# Patient Record
Sex: FEMALE | Race: WHITE | Employment: OTHER | URBAN - METROPOLITAN AREA
[De-identification: names, ages, dates, MRNs, and addresses within clinical notes are randomized per-mention and may not be internally consistent; named-entity substitution may affect disease eponyms.]

---

## 2017-01-05 ENCOUNTER — TELEPHONE (OUTPATIENT)
Dept: FAMILY MEDICINE | Facility: CLINIC | Age: 72
End: 2017-01-05

## 2017-01-05 DIAGNOSIS — I10 ESSENTIAL HYPERTENSION: ICD-10-CM

## 2017-01-05 DIAGNOSIS — N18.2 CKD (CHRONIC KIDNEY DISEASE) STAGE 2, GFR 60-89 ML/MIN: Primary | ICD-10-CM

## 2017-01-05 NOTE — TELEPHONE ENCOUNTER
Routing refill request to provider for review/approval because:  --So far only ordered by Ren[facility].  Needs to be ordered this time by G PCP.  --See patient's comments in previous documentation this encounter.      Last office visit : 11/22/16 Wegener.     POTASSIUM   Date Value Ref Range Status   11/17/2016 3.7 3.4 - 5.3 mmol/L Final   ]  CREATININE   Date Value Ref Range Status   11/17/2016 0.87 0.52 - 1.04 mg/dL Final   ]  BP Readings from Last 3 Encounters:   11/22/16 126/70   06/27/16 122/68   02/12/16 122/64

## 2017-01-05 NOTE — TELEPHONE ENCOUNTER
Reason for Call:  Medication or medication refill: metoprolol (LOPRESSOR) 25 MG tablet & lisinopril (PRINIVIL,ZESTRIL) 2.5 MG tablet    Do you use a [facility]?  Name of the pharmacy and phone number for the current request:  [facility] - [address]    Name of the medication requested: metoprolol (LOPRESSOR) 25 MG tablet & lisinopril (PRINIVIL,ZESTRIL) 2.5 MG tablet    Other request: Patient spoke with wegener previously about these medications. She no longer sees the original provider that dispensed these and would like wegener to start prescribing them    Can we leave a detailed message on this number? YES    Phone number patient can be reached at: Home number on file [phone] (home)    Best Time: anytime    Call taken on 1/5/2017 at 3:05 PM by Candace Taveras

## 2017-01-06 RX ORDER — METOPROLOL TARTRATE 25 MG/1
25 TABLET, FILM COATED ORAL 2 TIMES DAILY
Qty: 180 TABLET | Refills: 3 | Status: SHIPPED | OUTPATIENT
Start: 2017-01-06 | End: 2017-11-28

## 2017-01-06 RX ORDER — LISINOPRIL 2.5 MG/1
2.5 TABLET ORAL DAILY
Qty: 90 TABLET | Refills: 3 | Status: SHIPPED | OUTPATIENT
Start: 2017-01-06 | End: 2017-11-28

## 2017-01-10 DIAGNOSIS — E11.22 TYPE 2 DIABETES WITH STAGE 2 CHRONIC KIDNEY DISEASE GFR 60-89 (H): Primary | ICD-10-CM

## 2017-01-10 DIAGNOSIS — N18.2 TYPE 2 DIABETES WITH STAGE 2 CHRONIC KIDNEY DISEASE GFR 60-89 (H): Primary | ICD-10-CM

## 2017-01-10 NOTE — TELEPHONE ENCOUNTER
Prescription approved per [facility] Refill Protocol.    Signed Prescriptions:                        Disp   Refills    insulin pen needle (BD REY U/F) 32G X 4 MM90 each0        Sig: Use once daily as directed with lantus solostar  Authorizing Provider: WEGENER, JOEL DANIEL IRWIN  Ordering User: OLMAN HINES      Closing encounter - no further actions needed at this time    Olman Hines RN

## 2017-01-10 NOTE — TELEPHONE ENCOUNTER
Requesting 90 day supply  BD PEN REY      Last Written Prescription Date: 10/20/16  Last Fill Quantity: 90,  # refills: 0   Last Office Visit with FMG, [facility] or [facility] prescribing provider: 11/22/16

## 2017-02-02 DIAGNOSIS — E11.9 TYPE 2 DIABETES MELLITUS WITHOUT COMPLICATION, WITHOUT LONG-TERM CURRENT USE OF INSULIN (H): Primary | ICD-10-CM

## 2017-02-02 RX ORDER — INSULIN GLARGINE 100 [IU]/ML
45 INJECTION, SOLUTION SUBCUTANEOUS AT BEDTIME
Qty: 45 ML | Refills: 1 | Status: SHIPPED | OUTPATIENT
Start: 2017-02-02 | End: 2017-03-08

## 2017-02-02 NOTE — TELEPHONE ENCOUNTER
Patient states that she called her pharmacy for a refill on her Lantus Solastar on 1/24/17 and they have not heard back  Entered refill request  Patient says she is currently using 40 units at bedtime and she will be going to 45 units  Needs a 3 month supply or 4 boxes sent in due to cost.  Routing to provider due to change in dose.  Please have MA call patient when prescription has been sent.  OK to leave a message on voice mail.  Tessie Paez RN

## 2017-02-13 ENCOUNTER — OFFICE VISIT (OUTPATIENT)
Dept: OPHTHALMOLOGY | Facility: CLINIC | Age: 72
End: 2017-02-13
Attending: OPHTHALMOLOGY
Payer: COMMERCIAL

## 2017-02-13 DIAGNOSIS — E11.9 TYPE 2 DIABETES MELLITUS WITHOUT COMPLICATION, WITH LONG-TERM CURRENT USE OF INSULIN (H): Primary | ICD-10-CM

## 2017-02-13 DIAGNOSIS — Z79.4 TYPE 2 DIABETES MELLITUS WITHOUT COMPLICATION, WITH LONG-TERM CURRENT USE OF INSULIN (H): Primary | ICD-10-CM

## 2017-02-13 PROCEDURE — 99214 OFFICE O/P EST MOD 30 MIN: CPT | Mod: ZF

## 2017-02-13 ASSESSMENT — REFRACTION_WEARINGRX
OD_AXIS: 014
OS_CYLINDER: +1.25
OS_ADD: +2.50
OS_AXIS: 012
OD_SPHERE: -2.50
OD_CYLINDER: +2.25
OD_ADD: +2.50
SPECS_TYPE: PAL
OS_SPHERE: -2.00

## 2017-02-13 ASSESSMENT — CONF VISUAL FIELD
OS_NORMAL: 1
OD_NORMAL: 1

## 2017-02-13 ASSESSMENT — VISUAL ACUITY
OD_CC+: -2
OS_CC+: +1
OS_CC: 20/25
OD_CC: 20/40
METHOD: SNELLEN - LINEAR
OD_PH_CC: 20/40
CORRECTION_TYPE: GLASSES

## 2017-02-13 ASSESSMENT — EXTERNAL EXAM - RIGHT EYE: OD_EXAM: NORMAL

## 2017-02-13 ASSESSMENT — SLIT LAMP EXAM - LIDS
COMMENTS: NORMAL
COMMENTS: NORMAL

## 2017-02-13 ASSESSMENT — CUP TO DISC RATIO
OS_RATIO: 0.2
OD_RATIO: 0.2

## 2017-02-13 ASSESSMENT — EXTERNAL EXAM - LEFT EYE: OS_EXAM: NORMAL

## 2017-02-13 ASSESSMENT — TONOMETRY
IOP_METHOD: TONOPEN
OD_IOP_MMHG: 22
OS_IOP_MMHG: 24

## 2017-02-13 NOTE — PROGRESS NOTES
TODAY'S VISIT:     CHIEF COMPLAINT:  Patient is a 71 year old female who presents to [facility] for diabetic eye exam    HPI:    Most recent Hgb A1c of 8.0 on 11/16    Endorsed seeing new floaters in upper field of view 1 month ago, not sure if it was in both eyes    Denies flashes of light or pain/irritation during that incident    Vision has gradually decreased over the years, noticing that she needed her glasses at home    Current glasses that she's wearing is about 8-9 yrs old    OPHTHALMIC/MEDICAL HISTORY:    Type 2 Diabetes mellitus    Myopic astigmatism w/ presbyopia both eyes    OPHTHALMIC PROCEDURE/SURGICAL HISTORY:    None    CURRENT OPHTHALMIC MEDICATIONS:    None    RETINAL IMAGING:    None      ASSESSMENT / PLAN:        1. Type 2 Diabetes mellitus, no ocular manifestations:   - continue excellent Diabetes mellitus management   - A1C is 8.0 per recent labs 11/2016   - no diabetic retinopathy noted today     2.  Trace Nuclear sclerotic cataract     - updated eyeglasses prescription given    Return to clinic in 1 year for dilated eye exam         Josette Houston MD PhD.  Professor & Chair

## 2017-02-13 NOTE — MR AVS SNAPSHOT
After Visit Summary   2/13/2017    Sarah Felix    MRN: [id number]           Patient Information     Date Of Birth          1945        Visit Information        Provider Department      2/13/2017 1:00 PM Josette Rodriguez MD [facility]        Today's Diagnoses     Type 2 diabetes mellitus without complication, with long-term current use of insulin (H)    -  1       Follow-ups after your visit        Follow-up notes from your care team     Return in about 1 year (around 2/13/2018) for Diabetic exam.      Who to contact     Please call your clinic at [phone] to:    Ask questions about your health    Make or cancel appointments    Discuss your medicines    Learn about your test results    Speak to your doctor   If you have compliments or concerns about an experience at your clinic, or if you wish to file a complaint, please contact [facility] Patient Relations at [phone] or email us at [email or web address]         Additional Information About Your Visit        MyChart Information     [employer] gives you secure access to your electronic health record. If you see a primary care provider, you can also send messages to your care team and make appointments. If you have questions, please call your primary care clinic.  If you do not have a primary care provider, please call [phone] and they will assist you.      [employer] is an electronic gateway that provides easy, online access to your medical records. With [employer], you can request a clinic appointment, read your test results, renew a prescription or communicate with your care team.     To access your existing account, please contact your [facility] or call [phone] for assistance.        Care EveryWhere ID     This is your Care EveryWhere ID. This could be used by other organizations to access your [place] medical records  [id number]         Blood  Pressure from Last 3 Encounters:   11/22/16 126/70   06/27/16 122/68   02/12/16 122/64    Weight from Last 3 Encounters:   11/22/16 90.7 kg (200 lb)   06/27/16 89.4 kg (197 lb)   02/12/16 89.8 kg (198 lb)              Today, you had the following     No orders found for display       Primary Care Provider Office Phone # Fax #    Joel Daniel Irwin Wegener, [phone]       [facility] [address] [facility] [address]        Thank you!     Thank you for choosing EYE CLINIC  for your care. Our goal is always to provide you with excellent care. Hearing back from our patients is one way we can continue to improve our services. Please take a few minutes to complete the written survey that you may receive in the mail after your visit with us. Thank you!             Your Updated Medication List - Protect others around you: Learn how to safely use, store and throw away your medicines at www.disposemymeds.org.          This list is accurate as of: 2/13/17  1:59 PM.  Always use your most recent med list.                   Brand Name Dispense Instructions for use    ACE/ARB NOT PRESCRIBED (INTENTIONAL)      by Other route continuous prn.       amoxicillin-clavulanate 875-125 MG per tablet    AUGMENTIN    28 tablet    Take 1 tablet by mouth 2 times daily       aspirin 81 MG tablet     60 tablet    Take 2 tablets (162 mg) by mouth daily       atorvastatin 40 MG tablet    LIPITOR    90 tablet    Take 1 tablet (40 mg) by mouth daily       blood glucose monitoring meter device kit     1 kit    Use to test blood sugars 2 times daily as directed.       blood glucose monitoring test strip    ONE TOUCH ULTRA    360 each    1 strip by In Vitro route 4 times daily.       CALCIUM 500 + D 500-200 MG-IU Tabs     100    one tab twice per day       COLACE PO      Take  by mouth as needed.       furosemide 20 MG tablet    LASIX    270 tablet    Two in am (40mg) and one tablet (20mg) in afternoon       glimepiride 4  MG tablet    AMARYL    180 tablet    Take 1 tablet (4 mg) by mouth 2 times daily (with meals)       insulin pen needle 32G X 4 MM    BD REY U/F    90 each    Use once daily as directed with lantus solostar       LANTUS SOLOSTAR 100 UNIT/ML injection   Generic drug:  insulin glargine     45 mL    Inject 45 Units Subcutaneous At Bedtime       lisinopril 2.5 MG tablet    PRINIVIL/Zestril    90 tablet    Take 1 tablet (2.5 mg) by mouth daily       MAALOX REGULAR STRENGTH 200-200-20 MG/5ML Susp suspension   Generic drug:  alum & mag hydroxide-simethicone      Take 30 mLs by mouth At Bedtime.       metoprolol 25 MG tablet    LOPRESSOR    180 tablet    Take 1 tablet (25 mg) by mouth 2 times daily       ONETOUCH DELICA LANCETS 33G Misc     360 each    1 each 4 times daily       sitagliptin 25 MG tablet    JANUVIA    30 tablet    Take 1 tablet (25 mg) by mouth daily       triamcinolone 0.1 % cream    KENALOG    80 g    Apply sparingly to affected area two times daily as needed       VITAMIN D PO      Take  by mouth. Pt consuming 3000 units per day

## 2017-02-13 NOTE — NURSING NOTE
Chief Complaints and History of Present Illnesses   Patient presents with     Follow Up For     annual diabetic eye exam     HPI    Affected eye(s):  Both   Symptoms:     No decreased vision   No floaters   No flashes   No tearing   No Dryness      Duration:  1 year      Do you have eye pain now?:  No      Comments:  Pt here for annual diabetic eye exam  Pt notes some blurred vision, more so in the mornings, occasionally    BS was 130 this AM  Lab Results       Component                Value               Date                       A1C                      8.0                 11/17/2016                 A1C                      7.9                 06/27/2016                 A1C                      7.9                 02/12/2016                 A1C                      7.1                 10/19/2015                 A1C                      7.5                 06/19/2015              Janay Martinez COA 1:22 PM February 13, 2017

## 2017-03-08 ENCOUNTER — OFFICE VISIT (OUTPATIENT)
Dept: FAMILY MEDICINE | Facility: CLINIC | Age: 72
End: 2017-03-08
Payer: COMMERCIAL

## 2017-03-08 VITALS
HEART RATE: 78 BPM | BODY MASS INDEX: 33.07 KG/M2 | TEMPERATURE: 98.5 F | SYSTOLIC BLOOD PRESSURE: 104 MMHG | DIASTOLIC BLOOD PRESSURE: 60 MMHG | HEIGHT: 65 IN | OXYGEN SATURATION: 93 % | WEIGHT: 198.5 LBS

## 2017-03-08 DIAGNOSIS — N18.2 TYPE 2 DIABETES MELLITUS WITH STAGE 2 CHRONIC KIDNEY DISEASE, WITHOUT LONG-TERM CURRENT USE OF INSULIN (H): Primary | ICD-10-CM

## 2017-03-08 DIAGNOSIS — E11.9 TYPE 2 DIABETES MELLITUS WITHOUT COMPLICATION, WITHOUT LONG-TERM CURRENT USE OF INSULIN (H): ICD-10-CM

## 2017-03-08 DIAGNOSIS — E11.22 TYPE 2 DIABETES MELLITUS WITH STAGE 2 CHRONIC KIDNEY DISEASE, WITHOUT LONG-TERM CURRENT USE OF INSULIN (H): Primary | ICD-10-CM

## 2017-03-08 LAB — HBA1C MFR BLD: 7.9 % (ref 4.3–6)

## 2017-03-08 PROCEDURE — 83036 HEMOGLOBIN GLYCOSYLATED A1C: CPT | Performed by: FAMILY MEDICINE

## 2017-03-08 PROCEDURE — 36415 COLL VENOUS BLD VENIPUNCTURE: CPT | Performed by: FAMILY MEDICINE

## 2017-03-08 PROCEDURE — 99213 OFFICE O/P EST LOW 20 MIN: CPT | Performed by: FAMILY MEDICINE

## 2017-03-08 RX ORDER — INSULIN GLARGINE 100 [IU]/ML
45 INJECTION, SOLUTION SUBCUTANEOUS AT BEDTIME
Qty: 60 ML | Refills: 3 | Status: SHIPPED | OUTPATIENT
Start: 2017-03-08 | End: 2017-03-21

## 2017-03-08 NOTE — PROGRESS NOTES
"  SUBJECTIVE:                                                    Sarah Felix is a 71 year old female who presents to clinic today for the following health issues:      Diabetes Follow-up    Patient is checking blood sugars: twice daily.    Blood sugar testing frequency justification: none  Results are as follows:         am - 150's    Diabetic concerns: None and other - medications/ cost     Symptoms of hypoglycemia (low blood sugar): shaky only 1 time     Paresthesias (numbness or burning in feet) or sores: No     Date of last diabetic eye exam:        Amount of exercise or physical activity: None    Problems taking medications regularly: No    Medication side effects: none  Diet: regular (no restrictions)         Additional history/life update:       Type 2 diabetes mellitus with stage 2 chronic kidney disease, without long-term current use of insulin (H)  Type 2 diabetes mellitus without complication, without long-term current use of insulin (H) :has been taking januvia but one month over $200. Trouble with donut hole last year.     Medical, social, surgical, and family histories reviewed.      REVIEW OF SYSTEMS FOR GENERAL, RESPIRATORY, CV, GI AND PSYCHIATRIC NEGATIVE EXCEPT AS NOTED IN HPI.         OBJECTIVE:  /60 (BP Location: Left arm, Patient Position: Chair, Cuff Size: Adult Large)  Pulse 78  Temp 98.5  F (36.9  C) (Oral)  Ht 5' 5\" (1.651 m)  Wt 198 lb 8 oz (90 kg)  SpO2 93%  BMI 33.03 kg/m2    EXAM:  GENERAL APPEARANCE: healthy, alert and no distress  RESP: lungs clear to auscultation - no rales, rhonchi or wheezes  CV: regular rates and rhythm, normal S1 S2, no S3 or S4 and no murmur, click or rub -      Lab Results   Component Value Date    A1C 7.9 03/08/2017    A1C 8.0 11/17/2016    A1C 7.9 06/27/2016    A1C 7.9 02/12/2016    A1C 7.1 10/19/2015        ASSESSMENT AND PLAN  Patient Instructions   ASSESSMENT AND PLAN  1. Type 2 diabetes mellitus with stage 2 chronic kidney disease, without " long-term current use of insulin (H)  a1c stable despite januvia which is expensive so will stop that.      Refilled lantus at 45 units daily but needs 4 boxes to be able to use this much insulin.     Follow up 5-6 months for next check.     I can help look at insurance next fall to see if there are non donut hole options which may be helpful to reduce cost or our pharm D can do this as well.     please put in kayce DOWNS for diabetes fu visit at 3:00 on september 5th    - Hemoglobin A1c    2. Type 2 diabetes mellitus without complication, without long-term current use of insulin (H)    - LANTUS SOLOSTAR 100 UNIT/ML soln; Inject 45 Units Subcutaneous At Bedtime Needs four boxes for three month supply.  Dispense: 60 mL; Refill: 3        MYCHART SIGNUP FOR E-VISITS AND EASIER COMMUNICATION:  [email or web address]     Zipnosis:  [email or web address].  Sign up for e-visits for common illnesses!     RADIOLOGY:   [facility]:  [phone] to schedule any radiology tests at [facility]: [phone]    Mammograms/colonoscopies:  [phone]    CONSUMER PRICE LINE for estimates of test costs:  [phone]         More than 1/2 of 25 minutes spent counseling/coordination of care today.       Joel Wegener,MD

## 2017-03-08 NOTE — NURSING NOTE
"Chief Complaint   Patient presents with     Diabetes       Initial /60 (BP Location: Left arm, Patient Position: Chair, Cuff Size: Adult Large)  Pulse 78  Temp 98.5  F (36.9  C) (Oral)  Ht 5' 5\" (1.651 m)  Wt 198 lb 8 oz (90 kg)  SpO2 93%  BMI 33.03 kg/m2 Estimated body mass index is 33.03 kg/(m^2) as calculated from the following:    Height as of this encounter: 5' 5\" (1.651 m).    Weight as of this encounter: 198 lb 8 oz (90 kg).  Medication Reconciliation: complete Nenita Merchant MA      "

## 2017-03-08 NOTE — MR AVS SNAPSHOT
After Visit Summary   3/8/2017    Kayce Felix    MRN: [id number]           Patient Information     Date Of Birth          1945        Visit Information        Provider Department      3/8/2017 3:00 PM Wegener, Joel Daniel Irwin, MD [facility]        Today's Diagnoses     Type 2 diabetes mellitus with stage 2 chronic kidney disease, without long-term current use of insulin (H)    -  1    Type 2 diabetes mellitus without complication, without long-term current use of insulin (H)          Care Instructions    ASSESSMENT AND PLAN  1. Type 2 diabetes mellitus with stage 2 chronic kidney disease, without long-term current use of insulin (H)  a1c stable despite januvia which is expensive so will stop that.      Refilled lantus at 45 units daily but needs 4 boxes to be able to use this much insulin.     Follow up 5-6 months for next check.     I can help look at insurance next fall to see if there are non donut hole options which may be helpful to reduce cost or our pharm D can do this as well.     please put in kayce DOWNS for diabetes fu visit at 3:00 on september 5th    - Hemoglobin A1c    2. Type 2 diabetes mellitus without complication, without long-term current use of insulin (H)    - LANTUS SOLOSTAR 100 UNIT/ML soln; Inject 45 Units Subcutaneous At Bedtime Needs four boxes for three month supply.  Dispense: 60 mL; Refill: 3        MYCHART SIGNUP FOR E-VISITS AND EASIER COMMUNICATION:  [email or web address]     Zipnosis:  [email or web address].[email or web address].[place] [employer]I.  Sign up for e-visits for common illnesses!     RADIOLOGY:   [facility]:  [phone] to schedule any radiology tests at [facility]: [phone]    Mammograms/colonoscopies:  [phone]    CONSUMER PRICE LINE for estimates of test costs:  [phone]             Follow-ups after your visit        Follow-up notes from your care team     Write patient with results Return in 3 months (on 6/8/2017) for  "QUARTERLY DIABETIC CHECK.      Your next 10 appointments already scheduled     Feb 15, 2018  1:00 PM CST   RETURN RETINA with Josette Rodriguez MD   Eye Clinic ([facility])    [address]  [address] [facility]  9th Fl Clin [address] [place] [address] [id number] [phone]              Who to contact     If you have questions or need follow up information about today's clinic visit or your schedule please contact [facility] directly at [phone].  Normal or non-critical lab and imaging results will be communicated to you by [employer], letter or phone within 4 business days after the clinic has received the results. If you do not hear from us within 7 days, please contact the clinic through [employer] or phone. If you have a critical or abnormal lab result, we will notify you by phone as soon as possible.  Submit refill requests through [employer] or call your pharmacy and they will forward the refill request to us. Please allow 3 business days for your refill to be completed.          Additional Information About Your Visit        [employer] In Store Information     [employer] gives you secure access to your electronic health record. If you see a primary care provider, you can also send messages to your care team and make appointments. If you have questions, please call your primary care clinic.  If you do not have a primary care provider, please call [phone] and they will assist you.        Care EveryWhere ID     This is your Care EveryWhere ID. This could be used by other organizations to access your [place] medical records  [id number]        Your Vitals Were     Pulse Temperature Height Pulse Oximetry BMI (Body Mass Index)       78 98.5  F (36.9  C) (Oral) 5' 5\" (1.651 m) 93% 33.03 kg/m2        Blood Pressure from Last 3 Encounters:   03/08/17 104/60   11/22/16 126/70   06/27/16 122/68    Weight from Last 3 Encounters:   03/08/17 198 lb 8 oz (90 kg)   11/22/16 200 lb (90.7 kg)   06/27/16 " 197 lb (89.4 kg)              We Performed the Following     Hemoglobin A1c          Today's Medication Changes          These changes are accurate as of: 3/8/17  4:08 PM.  If you have any questions, ask your nurse or doctor.               These medicines have changed or have updated prescriptions.        Dose/Directions    LANTUS SOLOSTAR 100 UNIT/ML injection   This may have changed:  additional instructions   Used for:  Type 2 diabetes mellitus without complication, without long-term current use of insulin (H)   Generic drug:  insulin glargine   Changed by:  Wegener, Joel Daniel Irwin, MD        Dose:  45 Units   Inject 45 Units Subcutaneous At Bedtime Needs four boxes for three month supply.   Quantity:  60 mL   Refills:  3         Stop taking these medicines if you haven't already. Please contact your care team if you have questions.     sitagliptin 25 MG tablet   Commonly known as:  JANUVIA   Stopped by:  Wegener, Joel Daniel Irwin, MD                Where to get your medicines      These medications were sent to [facility] [address] [address]     Phone:  [phone]     LANTUS SOLOSTAR 100 UNIT/ML injection                Primary Care Provider Office Phone # Fax #    Joel Daniel Irwin Wegener, [phone]       [address] [address]        Thank you!     Thank you for choosing [facility]  for your care. Our goal is always to provide you with excellent care. Hearing back from our patients is one way we can continue to improve our services. Please take a few minutes to complete the written survey that you may receive in the mail after your visit with us. Thank you!             Your Updated Medication List - Protect others around you: Learn how to safely use, store and throw away your medicines at www.disposemymeds.org.          This list is accurate as of: 3/8/17   4:08 PM.  Always use your most recent med list.                   Brand Name Dispense Instructions for use    ACE/ARB NOT PRESCRIBED (INTENTIONAL)      by Other route continuous prn.       amoxicillin-clavulanate 875-125 MG per tablet    AUGMENTIN    28 tablet    Take 1 tablet by mouth 2 times daily       aspirin 81 MG tablet     60 tablet    Take 2 tablets (162 mg) by mouth daily       atorvastatin 40 MG tablet    LIPITOR    90 tablet    Take 1 tablet (40 mg) by mouth daily       blood glucose monitoring meter device kit     1 kit    Use to test blood sugars 2 times daily as directed.       blood glucose monitoring test strip    ONE TOUCH ULTRA    360 each    1 strip by In Vitro route 4 times daily.       CALCIUM 500 + D 500-200 MG-IU Tabs     100    one tab twice per day       COLACE PO      Take  by mouth as needed.       furosemide 20 MG tablet    LASIX    270 tablet    Two in am (40mg) and one tablet (20mg) in afternoon       glimepiride 4 MG tablet    AMARYL    180 tablet    Take 1 tablet (4 mg) by mouth 2 times daily (with meals)       insulin pen needle 32G X 4 MM    BD REY U/F    90 each    Use once daily as directed with lantus solostar       LANTUS SOLOSTAR 100 UNIT/ML injection   Generic drug:  insulin glargine     60 mL    Inject 45 Units Subcutaneous At Bedtime Needs four boxes for three month supply.       lisinopril 2.5 MG tablet    PRINIVIL/Zestril    90 tablet    Take 1 tablet (2.5 mg) by mouth daily       MAALOX REGULAR STRENGTH 200-200-20 MG/5ML Susp suspension   Generic drug:  alum & mag hydroxide-simethicone      Take 30 mLs by mouth At Bedtime.       metoprolol 25 MG tablet    LOPRESSOR    180 tablet    Take 1 tablet (25 mg) by mouth 2 times daily       ONETOUCH DELICA LANCETS 33G Misc     360 each    1 each 4 times daily       triamcinolone 0.1 % cream    KENALOG    80 g    Apply sparingly to affected area two times daily as needed       VITAMIN D PO      Take  by mouth. Pt consuming 3000  units per day

## 2017-03-08 NOTE — PATIENT INSTRUCTIONS
ASSESSMENT AND PLAN  1. Type 2 diabetes mellitus with stage 2 chronic kidney disease, without long-term current use of insulin (H)  a1c stable despite januvia which is expensive so will stop that.      Refilled lantus at 45 units daily but needs 4 boxes to be able to use this much insulin.     Follow up 5-6 months for next check.     I can help look at insurance next fall to see if there are non donut hole options which may be helpful to reduce cost or our pharm D can do this as well.     please put in kayce DOWNS for diabetes fu visit at 3:00 on september 5th    - Hemoglobin A1c    2. Type 2 diabetes mellitus without complication, without long-term current use of insulin (H)    - LANTUS SOLOSTAR 100 UNIT/ML soln; Inject 45 Units Subcutaneous At Bedtime Needs four boxes for three month supply.  Dispense: 60 mL; Refill: 3        MYCHART SIGNUP FOR E-VISITS AND EASIER COMMUNICATION:  [email or web address]     Zipnosis:  [email or web address].  Sign up for e-visits for common illnesses!     RADIOLOGY:   [facility]:  [phone] to schedule any radiology tests at [facility]: [phone]    Mammograms/colonoscopies:  [phone]    CONSUMER PRICE LINE for estimates of test costs:  [phone]

## 2017-03-21 DIAGNOSIS — Z79.4 TYPE 2 DIABETES MELLITUS WITH STAGE 2 CHRONIC KIDNEY DISEASE, WITH LONG-TERM CURRENT USE OF INSULIN (H): ICD-10-CM

## 2017-03-21 DIAGNOSIS — N18.2 TYPE 2 DIABETES MELLITUS WITH STAGE 2 CHRONIC KIDNEY DISEASE, WITH LONG-TERM CURRENT USE OF INSULIN (H): ICD-10-CM

## 2017-03-21 DIAGNOSIS — E11.22 TYPE 2 DIABETES MELLITUS WITH STAGE 2 CHRONIC KIDNEY DISEASE, WITH LONG-TERM CURRENT USE OF INSULIN (H): ICD-10-CM

## 2017-03-21 NOTE — TELEPHONE ENCOUNTER
Patient is not able at this time to get the 4 boxes of insulin for a 3 month supply on the insulin due to wording of prescription  Per patient, she was told by [facility] that patient directions need to say to give 45 units and up to 60 units at bedtime. Then she should not have a problem getting 4 boxes for a 3 month supply  Order pended.  Provider to sign is he agrees.  Tessie Paez RN

## 2017-03-23 RX ORDER — PEN NEEDLE, DIABETIC 32GX 5/32"
NEEDLE, DISPOSABLE MISCELLANEOUS
Qty: 90 EACH | Refills: 3 | Status: SHIPPED | OUTPATIENT
Start: 2017-03-23 | End: 2019-05-21

## 2017-03-23 RX ORDER — INSULIN GLARGINE 100 [IU]/ML
INJECTION, SOLUTION SUBCUTANEOUS
Qty: 60 ML | Refills: 3 | Status: SHIPPED | OUTPATIENT
Start: 2017-03-23 | End: 2017-03-28

## 2017-03-28 DIAGNOSIS — N18.2 TYPE 2 DIABETES MELLITUS WITH STAGE 2 CHRONIC KIDNEY DISEASE, WITH LONG-TERM CURRENT USE OF INSULIN (H): ICD-10-CM

## 2017-03-28 DIAGNOSIS — E11.22 TYPE 2 DIABETES MELLITUS WITH STAGE 2 CHRONIC KIDNEY DISEASE, WITH LONG-TERM CURRENT USE OF INSULIN (H): ICD-10-CM

## 2017-03-28 DIAGNOSIS — Z79.4 TYPE 2 DIABETES MELLITUS WITH STAGE 2 CHRONIC KIDNEY DISEASE, WITH LONG-TERM CURRENT USE OF INSULIN (H): ICD-10-CM

## 2017-03-28 NOTE — TELEPHONE ENCOUNTER
Medication Detail      Disp Refills Start End CHAD   JENY SOLOSTAR 100 UNIT/ML soln 60 mL 3 3/23/2017  Yes   Sig: Inject 45 units and up to 60 units at bedtime.  Needs four boxes for three month supply.       Last Office Visit with FMG, [facility] or [facility] prescribing provider: 3/8/17

## 2017-03-30 RX ORDER — INSULIN GLARGINE 100 [IU]/ML
INJECTION, SOLUTION SUBCUTANEOUS
Qty: 60 ML | Refills: 3 | Status: SHIPPED | OUTPATIENT
Start: 2017-03-30 | End: 2018-11-14

## 2017-03-30 NOTE — TELEPHONE ENCOUNTER
Sig clarification requested.    Writer adjust sig to state: Inject between 45 units and 60 units at bedtime.  Needs four boxes for three month supply.    Routing to PCP.    Dr. Wegener-Please review and sign if agree.    Thank you!  PASCALE De León, MEGANN, RN

## 2017-04-14 DIAGNOSIS — E78.5 HYPERLIPIDEMIA LDL GOAL <100: ICD-10-CM

## 2017-04-14 NOTE — TELEPHONE ENCOUNTER
Medication Detail      Disp Refills Start End CHAD   atorvastatin (LIPITOR) 40 MG tablet 90 tablet 2 9/8/2016  No   Sig: Take 1 tablet (40 mg) by mouth daily   Class: E-Prescribe   Route: Oral   Order: [id number]       Last Office Visit with [facility], P or [facility] prescribing provider: 3/8/2017       Lab Results   Component Value Date    CHOL 156 06/27/2016     Lab Results   Component Value Date    HDL 44 06/27/2016     Lab Results   Component Value Date    LDL 78 06/27/2016     Lab Results   Component Value Date    TRIG 171 06/27/2016     Lab Results   Component Value Date    CHOLHDLRATIO 3.4 07/09/2013

## 2017-04-17 RX ORDER — ATORVASTATIN CALCIUM 40 MG/1
TABLET, FILM COATED ORAL
Qty: 90 TABLET | Refills: 0 | Status: SHIPPED | OUTPATIENT
Start: 2017-04-17 | End: 2017-07-09

## 2017-04-17 NOTE — TELEPHONE ENCOUNTER
Prescription approved per [facility] Refill Protocol.    Signed Prescriptions:                        Disp   Refills    atorvastatin (LIPITOR) 40 MG tablet        90 tab*0        Sig: TAKE 1 TABLET DAILY  Authorizing Provider: WEGENER, JOEL DANIEL IRWIN  Ordering User: OLMAN HINES      Closing encounter - no further actions needed at this time    Olman Hines RN

## 2017-05-10 ENCOUNTER — TELEPHONE (OUTPATIENT)
Dept: FAMILY MEDICINE | Facility: CLINIC | Age: 72
End: 2017-05-10

## 2017-05-10 ENCOUNTER — OFFICE VISIT (OUTPATIENT)
Dept: FAMILY MEDICINE | Facility: CLINIC | Age: 72
End: 2017-05-10
Payer: COMMERCIAL

## 2017-05-10 VITALS
WEIGHT: 199 LBS | DIASTOLIC BLOOD PRESSURE: 72 MMHG | OXYGEN SATURATION: 92 % | TEMPERATURE: 99.2 F | BODY MASS INDEX: 33.12 KG/M2 | HEART RATE: 92 BPM | SYSTOLIC BLOOD PRESSURE: 112 MMHG

## 2017-05-10 DIAGNOSIS — M25.562 ACUTE PAIN OF LEFT KNEE: Primary | ICD-10-CM

## 2017-05-10 DIAGNOSIS — M79.89 CALF SWELLING: ICD-10-CM

## 2017-05-10 PROCEDURE — 99214 OFFICE O/P EST MOD 30 MIN: CPT | Performed by: FAMILY MEDICINE

## 2017-05-10 RX ORDER — WARFARIN SODIUM 4 MG/1
4 TABLET ORAL DAILY
Qty: 30 TABLET | Refills: 0 | Status: SHIPPED | OUTPATIENT
Start: 2017-05-10 | End: 2018-04-20

## 2017-05-10 RX ORDER — OXYCODONE AND ACETAMINOPHEN 5; 325 MG/1; MG/1
1 TABLET ORAL 4 TIMES DAILY
Qty: 24 TABLET | Refills: 0 | Status: SHIPPED | OUTPATIENT
Start: 2017-05-10 | End: 2017-05-16

## 2017-05-10 RX ORDER — PREDNISONE 20 MG/1
40 TABLET ORAL DAILY
Qty: 10 TABLET | Refills: 0 | Status: SHIPPED | OUTPATIENT
Start: 2017-05-10 | End: 2017-05-15

## 2017-05-10 ASSESSMENT — PAIN SCALES - GENERAL: PAINLEVEL: WORST PAIN (10)

## 2017-05-10 NOTE — MR AVS SNAPSHOT
After Visit Summary   5/10/2017    Sarah Felix    MRN: [id number]           Patient Information     Date Of Birth          1945        Visit Information        Provider Department      5/10/2017 1:20 PM Wegener, Joel Daniel Irwin, MD [facility]        Today's Diagnoses     Acute pain of left knee    -  1    Calf swelling          Care Instructions    ASSESSMENT AND PLAN  1. Acute pain of left knee  Will do ultrasound today, if dvt will start enoxaparin 90mg twice daily tonight and coumadin 4 mg daily.     Will make INR check for this Friday and follow up with me 1:00 next Tuesday.     If no dvt then start prednisone 40mg daily and do compression icing 3-4 times a day for 15 minutes to treat for presumed gout.     Percocet given to use four times daily for pain.         - oxyCODONE-acetaminophen (PERCOCET) 5-325 MG per tablet; Take 1 tablet by mouth 4 times daily for 6 days maximum 4 tablet(s) per day  Dispense: 24 tablet; Refill: 0  - US Lower Extremity Venous Duplex Left; Future  - predniSONE (DELTASONE) 20 MG tablet; Take 2 tablets (40 mg) by mouth daily for 5 days  Dispense: 10 tablet; Refill: 0  - warfarin (COUMADIN) 4 MG tablet; Take 1 tablet (4 mg) by mouth daily  Dispense: 30 tablet; Refill: 0  - enoxaparin (LOVENOX) 100 MG/ML injection; Inject 0.9 mLs (90 mg) Subcutaneous 2 times daily for 10 days  Dispense: 18 mL; Refill: 0    2. Calf swelling  As above.     - US Lower Extremity Venous Duplex Left; Future  - predniSONE (DELTASONE) 20 MG tablet; Take 2 tablets (40 mg) by mouth daily for 5 days  Dispense: 10 tablet; Refill: 0  - warfarin (COUMADIN) 4 MG tablet; Take 1 tablet (4 mg) by mouth daily  Dispense: 30 tablet; Refill: 0  - enoxaparin (LOVENOX) 100 MG/ML injection; Inject 0.9 mLs (90 mg) Subcutaneous 2 times daily for 10 days  Dispense: 18 mL; Refill: 0        MYCHART SIGNUP FOR E-VISITS AND EASIER COMMUNICATION:  [email or web address]     Zipnosis:   [facility].[employer]fly.[employer].  Sign up for e-visits for common illnesses!     RADIOLOGY:   [facility]:  [phone] to schedule any radiology tests at [facility]: [phone]    Mammograms/colonoscopies:  [phone]    CONSUMER PRICE LINE for estimates of test costs:  [phone]             Follow-ups after your visit        Your next 10 appointments already scheduled     Sep 05, 2017  3:00 PM CDT   SHORT with Joel Daniel Irwin Wegener, MD   [facility] ([facility]    [address] [place] [id number] [phone]            Feb 15, 2018  1:00 PM CST   RETURN RETINA with Josette Rodriguez MD   [facility]    [address]  [address]  [facility] [id number] [phone]              Future tests that were ordered for you today     Open Future Orders        Priority Expected Expires Ordered    US Lower Extremity Venous Duplex Left Routine  5/10/2018 5/10/2017            Who to contact     If you have questions or need follow up information about today's clinic visit or your schedule please contact [facility] directly at [phone].  Normal or non-critical lab and imaging results will be communicated to you by MyChart, letter or phone within 4 business days after the clinic has received the results. If you do not hear from us within 7 days, please contact the clinic through MyChart or phone. If you have a critical or abnormal lab result, we will notify you by phone as soon as possible.  Submit refill requests through [employer] or call your pharmacy and they will forward the refill request to us. Please allow 3 business days for your refill to be completed.          Additional Information About Your Visit        [employer] Information     [employer] gives you secure access to your electronic health record. If you see a primary care provider, you can also send messages to  your care team and make appointments. If you have questions, please call your primary care clinic.  If you do not have a primary care provider, please call [phone] and they will assist you.        Care EveryWhere ID     This is your Care EveryWhere ID. This could be used by other organizations to access your [facility] medical records  [id number]        Your Vitals Were     Pulse Temperature Pulse Oximetry BMI (Body Mass Index)          92 99.2  F (37.3  C) (Tympanic) 92% 33.12 kg/m2         Blood Pressure from Last 3 Encounters:   05/10/17 112/72   03/08/17 104/60   11/22/16 126/70    Weight from Last 3 Encounters:   05/10/17 199 lb (90.3 kg)   03/08/17 198 lb 8 oz (90 kg)   11/22/16 200 lb (90.7 kg)                 Today's Medication Changes          These changes are accurate as of: 5/10/17  2:13 PM.  If you have any questions, ask your nurse or doctor.               Start taking these medicines.        Dose/Directions    enoxaparin 100 MG/ML injection   Commonly known as:  LOVENOX   Used for:  Acute pain of left knee, Calf swelling   Started by:  Wegener, Joel Daniel Irwin, MD        Dose:  1 mg/kg   Inject 0.9 mLs (90 mg) Subcutaneous 2 times daily for 10 days   Quantity:  18 mL   Refills:  0       oxyCODONE-acetaminophen 5-325 MG per tablet   Commonly known as:  PERCOCET   Used for:  Acute pain of left knee   Started by:  Wegener, Joel Daniel Irwin, MD        Dose:  1 tablet   Take 1 tablet by mouth 4 times daily for 6 days maximum 4 tablet(s) per day   Quantity:  24 tablet   Refills:  0       predniSONE 20 MG tablet   Commonly known as:  DELTASONE   Used for:  Acute pain of left knee, Calf swelling   Started by:  Wegener, Joel Daniel Irwin, MD        Dose:  40 mg   Take 2 tablets (40 mg) by mouth daily for 5 days   Quantity:  10 tablet   Refills:  0       warfarin 4 MG tablet   Commonly known as:  COUMADIN   Used for:  Acute pain of left knee, Calf swelling   Started by:  Wegener, Joel Daniel Irwin, MD         Dose:  4 mg   Take 1 tablet (4 mg) by mouth daily   Quantity:  30 tablet   Refills:  0            Where to get your medicines      These medications were sent to [place], [place] - [address]  [address] [facility] [address]     Phone:  [phone]     predniSONE 20 MG tablet         Some of these will need a paper prescription and others can be bought over the counter.  Ask your nurse if you have questions.     Bring a paper prescription for each of these medications     enoxaparin 100 MG/ML injection    oxyCODONE-acetaminophen 5-325 MG per tablet    warfarin 4 MG tablet                Primary Care Provider Office Phone # Fax #    Joel Daniel Irwin Wegener, [phone]       [place] [address]  [facility] [address]        Thank you!     Thank you for choosing [facility]  for your care. Our goal is always to provide you with excellent care. Hearing back from our patients is one way we can continue to improve our services. Please take a few minutes to complete the written survey that you may receive in the mail after your visit with us. Thank you!             Your Updated Medication List - Protect others around you: Learn how to safely use, store and throw away your medicines at www.disposemymeds.org.          This list is accurate as of: 5/10/17  2:13 PM.  Always use your most recent med list.                   Brand Name Dispense Instructions for use    ACE/ARB NOT PRESCRIBED (INTENTIONAL)      by Other route continuous prn.       amoxicillin-clavulanate 875-125 MG per tablet    AUGMENTIN    28 tablet    Take 1 tablet by mouth 2 times daily       aspirin 81 MG tablet     60 tablet    Take 2 tablets (162 mg) by mouth daily       atorvastatin 40 MG tablet    LIPITOR    90 tablet    TAKE 1 TABLET DAILY       BD REY U/F 32G X 4 MM   Generic drug:  insulin pen needle     90 each    USE ONCE DAILY AS DIRECTED WITH LANTUS  SOLOSTAR       blood glucose monitoring meter device kit     1 kit    Use to test blood sugars 2 times daily as directed.       blood glucose monitoring test strip    ONE TOUCH ULTRA    360 each    1 strip by In Vitro route 4 times daily.       CALCIUM 500 + D 500-200 MG-IU Tabs     100    one tab twice per day       COLACE PO      Take  by mouth as needed.       enoxaparin 100 MG/ML injection    LOVENOX    18 mL    Inject 0.9 mLs (90 mg) Subcutaneous 2 times daily for 10 days       furosemide 20 MG tablet    LASIX    270 tablet    Two in am (40mg) and one tablet (20mg) in afternoon       glimepiride 4 MG tablet    AMARYL    180 tablet    Take 1 tablet (4 mg) by mouth 2 times daily (with meals)       LANTUS SOLOSTAR 100 UNIT/ML injection   Generic drug:  insulin glargine     60 mL    Inject between 45 units and 60 units at bedtime.  Needs four boxes for three month supply.       lisinopril 2.5 MG tablet    PRINIVIL/Zestril    90 tablet    Take 1 tablet (2.5 mg) by mouth daily       MAALOX REGULAR STRENGTH 200-200-20 MG/5ML Susp suspension   Generic drug:  alum & mag hydroxide-simethicone      Take 30 mLs by mouth At Bedtime.       metoprolol 25 MG tablet    LOPRESSOR    180 tablet    Take 1 tablet (25 mg) by mouth 2 times daily       ONETOUCH DELICA LANCETS 33G Misc     360 each    1 each 4 times daily       oxyCODONE-acetaminophen 5-325 MG per tablet    PERCOCET    24 tablet    Take 1 tablet by mouth 4 times daily for 6 days maximum 4 tablet(s) per day       predniSONE 20 MG tablet    DELTASONE    10 tablet    Take 2 tablets (40 mg) by mouth daily for 5 days       triamcinolone 0.1 % cream    KENALOG    80 g    Apply sparingly to affected area two times daily as needed       VITAMIN D PO      Take  by mouth. Pt consuming 3000 units per day       warfarin 4 MG tablet    COUMADIN    30 tablet    Take 1 tablet (4 mg) by mouth daily

## 2017-05-10 NOTE — PROGRESS NOTES
SUBJECTIVE:                                                    Sarah Felix is a 71 year old female who presents to clinic today for the following health issues:      Musculoskeletal problem/pain      Duration: x5d    Description  Location: left knee    Intensity:  Severe pain for the past day but less painful today.     Accompanying signs and symptoms: radiation of pain to calf and swelling    History  Previous similar problem: no   Previous evaluation:  none    Precipitating or alleviating factors:  Trauma or overuse: no   Aggravating factors include: sitting and bending or putting weight on the knee    Therapies tried and outcome: 12yrs old OxyContin  And massage       Additional history/life update:       Acute pain of left knee  Calf swelling :for 4 days.  No trauma or overuse or prolonged immobilization or smoking.  Feels like when she had a blood clot years ago though after ovarian surgery.  Very painful, difficult to walk. Has not tried any specific remedies except rest.         Medical, social, surgical, and family histories reviewed, notable for previous gout in hands and dvt as aboce.       REVIEW OF SYSTEMS FOR GENERAL, RESPIRATORY, CV, GI AND PSYCHIATRIC NEGATIVE EXCEPT AS NOTED IN HPI.         OBJECTIVE:  /72 (BP Location: Left arm, Patient Position: Chair, Cuff Size: Adult Regular)  Pulse 92  Temp 99.2  F (37.3  C) (Tympanic)  Wt 199 lb (90.3 kg)  SpO2 92%  BMI 33.12 kg/m2    EXAM:  GENERAL APPEARANCE: healthy, alert and no distress  RESP: lungs clear to auscultation - no rales, rhonchi or wheezes  CV: regular rates and rhythm, normal S1 S2, no S3 or S4 and no murmur, click or rub -  ABDOMEN:  soft, nontender, no HSM or masses and bowel sounds normal  Left knee swollen compared to right and diffusely tender.  No warmth or color change.  Calf slightly swollen as well and tender, no palpable cord. Normal color.       ASSESSMENT AND PLAN  Patient Instructions   ASSESSMENT AND PLAN  1.  Acute pain of left knee  Will do ultrasound today, if dvt will start enoxaparin 90mg twice daily tonight and coumadin 4 mg daily.     Will make INR check for this Friday and follow up with me 1:00 next Tuesday.     If no dvt then start prednisone 40mg daily and do compression icing 3-4 times a day for 15 minutes to treat for presumed gout.     Percocet given to use four times daily for pain.         - oxyCODONE-acetaminophen (PERCOCET) 5-325 MG per tablet; Take 1 tablet by mouth 4 times daily for 6 days maximum 4 tablet(s) per day  Dispense: 24 tablet; Refill: 0  - US Lower Extremity Venous Duplex Left; Future  - predniSONE (DELTASONE) 20 MG tablet; Take 2 tablets (40 mg) by mouth daily for 5 days  Dispense: 10 tablet; Refill: 0  - warfarin (COUMADIN) 4 MG tablet; Take 1 tablet (4 mg) by mouth daily  Dispense: 30 tablet; Refill: 0  - enoxaparin (LOVENOX) 100 MG/ML injection; Inject 0.9 mLs (90 mg) Subcutaneous 2 times daily for 10 days  Dispense: 18 mL; Refill: 0    2. Calf swelling  As above.     - US Lower Extremity Venous Duplex Left; Future  - predniSONE (DELTASONE) 20 MG tablet; Take 2 tablets (40 mg) by mouth daily for 5 days  Dispense: 10 tablet; Refill: 0  - warfarin (COUMADIN) 4 MG tablet; Take 1 tablet (4 mg) by mouth daily  Dispense: 30 tablet; Refill: 0  - enoxaparin (LOVENOX) 100 MG/ML injection; Inject 0.9 mLs (90 mg) Subcutaneous 2 times daily for 10 days  Dispense: 18 mL; Refill: 0        MYCHART SIGNUP FOR E-VISITS AND EASIER COMMUNICATION:  [email or web address]     Zipnosis:  [employer].  Sign up for e-visits for common illnesses!     RADIOLOGY:   [facility]:  [phone] to schedule any radiology tests at [facility]: [phone]    Mammograms/colonoscopies:  [phone]    CONSUMER PRICE LINE for estimates of test costs:  [phone]               Joel Wegener,MD

## 2017-05-10 NOTE — NURSING NOTE
"Chief Complaint   Patient presents with     Musculoskeletal Problem     knee problem       Initial /72 (BP Location: Left arm, Patient Position: Chair, Cuff Size: Adult Regular)  Pulse 92  Temp 99.2  F (37.3  C) (Tympanic)  Wt 199 lb (90.3 kg)  SpO2 92%  BMI 33.12 kg/m2 Estimated body mass index is 33.12 kg/(m^2) as calculated from the following:    Height as of 3/8/17: 5' 5\" (1.651 m).    Weight as of this encounter: 199 lb (90.3 kg).  Medication Reconciliation: complete Nenita Merchant MA      "

## 2017-05-10 NOTE — PATIENT INSTRUCTIONS
ASSESSMENT AND PLAN  1. Acute pain of left knee  Will do ultrasound today, if dvt will start enoxaparin 90mg twice daily tonight and coumadin 4 mg daily.     Will make INR check for this Friday and follow up with me 1:00 next Tuesday.     If no dvt then start prednisone 40mg daily and do compression icing 3-4 times a day for 15 minutes to treat for presumed gout.     Percocet given to use four times daily for pain.         - oxyCODONE-acetaminophen (PERCOCET) 5-325 MG per tablet; Take 1 tablet by mouth 4 times daily for 6 days maximum 4 tablet(s) per day  Dispense: 24 tablet; Refill: 0  - US Lower Extremity Venous Duplex Left; Future  - predniSONE (DELTASONE) 20 MG tablet; Take 2 tablets (40 mg) by mouth daily for 5 days  Dispense: 10 tablet; Refill: 0  - warfarin (COUMADIN) 4 MG tablet; Take 1 tablet (4 mg) by mouth daily  Dispense: 30 tablet; Refill: 0  - enoxaparin (LOVENOX) 100 MG/ML injection; Inject 0.9 mLs (90 mg) Subcutaneous 2 times daily for 10 days  Dispense: 18 mL; Refill: 0    2. Calf swelling  As above.     - US Lower Extremity Venous Duplex Left; Future  - predniSONE (DELTASONE) 20 MG tablet; Take 2 tablets (40 mg) by mouth daily for 5 days  Dispense: 10 tablet; Refill: 0  - warfarin (COUMADIN) 4 MG tablet; Take 1 tablet (4 mg) by mouth daily  Dispense: 30 tablet; Refill: 0  - enoxaparin (LOVENOX) 100 MG/ML injection; Inject 0.9 mLs (90 mg) Subcutaneous 2 times daily for 10 days  Dispense: 18 mL; Refill: 0        MYCHART SIGNUP FOR E-VISITS AND EASIER COMMUNICATION:  [email or web address]     Zipnosis:  [employer].[employer].LikeLike.com.  Sign up for e-visits for common illnesses!     RADIOLOGY:   [facility]:  [phone] to schedule any radiology tests at [facility]: [phone]    Mammograms/colonoscopies:  [phone]    CONSUMER PRICE LINE for estimates of test costs:  [phone]

## 2017-05-11 NOTE — PROGRESS NOTES
Sarah,    Your ultrasound was negative for a DVT (blood clot in your leg).  I recommend you start the prednisone and ice/compression as you discussed with Dr. Wegener yesterday.    -Nell Mckeon CNP (covering for Dr. Wegener who is currently out of the office)

## 2017-05-16 ENCOUNTER — OFFICE VISIT (OUTPATIENT)
Dept: FAMILY MEDICINE | Facility: CLINIC | Age: 72
End: 2017-05-16
Payer: COMMERCIAL

## 2017-05-16 ENCOUNTER — TELEPHONE (OUTPATIENT)
Dept: FAMILY MEDICINE | Facility: CLINIC | Age: 72
End: 2017-05-16

## 2017-05-16 VITALS
DIASTOLIC BLOOD PRESSURE: 78 MMHG | OXYGEN SATURATION: 95 % | TEMPERATURE: 97.8 F | HEART RATE: 84 BPM | SYSTOLIC BLOOD PRESSURE: 122 MMHG | WEIGHT: 199 LBS | BODY MASS INDEX: 33.12 KG/M2

## 2017-05-16 DIAGNOSIS — M25.562 ACUTE PAIN OF LEFT KNEE: Primary | ICD-10-CM

## 2017-05-16 PROCEDURE — 99214 OFFICE O/P EST MOD 30 MIN: CPT | Performed by: FAMILY MEDICINE

## 2017-05-16 RX ORDER — OXYCODONE AND ACETAMINOPHEN 5; 325 MG/1; MG/1
1 TABLET ORAL 4 TIMES DAILY
Qty: 24 TABLET | Refills: 0 | Status: SHIPPED | OUTPATIENT
Start: 2017-05-16 | End: 2017-06-02

## 2017-05-16 RX ORDER — DIAZEPAM 2 MG
2 TABLET ORAL ONCE
Qty: 1 TABLET | Refills: 0 | Status: SHIPPED | OUTPATIENT
Start: 2017-05-16 | End: 2017-05-16

## 2017-05-16 NOTE — PROGRESS NOTES
"  SUBJECTIVE:                                                    Sarah Felix is a 71 year old year old female who presents to clinic today for the following health issues:        Sarah Felix is here concern about here knee pain, pt state that she was doing well from the last visit. She felt that she was improving and was able to mowed grass around the house. Around Sunday pain started to get worst and yesterday pt felt a \"tear\" in the back of her knee while walking she started to have really bad pain unable to put weight on the leg. Pain score was 12/10.    Additional history/life update:    Acute pain of left knee :was fairly improved after last visit with icing/ibuprofen but had slight twist again and it seemed to \"lock up\" a few days ago.  Now very painful again.  Cannot straighten out.  No trauma/falls.  Ultrasound negative for dvt so did take prednisone for swelling/possible gout.  This did seem to help.  Again, pain came back suddenly , not gradually.       Medical, social, surgical, and family histories reviewed.      REVIEW OF SYSTEMS FOR GENERAL, RESPIRATORY, CV, GI AND PSYCHIATRIC NEGATIVE EXCEPT AS NOTED IN HPI.         OBJECTIVE:  /78 (BP Location: Left arm, Patient Position: Chair, Cuff Size: Adult Regular)  Pulse 84  Temp 97.8  F (36.6  C) (Tympanic)  Wt 199 lb (90.3 kg)  SpO2 95%  BMI 33.12 kg/m2    EXAM:  GENERAL APPEARANCE: healthy, alert and no distress  RESP: lungs clear to auscultation - no rales, rhonchi or wheezes  CV: regular rates and rhythm, normal S1 S2, no S3 or S4 and no murmur, click or rub -     left knee with lateral joint line pain. Slightly swollen diffusely.   Acl/pcl/mcl/lcl ligaments intact.   Pain with flexion.  Pain with riya's testing.       ASSESSMENT AND PLAN  Patient Instructions   ASSESSMENT AND PLAN  1. Acute pain of left knee  Was mostly better with time, prednisone.  Then got acutely worse with minor twist.     Exam today consistent with " cartilage tear.     Plan to do MRI with ortho follow up.      IF MRI looks very good I would recommend taking fluid to eval for gout but not acting like gout at this time.     Right now at nearly 4 weeks of symptoms despite appropriate treatment so if it continues I feel arthroscopy with cartilage clean out could be considered.       - MR Knee Left w/o & w Contrast; Future  - diazepam (VALIUM) 2 MG tablet; Take 1 tablet (2 mg) by mouth once for 1 dose 15 minutes prior to MRI  Dispense: 1 tablet; Refill: 0  - ORTHO  REFERRAL  - oxyCODONE-acetaminophen (PERCOCET) 5-325 MG per tablet; Take 1 tablet by mouth 4 times daily maximum 4 tablet(s) per day  Dispense: 24 tablet; Refill: 0        MYCHART SIGNUP FOR E-VISITS AND EASIER COMMUNICATION:  [email or web address]     Zipnosis:  [place].[employer].  Sign up for e-visits for common illnesses!     RADIOLOGY:   [facility]:  [phone] to schedule any radiology tests at [facility]: [phone]    Mammograms/colonoscopies:  [phone]    CONSUMER PRICE LINE for estimates of test costs:  [phone]               Joel Wegener,MD

## 2017-05-16 NOTE — TELEPHONE ENCOUNTER
Please do not close this encounter until this has been addressed.  (prior auth approved/denied, prescriber refusal to complete prior auth or medication changed/discontinued)    Prior Authorization needed on: Diazepam 2mg  Drug NDC: [address]-37     Insurance: [employer]  Rx BIN: [id number]  Member ID: [id number]   Insurance phone #: [phone]    Pharmacy NPI: [id number]  Pharmacy Phone #: [phone]  Pharmacy Fax #: [phone]    Please note that this prior authorization is being requested at the direction of the patient.    Please let us know if the PA gets approved or denied or if medication is changed.    Thank You,  Nona Ceballos, [facility]

## 2017-05-16 NOTE — MR AVS SNAPSHOT
After Visit Summary   5/16/2017    Sarah Felix    MRN: [id number]           Patient Information     Date Of Birth          1945        Visit Information        Provider Department      5/16/2017 1:00 PM Wegener, Joel Daniel Irwin, MD [facility]        Today's Diagnoses     Acute pain of left knee    -  1      Care Instructions    ASSESSMENT AND PLAN  1. Acute pain of left knee  Was mostly better with time, prednisone.  Then got acutely worse with minor twist.     Exam today consistent with cartilage tear.     Plan to do MRI with ortho follow up.      IF MRI looks very good I would recommend taking fluid to eval for gout but not acting like gout at this time.     Right now at nearly 4 weeks of symptoms despite appropriate treatment so if it continues I feel arthroscopy with cartilage clean out could be considered.       - MR Knee Left w/o & w Contrast; Future  - diazepam (VALIUM) 2 MG tablet; Take 1 tablet (2 mg) by mouth once for 1 dose 15 minutes prior to MRI  Dispense: 1 tablet; Refill: 0  - ORTHO  REFERRAL  - oxyCODONE-acetaminophen (PERCOCET) 5-325 MG per tablet; Take 1 tablet by mouth 4 times daily maximum 4 tablet(s) per day  Dispense: 24 tablet; Refill: 0        MYCHART SIGNUP FOR E-VISITS AND EASIER COMMUNICATION:  [email or web address]     Zipnosis:  [email or web address].  Sign up for e-visits for common illnesses!     RADIOLOGY:   [facility] [employer]:  [phone] to schedule any radiology tests at [facility]: [phone]    Mammograms/colonoscopies:  [phone]    CONSUMER PRICE LINE for estimates of test costs:  [phone]             Follow-ups after your visit        Additional Services     ORTHO  REFERRAL       [facility] is referring you to the Orthopedic  Services at [facility].       The  Representative will assist you in the coordination of your  Orthopedic and Musculoskeletal Care as prescribed by your physician.    The  Representative will call you within 1 business day to help schedule your appointment, or you may contact the  Representative at:    All areas ~ [phone]     Type of Referral : Surgical / Specialist       Timeframe requested: Within 2 weeks    Coverage of these services is subject to the terms and limitations of your health insurance plan.  Please call member services at your health plan with any benefit or coverage questions.      If X-rays, CT or MRI's have been performed, please contact the facility where they were done to arrange for , prior to your scheduled appointment.  Please bring this referral request to your appointment and present it to your specialist.                  Your next 10 appointments already scheduled     Sep 05, 2017  3:00 PM CDT   SHORT with Joel Daniel Irwin Wegener, MD   [facility] ([facility])    [address] [place] [id number] [phone]            Feb 15, 2018  1:00 PM CST   RETURN RETINA with Josette Rodriguez MD   Eye Clinic ([facility])    [facility]  [address]  [facility] [id number] [phone]              Future tests that were ordered for you today     Open Future Orders        Priority Expected Expires Ordered    MR Knee Left w/o & w Contrast Routine  5/16/2018 5/16/2017            Who to contact     If you have questions or need follow up information about today's clinic visit or your schedule please contact [facility] directly at [phone].  Normal or non-critical lab and imaging results will be communicated to you by MyChart, letter or phone within 4 business days after the clinic has received the results. If you do not hear from us within 7 days, please contact the clinic through MyChart or phone. If you have a critical or abnormal lab  result, we will notify you by phone as soon as possible.  Submit refill requests through [employer] or call your pharmacy and they will forward the refill request to us. Please allow 3 business days for your refill to be completed.          Additional Information About Your Visit        [employer] Information     [employer] gives you secure access to your electronic health record. If you see a primary care provider, you can also send messages to your care team and make appointments. If you have questions, please call your primary care clinic.  If you do not have a primary care provider, please call [phone] and they will assist you.        Care EveryWhere ID     This is your Care EveryWhere ID. This could be used by other organizations to access your [place] [facility] medical records  [id number]        Your Vitals Were     Pulse Temperature Pulse Oximetry BMI (Body Mass Index)          84 97.8  F (36.6  C) (Tympanic) 95% 33.12 kg/m2         Blood Pressure from Last 3 Encounters:   05/16/17 122/78   05/10/17 112/72   03/08/17 104/60    Weight from Last 3 Encounters:   05/16/17 199 lb (90.3 kg)   05/10/17 199 lb (90.3 kg)   03/08/17 198 lb 8 oz (90 kg)              We Performed the Following     ORTHO  REFERRAL          Today's Medication Changes          These changes are accurate as of: 5/16/17  2:01 PM.  If you have any questions, ask your nurse or doctor.               Start taking these medicines.        Dose/Directions    diazepam 2 MG tablet   Commonly known as:  VALIUM   Used for:  Acute pain of left knee   Started by:  Wegener, Joel Daniel Irwin, MD        Dose:  2 mg   Take 1 tablet (2 mg) by mouth once for 1 dose 15 minutes prior to MRI   Quantity:  1 tablet   Refills:  0            Where to get your medicines      Some of these will need a paper prescription and others can be bought over the counter.  Ask your nurse if you have questions.     Bring a paper prescription for each of these medications      diazepam 2 MG tablet    oxyCODONE-acetaminophen 5-325 MG per tablet                Primary Care Provider Office Phone # Fax #    Joel Daniel Irwin Wegener, [phone]       [facility] [address]  [facility] [address]        Thank you!     Thank you for choosing [facility]  for your care. Our goal is always to provide you with excellent care. Hearing back from our patients is one way we can continue to improve our services. Please take a few minutes to complete the written survey that you may receive in the mail after your visit with us. Thank you!             Your Updated Medication List - Protect others around you: Learn how to safely use, store and throw away your medicines at www.disposemymeds.org.          This list is accurate as of: 5/16/17  2:01 PM.  Always use your most recent med list.                   Brand Name Dispense Instructions for use    ACE/ARB NOT PRESCRIBED (INTENTIONAL)      by Other route continuous prn.       amoxicillin-clavulanate 875-125 MG per tablet    AUGMENTIN    28 tablet    Take 1 tablet by mouth 2 times daily       aspirin 81 MG tablet     60 tablet    Take 2 tablets (162 mg) by mouth daily       atorvastatin 40 MG tablet    LIPITOR    90 tablet    TAKE 1 TABLET DAILY       BD REY U/F 32G X 4 MM   Generic drug:  insulin pen needle     90 each    USE ONCE DAILY AS DIRECTED WITH LANTUS SOLOSTAR       blood glucose monitoring meter device kit     1 kit    Use to test blood sugars 2 times daily as directed.       blood glucose monitoring test strip    ONE TOUCH ULTRA    360 each    1 strip by In Vitro route 4 times daily.       CALCIUM 500 + D 500-200 MG-IU Tabs     100    one tab twice per day       COLACE PO      Take  by mouth as needed.       diazepam 2 MG tablet    VALIUM    1 tablet    Take 1 tablet (2 mg) by mouth once for 1 dose 15 minutes prior to MRI       enoxaparin 100 MG/ML injection    LOVENOX    18 mL    Inject 0.9 mLs (90 mg)  Subcutaneous 2 times daily for 10 days       furosemide 20 MG tablet    LASIX    270 tablet    Two in am (40mg) and one tablet (20mg) in afternoon       glimepiride 4 MG tablet    AMARYL    180 tablet    Take 1 tablet (4 mg) by mouth 2 times daily (with meals)       LANTUS SOLOSTAR 100 UNIT/ML injection   Generic drug:  insulin glargine     60 mL    Inject between 45 units and 60 units at bedtime.  Needs four boxes for three month supply.       lisinopril 2.5 MG tablet    PRINIVIL/Zestril    90 tablet    Take 1 tablet (2.5 mg) by mouth daily       MAALOX REGULAR STRENGTH 200-200-20 MG/5ML Susp suspension   Generic drug:  alum & mag hydroxide-simethicone      Take 30 mLs by mouth At Bedtime.       metoprolol 25 MG tablet    LOPRESSOR    180 tablet    Take 1 tablet (25 mg) by mouth 2 times daily       ONETOUCH DELICA LANCETS 33G Misc     360 each    1 each 4 times daily       oxyCODONE-acetaminophen 5-325 MG per tablet    PERCOCET    24 tablet    Take 1 tablet by mouth 4 times daily maximum 4 tablet(s) per day       triamcinolone 0.1 % cream    KENALOG    80 g    Apply sparingly to affected area two times daily as needed       VITAMIN D PO      Take  by mouth. Pt consuming 3000 units per day       warfarin 4 MG tablet    COUMADIN    30 tablet    Take 1 tablet (4 mg) by mouth daily

## 2017-05-16 NOTE — PATIENT INSTRUCTIONS
ASSESSMENT AND PLAN  1. Acute pain of left knee  Was mostly better with time, prednisone.  Then got acutely worse with minor twist.     Exam today consistent with cartilage tear.     Plan to do MRI with ortho follow up.      IF MRI looks very good I would recommend taking fluid to eval for gout but not acting like gout at this time.     Right now at nearly 4 weeks of symptoms despite appropriate treatment so if it continues I feel arthroscopy with cartilage clean out could be considered.       - MR Knee Left w/o & w Contrast; Future  - diazepam (VALIUM) 2 MG tablet; Take 1 tablet (2 mg) by mouth once for 1 dose 15 minutes prior to MRI  Dispense: 1 tablet; Refill: 0  - ORTHO  REFERRAL  - oxyCODONE-acetaminophen (PERCOCET) 5-325 MG per tablet; Take 1 tablet by mouth 4 times daily maximum 4 tablet(s) per day  Dispense: 24 tablet; Refill: 0        MYCHART SIGNUP FOR E-VISITS AND EASIER COMMUNICATION:  [email or web address]     Zipnosis:  [email or web address].  Sign up for e-visits for common illnesses!     RADIOLOGY:   [facility]:  [phone] to schedule any radiology tests at [facility] [place]: [phone]    Mammograms/colonoscopies:  [phone]    CONSUMER PRICE LINE for estimates of test costs:  [phone]

## 2017-05-17 NOTE — TELEPHONE ENCOUNTER
Called and LVMTCB. If pt calls back please give pt message below from Dr Wegener.     Jael Rosenthal, [facility]

## 2017-05-31 ENCOUNTER — HOSPITAL ENCOUNTER (OUTPATIENT)
Dept: MRI IMAGING | Facility: CLINIC | Age: 72
Discharge: HOME | End: 2017-05-31
Attending: FAMILY MEDICINE | Admitting: FAMILY MEDICINE
Payer: COMMERCIAL

## 2017-05-31 DIAGNOSIS — M25.562 ACUTE PAIN OF LEFT KNEE: ICD-10-CM

## 2017-05-31 LAB — RADIOLOGIST FLAGS: NORMAL

## 2017-05-31 PROCEDURE — 73721 MRI JNT OF LWR EXTRE W/O DYE: CPT | Mod: LT

## 2017-06-01 ENCOUNTER — TELEPHONE (OUTPATIENT)
Dept: FAMILY MEDICINE | Facility: CLINIC | Age: 72
End: 2017-06-01

## 2017-06-01 ENCOUNTER — MYC MEDICAL ADVICE (OUTPATIENT)
Dept: FAMILY MEDICINE | Facility: CLINIC | Age: 72
End: 2017-06-01

## 2017-06-01 DIAGNOSIS — M25.562 ACUTE PAIN OF LEFT KNEE: ICD-10-CM

## 2017-06-01 NOTE — TELEPHONE ENCOUNTER
MRI reveals insufficiency fracture of left medial tibial plateau as well as medial meniscal tear and arthritis.    Pt has follow up scheduled 6/19 with ortho, but I suspect they may want to see her sooner with the fracture.  Looks like management is often splinting and non weight bearing of knee.  Can we please call ortho to see if they advise more urgent follow up and notify pt of MRI results and plan.    Nell Parisi, CNP

## 2017-06-01 NOTE — TELEPHONE ENCOUNTER
--I called Orthopa[facility] at [facility] [phone] and spoke with triage nurse Nona Lazo LPN.    --Nona Lazo will get contact the  team to ask Nell's question (Dr. Artis, Apolinar Mcclure MD is not available for consultation on this.)    --I left voice mail for patient asking her to call back or look at Bionym for message from Nell.    Flory Woods RN

## 2017-06-02 RX ORDER — OXYCODONE AND ACETAMINOPHEN 5; 325 MG/1; MG/1
1 TABLET ORAL 4 TIMES DAILY
Qty: 24 TABLET | Refills: 0 | Status: SHIPPED | OUTPATIENT
Start: 2017-06-02 | End: 2017-06-16

## 2017-06-02 NOTE — TELEPHONE ENCOUNTER
Called patient and reviewed message per below from MELODY Parisi CNP.    Patient asked if prescription could be sent to [facility] and writer reviewed that this type of controlled substance cannot be called in or faxed.    Writer explained this three times to patient.    Patient agreed to  medication at [facility] and writer reviewed pharmacy open until 6:00 PM.    KIMBERLY Bautista, , walked script to [facility] pharmacy.    MEGAN ChaudharyN, RN

## 2017-06-02 NOTE — TELEPHONE ENCOUNTER
Ok to refill x1, needs follow up with PCP to address ongoing pain, please schedule.  Does she want to  Rx or have it filled here?  Signed and sitting on my desk.    Nell Parisi, CNP

## 2017-06-02 NOTE — TELEPHONE ENCOUNTER
Patient called back and has read the My Chart message regarding her MRI  Patient was not told that the appointment with the ortho for 6/19/17 had been cancelled  Will call to schedule appointment with sports med as directed in My Chart message    Patient was only given a 6 day supply of pain medication and is asking for more.  Please advise.  Tessie Paez RN

## 2017-06-05 ENCOUNTER — TELEPHONE (OUTPATIENT)
Dept: FAMILY MEDICINE | Facility: CLINIC | Age: 72
End: 2017-06-05

## 2017-06-06 ENCOUNTER — OFFICE VISIT (OUTPATIENT)
Dept: ORTHOPEDICS | Facility: CLINIC | Age: 72
End: 2017-06-06

## 2017-06-06 VITALS — RESPIRATION RATE: 18 BRPM | HEIGHT: 65 IN | WEIGHT: 200 LBS | BODY MASS INDEX: 33.32 KG/M2

## 2017-06-06 DIAGNOSIS — M25.562 LEFT KNEE PAIN, UNSPECIFIED CHRONICITY: Primary | ICD-10-CM

## 2017-06-06 DIAGNOSIS — M84.469A INSUFFICIENCY FRACTURE OF TIBIA, INITIAL ENCOUNTER: ICD-10-CM

## 2017-06-06 DIAGNOSIS — M85.80 OSTEOPENIA: ICD-10-CM

## 2017-06-06 DIAGNOSIS — M17.12 PRIMARY OSTEOARTHRITIS OF LEFT KNEE: ICD-10-CM

## 2017-06-06 NOTE — Clinical Note
Thank you for allowing me to see your patient in Sports Medicine Clinic.  Please see the attached copy of our visit.  Sincerely,  Den Troncoso MD

## 2017-06-06 NOTE — PROGRESS NOTES
"Sports Medicine Clinic Visit    PCP: Wegener, Joel Daniel Irwin    Sarah Felix is a 71 year old female who is seen  in consultation at the request of Dr. Wegener in consultation presenting with left knee pain. Reports stiffness, swelling.  Pain x 8 wks.  Pt was triaged from Dr. Sterling to try possible cortisone injection for knee based on recent MRI findings.            Injury: NA    Location of Pain: left knee  Duration of Pain: 6 week(s)  Rating of Pain: 10/10  Pain is better with: Other medications: oxycodone and Rest  Pain is worse with: weight bearing, knee AROM  Additional Features: pain is getting worse.  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  felt to be focal nodular hyperplasia.  annual CT abd.   followed by Dr. Amilcar Kat      Cystocele, lateral      Diabetes mellitus (H)      Diverticulitis of colon (without mention of hemorrhage) 6/04    Abd CT     Embolism and thrombosis of unspecified site age 22    post ovarian cyst removed     Esophageal reflux     Hiatal hernia     Fatty liver      Hiatal hernia     small     Hyperlipidemia      Nontoxic uninodular goiter      Osteopenia 4/21/2005    on fosamax  for > 5 yr.  stop 7/2012     Other chronic otitis externa      Other specified disorders of liver     Fatty Liver, Benign appearing liver cysts     Pure hypercholesterolemia        Active problem list:  Patient Active Problem List   Diagnosis     CYSTIC LIVER DIS     Allergic rhinitis     Diverticulitis " of colon     Disorder of bone and cartilage     Esophageal reflux     Mixed incontinence urge and stress (male)(female)     Cystocele, lateral     Vaginal atrophy     Type 2 diabetes, HbA1c goal < 7% (H)     Hyperlipidemia LDL goal <100     Heart burn     BABB (dyspnea on exertion)     Impingement syndrome, shoulder     Sebaceous hyperplasia     Seborrheic keratosis     Fatty liver     Personal history of other malignant neoplasm of skin     Health Care Home     Advanced directives, counseling/discussion     Hypertension goal BP (blood pressure) < 140/90     Elevated serum creatinine     Peripheral vascular disease (H)     Skin exam, screening for cancer     Essential hypertension, benign (HTN)     CKD (chronic kidney disease) stage 2, GFR 60-89 ml/min     Type 2 diabetes mellitus with hyperglycemia (H)     Type 2 diabetes with stage 2 chronic kidney disease GFR 60-89 (H)     Type 2 diabetes mellitus without complication (H)       FH:  Family History   Problem Relation Age of Onset     DIABETES Mother      at 89 yrs     Hypertension Mother      Arthritis Mother      rheumatoid     CANCER Father      bladder     Cardiovascular Brother      palpitations not on meds.     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 No     Social History Narrative    Dairy/d 2 servings/d.  Not much milk mostly cheese    Caffeine 5 servings/d    Exercise active in yard work     Sunscreen used - Yes when in sun    Seatbelts used - Yes    Working smoke/CO detectors in the home - Yes    Guns stored in the home - No    Self Breast Exams - Yes    Self Testicular Exam - NA    Eye Exam up to date - Yes needs to see opthamologist for diabetes    Dental Exam up to date - Yes    Pap Smear up to date -Yes     Mammogram up to date - Yes 3/10    PSA up to date - NA    Dexa Scan up to date - 06/5/07    Flex Sig / Colonoscopy up to date -  04/06 yes RTC 10 yrs in epic    Immunizations up to date - Yes Td 06/04    Abuse: Current or Past(Physical, Sexual or Emotional)- No    Do you feel safe in your environment - Yes    8/4/10    Liya Ashraf RN                               MEDS:  See EMR, reviewed  ALL:  See EMR, reviewed    REVIEW OF SYSTEMS:  CONSTITUTIONAL:NEGATIVE for fever, chills, change in weight  INTEGUMENTARY/SKIN: NEGATIVE for worrisome rashes, moles or lesions  EYES: NEGATIVE for vision changes or irritation  ENT/MOUTH: NEGATIVE for ear, mouth and throat problems  RESP:NEGATIVE for significant cough or SOB  BREAST: NEGATIVE for masses, tenderness or discharge  CV: NEGATIVE for chest pain, palpitations or peripheral edema  GI: NEGATIVE for nausea, abdominal pain, heartburn, or change in bowel habits  :NEGATIVE for frequency, dysuria, or hematuria  :NEGATIVE for frequency, dysuria, or hematuria  NEURO: NEGATIVE for weakness, dizziness or paresthesias  ENDOCRINE: NEGATIVE for temperature intolerance, skin/hair changes  HEME/ALLERGY/IMMUNE: NEGATIVE for bleeding problems  PSYCHIATRIC: NEGATIVE for changes in mood or affect      MR left knee without contrast 5/31/2017 2:38 PM     Techniques: Multiplanar multisequence MR imaging of the left knee was  obtained without  administration of intra-articular or intravenous  contrast using standard sequences in 3 orthogonal planes.     History: Pain in left  knee     Additional History from EMR: 71-year-old female with acute left knee  pain and instability.     Comparison: None available.     Findings:     MENISCI:  Medial meniscus: Radial tear of the medial meniscus at the junction of  the posterior horn and posterior root ligament. The anterior horn and  anterior root ligament are intact.  Lateral meniscus: Intact.     LIGAMENTS  Cruciate ligaments: Intact.  Medial supporting structures: Intact.  Lateral supporting structures: Intact.     EXTENSOR MECHANISM  Intact.     FLUID  Moderate joint effusion. Small popliteal cyst.     OSSEOUS and ARTICULAR STRUCTURES  Bones: Diffuse bone marrow edema along the medial tibial plateau with  a linear low T1 signal subchondral line consistent with an  insufficiency fracture. Mild bone marrow edema in the medial femoral  condyle is likely reactive due to osteoarthrosis     Patellofemoral compartment: Diffuse hyaline cartilage loss, greatest  along the median patellar ridge and medial patellar facet with focal  areas of thickness cartilage loss. Underlying reactive bone marrow  edema along the median patellar ridge.     Medial compartment: Diffuse cartilage thinning in the medial  femorotibial compartment, without focal full-thickness cartilage loss.     Lateral compartment: Mild diffuse cartilage thinning in the lateral  femorotibial compartment.         Impression:  1. Diffuse bone marrow edema in the left knee medial tibial plateau  with a linear subchondral region of low T1 signal, consistent with an  insufficiency fracture.  2. Radial tear of the left knee medial meniscus at the junction of the  posterior horn and posterior root ligament.  3. Moderate size left knee joint effusion.  4. Focal full-thickness cartilage loss in the left knee patellofemoral  compartment with mild diffuse cartilage thinning in the medial  femorotibial compartment.  5. The anterior and posterior cruciate ligaments, medial and lateral  supporting  structures, and lateral meniscus are intact.        [Consider Follow Up: Insufficiency fracture at the left knee medial  tibial plateau.]     This report will be copied to the [facility] to ensure a  provider acknowledges the finding.      I have personally reviewed the examination and initial interpretation  and I agree with the findings.     ODILON BLACK MD            SUBJECTIVE:  This 71-year-old female for the last 8 weeks has had discomfort along the medial left knee with weightbearing.  She uses a 4-pronged cane to ambulate about her house.  She is walking and weightbearing but it still gives her significant pain and she is requiring oxycodone to help with the pain.  She points to the medial aspect of the tibia as the area that bothers her.  It is not associated with radicular discomfort into the leg.  She has had an MRI of the knee that shows an insufficiency fracture across the medial tibial plateau, some signs of degenerative meniscal tearing within the knee and significant patellofemoral DJD, otherwise there was no internal derangement.  She can think of no injury.  She had been doing a lot of walking 8 weeks ago but does not remember any particular injury that happened.  She had a normal x-ray of the knee in 2012.  She also had a DEXA scan in 2012 that showed some osteopenia and has been taking calcium and vitamin D since.      OBJECTIVE:  She is tender, particularly on the medial tibial plateau, mildly tender along the medial joint line.  There is no effusion of either knee.  I can flex and extend the knee fully.  She is nontender about the lateral joint line, the pes anserine bursa or the distal IT band or the patellar tendon.  She has good range of motion at the hips.  Gentle anterior drawer is well tolerated. Appripriate in conversation and affect.  Skin intact.     IMAGING:  Standing x-rays of the knee today are done and an insufficiency fracture can be seen, but there was no  depression involving the tibial plateau.  She has good maintenance of the joint space without signs of degenerative change on her x-ray.  She has some minimal patellofemoral DJD.      ASSESSMENT:  Insufficiency fracture of her left medial knee.      PLAN:  She has had pain for 8 weeks.  She does not have significant degenerative change on her x-rays so I am not certain that a cortisone shot would help her pain.  It might help with a certain amount of patellofemoral pain with arthritis, but I think it is unlikely to change the weightbearing pain she still has over the proximal tibia.  I talked about supplying her with a scooter, but she says there is no way she can tolerate it in her home and that the 4-prong cane works better.  We agreed to try a medial  brace to see if it helps with her current pain.  If it provides no relief the cortisone shot could be considered.  This is not an indication for surgery at this time so at this point, it is conservative cares to assist with pain and assisted weightbearing.  She understands that a repeat DEXA scan could be ordered by her primary provider at some point to make sure that she has not progressed from osteopenia to osteoporosis over time.

## 2017-06-06 NOTE — MR AVS SNAPSHOT
After Visit Summary   6/6/2017    Sarah Felix    MRN: [id number]           Patient Information     Date Of Birth          1945        Visit Information        Provider Department      6/6/2017 3:45 PM Den Troncoso MD [facility]        Today's Diagnoses     Left knee pain, unspecified chronicity    -  1    Insufficiency fracture of tibia, initial encounter        Primary osteoarthritis of left knee           Follow-ups after your visit        Additional Services     ORTHOTICS REFERRAL       **This referral order prints off in the [facility]  (Orthotics & Prosthetics) Central Scheduling Office**    The [facility] Scheduling Staff will contact the patient to schedule appointments.     Central Scheduling Contact Information: [phone] ([place])    Left knee medial  brace for left knee medial insufficiency fracture pain x 8 weeks and knee djd.  May try Donjoy  brace if it is effective    Please be aware that coverage of these services is subject to the terms and limitations of your health insurance plan.  Call member services at your health plan with any benefit or coverage questions.      Please bring the following to your appointment:    >>   Any x-rays, CTs or MRIs which have been performed.  Contact the facility where they were done to arrange for  prior to your scheduled appointment.    >>   List of current medications   >>   This referral request   >>   Any documents/labs given to you for this referral                  Your next 10 appointments already scheduled     Sep 05, 2017  3:00 PM CDT   SHORT with Joel Daniel Irwin Wegener, MD   [facility] ([facility])    [address] [place] [id number] [phone]            Feb 15, 2018  1:00 PM CST   RETURN RETINA with Josette Rodriguez MD   [facility] Clinic ([facility])    [address]  [facility]  [facility] [address] [phone]              Who to contact     Please call your clinic at [phone] to:    Ask questions about your health    Make or cancel appointments    Discuss your medicines    Learn about your test results    Speak to your doctor   If you have compliments or concerns about an experience at your clinic, or if you wish to file a complaint, please contact [facility] Patient Relations at [phone] or email us at [email or web address]         Additional Information About Your Visit        OutSystemshart Information     [employer] gives you secure access to your electronic health record. If you see a primary care provider, you can also send messages to your care team and make appointments. If you have questions, please call your primary care clinic.  If you do not have a primary care provider, please call [phone] and they will assist you.      [employer] is an electronic gateway that provides easy, online access to your medical records. With [employer], you can request a clinic appointment, read your test results, renew a prescription or communicate with your care team.     To access your existing account, please contact your [facility] or call [phone] for assistance.        Care EveryWhere ID     This is your Care EveryWhere ID. This could be used by other organizations to access your [facility] medical records  [id number]        Your Vitals Were     Respirations Height BMI (Body Mass Index)             18 5' 5\" (1.651 m) 33.28 kg/m2          Blood Pressure from Last 3 Encounters:   05/16/17 122/78   05/10/17 112/72   03/08/17 104/60    Weight from Last 3 Encounters:   06/06/17 200 lb (90.7 kg)   05/16/17 199 lb (90.3 kg)   05/10/17 199 lb (90.3 kg)              We Performed the Following     ORTHOTICS REFERRAL        Primary Care Provider Office Phone # Fax #    Joel Daniel Irwin Wegener, " [phone]       [facility] [address]  [facility] [address]        Thank you!     Thank you for choosing [facility]  for your care. Our goal is always to provide you with excellent care. Hearing back from our patients is one way we can continue to improve our services. Please take a few minutes to complete the written survey that you may receive in the mail after your visit with us. Thank you!             Your Updated Medication List - Protect others around you: Learn how to safely use, store and throw away your medicines at www.disposemymeds.org.          This list is accurate as of: 6/6/17  4:31 PM.  Always use your most recent med list.                   Brand Name Dispense Instructions for use    ACE/ARB NOT PRESCRIBED (INTENTIONAL)      by Other route continuous prn.       amoxicillin-clavulanate 875-125 MG per tablet    AUGMENTIN    28 tablet    Take 1 tablet by mouth 2 times daily       aspirin 81 MG tablet     60 tablet    Take 2 tablets (162 mg) by mouth daily       atorvastatin 40 MG tablet    LIPITOR    90 tablet    TAKE 1 TABLET DAILY       BD REY U/F 32G X 4 MM   Generic drug:  insulin pen needle     90 each    USE ONCE DAILY AS DIRECTED WITH LANTUS SOLOSTAR       blood glucose monitoring meter device kit     1 kit    Use to test blood sugars 2 times daily as directed.       blood glucose monitoring test strip    ONE TOUCH ULTRA    360 each    1 strip by In Vitro route 4 times daily.       CALCIUM 500 + D 500-200 MG-IU Tabs     100    one tab twice per day       COLACE PO      Take  by mouth as needed.       furosemide 20 MG tablet    LASIX    270 tablet    Two in am (40mg) and one tablet (20mg) in afternoon       glimepiride 4 MG tablet    AMARYL    180 tablet    Take 1 tablet (4 mg) by mouth 2 times daily (with meals)       LANTUS SOLOSTAR 100 UNIT/ML injection   Generic drug:  insulin glargine     60 mL    Inject between 45 units and 60 units at bedtime.   Needs four boxes for three month supply.       lisinopril 2.5 MG tablet    PRINIVIL/Zestril    90 tablet    Take 1 tablet (2.5 mg) by mouth daily       MAALOX REGULAR STRENGTH 200-200-20 MG/5ML Susp suspension   Generic drug:  alum & mag hydroxide-simethicone      Take 30 mLs by mouth At Bedtime.       metoprolol 25 MG tablet    LOPRESSOR    180 tablet    Take 1 tablet (25 mg) by mouth 2 times daily       ONETOUCH DELICA LANCETS 33G Misc     360 each    1 each 4 times daily       oxyCODONE-acetaminophen 5-325 MG per tablet    PERCOCET    24 tablet    Take 1 tablet by mouth 4 times daily maximum 4 tablet(s) per day       triamcinolone 0.1 % cream    KENALOG    80 g    Apply sparingly to affected area two times daily as needed       VITAMIN D PO      Take  by mouth. Pt consuming 3000 units per day       warfarin 4 MG tablet    COUMADIN    30 tablet    Take 1 tablet (4 mg) by mouth daily

## 2017-06-06 NOTE — LETTER
"  6/6/2017      RE: Sarah Felix  [address] [place] [address]       Sports Medicine Clinic Visit    PCP: Wegener, Joel Daniel Irwin    Sarah Felix is a 71 year old female who is seen  in consultation at the request of Dr. Wegener in consultation presenting with left knee pain. Reports stiffness, swelling.  Pain x 8 wks.  Pt was triaged from Dr. Sterling to try possible cortisone injection for knee based on recent MRI findings.            Injury: NA    Location of Pain: left knee  Duration of Pain: 6 week(s)  Rating of Pain: 10/10  Pain is better with: Other medications: oxycodone and Rest  Pain is worse with: weight bearing, knee AROM  Additional Features: pain is getting worse.  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  felt to be focal nodular hyperplasia.  annual CT abd.   followed by Dr. Amilcar Kat      Cystocele, lateral      Diabetes mellitus (H)      Diverticulitis of colon (without mention of hemorrhage) 6/04    Abd CT     Embolism and thrombosis of unspecified site age 22    post ovarian cyst removed     Esophageal reflux     Hiatal hernia     Fatty liver      Hiatal hernia     small     Hyperlipidemia      Nontoxic uninodular goiter      Osteopenia 4/21/2005    on fosamax  for > 5 yr.  stop 7/2012     Other chronic otitis externa      Other specified disorders of liver     Fatty Liver, Benign appearing liver cysts     Pure hypercholesterolemia        Active problem list:  Patient " Active Problem List   Diagnosis     CYSTIC LIVER DIS     Allergic rhinitis     Diverticulitis of colon     Disorder of bone and cartilage     Esophageal reflux     Mixed incontinence urge and stress (male)(female)     Cystocele, lateral     Vaginal atrophy     Type 2 diabetes, HbA1c goal < 7% (H)     Hyperlipidemia LDL goal <100     Heart burn     BABB (dyspnea on exertion)     Impingement syndrome, shoulder     Sebaceous hyperplasia     Seborrheic keratosis     Fatty liver     Personal history of other malignant neoplasm of skin     Health Care Home     Advanced directives, counseling/discussion     Hypertension goal BP (blood pressure) < 140/90     Elevated serum creatinine     Peripheral vascular disease (H)     Skin exam, screening for cancer     Essential hypertension, benign (HTN)     CKD (chronic kidney disease) stage 2, GFR 60-89 ml/min     Type 2 diabetes mellitus with hyperglycemia (H)     Type 2 diabetes with stage 2 chronic kidney disease GFR 60-89 (H)     Type 2 diabetes mellitus without complication (H)       FH:  Family History   Problem Relation Age of Onset     DIABETES Mother      at 89 yrs     Hypertension Mother      Arthritis Mother      rheumatoid     CANCER Father      bladder     Cardiovascular Brother      palpitations not on meds.     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 No     Social History Narrative    Dairy/d 2 servings/d. Not much milk mostly cheese    Caffeine 5 servings/d    Exercise active in yard work     Sunscreen used - Yes when in sun    Seatbelts used - Yes    Working smoke/CO detectors in the home - Yes    Guns stored in the home - No    Self Breast Exams - Yes    Self Testicular Exam - NA    Eye Exam up to date - Yes needs to see opthamologist for diabetes    Dental Exam up to date - Yes    Pap Smear up to date -Yes     Mammogram up to date - Yes 3/10    PSA up to date - NA    Dexa Scan up to date - 06/5/07    Flex Sig / Colonoscopy up to date -  04/06 yes RTC 10 yrs in epic    Immunizations up to date - Yes Td 06/04    Abuse: Current or Past(Physical, Sexual or Emotional)- No    Do you feel safe in your environment - Yes    8/4/10    Liya Ashraf RN                               MEDS:  See EMR, reviewed  ALL:  See EMR, reviewed    REVIEW OF SYSTEMS:  CONSTITUTIONAL:NEGATIVE for fever, chills, change in weight  INTEGUMENTARY/SKIN: NEGATIVE for worrisome rashes, moles or lesions  EYES: NEGATIVE for vision changes or irritation  ENT/MOUTH: NEGATIVE for ear, mouth and throat problems  RESP:NEGATIVE for significant cough or SOB  BREAST: NEGATIVE for masses, tenderness or discharge  CV: NEGATIVE for chest pain, palpitations or peripheral edema  GI: NEGATIVE for nausea, abdominal pain, heartburn, or change in bowel habits  :NEGATIVE for frequency, dysuria, or hematuria  :NEGATIVE for frequency, dysuria, or hematuria  NEURO: NEGATIVE for weakness, dizziness or paresthesias  ENDOCRINE: NEGATIVE for temperature intolerance, skin/hair changes  HEME/ALLERGY/IMMUNE: NEGATIVE for bleeding problems  PSYCHIATRIC: NEGATIVE for changes in mood or affect      MR left knee without contrast 5/31/2017 2:38 PM     Techniques: Multiplanar multisequence MR imaging of the left knee was  obtained without  administration of intra-articular or  intravenous  contrast using standard sequences in 3 orthogonal planes.     History: Pain in left knee     Additional History from EMR: 71-year-old female with acute left knee  pain and instability.     Comparison: None available.     Findings:     MENISCI:  Medial meniscus: Radial tear of the medial meniscus at the junction of  the posterior horn and posterior root ligament. The anterior horn and  anterior root ligament are intact.  Lateral meniscus: Intact.     LIGAMENTS  Cruciate ligaments: Intact.  Medial supporting structures: Intact.  Lateral supporting structures: Intact.     EXTENSOR MECHANISM  Intact.     FLUID  Moderate joint effusion. Small popliteal cyst.     OSSEOUS and ARTICULAR STRUCTURES  Bones: Diffuse bone marrow edema along the medial tibial plateau with  a linear low T1 signal subchondral line consistent with an  insufficiency fracture. Mild bone marrow edema in the medial femoral  condyle is likely reactive due to osteoarthrosis     Patellofemoral compartment: Diffuse hyaline cartilage loss, greatest  along the median patellar ridge and medial patellar facet with focal  areas of thickness cartilage loss. Underlying reactive bone marrow  edema along the median patellar ridge.     Medial compartment: Diffuse cartilage thinning in the medial  femorotibial compartment, without focal full-thickness cartilage loss.     Lateral compartment: Mild diffuse cartilage thinning in the lateral  femorotibial compartment.         Impression:  1. Diffuse bone marrow edema in the left knee medial tibial plateau  with a linear subchondral region of low T1 signal, consistent with an  insufficiency fracture.  2. Radial tear of the left knee medial meniscus at the junction of the  posterior horn and posterior root ligament.  3. Moderate size left knee joint effusion.  4. Focal full-thickness cartilage loss in the left knee patellofemoral  compartment with mild diffuse cartilage thinning in the medial  femorotibial  compartment.  5. The anterior and posterior cruciate ligaments, medial and lateral  supporting structures, and lateral meniscus are intact.        [Consider Follow Up: Insufficiency fracture at the left knee medial  tibial plateau.]     This report will be copied to the [facility] to ensure a  provider acknowledges the finding.      I have personally reviewed the examination and initial interpretation  and I agree with the findings.     ODILON BLACK MD            SUBJECTIVE:  This 71-year-old female for the last 8 weeks has had discomfort along the medial left knee with weightbearing.  She uses a 4-pronged cane to ambulate about her house.  She is walking and weightbearing but it still gives her significant pain and she is requiring oxycodone to help with the pain.  She points to the medial aspect of the tibia as the area that bothers her.  It is not associated with radicular discomfort into the leg.  She has had an MRI of the knee that shows an insufficiency fracture across the medial tibial plateau, some signs of degenerative meniscal tearing within the knee and significant patellofemoral DJD, otherwise there was no internal derangement.  She can think of no injury.  She had been doing a lot of walking 8 weeks ago but does not remember any particular injury that happened.  She had a normal x-ray of the knee in 2012.  She also had a DEXA scan in 2012 that showed some osteopenia and has been taking calcium and vitamin D since.      OBJECTIVE:  She is tender, particularly on the medial tibial plateau, mildly tender along the medial joint line.  There is no effusion of either knee.  I can flex and extend the knee fully.  She is nontender about the lateral joint line, the pes anserine bursa or the distal IT band or the patellar tendon.  She has good range of motion at the hips.  Gentle anterior drawer is well tolerated. Appripriate in conversation and affect.  Skin intact.     IMAGING:  Standing x-rays  of the knee today are done and an insufficiency fracture can be seen, but there was no depression involving the tibial plateau.  She has good maintenance of the joint space without signs of degenerative change on her x-ray.  She has some minimal patellofemoral DJD.      ASSESSMENT:  Insufficiency fracture of her left medial knee.      PLAN:  She has had pain for 8 weeks.  She does not have significant degenerative change on her x-rays so I am not certain that a cortisone shot would help her pain.  It might help with a certain amount of patellofemoral pain with arthritis, but I think it is unlikely to change the weightbearing pain she still has over the proximal tibia.  I talked about supplying her with a scooter, but she says there is no way she can tolerate it in her home and that the 4-prong cane works better.  We agreed to try a medial  brace to see if it helps with her current pain.  If it provides no relief the cortisone shot could be considered.  This is not an indication for surgery at this time so at this point, it is conservative cares to assist with pain and assisted weightbearing.  She understands that a repeat DEXA scan could be ordered by her primary provider at some point to make sure that she has not progressed from osteopenia to osteoporosis over time.       Den Troncoso MD

## 2017-06-16 DIAGNOSIS — M25.562 ACUTE PAIN OF LEFT KNEE: ICD-10-CM

## 2017-06-16 NOTE — TELEPHONE ENCOUNTER
Controlled Substance Refill Request for percocet    Last refill: 6/2/17    Last clinic visit: 5/16/17        Next appt: 9/5/17 for diabetes    Controlled substance agreement on file: No.      Processing:  Staff will hand deliver Rx to on-site pharmacy    [place] reviewed:  reviewed- no concerns  [place]:  [email or web address]      Dara Bose RN, BSN

## 2017-06-16 NOTE — TELEPHONE ENCOUNTER
Reason for Call:  Medication or medication refill:    Do you use a [facility]?  Name of the pharmacy and phone number for the current request:  [facility] [place] - [phone]    Name of the medication requested: Oxycodone    Other request: Pt would like this refilled today and would like rx walked over to pharmacy. Advised of 72 hr policy on refills    Can we leave a detailed message on this number? YES    Phone number patient can be reached at: Home number on file [phone] (home)    Best Time: Anytime    Call taken on 6/16/2017 at 9:50 AM by Marya Cvaazos

## 2017-06-20 RX ORDER — OXYCODONE AND ACETAMINOPHEN 5; 325 MG/1; MG/1
1 TABLET ORAL 4 TIMES DAILY
Qty: 24 TABLET | Refills: 0 | Status: SHIPPED | OUTPATIENT
Start: 2017-06-20 | End: 2018-03-13

## 2017-06-20 NOTE — TELEPHONE ENCOUNTER
One refill ok.     Pt should follow up if needing more oxycodone/percocet prescriptions after this time. In nurse basket. Joel Wegener,MD Joel Wegener,MD

## 2017-06-20 NOTE — TELEPHONE ENCOUNTER
Patient missed a call from clinic and called back. Writer informed her of Dr. Wegener's message and she did not wish to schedule an appointment at this time.  Thank you  Alondra VALENTINE  Patient Rep

## 2017-06-20 NOTE — TELEPHONE ENCOUNTER
Staff walked Oxycodone rx to  Pharmacy [place]. Called and LVM to notify patient that rx is at pharmacy and patient should f/u if needing more Oxycodone after this time, per Dr Wegener.    Jackie Merchant MA

## 2017-07-09 DIAGNOSIS — E78.5 HYPERLIPIDEMIA LDL GOAL <100: ICD-10-CM

## 2017-07-10 RX ORDER — ATORVASTATIN CALCIUM 40 MG/1
TABLET, FILM COATED ORAL
Qty: 90 TABLET | Refills: 0 | Status: SHIPPED | OUTPATIENT
Start: 2017-07-10 | End: 2017-10-29

## 2017-07-10 NOTE — TELEPHONE ENCOUNTER
Asking for a 3 month supply  Patient uses mail order pharmacy  Overdue for annual labs - orders pended.  Will route to provider for approval   Patient does have an upcoming appointment with provider.  Tessie Paez RN

## 2017-07-10 NOTE — TELEPHONE ENCOUNTER
Atorvastatin     Last Written Prescription Date: 4/17/17  Last Fill Quantity: 90, # refills: 0  [facility] [place] [facility] prescribing provider: 5/16/17  Next 5 appointments (look out 90 days)     Jul 26, 2017  2:20 PM CDT   Office Visit with Joel Daniel Irwin Wegener, MD   [facility] ([facility]    [address] [place] [id number] [phone]            Sep 05, 2017  3:00 PM CDT   SHORT with Joel Daniel Irwin Wegener, MD   [facility] ([facility])    [address] [place] [id number] [phone]                   Lab Results   Component Value Date    CHOL 156 06/27/2016     Lab Results   Component Value Date    HDL 44 06/27/2016     Lab Results   Component Value Date    LDL 78 06/27/2016     Lab Results   Component Value Date    TRIG 171 06/27/2016     Lab Results   Component Value Date    CHOLHDLRATIO 3.4 07/09/2013

## 2017-07-18 ENCOUNTER — RADIANT APPOINTMENT (OUTPATIENT)
Dept: MAMMOGRAPHY | Facility: CLINIC | Age: 72
End: 2017-07-18
Attending: FAMILY MEDICINE

## 2017-07-18 DIAGNOSIS — Z12.31 VISIT FOR SCREENING MAMMOGRAM: ICD-10-CM

## 2017-07-26 ENCOUNTER — OFFICE VISIT (OUTPATIENT)
Dept: FAMILY MEDICINE | Facility: CLINIC | Age: 72
End: 2017-07-26
Payer: COMMERCIAL

## 2017-07-26 VITALS
OXYGEN SATURATION: 97 % | DIASTOLIC BLOOD PRESSURE: 66 MMHG | SYSTOLIC BLOOD PRESSURE: 106 MMHG | WEIGHT: 193.5 LBS | HEART RATE: 77 BPM | TEMPERATURE: 97.8 F | BODY MASS INDEX: 32.2 KG/M2 | RESPIRATION RATE: 18 BRPM

## 2017-07-26 DIAGNOSIS — M84.469D INSUFFICIENCY FRACTURE OF TIBIA, WITH ROUTINE HEALING, SUBSEQUENT ENCOUNTER: Primary | ICD-10-CM

## 2017-07-26 DIAGNOSIS — E11.22 TYPE 2 DIABETES MELLITUS WITH STAGE 2 CHRONIC KIDNEY DISEASE, WITHOUT LONG-TERM CURRENT USE OF INSULIN (H): ICD-10-CM

## 2017-07-26 DIAGNOSIS — N18.2 TYPE 2 DIABETES MELLITUS WITH STAGE 2 CHRONIC KIDNEY DISEASE, WITHOUT LONG-TERM CURRENT USE OF INSULIN (H): ICD-10-CM

## 2017-07-26 DIAGNOSIS — E78.5 HYPERLIPIDEMIA LDL GOAL <100: ICD-10-CM

## 2017-07-26 LAB — HBA1C MFR BLD: 7 % (ref 4.3–6)

## 2017-07-26 PROCEDURE — 36415 COLL VENOUS BLD VENIPUNCTURE: CPT | Performed by: FAMILY MEDICINE

## 2017-07-26 PROCEDURE — 83036 HEMOGLOBIN GLYCOSYLATED A1C: CPT | Performed by: FAMILY MEDICINE

## 2017-07-26 PROCEDURE — 80061 LIPID PANEL: CPT | Performed by: FAMILY MEDICINE

## 2017-07-26 PROCEDURE — 84443 ASSAY THYROID STIM HORMONE: CPT | Performed by: FAMILY MEDICINE

## 2017-07-26 PROCEDURE — 99214 OFFICE O/P EST MOD 30 MIN: CPT | Performed by: FAMILY MEDICINE

## 2017-07-26 RX ORDER — HYDROCODONE BITARTRATE AND ACETAMINOPHEN 5; 325 MG/1; MG/1
1 TABLET ORAL DAILY PRN
Qty: 30 TABLET | Refills: 0 | Status: SHIPPED | OUTPATIENT
Start: 2017-07-26 | End: 2018-03-13

## 2017-07-26 RX ORDER — SYRINGE-NEEDLE,INSULIN,0.5 ML 27GX1/2"
SYRINGE, EMPTY DISPOSABLE MISCELLANEOUS
Qty: 90 EACH | Refills: 3 | Status: SHIPPED | OUTPATIENT
Start: 2017-07-26 | End: 2019-05-21

## 2017-07-26 NOTE — MR AVS SNAPSHOT
After Visit Summary   7/26/2017    Sarah Felix    MRN: [id number]           Patient Information     Date Of Birth          1945        Visit Information        Provider Department      7/26/2017 2:20 PM Wegener, Joel Daniel Irwin, MD [facility]        Today's Diagnoses     Insufficiency fracture of tibia, with routine healing, subsequent encounter    -  1    Type 2 diabetes mellitus with stage 2 chronic kidney disease, without long-term current use of insulin (H)        Hyperlipidemia LDL goal <100          Care Instructions    ASSESSMENT AND PLAN  1. Insufficiency fracture of tibia, with routine healing, subsequent encounter  Overall improving but still significant pain at night.  Difficulty with logistics of ortho referral/changed appointments without he being notified.     Brace is helping a lot.     I would anticipate 12-18 weeks for full recovery since has been weight bearing.     Ok to use hydrocodone one pill nightly in addition to icing for pain to help with sleep/recovery.     If not nearly resolved/needing more pain medication in one month then I would recommend re-evaluation with another MRI.     If needing to change pain medication/dose please follow up with me.       - HYDROcodone-acetaminophen (NORCO) 5-325 MG per tablet; Take 1 tablet by mouth daily as needed for moderate to severe pain maximum 1 tablet(s) per day at night  Dispense: 30 tablet; Refill: 0    2. Type 2 diabetes mellitus with stage 2 chronic kidney disease, without long-term current use of insulin (H)  Controlled. a1c 7 today.  On ace, statin and insulin and aspiring. Blood pressue contrlled.   - Hemoglobin A1c  - TSH with free T4 reflex    3. Hyperlipidemia LDL goal <100  Check lab today.     - Lipid panel reflex to direct LDL        MYCHART SIGNUP FOR E-VISITS AND EASIER COMMUNICATION:  [email or web address]     Zipnosis:  [email or web address].  Sign up for e-visits for common illnesses!      RADIOLOGY:   [facility]:  [phone] to schedule any radiology tests at [facility]: [phone]    Mammograms/colonoscopies:  [phone]    CONSUMER PRICE LINE for estimates of test costs:  [phone]               Follow-ups after your visit        Your next 10 appointments already scheduled     Sep 05, 2017  3:00 PM CDT   SHORT with Joel Daniel Irwin Wegener, MD   [facility]    [address] [place] [id number] [phone]            Feb 15, 2018  1:00 PM CST   RETURN RETINA with Josette Rodriguez MD   [facility] Clinic ([facility])    [facility]  [address]  [address] [id number] [phone]              Who to contact     If you have questions or need follow up information about today's clinic visit or your schedule please contact [facility] directly at [phone].  Normal or non-critical lab and imaging results will be communicated to you by MyChart, letter or phone within 4 business days after the clinic has received the results. If you do not hear from us within 7 days, please contact the clinic through [facility] or phone. If you have a critical or abnormal lab result, we will notify you by phone as soon as possible.  Submit refill requests through [employer] or call your pharmacy and they will forward the refill request to us. Please allow 3 business days for your refill to be completed.          Additional Information About Your Visit        [facility] Mobile Information     [employer] gives you secure access to your electronic health record. If you see a primary care provider, you can also send messages to your care team and make appointments. If you have questions, please call your primary care clinic.  If you do not have a primary care provider, please call [phone] and they will assist you.        Care EveryWhere ID     This is your Care EveryWhere  ID. This could be used by other organizations to access your [facility] medical records  [id number]        Your Vitals Were     Pulse Temperature Respirations Pulse Oximetry BMI (Body Mass Index)       77 97.8  F (36.6  C) (Tympanic) 18 97% 32.2 kg/m2        Blood Pressure from Last 3 Encounters:   07/26/17 106/66   05/16/17 122/78   05/10/17 112/72    Weight from Last 3 Encounters:   07/26/17 193 lb 8 oz (87.8 kg)   06/06/17 200 lb (90.7 kg)   05/16/17 199 lb (90.3 kg)              We Performed the Following     Hemoglobin A1c     Lipid panel reflex to direct LDL     TSH with free T4 reflex          Today's Medication Changes          These changes are accurate as of: 7/26/17  3:32 PM.  If you have any questions, ask your nurse or doctor.               Start taking these medicines.        Dose/Directions    HYDROcodone-acetaminophen 5-325 MG per tablet   Commonly known as:  NORCO   Used for:  Insufficiency fracture of tibia, with routine healing, subsequent encounter   Started by:  Wegener, Joel Daniel Irwin, MD        Dose:  1 tablet   Take 1 tablet by mouth daily as needed for moderate to severe pain maximum 1 tablet(s) per day at night   Quantity:  30 tablet   Refills:  0            Where to get your medicines      Some of these will need a paper prescription and others can be bought over the counter.  Ask your nurse if you have questions.     Bring a paper prescription for each of these medications     HYDROcodone-acetaminophen 5-325 MG per tablet                Primary Care Provider Office Phone # Fax #    Joel Daniel Irwin Wegener, [phone]       [place] [id number]        Equal Access to Services     [facility]: Hadii kash genao Somisael, waaxda luqadaha, qaybta kaalmada adegeneva, jamila engel. So [facility] [phone].    ATENCIÓN: Si habla español, tiene a meza disposición servicios gratuitos de asistencia lingüística.  Jorge Alberto watt [phone].    We comply with applicable federal civil rights laws and [place] laws. We do not discriminate on the basis of race, color, national origin, age, disability sex, sexual orientation or gender identity.            Thank you!     Thank you for choosing [facility]  for your care. Our goal is always to provide you with excellent care. Hearing back from our patients is one way we can continue to improve our services. Please take a few minutes to complete the written survey that you may receive in the mail after your visit with us. Thank you!             Your Updated Medication List - Protect others around you: Learn how to safely use, store and throw away your medicines at www.disposemymeds.org.          This list is accurate as of: 7/26/17  3:32 PM.  Always use your most recent med list.                   Brand Name Dispense Instructions for use Diagnosis    ACE/ARB NOT PRESCRIBED (INTENTIONAL)      by Other route continuous prn.        amoxicillin-clavulanate 875-125 MG per tablet    AUGMENTIN    28 tablet    Take 1 tablet by mouth 2 times daily    Diverticulitis of colon       aspirin 81 MG tablet     60 tablet    Take 2 tablets (162 mg) by mouth daily    Health care maintenance       atorvastatin 40 MG tablet    LIPITOR    90 tablet    TAKE 1 TABLET DAILY    Hyperlipidemia LDL goal <100       BD REY U/F 32G X 4 MM   Generic drug:  insulin pen needle     90 each    USE ONCE DAILY AS DIRECTED WITH JENY DIETRICH    Type 2 diabetes mellitus with stage 2 chronic kidney disease, with long-term current use of insulin (H)       blood glucose monitoring meter device kit     1 kit    Use to test blood sugars 2 times daily as directed.    Type 2 diabetes, HbA1c goal < 7% (H)       blood glucose monitoring test strip    ONE TOUCH ULTRA    360 each    1 strip by In Vitro route 4 times daily.    Type 2 diabetes, HbA1c goal < 7% (H)       CALCIUM 500 + D 500-200 MG-IU Tabs     100    one  tab twice per day    Osteopenia       COLACE PO      Take  by mouth as needed.    Vaginal discharge       furosemide 20 MG tablet    LASIX    270 tablet    Two in am (40mg) and one tablet (20mg) in afternoon    Dependent edema, CKD (chronic kidney disease) stage 2, GFR 60-89 ml/min       glimepiride 4 MG tablet    AMARYL    180 tablet    Take 1 tablet (4 mg) by mouth 2 times daily (with meals)    Type 2 diabetes with stage 2 chronic kidney disease GFR 60-89 (H)       HYDROcodone-acetaminophen 5-325 MG per tablet    NORCO    30 tablet    Take 1 tablet by mouth daily as needed for moderate to severe pain maximum 1 tablet(s) per day at night    Insufficiency fracture of tibia, with routine healing, subsequent encounter       LANTUS SOLOSTAR 100 UNIT/ML injection   Generic drug:  insulin glargine     60 mL    Inject between 45 units and 60 units at bedtime.  Needs four boxes for three month supply.    Type 2 diabetes mellitus with stage 2 chronic kidney disease, with long-term current use of insulin (H)       lisinopril 2.5 MG tablet    PRINIVIL/Zestril    90 tablet    Take 1 tablet (2.5 mg) by mouth daily    CKD (chronic kidney disease) stage 2, GFR 60-89 ml/min       MAALOX REGULAR STRENGTH 200-200-20 MG/5ML Susp suspension   Generic drug:  alum & mag hydroxide-simethicone      Take 30 mLs by mouth At Bedtime.        metoprolol 25 MG tablet    LOPRESSOR    180 tablet    Take 1 tablet (25 mg) by mouth 2 times daily    Essential hypertension       ONETOUCH DELICA LANCETS 33G Misc     360 each    1 each 4 times daily    Type 2 diabetes, HbA1c goal < 7% (H)       oxyCODONE-acetaminophen 5-325 MG per tablet    PERCOCET    24 tablet    Take 1 tablet by mouth 4 times daily maximum 4 tablet(s) per day    Acute pain of left knee       triamcinolone 0.1 % cream    KENALOG    80 g    Apply sparingly to affected area two times daily as needed    Contact dermatitis and other eczema, due to unspecified cause       VITAMIN D PO       Take  by mouth. Pt consuming 3000 units per day        warfarin 4 MG tablet    COUMADIN    30 tablet    Take 1 tablet (4 mg) by mouth daily    Acute pain of left knee, Calf swelling

## 2017-07-26 NOTE — NURSING NOTE
"Chief Complaint   Patient presents with     Pain Management     left knee       Initial /66 (BP Location: Right arm, Patient Position: Chair, Cuff Size: Adult Large)  Pulse 77  Temp 97.8  F (36.6  C) (Tympanic)  Resp 18  Wt 193 lb 8 oz (87.8 kg)  SpO2 97%  BMI 32.2 kg/m2 Estimated body mass index is 32.2 kg/(m^2) as calculated from the following:    Height as of 6/6/17: 5' 5\" (1.651 m).    Weight as of this encounter: 193 lb 8 oz (87.8 kg).  Medication Reconciliation: complete     Bekah Merchant MA      "

## 2017-07-26 NOTE — PROGRESS NOTES
SUBJECTIVE:                                                    Sarah Felix is a 71 year old female who presents to clinic today for the following health issues:      Pt in to discuss about left knee pain. Pt has been wearing a knee brace for about a month. Before the brace she could not put pressure on her knee. With the brace on she can do thing but now when she sit down and relaxes without the brace it is achy with throbbing/stabbing  is on the lateral side of the knee when before the pain was on the medial side of the knee. Sleeping is difficult because that is when she feels most of her pain.      Additional history/life update:       Insufficiency fracture of tibia, with routine healing, subsequent encounter: Overall improving but still significant pain at night.  Difficulty with logistics of ortho referral/changed appointments without he being notified.     Brace is helping a lot.   Type 2 diabetes mellitus with stage 2 chronic kidney disease, without long-term current use of insulin (H):: requested/due for labs. Does not have meter with her today.   Hyperlipidemia LDL goal <100 :    Medical, social, surgical, and family histories reviewed.    ROS:  Constitutional, HEENT, cardiovascular, pulmonary, GI, , musculoskeletal, neuro, skin, endocrine and psych systems are negative, except as otherwise noted.        OBJECTIVE:  /66 (BP Location: Right arm, Patient Position: Chair, Cuff Size: Adult Large)  Pulse 77  Temp 97.8  F (36.6  C) (Tympanic)  Resp 18  Wt 193 lb 8 oz (87.8 kg)  SpO2 97%  BMI 32.2 kg/m2    EXAM:  GENERAL APPEARANCE: healthy, alert and no distress  Left knee in off- brace.  Minimal ankle swelling.       ASSESSMENT AND PLAN  Patient Instructions   ASSESSMENT AND PLAN  1. Insufficiency fracture of tibia, with routine healing, subsequent encounter    I reviewed sports medicine note.  I presume that a knee orthopedic provider reviewed her imaging prior to her appointment  and thus changed visit to sports medicine since surgery not indicated.     I would anticipate 12-18 weeks for full recovery since has been weight bearing when usually would take 6-8 weeks with complete non weight bearing in my experience.     Ok to use hydrocodone one pill nightly in addition to icing for pain to help with sleep/recovery.     If not nearly resolved/needing more pain medication in one month then I would recommend re-evaluation with another MRI.     If needing to change pain medication/dose please follow up with me.     Can call clinic to schedule repeat dexa to evaluate for osteoporosis given unexplained fracture.       - HYDROcodone-acetaminophen (NORCO) 5-325 MG per tablet; Take 1 tablet by mouth daily as needed for moderate to severe pain maximum 1 tablet(s) per day at night  Dispense: 30 tablet; Refill: 0    2. Type 2 diabetes mellitus with stage 2 chronic kidney disease, without long-term current use of insulin (H)  Uncontrolled but close with. a1c right at 7 today.  On ace, statin and insulin and aspiring. Blood pressue contrlled. Encouraged to continue to watch diet closely .  Presume will improve further once able to be more active again.   - Hemoglobin A1c  - TSH with free T4 reflex    3. Hyperlipidemia LDL goal <100 - uncontrolled based on last results.     The 10-year ASCVD risk score ([place] DC Jr, et al., 2013) is: 13.7%    Values used to calculate the score:      Age: 71 years      Sex: Female      Is Non- : No      Diabetic: Yes      Tobacco smoker: No      Systolic Blood Pressure: 106 mmHg      Is BP treated: Yes      HDL Cholesterol: 50 mg/dL      Total Cholesterol: 149 mg/dL    Check lab today, adjust therapy as needed.     - Lipid panel reflex to direct LDL          I Spent greater than 1/2 of 25 minutes counseling discussing the rational for the assessment and plan noted above      Joel Wegener,MD

## 2017-07-26 NOTE — PATIENT INSTRUCTIONS
ASSESSMENT AND PLAN  1. Insufficiency fracture of tibia, with routine healing, subsequent encounter  Overall improving but still significant pain at night.  Difficulty with logistics of ortho referral/changed appointments without he being notified.     Brace is helping a lot.     I would anticipate 12-18 weeks for full recovery since has been weight bearing.     Ok to use hydrocodone one pill nightly in addition to icing for pain to help with sleep/recovery.     If not nearly resolved/needing more pain medication in one month then I would recommend re-evaluation with another MRI.     If needing to change pain medication/dose please follow up with me.       - HYDROcodone-acetaminophen (NORCO) 5-325 MG per tablet; Take 1 tablet by mouth daily as needed for moderate to severe pain maximum 1 tablet(s) per day at night  Dispense: 30 tablet; Refill: 0    2. Type 2 diabetes mellitus with stage 2 chronic kidney disease, without long-term current use of insulin (H)  Controlled. a1c 7 today.  On ace, statin and insulin and aspiring. Blood pressue contrlled.   - Hemoglobin A1c  - TSH with free T4 reflex    3. Hyperlipidemia LDL goal <100  Check lab today.     - Lipid panel reflex to direct LDL        MYCHART SIGNUP FOR E-VISITS AND EASIER COMMUNICATION:  [email or web address]     Zipnosis:  [email or web address].  Sign up for e-visits for common illnesses!     RADIOLOGY:   [facility]:  [phone] to schedule any radiology tests at [facility]: [phone]    Mammograms/colonoscopies:  [phone]    CONSUMER PRICE LINE for estimates of test costs:  [phone]

## 2017-07-27 LAB
CHOLEST SERPL-MCNC: 149 MG/DL
HDLC SERPL-MCNC: 50 MG/DL
LDLC SERPL CALC-MCNC: 66 MG/DL
NONHDLC SERPL-MCNC: 99 MG/DL
TRIGL SERPL-MCNC: 164 MG/DL
TSH SERPL DL<=0.005 MIU/L-ACNC: 2.11 MU/L (ref 0.4–4)

## 2017-08-11 DIAGNOSIS — N18.2 TYPE 2 DIABETES MELLITUS WITH STAGE 2 CHRONIC KIDNEY DISEASE, WITHOUT LONG-TERM CURRENT USE OF INSULIN (H): ICD-10-CM

## 2017-08-11 DIAGNOSIS — N18.2 CKD (CHRONIC KIDNEY DISEASE) STAGE 2, GFR 60-89 ML/MIN: ICD-10-CM

## 2017-08-11 DIAGNOSIS — E11.22 TYPE 2 DIABETES MELLITUS WITH STAGE 2 CHRONIC KIDNEY DISEASE, WITHOUT LONG-TERM CURRENT USE OF INSULIN (H): ICD-10-CM

## 2017-08-11 DIAGNOSIS — R60.9 DEPENDENT EDEMA: ICD-10-CM

## 2017-08-14 NOTE — TELEPHONE ENCOUNTER
furosemide (LASIX) 20 MG tablet      Last Written Prescription Date: 8/12/16  Last Fill Quantity: 270, # refills: 3  Last Office Visit with [facility] or [facility] prescribing provider: 7/26/17       Potassium   Date Value Ref Range Status   11/17/2016 3.7 3.4 - 5.3 mmol/L Final     Creatinine   Date Value Ref Range Status   11/17/2016 0.87 0.52 - 1.04 mg/dL Final     BP Readings from Last 3 Encounters:   07/26/17 106/66   05/16/17 122/78   05/10/17 112/72         glimepiride (AMARYL) 4 MG tablet         Last Written Prescription Date: 7/14/16  Last Fill Quantity: 180, # refills: 2  Last Office Visit with [facility] or [facility] prescribing provider:  7/26/17        BP Readings from Last 3 Encounters:   07/26/17 106/66   05/16/17 122/78   05/10/17 112/72     Lab Results   Component Value Date    MICROL 21 11/17/2016     Lab Results   Component Value Date    UMALCR 9.22 11/17/2016     Creatinine   Date Value Ref Range Status   11/17/2016 0.87 0.52 - 1.04 mg/dL Final   ]  GFR Estimate   Date Value Ref Range Status   11/17/2016 65 >60 mL/min/1.7m2 Final     Comment:     Non  GFR Calc   10/19/2015 63 >60 mL/min/1.7m2 Final     Comment:     Non  GFR Calc   10/19/2015 62 >60 mL/min/1.7m2 Final     Comment:     Non  GFR Calc     GFR Estimate If Black   Date Value Ref Range Status   11/17/2016 78 >60 mL/min/1.7m2 Final     Comment:      GFR Calc   10/19/2015 76 >60 mL/min/1.7m2 Final     Comment:      GFR Calc   10/19/2015 75 >60 mL/min/1.7m2 Final     Comment:      GFR Calc     Lab Results   Component Value Date    CHOL 149 07/26/2017     Lab Results   Component Value Date    HDL 50 07/26/2017     Lab Results   Component Value Date    LDL 66 07/26/2017     Lab Results   Component Value Date    TRIG 164 07/26/2017     Lab Results   Component Value Date    CHOLHDLRATIO 3.4 07/09/2013     Lab Results   Component Value Date    AST 31  10/19/2015     Lab Results   Component Value Date    ALT 47 10/19/2015     Lab Results   Component Value Date    A1C 7.0 07/26/2017    A1C 7.9 03/08/2017    A1C 8.0 11/17/2016    A1C 7.9 06/27/2016    A1C 7.9 02/12/2016     Potassium   Date Value Ref Range Status   11/17/2016 3.7 3.4 - 5.3 mmol/L Final

## 2017-08-15 RX ORDER — FUROSEMIDE 20 MG
TABLET ORAL
Qty: 270 TABLET | Refills: 0 | Status: SHIPPED | OUTPATIENT
Start: 2017-08-15 | End: 2017-10-29

## 2017-08-15 NOTE — TELEPHONE ENCOUNTER
Routing refill request to provider for review/approval because:  Labs not current:  AST and ALT    Furosemide refilled according to [facility] refill protocol.    Dr. Wegener-Please sign if agree.    Team coordinators-Please contact patient to schedule non-fasting lab appt.  These are monitoring labs for Glimepiride.    Thank you!  MEGAN ChaudharyN, RN

## 2017-08-16 RX ORDER — GLIMEPIRIDE 4 MG/1
TABLET ORAL
Qty: 180 TABLET | Refills: 0 | Status: SHIPPED | OUTPATIENT
Start: 2017-08-16 | End: 2017-10-11

## 2017-10-11 ENCOUNTER — OFFICE VISIT (OUTPATIENT)
Dept: FAMILY MEDICINE | Facility: CLINIC | Age: 72
End: 2017-10-11
Payer: COMMERCIAL

## 2017-10-11 VITALS
DIASTOLIC BLOOD PRESSURE: 68 MMHG | BODY MASS INDEX: 33.03 KG/M2 | RESPIRATION RATE: 14 BRPM | WEIGHT: 198.5 LBS | OXYGEN SATURATION: 94 % | HEART RATE: 72 BPM | SYSTOLIC BLOOD PRESSURE: 113 MMHG | TEMPERATURE: 97.4 F

## 2017-10-11 DIAGNOSIS — N18.2 TYPE 2 DIABETES MELLITUS WITH STAGE 2 CHRONIC KIDNEY DISEASE, WITHOUT LONG-TERM CURRENT USE OF INSULIN (H): Primary | ICD-10-CM

## 2017-10-11 DIAGNOSIS — Z23 NEED FOR PROPHYLACTIC VACCINATION AND INOCULATION AGAINST INFLUENZA: ICD-10-CM

## 2017-10-11 DIAGNOSIS — E11.22 TYPE 2 DIABETES MELLITUS WITH STAGE 2 CHRONIC KIDNEY DISEASE, WITHOUT LONG-TERM CURRENT USE OF INSULIN (H): Primary | ICD-10-CM

## 2017-10-11 PROCEDURE — 90662 IIV NO PRSV INCREASED AG IM: CPT | Performed by: FAMILY MEDICINE

## 2017-10-11 PROCEDURE — 99213 OFFICE O/P EST LOW 20 MIN: CPT | Mod: 25 | Performed by: FAMILY MEDICINE

## 2017-10-11 PROCEDURE — G0008 ADMIN INFLUENZA VIRUS VAC: HCPCS | Performed by: FAMILY MEDICINE

## 2017-10-11 RX ORDER — GLIMEPIRIDE 4 MG/1
TABLET ORAL
Qty: 180 TABLET | Refills: 3 | Status: SHIPPED | OUTPATIENT
Start: 2017-10-11 | End: 2017-10-12

## 2017-10-11 NOTE — MR AVS SNAPSHOT
After Visit Summary   10/11/2017    Sarah Felix    MRN: [id number]           Patient Information     Date Of Birth          1945        Visit Information        Provider Department      10/11/2017 3:00 PM Wegener, Joel Daniel Irwin, MD [facility]        Today's Diagnoses     Type 2 diabetes mellitus with stage 2 chronic kidney disease, without long-term current use of insulin (H)    -  1    Need for prophylactic vaccination and inoculation against influenza           Follow-ups after your visit        Your next 10 appointments already scheduled     Jan 03, 2018  2:00 PM CST   DX HIP/PELVIS/SPINE with HWDX1   [facility]    [address] [place] [address] [phone]           Please do not take any of the following 24 hours prior to the day of your exam: vitamins, calcium tablets, antacids.  If possible, please wear clothes without metal (snaps, zippers). A sweatsuit works well.            Jan 03, 2018  2:40 PM CST   SHORT with Joel Daniel Irwin Wegener, MD   [facility] ([facility]    [address] [place] [address] [phone]            Feb 15, 2018  1:00 PM CST   RETURN RETINA with Josette Rodriguez MD   Eye Clinic ([facility])    [address]  [address] [facility]  [address]  [facility] [id number] [phone]              Who to contact     If you have questions or need follow up information about today's clinic visit or your schedule please contact [facility] directly at [phone].  Normal or non-critical lab and imaging results will be communicated to you by MyChart, letter or phone within 4 business days after the clinic has received the results. If you do not hear from us within 7 days, please contact the clinic through MyChart or phone. If you have a critical or abnormal lab result, we will notify  you by phone as soon as possible.  Submit refill requests through PedidosYa / PedidosJÃ¡ or call your pharmacy and they will forward the refill request to us. Please allow 3 business days for your refill to be completed.          Additional Information About Your Visit        PedidosYa / PedidosJÃ¡ Information     PedidosYa / PedidosJÃ¡ gives you secure access to your electronic health record. If you see a primary care provider, you can also send messages to your care team and make appointments. If you have questions, please call your primary care clinic.  If you do not have a primary care provider, please call [phone] and they will assist you.        Care EveryWhere ID     This is your Care EveryWhere ID. This could be used by other organizations to access your [place] medical records  [id number]        Your Vitals Were     Pulse Temperature Respirations Pulse Oximetry BMI (Body Mass Index)       72 97.4  F (36.3  C) (Tympanic) 14 94% 33.03 kg/m2        Blood Pressure from Last 3 Encounters:   10/11/17 113/68   07/26/17 106/66   05/16/17 122/78    Weight from Last 3 Encounters:   10/11/17 198 lb 8 oz (90 kg)   07/26/17 193 lb 8 oz (87.8 kg)   06/06/17 200 lb (90.7 kg)              We Performed the Following     FLU VACCINE, INCREASED ANTIGEN, PRESV FREE, AGE 65+ [84142]     Vaccine Administration, Initial [47365]          Today's Medication Changes          These changes are accurate as of: 10/11/17 11:59 PM.  If you have any questions, ask your nurse or doctor.               These medicines have changed or have updated prescriptions.        Dose/Directions    glimepiride 4 MG tablet   Commonly known as:  AMARYL   This may have changed:  See the new instructions.   Used for:  Type 2 diabetes mellitus with stage 2 chronic kidney disease, without long-term current use of insulin (H)   Changed by:  Wegener, Joel Daniel Irwin, MD        TAKE 1 TABLET TWICE A DAY WITH MEALS (BREAKFAST AND DINNER)   Quantity:  180 tablet   Refills:  3            Where to  get your medicines      These medications were sent to [facility] - [place], [place] - [address]  [address], [facility] [address]     Phone:  [phone]     glimepiride 4 MG tablet                Primary Care Provider Office Phone # Fax #    Jem Daniel Irwin Wegener, [phone]       [address]  [facility] [address]        Equal Access to Services     KILLIAN CASON : Hadii aad ku hadasho Soomaali, waaxda luqadaha, qaybta kaalmada adeegyada, waxay idiin hayaan adeeg kharash la'aan . So [facility] [phone].    ATENCIÓN: Si habla español, tiene a meza disposición servicios gratuitos de asistencia lingüística. [facility] [phone].    We comply with applicable federal civil rights laws and [place] laws. We do not discriminate on the basis of race, color, national origin, age, disability, sex, sexual orientation, or gender identity.            Thank you!     Thank you for choosing [facility]  for your care. Our goal is always to provide you with excellent care. Hearing back from our patients is one way we can continue to improve our services. Please take a few minutes to complete the written survey that you may receive in the mail after your visit with us. Thank you!             Your Updated Medication List - Protect others around you: Learn how to safely use, store and throw away your medicines at www.disposemymeds.org.          This list is accurate as of: 10/11/17 11:59 PM.  Always use your most recent med list.                   Brand Name Dispense Instructions for use Diagnosis    ACE/ARB NOT PRESCRIBED (INTENTIONAL)      by Other route continuous prn.        amoxicillin-clavulanate 875-125 MG per tablet    AUGMENTIN    28 tablet    Take 1 tablet by mouth 2 times daily    Diverticulitis of colon       aspirin 81 MG tablet     60 tablet    Take 2 tablets (162 mg) by mouth daily    Health care maintenance       atorvastatin 40 MG tablet    LIPITOR    90  "tablet    TAKE 1 TABLET DAILY    Hyperlipidemia LDL goal <100       BD REY U/F 32G X 4 MM   Generic drug:  insulin pen needle     90 each    USE ONCE DAILY AS DIRECTED WITH LANTUS SOLOSTAR    Type 2 diabetes mellitus with stage 2 chronic kidney disease, with long-term current use of insulin (H)       blood glucose monitoring meter device kit     1 kit    Use to test blood sugars 2 times daily as directed.    Type 2 diabetes, HbA1c goal < 7% (H)       blood glucose monitoring test strip    ONE TOUCH ULTRA    360 each    1 strip by In Vitro route 4 times daily.    Type 2 diabetes, HbA1c goal < 7% (H)       CALCIUM 500 + D 500-200 MG-IU Tabs     100    one tab twice per day    Osteopenia       COLACE PO      Take  by mouth as needed.    Vaginal discharge       furosemide 20 MG tablet    LASIX    270 tablet    TAKE 2 TABLETS IN THE MORNING AND 1 TABLET IN THE AFTERNOON    Dependent edema, CKD (chronic kidney disease) stage 2, GFR 60-89 ml/min       glimepiride 4 MG tablet    AMARYL    180 tablet    TAKE 1 TABLET TWICE A DAY WITH MEALS (BREAKFAST AND DINNER)    Type 2 diabetes mellitus with stage 2 chronic kidney disease, without long-term current use of insulin (H)       HYDROcodone-acetaminophen 5-325 MG per tablet    NORCO    30 tablet    Take 1 tablet by mouth daily as needed for moderate to severe pain maximum 1 tablet(s) per day at night    Insufficiency fracture of tibia, with routine healing, subsequent encounter       insulin syringe-needle U-100 31G X 5/16\" 1 ML    BD insulin syringe ULTRAFINE    90 each    Use 1 syringes daily or as directed. Prescription 4mm Rey 32g (5/32\" x 0.23mm) please.    Type 2 diabetes mellitus with stage 2 chronic kidney disease, without long-term current use of insulin (H)       LANTUS SOLOSTAR 100 UNIT/ML injection   Generic drug:  insulin glargine     60 mL    Inject between 45 units and 60 units at bedtime.  Needs four boxes for three month supply.    Type 2 diabetes mellitus " with stage 2 chronic kidney disease, with long-term current use of insulin (H)       lisinopril 2.5 MG tablet    PRINIVIL/Zestril    90 tablet    Take 1 tablet (2.5 mg) by mouth daily    CKD (chronic kidney disease) stage 2, GFR 60-89 ml/min       MAALOX REGULAR STRENGTH 200-200-20 MG/5ML Susp suspension   Generic drug:  alum & mag hydroxide-simethicone      Take 30 mLs by mouth At Bedtime.        metoprolol 25 MG tablet    LOPRESSOR    180 tablet    Take 1 tablet (25 mg) by mouth 2 times daily    Essential hypertension       ONETOUCH DELICA LANCETS 33G Misc     360 each    1 each 4 times daily    Type 2 diabetes, HbA1c goal < 7% (H)       oxyCODONE-acetaminophen 5-325 MG per tablet    PERCOCET    24 tablet    Take 1 tablet by mouth 4 times daily maximum 4 tablet(s) per day    Acute pain of left knee       triamcinolone 0.1 % cream    KENALOG    80 g    Apply sparingly to affected area two times daily as needed    Contact dermatitis and other eczema, due to unspecified cause       VITAMIN D PO      Take  by mouth. Pt consuming 3000 units per day        warfarin 4 MG tablet    COUMADIN    30 tablet    Take 1 tablet (4 mg) by mouth daily    Acute pain of left knee, Calf swelling

## 2017-10-11 NOTE — NURSING NOTE
"Chief Complaint   Patient presents with     Forms       Initial /68  Pulse 72  Temp 97.4  F (36.3  C) (Tympanic)  Resp 14  Wt 198 lb 8 oz (90 kg)  SpO2 94%  BMI 33.03 kg/m2 Estimated body mass index is 33.03 kg/(m^2) as calculated from the following:    Height as of 6/6/17: 5' 5\" (1.651 m).    Weight as of this encounter: 198 lb 8 oz (90 kg).  Medication Reconciliation: complete     Lima Vega MA     "

## 2017-10-11 NOTE — PROGRESS NOTES
SUBJECTIVE:   Sarah Felix is a 72 year old female who presents to clinic today for the following health issues:      Patient is in clinic to have forms completed.     Additional history/life update:       Type 2 diabetes mellitus with stage 2 chronic kidney disease, without long-term current use of insulin (H)  Need for prophylactic vaccination and inoculation against influenza :needing driving form for diabetes/insulin.  Has not had hypoglycemia/backouts or seizures ever.  Also two ucare forms needed to confirm keeping up on diabetes care which she is.     Medical, social, surgical, and family histories reviewed.      ROS:  Constitutional, HEENT, cardiovascular, pulmonary, GI, , musculoskeletal, neuro, skin, endocrine and psych systems are negative, except as otherwise noted.        OBJECTIVE:  /68  Pulse 72  Temp 97.4  F (36.3  C) (Tympanic)  Resp 14  Wt 198 lb 8 oz (90 kg)  SpO2 94%  BMI 33.03 kg/m2    EXAM:  GENERAL APPEARANCE: healthy, alert and no distress        ASSESSMENT AND PLAN  1. Type 2 diabetes mellitus with stage 2 chronic kidney disease, without long-term current use of insulin (H)  We reviewed the history of her diabetic monitoring and when next items were due.     Filled out driving form and faxed and returned to her with 4 year pass/re-evaluation rate.  Signed other two forms confirming has had urine protein test and eye exam.  Follow up with me planned for diabetes visit Jan 2018.     2. Need for prophylactic vaccination and inoculation against influenza    - FLU VACCINE, INCREASED ANTIGEN, PRESV FREE, AGE 65+ [17408]  - Vaccine Administration, Initial [19778]        I spent greater than 1/2 of 15 minutes counseling regarding the rational for the assessment and plan noted above.           Joel Wegener,MD        Injectable Influenza Immunization Documentation    1.  Is the person to be vaccinated sick today?   No    2. Does the person to be vaccinated have an allergy to a  component   of the vaccine?   No    3. Has the person to be vaccinated ever had a serious reaction   to influenza vaccine in the past?   No    4. Has the person to be vaccinated ever had Guillain-Barré syndrome?   No    Form completed by Lima Vega MA

## 2017-10-12 ENCOUNTER — TELEPHONE (OUTPATIENT)
Dept: FAMILY MEDICINE | Facility: CLINIC | Age: 72
End: 2017-10-12

## 2017-10-12 DIAGNOSIS — E11.22 TYPE 2 DIABETES MELLITUS WITH STAGE 2 CHRONIC KIDNEY DISEASE, WITHOUT LONG-TERM CURRENT USE OF INSULIN (H): Primary | ICD-10-CM

## 2017-10-12 DIAGNOSIS — N18.2 TYPE 2 DIABETES MELLITUS WITH STAGE 2 CHRONIC KIDNEY DISEASE, WITHOUT LONG-TERM CURRENT USE OF INSULIN (H): Primary | ICD-10-CM

## 2017-10-12 RX ORDER — GLIMEPIRIDE 4 MG/1
TABLET ORAL
Qty: 180 TABLET | Refills: 3 | Status: SHIPPED | OUTPATIENT
Start: 2017-10-12 | End: 2019-01-19

## 2017-10-12 NOTE — TELEPHONE ENCOUNTER
Reason for Call: Request for an order or referral:    Order or referral being requested: N/A    Date needed: as soon as possible    Has the patient been seen by the PCP for this problem? YES    Additional comments: ** SEE TE FROM 08/11/17*  Patient was seen in the clinic yesterday and states that she was suppose to get labs done (noted in last refill request). However, the nurse couldn't find the order and patient had been there for about an hour & a half so she could not wait any longer. Please advise, thank you!    Phone number Patient can be reached at:  Home number on file [phone] (home)    Best Time:  anytime    Can we leave a detailed message on this number?  YES    Call taken on 10/12/2017 at 2:52 PM by Alondra Crocker

## 2017-10-12 NOTE — TELEPHONE ENCOUNTER
Dr. Wegener-Patient overdue for AST and ALT labs.  Would you also like A1C checked?  Writer pended these three labs.    Thank you!  MEGAN ChaudharyN, RN

## 2017-10-12 NOTE — TELEPHONE ENCOUNTER
Patient called back and asked for clarification why previous script had note she needed lab work before future refills and now glimepiride was prescribed.    Writer explained to patient since last refill:  1. She was seen by PCP in office visit.  2. PCP decided to refill medication with 3 refills  3. PCP still would like labs completed.    Reviewed each lab in detail and reason for fasting.    Patient verbalized understanding and in agreement with plan.    Patient plans to schedule lab only at [facility].    Patient requests Glimepiride be transferred to [facility].    Med sent to patient's preferred pharmacy.      PASCALE De León, MEGANN, RN

## 2017-10-12 NOTE — TELEPHONE ENCOUNTER
OK, I recommend cmp, lipid, microalbumin now since those are also due in November.  We can wait to do next a1c at visit in January when she follows up with me.     Joel Wegener,MD

## 2017-10-12 NOTE — TELEPHONE ENCOUNTER
Detailed message left for patient:  1. Dr. Wegener ordered metabolic panel, cholesterol labs and urine test which test for protein in urine  2. Can schedule fasting lab appt.  Fast for 10 hours.  Sips of water and taking usual morning medications is fine.  3. A1C will be checked at next office visit in January    A. MEGAN De LeónN, RN

## 2017-10-20 DIAGNOSIS — E11.9 TYPE 2 DIABETES, HBA1C GOAL < 7% (H): Primary | ICD-10-CM

## 2017-10-23 RX ORDER — LANCETS 33 GAUGE
1 EACH MISCELLANEOUS 4 TIMES DAILY
Qty: 360 EACH | Refills: 1 | Status: SHIPPED | OUTPATIENT
Start: 2017-10-23 | End: 2019-08-19

## 2017-10-27 RX ORDER — LANCETS 33 GAUGE
1 EACH MISCELLANEOUS 4 TIMES DAILY
Qty: 360 EACH | Refills: 1 | OUTPATIENT
Start: 2017-10-27

## 2017-10-27 NOTE — TELEPHONE ENCOUNTER
90 day supply with 3 refills sent to [facility] on 10/23/17.    This is duplicate request.    Med MEGAN BallesterosN, RN

## 2017-10-29 DIAGNOSIS — E78.5 HYPERLIPIDEMIA LDL GOAL <100: ICD-10-CM

## 2017-10-29 DIAGNOSIS — N18.2 CKD (CHRONIC KIDNEY DISEASE) STAGE 2, GFR 60-89 ML/MIN: ICD-10-CM

## 2017-10-29 DIAGNOSIS — R60.9 DEPENDENT EDEMA: ICD-10-CM

## 2017-11-01 RX ORDER — FUROSEMIDE 20 MG
TABLET ORAL
Qty: 270 TABLET | Refills: 0 | Status: SHIPPED | OUTPATIENT
Start: 2017-11-01 | End: 2018-01-22

## 2017-11-01 RX ORDER — ATORVASTATIN CALCIUM 40 MG/1
TABLET, FILM COATED ORAL
Qty: 90 TABLET | Refills: 0 | Status: SHIPPED | OUTPATIENT
Start: 2017-11-01 | End: 2018-01-22

## 2017-11-01 NOTE — TELEPHONE ENCOUNTER
Prescription approved per [facility] Refill Protocol.    Kelly Tamez, MEGANN, RN  [facility]

## 2017-11-09 DIAGNOSIS — N18.2 TYPE 2 DIABETES MELLITUS WITH STAGE 2 CHRONIC KIDNEY DISEASE, WITHOUT LONG-TERM CURRENT USE OF INSULIN (H): ICD-10-CM

## 2017-11-09 DIAGNOSIS — E11.22 TYPE 2 DIABETES MELLITUS WITH STAGE 2 CHRONIC KIDNEY DISEASE, WITHOUT LONG-TERM CURRENT USE OF INSULIN (H): ICD-10-CM

## 2017-11-09 PROCEDURE — 36415 COLL VENOUS BLD VENIPUNCTURE: CPT | Performed by: FAMILY MEDICINE

## 2017-11-09 PROCEDURE — 82043 UR ALBUMIN QUANTITATIVE: CPT | Performed by: FAMILY MEDICINE

## 2017-11-09 PROCEDURE — 80061 LIPID PANEL: CPT | Performed by: FAMILY MEDICINE

## 2017-11-09 PROCEDURE — 80053 COMPREHEN METABOLIC PANEL: CPT | Performed by: FAMILY MEDICINE

## 2017-11-10 LAB
ALBUMIN SERPL-MCNC: 3.4 G/DL (ref 3.4–5)
ALP SERPL-CCNC: 105 U/L (ref 40–150)
ALT SERPL W P-5'-P-CCNC: 42 U/L (ref 0–50)
ANION GAP SERPL CALCULATED.3IONS-SCNC: 8 MMOL/L (ref 3–14)
AST SERPL W P-5'-P-CCNC: 33 U/L (ref 0–45)
BILIRUB SERPL-MCNC: 0.8 MG/DL (ref 0.2–1.3)
BUN SERPL-MCNC: 7 MG/DL (ref 7–30)
CALCIUM SERPL-MCNC: 8.8 MG/DL (ref 8.5–10.1)
CHLORIDE SERPL-SCNC: 105 MMOL/L (ref 94–109)
CHOLEST SERPL-MCNC: 148 MG/DL
CO2 SERPL-SCNC: 27 MMOL/L (ref 20–32)
CREAT SERPL-MCNC: 0.81 MG/DL (ref 0.52–1.04)
CREAT UR-MCNC: 28 MG/DL
GFR SERPL CREATININE-BSD FRML MDRD: 70 ML/MIN/1.7M2
GLUCOSE SERPL-MCNC: 107 MG/DL (ref 70–99)
HDLC SERPL-MCNC: 51 MG/DL
LDLC SERPL CALC-MCNC: 73 MG/DL
MICROALBUMIN UR-MCNC: <5 MG/L
MICROALBUMIN/CREAT UR: NORMAL MG/G CR (ref 0–25)
NONHDLC SERPL-MCNC: 97 MG/DL
POTASSIUM SERPL-SCNC: 3.3 MMOL/L (ref 3.4–5.3)
PROT SERPL-MCNC: 6.5 G/DL (ref 6.8–8.8)
SODIUM SERPL-SCNC: 140 MMOL/L (ref 133–144)
TRIGL SERPL-MCNC: 118 MG/DL

## 2017-11-28 DIAGNOSIS — N18.2 CKD (CHRONIC KIDNEY DISEASE) STAGE 2, GFR 60-89 ML/MIN: ICD-10-CM

## 2017-11-28 DIAGNOSIS — I10 ESSENTIAL HYPERTENSION: ICD-10-CM

## 2017-11-29 NOTE — TELEPHONE ENCOUNTER
Medication Detail      Disp Refills Start End CHAD   metoprolol (LOPRESSOR) 25 MG tablet 180 tablet 3 1/6/2017  No   Sig: Take 1 tablet (25 mg) by mouth 2 times daily     Medication Detail      Disp Refills Start End CHAD   lisinopril (PRINIVIL/ZESTRIL) 2.5 MG tablet 90 tablet 3 1/6/2017  No   Sig: Take 1 tablet (2.5 mg) by mouth daily     LOV 10/11/17

## 2017-11-30 RX ORDER — LISINOPRIL 2.5 MG/1
TABLET ORAL
Qty: 90 TABLET | Refills: 3 | Status: SHIPPED | OUTPATIENT
Start: 2017-11-30 | End: 2018-10-30

## 2017-11-30 RX ORDER — METOPROLOL TARTRATE 25 MG/1
TABLET, FILM COATED ORAL
Qty: 180 TABLET | Refills: 2 | Status: SHIPPED | OUTPATIENT
Start: 2017-11-30 | End: 2018-09-02

## 2017-11-30 NOTE — TELEPHONE ENCOUNTER
Routing refill request to provider for review/approval because:  Labs out of range:  Potassium: for Lisinopril    Metoprolol refilled according to [facility] refill protocol.    Dr. Wegener-Please sign if agree.    Thank you!  MEGAN MunozN, RN          Next 5 appointments (look out 90 days)     Jan 03, 2018  2:40 PM CST   SHORT with Joel Daniel Irwin Wegener, MD   [facility] ([facility])    [address] [place] [address] [phone]                   Potassium   Date Value Ref Range Status   11/09/2017 3.3 (L) 3.4 - 5.3 mmol/L Final     Creatinine   Date Value Ref Range Status   11/09/2017 0.81 0.52 - 1.04 mg/dL Final     BP Readings from Last 3 Encounters:   10/11/17 113/68   07/26/17 106/66   05/16/17 122/78

## 2018-01-03 ENCOUNTER — OFFICE VISIT (OUTPATIENT)
Dept: FAMILY MEDICINE | Facility: CLINIC | Age: 73
End: 2018-01-03
Payer: COMMERCIAL

## 2018-01-03 ENCOUNTER — RADIANT APPOINTMENT (OUTPATIENT)
Dept: BONE DENSITY | Facility: CLINIC | Age: 73
End: 2018-01-03
Attending: FAMILY MEDICINE
Payer: COMMERCIAL

## 2018-01-03 VITALS
TEMPERATURE: 97.5 F | BODY MASS INDEX: 32.82 KG/M2 | HEART RATE: 75 BPM | WEIGHT: 197 LBS | SYSTOLIC BLOOD PRESSURE: 129 MMHG | HEIGHT: 65 IN | RESPIRATION RATE: 16 BRPM | OXYGEN SATURATION: 96 % | DIASTOLIC BLOOD PRESSURE: 81 MMHG

## 2018-01-03 DIAGNOSIS — N18.2 TYPE 2 DIABETES MELLITUS WITH STAGE 2 CHRONIC KIDNEY DISEASE, WITHOUT LONG-TERM CURRENT USE OF INSULIN (H): Primary | ICD-10-CM

## 2018-01-03 DIAGNOSIS — M84.469D INSUFFICIENCY FRACTURE OF TIBIA, WITH ROUTINE HEALING, SUBSEQUENT ENCOUNTER: ICD-10-CM

## 2018-01-03 DIAGNOSIS — E11.22 TYPE 2 DIABETES MELLITUS WITH STAGE 2 CHRONIC KIDNEY DISEASE, WITHOUT LONG-TERM CURRENT USE OF INSULIN (H): Primary | ICD-10-CM

## 2018-01-03 LAB — HBA1C MFR BLD: 8.6 % (ref 4.3–6)

## 2018-01-03 PROCEDURE — 36415 COLL VENOUS BLD VENIPUNCTURE: CPT | Performed by: FAMILY MEDICINE

## 2018-01-03 PROCEDURE — 99214 OFFICE O/P EST MOD 30 MIN: CPT | Performed by: FAMILY MEDICINE

## 2018-01-03 PROCEDURE — 83036 HEMOGLOBIN GLYCOSYLATED A1C: CPT | Performed by: FAMILY MEDICINE

## 2018-01-03 PROCEDURE — 77085 DXA BONE DENSITY AXL VRT FX: CPT | Performed by: INTERNAL MEDICINE

## 2018-01-03 NOTE — PATIENT INSTRUCTIONS
Increase lantus to 50 units daily.     Follow up one month telephone visit.     Watch carbs/sweets in meantime.     Bilateral diabetic monofilament foot exam normal

## 2018-01-03 NOTE — MR AVS SNAPSHOT
After Visit Summary   1/3/2018    Sarah Felix    MRN: [id number]           Patient Information     Date Of Birth          1945        Visit Information        Provider Department      1/3/2018 2:40 PM Wegener, Joel Daniel Irwin, MD [facility]        Today's Diagnoses     Type 2 diabetes mellitus with stage 2 chronic kidney disease, without long-term current use of insulin (H)    -  1      Care Instructions    Increase lantus to 50 units daily.     Follow up one month telephone visit.     Watch carbs/sweets in meantime.     Bilateral diabetic monofilament foot exam normal           Follow-ups after your visit        Your next 10 appointments already scheduled     Feb 15, 2018  1:00 PM CST   RETURN RETINA with Josette Rodriguez MD   Eye Clinic ([facility])    [address]  [address] [facility]  9th Fl Clin 9a  [facility] [address] [phone]              Who to contact     If you have questions or need follow up information about today's clinic visit or your schedule please contact [facility] directly at [phone].  Normal or non-critical lab and imaging results will be communicated to you by MyChart, letter or phone within 4 business days after the clinic has received the results. If you do not hear from us within 7 days, please contact the clinic through Conturhart or phone. If you have a critical or abnormal lab result, we will notify you by phone as soon as possible.  Submit refill requests through WatchGuard or call your pharmacy and they will forward the refill request to us. Please allow 3 business days for your refill to be completed.          Additional Information About Your Visit        MyChart Information     WatchGuard gives you secure access to your electronic health record. If you see a primary care provider, you can also send messages to your care team and make appointments. If you have questions, please call your  "primary care clinic.  If you do not have a primary care provider, please call [phone] and they will assist you.        Care EveryWhere ID     This is your Care EveryWhere ID. This could be used by other organizations to access your [place] medical records  [id number]        Your Vitals Were     Pulse Temperature Respirations Height Pulse Oximetry BMI (Body Mass Index)    75 97.5  F (36.4  C) (Oral) 16 5' 5\" (1.651 m) 96% 32.78 kg/m2       Blood Pressure from Last 3 Encounters:   01/03/18 129/81   10/11/17 113/68   07/26/17 106/66    Weight from Last 3 Encounters:   01/03/18 197 lb (89.4 kg)   10/11/17 198 lb 8 oz (90 kg)   07/26/17 193 lb 8 oz (87.8 kg)              We Performed the Following     Hemoglobin A1c        Primary Care Provider Office Phone # Fax #    Joel Daniel Irwin Wegener, [phone]       [facility] [address] [place] [address]        Equal Access to Services     [facility]: Hadii aad ku hadasho Soomaali, waaxda luqadaha, qaybta kaalmada adeegyabeth, jamila santos . So [facility] [phone].    ATENCIÓN: Si habla español, tiene a meza disposición servicios gratuitos de asistencia lingüística. [facility] [phone].    We comply with applicable federal civil rights laws and [place] laws. We do not discriminate on the basis of race, color, national origin, age, disability, sex, sexual orientation, or gender identity.            Thank you!     Thank you for choosing [facility]  for your care. Our goal is always to provide you with excellent care. Hearing back from our patients is one way we can continue to improve our services. Please take a few minutes to complete the written survey that you may receive in the mail after your visit with us. Thank you!             Your Updated Medication List - Protect others around you: Learn how to safely use, store and throw away your medicines at www.disposemymeds.org.          This list is accurate as " of: 1/3/18  3:36 PM.  Always use your most recent med list.                   Brand Name Dispense Instructions for use Diagnosis    ACE/ARB/ARNI NOT PRESCRIBED (INTENTIONAL)      by Other route continuous prn.        amoxicillin-clavulanate 875-125 MG per tablet    AUGMENTIN    28 tablet    Take 1 tablet by mouth 2 times daily    Diverticulitis of colon       aspirin 81 MG tablet     60 tablet    Take 2 tablets (162 mg) by mouth daily    Health care maintenance       atorvastatin 40 MG tablet    LIPITOR    90 tablet    TAKE 1 TABLET DAILY (NEED APPOINTMENT FOR LABS FOR FUTURE REFILLS)    Hyperlipidemia LDL goal <100       BD REY U/F 32G X 4 MM   Generic drug:  insulin pen needle     90 each    USE ONCE DAILY AS DIRECTED WITH JENY DIETRICH    Type 2 diabetes mellitus with stage 2 chronic kidney disease, with long-term current use of insulin (H)       blood glucose monitoring meter device kit     1 kit    Use to test blood sugars 2 times daily as directed.    Type 2 diabetes, HbA1c goal < 7% (H)       blood glucose monitoring test strip    ONETOUCH ULTRA    360 each    1 strip by In Vitro route 4 times daily.    Type 2 diabetes, HbA1c goal < 7% (H)       CALCIUM 500 + D 500-200 MG-IU Tabs     100    one tab twice per day    Osteopenia       COLACE PO      Take  by mouth as needed.    Vaginal discharge       furosemide 20 MG tablet    LASIX    270 tablet    TAKE 2 TABLETS IN THE MORNING AND 1 TABLET IN THE AFTERNOON    Dependent edema, CKD (chronic kidney disease) stage 2, GFR 60-89 ml/min       glimepiride 4 MG tablet    AMARYL    180 tablet    TAKE 1 TABLET TWICE A DAY WITH MEALS (BREAKFAST AND DINNER)    Type 2 diabetes mellitus with stage 2 chronic kidney disease, without long-term current use of insulin (H)       HYDROcodone-acetaminophen 5-325 MG per tablet    NORCO    30 tablet    Take 1 tablet by mouth daily as needed for moderate to severe pain maximum 1 tablet(s) per day at night    Insufficiency fracture  "of tibia, with routine healing, subsequent encounter       insulin syringe-needle U-100 31G X 5/16\" 1 ML    BD insulin syringe ULTRAFINE    90 each    Use 1 syringes daily or as directed. Prescription 4mm Trish 32g (5/32\" x 0.23mm) please.    Type 2 diabetes mellitus with stage 2 chronic kidney disease, without long-term current use of insulin (H)       LANTUS SOLOSTAR 100 UNIT/ML injection   Generic drug:  insulin glargine     60 mL    Inject between 45 units and 60 units at bedtime.  Needs four boxes for three month supply.    Type 2 diabetes mellitus with stage 2 chronic kidney disease, with long-term current use of insulin (H)       lisinopril 2.5 MG tablet    PRINIVIL/Zestril    90 tablet    TAKE 1 TABLET DAILY    CKD (chronic kidney disease) stage 2, GFR 60-89 ml/min       MAALOX REGULAR STRENGTH 200-200-20 MG/5ML Susp suspension   Generic drug:  alum & mag hydroxide-simethicone      Take 30 mLs by mouth At Bedtime.        metoprolol 25 MG tablet    LOPRESSOR    180 tablet    TAKE 1 TABLET TWICE A DAY    Essential hypertension       ONETOUCH DELICA LANCETS 33G Misc     360 each    1 each 4 times daily    Type 2 diabetes, HbA1c goal < 7% (H)       oxyCODONE-acetaminophen 5-325 MG per tablet    PERCOCET    24 tablet    Take 1 tablet by mouth 4 times daily maximum 4 tablet(s) per day    Acute pain of left knee       triamcinolone 0.1 % cream    KENALOG    80 g    Apply sparingly to affected area two times daily as needed    Contact dermatitis and other eczema, due to unspecified cause       VITAMIN D PO      Take  by mouth. Pt consuming 3000 units per day        warfarin 4 MG tablet    COUMADIN    30 tablet    Take 1 tablet (4 mg) by mouth daily    Acute pain of left knee, Calf swelling         "

## 2018-01-03 NOTE — PROGRESS NOTES
SUBJECTIVE:   Sarah Felix is a 72 year old female who presents to clinic today for the following health issues:    Diabetes Follow-up    Patient is checking blood sugars: twice daily.    Results are as follows:       AM: around 150-200    Diabetic concerns:  other - foot exam and check left great toe     Symptoms of hypoglycemia (low blood sugar): none     Paresthesias (numbness or burning in feet) or sores: No     Date of last diabetic eye exam: feb 2017  BP Readings from Last 2 Encounters:   01/03/18 129/81   10/11/17 113/68     Hemoglobin A1C (%)   Date Value   01/03/2018 8.6 (H)   07/26/2017 7.0 (H)     LDL Cholesterol Calculated (mg/dL)   Date Value   11/09/2017 73   07/26/2017 66         Amount of exercise or physical activity: varies    Problems taking medications regularly: No    Medication side effects: none    Diet: regular (no restrictions)        Additional history/life update:    Type 2 diabetes mellitus with stage 2 chronic kidney disease, without long-term current use of insulin (H) :blood sugars higher, having more juice, watching carbs les. More sweets over holidays.          Problem list, Medication list, Allergies, and Medical/Social/Surgical histories reviewed in [facility] and updated as appropriate.  Labs reviewed in [facility]  BP Readings from Last 3 Encounters:   01/03/18 129/81   10/11/17 113/68   07/26/17 106/66    Wt Readings from Last 3 Encounters:   01/03/18 197 lb (89.4 kg)   10/11/17 198 lb 8 oz (90 kg)   07/26/17 193 lb 8 oz (87.8 kg)                  Patient Active Problem List   Diagnosis     CYSTIC LIVER DIS     Allergic rhinitis     Diverticulitis of colon     Disorder of bone and cartilage     Esophageal reflux     Mixed incontinence urge and stress (male)(female)     Cystocele, lateral     Vaginal atrophy     Type 2 diabetes, HbA1c goal < 7% (H)     Hyperlipidemia LDL goal <100     Heart burn     BABB (dyspnea on exertion)     Impingement syndrome, shoulder     Sebaceous  hyperplasia     Seborrheic keratosis     Fatty liver     Personal history of other malignant neoplasm of skin     Health Care Home     Advanced directives, counseling/discussion     Hypertension goal BP (blood pressure) < 140/90     Elevated serum creatinine     Peripheral vascular disease (H)     Skin exam, screening for cancer     Essential hypertension, benign (HTN)     CKD (chronic kidney disease) stage 2, GFR 60-89 ml/min     Type 2 diabetes mellitus with hyperglycemia (H)     Type 2 diabetes with stage 2 chronic kidney disease GFR 60-89 (H)     Type 2 diabetes mellitus without complication (H)     Past Surgical History:   Procedure Laterality Date     C APPENDECTOMY  age 22     C DEXA, BONE DENSITY, AXIAL SKEL  2005     C DEXA, BONE DENSITY, AXIAL SKEL  6/2007    No signif change in Osteopenia     COLONOSCOPY  4/2006    repeat 10 yr     GYN SURGERY  10/22/08    pelvic support     HEMORRHOIDECTOMY  8/08     MOHS MICROGRAPHIC PROCEDURE       SURGICAL HISTORY OF -   age 22    L ovarian cyst removal, laparotomy     SURGICAL HISTORY OF -   1998    partial thyroidectomy     SURGICAL HISTORY OF -   10/08    Transobturator tape for Urinary Incontinence     SURGICAL HISTORY OF -   11/2011    stress test normal     THYROID SURGERY      Had cyst removed, thyroid gland is intact.       Social History   Substance Use Topics     Smoking status: Former Smoker     Quit date: 1/1/1980     Smokeless tobacco: Never Used      Comment: smoked from [age]; smoked about 1ppd     Alcohol use 0.0 oz/week      Comment: 6 drinks per week     Family History   Problem Relation Age of Onset     DIABETES Mother      at 89 yrs     Hypertension Mother      Arthritis Mother      rheumatoid     CANCER Father      bladder     Cardiovascular Brother      palpitations not on meds.     Glaucoma No family hx of      Macular Degeneration No family hx of      Amblyopia No family hx of          Current Outpatient Prescriptions   Medication Sig Dispense  "Refill     lisinopril (PRINIVIL/ZESTRIL) 2.5 MG tablet TAKE 1 TABLET DAILY 90 tablet 3     metoprolol (LOPRESSOR) 25 MG tablet TAKE 1 TABLET TWICE A  tablet 2     furosemide (LASIX) 20 MG tablet TAKE 2 TABLETS IN THE MORNING AND 1 TABLET IN THE AFTERNOON 270 tablet 0     atorvastatin (LIPITOR) 40 MG tablet TAKE 1 TABLET DAILY (NEED APPOINTMENT FOR LABS FOR FUTURE REFILLS) 90 tablet 0     blood glucose monitoring (ONE TOUCH ULTRA) test strip 1 strip by In Vitro route 4 times daily. 360 each 1     ONETOUCH DELICA LANCETS 33G MISC 1 each 4 times daily 360 each 1     glimepiride (AMARYL) 4 MG tablet TAKE 1 TABLET TWICE A DAY WITH MEALS (BREAKFAST AND DINNER) 180 tablet 3     HYDROcodone-acetaminophen (NORCO) 5-325 MG per tablet Take 1 tablet by mouth daily as needed for moderate to severe pain maximum 1 tablet(s) per day at night 30 tablet 0     insulin syringe-needle U-100 (BD INSULIN SYRINGE ULTRAFINE) 31G X 5/16\" 1 ML Use 1 syringes daily or as directed. Prescription 4mm Rey 32g (5/32\" x 0.23mm) please. 90 each 3     oxyCODONE-acetaminophen (PERCOCET) 5-325 MG per tablet Take 1 tablet by mouth 4 times daily maximum 4 tablet(s) per day 24 tablet 0     warfarin (COUMADIN) 4 MG tablet Take 1 tablet (4 mg) by mouth daily 30 tablet 0     LANTUS SOLOSTAR 100 UNIT/ML soln Inject between 45 units and 60 units at bedtime.  Needs four boxes for three month supply. 60 mL 3     BD REY U/F 32G X 4 MM insulin pen needle USE ONCE DAILY AS DIRECTED WITH LANTUS SOLOSTAR 90 each 3     amoxicillin-clavulanate (AUGMENTIN) 875-125 MG per tablet Take 1 tablet by mouth 2 times daily 28 tablet 1     triamcinolone (KENALOG) 0.1 % cream Apply sparingly to affected area two times daily as needed 80 g 3     aspirin 81 MG tablet Take 2 tablets (162 mg) by mouth daily 60 tablet      Blood Glucose Monitoring Suppl (ONE TOUCH ULTRA 2) W/DEVICE KIT Use to test blood sugars 2 times daily as directed. 1 kit 0     Cholecalciferol (VITAMIN D " "PO) Take  by mouth. Pt consuming 3000 units per day        alum & mag hydroxide-simethicone (MAALOX REGULAR STRENGTH) 200-200-20 MG/5ML SUSP Take 30 mLs by mouth At Bedtime.       Docusate Sodium (COLACE PO) Take  by mouth as needed.       CALCIUM 500 + D 500-200 MG-IU OR TABS one tab twice per day 100 0     ACE/ARB NOT PRESCRIBED, INTENTIONAL, by Other route continuous prn.       Allergies   Allergen Reactions     Amlodipine Swelling     Cephalosporins Rash     Levaquin Rash     Itching, rash     Metformin Diarrhea and GI Disturbance     Nickel Rash     Contact reaction     Recent Labs   Lab Test  01/03/18   1418  11/09/17   1403  07/26/17   1434  03/08/17   1516  11/17/16   1356  06/27/16   1506   10/19/15   1541  06/19/15   1513   06/29/12   1401   A1C  8.6*   --   7.0*  7.9*  8.0*  7.9*   < >  7.1*  7.5*   < >  8.3*   LDL   --   73  66   --    --   78   --    --   88   < >  88   HDL   --   51  50   --    --   44*   --    --    --    < >  60   TRIG   --   118  164*   --    --   171*   --    --    --    < >  189*   ALT   --   42   --    --    --    --    --   47   --    --   71*   CR   --   0.81   --    --   0.87   --    < >  0.90   --    < >  0.66   GFRESTIMATED   --   70   --    --   65   --    < >  62   --    < >  >90   GFRESTBLACK   --   85   --    --   78   --    < >  75   --    < >  >90   POTASSIUM   --   3.3*   --    --   3.7   --    < >  3.9   --    < >  3.7   TSH   --    --   2.11   --    --    --    --    --   2.27   < >   --     < > = values in this interval not displayed.        ROS:  Constitutional, HEENT, cardiovascular, pulmonary, GI, , musculoskeletal, neuro, skin, endocrine and psych systems are negative, except as otherwise noted.        OBJECTIVE:  /81 (BP Location: Left arm, Patient Position: Chair, Cuff Size: Adult Regular)  Pulse 75  Temp 97.5  F (36.4  C) (Oral)  Resp 16  Ht 5' 5\" (1.651 m)  Wt 197 lb (89.4 kg)  SpO2 96%  BMI 32.78 kg/m2    EXAM:  GENERAL APPEARANCE: healthy, " alert and no distress  Bilateral diabetic monofilament foot exam normal     ASSESSMENT AND PLAN  Patient Instructions    DM II - uncontrolled:    Increase lantus to 50 units daily. Reviewed goal blood sugars  fasting, less than 160 after meals.      Follow up one month telephone visit.     Watch carbs/sweets in meantime.     Bilateral diabetic monofilament foot exam normal       Joel Wegener,MD

## 2018-01-22 DIAGNOSIS — N18.2 CKD (CHRONIC KIDNEY DISEASE) STAGE 2, GFR 60-89 ML/MIN: ICD-10-CM

## 2018-01-22 DIAGNOSIS — R60.9 DEPENDENT EDEMA: ICD-10-CM

## 2018-01-22 DIAGNOSIS — E78.5 HYPERLIPIDEMIA LDL GOAL <100: ICD-10-CM

## 2018-01-23 NOTE — TELEPHONE ENCOUNTER
"Requested Prescriptions   Pending Prescriptions Disp Refills     furosemide (LASIX) 20 MG tablet [Pharmacy Med Name: FUROSEMIDE TABS 20MG]  Last Written Prescription Date:  11/1/2017  Last Fill Quantity: 270 tablet,  # refills: 0   Last Office Visit: 1/3/2018   Future Office Visit:    Next 5 appointments (look out 90 days)     Jan 31, 2018  3:00 PM CST   Telephone Visit with Joel Daniel Irwin Wegener, MD   [facility] ([facility]    [address] [place] [id number] [phone]                  270 tablet 0     Sig: TAKE 2 TABLETS IN THE MORNING AND 1 TABLET IN THE AFTERNOON    Diuretics (Including Combos) Protocol Failed    1/22/2018 11:34 PM       Failed - Normal serum potassium on file in past 12 months    Recent Labs   Lab Test  11/09/17   1403   POTASSIUM  3.3*          Passed - Blood pressure under 140/90    BP Readings from Last 3 Encounters:   01/03/18 129/81   10/11/17 113/68   07/26/17 106/66          Passed - Recent or future visit with authorizing provider's specialty    Patient had office visit in the last year or has a visit in the next 30 days with authorizing provider.  See \"Patient Info\" tab in inbasket, or \"Choose Columns\" in Meds & Orders section of the refill encounter.        Passed - Patient is age 18 or older       Passed - No active pregancy on record       Passed - Normal serum creatinine on file in past 12 months    Recent Labs   Lab Test  11/09/17   1403   CR  0.81           Passed - Normal serum sodium on file in past 12 months    Recent Labs   Lab Test  11/09/17   1403   NA  140           Passed - No positive pregnancy test in past 12 months     ________________________________________________________________________________       atorvastatin (LIPITOR) 40 MG tablet [Pharmacy Med Name: ATORVASTATIN TABS 40MG]  Last Written Prescription Date:  11/1/2017  Last Fill Quantity: 90 tablet,  # refills: 0   Last Office Visit: 1/3/2018   Future " "Office Visit:    Next 5 appointments (look out 90 days)     Jan 31, 2018  3:00 PM CST   Telephone Visit with Joel Daniel Irwin Wegener, MD   [facility] ([facility])    [address] [place] [id number] [phone]                90 tablet 0     Sig: TAKE 1 TABLET DAILY (NEED APPOINTMENT FOR LABS FOR FUTURE REFILLS)    Statins Protocol Passed    1/22/2018 11:34 PM       Passed - LDL on file in past 12 months    Recent Labs   Lab Test  11/09/17   1403   LDL  73          Passed - No abnormal creatine kinase in past 12 months    No lab results found.       Passed - Recent or future visit with authorizing provider    Patient had office visit in the last year or has a visit in the next 30 days with authorizing provider.  See \"Patient Info\" tab in inbasket, or \"Choose Columns\" in Meds & Orders section of the refill encounter.          Passed - Patient is age 18 or older       Passed - No active pregnancy on record       Passed - No positive pregnancy test in past 12 months          "

## 2018-01-26 RX ORDER — FUROSEMIDE 20 MG
TABLET ORAL
Qty: 270 TABLET | Refills: 0 | Status: SHIPPED | OUTPATIENT
Start: 2018-01-26 | End: 2018-04-24

## 2018-01-26 RX ORDER — ATORVASTATIN CALCIUM 40 MG/1
40 TABLET, FILM COATED ORAL DAILY
Qty: 90 TABLET | Refills: 2 | Status: SHIPPED | OUTPATIENT
Start: 2018-01-26 | End: 2018-01-31

## 2018-01-26 NOTE — TELEPHONE ENCOUNTER
Potassium   Date Value Ref Range Status   11/09/2017 3.3 (L) 3.4 - 5.3 mmol/L Final   ]     LDL Cholesterol Calculated   Date Value Ref Range Status   11/09/2017 73 <100 mg/dL Final     Comment:     Desirable:       <100 mg/dl   ]    Statin Refilled per [facility] refill policy.    Diuretic refilled so pt does not run out but will relay to PCP - when does she need potassium rechecked?    Dara Bose, RN, BSN         Dara Bose, RN

## 2018-01-27 NOTE — PROGRESS NOTES
Need potasium re-check.     Additional history/life update:    Type 2 diabetes mellitus with stage 2 chronic kidney disease, with long-term current use of insulin (H) :increased lantus to 50 units daily . Stopped all juice.  Helped a lot.  One low 65 fasting.  Many 110s/120s. 154 after meals.         Problem list, Medication list, Allergies, and Medical/Social/Surgical histories reviewed in [facility] and updated as appropriate.  Labs reviewed in [facility]  BP Readings from Last 3 Encounters:   01/03/18 129/81   10/11/17 113/68   07/26/17 106/66    Wt Readings from Last 3 Encounters:   01/03/18 197 lb (89.4 kg)   10/11/17 198 lb 8 oz (90 kg)   07/26/17 193 lb 8 oz (87.8 kg)                  Patient Active Problem List   Diagnosis     CYSTIC LIVER DIS     Allergic rhinitis     Diverticulitis of colon     Disorder of bone and cartilage     Esophageal reflux     Mixed incontinence urge and stress (male)(female)     Cystocele, lateral     Vaginal atrophy     Type 2 diabetes, HbA1c goal < 7% (H)     Hyperlipidemia LDL goal <100     Heart burn     BABB (dyspnea on exertion)     Impingement syndrome, shoulder     Sebaceous hyperplasia     Seborrheic keratosis     Fatty liver     Personal history of other malignant neoplasm of skin     Health Care Home     Advanced directives, counseling/discussion     Hypertension goal BP (blood pressure) < 140/90     Elevated serum creatinine     Peripheral vascular disease (H)     Skin exam, screening for cancer     Essential hypertension, benign (HTN)     CKD (chronic kidney disease) stage 2, GFR 60-89 ml/min     Type 2 diabetes mellitus with hyperglycemia (H)     Type 2 diabetes with stage 2 chronic kidney disease GFR 60-89 (H)     Type 2 diabetes mellitus without complication (H)     Past Surgical History:   Procedure Laterality Date     C APPENDECTOMY  age 22     C DEXA, BONE DENSITY, AXIAL SKEL  2005     C DEXA, BONE DENSITY, AXIAL SKEL  6/2007    No signif change in Osteopenia     COLONOSCOPY   4/2006    repeat 10 yr     GYN SURGERY  10/22/08    pelvic support     HEMORRHOIDECTOMY  8/08     MOHS MICROGRAPHIC PROCEDURE       SURGICAL HISTORY OF -   age 22    L ovarian cyst removal, laparotomy     SURGICAL HISTORY OF -   1998    partial thyroidectomy     SURGICAL HISTORY OF -   10/08    Transobturator tape for Urinary Incontinence     SURGICAL HISTORY OF -   11/2011    stress test normal     THYROID SURGERY      Had cyst removed, thyroid gland is intact.       Social History   Substance Use Topics     Smoking status: Former Smoker     Quit date: 1/1/1980     Smokeless tobacco: Never Used      Comment: smoked from [age]; smoked about 1ppd     Alcohol use 0.0 oz/week      Comment: 6 drinks per week     Family History   Problem Relation Age of Onset     DIABETES Mother      at 89 yrs     Hypertension Mother      Arthritis Mother      rheumatoid     CANCER Father      bladder     Cardiovascular Brother      palpitations not on meds.     Glaucoma No family hx of      Macular Degeneration No family hx of      Amblyopia No family hx of          Current Outpatient Prescriptions   Medication Sig Dispense Refill     furosemide (LASIX) 20 MG tablet TAKE 2 TABLETS IN THE MORNING AND 1 TABLET IN THE AFTERNOON 270 tablet 0     atorvastatin (LIPITOR) 40 MG tablet Take 1 tablet (40 mg) by mouth daily 90 tablet 2     lisinopril (PRINIVIL/ZESTRIL) 2.5 MG tablet TAKE 1 TABLET DAILY 90 tablet 3     metoprolol (LOPRESSOR) 25 MG tablet TAKE 1 TABLET TWICE A  tablet 2     blood glucose monitoring (ONE TOUCH ULTRA) test strip 1 strip by In Vitro route 4 times daily. 360 each 1     ONETOUCH DELICA LANCETS 33G MISC 1 each 4 times daily 360 each 1     glimepiride (AMARYL) 4 MG tablet TAKE 1 TABLET TWICE A DAY WITH MEALS (BREAKFAST AND DINNER) 180 tablet 3     HYDROcodone-acetaminophen (NORCO) 5-325 MG per tablet Take 1 tablet by mouth daily as needed for moderate to severe pain maximum 1 tablet(s) per day at night 30 tablet 0  "    insulin syringe-needle U-100 (BD INSULIN SYRINGE ULTRAFINE) 31G X 5/16\" 1 ML Use 1 syringes daily or as directed. Prescription 4mm Rey 32g (5/32\" x 0.23mm) please. 90 each 3     oxyCODONE-acetaminophen (PERCOCET) 5-325 MG per tablet Take 1 tablet by mouth 4 times daily maximum 4 tablet(s) per day 24 tablet 0     warfarin (COUMADIN) 4 MG tablet Take 1 tablet (4 mg) by mouth daily 30 tablet 0     LANTUS SOLOSTAR 100 UNIT/ML soln Inject between 45 units and 60 units at bedtime.  Needs four boxes for three month supply. 60 mL 3     BD REY U/F 32G X 4 MM insulin pen needle USE ONCE DAILY AS DIRECTED WITH LANTUS SOLOSTAR 90 each 3     amoxicillin-clavulanate (AUGMENTIN) 875-125 MG per tablet Take 1 tablet by mouth 2 times daily 28 tablet 1     triamcinolone (KENALOG) 0.1 % cream Apply sparingly to affected area two times daily as needed 80 g 3     aspirin 81 MG tablet Take 2 tablets (162 mg) by mouth daily 60 tablet      Blood Glucose Monitoring Suppl (ONE TOUCH ULTRA 2) W/DEVICE KIT Use to test blood sugars 2 times daily as directed. 1 kit 0     Cholecalciferol (VITAMIN D PO) Take  by mouth. Pt consuming 3000 units per day        alum & mag hydroxide-simethicone (MAALOX REGULAR STRENGTH) 200-200-20 MG/5ML SUSP Take 30 mLs by mouth At Bedtime.       ACE/ARB NOT PRESCRIBED, INTENTIONAL, by Other route continuous prn.       Docusate Sodium (COLACE PO) Take  by mouth as needed.       CALCIUM 500 + D 500-200 MG-IU OR TABS one tab twice per day 100 0     Allergies   Allergen Reactions     Amlodipine Swelling     Cephalosporins Rash     Levaquin Rash     Itching, rash     Metformin Diarrhea and GI Disturbance     Nickel Rash     Contact reaction     Recent Labs   Lab Test  01/03/18   1418  11/09/17   1403  07/26/17   1434  03/08/17   1516  11/17/16   1356  06/27/16   1506   10/19/15   1541  06/19/15   1513   06/29/12   1401   A1C  8.6*   --   7.0*  7.9*  8.0*  7.9*   < >  7.1*  7.5*   < >  8.3*   LDL   --   73  66   --    " --   78   --    --   88   < >  88   HDL   --   51  50   --    --   44*   --    --    --    < >  60   TRIG   --   118  164*   --    --   171*   --    --    --    < >  189*   ALT   --   42   --    --    --    --    --   47   --    --   71*   CR   --   0.81   --    --   0.87   --    < >  0.90   --    < >  0.66   GFRESTIMATED   --   70   --    --   65   --    < >  62   --    < >  >90   GFRESTBLACK   --   85   --    --   78   --    < >  75   --    < >  >90   POTASSIUM   --   3.3*   --    --   3.7   --    < >  3.9   --    < >  3.7   TSH   --    --   2.11   --    --    --    --    --   2.27   < >   --     < > = values in this interval not displayed.        ROS:  Constitutional, HEENT, cardiovascular, pulmonary, GI, , musculoskeletal, neuro, skin, endocrine and psych systems are negative, except as otherwise noted.        OBJECTIVE:  There were no vitals taken for this visit.    EXAM:  GENERAL APPEARANCE: healthy, alert and no distress      ASSESSMENT AND PLAN  1. Type 2 diabetes mellitus with stage 2 chronic kidney disease, with long-term current use of insulin (H)  Decrease lantus to 45 units to avoid am hypoglycemia.  Keep up the great work on diet changes!     2. Hypokalemia  Needed refill given.   - potassium chloride SA (KLOR-CON) 20 MEQ CR tablet; Take 1 tablet (20 mEq) by mouth daily  Dispense: 180 tablet; Refill: 3    3. Hyperlipidemia LDL goal <100  Needed refill/given.   - atorvastatin (LIPITOR) 40 MG tablet; Take 1 tablet (40 mg) by mouth daily  Dispense: 90 tablet; Refill: 3    Started phone visit at  2:51 ended at 3:02. Total time 11 minutes.     Joel Wegener,MD

## 2018-01-27 NOTE — TELEPHONE ENCOUNTER
Thank you, I agree.  I left note in visit note as reminder to re-check potassium. Joel Wegener,MD

## 2018-01-31 ENCOUNTER — VIRTUAL VISIT (OUTPATIENT)
Dept: FAMILY MEDICINE | Facility: CLINIC | Age: 73
End: 2018-01-31
Payer: COMMERCIAL

## 2018-01-31 DIAGNOSIS — Z79.4 TYPE 2 DIABETES MELLITUS WITH STAGE 2 CHRONIC KIDNEY DISEASE, WITH LONG-TERM CURRENT USE OF INSULIN (H): Primary | ICD-10-CM

## 2018-01-31 DIAGNOSIS — E78.5 HYPERLIPIDEMIA LDL GOAL <100: ICD-10-CM

## 2018-01-31 DIAGNOSIS — N18.2 TYPE 2 DIABETES MELLITUS WITH STAGE 2 CHRONIC KIDNEY DISEASE, WITH LONG-TERM CURRENT USE OF INSULIN (H): Primary | ICD-10-CM

## 2018-01-31 DIAGNOSIS — E11.22 TYPE 2 DIABETES MELLITUS WITH STAGE 2 CHRONIC KIDNEY DISEASE, WITH LONG-TERM CURRENT USE OF INSULIN (H): Primary | ICD-10-CM

## 2018-01-31 DIAGNOSIS — E87.6 HYPOKALEMIA: ICD-10-CM

## 2018-01-31 PROCEDURE — 99442 ZZC PHYSICIAN TELEPHONE EVALUATION 11-20 MIN: CPT | Performed by: FAMILY MEDICINE

## 2018-01-31 RX ORDER — POTASSIUM CHLORIDE 1500 MG/1
20 TABLET, EXTENDED RELEASE ORAL DAILY
Qty: 180 TABLET | Refills: 3 | Status: SHIPPED | OUTPATIENT
Start: 2018-01-31 | End: 2018-04-18

## 2018-01-31 RX ORDER — ATORVASTATIN CALCIUM 40 MG/1
40 TABLET, FILM COATED ORAL DAILY
Qty: 90 TABLET | Refills: 3 | Status: SHIPPED | OUTPATIENT
Start: 2018-01-31 | End: 2019-04-13

## 2018-02-15 ENCOUNTER — OFFICE VISIT (OUTPATIENT)
Dept: OPHTHALMOLOGY | Facility: CLINIC | Age: 73
End: 2018-02-15
Attending: OPHTHALMOLOGY
Payer: COMMERCIAL

## 2018-02-15 DIAGNOSIS — H25.11 SENILE NUCLEAR SCLEROSIS, RIGHT: ICD-10-CM

## 2018-02-15 DIAGNOSIS — E11.9 TYPE 2 DIABETES MELLITUS WITHOUT COMPLICATION, WITH LONG-TERM CURRENT USE OF INSULIN (H): Primary | ICD-10-CM

## 2018-02-15 DIAGNOSIS — Z79.4 TYPE 2 DIABETES MELLITUS WITHOUT COMPLICATION, WITH LONG-TERM CURRENT USE OF INSULIN (H): Primary | ICD-10-CM

## 2018-02-15 PROCEDURE — G0463 HOSPITAL OUTPT CLINIC VISIT: HCPCS | Mod: ZF

## 2018-02-15 PROCEDURE — 92015 DETERMINE REFRACTIVE STATE: CPT | Mod: ZF

## 2018-02-15 ASSESSMENT — REFRACTION_MANIFEST
OS_CYLINDER: +1.75
OS_ADD: +2.50
OD_SPHERE: -3.50
OD_AXIS: 018
OD_CYLINDER: +1.75
OS_SPHERE: -2.25
OS_AXIS: 010
OD_ADD: +2.50

## 2018-02-15 ASSESSMENT — REFRACTION_WEARINGRX
OD_AXIS: 018
OD_CYLINDER: +2.25
OS_ADD: +2.50
SPECS_TYPE: MR 2016
OS_AXIS: 010
OD_AXIS: 014
OS_AXIS: 012
OS_SPHERE: -1.50
OD_CYLINDER: +2.50
OS_CYLINDER: +1.25
OD_ADD: +2.50
OD_ADD: +2.50
OS_ADD: +2.50
OS_SPHERE: -2.00
OD_SPHERE: -2.00
OD_SPHERE: -2.50
OS_CYLINDER: +1.50

## 2018-02-15 ASSESSMENT — VISUAL ACUITY
OS_PH_CC: 20/25
CORRECTION_TYPE: GLASSES
OS_CC+: -2
OD_CC: 20/80
OD_CC+: +2
OS_CC: 20/30
OD_PH_CC: 20/50
METHOD: SNELLEN - LINEAR

## 2018-02-15 ASSESSMENT — CONF VISUAL FIELD
OD_NORMAL: 1
OS_NORMAL: 1

## 2018-02-15 ASSESSMENT — TONOMETRY
OD_IOP_MMHG: 18
OS_IOP_MMHG: 19
IOP_METHOD: TONOPEN

## 2018-02-15 ASSESSMENT — EXTERNAL EXAM - RIGHT EYE: OD_EXAM: NORMAL

## 2018-02-15 ASSESSMENT — SLIT LAMP EXAM - LIDS
COMMENTS: NORMAL
COMMENTS: NORMAL

## 2018-02-15 ASSESSMENT — CUP TO DISC RATIO
OD_RATIO: 0.2
OS_RATIO: 0.2

## 2018-02-15 ASSESSMENT — EXTERNAL EXAM - LEFT EYE: OS_EXAM: NORMAL

## 2018-02-15 NOTE — NURSING NOTE
Chief Complaints and History of Present Illnesses   Patient presents with     Annual Eye Exam     HPI    Affected eye(s):  Both   Symptoms:     Blurred vision   No floaters   No flashes   No Dryness   No itching      Duration:  1 year      Do you have eye pain now?:  No      Comments:  Annual DM eye exam.    Lab Results       Component                Value               Date                       A1C                      8.6                 01/03/2018                 A1C                      7.0                 07/26/2017                 A1C                      7.9                 03/08/2017                 A1C                      8.0                 11/17/2016                 A1C                      7.9                 06/27/2016            ADAM Strickland 1:11 PM 02/15/2018

## 2018-02-15 NOTE — MR AVS SNAPSHOT
After Visit Summary   2/15/2018    Sarah Felix    MRN: [id number]           Patient Information     Date Of Birth          1945        Visit Information        Provider Department      2/15/2018 1:00 PM Josette Rodriguez MD [facility]        Today's Diagnoses     Type 2 diabetes mellitus without complication, with long-term current use of insulin (H)    -  1    Senile nuclear sclerosis, right           Follow-ups after your visit        Follow-up notes from your care team     Return if symptoms worsen or fail to improve.      Your next 10 appointments already scheduled     Feb 21, 2018  1:30 PM CST   [facility] GENERAL with Janay Amezcua MD   Eye Clinic ([facility])    [address]  [address] [id number] [phone]            Apr 11, 2018  2:40 PM CDT   SHORT with Joel Daniel Irwin Wegener, MD   [facility] ([facility])    [address] [place] [address] [phone]              Who to contact     Please call your clinic at [phone] to:    Ask questions about your health    Make or cancel appointments    Discuss your medicines    Learn about your test results    Speak to your doctor            Additional Information About Your Visit        [employer] Information     [employer] gives you secure access to your electronic health record. If you see a primary care provider, you can also send messages to your care team and make appointments. If you have questions, please call your primary care clinic.  If you do not have a primary care provider, please call [phone] and they will assist you.      [employer] is an electronic gateway that provides easy, online access to your medical records. With [employer], you can request a clinic appointment, read your test results, renew a prescription or communicate with your care team.     To access your existing account, please contact  your [facility] or call [phone] for assistance.        Care EveryWhere ID     This is your Care EveryWhere ID. This could be used by other organizations to access your [place] medical records  [id number]         Blood Pressure from Last 3 Encounters:   01/03/18 129/81   10/11/17 113/68   07/26/17 106/66    Weight from Last 3 Encounters:   01/03/18 89.4 kg (197 lb)   10/11/17 90 kg (198 lb 8 oz)   07/26/17 87.8 kg (193 lb 8 oz)              Today, you had the following     No orders found for display       Primary Care Provider Office Phone # Fax #    Joel Daniel Irwin Wegener, [phone] [address]  [facility] [address]        Equal Access to Services     KILLIAN CASON : Hadii aad ku hadasho Soomaali, waaxda luqadaha, qaybta kaalmada adeegyada, jamila peña hayzainab santos . So [facility] [phone].    ATENCIÓN: Si habla español, tiene a meza disposición servicios gratuitos de asistencia lingüística. Jorge Alberto al [phone].    We comply with applicable federal civil rights laws and [place] laws. We do not discriminate on the basis of race, color, national origin, age, disability, sex, sexual orientation, or gender identity.            Thank you!     Thank you for choosing EYE CLINIC  for your care. Our goal is always to provide you with excellent care. Hearing back from our patients is one way we can continue to improve our services. Please take a few minutes to complete the written survey that you may receive in the mail after your visit with us. Thank you!             Your Updated Medication List - Protect others around you: Learn how to safely use, store and throw away your medicines at www.disposemymeds.org.          This list is accurate as of 2/15/18  2:03 PM.  Always use your most recent med list.                   Brand Name Dispense Instructions for use Diagnosis    ACE/ARB/ARNI NOT PRESCRIBED (INTENTIONAL)      by Other route  "continuous prn.        amoxicillin-clavulanate 875-125 MG per tablet    AUGMENTIN    28 tablet    Take 1 tablet by mouth 2 times daily    Diverticulitis of colon       aspirin 81 MG tablet     60 tablet    Take 2 tablets (162 mg) by mouth daily    Health care maintenance       atorvastatin 40 MG tablet    LIPITOR    90 tablet    Take 1 tablet (40 mg) by mouth daily    Hyperlipidemia LDL goal <100       BD REY U/F 32G X 4 MM   Generic drug:  insulin pen needle     90 each    USE ONCE DAILY AS DIRECTED WITH JENY DIETRICH    Type 2 diabetes mellitus with stage 2 chronic kidney disease, with long-term current use of insulin (H)       blood glucose monitoring meter device kit     1 kit    Use to test blood sugars 2 times daily as directed.    Type 2 diabetes, HbA1c goal < 7% (H)       blood glucose monitoring test strip    ONETOUCH ULTRA    360 each    1 strip by In Vitro route 4 times daily.    Type 2 diabetes, HbA1c goal < 7% (H)       CALCIUM 500 + D 500-200 MG-IU Tabs     100    one tab twice per day    Osteopenia       COLACE PO      Take  by mouth as needed.    Vaginal discharge       furosemide 20 MG tablet    LASIX    270 tablet    TAKE 2 TABLETS IN THE MORNING AND 1 TABLET IN THE AFTERNOON    Dependent edema, CKD (chronic kidney disease) stage 2, GFR 60-89 ml/min       glimepiride 4 MG tablet    AMARYL    180 tablet    TAKE 1 TABLET TWICE A DAY WITH MEALS (BREAKFAST AND DINNER)    Type 2 diabetes mellitus with stage 2 chronic kidney disease, without long-term current use of insulin (H)       HYDROcodone-acetaminophen 5-325 MG per tablet    NORCO    30 tablet    Take 1 tablet by mouth daily as needed for moderate to severe pain maximum 1 tablet(s) per day at night    Insufficiency fracture of tibia, with routine healing, subsequent encounter       insulin syringe-needle U-100 31G X 5/16\" 1 ML    BD insulin syringe ULTRAFINE    90 each    Use 1 syringes daily or as directed. Prescription 4mm Rey 32g (5/32\" x " 0.23mm) please.    Type 2 diabetes mellitus with stage 2 chronic kidney disease, without long-term current use of insulin (H)       LANTUS SOLOSTAR 100 UNIT/ML injection   Generic drug:  insulin glargine     60 mL    Inject between 45 units and 60 units at bedtime.  Needs four boxes for three month supply.    Type 2 diabetes mellitus with stage 2 chronic kidney disease, with long-term current use of insulin (H)       lisinopril 2.5 MG tablet    PRINIVIL/Zestril    90 tablet    TAKE 1 TABLET DAILY    CKD (chronic kidney disease) stage 2, GFR 60-89 ml/min       MAALOX REGULAR STRENGTH 200-200-20 MG/5ML Susp suspension   Generic drug:  alum & mag hydroxide-simethicone      Take 30 mLs by mouth At Bedtime.        metoprolol tartrate 25 MG tablet    LOPRESSOR    180 tablet    TAKE 1 TABLET TWICE A DAY    Essential hypertension       ONETOUCH DELICA LANCETS 33G Misc     360 each    1 each 4 times daily    Type 2 diabetes, HbA1c goal < 7% (H)       oxyCODONE-acetaminophen 5-325 MG per tablet    PERCOCET    24 tablet    Take 1 tablet by mouth 4 times daily maximum 4 tablet(s) per day    Acute pain of left knee       potassium chloride SA 20 MEQ CR tablet    KLOR-CON    180 tablet    Take 1 tablet (20 mEq) by mouth daily    Hypokalemia       triamcinolone 0.1 % cream    KENALOG    80 g    Apply sparingly to affected area two times daily as needed    Contact dermatitis and other eczema, due to unspecified cause       VITAMIN D PO      Take  by mouth. Pt consuming 3000 units per day        warfarin 4 MG tablet    COUMADIN    30 tablet    Take 1 tablet (4 mg) by mouth daily    Acute pain of left knee, Calf swelling

## 2018-02-15 NOTE — PROGRESS NOTES
TODAY'S VISIT:     CHIEF COMPLAINT:  Patient is a 72 year old female who presents to [facility] for diabetic eye exam    HPI:    Most recent Hgb A1c of 8.6 on 11/17    Endorsed seeing new floaters in upper field of view 1 month ago, not sure if it was in both eyes    Denies flashes of light or pain/irritation during that incident    Vision has gradually decreased over the years, noticing that she needed her glasses at home    Current glasses that she's wearing is about 8-9 yrs old    OPHTHALMIC/MEDICAL HISTORY:    Type 2 Diabetes mellitus 10 years     Myopic astigmatism w/ presbyopia both eyes    OPHTHALMIC PROCEDURE/SURGICAL HISTORY:    None    CURRENT OPHTHALMIC MEDICATIONS:    None    RETINAL IMAGING:    None      ASSESSMENT / PLAN:        1. Type 2 Diabetes mellitus, no ocular manifestations:   - continue excellent Diabetes mellitus management   - A1C is 8.6 per recent labs 11/2017   - no diabetic retinopathy noted today     2.  Nuclear sclerotic cataract     - visually significant right eye    - refer to Pastor Brothers MD     Return to clinic in 1 year for dilated eye exam         Josette Houston MD PhD.  Professor & Chair

## 2018-02-19 NOTE — TELEPHONE ENCOUNTER
"Nell,    --Nona Lazo, LPN in Ortho at [facility], called back and says that she ran this MRI result by Sydnie Patricio NP for .  --Sydnie says this fracture of left medial tibial plateau is typical of arthritis and means the knee is bone on bone.  She suggests that the next step may be a steroid injection.  --Nona Lazo says the patient should see a \"non operative sports medicine provider\" and can call [phone] to schedule.  --Nona Lazo says usually patient's can get in pretty fast.    --I sent all this info to Sarah via [employer] and left voice mail for her to call back or check [employer].  --Nona Lazo said she would cancel the appointment with Dr. Artis, Apolinar Mcclure MD because he would not be the correct provider for this problem;.    Flory Woods RN      " No

## 2018-02-20 DIAGNOSIS — H25.13 NUCLEAR SENILE CATARACT OF BOTH EYES: Primary | ICD-10-CM

## 2018-02-21 ENCOUNTER — OFFICE VISIT (OUTPATIENT)
Dept: OPHTHALMOLOGY | Facility: CLINIC | Age: 73
End: 2018-02-21
Attending: OPHTHALMOLOGY
Payer: COMMERCIAL

## 2018-02-21 VITALS — BODY MASS INDEX: 31.82 KG/M2 | WEIGHT: 191 LBS | HEIGHT: 65 IN

## 2018-02-21 DIAGNOSIS — H25.13 NUCLEAR SENILE CATARACT OF BOTH EYES: Primary | ICD-10-CM

## 2018-02-21 DIAGNOSIS — E11.9 DIABETES MELLITUS TYPE 2 WITHOUT RETINOPATHY (H): ICD-10-CM

## 2018-02-21 PROCEDURE — G0463 HOSPITAL OUTPT CLINIC VISIT: HCPCS | Mod: ZF

## 2018-02-21 PROCEDURE — 76519 ECHO EXAM OF EYE: CPT | Mod: ZF | Performed by: OPHTHALMOLOGY

## 2018-02-21 ASSESSMENT — CONF VISUAL FIELD
OS_NORMAL: 1
OD_NORMAL: 1
METHOD: COUNTING FINGERS

## 2018-02-21 ASSESSMENT — TONOMETRY
OD_IOP_MMHG: 22
OS_IOP_MMHG: 22
IOP_METHOD: TONOPEN

## 2018-02-21 ASSESSMENT — VISUAL ACUITY
CORRECTION_TYPE: GLASSES
OD_PH_CC: 20/50
OS_CC: 20/30
OS_PH_CC: 20/25+2
METHOD: SNELLEN - LINEAR
OS_CC+: -2
OD_CC+: -1
OD_CC: 20/70

## 2018-02-21 ASSESSMENT — CUP TO DISC RATIO
OS_RATIO: 0.2
OD_RATIO: 0.2

## 2018-02-21 ASSESSMENT — REFRACTION_WEARINGRX
OS_CYLINDER: +1.25
OD_ADD: +2.50
OS_AXIS: 010
OD_CYLINDER: +2.50
OS_ADD: +2.50
OD_CYLINDER: +2.25
OD_SPHERE: -2.00
OS_SPHERE: -1.50
OS_AXIS: 012
SPECS_TYPE: MR 2016
OD_AXIS: 014
OD_ADD: +2.50
OD_SPHERE: -2.50
OS_CYLINDER: +1.50
OD_AXIS: 018
OS_ADD: +2.50
OS_SPHERE: -2.00

## 2018-02-21 ASSESSMENT — EXTERNAL EXAM - RIGHT EYE: OD_EXAM: NORMAL

## 2018-02-21 ASSESSMENT — SLIT LAMP EXAM - LIDS
COMMENTS: NORMAL
COMMENTS: NORMAL

## 2018-02-21 ASSESSMENT — EXTERNAL EXAM - LEFT EYE: OS_EXAM: NORMAL

## 2018-02-21 NOTE — MR AVS SNAPSHOT
After Visit Summary   2/21/2018    Sarah Felix    MRN: [id number]           Patient Information     Date Of Birth          1945        Visit Information        Provider Department      2/21/2018 1:30 PM Janay Amezcua MD [facility]        Today's Diagnoses     Nuclear senile cataract of both eyes    -  1    Diabetes mellitus type 2 without retinopathy (H)           Follow-ups after your visit        Follow-up notes from your care team     Return for scheduled procedure.      Your next 10 appointments already scheduled     Mar 21, 2018  2:45 PM CDT   Post-Op with Janay Amezcua MD   Eye Clinic ([facility])    [address] [address]   [phone]            Apr 11, 2018  2:40 PM CDT   SHORT with Joel Daniel Irwin Wegener, MD   [facility] ([facility])    [address] [place] [address] [phone]            Apr 16, 2018  2:30 PM CDT   Post-Op with Janay Amezcua MD   Eye Clinic ([facility])    [address] [address]   [phone]              Who to contact     Please call your clinic at [phone] to:    Ask questions about your health    Make or cancel appointments    Discuss your medicines    Learn about your test results    Speak to your doctor            Additional Information About Your Visit        [employer] Information     [employer] gives you secure access to your electronic health record. If you see a primary care provider, you can also send messages to your care team and make appointments. If you have questions, please call your primary care clinic.  If you do not have a primary care provider, please call [phone] and they will assist you.      [employer] is an electronic gateway that provides easy, online access to your medical records. With [employer], you can request a clinic  "appointment, read your test results, renew a prescription or communicate with your care team.     To access your existing account, please contact your [facility] or call [phone] for assistance.        Care EveryWhere ID     This is your Care EveryWhere ID. This could be used by other organizations to access your [place] medical records  [id number]        Your Vitals Were     Height BMI (Body Mass Index)                1.651 m (5' 5\") 31.78 kg/m2           Blood Pressure from Last 3 Encounters:   01/03/18 129/81   10/11/17 113/68   07/26/17 106/66    Weight from Last 3 Encounters:   02/21/18 86.6 kg (191 lb)   01/03/18 89.4 kg (197 lb)   10/11/17 90 kg (198 lb 8 oz)              We Performed the Following     IOL Biometry w/ IOL calc OU (both eye)     [place]-Operative Worksheet        Primary Care Provider Office Phone # Fax #    Joel Daniel Irwin Wegener, [phone]       [facility] [address] [place] [address]        Equal Access to Services     [facility] Center[facility] : Hadii aad ku hadasho Soosmanali, waaxda luqadaha, qaybta kaalmada andra, jamila santos . So [facility] [phone].    ATENCIÓN: Si habla español, tiene a meza disposición servicios gratuitos de asistencia lingüística. Jorge Alberto al [phone].    We comply with applicable federal civil rights laws and [place] laws. We do not discriminate on the basis of race, color, national origin, age, disability, sex, sexual orientation, or gender identity.            Thank you!     Thank you for choosing EYE CLINIC  for your care. Our goal is always to provide you with excellent care. Hearing back from our patients is one way we can continue to improve our services. Please take a few minutes to complete the written survey that you may receive in the mail after your visit with us. Thank you!             Your Updated Medication List - Protect others around you: Learn how to safely use, store and throw " away your medicines at www.disposemymeds.org.          This list is accurate as of 2/21/18 11:59 PM.  Always use your most recent med list.                   Brand Name Dispense Instructions for use Diagnosis    ACE/ARB/ARNI NOT PRESCRIBED (INTENTIONAL)      by Other route continuous prn.        amoxicillin-clavulanate 875-125 MG per tablet    AUGMENTIN    28 tablet    Take 1 tablet by mouth 2 times daily    Diverticulitis of colon       aspirin 81 MG tablet     60 tablet    Take 2 tablets (162 mg) by mouth daily    Health care maintenance       atorvastatin 40 MG tablet    LIPITOR    90 tablet    Take 1 tablet (40 mg) by mouth daily    Hyperlipidemia LDL goal <100       BD REY U/F 32G X 4 MM   Generic drug:  insulin pen needle     90 each    USE ONCE DAILY AS DIRECTED WITH JENY DIETRICH    Type 2 diabetes mellitus with stage 2 chronic kidney disease, with long-term current use of insulin (H)       blood glucose monitoring meter device kit     1 kit    Use to test blood sugars 2 times daily as directed.    Type 2 diabetes, HbA1c goal < 7% (H)       blood glucose monitoring test strip    ONETOUCH ULTRA    360 each    1 strip by In Vitro route 4 times daily.    Type 2 diabetes, HbA1c goal < 7% (H)       CALCIUM 500 + D 500-200 MG-IU Tabs     100    one tab twice per day    Osteopenia       COLACE PO      Take  by mouth as needed.    Vaginal discharge       furosemide 20 MG tablet    LASIX    270 tablet    TAKE 2 TABLETS IN THE MORNING AND 1 TABLET IN THE AFTERNOON    Dependent edema, CKD (chronic kidney disease) stage 2, GFR 60-89 ml/min       glimepiride 4 MG tablet    AMARYL    180 tablet    TAKE 1 TABLET TWICE A DAY WITH MEALS (BREAKFAST AND DINNER)    Type 2 diabetes mellitus with stage 2 chronic kidney disease, without long-term current use of insulin (H)       HYDROcodone-acetaminophen 5-325 MG per tablet    NORCO    30 tablet    Take 1 tablet by mouth daily as needed for moderate to severe pain maximum 1  "tablet(s) per day at night    Insufficiency fracture of tibia, with routine healing, subsequent encounter       insulin syringe-needle U-100 31G X 5/16\" 1 ML    BD insulin syringe ULTRAFINE    90 each    Use 1 syringes daily or as directed. Prescription 4mm Trish 32g (5/32\" x 0.23mm) please.    Type 2 diabetes mellitus with stage 2 chronic kidney disease, without long-term current use of insulin (H)       LANTUS SOLOSTAR 100 UNIT/ML injection   Generic drug:  insulin glargine     60 mL    Inject between 45 units and 60 units at bedtime.  Needs four boxes for three month supply.    Type 2 diabetes mellitus with stage 2 chronic kidney disease, with long-term current use of insulin (H)       lisinopril 2.5 MG tablet    PRINIVIL/Zestril    90 tablet    TAKE 1 TABLET DAILY    CKD (chronic kidney disease) stage 2, GFR 60-89 ml/min       MAALOX REGULAR STRENGTH 200-200-20 MG/5ML Susp suspension   Generic drug:  alum & mag hydroxide-simethicone      Take 30 mLs by mouth At Bedtime.        metoprolol tartrate 25 MG tablet    LOPRESSOR    180 tablet    TAKE 1 TABLET TWICE A DAY    Essential hypertension       ONETOUCH DELICA LANCETS 33G Misc     360 each    1 each 4 times daily    Type 2 diabetes, HbA1c goal < 7% (H)       oxyCODONE-acetaminophen 5-325 MG per tablet    PERCOCET    24 tablet    Take 1 tablet by mouth 4 times daily maximum 4 tablet(s) per day    Acute pain of left knee       potassium chloride SA 20 MEQ CR tablet    KLOR-CON    180 tablet    Take 1 tablet (20 mEq) by mouth daily    Hypokalemia       triamcinolone 0.1 % cream    KENALOG    80 g    Apply sparingly to affected area two times daily as needed    Contact dermatitis and other eczema, due to unspecified cause       VITAMIN D PO      Take  by mouth. Pt consuming 3000 units per day        warfarin 4 MG tablet    COUMADIN    30 tablet    Take 1 tablet (4 mg) by mouth daily    Acute pain of left knee, Calf swelling         "

## 2018-02-21 NOTE — NURSING NOTE
Chief Complaints and History of Present Illnesses   Patient presents with     Cataract Evaluation     HPI    Affected eye(s):  Both   Symptoms:     No floaters   No flashes   No redness         Do you have eye pain now?:  No      Comments:  Pt states vision in RE appearing more blurry than LE for many months.  DM2 BS: 187 this afternoon.  Lab Results       Component                Value               Date                       A1C                      8.6                 01/03/2018                 A1C                      7.0                 07/26/2017                 A1C                      7.9                 03/08/2017                 A1C                      8.0                 11/17/2016                 A1C                      7.9                 06/27/2016              [facility] February 21, 2018 1:47 PM

## 2018-02-21 NOTE — PROGRESS NOTES
HPI  Sarah Felix is a 72 year old female referred by Dr. Rodriguez for cataract evaluation. She feels her vision is getting slowly more and more blurred both for near and distance. Her current glasses don't help. Her right eye seems quite a bit worse than her left eye. No pain, redness, discharge. No flashes/floaters.     Assessment & Plan      (H25.13) Nuclear senile cataract of both eyes  (primary encounter diagnosis)  Comment: Visually significant with BCVA of 20/40 OD and 20/25 OS. Moderate NS on exam.    Dilates to: 7 mm  Alpha blockers/Flomax: None  Trauma/Pseudoxfoliation: None  Fuchs dystrophy/guttae: None    Diabetes: YES, no retinopathy  Anticoagulation: YES, warfarin    Cyl: 1.13 @ 006 OD, 0.28 @ 171 OS    We discussed the risks and benefits of cataract surgery in the right eye, and informed consent was obtained.  Proceed with CE/IOL OD (hold off on OS for now). She currently takes her glasses off for intermediate near work and would like to continue with this.    Surgical plan:  Topical  Aim mild myopia (-1.50 to -2.00)  She has reflux, may have difficulty lying completely flat.    (E11.9) Diabetes mellitus type 2 without retinopathy (H)  Comment: Diagnosed around 2007. On insulin and oral hypoglycemics. Last A1c 8.6 1/2018. No diabetic retinopathy.  Plan: Discussed the importance of tight blood glucose control in the prevention of diabetic retinopathy. Recommend yearly dilated eye exam.     -----------------------------------------------------------------------------------    Patient disposition:   Return for scheduled procedure. or sooner as needed.    Teaching statement:  Complete documentation of historical and exam elements from today's encounter can be found in the full encounter summary report (not reduplicated in this progress note). I personally obtained the chief complaint(s) and history of present illness.  I confirmed and edited as necessary the review of systems, past  medical/surgical history, family history, social history, and examination findings as documented by others; and I examined the patient myself. I personally reviewed the relevant tests, images, and reports as documented above.     I formulated and edited as necessary the assessment and plan and discussed the findings and management plan with the patient and family.    Janay Amezcua MD  Comprehensive Ophthalmology & Ocular Pathology  Department of Ophthalmology and Visual Neurosciences  [email or web address]  Pager [phone]

## 2018-02-22 ENCOUNTER — TELEPHONE (OUTPATIENT)
Dept: OPHTHALMOLOGY | Facility: CLINIC | Age: 73
End: 2018-02-22

## 2018-03-13 ENCOUNTER — OFFICE VISIT (OUTPATIENT)
Dept: FAMILY MEDICINE | Facility: CLINIC | Age: 73
End: 2018-03-13
Payer: COMMERCIAL

## 2018-03-13 VITALS
TEMPERATURE: 97.8 F | OXYGEN SATURATION: 95 % | RESPIRATION RATE: 19 BRPM | SYSTOLIC BLOOD PRESSURE: 110 MMHG | BODY MASS INDEX: 32.53 KG/M2 | DIASTOLIC BLOOD PRESSURE: 68 MMHG | HEART RATE: 77 BPM | WEIGHT: 195.5 LBS

## 2018-03-13 DIAGNOSIS — Z79.4 TYPE 2 DIABETES MELLITUS WITH STAGE 2 CHRONIC KIDNEY DISEASE, WITH LONG-TERM CURRENT USE OF INSULIN (H): Primary | ICD-10-CM

## 2018-03-13 DIAGNOSIS — E11.22 TYPE 2 DIABETES MELLITUS WITH STAGE 2 CHRONIC KIDNEY DISEASE, WITH LONG-TERM CURRENT USE OF INSULIN (H): Primary | ICD-10-CM

## 2018-03-13 DIAGNOSIS — N18.2 TYPE 2 DIABETES MELLITUS WITH STAGE 2 CHRONIC KIDNEY DISEASE, WITH LONG-TERM CURRENT USE OF INSULIN (H): Primary | ICD-10-CM

## 2018-03-13 PROCEDURE — 99213 OFFICE O/P EST LOW 20 MIN: CPT | Performed by: FAMILY MEDICINE

## 2018-03-13 NOTE — PATIENT INSTRUCTIONS
ASSESSMENT AND PLAN  1. Type 2 diabetes mellitus with stage 2 chronic kidney disease, with long-term current use of insulin (H)  Overall blood sugars seem to be in range . Near or below 100 most mornings, usually around 160 after meals and always below 200.  No changes.     Follow up April 11th for pre-op for cataract surgery.     - Hemoglobin A1c; Future        MYCHART SIGNUP FOR E-VISITS AND EASIER COMMUNICATION:  [email or web address]     Zipnosis:  [email or web address][email or web address].  Sign up for e-visits for common illnesses!     RADIOLOGY:   [facility]:  [phone] to schedule any radiology tests at [facility]: [phone]    Mammograms/colonoscopies:  [phone]    CONSUMER PRICE LINE for estimates of test costs:  [phone]

## 2018-03-13 NOTE — MR AVS SNAPSHOT
After Visit Summary   3/13/2018    Sarah Felix    MRN: [id number]           Patient Information     Date Of Birth          1945        Visit Information        Provider Department      3/13/2018 3:20 PM Wegener, Joel Daniel Irwin, MD [facility]        Today's Diagnoses     Type 2 diabetes mellitus with stage 2 chronic kidney disease, with long-term current use of insulin (H)    -  1      Care Instructions    ASSESSMENT AND PLAN  1. Type 2 diabetes mellitus with stage 2 chronic kidney disease, with long-term current use of insulin (H)  Overall blood sugars seem to be in range . Near or below 100 most mornings, usually around 160 after meals and always below 200.  No changes.     Follow up April 11th for pre-op for cataract surgery.     - Hemoglobin A1c; Future        MYCHART SIGNUP FOR E-VISITS AND EASIER COMMUNICATION:  [email or web address]     Zipnosis:  [email or web address].  Sign up for e-visits for common illnesses!     RADIOLOGY:   [facility]:  [phone] to schedule any radiology tests at [facility]: [phone]    Mammograms/colonoscopies:  [phone]    CONSUMER PRICE LINE for estimates of test costs:  [phone]               Follow-ups after your visit        Your next 10 appointments already scheduled     Apr 11, 2018  2:40 PM CDT   Pre-Op physical with Joel Daniel Irwin Wegener, MD   [facility] ([facility])    [address] [place] [id number] [phone]            Apr 18, 2018  2:45 PM CDT   Post-Op with Janay Amezcua MD   [facility] ([facility])    [address]  [facility] [id number] [phone]            May 14, 2018  2:45 PM CDT   Post-Op with Janay Amezcua MD   Eye Clinic ([facility])    [address]  [address] [address]  [facility] [address] [phone]               Future tests that were ordered for you today     Open Future Orders        Priority Expected Expires Ordered    Hemoglobin A1c Routine  3/13/2019 3/13/2018            Who to contact     If you have questions or need follow up information about today's clinic visit or your schedule please contact [facility] directly at [phone].  Normal or non-critical lab and imaging results will be communicated to you by MyChart, letter or phone within 4 business days after the clinic has received the results. If you do not hear from us within 7 days, please contact the clinic through [facility] or phone. If you have a critical or abnormal lab result, we will notify you by phone as soon as possible.  Submit refill requests through Info or call your pharmacy and they will forward the refill request to us. Please allow 3 business days for your refill to be completed.          Additional Information About Your Visit        MyChart Information     Info gives you secure access to your electronic health record. If you see a primary care provider, you can also send messages to your care team and make appointments. If you have questions, please call your primary care clinic.  If you do not have a primary care provider, please call [phone] and they will assist you.        Care EveryWhere ID     This is your Care EveryWhere ID. This could be used by other organizations to access your [facility] medical records  [id number]        Your Vitals Were     Pulse Temperature Respirations Pulse Oximetry BMI (Body Mass Index)       77 97.8  F (36.6  C) (Oral) 19 95% 32.53 kg/m2        Blood Pressure from Last 3 Encounters:   03/13/18 110/68   01/03/18 129/81   10/11/17 113/68    Weight from Last 3 Encounters:   03/13/18 195 lb 8 oz (88.7 kg)   02/21/18 191 lb (86.6 kg)   01/03/18 197 lb (89.4 kg)               Primary Care Provider Office Phone # Fax #    Joel Daniel Irwin Wegener, [phone]        [address]  [facility] [address]        Equal Access to Services     KILLIAN CASON : Hadii aad ku hadtarasfaith Jonnamisael, wajessieda jaki, qaalbertota latoyavikrambeth silverman, jamila aragonpathugo engel. So [facility] [phone].    ATENCIÓN: Si habla español, tiene a meza disposición servicios gratuitos de asistencia lingüística. Llame al [phone].    We comply with applicable federal civil rights laws and [place] laws. We do not discriminate on the basis of race, color, national origin, age, disability, sex, sexual orientation, or gender identity.            Thank you!     Thank you for choosing [facility]  for your care. Our goal is always to provide you with excellent care. Hearing back from our patients is one way we can continue to improve our services. Please take a few minutes to complete the written survey that you may receive in the mail after your visit with us. Thank you!             Your Updated Medication List - Protect others around you: Learn how to safely use, store and throw away your medicines at www.disposemymeds.org.          This list is accurate as of 3/13/18  4:29 PM.  Always use your most recent med list.                   Brand Name Dispense Instructions for use Diagnosis    ACE/ARB/ARNI NOT PRESCRIBED (INTENTIONAL)      by Other route continuous prn.        aspirin 81 MG tablet     60 tablet    Take 2 tablets (162 mg) by mouth daily    Health care maintenance       atorvastatin 40 MG tablet    LIPITOR    90 tablet    Take 1 tablet (40 mg) by mouth daily    Hyperlipidemia LDL goal <100       BD REY U/F 32G X 4 MM   Generic drug:  insulin pen needle     90 each    USE ONCE DAILY AS DIRECTED WITH LANTUS SOLOSTAR    Type 2 diabetes mellitus with stage 2 chronic kidney disease, with long-term current use of insulin (H)       blood glucose monitoring meter device kit     1 kit    Use to test blood sugars 2 times daily as directed.    Type 2 diabetes, HbA1c goal < 7% (H)     "   blood glucose monitoring test strip    ONETOUCH ULTRA    360 each    1 strip by In Vitro route 4 times daily.    Type 2 diabetes, HbA1c goal < 7% (H)       CALCIUM 500 + D 500-200 MG-IU Tabs     100    one tab twice per day    Osteopenia       COLACE PO      Take  by mouth as needed.    Vaginal discharge       furosemide 20 MG tablet    LASIX    270 tablet    TAKE 2 TABLETS IN THE MORNING AND 1 TABLET IN THE AFTERNOON    Dependent edema, CKD (chronic kidney disease) stage 2, GFR 60-89 ml/min       glimepiride 4 MG tablet    AMARYL    180 tablet    TAKE 1 TABLET TWICE A DAY WITH MEALS (BREAKFAST AND DINNER)    Type 2 diabetes mellitus with stage 2 chronic kidney disease, without long-term current use of insulin (H)       insulin syringe-needle U-100 31G X 5/16\" 1 ML    BD insulin syringe ULTRAFINE    90 each    Use 1 syringes daily or as directed. Prescription 4mm Trish 32g (5/32\" x 0.23mm) please.    Type 2 diabetes mellitus with stage 2 chronic kidney disease, without long-term current use of insulin (H)       LANTUS SOLOSTAR 100 UNIT/ML injection   Generic drug:  insulin glargine     60 mL    Inject between 45 units and 60 units at bedtime.  Needs four boxes for three month supply.    Type 2 diabetes mellitus with stage 2 chronic kidney disease, with long-term current use of insulin (H)       lisinopril 2.5 MG tablet    PRINIVIL/Zestril    90 tablet    TAKE 1 TABLET DAILY    CKD (chronic kidney disease) stage 2, GFR 60-89 ml/min       MAALOX REGULAR STRENGTH 200-200-20 MG/5ML Susp suspension   Generic drug:  alum & mag hydroxide-simethicone      Take 30 mLs by mouth At Bedtime.        metoprolol tartrate 25 MG tablet    LOPRESSOR    180 tablet    TAKE 1 TABLET TWICE A DAY    Essential hypertension       ONETOUCH DELICA LANCETS 33G Misc     360 each    1 each 4 times daily    Type 2 diabetes, HbA1c goal < 7% (H)       potassium chloride SA 20 MEQ CR tablet    KLOR-CON    180 tablet    Take 1 tablet (20 mEq) by " mouth daily    Hypokalemia       triamcinolone 0.1 % cream    KENALOG    80 g    Apply sparingly to affected area two times daily as needed    Contact dermatitis and other eczema, due to unspecified cause       VITAMIN D PO      Take  by mouth. Pt consuming 3000 units per day        warfarin 4 MG tablet    COUMADIN    30 tablet    Take 1 tablet (4 mg) by mouth daily    Acute pain of left knee, Calf swelling

## 2018-03-13 NOTE — PROGRESS NOTES
SUBJECTIVE:   Sarah Felix is a 72 year old female who presents to clinic today for the following health issues:      Diabetes Follow-up    Patient is checking blood sugars: twice daily.    Blood sugar testing frequency justification: Uncontrolled diabetes  Results are as follows:          am - In a good range sometimes too low.          postprandial after lunch- In a good range sometimes too low.     Diabetic concerns: None     Symptoms of hypoglycemia (low blood sugar): none     Paresthesias (numbness or burning in feet) or sores: No     Date of last diabetic eye exam: Yes     Hyperlipidemia Follow-Up      Rate your low fat/cholesterol diet?: good    Taking statin?  Yes, no muscle aches from statin    Other lipid medications/supplements?:  none    Hypertension Follow-up      Outpatient blood pressures are not being checked.    Low Salt Diet: low salt    BP Readings from Last 2 Encounters:   01/03/18 129/81   10/11/17 113/68     Hemoglobin A1C (%)   Date Value   01/03/2018 8.6 (H)   07/26/2017 7.0 (H)     LDL Cholesterol Calculated (mg/dL)   Date Value   11/09/2017 73   07/26/2017 66       Amount of exercise or physical activity: None    Problems taking medications regularly: No    Medication side effects: none    Diet: low salt, low fat/cholesterol and diabetic        Additional history/life update:    Type 2 diabetes mellitus with stage 2 chronic kidney disease, with long-term current use of insulin (H) :here for interval check since a1c high last visit and adjustments made. Overall blood sugars seem to be in range . Near or below 100 most mornings, usually around 160 after meals and always below 200.         Problem list, Medication list, Allergies, and Medical/Social/Surgical histories reviewed in [facility] and updated as appropriate.  Labs reviewed in EPIC  BP Readings from Last 3 Encounters:   03/13/18 110/68   01/03/18 129/81   10/11/17 113/68    Wt Readings from Last 3 Encounters:   03/13/18 195 lb 8 oz  (88.7 kg)   02/21/18 191 lb (86.6 kg)   01/03/18 197 lb (89.4 kg)                  Patient Active Problem List   Diagnosis     CYSTIC LIVER DIS     Allergic rhinitis     Diverticulitis of colon     Disorder of bone and cartilage     Esophageal reflux     Mixed incontinence urge and stress (male)(female)     Cystocele, lateral     Vaginal atrophy     Type 2 diabetes, HbA1c goal < 7% (H)     Hyperlipidemia LDL goal <100     Heart burn     BABB (dyspnea on exertion)     Impingement syndrome, shoulder     Sebaceous hyperplasia     Seborrheic keratosis     Fatty liver     Personal history of other malignant neoplasm of skin     Health Care Home     Advanced directives, counseling/discussion     Hypertension goal BP (blood pressure) < 140/90     Elevated serum creatinine     Peripheral vascular disease (H)     Skin exam, screening for cancer     Essential hypertension, benign (HTN)     CKD (chronic kidney disease) stage 2, GFR 60-89 ml/min     Type 2 diabetes mellitus with hyperglycemia (H)     Type 2 diabetes with stage 2 chronic kidney disease GFR 60-89 (H)     Type 2 diabetes mellitus without complication, with long-term current use of insulin (H)     Senile nuclear sclerosis, right     Past Surgical History:   Procedure Laterality Date     C APPENDECTOMY  age 22     C DEXA, BONE DENSITY, AXIAL SKEL  2005     C DEXA, BONE DENSITY, AXIAL SKEL  6/2007    No signif change in Osteopenia     COLONOSCOPY  4/2006    repeat 10 yr     GYN SURGERY  10/22/08    pelvic support     HEMORRHOIDECTOMY  8/08     MOHS MICROGRAPHIC PROCEDURE       SURGICAL HISTORY OF -   age 22    L ovarian cyst removal, laparotomy     SURGICAL HISTORY OF -   1998    partial thyroidectomy     SURGICAL HISTORY OF -   10/08    Transobturator tape for Urinary Incontinence     SURGICAL HISTORY OF -   11/2011    stress test normal     THYROID SURGERY      Had cyst removed, thyroid gland is intact.       Social History   Substance Use Topics     Smoking status:  "Former Smoker     Quit date: 1/1/1980     Smokeless tobacco: Never Used      Comment: smoked from [age]; smoked about 1ppd     Alcohol use 0.0 oz/week      Comment: 6 drinks per week     Family History   Problem Relation Age of Onset     DIABETES Mother      at 89 yrs     Hypertension Mother      Arthritis Mother      rheumatoid     CANCER Father      bladder     Cardiovascular Brother      palpitations not on meds.     Glaucoma No family hx of      Macular Degeneration No family hx of      Amblyopia No family hx of          Current Outpatient Prescriptions   Medication Sig Dispense Refill     potassium chloride SA (KLOR-CON) 20 MEQ CR tablet Take 1 tablet (20 mEq) by mouth daily 180 tablet 3     atorvastatin (LIPITOR) 40 MG tablet Take 1 tablet (40 mg) by mouth daily 90 tablet 3     furosemide (LASIX) 20 MG tablet TAKE 2 TABLETS IN THE MORNING AND 1 TABLET IN THE AFTERNOON 270 tablet 0     lisinopril (PRINIVIL/ZESTRIL) 2.5 MG tablet TAKE 1 TABLET DAILY 90 tablet 3     metoprolol (LOPRESSOR) 25 MG tablet TAKE 1 TABLET TWICE A  tablet 2     blood glucose monitoring (ONE TOUCH ULTRA) test strip 1 strip by In Vitro route 4 times daily. 360 each 1     ONETOUCH DELICA LANCETS 33G MISC 1 each 4 times daily 360 each 1     glimepiride (AMARYL) 4 MG tablet TAKE 1 TABLET TWICE A DAY WITH MEALS (BREAKFAST AND DINNER) 180 tablet 3     insulin syringe-needle U-100 (BD INSULIN SYRINGE ULTRAFINE) 31G X 5/16\" 1 ML Use 1 syringes daily or as directed. Prescription 4mm Rey 32g (5/32\" x 0.23mm) please. 90 each 3     warfarin (COUMADIN) 4 MG tablet Take 1 tablet (4 mg) by mouth daily 30 tablet 0     LANTUS SOLOSTAR 100 UNIT/ML soln Inject between 45 units and 60 units at bedtime.  Needs four boxes for three month supply. 60 mL 3     BD REY U/F 32G X 4 MM insulin pen needle USE ONCE DAILY AS DIRECTED WITH LANTUS SOLOSTAR 90 each 3     triamcinolone (KENALOG) 0.1 % cream Apply sparingly to affected area two times daily as needed " 80 g 3     aspirin 81 MG tablet Take 2 tablets (162 mg) by mouth daily 60 tablet      Blood Glucose Monitoring Suppl (ONE TOUCH ULTRA 2) W/DEVICE KIT Use to test blood sugars 2 times daily as directed. 1 kit 0     Cholecalciferol (VITAMIN D PO) Take  by mouth. Pt consuming 3000 units per day        alum & mag hydroxide-simethicone (MAALOX REGULAR STRENGTH) 200-200-20 MG/5ML SUSP Take 30 mLs by mouth At Bedtime.       ACE/ARB NOT PRESCRIBED, INTENTIONAL, by Other route continuous prn.       Docusate Sodium (COLACE PO) Take  by mouth as needed.       CALCIUM 500 + D 500-200 MG-IU OR TABS one tab twice per day 100 0     Allergies   Allergen Reactions     Amlodipine Swelling     Cephalosporins Rash     Levaquin Rash     Itching, rash     Metformin Diarrhea and GI Disturbance     Nickel Rash     Contact reaction     Recent Labs   Lab Test  01/03/18   1418  11/09/17   1403  07/26/17   1434  03/08/17   1516  11/17/16   1356  06/27/16   1506   10/19/15   1541  06/19/15   1513   06/29/12   1401   A1C  8.6*   --   7.0*  7.9*  8.0*  7.9*   < >  7.1*  7.5*   < >  8.3*   LDL   --   73  66   --    --   78   --    --   88   < >  88   HDL   --   51  50   --    --   44*   --    --    --    < >  60   TRIG   --   118  164*   --    --   171*   --    --    --    < >  189*   ALT   --   42   --    --    --    --    --   47   --    --   71*   CR   --   0.81   --    --   0.87   --    < >  0.90   --    < >  0.66   GFRESTIMATED   --   70   --    --   65   --    < >  62   --    < >  >90   GFRESTBLACK   --   85   --    --   78   --    < >  75   --    < >  >90   POTASSIUM   --   3.3*   --    --   3.7   --    < >  3.9   --    < >  3.7   TSH   --    --   2.11   --    --    --    --    --   2.27   < >   --     < > = values in this interval not displayed.        ROS:  Constitutional, HEENT, cardiovascular, pulmonary, GI, , musculoskeletal, neuro, skin, endocrine and psych systems are negative, except as otherwise noted.        OBJECTIVE:  BP  110/68  Pulse 77  Temp 97.8  F (36.6  C) (Oral)  Resp 19  Wt 195 lb 8 oz (88.7 kg)  SpO2 95%  BMI 32.53 kg/m2    EXAM:  GENERAL APPEARANCE: healthy, alert and no distress      ASSESSMENT AND PLAN  Patient Instructions   ASSESSMENT AND PLAN  1. Type 2 diabetes mellitus with stage 2 chronic kidney disease, with long-term current use of insulin (H)  Overall blood sugars seem to be in range . Near or below 100 most mornings, usually around 160 after meals and always below 200.  No changes.   Med list reviewed/appropriate/accurate as above.     Follow up April 11th for pre-op for cataract surgery.     - Hemoglobin A1c; Future can do April 3rd or later.     Joel Wegener,MD

## 2018-04-09 ENCOUNTER — OFFICE VISIT (OUTPATIENT)
Dept: FAMILY MEDICINE | Facility: CLINIC | Age: 73
End: 2018-04-09
Payer: COMMERCIAL

## 2018-04-09 VITALS
HEART RATE: 76 BPM | WEIGHT: 195 LBS | SYSTOLIC BLOOD PRESSURE: 117 MMHG | RESPIRATION RATE: 21 BRPM | BODY MASS INDEX: 32.45 KG/M2 | DIASTOLIC BLOOD PRESSURE: 70 MMHG | TEMPERATURE: 98 F | OXYGEN SATURATION: 94 %

## 2018-04-09 DIAGNOSIS — R22.32 AXILLARY MASS, LEFT: Primary | ICD-10-CM

## 2018-04-09 PROCEDURE — 99213 OFFICE O/P EST LOW 20 MIN: CPT | Performed by: FAMILY MEDICINE

## 2018-04-09 NOTE — PATIENT INSTRUCTIONS
"ASSESSMENT AND PLAN  1. Axillary mass, left  Refer to general surgery for removal/biopsy.     Is up to date on mammograms, next due in 2018.  If for some reason the mass is worrisome we may need to do diagnostic mammogram earlier.     To me feels most like a lipoma or cyst however it is solid and no punctum (opening) so I feel removal to assess for a worrisome lymph node is warranted.     Follow up with me in two days for cataract pre-op as planned.       - GENERAL SURG ADULT REFERRAL        [facility](VISITS), LABS, REFILLS, MESSAGING, ETC [email or web address] , [phone]    E-VISIT: click \"on-line care, then request e-visit\".  E-visits work well for following up on issues we have discussed in clinic previously which may need new prescriptions, new prescriptions or substantial discussion. These are always done by me (Dr. Wegener).     ONCARE VISIT:  [email or web address]  - we treat nearly 50 common conditions through on-care.  These are done in an hour by on-call staff.     RADIOLOGY:  [facility]:  [phone]   [facility]: [phone]    Mammogram and Colonoscopy [phone]    Smoking Cessation: [email or web address], [phone] ([phone])      CONSUMER PRICE LINE for estimates of test costs:  [phone]       "

## 2018-04-09 NOTE — MR AVS SNAPSHOT
"              After Visit Summary   2018    Sarah Fleix    MRN: [id number]           Patient Information     Date Of Birth          1945        Visit Information        Provider Department      2018 2:00 PM Wegener, Joel Daniel Irwin, MD [facility]        Today's Diagnoses     Axillary mass, left    -  1      Care Instructions    ASSESSMENT AND PLAN  1. Axillary mass, left  Refer to general surgery for removal/biopsy.     Is up to date on mammograms, next due in 2018.  If for some reason the mass is worrisome we may need to do diagnostic mammogram earlier.     To me feels most like a lipoma or cyst however it is solid and no punctum (opening) so I feel removal to assess for a worrisome lymph node is warranted.     Follow up with me in two days for cataract pre-op as planned.       - GENERAL SURG ADULT REFERRAL        MYCHART FOR ON-LINE CARE(VISITS), LABS, REFILLS, MESSAGING, ETC [email or web address] , [phone]    E-VISIT: click \"on-line care, then request e-visit\".  E-visits work well for following up on issues we have discussed in clinic previously which may need new prescriptions, new prescriptions or substantial discussion. These are always done by me (Dr. Wegener).     ONCARE VISIT:  [email or web address]  - we treat nearly 50 common conditions through on-care.  These are done in an hour by on-call staff.     RADIOLOGY:  [facility]:  [phone]   [facility]: [phone]    Mammogram and Colonoscopy [phone]    Smoking Cessation: [email or web address], [phone]-PLAN ([phone])      CONSUMER PRICE LINE for estimates of test costs:  [phone]               Follow-ups after your visit        Additional Services     GENERAL SURG ADULT REFERRAL       Your provider has referred you to: [facility] [place]: General Surgery Clinic [facility] [phone]   [email or web address]  UM: [facility] [place] [phone]   [email or web address]    Please be aware that coverage of these services is subject to the terms and limitations of your health insurance plan.  Call member services at your health plan with any benefit or coverage questions.      Please bring the following with you to your appointment:    (1) Any X-Rays, CTs or MRIs which have been performed.  Contact the facility where they were done to arrange for  prior to your scheduled appointment.   (2) List of current medications   (3) This referral request   (4) Any documents/labs given to you for this referral                  Your next 10 appointments already scheduled     Apr 11, 2018  2:40 PM CDT   Pre-[facility]    [address] [place] [id number] [phone]            Apr 18, 2018  2:45 PM CDT   Post-Op with Janay Amezcua MD   [facility]    [address] [id number] [phone]            May 14, 2018  2:45 PM CDT   Post-Op with Janay Amezcua MD   [facility] ([facility])    [address] [id number] [phone]              Who to contact     If you have questions or need follow up information about today's clinic visit or your schedule please contact [facility] directly at [phone].  Normal or non-critical lab and imaging results will be communicated to you by MyChart, letter or phone within 4 business days after the clinic has received the results. If you do not hear from us within 7 days, please contact the clinic through MyChart or phone. If you have a critical or abnormal lab result, we will notify you by phone as soon as possible.  Submit refill requests through [facility] or call your pharmacy  and they will forward the refill request to us. Please allow 3 business days for your refill to be completed.          Additional Information About Your Visit        [facility] Information     [facility] [facility] gives you secure access to your electronic health record. If you see a primary care provider, you can also send messages to your care team and make appointments. If you have questions, please call your primary care clinic.  If you do not have a primary care provider, please call [phone] and they will assist you.        Care EveryWhere ID     This is your Care EveryWhere ID. This could be used by other organizations to access your [place] medical records  [id number]        Your Vitals Were     Pulse Temperature Respirations Pulse Oximetry BMI (Body Mass Index)       76 98  F (36.7  C) (Oral) 21 94% 32.45 kg/m2        Blood Pressure from Last 3 Encounters:   04/09/18 117/70   03/13/18 110/68   01/03/18 129/81    Weight from Last 3 Encounters:   04/09/18 195 lb (88.5 kg)   03/13/18 195 lb 8 oz (88.7 kg)   02/21/18 191 lb (86.6 kg)              We Performed the Following     GENERAL SURG ADULT REFERRAL        Primary Care Provider Office Phone # Fax #    Joel Daniel Irwin Wegener, [phone] [address] [facility] [address]        Equal Access to Services     KILLIAN CASON : Hadii aad ku hadasho Soomaali, waaxda luqadaha, qaybta kaalmada adeegyada, jamila engel. So [facility] [phone].    ATENCIÓN: Si habla español, tiene a meza disposición servicios gratuitos de asistencia lingüística. Llame al [phone].    We comply with applicable federal civil rights laws and [place] laws. We do not discriminate on the basis of race, color, national origin, age, disability, sex, sexual orientation, or gender identity.            Thank you!     Thank you for choosing [facility]  for your care. Our goal is always to provide you with excellent care. Hearing back  from our patients is one way we can continue to improve our services. Please take a few minutes to complete the written survey that you may receive in the mail after your visit with us. Thank you!             Your Updated Medication List - Protect others around you: Learn how to safely use, store and throw away your medicines at www.disposemymeds.org.          This list is accurate as of 4/9/18  2:46 PM.  Always use your most recent med list.                   Brand Name Dispense Instructions for use Diagnosis    ACE/ARB/ARNI NOT PRESCRIBED (INTENTIONAL)      by Other route continuous prn.        aspirin 81 MG tablet     60 tablet    Take 2 tablets (162 mg) by mouth daily    Health care maintenance       atorvastatin 40 MG tablet    LIPITOR    90 tablet    Take 1 tablet (40 mg) by mouth daily    Hyperlipidemia LDL goal <100       BD REY U/F 32G X 4 MM   Generic drug:  insulin pen needle     90 each    USE ONCE DAILY AS DIRECTED WITH LANTUS SOLOSTAR    Type 2 diabetes mellitus with stage 2 chronic kidney disease, with long-term current use of insulin (H)       blood glucose monitoring meter device kit     1 kit    Use to test blood sugars 2 times daily as directed.    Type 2 diabetes, HbA1c goal < 7% (H)       blood glucose monitoring test strip    ONETOUCH ULTRA    360 each    1 strip by In Vitro route 4 times daily.    Type 2 diabetes, HbA1c goal < 7% (H)       CALCIUM 500 + D 500-200 MG-IU Tabs     100    one tab twice per day    Osteopenia       COLACE PO      Take  by mouth as needed.    Vaginal discharge       furosemide 20 MG tablet    LASIX    270 tablet    TAKE 2 TABLETS IN THE MORNING AND 1 TABLET IN THE AFTERNOON    Dependent edema, CKD (chronic kidney disease) stage 2, GFR 60-89 ml/min       glimepiride 4 MG tablet    AMARYL    180 tablet    TAKE 1 TABLET TWICE A DAY WITH MEALS (BREAKFAST AND DINNER)    Type 2 diabetes mellitus with stage 2 chronic kidney disease, without long-term current use of insulin  "(H)       insulin syringe-needle U-100 31G X 5/16\" 1 ML    BD insulin syringe ULTRAFINE    90 each    Use 1 syringes daily or as directed. Prescription 4mm Trish 32g (5/32\" x 0.23mm) please.    Type 2 diabetes mellitus with stage 2 chronic kidney disease, without long-term current use of insulin (H)       LANTUS SOLOSTAR 100 UNIT/ML injection   Generic drug:  insulin glargine     60 mL    Inject between 45 units and 60 units at bedtime.  Needs four boxes for three month supply.    Type 2 diabetes mellitus with stage 2 chronic kidney disease, with long-term current use of insulin (H)       lisinopril 2.5 MG tablet    PRINIVIL/Zestril    90 tablet    TAKE 1 TABLET DAILY    CKD (chronic kidney disease) stage 2, GFR 60-89 ml/min       MAALOX REGULAR STRENGTH 200-200-20 MG/5ML Susp suspension   Generic drug:  alum & mag hydroxide-simethicone      Take 30 mLs by mouth as needed        metoprolol tartrate 25 MG tablet    LOPRESSOR    180 tablet    TAKE 1 TABLET TWICE A DAY    Essential hypertension       ONETOUCH DELICA LANCETS 33G Misc     360 each    1 each 4 times daily    Type 2 diabetes, HbA1c goal < 7% (H)       potassium chloride SA 20 MEQ CR tablet    KLOR-CON    180 tablet    Take 1 tablet (20 mEq) by mouth daily    Hypokalemia       triamcinolone 0.1 % cream    KENALOG    80 g    Apply sparingly to affected area two times daily as needed    Contact dermatitis and other eczema, due to unspecified cause       VITAMIN D PO      Take  by mouth. Pt consuming 3000 units per day        warfarin 4 MG tablet    COUMADIN    30 tablet    Take 1 tablet (4 mg) by mouth daily    Acute pain of left knee, Calf swelling         "

## 2018-04-09 NOTE — PROGRESS NOTES
SUBJECTIVE:   Sarah Felix is a 72 year old female who presents to clinic today for the following health issues:      LUMP/MASS  Where Is It Located: Left armpit.   When Did You First Notice: Last Trusday  Is There More Than One: No  Is It Painful: Only if it is messed with.   What Is The Shape: Oval and seems solid  Is There Any Redness: No redness  Has There Been Any Change In Appearance: No  What Symptoms Are You Having: A little discomfort when it is messed with     Patient reports noticing a small lump in her left armpit last Thursday. The lump is on the surface of her skin and is not painful. She is concerned about the possibility of cancer. She reports no family history of breast cancer. She reports no other new symptoms    Problem list and histories reviewed & adjusted, as indicated.          Problem list, Medication list, Allergies, and Medical/Social/Surgical histories reviewed in [facility] and updated as appropriate.  Labs reviewed in EPIC  BP Readings from Last 3 Encounters:   04/09/18 117/70   03/13/18 110/68   01/03/18 129/81    Wt Readings from Last 3 Encounters:   04/09/18 195 lb (88.5 kg)   03/13/18 195 lb 8 oz (88.7 kg)   02/21/18 191 lb (86.6 kg)                  Patient Active Problem List   Diagnosis     CYSTIC LIVER DIS     Allergic rhinitis     Diverticulitis of colon     Disorder of bone and cartilage     Esophageal reflux     Mixed incontinence urge and stress (male)(female)     Cystocele, lateral     Vaginal atrophy     Type 2 diabetes, HbA1c goal < 7% (H)     Hyperlipidemia LDL goal <100     Heart burn     BABB (dyspnea on exertion)     Impingement syndrome, shoulder     Sebaceous hyperplasia     Seborrheic keratosis     Fatty liver     Personal history of other malignant neoplasm of skin     Health Care Home     Advanced directives, counseling/discussion     Hypertension goal BP (blood pressure) < 140/90     Elevated serum creatinine     Peripheral vascular disease (H)     Skin exam,  screening for cancer     Essential hypertension, benign (HTN)     CKD (chronic kidney disease) stage 2, GFR 60-89 ml/min     Type 2 diabetes mellitus with hyperglycemia (H)     Type 2 diabetes with stage 2 chronic kidney disease GFR 60-89 (H)     Type 2 diabetes mellitus without complication, with long-term current use of insulin (H)     Senile nuclear sclerosis, right     Past Surgical History:   Procedure Laterality Date     C APPENDECTOMY  age 22     C DEXA, BONE DENSITY, AXIAL SKEL  2005     C DEXA, BONE DENSITY, AXIAL SKEL  6/2007    No signif change in Osteopenia     COLONOSCOPY  4/2006    repeat 10 yr     GYN SURGERY  10/22/08    pelvic support     HEMORRHOIDECTOMY  8/08     MOHS MICROGRAPHIC PROCEDURE       SURGICAL HISTORY OF -   age 22    L ovarian cyst removal, laparotomy     SURGICAL HISTORY OF -   1998    partial thyroidectomy     SURGICAL HISTORY OF -   10/08    Transobturator tape for Urinary Incontinence     SURGICAL HISTORY OF -   11/2011    stress test normal     THYROID SURGERY      Had cyst removed, thyroid gland is intact.       Social History   Substance Use Topics     Smoking status: Former Smoker     Quit date: 1/1/1980     Smokeless tobacco: Never Used      Comment: smoked from [age]; smoked about 1ppd     Alcohol use 0.0 oz/week      Comment: 6 drinks per week     Family History   Problem Relation Age of Onset     DIABETES Mother      at 89 yrs     Hypertension Mother      Arthritis Mother      rheumatoid     CANCER Father      bladder     Cardiovascular Brother      palpitations not on meds.     Glaucoma No family hx of      Macular Degeneration No family hx of      Amblyopia No family hx of          Current Outpatient Prescriptions   Medication Sig Dispense Refill     potassium chloride SA (KLOR-CON) 20 MEQ CR tablet Take 1 tablet (20 mEq) by mouth daily 180 tablet 3     atorvastatin (LIPITOR) 40 MG tablet Take 1 tablet (40 mg) by mouth daily 90 tablet 3     furosemide (LASIX) 20 MG  "tablet TAKE 2 TABLETS IN THE MORNING AND 1 TABLET IN THE AFTERNOON 270 tablet 0     lisinopril (PRINIVIL/ZESTRIL) 2.5 MG tablet TAKE 1 TABLET DAILY 90 tablet 3     metoprolol (LOPRESSOR) 25 MG tablet TAKE 1 TABLET TWICE A  tablet 2     blood glucose monitoring (ONE TOUCH ULTRA) test strip 1 strip by In Vitro route 4 times daily. 360 each 1     ONETOUCH DELICA LANCETS 33G MISC 1 each 4 times daily 360 each 1     glimepiride (AMARYL) 4 MG tablet TAKE 1 TABLET TWICE A DAY WITH MEALS (BREAKFAST AND DINNER) 180 tablet 3     insulin syringe-needle U-100 (BD INSULIN SYRINGE ULTRAFINE) 31G X 5/16\" 1 ML Use 1 syringes daily or as directed. Prescription 4mm Rey 32g (5/32\" x 0.23mm) please. 90 each 3     warfarin (COUMADIN) 4 MG tablet Take 1 tablet (4 mg) by mouth daily 30 tablet 0     LANTUS SOLOSTAR 100 UNIT/ML soln Inject between 45 units and 60 units at bedtime.  Needs four boxes for three month supply. 60 mL 3     BD REY U/F 32G X 4 MM insulin pen needle USE ONCE DAILY AS DIRECTED WITH LANTUS SOLOSTAR 90 each 3     triamcinolone (KENALOG) 0.1 % cream Apply sparingly to affected area two times daily as needed 80 g 3     aspirin 81 MG tablet Take 2 tablets (162 mg) by mouth daily 60 tablet      Blood Glucose Monitoring Suppl (ONE TOUCH ULTRA 2) W/DEVICE KIT Use to test blood sugars 2 times daily as directed. 1 kit 0     Cholecalciferol (VITAMIN D PO) Take  by mouth. Pt consuming 3000 units per day        alum & mag hydroxide-simethicone (MAALOX REGULAR STRENGTH) 200-200-20 MG/5ML SUSP Take 30 mLs by mouth as needed        ACE/ARB NOT PRESCRIBED, INTENTIONAL, by Other route continuous prn.       Docusate Sodium (COLACE PO) Take  by mouth as needed.       CALCIUM 500 + D 500-200 MG-IU OR TABS one tab twice per day 100 0     Allergies   Allergen Reactions     Amlodipine Swelling     Cephalosporins Rash     Levaquin Rash     Itching, rash     Metformin Diarrhea and GI Disturbance     Nickel Rash     Contact reaction "     Recent Labs   Lab Test  01/03/18   1418  11/09/17   1403  07/26/17   1434  03/08/17   1516  11/17/16   1356  06/27/16   1506   10/19/15   1541  06/19/15   1513   06/29/12   1401   A1C  8.6*   --   7.0*  7.9*  8.0*  7.9*   < >  7.1*  7.5*   < >  8.3*   LDL   --   73  66   --    --   78   --    --   88   < >  88   HDL   --   51  50   --    --   44*   --    --    --    < >  60   TRIG   --   118  164*   --    --   171*   --    --    --    < >  189*   ALT   --   42   --    --    --    --    --   47   --    --   71*   CR   --   0.81   --    --   0.87   --    < >  0.90   --    < >  0.66   GFRESTIMATED   --   70   --    --   65   --    < >  62   --    < >  >90   GFRESTBLACK   --   85   --    --   78   --    < >  75   --    < >  >90   POTASSIUM   --   3.3*   --    --   3.7   --    < >  3.9   --    < >  3.7   TSH   --    --   2.11   --    --    --    --    --   2.27   < >   --     < > = values in this interval not displayed.        ROS:  Constitutional, HEENT, cardiovascular, pulmonary, GI, , musculoskeletal, neuro, skin, endocrine and psych systems are negative, except as otherwise noted.    OBJECTIVE:  /70  Pulse 76  Temp 98  F (36.7  C) (Oral)  Resp 21  Wt 195 lb (88.5 kg)  SpO2 94%  BMI 32.45 kg/m2    EXAM:  GENERAL APPEARANCE: healthy, alert and no distress  AXILLARY: 2 cm nontender mobile lesion in  Skin and subcutaneous tissue of  of left axilla, no other abnormal findings    ASSESSMENT AND PLAN    1. Axillary mass, left  Refer to general surgery for removal/biopsy.     Last mammogram July 2017 showed no abnormal findings. The superficial location and mobile nature of the lesion reduce the likelihood that this is a karl metastasis. It is most likely a lipoma or cyst however it is solid and no punctum (opening) so I feel removal to assess for a worrisome lymph node is warranted.  If for some reason the mass is worrisome we may need to do a diagnostic mammogram earlier.     Follow up with me in two  "days for cataract pre-op as planned.     - GENERAL SURG ADULT REFERRAL        MYCHART FOR ON-LINE CARE(VISITS), LABS, REFILLS, MESSAGING, ETC [email or web address] , [phone]    E-VISIT: click \"on-line care, then request e-visit\".  E-visits work well for following up on issues we have discussed in clinic previously which may need new prescriptions, new prescriptions or substantial discussion. These are always done by me (Dr. Wegener).     ONCARE VISIT:  [email or web address]  - we treat nearly 50 common conditions through on-care.  These are done in an hour by on-call staff.     RADIOLOGY:  [facility]:  [phone]   [facility]: [phone]    Mammogram and Colonoscopy [phone]    Smoking Cessation: www.[email or web address], 4-746-617-PLAN ([phone])      CONSUMER PRICE LINE for estimates of test costs:  [phone]              Scribed by Cruz Celestin, Medical Student for Joel Wegener, MD based on the provider s statements to me.        I have reviewed and edited the medical student s documentation acting as scribe and verify that the history, exam and medical decision making reflect the history, exam and decision making performed by myself today.    Joel Wegener,MD        "

## 2018-04-11 ENCOUNTER — OFFICE VISIT (OUTPATIENT)
Dept: FAMILY MEDICINE | Facility: CLINIC | Age: 73
End: 2018-04-11
Payer: COMMERCIAL

## 2018-04-11 VITALS
TEMPERATURE: 98.3 F | OXYGEN SATURATION: 94 % | WEIGHT: 195 LBS | SYSTOLIC BLOOD PRESSURE: 116 MMHG | RESPIRATION RATE: 18 BRPM | HEART RATE: 74 BPM | DIASTOLIC BLOOD PRESSURE: 68 MMHG | BODY MASS INDEX: 32.45 KG/M2

## 2018-04-11 DIAGNOSIS — E11.22 TYPE 2 DIABETES MELLITUS WITH STAGE 2 CHRONIC KIDNEY DISEASE, WITH LONG-TERM CURRENT USE OF INSULIN (H): ICD-10-CM

## 2018-04-11 DIAGNOSIS — H26.9 CATARACT, UNSPECIFIED CATARACT TYPE, UNSPECIFIED LATERALITY: ICD-10-CM

## 2018-04-11 DIAGNOSIS — N18.2 TYPE 2 DIABETES MELLITUS WITH STAGE 2 CHRONIC KIDNEY DISEASE, WITH LONG-TERM CURRENT USE OF INSULIN (H): ICD-10-CM

## 2018-04-11 DIAGNOSIS — Z79.4 TYPE 2 DIABETES MELLITUS WITH STAGE 2 CHRONIC KIDNEY DISEASE, WITH LONG-TERM CURRENT USE OF INSULIN (H): ICD-10-CM

## 2018-04-11 DIAGNOSIS — Z01.818 PREOP GENERAL PHYSICAL EXAM: Primary | ICD-10-CM

## 2018-04-11 DIAGNOSIS — E87.6 HYPOKALEMIA: ICD-10-CM

## 2018-04-11 LAB — HBA1C MFR BLD: 7.8 % (ref 0–6.4)

## 2018-04-11 PROCEDURE — 83036 HEMOGLOBIN GLYCOSYLATED A1C: CPT | Performed by: FAMILY MEDICINE

## 2018-04-11 PROCEDURE — 36415 COLL VENOUS BLD VENIPUNCTURE: CPT | Performed by: FAMILY MEDICINE

## 2018-04-11 PROCEDURE — 84132 ASSAY OF SERUM POTASSIUM: CPT | Performed by: FAMILY MEDICINE

## 2018-04-11 PROCEDURE — 99214 OFFICE O/P EST MOD 30 MIN: CPT | Performed by: FAMILY MEDICINE

## 2018-04-11 NOTE — PATIENT INSTRUCTIONS
"ASSESSMENT AND PLAN  1. Preop general physical exam  Cleared for cataract surgery.          No need to stop coumadin or aspirin unless directed otherwise by your eye surgeon. Please call and confirm this with them.     Skip glimepiride the evening before and decrease lantus to 30 units from 50) the evening prior to the procedure since you will not be eating breakfast.     Go ahead and take all your other pills on the morning of the procedure.     Decrease lantus to     2. Cataract, unspecified cataract type, unspecified laterality  Reason for surgery.     3. Type 2 diabetes mellitus with stage 2 chronic kidney disease, with long-term current use of insulin (H)  a1c at goal less than 8, improved.      On appropriate statin, ace inhibitor.       - Hemoglobin A1c    4. Hypokalemia  Re-check today.   - Potassium        MYCHART FOR ON-LINE CARE(VISITS), LABS, REFILLS, MESSAGING, ETC [email or web address] , [phone]    E-VISIT: click \"on-line care, then request e-visit\".  E-visits work well for following up on issues we have discussed in clinic previously which may need new prescriptions, new prescriptions or substantial discussion. These are always done by me (Dr. Wegener).     ONCARE VISIT:  [email or web address]  - we treat nearly 50 common conditions through on-care.  These are done in an hour by on-call staff.     RADIOLOGY:  [facility]:  [phone]   [facility]: [phone]    Mammogram and Colonoscopy [phone]    Smoking Cessation: www.[email or web address], 5-778-706-PLAN ([phone])      CONSUMER PRICE LINE for estimates of test costs:  [phone]       Before Your Surgery      Call your surgeon if there is any change in your health. This includes signs of a cold or flu (such as a sore throat, runny nose, cough, rash or fever).    Do not smoke, drink alcohol or take over the counter medicine (unless your surgeon or primary care doctor tells you to) for the 24 hours before and " after surgery.    If you take prescribed drugs: Follow your doctor s orders about which medicines to take and which to stop until after surgery.    Eating and drinking prior to surgery: follow the instructions from your surgeon    Take a shower or bath the night before surgery. Use the soap your surgeon gave you to gently clean your skin. If you do not have soap from your surgeon, use your regular soap. Do not shave or scrub the surgery site.  Wear clean pajamas and have clean sheets on your bed.

## 2018-04-11 NOTE — PROGRESS NOTES
[place] [id number] [place] [address] [phone]  Dept: [phone]    PRE-OP EVALUATION:  Today's date: 2018    Sarah Felix (: 1945) presents for pre-operative evaluation assessment as requested by Dr. Amezcua.  She requires evaluation and anesthesia risk assessment prior to undergoing surgery/procedure for treatment of Cataracts .    Fax number for surgical facility: [phone]  Primary Physician: Wegener, Joel Daniel Irwin  Type of Anesthesia Anticipated: to be determined    Patient has a Health Care Directive or Living Will:  NO    Preop Questions 2018   Who is doing your surgery? latisha   What are you having done? [facility]   Date of Surgery/Procedure:    Facility or Hospital where procedure/surgery will be performed: [facility] [place]   1.  Do you have a history of Heart attack, stroke, stent, coronary bypass surgery, or other heart surgery? No   2.  Do you ever have any pain or discomfort in your chest? No   3.  Do you have a history of  Heart Failure? No   4.   Are you troubled by shortness of breath when:  walking on a level surface, or up a slight hill, or at night? YES -    5.  Do you currently have a cold, bronchitis or other respiratory infection? No   6.  Do you have a cough, shortness of breath, or wheezing? No   7.  Do you sometimes get pains in the calves of your legs when you walk? No   8. Do you or anyone in your family have previous history of blood clots? YES -    9.  Do you or does anyone in your family have a serious bleeding problem such as prolonged bleeding following surgeries or cuts? No   10. Have you ever had problems with anemia or been told to take iron pills? No   11. Have you had any abnormal blood loss such as black, tarry or bloody stools, or abnormal vaginal bleeding? No   12. Have you ever had a blood transfusion? No   13. Have you or any of your relatives ever had problems with  anesthesia? No   14. Do you have sleep apnea, excessive snoring or daytime drowsiness? YES -    15. Do you have any prosthetic heart valves? No   16. Do you have prosthetic joints? No   17. Is there any chance that you may be pregnant? No         HPI:     HPI related to upcoming procedure: overall doing well.  No chest pain with exertion.  Having cataract surgery .     Using lantus 50 units nightly, sometimes getting low blood sugar now in am especially if takes glimeperide with not best timing or not eating enough so sometimes skips this.       MEDICAL HISTORY:     Patient Active Problem List    Diagnosis Date Noted     Senile nuclear sclerosis, right 02/15/2018     Priority: Medium     Type 2 diabetes mellitus without complication, with long-term current use of insulin (H) 02/01/2016     Priority: Medium     Type 2 diabetes with stage 2 chronic kidney disease GFR 60-89 (H) 10/23/2015     Priority: Medium     Type 2 diabetes mellitus with hyperglycemia (H) 10/19/2015     Priority: Medium     Essential hypertension, benign (HTN) 06/06/2014     Priority: Medium     CKD (chronic kidney disease) stage 2, GFR 60-89 ml/min 06/06/2014     Priority: Medium     Skin exam, screening for cancer 03/07/2014     Priority: Medium     Peripheral vascular disease (H) 12/27/2013     Priority: Medium     Problem list name updated by automated process. Provider to review       Elevated serum creatinine 09/11/2013     Priority: Medium     Hypertension goal BP (blood pressure) < 140/90 08/01/2013     Priority: Medium     Advanced directives, counseling/discussion 01/16/2013     Priority: Medium     Discussed advance care planning with patient; information given to patient to review. 1/16/2013        Personal history of other malignant neoplasm of skin 10/19/2012     Priority: Medium     Health Care Home 10/19/2012     Priority: Medium     Bonnie Rdz RN-BSN, [facility]  [phone].  FPA / HALLIEG are for Seniors      RIA  V65.8 REPLACED WITH [id number] HEALTH CARE HOME (04/08/2013)       Fatty liver      Priority: Medium     Sebaceous hyperplasia 01/22/2012     Priority: Medium     Seborrheic keratosis 01/22/2012     Priority: Medium     Impingement syndrome, shoulder 12/21/2011     Priority: Medium     BABB (dyspnea on exertion) 11/09/2011     Priority: Medium     Normal stress test 2011  Seen by Pulmonary: deconditioning.  Possible mild asthma.       Heart burn 10/17/2011     Priority: Medium     Type 2 diabetes, HbA1c goal < 7% (H) 10/31/2010     Priority: Medium     Per provider       Hyperlipidemia LDL goal <100 10/31/2010     Priority: Medium     Per provider       Vaginal atrophy 10/22/2010     Priority: Medium     Mixed incontinence urge and stress (male)(female) 06/24/2007     Priority: Medium     Cystocele, lateral 06/24/2007     Priority: Medium     Esophageal reflux      Priority: Medium     Disorder of bone and cartilage 04/21/2005     Priority: Medium     Problem list name updated by automated process. Provider to review       Diverticulitis of colon 07/08/2004     Priority: Medium     Abd CT  Problem list name updated by automated process. Provider to review       CYSTIC LIVER DIS 06/25/2004     Priority: Medium     multiple liver lesions.  felt to be focal nodular hyperplasia.  annual CT abd.    followed by Dr. Amilcar Kat         Allergic rhinitis 06/25/2004     Priority: Medium     Allergic rhinitis  Problem list name updated by automated process. 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  felt to be focal nodular hyperplasia.  annual CT abd.   followed by Dr. Amilcar Kat      Cystoceleash       Diabetes mellitus (H)      Diverticulitis of colon (without mention of hemorrhage)(562.11) 6/04    Abd CT     Embolism and thrombosis of unspecified site age 22    post ovarian cyst removed     Esophageal reflux     Hiatal hernia     Fatty liver      Hiatal hernia     small     Hyperlipidemia      Nontoxic uninodular goiter      Osteopenia 4/21/2005    on fosamax  for > 5 yr.  stop 7/2012     Other chronic otitis externa      Other specified disorders of liver     Fatty Liver, Benign appearing liver cysts     Pure hypercholesterolemia      Past Surgical History:   Procedure Laterality Date     C APPENDECTOMY  age 22     C DEXA, BONE DENSITY, AXIAL SKEL  2005     C DEXA, BONE DENSITY, AXIAL SKEL  6/2007    No signif change in Osteopenia     COLONOSCOPY  4/2006    repeat 10 yr     GYN SURGERY  10/22/08    pelvic support     HEMORRHOIDECTOMY  8/08     MOHS MICROGRAPHIC PROCEDURE       SURGICAL HISTORY OF -   age 22    L ovarian cyst removal, laparotomy     SURGICAL HISTORY OF -   1998    partial thyroidectomy     SURGICAL HISTORY OF -   10/08    Transobturator tape for Urinary Incontinence     SURGICAL HISTORY OF -   11/2011    stress test normal     THYROID SURGERY      Had cyst removed, thyroid gland is intact.     Current Outpatient Prescriptions   Medication Sig Dispense Refill     potassium chloride SA (KLOR-CON) 20 MEQ CR tablet Take 1 tablet (20 mEq) by mouth daily 180 tablet 3     atorvastatin (LIPITOR) 40 MG tablet Take 1 tablet (40 mg) by mouth daily 90 tablet 3     furosemide (LASIX) 20 MG tablet TAKE 2 TABLETS IN THE MORNING AND 1 TABLET IN THE AFTERNOON 270 tablet 0     lisinopril (PRINIVIL/ZESTRIL) 2.5 MG tablet TAKE 1 TABLET DAILY 90 tablet 3     metoprolol (LOPRESSOR) 25 MG tablet TAKE 1 TABLET TWICE A  tablet 2     blood glucose monitoring (ONE TOUCH ULTRA) test strip 1 strip by In Vitro route 4 times daily. 360 each 1     ONETOUCH DELICA LANCETS 33G MISC 1 each 4 times daily 360 each 1      "glimepiride (AMARYL) 4 MG tablet TAKE 1 TABLET TWICE A DAY WITH MEALS (BREAKFAST AND DINNER) 180 tablet 3     insulin syringe-needle U-100 (BD INSULIN SYRINGE ULTRAFINE) 31G X 5/16\" 1 ML Use 1 syringes daily or as directed. Prescription 4mm Rey 32g (5/32\" x 0.23mm) please. 90 each 3     warfarin (COUMADIN) 4 MG tablet Take 1 tablet (4 mg) by mouth daily 30 tablet 0     LANTUS SOLOSTAR 100 UNIT/ML soln Inject between 45 units and 60 units at bedtime.  Needs four boxes for three month supply. 60 mL 3     BD REY U/F 32G X 4 MM insulin pen needle USE ONCE DAILY AS DIRECTED WITH LANTUS SOLOSTAR 90 each 3     triamcinolone (KENALOG) 0.1 % cream Apply sparingly to affected area two times daily as needed 80 g 3     aspirin 81 MG tablet Take 2 tablets (162 mg) by mouth daily 60 tablet      Blood Glucose Monitoring Suppl (ONE TOUCH ULTRA 2) W/DEVICE KIT Use to test blood sugars 2 times daily as directed. 1 kit 0     Cholecalciferol (VITAMIN D PO) Take  by mouth. Pt consuming 3000 units per day        alum & mag hydroxide-simethicone (MAALOX REGULAR STRENGTH) 200-200-20 MG/5ML SUSP Take 30 mLs by mouth as needed        ACE/ARB NOT PRESCRIBED, INTENTIONAL, by Other route continuous prn.       Docusate Sodium (COLACE PO) Take  by mouth as needed.       CALCIUM 500 + D 500-200 MG-IU OR TABS one tab twice per day 100 0     OTC products: Aspirin    Allergies   Allergen Reactions     Amlodipine Swelling     Cephalosporins Rash     Levaquin Rash     Itching, rash     Metformin Diarrhea and GI Disturbance     Nickel Rash     Contact reaction      Latex Allergy: NO    Social History   Substance Use Topics     Smoking status: Former Smoker     Quit date: 1/1/1980     Smokeless tobacco: Never Used      Comment: smoked from 70-80; smoked about 1ppd     Alcohol use 0.0 oz/week      Comment: 6 drinks per week     History   Drug Use No       REVIEW OF SYSTEMS:   CONSTITUTIONAL: NEGATIVE for fever, chills, change in " weight  INTEGUMENTARY/SKIN: NEGATIVE for worrisome rashes, moles or lesions  EYES: NEGATIVE for vision changes or irritation  ENT/MOUTH: NEGATIVE for ear, mouth and throat problems  RESP: NEGATIVE for significant cough or SOB  BREAST: NEGATIVE for masses, tenderness or discharge  CV: NEGATIVE for chest pain, palpitations or peripheral edema  GI: NEGATIVE for nausea, abdominal pain, heartburn, or change in bowel habits  : NEGATIVE for frequency, dysuria, or hematuria  MUSCULOSKELETAL: NEGATIVE for significant arthralgias or myalgia  NEURO: NEGATIVE for weakness, dizziness or paresthesias  ENDOCRINE: NEGATIVE for temperature intolerance, skin/hair changes  HEME: NEGATIVE for bleeding problems  PSYCHIATRIC: NEGATIVE for changes in mood or affect    EXAM:   There were no vitals taken for this visit.  GENERAL APPEARANCE: healthy, alert and no distress  HENT: ear canals and TM's normal and nose and mouth without ulcers or lesions  RESP: lungs clear to auscultation - no rales, rhonchi or wheezes  CV: regular rate and rhythm, normal S1 S2, no S3 or S4 and no murmur, click or rub   ABDOMEN: soft, nontender, no HSM or masses and bowel sounds normal  NEURO: Normal strength and tone, sensory exam grossly normal, mentation intact and speech normal    DIAGNOSTICS:   No labs or EKG required for low risk surgery (cataract, skin procedure, breast biopsy, etc)    Recent Labs   Lab Test  01/03/18   1418  11/09/17   1403  07/26/17   1434   11/17/16   1356   10/19/15   1543   10/29/14   1600   07/09/13   1510   04/05/11   1516   HGB   --    --    --    --    --    --   14.5   --   13.8   < >  13.4   --   13.6   PLT   --    --    --    --    --    --    --    --    --    --   400   --   356   NA   --   140   --    --   138   --   138   < >  139   < >   --    < >   --    POTASSIUM   --   3.3*   --    --   3.7   --   4.0   < >  3.8   < >   --    < >   --    CR   --   0.81   --    --   0.87   --   0.89   < >  0.98   < >   --    < >   --     A1C  8.6*   --   7.0*   < >  8.0*   < >   --    < >   --    < >   --    < >   --     < > = values in this interval not displayed.        IMPRESSION:   Reason for surgery/procedure: cataract  Diagnosis/reason for consult: pre op clearance    The proposed surgical procedure is considered LOW risk.    REVISED CARDIAC RISK INDEX  The patient has the following serious cardiovascular risks for perioperative complications such as (MI, PE, VFib and 3  AV Block):  No serious cardiac risks  INTERPRETATION: 0 risks: Class I (very low risk - 0.4% complication rate)    The patient has the following additional risks for perioperative complications:  No identified additional risks  diabetes      ICD-10-CM    1. Preop general physical exam Z01.818        RECOMMENDATIONS:     ASSESSMENT AND PLAN  1. Preop general physical exam  Cleared for cataract surgery.          No need to stop coumadin or aspirin unless directed otherwise by your eye surgeon. Please call and confirm this with them.     Skip glimepiride the evening before and decrease lantus to 30 units from 50) the evening prior to the procedure since you will not be eating breakfast.     Go ahead and take all your other pills on the morning of the procedure.     Decrease lantus to     2. Cataract, unspecified cataract type, unspecified laterality  Reason for surgery.     3. Type 2 diabetes mellitus with stage 2 chronic kidney disease, with long-term current use of insulin (H)  a1c at goal less than 8, improved.      On appropriate statin, ace inhibitor.       - Hemoglobin A1c    4. Hypokalemia  Re-check today.   - Potassium    5.  GERD: noted, please provide aspiration monitoring.       Signed Electronically by: Joel Daniel Wegener, MD    Copy of this evaluation report is provided to requesting physician.    [facility] Preop Guidelines    Revised Cardiac Risk Index

## 2018-04-12 LAB — POTASSIUM SERPL-SCNC: 3.7 MMOL/L (ref 3.4–5.3)

## 2018-04-17 ENCOUNTER — TRANSFERRED RECORDS (OUTPATIENT)
Dept: HEALTH INFORMATION MANAGEMENT | Facility: CLINIC | Age: 73
End: 2018-04-17

## 2018-04-17 ENCOUNTER — TELEPHONE (OUTPATIENT)
Dept: FAMILY MEDICINE | Facility: CLINIC | Age: 73
End: 2018-04-17

## 2018-04-17 DIAGNOSIS — E87.6 HYPOKALEMIA: ICD-10-CM

## 2018-04-17 NOTE — TELEPHONE ENCOUNTER
Reason for Call:  Medication or medication refill:    Do you use a [facility]?  Name of the pharmacy and phone number for the current request:  [facility]    Name of the medication requested: potassium chloride SA (KLOR-CON) 20 MEQ CR tablet    Other request: Mail Order is cheaper for pt.    Can we leave a detailed message on this number? YES    Phone number patient can be reached at: Home number on file [phone] (home)    Best Time: Any Time    Call taken on 4/17/2018 at 5:06 PM by Keila Aquino

## 2018-04-18 ENCOUNTER — TELEPHONE (OUTPATIENT)
Dept: SURGERY | Facility: CLINIC | Age: 73
End: 2018-04-18

## 2018-04-18 ENCOUNTER — OFFICE VISIT (OUTPATIENT)
Dept: OPHTHALMOLOGY | Facility: CLINIC | Age: 73
End: 2018-04-18
Attending: OPHTHALMOLOGY
Payer: COMMERCIAL

## 2018-04-18 DIAGNOSIS — Z96.1 PSEUDOPHAKIA, RIGHT EYE: Primary | ICD-10-CM

## 2018-04-18 PROCEDURE — G0463 HOSPITAL OUTPT CLINIC VISIT: HCPCS | Mod: ZF

## 2018-04-18 RX ORDER — POTASSIUM CHLORIDE 1500 MG/1
20 TABLET, EXTENDED RELEASE ORAL DAILY
Qty: 180 TABLET | Refills: 2 | Status: SHIPPED | OUTPATIENT
Start: 2018-04-18 | End: 2019-01-28

## 2018-04-18 ASSESSMENT — VISUAL ACUITY
METHOD: SNELLEN - LINEAR
OD_PH_SC: 20/25
OD_SC: 20/50
OS_SC: 20/60

## 2018-04-18 ASSESSMENT — EXTERNAL EXAM - RIGHT EYE: OD_EXAM: NORMAL

## 2018-04-18 ASSESSMENT — TONOMETRY
OD_IOP_MMHG: 20
OS_IOP_MMHG: 20
IOP_METHOD: TONOPEN

## 2018-04-18 ASSESSMENT — SLIT LAMP EXAM - LIDS: COMMENTS: NORMAL

## 2018-04-18 NOTE — PROGRESS NOTES
POD#1, status post cataract surgery, right eye    No complaints.  Denies eye pain.    Impression/Plan:  Pseudophakia, OD: POD0, good post-operative appearance. IOP reasonable.    - Tobramycin antibiotic 4x daily in the surgical eye for 1 week  - Prednisolone 4x daily in the surgical eye for 1 week, then weekly taper      Eye protection at all times and eye shield at night for 1 week.    Limited activities with no exercise or heavy lifting for 1 week.    Instructed patient to contact us for decreasing vision, eye pain, new floaters or flashes of light or other concerning symptoms.    Written instructions given    Return to clinic 3-4 weeks with refraction and dilation.    Teaching statement:  Complete documentation of historical and exam elements from today's encounter can be found in the full encounter summary report (not reduplicated in this progress note). I personally obtained the chief complaint(s) and history of present illness.  I confirmed and edited as necessary the review of systems, past medical/surgical history, family history, social history, and examination findings as documented by others; and I examined the patient myself. I personally reviewed the relevant tests, images, and reports as documented above.     I formulated and edited as necessary the assessment and plan and discussed the findings and management plan with the patient and family.    Janay Amezcua MD  Comprehensive Ophthalmology & Ocular Pathology  Department of Ophthalmology and Visual Neurosciences  [email or web address]  Pager [phone]

## 2018-04-18 NOTE — MR AVS SNAPSHOT
After Visit Summary   4/18/2018    Sarah Felix    MRN: [id number]           Patient Information     Date Of Birth          1945        Visit Information        Provider Department      4/18/2018 2:45 PM Janay Amezcua MD [facility]        Today's Diagnoses     Pseudophakia, right eye    -  1       Follow-ups after your visit        Follow-up notes from your care team     Return in about 3 weeks (around 5/9/2018) for refraction and dilation.      Your next 10 appointments already scheduled     Apr 20, 2018 12:30 PM CDT   (Arrive by 12:15 PM)   New Patient Visit with Maryjo Donohue MD   [facility] ([facility])    [address]  [address] [facility] [address] [phone]            May 14, 2018  2:45 PM CDT   Post-Op with Janay Amezcua MD   [facility] ([facility])    [address] [id number] [phone]            Oct 17, 2018  3:00 PM CDT   SHORT with Joel Daniel Irwin Wegener, MD   [facility] ([facility]    [address] [place] [address] [phone]              Who to contact     Please call your clinic at [phone] to:    Ask questions about your health    Make or cancel appointments    Discuss your medicines    Learn about your test results    Speak to your doctor            Additional Information About Your Visit        [facility] Information     [facility] gives you secure access to your electronic health record. If you see a primary care provider, you can also send messages to your care team and make appointments. If you have questions, please call your primary care clinic.  If you do not have a primary care provider, please call [phone] and they will assist you.      [facility] is an electronic gateway that provides easy, online access to your medical records. With [facility], you can  request a clinic appointment, read your test results, renew a prescription or communicate with your care team.     To access your existing account, please contact your [facility] or call [phone] for assistance.        Care EveryWhere ID     This is your Care EveryWhere ID. This could be used by other organizations to access your [place] medical records  [id number]         Blood Pressure from Last 3 Encounters:   04/11/18 116/68   04/09/18 117/70   03/13/18 110/68    Weight from Last 3 Encounters:   04/11/18 88.5 kg (195 lb)   04/09/18 88.5 kg (195 lb)   03/13/18 88.7 kg (195 lb 8 oz)              Today, you had the following     No orders found for display       Primary Care Provider Office Phone # Fax #    Joel Daniel Irwin Wegener, [phone]       [address]  [facility] [address]        Equal Access to Services     KILLIAN CASON : Hadii aad ku hadasho Soomaali, waaxda luqadaha, qaybta kaalmada adeegyada, waxay idiin hayaan iban kharahugo santos . So [facility] [phone].    ATENCIÓN: Si habla español, tiene a meza disposición servicios gratuitos de asistencia lingüística. Llame al [phone].    We comply with applicable federal civil rights laws and [place] laws. We do not discriminate on the basis of race, color, national origin, age, disability, sex, sexual orientation, or gender identity.            Thank you!     Thank you for choosing [facility]  for your care. Our goal is always to provide you with excellent care. Hearing back from our patients is one way we can continue to improve our services. Please take a few minutes to complete the written survey that you may receive in the mail after your visit with us. Thank you!             Your Updated Medication List - Protect others around you: Learn how to safely use, store and throw away your medicines at www.disposemymeds.org.          This list is accurate as of 4/18/18  3:37 PM.  Always use your most  "recent med list.                   Brand Name Dispense Instructions for use Diagnosis    ACE/ARB/ARNI NOT PRESCRIBED (INTENTIONAL)      by Other route continuous prn.        aspirin 81 MG tablet     60 tablet    Take 2 tablets (162 mg) by mouth daily    Health care maintenance       atorvastatin 40 MG tablet    LIPITOR    90 tablet    Take 1 tablet (40 mg) by mouth daily    Hyperlipidemia LDL goal <100       BD REY U/F 32G X 4 MM   Generic drug:  insulin pen needle     90 each    USE ONCE DAILY AS DIRECTED WITH LANTUS SOLOSTAR    Type 2 diabetes mellitus with stage 2 chronic kidney disease, with long-term current use of insulin (H)       blood glucose monitoring meter device kit     1 kit    Use to test blood sugars 2 times daily as directed.    Type 2 diabetes, HbA1c goal < 7% (H)       blood glucose monitoring test strip    ONETOUCH ULTRA    360 each    1 strip by In Vitro route 4 times daily.    Type 2 diabetes, HbA1c goal < 7% (H)       CALCIUM 500 + D 500-200 MG-IU Tabs     100    one tab twice per day    Osteopenia       COLACE PO      Take  by mouth as needed.    Vaginal discharge       furosemide 20 MG tablet    LASIX    270 tablet    TAKE 2 TABLETS IN THE MORNING AND 1 TABLET IN THE AFTERNOON    Dependent edema, CKD (chronic kidney disease) stage 2, GFR 60-89 ml/min       glimepiride 4 MG tablet    AMARYL    180 tablet    TAKE 1 TABLET TWICE A DAY WITH MEALS (BREAKFAST AND DINNER)    Type 2 diabetes mellitus with stage 2 chronic kidney disease, without long-term current use of insulin (H)       insulin syringe-needle U-100 31G X 5/16\" 1 ML    BD insulin syringe ULTRAFINE    90 each    Use 1 syringes daily or as directed. Prescription 4mm Rey 32g (5/32\" x 0.23mm) please.    Type 2 diabetes mellitus with stage 2 chronic kidney disease, without long-term current use of insulin (H)       LANTUS SOLOSTAR 100 UNIT/ML injection   Generic drug:  insulin glargine     60 mL    Inject between 45 units and 60 units " at bedtime.  Needs four boxes for three month supply.    Type 2 diabetes mellitus with stage 2 chronic kidney disease, with long-term current use of insulin (H)       lisinopril 2.5 MG tablet    PRINIVIL/Zestril    90 tablet    TAKE 1 TABLET DAILY    CKD (chronic kidney disease) stage 2, GFR 60-89 ml/min       MAALOX REGULAR STRENGTH 200-200-20 MG/5ML Susp suspension   Generic drug:  alum & mag hydroxide-simethicone      Take 30 mLs by mouth as needed        metoprolol tartrate 25 MG tablet    LOPRESSOR    180 tablet    TAKE 1 TABLET TWICE A DAY    Essential hypertension       ONETOUCH DELICA LANCETS 33G Misc     360 each    1 each 4 times daily    Type 2 diabetes, HbA1c goal < 7% (H)       potassium chloride SA 20 MEQ CR tablet    KLOR-CON    180 tablet    Take 1 tablet (20 mEq) by mouth daily    Hypokalemia       triamcinolone 0.1 % cream    KENALOG    80 g    Apply sparingly to affected area two times daily as needed    Contact dermatitis and other eczema, due to unspecified cause       VITAMIN D PO      Take  by mouth. Pt consuming 3000 units per day        warfarin 4 MG tablet    COUMADIN    30 tablet    Take 1 tablet (4 mg) by mouth daily    Acute pain of left knee, Calf swelling

## 2018-04-18 NOTE — NURSING NOTE
Chief Complaints and History of Present Illnesses   Patient presents with     Post Op (Ophthalmology) Right Eye     1 day post op CE/IOL RE     HPI    Affected eye(s):  Right   Symptoms:           Do you have eye pain now?:  No      Comments:  1 day post op CE/IOL RE  Had good night slept well  Prednisolone qid RE  Tobramycin qid RE  Esther Juarez COA 3:17 PM April 18, 2018

## 2018-04-18 NOTE — TELEPHONE ENCOUNTER
Pre Visit Call and Assessment    Date of call:  04/18/2018    Phone numbers:  Home number on file [phone] (home)    Reached patient/confirmed appointment:  Yes  Patient care team/Primary provider:  Wegener, Joel Daniel Irwin  Referred to:  Dr. Maryjo Donohue    Reason for visit:  Axillary mass consult

## 2018-04-20 ENCOUNTER — HOSPITAL ENCOUNTER (OUTPATIENT)
Facility: AMBULATORY SURGERY CENTER | Age: 73
End: 2018-04-20
Attending: SURGERY
Payer: COMMERCIAL

## 2018-04-20 ENCOUNTER — OFFICE VISIT (OUTPATIENT)
Dept: SURGERY | Facility: CLINIC | Age: 73
End: 2018-04-20
Payer: COMMERCIAL

## 2018-04-20 ENCOUNTER — DOCUMENTATION ONLY (OUTPATIENT)
Dept: SURGERY | Facility: CLINIC | Age: 73
End: 2018-04-20

## 2018-04-20 ENCOUNTER — CARE COORDINATION (OUTPATIENT)
Dept: SURGERY | Facility: CLINIC | Age: 73
End: 2018-04-20

## 2018-04-20 VITALS
SYSTOLIC BLOOD PRESSURE: 130 MMHG | WEIGHT: 195.5 LBS | OXYGEN SATURATION: 97 % | BODY MASS INDEX: 32.57 KG/M2 | DIASTOLIC BLOOD PRESSURE: 66 MMHG | TEMPERATURE: 98 F | HEART RATE: 68 BPM | HEIGHT: 65 IN

## 2018-04-20 DIAGNOSIS — L72.3 SEBACEOUS CYST: Primary | ICD-10-CM

## 2018-04-20 ASSESSMENT — ENCOUNTER SYMPTOMS
POOR WOUND HEALING: 0
EYE IRRITATION: 1
NAIL CHANGES: 0
SKIN CHANGES: 1
DOUBLE VISION: 0
EYE REDNESS: 0
EYE WATERING: 0
EYE PAIN: 1

## 2018-04-20 ASSESSMENT — PAIN SCALES - GENERAL: PAINLEVEL: NO PAIN (0)

## 2018-04-20 NOTE — PROGRESS NOTES
Pre and Post op Patient Education/Teaching Flowsheet  Relevant Diagnosis:  mass  Teaching Topic:  Pre and post op teaching  Person(s) Involved in teaching:  Patient     Motivation Level:  Asks Questions:  Yes  Eager to Learn:  Yes  Cooperative:  Yes  Receptive (willing/able to accept information):  Yes  Any cultural factors/Amish beliefs that may influence understanding or compliance?  No    Patient/caregiver/family demonstrates understanding of the following:  Reason for the appointment, diagnosis, and treatment plan:  Yes  Patient demonstrates understanding of the following:  Pre-op bowel prep:  No  Post-op pain management recommendations (medications, ice compress, binder/athletic supporter (if applicable), etc.:  Yes  Inguinal hernia patients:  Post-op urinary retention- discussed signs/symptoms and visit to ER for Silver catheter placement and to stay in place for at least 48 hours:  NA  Restrictions:  Yes  Medications to take the day of surgery:  Per PCP  Blood thinner medications discussed and when to stop (if applicable):  Yes  Wound care:  NA  Diabetes medication management (if applicable):  Per PCP  Which situations necessitate calling provider and whom to contact:  Discussed how to contact the hospital, nurse, and clinic scheduling staff if necessary      Date and time of surgery:  Yes  Location of surgery:  [facility]- 5th Floor  History and Physical and any other testing necessary prior to surgery:  Yes  Required time line for completion of History and Physical and any pre-op testing:  Yes  Discuss need for someone to drive patient home and stay with them for 24 hours:  Yes  Pre-op showering/scrub information with Surgical Scrub:  Yes  NPO Guidelines:  NPO per Anesthesia Guidelines    Infection Prevention: Patient demonstrates understanding of the following:  Patient instructed on hand hygiene:  Yes  Surgical procedure site care will be taught and will be reviewed  at the time of discharge  Signs and symptoms of infection taught:  Yes  Wound care reviewed and will be taught at the time of discharge  Central venous catheter care will be taught at the time of discharge (if applicable)    Post-op follow-up:  Instructional materials used/given/mailed:  [facility] Booklet, post op teaching sheet, Map, Soap, and arrival/location information    Surgical instructions mailed to patient

## 2018-04-20 NOTE — MR AVS SNAPSHOT
After Visit Summary   4/20/2018    Sarah Felix    MRN: [id number]           Patient Information     Date Of Birth          1945        Visit Information        Provider Department      4/20/2018 12:30 PM Maryjo Donohue MD [facility]        Today's Diagnoses     Sebaceous cyst    -  1       Follow-ups after your visit        Your next 10 appointments already scheduled     May 14, 2018  2:45 PM CDT   Post-Op with Janay Amezcua MD   [facility] ([facility])    [address]  [facility] [address] [place] [id number] [phone]            Oct 17, 2018  3:00 PM CDT   SHORT with Joel Daniel Irwin Wegener, MD   [facility]    [address] [place] [id number] [phone]              Who to contact     Please call your clinic at [phone] to:    Ask questions about your health    Make or cancel appointments    Discuss your medicines    Learn about your test results    Speak to your doctor            Additional Information About Your Visit        MyChart Information     [employer] gives you secure access to your electronic health record. If you see a primary care provider, you can also send messages to your care team and make appointments. If you have questions, please call your primary care clinic.  If you do not have a primary care provider, please call [phone] and they will assist you.      E[employer] is an electronic gateway that provides easy, online access to your medical records. With [employer], you can request a clinic appointment, read your test results, renew a prescription or communicate with your care team.     To access your existing account, please contact your [facility] or call [phone] for assistance.        Care EveryWhere ID     This is your Care EveryWhere ID. This could be used by other organizations to  "access your [place] medical records  [id number]        Your Vitals Were     Pulse Temperature Height Pulse Oximetry BMI (Body Mass Index)       68 98  F (36.7  C) (Oral) 1.651 m (5' 5\") 97% 32.53 kg/m2        Blood Pressure from Last 3 Encounters:   04/20/18 130/66   04/11/18 116/68   04/09/18 117/70    Weight from Last 3 Encounters:   04/20/18 88.7 kg (195 lb 8 oz)   04/11/18 88.5 kg (195 lb)   04/09/18 88.5 kg (195 lb)              Today, you had the following     No orders found for display       Primary Care Provider Office Phone # Fax #    Joel Daniel Irwin Wegener, [phone] [address]  [facility] [address]        Equal Access to Services     [facility]: Hadii aad conner hadasho Somisael, waaxda luqadaha, qaybta kaalmada adeegyada, jamila peña hayzainab santos . So [facility] [phone].    ATENCIÓN: Si habla español, tiene a meza disposición servicios gratuitos de asistencia lingüística. Llame al [phone].    We comply with applicable federal civil rights laws and [place] laws. We do not discriminate on the basis of race, color, national origin, age, disability, sex, sexual orientation, or gender identity.            Thank you!     Thank you for choosing [facility]  for your care. Our goal is always to provide you with excellent care. Hearing back from our patients is one way we can continue to improve our services. Please take a few minutes to complete the written survey that you may receive in the mail after your visit with us. Thank you!             Your Updated Medication List - Protect others around you: Learn how to safely use, store and throw away your medicines at www.disposemymeds.org.          This list is accurate as of 4/20/18  2:23 PM.  Always use your most recent med list.                   Brand Name Dispense Instructions for use Diagnosis    ACE/ARB/ARNI NOT PRESCRIBED (INTENTIONAL)      by Other route continuous prn.        aspirin 81 MG " "tablet     60 tablet    Take 2 tablets (162 mg) by mouth daily    Health care maintenance       atorvastatin 40 MG tablet    LIPITOR    90 tablet    Take 1 tablet (40 mg) by mouth daily    Hyperlipidemia LDL goal <100       BD REY U/F 32G X 4 MM   Generic drug:  insulin pen needle     90 each    USE ONCE DAILY AS DIRECTED WITH LANTUS SOLOSTAR    Type 2 diabetes mellitus with stage 2 chronic kidney disease, with long-term current use of insulin (H)       blood glucose monitoring meter device kit     1 kit    Use to test blood sugars 2 times daily as directed.    Type 2 diabetes, HbA1c goal < 7% (H)       blood glucose monitoring test strip    ONETOUCH ULTRA    360 each    1 strip by In Vitro route 4 times daily.    Type 2 diabetes, HbA1c goal < 7% (H)       CALCIUM 500 + D 500-200 MG-IU Tabs     100    one tab twice per day    Osteopenia       COLACE PO      Take  by mouth as needed.    Vaginal discharge       furosemide 20 MG tablet    LASIX    270 tablet    TAKE 2 TABLETS IN THE MORNING AND 1 TABLET IN THE AFTERNOON    Dependent edema, CKD (chronic kidney disease) stage 2, GFR 60-89 ml/min       glimepiride 4 MG tablet    AMARYL    180 tablet    TAKE 1 TABLET TWICE A DAY WITH MEALS (BREAKFAST AND DINNER)    Type 2 diabetes mellitus with stage 2 chronic kidney disease, without long-term current use of insulin (H)       insulin syringe-needle U-100 31G X 5/16\" 1 ML    BD insulin syringe ULTRAFINE    90 each    Use 1 syringes daily or as directed. Prescription 4mm Rey 32g (5/32\" x 0.23mm) please.    Type 2 diabetes mellitus with stage 2 chronic kidney disease, without long-term current use of insulin (H)       LANTUS SOLOSTAR 100 UNIT/ML injection   Generic drug:  insulin glargine     60 mL    Inject between 45 units and 60 units at bedtime.  Needs four boxes for three month supply.    Type 2 diabetes mellitus with stage 2 chronic kidney disease, with long-term current use of insulin (H)       lisinopril 2.5 MG " tablet    PRINIVIL/Zestril    90 tablet    TAKE 1 TABLET DAILY    CKD (chronic kidney disease) stage 2, GFR 60-89 ml/min       MAALOX REGULAR STRENGTH 200-200-20 MG/5ML Susp suspension   Generic drug:  alum & mag hydroxide-simethicone      Take 30 mLs by mouth as needed        metoprolol tartrate 25 MG tablet    LOPRESSOR    180 tablet    TAKE 1 TABLET TWICE A DAY    Essential hypertension       ONETOUCH DELICA LANCETS 33G Misc     360 each    1 each 4 times daily    Type 2 diabetes, HbA1c goal < 7% (H)       potassium chloride SA 20 MEQ CR tablet    KLOR-CON    180 tablet    Take 1 tablet (20 mEq) by mouth daily    Hypokalemia       triamcinolone 0.1 % cream    KENALOG    80 g    Apply sparingly to affected area two times daily as needed    Contact dermatitis and other eczema, due to unspecified cause       VITAMIN D PO      Take  by mouth. Pt consuming 3000 units per day

## 2018-04-20 NOTE — PROGRESS NOTES
I spoke with the patient in clinic about surgery dates and she selected 05/24/2018. I confirmed time, date, arrival time and location for surgery on 05/24/2018 at 12:00 pm with Dr. Maryjo Donohue. I also reviewed with her that Dr. Donohue would update her H&P the day of her procedure. She also knows to call us if she experiences any cold or flu symptoms. Surgery packet,teaching and soap were given to patient in clinic on 04/20/2018.

## 2018-04-20 NOTE — LETTER
4/20/2018       RE: Sarah Felix  [address] [place] [address]     Dear Colleague,    Thank you for referring your patient, Sarah Felix, to the [facility] GENERAL SURGERY at [facility]. Please see a copy of my visit note below.    General Surgery Clinic Note - New Patient Visit    NAME: Sarah Felix,  72 year old female    PCP: Wegener, Joel Daniel Irwin  MRN:   [id number]      Ph#: [phone]  Date: Apr 20, 2018    Chief Complaint: lump under left armpit    History of Present Illness: Sarah Felix is a 72 year old female who presents to the clinic with a lump under her left armpit that she first noticed about two weeks ago.  She thinks it has gotten a bit smaller over that time. It hasn't broken open and drained.  It hasn't gotten red or hot.  It was a bit itchy at first, that was how balwinder noticed it initially.     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  felt to be focal nodular hyperplasia.  annual CT abd.   followed by Dr. Amilcar Kat      Cystocele, lateral      Diabetes mellitus (H)      Diverticulitis of colon (without mention of hemorrhage)(562.11) 6/04    Abd CT     Embolism and thrombosis of unspecified site age 22    post ovarian cyst removed     Esophageal reflux     Hiatal hernia     Fatty liver      Hiatal hernia     small     Hyperlipidemia      Nontoxic uninodular goiter      Osteopenia 4/21/2005    on fosamax  for > 5 yr.  stop 7/2012     Other chronic otitis externa      Other specified disorders of liver     Fatty Liver, Benign appearing liver cysts     Pure  hypercholesterolemia        Past Surgical History: History in Epic reviewed with the patient:  Past Surgical History:   Procedure Laterality Date     C APPENDECTOMY  age 22     C DEXA, BONE DENSITY, AXIAL SKEL  2005     C DEXA, BONE DENSITY, AXIAL SKEL  6/2007    No signif change in Osteopenia     COLONOSCOPY  4/2006    repeat 10 yr     GYN SURGERY  10/22/08    pelvic support     HEMORRHOIDECTOMY  8/08     MOHS MICROGRAPHIC PROCEDURE       SURGICAL HISTORY OF -   age 22    L ovarian cyst removal, laparotomy     SURGICAL HISTORY OF -   1998    partial thyroidectomy     SURGICAL HISTORY OF -   10/08    Transobturator tape for Urinary Incontinence     SURGICAL HISTORY OF -   11/2011    stress test normal     THYROID SURGERY      Had cyst removed, thyroid gland is intact.       Family History: History in Epic reviewed with the patient:  Family History   Problem Relation Age of Onset     DIABETES Mother      at 89 yrs     Hypertension Mother      Arthritis Mother      rheumatoid     CANCER Father      bladder     Cardiovascular Brother      palpitations not on meds.     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 No     Social History Narrative    Dairy/d 2 servings/d. Not much milk mostly cheese    Caffeine 5  servings/d    Exercise active in yard work     Sunscreen used - Yes when in sun    Seatbelts used - Yes    Working smoke/CO detectors in the home - Yes    Guns stored in the home - No    Self Breast Exams - Yes    Self Testicular Exam - NA    Eye Exam up to date - Yes needs to see opthamologist for diabetes    Dental Exam up to date - Yes    Pap Smear up to date -Yes     Mammogram up to date - Yes 3/10    PSA up to date - NA    Dexa Scan up to date - 06/5/07    Flex Sig / Colonoscopy up to date -  04/06 yes RTC 10 yrs in epic    Immunizations up to date - Yes Td 06/04    Abuse: Current or Past(Physical, Sexual or Emotional)- No    Do you feel safe in your environment - Yes    8/4/10    Liya Ashraf RN                               Allergies:     Allergies   Allergen Reactions     Amlodipine Swelling     Levaquin Rash     Itching, rash     Metformin Diarrhea and GI Disturbance     Cephalosporins Rash     Levofloxacin Itching and Rash     Nickel Rash     Contact reaction  Contact reaction       Outpatient Medications:  Outpatient Encounter Prescriptions as of 4/20/2018   Medication Sig Dispense Refill     ACE/ARB NOT PRESCRIBED, INTENTIONAL, by Other route continuous prn.       alum & mag hydroxide-simethicone (MAALOX REGULAR STRENGTH) 200-200-20 MG/5ML SUSP Take 30 mLs by mouth as needed        aspirin 81 MG tablet Take 2 tablets (162 mg) by mouth daily 60 tablet      atorvastatin (LIPITOR) 40 MG tablet Take 1 tablet (40 mg) by mouth daily 90 tablet 3     BD REY U/F 32G X 4 MM insulin pen needle USE ONCE DAILY AS DIRECTED WITH LANTUS SOLOSTAR 90 each 3     blood glucose monitoring (ONE TOUCH ULTRA) test strip 1 strip by In Vitro route 4 times daily. 360 each 1     Blood Glucose Monitoring Suppl (ONE TOUCH ULTRA 2) W/DEVICE KIT Use to test blood sugars 2 times daily as directed. 1 kit 0     CALCIUM 500 + D 500-200 MG-IU OR TABS one tab twice per day 100 0     Cholecalciferol (VITAMIN D PO) Take  by mouth. Pt  "consuming 3000 units per day        Docusate Sodium (COLACE PO) Take  by mouth as needed.       furosemide (LASIX) 20 MG tablet TAKE 2 TABLETS IN THE MORNING AND 1 TABLET IN THE AFTERNOON 270 tablet 0     glimepiride (AMARYL) 4 MG tablet TAKE 1 TABLET TWICE A DAY WITH MEALS (BREAKFAST AND DINNER) 180 tablet 3     insulin syringe-needle U-100 (BD INSULIN SYRINGE ULTRAFINE) 31G X 5/16\" 1 ML Use 1 syringes daily or as directed. Prescription 4mm Trish 32g (5/32\" x 0.23mm) please. 90 each 3     LANTUS SOLOSTAR 100 UNIT/ML soln Inject between 45 units and 60 units at bedtime.  Needs four boxes for three month supply. 60 mL 3     lisinopril (PRINIVIL/ZESTRIL) 2.5 MG tablet TAKE 1 TABLET DAILY 90 tablet 3     metoprolol (LOPRESSOR) 25 MG tablet TAKE 1 TABLET TWICE A  tablet 2     ONETOUCH DELICA LANCETS 33G MISC 1 each 4 times daily 360 each 1     potassium chloride SA (KLOR-CON) 20 MEQ CR tablet Take 1 tablet (20 mEq) by mouth daily 180 tablet 2     triamcinolone (KENALOG) 0.1 % cream Apply sparingly to affected area two times daily as needed 80 g 3     [DISCONTINUED] warfarin (COUMADIN) 4 MG tablet Take 1 tablet (4 mg) by mouth daily 30 tablet 0     No facility-administered encounter medications on file as of 4/20/2018.        EXAM:  /66 (BP Location: Left arm, Patient Position: Chair, Cuff Size: Adult Large)  Pulse 68  Temp 98  F (36.7  C) (Oral)  Ht 5' 5\"  Wt 195 lb 8 oz  SpO2 97%  BMI 32.53 kg/m2  Psych: Normal affect  Neuro: No gross focal deficits noted  Head:Normocephalic, atraumatic  Eyes: Non icteric  Neck:supple  Heart: Regular rate and rhythm  Lungs: non-labored, quiet respiration  Abdomen: soft   Extremities: No obvious deformities, pea sized lump with slightly reddened skin in the left axilla. The mass is attached to the skin. No other masses felt.     A/P: Sarah Felix is a 72 year old female with a mass in the left axilla.  I think this is almost certainly benign.  In my experience, " I have seen one such mass not be benign, so I agree it should come out to be safe. We will do this with local in the procedure area.  Risks, benefits and alternatives were discussed. All questions were answered and the patient agrees to proceed.       Again, thank you for allowing me to participate in the care of your patient.      Sincerely,    Maryjo Donohue MD

## 2018-04-20 NOTE — PROGRESS NOTES
General Surgery Clinic Note - New Patient Visit    NAME: Sarah Felix,  72 year old female    PCP: Wegener, Joel Daniel Irwin  MRN:   [id number]      #: [phone]  Date: Apr 20, 2018    Chief Complaint: lump under left armpit    History of Present Illness: Sarah Felix is a 72 year old female who presents to the clinic with a lump under her left armpit that she first noticed about two weeks ago.  She thinks it has gotten a bit smaller over that time. It hasn't broken open and drained.  It hasn't gotten red or hot.  It was a bit itchy at first, that was how balwinder noticed it initially.     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  felt to be focal nodular hyperplasia.  annual CT abd.   followed by Dr. Amilcar Kat      Cystocele, lateral      Diabetes mellitus (H)      Diverticulitis of colon (without mention of hemorrhage)(562.11) 6/04    Abd CT     Embolism and thrombosis of unspecified site age 22    post ovarian cyst removed     Esophageal reflux     Hiatal hernia     Fatty liver      Hiatal hernia     small     Hyperlipidemia      Nontoxic uninodular goiter      Osteopenia 4/21/2005    on fosamax  for > 5 yr.  stop 7/2012     Other chronic otitis externa      Other specified disorders of liver     Fatty Liver, Benign appearing liver cysts     Pure hypercholesterolemia        Past Surgical History: History in Epic reviewed with the patient:  Past Surgical History:   Procedure Laterality Date     C APPENDECTOMY  age 22     C DEXA, BONE DENSITY, AXIAL SKEL  2005     C DEXA, BONE DENSITY, AXIAL SKEL  6/2007    No signif change in Osteopenia      COLONOSCOPY  4/2006    repeat 10 yr     GYN SURGERY  10/22/08    pelvic support     HEMORRHOIDECTOMY  8/08     MOHS MICROGRAPHIC PROCEDURE       SURGICAL HISTORY OF -   age 22    L ovarian cyst removal, laparotomy     SURGICAL HISTORY OF -   1998    partial thyroidectomy     SURGICAL HISTORY OF -   10/08    Transobturator tape for Urinary Incontinence     SURGICAL HISTORY OF -   11/2011    stress test normal     THYROID SURGERY      Had cyst removed, thyroid gland is intact.       Family History: History in Epic reviewed with the patient:  Family History   Problem Relation Age of Onset     DIABETES Mother      at 89 yrs     Hypertension Mother      Arthritis Mother      rheumatoid     CANCER Father      bladder     Cardiovascular Brother      palpitations not on meds.     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 No     Social History Narrative    Dairy/d 2 servings/d. Not much milk mostly cheese    Caffeine 5 servings/d    Exercise active in yard work     Sunscreen used - Yes when in sun    Seatbelts used - Yes    Working smoke/CO detectors in the home - Yes    Guns stored in the home - No    Self Breast Exams - Yes    Self Testicular Exam - NA    Eye Exam up to date - Yes needs to see opthamologist for  diabetes    Dental Exam up to date - Yes    Pap Smear up to date -Yes     Mammogram up to date - Yes 3/10    PSA up to date - NA    Dexa Scan up to date - 06/5/07    Flex Sig / Colonoscopy up to date -  04/06 yes RTC 10 yrs in epic    Immunizations up to date - Yes Td 06/04    Abuse: Current or Past(Physical, Sexual or Emotional)- No    Do you feel safe in your environment - Yes    8/4/10    Liya Ashraf RN                               Allergies:     Allergies   Allergen Reactions     Amlodipine Swelling     Levaquin Rash     Itching, rash     Metformin Diarrhea and GI Disturbance     Cephalosporins Rash     Levofloxacin Itching and Rash     Nickel Rash     Contact reaction  Contact reaction       Outpatient Medications:  Outpatient Encounter Prescriptions as of 4/20/2018   Medication Sig Dispense Refill     ACE/ARB NOT PRESCRIBED, INTENTIONAL, by Other route continuous prn.       alum & mag hydroxide-simethicone (MAALOX REGULAR STRENGTH) 200-200-20 MG/5ML SUSP Take 30 mLs by mouth as needed        aspirin 81 MG tablet Take 2 tablets (162 mg) by mouth daily 60 tablet      atorvastatin (LIPITOR) 40 MG tablet Take 1 tablet (40 mg) by mouth daily 90 tablet 3     BD REY U/F 32G X 4 MM insulin pen needle USE ONCE DAILY AS DIRECTED WITH LANTUS SOLOSTAR 90 each 3     blood glucose monitoring (ONE TOUCH ULTRA) test strip 1 strip by In Vitro route 4 times daily. 360 each 1     Blood Glucose Monitoring Suppl (ONE TOUCH ULTRA 2) W/DEVICE KIT Use to test blood sugars 2 times daily as directed. 1 kit 0     CALCIUM 500 + D 500-200 MG-IU OR TABS one tab twice per day 100 0     Cholecalciferol (VITAMIN D PO) Take  by mouth. Pt consuming 3000 units per day        Docusate Sodium (COLACE PO) Take  by mouth as needed.       furosemide (LASIX) 20 MG tablet TAKE 2 TABLETS IN THE MORNING AND 1 TABLET IN THE AFTERNOON 270 tablet 0     glimepiride (AMARYL) 4 MG tablet TAKE 1 TABLET TWICE A DAY WITH MEALS (BREAKFAST AND DINNER) 180  "tablet 3     insulin syringe-needle U-100 (BD INSULIN SYRINGE ULTRAFINE) 31G X 5/16\" 1 ML Use 1 syringes daily or as directed. Prescription 4mm Trish 32g (5/32\" x 0.23mm) please. 90 each 3     LANTUS SOLOSTAR 100 UNIT/ML soln Inject between 45 units and 60 units at bedtime.  Needs four boxes for three month supply. 60 mL 3     lisinopril (PRINIVIL/ZESTRIL) 2.5 MG tablet TAKE 1 TABLET DAILY 90 tablet 3     metoprolol (LOPRESSOR) 25 MG tablet TAKE 1 TABLET TWICE A  tablet 2     ONETOUCH DELICA LANCETS 33G MISC 1 each 4 times daily 360 each 1     potassium chloride SA (KLOR-CON) 20 MEQ CR tablet Take 1 tablet (20 mEq) by mouth daily 180 tablet 2     triamcinolone (KENALOG) 0.1 % cream Apply sparingly to affected area two times daily as needed 80 g 3     [DISCONTINUED] warfarin (COUMADIN) 4 MG tablet Take 1 tablet (4 mg) by mouth daily 30 tablet 0     No facility-administered encounter medications on file as of 4/20/2018.        ROS: 10 systems reviewed and all are negative except as above.Answers for HPI/ROS submitted by the patient on 4/20/2018   General Symptoms: No  Skin Symptoms: Yes  HENT Symptoms: No  EYE SYMPTOMS: Yes  HEART SYMPTOMS: No  LUNG SYMPTOMS: No  INTESTINAL SYMPTOMS: No  URINARY SYMPTOMS: No  GYNECOLOGIC SYMPTOMS: No  BREAST SYMPTOMS: No  SKELETAL SYMPTOMS: No  BLOOD SYMPTOMS: No  NERVOUS SYSTEM SYMPTOMS: No  MENTAL HEALTH SYMPTOMS: No  Changes in hair: No  Changes in moles/birth marks: Yes  Itching: No  Rashes: No  Changes in nails: No  Acne: No  Hair in places you don't want it: No  Change in facial hair: No  Warts: No  Non-healing sores: No  Scarring: No  Flaking of skin: No  Color changes of hands/feet in cold : No  Sun sensitivity: No  Skin thickening: No  Eye pain: Yes  Vision loss: No  Dry eyes: No  Watery eyes: No  Eye bulging: No  Double vision: No  Flashing of lights: No  Spots: No  Floaters: No  Redness: No  Crossed eyes: No  Tunnel Vision: No  Yellowing of eyes: No  Eye irritation: " "Yes      EXAM:  /66 (BP Location: Left arm, Patient Position: Chair, Cuff Size: Adult Large)  Pulse 68  Temp 98  F (36.7  C) (Oral)  Ht 5' 5\"  Wt 195 lb 8 oz  SpO2 97%  BMI 32.53 kg/m2  Psych: Normal affect  Neuro: No gross focal deficits noted  Head:Normocephalic, atraumatic  Eyes: Non icteric  Neck:supple  Heart: Regular rate and rhythm  Lungs: non-labored, quiet respiration  Abdomen: soft   Extremities: No obvious deformities, pea sized lump with slightly reddened skin in the left axilla. The mass is attached to the skin. No other masses felt.     A/P: Sarah Felix is a 72 year old female with a mass in the left axilla.  I think this is almost certainly benign.  In my experience, I have seen one such mass not be benign, so I agree it should come out to be safe. We will do this with local in the procedure area.  Risks, benefits and alternatives were discussed. All questions were answered and the patient agrees to proceed.       Mrayjo Donohue MD FACS  Associate Professor of Surgery  Pager [phone]     "

## 2018-04-20 NOTE — PATIENT INSTRUCTIONS
After Your Mass/Lump Removal       Incision care     You may take a shower the day after surgery. Carefully wash your incision with soap and water. Do not submerge yourself in water (bath, whirlpool, hot tub, pool, lake) for 14 days after surgery.     Remove the gauze bandage 24 hours after surgery, but leave the medical tape (Steri-Strips) or glue in place. These will loosen and fall off on their own 5 to 7 days after surgery.       Always wash your hands before touching your incisions or removing bandages.      Other medicines     Wait to start aspirin or blood thinners until the day after surgery. You can continue your regular medicines at your normal time the day after surgery.     Your pain medicine may cause constipation (hard, dry stools). To help with this, take the stool softener your doctor gave you or an over-the-counter stool softener or laxative. You can stop taking this when you are no longer taking pain medicine and your bowel movements are back to normal.      For pain or discomfort     Take the narcotic pain medicine your doctor gave you as needed and as instructed on the bottle. If you prefer to use over-the-counter medication, use acetaminophen (Tylenol) or ibuprofen (Advil, Motrin) as instructed on the box. Do not take Tylenol if it is in your narcotic pain medication.      Use an ice pack on your surgical cut (incision) for 20 minutes at a time as needed for the first 24 hours. Be sure to protect your skin by putting a cloth between the ice pack and your skin.      Activities   No driving until you feel it s safe to do so. Don t drive while taking narcotic pain medicine.      Diet   You can eat your regular meals after surgery.      Results   You should know any test results about 5 to 7 business days after surgery       When to call the doctor   Call your doctor if you have:     A fever above 101 F (38.3 C) (taken under the tongue), or a fever or chills lasting more than a day.     Redness at  the incision site.     Any fluid or blood draining from the incision, especially if it smells bad.      Severe pain that doesn t improve with pain medicine.      We will call you 2 to 4 days after surgery to review this handout, answer questions and help arrange after-surgery care. If you have questions or concerns, please call [phone] during regular office hours. If you need to call after business hours, call [phone] and ask to page the surgeon on-call.

## 2018-04-20 NOTE — NURSING NOTE
"Chief Complaint   Patient presents with     Mass     New patient consult, left axillary mass.       Vitals:    04/20/18 1227   BP: 130/66   BP Location: Left arm   Patient Position: Chair   Cuff Size: Adult Large   Pulse: 68   Temp: 98  F (36.7  C)   TempSrc: Oral   SpO2: 97%   Weight: 195 lb 8 oz   Height: 5' 5\"       Body mass index is 32.53 kg/(m^2).  Archana X, MARIXAN                     "

## 2018-04-24 DIAGNOSIS — N18.2 CKD (CHRONIC KIDNEY DISEASE) STAGE 2, GFR 60-89 ML/MIN: ICD-10-CM

## 2018-04-24 DIAGNOSIS — R60.9 DEPENDENT EDEMA: ICD-10-CM

## 2018-04-24 NOTE — TELEPHONE ENCOUNTER
"Requested Prescriptions   Pending Prescriptions Disp Refills     furosemide (LASIX) 20 MG tablet [Pharmacy Med Name: FUROSEMIDE TABS 20MG]  Last Written Prescription Date:  1/26/2018  Last Fill Quantity: 270 tablet,  # refills: 0   Last Office Visit: 4/11/2018   Future Office Visit:      270 tablet 0     Sig: TAKE 2 TABLETS IN THE MORNING AND 1 TABLET IN THE AFTERNOON    Diuretics (Including Combos) Protocol Passed    4/24/2018  1:29 PM       Passed - Blood pressure under 140/90 in past 12 months    BP Readings from Last 3 Encounters:   04/20/18 130/66   04/11/18 116/68   04/09/18 117/70          Passed - Recent (12 mo) or future (30 days) visit within the authorizing provider's specialty    Patient had office visit in the last 12 months or has a visit in the next 30 days with authorizing provider or within the authorizing provider's specialty.  See \"Patient Info\" tab in inbasket, or \"Choose Columns\" in Meds & Orders section of the refill encounter.           Passed - Patient is age 18 or older       Passed - No active pregancy on record       Passed - Normal serum creatinine on file in past 12 months    Recent Labs   Lab Test  11/09/17   1403   CR  0.81           Passed - Normal serum potassium on file in past 12 months    Recent Labs   Lab Test  04/11/18   1505   POTASSIUM  3.7           Passed - Normal serum sodium on file in past 12 months    Recent Labs   Lab Test  11/09/17   1403   NA  140           Passed - No positive pregnancy test in past 12 months          "

## 2018-04-26 RX ORDER — FUROSEMIDE 20 MG
TABLET ORAL
Qty: 270 TABLET | Refills: 1 | Status: SHIPPED | OUTPATIENT
Start: 2018-04-26 | End: 2018-10-30

## 2018-04-26 NOTE — TELEPHONE ENCOUNTER
Prescription approved per [facility] Refill Protocol.  Flory Woods RN        Last office visit : 4/11/18     Potassium   Date Value Ref Range Status   04/11/2018 3.7 3.4 - 5.3 mmol/L Final   ]  Creatinine   Date Value Ref Range Status   11/09/2017 0.81 0.52 - 1.04 mg/dL Final   ]  Sodium   Date Value Ref Range Status   11/09/2017 140 133 - 144 mmol/L Final   ]  BP Readings from Last 3 Encounters:   04/20/18 130/66   04/11/18 116/68   04/09/18 117/70

## 2018-05-07 ENCOUNTER — TELEPHONE (OUTPATIENT)
Dept: SURGERY | Facility: CLINIC | Age: 73
End: 2018-05-07

## 2018-05-07 NOTE — TELEPHONE ENCOUNTER
Per provider request, this case needs to be reschedule because provider will not be available. The patient was called and a voicemail was left with 05/23/2018 at 1:30 pm as the rescheduled date and time. The patient was asked to call [phone] to confirm this information.

## 2018-05-08 ENCOUNTER — TELEPHONE (OUTPATIENT)
Dept: SURGERY | Facility: CLINIC | Age: 73
End: 2018-05-08

## 2018-05-08 NOTE — TELEPHONE ENCOUNTER
Patient called to discuss her rescheduled surgery. She has noticed that the mass has gotten smaller and she thinks she will not need surgery. She would like to see Dr. Donohue in clinic to have it evaluated. She has been scheduled for an appointment on 05/23/2018 at 11:00 am. She feels that she will not need surgery.

## 2018-05-14 ENCOUNTER — OFFICE VISIT (OUTPATIENT)
Dept: OPHTHALMOLOGY | Facility: CLINIC | Age: 73
End: 2018-05-14
Attending: OPHTHALMOLOGY
Payer: COMMERCIAL

## 2018-05-14 DIAGNOSIS — Z96.1 PSEUDOPHAKIA, RIGHT EYE: Primary | ICD-10-CM

## 2018-05-14 PROCEDURE — G0463 HOSPITAL OUTPT CLINIC VISIT: HCPCS | Mod: ZF

## 2018-05-14 PROCEDURE — 92015 DETERMINE REFRACTIVE STATE: CPT | Mod: ZF

## 2018-05-14 ASSESSMENT — CONF VISUAL FIELD
METHOD: COUNTING FINGERS
OD_NORMAL: 1
OS_NORMAL: 1

## 2018-05-14 ASSESSMENT — REFRACTION_MANIFEST
OS_AXIS: 010
OD_AXIS: 005
OD_SPHERE: -1.50
OD_ADD: +2.50
OS_CYLINDER: +1.00
OD_CYLINDER: +1.25
OS_ADD: +2.50
OS_SPHERE: -2.00

## 2018-05-14 ASSESSMENT — REFRACTION_WEARINGRX
OS_ADD: +2.50
SPECS_TYPE: MR 2016
OD_CYLINDER: +2.50
OS_AXIS: 012
OD_CYLINDER: +2.25
OD_SPHERE: -2.50
OS_SPHERE: -2.00
OS_AXIS: 010
OD_SPHERE: -2.00
OD_ADD: +2.50
OS_SPHERE: -1.50
OS_ADD: +2.50
OD_ADD: +2.50
OS_CYLINDER: +1.25
OD_AXIS: 018
OS_CYLINDER: +1.50
OD_AXIS: 014

## 2018-05-14 ASSESSMENT — VISUAL ACUITY
OS_PH_SC: 20/25-2
OD_SC: 20/50
OD_PH_SC: 20/25
OS_SC: 20/60
CORRECTION_TYPE: GLASSES
METHOD: SNELLEN - LINEAR

## 2018-05-14 ASSESSMENT — CUP TO DISC RATIO
OS_RATIO: 0.2
OD_RATIO: 0.2

## 2018-05-14 ASSESSMENT — TONOMETRY
OS_IOP_MMHG: 22
IOP_METHOD: TONOPEN
OD_IOP_MMHG: 21

## 2018-05-14 ASSESSMENT — SLIT LAMP EXAM - LIDS
COMMENTS: NORMAL
COMMENTS: NORMAL

## 2018-05-14 ASSESSMENT — EXTERNAL EXAM - LEFT EYE: OS_EXAM: NORMAL

## 2018-05-14 ASSESSMENT — EXTERNAL EXAM - RIGHT EYE: OD_EXAM: NORMAL

## 2018-05-14 NOTE — MR AVS SNAPSHOT
After Visit Summary   5/14/2018    Sarah Felix    MRN: [id number]           Patient Information     Date Of Birth          1945        Visit Information        Provider Department      5/14/2018 2:45 PM Janay Amezcua MD [facility]        Today's Diagnoses     Pseudophakia, right eye    -  1       Follow-ups after your visit        Follow-up notes from your care team     Return in about 1 year (around 5/14/2019).      Your next 10 appointments already scheduled     May 23, 2018 11:00 AM CDT   (Arrive by 10:45 AM)   Return Visit with Maryjo Donohue MD   [facility] General Surgery ([facility])    [address] [place] [address]  [address] [facility] [address] [phone]            May 23, 2018   Procedure with Maryjo Donohue MD   [facility] ([facility])    [address] [place] [address]  [address] [facility] [address] [phone]           Located in the Clinics and Surgery Center at [address] [place] [address].   parking is very convenient and highly recommended.  is a $6 flat rate fee.  Both  and self parkers should enter the main arrival plaza from [facility]; parking attendants will direct you based on your parking preference.            Oct 17, 2018  3:00 PM CDT   SHORT with Joel Daniel Irwin Wegener, MD   [facility] ([facility])    [address] [place] [address] [phone]              Who to contact     Please call your clinic at [phone] to:    Ask questions about your health    Make or cancel appointments    Discuss your medicines    Learn about your test results    Speak to your doctor            Additional Information About Your Visit        MyChart Information     [employer] gives you secure access to your electronic health record. If you see a primary care provider, you can also send messages to  your care team and make appointments. If you have questions, please call your primary care clinic.  If you do not have a primary care provider, please call [phone] and they will assist you.      [employer] is an electronic gateway that provides easy, online access to your medical records. With [employer], you can request a clinic appointment, read your test results, renew a prescription or communicate with your care team.     To access your existing account, please contact your [facility] or call [phone] for assistance.        Care EveryWhere ID     This is your Care EveryWhere ID. This could be used by other organizations to access your [facility] medical records  [id number]         Blood Pressure from Last 3 Encounters:   04/20/18 130/66   04/11/18 116/68   04/09/18 117/70    Weight from Last 3 Encounters:   04/20/18 88.7 kg (195 lb 8 oz)   04/11/18 88.5 kg (195 lb)   04/09/18 88.5 kg (195 lb)              Today, you had the following     No orders found for display       Primary Care Provider Office Phone # Fax #    Joel Daniel Irwin Wegener, [phone]       [address]  [facility] [address]        Equal Access to Services     KILLIAN CASON : Hadii aad ku hadasho Soomaali, waaxda luqadaha, qaybta kaalmada adeegyada, waxay idiin hayulin adeeg noble laeverton ah. So [facility] [phone].    ATENCIÓN: Si habla español, tiene a meza disposición servicios gratuitos de asistencia lingüística. Llame al [phone].    We comply with applicable federal civil rights laws and [place] laws. We do not discriminate on the basis of race, color, national origin, age, disability, sex, sexual orientation, or gender identity.            Thank you!     Thank you for choosing [facility]  for your care. Our goal is always to provide you with excellent care. Hearing back from our patients is one way we can continue to improve our services. Please take a few minutes to complete the  "written survey that you may receive in the mail after your visit with us. Thank you!             Your Updated Medication List - Protect others around you: Learn how to safely use, store and throw away your medicines at www.disposemymeds.org.          This list is accurate as of 5/14/18  5:27 PM.  Always use your most recent med list.                   Brand Name Dispense Instructions for use Diagnosis    ACE/ARB/ARNI NOT PRESCRIBED (INTENTIONAL)      by Other route continuous prn.        aspirin 81 MG tablet     60 tablet    Take 2 tablets (162 mg) by mouth daily    Health care maintenance       atorvastatin 40 MG tablet    LIPITOR    90 tablet    Take 1 tablet (40 mg) by mouth daily    Hyperlipidemia LDL goal <100       BD REY U/F 32G X 4 MM   Generic drug:  insulin pen needle     90 each    USE ONCE DAILY AS DIRECTED WITH JENY DIETRICH    Type 2 diabetes mellitus with stage 2 chronic kidney disease, with long-term current use of insulin (H)       blood glucose monitoring meter device kit     1 kit    Use to test blood sugars 2 times daily as directed.    Type 2 diabetes, HbA1c goal < 7% (H)       blood glucose monitoring test strip    ONETOUCH ULTRA    360 each    1 strip by In Vitro route 4 times daily.    Type 2 diabetes, HbA1c goal < 7% (H)       CALCIUM 500 + D 500-200 MG-IU Tabs     100    one tab twice per day    Osteopenia       COLACE PO      Take  by mouth as needed.    Vaginal discharge       furosemide 20 MG tablet    LASIX    270 tablet    TAKE 2 TABLETS IN THE MORNING AND 1 TABLET IN THE AFTERNOON    Dependent edema, CKD (chronic kidney disease) stage 2, GFR 60-89 ml/min       glimepiride 4 MG tablet    AMARYL    180 tablet    TAKE 1 TABLET TWICE A DAY WITH MEALS (BREAKFAST AND DINNER)    Type 2 diabetes mellitus with stage 2 chronic kidney disease, without long-term current use of insulin (H)       insulin syringe-needle U-100 31G X 5/16\" 1 ML    BD insulin syringe ULTRAFINE    90 each    Use 1 " "syringes daily or as directed. Prescription 4mm Trish 32g (5/32\" x 0.23mm) please.    Type 2 diabetes mellitus with stage 2 chronic kidney disease, without long-term current use of insulin (H)       LANTUS SOLOSTAR 100 UNIT/ML injection   Generic drug:  insulin glargine     60 mL    Inject between 45 units and 60 units at bedtime.  Needs four boxes for three month supply.    Type 2 diabetes mellitus with stage 2 chronic kidney disease, with long-term current use of insulin (H)       lisinopril 2.5 MG tablet    PRINIVIL/Zestril    90 tablet    TAKE 1 TABLET DAILY    CKD (chronic kidney disease) stage 2, GFR 60-89 ml/min       MAALOX REGULAR STRENGTH 200-200-20 MG/5ML Susp suspension   Generic drug:  alum & mag hydroxide-simethicone      Take 30 mLs by mouth as needed        metoprolol tartrate 25 MG tablet    LOPRESSOR    180 tablet    TAKE 1 TABLET TWICE A DAY    Essential hypertension       ONETOUCH DELICA LANCETS 33G Misc     360 each    1 each 4 times daily    Type 2 diabetes, HbA1c goal < 7% (H)       potassium chloride SA 20 MEQ CR tablet    KLOR-CON    180 tablet    Take 1 tablet (20 mEq) by mouth daily    Hypokalemia       triamcinolone 0.1 % cream    KENALOG    80 g    Apply sparingly to affected area two times daily as needed    Contact dermatitis and other eczema, due to unspecified cause       VITAMIN D PO      Take  by mouth. Pt consuming 3000 units per day          "

## 2018-05-14 NOTE — NURSING NOTE
Chief Complaints and History of Present Illnesses   Patient presents with     Follow Up For     4 week follow up s/p CE/IOL right eye     HPI    Affected eye(s):  Right   Symptoms:     No floaters   No flashes   No halos   No redness   No tearing   No Dryness   No itching         Do you have eye pain now?:  No      Comments:  Pt states vision has been good since last visit. Occasional irritation in RE that comes and goes.  DM2 BS: 132   Lab Results       Component                Value               Date                       A1C                      7.8                 04/11/2018                 A1C                      8.6                 01/03/2018                 A1C                      7.0                 07/26/2017                 A1C                      7.9                 03/08/2017                 A1C                      8.0                 11/17/2016                Irena Cruz [facility] May 14, 2018 3:28 PM

## 2018-05-14 NOTE — PROGRESS NOTES
HPI  Sarah Felix is a 72 year old female here for follow-up after CE/IOL right eye. She notes the vision is improved. No pain, redness, discharge. No flashes/floaters.     Assessment & Plan    (Z96.1) Pseudophakia, right eye  (primary encounter diagnosis)  Comment: Good post-op appearance. On target refractively (mild myopia)  Plan: Given updated glasses Rx.     (H25.13) Nuclear senile cataract of left eye  Comment: Visually significant with BCVA of 20/30 OS. Moderate NS on exam.    Dilates to: 7 mm  Alpha blockers/Flomax: None  Trauma/Pseudoxfoliation: None  Fuchs dystrophy/guttae: None    Diabetes: YES, no retinopathy  Anticoagulation: YES, warfarin    Cyl:  0.28 @ 171 OS    She'd like to observe for now. I think this is reasonable.    Surgical plan (when ready):  Topical  Aim mild myopia (-1.50 to -2.00) - may want to be a bit more minus for more near vision OS.  She has reflux, may have difficulty lying completely flat.    (E11.9) Diabetes mellitus type 2 without retinopathy (H)  Comment: Diagnosed around 2007. On insulin and oral hypoglycemics. Last A1c 8.6 1/2018. No diabetic retinopathy.  Plan: Discussed the importance of tight blood glucose control in the prevention of diabetic retinopathy. Recommend yearly dilated eye exam.     -----------------------------------------------------------------------------------    Patient disposition:   Return in about 1 year (around 5/14/2019). or sooner as needed.    Teaching statement:  Complete documentation of historical and exam elements from today's encounter can be found in the full encounter summary report (not reduplicated in this progress note). I personally obtained the chief complaint(s) and history of present illness.  I confirmed and edited as necessary the review of systems, past medical/surgical history, family history, social history, and examination findings as documented by others; and I examined the patient myself. I personally reviewed the  relevant tests, images, and reports as documented above.     I formulated and edited as necessary the assessment and plan and discussed the findings and management plan with the patient and family.    Janay Amezcua MD  Comprehensive Ophthalmology & Ocular Pathology  Department of Ophthalmology and Visual Neurosciences  [email or web address]  Pager [phone]

## 2018-05-23 ENCOUNTER — OFFICE VISIT (OUTPATIENT)
Dept: SURGERY | Facility: CLINIC | Age: 73
End: 2018-05-23
Payer: COMMERCIAL

## 2018-05-23 ENCOUNTER — TELEPHONE (OUTPATIENT)
Dept: FAMILY MEDICINE | Facility: CLINIC | Age: 73
End: 2018-05-23

## 2018-05-23 VITALS
BODY MASS INDEX: 32.84 KG/M2 | HEIGHT: 65 IN | HEART RATE: 108 BPM | OXYGEN SATURATION: 95 % | SYSTOLIC BLOOD PRESSURE: 128 MMHG | TEMPERATURE: 97.6 F | DIASTOLIC BLOOD PRESSURE: 73 MMHG | WEIGHT: 197.1 LBS

## 2018-05-23 DIAGNOSIS — L72.3 SEBACEOUS CYST: Primary | ICD-10-CM

## 2018-05-23 ASSESSMENT — PAIN SCALES - GENERAL: PAINLEVEL: NO PAIN (0)

## 2018-05-23 NOTE — PROGRESS NOTES
Surgery Clinic Outpatient Progress Note  May 23, 2018  Sarah Felix  [id number]    S: Sarah Felix is a 72 year old female who presents to the clinic in follow-up of left axillary lump. Patient was originally scheduled for to have cyst removed today but reported that cyst disappeared. She denies any redness, discharge, fever, or any other discomfort. She is due for a mammogram in July 2018. She has no other complaints.  ROS: As above, and she denies any new complaints.  O:  197 lbs 1.6 oz   Temp:  [97.6  F (36.4  C)] 97.6  F (36.4  C)  Pulse:  [108] 108  BP: (128)/(73) 128/73  SpO2:  [95 %] 95 %  Drain: The patient does not have any drains or tubes we are monitoring.  Physical Exam:  Gen: Alert, oriented, no distress  Psych: Normal affect  Neuro: No focal deficit  Resp: Quiet breathing  CV: Warm and well perfused. RRR, no murmurs.   Pulm: CTAB/L no wheezing or Rhonchi   Ext: Small palpable lymph node appreciated by the Left pectoralis muscle. No abscess, cyst, folliculitis or lumps noted on exam.    A/P:   Sarah Felix is a 72 year old female who presented to the clinic today for left axillary cyst follow up. Per exam patient's cyst has resolved but one single lymph node was palpable. Patient has a Mammogram due in July and was advise to keep the appointment. She will follow up as needed.    Philip Kolb MD  PGY3 [facility] Resident   Pager: [phone]     I saw and examined this patient with the resident. I examined the axilla and the original cyst was gone. I was able to feel a small mobile lump that feels like a lymph node to me. While this is likely reactive to the previous cyst, I strongly encouraged her to keep her mammogram appointment and FU with her PCP.  Total time 10 mins, the majority of which was spent in counseling.  Maryjo Donohue MD FACS  Associate Professor of Surgery  Pager [phone]

## 2018-05-23 NOTE — MR AVS SNAPSHOT
After Visit Summary   2018    Sarah Felix    MRN: [id number]           Patient Information     Date Of Birth          1945        Visit Information        Provider Department      2018 11:00 AM Maryjo Donohue MD [facility] General Surgery        Today's Diagnoses     Sebaceous cyst    -  1      Care Instructions    You met with Dr. Maryjo Donohue.      Today's visit instructions:    Return to the Surgery Clinic on an as needed basis. Surgery was cancelled today as the mass resolved.      If you have questions please contact [place] or [place] during regular clinic hours, Monday through Friday 7:30 AM - 4:00 PM, or you can contact us via [employer] at anytime.       If you have urgent needs after-hours, weekends, or holidays please call the hospital at [phone] and ask to speak with our on-call General Surgery Team.    Appointment [phone], option #1   Nurse Advice ([place] Atkinson): [phone]   Surgery Scheduler ([place]): [phone]  Fax: [phone]                    Follow-ups after your visit        Your next 10 appointments already scheduled     Oct 17, 2018  3:00 PM CDT   SHORT with Joel Daniel Irwin Wegener, MD   [facility]    [address] [place] [id number] [phone]              Who to contact     Please call your clinic at [phone] to:    Ask questions about your health    Make or cancel appointments    Discuss your medicines    Learn about your test results    Speak to your doctor            Additional Information About Your Visit        MyChart Information     [employer] gives you secure access to your electronic health record. If you see a primary care provider, you can also send messages to your care team and make appointments. If you have questions, please call your primary care clinic.  If you do not have a primary care provider, please call [phone] and they  "will assist you.      [employer] is an electronic gateway that provides easy, online access to your medical records. With [employer], you can request a clinic appointment, read your test results, renew a prescription or communicate with your care team.     To access your existing account, please contact your [facility] or call [phone] for assistance.        Care EveryWhere ID     This is your Care EveryWhere ID. This could be used by other organizations to access your [facility] medical records  [id number]        Your Vitals Were     Pulse Temperature Height Pulse Oximetry BMI (Body Mass Index)       108 97.6  F (36.4  C) (Oral) 5' 5\" 95% 32.8 kg/m2        Blood Pressure from Last 3 Encounters:   05/23/18 128/73   04/20/18 130/66   04/11/18 116/68    Weight from Last 3 Encounters:   05/23/18 197 lb 1.6 oz   04/20/18 195 lb 8 oz   04/11/18 195 lb              Today, you had the following     No orders found for display       Primary Care Provider Office Phone # Fax #    Joel Daniel Irwin Wegener, [phone]       [address]  [facility] [place] [address]        Equal Access to Services     KILLIAN CASON AH: Hadii aad conner hadasho Soomaali, waaxda luqadaha, qaybta kaalmada adeegyada, jamila engel. So [place] [phone].    ATENCIÓN: Si habla español, tiene a meza disposición servicios gratuitos de asistencia lingüística. Jorge Alberto al [phone].    We comply with applicable federal civil rights laws and [place] laws. We do not discriminate on the basis of race, color, national origin, age, disability, sex, sexual orientation, or gender identity.            Thank you!     Thank you for choosing [facility]  for your care. Our goal is always to provide you with excellent care. Hearing back from our patients is one way we can continue to improve our services. Please take a few minutes to complete the written survey that you may receive in the " "mail after your visit with us. Thank you!             Your Updated Medication List - Protect others around you: Learn how to safely use, store and throw away your medicines at www.disposemymeds.org.          This list is accurate as of 5/23/18 11:16 AM.  Always use your most recent med list.                   Brand Name Dispense Instructions for use Diagnosis    ACE/ARB/ARNI NOT PRESCRIBED (INTENTIONAL)      by Other route continuous prn.        aspirin 81 MG tablet     60 tablet    Take 2 tablets (162 mg) by mouth daily    Health care maintenance       atorvastatin 40 MG tablet    LIPITOR    90 tablet    Take 1 tablet (40 mg) by mouth daily    Hyperlipidemia LDL goal <100       BD REY U/F 32G X 4 MM   Generic drug:  insulin pen needle     90 each    USE ONCE DAILY AS DIRECTED WITH JENY DIETRICH    Type 2 diabetes mellitus with stage 2 chronic kidney disease, with long-term current use of insulin (H)       blood glucose monitoring meter device kit     1 kit    Use to test blood sugars 2 times daily as directed.    Type 2 diabetes, HbA1c goal < 7% (H)       blood glucose monitoring test strip    ONETOUCH ULTRA    360 each    1 strip by In Vitro route 4 times daily.    Type 2 diabetes, HbA1c goal < 7% (H)       CALCIUM 500 + D 500-200 MG-IU Tabs     100    one tab twice per day    Osteopenia       COLACE PO      Take  by mouth as needed.    Vaginal discharge       furosemide 20 MG tablet    LASIX    270 tablet    TAKE 2 TABLETS IN THE MORNING AND 1 TABLET IN THE AFTERNOON    Dependent edema, CKD (chronic kidney disease) stage 2, GFR 60-89 ml/min       glimepiride 4 MG tablet    AMARYL    180 tablet    TAKE 1 TABLET TWICE A DAY WITH MEALS (BREAKFAST AND DINNER)    Type 2 diabetes mellitus with stage 2 chronic kidney disease, without long-term current use of insulin (H)       insulin syringe-needle U-100 31G X 5/16\" 1 ML    BD insulin syringe ULTRAFINE    90 each    Use 1 syringes daily or as directed. Prescription " "4mm Trish 32g (5/32\" x 0.23mm) please.    Type 2 diabetes mellitus with stage 2 chronic kidney disease, without long-term current use of insulin (H)       LANTUS SOLOSTAR 100 UNIT/ML injection   Generic drug:  insulin glargine     60 mL    Inject between 45 units and 60 units at bedtime.  Needs four boxes for three month supply.    Type 2 diabetes mellitus with stage 2 chronic kidney disease, with long-term current use of insulin (H)       lisinopril 2.5 MG tablet    PRINIVIL/Zestril    90 tablet    TAKE 1 TABLET DAILY    CKD (chronic kidney disease) stage 2, GFR 60-89 ml/min       MAALOX REGULAR STRENGTH 200-200-20 MG/5ML Susp suspension   Generic drug:  alum & mag hydroxide-simethicone      Take 30 mLs by mouth as needed        metoprolol tartrate 25 MG tablet    LOPRESSOR    180 tablet    TAKE 1 TABLET TWICE A DAY    Essential hypertension       ONETOUCH DELICA LANCETS 33G Misc     360 each    1 each 4 times daily    Type 2 diabetes, HbA1c goal < 7% (H)       potassium chloride SA 20 MEQ CR tablet    KLOR-CON    180 tablet    Take 1 tablet (20 mEq) by mouth daily    Hypokalemia       triamcinolone 0.1 % cream    KENALOG    80 g    Apply sparingly to affected area two times daily as needed    Contact dermatitis and other eczema, due to unspecified cause       VITAMIN D PO      Take  by mouth. Pt consuming 3000 units per day          "

## 2018-05-23 NOTE — PATIENT INSTRUCTIONS
You met with Dr. Maryjo Donohue.      Today's visit instructions:    Return to the Surgery Clinic on an as needed basis. Surgery was cancelled today as the mass resolved.      If you have questions please contact [place] or [place] during regular clinic hours, Monday through Friday 7:30 AM - 4:00 PM, or you can contact us via [employer] at anytime.       If you have urgent needs after-hours, weekends, or holidays please call the hospital at [phone] and ask to speak with our on-call General Surgery Team.    Appointment [phone], option #1   Nurse Advice (Deshaun Atkinson): [phone]   Surgery Scheduler ([place]): [phone]  Fax: [phone]

## 2018-05-23 NOTE — NURSING NOTE
"Chief Complaint   Patient presents with     Mass     Return patient, here to discuss axillary mass.       Vitals:    05/23/18 1049   BP: 128/73   BP Location: Left arm   Patient Position: Chair   Cuff Size: Adult Large   Pulse: 108   Temp: 97.6  F (36.4  C)   TempSrc: Oral   SpO2: 95%   Weight: 197 lb 1.6 oz   Height: 5' 5\"       Body mass index is 32.8 kg/(m^2).    Archana VALENTINE, MARIXAN                  "

## 2018-05-23 NOTE — TELEPHONE ENCOUNTER
Please call pt to schedule follow up sebaceous cyst with me in one month.  This resolved and did not need excision but the surgeon still felt a nodule (likely lymph node) beneath it that needs to be followed.     Joel Wegener,MD

## 2018-05-23 NOTE — LETTER
5/23/2018       RE: Sarah Felix  [address] [place] [address]     Dear Colleague,    Thank you for referring your patient, Sarah Felix, to the [facility] SURGERY at [facility]. Please see a copy of my visit note below.    Surgery Clinic Outpatient Progress Note  May 23, 2018  Sarah Felix  [id number]    S: Sarah Felix is a 72 year old female who presents to the clinic in follow-up of left axillary lump. Patient was originally scheduled for to have cyst removed today but reported that cyst disappeared. She denies any redness, discharge, fever, or any other discomfort. She is due for a mammogram in July 2018. She has no other complaints.  ROS: As above, and she denies any new complaints.  O:  197 lbs 1.6 oz   Temp:  [97.6  F (36.4  C)] 97.6  F (36.4  C)  Pulse:  [108] 108  BP: (128)/(73) 128/73  SpO2:  [95 %] 95 %  Drain: The patient does not have any drains or tubes we are monitoring.  Physical Exam:  Gen: Alert, oriented, no distress  Psych: Normal affect  Neuro: No focal deficit  Resp: Quiet breathing  CV: Warm and well perfused. RRR, no murmurs.   Pulm: CTAB/L no wheezing or Rhonchi   Ext: Small palpable lymph node appreciated by the Left pectoralis muscle. No abscess, cyst, folliculitis or lumps noted on exam.    A/P:   Sarah Felix is a 72 year old female who presented to the clinic today for left axillary cyst follow up. Per exam patient's cyst has resolved but one single lymph node was palpable. Patient has a Mammogram due in July and was advise to keep the appointment. She will follow up as needed.    Philip Kolb MD  PGY3 [facility] Resident   Pager: [phone]     I saw and examined this patient with the resident. I examined the axilla and the original cyst was gone. I was able to feel a small mobile lump that feels like a lymph node to me. While this is likely reactive to the previous cyst, I  strongly encouraged her to keep her mammogram appointment and FU with her PCP.  Total time 10 mins, the majority of which was spent in counseling.      Maryjo Donohue MD FACS  Associate Professor of Surgery  Pager [phone]

## 2018-06-25 ENCOUNTER — OFFICE VISIT (OUTPATIENT)
Dept: FAMILY MEDICINE | Facility: CLINIC | Age: 73
End: 2018-06-25
Payer: COMMERCIAL

## 2018-06-25 VITALS
DIASTOLIC BLOOD PRESSURE: 69 MMHG | SYSTOLIC BLOOD PRESSURE: 120 MMHG | HEART RATE: 72 BPM | TEMPERATURE: 97.8 F | OXYGEN SATURATION: 96 % | WEIGHT: 196 LBS | BODY MASS INDEX: 32.62 KG/M2

## 2018-06-25 DIAGNOSIS — K21.9 GASTROESOPHAGEAL REFLUX DISEASE WITHOUT ESOPHAGITIS: ICD-10-CM

## 2018-06-25 DIAGNOSIS — R22.32 AXILLARY MASS, LEFT: ICD-10-CM

## 2018-06-25 DIAGNOSIS — R13.12 OROPHARYNGEAL DYSPHAGIA: ICD-10-CM

## 2018-06-25 DIAGNOSIS — K14.9 TONGUE DYSPLASIA: Primary | ICD-10-CM

## 2018-06-25 PROCEDURE — 99213 OFFICE O/P EST LOW 20 MIN: CPT | Performed by: FAMILY MEDICINE

## 2018-06-25 NOTE — MR AVS SNAPSHOT
"              After Visit Summary   2018    Sarah Felix    MRN: [id number]           Patient Information     Date Of Birth          1945        Visit Information        Provider Department      2018 3:20 PM Wegener, Joel Daniel Irwin, MD [facility]        Today's Diagnoses     Tongue dysplasia    -  1    Oropharyngeal dysphagia        Gastroesophageal reflux disease without esophagitis        Axillary mass, left          Care Instructions    ASSESSMENT AND PLAN  1. Tongue dysplasia  referal given to ENT clinic since oral surgery not covered.   - OTOLARYNGOLOGY REFERRAL    2. Oropharyngeal dysphagia  Chronic but slightly worsening in context of chronic gerd.  May be stricture vs pre-cancerous change.  Referral for upper endoscopy gien.     - GASTROENTEROLOGY ADULT REF PROCEDURE ONLY [facility] [phone]    3. Gastroesophageal reflux disease without esophagitis    - GASTROENTEROLOGY ADULT REF PROCEDURE ONLY [facility] [phone]    4. Axillary mass, left  Resolved.  No lymphadenopathy today.  Planning mammogram in July .           MYCHART FOR ON-LINE CARE(VISITS), LABS, REFILLS, MESSAGING, ETC [email or web address] , [phone]    E-VISIT: click \"on-line care, then request e-visit\".  E-visits work well for following up on issues we have discussed in clinic previously which may need new prescriptions, new prescriptions or substantial discussion. These are always done by me (Dr. Wegener).     ONCARE VISIT:  [email or web address]  - we treat nearly 50 common conditions through on-care.  These are done in an hour by on-call staff.     RADIOLOGY:  [facility]:  [phone]   [facility]: [phone]    Mammogram and Colonoscopy [phone]    Smoking Cessation: [email or web address], [phone] ([phone])      CONSUMER PRICE LINE for estimates of test costs:  [phone]               Follow-ups after your visit        Additional " Services     GASTROENTEROLOGY ADULT REF PROCEDURE ONLY [facility] [phone]       Last Lab Result: Creatinine (mg/dL)       Date                     Value                 11/09/2017               0.81             ----------  Body mass index is 32.62 kg/(m^2).     Needed:  No  Language:  English    Patient will be contacted to schedule procedure.     Please be aware that coverage of these services is subject to the terms and limitations of your health insurance plan.  Call member services at your health plan with any benefit or coverage questions.  Any procedures must be performed at a [facility] facility OR coordinated by your clinic's referral office.    Please bring the following with you to your appointment:    (1) Any X-Rays, CTs or MRIs which have been performed.  Contact the facility where they were done to arrange for  prior to your scheduled appointment.    (2) List of current medications   (3) This referral request   (4) Any documents/labs given to you for this referral            OTOLARYNGOLOGY REFERRAL       Your provider has referred you to: [facility]: Adult [facility] (Otolaryngology) [facility] [phone]  [email or web address]    Please be aware that coverage of these services is subject to the terms and limitations of your health insurance plan.  Call member services at your health plan with any benefit or coverage questions.      Please bring the following with you to your appointment:    (1) Any X-Rays, CTs or MRIs which have been performed.  Contact the facility where they were done to arrange for  prior to your scheduled appointment.   (2) List of current medications  (3) This referral request   (4) Any documents/labs given to you for this referral                  Your next 10 appointments already scheduled     Oct 17, 2018  3:00 PM CDT   SHORT with Joel Daniel Irwin Wegener, MD   [facility]    [address] [place] [id number] [phone]              Who to contact     If you have questions or need follow up information about today's clinic visit or your schedule please contact [facility] directly at [phone].  Normal or non-critical lab and imaging results will be communicated to you by MyChart, letter or phone within 4 business days after the clinic has received the results. If you do not hear from us within 7 days, please contact the clinic through MyChart or phone. If you have a critical or abnormal lab result, we will notify you by phone as soon as possible.  Submit refill requests through [employer] or call your pharmacy and they will forward the refill request to us. Please allow 3 business days for your refill to be completed.          Additional Information About Your Visit        Red-rabbithart Information     [employer] gives you secure access to your electronic health record. If you see a primary care provider, you can also send messages to your care team and make appointments. If you have questions, please call your primary care clinic.  If you do not have a primary care provider, please call [phone] and they will assist you.        Care EveryWhere ID     This is your Care EveryWhere ID. This could be used by other organizations to access your [facility] medical records  [id number]        Your Vitals Were     Pulse Temperature Pulse Oximetry BMI (Body Mass Index)          72 97.8  F (36.6  C) (Tympanic) 96% 32.62 kg/m2         Blood Pressure from Last 3 Encounters:   06/25/18 120/69   05/23/18 128/73   04/20/18 130/66    Weight from Last 3 Encounters:   06/25/18 196 lb (88.9 kg)   05/23/18 197 lb 1.6 oz (89.4 kg)   04/20/18 195 lb 8 oz (88.7 kg)              We Performed the Following     GASTROENTEROLOGY ADULT REF PROCEDURE ONLY [facility] [phone]     OTOLARYNGOLOGY REFERRAL        Primary Care Provider  Office Phone # Fax #    Jem Ivan Irwin Wegener, [phone] [address]  [facility] [address]        Equal Access to Services     EDWINADRIAN KAMLA : Hadmercedez kash prieto birgit Sampson, waaxda luqadaha, qaybta kaalmada andra, jamila bhagatgrant maren. So [facility] [phone].    ATENCIÓN: Si habla español, tiene a meza disposición servicios gratuitos de asistencia lingüística. Llame al [phone].    We comply with applicable federal civil rights laws and [place] laws. We do not discriminate on the basis of race, color, national origin, age, disability, sex, sexual orientation, or gender identity.            Thank you!     Thank you for choosing [facility]  for your care. Our goal is always to provide you with excellent care. Hearing back from our patients is one way we can continue to improve our services. Please take a few minutes to complete the written survey that you may receive in the mail after your visit with us. Thank you!             Your Updated Medication List - Protect others around you: Learn how to safely use, store and throw away your medicines at www.disposemymeds.org.          This list is accurate as of 6/25/18  4:19 PM.  Always use your most recent med list.                   Brand Name Dispense Instructions for use Diagnosis    ACE/ARB/ARNI NOT PRESCRIBED (INTENTIONAL)      by Other route continuous prn.        aspirin 81 MG tablet     60 tablet    Take 2 tablets (162 mg) by mouth daily    Health care maintenance       atorvastatin 40 MG tablet    LIPITOR    90 tablet    Take 1 tablet (40 mg) by mouth daily    Hyperlipidemia LDL goal <100       BD REY U/F 32G X 4 MM   Generic drug:  insulin pen needle     90 each    USE ONCE DAILY AS DIRECTED WITH JENY DIETRICH    Type 2 diabetes mellitus with stage 2 chronic kidney disease, with long-term current use of insulin (H)       blood glucose monitoring meter device kit     1 kit    Use to test  "blood sugars 2 times daily as directed.    Type 2 diabetes, HbA1c goal < 7% (H)       blood glucose monitoring test strip    ONETOUCH ULTRA    360 each    1 strip by In Vitro route 4 times daily.    Type 2 diabetes, HbA1c goal < 7% (H)       CALCIUM 500 + D 500-200 MG-IU Tabs     100    one tab twice per day    Osteopenia       COLACE PO      Take  by mouth as needed.    Vaginal discharge       furosemide 20 MG tablet    LASIX    270 tablet    TAKE 2 TABLETS IN THE MORNING AND 1 TABLET IN THE AFTERNOON    Dependent edema, CKD (chronic kidney disease) stage 2, GFR 60-89 ml/min       glimepiride 4 MG tablet    AMARYL    180 tablet    TAKE 1 TABLET TWICE A DAY WITH MEALS (BREAKFAST AND DINNER)    Type 2 diabetes mellitus with stage 2 chronic kidney disease, without long-term current use of insulin (H)       insulin syringe-needle U-100 31G X 5/16\" 1 ML    BD insulin syringe ULTRAFINE    90 each    Use 1 syringes daily or as directed. Prescription 4mm Trish 32g (5/32\" x 0.23mm) please.    Type 2 diabetes mellitus with stage 2 chronic kidney disease, without long-term current use of insulin (H)       LANTUS SOLOSTAR 100 UNIT/ML injection   Generic drug:  insulin glargine     60 mL    Inject between 45 units and 60 units at bedtime.  Needs four boxes for three month supply.    Type 2 diabetes mellitus with stage 2 chronic kidney disease, with long-term current use of insulin (H)       lisinopril 2.5 MG tablet    PRINIVIL/Zestril    90 tablet    TAKE 1 TABLET DAILY    CKD (chronic kidney disease) stage 2, GFR 60-89 ml/min       MAALOX REGULAR STRENGTH 200-200-20 MG/5ML Susp suspension   Generic drug:  alum & mag hydroxide-simethicone      Take 30 mLs by mouth as needed        metoprolol tartrate 25 MG tablet    LOPRESSOR    180 tablet    TAKE 1 TABLET TWICE A DAY    Essential hypertension       ONETOUCH DELICA LANCETS 33G Misc     360 each    1 each 4 times daily    Type 2 diabetes, HbA1c goal < 7% (H)       potassium " chloride SA 20 MEQ CR tablet    KLOR-CON    180 tablet    Take 1 tablet (20 mEq) by mouth daily    Hypokalemia       triamcinolone 0.1 % cream    KENALOG    80 g    Apply sparingly to affected area two times daily as needed    Contact dermatitis and other eczema, due to unspecified cause       VITAMIN D PO      Take  by mouth. Pt consuming 3000 units per day

## 2018-06-25 NOTE — PROGRESS NOTES
SUBJECTIVE:   Sarah Felix is a 72 year old female who presents to clinic today for the following health issues:      Concern - Axillary Cyst.    Onset:      Description:       Intensity: None    Progression of Symptoms:  None    Accompanying Signs & Symptoms:  None    Previous history of similar problem:   No    Precipitating factors:   Worsened by:     Alleviating factors:  Improved by:     Therapies Tried and outcome: gyfe         Tongue dysplasia: cyst resolved but other issues.  White spot on tongue, dentist referred to oral surgery but no insurance for this so desires ent.   Oropharyngeal dysphagia: slight trouble swallowing over past two months on and off in context of chronic but fairly well controlled gerd.   Gastroesophageal reflux disease without esophagitis  Axillary mass, left :again, resolved.         Problem list, Medication list, Allergies, and Medical/Social/Surgical histories reviewed in [facility] and updated as appropriate.  Labs reviewed in EPIC  BP Readings from Last 3 Encounters:   06/25/18 120/69   05/23/18 128/73   04/20/18 130/66    Wt Readings from Last 3 Encounters:   06/25/18 196 lb (88.9 kg)   05/23/18 197 lb 1.6 oz (89.4 kg)   04/20/18 195 lb 8 oz (88.7 kg)                  Patient Active Problem List   Diagnosis     CYSTIC LIVER DIS     Allergic rhinitis     Diverticulitis of colon     Disorder of bone and cartilage     Esophageal reflux     Mixed incontinence urge and stress (male)(female)     Cystocele, lateral     Vaginal atrophy     Type 2 diabetes, HbA1c goal < 7% (H)     Hyperlipidemia LDL goal <100     Heart burn     BABB (dyspnea on exertion)     Impingement syndrome, shoulder     Sebaceous hyperplasia     Seborrheic keratosis     Fatty liver     Personal history of other malignant neoplasm of skin     Health Care Home     Advanced directives, counseling/discussion     Hypertension goal BP (blood pressure) < 140/90     Elevated serum creatinine     Peripheral vascular  disease (H)     Skin exam, screening for cancer     Essential hypertension, benign (HTN)     CKD (chronic kidney disease) stage 2, GFR 60-89 ml/min     Type 2 diabetes mellitus with hyperglycemia (H)     Type 2 diabetes with stage 2 chronic kidney disease GFR 60-89 (H)     Type 2 diabetes mellitus without complication, with long-term current use of insulin (H)     Senile nuclear sclerosis, right     Past Surgical History:   Procedure Laterality Date     C APPENDECTOMY  age 22     C DEXA, BONE DENSITY, AXIAL SKEL  2005     C DEXA, BONE DENSITY, AXIAL SKEL  6/2007    No signif change in Osteopenia     COLONOSCOPY  4/2006    repeat 10 yr     GYN SURGERY  10/22/08    pelvic support     HEMORRHOIDECTOMY  8/08     MOHS MICROGRAPHIC PROCEDURE       SURGICAL HISTORY OF -   age 22    L ovarian cyst removal, laparotomy     SURGICAL HISTORY OF -   1998    partial thyroidectomy     SURGICAL HISTORY OF -   10/08    Transobturator tape for Urinary Incontinence     SURGICAL HISTORY OF -   11/2011    stress test normal     THYROID SURGERY      Had cyst removed, thyroid gland is intact.       Social History   Substance Use Topics     Smoking status: Former Smoker     Quit date: 1/1/1980     Smokeless tobacco: Never Used      Comment: smoked from [age]; smoked about 1ppd     Alcohol use 0.0 oz/week      Comment: 6 drinks per week     Family History   Problem Relation Age of Onset     Diabetes Mother      at 89 yrs     Hypertension Mother      Arthritis Mother      rheumatoid     Cancer Father      bladder     Cardiovascular Brother      palpitations not on meds.     Glaucoma No family hx of      Macular Degeneration No family hx of      Amblyopia No family hx of          Current Outpatient Prescriptions   Medication Sig Dispense Refill     ACE/ARB NOT PRESCRIBED, INTENTIONAL, by Other route continuous prn.       alum & mag hydroxide-simethicone (MAALOX REGULAR STRENGTH) 200-200-20 MG/5ML SUSP Take 30 mLs by mouth as needed         "aspirin 81 MG tablet Take 2 tablets (162 mg) by mouth daily 60 tablet      atorvastatin (LIPITOR) 40 MG tablet Take 1 tablet (40 mg) by mouth daily 90 tablet 3     BD REY U/F 32G X 4 MM insulin pen needle USE ONCE DAILY AS DIRECTED WITH LANTUS SOLOSTAR 90 each 3     blood glucose monitoring (ONE TOUCH ULTRA) test strip 1 strip by In Vitro route 4 times daily. 360 each 1     Blood Glucose Monitoring Suppl (ONE TOUCH ULTRA 2) W/DEVICE KIT Use to test blood sugars 2 times daily as directed. 1 kit 0     CALCIUM 500 + D 500-200 MG-IU OR TABS one tab twice per day 100 0     Cholecalciferol (VITAMIN D PO) Take  by mouth. Pt consuming 3000 units per day        Docusate Sodium (COLACE PO) Take  by mouth as needed.       furosemide (LASIX) 20 MG tablet TAKE 2 TABLETS IN THE MORNING AND 1 TABLET IN THE AFTERNOON 270 tablet 1     glimepiride (AMARYL) 4 MG tablet TAKE 1 TABLET TWICE A DAY WITH MEALS (BREAKFAST AND DINNER) 180 tablet 3     insulin syringe-needle U-100 (BD INSULIN SYRINGE ULTRAFINE) 31G X 5/16\" 1 ML Use 1 syringes daily or as directed. Prescription 4mm Rey 32g (5/32\" x 0.23mm) please. 90 each 3     LANTUS SOLOSTAR 100 UNIT/ML soln Inject between 45 units and 60 units at bedtime.  Needs four boxes for three month supply. 60 mL 3     lisinopril (PRINIVIL/ZESTRIL) 2.5 MG tablet TAKE 1 TABLET DAILY 90 tablet 3     metoprolol (LOPRESSOR) 25 MG tablet TAKE 1 TABLET TWICE A  tablet 2     ONETOUCH DELICA LANCETS 33G MISC 1 each 4 times daily 360 each 1     potassium chloride SA (KLOR-CON) 20 MEQ CR tablet Take 1 tablet (20 mEq) by mouth daily 180 tablet 2     triamcinolone (KENALOG) 0.1 % cream Apply sparingly to affected area two times daily as needed 80 g 3     Allergies   Allergen Reactions     Amlodipine Swelling     Levaquin Rash     Itching, rash     Metformin Diarrhea and GI Disturbance     Cephalosporins Rash     Levofloxacin Itching and Rash     Nickel Rash     Contact reaction  Contact reaction "     Recent Labs   Lab Test  04/11/18   1505  01/03/18   1418  11/09/17   1403  07/26/17   1434   11/17/16   1356  06/27/16   1506   10/19/15   1541  06/19/15   1513   06/29/12   1401   A1C  7.8*  8.6*   --   7.0*   < >  8.0*  7.9*   < >  7.1*  7.5*   < >  8.3*   LDL   --    --   73  66   --    --   78   --    --   88   < >  88   HDL   --    --   51  50   --    --   44*   --    --    --    < >  60   TRIG   --    --   118  164*   --    --   171*   --    --    --    < >  189*   ALT   --    --   42   --    --    --    --    --   47   --    --   71*   CR   --    --   0.81   --    --   0.87   --    < >  0.90   --    < >  0.66   GFRESTIMATED   --    --   70   --    --   65   --    < >  62   --    < >  >90   GFRESTBLACK   --    --   85   --    --   78   --    < >  75   --    < >  >90   POTASSIUM  3.7   --   3.3*   --    --   3.7   --    < >  3.9   --    < >  3.7   TSH   --    --    --   2.11   --    --    --    --    --   2.27   < >   --     < > = values in this interval not displayed.        ROS:  Constitutional, HEENT, cardiovascular, pulmonary, GI, , musculoskeletal, neuro, skin, endocrine and psych systems are negative, except as otherwise noted.        OBJECTIVE:  /69  Pulse 72  Temp 97.8  F (36.6  C) (Tympanic)  Wt 196 lb (88.9 kg)  SpO2 96%  BMI 32.62 kg/m2    EXAM:  GENERAL APPEARANCE: healthy, alert and no distress  RESP: lungs clear to auscultation - no rales, rhonchi or wheezes  CV: regular rates and rhythm, normal S1 S2, no S3 or S4 and no murmur, click or rub -  ABDOMEN:  soft, nontender, no HSM or masses and bowel sounds normal  Full neck and axillary skin/lymph exam today.  No masses/cysts or lymp nodes in axilla any longer.  (was still lymph node left axilla when she had appointment with general surgery team).      illregular oval white patch on tongue about 1.5 cm round in area.    ASSESSMENT AND PLAN  Patient Instructions   ASSESSMENT AND PLAN  1. Tongue dysplasia  referal given to ENT clinic  "since oral surgery not covered.   - OTOLARYNGOLOGY REFERRAL    2. Oropharyngeal dysphagia  Chronic but slightly worsening in context of chronic gerd.  May be stricture vs pre-cancerous change.  Referral for upper endoscopy gien.     - GASTROENTEROLOGY ADULT REF PROCEDURE ONLY [facility] [phone]    3. Gastroesophageal reflux disease without esophagitis    - GASTROENTEROLOGY ADULT REF PROCEDURE ONLY [facility] [phone]    4. Axillary mass, left  Resolved.  No lymphadenopathy today.  Planning mammogram in July .           MYCHART FOR ON-LINE CARE(VISITS), LABS, REFILLS, MESSAGING, ETC [email or web address] , [phone]    E-VISIT: click \"on-line care, then request e-visit\".  E-visits work well for following up on issues we have discussed in clinic previously which may need new prescriptions, new prescriptions or substantial discussion. These are always done by me (Dr. Wegener).     ONCARE VISIT:  [email or web address]  - we treat nearly 50 common conditions through on-care.  These are done in an hour by on-call staff.     RADIOLOGY:  [facility]:  [phone]   [facility]: [phone]    Mammogram and Colonoscopy [phone]    Smoking Cessation: [email or web address], [phone]-[phone] ([phone])      CONSUMER PRICE LINE for estimates of test costs:  [phone]           Joel Wegener,MD        "

## 2018-07-20 ENCOUNTER — RADIANT APPOINTMENT (OUTPATIENT)
Dept: MAMMOGRAPHY | Facility: CLINIC | Age: 73
End: 2018-07-20
Attending: FAMILY MEDICINE
Payer: COMMERCIAL

## 2018-07-20 DIAGNOSIS — Z12.31 VISIT FOR SCREENING MAMMOGRAM: ICD-10-CM

## 2018-08-06 ENCOUNTER — TELEPHONE (OUTPATIENT)
Dept: GASTROENTEROLOGY | Facility: CLINIC | Age: 73
End: 2018-08-06

## 2018-08-07 ENCOUNTER — TELEPHONE (OUTPATIENT)
Dept: GASTROENTEROLOGY | Facility: CLINIC | Age: 73
End: 2018-08-07

## 2018-08-07 NOTE — TELEPHONE ENCOUNTER
Patient scheduled for EGD     Indication for procedure. Oropharyngeal dysphagia, Gastroesophageal reflux disease without esophagitis    Referring Provider. Wegener, Joel Daniel Irwin, MD    ? No     Arrival time verified? Patient to arrive at 220 pm     Facility location verified? [address] [place] [address], 5th floor     Instructions given regarding prep and procedure. Transportation policy reviewed and verbalized understanding.     Prep Type NPO     Are you taking any anticoagulants or blood thinners? Aspirin     Instructions given? Yes     Electronic implanted devices? Denies     Pre procedure teaching completed? Yes    Transportation from procedure? Yes     H&P / Pre op physical completed? N/A    Stone Phillips RN

## 2018-08-13 ENCOUNTER — SURGERY (OUTPATIENT)
Age: 73
End: 2018-08-13

## 2018-08-13 ENCOUNTER — HOSPITAL ENCOUNTER (OUTPATIENT)
Facility: AMBULATORY SURGERY CENTER | Age: 73
End: 2018-08-13
Attending: INTERNAL MEDICINE
Payer: COMMERCIAL

## 2018-08-13 VITALS
DIASTOLIC BLOOD PRESSURE: 72 MMHG | SYSTOLIC BLOOD PRESSURE: 131 MMHG | WEIGHT: 193 LBS | BODY MASS INDEX: 32.15 KG/M2 | RESPIRATION RATE: 18 BRPM | HEIGHT: 65 IN | TEMPERATURE: 97.9 F | OXYGEN SATURATION: 95 % | HEART RATE: 89 BPM

## 2018-08-13 DIAGNOSIS — K22.2 PEPTIC STRICTURE OF ESOPHAGUS: Primary | ICD-10-CM

## 2018-08-13 LAB
GLUCOSE BLDC GLUCOMTR-MCNC: 108 MG/DL (ref 70–99)
GLUCOSE BLDC GLUCOMTR-MCNC: 92 MG/DL (ref 70–99)
UPPER GI ENDOSCOPY: NORMAL

## 2018-08-13 RX ORDER — NALOXONE HYDROCHLORIDE 0.4 MG/ML
.1-.4 INJECTION, SOLUTION INTRAMUSCULAR; INTRAVENOUS; SUBCUTANEOUS
Status: DISCONTINUED | OUTPATIENT
Start: 2018-08-13 | End: 2018-08-14 | Stop reason: HOSPADM

## 2018-08-13 RX ORDER — PANTOPRAZOLE SODIUM 40 MG/1
40 TABLET, DELAYED RELEASE ORAL DAILY
Qty: 30 TABLET | Refills: 3 | Status: ON HOLD | OUTPATIENT
Start: 2018-08-13 | End: 2019-10-14

## 2018-08-13 RX ORDER — ONDANSETRON 2 MG/ML
4 INJECTION INTRAMUSCULAR; INTRAVENOUS EVERY 6 HOURS PRN
Status: DISCONTINUED | OUTPATIENT
Start: 2018-08-13 | End: 2018-08-14 | Stop reason: HOSPADM

## 2018-08-13 RX ORDER — LIDOCAINE 40 MG/G
CREAM TOPICAL
Status: DISCONTINUED | OUTPATIENT
Start: 2018-08-13 | End: 2018-08-13 | Stop reason: HOSPADM

## 2018-08-13 RX ORDER — FLUMAZENIL 0.1 MG/ML
0.2 INJECTION, SOLUTION INTRAVENOUS
Status: DISCONTINUED | OUTPATIENT
Start: 2018-08-13 | End: 2018-08-14 | Stop reason: HOSPADM

## 2018-08-13 RX ORDER — FENTANYL CITRATE 50 UG/ML
INJECTION, SOLUTION INTRAMUSCULAR; INTRAVENOUS PRN
Status: DISCONTINUED | OUTPATIENT
Start: 2018-08-13 | End: 2018-08-13 | Stop reason: HOSPADM

## 2018-08-13 RX ORDER — ONDANSETRON 4 MG/1
4 TABLET, ORALLY DISINTEGRATING ORAL EVERY 6 HOURS PRN
Status: DISCONTINUED | OUTPATIENT
Start: 2018-08-13 | End: 2018-08-14 | Stop reason: HOSPADM

## 2018-08-13 RX ORDER — ONDANSETRON 2 MG/ML
4 INJECTION INTRAMUSCULAR; INTRAVENOUS
Status: DISCONTINUED | OUTPATIENT
Start: 2018-08-13 | End: 2018-08-13 | Stop reason: HOSPADM

## 2018-08-13 RX ADMIN — FENTANYL CITRATE 50 MCG: 50 INJECTION, SOLUTION INTRAMUSCULAR; INTRAVENOUS at 16:06

## 2018-08-13 RX ADMIN — FENTANYL CITRATE 50 MCG: 50 INJECTION, SOLUTION INTRAMUSCULAR; INTRAVENOUS at 15:56

## 2018-08-13 RX ADMIN — FENTANYL CITRATE 50 MCG: 50 INJECTION, SOLUTION INTRAMUSCULAR; INTRAVENOUS at 15:52

## 2018-08-13 NOTE — OR NURSING
EGD under conscious sedation.  Biopsies obtained.  Dilation performed using 12-15 balloon and 15-18 balloon.  Pt tolerated procedure.

## 2018-08-13 NOTE — IP AVS SNAPSHOT
MRN:[id number]                      After Visit Summary   8/13/2018    Sarah Felix    MRN: [id number]           Thank you!     Thank you for choosing [facility] for your care. Our goal is always to provide you with excellent care. Hearing back from our patients is one way we can continue to improve our services. Please take a few minutes to complete the written survey that you may receive in the mail after you visit with us. Thank you!        Patient Information     Date Of Birth          1945        About your hospital stay     You were admitted on:  August 13, 2018 You last received care in the:  [facility]    You were discharged on:  August 13, 2018       Who to Call     For medical emergencies, please call 911.  For non-urgent questions about your medical care, please call your primary care provider or clinic, [phone]  For questions related to your surgery, please call your surgery clinic        Attending Provider     Provider Specialty    Hood Brower MD Gastroenterology       Primary Care Provider Office Phone # Fax #    Joel Daniel Irwin Wegener, [phone]      Your next 10 appointments already scheduled     Aug 31, 2018  1:30 PM CDT   (Arrive by 1:15 PM)   New Patient Visit with Partha Johnson MD   [facility] ([facility])    [address] [address] [phone]            Oct 17, 2018  3:00 PM CDT   SHORT with Joel Daniel Irwin Wegener, MD   [facility] ([facility]    [address] [place] [address] [phone]              Pending Results     No orders found from 8/11/2018 to 8/14/2018.            Admission Information     Date & Time Provider Department Dept. Phone    8/13/2018 Hood Brower MD [facility] [phone]      Your Vitals Were     Blood Pressure  "Pulse Temperature Respirations Height Weight    125/74 89 97.9  F (36.6  C) (Temporal) 16 1.651 m (5' 5\") 87.5 kg (193 lb)    Pulse Oximetry BMI (Body Mass Index)                94% 32.12 kg/m2          [employer] Information     Last Size gives you secure access to your electronic health record. If you see a primary care provider, you can also send messages to your care team and make appointments. If you have questions, please call your primary care clinic.  If you do not have a primary care provider, please call [phone] and they will assist you.      Last [employer] is an electronic gateway that provides easy, online access to your medical records. With Last [employer], you can request a clinic appointment, read your test results, renew a prescription or communicate with your care team.     To access your existing account, please contact your [facility] or call [phone] for assistance.        Care EveryWhere ID     This is your Care EveryWhere ID. This could be used by other organizations to access your [facility] medical records  [id number]        Equal Access to Services     KILLIAN CASON : Hadii kash Sampson, waaxda lulinda, qaybta kaalleonarda silverman, jamila engel. So [facility] [phone].    ATENCIÓN: Si habla español, tiene a meza disposición servicios gratuitos de asistencia lingüística. Llame al [phone].    We comply with applicable federal civil rights laws and [place] laws. We do not discriminate on the basis of race, color, national origin, age, disability, sex, sexual orientation, or gender identity.               Review of your medicines      UNREVIEWED medicines. Ask your doctor about these medicines        Dose / Directions    ACE/ARB/ARNI NOT PRESCRIBED (INTENTIONAL)        by Other route continuous prn.   Refills:  0       aspirin 81 MG tablet   Used for:  Health care maintenance        Dose:  162 mg   Take 2 tablets (162 mg) by mouth " daily   Quantity:  60 tablet   Refills:  0       atorvastatin 40 MG tablet   Commonly known as:  LIPITOR   Used for:  Hyperlipidemia LDL goal <100        Dose:  40 mg   Take 1 tablet (40 mg) by mouth daily   Quantity:  90 tablet   Refills:  3       CALCIUM 500 + D 500-200 MG-IU Tabs   Used for:  Osteopenia        one tab twice per day   Quantity:  100   Refills:  0       COLACE PO   Used for:  Vaginal discharge        Take  by mouth as needed.   Refills:  0       furosemide 20 MG tablet   Commonly known as:  LASIX   Used for:  Dependent edema, CKD (chronic kidney disease) stage 2, GFR 60-89 ml/min        TAKE 2 TABLETS IN THE MORNING AND 1 TABLET IN THE AFTERNOON   Quantity:  270 tablet   Refills:  1       glimepiride 4 MG tablet   Commonly known as:  AMARYL   Used for:  Type 2 diabetes mellitus with stage 2 chronic kidney disease, without long-term current use of insulin (H)        TAKE 1 TABLET TWICE A DAY WITH MEALS (BREAKFAST AND DINNER)   Quantity:  180 tablet   Refills:  3       LANTUS SOLOSTAR 100 UNIT/ML injection   Used for:  Type 2 diabetes mellitus with stage 2 chronic kidney disease, with long-term current use of insulin (H)   Generic drug:  insulin glargine        Inject between 45 units and 60 units at bedtime.  Needs four boxes for three month supply.   Quantity:  60 mL   Refills:  3       lisinopril 2.5 MG tablet   Commonly known as:  PRINIVIL/Zestril   Used for:  CKD (chronic kidney disease) stage 2, GFR 60-89 ml/min        TAKE 1 TABLET DAILY   Quantity:  90 tablet   Refills:  3       MAALOX REGULAR STRENGTH 200-200-20 MG/5ML Susp suspension   Generic drug:  alum & mag hydroxide-simethicone        Dose:  30 mL   Take 30 mLs by mouth as needed   Refills:  0       metoprolol tartrate 25 MG tablet   Commonly known as:  LOPRESSOR   Used for:  Essential hypertension        TAKE 1 TABLET TWICE A DAY   Quantity:  180 tablet   Refills:  2       potassium chloride SA 20 MEQ CR tablet   Commonly known as:   "KLOR-CON   Used for:  Hypokalemia        Dose:  20 mEq   Take 1 tablet (20 mEq) by mouth daily   Quantity:  180 tablet   Refills:  2       triamcinolone 0.1 % cream   Commonly known as:  KENALOG   Used for:  Contact dermatitis and other eczema, due to unspecified cause        Apply sparingly to affected area two times daily as needed   Quantity:  80 g   Refills:  3       VITAMIN D PO        Take  by mouth. Pt consuming 3000 units per day   Refills:  0         START taking        Dose / Directions    pantoprazole 40 MG EC tablet   Commonly known as:  PROTONIX   Used for:  Peptic stricture of esophagus        Dose:  40 mg   Take 1 tablet (40 mg) by mouth daily Take 30-60 minutes before a meal.   Quantity:  30 tablet   Refills:  3         CONTINUE these medicines which have NOT CHANGED        Dose / Directions    BD REY U/F 32G X 4 MM   Used for:  Type 2 diabetes mellitus with stage 2 chronic kidney disease, with long-term current use of insulin (H)   Generic drug:  insulin pen needle        USE ONCE DAILY AS DIRECTED WITH LANILYAUS SOLOSTAR   Quantity:  90 each   Refills:  3       blood glucose monitoring meter device kit   Used for:  Type 2 diabetes, HbA1c goal < 7% (H)        Use to test blood sugars 2 times daily as directed.   Quantity:  1 kit   Refills:  0       blood glucose monitoring test strip   Commonly known as:  ONETOUCH ULTRA   Used for:  Type 2 diabetes, HbA1c goal < 7% (H)        1 strip by In Vitro route 4 times daily.   Quantity:  360 each   Refills:  1       insulin syringe-needle U-100 31G X 5/16\" 1 ML   Commonly known as:  BD insulin syringe ULTRAFINE   Used for:  Type 2 diabetes mellitus with stage 2 chronic kidney disease, without long-term current use of insulin (H)        Use 1 syringes daily or as directed. Prescription 4mm Rey 32g (5/32\" x 0.23mm) please.   Quantity:  90 each   Refills:  3       ONETOUCH DELICA LANCETS 33G Misc   Used for:  Type 2 diabetes, HbA1c goal < 7% (H)        Dose:  1 " each   1 each 4 times daily   Quantity:  360 each   Refills:  1            Where to get your medicines      These medications were sent to [facility] [place], [place] - [address]  [address], [facility] [address]    Hours:  TRANSPLANT PHONE NUMBER [phone] Phone:  [phone]     pantoprazole 40 MG EC tablet                Protect others around you: Learn how to safely use, store and throw away your medicines at www.disposemymeds.org.             Medication List: This is a list of all your medications and when to take them. Check marks below indicate your daily home schedule. Keep this list as a reference.      Medications           Morning Afternoon Evening Bedtime As Needed    ACE/ARB/ARNI NOT PRESCRIBED (INTENTIONAL)   by Other route continuous prn.                                aspirin 81 MG tablet   Take 2 tablets (162 mg) by mouth daily                                atorvastatin 40 MG tablet   Commonly known as:  LIPITOR   Take 1 tablet (40 mg) by mouth daily                                BD REY U/F 32G X 4 MM   USE ONCE DAILY AS DIRECTED WITH LANTUS SOLOSTAR   Generic drug:  insulin pen needle                                blood glucose monitoring meter device kit   Use to test blood sugars 2 times daily as directed.                                blood glucose monitoring test strip   Commonly known as:  ONETOUCH ULTRA   1 strip by In Vitro route 4 times daily.                                CALCIUM 500 + D 500-200 MG-IU Tabs   one tab twice per day                                COLACE PO   Take  by mouth as needed.                                furosemide 20 MG tablet   Commonly known as:  LASIX   TAKE 2 TABLETS IN THE MORNING AND 1 TABLET IN THE AFTERNOON                                glimepiride 4 MG tablet   Commonly known as:  AMARYL   TAKE 1 TABLET TWICE A DAY WITH MEALS (BREAKFAST AND DINNER)                                insulin  "syringe-needle U-100 31G X 5/16\" 1 ML   Commonly known as:  BD insulin syringe ULTRAFINE   Use 1 syringes daily or as directed. Prescription 4mm Trish 32g (5/32\" x 0.23mm) please.                                LANTUS SOLOSTAR 100 UNIT/ML injection   Inject between 45 units and 60 units at bedtime.  Needs four boxes for three month supply.   Generic drug:  insulin glargine                                lisinopril 2.5 MG tablet   Commonly known as:  PRINIVIL/Zestril   TAKE 1 TABLET DAILY                                MAALOX REGULAR STRENGTH 200-200-20 MG/5ML Susp suspension   Take 30 mLs by mouth as needed   Generic drug:  alum & mag hydroxide-simethicone                                metoprolol tartrate 25 MG tablet   Commonly known as:  LOPRESSOR   TAKE 1 TABLET TWICE A DAY                                ONETOUCH DELICA LANCETS 33G Misc   1 each 4 times daily                                pantoprazole 40 MG EC tablet   Commonly known as:  PROTONIX   Take 1 tablet (40 mg) by mouth daily Take 30-60 minutes before a meal.                                potassium chloride SA 20 MEQ CR tablet   Commonly known as:  KLOR-CON   Take 1 tablet (20 mEq) by mouth daily                                triamcinolone 0.1 % cream   Commonly known as:  KENALOG   Apply sparingly to affected area two times daily as needed                                VITAMIN D PO   Take  by mouth. Pt consuming 3000 units per day                                  "

## 2018-08-13 NOTE — IP AVS SNAPSHOT
[facility]    [address] [place] [address]    Phone:  [phone]    Fax:  [phone]                                       After Visit Summary   8/13/2018    Sarah Felix    MRN: [id number]           After Visit Summary Signature Page     I have received my discharge instructions, and my questions have been answered. I have discussed any challenges I see with this plan with the nurse or doctor.    ..........................................................................................................................................  Patient/Patient Representative Signature      ..........................................................................................................................................  Patient Representative Print Name and Relationship to Patient    ..................................................               ................................................  Date                                            Time    ..........................................................................................................................................  Reviewed by Signature/Title    ...................................................              ..............................................  Date                                                            Time

## 2018-08-17 LAB — COPATH REPORT: NORMAL

## 2018-08-22 NOTE — PROGRESS NOTES
Pathology from EGD reviewed. 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 Attempted calling patient x 2 without an answer and left a message. "[employer] message sent. I recommended further evaluation with EUS of the duodenal polyp and likely EMR with me.    Jerrica and Do, can you schedule this patient with me for an EUS and EMR? Next available. I'll need 3 hours. She can continue her aspirin. Thanks.    Hood Brower MD  [facility] [place]  Division of Gastroenterology and Hepatology  [facility] [address] [id number]

## 2018-08-22 NOTE — TELEPHONE ENCOUNTER
FUTURE VISIT INFORMATION      FUTURE VISIT INFORMATION:    Date: 08/31/2018    Time: 1:30    [place] [facility] [place]  REFERRAL INFORMATION:    Referring provider:  WEGENER, JOEL DANIEL IRWIN    Referring providers clinic:   [facility]/    Reason for visit/diagnosis  Tongue dysplasia    RECORDS REQUESTED FROM:       Clinic name Comments Records Status Imaging Status    [facility]/ OFFICE VISIT: 06/25/2018 INTERNAL N/A                                   RECORDS STATUS

## 2018-08-23 ENCOUNTER — CARE COORDINATION (OUTPATIENT)
Dept: GASTROENTEROLOGY | Facility: CLINIC | Age: 73
End: 2018-08-23

## 2018-08-23 DIAGNOSIS — D13.2 ADENOMATOUS DUODENAL POLYP: Primary | ICD-10-CM

## 2018-08-23 NOTE — PROGRESS NOTES
[facility] RN Care Coordination Note:    Procedure requested: Upper EUS with possible EMR    Requesting provider: Dr. Brower     Indication: Duodenal polyp    Urgency: 1st available.     Pre procedure testing needed: None.     PAC appointment: only if unable to see PCP for pre op.      Other notes: Received note from Dr. Brower on 08/23/18 with procedure requested. He has attempted to reach patient with results and recommendations by phone but unable to reach. [employer] message also sent to patient. Will proceed with scheduling at this time.       Jerrica Rao RN   Care Coordinator   [phone]

## 2018-08-29 ENCOUNTER — TELEPHONE (OUTPATIENT)
Dept: GASTROENTEROLOGY | Facility: CLINIC | Age: 73
End: 2018-08-29

## 2018-08-31 ENCOUNTER — OFFICE VISIT (OUTPATIENT)
Dept: OTOLARYNGOLOGY | Facility: CLINIC | Age: 73
End: 2018-08-31
Payer: COMMERCIAL

## 2018-08-31 ENCOUNTER — PRE VISIT (OUTPATIENT)
Dept: OTOLARYNGOLOGY | Facility: CLINIC | Age: 73
End: 2018-08-31

## 2018-08-31 DIAGNOSIS — K13.70 ORAL LESION: Primary | ICD-10-CM

## 2018-08-31 ASSESSMENT — PAIN SCALES - GENERAL: PAINLEVEL: NO PAIN (0)

## 2018-08-31 NOTE — PROGRESS NOTES
The patient presents with a history of a recurrent white lesion on the midline of the anterior mobile tongue. She reports associated irritation of the oral mucosa diffusely with the ingestion of citrus or spicy foods. She denies previous such difficulties or lesions. She reports that at this time, the lesion has resolved and is no longer present. The patient denies sinusitis, rhinitis, facial pain, nasal obstruction or purulent nasal discharge. The patient denies chronic or recurrent tonsillitis, chronic or recurrent pharyngitis. The patient denies otalgia, otorrhea, eustachian tube dysfunction, ear infections, dizziness or tinnitus.     This patient is seen in consultation at the request of Dr. Joel Wegener.    All other systems were reviewed and they are either negative or they are not directly pertinent to this Otolaryngology examination.     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  felt to be focal nodular hyperplasia.  annual CT abd.   followed by Dr. Amilcar Kat      Cystocele, lateral      Diabetes mellitus (H)      Diverticulitis of colon (without mention of hemorrhage)(562.11) 6/04    Abd CT     Embolism and thrombosis of unspecified site age 22    post ovarian cyst removed     Esophageal reflux     Hiatal hernia     Fatty liver      Hiatal hernia     small     Hyperlipidemia      Nontoxic uninodular goiter      Osteopenia 4/21/2005    on fosamax  for > 5 yr.  stop 7/2012     Other chronic otitis externa      Other specified disorders of liver     Fatty Liver, Benign appearing liver cysts     Pure hypercholesterolemia        Past Surgical History:    Past Surgical History:   Procedure  Laterality Date     C APPENDECTOMY  age 22     C DEXA, BONE DENSITY, AXIAL SKEL  2005     C DEXA, BONE DENSITY, AXIAL SKEL  6/2007    No signif change in Osteopenia     COLONOSCOPY  4/2006    repeat 10 yr     ESOPHAGOSCOPY, GASTROSCOPY, DUODENOSCOPY (EGD), COMBINED N/A 8/13/2018    Procedure: COMBINED ESOPHAGOSCOPY, GASTROSCOPY, DUODENOSCOPY (EGD), BIOPSY SINGLE OR MULTIPLE;  EGD;  Surgeon: Hood Brower MD;  Location: [facility] OR     GYN SURGERY  10/22/08    pelvic support     HEMORRHOIDECTOMY  8/08     MOHS MICROGRAPHIC PROCEDURE       SURGICAL HISTORY OF -   age 22    L ovarian cyst removal, laparotomy     SURGICAL HISTORY OF -   1998    partial thyroidectomy     SURGICAL HISTORY OF -   10/08    Transobturator tape for Urinary Incontinence     SURGICAL HISTORY OF -   11/2011    stress test normal     THYROID SURGERY      Had cyst removed, thyroid gland is intact.       Medications:      Current Outpatient Prescriptions:      ACE/ARB NOT PRESCRIBED, INTENTIONAL,, by Other route continuous prn., Disp: , Rfl:      alum & mag hydroxide-simethicone (MAALOX REGULAR STRENGTH) 200-200-20 MG/5ML SUSP, Take 30 mLs by mouth as needed , Disp: , Rfl:      aspirin 81 MG tablet, Take 2 tablets (162 mg) by mouth daily, Disp: 60 tablet, Rfl:      atorvastatin (LIPITOR) 40 MG tablet, Take 1 tablet (40 mg) by mouth daily, Disp: 90 tablet, Rfl: 3     BD REY U/F 32G X 4 MM insulin pen needle, USE ONCE DAILY AS DIRECTED WITH LANTUS SOLOSTAR, Disp: 90 each, Rfl: 3     blood glucose monitoring (ONE TOUCH ULTRA) test strip, 1 strip by In Vitro route 4 times daily., Disp: 360 each, Rfl: 1     Blood Glucose Monitoring Suppl (ONE TOUCH ULTRA 2) W/DEVICE KIT, Use to test blood sugars 2 times daily as directed., Disp: 1 kit, Rfl: 0     CALCIUM 500 + D 500-200 MG-IU OR TABS, one tab twice per day, Disp: 100, Rfl: 0     Cholecalciferol (VITAMIN D PO), Take  by mouth. Pt consuming 3000 units per day , Disp: , Rfl:      Docusate Sodium (COLACE PO),  "Take  by mouth as needed., Disp: , Rfl:      furosemide (LASIX) 20 MG tablet, TAKE 2 TABLETS IN THE MORNING AND 1 TABLET IN THE AFTERNOON, Disp: 270 tablet, Rfl: 1     glimepiride (AMARYL) 4 MG tablet, TAKE 1 TABLET TWICE A DAY WITH MEALS (BREAKFAST AND DINNER), Disp: 180 tablet, Rfl: 3     insulin syringe-needle U-100 (BD INSULIN SYRINGE ULTRAFINE) 31G X 5/16\" 1 ML, Use 1 syringes daily or as directed. Prescription 4mm Trish 32g (5/32\" x 0.23mm) please., Disp: 90 each, Rfl: 3     LANTUS SOLOSTAR 100 UNIT/ML soln, Inject between 45 units and 60 units at bedtime.  Needs four boxes for three month supply., Disp: 60 mL, Rfl: 3     lisinopril (PRINIVIL/ZESTRIL) 2.5 MG tablet, TAKE 1 TABLET DAILY, Disp: 90 tablet, Rfl: 3     metoprolol (LOPRESSOR) 25 MG tablet, TAKE 1 TABLET TWICE A DAY, Disp: 180 tablet, Rfl: 2     ONETOUCH DELICA LANCETS 33G MISC, 1 each 4 times daily, Disp: 360 each, Rfl: 1     pantoprazole (PROTONIX) 40 MG EC tablet, Take 1 tablet (40 mg) by mouth daily Take 30-60 minutes before a meal., Disp: 30 tablet, Rfl: 3     potassium chloride SA (KLOR-CON) 20 MEQ CR tablet, Take 1 tablet (20 mEq) by mouth daily, Disp: 180 tablet, Rfl: 2     triamcinolone (KENALOG) 0.1 % cream, Apply sparingly to affected area two times daily as needed, Disp: 80 g, Rfl: 3    Allergies:    Amlodipine; Levaquin; Metformin; Cephalosporins; Levofloxacin; and Nickel    Physical Examination:    The patient is a well developed, well nourished female in no apparent distress.  She is normocephalic, atraumatic with pupils equally round and reactive to light.    Oral Cavity Examination:  Normal mucosa with no masses or lesions  Nasal Examination: Normal mucosa with no masses or lesions  Ear Examination: Ear canals clear, tympanic membranes and middle ear spaces normal  Neurological Examination: Facial nerve function intact and symmetric  Integumentary Examination: No lesions on the skin of the head and neck  Neck Examination: No masses or " lesions, no lymphadenopathy  Endocrine Examination: Normal thyroid examination    Assessment and Plan:    The patient presents with a history of a recurrent white lesion on the midline of the anterior mobile tongue. The patient will be referred to Dr. Tyler Stallworth for biopsy of the lesion when it redevelops to try to identify the etiology of the lesion.     CC: Dr. Joel Wegener

## 2018-08-31 NOTE — PATIENT INSTRUCTIONS
The patient presents with a history of a recurrent white lesion on the midline of the anterior mobile tongue. The patient will be referred to Dr. Tyler Stallworth for biopsy of the lesion when it redevelops to try to identify the etiology of the lesion.

## 2018-08-31 NOTE — MR AVS SNAPSHOT
After Visit Summary   8/31/2018    Sarah Felix    MRN: [id number]           Patient Information     Date Of Birth          1945        Visit Information        Provider Department      8/31/2018 1:30 PM Partha Johnson MD [facility]        Today's Diagnoses     Oral lesion    -  1      Care Instructions    The patient presents with a history of a recurrent white lesion on the midline of the anterior mobile tongue. The patient will be referred to Dr. Tyler Stallworth for biopsy of the lesion when it redevelops to try to identify the etiology of the lesion.           Follow-ups after your visit        Your next 10 appointments already scheduled     Oct 02, 2018  1:00 PM CDT   (Arrive by 12:45 PM)   New Patient Visit with MD YOSI Fowler [facility] ([facility])    [address] [address] [phone]            Oct 24, 2018  3:00 PM CDT   SHORT with Joel Daniel Irwin Wegener, MD   [facility] ([facility])    [address] [place] [address] [phone]              Who to contact     Please call your clinic at [phone] to:    Ask questions about your health    Make or cancel appointments    Discuss your medicines    Learn about your test results    Speak to your doctor            Additional Information About Your Visit        MyChart Information     [employer] gives you secure access to your electronic health record. If you see a primary care provider, you can also send messages to your care team and make appointments. If you have questions, please call your primary care clinic.  If you do not have a primary care provider, please call [phone] and they will assist you.      [employer] is an electronic gateway that provides easy, online access to your medical records. With [employer], you can request a clinic appointment, read your  test results, renew a prescription or communicate with your care team.     To access your existing account, please contact your [facility] or call [phone] for assistance.        Care EveryWhere ID     This is your Care EveryWhere ID. This could be used by other organizations to access your [facility] medical records  [id number]         Blood Pressure from Last 3 Encounters:   08/13/18 131/72   06/25/18 120/69   05/23/18 128/73    Weight from Last 3 Encounters:   08/13/18 87.5 kg (193 lb)   06/25/18 88.9 kg (196 lb)   05/23/18 89.4 kg (197 lb 1.6 oz)              Today, you had the following     No orders found for display       Primary Care Provider Office Phone # Fax #    Joel Daniel Irwin Wegener, [phone] [address]  [facility] [address]        Equal Access to Services     ADRIAN [facility] SystemNEGRO : Hadii aad ku hadasho Soomaali, waaxda luqadaha, qaybta kaalmada adeegyada, waxay idiin hayaan adeeg noble larikyn . So [facility] [phone].    ATENCIÓN: Si habla español, tiene a meza disposición servicios gratuitos de asistencia lingüística. Jorge Alberto al [phone].    We comply with applicable federal civil rights laws and [place] laws. We do not discriminate on the basis of race, color, national origin, age, disability, sex, sexual orientation, or gender identity.            Thank you!     Thank you for choosing [facility]  for your care. Our goal is always to provide you with excellent care. Hearing back from our patients is one way we can continue to improve our services. Please take a few minutes to complete the written survey that you may receive in the mail after your visit with us. Thank you!             Your Updated Medication List - Protect others around you: Learn how to safely use, store and throw away your medicines at www.disposemymeds.org.          This list is accurate as of 8/31/18  2:03 PM.  Always use your most recent med list.     "               Brand Name Dispense Instructions for use Diagnosis    ACE/ARB/ARNI NOT PRESCRIBED (INTENTIONAL)      by Other route continuous prn.        aspirin 81 MG tablet     60 tablet    Take 2 tablets (162 mg) by mouth daily    Health care maintenance       atorvastatin 40 MG tablet    LIPITOR    90 tablet    Take 1 tablet (40 mg) by mouth daily    Hyperlipidemia LDL goal <100       BD REY U/F 32G X 4 MM   Generic drug:  insulin pen needle     90 each    USE ONCE DAILY AS DIRECTED WITH LANTUS SOLOSTAR    Type 2 diabetes mellitus with stage 2 chronic kidney disease, with long-term current use of insulin (H)       blood glucose monitoring meter device kit     1 kit    Use to test blood sugars 2 times daily as directed.    Type 2 diabetes, HbA1c goal < 7% (H)       blood glucose monitoring test strip    ONETOUCH ULTRA    360 each    1 strip by In Vitro route 4 times daily.    Type 2 diabetes, HbA1c goal < 7% (H)       CALCIUM 500 + D 500-200 MG-IU Tabs     100    one tab twice per day    Osteopenia       COLACE PO      Take  by mouth as needed.    Vaginal discharge       furosemide 20 MG tablet    LASIX    270 tablet    TAKE 2 TABLETS IN THE MORNING AND 1 TABLET IN THE AFTERNOON    Dependent edema, CKD (chronic kidney disease) stage 2, GFR 60-89 ml/min       glimepiride 4 MG tablet    AMARYL    180 tablet    TAKE 1 TABLET TWICE A DAY WITH MEALS (BREAKFAST AND DINNER)    Type 2 diabetes mellitus with stage 2 chronic kidney disease, without long-term current use of insulin (H)       insulin syringe-needle U-100 31G X 5/16\" 1 ML    BD insulin syringe ULTRAFINE    90 each    Use 1 syringes daily or as directed. Prescription 4mm Rey 32g (5/32\" x 0.23mm) please.    Type 2 diabetes mellitus with stage 2 chronic kidney disease, without long-term current use of insulin (H)       LANTUS SOLOSTAR 100 UNIT/ML injection   Generic drug:  insulin glargine     60 mL    Inject between 45 units and 60 units at bedtime.  Needs " four boxes for three month supply.    Type 2 diabetes mellitus with stage 2 chronic kidney disease, with long-term current use of insulin (H)       lisinopril 2.5 MG tablet    PRINIVIL/Zestril    90 tablet    TAKE 1 TABLET DAILY    CKD (chronic kidney disease) stage 2, GFR 60-89 ml/min       MAALOX REGULAR STRENGTH 200-200-20 MG/5ML Susp suspension   Generic drug:  alum & mag hydroxide-simethicone      Take 30 mLs by mouth as needed        metoprolol tartrate 25 MG tablet    LOPRESSOR    180 tablet    TAKE 1 TABLET TWICE A DAY    Essential hypertension       ONETOUCH DELICA LANCETS 33G Misc     360 each    1 each 4 times daily    Type 2 diabetes, HbA1c goal < 7% (H)       pantoprazole 40 MG EC tablet    PROTONIX    30 tablet    Take 1 tablet (40 mg) by mouth daily Take 30-60 minutes before a meal.    Peptic stricture of esophagus       potassium chloride SA 20 MEQ CR tablet    KLOR-CON    180 tablet    Take 1 tablet (20 mEq) by mouth daily    Hypokalemia       triamcinolone 0.1 % cream    KENALOG    80 g    Apply sparingly to affected area two times daily as needed    Contact dermatitis and other eczema, due to unspecified cause       VITAMIN D PO      Take  by mouth. Pt consuming 3000 units per day

## 2018-08-31 NOTE — LETTER
8/31/2018       RE: Sarah Felix  [address] [place] [address]     Dear Colleague,    Thank you for referring your patient, Sarah Felix, to the [facility] at [facility]. Please see a copy of my visit note below.    The patient presents with a history of a recurrent white lesion on the midline of the anterior mobile tongue. She reports associated irritation of the oral mucosa diffusely with the ingestion of citrus or spicy foods. She denies previous such difficulties or lesions. She reports that at this time, the lesion has resolved and is no longer present. The patient denies sinusitis, rhinitis, facial pain, nasal obstruction or purulent nasal discharge. The patient denies chronic or recurrent tonsillitis, chronic or recurrent pharyngitis. The patient denies otalgia, otorrhea, eustachian tube dysfunction, ear infections, dizziness or tinnitus.     This patient is seen in consultation at the request of Dr. Joel Wegener.    All other systems were reviewed and they are either negative or they are not directly pertinent to this Otolaryngology examination.     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  felt to be focal nodular hyperplasia.  annual CT abd.   followed by Dr. Amilcar Kat      Cystocele, lateral      Diabetes mellitus (H)      Diverticulitis of colon (without mention of hemorrhage)(562.11) 6/04    Abd CT     Embolism and thrombosis of unspecified site age 22    post ovarian cyst removed     Esophageal reflux     Hiatal hernia     Fatty liver      Hiatal hernia     small     Hyperlipidemia      Nontoxic  uninodular goiter      Osteopenia 4/21/2005    on fosamax  for > 5 yr.  stop 7/2012     Other chronic otitis externa      Other specified disorders of liver     Fatty Liver, Benign appearing liver cysts     Pure hypercholesterolemia        Past Surgical History:    Past Surgical History:   Procedure Laterality Date     C APPENDECTOMY  age 22     C DEXA, BONE DENSITY, AXIAL SKEL  2005     C DEXA, BONE DENSITY, AXIAL SKEL  6/2007    No signif change in Osteopenia     COLONOSCOPY  4/2006    repeat 10 yr     ESOPHAGOSCOPY, GASTROSCOPY, DUODENOSCOPY (EGD), COMBINED N/A 8/13/2018    Procedure: COMBINED ESOPHAGOSCOPY, GASTROSCOPY, DUODENOSCOPY (EGD), BIOPSY SINGLE OR MULTIPLE;  EGD;  Surgeon: Hood Brower MD;  Location: [facility]     GYN SURGERY  10/22/08    pelvic support     HEMORRHOIDECTOMY  8/08     MOHS MICROGRAPHIC PROCEDURE       SURGICAL HISTORY OF -   age 22    L ovarian cyst removal, laparotomy     SURGICAL HISTORY OF -   1998    partial thyroidectomy     SURGICAL HISTORY OF -   10/08    Transobturator tape for Urinary Incontinence     SURGICAL HISTORY OF -   11/2011    stress test normal     THYROID SURGERY      Had cyst removed, thyroid gland is intact.       Medications:      Current Outpatient Prescriptions:      ACE/ARB NOT PRESCRIBED, INTENTIONAL,, by Other route continuous prn., Disp: , Rfl:      alum & mag hydroxide-simethicone (MAALOX REGULAR STRENGTH) 200-200-20 MG/5ML SUSP, Take 30 mLs by mouth as needed , Disp: , Rfl:      aspirin 81 MG tablet, Take 2 tablets (162 mg) by mouth daily, Disp: 60 tablet, Rfl:      atorvastatin (LIPITOR) 40 MG tablet, Take 1 tablet (40 mg) by mouth daily, Disp: 90 tablet, Rfl: 3     BD REY U/F 32G X 4 MM insulin pen needle, USE ONCE DAILY AS DIRECTED WITH LANTUS SOLOSTAR, Disp: 90 each, Rfl: 3     blood glucose monitoring (ONE TOUCH ULTRA) test strip, 1 strip by In Vitro route 4 times daily., Disp: 360 each, Rfl: 1     Blood Glucose Monitoring Suppl (ONE TOUCH ULTRA 2)  "W/DEVICE KIT, Use to test blood sugars 2 times daily as directed., Disp: 1 kit, Rfl: 0     CALCIUM 500 + D 500-200 MG-IU OR TABS, one tab twice per day, Disp: 100, Rfl: 0     Cholecalciferol (VITAMIN D PO), Take  by mouth. Pt consuming 3000 units per day , Disp: , Rfl:      Docusate Sodium (COLACE PO), Take  by mouth as needed., Disp: , Rfl:      furosemide (LASIX) 20 MG tablet, TAKE 2 TABLETS IN THE MORNING AND 1 TABLET IN THE AFTERNOON, Disp: 270 tablet, Rfl: 1     glimepiride (AMARYL) 4 MG tablet, TAKE 1 TABLET TWICE A DAY WITH MEALS (BREAKFAST AND DINNER), Disp: 180 tablet, Rfl: 3     insulin syringe-needle U-100 (BD INSULIN SYRINGE ULTRAFINE) 31G X 5/16\" 1 ML, Use 1 syringes daily or as directed. Prescription 4mm Trish 32g (5/32\" x 0.23mm) please., Disp: 90 each, Rfl: 3     LANTUS SOLOSTAR 100 UNIT/ML soln, Inject between 45 units and 60 units at bedtime.  Needs four boxes for three month supply., Disp: 60 mL, Rfl: 3     lisinopril (PRINIVIL/ZESTRIL) 2.5 MG tablet, TAKE 1 TABLET DAILY, Disp: 90 tablet, Rfl: 3     metoprolol (LOPRESSOR) 25 MG tablet, TAKE 1 TABLET TWICE A DAY, Disp: 180 tablet, Rfl: 2     ONETOUCH DELICA LANCETS 33G MISC, 1 each 4 times daily, Disp: 360 each, Rfl: 1     pantoprazole (PROTONIX) 40 MG EC tablet, Take 1 tablet (40 mg) by mouth daily Take 30-60 minutes before a meal., Disp: 30 tablet, Rfl: 3     potassium chloride SA (KLOR-CON) 20 MEQ CR tablet, Take 1 tablet (20 mEq) by mouth daily, Disp: 180 tablet, Rfl: 2     triamcinolone (KENALOG) 0.1 % cream, Apply sparingly to affected area two times daily as needed, Disp: 80 g, Rfl: 3    Allergies:    Amlodipine; Levaquin; Metformin; Cephalosporins; Levofloxacin; and Nickel    Physical Examination:    The patient is a well developed, well nourished female in no apparent distress.  She is normocephalic, atraumatic with pupils equally round and reactive to light.    Oral Cavity Examination:  Normal mucosa with no masses or lesions  Nasal " Examination: Normal mucosa with no masses or lesions  Ear Examination: Ear canals clear, tympanic membranes and middle ear spaces normal  Neurological Examination: Facial nerve function intact and symmetric  Integumentary Examination: No lesions on the skin of the head and neck  Neck Examination: No masses or lesions, no lymphadenopathy  Endocrine Examination: Normal thyroid examination    Assessment and Plan:    The patient presents with a history of a recurrent white lesion on the midline of the anterior mobile tongue. The patient will be referred to Dr. Tyler Stallworth for biopsy of the lesion when it redevelops to try to identify the etiology of the lesion.     CC: Dr. Joel Wegener       Again, thank you for allowing me to participate in the care of your patient.      Sincerely,    Partha Johnson MD

## 2018-08-31 NOTE — NURSING NOTE
Chief Complaint   Patient presents with     Consult     white patch on tongue      Miles Townsend LPN

## 2018-09-02 DIAGNOSIS — I10 ESSENTIAL HYPERTENSION: ICD-10-CM

## 2018-09-04 ENCOUNTER — TELEPHONE (OUTPATIENT)
Dept: GASTROENTEROLOGY | Facility: CLINIC | Age: 73
End: 2018-09-04

## 2018-09-04 ENCOUNTER — CARE COORDINATION (OUTPATIENT)
Dept: GASTROENTEROLOGY | Facility: CLINIC | Age: 73
End: 2018-09-04

## 2018-09-04 NOTE — PROGRESS NOTES
[facility] RN Care Coordination Note:    Returned call to patient and discuss procedure plan and reason for repeat procedure. Also spoke with patient at length regarding her medication questions and different options for the medication. After long discussion regarding medication informed her I would message Dr. Brower to clarify his preferred dose for the patient and would get back to her regarding this plan. She verbalized understanding and was ok with mychart communication regarding medication plan.      Jerrica Rao RN   Care Coordinator   [phone]

## 2018-09-04 NOTE — TELEPHONE ENCOUNTER
"Requested Prescriptions   Pending Prescriptions Disp Refills     metoprolol tartrate (LOPRESSOR) 25 MG tablet [Pharmacy Med Name: METOPROLOL TARTRATE TABS 25MG]  Last Written Prescription Date:  11/30/2017  Last Fill Quantity: 180 tablet,  # refills: 2   Last Office Visit: 6/25/2018   Future Office Visit:    Next 5 appointments (look out 90 days)     Oct 24, 2018  3:00 PM CDT   SHORT with Joel Daniel Irwin Wegener, MD   [facility] ([facility])    [address] [place] [address] [phone]                180 tablet 2     Sig: TAKE 1 TABLET TWICE A DAY    Beta-Blockers Protocol Passed    9/2/2018 12:18 AM       Passed - Blood pressure under 140/90 in past 12 months    BP Readings from Last 3 Encounters:   08/13/18 131/72   06/25/18 120/69   05/23/18 128/73          Passed - Patient is age 6 or older       Passed - Recent (12 mo) or future (30 days) visit within the authorizing provider's specialty    Patient had office visit in the last 12 months or has a visit in the next 30 days with authorizing provider or within the authorizing provider's specialty.  See \"Patient Info\" tab in inbasket, or \"Choose Columns\" in Meds & Orders section of the refill encounter.              "

## 2018-09-06 ENCOUNTER — ANESTHESIA EVENT (OUTPATIENT)
Dept: SURGERY | Facility: CLINIC | Age: 73
End: 2018-09-06
Payer: COMMERCIAL

## 2018-09-06 ENCOUNTER — SURGERY (OUTPATIENT)
Age: 73
End: 2018-09-06

## 2018-09-06 ENCOUNTER — ANESTHESIA (OUTPATIENT)
Dept: SURGERY | Facility: CLINIC | Age: 73
End: 2018-09-06
Payer: COMMERCIAL

## 2018-09-06 ENCOUNTER — HOSPITAL ENCOUNTER (OUTPATIENT)
Facility: CLINIC | Age: 73
Discharge: HOME | End: 2018-09-06
Attending: INTERNAL MEDICINE | Admitting: INTERNAL MEDICINE
Payer: COMMERCIAL

## 2018-09-06 ENCOUNTER — TELEPHONE (OUTPATIENT)
Dept: GASTROENTEROLOGY | Facility: CLINIC | Age: 73
End: 2018-09-06

## 2018-09-06 VITALS
HEIGHT: 65 IN | DIASTOLIC BLOOD PRESSURE: 92 MMHG | BODY MASS INDEX: 32.69 KG/M2 | RESPIRATION RATE: 14 BRPM | SYSTOLIC BLOOD PRESSURE: 136 MMHG | WEIGHT: 196.21 LBS | OXYGEN SATURATION: 96 % | TEMPERATURE: 97.6 F

## 2018-09-06 LAB
CREAT SERPL-MCNC: 0.81 MG/DL (ref 0.52–1.04)
GFR SERPL CREATININE-BSD FRML MDRD: 70 ML/MIN/1.7M2
GLUCOSE BLDC GLUCOMTR-MCNC: 126 MG/DL (ref 70–99)
GLUCOSE BLDC GLUCOMTR-MCNC: 144 MG/DL (ref 70–99)
HGB BLD-MCNC: 15.2 G/DL (ref 11.7–15.7)
POTASSIUM SERPL-SCNC: 3.8 MMOL/L (ref 3.4–5.3)
UPPER EUS: NORMAL

## 2018-09-06 PROCEDURE — 36000064 ZZH SURGERY LEVEL 4 EA 15 ADDTL MIN - UMMC: Performed by: INTERNAL MEDICINE

## 2018-09-06 PROCEDURE — 82565 ASSAY OF CREATININE: CPT | Performed by: ANESTHESIOLOGY

## 2018-09-06 PROCEDURE — 25000128 H RX IP 250 OP 636: Performed by: NURSE ANESTHETIST, CERTIFIED REGISTERED

## 2018-09-06 PROCEDURE — 84132 ASSAY OF SERUM POTASSIUM: CPT | Performed by: ANESTHESIOLOGY

## 2018-09-06 PROCEDURE — 27210794 ZZH OR GENERAL SUPPLY STERILE: Performed by: INTERNAL MEDICINE

## 2018-09-06 PROCEDURE — 85018 HEMOGLOBIN: CPT | Performed by: ANESTHESIOLOGY

## 2018-09-06 PROCEDURE — 25000125 ZZHC RX 250: Performed by: INTERNAL MEDICINE

## 2018-09-06 PROCEDURE — 82962 GLUCOSE BLOOD TEST: CPT

## 2018-09-06 PROCEDURE — 36000062 ZZH SURGERY LEVEL 4 1ST 30 MIN - UMMC: Performed by: INTERNAL MEDICINE

## 2018-09-06 PROCEDURE — 40000170 ZZH STATISTIC PRE-PROCEDURE ASSESSMENT II: Performed by: INTERNAL MEDICINE

## 2018-09-06 PROCEDURE — 88305 TISSUE EXAM BY PATHOLOGIST: CPT | Performed by: INTERNAL MEDICINE

## 2018-09-06 PROCEDURE — 71000014 ZZH RECOVERY PHASE 1 LEVEL 2 FIRST HR: Performed by: INTERNAL MEDICINE

## 2018-09-06 PROCEDURE — 37000008 ZZH ANESTHESIA TECHNICAL FEE, 1ST 30 MIN: Performed by: INTERNAL MEDICINE

## 2018-09-06 PROCEDURE — C9399 UNCLASSIFIED DRUGS OR BIOLOG: HCPCS | Performed by: NURSE ANESTHETIST, CERTIFIED REGISTERED

## 2018-09-06 PROCEDURE — 37000009 ZZH ANESTHESIA TECHNICAL FEE, EACH ADDTL 15 MIN: Performed by: INTERNAL MEDICINE

## 2018-09-06 PROCEDURE — 25000125 ZZHC RX 250: Performed by: NURSE ANESTHETIST, CERTIFIED REGISTERED

## 2018-09-06 PROCEDURE — 71000027 ZZH RECOVERY PHASE 2 EACH 15 MINS: Performed by: INTERNAL MEDICINE

## 2018-09-06 RX ORDER — DEXAMETHASONE SODIUM PHOSPHATE 4 MG/ML
INJECTION, SOLUTION INTRA-ARTICULAR; INTRALESIONAL; INTRAMUSCULAR; INTRAVENOUS; SOFT TISSUE PRN
Status: DISCONTINUED | OUTPATIENT
Start: 2018-09-06 | End: 2018-09-06

## 2018-09-06 RX ORDER — PROPOFOL 10 MG/ML
INJECTION, EMULSION INTRAVENOUS PRN
Status: DISCONTINUED | OUTPATIENT
Start: 2018-09-06 | End: 2018-09-06

## 2018-09-06 RX ORDER — FENTANYL CITRATE 50 UG/ML
25-50 INJECTION, SOLUTION INTRAMUSCULAR; INTRAVENOUS
Status: DISCONTINUED | OUTPATIENT
Start: 2018-09-06 | End: 2018-09-06 | Stop reason: HOSPADM

## 2018-09-06 RX ORDER — NALOXONE HYDROCHLORIDE 0.4 MG/ML
.1-.4 INJECTION, SOLUTION INTRAMUSCULAR; INTRAVENOUS; SUBCUTANEOUS
Status: DISCONTINUED | OUTPATIENT
Start: 2018-09-06 | End: 2018-09-06 | Stop reason: HOSPADM

## 2018-09-06 RX ORDER — FENTANYL CITRATE 50 UG/ML
INJECTION, SOLUTION INTRAMUSCULAR; INTRAVENOUS PRN
Status: DISCONTINUED | OUTPATIENT
Start: 2018-09-06 | End: 2018-09-06

## 2018-09-06 RX ORDER — EPHEDRINE SULFATE 50 MG/ML
INJECTION, SOLUTION INTRAMUSCULAR; INTRAVENOUS; SUBCUTANEOUS PRN
Status: DISCONTINUED | OUTPATIENT
Start: 2018-09-06 | End: 2018-09-06

## 2018-09-06 RX ORDER — ONDANSETRON 2 MG/ML
4 INJECTION INTRAMUSCULAR; INTRAVENOUS
Status: DISCONTINUED | OUTPATIENT
Start: 2018-09-06 | End: 2018-09-06 | Stop reason: HOSPADM

## 2018-09-06 RX ORDER — SODIUM CHLORIDE, SODIUM LACTATE, POTASSIUM CHLORIDE, CALCIUM CHLORIDE 600; 310; 30; 20 MG/100ML; MG/100ML; MG/100ML; MG/100ML
INJECTION, SOLUTION INTRAVENOUS CONTINUOUS
Status: DISCONTINUED | OUTPATIENT
Start: 2018-09-06 | End: 2018-09-06 | Stop reason: HOSPADM

## 2018-09-06 RX ORDER — PROPOFOL 10 MG/ML
INJECTION, EMULSION INTRAVENOUS CONTINUOUS PRN
Status: DISCONTINUED | OUTPATIENT
Start: 2018-09-06 | End: 2018-09-06

## 2018-09-06 RX ORDER — FLUMAZENIL 0.1 MG/ML
0.2 INJECTION, SOLUTION INTRAVENOUS
Status: DISCONTINUED | OUTPATIENT
Start: 2018-09-06 | End: 2018-09-06 | Stop reason: HOSPADM

## 2018-09-06 RX ORDER — LIDOCAINE 40 MG/G
CREAM TOPICAL
Status: DISCONTINUED | OUTPATIENT
Start: 2018-09-06 | End: 2018-09-06 | Stop reason: HOSPADM

## 2018-09-06 RX ORDER — ONDANSETRON 2 MG/ML
INJECTION INTRAMUSCULAR; INTRAVENOUS PRN
Status: DISCONTINUED | OUTPATIENT
Start: 2018-09-06 | End: 2018-09-06

## 2018-09-06 RX ORDER — ONDANSETRON 2 MG/ML
4 INJECTION INTRAMUSCULAR; INTRAVENOUS EVERY 30 MIN PRN
Status: DISCONTINUED | OUTPATIENT
Start: 2018-09-06 | End: 2018-09-06 | Stop reason: HOSPADM

## 2018-09-06 RX ORDER — LIDOCAINE HYDROCHLORIDE 20 MG/ML
INJECTION, SOLUTION INFILTRATION; PERINEURAL PRN
Status: DISCONTINUED | OUTPATIENT
Start: 2018-09-06 | End: 2018-09-06

## 2018-09-06 RX ORDER — ONDANSETRON 4 MG/1
4 TABLET, ORALLY DISINTEGRATING ORAL EVERY 30 MIN PRN
Status: DISCONTINUED | OUTPATIENT
Start: 2018-09-06 | End: 2018-09-06 | Stop reason: HOSPADM

## 2018-09-06 RX ORDER — SODIUM CHLORIDE, SODIUM LACTATE, POTASSIUM CHLORIDE, CALCIUM CHLORIDE 600; 310; 30; 20 MG/100ML; MG/100ML; MG/100ML; MG/100ML
INJECTION, SOLUTION INTRAVENOUS CONTINUOUS PRN
Status: DISCONTINUED | OUTPATIENT
Start: 2018-09-06 | End: 2018-09-06

## 2018-09-06 RX ADMIN — Medication 5 MG: at 13:06

## 2018-09-06 RX ADMIN — PHENYLEPHRINE HYDROCHLORIDE 100 MCG: 10 INJECTION, SOLUTION INTRAMUSCULAR; INTRAVENOUS; SUBCUTANEOUS at 13:11

## 2018-09-06 RX ADMIN — PHENYLEPHRINE HYDROCHLORIDE 100 MCG: 10 INJECTION, SOLUTION INTRAMUSCULAR; INTRAVENOUS; SUBCUTANEOUS at 14:01

## 2018-09-06 RX ADMIN — WATER 900 ML: 100 IRRIGANT IRRIGATION at 14:00

## 2018-09-06 RX ADMIN — PROPOFOL 150 MG: 10 INJECTION, EMULSION INTRAVENOUS at 12:52

## 2018-09-06 RX ADMIN — SUGAMMADEX 200 MG: 100 INJECTION, SOLUTION INTRAVENOUS at 14:05

## 2018-09-06 RX ADMIN — SIMETHICONE 133 MG: 63.3; 3.7 SOLUTION/ DROPS ORAL at 13:59

## 2018-09-06 RX ADMIN — ONDANSETRON 4 MG: 2 INJECTION INTRAMUSCULAR; INTRAVENOUS at 13:15

## 2018-09-06 RX ADMIN — PROPOFOL 150 MCG/KG/MIN: 10 INJECTION, EMULSION INTRAVENOUS at 12:52

## 2018-09-06 RX ADMIN — FENTANYL CITRATE 100 MCG: 50 INJECTION, SOLUTION INTRAMUSCULAR; INTRAVENOUS at 12:52

## 2018-09-06 RX ADMIN — ROCURONIUM BROMIDE 30 MG: 10 INJECTION INTRAVENOUS at 12:52

## 2018-09-06 RX ADMIN — DEXAMETHASONE SODIUM PHOSPHATE 4 MG: 4 INJECTION, SOLUTION INTRA-ARTICULAR; INTRALESIONAL; INTRAMUSCULAR; INTRAVENOUS; SOFT TISSUE at 13:15

## 2018-09-06 RX ADMIN — LIDOCAINE HYDROCHLORIDE 50 MG: 20 INJECTION, SOLUTION INFILTRATION; PERINEURAL at 12:52

## 2018-09-06 RX ADMIN — SODIUM CHLORIDE, POTASSIUM CHLORIDE, SODIUM LACTATE AND CALCIUM CHLORIDE: 600; 310; 30; 20 INJECTION, SOLUTION INTRAVENOUS at 12:30

## 2018-09-06 RX ADMIN — SIMETHICONE 133 MG: 63.3; 3.7 SOLUTION/ DROPS ORAL at 14:00

## 2018-09-06 NOTE — IP AVS SNAPSHOT
MRN:[id number]                      After Visit Summary   9/6/2018    Sarah Felix    MRN: [id number]           Thank you!     Thank you for choosing [facility] for your care. Our goal is always to provide you with excellent care. Hearing back from our patients is one way we can continue to improve our services. Please take a few minutes to complete the written survey that you may receive in the mail after you visit with us. Thank you!        Patient Information     Date Of Birth          1945        About your hospital stay     You were admitted on:  September 6, 2018 You last received care in the:  Same Day Surgery [facility]    You were discharged on:  September 6, 2018       Who to Call     For medical emergencies, please call 911.  For non-urgent questions about your medical care, please call your primary care provider or clinic, [phone]  For questions related to your surgery, please call your surgery clinic        Attending Provider     Provider Hood Felix MD Gastroenterology       Primary Care Provider Office Phone # Fax #    Jem Daniel Irwin Wegener, [phone]      After Care Instructions     Discharge Instructions       No driving or operating machinery until the day after procedure.            Discharge Instructions       Recommend that a responsible adult remain with the patient at home for 24 hours post discharge.            Discharge Instructions       Start with clear liquids, sips of water 1 hour after procedure. If no abdominal pain and gag reflex has returned, advance as tolerated to pre-procedure diet.              Discharge Instructions       Restart home medications.            Discharge Instructions       Check with your Provider when to start anticoagulant medication.            Discharge Instructions       No ALCOHOL 24 hours post procedure.                  Your next 10 appointments already scheduled     Oct 02, 2018   1:00 PM CDT   (Arrive by 12:45 PM)   New Patient Visit with Tyler Stallworth MD   [facility] ([facility])    [address]  [address] [facility] [address] [phone]            Oct 24, 2018  3:00 PM CDT   SHORT with Joel Daniel Irwin Wegener, MD   [facility] ([facility])    [address] [place] [address] [phone]              Further instructions from your care team       [facility]  Same-Day Surgery   Adult Discharge Orders & Instructions     For 24 hours after surgery    1. Get plenty of rest.  A responsible adult must stay with you for at least 24 hours after you leave the hospital.   2. Do not drive or use heavy equipment.  If you have weakness or tingling, don't drive or use heavy equipment until this feeling goes away.  3. Do not drink alcohol.  4. Avoid strenuous or risky activities.  Ask for help when climbing stairs.   5. You may feel lightheaded.  IF so, sit for a few minutes before standing.  Have someone help you get up.   6. If you have nausea (feel sick to your stomach): Drink only clear liquids such as apple juice, ginger ale, broth or 7-Up.  Rest may also help.  Be sure to drink enough fluids.  Move to a regular diet as you feel able.  7. You may have a slight fever. Call the doctor if your fever is over 100.5 F (37.7 C) (taken under the tongue) or lasts longer than 24 hours.  8. You may have a dry mouth, a sore throat, muscle aches or trouble sleeping.  These should go away after 24 hours.  9. Do not make important or legal decisions.   Call your doctor for any of the followin.  Signs of infection (fever, growing tenderness at the surgery site, a large amount of drainage or bleeding, severe pain, foul-smelling drainage, redness, swelling).    2. It has been over 8 to 10 hours since surgery and you are still not able to urinate (pass  "water).    3.  Headache for over 24 hours.      To contact a doctor, call Dr. Brower at [phone] or [phone]  or:        [phone] and ask for the resident on call for Gastroenterology (answered 24 hours a day)      Emergency Department:    [facility]: [phone]       ([facility] for hearing impaired: [phone])      Take it easy when you get home.  Remember, same day surgery DOES NOT MEAN SAME DAY RECOVERY!  Healing is a gradual process.  You will need some time to recover - you may be more tired than you realize at first.  Rest and relax for at least the first 24 hours at home.  You'll feel better and heal faster if you take good care of yourself.        Pending Results     Date and Time Order Name Status Description    9/6/2018 1340 Surgical pathology exam In process             Admission Information     Date & Time Provider Department Dept. Phone    9/6/2018 Hood Brower MD Same Day Surgery [facility] [phone]      Your Vitals Were     Blood Pressure Temperature Respirations Height Weight Pulse Oximetry    116/65 (BP Location: Right arm) 97.5  F (36.4  C) (Oral) 14 1.651 m (5' 5\") 89 kg (196 lb 3.4 oz) 97%    BMI (Body Mass Index)                   32.65 kg/m2           [facility] Information     [facility] gives you secure access to your electronic health record. If you see a primary care provider, you can also send messages to your care team and make appointments. If you have questions, please call your primary care clinic.  If you do not have a primary care provider, please call [phone] and they will assist you.        Care EveryWhere ID     This is your Care EveryWhere ID. This could be used by other organizations to access your [facility] medical records  [id number]        Equal Access to Services     KILLIAN CASON : Jennie Sampson, ivkas pollack, jamila flores. So [facility] [phone].    ATENCIÓN: Si habla " español, tiene a meza disposición servicios gratuitos de asistencia lingüística. Jorge Alberto watt [phone].    We comply with applicable federal civil rights laws and [place] laws. We do not discriminate on the basis of race, color, national origin, age, disability, sex, sexual orientation, or gender identity.               Review of your medicines      CONTINUE these medicines which have NOT CHANGED        Dose / Directions    ACE/ARB/ARNI NOT PRESCRIBED (INTENTIONAL)        by Other route continuous prn.   Refills:  0       aspirin 81 MG tablet   Used for:  Health care maintenance        Dose:  162 mg   Take 2 tablets (162 mg) by mouth daily   Quantity:  60 tablet   Refills:  0       atorvastatin 40 MG tablet   Commonly known as:  LIPITOR   Used for:  Hyperlipidemia LDL goal <100        Dose:  40 mg   Take 1 tablet (40 mg) by mouth daily   Quantity:  90 tablet   Refills:  3       BD REY U/F 32G X 4 MM   Used for:  Type 2 diabetes mellitus with stage 2 chronic kidney disease, with long-term current use of insulin (H)   Generic drug:  insulin pen needle        USE ONCE DAILY AS DIRECTED WITH JENY DIETRICH   Quantity:  90 each   Refills:  3       blood glucose monitoring meter device kit   Used for:  Type 2 diabetes, HbA1c goal < 7% (H)        Use to test blood sugars 2 times daily as directed.   Quantity:  1 kit   Refills:  0       blood glucose monitoring test strip   Commonly known as:  ONETOUCH ULTRA   Used for:  Type 2 diabetes, HbA1c goal < 7% (H)        1 strip by In Vitro route 4 times daily.   Quantity:  360 each   Refills:  1       CALCIUM 500 + D 500-200 MG-IU Tabs   Used for:  Osteopenia        one tab twice per day   Quantity:  100   Refills:  0       COLACE PO   Used for:  Vaginal discharge        Take  by mouth as needed.   Refills:  0       furosemide 20 MG tablet   Commonly known as:  LASIX   Used for:  Dependent edema, CKD (chronic kidney disease) stage 2, GFR 60-89 ml/min        TAKE 2 TABLETS IN  "THE MORNING AND 1 TABLET IN THE AFTERNOON   Quantity:  270 tablet   Refills:  1       glimepiride 4 MG tablet   Commonly known as:  AMARYL   Used for:  Type 2 diabetes mellitus with stage 2 chronic kidney disease, without long-term current use of insulin (H)        TAKE 1 TABLET TWICE A DAY WITH MEALS (BREAKFAST AND DINNER)   Quantity:  180 tablet   Refills:  3       insulin syringe-needle U-100 31G X 5/16\" 1 ML   Commonly known as:  BD insulin syringe ULTRAFINE   Used for:  Type 2 diabetes mellitus with stage 2 chronic kidney disease, without long-term current use of insulin (H)        Use 1 syringes daily or as directed. Prescription 4mm Trish 32g (5/32\" x 0.23mm) please.   Quantity:  90 each   Refills:  3       LANTUS SOLOSTAR 100 UNIT/ML injection   Used for:  Type 2 diabetes mellitus with stage 2 chronic kidney disease, with long-term current use of insulin (H)   Generic drug:  insulin glargine        Inject between 45 units and 60 units at bedtime.  Needs four boxes for three month supply.   Quantity:  60 mL   Refills:  3       lisinopril 2.5 MG tablet   Commonly known as:  PRINIVIL/Zestril   Used for:  CKD (chronic kidney disease) stage 2, GFR 60-89 ml/min        TAKE 1 TABLET DAILY   Quantity:  90 tablet   Refills:  3       MAALOX REGULAR STRENGTH 200-200-20 MG/5ML Susp suspension   Generic drug:  alum & mag hydroxide-simethicone        Dose:  30 mL   Take 30 mLs by mouth as needed   Refills:  0       metoprolol tartrate 25 MG tablet   Commonly known as:  LOPRESSOR   Used for:  Essential hypertension        TAKE 1 TABLET TWICE A DAY   Quantity:  180 tablet   Refills:  2       ONETOUCH DELICA LANCETS 33G Misc   Used for:  Type 2 diabetes, HbA1c goal < 7% (H)        Dose:  1 each   1 each 4 times daily   Quantity:  360 each   Refills:  1       pantoprazole 40 MG EC tablet   Commonly known as:  PROTONIX   Used for:  Peptic stricture of esophagus        Dose:  40 mg   Take 1 tablet (40 mg) by mouth daily Take " 30-60 minutes before a meal.   Quantity:  30 tablet   Refills:  3       potassium chloride SA 20 MEQ CR tablet   Commonly known as:  KLOR-CON   Used for:  Hypokalemia        Dose:  20 mEq   Take 1 tablet (20 mEq) by mouth daily   Quantity:  180 tablet   Refills:  2       triamcinolone 0.1 % cream   Commonly known as:  KENALOG   Used for:  Contact dermatitis and other eczema, due to unspecified cause        Apply sparingly to affected area two times daily as needed   Quantity:  80 g   Refills:  3       VITAMIN D PO        Take  by mouth. Pt consuming 3000 units per day   Refills:  0                Protect others around you: Learn how to safely use, store and throw away your medicines at www.disposemymeds.org.             Medication List: This is a list of all your medications and when to take them. Check marks below indicate your daily home schedule. Keep this list as a reference.      Medications           Morning Afternoon Evening Bedtime As Needed    ACE/ARB/ARNI NOT PRESCRIBED (INTENTIONAL)   by Other route continuous prn.                                aspirin 81 MG tablet   Take 2 tablets (162 mg) by mouth daily                                atorvastatin 40 MG tablet   Commonly known as:  LIPITOR   Take 1 tablet (40 mg) by mouth daily                                BD REY U/F 32G X 4 MM   USE ONCE DAILY AS DIRECTED WITH LANTUS SOLOSTAR   Generic drug:  insulin pen needle                                blood glucose monitoring meter device kit   Use to test blood sugars 2 times daily as directed.                                blood glucose monitoring test strip   Commonly known as:  ONETOUCH ULTRA   1 strip by In Vitro route 4 times daily.                                CALCIUM 500 + D 500-200 MG-IU Tabs   one tab twice per day                                COLACE PO   Take  by mouth as needed.                                furosemide 20 MG tablet   Commonly known as:  LASIX   TAKE 2 TABLETS IN THE MORNING  "AND 1 TABLET IN THE AFTERNOON                                glimepiride 4 MG tablet   Commonly known as:  AMARYL   TAKE 1 TABLET TWICE A DAY WITH MEALS (BREAKFAST AND DINNER)                                insulin syringe-needle U-100 31G X 5/16\" 1 ML   Commonly known as:  BD insulin syringe ULTRAFINE   Use 1 syringes daily or as directed. Prescription 4mm Trish 32g (5/32\" x 0.23mm) please.                                LANTUS SOLOSTAR 100 UNIT/ML injection   Inject between 45 units and 60 units at bedtime.  Needs four boxes for three month supply.   Generic drug:  insulin glargine                                lisinopril 2.5 MG tablet   Commonly known as:  PRINIVIL/Zestril   TAKE 1 TABLET DAILY                                MAALOX REGULAR STRENGTH 200-200-20 MG/5ML Susp suspension   Take 30 mLs by mouth as needed   Generic drug:  alum & mag hydroxide-simethicone                                metoprolol tartrate 25 MG tablet   Commonly known as:  LOPRESSOR   TAKE 1 TABLET TWICE A DAY                                ONETOUCH DELICA LANCETS 33G Misc   1 each 4 times daily                                pantoprazole 40 MG EC tablet   Commonly known as:  PROTONIX   Take 1 tablet (40 mg) by mouth daily Take 30-60 minutes before a meal.                                potassium chloride SA 20 MEQ CR tablet   Commonly known as:  KLOR-CON   Take 1 tablet (20 mEq) by mouth daily                                triamcinolone 0.1 % cream   Commonly known as:  KENALOG   Apply sparingly to affected area two times daily as needed                                VITAMIN D PO   Take  by mouth. Pt consuming 3000 units per day                                  "

## 2018-09-06 NOTE — OR NURSING
Writer spoke with Dr. Milton (anesthesia) and he states patient may transfer from PACU to phase II recovery and that he will enter sign out.

## 2018-09-06 NOTE — IP AVS SNAPSHOT
Same Day Surgery [address] [address]    Phone:  [phone]                                       After Visit Summary   9/6/2018    Sarah Felix    MRN: [id number]           After Visit Summary Signature Page     I have received my discharge instructions, and my questions have been answered. I have discussed any challenges I see with this plan with the nurse or doctor.    ..........................................................................................................................................  Patient/Patient Representative Signature      ..........................................................................................................................................  Patient Representative Print Name and Relationship to Patient    ..................................................               ................................................  Date                                            Time    ..........................................................................................................................................  Reviewed by Signature/Title    ...................................................              ..............................................  Date                                                            Time          22EPIC Rev 08/18

## 2018-09-06 NOTE — ANESTHESIA POSTPROCEDURE EVALUATION
Patient: Sarah Felix    Procedure(s):  Endoscopic Ultrasound, Esophagogastroduodenoscopy with endoscopic mucosal resection - Wound Class: II-Clean Contaminated   - Wound Class: II-Clean Contaminated    Diagnosis:Duodenal Polyp  Diagnosis Additional Information: No value filed.    Anesthesia Type:  General    Note:  Anesthesia Post Evaluation    Patient location during evaluation: PACU  Patient participation: Able to fully participate in evaluation  Level of consciousness: awake  Pain management: satisfactory to patient  Cardiovascular status: acceptable  Respiratory status: acceptable  Hydration status: acceptable  PONV: controlled             Last vitals:  Vitals:    09/06/18 1430 09/06/18 1445 09/06/18 1500   BP: 113/62 120/67 114/64   Resp: 14 12 14   Temp: 35.8  C (96.4  F) 36.4  C (97.5  F) 36.1  C (97  F)   SpO2: 98% 95% 95%         Electronically Signed By: Dottie Milton MD  September 6, 2018  3:34 PM

## 2018-09-06 NOTE — ANESTHESIA PREPROCEDURE EVALUATION
Anesthesia Plan      History & Physical Review      ASA Status:  2 .    NPO Status:  > 6 hours    Plan for General with Intravenous induction. Maintenance will be Inhalation.           Postoperative Care  Postoperative pain management:  IV analgesics.      Consents  Anesthetic plan, risks, benefits and alternatives discussed with:  Patient..                          .

## 2018-09-06 NOTE — BRIEF OP NOTE
Upper EUS 09/06/2018 12:56 PM [facility], [address] [place] [address] [phone]     Endoscopy Department   _______________________________________________________________________________   Patient Name: Sarah Felix       Procedure Date: 9/6/2018 12:56 PM   MRN: [id number]                       Account Number: [id number]   YOB: 1945               Admit Type: Outpatient   Age: 73                                Gender: Female   Note Status: Finalized                Attending MD: Hood Brower MD   Pause for the Cause: time out performed Total Sedation Time:   _______________________________________________________________________________       Procedure:           Upper EUS   Indications:         For therapy of polyps in the duodenum; pt underwent EGD                        for dysphagia on 8/13/18 and was incidentally found to                        have a 15 mm duodenal polyp in D2 and a smaller polyp in                        D3. 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 Plan                        for EUS to assess resectability followed by EMR.   Providers:           Hood Brower MD   Referring MD:        Joel D. Wegener, MD   Medicines:           General Anesthesia   Complications:       No immediate complications. Estimated blood loss:                        Minimal.   _______________________________________________________________________________   Procedure:           Pre-Anesthesia Assessment:                        - Prior to the procedure, a History and Physical was                        performed, and patient medications and allergies were                        reviewed. The patient is competent. The risks and                        benefits of the procedure and the sedation options and                        risks were discussed with the patient. All questions                        were answered and informed consent was obtained.  Patient                        identification and proposed procedure were verified by                        the physician, the nurse, the anesthesiologist and the                        anesthetist in the procedure room. Mental Status                        Examination: alert and oriented. Airway Examination:                        normal oropharyngeal airway and neck mobility.                        Respiratory Examination: clear to auscultation. CV                        Examination: normal. Prophylactic Antibiotics: The                        patient does not require prophylactic antibiotics. Prior                        Anticoagulants: The patient has taken no previous                        anticoagulant or antiplatelet agents. ASA Grade                        Assessment: III - A patient with severe systemic                        disease. After reviewing the risks and benefits, the                        patient was deemed in satisfactory condition to undergo                        the procedure. The anesthesia plan was to use general                        anesthesia. Immediately prior to administration of                        medications, the patient was re-assessed for adequacy to                        receive sedatives. The heart rate, respiratory rate,                        oxygen saturations, blood pressure, adequacy of                        pulmonary ventilation, and response to care were                        monitored throughout the procedure. The physical status                        of the patient was re-assessed after the procedure.                        After obtaining informed consent, the endoscope was                        passed under direct vision. Throughout the procedure,                        the patient's blood pressure, pulse, and oxygen                        saturations were monitored continuously. The                        Endosonoscope was introduced through the mouth,  and                        advanced to the second part of duodenum. The Colonoscope                        was introduced through the mouth, and advanced to the                        third part of duodenum. The upper EUS was accomplished                        without difficulty. The patient tolerated the procedure                        well.                                                                                     Findings:        Endosonographic Finding :        A hypoechoic sessile polyp was identified endosonographically in the        second portion of the duodenum. The mass measured 17 mm by 8 mm in        maximal cross-sectional diameter. The lesion appeared confined to the        mucosa, without extension into deeper wall layers. The endosonographic        borders were well-defined.        One stone was visualized endosonographically in the gallbladder body.        The stone measured 12 mm in greatest dimension. The stone was oval. It        was hyperechoic.        Endoscopic Finding :        Two 2 to 6 mm sessile polyps (Sandra classification 0-IIa) were found in        the third portion of the duodenum. 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 Preparations were made for        mucosal resection. Cold snare mucosal resection was performed. Resection        and retrieval were complete. To close a defect after mucosal resection,        two hemostatic clips were successfully placed (MR conditional) on the 6        mm polyp. There was no bleeding at the end of the procedure.        A single 15 mm sessile polyp (Sandra classification 0-IIa) was found in        the second portion of the duodenum on the lateral wall distal to the        major papilla. Preparations were made for mucosal resection. Piecemeal        mucosal resection using a cold snare was performed. Resection and        retrieval were complete. There was no active bleeding at the end of the         maneuver. To close a defect after mucosal resection, four hemostatic        clips were successfully placed (MR conditional). There was no bleeding        at the end of the procedure.        A few small sessile polyps were found in the gastric fundus consistent        with benign fundic gland polyps.        Previously noted benign-appearing, intrinsic stenosis was found 35 cm        from the incisors at the GE junction. This stenosis was mildly severe        (non-circumferential scarring) with heaped up margins. The stenosis was        traversed. Biopsies were taken with a cold forceps for histology.        Verification of patient identification for the specimen was done by the        physician and nurse using the patient's name, birth date and medical        record number. Estimated blood loss was minimal.        The exam of the esophagus was otherwise normal.                                                                                     Impression:          - EUS: 15 polyp in the second portion of the duodenum                        arising from the mucosa without evidence of invasion                        into the muscularis propria.                        - Two 2-6 mm duodenal polyps in third portion.                        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 Resected and retrieved. Clips (MR                        conditional) were placed at the 6 mm polypectomy defect.                        - Piecemeal cold snare EMR performed of the 15 mm                        duodenal polyp. Resected and retrieved. Clips (MR                        conditional) were placed to close the defect.                        - Cholelithiasis incidentally noted in gallbladder fossa.                        - A few benign gastric fundic gland polyps.                        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 Heaped up                        margins biopsied.   Recommendation:      - Discharge patient to home (ambulatory).                        - Await path results.                        - Repeat an upper GI endoscopy in the OR in 3-6 months                        after piecemeal resection of duodenal polyp.                        - Patient still notes reflux/regurgitation symptoms                        along with occasional solid food dysphagia despite PPI                        therapy. I suspect this is due to ongoing reflux from                        her large hiatal hernia also causing reflux related                        dysmotility rather than from the mild stenosis at the GE                        junction that was previously dilated to 18 mm. The next                        step in management would be to consider surgical                        treatment with a pH/Impedance and manometry study prior                        to, which was discussed with the patient but at this                        time she feels that her symptoms are not that bad and                        she has found behavioral modifications that are                        compensating for this. I recommended that if symptoms                        worsen, then we can reassess in clinic.                                                                                       Hood Brower MD

## 2018-09-06 NOTE — TELEPHONE ENCOUNTER
Sarah called to confirm that she will be coming for her procedure today at 1045 AM for check-in.   Patient has been NPO.      09/06/2018 @ 933 A

## 2018-09-06 NOTE — ANESTHESIA CARE TRANSFER NOTE
Patient: Sarah Felix    Procedure(s):  Endoscopic Ultrasound, Esophagogastroduodenoscopy with endoscopic mucosal resection - Wound Class: II-Clean Contaminated   - Wound Class: II-Clean Contaminated    Diagnosis: Duodenal Polyp  Diagnosis Additional Information: No value filed.    Anesthesia Type:   General     Note:  Airway :Nasal Cannula  Patient transferred to:PACU  Comments: To PACU, VSS, airway patent, RN at bedside. Patient awake and alert.Handoff Report: Identifed the Patient, Identified the Reponsible Provider, Reviewed the pertinent medical history, Discussed the surgical course, Reviewed Intra-OP anesthesia mangement and issues during anesthesia, Set expectations for post-procedure period and Allowed opportunity for questions and acknowledgement of understanding      Vitals: (Last set prior to Anesthesia Care Transfer)    CRNA VITALS  9/6/2018 1345 - 9/6/2018 1419      9/6/2018             Resp Rate (observed): 16    EKG: Sinus rhythm                Electronically Signed By: MALATHI Perez CRNA  September 6, 2018  2:19 PM

## 2018-09-06 NOTE — DISCHARGE INSTRUCTIONS
[facility]  Same-Day Surgery   Adult Discharge Orders & Instructions     For 24 hours after surgery    1. Get plenty of rest.  A responsible adult must stay with you for at least 24 hours after you leave the hospital.   2. Do not drive or use heavy equipment.  If you have weakness or tingling, don't drive or use heavy equipment until this feeling goes away.  3. Do not drink alcohol.  4. Avoid strenuous or risky activities.  Ask for help when climbing stairs.   5. You may feel lightheaded.  IF so, sit for a few minutes before standing.  Have someone help you get up.   6. If you have nausea (feel sick to your stomach): Drink only clear liquids such as apple juice, ginger ale, broth or 7-Up.  Rest may also help.  Be sure to drink enough fluids.  Move to a regular diet as you feel able.  7. You may have a slight fever. Call the doctor if your fever is over 100.5 F (37.7 C) (taken under the tongue) or lasts longer than 24 hours.  8. You may have a dry mouth, a sore throat, muscle aches or trouble sleeping.  These should go away after 24 hours.  9. Do not make important or legal decisions.   Call your doctor for any of the followin.  Signs of infection (fever, growing tenderness at the surgery site, a large amount of drainage or bleeding, severe pain, foul-smelling drainage, redness, swelling).    2. It has been over 8 to 10 hours since surgery and you are still not able to urinate (pass water).    3.  Headache for over 24 hours.      To contact a doctor, call Dr. Brower at [phone] or [phone]  or:        [phone] and ask for the resident on call for Gastroenterology (answered 24 hours a day)      Emergency Department:    [facility]: [phone]       (TTY for hearing impaired: [phone])      Take it easy when you get home.  Remember, same day surgery DOES NOT MEAN SAME DAY RECOVERY!  Healing is a gradual process.  You will need some time to recover -  you may be more tired than you realize at first.  Rest and relax for at least the first 24 hours at home.  You'll feel better and heal faster if you take good care of yourself.

## 2018-09-06 NOTE — TELEPHONE ENCOUNTER
Patient currently admitted in hospital.    Will address refill upon patient's discharge.    MEGAN MunozN, RN

## 2018-09-07 LAB — COPATH REPORT: NORMAL

## 2018-09-07 RX ORDER — METOPROLOL TARTRATE 25 MG/1
TABLET, FILM COATED ORAL
Qty: 180 TABLET | Refills: 0 | Status: SHIPPED | OUTPATIENT
Start: 2018-09-07 | End: 2018-11-26

## 2018-09-11 ENCOUNTER — CARE COORDINATION (OUTPATIENT)
Dept: GASTROENTEROLOGY | Facility: CLINIC | Age: 73
End: 2018-09-11

## 2018-09-11 DIAGNOSIS — K31.7 POLYP OF DUODENUM: Primary | ICD-10-CM

## 2018-09-11 NOTE — PROGRESS NOTES
[facility] RN Care Coordination Note:    Procedure requested: Repeat EGD in the OR.     Indication: Surveillance for duodenal polyps    Urgency: 3-6 months, Due between Dec 2018 - March 2019.     Pre procedure testing needed: Pre op H&P.     PAC appointment: only if patient requests.      Orders placed for follow up procedure and routed to scheduling.     Jerrica Rao RN   Care Coordinator   [phone]

## 2018-09-26 ENCOUNTER — TELEPHONE (OUTPATIENT)
Dept: FAMILY MEDICINE | Facility: CLINIC | Age: 73
End: 2018-09-26

## 2018-09-26 NOTE — TELEPHONE ENCOUNTER
Reason for Call:  Medication or medication refill:    Do you use a [facility]?  Name of the pharmacy and phone number for the current request:  [facility] - [address]    Name of the medication requested: metoprolol tartrate (LOPRESSOR) 25 MG tablet    Other request: Patient is requesting a call back as to why this medication was prescribed for only 3 months and no refills when she's usually given one year. Please assist. Thanks!    Can we leave a detailed message on this number? YES    Phone number patient can be reached at: Home number on file [phone] (home)    Best Time: Any    Call taken on 9/26/2018 at 4:47 PM by Tamra Foley

## 2018-09-27 NOTE — TELEPHONE ENCOUNTER
FUTURE VISIT INFORMATION      FUTURE VISIT INFORMATION:    Date: 10/2/18    Time: 1:00PM    Location: [facility]  REFERRAL INFORMATION:    Referring provider:  Dr Partha Johnson    Referring providers clinic:  [facility]    Reason for visit/diagnosis  biopsy of the lesion when it redevelops to try to identify the etiology of the lesion     RECORDS REQUESTED FROM:       Clinic name Comments Records Status Imaging Status   [facility] 8/631/18 consult with Dr Johnson [facility]/ 6/25/18 notes EPIC                              RECORDS STATUS

## 2018-10-02 ENCOUNTER — PRE VISIT (OUTPATIENT)
Dept: OTOLARYNGOLOGY | Facility: CLINIC | Age: 73
End: 2018-10-02

## 2018-10-08 ENCOUNTER — TELEPHONE (OUTPATIENT)
Dept: GASTROENTEROLOGY | Facility: CLINIC | Age: 73
End: 2018-10-08

## 2018-10-10 ENCOUNTER — CARE COORDINATION (OUTPATIENT)
Dept: GASTROENTEROLOGY | Facility: CLINIC | Age: 73
End: 2018-10-10

## 2018-10-10 ENCOUNTER — TELEPHONE (OUTPATIENT)
Dept: GASTROENTEROLOGY | Facility: CLINIC | Age: 73
End: 2018-10-10

## 2018-10-10 NOTE — PROGRESS NOTES
[facility] RN Care Coordination Note:    Called and left a message for patient informing her that she should be taking her protonix medication once daily 30-60 min prior to a meal. Informed her if she has additional questions she can contact me directly.       Jerrica Rao RN   Care Coordinator   [phone]

## 2018-10-10 NOTE — TELEPHONE ENCOUNTER
Sarah's EGD with Dr. Brower is due in 3-6 months from her last one.   I offered her Dec, pt is choosing to wait until 6 months and get this done in March.  Patient is scheduled on 03/14/19 at 215 P with an arrival time of 1215 P.  Patient instructed to get a pre-op physical done prior to her procedure, she was advised to get this done last Feb, early March to fall within 30 days of her procedure.  Patient knows she will need to arrange for a  and someone to monitor her for at least 24 hours post.  All instructions along with the pre-op form has been mailed to pt and [facility].  Pt had questions re: medications, advised that I am not a nurse, I will send Dr. Brower's RNCC a message to F/U.   Patient's address listed in EPIC was confirmed during this call.    SR 10/10/18 @ 1004 A

## 2018-10-22 ENCOUNTER — OFFICE VISIT (OUTPATIENT)
Dept: FAMILY MEDICINE | Facility: CLINIC | Age: 73
End: 2018-10-22
Payer: COMMERCIAL

## 2018-10-22 VITALS
OXYGEN SATURATION: 97 % | DIASTOLIC BLOOD PRESSURE: 77 MMHG | TEMPERATURE: 97.9 F | RESPIRATION RATE: 18 BRPM | SYSTOLIC BLOOD PRESSURE: 128 MMHG | WEIGHT: 197 LBS | HEIGHT: 65 IN | BODY MASS INDEX: 32.82 KG/M2 | HEART RATE: 76 BPM

## 2018-10-22 DIAGNOSIS — I10 HYPERTENSION GOAL BP (BLOOD PRESSURE) < 140/90: Chronic | ICD-10-CM

## 2018-10-22 DIAGNOSIS — E78.5 HYPERLIPIDEMIA LDL GOAL <100: ICD-10-CM

## 2018-10-22 DIAGNOSIS — K14.6 TONGUE BURNING SENSATION: ICD-10-CM

## 2018-10-22 DIAGNOSIS — E11.22 TYPE 2 DIABETES MELLITUS WITH STAGE 2 CHRONIC KIDNEY DISEASE, WITH LONG-TERM CURRENT USE OF INSULIN (H): Primary | ICD-10-CM

## 2018-10-22 DIAGNOSIS — Z23 NEED FOR PROPHYLACTIC VACCINATION AND INOCULATION AGAINST INFLUENZA: ICD-10-CM

## 2018-10-22 DIAGNOSIS — Z79.4 TYPE 2 DIABETES MELLITUS WITH STAGE 2 CHRONIC KIDNEY DISEASE, WITH LONG-TERM CURRENT USE OF INSULIN (H): Primary | ICD-10-CM

## 2018-10-22 DIAGNOSIS — N18.2 TYPE 2 DIABETES MELLITUS WITH STAGE 2 CHRONIC KIDNEY DISEASE, WITH LONG-TERM CURRENT USE OF INSULIN (H): Primary | ICD-10-CM

## 2018-10-22 LAB — HBA1C MFR BLD: 8.3 % (ref 0–5.6)

## 2018-10-22 PROCEDURE — 82043 UR ALBUMIN QUANTITATIVE: CPT | Performed by: FAMILY MEDICINE

## 2018-10-22 PROCEDURE — 99207 C FOOT EXAM  NO CHARGE: CPT | Mod: 25 | Performed by: FAMILY MEDICINE

## 2018-10-22 PROCEDURE — 99213 OFFICE O/P EST LOW 20 MIN: CPT | Mod: 25 | Performed by: FAMILY MEDICINE

## 2018-10-22 PROCEDURE — 90662 IIV NO PRSV INCREASED AG IM: CPT | Performed by: FAMILY MEDICINE

## 2018-10-22 PROCEDURE — 36415 COLL VENOUS BLD VENIPUNCTURE: CPT | Performed by: FAMILY MEDICINE

## 2018-10-22 PROCEDURE — G0008 ADMIN INFLUENZA VIRUS VAC: HCPCS | Performed by: FAMILY MEDICINE

## 2018-10-22 PROCEDURE — 80061 LIPID PANEL: CPT | Performed by: FAMILY MEDICINE

## 2018-10-22 PROCEDURE — 83036 HEMOGLOBIN GLYCOSYLATED A1C: CPT | Performed by: FAMILY MEDICINE

## 2018-10-22 NOTE — PATIENT INSTRUCTIONS
"ASSESSMENT AND PLAN  1. Type 2 diabetes mellitus with stage 2 chronic kidney disease, with long-term current use of insulin (H)  Increase lantus (starting at 52 units daily)  Two units weekly until fasting sugars 100-130.      Follow up three months for next a1c.      Follow up with me for pre-op in march.       - Hemoglobin A1c; Future  - Albumin Random Urine Quantitative with Creat Ratio; Future  - Hemoglobin A1c  - Albumin Random Urine Quantitative with Creat Ratio    2. Tongue burning sensation  Unclear cause.  If allergy referral desired please let me know.         3. Hypertension goal BP (blood pressure) < 140/90    - Albumin Random Urine Quantitative with Creat Ratio; Future  - Albumin Random Urine Quantitative with Creat Ratio    4. Hyperlipidemia LDL goal <100    - Lipid panel reflex to direct LDL Fasting; Future  - Lipid panel reflex to direct LDL Fasting        MYCHART FOR ON-LINE CARE(VISITS), LABS, REFILLS, MESSAGING, ETC [email or web address]    E-VISIT: click \"on-line care, then request e-visit\".  E-visits work well for following up on issues we have discussed in clinic previously which may need new prescriptions, new prescriptions or substantial discussion. These are always done by me (Dr. Wegener).     ONCARE VISIT:  [email or web address]  - we treat nearly 50 common conditions through on-care.  These are done in an hour by on-call staff.     RADIOLOGY:  [facility]:  [phone]   [facility]: [phone]    Mammogram and Colonoscopy [phone]    Smoking Cessation: [email or web address], 3-900-810-PLAN ([phone])      CONSUMER PRICE LINE for estimates of test costs:  [phone]       "

## 2018-10-22 NOTE — PROGRESS NOTES
SUBJECTIVE:   Sarah Felix is a 73 year old female who presents to clinic today for the following health issues:      Diabetes Follow-up    Patient is checking blood sugars: twice daily.    Blood sugar testing frequency justification:    Results are as follows:         am - pm    Diabetic concerns: None     Symptoms of hypoglycemia (low blood sugar): shaky     Paresthesias (numbness or burning in feet) or sores: Yes sore ankle      Date of last diabetic eye exam: 2018    BP Readings from Last 2 Encounters:   09/06/18 (!) 136/92   08/13/18 131/72     Hemoglobin A1C (%)   Date Value   04/11/2018 7.8 (H)   01/03/2018 8.6 (H)     LDL Cholesterol Calculated (mg/dL)   Date Value   11/09/2017 73   07/26/2017 66       Diabetes Management Resources    Amount of exercise or physical activity: yard work     Problems taking medications regularly: No    Medication side effects: none    Diet: regular (no restrictions)    Sugars running high, 150 am  170pm.  Had one low about 69, able to correct with drinking OJ  Left second toe has mildly decreased sensation on monofilament test, otherwise normal.     Sarah presents to clinic for management of her diabetes. She reports today that recently her blood sugars checked at home have been running higher than usual. Morning checks have frequently remained about 150 A1C was prior to her appointment and was elevated at 8.3           Type 2 diabetes mellitus with stage 2 chronic kidney disease, with long-term current use of insulin (H)  Tongue burning sensation  Hypertension goal BP (blood pressure) < 140/90  Hyperlipidemia LDL goal <100  Need for prophylactic vaccination and inoculation against influenza :        Problem list, Medication list, Allergies, and Medical/Social/Surgical histories reviewed in [facility] and updated as appropriate.  Labs reviewed in [facility]  BP Readings from Last 3 Encounters:   10/22/18 128/77   09/06/18 (!) 136/92   08/13/18 131/72    Wt Readings from Last 3  Encounters:   10/22/18 197 lb (89.4 kg)   09/06/18 196 lb 3.4 oz (89 kg)   08/13/18 193 lb (87.5 kg)                  Patient Active Problem List   Diagnosis     CYSTIC LIVER DIS     Allergic rhinitis     Diverticulitis of colon     Disorder of bone and cartilage     Esophageal reflux     Mixed incontinence urge and stress (male)(female)     Cystocele, lateral     Vaginal atrophy     Type 2 diabetes, HbA1c goal < 7% (H)     Hyperlipidemia LDL goal <100     Heart burn     BABB (dyspnea on exertion)     Impingement syndrome, shoulder     Sebaceous hyperplasia     Seborrheic keratosis     Fatty liver     Personal history of other malignant neoplasm of skin     Health Care Home     Advanced directives, counseling/discussion     Hypertension goal BP (blood pressure) < 140/90     Elevated serum creatinine     Peripheral vascular disease (H)     Skin exam, screening for cancer     Essential hypertension, benign (HTN)     CKD (chronic kidney disease) stage 2, GFR 60-89 ml/min     Type 2 diabetes mellitus with hyperglycemia (H)     Type 2 diabetes with stage 2 chronic kidney disease GFR 60-89 (H)     Type 2 diabetes mellitus without complication, with long-term current use of insulin (H)     Senile nuclear sclerosis, right     Past Surgical History:   Procedure Laterality Date     C APPENDECTOMY  age 22     C DEXA, BONE DENSITY, AXIAL SKEL  2005     C DEXA, BONE DENSITY, AXIAL SKEL  6/2007    No signif change in Osteopenia     COLONOSCOPY  4/2006    repeat 10 yr     ENDOSCOPIC ULTRASOUND UPPER GASTROINTESTINAL TRACT (GI) N/A 9/6/2018    Procedure: ENDOSCOPIC ULTRASOUND, ESOPHAGOSCOPY / UPPER GASTROINTESTINAL TRACT (GI);  Endoscopic Ultrasound, Esophagogastroduodenoscopy with endoscopic mucosal resection;  Surgeon: Hood Brower MD;  Location:  OR     ESOPHAGOSCOPY, GASTROSCOPY, DUODENOSCOPY (EGD), COMBINED N/A 8/13/2018    Procedure: COMBINED ESOPHAGOSCOPY, GASTROSCOPY, DUODENOSCOPY (EGD), BIOPSY SINGLE OR MULTIPLE;  EGD;   Surgeon: Hood Brower MD;  Location: [facility] OR     ESOPHAGOSCOPY, GASTROSCOPY, DUODENOSCOPY (EGD), RESECT MUCOSA, COMBINED N/A 9/6/2018    Procedure: COMBINED ESOPHAGOSCOPY, GASTROSCOPY, DUODENOSCOPY (EGD), RESECT MUCOSA;;  Surgeon: Hood Brower MD;  Location: [facility] OR     GYN SURGERY  10/22/08    pelvic support     HEMORRHOIDECTOMY  8/08     MOHS MICROGRAPHIC PROCEDURE       SURGICAL HISTORY OF -   age 22    L ovarian cyst removal, laparotomy     SURGICAL HISTORY OF -   1998    partial thyroidectomy     SURGICAL HISTORY OF -   10/08    Transobturator tape for Urinary Incontinence     SURGICAL HISTORY OF -   11/2011    stress test normal     THYROID SURGERY      Had cyst removed, thyroid gland is intact.       Social History   Substance Use Topics     Smoking status: Former Smoker     Quit date: 1/1/1980     Smokeless tobacco: Never Used      Comment: smoked from 70-80; smoked about 1ppd     Alcohol use 0.0 oz/week      Comment: 6 drinks per week     Family History   Problem Relation Age of Onset     Diabetes Mother      at 89 yrs     Hypertension Mother      Arthritis Mother      rheumatoid     Cancer Father      bladder     Cardiovascular Brother      palpitations not on meds.     Glaucoma No family hx of      Macular Degeneration No family hx of      Amblyopia No family hx of          Current Outpatient Prescriptions   Medication Sig Dispense Refill     ACE/ARB NOT PRESCRIBED, INTENTIONAL, by Other route continuous prn.       alum & mag hydroxide-simethicone (MAALOX REGULAR STRENGTH) 200-200-20 MG/5ML SUSP Take 30 mLs by mouth as needed        aspirin 81 MG tablet Take 2 tablets (162 mg) by mouth daily 60 tablet      atorvastatin (LIPITOR) 40 MG tablet Take 1 tablet (40 mg) by mouth daily 90 tablet 3     BD REY U/F 32G X 4 MM insulin pen needle USE ONCE DAILY AS DIRECTED WITH LANTUS SOLOSTAR 90 each 3     blood glucose monitoring (ONE TOUCH ULTRA) test strip 1 strip by In Vitro route 4 times daily. 360 each 1      "Blood Glucose Monitoring Suppl (ONE TOUCH ULTRA 2) W/DEVICE KIT Use to test blood sugars 2 times daily as directed. 1 kit 0     CALCIUM 500 + D 500-200 MG-IU OR TABS one tab twice per day 100 0     Cholecalciferol (VITAMIN D PO) Take  by mouth. Pt consuming 3000 units per day        Docusate Sodium (COLACE PO) Take  by mouth as needed.       furosemide (LASIX) 20 MG tablet TAKE 2 TABLETS IN THE MORNING AND 1 TABLET IN THE AFTERNOON 270 tablet 1     glimepiride (AMARYL) 4 MG tablet TAKE 1 TABLET TWICE A DAY WITH MEALS (BREAKFAST AND DINNER) 180 tablet 3     insulin syringe-needle U-100 (BD INSULIN SYRINGE ULTRAFINE) 31G X 5/16\" 1 ML Use 1 syringes daily or as directed. Prescription 4mm Trish 32g (5/32\" x 0.23mm) please. 90 each 3     LANTUS SOLOSTAR 100 UNIT/ML soln Inject between 45 units and 60 units at bedtime.  Needs four boxes for three month supply. 60 mL 3     lisinopril (PRINIVIL/ZESTRIL) 2.5 MG tablet TAKE 1 TABLET DAILY 90 tablet 3     metoprolol tartrate (LOPRESSOR) 25 MG tablet TAKE 1 TABLET TWICE A  tablet 0     ONETOUCH DELICA LANCETS 33G MISC 1 each 4 times daily 360 each 1     pantoprazole (PROTONIX) 40 MG EC tablet Take 1 tablet (40 mg) by mouth daily Take 30-60 minutes before a meal. 30 tablet 3     potassium chloride SA (KLOR-CON) 20 MEQ CR tablet Take 1 tablet (20 mEq) by mouth daily 180 tablet 2     triamcinolone (KENALOG) 0.1 % cream Apply sparingly to affected area two times daily as needed 80 g 3     Allergies   Allergen Reactions     Amlodipine Swelling     Ancef [Cefazolin]      Levaquin Rash     Itching, rash     Metformin Diarrhea and GI Disturbance     Cephalosporins Rash     Levofloxacin Itching and Rash     Nickel Rash     Contact reaction  Contact reaction     Recent Labs   Lab Test  10/22/18   1516  09/06/18   1149  04/11/18   1505  01/03/18   1418  11/09/17   1403  07/26/17   1434   10/19/15   1541  06/19/15   1513   06/29/12   1401   A1C  8.3*   --   7.8*  8.6*   --   7.0*   < " ">  7.1*  7.5*   < >  8.3*   LDL  77   --    --    --   73  66   < >   --   88   < >  88   HDL  59   --    --    --   51  50   < >   --    --    < >  60   TRIG  184*   --    --    --   118  164*   < >   --    --    < >  189*   ALT   --    --    --    --   42   --    --   47   --    --   71*   CR   --   0.81   --    --   0.81   --    < >  0.90   --    < >  0.66   GFRESTIMATED   --   70   --    --   70   --    < >  62   --    < >  >90   GFRESTBLACK   --   85   --    --   85   --    < >  75   --    < >  >90   POTASSIUM   --   3.8  3.7   --   3.3*   --    < >  3.9   --    < >  3.7   TSH   --    --    --    --    --   2.11   --    --   2.27   < >   --     < > = values in this interval not displayed.        ROS:  Constitutional, HEENT, cardiovascular, pulmonary, GI, , musculoskeletal, neuro, skin, endocrine and psych systems are negative, except as otherwise noted.        OBJECTIVE:  /77 (BP Location: Right arm, Patient Position: Sitting, Cuff Size: Adult Regular)  Pulse 76  Temp 97.9  F (36.6  C) (Oral)  Resp 18  Ht 5' 5\" (1.651 m)  Wt 197 lb (89.4 kg)  SpO2 97%  BMI 32.78 kg/m2    EXAM:  GENERAL APPEARANCE: healthy, alert and no distress  Bilateral diabetic monofilament foot exam normal last visit normal. She reminds me.   Normal appearing tongue today with burning sensation.  Dentist does not know cause, saw ent and was not present.     ASSESSMENT AND PLAN  Patient Instructions   ASSESSMENT AND PLAN  1. Type 2 diabetes mellitus with stage 2 chronic kidney disease, with long-term current use of insulin (H) - uncontrolled.   Increase lantus (starting at 52 units daily)  Two units weekly until fasting sugars 100-130.      Follow up three months for next a1c.      Follow up with me for pre-op in march.       - Hemoglobin A1c; Future  - Albumin Random Urine Quantitative with Creat Ratio; Future  - Hemoglobin A1c  - Albumin Random Urine Quantitative with Creat Ratio    2. Tongue burning sensation  Unclear " "cause.  If allergy referral desired please let me know.         3. Hypertension goal BP (blood pressure) < 140/90    - Albumin Random Urine Quantitative with Creat Ratio; Future  - Albumin Random Urine Quantitative with Creat Ratio    4. Hyperlipidemia LDL goal <100    - Lipid panel reflex to direct LDL Fasting; Future  - Lipid panel reflex to direct LDL Fasting        MYCHART FOR ON-LINE CARE(VISITS), LABS, REFILLS, MESSAGING, ETC [email or web address] [phone]    E-VISIT: click \"on-line care, then request e-visit\".  E-visits work well for following up on issues we have discussed in clinic previously which may need new prescriptions, new prescriptions or substantial discussion. These are always done by me (Dr. Wegener).     ONCARE VISIT:  [email or web address]  - we treat nearly 50 common conditions through on-care.  These are done in an hour by on-call staff.     RADIOLOGY:  [facility]:  [phone]   [facility]: [phone]    Mammogram and Colonoscopy [phone]    Smoking Cessation: [email or web address], 0-471-007-PLAN ([phone])      CONSUMER PRICE LINE for estimates of test costs:  [phone]             Joel Wegener, MD          Injectable Influenza Immunization Documentation    1.  Is the person to be vaccinated sick today?   No    2. Does the person to be vaccinated have an allergy to a component   of the vaccine?   No  Egg Allergy Algorithm Link    3. Has the person to be vaccinated ever had a serious reaction   to influenza vaccine in the past?   No    4. Has the person to be vaccinated ever had Guillain-Barré syndrome?   No    Form completed by Patient.      .Karen Balderrama MA           "

## 2018-10-22 NOTE — MR AVS SNAPSHOT
"              After Visit Summary   10/22/2018    Sarah Felix    MRN: [id number]           Patient Information     Date Of Birth          1945        Visit Information        Provider Department      10/22/2018 3:00 PM Wegener, Joel Daniel Irwin, MD [facility]        Today's Diagnoses     Type 2 diabetes mellitus with stage 2 chronic kidney disease, with long-term current use of insulin (H)    -  1    Tongue burning sensation        Hypertension goal BP (blood pressure) < 140/90        Hyperlipidemia LDL goal <100          Care Instructions    ASSESSMENT AND PLAN  1. Type 2 diabetes mellitus with stage 2 chronic kidney disease, with long-term current use of insulin (H)  Increase lantus (starting at 52 units daily)  Two units weekly until fasting sugars 100-130.      Follow up three months for next a1c.      Follow up with me for pre-op in march.       - Hemoglobin A1c; Future  - Albumin Random Urine Quantitative with Creat Ratio; Future  - Hemoglobin A1c  - Albumin Random Urine Quantitative with Creat Ratio    2. Tongue burning sensation  Unclear cause.  If allergy referral desired please let me know.         3. Hypertension goal BP (blood pressure) < 140/90    - Albumin Random Urine Quantitative with Creat Ratio; Future  - Albumin Random Urine Quantitative with Creat Ratio    4. Hyperlipidemia LDL goal <100    - Lipid panel reflex to direct LDL Fasting; Future  - Lipid panel reflex to direct LDL Fasting        MYCHART FOR ON-LINE CARE(VISITS), LABS, REFILLS, MESSAGING, ETC [email or web address] , [phone]    E-VISIT: click \"on-line care, then request e-visit\".  E-visits work well for following up on issues we have discussed in clinic previously which may need new prescriptions, new prescriptions or substantial discussion. These are always done by me (Dr. Wegener).     ONCARE VISIT:  [email or web address]  - we treat nearly 50 common conditions through on-care.  These are done in " an hour by on-call staff.     RADIOLOGY:  [facility]:  [phone]   [facility]: [phone]    Mammogram and Colonoscopy [phone]    Smoking Cessation: www.[email or web address], 6-134-646-PLAN ([phone])      CONSUMER PRICE LINE for estimates of test costs:  [phone]               Follow-ups after your visit        Your next 10 appointments already scheduled     Mar 14, 2019   Procedure with Hood Brower MD   [facility], [place], Same Day Surgery (--)    [address]  [place] [address] [phone]              Who to contact     If you have questions or need follow up information about today's clinic visit or your schedule please contact [facility] directly at [phone].  Normal or non-critical lab and imaging results will be communicated to you by MyChart, letter or phone within 4 business days after the clinic has received the results. If you do not hear from us within 7 days, please contact the clinic through [employer] or phone. If you have a critical or abnormal lab result, we will notify you by phone as soon as possible.  Submit refill requests through [email or web address] or call your pharmacy and they will forward the refill request to us. Please allow 3 business days for your refill to be completed.          Additional Information About Your Visit        MyChart Information     [email or web address] gives you secure access to your electronic health record. If you see a primary care provider, you can also send messages to your care team and make appointments. If you have questions, please call your primary care clinic.  If you do not have a primary care provider, please call [phone] and they will assist you.        Care EveryWhere ID     This is your Care EveryWhere ID. This could be used by other organizations to access your [place] medical records  [id number]        Your Vitals Were     Pulse Temperature Respirations Height Pulse Oximetry BMI (Body Mass Index)    76 97.9  F (36.6  " C) (Oral) 18 5' 5\" (1.651 m) 97% 32.78 kg/m2       Blood Pressure from Last 3 Encounters:   10/22/18 128/77   09/06/18 (!) 136/92   08/13/18 131/72    Weight from Last 3 Encounters:   10/22/18 197 lb (89.4 kg)   09/06/18 196 lb 3.4 oz (89 kg)   08/13/18 193 lb (87.5 kg)              We Performed the Following     Albumin Random Urine Quantitative with Creat Ratio     Hemoglobin A1c     Lipid panel reflex to direct LDL Fasting        Primary Care Provider Office Phone # Fax #    Joel Daniel Irwin Wegener, [phone] [address] [place] [address]        Equal Access to Services     KILLIAN CASON : Hadii kash prieto hadasho Soomaali, waaxda luqadaha, qaybta kaalmada adeegyada, jamila idiin hayzainab santos . So [facility] [place] [facility] [phone].    ATENCIÓN: Si habla español, tiene a meza disposición servicios gratuitos de asistencia lingüística. Llame al [phone].    We comply with applicable federal civil rights laws and [place] laws. We do not discriminate on the basis of race, color, national origin, age, disability, sex, sexual orientation, or gender identity.            Thank you!     Thank you for choosing [facility]  for your care. Our goal is always to provide you with excellent care. Hearing back from our patients is one way we can continue to improve our services. Please take a few minutes to complete the written survey that you may receive in the mail after your visit with us. Thank you!             Your Updated Medication List - Protect others around you: Learn how to safely use, store and throw away your medicines at www.disposemymeds.org.          This list is accurate as of 10/22/18  4:19 PM.  Always use your most recent med list.                   Brand Name Dispense Instructions for use Diagnosis    ACE/ARB/ARNI NOT PRESCRIBED (INTENTIONAL)      by Other route continuous prn.        aspirin 81 MG tablet     60 tablet    Take 2 tablets (162 mg) by mouth daily    " "Health care maintenance       atorvastatin 40 MG tablet    LIPITOR    90 tablet    Take 1 tablet (40 mg) by mouth daily    Hyperlipidemia LDL goal <100       BD REY U/F 32G X 4 MM   Generic drug:  insulin pen needle     90 each    USE ONCE DAILY AS DIRECTED WITH LANTUS SOLOSTAR    Type 2 diabetes mellitus with stage 2 chronic kidney disease, with long-term current use of insulin (H)       blood glucose monitoring meter device kit     1 kit    Use to test blood sugars 2 times daily as directed.    Type 2 diabetes, HbA1c goal < 7% (H)       blood glucose monitoring test strip    ONETOUCH ULTRA    360 each    1 strip by In Vitro route 4 times daily.    Type 2 diabetes, HbA1c goal < 7% (H)       CALCIUM 500 + D 500-200 MG-IU Tabs     100    one tab twice per day    Osteopenia       COLACE PO      Take  by mouth as needed.    Vaginal discharge       furosemide 20 MG tablet    LASIX    270 tablet    TAKE 2 TABLETS IN THE MORNING AND 1 TABLET IN THE AFTERNOON    Dependent edema, CKD (chronic kidney disease) stage 2, GFR 60-89 ml/min       glimepiride 4 MG tablet    AMARYL    180 tablet    TAKE 1 TABLET TWICE A DAY WITH MEALS (BREAKFAST AND DINNER)    Type 2 diabetes mellitus with stage 2 chronic kidney disease, without long-term current use of insulin (H)       insulin syringe-needle U-100 31G X 5/16\" 1 ML    BD insulin syringe ULTRAFINE    90 each    Use 1 syringes daily or as directed. Prescription 4mm Rey 32g (5/32\" x 0.23mm) please.    Type 2 diabetes mellitus with stage 2 chronic kidney disease, without long-term current use of insulin (H)       LANTUS SOLOSTAR 100 UNIT/ML injection   Generic drug:  insulin glargine     60 mL    Inject between 45 units and 60 units at bedtime.  Needs four boxes for three month supply.    Type 2 diabetes mellitus with stage 2 chronic kidney disease, with long-term current use of insulin (H)       lisinopril 2.5 MG tablet    PRINIVIL/Zestril    90 tablet    TAKE 1 TABLET DAILY    CKD " (chronic kidney disease) stage 2, GFR 60-89 ml/min       MAALOX REGULAR STRENGTH 200-200-20 MG/5ML Susp suspension   Generic drug:  alum & mag hydroxide-simethicone      Take 30 mLs by mouth as needed        metoprolol tartrate 25 MG tablet    LOPRESSOR    180 tablet    TAKE 1 TABLET TWICE A DAY    Essential hypertension       ONETOUCH DELICA LANCETS 33G Misc     360 each    1 each 4 times daily    Type 2 diabetes, HbA1c goal < 7% (H)       pantoprazole 40 MG EC tablet    PROTONIX    30 tablet    Take 1 tablet (40 mg) by mouth daily Take 30-60 minutes before a meal.    Peptic stricture of esophagus       potassium chloride SA 20 MEQ CR tablet    KLOR-CON    180 tablet    Take 1 tablet (20 mEq) by mouth daily    Hypokalemia       triamcinolone 0.1 % cream    KENALOG    80 g    Apply sparingly to affected area two times daily as needed    Contact dermatitis and other eczema, due to unspecified cause       VITAMIN D PO      Take  by mouth. Pt consuming 3000 units per day

## 2018-10-23 LAB
CHOLEST SERPL-MCNC: 173 MG/DL
CREAT UR-MCNC: 26 MG/DL
HDLC SERPL-MCNC: 59 MG/DL
LDLC SERPL CALC-MCNC: 77 MG/DL
MICROALBUMIN UR-MCNC: <5 MG/L
MICROALBUMIN/CREAT UR: NORMAL MG/G CR (ref 0–25)
NONHDLC SERPL-MCNC: 114 MG/DL
TRIGL SERPL-MCNC: 184 MG/DL

## 2018-10-30 DIAGNOSIS — R60.9 DEPENDENT EDEMA: ICD-10-CM

## 2018-10-30 DIAGNOSIS — N18.2 CKD (CHRONIC KIDNEY DISEASE) STAGE 2, GFR 60-89 ML/MIN: ICD-10-CM

## 2018-10-31 NOTE — TELEPHONE ENCOUNTER
"Requested Prescriptions   Pending Prescriptions Disp Refills     lisinopril (PRINIVIL/ZESTRIL) 2.5 MG tablet [Pharmacy Med Name: LISINOPRIL TABS 2.5MG]  Last Written Prescription Date:  11/30/2017  Last Fill Quantity: 90 tablet,  # refills: 3   Last Office Visit: 10/22/2018   Future Office Visit:      90 tablet 3     Sig: TAKE 1 TABLET DAILY    ACE Inhibitors (Including Combos) Protocol Passed    10/30/2018  8:39 PM       Passed - Blood pressure under 140/90 in past 12 months    BP Readings from Last 3 Encounters:   10/22/18 128/77   09/06/18 (!) 136/92 08/13/18 131/72                Passed - Recent (12 mo) or future (30 days) visit within the authorizing provider's specialty    Patient had office visit in the last 12 months or has a visit in the next 30 days with authorizing provider or within the authorizing provider's specialty.  See \"Patient Info\" tab in inbasket, or \"Choose Columns\" in Meds & Orders section of the refill encounter.             Passed - Patient is age 18 or older       Passed - No active pregnancy on record       Passed - Normal serum creatinine on file in past 12 months    Recent Labs   Lab Test  09/06/18   1149   CR  0.81            Passed - Normal serum potassium on file in past 12 months    Recent Labs   Lab Test  09/06/18   1149   POTASSIUM  3.8            Passed - No positive pregnancy test in past 12 months        furosemide (LASIX) 20 MG tablet [Pharmacy Med Name: FUROSEMIDE TABS 20MG]  Last Written Prescription Date:  04/26/2018  Last Fill Quantity: 270 tablet,  # refills: 1   Last Office Visit: 10/22/2018   Future Office Visit:      270 tablet 1     Sig: TAKE 2 TABLETS IN THE MORNING AND 1 TABLET IN THE AFTERNOON    Diuretics (Including Combos) Protocol Passed    10/30/2018  8:39 PM       Passed - Blood pressure under 140/90 in past 12 months    BP Readings from Last 3 Encounters:   10/22/18 128/77   09/06/18 (!) 136/92   08/13/18 131/72                Passed - Recent (12 mo) or " "future (30 days) visit within the authorizing provider's specialty    Patient had office visit in the last 12 months or has a visit in the next 30 days with authorizing provider or within the authorizing provider's specialty.  See \"Patient Info\" tab in inbasket, or \"Choose Columns\" in Meds & Orders section of the refill encounter.             Passed - Patient is age 18 or older       Passed - No active pregancy on record       Passed - Normal serum creatinine on file in past 12 months    Recent Labs   Lab Test  09/06/18   1149   CR  0.81             Passed - Normal serum potassium on file in past 12 months    Recent Labs   Lab Test  09/06/18   1149   POTASSIUM  3.8                   Passed - Normal serum sodium on file in past 12 months    Recent Labs   Lab Test  11/09/17   1403   NA  140             Passed - No positive pregnancy test in past 12 months          "

## 2018-11-02 RX ORDER — LISINOPRIL 2.5 MG/1
TABLET ORAL
Qty: 90 TABLET | Refills: 1 | Status: SHIPPED | OUTPATIENT
Start: 2018-11-02 | End: 2019-05-13

## 2018-11-02 RX ORDER — FUROSEMIDE 20 MG
TABLET ORAL
Qty: 270 TABLET | Refills: 1 | Status: SHIPPED | OUTPATIENT
Start: 2018-11-02 | End: 2019-04-13

## 2018-11-02 NOTE — TELEPHONE ENCOUNTER
Medication Refill Request    Medication Refill Request For:   lisinopril (PRINIVIL/ZESTRIL) 2.5 MG tablet  furosemide (LASIX) 20 MG tablet    LOV: 10/22/2018  Relevant Labs:  K -- normal on 09/06/2018  Cr -- normal on 09/06/2018  BP Readings from Last 3 Encounters:   10/22/18 128/77   09/06/18 (!) 136/92   08/13/18 131/72       Prescription approved per [facility] Refill Protocol.    Anabella Alan, RN  Triage Nurse

## 2018-11-14 DIAGNOSIS — Z79.4 TYPE 2 DIABETES MELLITUS WITH STAGE 2 CHRONIC KIDNEY DISEASE, WITH LONG-TERM CURRENT USE OF INSULIN (H): ICD-10-CM

## 2018-11-14 DIAGNOSIS — E11.22 TYPE 2 DIABETES MELLITUS WITH STAGE 2 CHRONIC KIDNEY DISEASE, WITH LONG-TERM CURRENT USE OF INSULIN (H): ICD-10-CM

## 2018-11-14 DIAGNOSIS — N18.2 TYPE 2 DIABETES MELLITUS WITH STAGE 2 CHRONIC KIDNEY DISEASE, WITH LONG-TERM CURRENT USE OF INSULIN (H): ICD-10-CM

## 2018-11-14 NOTE — TELEPHONE ENCOUNTER
Calling for refill of her Lantus Solostar.      Needs new dosage put on this script.      Was 45 units at bedtime, but it had been changed to 60 units at bedtime.      Please call patient to let her know if this was ok'd.  Uses [facility].  OK to leave message on voicemail.

## 2018-11-14 NOTE — TELEPHONE ENCOUNTER
"Requested Prescriptions   Pending Prescriptions Disp Refills     LANTUS SOLOSTAR 100 UNIT/ML soln  Last Written Prescription Date:  3/30/2017  Last Fill Quantity: 60ml,  # refills: 3   Last Office Visit: 10/22/2018   Future Office Visit:      60 mL 3     Sig: Inject between 45 units and 60 units at bedtime.  Needs four boxes for three month supply.    Long Acting Insulin Protocol Passed    11/14/2018 12:30 PM       Passed - Blood pressure less than 140/90 in past 6 months    BP Readings from Last 3 Encounters:   10/22/18 128/77   09/06/18 (!) 136/92   08/13/18 131/72          Passed - LDL on file in past 12 months    Recent Labs   Lab Test  10/22/18   1516   LDL  77          Passed - Microalbumin on file in past 12 months    Recent Labs   Lab Test  10/22/18   1516   MICROL  <5   UMALCR  Unable to calculate due to low value          Passed - Serum creatinine on file in past 12 months    Recent Labs   Lab Test  09/06/18   1149   CR  0.81          Passed - HgbA1C in past 3 or 6 months    If HgbA1C is 8 or greater, it needs to be on file within the past 3 months.  If less than 8, must be on file within the past 6 months.     Recent Labs   Lab Test  10/22/18   1516   A1C  8.3*          Passed - Patient is age 18 or older       Passed - Recent (6 mo) or future (30 days) visit within the authorizing provider's specialty    Patient had office visit in the last 6 months or has a visit in the next 30 days with authorizing provider or within the authorizing provider's specialty.  See \"Patient Info\" tab in inbasket, or \"Choose Columns\" in Meds & Orders section of the refill encounter.              "

## 2018-11-16 RX ORDER — INSULIN GLARGINE 100 [IU]/ML
INJECTION, SOLUTION SUBCUTANEOUS
Qty: 60 ML | Refills: 3 | Status: SHIPPED | OUTPATIENT
Start: 2018-11-16 | End: 2019-03-11

## 2018-11-16 NOTE — TELEPHONE ENCOUNTER
LOV: 10/22/2018    A1C needed in January 2019    Prescription approved per [facility] Refill Protocol.  Thanks! Martha Loomis RN

## 2018-11-26 DIAGNOSIS — I10 ESSENTIAL HYPERTENSION: ICD-10-CM

## 2018-11-27 NOTE — TELEPHONE ENCOUNTER
"Requested Prescriptions   Pending Prescriptions Disp Refills     metoprolol tartrate (LOPRESSOR) 25 MG tablet [Pharmacy Med Name: METOPROLOL TARTRATE TABS 25MG]  Last Written Prescription Date:  9/7/2018  Last Fill Quantity: 180 tablet,  # refills: 0   Last Office Visit: 10/22/2018   Future Office Visit:      180 tablet 0     Sig: TAKE 1 TABLET TWICE A DAY    Beta-Blockers Protocol Passed    11/26/2018  1:18 PM       Passed - Blood pressure under 140/90 in past 12 months    BP Readings from Last 3 Encounters:   10/22/18 128/77   09/06/18 (!) 136/92   08/13/18 131/72          Passed - Patient is age 6 or older       Passed - Recent (12 mo) or future (30 days) visit within the authorizing provider's specialty    Patient had office visit in the last 12 months or has a visit in the next 30 days with authorizing provider or within the authorizing provider's specialty.  See \"Patient Info\" tab in inbasket, or \"Choose Columns\" in Meds & Orders section of the refill encounter.                "

## 2018-11-28 RX ORDER — METOPROLOL TARTRATE 25 MG/1
TABLET, FILM COATED ORAL
Qty: 180 TABLET | Refills: 2 | Status: SHIPPED | OUTPATIENT
Start: 2018-11-28 | End: 2019-09-02

## 2018-11-28 NOTE — TELEPHONE ENCOUNTER
Prescription approved per [facility] Refill Protocol.  PASCALE Wills, BSN, RN

## 2019-01-19 DIAGNOSIS — E11.22 TYPE 2 DIABETES MELLITUS WITH STAGE 2 CHRONIC KIDNEY DISEASE, WITHOUT LONG-TERM CURRENT USE OF INSULIN (H): ICD-10-CM

## 2019-01-19 DIAGNOSIS — N18.2 TYPE 2 DIABETES MELLITUS WITH STAGE 2 CHRONIC KIDNEY DISEASE, WITHOUT LONG-TERM CURRENT USE OF INSULIN (H): ICD-10-CM

## 2019-01-19 NOTE — TELEPHONE ENCOUNTER
"Requested Prescriptions   Pending Prescriptions Disp Refills     glimepiride (AMARYL) 4 MG tablet [Pharmacy Med Name: GLIMEPIRIDE TABS 4MG]  Last Written Prescription Date:  10/12/2017  Last Fill Quantity: 180 tabs,  # refills: 3   Last office visit: 10/22/2018 with prescribing provider:  Wegener   Future Office Visit:     180 tablet 3     Sig: TAKE 1 TABLET TWICE A DAY WITH MEALS (BREAKFAST AND DINNER)    Sulfonylurea Agents Passed - 1/19/2019  9:36 AM       Passed - Blood pressure less than 140/90 in past 6 months    BP Readings from Last 3 Encounters:   10/22/18 128/77   09/06/18 (!) 136/92   08/13/18 131/72                Passed - Patient has documented LDL within the past 12 mos.    Recent Labs   Lab Test 10/22/18  1516   LDL 77            Passed - Patient has had a Microalbumin in the past 12 mos.    Recent Labs   Lab Test 10/22/18  1516   MICROL <5   UMALCR Unable to calculate due to low value            Passed - Patient has documented A1c within the specified period of time.    If HgbA1C is 8 or greater, it needs to be on file within the past 3 months.  If less than 8, must be on file within the past 6 months.     Recent Labs   Lab Test 10/22/18  1516   A1C 8.3*            Passed - Medication is active on med list       Passed - Patient is age 18 or older       Passed - No active pregnancy on record       Passed - Patient has a recent creatinine (normal) within the past 12 mos.    Recent Labs   Lab Test 09/06/18  1149   CR 0.81            Passed - Patient has not had a positive pregnancy test within the past 12 mos.       Passed - Recent (6 mo) or future (30 days) visit within the authorizing provider's specialty    Patient had office visit in the last 6 months or has a visit in the next 30 days with authorizing provider or within the authorizing provider's specialty.  See \"Patient Info\" tab in inbasket, or \"Choose Columns\" in Meds & Orders section of the refill encounter.              "

## 2019-01-23 RX ORDER — GLIMEPIRIDE 4 MG/1
TABLET ORAL
Qty: 180 TABLET | Refills: 0 | Status: SHIPPED | OUTPATIENT
Start: 2019-01-23 | End: 2019-04-13

## 2019-01-25 ENCOUNTER — TELEPHONE (OUTPATIENT)
Dept: FAMILY MEDICINE | Facility: CLINIC | Age: 74
End: 2019-01-25

## 2019-01-25 DIAGNOSIS — E87.6 HYPOKALEMIA: Primary | ICD-10-CM

## 2019-01-25 NOTE — TELEPHONE ENCOUNTER
The patient is requesting potassium chloride SA (KLOR-CON) 10 MEQ CR tablet twice daily in place of potassium chloride SA (KLOR-CON) 20 MEQ CR tablet once daily.  Patient reports that she can break the 20 MEQ but the sharp edges hurt her throat.

## 2019-01-25 NOTE — TELEPHONE ENCOUNTER
Wegener, Joel Daniel Irwin MD   Please review request from patient     Pended dose requested please review and ensure correct     Thank you   Gosia Grace, Registered Nurse   [facility]

## 2019-01-28 RX ORDER — POTASSIUM CHLORIDE 750 MG/1
10 TABLET, EXTENDED RELEASE ORAL 2 TIMES DAILY
Qty: 180 TABLET | Refills: 3 | Status: ON HOLD | OUTPATIENT
Start: 2019-01-28 | End: 2019-10-18

## 2019-01-28 NOTE — TELEPHONE ENCOUNTER
"Left this generic message on non-identified VM.  \"Your med request has been completed. Please check with your pharmacy.\"  MIRANDA Ma    "

## 2019-03-04 DIAGNOSIS — E11.22 TYPE 2 DIABETES MELLITUS WITH STAGE 2 CHRONIC KIDNEY DISEASE, WITHOUT LONG-TERM CURRENT USE OF INSULIN (H): ICD-10-CM

## 2019-03-04 DIAGNOSIS — N18.2 TYPE 2 DIABETES MELLITUS WITH STAGE 2 CHRONIC KIDNEY DISEASE, WITHOUT LONG-TERM CURRENT USE OF INSULIN (H): ICD-10-CM

## 2019-03-04 LAB — HBA1C MFR BLD: 8.1 % (ref 0–5.6)

## 2019-03-04 PROCEDURE — 83036 HEMOGLOBIN GLYCOSYLATED A1C: CPT | Performed by: FAMILY MEDICINE

## 2019-03-04 PROCEDURE — 36415 COLL VENOUS BLD VENIPUNCTURE: CPT | Performed by: FAMILY MEDICINE

## 2019-03-06 ENCOUNTER — TELEPHONE (OUTPATIENT)
Dept: GASTROENTEROLOGY | Facility: CLINIC | Age: 74
End: 2019-03-06

## 2019-03-06 NOTE — TELEPHONE ENCOUNTER
LVM for patient in regards to message received from the call center. Left direct line for patient to call back and go over scheduling details.     3/6/19 1010am

## 2019-03-06 NOTE — TELEPHONE ENCOUNTER
Patient returned my phone call from earlier. Patient stated she had not received any of her procedure information. Informed patient a [employer] message was sent back in October when the procedure was scheduled. I went over all the scheduling details with the patient. Informed patient I will reprint and send the procedure information in the mail so she has it. Direct line given in case there were any additional questions.      3/6/19 1139am

## 2019-03-11 ENCOUNTER — OFFICE VISIT (OUTPATIENT)
Dept: FAMILY MEDICINE | Facility: CLINIC | Age: 74
End: 2019-03-11
Payer: COMMERCIAL

## 2019-03-11 VITALS
RESPIRATION RATE: 18 BRPM | BODY MASS INDEX: 33.49 KG/M2 | HEIGHT: 65 IN | TEMPERATURE: 98 F | DIASTOLIC BLOOD PRESSURE: 77 MMHG | SYSTOLIC BLOOD PRESSURE: 114 MMHG | HEART RATE: 100 BPM | WEIGHT: 201 LBS | OXYGEN SATURATION: 94 %

## 2019-03-11 DIAGNOSIS — N18.2 TYPE 2 DIABETES MELLITUS WITH STAGE 2 CHRONIC KIDNEY DISEASE, WITH LONG-TERM CURRENT USE OF INSULIN (H): ICD-10-CM

## 2019-03-11 DIAGNOSIS — K31.7 POLYP OF DUODENUM: ICD-10-CM

## 2019-03-11 DIAGNOSIS — Z01.818 PREOP GENERAL PHYSICAL EXAM: Primary | ICD-10-CM

## 2019-03-11 DIAGNOSIS — Z79.4 TYPE 2 DIABETES MELLITUS WITH STAGE 2 CHRONIC KIDNEY DISEASE, WITH LONG-TERM CURRENT USE OF INSULIN (H): ICD-10-CM

## 2019-03-11 DIAGNOSIS — E11.22 TYPE 2 DIABETES MELLITUS WITH STAGE 2 CHRONIC KIDNEY DISEASE, WITH LONG-TERM CURRENT USE OF INSULIN (H): ICD-10-CM

## 2019-03-11 LAB
ERYTHROCYTE [DISTWIDTH] IN BLOOD BY AUTOMATED COUNT: 13.9 % (ref 10–15)
HCT VFR BLD AUTO: 44.5 % (ref 35–47)
HGB BLD-MCNC: 14.8 G/DL (ref 11.7–15.7)
MCH RBC QN AUTO: 32.2 PG (ref 26.5–33)
MCHC RBC AUTO-ENTMCNC: 33.3 G/DL (ref 31.5–36.5)
MCV RBC AUTO: 97 FL (ref 78–100)
PLATELET # BLD AUTO: 556 10E9/L (ref 150–450)
RBC # BLD AUTO: 4.6 10E12/L (ref 3.8–5.2)
WBC # BLD AUTO: 9 10E9/L (ref 4–11)

## 2019-03-11 PROCEDURE — 93000 ELECTROCARDIOGRAM COMPLETE: CPT | Performed by: FAMILY MEDICINE

## 2019-03-11 PROCEDURE — 80048 BASIC METABOLIC PNL TOTAL CA: CPT | Performed by: FAMILY MEDICINE

## 2019-03-11 PROCEDURE — 85027 COMPLETE CBC AUTOMATED: CPT | Performed by: FAMILY MEDICINE

## 2019-03-11 PROCEDURE — 99215 OFFICE O/P EST HI 40 MIN: CPT | Performed by: FAMILY MEDICINE

## 2019-03-11 PROCEDURE — 36415 COLL VENOUS BLD VENIPUNCTURE: CPT | Performed by: FAMILY MEDICINE

## 2019-03-11 RX ORDER — INSULIN GLARGINE 100 [IU]/ML
INJECTION, SOLUTION SUBCUTANEOUS
Qty: 60 ML | Refills: 3 | Status: ON HOLD | COMMUNITY
Start: 2019-03-11 | End: 2019-10-18

## 2019-03-11 ASSESSMENT — MIFFLIN-ST. JEOR: SCORE: 1427.61

## 2019-03-11 NOTE — PROGRESS NOTES
[facility]  [address] [place] [address] [id number] [phone]  Dept: [phone]    PRE-OP EVALUATION:  Today's date: 3/11/2019    Sarah Felix (: 1945) presents for pre-operative evaluation assessment as requested by Hood Bernal MD   She requires evaluation and anesthesia risk assessment prior to undergoing surgery/procedure for treatment of Upper Endoscopy    Fax number for surgical facility: na  Primary Physician: Wegener, Joel Daniel Irwin  Type of Anesthesia Anticipated: General    Patient has a Health Care Directive or Living Will:  NO    Preop Questions 3/11/2019   Who is doing your surgery? -Hood Brower MD   What are you having done? endoscopy   Date of Surgery/Procedure: -2019   Facility or Hospital where procedure/surgery [facility] be performed[facility]   1.  Do you have a history of Heart attack, stroke, stent, coronary bypass surgery, or other heart surgery? No   2.  Do you ever have any pain or discomfort in your chest? No   3.  Do you have a history of  Heart Failure? No   4.   Are you troubled by shortness of breath when:  walking on a level surface, or up a slight hill, or at night? YES - I past not now   5.  Do you currently have a cold, bronchitis or other respiratory infection? No   6.  Do you have a cough, shortness of breath, or wheezing? No   7.  Do you sometimes get pains in the calves of your legs when you walk? No   8. Do you or anyone in your family have previous history of blood clots? YES - family, not her   9.  Do you or does anyone in your family have a serious bleeding problem such as prolonged bleeding following surgeries or cuts? No   10. Have you ever had problems with anemia or been told to take iron pills? No   11. Have you had any abnormal blood loss such as black, tarry or bloody stools, or abnormal vaginal bleeding? No   12. Have you ever had a blood transfusion? No   13. Have you or any of your relatives ever had  problems with anesthesia? No   14. Do you have sleep apnea, excessive snoring or daytime drowsiness? YES - fatigue, no sleep apnea   15. Do you have any prosthetic heart valves? No   16. Do you have prosthetic joints? No   17. Is there any chance that you may be pregnant? No         HPI:     HPI related to upcoming procedure: having repeat EGD after friable polyp found last august.     Otherwise feeling fairly well.     Currently using 60 units lantus (decrease for surgery as above.)      Getting low blood sugars more often in am (74 this am) yet hypeglycemia in afternoon after meals (above 200) indicating glimpiride likely not adequate and perhaps too much lantus.     No chest pain with exertion.  No dyspnea with walking, can go up several flights of stairs.     See problem list for active medical problems.  Problems all longstanding and stable, except as noted/documented.  See ROS for pertinent symptoms related to these conditions.                                                                                                                                                          .    MEDICAL HISTORY:     Patient Active Problem List    Diagnosis Date Noted     Senile nuclear sclerosis, right 02/15/2018     Priority: Medium     Type 2 diabetes mellitus without complication, with long-term current use of insulin (H) 02/01/2016     Priority: Medium     Type 2 diabetes with stage 2 chronic kidney disease GFR 60-89 (H) 10/23/2015     Priority: Medium     Type 2 diabetes mellitus with hyperglycemia (H) 10/19/2015     Priority: Medium     Essential hypertension, benign (HTN) 06/06/2014     Priority: Medium     CKD (chronic kidney disease) stage 2, GFR 60-89 ml/min 06/06/2014     Priority: Medium     Skin exam, screening for cancer 03/07/2014     Priority: Medium     Peripheral vascular disease (H) 12/27/2013     Priority: Medium     Problem list name updated by automated process. Provider to review       Elevated serum  creatinine 09/11/2013     Priority: Medium     Hypertension goal BP (blood pressure) < 140/90 08/01/2013     Priority: Medium     Advanced directives, counseling/discussion 01/16/2013     Priority: Medium     Discussed advance care planning with patient; information given to patient to review. 1/16/2013        Personal history of other malignant neoplasm of skin 10/19/2012     Priority: Medium     Health Care Home 10/19/2012     Priority: Medium     Bonnie Rdz RN-BSN, [facility]  [phone].  FPA / [facility]      DX V65.8 REPLACED WITH [id number] HEALTH CARE HOME (04/08/2013)       Fatty liver      Priority: Medium     Sebaceous hyperplasia 01/22/2012     Priority: Medium     Seborrheic keratosis 01/22/2012     Priority: Medium     Impingement syndrome, shoulder 12/21/2011     Priority: Medium     BABB (dyspnea on exertion) 11/09/2011     Priority: Medium     Normal stress test 2011  Seen by Pulmonary: deconditioning.  Possible mild asthma.       Heart burn 10/17/2011     Priority: Medium     Type 2 diabetes, HbA1c goal < 7% (H) 10/31/2010     Priority: Medium     Per provider       Hyperlipidemia LDL goal <100 10/31/2010     Priority: Medium     Per provider       Vaginal atrophy 10/22/2010     Priority: Medium     Mixed incontinence urge and stress (male)(female) 06/24/2007     Priority: Medium     Cystocele, lateral 06/24/2007     Priority: Medium     Esophageal reflux      Priority: Medium     Disorder of bone and cartilage 04/21/2005     Priority: Medium     Problem list name updated by automated process. Provider to review       Diverticulitis of colon 07/08/2004     Priority: Medium     Abd CT  Problem list name updated by automated process. Provider to review       CYSTIC LIVER DIS 06/25/2004     Priority: Medium     multiple liver lesions.  felt to be focal nodular hyperplasia.  annual CT abd.    followed by Dr. Amilcar Kat         Allergic rhinitis 06/25/2004     Priority: Medium      Allergic rhinitis  Problem list name updated by automated process. 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  felt to be focal nodular hyperplasia.  annual CT abd.   followed by Dr. Amilcar Kat      Cystocele, lateral      Diabetes mellitus (H)      Diverticulitis of colon (without mention of hemorrhage)(562.11) 6/04    Abd CT     Embolism and thrombosis of unspecified site age 22    post ovarian cyst removed     Esophageal reflux     Hiatal hernia     Fatty liver      Hiatal hernia     small     Hyperlipidemia      Nontoxic uninodular goiter      Osteopenia 4/21/2005    on fosamax  for > 5 yr.  stop 7/2012     Other chronic otitis externa      Other specified disorders of liver     Fatty Liver, Benign appearing liver cysts     Pure hypercholesterolemia      Past Surgical History:   Procedure Laterality Date     C APPENDECTOMY  age 22     C DEXA, BONE DENSITY, AXIAL SKEL  2005     C DEXA, BONE DENSITY, AXIAL SKEL  6/2007    No signif change in Osteopenia     COLONOSCOPY  4/2006    repeat 10 yr     ENDOSCOPIC ULTRASOUND UPPER GASTROINTESTINAL TRACT (GI) N/A 9/6/2018    Procedure: ENDOSCOPIC ULTRASOUND, ESOPHAGOSCOPY / UPPER GASTROINTESTINAL TRACT (GI);  Endoscopic Ultrasound, Esophagogastroduodenoscopy with endoscopic mucosal resection;  Surgeon: Hood Brower MD;  [facility]     ESOPHAGOSCOPY, GASTROSCOPY, DUODENOSCOPY (EGD), COMBINED N/A 8/13/2018    Procedure: COMBINED ESOPHAGOSCOPY, GASTROSCOPY, DUODENOSCOPY (EGD), BIOPSY SINGLE OR MULTIPLE;  EGD;  Surgeon: Hood Brower MD;  Location:  OR     ESOPHAGOSCOPY, GASTROSCOPY, DUODENOSCOPY (EGD), COMBINED N/A 3/14/2019    Procedure: Upper  Endoscopy;  Surgeon: Hood Brower MD;  Location: [facility]     ESOPHAGOSCOPY, GASTROSCOPY, DUODENOSCOPY (EGD), RESECT MUCOSA, COMBINED N/A 9/6/2018    Procedure: COMBINED ESOPHAGOSCOPY, GASTROSCOPY, DUODENOSCOPY (EGD), RESECT MUCOSA;;  Surgeon: Hood Brower MD;  Location: [facility] OR     GYN SURGERY  10/22/08    pelvic support     HEMORRHOIDECTOMY  8/08     MOHS MICROGRAPHIC PROCEDURE       SURGICAL HISTORY OF -   age 22    L ovarian cyst removal, laparotomy     SURGICAL HISTORY OF -   1998    partial thyroidectomy     SURGICAL HISTORY OF -   10/08    Transobturator tape for Urinary Incontinence     SURGICAL HISTORY OF -   11/2011    stress test normal     THYROID SURGERY      Had cyst removed, thyroid gland is intact.     Current Outpatient Medications   Medication Sig Dispense Refill     ACE/ARB NOT PRESCRIBED, INTENTIONAL, by Other route continuous prn.       alum & mag hydroxide-simethicone (MAALOX REGULAR STRENGTH) 200-200-20 MG/5ML SUSP Take 30 mLs by mouth as needed        aspirin 81 MG tablet Take 2 tablets (162 mg) by mouth daily 60 tablet      atorvastatin (LIPITOR) 40 MG tablet Take 1 tablet (40 mg) by mouth daily 90 tablet 3     BD REY U/F 32G X 4 MM insulin pen needle USE ONCE DAILY AS DIRECTED WITH LANTUS SOLOSTAR 90 each 3     blood glucose monitoring (ONE TOUCH ULTRA) test strip 1 strip by In Vitro route 4 times daily. 360 each 1     Blood Glucose Monitoring Suppl (ONE TOUCH ULTRA 2) W/DEVICE KIT Use to test blood sugars 2 times daily as directed. 1 kit 0     CALCIUM 500 + D 500-200 MG-IU OR TABS one tab twice per day 100 0     Cholecalciferol (VITAMIN D PO) Take  by mouth. Pt consuming 3000 units per day        Docusate Sodium (COLACE PO) Take  by mouth as needed.       furosemide (LASIX) 20 MG tablet TAKE 2 TABLETS IN THE MORNING AND 1 TABLET IN THE AFTERNOON 270 tablet 1     glimepiride (AMARYL) 4 MG tablet TAKE 1 TABLET TWICE A DAY WITH MEALS (BREAKFAST AND DINNER) 180 tablet 0     insulin  "syringe-needle U-100 (BD INSULIN SYRINGE ULTRAFINE) 31G X \" 1 ML Use 1 syringes daily or as directed. Prescription 4mm Trish 32g (\" x 0.23mm) please. 90 each 3     LANTUS SOLOSTAR 100 UNIT/ML soln Inject between 60 units at bedtime.  Needs four boxes for three month supply. 60 mL 3     lisinopril (PRINIVIL/ZESTRIL) 2.5 MG tablet TAKE 1 TABLET DAILY 90 tablet 1     metoprolol tartrate (LOPRESSOR) 25 MG tablet TAKE 1 TABLET TWICE A  tablet 2     ONETOUCH DELICA LANCETS 33G MISC 1 each 4 times daily 360 each 1     pantoprazole (PROTONIX) 40 MG EC tablet Take 1 tablet (40 mg) by mouth daily Take 30-60 minutes before a meal. 30 tablet 3     potassium chloride ER (K-TAB/KLOR-CON) 10 MEQ CR tablet Take 1 tablet (10 mEq) by mouth 2 times daily 180 tablet 3     triamcinolone (KENALOG) 0.1 % cream Apply sparingly to affected area two times daily as needed 80 g 3     OTC products: Aspirin    Allergies   Allergen Reactions     Amlodipine Swelling     Ancef [Cefazolin]      Levaquin Rash     Itching, rash     Metformin Diarrhea and GI Disturbance     Cephalosporins Rash     Levofloxacin Itching and Rash     Nickel Rash     Contact reaction  Contact reaction      Latex Allergy: NO    Social History     Tobacco Use     Smoking status: Former Smoker     Last attempt to quit: 1980     Years since quittin.2     Smokeless tobacco: Never Used     Tobacco comment: smoked from 70-80; smoked about 1ppd   Substance Use Topics     Alcohol use: Yes     Alcohol/week: 0.0 oz     Comment: 6 drinks per week     History   Drug Use No       REVIEW OF SYSTEMS:   CONSTITUTIONAL: NEGATIVE for fever, chills, change in weight  INTEGUMENTARY/SKIN: NEGATIVE for worrisome rashes, moles or lesions  EYES: NEGATIVE for vision changes or irritation  ENT/MOUTH: NEGATIVE for ear, mouth and throat problems  RESP: NEGATIVE for significant cough or SOB  BREAST: NEGATIVE for masses, tenderness or discharge  CV: NEGATIVE for chest pain, " "palpitations or peripheral edema  GI: NEGATIVE for nausea, abdominal pain, heartburn, or change in bowel habits  : NEGATIVE for frequency, dysuria, or hematuria  MUSCULOSKELETAL: NEGATIVE for significant arthralgias or myalgia  NEURO: NEGATIVE for weakness, dizziness or paresthesias  ENDOCRINE: NEGATIVE for temperature intolerance, skin/hair changes  HEME: NEGATIVE for bleeding problems  PSYCHIATRIC: NEGATIVE for changes in mood or affect    EXAM:   /77 (BP Location: Left arm, Patient Position: Sitting, Cuff Size: Adult Large)   Pulse 100   Temp 98  F (36.7  C) (Oral)   Resp 18   Ht 1.651 m (5' 5\")   Wt 91.2 kg (201 lb)   SpO2 94%   BMI 33.45 kg/m    GENERAL APPEARANCE: healthy, alert and no distress  HENT: ear canals and TM's normal and nose and mouth without ulcers or lesions  RESP: lungs clear to auscultation - no rales, rhonchi or wheezes  CV: regular rate and rhythm, normal S1 S2, no S3 or S4 and no murmur, click or rub   ABDOMEN: soft, nontender, no HSM or masses and bowel sounds normal  NEURO: Normal strength and tone, sensory exam grossly normal, mentation intact and speech normal    DIAGNOSTICS:   EKG:  One pvc, isolated inverted T wave V1, no acute ischemic changes, reassuring.     Recent Labs   Lab Test 03/04/19  1515 10/22/18  1516 09/06/18  1149 04/11/18  1505  11/09/17  1403  11/17/16  1356  10/19/15  1543  07/09/13  1510  04/05/11  1516   HGB  --   --  15.2  --   --   --   --   --   --  14.5   < > 13.4  --  13.6   PLT  --   --   --   --   --   --   --   --   --   --   --  400  --  356   NA  --   --   --   --   --  140  --  138  --  138   < >  --    < >  --    POTASSIUM  --   --  3.8 3.7  --  3.3*  --  3.7  --  4.0   < >  --    < >  --    CR  --   --  0.81  --   --  0.81  --  0.87  --  0.89   < >  --    < >  --    A1C 8.1* 8.3*  --  7.8*   < >  --    < > 8.0*   < >  --    < >  --    < >  --     < > = values in this interval not displayed.        IMPRESSION:   Reason for " surgery/procedure: duodenal polyps  Diagnosis/reason for consult: pre op clearance    The proposed surgical procedure is considered LOW risk.    REVISED CARDIAC RISK INDEX  The patient has the following serious cardiovascular risks for perioperative complications such as (MI, PE, VFib and 3  AV Block):  Diabetes Mellitus (on Insulin)  INTERPRETATION: 1 risks: Class II (low risk - 0.9% complication rate)    The patient has the following additional risks for perioperative complications:  No identified additional risks      ICD-10-CM    1. Preop general physical exam Z01.818 EKG 12-lead complete w/read - Clinics     EKG 12-lead complete w/read - Clinics     CBC with platelets     Basic metabolic panel   2. Polyp of duodenum K31.7    3. Type 2 diabetes mellitus with stage 2 chronic kidney disease, with long-term current use of insulin (H) E11.22 LANTUS SOLOSTAR 100 UNIT/ML soln    N18.2     Z79.4        RECOMMENDATIONS:     ASSESSMENT AND PLAN  1. Preop general physical exam  Cleared for procedure with general anesthesia if needed.     Stop aspirin now.     Recommend being NPO for 10 hours except water.     Decrease lantus to 45 units the night before.     Hold all medications on the morning of the procedure except the metoprolol.       - EKG 12-lead complete w/read - Clinics; Future  - EKG 12-lead complete w/read - Clinics    2. Polyp of duodenum  Reason for procedure.     3. Type 2 diabetes mellitus with stage 2 chronic kidney disease, with long-term current use of insulin (H) -   a1c acceptable for this minimally invasive procedure.     Currently using 60 units lantus (decrease for surgery as above.)      Getting low blood sugars more often in am (74 this am) yet hypeglycemia in afternoon after meals (above 200) indicating glimpiride likely not adequate and perhaps too much lantus.     May need meal time insulin, cannot tolerate metformin with GI side effects.     I recommend seeing if your insurance covers byetta,  trulicity or joi.      Follow up with me after the procedure for diabetes visit and to discuss these options.     - LANTUS SOLOSTAR 100 UNIT/ML soln; Inject between 60 units at bedtime.  Needs four boxes for three month supply.  Dispense: 60 mL; Refill: 3      Signed Electronically by: Joel Daniel Wegener, MD    Copy of this evaluation report is provided to requesting physician.    [place] Preop Guidelines    Revised Cardiac Risk Index

## 2019-03-11 NOTE — PATIENT INSTRUCTIONS
"ASSESSMENT AND PLAN  1. Preop general physical exam  Cleared for procedure with general anesthesia if needed.     Stop aspirin now.     Recommend being NPO for 10 hours except water.     Decrease lantus to 45 units the night before.     Hold all medications on the morning of the procedure except the metoprolol.       - EKG 12-lead complete w/read - Clinics; Future  - EKG 12-lead complete w/read - Clinics    2. Polyp of duodenum      3. Type 2 diabetes mellitus with stage 2 chronic kidney disease, with long-term current use of insulin (H)  a1c acceptable for this minimally invasive procedure.     Currently using 60 units lantus (decrease for surgery as above.)      Getting low blood sugars more often in am (74 this am) yet hypeglycemia in afternoon after meals (above 200) indicating glimpiride likely not adequate and perhaps too much lantus.     May need meal time insulin, cannot tolerate metformin with GI side effects.     I recommend seeing if your insurance covers byetta, trulicity or Paddle8.      Follow up with me after the procedure for diabetes visit and to discuss these options.     - LANTUS SOLOSTAR 100 UNIT/ML soln; Inject between 60 units at bedtime.  Needs four boxes for three month supply.  Dispense: 60 mL; Refill: 3        MYCHART FOR ON-LINE CARE(VISITS), LABS, REFILLS, MESSAGING, ETC [email or web address].[email or web address] , [phone]    E-VISIT: click \"on-line care, then request e-visit\".  E-visits work well for following up on issues we have discussed in clinic previously which may need new prescriptions, new prescriptions or substantial discussion. These are always done by me (Dr. Wegener).     ONCARE VISIT:  [email or web address]  - we treat nearly 50 common conditions through on-care.  These are done in an hour by on-call staff.     RADIOLOGY:  [facility]:  [phone]   [facility]: [phone]    Mammogram and Colonoscopy [phone]    Smoking Cessation: " www.quitplan.org, 8-746-884-PLAN ([phone])      CONSUMER PRICE LINE for estimates of test costs:  [phone]       Before Your Surgery      Call your surgeon if there is any change in your health. This includes signs of a cold or flu (such as a sore throat, runny nose, cough, rash or fever).    Do not smoke, drink alcohol or take over the counter medicine (unless your surgeon or primary care doctor tells you to) for the 24 hours before and after surgery.    If you take prescribed drugs: Follow your doctor s orders about which medicines to take and which to stop until after surgery.    Eating and drinking prior to surgery: follow the instructions from your surgeon    Take a shower or bath the night before surgery. Use the soap your surgeon gave you to gently clean your skin. If you do not have soap from your surgeon, use your regular soap. Do not shave or scrub the surgery site.  Wear clean pajamas and have clean sheets on your bed.   Before Your Surgery    Call your surgeon if there is any change in your health. This includes signs of a cold or flu (such as a sore throat, runny nose, cough, rash or fever).  Do not smoke, drink alcohol or take over the counter medicine (unless your surgeon or primary care doctor tells you to) for the 24 hours before and after surgery.  If you take prescribed drugs: Follow your doctor s orders about which medicines to take and which to stop until after surgery.  Eating and drinking prior to surgery: follow the instructions from your surgeon  Take a shower or bath the night before surgery. Use the soap your surgeon gave you to gently clean your skin. If you do not have soap from your surgeon, use your regular soap. Do not shave or scrub the surgery site.  Wear clean pajamas and have clean sheets on your bed.

## 2019-03-12 LAB
ANION GAP SERPL CALCULATED.3IONS-SCNC: 9 MMOL/L (ref 3–14)
BUN SERPL-MCNC: 9 MG/DL (ref 7–30)
CALCIUM SERPL-MCNC: 9.2 MG/DL (ref 8.5–10.1)
CHLORIDE SERPL-SCNC: 106 MMOL/L (ref 94–109)
CO2 SERPL-SCNC: 25 MMOL/L (ref 20–32)
CREAT SERPL-MCNC: 0.86 MG/DL (ref 0.52–1.04)
GFR SERPL CREATININE-BSD FRML MDRD: 68 ML/MIN/{1.73_M2}
GLUCOSE SERPL-MCNC: 122 MG/DL (ref 70–99)
POTASSIUM SERPL-SCNC: 3.9 MMOL/L (ref 3.4–5.3)
SODIUM SERPL-SCNC: 140 MMOL/L (ref 133–144)

## 2019-03-13 ENCOUNTER — ANESTHESIA EVENT (OUTPATIENT)
Dept: SURGERY | Facility: CLINIC | Age: 74
End: 2019-03-13
Payer: COMMERCIAL

## 2019-03-13 NOTE — ANESTHESIA PREPROCEDURE EVALUATION
Anesthesia Pre-Procedure Evaluation    Patient: Sarah Felix   MRN:     [id number] Gender:   female   Age:    73 year old :      1945        Preoperative Diagnosis: Polyp Of Duodenum    Procedure(s):  Upper Endoscopy     Past Medical History:   Diagnosis Date     Allergic rhinitis, cause unspecified     Allergic rhinitis     Basal cell carcinoma of face 3/2012    Mohs     Contact dermatitis and other eczema, due to unspecified cause      Cyst of thyroid 1998    Thyroid Nodule/Hurthle Cell Neoplasm/Benign     CYSTIC LIVER DIS     multiple liver lesions.  felt to be focal nodular hyperplasia.  annual CT abd.   followed by Dr. Amilcar Kat      Cystocele, lateral      Diabetes mellitus (H)      Diverticulitis of colon (without mention of hemorrhage)(562.11)     Abd CT     Embolism and thrombosis of unspecified site age 22    post ovarian cyst removed     Esophageal reflux     Hiatal hernia     Fatty liver      Hiatal hernia     small     Hyperlipidemia      Nontoxic uninodular goiter      Osteopenia 2005    on fosamax  for > 5 yr.  stop 2012     Other chronic otitis externa      Other specified disorders of liver     Fatty Liver, Benign appearing liver cysts     Pure hypercholesterolemia       Past Surgical History:   Procedure Laterality Date     C APPENDECTOMY  age 22     C DEXA, BONE DENSITY, AXIAL SKEL       C DEXA, BONE DENSITY, AXIAL SKEL  2007    No signif change in Osteopenia     COLONOSCOPY  2006    repeat 10 yr     ENDOSCOPIC ULTRASOUND UPPER GASTROINTESTINAL TRACT (GI) N/A 2018    Procedure: ENDOSCOPIC ULTRASOUND, ESOPHAGOSCOPY / UPPER GASTROINTESTINAL TRACT (GI);  Endoscopic Ultrasound, Esophagogastroduodenoscopy with endoscopic mucosal resection;  Surgeon: Hood Brower MD;  Location:  OR     ESOPHAGOSCOPY, GASTROSCOPY, DUODENOSCOPY (EGD), COMBINED N/A 2018    Procedure: COMBINED ESOPHAGOSCOPY, GASTROSCOPY, DUODENOSCOPY (EGD), BIOPSY SINGLE OR MULTIPLE;  EGD;   Surgeon: Hood Brower MD;  Location: [facility] OR     ESOPHAGOSCOPY, GASTROSCOPY, DUODENOSCOPY (EGD), RESECT MUCOSA, COMBINED N/A 9/6/2018    Procedure: COMBINED ESOPHAGOSCOPY, GASTROSCOPY, DUODENOSCOPY (EGD), RESECT MUCOSA;;  Surgeon: Hood Brower MD;  Location: [facility] OR     GYN SURGERY  10/22/08    pelvic support     HEMORRHOIDECTOMY  8/08     MOHS MICROGRAPHIC PROCEDURE       SURGICAL HISTORY OF -   age 22    L ovarian cyst removal, laparotomy     SURGICAL HISTORY OF -   1998    partial thyroidectomy     SURGICAL HISTORY OF -   10/08    Transobturator tape for Urinary Incontinence     SURGICAL HISTORY OF -   11/2011    stress test normal     THYROID SURGERY      Had cyst removed, thyroid gland is intact.          Anesthesia Evaluation     . Pt has had prior anesthetic. Type: General    No history of anesthetic complications          ROS/MED HX    ENT/Pulmonary:     (+)BRENDA risk factors snores loudly, , . .    Neurologic:  - neg neurologic ROS     Cardiovascular:     (+) Dyslipidemia, hypertension-Peripheral Vascular Disease---. : . . BABB, . :. .       METS/Exercise Tolerance:     Hematologic:  - neg hematologic  ROS       Musculoskeletal:  - neg musculoskeletal ROS       GI/Hepatic: Comment: Duodenal polyp    (+) GERD Symptomatic, hiatal hernia, liver disease (fatty, cystic liver),       Renal/Genitourinary:      (-) renal disease   Endo:     (+) type II DM Using insulin - not using insulin pump Obesity (BMI=34), .   (-) thyroid disease   Psychiatric:  - neg psychiatric ROS       Infectious Disease:  - neg infectious disease ROS       Malignancy:      - no malignancy   Other:    - neg other ROS                     PHYSICAL EXAM:   Mental Status/Neuro: A/A/O   Airway: Facies: Thick Neck  Mallampati: I  Mouth/Opening: Full  TM distance: > 6 cm  Neck ROM: Full   Respiratory: Auscultation: CTAB     Resp. Rate: Normal     Resp. Effort: Normal      CV: Rhythm: Regular  Rate: Age appropriate  Heart: Normal Sounds   Comments:   "    Dental: Normal                  Lab Results   Component Value Date    WBC 9.0 03/11/2019    HGB 14.8 03/11/2019    HCT 44.5 03/11/2019     (H) 03/11/2019    SED 7 03/25/2004     03/11/2019    POTASSIUM 3.9 03/11/2019    CHLORIDE 106 03/11/2019    CO2 25 03/11/2019    BUN 9 03/11/2019    CR 0.86 03/11/2019     (H) 03/11/2019    CECY 9.2 03/11/2019    PHOS 3.4 10/19/2015    MAG 1.7 09/06/2013    ALBUMIN 3.4 11/09/2017    PROTTOTAL 6.5 (L) 11/09/2017    ALT 42 11/09/2017    AST 33 11/09/2017    ALKPHOS 105 11/09/2017    BILITOTAL 0.8 11/09/2017    TSH 2.11 07/26/2017       Preop Vitals  BP Readings from Last 3 Encounters:   03/11/19 114/77   10/22/18 128/77   09/06/18 (!) 136/92    Pulse Readings from Last 3 Encounters:   03/11/19 100   10/22/18 76   08/13/18 89      Resp Readings from Last 3 Encounters:   03/11/19 18   10/22/18 18   09/06/18 14    SpO2 Readings from Last 3 Encounters:   03/11/19 94%   10/22/18 97%   09/06/18 96%      Temp Readings from Last 1 Encounters:   03/11/19 36.7  C (98  F) (Oral)    Ht Readings from Last 1 Encounters:   03/11/19 1.651 m (5' 5\")      Wt Readings from Last 1 Encounters:   03/11/19 91.2 kg (201 lb)    Estimated body mass index is 33.45 kg/m  as calculated from the following:    Height as of 3/11/19: 1.651 m (5' 5\").    Weight as of 3/11/19: 91.2 kg (201 lb).     LDA:            Assessment:   ASA SCORE: 3    NPO Status: > 6 hours since completed Solid Foods   Documentation: H&P complete; Preop Testing complete; Consents complete   Proceeding: Proceed without further delay  Tobacco Use:  NO Active use of Tobacco/UNKNOWN Tobacco use status     Plan:   Anes. Type:  General      Induction:  IV (RSI)   Airway: Oral ETT   Access/Monitoring: PIV   Maintenance: Balanced   Emergence: Procedure Site   Logistics: Same Day Surgery     Postop Pain/Sedation Strategy:  Standard-Options: Opioids PRN     PONV Management:  Adult Risk Factors: Female, Non-Smoker  Prevention: " Ondansetron     CONSENT: Direct conversation   Plan and risks discussed with: Patient   Blood Products: Consent Deferred (Minimal Blood Loss)                         Chloe Huddleston MD

## 2019-03-14 ENCOUNTER — ANESTHESIA (OUTPATIENT)
Dept: SURGERY | Facility: CLINIC | Age: 74
End: 2019-03-14
Payer: COMMERCIAL

## 2019-03-14 ENCOUNTER — HOSPITAL ENCOUNTER (OUTPATIENT)
Facility: CLINIC | Age: 74
Discharge: HOME | End: 2019-03-14
Attending: INTERNAL MEDICINE | Admitting: INTERNAL MEDICINE
Payer: COMMERCIAL

## 2019-03-14 VITALS
SYSTOLIC BLOOD PRESSURE: 144 MMHG | TEMPERATURE: 97.9 F | RESPIRATION RATE: 16 BRPM | BODY MASS INDEX: 33.43 KG/M2 | DIASTOLIC BLOOD PRESSURE: 74 MMHG | HEART RATE: 78 BPM | WEIGHT: 200.62 LBS | OXYGEN SATURATION: 98 % | HEIGHT: 65 IN

## 2019-03-14 LAB
GLUCOSE BLDC GLUCOMTR-MCNC: 184 MG/DL (ref 70–99)
GLUCOSE BLDC GLUCOMTR-MCNC: 192 MG/DL (ref 70–99)
UPPER GI ENDOSCOPY: NORMAL

## 2019-03-14 PROCEDURE — 71000027 ZZH RECOVERY PHASE 2 EACH 15 MINS: Performed by: INTERNAL MEDICINE

## 2019-03-14 PROCEDURE — 25000125 ZZHC RX 250: Performed by: NURSE ANESTHETIST, CERTIFIED REGISTERED

## 2019-03-14 PROCEDURE — 36000053 ZZH SURGERY LEVEL 2 EA 15 ADDTL MIN - UMMC: Performed by: INTERNAL MEDICINE

## 2019-03-14 PROCEDURE — 82962 GLUCOSE BLOOD TEST: CPT

## 2019-03-14 PROCEDURE — 25000566 ZZH SEVOFLURANE, EA 15 MIN: Performed by: INTERNAL MEDICINE

## 2019-03-14 PROCEDURE — 88305 TISSUE EXAM BY PATHOLOGIST: CPT | Performed by: INTERNAL MEDICINE

## 2019-03-14 PROCEDURE — 71000012 ZZH RECOVERY PHASE 1 LEVEL 1 FIRST HR: Performed by: INTERNAL MEDICINE

## 2019-03-14 PROCEDURE — 25800030 ZZH RX IP 258 OP 636: Performed by: ANESTHESIOLOGY

## 2019-03-14 PROCEDURE — 40000170 ZZH STATISTIC PRE-PROCEDURE ASSESSMENT II: Performed by: INTERNAL MEDICINE

## 2019-03-14 PROCEDURE — 25000128 H RX IP 250 OP 636: Performed by: ANESTHESIOLOGY

## 2019-03-14 PROCEDURE — 25000128 H RX IP 250 OP 636: Performed by: NURSE ANESTHETIST, CERTIFIED REGISTERED

## 2019-03-14 PROCEDURE — 27210794 ZZH OR GENERAL SUPPLY STERILE: Performed by: INTERNAL MEDICINE

## 2019-03-14 PROCEDURE — 36000051 ZZH SURGERY LEVEL 2 1ST 30 MIN - UMMC: Performed by: INTERNAL MEDICINE

## 2019-03-14 PROCEDURE — 37000009 ZZH ANESTHESIA TECHNICAL FEE, EACH ADDTL 15 MIN: Performed by: INTERNAL MEDICINE

## 2019-03-14 PROCEDURE — 25000125 ZZHC RX 250: Performed by: INTERNAL MEDICINE

## 2019-03-14 PROCEDURE — 37000008 ZZH ANESTHESIA TECHNICAL FEE, 1ST 30 MIN: Performed by: INTERNAL MEDICINE

## 2019-03-14 RX ORDER — SODIUM CHLORIDE, SODIUM LACTATE, POTASSIUM CHLORIDE, CALCIUM CHLORIDE 600; 310; 30; 20 MG/100ML; MG/100ML; MG/100ML; MG/100ML
INJECTION, SOLUTION INTRAVENOUS CONTINUOUS
Status: DISCONTINUED | OUTPATIENT
Start: 2019-03-14 | End: 2019-03-14 | Stop reason: HOSPADM

## 2019-03-14 RX ORDER — HYDRALAZINE HYDROCHLORIDE 20 MG/ML
2.5-5 INJECTION INTRAMUSCULAR; INTRAVENOUS EVERY 10 MIN PRN
Status: DISCONTINUED | OUTPATIENT
Start: 2019-03-14 | End: 2019-03-14 | Stop reason: HOSPADM

## 2019-03-14 RX ORDER — ONDANSETRON 4 MG/1
4 TABLET, ORALLY DISINTEGRATING ORAL EVERY 30 MIN PRN
Status: DISCONTINUED | OUTPATIENT
Start: 2019-03-14 | End: 2019-03-14 | Stop reason: HOSPADM

## 2019-03-14 RX ORDER — HYDROMORPHONE HYDROCHLORIDE 1 MG/ML
.3-.5 INJECTION, SOLUTION INTRAMUSCULAR; INTRAVENOUS; SUBCUTANEOUS EVERY 10 MIN PRN
Status: DISCONTINUED | OUTPATIENT
Start: 2019-03-14 | End: 2019-03-14 | Stop reason: HOSPADM

## 2019-03-14 RX ORDER — ALBUTEROL SULFATE 0.83 MG/ML
2.5 SOLUTION RESPIRATORY (INHALATION) EVERY 4 HOURS PRN
Status: DISCONTINUED | OUTPATIENT
Start: 2019-03-14 | End: 2019-03-14 | Stop reason: HOSPADM

## 2019-03-14 RX ORDER — LIDOCAINE HYDROCHLORIDE 20 MG/ML
INJECTION, SOLUTION INFILTRATION; PERINEURAL PRN
Status: DISCONTINUED | OUTPATIENT
Start: 2019-03-14 | End: 2019-03-14

## 2019-03-14 RX ORDER — PROPOFOL 10 MG/ML
INJECTION, EMULSION INTRAVENOUS PRN
Status: DISCONTINUED | OUTPATIENT
Start: 2019-03-14 | End: 2019-03-14

## 2019-03-14 RX ORDER — DIMENHYDRINATE 50 MG/ML
25 INJECTION, SOLUTION INTRAMUSCULAR; INTRAVENOUS
Status: DISCONTINUED | OUTPATIENT
Start: 2019-03-14 | End: 2019-03-14 | Stop reason: HOSPADM

## 2019-03-14 RX ORDER — METOPROLOL TARTRATE 1 MG/ML
1-2 INJECTION, SOLUTION INTRAVENOUS EVERY 5 MIN PRN
Status: DISCONTINUED | OUTPATIENT
Start: 2019-03-14 | End: 2019-03-14 | Stop reason: HOSPADM

## 2019-03-14 RX ORDER — NALOXONE HYDROCHLORIDE 0.4 MG/ML
.1-.4 INJECTION, SOLUTION INTRAMUSCULAR; INTRAVENOUS; SUBCUTANEOUS
Status: DISCONTINUED | OUTPATIENT
Start: 2019-03-14 | End: 2019-03-14 | Stop reason: HOSPADM

## 2019-03-14 RX ORDER — ONDANSETRON 2 MG/ML
INJECTION INTRAMUSCULAR; INTRAVENOUS PRN
Status: DISCONTINUED | OUTPATIENT
Start: 2019-03-14 | End: 2019-03-14

## 2019-03-14 RX ORDER — FLUMAZENIL 0.1 MG/ML
0.2 INJECTION, SOLUTION INTRAVENOUS
Status: DISCONTINUED | OUTPATIENT
Start: 2019-03-14 | End: 2019-03-14 | Stop reason: HOSPADM

## 2019-03-14 RX ORDER — LIDOCAINE 40 MG/G
CREAM TOPICAL
Status: DISCONTINUED | OUTPATIENT
Start: 2019-03-14 | End: 2019-03-14 | Stop reason: HOSPADM

## 2019-03-14 RX ORDER — ONDANSETRON 2 MG/ML
4 INJECTION INTRAMUSCULAR; INTRAVENOUS EVERY 30 MIN PRN
Status: DISCONTINUED | OUTPATIENT
Start: 2019-03-14 | End: 2019-03-14 | Stop reason: HOSPADM

## 2019-03-14 RX ORDER — ONDANSETRON 2 MG/ML
4 INJECTION INTRAMUSCULAR; INTRAVENOUS EVERY 6 HOURS PRN
Status: DISCONTINUED | OUTPATIENT
Start: 2019-03-14 | End: 2019-03-14 | Stop reason: HOSPADM

## 2019-03-14 RX ORDER — FENTANYL CITRATE 50 UG/ML
25-50 INJECTION, SOLUTION INTRAMUSCULAR; INTRAVENOUS
Status: DISCONTINUED | OUTPATIENT
Start: 2019-03-14 | End: 2019-03-14 | Stop reason: HOSPADM

## 2019-03-14 RX ORDER — ONDANSETRON 4 MG/1
4 TABLET, ORALLY DISINTEGRATING ORAL EVERY 6 HOURS PRN
Status: DISCONTINUED | OUTPATIENT
Start: 2019-03-14 | End: 2019-03-14 | Stop reason: HOSPADM

## 2019-03-14 RX ORDER — METOCLOPRAMIDE HYDROCHLORIDE 5 MG/ML
10 INJECTION INTRAMUSCULAR; INTRAVENOUS
Status: COMPLETED | OUTPATIENT
Start: 2019-03-14 | End: 2019-03-14

## 2019-03-14 RX ORDER — MEPERIDINE HYDROCHLORIDE 50 MG/ML
12.5 INJECTION INTRAMUSCULAR; INTRAVENOUS; SUBCUTANEOUS
Status: DISCONTINUED | OUTPATIENT
Start: 2019-03-14 | End: 2019-03-14 | Stop reason: HOSPADM

## 2019-03-14 RX ORDER — FENTANYL CITRATE 50 UG/ML
INJECTION, SOLUTION INTRAMUSCULAR; INTRAVENOUS PRN
Status: DISCONTINUED | OUTPATIENT
Start: 2019-03-14 | End: 2019-03-14

## 2019-03-14 RX ORDER — ONDANSETRON 2 MG/ML
4 INJECTION INTRAMUSCULAR; INTRAVENOUS
Status: DISCONTINUED | OUTPATIENT
Start: 2019-03-14 | End: 2019-03-14 | Stop reason: HOSPADM

## 2019-03-14 RX ADMIN — SODIUM CHLORIDE, POTASSIUM CHLORIDE, SODIUM LACTATE AND CALCIUM CHLORIDE: 600; 310; 30; 20 INJECTION, SOLUTION INTRAVENOUS at 13:40

## 2019-03-14 RX ADMIN — MIDAZOLAM 1 MG: 1 INJECTION INTRAMUSCULAR; INTRAVENOUS at 14:09

## 2019-03-14 RX ADMIN — LIDOCAINE HYDROCHLORIDE 100 MG: 20 INJECTION, SOLUTION INFILTRATION; PERINEURAL at 14:19

## 2019-03-14 RX ADMIN — Medication 150 MG: at 14:19

## 2019-03-14 RX ADMIN — PROPOFOL 160 MG: 10 INJECTION, EMULSION INTRAVENOUS at 14:19

## 2019-03-14 RX ADMIN — FENTANYL CITRATE 50 MCG: 50 INJECTION, SOLUTION INTRAMUSCULAR; INTRAVENOUS at 14:19

## 2019-03-14 RX ADMIN — METOCLOPRAMIDE 10 MG: 5 INJECTION, SOLUTION INTRAMUSCULAR; INTRAVENOUS at 13:39

## 2019-03-14 RX ADMIN — ONDANSETRON 4 MG: 2 INJECTION INTRAMUSCULAR; INTRAVENOUS at 14:43

## 2019-03-14 ASSESSMENT — MIFFLIN-ST. JEOR: SCORE: 1425.88

## 2019-03-14 NOTE — ANESTHESIA POSTPROCEDURE EVALUATION
Anesthesia POST Procedure Evaluation    Patient: Sarah Felix   MRN:     [id number] Gender:   female   Age:    73 year old :      1945        Preoperative Diagnosis: Polyp Of Duodenum    Procedure(s):  Upper Endoscopy   Postop Comments: No value filed.       Anesthesia Type:  General    Reportable Event: NO     PAIN: Uncomplicated   Sign Out status: Comfortable, Well controlled pain     PONV: No PONV   Sign Out status:  No Nausea or Vomiting     Neuro/Psych: Uneventful perioperative course   Sign Out Status: Preoperative baseline; Age appropriate mentation     Airway/Resp.: Uneventful perioperative course   Sign Out Status: Non labored breathing, age appropriate RR; Resp. Status within EXPECTED Parameters     CV: Uneventful perioperative course   Sign Out status: Appropriate BP and perfusion indices; Appropriate HR/Rhythm     Disposition:   Sign Out in:  PACU  Disposition:  Phase II; Home  Recovery Course: Uneventful  Follow-Up: Not required           Last Anesthesia Record Vitals:  CRNA VITALS  3/14/2019 1427 - 3/14/2019 1525      3/14/2019             NIBP:  133/78    Ht Rate:  85          Last PACU/Preop Vitals:  Vitals:    19 1500 19 1515 19 1530   BP: 120/76 117/75    Pulse: 85     Resp: 16 16 15   Temp: 36.8  C (98.2  F)  36.4  C (97.5  F)   SpO2: 99% 96% 96%         Electronically Signed By: Chloe Huddleston MD, 2019, 3:25 PM

## 2019-03-14 NOTE — OR NURSING
Dr. Brower at bedside in PACU     Surgeon/Pathologist Verbiage (Will Incorporate Name Of Surgeon From Intro If Not Blank): operated in two distinct and integrated capacities as the surgeon and pathologist.

## 2019-03-14 NOTE — PROVIDER NOTIFICATION
No change in previous assessment except as noted below, pt appears comfortable, no distress noted, gait steady, denies dizziness, tolerates oral intake well, denies nausea.  Pt awaiting call from ride, no family/friends available at bedside.  Discharge instructions given to patient

## 2019-03-14 NOTE — DISCHARGE INSTRUCTIONS
[facility]   Adult Discharge Orders & Instructions     For 24 hours after surgery    1. Get plenty of rest.  A responsible adult must stay with you for at least 24 hours after you leave the hospital.   2. Do not drive or use heavy equipment.  If you have weakness or tingling, don't drive or use heavy equipment until this feeling goes away.  3. Do not drink alcohol.  4. Avoid strenuous or risky activities.  Ask for help when climbing stairs.   5. You may feel lightheaded.  IF so, sit for a few minutes before standing.  Have someone help you get up.   6. If you have nausea (feel sick to your stomach): Drink only clear liquids such as apple juice, ginger ale, broth or 7-Up.  Rest may also help.  Be sure to drink enough fluids.  Move to a regular diet as you feel able.  7. You may have a slight fever. Call the doctor if your fever is over 100 F (37.7 C) (taken under the tongue) or lasts longer than 24 hours.  8. You may have a dry mouth, a sore throat, muscle aches or trouble sleeping.  These should go away after 24 hours.  9. Do not make important or legal decisions.   Call your doctor for any of the followin.  Signs of infection (fever, growing tenderness at the surgery site, a large amount of drainage or bleeding, severe pain, foul-smelling drainage, redness, swelling).    2. It has been over 8 to 10 hours since surgery and you are still not able to urinate (pass water).    3.  Headache for over 24 hours.    4.  Numbness, tingling or weakness the day after surgery (if you had spinal anesthesia).  To contact a doctor, call Dr Brower at [phone] or if after hours, please call the hospital  and request the Gastroenterologist resident on call

## 2019-03-14 NOTE — OP NOTE
Upper GI Endoscopy 03/14/2019  2:19 PM [address] [place] [address] [phone]     Endoscopy Department   _______________________________________________________________________________   Patient Name: Sarah Felix       Procedure Date: 3/14/2019 2:19 PM   MRN: [id number]                       Account Number: [id number]   YOB: 1945               Admit Type: Outpatient   Age: 73                               Room: OR   Gender: Female                        Note Status: Finalized   Attending MD: Hood Brower MD       Pause for the Cause: time out performed   Total Sedation Time:                     _______________________________________________________________________________       Procedure:           Upper GI endoscopy   Indications:         Surveillance procedure, h/o duodenal adenoma removed                        with piecemeal EMR on 9/2018; Plan for EGD to ensure                        complere removal.   Providers:           Hood Brower MD   Referring MD:           Requesting Provider: Joel D. Wegener, MD   Medicines:           General Anesthesia   Complications:       No immediate complications. Estimated blood loss:                        Minimal.   _______________________________________________________________________________   Procedure:           Pre-Anesthesia Assessment:                        - Prior to the procedure, a History and Physical was                        performed, and patient medications and allergies were                        reviewed. The patient is competent. The risks and                        benefits of the procedure and the sedation options and                        risks were discussed with the patient. All questions                        were answered and informed consent was obtained. Patient                        identification and proposed procedure were verified by                        the  physician, the nurse, the anesthesiologist and the                        anesthetist in the procedure room. Mental Status                        Examination: alert and oriented. Airway Examination:                        normal oropharyngeal airway and neck mobility.                        Respiratory Examination: clear to auscultation. CV                        Examination: normal. Prophylactic Antibiotics: The                        patient does not require prophylactic antibiotics. Prior                        Anticoagulants: The patient has taken no previous                        anticoagulant or antiplatelet agents. ASA Grade                        Assessment: II - A patient with mild systemic disease.                        After reviewing the risks and benefits, the patient was                        deemed in satisfactory condition to undergo the                        procedure. The anesthesia plan was to use general                        anesthesia. Immediately prior to administration of                        medications, the patient was re-assessed for adequacy to                        receive sedatives. The heart rate, respiratory rate,                        oxygen saturations, blood pressure, adequacy of                        pulmonary ventilation, and response to care were                        monitored throughout the procedure. The physical status                        of the patient was re-assessed after the procedure.                        After obtaining informed consent, the endoscope was                        passed under direct vision. Throughout the procedure,                        the patient's blood pressure, pulse, and oxygen                        saturations were monitored continuously. The Endoscope                        was introduced through the mouth, and advanced to the                        third part of duodenum. The upper GI endoscopy was                         accomplished without difficulty. The patient tolerated                        the procedure well.                                                                                     Findings:        A non-obstructing Schatzki ring was found at the gastroesophageal        junction.        The exam of the esophagus was otherwise normal.        A few diminutive sessile polyps were found in the gastric fundus.        A small post polypectomy scar was found in the second portion of the        duodenum. There was no evidence of the previous polyp. Biopsies were        taken with a cold forceps for histology. Verification of patient        identification for the specimen was done by the physician and nurse        using the patient's name, birth date and medical record number.        Estimated blood loss was minimal.                                                                                     Impression:          - Non-obstructing Schatzki ring.                        - A few gastric polyps consistent with benign fundic                        gland polyps.                        - Duodenal scar from likely prior polypectomy. No                        residual polypoid tissue seen. Biopsied to confirm no                        residual adenoma.   Recommendation:      - Discharge patient to home (ambulatory).                        - Await pathology results.                        - Would not recommend further surveillance endoscopy for                        sporadic adenoma of the duodenum as the efficacy of                        further surveillance is unclear. No follow up needed.                                                                                       Hood Brower MD

## 2019-03-19 LAB — COPATH REPORT: NORMAL

## 2019-03-25 ENCOUNTER — TELEPHONE (OUTPATIENT)
Dept: FAMILY MEDICINE | Facility: CLINIC | Age: 74
End: 2019-03-25

## 2019-05-13 DIAGNOSIS — N18.2 CKD (CHRONIC KIDNEY DISEASE) STAGE 2, GFR 60-89 ML/MIN: ICD-10-CM

## 2019-05-13 NOTE — TELEPHONE ENCOUNTER
"Requested Prescriptions   Pending Prescriptions Disp Refills     lisinopril (PRINIVIL/ZESTRIL) 2.5 MG tablet [Pharmacy Med Name: LISINOPRIL TABS 2.5MG] 90 tablet 1     Sig: TAKE 1 TABLET DAILY  Last Written Prescription Date:  11/2/2018  Last Fill Quantity: 90 tablet,  # refills: 1   Last Office Visit: 3/11/2019   Future Office Visit:    Next 5 appointments (look out 90 days)    May 21, 2019  3:00 PM CDT  Office Visit with Joel Daniel Irwin Wegener, MD  [facility] ([facility]) [address] [place] [id number] [phone]              ACE Inhibitors (Including Combos) Protocol Failed - 5/13/2019  3:55 PM        Failed - Blood pressure under 140/90 in past 12 months     BP Readings from Last 3 Encounters:   03/14/19 144/74   03/11/19 114/77   10/22/18 128/77           Passed - Recent (12 mo) or future (30 days) visit within the authorizing provider's specialty     Patient had office visit in the last 12 months or has a visit in the next 30 days with authorizing provider or within the authorizing provider's specialty.  See \"Patient Info\" tab in inbasket, or \"Choose Columns\" in Meds & Orders section of the refill encounter.            Passed - Medication is active on med list        Passed - Patient is age 18 or older        Passed - No active pregnancy on record        Passed - Normal serum creatinine on file in past 12 months     Recent Labs   Lab Test 03/11/19  1603   CR 0.86           Passed - Normal serum potassium on file in past 12 months     Recent Labs   Lab Test 03/11/19  1603   POTASSIUM 3.9           Passed - No positive pregnancy test within past 12 months          "

## 2019-05-16 RX ORDER — LISINOPRIL 2.5 MG/1
TABLET ORAL
Qty: 90 TABLET | Refills: 1 | Status: SHIPPED | OUTPATIENT
Start: 2019-05-16 | End: 2020-01-13

## 2019-05-16 NOTE — TELEPHONE ENCOUNTER
Out of range due to surgery - writer will approve    Prescription approved per [facility] [facility].    Signed Prescriptions:                        Disp   Refills    lisinopril (PRINIVIL/ZESTRIL) 2.5 MG [id number] tab*1        Sig: TAKE 1 TABLET DAILY  Authorizing Provider: WEGENER, JOEL DANIEL IRWIN  Ordering User: OLMAN HINES

## 2019-05-21 ENCOUNTER — OFFICE VISIT (OUTPATIENT)
Dept: FAMILY MEDICINE | Facility: CLINIC | Age: 74
End: 2019-05-21
Payer: COMMERCIAL

## 2019-05-21 VITALS
SYSTOLIC BLOOD PRESSURE: 121 MMHG | HEIGHT: 65 IN | RESPIRATION RATE: 16 BRPM | BODY MASS INDEX: 33.24 KG/M2 | TEMPERATURE: 98.1 F | HEART RATE: 72 BPM | OXYGEN SATURATION: 100 % | WEIGHT: 199.5 LBS | DIASTOLIC BLOOD PRESSURE: 73 MMHG

## 2019-05-21 DIAGNOSIS — E11.22 TYPE 2 DIABETES MELLITUS WITH STAGE 2 CHRONIC KIDNEY DISEASE, WITHOUT LONG-TERM CURRENT USE OF INSULIN (H): Primary | ICD-10-CM

## 2019-05-21 DIAGNOSIS — Z79.4 TYPE 2 DIABETES MELLITUS WITH STAGE 2 CHRONIC KIDNEY DISEASE, WITH LONG-TERM CURRENT USE OF INSULIN (H): ICD-10-CM

## 2019-05-21 DIAGNOSIS — E11.22 TYPE 2 DIABETES MELLITUS WITH STAGE 2 CHRONIC KIDNEY DISEASE, WITH LONG-TERM CURRENT USE OF INSULIN (H): ICD-10-CM

## 2019-05-21 DIAGNOSIS — N18.2 TYPE 2 DIABETES MELLITUS WITH STAGE 2 CHRONIC KIDNEY DISEASE, WITHOUT LONG-TERM CURRENT USE OF INSULIN (H): Primary | ICD-10-CM

## 2019-05-21 DIAGNOSIS — N18.2 TYPE 2 DIABETES MELLITUS WITH STAGE 2 CHRONIC KIDNEY DISEASE, WITH LONG-TERM CURRENT USE OF INSULIN (H): ICD-10-CM

## 2019-05-21 LAB — HBA1C MFR BLD: 7.8 % (ref 0–5.6)

## 2019-05-21 PROCEDURE — 36415 COLL VENOUS BLD VENIPUNCTURE: CPT | Performed by: FAMILY MEDICINE

## 2019-05-21 PROCEDURE — 99214 OFFICE O/P EST MOD 30 MIN: CPT | Performed by: FAMILY MEDICINE

## 2019-05-21 PROCEDURE — 83036 HEMOGLOBIN GLYCOSYLATED A1C: CPT | Performed by: FAMILY MEDICINE

## 2019-05-21 RX ORDER — SYRINGE-NEEDLE,INSULIN,0.5 ML 27GX1/2"
SYRINGE, EMPTY DISPOSABLE MISCELLANEOUS
Qty: 100 EACH | Refills: 3 | Status: SHIPPED | OUTPATIENT
Start: 2019-05-21

## 2019-05-21 RX ORDER — LIRAGLUTIDE 6 MG/ML
0.6 INJECTION SUBCUTANEOUS DAILY
Qty: 3 ML | Refills: 0 | Status: SHIPPED | OUTPATIENT
Start: 2019-05-21 | End: 2019-05-22

## 2019-05-21 RX ORDER — SYRINGE-NEEDLE,INSULIN,0.5 ML 27GX1/2"
SYRINGE, EMPTY DISPOSABLE MISCELLANEOUS
Qty: 100 EACH | Refills: 3 | Status: SHIPPED | OUTPATIENT
Start: 2019-05-21 | End: 2019-05-21

## 2019-05-21 RX ORDER — LIRAGLUTIDE 6 MG/ML
0.6 INJECTION SUBCUTANEOUS DAILY
Qty: 3 ML | Refills: 0 | Status: SHIPPED | OUTPATIENT
Start: 2019-05-21 | End: 2019-05-21

## 2019-05-21 RX ORDER — LIRAGLUTIDE 6 MG/ML
0.6 INJECTION SUBCUTANEOUS DAILY
Qty: 3 ML | Refills: 3 | Status: SHIPPED | OUTPATIENT
Start: 2019-05-21 | End: 2019-05-21

## 2019-05-21 ASSESSMENT — MIFFLIN-ST. JEOR: SCORE: 1420.81

## 2019-05-21 NOTE — PROGRESS NOTES
"Subjective     Sarah Felix is a 73 year old female who presents to clinic today for the following health issues:    ZITA Smith had an A1C of 7.8, which is an improvement over her last two measurements (8.3 and 8.1, respectively).    Also, she stopped using pantoprazole to manage her GERD since it did not seem to be helping. Symptoms tolerable though.     Gi side effects from metformin in past.      Brought in costs for victoza, byetta.          Diabetes Follow-up      How often are you checking your blood sugar? Two times daily    What time of day are you checking your blood sugars (select all that apply)?  Before and after meals    Have you had any blood sugars above 200?  No    Have you had any blood sugars below 70?  Yes    What symptoms do you notice when your blood sugar is low?  Shaky    What concerns do you have today about your diabetes? None     Do you have any of these symptoms? (Select all that apply)  No numbness or tingling in feet.  No redness, sores or blisters on feet.  No complaints of excessive thirst.  No reports of blurry vision.  No significant changes to weight.     Have you had a diabetic eye exam in the last 12 months? Yes- Date of last eye exam:     BP Readings from Last 2 Encounters:   05/21/19 121/73   03/14/19 144/74     Hemoglobin A1C (%)   Date Value   05/21/2019 7.8 (H)   03/04/2019 8.1 (H)     LDL Cholesterol Calculated (mg/dL)   Date Value   10/22/2018 77   11/09/2017 73       Diabetes Management Resources    Amount of exercise or physical activity: yard work     Problems taking medications regularly: No    Medication side effects: none    Diet: regular (no restrictions)      Objective    /73 (BP Location: Right arm, Patient Position: Sitting, Cuff Size: Adult Regular)   Pulse 72   Temp 98.1  F (36.7  C) (Oral)   Resp 16   Ht 1.651 m (5' 5\")   Wt 90.5 kg (199 lb 8 oz)   SpO2 100%   BMI 33.20 kg/m    Body mass index is 33.2 kg/m .      Problem list, Medication " list, Allergies, and Medical/Social/Surgical histories reviewed in [facility] and updated as appropriate.  Labs reviewed in EPIC  BP Readings from Last 3 Encounters:   05/21/19 121/73   03/14/19 144/74   03/11/19 114/77    Wt Readings from Last 3 Encounters:   05/21/19 90.5 kg (199 lb 8 oz)   03/14/19 91 kg (200 lb 9.9 oz)   03/11/19 91.2 kg (201 lb)                  Patient Active Problem List   Diagnosis     CYSTIC LIVER DIS     Allergic rhinitis     Diverticulitis of colon     Disorder of bone and cartilage     Esophageal reflux     Mixed incontinence urge and stress (male)(female)     Cystocele, lateral     Vaginal atrophy     Type 2 diabetes, HbA1c goal < 7% (H)     Hyperlipidemia LDL goal <100     Heart burn     BABB (dyspnea on exertion)     Impingement syndrome, shoulder     Sebaceous hyperplasia     Seborrheic keratosis     Fatty liver     Personal history of other malignant neoplasm of skin     Health Care Home     Advanced directives, counseling/discussion     Hypertension goal BP (blood pressure) < 140/90     Elevated serum creatinine     Peripheral vascular disease (H)     Skin exam, screening for cancer     Essential hypertension, benign (HTN)     CKD (chronic kidney disease) stage 2, GFR 60-89 ml/min     Type 2 diabetes mellitus with hyperglycemia (H)     Type 2 diabetes with stage 2 chronic kidney disease GFR 60-89 (H)     Type 2 diabetes mellitus without complication, with long-term current use of insulin (H)     Senile nuclear sclerosis, right     Past Surgical History:   Procedure Laterality Date     C APPENDECTOMY  age 22     C DEXA, BONE DENSITY, AXIAL SKEL  2005     C DEXA, BONE DENSITY, AXIAL SKEL  6/2007    No signif change in Osteopenia     COLONOSCOPY  4/2006    repeat 10 yr     ENDOSCOPIC ULTRASOUND UPPER GASTROINTESTINAL TRACT (GI) N/A 9/6/2018    Procedure: ENDOSCOPIC ULTRASOUND, ESOPHAGOSCOPY / UPPER GASTROINTESTINAL TRACT (GI);  Endoscopic Ultrasound, Esophagogastroduodenoscopy with  endoscopic mucosal resection;  Surgeon: Hood Brower MD;  Location: [facility] OR     ESOPHAGOSCOPY, GASTROSCOPY, DUODENOSCOPY (EGD), COMBINED N/A 2018    Procedure: COMBINED ESOPHAGOSCOPY, GASTROSCOPY, DUODENOSCOPY (EGD), BIOPSY SINGLE OR MULTIPLE;  EGD;  Surgeon: Hood Brower MD;  Location: [facility] OR     ESOPHAGOSCOPY, GASTROSCOPY, DUODENOSCOPY (EGD), COMBINED N/A 3/14/2019    Procedure: Upper Endoscopy;  Surgeon: Hood Brower MD;  Location: [facility] OR     ESOPHAGOSCOPY, GASTROSCOPY, DUODENOSCOPY (EGD), RESECT MUCOSA, COMBINED N/A 2018    Procedure: COMBINED ESOPHAGOSCOPY, GASTROSCOPY, DUODENOSCOPY (EGD), RESECT MUCOSA;;  Surgeon: Hood Brower MD;  Location: [facility] OR     GYN SURGERY  10/22/08    pelvic support     HEMORRHOIDECTOMY       MOHS MICROGRAPHIC PROCEDURE       SURGICAL HISTORY OF -   age 22    L ovarian cyst removal, laparotomy     SURGICAL HISTORY OF -       partial thyroidectomy     SURGICAL HISTORY OF -   10/08    Transobturator tape for Urinary Incontinence     SURGICAL HISTORY OF -   2011    stress test normal     THYROID SURGERY      Had cyst removed, thyroid gland is intact.       Social History     Tobacco Use     Smoking status: Former Smoker     Last attempt to quit: 1980     Years since quittin.4     Smokeless tobacco: Never Used     Tobacco comment: smoked from 70-80; smoked about 1ppd   Substance Use Topics     Alcohol use: Yes     Alcohol/week: 0.0 oz     Comment: 6 drinks per week     Family History   Problem Relation Age of Onset     Diabetes Mother         at 89 yrs     Hypertension Mother      Arthritis Mother         rheumatoid     Cancer Father         bladder     Cardiovascular Brother         palpitations not on meds.     Glaucoma No family hx of      Macular Degeneration No family hx of      Amblyopia No family hx of          Current Outpatient Medications   Medication Sig Dispense Refill     ACE/ARB NOT PRESCRIBED, INTENTIONAL, by Other route continuous prn.        "alum & mag hydroxide-simethicone (MAALOX REGULAR STRENGTH) 200-200-20 MG/5ML SUSP Take 30 mLs by mouth as needed        aspirin 81 MG tablet Take 2 tablets (162 mg) by mouth daily 60 tablet      atorvastatin (LIPITOR) 40 MG tablet TAKE 1 TABLET DAILY 90 tablet 1     blood glucose monitoring (ONE TOUCH ULTRA) test strip 1 strip by In Vitro route 4 times daily. 360 each 1     Blood Glucose Monitoring Suppl (ONE TOUCH ULTRA 2) W/DEVICE KIT Use to test blood sugars 2 times daily as directed. 1 kit 0     CALCIUM 500 + D 500-200 MG-IU OR TABS one tab twice per day 100 0     Cholecalciferol (VITAMIN D PO) Take  by mouth. Pt consuming 3000 units per day        Docusate Sodium (COLACE PO) Take  by mouth as needed.       furosemide (LASIX) 20 MG tablet TAKE 2 TABLETS IN THE MORNING AND 1 TABLET IN THE AFTERNOON 270 tablet 1     glimepiride (AMARYL) 4 MG tablet TAKE 1 TABLET TWICE A DAY WITH MEALS (BREAKFAST AND DINNER) (PLEASE SCHEDULE LAB APPOINTMENT PRIOR TO REFILL REQUESTS) 180 tablet 0     insulin pen needle (BD REY U/F) 32G X 4 MM miscellaneous Use one pen needle twice daily (with lantus and victoza) 180 each 3     insulin syringe-needle U-100 (BD INSULIN SYRINGE ULTRAFINE) 31G X 5/16\" 1 ML miscellaneous Use 1 syringes twice daily for insulin and victoza and for symptoms of hypoglycemia 100 each 3     LANTUS SOLOSTAR 100 UNIT/ML soln Inject between 60 units at bedtime.  Needs four boxes for three month supply. 60 mL 3     liraglutide (VICTOZA) 18 MG/3ML solution Inject 0.6 mg Subcutaneous daily 3 mL 0     lisinopril (PRINIVIL/ZESTRIL) 2.5 MG tablet TAKE 1 TABLET DAILY 90 tablet 1     metoprolol tartrate (LOPRESSOR) 25 MG tablet TAKE 1 TABLET TWICE A  tablet 2     ONETOUCH DELICA LANCETS 33G MISC 1 each 4 times daily 360 each 1     pantoprazole (PROTONIX) 40 MG EC tablet Take 1 tablet (40 mg) by mouth daily Take 30-60 minutes before a meal. 30 tablet 3     potassium chloride ER (K-TAB/KLOR-CON) 10 MEQ CR tablet " "Take 1 tablet (10 mEq) by mouth 2 times daily 180 tablet 3     triamcinolone (KENALOG) 0.1 % cream Apply sparingly to affected area two times daily as needed 80 g 3     Allergies   Allergen Reactions     Amlodipine Swelling     Ancef [Cefazolin]      Levaquin Rash     Itching, rash     Metformin Diarrhea and GI Disturbance     Cephalosporins Rash     Levofloxacin Itching and Rash     Nickel Rash     Contact reaction  Contact reaction     Recent Labs   Lab Test 05/21/19  1507 03/11/19  1603 03/04/19  1515 10/22/18  1516 09/06/18  1149  11/09/17  1403 07/26/17  1434  10/19/15  1541 06/19/15  1513  06/29/12  1401   A1C 7.8*  --  8.1* 8.3*  --    < >  --  7.0*   < > 7.1* 7.5*   < > 8.3*   LDL  --   --   --  77  --   --  73 66   < >  --  88   < > 88   HDL  --   --   --  59  --   --  51 50   < >  --   --    < > 60   TRIG  --   --   --  184*  --   --  118 164*   < >  --   --    < > 189*   ALT  --   --   --   --   --   --  42  --   --  47  --   --  71*   CR  --  0.86  --   --  0.81  --  0.81  --    < > 0.90  --    < > 0.66   GFRESTIMATED  --  68  --   --  70  --  70  --    < > 62  --    < > >90   GFRESTBLACK  --  79  --   --  85  --  85  --    < > 75  --    < > >90   POTASSIUM  --  3.9  --   --  3.8   < > 3.3*  --    < > 3.9  --    < > 3.7   TSH  --   --   --   --   --   --   --  2.11  --   --  2.27   < >  --     < > = values in this interval not displayed.        ROS:  Constitutional, HEENT, cardiovascular, pulmonary, GI, , musculoskeletal, neuro, skin, endocrine and psych systems are negative, except as otherwise noted.        OBJECTIVE:  /73 (BP Location: Right arm, Patient Position: Sitting, Cuff Size: Adult Regular)   Pulse 72   Temp 98.1  F (36.7  C) (Oral)   Resp 16   Ht 1.651 m (5' 5\")   Wt 90.5 kg (199 lb 8 oz)   SpO2 100%   BMI 33.20 kg/m      EXAM:  GENERAL APPEARANCE: healthy, alert and no distress      ASSESSMENT AND PLAN  Patient Instructions   ASSESSMENT AND PLAN  1. Type 2 diabetes mellitus " with stage 2 chronic kidney disease, without long-term current use of insulin (H)  a1c improved. On appropriate medications. Discussed metformin, had gi side effects in past.     Adding victoza 0.6 mg daily, no change in lantus dosing for now.  If getting hypoglecemic stop lantus and let me know.     Sent insulin needle prescription to pharmacy.     Stopped pantoprazole.  Not working.       Had gastric polyps removed (no burks's) .  Lip blisters after upper endoscopy.     Follow up 3 months diabetes check.                     Joel Wegener, MD

## 2019-05-21 NOTE — PATIENT INSTRUCTIONS
"ASSESSMENT AND PLAN  1. Type 2 diabetes mellitus with stage 2 chronic kidney disease, without long-term current use of insulin (H)  a1c improved. On appropriate medications. Discussed metformin, had gi side effects in past.     Adding victoza 0.6 mg daily, no change in lantus dosing for now.  If getting hypoglecemic stop lantus and let me know.     Sent insulin needle prescription to pharmacy.     Stopped pantoprazole.  Not working.       Had gastric polyps removed.  Lip blisters after upper endoscopy.     Follow up 3 months diabetes check.       - Hemoglobin A1c; Future  - Hemoglobin A1c  - insulin syringe-needle U-100 (BD INSULIN SYRINGE ULTRAFINE) 31G X 5/16\" 1 ML miscellaneous; Use 1 syringes twice daily for insulin and victoza and for symptoms of hypoglycemia  Dispense: 100 each; Refill: 3  - liraglutide (VICTOZA) 18 MG/3ML solution; Inject 0.6 mg Subcutaneous daily  Dispense: 3 mL; Refill: 0        MYCHART FOR ON-LINE CARE(VISITS), LABS, REFILLS, MESSAGING, ETC [email or web address] , [phone]    E-VISIT: click \"on-line care, then request e-visit\".  E-visits work well for following up on issues we have discussed in clinic previously which may need new prescriptions, new prescriptions or substantial discussion. These are always done by me (Dr. Wegener).     ONCARE VISIT:  [email or web address]  - we treat nearly 50 common conditions through on-care.  These are done in an hour by on-call staff.     RADIOLOGY:  [facility]:  [phone]   [facility]: [phone]    Mammogram and Colonoscopy [phone]    Smoking Cessation: [email or web address], [phone]-[phone] ([phone])      CONSUMER PRICE LINE for estimates of test costs:  [phone]       "

## 2019-05-22 ENCOUNTER — TELEPHONE (OUTPATIENT)
Dept: FAMILY MEDICINE | Facility: CLINIC | Age: 74
End: 2019-05-22

## 2019-05-22 DIAGNOSIS — E11.22 TYPE 2 DIABETES MELLITUS WITH STAGE 2 CHRONIC KIDNEY DISEASE, WITH LONG-TERM CURRENT USE OF INSULIN (H): ICD-10-CM

## 2019-05-22 DIAGNOSIS — N18.2 TYPE 2 DIABETES MELLITUS WITH STAGE 2 CHRONIC KIDNEY DISEASE, WITHOUT LONG-TERM CURRENT USE OF INSULIN (H): ICD-10-CM

## 2019-05-22 DIAGNOSIS — E11.22 TYPE 2 DIABETES MELLITUS WITH STAGE 2 CHRONIC KIDNEY DISEASE, WITHOUT LONG-TERM CURRENT USE OF INSULIN (H): ICD-10-CM

## 2019-05-22 DIAGNOSIS — N18.2 TYPE 2 DIABETES MELLITUS WITH STAGE 2 CHRONIC KIDNEY DISEASE, WITH LONG-TERM CURRENT USE OF INSULIN (H): ICD-10-CM

## 2019-05-22 DIAGNOSIS — Z79.4 TYPE 2 DIABETES MELLITUS WITH STAGE 2 CHRONIC KIDNEY DISEASE, WITH LONG-TERM CURRENT USE OF INSULIN (H): ICD-10-CM

## 2019-05-22 RX ORDER — LIRAGLUTIDE 6 MG/ML
0.6 INJECTION SUBCUTANEOUS DAILY
Qty: 9 ML | Refills: 3 | Status: ON HOLD | OUTPATIENT
Start: 2019-05-22 | End: 2019-10-18

## 2019-05-22 NOTE — TELEPHONE ENCOUNTER
Reason for Call:  Other call back    Detailed comments: Patient would like a call back to discuss new medications.     Phone Number Patient can be reached at: Home number on file [phone] (home)    Best Time: Any     Can we leave a detailed message on this number? YES    Call taken on 5/22/2019 at 1:39 PM by Partha Guardado

## 2019-05-22 NOTE — TELEPHONE ENCOUNTER
Dr. Wegener-Please review and sign if agree.  Victoza pended for 90 day supply, which is increased dispense than 5/21/19 order.    Triage-Please either leave voicemail or send patient [email or web address] message notifying her Victoza and pen needles sent to [email or web address].    Patient would like insulin pen needle 32G x 4 mm and Victoza (3 month supply) sent to [email or web address] because it is cheaper than local pharmacy.    Insulin pen needle order sent to [email or web address].    Thank you!  PASCALE Wills, BSN, RN

## 2019-06-07 ENCOUNTER — TELEPHONE (OUTPATIENT)
Dept: FAMILY MEDICINE | Facility: CLINIC | Age: 74
End: 2019-06-07

## 2019-06-07 DIAGNOSIS — E11.9 TYPE 2 DIABETES MELLITUS WITHOUT COMPLICATION, WITHOUT LONG-TERM CURRENT USE OF INSULIN (H): Primary | ICD-10-CM

## 2019-06-07 NOTE — TELEPHONE ENCOUNTER
Pt would like to request:     Lancets One Touch 100s Delica- 4x a day     One touch ultra strips blue 100- 4x a day.     [facility]     Pt states that she is out of medications.

## 2019-08-12 ENCOUNTER — ANCILLARY PROCEDURE (OUTPATIENT)
Dept: MAMMOGRAPHY | Facility: CLINIC | Age: 74
End: 2019-08-12
Attending: FAMILY MEDICINE
Payer: COMMERCIAL

## 2019-08-12 DIAGNOSIS — Z12.31 VISIT FOR SCREENING MAMMOGRAM: ICD-10-CM

## 2019-08-18 NOTE — PATIENT INSTRUCTIONS
"ASSESSMENT AND PLAN  1. Type 2 diabetes mellitus with stage 2 chronic kidney disease, without long-term current use of insulin (H)  Glimepiride, lantus, victoza, metoprolol, lisinopril, statin appropriately.  Discuss aspirin.    a1c today and tsh . a1c lower , 7.5 still.    Oddly victoza went from $70 to over 200, lantus also jumped up but others did not.    Will try to increase victoza to 1.2 units/dose.    Follow up three-6 months next a1c.                  MYCHART FOR ON-LINE CARE(VISITS), LABS, REFILLS, MESSAGING, ETC [email or web address]    E-VISIT: click \"on-line care, then request e-visit\".  E-visits work well for following up on issues we have discussed in clinic previously which may need new prescriptions, new prescriptions or substantial discussion. These are always done by me (Dr. Wegener).     ONCARE VISIT:  [email or web address]  - we treat nearly 50 common conditions through on-care.  These are done in an hour by on-call staff.     RADIOLOGY:  [facility]:  [phone]   [facility]: [phone]    Mammogram and Colonoscopy [phone]    Smoking Cessation: [email or web address], [phone] ([phone])      CONSUMER PRICE LINE for estimates of test costs:  [phone]       "

## 2019-08-19 ENCOUNTER — OFFICE VISIT (OUTPATIENT)
Dept: FAMILY MEDICINE | Facility: CLINIC | Age: 74
End: 2019-08-19
Payer: COMMERCIAL

## 2019-08-19 VITALS
SYSTOLIC BLOOD PRESSURE: 110 MMHG | TEMPERATURE: 97.8 F | HEIGHT: 65 IN | RESPIRATION RATE: 18 BRPM | BODY MASS INDEX: 32.24 KG/M2 | WEIGHT: 193.5 LBS | OXYGEN SATURATION: 94 % | DIASTOLIC BLOOD PRESSURE: 72 MMHG | HEART RATE: 98 BPM

## 2019-08-19 DIAGNOSIS — N18.2 TYPE 2 DIABETES MELLITUS WITH STAGE 2 CHRONIC KIDNEY DISEASE, WITHOUT LONG-TERM CURRENT USE OF INSULIN (H): Primary | ICD-10-CM

## 2019-08-19 DIAGNOSIS — E11.9 TYPE 2 DIABETES, HBA1C GOAL < 7% (H): ICD-10-CM

## 2019-08-19 DIAGNOSIS — E11.22 TYPE 2 DIABETES MELLITUS WITH STAGE 2 CHRONIC KIDNEY DISEASE, WITHOUT LONG-TERM CURRENT USE OF INSULIN (H): Primary | ICD-10-CM

## 2019-08-19 LAB — HBA1C MFR BLD: 7.5 % (ref 0–5.6)

## 2019-08-19 PROCEDURE — 83036 HEMOGLOBIN GLYCOSYLATED A1C: CPT | Performed by: FAMILY MEDICINE

## 2019-08-19 PROCEDURE — 99214 OFFICE O/P EST MOD 30 MIN: CPT | Performed by: FAMILY MEDICINE

## 2019-08-19 PROCEDURE — 99207 C PAF COMPLETED  NO CHARGE: CPT | Performed by: FAMILY MEDICINE

## 2019-08-19 PROCEDURE — 84443 ASSAY THYROID STIM HORMONE: CPT | Performed by: FAMILY MEDICINE

## 2019-08-19 PROCEDURE — 36415 COLL VENOUS BLD VENIPUNCTURE: CPT | Performed by: FAMILY MEDICINE

## 2019-08-19 RX ORDER — LIRAGLUTIDE 6 MG/ML
1.2 INJECTION SUBCUTANEOUS DAILY
Qty: 18 ML | Refills: 3 | Status: SHIPPED | OUTPATIENT
Start: 2019-08-19 | End: 2019-10-12

## 2019-08-19 RX ORDER — LANCETS 33 GAUGE
1 EACH MISCELLANEOUS 4 TIMES DAILY
Qty: 360 EACH | Refills: 3 | Status: SHIPPED | OUTPATIENT
Start: 2019-08-19 | End: 2021-09-27

## 2019-08-19 ASSESSMENT — ANXIETY QUESTIONNAIRES
3. WORRYING TOO MUCH ABOUT DIFFERENT THINGS: NOT AT ALL
5. BEING SO RESTLESS THAT IT IS HARD TO SIT STILL: NOT AT ALL
7. FEELING AFRAID AS IF SOMETHING AWFUL MIGHT HAPPEN: NOT AT ALL
IF YOU CHECKED OFF ANY PROBLEMS ON THIS QUESTIONNAIRE, HOW DIFFICULT HAVE THESE PROBLEMS MADE IT FOR YOU TO DO YOUR WORK, TAKE CARE OF THINGS AT HOME, OR GET ALONG WITH OTHER PEOPLE: NOT DIFFICULT AT ALL
6. BECOMING EASILY ANNOYED OR IRRITABLE: NOT AT ALL
GAD7 TOTAL SCORE: 0
1. FEELING NERVOUS, ANXIOUS, OR ON EDGE: NOT AT ALL
2. NOT BEING ABLE TO STOP OR CONTROL WORRYING: NOT AT ALL

## 2019-08-19 ASSESSMENT — MIFFLIN-ST. JEOR: SCORE: 1393.59

## 2019-08-19 ASSESSMENT — PATIENT HEALTH QUESTIONNAIRE - PHQ9
SUM OF ALL RESPONSES TO PHQ QUESTIONS 1-9: 4
5. POOR APPETITE OR OVEREATING: NOT AT ALL

## 2019-08-19 NOTE — PROGRESS NOTES
Subjective     Sarah Felix is a 73 year old female who presents to clinic today for the following health issues:    HPI   Diabetes Follow-up      How often are you checking your blood sugar? Three times daily    What time of day are you checking your blood sugars (select all that apply)?  Before and after meals    Have you had any blood sugars above 200?  Yes sometimes after  meal     Have you had any blood sugars below 70?  Yes one time     What symptoms do you notice when your blood sugar is low?  Shaky    What concerns do you have today about your diabetes? None and Other:  Test result , medication      Do you have any of these symptoms? (Select all that apply)  No numbness or tingling in feet.  No redness, sores or blisters on feet.  No complaints of excessive thirst.  No reports of blurry vision.  No significant changes to weight.     Have you had a diabetic eye exam in the last 12 months? Yes- Date of last eye exam: 2018    BP Readings from Last 2 Encounters:   08/19/19 110/72   05/21/19 121/73     Hemoglobin A1C (%)   Date Value   08/19/2019 7.5 (H)   05/21/2019 7.8 (H)     LDL Cholesterol Calculated (mg/dL)   Date Value   10/22/2018 77   11/09/2017 73       Diabetes Management Resources      How many servings of fruits and vegetables do you eat daily?  2-3    On average, how many sweetened beverages do you drink each day (soda, juice, sweet tea, etc)?   Diet coke sometimes     How many days per week do you miss taking your medication? 0         Type 2 diabetes mellitus with stage 2 chronic kidney disease, without long-term current use of insulin (H)  Type 2 diabetes, HbA1c goal < 7% (H) :see plan for additional history.         Problem list, Medication list, Allergies, and Medical/Social/Surgical histories reviewed in [facility] and updated as appropriate.  Labs reviewed in [facility]  BP Readings from Last 3 Encounters:   08/19/19 110/72   05/21/19 121/73   03/14/19 144/74    Wt Readings from Last 3 Encounters:    08/19/19 87.8 kg (193 lb 8 oz)   05/21/19 90.5 kg (199 lb 8 oz)   03/14/19 91 kg (200 lb 9.9 oz)                  Patient Active Problem List   Diagnosis     CYSTIC LIVER DIS     Allergic rhinitis     Diverticulitis of colon     Disorder of bone and cartilage     Esophageal reflux     Mixed incontinence urge and stress (male)(female)     Cystocele, lateral     Vaginal atrophy     Type 2 diabetes, HbA1c goal < 7% (H)     Hyperlipidemia LDL goal <100     Heart burn     BABB (dyspnea on exertion)     Impingement syndrome, shoulder     Sebaceous hyperplasia     Seborrheic keratosis     Fatty liver     Personal history of other malignant neoplasm of skin     Health Care Home     Advanced directives, counseling/discussion     Hypertension goal BP (blood pressure) < 140/90     Elevated serum creatinine     Peripheral vascular disease (H)     Skin exam, screening for cancer     Essential hypertension, benign (HTN)     CKD (chronic kidney disease) stage 2, GFR 60-89 ml/min     Type 2 diabetes mellitus with hyperglycemia (H)     Type 2 diabetes with stage 2 chronic kidney disease GFR 60-89 (H)     Type 2 diabetes mellitus without complication, with long-term current use of insulin (H)     Senile nuclear sclerosis, right     Past Surgical History:   Procedure Laterality Date     C APPENDECTOMY  age 22     C DEXA, BONE DENSITY, AXIAL SKEL  2005     C DEXA, BONE DENSITY, AXIAL SKEL  6/2007    No signif change in Osteopenia     COLONOSCOPY  4/2006    repeat 10 yr     ENDOSCOPIC ULTRASOUND UPPER GASTROINTESTINAL TRACT (GI) N/A 9/6/2018    Procedure: ENDOSCOPIC ULTRASOUND, ESOPHAGOSCOPY / UPPER GASTROINTESTINAL TRACT (GI);  Endoscopic Ultrasound, Esophagogastroduodenoscopy with endoscopic mucosal resection;  Surgeon: Hood Brower MD;  Location:  OR     ESOPHAGOSCOPY, GASTROSCOPY, DUODENOSCOPY (EGD), COMBINED N/A 8/13/2018    Procedure: COMBINED ESOPHAGOSCOPY, GASTROSCOPY, DUODENOSCOPY (EGD), BIOPSY SINGLE OR MULTIPLE;  EGD;   Surgeon: Hood Brower MD;  [facility]     ESOPHAGOSCOPY, GASTROSCOPY, DUODENOSCOPY (EGD), COMBINED N/A 3/14/2019    Procedure: Upper Endoscopy;  Surgeon: Hood Brower MD;  [facility]     ESOPHAGOSCOPY, GASTROSCOPY, DUODENOSCOPY (EGD), RESECT MUCOSA, COMBINED N/A 2018    Procedure: COMBINED ESOPHAGOSCOPY, GASTROSCOPY, DUODENOSCOPY (EGD), RESECT MUCOSA;;  Surgeon: Hood Brower MD;  [facility]     GYN SURGERY  10/22/08    pelvic support     HEMORRHOIDECTOMY       MOHS MICROGRAPHIC PROCEDURE       SURGICAL HISTORY OF -   age 22    L ovarian cyst removal, laparotomy     SURGICAL HISTORY OF -       partial thyroidectomy     SURGICAL HISTORY OF -   10/08    Transobturator tape for Urinary Incontinence     SURGICAL HISTORY OF -   2011    stress test normal     THYROID SURGERY      Had cyst removed, thyroid gland is intact.       Social History     Tobacco Use     Smoking status: Former Smoker     Last attempt to quit: 1980     Years since quittin.6     Smokeless tobacco: Never Used     Tobacco comment: smoked from 70-80; smoked about 1ppd   Substance Use Topics     Alcohol use: Yes     Alcohol/week: 0.0 oz     Comment: 6 drinks per week     Family History   Problem Relation Age of Onset     Diabetes Mother         at 89 yrs     Hypertension Mother      Arthritis Mother         rheumatoid     Cancer Father         bladder     Cardiovascular Brother         palpitations not on meds.     Glaucoma No family hx of      Macular Degeneration No family hx of      Amblyopia No family hx of          Current Outpatient Medications   Medication Sig Dispense Refill     ACE/ARB NOT PRESCRIBED, INTENTIONAL, by Other route continuous prn.       alum & mag hydroxide-simethicone (MAALOX REGULAR STRENGTH) 200-200-20 MG/5ML SUSP Take 30 mLs by mouth as needed        aspirin 81 MG tablet Take 2 tablets (162 mg) by mouth daily 60 tablet      atorvastatin (LIPITOR) 40 MG tablet TAKE 1 TABLET DAILY 90  "tablet 1     blood glucose (NO BRAND SPECIFIED) lancets standard Use to test blood sugar 4 times daily or as directed.Lancets One Touch 100s Delica- 4x a day 120 each 5     blood glucose (NO BRAND SPECIFIED) test strip Use to test blood sugar 4 times daily or as directed.One touch ultra strips blue 100- 4x a day. 120 strip 5     blood glucose (ONETOUCH ULTRA) test strip 1 strip by In Vitro route 4 times daily. 360 each 3     Blood Glucose Monitoring Suppl (ONE TOUCH ULTRA 2) W/DEVICE KIT Use to test blood sugars 2 times daily as directed. 1 kit 0     CALCIUM 500 + D 500-200 MG-IU OR TABS one tab twice per day 100 0     Cholecalciferol (VITAMIN D PO) Take  by mouth. Pt consuming 3000 units per day        Docusate Sodium (COLACE PO) Take  by mouth as needed.       furosemide (LASIX) 20 MG tablet TAKE 2 TABLETS IN THE MORNING AND 1 TABLET IN THE AFTERNOON 270 tablet 1     glimepiride (AMARYL) 4 MG tablet TAKE 1 TABLET TWICE A DAY WITH MEALS (BREAKFAST AND DINNER) (PLEASE SCHEDULE LAB APPOINTMENT PRIOR TO REFILL REQUESTS) 180 tablet 0     insulin pen needle (BD REY U/F) 32G X 4 MM miscellaneous Use one pen needle twice daily (with lantus and victoza) 180 each 3     insulin syringe-needle U-100 (BD INSULIN SYRINGE ULTRAFINE) 31G X 5/16\" 1 ML miscellaneous Use 1 syringes twice daily for insulin and victoza and for symptoms of hypoglycemia 100 each 3     LANTUS SOLOSTAR 100 UNIT/ML soln Inject between 60 units at bedtime.  Needs four boxes for three month supply. 60 mL 3     liraglutide (VICTOZA) 18 MG/3ML solution Inject 1.2 mg Subcutaneous daily 18 mL 3     liraglutide (VICTOZA) 18 MG/3ML solution Inject 0.6 mg Subcutaneous daily 9 mL 3     lisinopril (PRINIVIL/ZESTRIL) 2.5 MG tablet TAKE 1 TABLET DAILY 90 tablet 1     metoprolol tartrate (LOPRESSOR) 25 MG tablet TAKE 1 TABLET TWICE A  tablet 2     ONETOUCH DELICA LANCETS 33G MISC 1 each 4 times daily 360 each 3     pantoprazole (PROTONIX) 40 MG EC tablet Take 1 " "tablet (40 mg) by mouth daily Take 30-60 minutes before a meal. 30 tablet 3     potassium chloride ER (K-TAB/KLOR-CON) 10 MEQ CR tablet Take 1 tablet (10 mEq) by mouth 2 times daily 180 tablet 3     triamcinolone (KENALOG) 0.1 % cream Apply sparingly to affected area two times daily as needed 80 g 3     Allergies   Allergen Reactions     Amlodipine Swelling     Ancef [Cefazolin]      Levaquin Rash     Itching, rash     Metformin Diarrhea and GI Disturbance     Cephalosporins Rash     Levofloxacin Itching and Rash     Nickel Rash     Contact reaction  Contact reaction     Recent Labs   Lab Test 08/19/19  1429 05/21/19  1507 03/11/19  1603 03/04/19  1515 10/22/18  1516 09/06/18  1149  11/09/17  1403 07/26/17  1434  10/19/15  1541  06/29/12  1401   A1C 7.5* 7.8*  --  8.1* 8.3*  --    < >  --  7.0*   < > 7.1*   < > 8.3*   LDL  --   --   --   --  77  --   --  73 66   < >  --    < > 88   HDL  --   --   --   --  59  --   --  51 50   < >  --    < > 60   TRIG  --   --   --   --  184*  --   --  118 164*   < >  --    < > 189*   ALT  --   --   --   --   --   --   --  42  --   --  47  --  71*   CR  --   --  0.86  --   --  0.81  --  0.81  --    < > 0.90   < > 0.66   GFRESTIMATED  --   --  68  --   --  70  --  70  --    < > 62   < > >90   GFRESTBLACK  --   --  79  --   --  85  --  85  --    < > 75   < > >90   POTASSIUM  --   --  3.9  --   --  3.8   < > 3.3*  --    < > 3.9   < > 3.7   TSH 2.46  --   --   --   --   --   --   --  2.11  --   --    < >  --     < > = values in this interval not displayed.        ROS:  Constitutional, HEENT, cardiovascular, pulmonary, GI, , musculoskeletal, neuro, skin, endocrine and psych systems are negative, except as otherwise noted.        OBJECTIVE:  /72 (BP Location: Left arm, Patient Position: Sitting, Cuff Size: Adult Regular)   Pulse 98   Temp 97.8  F (36.6  C) (Oral)   Resp 18   Ht 1.651 m (5' 5\")   Wt 87.8 kg (193 lb 8 oz)   SpO2 94%   BMI 32.20 kg/m      EXAM:  GENERAL " "APPEARANCE: healthy, alert and no distress      ASSESSMENT AND PLAN  Patient Instructions   ASSESSMENT AND PLAN  1. Type 2 diabetes mellitus with stage 2 chronic kidney disease, without long-term current use of insulin (H) - uncontrolled.   Glimepiride, lantus, victoza, metoprolol, lisinopril, statin appropriately.  Discuss aspirin.    a1c today and tsh . a1c lower , 7.5 still.    Oddly victoza went from $70 to over 200, lantus also jumped up but others did not.    Will try to increase victoza to 1.2 units/dose.    Follow up three-6 months next a1c.                  MYCHART FOR ON-LINE CARE(VISITS), LABS, REFILLS, MESSAGING, ETC [email or web address] , [phone]    E-VISIT: click \"on-line care, then request e-visit\".  E-visits work well for following up on issues we have discussed in clinic previously which may need new prescriptions, new prescriptions or substantial discussion. These are always done by me (Dr. Wegener).     ONCARE VISIT:  [email or web address]  - we treat nearly 50 common conditions through on-care.  These are done in an hour by on-call staff.     RADIOLOGY:  [facility]:  [phone]   [facility]: [phone]    Mammogram and Colonoscopy [phone]    Smoking Cessation: [email or web address], [phone] ([phone])      CONSUMER PRICE LINE for estimates of test costs:  [phone]             Joel Wegener, MD        "

## 2019-08-20 LAB — TSH SERPL DL<=0.005 MIU/L-ACNC: 2.46 MU/L (ref 0.4–4)

## 2019-08-20 ASSESSMENT — ANXIETY QUESTIONNAIRES: GAD7 TOTAL SCORE: 0

## 2019-08-28 ENCOUNTER — TELEPHONE (OUTPATIENT)
Dept: FAMILY MEDICINE | Facility: CLINIC | Age: 74
End: 2019-08-28

## 2019-08-28 DIAGNOSIS — E11.9 TYPE 2 DIABETES, HBA1C GOAL < 7% (H): Primary | ICD-10-CM

## 2019-08-28 NOTE — TELEPHONE ENCOUNTER
Reason for Call:  Other FYI    Detailed comments: patient wants Dr Wegener to know that she is not going to get her victoza medication any more due to the cost going form 70.00 to 500.00    Phone Number Patient can be reached at: Home number on file [phone] (home)    Best Time: any    Can we leave a detailed message on this number? YES    Call taken on 8/28/2019 at 4:01 PM by Camille Barfield

## 2019-08-28 NOTE — TELEPHONE ENCOUNTER
Thank you.    Would she check with her insurance if other similar medications in other brands would be covered?    This would include byetta, bydureon, trulicity, ozempic.    Otherwise, could she ask her insurance for a copy of her drug formulary for diabetes medications and send it to me for review?     Joel Wegener,MD

## 2019-08-28 NOTE — TELEPHONE ENCOUNTER
Reason for Call: Request for an order or referral:    Order or referral being requested: order    Date needed: as soon as possible    Has the patient been seen by the PCP for this problem? NO    Additional comments: patient is asking for you to order her a new glucometer to [facility] the 1 touch ultra 2    Phone number Patient can be reached at:  Home number on file [phone] (home)    Best Time:  any    Can we leave a detailed message on this number?  YES    Call taken on 8/28/2019 at 3:59 PM by Camille Barfield

## 2019-08-29 ENCOUNTER — MYC MEDICAL ADVICE (OUTPATIENT)
Dept: FAMILY MEDICINE | Facility: CLINIC | Age: 74
End: 2019-08-29

## 2019-08-29 NOTE — TELEPHONE ENCOUNTER
[place] and [place]. My Chart message sent to her and she will look at it later    Dara Bose, RN, BSN

## 2019-09-02 DIAGNOSIS — I10 ESSENTIAL HYPERTENSION: ICD-10-CM

## 2019-09-03 NOTE — TELEPHONE ENCOUNTER
"Requested Prescriptions   Pending Prescriptions Disp Refills     metoprolol tartrate (LOPRESSOR) 25 MG tablet [Pharmacy Med Name: METOPROLOL TARTRATE TABS 25MG] 180 tablet 4     Sig: TAKE 1 TABLET TWICE A DAY  Last Written Prescription Date:  11/28/2018  Last Fill Quantity: 180 tablet,  # refills: 2   Last Office Visit: 8/19/2019   Future Office Visit:    Next 5 appointments (look out 90 days)    Nov 18, 2019  2:00 PM CST  PHYSICAL with Joel Daniel Irwin Wegener, MD  [facility] ([facility]) [address] [place] [id number] [phone]              Beta-Blockers Protocol Passed - 9/2/2019  5:40 AM        Passed - Blood pressure under 140/90 in past 12 months     BP Readings from Last 3 Encounters:   08/19/19 110/72   05/21/19 121/73   03/14/19 144/74           Passed - Patient is age 6 or older        Passed - Recent (12 mo) or future (30 days) visit within the authorizing provider's specialty     Patient had office visit in the last 12 months or has a visit in the next 30 days with authorizing provider or within the authorizing provider's specialty.  See \"Patient Info\" tab in inbasket, or \"Choose Columns\" in Meds & Orders section of the refill encounter.            Passed - Medication is active on med list          "

## 2019-09-04 RX ORDER — METOPROLOL TARTRATE 25 MG/1
TABLET, FILM COATED ORAL
Qty: 180 TABLET | Refills: 1 | Status: SHIPPED | OUTPATIENT
Start: 2019-09-04 | End: 2020-05-11

## 2019-09-04 NOTE — TELEPHONE ENCOUNTER
Prescription approved per [facility] Refill Protocol.  Brittani Mac RN

## 2019-09-17 NOTE — TELEPHONE ENCOUNTER
Team coordinators-Please inform patient new glucometer order sent to [facility].    Thank you!  MEGAN MunozN, RN

## 2019-09-17 NOTE — TELEPHONE ENCOUNTER
Patient called to check on the status of this request for a new glucometer. States it has been 3 weeks with no follow up and she is without one. Doesn't look like this message was initially routed anywhere, though there have been other messages addressed after, and from the same date of this encounter. Please assist. Thanks!

## 2019-09-29 ENCOUNTER — HEALTH MAINTENANCE LETTER (OUTPATIENT)
Age: 74
End: 2019-09-29

## 2019-09-29 DIAGNOSIS — E78.5 HYPERLIPIDEMIA LDL GOAL <100: ICD-10-CM

## 2019-09-30 NOTE — TELEPHONE ENCOUNTER
"Requested Prescriptions   Pending Prescriptions Disp Refills     atorvastatin (LIPITOR) 40 MG tablet [Pharmacy Med Name: ATORVASTATIN TABS 40MG] 90 tablet 4     Sig: TAKE 1 TABLET DAILY  Last Written Prescription Date:  4/16/2019  Last Fill Quantity: 90 tablet,  # refills: 1   Last Office Visit: 8/19/2019   Future Office Visit:    Next 5 appointments (look out 90 days)    Nov 18, 2019  2:00 PM CST  PHYSICAL with Joel Daniel Irwin Wegener, MD  [facility] [address] [place] [id number] [phone]              Statins Protocol Passed - 9/29/2019  6:26 PM        Passed - LDL on file in past 12 months     Recent Labs   Lab Test 10/22/18  1516   LDL 77           Passed - No abnormal creatine kinase in past 12 months     No lab results found.           Passed - Recent (12 mo) or future (30 days) visit within the authorizing provider's specialty     Patient has had an office visit with the authorizing provider or a provider within the authorizing providers department within the previous 12 mos or has a future within next 30 days. See \"Patient Info\" tab in inbasket, or \"Choose Columns\" in Meds & Orders section of the refill encounter.            Passed - Medication is active on med list        Passed - Patient is age 18 or older        Passed - No active pregnancy on record        Passed - No positive pregnancy test in past 12 months          "

## 2019-10-02 RX ORDER — ATORVASTATIN CALCIUM 40 MG/1
TABLET, FILM COATED ORAL
Qty: 30 TABLET | Refills: 0 | Status: SHIPPED | OUTPATIENT
Start: 2019-10-02 | End: 2020-02-24

## 2019-10-02 NOTE — TELEPHONE ENCOUNTER
Routing refill request to provider for review/approval because:  Patient needs to be seen because:  Lipid panel will outdate this month    Patient has physical exam scheduled 11/18/2019    Thank You!  Anabella Alan, RN  Triage Nurse

## 2019-10-12 ENCOUNTER — APPOINTMENT (OUTPATIENT)
Dept: GENERAL RADIOLOGY | Facility: CLINIC | Age: 74
DRG: 493 | End: 2019-10-12
Attending: ORTHOPAEDIC SURGERY
Payer: COMMERCIAL

## 2019-10-12 ENCOUNTER — ANESTHESIA (OUTPATIENT)
Dept: SURGERY | Facility: CLINIC | Age: 74
DRG: 493 | End: 2019-10-12
Payer: COMMERCIAL

## 2019-10-12 ENCOUNTER — HOSPITAL ENCOUNTER (INPATIENT)
Facility: CLINIC | Age: 74
LOS: 6 days | Discharge: NURSING, REHAB OR LONG-TERM CARE | DRG: 493 | End: 2019-10-18
Attending: EMERGENCY MEDICINE | Admitting: ORTHOPAEDIC SURGERY
Payer: COMMERCIAL

## 2019-10-12 ENCOUNTER — APPOINTMENT (OUTPATIENT)
Dept: GENERAL RADIOLOGY | Facility: CLINIC | Age: 74
DRG: 493 | End: 2019-10-12
Payer: COMMERCIAL

## 2019-10-12 ENCOUNTER — APPOINTMENT (OUTPATIENT)
Dept: GENERAL RADIOLOGY | Facility: CLINIC | Age: 74
DRG: 493 | End: 2019-10-12
Attending: EMERGENCY MEDICINE
Payer: COMMERCIAL

## 2019-10-12 ENCOUNTER — ANESTHESIA EVENT (OUTPATIENT)
Dept: SURGERY | Facility: CLINIC | Age: 74
DRG: 493 | End: 2019-10-12
Payer: COMMERCIAL

## 2019-10-12 DIAGNOSIS — E11.9 TYPE 2 DIABETES, HBA1C GOAL < 7% (H): ICD-10-CM

## 2019-10-12 DIAGNOSIS — E11.65 TYPE 2 DIABETES MELLITUS WITH HYPERGLYCEMIA, WITH LONG-TERM CURRENT USE OF INSULIN (H): ICD-10-CM

## 2019-10-12 DIAGNOSIS — Z79.4 TYPE 2 DIABETES MELLITUS WITH HYPERGLYCEMIA, WITH LONG-TERM CURRENT USE OF INSULIN (H): ICD-10-CM

## 2019-10-12 DIAGNOSIS — S82.892A CLOSED FRACTURE OF LEFT ANKLE, INITIAL ENCOUNTER: Primary | ICD-10-CM

## 2019-10-12 DIAGNOSIS — N18.2 CKD (CHRONIC KIDNEY DISEASE) STAGE 2, GFR 60-89 ML/MIN: ICD-10-CM

## 2019-10-12 DIAGNOSIS — Z79.84 LONG TERM CURRENT USE OF ORAL HYPOGLYCEMIC DRUG: ICD-10-CM

## 2019-10-12 DIAGNOSIS — L82.1 SEBORRHEIC KERATOSIS: ICD-10-CM

## 2019-10-12 DIAGNOSIS — R60.9 DEPENDENT EDEMA: ICD-10-CM

## 2019-10-12 DIAGNOSIS — S82.842A CLOSED BIMALLEOLAR FRACTURE OF LEFT ANKLE, INITIAL ENCOUNTER: ICD-10-CM

## 2019-10-12 DIAGNOSIS — L30.9 DERMATITIS: ICD-10-CM

## 2019-10-12 PROBLEM — S82.899A ANKLE FRACTURE: Status: ACTIVE | Noted: 2019-10-12

## 2019-10-12 LAB
ABO + RH BLD: NORMAL
ABO + RH BLD: NORMAL
ANION GAP SERPL CALCULATED.3IONS-SCNC: 12 MMOL/L (ref 3–14)
APTT PPP: 31 SEC (ref 22–37)
BASOPHILS # BLD AUTO: 0 10E9/L (ref 0–0.2)
BASOPHILS NFR BLD AUTO: 0.1 %
BLD GP AB SCN SERPL QL: NORMAL
BLOOD BANK CMNT PATIENT-IMP: NORMAL
BUN SERPL-MCNC: 17 MG/DL (ref 7–30)
CALCIUM SERPL-MCNC: 8.5 MG/DL (ref 8.5–10.1)
CHLORIDE SERPL-SCNC: 103 MMOL/L (ref 94–109)
CO2 BLDCOV-SCNC: 22 MMOL/L (ref 21–28)
CO2 SERPL-SCNC: 23 MMOL/L (ref 20–32)
CREAT SERPL-MCNC: 0.68 MG/DL (ref 0.52–1.04)
CREAT SERPL-MCNC: 0.81 MG/DL (ref 0.52–1.04)
DIFFERENTIAL METHOD BLD: ABNORMAL
EOSINOPHIL # BLD AUTO: 0 10E9/L (ref 0–0.7)
EOSINOPHIL NFR BLD AUTO: 0 %
ERYTHROCYTE [DISTWIDTH] IN BLOOD BY AUTOMATED COUNT: 14.4 % (ref 10–15)
GFR SERPL CREATININE-BSD FRML MDRD: 73 ML/MIN/{1.73_M2}
GFR SERPL CREATININE-BSD FRML MDRD: 87 ML/MIN/{1.73_M2}
GLUCOSE BLDC GLUCOMTR-MCNC: 169 MG/DL (ref 70–99)
GLUCOSE BLDC GLUCOMTR-MCNC: 180 MG/DL (ref 70–99)
GLUCOSE BLDC GLUCOMTR-MCNC: 185 MG/DL (ref 70–99)
GLUCOSE BLDC GLUCOMTR-MCNC: 200 MG/DL (ref 70–99)
GLUCOSE SERPL-MCNC: 246 MG/DL (ref 70–99)
HBA1C MFR BLD: 7.6 % (ref 0–5.6)
HCT VFR BLD AUTO: 40.3 % (ref 35–47)
HGB BLD-MCNC: 13.8 G/DL (ref 11.7–15.7)
IMM GRANULOCYTES # BLD: 0 10E9/L (ref 0–0.4)
IMM GRANULOCYTES NFR BLD: 0.3 %
INR PPP: 1.09 (ref 0.86–1.14)
LACTATE BLD-SCNC: 2.1 MMOL/L (ref 0.7–2.1)
LYMPHOCYTES # BLD AUTO: 0.9 10E9/L (ref 0.8–5.3)
LYMPHOCYTES NFR BLD AUTO: 6.7 %
MCH RBC QN AUTO: 33.2 PG (ref 26.5–33)
MCHC RBC AUTO-ENTMCNC: 34.2 G/DL (ref 31.5–36.5)
MCV RBC AUTO: 97 FL (ref 78–100)
MONOCYTES # BLD AUTO: 0.9 10E9/L (ref 0–1.3)
MONOCYTES NFR BLD AUTO: 6.9 %
NEUTROPHILS # BLD AUTO: 11.7 10E9/L (ref 1.6–8.3)
NEUTROPHILS NFR BLD AUTO: 86 %
NRBC # BLD AUTO: 0 10*3/UL
NRBC BLD AUTO-RTO: 0 /100
PCO2 BLDV: 33 MM HG (ref 40–50)
PH BLDV: 7.42 PH (ref 7.32–7.43)
PLATELET # BLD AUTO: 594 10E9/L (ref 150–450)
PLATELET # BLD AUTO: 647 10E9/L (ref 150–450)
PO2 BLDV: 26 MM HG (ref 25–47)
POTASSIUM SERPL-SCNC: 4.4 MMOL/L (ref 3.4–5.3)
RBC # BLD AUTO: 4.16 10E12/L (ref 3.8–5.2)
SAO2 % BLDV FROM PO2: 50 %
SODIUM SERPL-SCNC: 138 MMOL/L (ref 133–144)
SPECIMEN EXP DATE BLD: NORMAL
WBC # BLD AUTO: 13.5 10E9/L (ref 4–11)

## 2019-10-12 PROCEDURE — 37000009 ZZH ANESTHESIA TECHNICAL FEE, EACH ADDTL 15 MIN: Performed by: ORTHOPAEDIC SURGERY

## 2019-10-12 PROCEDURE — 96376 TX/PRO/DX INJ SAME DRUG ADON: CPT | Performed by: EMERGENCY MEDICINE

## 2019-10-12 PROCEDURE — 40000278 XR SURGERY CARM FLUORO LESS THAN 5 MIN: Mod: TC

## 2019-10-12 PROCEDURE — 0QSH04Z REPOSITION LEFT TIBIA WITH INTERNAL FIXATION DEVICE, OPEN APPROACH: ICD-10-PCS | Performed by: ORTHOPAEDIC SURGERY

## 2019-10-12 PROCEDURE — 96361 HYDRATE IV INFUSION ADD-ON: CPT | Performed by: EMERGENCY MEDICINE

## 2019-10-12 PROCEDURE — 36000051 ZZH SURGERY LEVEL 2 1ST 30 MIN - UMMC: Performed by: ORTHOPAEDIC SURGERY

## 2019-10-12 PROCEDURE — 27210794 ZZH OR GENERAL SUPPLY STERILE: Performed by: ORTHOPAEDIC SURGERY

## 2019-10-12 PROCEDURE — 00000146 ZZHCL STATISTIC GLUCOSE BY METER IP

## 2019-10-12 PROCEDURE — 99207 ZZC CONSULT E&M CHANGED TO INITIAL LEVEL: CPT | Performed by: INTERNAL MEDICINE

## 2019-10-12 PROCEDURE — 80048 BASIC METABOLIC PNL TOTAL CA: CPT | Performed by: EMERGENCY MEDICINE

## 2019-10-12 PROCEDURE — 40000986 XR ANKLE PORT LT G/E 3 VW: Mod: LT

## 2019-10-12 PROCEDURE — 25800030 ZZH RX IP 258 OP 636: Performed by: NURSE ANESTHETIST, CERTIFIED REGISTERED

## 2019-10-12 PROCEDURE — 36415 COLL VENOUS BLD VENIPUNCTURE: CPT | Performed by: STUDENT IN AN ORGANIZED HEALTH CARE EDUCATION/TRAINING PROGRAM

## 2019-10-12 PROCEDURE — 25000132 ZZH RX MED GY IP 250 OP 250 PS 637: Performed by: INTERNAL MEDICINE

## 2019-10-12 PROCEDURE — 36000053 ZZH SURGERY LEVEL 2 EA 15 ADDTL MIN - UMMC: Performed by: ORTHOPAEDIC SURGERY

## 2019-10-12 PROCEDURE — 85025 COMPLETE CBC W/AUTO DIFF WBC: CPT | Performed by: EMERGENCY MEDICINE

## 2019-10-12 PROCEDURE — 25000128 H RX IP 250 OP 636: Performed by: NURSE ANESTHETIST, CERTIFIED REGISTERED

## 2019-10-12 PROCEDURE — 83036 HEMOGLOBIN GLYCOSYLATED A1C: CPT | Performed by: EMERGENCY MEDICINE

## 2019-10-12 PROCEDURE — 99156 MOD SED OTH PHYS/QHP 5/>YRS: CPT | Mod: Z6 | Performed by: EMERGENCY MEDICINE

## 2019-10-12 PROCEDURE — 96374 THER/PROPH/DIAG INJ IV PUSH: CPT | Performed by: EMERGENCY MEDICINE

## 2019-10-12 PROCEDURE — 27110028 ZZH OR GENERAL SUPPLY NON-STERILE: Performed by: ORTHOPAEDIC SURGERY

## 2019-10-12 PROCEDURE — 25000125 ZZHC RX 250: Performed by: STUDENT IN AN ORGANIZED HEALTH CARE EDUCATION/TRAINING PROGRAM

## 2019-10-12 PROCEDURE — 25000128 H RX IP 250 OP 636: Performed by: EMERGENCY MEDICINE

## 2019-10-12 PROCEDURE — 25000132 ZZH RX MED GY IP 250 OP 250 PS 637: Performed by: STUDENT IN AN ORGANIZED HEALTH CARE EDUCATION/TRAINING PROGRAM

## 2019-10-12 PROCEDURE — 29515 APPLICATION SHORT LEG SPLINT: CPT | Performed by: EMERGENCY MEDICINE

## 2019-10-12 PROCEDURE — 25800030 ZZH RX IP 258 OP 636: Performed by: EMERGENCY MEDICINE

## 2019-10-12 PROCEDURE — 86900 BLOOD TYPING SEROLOGIC ABO: CPT | Performed by: EMERGENCY MEDICINE

## 2019-10-12 PROCEDURE — 86850 RBC ANTIBODY SCREEN: CPT | Performed by: EMERGENCY MEDICINE

## 2019-10-12 PROCEDURE — C1713 ANCHOR/SCREW BN/BN,TIS/BN: HCPCS | Performed by: ORTHOPAEDIC SURGERY

## 2019-10-12 PROCEDURE — 82803 BLOOD GASES ANY COMBINATION: CPT

## 2019-10-12 PROCEDURE — 37000008 ZZH ANESTHESIA TECHNICAL FEE, 1ST 30 MIN: Performed by: ORTHOPAEDIC SURGERY

## 2019-10-12 PROCEDURE — 83605 ASSAY OF LACTIC ACID: CPT

## 2019-10-12 PROCEDURE — 99222 1ST HOSP IP/OBS MODERATE 55: CPT | Performed by: INTERNAL MEDICINE

## 2019-10-12 PROCEDURE — 25000125 ZZHC RX 250: Performed by: NURSE ANESTHETIST, CERTIFIED REGISTERED

## 2019-10-12 PROCEDURE — 86901 BLOOD TYPING SEROLOGIC RH(D): CPT | Performed by: EMERGENCY MEDICINE

## 2019-10-12 PROCEDURE — 99285 EMERGENCY DEPT VISIT HI MDM: CPT | Mod: 25 | Performed by: EMERGENCY MEDICINE

## 2019-10-12 PROCEDURE — 99156 MOD SED OTH PHYS/QHP 5/>YRS: CPT | Performed by: EMERGENCY MEDICINE

## 2019-10-12 PROCEDURE — 25000128 H RX IP 250 OP 636: Performed by: ANESTHESIOLOGY

## 2019-10-12 PROCEDURE — 25000566 ZZH SEVOFLURANE, EA 15 MIN: Performed by: ORTHOPAEDIC SURGERY

## 2019-10-12 PROCEDURE — 73610 X-RAY EXAM OF ANKLE: CPT | Mod: LT

## 2019-10-12 PROCEDURE — 85610 PROTHROMBIN TIME: CPT | Performed by: EMERGENCY MEDICINE

## 2019-10-12 PROCEDURE — 25000131 ZZH RX MED GY IP 250 OP 636 PS 637: Performed by: INTERNAL MEDICINE

## 2019-10-12 PROCEDURE — 71000014 ZZH RECOVERY PHASE 1 LEVEL 2 FIRST HR: Performed by: ORTHOPAEDIC SURGERY

## 2019-10-12 PROCEDURE — 82565 ASSAY OF CREATININE: CPT | Performed by: STUDENT IN AN ORGANIZED HEALTH CARE EDUCATION/TRAINING PROGRAM

## 2019-10-12 PROCEDURE — 85730 THROMBOPLASTIN TIME PARTIAL: CPT | Performed by: EMERGENCY MEDICINE

## 2019-10-12 PROCEDURE — 25800030 ZZH RX IP 258 OP 636: Performed by: STUDENT IN AN ORGANIZED HEALTH CARE EDUCATION/TRAINING PROGRAM

## 2019-10-12 PROCEDURE — 87040 BLOOD CULTURE FOR BACTERIA: CPT | Performed by: STUDENT IN AN ORGANIZED HEALTH CARE EDUCATION/TRAINING PROGRAM

## 2019-10-12 PROCEDURE — 71000015 ZZH RECOVERY PHASE 1 LEVEL 2 EA ADDTL HR: Performed by: ORTHOPAEDIC SURGERY

## 2019-10-12 PROCEDURE — 85049 AUTOMATED PLATELET COUNT: CPT | Performed by: STUDENT IN AN ORGANIZED HEALTH CARE EDUCATION/TRAINING PROGRAM

## 2019-10-12 PROCEDURE — 40000170 ZZH STATISTIC PRE-PROCEDURE ASSESSMENT II: Performed by: ORTHOPAEDIC SURGERY

## 2019-10-12 PROCEDURE — 12000001 ZZH R&B MED SURG/OB UMMC

## 2019-10-12 DEVICE — IMPLANTABLE DEVICE: Type: IMPLANTABLE DEVICE | Site: ANKLE | Status: FUNCTIONAL

## 2019-10-12 RX ORDER — PROPOFOL 10 MG/ML
40 INJECTION, EMULSION INTRAVENOUS ONCE
Status: COMPLETED | OUTPATIENT
Start: 2019-10-12 | End: 2019-10-12

## 2019-10-12 RX ORDER — HYDROMORPHONE HCL/0.9% NACL/PF 0.2MG/0.2
0.2 SYRINGE (ML) INTRAVENOUS ONCE
Status: COMPLETED | OUTPATIENT
Start: 2019-10-12 | End: 2019-10-12

## 2019-10-12 RX ORDER — HYDRALAZINE HYDROCHLORIDE 10 MG/1
10 TABLET, FILM COATED ORAL 4 TIMES DAILY PRN
Status: DISCONTINUED | OUTPATIENT
Start: 2019-10-12 | End: 2019-10-15

## 2019-10-12 RX ORDER — HYDRALAZINE HYDROCHLORIDE 25 MG/1
25 TABLET, FILM COATED ORAL 4 TIMES DAILY PRN
Status: DISCONTINUED | OUTPATIENT
Start: 2019-10-12 | End: 2019-10-12

## 2019-10-12 RX ORDER — AMOXICILLIN 250 MG
2 CAPSULE ORAL 2 TIMES DAILY
Status: DISCONTINUED | OUTPATIENT
Start: 2019-10-12 | End: 2019-10-18 | Stop reason: HOSPADM

## 2019-10-12 RX ORDER — LIDOCAINE HYDROCHLORIDE 20 MG/ML
INJECTION, SOLUTION INFILTRATION; PERINEURAL PRN
Status: DISCONTINUED | OUTPATIENT
Start: 2019-10-12 | End: 2019-10-12

## 2019-10-12 RX ORDER — ATORVASTATIN CALCIUM 40 MG/1
40 TABLET, FILM COATED ORAL AT BEDTIME
Status: DISCONTINUED | OUTPATIENT
Start: 2019-10-12 | End: 2019-10-18 | Stop reason: HOSPADM

## 2019-10-12 RX ORDER — CLINDAMYCIN PHOSPHATE 900 MG/50ML
900 INJECTION, SOLUTION INTRAVENOUS SEE ADMIN INSTRUCTIONS
Status: DISCONTINUED | OUTPATIENT
Start: 2019-10-12 | End: 2019-10-12 | Stop reason: HOSPADM

## 2019-10-12 RX ORDER — ONDANSETRON 4 MG/1
4 TABLET, ORALLY DISINTEGRATING ORAL EVERY 30 MIN PRN
Status: DISCONTINUED | OUTPATIENT
Start: 2019-10-12 | End: 2019-10-12 | Stop reason: HOSPADM

## 2019-10-12 RX ORDER — FENTANYL CITRATE 50 UG/ML
25-50 INJECTION, SOLUTION INTRAMUSCULAR; INTRAVENOUS
Status: DISCONTINUED | OUTPATIENT
Start: 2019-10-12 | End: 2019-10-12 | Stop reason: HOSPADM

## 2019-10-12 RX ORDER — CLINDAMYCIN PHOSPHATE 900 MG/50ML
900 INJECTION, SOLUTION INTRAVENOUS
Status: COMPLETED | OUTPATIENT
Start: 2019-10-12 | End: 2019-10-12

## 2019-10-12 RX ORDER — SODIUM CHLORIDE 9 MG/ML
1000 INJECTION, SOLUTION INTRAVENOUS CONTINUOUS
Status: DISCONTINUED | OUTPATIENT
Start: 2019-10-12 | End: 2019-10-12

## 2019-10-12 RX ORDER — PANTOPRAZOLE SODIUM 40 MG/1
40 TABLET, DELAYED RELEASE ORAL DAILY
Status: DISCONTINUED | OUTPATIENT
Start: 2019-10-12 | End: 2019-10-14

## 2019-10-12 RX ORDER — FENTANYL CITRATE 50 UG/ML
INJECTION, SOLUTION INTRAMUSCULAR; INTRAVENOUS PRN
Status: DISCONTINUED | OUTPATIENT
Start: 2019-10-12 | End: 2019-10-12

## 2019-10-12 RX ORDER — SODIUM CHLORIDE, SODIUM LACTATE, POTASSIUM CHLORIDE, CALCIUM CHLORIDE 600; 310; 30; 20 MG/100ML; MG/100ML; MG/100ML; MG/100ML
INJECTION, SOLUTION INTRAVENOUS CONTINUOUS
Status: DISCONTINUED | OUTPATIENT
Start: 2019-10-12 | End: 2019-10-13

## 2019-10-12 RX ORDER — ONDANSETRON 4 MG/1
4 TABLET, ORALLY DISINTEGRATING ORAL EVERY 6 HOURS PRN
Status: DISCONTINUED | OUTPATIENT
Start: 2019-10-12 | End: 2019-10-18 | Stop reason: HOSPADM

## 2019-10-12 RX ORDER — HYDROMORPHONE HYDROCHLORIDE 1 MG/ML
.3-.5 INJECTION, SOLUTION INTRAMUSCULAR; INTRAVENOUS; SUBCUTANEOUS
Status: DISCONTINUED | OUTPATIENT
Start: 2019-10-12 | End: 2019-10-14

## 2019-10-12 RX ORDER — NICOTINE POLACRILEX 4 MG
15-30 LOZENGE BUCCAL
Status: DISCONTINUED | OUTPATIENT
Start: 2019-10-12 | End: 2019-10-18 | Stop reason: HOSPADM

## 2019-10-12 RX ORDER — GLIMEPIRIDE 4 MG/1
4 TABLET ORAL 2 TIMES DAILY WITH MEALS
Status: DISCONTINUED | OUTPATIENT
Start: 2019-10-13 | End: 2019-10-12

## 2019-10-12 RX ORDER — METOPROLOL TARTRATE 25 MG/1
25 TABLET, FILM COATED ORAL 2 TIMES DAILY
Status: DISCONTINUED | OUTPATIENT
Start: 2019-10-12 | End: 2019-10-18 | Stop reason: HOSPADM

## 2019-10-12 RX ORDER — ACETAMINOPHEN 325 MG/1
650 TABLET ORAL EVERY 4 HOURS PRN
Status: DISCONTINUED | OUTPATIENT
Start: 2019-10-15 | End: 2019-10-18 | Stop reason: HOSPADM

## 2019-10-12 RX ORDER — NALOXONE HYDROCHLORIDE 0.4 MG/ML
.1-.4 INJECTION, SOLUTION INTRAMUSCULAR; INTRAVENOUS; SUBCUTANEOUS
Status: DISCONTINUED | OUTPATIENT
Start: 2019-10-12 | End: 2019-10-12

## 2019-10-12 RX ORDER — GLIMEPIRIDE 4 MG/1
4 TABLET ORAL 2 TIMES DAILY WITH MEALS
Status: DISCONTINUED | OUTPATIENT
Start: 2019-10-12 | End: 2019-10-12

## 2019-10-12 RX ORDER — HYDROMORPHONE HYDROCHLORIDE 1 MG/ML
0.5 INJECTION, SOLUTION INTRAMUSCULAR; INTRAVENOUS; SUBCUTANEOUS ONCE
Status: DISCONTINUED | OUTPATIENT
Start: 2019-10-12 | End: 2019-10-12

## 2019-10-12 RX ORDER — HYDROMORPHONE HYDROCHLORIDE 1 MG/ML
.3-.5 INJECTION, SOLUTION INTRAMUSCULAR; INTRAVENOUS; SUBCUTANEOUS EVERY 5 MIN PRN
Status: DISCONTINUED | OUTPATIENT
Start: 2019-10-12 | End: 2019-10-12 | Stop reason: HOSPADM

## 2019-10-12 RX ORDER — DEXTROSE MONOHYDRATE 25 G/50ML
25-50 INJECTION, SOLUTION INTRAVENOUS
Status: DISCONTINUED | OUTPATIENT
Start: 2019-10-12 | End: 2019-10-18 | Stop reason: HOSPADM

## 2019-10-12 RX ORDER — ONDANSETRON 2 MG/ML
INJECTION INTRAMUSCULAR; INTRAVENOUS PRN
Status: DISCONTINUED | OUTPATIENT
Start: 2019-10-12 | End: 2019-10-12

## 2019-10-12 RX ORDER — ASPIRIN 81 MG/1
162 TABLET, CHEWABLE ORAL DAILY
Status: DISCONTINUED | OUTPATIENT
Start: 2019-10-12 | End: 2019-10-14

## 2019-10-12 RX ORDER — ONDANSETRON 2 MG/ML
4 INJECTION INTRAMUSCULAR; INTRAVENOUS EVERY 6 HOURS PRN
Status: DISCONTINUED | OUTPATIENT
Start: 2019-10-12 | End: 2019-10-18 | Stop reason: HOSPADM

## 2019-10-12 RX ORDER — HYDROMORPHONE HYDROCHLORIDE 1 MG/ML
0.5 INJECTION, SOLUTION INTRAMUSCULAR; INTRAVENOUS; SUBCUTANEOUS ONCE
Status: COMPLETED | OUTPATIENT
Start: 2019-10-12 | End: 2019-10-12

## 2019-10-12 RX ORDER — SODIUM CHLORIDE, SODIUM LACTATE, POTASSIUM CHLORIDE, CALCIUM CHLORIDE 600; 310; 30; 20 MG/100ML; MG/100ML; MG/100ML; MG/100ML
INJECTION, SOLUTION INTRAVENOUS CONTINUOUS
Status: DISCONTINUED | OUTPATIENT
Start: 2019-10-12 | End: 2019-10-12 | Stop reason: HOSPADM

## 2019-10-12 RX ORDER — ACETAMINOPHEN 325 MG/1
975 TABLET ORAL EVERY 8 HOURS
Status: COMPLETED | OUTPATIENT
Start: 2019-10-12 | End: 2019-10-15

## 2019-10-12 RX ORDER — ALUMINA, MAGNESIA, AND SIMETHICONE 2400; 2400; 240 MG/30ML; MG/30ML; MG/30ML
30 SUSPENSION ORAL EVERY 4 HOURS PRN
Status: DISCONTINUED | OUTPATIENT
Start: 2019-10-12 | End: 2019-10-18 | Stop reason: HOSPADM

## 2019-10-12 RX ORDER — PROPOFOL 10 MG/ML
INJECTION, EMULSION INTRAVENOUS DAILY PRN
Status: COMPLETED | OUTPATIENT
Start: 2019-10-12 | End: 2019-10-12

## 2019-10-12 RX ORDER — PROPOFOL 10 MG/ML
INJECTION, EMULSION INTRAVENOUS
Status: DISCONTINUED
Start: 2019-10-12 | End: 2019-10-12 | Stop reason: HOSPADM

## 2019-10-12 RX ORDER — NALOXONE HYDROCHLORIDE 0.4 MG/ML
.1-.4 INJECTION, SOLUTION INTRAMUSCULAR; INTRAVENOUS; SUBCUTANEOUS
Status: DISCONTINUED | OUTPATIENT
Start: 2019-10-12 | End: 2019-10-18 | Stop reason: HOSPADM

## 2019-10-12 RX ORDER — ONDANSETRON 2 MG/ML
4 INJECTION INTRAMUSCULAR; INTRAVENOUS EVERY 30 MIN PRN
Status: DISCONTINUED | OUTPATIENT
Start: 2019-10-12 | End: 2019-10-12 | Stop reason: HOSPADM

## 2019-10-12 RX ORDER — OXYCODONE HYDROCHLORIDE 5 MG/1
5-10 TABLET ORAL EVERY 4 HOURS PRN
Status: DISCONTINUED | OUTPATIENT
Start: 2019-10-12 | End: 2019-10-13

## 2019-10-12 RX ORDER — LIDOCAINE 40 MG/G
CREAM TOPICAL
Status: DISCONTINUED | OUTPATIENT
Start: 2019-10-12 | End: 2019-10-18 | Stop reason: HOSPADM

## 2019-10-12 RX ORDER — DIPHENHYDRAMINE HCL 12.5MG/5ML
12.5 LIQUID (ML) ORAL EVERY 6 HOURS PRN
Status: DISCONTINUED | OUTPATIENT
Start: 2019-10-12 | End: 2019-10-14

## 2019-10-12 RX ORDER — METOPROLOL TARTRATE 25 MG/1
25 TABLET, FILM COATED ORAL ONCE
Status: DISCONTINUED | OUTPATIENT
Start: 2019-10-12 | End: 2019-10-12

## 2019-10-12 RX ORDER — LIDOCAINE 4 G/G
1 PATCH TOPICAL
Status: DISCONTINUED | OUTPATIENT
Start: 2019-10-13 | End: 2019-10-18 | Stop reason: HOSPADM

## 2019-10-12 RX ORDER — CLINDAMYCIN PHOSPHATE 900 MG/50ML
900 INJECTION, SOLUTION INTRAVENOUS EVERY 8 HOURS
Status: DISPENSED | OUTPATIENT
Start: 2019-10-13 | End: 2019-10-13

## 2019-10-12 RX ORDER — DIPHENHYDRAMINE HYDROCHLORIDE 50 MG/ML
12.5 INJECTION INTRAMUSCULAR; INTRAVENOUS EVERY 6 HOURS PRN
Status: DISCONTINUED | OUTPATIENT
Start: 2019-10-12 | End: 2019-10-14

## 2019-10-12 RX ORDER — AMOXICILLIN 250 MG
1 CAPSULE ORAL 2 TIMES DAILY
Status: DISCONTINUED | OUTPATIENT
Start: 2019-10-12 | End: 2019-10-18 | Stop reason: HOSPADM

## 2019-10-12 RX ADMIN — PROPOFOL 10 MG: 10 INJECTION, EMULSION INTRAVENOUS at 13:55

## 2019-10-12 RX ADMIN — MIDAZOLAM 0.5 MG: 1 INJECTION INTRAMUSCULAR; INTRAVENOUS at 13:35

## 2019-10-12 RX ADMIN — PROPOFOL 10 MG: 10 INJECTION, EMULSION INTRAVENOUS at 13:54

## 2019-10-12 RX ADMIN — PHENYLEPHRINE HYDROCHLORIDE 100 MCG: 10 INJECTION INTRAVENOUS at 16:11

## 2019-10-12 RX ADMIN — ROCURONIUM BROMIDE 20 MG: 10 INJECTION INTRAVENOUS at 16:23

## 2019-10-12 RX ADMIN — PROPOFOL 150 MG: 10 INJECTION, EMULSION INTRAVENOUS at 16:09

## 2019-10-12 RX ADMIN — OXYCODONE HYDROCHLORIDE 5 MG: 5 TABLET ORAL at 17:51

## 2019-10-12 RX ADMIN — HYDROMORPHONE HYDROCHLORIDE 0.5 MG: 1 INJECTION, SOLUTION INTRAMUSCULAR; INTRAVENOUS; SUBCUTANEOUS at 18:02

## 2019-10-12 RX ADMIN — ACETAMINOPHEN 975 MG: 325 TABLET, FILM COATED ORAL at 17:51

## 2019-10-12 RX ADMIN — Medication 0.2 MG: at 15:18

## 2019-10-12 RX ADMIN — LIDOCAINE HYDROCHLORIDE 90 MG: 20 INJECTION, SOLUTION INFILTRATION; PERINEURAL at 16:09

## 2019-10-12 RX ADMIN — PROPOFOL 10 MG: 10 INJECTION, EMULSION INTRAVENOUS at 13:51

## 2019-10-12 RX ADMIN — PROPOFOL 10 MG: 10 INJECTION, EMULSION INTRAVENOUS at 13:57

## 2019-10-12 RX ADMIN — PROPOFOL 40 MG: 10 INJECTION, EMULSION INTRAVENOUS at 13:48

## 2019-10-12 RX ADMIN — CLINDAMYCIN PHOSPHATE 900 MG: 18 INJECTION, SOLUTION INTRAVENOUS at 16:13

## 2019-10-12 RX ADMIN — SODIUM CHLORIDE 1000 ML: 9 INJECTION, SOLUTION INTRAVENOUS at 15:18

## 2019-10-12 RX ADMIN — PHENYLEPHRINE HYDROCHLORIDE 100 MCG: 10 INJECTION INTRAVENOUS at 16:22

## 2019-10-12 RX ADMIN — INSULIN GLARGINE 40 UNITS: 100 INJECTION, SOLUTION SUBCUTANEOUS at 22:58

## 2019-10-12 RX ADMIN — SUGAMMADEX 200 MG: 100 INJECTION, SOLUTION INTRAVENOUS at 16:58

## 2019-10-12 RX ADMIN — PHENYLEPHRINE HYDROCHLORIDE 100 MCG: 10 INJECTION INTRAVENOUS at 16:09

## 2019-10-12 RX ADMIN — BENZOCAINE, MENTHOL 1 LOZENGE: 15; 3.6 LOZENGE ORAL at 21:56

## 2019-10-12 RX ADMIN — SODIUM CHLORIDE 1000 ML: 9 INJECTION, SOLUTION INTRAVENOUS at 13:37

## 2019-10-12 RX ADMIN — OXYCODONE HYDROCHLORIDE 5 MG: 5 TABLET ORAL at 19:05

## 2019-10-12 RX ADMIN — INSULIN ASPART 1 UNITS: 100 INJECTION, SOLUTION INTRAVENOUS; SUBCUTANEOUS at 23:38

## 2019-10-12 RX ADMIN — CLINDAMYCIN PHOSPHATE 900 MG: 900 INJECTION, SOLUTION INTRAVENOUS at 23:42

## 2019-10-12 RX ADMIN — Medication 100 MG: at 16:09

## 2019-10-12 RX ADMIN — ASPIRIN 81 MG CHEWABLE TABLET 162 MG: 81 TABLET CHEWABLE at 21:27

## 2019-10-12 RX ADMIN — PROPOFOL 10 MG: 10 INJECTION, EMULSION INTRAVENOUS at 13:53

## 2019-10-12 RX ADMIN — ONDANSETRON 4 MG: 2 INJECTION INTRAMUSCULAR; INTRAVENOUS at 16:55

## 2019-10-12 RX ADMIN — Medication 0.2 MG: at 12:17

## 2019-10-12 RX ADMIN — Medication 0.2 MG: at 14:23

## 2019-10-12 RX ADMIN — PROPOFOL 40 MG: 10 INJECTION, EMULSION INTRAVENOUS at 13:49

## 2019-10-12 RX ADMIN — SODIUM CHLORIDE, POTASSIUM CHLORIDE, SODIUM LACTATE AND CALCIUM CHLORIDE: 600; 310; 30; 20 INJECTION, SOLUTION INTRAVENOUS at 19:04

## 2019-10-12 RX ADMIN — HYDROMORPHONE HYDROCHLORIDE 0.5 MG: 1 INJECTION, SOLUTION INTRAMUSCULAR; INTRAVENOUS; SUBCUTANEOUS at 12:52

## 2019-10-12 RX ADMIN — FENTANYL CITRATE 100 MCG: 50 INJECTION, SOLUTION INTRAMUSCULAR; INTRAVENOUS at 16:09

## 2019-10-12 RX ADMIN — OXYCODONE HYDROCHLORIDE 10 MG: 5 TABLET ORAL at 21:50

## 2019-10-12 RX ADMIN — SENNOSIDES AND DOCUSATE SODIUM 1 TABLET: 8.6; 5 TABLET ORAL at 21:28

## 2019-10-12 RX ADMIN — SODIUM CHLORIDE: 9 INJECTION, SOLUTION INTRAVENOUS at 15:59

## 2019-10-12 RX ADMIN — ATORVASTATIN CALCIUM 40 MG: 40 TABLET, FILM COATED ORAL at 21:28

## 2019-10-12 ASSESSMENT — ENCOUNTER SYMPTOMS: HEADACHES: 0

## 2019-10-12 ASSESSMENT — ACTIVITIES OF DAILY LIVING (ADL): ADLS_ACUITY_SCORE: 19

## 2019-10-12 NOTE — ANESTHESIA PREPROCEDURE EVALUATION
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  felt to be focal nodular hyperplasia.  annual CT abd.   followed by Dr. Amilcar Kat      Cystocele, lateral      Diabetes mellitus (H)      Diverticulitis of colon (without mention of hemorrhage)(562.11)     Abd CT     Embolism and thrombosis of unspecified site age 22    post ovarian cyst removed     Esophageal reflux     Hiatal hernia     Fatty liver      Hiatal hernia     small     Hyperlipidemia      Nontoxic uninodular goiter      Osteopenia 2005    on fosamax  for > 5 yr.  stop 2012     Other chronic otitis externa      Other specified disorders of liver     Fatty Liver, Benign appearing liver cysts     Pure hypercholesterolemia       Past Surgical History:   Procedure Laterality Date     C APPENDECTOMY  age 22     C DEXA, BONE DENSITY, AXIAL SKEL       C DEXA, BONE DENSITY, AXIAL SKEL  2007    No signif change in Osteopenia     COLONOSCOPY  2006    repeat 10 yr     ENDOSCOPIC ULTRASOUND UPPER GASTROINTESTINAL TRACT (GI) N/A 2018    Procedure: ENDOSCOPIC ULTRASOUND, ESOPHAGOSCOPY / UPPER GASTROINTESTINAL TRACT (GI);  Endoscopic Ultrasound, Esophagogastroduodenoscopy with endoscopic mucosal resection;  Surgeon: Hood Brower MD;  [facility]     ESOPHAGOSCOPY, GASTROSCOPY, DUODENOSCOPY (EGD), COMBINED N/A 2018    Procedure: COMBINED ESOPHAGOSCOPY, GASTROSCOPY, DUODENOSCOPY (EGD),  BIOPSY SINGLE OR MULTIPLE;  EGD;  Surgeon: Hood Brower MD;  [facility]     ESOPHAGOSCOPY, GASTROSCOPY, DUODENOSCOPY (EGD), COMBINED N/A 3/14/2019    Procedure: Upper Endoscopy;  Surgeon: Hood Brower MD;  [facility]     ESOPHAGOSCOPY, GASTROSCOPY, DUODENOSCOPY (EGD), RESECT MUCOSA, COMBINED N/A 9/6/2018    Procedure: COMBINED ESOPHAGOSCOPY, GASTROSCOPY, DUODENOSCOPY (EGD), RESECT MUCOSA;;  Surgeon: Hood Brower MD;  [facility]     GYN SURGERY  10/22/08    pelvic support     HEMORRHOIDECTOMY  8/08     MOHS MICROGRAPHIC PROCEDURE       SURGICAL HISTORY OF -   age 22    L ovarian cyst removal, laparotomy     SURGICAL HISTORY OF -   1998    partial thyroidectomy     SURGICAL HISTORY OF -   10/08    Transobturator tape for Urinary Incontinence     SURGICAL HISTORY OF -   11/2011    stress test normal     THYROID SURGERY      Had cyst removed, thyroid gland is intact.          Anesthesia Evaluation     . Pt has had prior anesthetic. Type: General           ROS/MED HX    ENT/Pulmonary:     (+)BRENDA risk factors snores loudly, , . .    Neurologic:       Cardiovascular:     (+) Dyslipidemia, hypertension-Peripheral Vascular Disease---. : . . . :. . Previous cardiac testing date:results:date: results:ECG reviewed date:3/2019 results:NSR, occasional PVCs, HR 87 date: results:          METS/Exercise Tolerance:     Hematologic:         Musculoskeletal:         GI/Hepatic:     (+) GERD Asymptomatic on medication, liver disease,       Renal/Genitourinary:     (+) chronic renal disease, type: CRI,       Endo:     (+) type II DM thyroid problem .      Psychiatric:         Infectious Disease:         Malignancy:         Other:                         PHYSICAL EXAM:   Mental Status/Neuro: A/A/O   Airway: Facies: Feasible  Mallampati: III  Mouth/Opening: Full  TM distance: > 6 cm  Neck ROM: Full   Respiratory: Auscultation: CTAB     Resp. Rate: Normal     Resp. Effort: Normal      CV: Rhythm: Regular  Rate: Age  "appropriate  Heart: Normal Sounds  Edema: None   Comments:                      LABS:  CBC:   Lab Results   Component Value Date    WBC 13.5 (H) 10/12/2019    WBC 9.0 03/11/2019    HGB 13.8 10/12/2019    HGB 14.8 03/11/2019    HCT 40.3 10/12/2019    HCT 44.5 03/11/2019     (H) 10/12/2019     (H) 03/11/2019     BMP:   Lab Results   Component Value Date     10/12/2019     03/11/2019    POTASSIUM 4.4 10/12/2019    POTASSIUM 3.9 03/11/2019    CHLORIDE 103 10/12/2019    CHLORIDE 106 03/11/2019    CO2 23 10/12/2019    CO2 25 03/11/2019    BUN 17 10/12/2019    BUN 9 03/11/2019    CR 0.81 10/12/2019    CR 0.86 03/11/2019     (H) 10/12/2019     (H) 03/11/2019     COAGS:   Lab Results   Component Value Date    PTT 31 10/12/2019    INR 1.09 10/12/2019     POC:   Lab Results   Component Value Date     (H) 03/14/2019     OTHER:   Lab Results   Component Value Date    LACT 2.1 10/12/2019    A1C 7.6 (H) 10/12/2019    CECY 8.5 10/12/2019    PHOS 3.4 10/19/2015    MAG 1.7 09/06/2013    ALBUMIN 3.4 11/09/2017    PROTTOTAL 6.5 (L) 11/09/2017    ALT 42 11/09/2017    AST 33 11/09/2017    ALKPHOS 105 11/09/2017    BILITOTAL 0.8 11/09/2017    TSH 2.46 08/19/2019    SED 7 03/25/2004        Preop Vitals    BP Readings from Last 3 Encounters:   10/12/19 107/73   08/19/19 110/72   05/21/19 121/73    Pulse Readings from Last 3 Encounters:   10/12/19 94   08/19/19 98   05/21/19 72      Resp Readings from Last 3 Encounters:   10/12/19 25   08/19/19 18   05/21/19 16    SpO2 Readings from Last 3 Encounters:   10/12/19 99%   08/19/19 94%   05/21/19 100%      Temp Readings from Last 1 Encounters:   10/12/19 36.7  C (98  F) (Oral)    Ht Readings from Last 1 Encounters:   08/19/19 1.651 m (5' 5\")      Wt Readings from Last 1 Encounters:   08/19/19 87.8 kg (193 lb 8 oz)    Estimated body mass index is 32.2 kg/m  as calculated from the following:    Height as of 8/19/19: 1.651 m (5' 5\").    Weight as of " 8/19/19: 87.8 kg (193 lb 8 oz).     LDA:  Peripheral IV 10/12/19 Right (Active)   Number of days: 0        Assessment:   ASA SCORE: 3    H&P: History and physical reviewed and following examination; no interval change.   Smoking Status:  Non-Smoker/Unknown   NPO Status: NPO Appropriate     Plan:   Anes. Type:  General   Pre-Medication: None   Induction:  IV (Standard)   Airway: ETT; Oral   Access/Monitoring: PIV   Maintenance: Balanced     Postop Plan:   Postop Pain: Opioids  Postop Sedation/Airway: Not planned     PONV Management:   Adult Risk Factors: Female, Non-Smoker, Postop Opioids   Prevention: Ondansetron, Propofol     CONSENT: Direct conversation   Plan and risks discussed with: Patient   Blood Products: Consented (ALL Blood Products)                   Ashley Downs MD

## 2019-10-12 NOTE — ED NOTES
Bed: ED08  Expected date: 10/12/19  Expected time: 11:18 AM  Means of arrival:   Comments:  73yo F ankle injury

## 2019-10-12 NOTE — CONSULTS
Orthopaedic Surgery Consultation: 10/12/2019    Sarah Felix MRN# [id number]   Age: 74 year old YOB: 1945   Date of Admission:  10/12/2019    Reason for consult:  Left ankle fracture   Requesting physician: Ana Cavazos MD              Impression and Recommendation (Resident / Clinician):   Impression:  Sarah Felix is a 74 year old female with PMH significant for type 2 diabetes, most recent A1c 7.3, seen in consultation for a delayed presentation after a left closed displaced bimalleolar ankle fracture, concerning for impending open fracture medially.  The fracture was closed reduced under conscious sedation by myself in the emergency department.     Recomendations:  -N.p.o.  -Preoperative labs.    -External fixator today         History of Present Illness:   74-year-old female seen in consultation for a closed left ankle fracture.  The injury took place yesterday around 10 PM when the patient fell at the stairs at home.  She denies losing consciousness at that time.  She was unable to ambulate and decided not to call emergency department during the night, secondary to concerns that they would break in the door.  Eventually she called for help and was brought into the [facility] emergency department.  Close reduction of her ankle fracture was attempted by the emergency department.  Orthopedic surgery was consulted after for further evaluation and management.    The patient denies any other injuries at this fall.     History obtained from patient interview and chart review.        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  felt to be focal nodular  hyperplasia.  annual CT abd.   followed by Dr. Amilcar Kat      Cystocele, lateral      Diabetes mellitus (H)      Diverticulitis of colon (without mention of hemorrhage)(562.11) 6/04    Abd CT     Embolism and thrombosis of unspecified site age 22    post ovarian cyst removed     Esophageal reflux     Hiatal hernia     Fatty liver      Hiatal hernia     small     Hyperlipidemia      Nontoxic uninodular goiter      Osteopenia 4/21/2005    on fosamax  for > 5 yr.  stop 7/2012     Other chronic otitis externa      Other specified disorders of liver     Fatty Liver, Benign appearing liver cysts     Pure hypercholesterolemia         The patient is currently retired.  She is a non-smoker.  Lives alone.     Past Surgical History:   Procedure Laterality Date     C APPENDECTOMY  age 22     C DEXA, BONE DENSITY, AXIAL SKEL  2005     C DEXA, BONE DENSITY, AXIAL SKEL  6/2007    No signif change in Osteopenia     COLONOSCOPY  4/2006    repeat 10 yr     ENDOSCOPIC ULTRASOUND UPPER GASTROINTESTINAL TRACT (GI) N/A 9/6/2018    Procedure: ENDOSCOPIC ULTRASOUND, ESOPHAGOSCOPY / UPPER GASTROINTESTINAL TRACT (GI);  Endoscopic Ultrasound, Esophagogastroduodenoscopy with endoscopic mucosal resection;  Surgeon: Hood Brower MD;  Location: [facility] OR     ESOPHAGOSCOPY, GASTROSCOPY, DUODENOSCOPY (EGD), COMBINED N/A 8/13/2018    Procedure: COMBINED ESOPHAGOSCOPY, GASTROSCOPY, DUODENOSCOPY (EGD), BIOPSY SINGLE OR MULTIPLE;  EGD;  Surgeon: Hood Brower MD;  Location:  OR     ESOPHAGOSCOPY, GASTROSCOPY, DUODENOSCOPY (EGD), COMBINED N/A 3/14/2019    Procedure: Upper Endoscopy;  Surgeon: Hood Brower MD;  Location: [facility] OR     ESOPHAGOSCOPY, GASTROSCOPY, DUODENOSCOPY (EGD), RESECT MUCOSA, COMBINED N/A 9/6/2018    Procedure: COMBINED ESOPHAGOSCOPY, GASTROSCOPY, DUODENOSCOPY (EGD), RESECT MUCOSA;;  Surgeon: Hood Brower MD;  Location: [facility] OR     GYN SURGERY  10/22/08    pelvic support     HEMORRHOIDECTOMY  8/08     MOHS MICROGRAPHIC PROCEDURE        SURGICAL HISTORY OF -   age 22    L ovarian cyst removal, laparotomy     SURGICAL HISTORY OF -   1998    partial thyroidectomy     SURGICAL HISTORY OF -   10/08    Transobturator tape for Urinary Incontinence     SURGICAL HISTORY OF -   11/2011    stress test normal     THYROID SURGERY      Had cyst removed, thyroid gland is intact.            Social History:   The patient is retired.  She is a non-smoker.  She lives independently, has stairs at home.          Family History:   No family history of anesthesia, bleeding or clotting complications.           Allergies:     Allergies   Allergen Reactions     Amlodipine Swelling     Ancef [Cefazolin]      Levaquin Rash     Itching, rash     Metformin Diarrhea and GI Disturbance     Cephalosporins Rash     Levofloxacin Itching and Rash     Nickel Rash     Contact reaction  Contact reaction             Medications:   Anticoagulation: Aspirin 325 daily  Antibiotics: None          Review of Systems:   A focused review of systems was performed and found to be negative except as stated in the HPI.          Physical Exam:   BP (!) 144/72   Pulse 110   Temp 98  F (36.7  C) (Oral)   Resp 14   SpO2 97%   General: awake, alert, cooperative, no apparent distress, appears stated age  HEENT: normocephalic, atraumatic, EOMI, no scleral icterus  Respiratory: breathing non-labored, no wheezing  Cardiovascular: nontachycardic  Skin: no rashes or lesions  Neurological: A&Ox3, CN II-XII grossly intact    Musculoskeletal:  Left lower extremity: The patient has an obvious deformity to the ankle with the fluids laterally subluxed.  The skin medially looks bruised.  There is an area concerning for necrosis where the medial malleolus is felt subcutaneous.  There is significant swelling around the foot and ankle.  Can fire FHL and EHL with some strength secondary to pain.  Endorses some paresthesias along the tibial nerve distribution, otherwise sensation present over the superficial  peroneal, deep peroneal, sural, saphenous.  Dorsalis pedis pulses and posterior tibialis not felt, but the foot is warm and well-perfused.          Imaging:   X-rays of the left ankle show evidence of a bimalleolar ankle equivalent fracture; the fibula fracture is a Walker type B and the medial malleolus is transverse at the level of the ankle joint.    C-arm images obtained after reduction, show a concentric ankle joint.         Laboratory data:     Inflammatory Markers:  Lab Results   Component Value Date    WBC 13.5 (H) 10/12/2019    WBC 9.0 03/11/2019    WBC 7.2 07/09/2013    SED 7 03/25/2004       CBC:  Lab Results   Component Value Date    WBC 13.5 (H) 10/12/2019    HGB 13.8 10/12/2019     (H) 10/12/2019       BMP:  Lab Results   Component Value Date     03/11/2019    POTASSIUM 3.9 03/11/2019    CHLORIDE 106 03/11/2019    CO2 25 03/11/2019    BUN 9 03/11/2019    CR 0.86 03/11/2019    ANIONGAP 9 03/11/2019    CECY 9.2 03/11/2019     (H) 03/11/2019           Hung Vazquez MD  Orthopaedic Surgery, PGY-4  Pager: [phone]

## 2019-10-12 NOTE — OR NURSING
PACU to Inpatient Nursing Handoff    Patient Sarah Felix is a 74 year old female who speaks English.   Procedure Procedure(s):  EXTERNAL FIXATION, LOWER EXTREMITY   Surgeon(s) Primary: Leeanne Brown MD  Resident - Assisting: Malik Tobar MD; Hung Mackenzie MD     Allergies   Allergen Reactions     Amlodipine Swelling     Ancef [Cefazolin]      Levaquin Rash     Itching, rash     Metformin Diarrhea and GI Disturbance     Cephalosporins Rash     Levofloxacin Itching and Rash     Nickel Rash     Contact reaction  Contact reaction       Isolation  [unfilled]     Past Medical History   has a past medical history of Allergic rhinitis, cause unspecified, Basal cell carcinoma of face (3/2012), Contact dermatitis and other eczema, due to unspecified cause, Cyst of thyroid (1998), CYSTIC LIVER DIS, Cystocele, lateral, Diabetes mellitus (H), Diverticulitis of colon (without mention of hemorrhage)(562.11) (6/04), Embolism and thrombosis of unspecified site (age 22), Esophageal reflux, Fatty liver, Hiatal hernia, Hyperlipidemia, Nontoxic uninodular goiter, Osteopenia (4/21/2005), Other chronic otitis externa, Other specified disorders of liver, and Pure hypercholesterolemia.    Anesthesia General   Dermatome Level     Preop Meds Not applicable   Nerve block Not applicable   Intraop Meds fentanyl (Sublimaze): 100 mcg total   Local Meds Yes   Antibiotics clindamycin (Cleocin) - last given at 1600     Pain Patient Currently in Pain: yes  Comfort: tolerable with discomfort  Pain Control: partially effective   PACU meds  acetaminophen (Tylenol): 975 mg (total dose) last given at 1800 Oxycodone 5mg  Dilaudid IV     PCA / epidural No   Capnography     Telemetry ECG Rhythm: Normal sinus rhythm   Inpatient Telemetry Monitor Ordered? No        Labs Glucose Lab Results   Component Value Date     10/12/2019       Hgb Lab Results   Component Value Date    HGB 13.8 10/12/2019       INR Lab  Results   Component Value Date    INR 1.09 10/12/2019      PACU Imaging Completed     Wound/Incision Incision/Surgical Site 10/12/19 Anterior;Midline;Distal;Lower;Left Leg (Active)   Incision Assessment [facility] 10/12/2019  5:30 PM   [place]-Incision Assessment [facility] 10/12/2019  5:30 PM   Incision Drainage Amount None 10/12/2019  5:30 PM   Dressing Intervention Clean, dry, intact 10/12/2019  5:30 PM   Number of days: 0       Incision/Surgical Site 10/12/19 Anterior;Midline;Left;Lower;Proximal Leg (Active)   Incision Assessment [facility] 10/12/2019  5:30 PM   [place]-Incision Assessment [facility] 10/12/2019  5:30 PM   Incision Drainage Amount None 10/12/2019  5:30 PM   Dressing Intervention Clean, dry, intact 10/12/2019  5:30 PM   Number of days: 0       Incision/Surgical Site 10/12/19 Bilateral;Left Heel (Active)   Incision Assessment [facility] 10/12/2019  5:30 PM   [place]-Incision Assessment [facility] 10/12/2019  5:30 PM   Incision Drainage Amount None 10/12/2019  5:30 PM   Dressing Intervention Clean, dry, intact 10/12/2019  5:30 PM   Number of days: 0      CMS        Equipment Not applicable   Other LDA       IV Access Peripheral IV 10/12/19 Right (Active)   Site Assessment [facility] 10/12/2019  7:07 AM   Line Status Infusing 10/12/2019  7:07 AM   Phlebitis Scale 0-->no symptoms 10/12/2019  7:07 AM   Infiltration Scale 0 10/12/2019  7:07 AM   Number of days: 0       Peripheral IV 10/12/19 Left Lower forearm (Active)   Site Assessment [facility] 10/12/2019  5:30 PM   Line Status Infusing 10/12/2019  5:30 PM   Phlebitis Scale 0-->no symptoms 10/12/2019  5:30 PM   Number of days: 0      Blood Products Not applicable EBL 5   mL   Intake/Output Date 10/12/19 0700 - 10/13/19 0659   Shift 7222-0926 4650-8839 8722-1967 24 Hour Total   INTAKE   I.V.  600  600   Shift Total  600  600   OUTPUT   Shift Total       Weight (kg)          Drains / Silver     Time of void PreOp      PostOp      Diapered? No   Bladder Scan     PO    ice chips     Vitals    B/P: 137/67  T: 96.8  F  (36  C)    Temp src: Core  P:  Pulse: 89 (10/12/19 1730)    Heart Rate: 91 (10/12/19 1730)     R: 17  O2:  SpO2: 100 %    O2 Device: None (Room air) (10/12/19 1744)    Oxygen Delivery: 6 LPM (10/12/19 1730)         Family/support present none   Patient belongings     Patient transported on bed   DC meds/scripts (obs/outpt) Not applicable   Inpatient Pain Meds Released? Yes       Special needs/considerations None   Tasks needing completion None       Genia Tanner, RN  ASCOM [id number]

## 2019-10-12 NOTE — ANESTHESIA POSTPROCEDURE EVALUATION
Anesthesia POST Procedure Evaluation    Patient: Sarah Felix   MRN:     [id number] Gender:   female   Age:    74 year old :      1945        Preoperative Diagnosis: * No pre-op diagnosis entered *   Procedure(s):  EXTERNAL FIXATION, LOWER EXTREMITY   Postop Comments: No value filed.       Anesthesia Type:  Not documented  General    Reportable Event: NO     PAIN: Uncomplicated   Sign Out status: Comfortable, Well controlled pain     PONV: No PONV   Sign Out status:  No Nausea or Vomiting     Neuro/Psych: Uneventful perioperative course   Sign Out Status: Preoperative baseline; Age appropriate mentation     Airway/Resp.: Uneventful perioperative course   Sign Out Status: Non labored breathing, age appropriate RR; Resp. Status within EXPECTED Parameters     CV: Uneventful perioperative course   Sign Out status: Appropriate BP and perfusion indices; Appropriate HR/Rhythm     Disposition:   Sign Out in:  PACU  Disposition:  Floor  Recovery Course: Uneventful  Follow-Up: Not required           Last Anesthesia Record Vitals:  CRNA VITALS  10/12/2019 1642 - 10/12/2019 1742      10/12/2019             Resp Rate (observed):  (!) 4          Last PACU Vitals:  Vitals Value Taken Time   /54 10/12/2019  6:30 PM   Temp 36.2  C (97.2  F) 10/12/2019  6:30 PM   Pulse 89 10/12/2019  6:15 PM   Resp 18 10/12/2019  6:30 PM   SpO2 94 % 10/12/2019  6:30 PM   Temp src     NIBP 127/66 10/12/2019  5:06 PM   Pulse 95 10/12/2019  5:06 PM   SpO2     Resp     Temp     Ht Rate     Temp 2     Vitals shown include unvalidated device data.      Electronically Signed By: Ashley Downs MD, 2019, 6:52 PM

## 2019-10-12 NOTE — ANESTHESIA CARE TRANSFER NOTE
Patient: Sarah Felix    Procedure(s):  EXTERNAL FIXATION, LOWER EXTREMITY    Diagnosis: * No pre-op diagnosis entered *  Diagnosis Additional Information: No value filed.    Anesthesia Type:   General     Note:  Airway :Face Mask  Patient transferred to:PACU  Comments: Stable, awake, comfortable appearing, facemask oxygen, report to pacu, rnHandoff Report: Identifed the Patient, Identified the Reponsible Provider, Reviewed the pertinent medical history, Discussed the surgical course, Reviewed Intra-OP anesthesia mangement and issues during anesthesia, Set expectations for post-procedure period and Allowed opportunity for questions and acknowledgement of understanding      Vitals: (Last set prior to Anesthesia Care Transfer)    CRNA VITALS  10/12/2019 1642 - 10/12/2019 1713      10/12/2019             Resp Rate (observed):  (!) 4                Electronically Signed By: MALATHI Gallo CRNA  October 12, 2019  5:13 PM

## 2019-10-12 NOTE — ED NOTES
[facility] Procedure Note        Sedation:      Performed by: Ana Cavazos MD  Authorized by: Ana Cavazos MD    Pre-Procedure Assessment done at 1300.    Sedation Level:  Moderate Sedation    Indication:  Sedation is required to allow for joint reduction    Consent obtained from patient after discussing the risks, benefits and alternatives.    PO Intake:  Not NPO but emergent condition outweighs risk.    ASA Class:  Class 2 - MILD SYSTEMIC DISEASE, NO ACUTE PROBLEMS, NO FUNCTIONAL LIMITATIONS.    Mallampati:  Grade 2:  Soft palate, base of uvula, tonsillar pillars, and portion of posterior pharyngeal wall visible    Lungs: Lungs Clear with good breath sounds bilaterally.     Heart: Normal heart sounds and rate    History and physical reviewed and no updates needed. I have reviewed the lab findings, diagnostic data, medications, and the plan for sedation. I have determined this patient to be an appropriate candidate for the planned sedation and procedure and have reassessed the patient IMMEDIATELY PRIOR to sedation and procedure.      Sedation Post Procedure Summary:    Prior to the start of the procedure and with procedural staff participation, I verbally confirmed the patient s identity using two indicators, relevant allergies, that the procedure was appropriate and matched the consent or emergent situation, and that the correct equipment/implants were available. Immediately prior to starting the procedure I conducted the Time Out with the procedural staff and re-confirmed the patient s name, procedure, and site/side. (The Joint Commission universal protocol was followed.)  Yes      Sedatives: Propofol,versed    Vital signs, airway, End Tidal CO2 and pulse oximetry were monitored and remained stable throughout the procedure and sedation was maintained until the procedure was complete.  The patient was monitored by staff until sedation discharge criteria were met.    Patient tolerance: Patient tolerated the  procedure well with no immediate complications.    Time of sedation in minutes:  15 minutes from beginning to end of physician one to one monitoring.       Ana Cavazos MD  10/12/19 1358

## 2019-10-12 NOTE — BRIEF OP NOTE
[facility], [place]    Brief Operative Note    Pre-operative diagnosis: Left bimalleolar ankle fracture-dislocation  Post-operative diagnosis: Same as above  Procedure: Procedure(s):  EXTERNAL FIXATION, LOWER EXTREMITY  Surgeon: Surgeon(s) and Role:     * Leeanne Brown MD - Primary     * Malik Tobar MD - Resident - Assisting     * Hung Mackenzie MD - Resident - Assisting  Anesthesia: General   Estimated blood loss: 2mL  Drains: None  Specimens: * No specimens in log *  Findings:   None.  Complications: None.  Implants:  Shanna 3 ExFix System, delta frame configuration      Assessment and Plan: Sarah Felix is a 74 year old female with left bimalleolar ankle fracture-dislocation s/p external fixator application on 10/12/2019 with Dr. Brown.     Orthopaedics Primary  Activity: Up with assist.   Weight bearing status: NWB LLE.  Pain management: Transition from IV to PO as tolerated.  Antibiotics: perioperative Clindamycin x 24 hours.  Diet: Begin with clear fluids and progress diet as tolerated.   DVT prophylaxis: Lovenox.  Dressings: Keep pin site dressings c/d/i, change prn  PT/OT: Eval and treat.   Consults: Hospitalist, PT, OT appreciate recs. SW for dispo planning, will likely require TCU upon discharge    Follow-up: Clinic with Dr. Lemus for planning of definitive fixation; date TBD.     Disposition: Pending progress with therapies, pain control on orals, and medical stability, anticipate discharge to TCU in 2-3 days      Malik Tobar MD  Orthopaedic Surgery PGY-1  [phone]    Please page me at [phone] with any questions/concerns. If there is no response, if it is a weekend, or if it is during evening hours then please page the orthopaedic surgery resident on call.

## 2019-10-12 NOTE — ED PROVIDER NOTES
[facility] [place] ([facility])    10/12/19       History     Chief Complaint   Patient presents with     Ankle Trauma     10 hours pta, pt was at home going down stairs and ankle gave out and pt slipped down 3-4 stairs on butt. pt has defomity of left ankle      The history is provided by the patient.     Sarah Felix is a 74 year old female with a medical history significant for type 2 diabetes mellitus and hypertension who presents to the Emergency Department for evaluation after a mechanical slip and fall.  The patient was walking down a set of stairs in her home last night at approximately 10 PM when she reports that her left foot slipped out from underneath her and she fell onto her buttocks.  The patient denies striking her head during the fall and she denies any loss of consciousness.  She denies being on any anticoagulation medications.  The patient does not remember exactly if her left ankle twisted or just slipped out from underneath her, but she had immediate pain in her left ankle and she was unable to get up on her own.  The patient reports that she was sliding around on the ground throughout the night last night, and then called EMS this morning as she was still not able to get up.    I have reviewed the Medications, Allergies, Past Medical and Surgical History, and Social History in the [employer] system.    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  felt to be focal nodular hyperplasia.  annual CT abd.   followed by Dr. Amilcar Kat      Cystocele, lateral      Diabetes mellitus (H)      Diverticulitis of colon (without mention of hemorrhage)(562.11) 6/04    Abd CT     Embolism and  thrombosis of unspecified site age 22    post ovarian cyst removed     Esophageal reflux     Hiatal hernia     Fatty liver      Hiatal hernia     small     Hyperlipidemia      Nontoxic uninodular goiter      Osteopenia 4/21/2005    on fosamax  for > 5 yr.  stop 7/2012     Other chronic otitis externa      Other specified disorders of liver     Fatty Liver, Benign appearing liver cysts     Pure hypercholesterolemia        Past Surgical History:   Procedure Laterality Date     C APPENDECTOMY  age 22     C DEXA, BONE DENSITY, AXIAL SKEL  2005     C DEXA, BONE DENSITY, AXIAL SKEL  6/2007    No signif change in Osteopenia     COLONOSCOPY  4/2006    repeat 10 yr     ENDOSCOPIC ULTRASOUND UPPER GASTROINTESTINAL TRACT (GI) N/A 9/6/2018    Procedure: ENDOSCOPIC ULTRASOUND, ESOPHAGOSCOPY / UPPER GASTROINTESTINAL TRACT (GI);  Endoscopic Ultrasound, Esophagogastroduodenoscopy with endoscopic mucosal resection;  Surgeon: Hood Brower MD;  Location: [facility] OR     ESOPHAGOSCOPY, GASTROSCOPY, DUODENOSCOPY (EGD), COMBINED N/A 8/13/2018    Procedure: COMBINED ESOPHAGOSCOPY, GASTROSCOPY, DUODENOSCOPY (EGD), BIOPSY SINGLE OR MULTIPLE;  EGD;  Surgeon: Hood Brower MD;  Location:  OR     ESOPHAGOSCOPY, GASTROSCOPY, DUODENOSCOPY (EGD), COMBINED N/A 3/14/2019    Procedure: Upper Endoscopy;  Surgeon: Hood Brower MD;  Location: [facility] OR     ESOPHAGOSCOPY, GASTROSCOPY, DUODENOSCOPY (EGD), RESECT MUCOSA, COMBINED N/A 9/6/2018    Procedure: COMBINED ESOPHAGOSCOPY, GASTROSCOPY, DUODENOSCOPY (EGD), RESECT MUCOSA;;  Surgeon: Hood Brower MD;  Location: [facility] OR     GYN SURGERY  10/22/08    pelvic support     HEMORRHOIDECTOMY  8/08     MOHS MICROGRAPHIC PROCEDURE       SURGICAL HISTORY OF -   age 22    L ovarian cyst removal, laparotomy     SURGICAL HISTORY OF -   1998    partial thyroidectomy     SURGICAL HISTORY OF -   10/08    Transobturator tape for Urinary Incontinence     SURGICAL HISTORY OF -   11/2011    stress test normal     THYROID  "SURGERY      Had cyst removed, thyroid gland is intact.       Family History   Problem Relation Age of Onset     Diabetes Mother         at 89 yrs     Hypertension Mother      Arthritis Mother         rheumatoid     Cancer Father         bladder     Cardiovascular Brother         palpitations not on meds.     Glaucoma No family hx of      Macular Degeneration No family hx of      Amblyopia No family hx of        Social History     Tobacco Use     Smoking status: Former Smoker     Last attempt to quit: 1980     Years since quittin.8     Smokeless tobacco: Never Used     Tobacco comment: smoked from 70-80; smoked about 1ppd   Substance Use Topics     Alcohol use: Yes     Alcohol/week: 0.0 standard drinks     Comment: 6 drinks per week       Current Facility-Administered Medications   Medication     clindamycin (CLEOCIN) infusion 900 mg     clindamycin (CLEOCIN) infusion 900 mg     propofol (DIPRIVAN) 1000 MG/100ML infusion     propofol (DIPRIVAN) injection 10 mg/mL vial     sodium chloride 0.9% infusion     Current Outpatient Medications   Medication     ACE/ARB NOT PRESCRIBED, INTENTIONAL,     alum & mag hydroxide-simethicone (MAALOX REGULAR STRENGTH) 200-200-20 MG/5ML SUSP     aspirin 81 MG tablet     atorvastatin (LIPITOR) 40 MG tablet     blood glucose (NO BRAND SPECIFIED) lancets standard     blood glucose (NO BRAND SPECIFIED) test strip     blood glucose (ONETOUCH ULTRA) test strip     blood glucose monitoring (NO BRAND SPECIFIED) meter device kit     CALCIUM 500 + D 500-200 MG-IU OR TABS     Cholecalciferol (VITAMIN D PO)     Docusate Sodium (COLACE PO)     furosemide (LASIX) 20 MG tablet     glimepiride (AMARYL) 4 MG tablet     insulin pen needle (BD REY U/F) 32G X 4 MM miscellaneous     insulin syringe-needle U-100 (BD INSULIN SYRINGE ULTRAFINE) 31G X 5/16\" 1 ML miscellaneous     LANTUS SOLOSTAR 100 UNIT/ML soln     liraglutide (VICTOZA) 18 MG/3ML solution     liraglutide (VICTOZA) 18 MG/3ML " solution     lisinopril (PRINIVIL/ZESTRIL) 2.5 MG tablet     metoprolol tartrate (LOPRESSOR) 25 MG tablet     ONETOUCH DELICA LANCETS 33G MISC     pantoprazole (PROTONIX) 40 MG EC tablet     potassium chloride ER (K-TAB/KLOR-CON) 10 MEQ CR tablet     triamcinolone (KENALOG) 0.1 % cream        Allergies   Allergen Reactions     Amlodipine Swelling     Ancef [Cefazolin]      Levaquin Rash     Itching, rash     Metformin Diarrhea and GI Disturbance     Cephalosporins Rash     Levofloxacin Itching and Rash     Nickel Rash     Contact reaction  Contact reaction         Review of Systems   Musculoskeletal:        Positive for left ankle pain   Neurological: Negative for syncope and headaches.   All other systems reviewed and are negative.      Physical Exam   BP: (!) 144/72  Pulse: 110  Temp: 98  F (36.7  C)  Resp: 14  SpO2: 97 %      Physical Exam  Vitals signs and nursing note reviewed.   Constitutional:       Appearance: Normal appearance.   HENT:      Head: Normocephalic and atraumatic.      Right Ear: External ear normal.      Left Ear: External ear normal.      Mouth/Throat:      Mouth: Mucous membranes are moist.   Eyes:      Extraocular Movements: Extraocular movements intact.      Pupils: Pupils are equal, round, and reactive to light.   Neck:      Musculoskeletal: Normal range of motion and neck supple.   Cardiovascular:      Rate and Rhythm: Normal rate and regular rhythm.      Pulses: Normal pulses.      Heart sounds: Normal heart sounds.   Pulmonary:      Effort: Pulmonary effort is normal.      Breath sounds: Normal breath sounds.   Abdominal:      General: Abdomen is flat.      Tenderness: There is no tenderness.   Musculoskeletal:         General: Swelling, tenderness, deformity and signs of injury present.      Comments: Left ankle has swelling, bruising, with tenting of the skin medial left ankle region with skin redness and blackened skin area.  Skin appears necrotic and compromised in this region.     Skin:     General: Skin is warm and dry.   Neurological:      General: No focal deficit present.      Mental Status: She is alert and oriented to person, place, and time.   Psychiatric:         Mood and Affect: Mood is anxious.         ED Course        Procedures           Labs Ordered and Resulted from Time of ED Arrival Up to the Time of Departure from the ED   CBC WITH PLATELETS DIFFERENTIAL - Abnormal; Notable for the following components:       Result Value    WBC 13.5 (*)     MCH 33.2 (*)     Platelet Count 647 (*)     Absolute Neutrophil 11.7 (*)     All other components within normal limits   BASIC METABOLIC PANEL - Abnormal; Notable for the following components:    Glucose 246 (*)     All other components within normal limits   HEMOGLOBIN A1C - Abnormal; Notable for the following components:    Hemoglobin A1C 7.6 (*)     All other components within normal limits   GLUCOSE BY METER - Abnormal; Notable for the following components:    Glucose 185 (*)     All other components within normal limits   ISTAT  GASES LACTATE PEPITO POCT - Abnormal; Notable for the following components:    PCO2 Venous 33 (*)     All other components within normal limits   INR   PARTIAL THROMBOPLASTIN TIME   GLUCOSE MONITOR NURSING POCT   ROUTINE UA WITH MICROSCOPIC   ISTAT CG4 GASES LACTATE PEPITO NURSING POCT   OBTAIN AFFIRMATION OF INFORMED CONSENT   SHAMPOO   SHOWER   PREP FOR PROCEDURE   VOID ON CALL TO OR   NOTIFY PHYSICIAN   APPLY PNEUMATIC COMPRESSION DEVICE (PCD)   ABO/RH TYPE AND SCREEN   BLOOD CULTURE   URINE CULTURE AEROBIC BACTERIAL   BLOOD CULTURE     Results for orders placed or performed during the hospital encounter of 10/12/19   XR Ankle Left G/E 3 Views    Narrative    LEFT ANKLE THREE OR MORE VIEWS  10/12/2019 12:28 PM     HISTORY:  Trauma and pain.    COMPARISON: None.    FINDINGS: Oblique Walker B distal fibular fracture and transverse  intra-articular fracture medial malleolus. Disruption ankle mortise.  Malleolus  fragments and talar dome displaced laterally 1.5 cm with  valgus angulation. Overriding distal fibular fracture. No visible  posterior malleolar fracture. Marked soft tissue swelling.      Impression    IMPRESSION: Bimalleolar ankle fracture/dislocation.     PREETHI GARCIA MD   XR Surgery CHENTE L/T 5 Min Fluoro    Narrative    This exam was marked as non-reportable because it will not be read by a   radiologist or a [place] non-radiologist provider.             CBC with platelets differential   Result Value Ref Range    WBC 13.5 (H) 4.0 - 11.0 10e9/L    RBC Count 4.16 3.8 - 5.2 10e12/L    Hemoglobin 13.8 11.7 - 15.7 g/dL    Hematocrit 40.3 35.0 - 47.0 %    MCV 97 78 - 100 fl    MCH 33.2 (H) 26.5 - 33.0 pg    MCHC 34.2 31.5 - 36.5 g/dL    RDW 14.4 10.0 - 15.0 %    Platelet Count 647 (H) 150 - 450 10e9/L    Diff Method Automated Method     % Neutrophils 86.0 %    % Lymphocytes 6.7 %    % Monocytes 6.9 %    % Eosinophils 0.0 %    % Basophils 0.1 %    % Immature Granulocytes 0.3 %    Nucleated RBCs 0 0 /100    Absolute Neutrophil 11.7 (H) 1.6 - 8.3 10e9/L    Absolute Lymphocytes 0.9 0.8 - 5.3 10e9/L    Absolute Monocytes 0.9 0.0 - 1.3 10e9/L    Absolute Eosinophils 0.0 0.0 - 0.7 10e9/L    Absolute Basophils 0.0 0.0 - 0.2 10e9/L    Abs Immature Granulocytes 0.0 0 - 0.4 10e9/L    Absolute Nucleated RBC 0.0    Basic metabolic panel   Result Value Ref Range    Sodium 138 133 - 144 mmol/L    Potassium 4.4 3.4 - 5.3 mmol/L    Chloride 103 94 - 109 mmol/L    Carbon Dioxide 23 20 - 32 mmol/L    Anion Gap 12 3 - 14 mmol/L    Glucose 246 (H) 70 - 99 mg/dL    Urea Nitrogen 17 7 - 30 mg/dL    Creatinine 0.81 0.52 - 1.04 mg/dL    GFR Estimate 73 >60 mL/min/[1.73_m2]    GFR Estimate If Black 85 >60 mL/min/[1.73_m2]    Calcium 8.5 8.5 - 10.1 mg/dL   INR   Result Value Ref Range    INR 1.09 0.86 - 1.14   Partial thromboplastin time   Result Value Ref Range    PTT 31 22 - 37 sec   Hemoglobin A1c   Result Value Ref Range    Hemoglobin  A1C 7.6 (H) 0 - 5.6 %   Glucose by meter   Result Value Ref Range    Glucose 185 (H) 70 - 99 mg/dL   ISTAT gases lactate lesly POCT   Result Value Ref Range    Ph Venous 7.42 7.32 - 7.43 pH    PCO2 Venous 33 (L) 40 - 50 mm Hg    PO2 Venous 26 25 - 47 mm Hg    Bicarbonate Venous 22 21 - 28 mmol/L    O2 Sat Venous 50 %    Lactic Acid 2.1 0.7 - 2.1 mmol/L            Assessments & Plan (with Medical Decision Making)   The patient slipped on her stairs last night and landed on her buttocks. She she says somehow injured her left ankle and could not stand on it afterwards. She saw the bone push the skin out.  She denies other injury.  She didn't call ems until today because she didn't want them breaking down her door.  She lives alone.  She has type 2 DM and takes long acting insulin as well as amaryl.  She didn't take her lantus last night and missed her morning amaryl.  She has not eaten anything since 10 pm last night.  Xray was ordered of her left ankle.  No other injuries noted.  I discussed the case with Dr. Hernandez who wanted to try and reduce her joint.  She has received dilaudid and fentanyl prior.  He pulled and was unable to reduce the ankle.  He wanted to do so given there was tissue compromise where the bone was pressing on the skin.  With her diabetes this was especially concerning.  I had consulted Orthopedics immediately after my exam and before xray.  Labs were reviewed and shows a mildly elevated glucose of 246.  This improved with IV fluids to 185. Xray shows left ankle bimalleolar fracture dislocation.  Ortho came and wanted to place an orthoglass splint to stop the pressure on the soft tissue.  Propofol conscious sedation was used.  She was given dilaudid and versed prior to the procedure.  Splint was applied and post procedure films were done by C arm.  Ortho wants to bring the patient to the OR.      I have reviewed the nursing notes.    I have reviewed the findings, diagnosis, plan and need for follow up  with the patient.    New Prescriptions    No medications on file       Final diagnoses:   Closed bimalleolar fracture of left ankle, initial encounter     IBradley, am serving as a trained medical scribe to document services personally performed by Ana Cavazos MD, based on the provider's statements to me.   IAna MD, was physically present and have reviewed and verified the accuracy of this note documented by Bradley Bojorquez.    10/12/2019   [facility], [place], EMERGENCY DEPARTMENT     Ana Cavazos MD  10/12/19 0896

## 2019-10-13 ENCOUNTER — APPOINTMENT (OUTPATIENT)
Dept: PHYSICAL THERAPY | Facility: CLINIC | Age: 74
DRG: 493 | End: 2019-10-13
Payer: COMMERCIAL

## 2019-10-13 LAB
ANION GAP SERPL CALCULATED.3IONS-SCNC: 7 MMOL/L (ref 3–14)
BUN SERPL-MCNC: 13 MG/DL (ref 7–30)
CALCIUM SERPL-MCNC: 7.6 MG/DL (ref 8.5–10.1)
CHLORIDE SERPL-SCNC: 109 MMOL/L (ref 94–109)
CO2 SERPL-SCNC: 23 MMOL/L (ref 20–32)
CREAT SERPL-MCNC: 0.77 MG/DL (ref 0.52–1.04)
ERYTHROCYTE [DISTWIDTH] IN BLOOD BY AUTOMATED COUNT: 14.8 % (ref 10–15)
GFR SERPL CREATININE-BSD FRML MDRD: 77 ML/MIN/{1.73_M2}
GLUCOSE BLDC GLUCOMTR-MCNC: 129 MG/DL (ref 70–99)
GLUCOSE BLDC GLUCOMTR-MCNC: 133 MG/DL (ref 70–99)
GLUCOSE BLDC GLUCOMTR-MCNC: 175 MG/DL (ref 70–99)
GLUCOSE BLDC GLUCOMTR-MCNC: 186 MG/DL (ref 70–99)
GLUCOSE BLDC GLUCOMTR-MCNC: 201 MG/DL (ref 70–99)
GLUCOSE SERPL-MCNC: 153 MG/DL (ref 70–99)
HCT VFR BLD AUTO: 36.7 % (ref 35–47)
HGB BLD-MCNC: 12 G/DL (ref 11.7–15.7)
MAGNESIUM SERPL-MCNC: 1.9 MG/DL (ref 1.6–2.3)
MCH RBC QN AUTO: 32.6 PG (ref 26.5–33)
MCHC RBC AUTO-ENTMCNC: 32.7 G/DL (ref 31.5–36.5)
MCV RBC AUTO: 100 FL (ref 78–100)
PLATELET # BLD AUTO: 536 10E9/L (ref 150–450)
POTASSIUM SERPL-SCNC: 3.9 MMOL/L (ref 3.4–5.3)
RBC # BLD AUTO: 3.68 10E12/L (ref 3.8–5.2)
SODIUM SERPL-SCNC: 139 MMOL/L (ref 133–144)
WBC # BLD AUTO: 8.1 10E9/L (ref 4–11)

## 2019-10-13 PROCEDURE — 86900 BLOOD TYPING SEROLOGIC ABO: CPT | Performed by: STUDENT IN AN ORGANIZED HEALTH CARE EDUCATION/TRAINING PROGRAM

## 2019-10-13 PROCEDURE — 25000132 ZZH RX MED GY IP 250 OP 250 PS 637: Performed by: INTERNAL MEDICINE

## 2019-10-13 PROCEDURE — 12000001 ZZH R&B MED SURG/OB UMMC

## 2019-10-13 PROCEDURE — 99232 SBSQ HOSP IP/OBS MODERATE 35: CPT | Performed by: INTERNAL MEDICINE

## 2019-10-13 PROCEDURE — 97530 THERAPEUTIC ACTIVITIES: CPT | Mod: GP

## 2019-10-13 PROCEDURE — 25000128 H RX IP 250 OP 636: Performed by: STUDENT IN AN ORGANIZED HEALTH CARE EDUCATION/TRAINING PROGRAM

## 2019-10-13 PROCEDURE — 97161 PT EVAL LOW COMPLEX 20 MIN: CPT | Mod: GP

## 2019-10-13 PROCEDURE — 86850 RBC ANTIBODY SCREEN: CPT | Performed by: STUDENT IN AN ORGANIZED HEALTH CARE EDUCATION/TRAINING PROGRAM

## 2019-10-13 PROCEDURE — 00000146 ZZHCL STATISTIC GLUCOSE BY METER IP

## 2019-10-13 PROCEDURE — 86901 BLOOD TYPING SEROLOGIC RH(D): CPT | Performed by: STUDENT IN AN ORGANIZED HEALTH CARE EDUCATION/TRAINING PROGRAM

## 2019-10-13 PROCEDURE — 83735 ASSAY OF MAGNESIUM: CPT | Performed by: INTERNAL MEDICINE

## 2019-10-13 PROCEDURE — 99207 ZZC CDG-MDM COMPONENT: MEETS LOW - DOWN CODED: CPT | Performed by: INTERNAL MEDICINE

## 2019-10-13 PROCEDURE — 25000132 ZZH RX MED GY IP 250 OP 250 PS 637: Performed by: STUDENT IN AN ORGANIZED HEALTH CARE EDUCATION/TRAINING PROGRAM

## 2019-10-13 PROCEDURE — 36415 COLL VENOUS BLD VENIPUNCTURE: CPT | Performed by: STUDENT IN AN ORGANIZED HEALTH CARE EDUCATION/TRAINING PROGRAM

## 2019-10-13 PROCEDURE — 85027 COMPLETE CBC AUTOMATED: CPT | Performed by: INTERNAL MEDICINE

## 2019-10-13 PROCEDURE — 80048 BASIC METABOLIC PNL TOTAL CA: CPT | Performed by: INTERNAL MEDICINE

## 2019-10-13 PROCEDURE — 87086 URINE CULTURE/COLONY COUNT: CPT | Performed by: STUDENT IN AN ORGANIZED HEALTH CARE EDUCATION/TRAINING PROGRAM

## 2019-10-13 RX ORDER — DIPHENHYDRAMINE HCL 25 MG
25 CAPSULE ORAL EVERY 4 HOURS PRN
Status: DISCONTINUED | OUTPATIENT
Start: 2019-10-13 | End: 2019-10-14

## 2019-10-13 RX ORDER — DIPHENHYDRAMINE HCL 25 MG
25 CAPSULE ORAL EVERY 4 HOURS PRN
Status: DISCONTINUED | OUTPATIENT
Start: 2019-10-13 | End: 2019-10-13

## 2019-10-13 RX ORDER — TRIAMCINOLONE ACETONIDE 1 MG/G
CREAM TOPICAL 3 TIMES DAILY
Status: DISCONTINUED | OUTPATIENT
Start: 2019-10-13 | End: 2019-10-14

## 2019-10-13 RX ORDER — OXYCODONE HYDROCHLORIDE 5 MG/1
5-10 TABLET ORAL
Status: DISCONTINUED | OUTPATIENT
Start: 2019-10-13 | End: 2019-10-14

## 2019-10-13 RX ADMIN — INSULIN ASPART 2 UNITS: 100 INJECTION, SOLUTION INTRAVENOUS; SUBCUTANEOUS at 20:39

## 2019-10-13 RX ADMIN — ENOXAPARIN SODIUM 40 MG: 40 INJECTION SUBCUTANEOUS at 12:44

## 2019-10-13 RX ADMIN — BENZOCAINE, MENTHOL 1 LOZENGE: 15; 3.6 LOZENGE ORAL at 19:31

## 2019-10-13 RX ADMIN — DIPHENHYDRAMINE HYDROCHLORIDE 25 MG: 25 CAPSULE ORAL at 10:53

## 2019-10-13 RX ADMIN — ASPIRIN 81 MG CHEWABLE TABLET 162 MG: 81 TABLET CHEWABLE at 20:33

## 2019-10-13 RX ADMIN — DIPHENHYDRAMINE HYDROCHLORIDE 25 MG: 25 CAPSULE ORAL at 23:30

## 2019-10-13 RX ADMIN — OXYCODONE HYDROCHLORIDE 10 MG: 5 TABLET ORAL at 14:50

## 2019-10-13 RX ADMIN — SENNOSIDES AND DOCUSATE SODIUM 2 TABLET: 8.6; 5 TABLET ORAL at 20:33

## 2019-10-13 RX ADMIN — METOPROLOL TARTRATE 25 MG: 25 TABLET, FILM COATED ORAL at 20:33

## 2019-10-13 RX ADMIN — SENNOSIDES AND DOCUSATE SODIUM 2 TABLET: 8.6; 5 TABLET ORAL at 10:52

## 2019-10-13 RX ADMIN — METOPROLOL TARTRATE 25 MG: 25 TABLET, FILM COATED ORAL at 10:55

## 2019-10-13 RX ADMIN — TRIAMCINOLONE ACETONIDE: 1 CREAM TOPICAL at 20:43

## 2019-10-13 RX ADMIN — PANTOPRAZOLE SODIUM 40 MG: 40 TABLET, DELAYED RELEASE ORAL at 10:22

## 2019-10-13 RX ADMIN — INSULIN GLARGINE 40 UNITS: 100 INJECTION, SOLUTION SUBCUTANEOUS at 22:14

## 2019-10-13 RX ADMIN — ACETAMINOPHEN 975 MG: 325 TABLET, FILM COATED ORAL at 01:14

## 2019-10-13 RX ADMIN — OXYCODONE HYDROCHLORIDE 10 MG: 5 TABLET ORAL at 18:32

## 2019-10-13 RX ADMIN — ATORVASTATIN CALCIUM 40 MG: 40 TABLET, FILM COATED ORAL at 22:13

## 2019-10-13 RX ADMIN — OXYCODONE HYDROCHLORIDE 10 MG: 5 TABLET ORAL at 06:22

## 2019-10-13 RX ADMIN — ACETAMINOPHEN 975 MG: 325 TABLET, FILM COATED ORAL at 10:21

## 2019-10-13 RX ADMIN — ACETAMINOPHEN 975 MG: 325 TABLET, FILM COATED ORAL at 18:32

## 2019-10-13 RX ADMIN — OXYCODONE HYDROCHLORIDE 10 MG: 5 TABLET ORAL at 22:43

## 2019-10-13 RX ADMIN — OXYCODONE HYDROCHLORIDE 10 MG: 5 TABLET ORAL at 10:53

## 2019-10-13 ASSESSMENT — ACTIVITIES OF DAILY LIVING (ADL)
ADLS_ACUITY_SCORE: 18
ADLS_ACUITY_SCORE: 19
ADLS_ACUITY_SCORE: 16
ADLS_ACUITY_SCORE: 18
ADLS_ACUITY_SCORE: 19
ADLS_ACUITY_SCORE: 16

## 2019-10-13 NOTE — PROGRESS NOTES
Record reviewed including internal medicine consultation.  Seen with RN.  Indicates fell on her buttocks while going down a flight of wooden stairs.  Uncertain as to why she fell.  No near or kinjal syncope.  Bimalleolar left ankle fracture, dislocation.  External fixator placement by Dr. Brown from orthopedics p.m. 10/14.  Aching on left lower extremity pain.  Inadequate control on present oxycodone regimen.  Not mobilized yet when seen.  Denies chest comfort, dyspnea.  Cough productive of scant nondescript sputum.  No nausea reflux or abdominal pain.  Passing flatus.  The bowel movement 2 days ago.  No voiding complaints.  Generalized disc interruption overnight.  Pruritic.  Prone to a similar reaction after IV antibiotics.  Did receive 1 dose of IV clindamycin perioperatively.  Has tolerated oxycodone in the past.  No associated dyspnea or oral cavity involvement.    /60   Pulse 102   Temp 96.1  F (35.6  C) (Oral)   Resp 17   SpO2 95%   Objective adult female lying supine in bed in no distress.  Alert and oriented.  HEENT oropharynx clear.  No mucosal lesions.  Skin-generalized erythema involving the face neck anterior posterior trunk abdomen proximal upper and lower extremities.  Chest clear lung fields.  Cardiac exam regular without gallop or murmur no click no jugular venous distention.  Abdomen benign without tenderness organomegaly or mass bowel sounds normal.  Extremities well perfused.  No edema.  Left lower extremity fixator in place.  No left thigh right lower extremity calf or thigh tenderness/induration to suggest DVT.    Last Comprehensive Metabolic Panel:  Sodium   Date Value Ref Range Status   10/13/2019 139 133 - 144 mmol/L Final     Potassium   Date Value Ref Range Status   10/13/2019 3.9 3.4 - 5.3 mmol/L Final     Chloride   Date Value Ref Range Status   10/13/2019 109 94 - 109 mmol/L Final     Carbon Dioxide   Date Value Ref Range Status   10/13/2019 23 20 - 32 mmol/L Final     Anion  Gap   Date Value Ref Range Status   10/13/2019 7 3 - 14 mmol/L Final     Glucose   Date Value Ref Range Status   10/13/2019 153 (H) 70 - 99 mg/dL Final     Urea Nitrogen   Date Value Ref Range Status   10/13/2019 13 7 - 30 mg/dL Final     Creatinine   Date Value Ref Range Status   10/13/2019 0.77 0.52 - 1.04 mg/dL Final     GFR Estimate   Date Value Ref Range Status   10/13/2019 77 >60 mL/min/[1.73_m2] Final     Comment:     Non  GFR Calc  Starting 12/18/2018, serum creatinine based estimated GFR (eGFR) will be   calculated using the Chronic Kidney Disease Epidemiology Collaboration   (CKD-EPI) equation.       Calcium   Date Value Ref Range Status   10/13/2019 7.6 (L) 8.5 - 10.1 mg/dL Final     Hemoglobin   Date Value Ref Range Status   10/13/2019 12.0 11.7 - 15.7 g/dL Final   10/12/2019 13.8 11.7 - 15.7 g/dL Final     Assessment:  1.  Status post placement Weldon Pelaez 3 external fixator system.  Indication left bimalleolar ankle fracture, subsequent to a fall.  No indication of syncope.  Minimal blood loss.  Fair pain control.  2.  Acute blood loss without anemia.  3.  Generalized skin abruption consistent with drug reaction.  Prone in the past 2 similar reaction with intravenous antibiotics.  Unaware of prior clindamycin intolerance.  Distribution not consistent with a contact allergy.  Unaware of prior clindamycin intolerance.  4.  Type 2 diabetes mellitus.  Fair control with last A1c 7.6%.  On reduced Lantus dose which I would continue with a.m. blood sugar into the 120s.  Continue to hold the glimepiride until tolerating a regular diet.  5.  Hypertension adequate control on the metoprolol with parameters.  Lasix and lisinopril on hold.  6.  Gastroesophageal reflux disease symptomatically controlled on PPI.  7.  Exertional dyspnea with negative stress test 2011.    Plan:  Review meds, orders.  No further IV clindamycin.  RN to review with orthopedics.  PRN p.o. Benadryl with the topical  triamcinolone cream.  Increase oxycodone to 5 to 10 mg every 3 hours as needed.  Scheduled Tylenol.  Consider addition of Neurontin.  Hold glimepiride.  Continue Lantus 40 units at bedtime.  NovoLog 1 unit per carb unit 3 times daily with meals in addition to medium correction.  Incentive spirometry, bowel meds, aspirin and Lovenox ordered for DVT prophylaxis (per orthopedics).  Mobilize as tolerated.  Trend labs.  Monitor clinically.    Anticipated discharge in 1 to 2 days to TCU.  Addendum: Noted by RN to have a diffuse erythema over her buttocks, site on which she landed with a fall.  Apparently scooted on her buttocks down the stairs.  Walk consult ordered.

## 2019-10-13 NOTE — PROGRESS NOTES
10/13/19 1200   Quick Adds   Type of Visit Initial PT Evaluation   Living Environment   Lives With alone   Living Arrangements house   Home Accessibility stairs to enter home;stairs within home   Number of Stairs, Main Entrance 4   Stair Railings, Main Entrance railings safe and in good condition   Number of Stairs, Within Home, Primary 10   Stair Railings, Within Home, Primary railings safe and in good condition   Transportation Anticipated car, drives self   Living Environment Comment PT: Shower/ tub combo. Lives alone.   Self-Care   Usual Activity Tolerance good   Current Activity Tolerance fair   Equipment Currently Used at Home cane, straight   Functional Level Prior   Ambulation 0-->independent   Transferring 0-->independent   Toileting 0-->independent   Bathing 0-->independent   Communication 0-->understands/communicates without difficulty   Swallowing 0-->swallows foods/liquids without difficulty   Cognition 0 - no cognition issues reported   Fall history within last six months no   Prior Functional Level Comment PT: IND with all mobility. Patient reports she falls easiely.   General Information   Onset of Illness/Injury or Date of Surgery - Date 10/12/19   Referring Physician Hung Mackenzie MD   Pertinent History of Current Problem (include personal factors and/or comorbidities that impact the POC) Sarah Felix is a 74 year old female with PMH significant for type 2 diabetes (A1C: 7.6), HTN, with mechanical fall 10.11.19 with left bimalleolar ankle fracture-dislocation-> initial close reduction by ortho in ED-->f/by  s/p external fixator application on 10/12/2019 with Dr. Brown.    Precautions/Limitations fall precautions   Weight-Bearing Status - LLE nonweight-bearing   Heart Disease Risk Factors Diabetes;High blood pressure;Lack of physical activity;Overweight   General Observations PIV in RUE, External fixator on L ankle   General Info Comments No Weight Bearing LLE    Cognitive Status Examination   Orientation orientation to person, place and time   Level of Consciousness lethargic/somnolent   Follows Commands and Answers Questions 100% of the time;able to follow multistep instructions   Personal Safety and Judgment intact   Memory intact   Pain Assessment   Patient Currently in Pain Yes, see Vital Sign flowsheet  (6/10)   Integumentary/Edema   Integumentary/Edema Comments PT: Swelling present in L ankle and L lower leg.   Range of Motion (ROM)   ROM Comment PT: Not tested on LLE. All other ROM WNL for observed mobility.   Strength   Strength Comments PT: Not tested on LLE 2/2 NWB, all oter strength at least 3/5 per observed mobility.   Bed Mobility   Bed Mobility Comments PT: Patient transfers supine<>sit EOB with HOB slightly elevated, MaxA for LLE and LLE fulley supported during entire trasnfer, other wise patient SBA for scooting and moving to sit EOB. Patient sat EOB ~3-4 minutes, transfered back into bed wtih maxA for LLE, SBA for scooting and boosting back into supine position.   Transfer Skills   Transfer Comments PT: Patient sleepy and in a lot of pain, declining pivot transfer to chair.   Gait   Gait Comments PT: Patient declined, did not attempt.   Balance   Balance Comments PT: Sitting balance is safe and stable.   Modality Interventions   Planned Modality Interventions Cryotherapy   Planned Modality Interventions Comments PT: Ice PRN for pain.   General Therapy Interventions   Planned Therapy Interventions bed mobility training;balance training;gait training;strengthening;transfer training;risk factor education   Clinical Impression   Criteria for Skilled Therapeutic Intervention yes, treatment indicated   PT Diagnosis Impaired functional mobility   Influenced by the following impairments Pain, NWB on LLE, decreased activity tolerance, decreased strength   Functional limitations due to impairments Bed mobility, transfers, gait, ADLs   Clinical Presentation  "Stable/Uncomplicated   Clinical Presentation Rationale A lot of pain, low activity tolerance, clinical judgement   Clinical Decision Making (Complexity) Moderate complexity   Therapy Frequency Daily  (Lower tolerance currently, increased to BID when able)   Predicted Duration of Therapy Intervention (days/wks) 7 days   Anticipated Discharge Disposition Transitional Care Facility   Risk & Benefits of therapy have been explained Yes   Patient, Family & other staff in agreement with plan of care Yes   Clinical Impression Comments PT evaluation complete,treatment initated.   [facility] \"6 Clicks\"   2016, Trustees of [facility], under license to [employer].  All rights reserved.   6 Clicks Short Forms Basic Mobility Inpatient Short Form   [facility] AM-PAC  \"6 Clicks\" V.2 Basic Mobility Inpatient Short Form   1. Turning from your back to your side while in a flat bed without using bedrails? 3 - A Little   2. Moving from lying on your back to sitting on the side of a flat bed without using bedrails? 2 - A Lot   3. Moving to and from a bed to a chair (including a wheelchair)? 2 - A Lot   4. Standing up from a chair using your arms (e.g., wheelchair, or bedside chair)? 2 - A Lot   5. To walk in hospital room? 2 - A Lot   6. Climbing 3-5 steps with a railing? 1 - Total   Basic Mobility Raw Score (Score out of 24.Lower scores equate to lower levels of function) 12   Total Evaluation Time   Total Evaluation Time (Minutes) 4     Janay Ryan, PT, DTP  [employer]  [email or web address]  Pager: [phone]  "

## 2019-10-13 NOTE — PLAN OF CARE
Pt. admitted from PACU at 1830. Pt. accompanied by transport and arrived with personal belongings. Report was taken from RN in PACU. Pt. is A&Ox4. CAPNO is on and activated. VSS. 02 sats= 94% on 1 LPM via nasal cannula. CMS and Neuro's are intact. Denies numbness and tingling in all extremities. Has pain in the LLE and given prn Oxycodone and is well controlled. LLE elevated on foam elevator. Pt. denied nausea, CP, SOB, lightheadedness, and dizziness. Is on a regular diet. BG was 180 before dinner. LLE external fixator is intact with no drainage noted. Pt due to void, was bladder scanned for 182 mL. Pt stated she does not feel the urge to void at this time. PIV is patent and infusing in left arm. PIV is patent and SL in the right arm. Pt has redness to buttocks, stated she was scooting around on floor overnight and putting pressure on buttocks overnight. WOC consult ordered to assess. Pt. educated on use and purpose of the Incentive Spirometer. Pt. is oriented to the room and call light system and the call light is within reach. Continue to monitor.

## 2019-10-13 NOTE — OP NOTE
Date of Service: 10/13/2019       Surgeon:   Leeanne Brown, *   Resident:  Lei Vazquez MD, Malik Tobar MD      Preoperative Diagnosis:     Left bimalleolar ankle fracture      Postoperative Diagnosis:     Same      Procedures:   Procedure(s):  EXTERNAL FIXATION, LOWER EXTREMITY      Anesthesia:   General endotracheal anesthesia  EBL:   2 mL.  Complications:   None    Implants / Equipment used:   1.    Magi Pelaez 3 external fixator system      Indications:   The patient is a 74-year-old female with past medical history significant for type 2 diabetes with a delayed presentation after a left bimalleolar ankle fracture.  On examination in October 12 of 2019 in the emergency department there was concern for an impending open fracture versus threatened necrotic skin.  The patient was closed reduced at bedside and we discussed external fixator placement to allow better examination of the skin.  The patient was informed as to the risks, benefits, and alternatives to the procedure and she elected to proceed.      Description of the procedure:       The patient was met in the preoperative area and identified by name and date of birth.  The surgical extremity the left lower extremity was marked.  The consent was reviewed and the patient reaffirmed her desire to proceed with surgery.  She was taken to the operating room by the anesthesia team.  She was transferred to the operating room table.  She received IV antibiotics (Clindamycin based on her allergies).  She was successfully induced under IV anesthesia and subsequently intubated.  The left lower extremity was carefully prepped in the usual sterile manner.  At this juncture a timeout was performed and everyone present agreed on the identity of the patient, the procedure to be performed, and the laterality of the procedure.    To stay out of the possible surgical field for the definitive fixation we opted for a delta frame external fixator.  We started  by placing 2 pins in the midshaft of the tibia.  We made a 0.5 cm incision over the medial surface of the midshaft of the tibia, we use a blunt Irish clamp to dissect down to bone.  We placed the first pin in place.  We then used the guide to determine the position of our second pin and similarly we made a 0.5 cm incision we dissected down to bone and a second pin was put in place.  The C-arm was brought onto the field and we confirm that the position of the pins was adequate, this was the case.  We then directed our attention to the calcaneal pin.  We started by obtaining a perfect lateral of the foot.  We we made our skin incision and placed a centrally threaded pin along the posterior plantar calcaneal cortex, since this appeared to have the best bone quality.  The position of the pin was confirmed with fluoroscopy on a lateral and Dong view.  At this juncture we connected bars to clamps and these were provisionally tightened.  We then brought the C-arm onto the field and confirmed that we had a well reduced ankle mortise, and this was the case.  A lateral view also showed a concentric joint reduction.  At this point all the pins were retightened and final x-rays were obtained.  The pins were dressed with Xeroform and Kerlix.  Of note the bars of the delta frame were long enough that when laying on a flat surface the heel will not be resting upon itself, this was done on purpose to minimize pressure necrosis.    Dr. Brown was scrubbed in throughout the case.    Postoperative plan the patient will remain nonweightbearing on the left lower extremity.  She will be admitted to the orthopedic surgery service and will receive 24 hours of IV antibiotics.  She will be anticoagulated with Lovenox 40 mg once daily.  Final treatment plan will be coordinated with our Foot and Ankle/Trauma colleagues.  In the meantime the patient can discharge to a transitional care unit once placement is obtained, as she lives alone and  has stairs at home.      Hung Vazquez MD  Orthopaedic Surgery, PGY-4    Physician Attestation   I was present for the entire procedure between opening and closing.  Leeanne Brown  Date of Service (when I saw the patient): 10/12/19

## 2019-10-13 NOTE — CONSULTS
HOSPITALIST CONSULTATION     REQUESTING PHYSICIAN: Leeanne Brown MD    REASON FOR CONSULTATION: Evaluation, Recommendations and Co-management of Medical Comorbidities.     ASSESSMENT & PLAN :     Sarah Felix is a 74 year old female with PMH significant for type 2 diabetes (A1C: 7.6), HTN, with mechanical fall 10.11.19 with left bimalleolar ankle fracture-dislocation-> initial close reduction by ortho in ED-->f/by  s/p external fixator application on 10/12/2019 with Dr. Brown.       Pre-operative diagnosis: Left bimalleolar ankle fracture-dislocation  Procedure:      Procedure(s):  EXTERNAL FIXATION, LOWER EXTREMITY  Surgeon:         Surgeon(s) and Role:     * Leeanne Brown MD - Primary      Anesthesia:     General             Estimated blood loss:  2mL  Drains: None  Complications:            None.  Implants:  Shanna 3 ExFix System, delta frame configuration      Currently doing well. Pain controlled. Denies cp or sob. No LH. No NV.        # Orthopedic Surgery: POD #0     Post Op Management per Primary team.   Hemodynamics: stable. Continue on gentle IV fluids, until adequate PO. Monitor I/o.   Post op capnography/pulse Ox per protocol.    Pain control- per Primary team - controlled at current.     Minimize use of narcotics as able. Consider bowel regimen with narcotics.   Encourage Incentive spirometry to prevent atelectasis.  DVT prophylaxis- per Primary team. Lovenox.   Activity, antibiotics, wound and/or drain care - per Primary team. perioperative Clindamycin x 24 hours. Up with assist.   Anemia of acute blood loss: Monitor hgb for anemia of acute blood loss. Transfuse for hgb <7.0  Follow-up: Clinic with Dr. Lemus for planning of definitive fixation; date TBD.       # Fall: Mechanical. Clinical eval c.w mechanical fall. Denies any other injury despite above. Currently asymptomatic otherwise.     # DM type 2: a1c: 7.6.   PTA: Lantus ? 51 HS per  patient. No longer taking victoza. glimepiride 4 mg bid.   Did not take last night dose. Did not eat much whole day, eating some now.   Start on lower dose Lantus 40 U HS - > titrate as needed.   Hold glimeperide until adequate po intake.   Med sliding scale insulin  Hypoglycemia protocol.   Monitor BG tid ac , HS, 200    # CVS:   HTN: controlled.  HL: statin: continue.   BABB: 2011 stress test negative with normal LV function. No recent echo or cardiac work up. No cp or sob at current. ? HFpEF. Ankle edema +  - Ct PTA metoprolol 25 bid w hold parameters  - hold lisinopril. Lasix for now.   - Hydralazine prn  - Monitor bp.     # GERD: Ct PPI, tums/maalox prn.     Thank you for the consultation. Please page with any question. Hospitalist team to follow.      Luke Ferguson MD   Hospitalist, Internal Medicine  Pager: [phone].     CHIEF COMPLAINT: Mechanical fall. L Leg fracture. S/p Ext fixator.     HISTORY OF PRESENT ILLNESS: Obtained from the patient and chart review including Pre op evaluation, procedure note.    Sarah Felix is a 74 year old female with PMH significant for type 2 diabetes (A1C: 7.6), HTN, with mechanical fall 10.11.19 with left bimalleolar ankle fracture-dislocation-> initial close reduction by ortho in ED-->f/by  s/p external fixator application on 10/12/2019 with Dr. Brown- as above.   The fall injury took place yesterday around 10 PM when the patient fell at the stairs at home.  She denies cp or LH or dizziness, losing consciousness at that time.  She was unable to ambulate and decided not to call emergency department during the night, secondary to concerns that they would break in the door.  Eventually she called for help and was brought into the [facility] emergency department.  Close reduction of her ankle fracture was attempted by the emergency department.  Orthopedic surgery was consulted after for further evaluation and management. Further procedure  as above.   Pt denies any other injury. Denies fever or chills. No cough. No NVD. No dysuria or bowel complaint. No new sensory or motor complaint.  Chronic intermittent ankle edema. bl +  BABB - worked up in the past, unclear etiology per pt. Not on home oxygen.   Lives alone.   Denies any other medical concern.     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  felt to be focal nodular hyperplasia.  annual CT abd.   followed by Dr. Amilcar Kat      Cystocele, lateral      Diabetes mellitus (H)      Diverticulitis of colon (without mention of hemorrhage)(562.11) 6/04    Abd CT     Embolism and thrombosis of unspecified site age 22    post ovarian cyst removed     Esophageal reflux     Hiatal hernia     Fatty liver      Hiatal hernia     small     Hyperlipidemia      Nontoxic uninodular goiter      Osteopenia 4/21/2005    on fosamax  for > 5 yr.  stop 7/2012     Other chronic otitis externa      Other specified disorders of liver     Fatty Liver, Benign appearing liver cysts     Pure hypercholesterolemia        PAST SURGICAL HISTORY:   Past Surgical History:   Procedure Laterality Date     C APPENDECTOMY  age 22     C DEXA, BONE DENSITY, AXIAL SKEL  2005     C DEXA, BONE DENSITY, AXIAL SKEL  6/2007    No signif change in Osteopenia     COLONOSCOPY  4/2006    repeat 10 yr     ENDOSCOPIC ULTRASOUND UPPER GASTROINTESTINAL TRACT (GI) N/A 9/6/2018    Procedure: ENDOSCOPIC ULTRASOUND, ESOPHAGOSCOPY / UPPER GASTROINTESTINAL TRACT (GI);  Endoscopic Ultrasound, Esophagogastroduodenoscopy with endoscopic mucosal resection;  Surgeon: Hood Brower MD;  Location: [facility] OR     ESOPHAGOSCOPY, GASTROSCOPY, DUODENOSCOPY  (EGD), COMBINED N/A 8/13/2018    Procedure: COMBINED ESOPHAGOSCOPY, GASTROSCOPY, DUODENOSCOPY (EGD), BIOPSY SINGLE OR MULTIPLE;  EGD;  Surgeon: Hood Brower MD;  Location: [facility] OR     ESOPHAGOSCOPY, GASTROSCOPY, DUODENOSCOPY (EGD), COMBINED N/A 3/14/2019    Procedure: Upper Endoscopy;  Surgeon: Hood Brower MD;  Location: [facility] OR     ESOPHAGOSCOPY, GASTROSCOPY, DUODENOSCOPY (EGD), RESECT MUCOSA, COMBINED N/A 9/6/2018    Procedure: COMBINED ESOPHAGOSCOPY, GASTROSCOPY, DUODENOSCOPY (EGD), RESECT MUCOSA;;  Surgeon: Hood Brower MD;  Location: [facility] OR     GYN SURGERY  10/22/08    pelvic support     HEMORRHOIDECTOMY  8/08     MOHS MICROGRAPHIC PROCEDURE       SURGICAL HISTORY OF -   age 22    L ovarian cyst removal, laparotomy     SURGICAL HISTORY OF -   1998    partial thyroidectomy     SURGICAL HISTORY OF -   10/08    Transobturator tape for Urinary Incontinence     SURGICAL HISTORY OF -   11/2011    stress test normal     THYROID SURGERY      Had cyst removed, thyroid gland is intact.       FH: reviewed.     Family History   Problem Relation Age of Onset     Diabetes Mother         at 89 yrs     Hypertension Mother      Arthritis Mother         rheumatoid     Cancer Father         bladder     Cardiovascular Brother         palpitations not on meds.     Glaucoma No family hx of      Macular Degeneration No family hx of      Amblyopia No family hx of         SH: reviewed.     Social History     Socioeconomic History     Marital status: Single     Spouse name: None     Number of children: 0     Years of education: None     Highest education level: None   Occupational History     Occupation: RN     Employer: [email or web address]     Comment: peds   Social Needs     Financial resource strain: None     Food insecurity:     Worry: None     Inability: None     Transportation needs:     Medical: None     Non-medical: None   Tobacco Use     Smoking status: Former Smoker     Last attempt to quit: 1/1/1980     Years since  quittin.8     Smokeless tobacco: Never Used     Tobacco comment: smoked from 70-80; smoked about 1ppd   Substance and Sexual Activity     Alcohol use: Yes     Alcohol/week: 0.0 standard drinks     Comment: 6 drinks per week     Drug use: No     Sexual activity: Never     Partners: Male     Comment: postmenopausal   Lifestyle     Physical activity:     Days per week: None     Minutes per session: None     Stress: None   Relationships     Social connections:     Talks on phone: None     Gets together: None     Attends [employer] service: None     Active member of club or organization: None     Attends meetings of clubs or organizations: None     Relationship status: None     Intimate partner violence:     Fear of current or ex partner: None     Emotionally abused: None     Physically abused: None     Forced sexual activity: None   Other Topics Concern     Parent/sibling w/ CABG, MI or angioplasty before 65F 55M? No   Social History Narrative    Dairy/d 2 servings/d. Not much milk mostly cheese    Caffeine 5 servings/d    Exercise active in yard work     Sunscreen used - Yes when in sun    Seatbelts used - Yes    Working smoke/CO detectors in the home - Yes    Guns stored in the home - No    Self Breast Exams - Yes    Self Testicular Exam - NA    Eye Exam up to date - Yes needs to see opthamologist for diabetes    Dental Exam up to date - Yes    Pap Smear up to date -Yes     Mammogram up to date - Yes 3/10    PSA up to date - NA    Dexa Scan up to date - 07    Flex Sig / Colonoscopy up to date -   yes RTC 10 yrs in epic    Immunizations up to date - Yes Td     Abuse: Current or Past(Physical, Sexual or Emotional)- No    Do you feel safe in your environment - Yes    8/4/10    Liya Ashraf RN                           ALLERGIES:   Allergies   Allergen Reactions     Amlodipine Swelling     Ancef [Cefazolin]      Levaquin Rash     Itching, rash     Metformin Diarrhea and GI Disturbance      Cephalosporins Rash     Levofloxacin Itching and Rash     Nickel Rash     Contact reaction  Contact reaction         HOME MEDICATIONS:     Prior to Admission medications    Medication Sig Start Date End Date Taking? Authorizing Provider   alum & mag hydroxide-simethicone (MAALOX REGULAR STRENGTH) 200-200-20 MG/5ML SUSP Take 30 mLs by mouth as needed    Yes Reported, Patient   Cholecalciferol (VITAMIN D PO) Take  by mouth. Pt consuming 3000 units per day    Yes Reported, Patient   furosemide (LASIX) 20 MG tablet TAKE 2 TABLETS IN THE MORNING AND 1 TABLET IN THE AFTERNOON 4/16/19  Yes Wegener, Joel Daniel Irwin, MD   glimepiride (AMARYL) 4 MG tablet TAKE 1 TABLET TWICE A DAY WITH MEALS (BREAKFAST AND DINNER) (PLEASE SCHEDULE LAB APPOINTMENT PRIOR TO REFILL REQUESTS) 7/9/19  Yes Wegener, Joel Daniel Irwin, MD   metoprolol tartrate (LOPRESSOR) 25 MG tablet TAKE 1 TABLET TWICE A DAY 9/4/19  Yes Wegener, Joel Daniel Irwin, MD   ACE/ARB NOT PRESCRIBED, INTENTIONAL, by Other route continuous prn. 2/18/11   Ute Campbell MD   aspirin 81 MG tablet Take 2 tablets (162 mg) by mouth daily 10/4/13   Kenna Alvarez MD   atorvastatin (LIPITOR) 40 MG tablet TAKE 1 TABLET DAILY 10/2/19   Wegener, Joel Daniel Irwin, MD   blood glucose (NO BRAND SPECIFIED) lancets standard Use to test blood sugar 4 times daily or as directed.Lancets One Touch 100s Delica- 4x a day 6/7/19   Wegener, Joel Daniel Irwin, MD   blood glucose (NO BRAND SPECIFIED) test strip Use to test blood sugar 4 times daily or as directed.One touch ultra strips blue 100- 4x a day. 6/7/19   Wegener, Joel Daniel Irwin, MD   blood glucose (ONETOUCH ULTRA) test strip 1 strip by In Vitro route 4 times daily. 8/19/19   Wegener, Joel Daniel Irwin, MD   blood glucose monitoring (NO BRAND SPECIFIED) meter device kit Use to test blood sugar 4 times daily or as directed. 9/17/19   Wegener, Joel Daniel Irwin, MD   CALCIUM 500 + D 500-200 MG-IU OR TABS one tab  "twice per day 7/15/09   Ute Campbell MD   Docusate Sodium (COLACE PO) Take  by mouth as needed.    Reported, Patient   insulin pen needle (BD REY U/F) 32G X 4 MM miscellaneous Use one pen needle twice daily (with lantus and victoza) 5/22/19   Wegener, Joel Daniel Irwin, MD   insulin syringe-needle U-100 (BD INSULIN SYRINGE ULTRAFINE) 31G X 5/16\" 1 ML miscellaneous Use 1 syringes twice daily for insulin and victoza and for symptoms of hypoglycemia 5/21/19   Wegener, Joel Daniel Irwin, MD   LANTUS SOLOSTAR 100 UNIT/ML soln Inject between 60 units at bedtime.  Needs four boxes for three month supply. 3/11/19   Wegener, Joel Daniel Irwin, MD   liraglutide (VICTOZA) 18 MG/3ML solution Inject 0.6 mg Subcutaneous daily 5/22/19 8/20/19  Wegener, Joel Daniel Irwin, MD   lisinopril (PRINIVIL/ZESTRIL) 2.5 MG tablet TAKE 1 TABLET DAILY 5/16/19   Wegener, Joel Daniel Irwin, MD   ONETOUCH DELICA LANCETS 33G MISC 1 each 4 times daily 8/19/19   Wegener, Joel Daniel Irwin, MD   pantoprazole (PROTONIX) 40 MG EC tablet Take 1 tablet (40 mg) by mouth daily Take 30-60 minutes before a meal. 8/13/18   Hood Brower MD   potassium chloride ER (K-TAB/KLOR-CON) 10 MEQ CR tablet Take 1 tablet (10 mEq) by mouth 2 times daily 1/28/19 1/28/20  Wegener, Joel Daniel Irwin, MD   triamcinolone (KENALOG) 0.1 % cream Apply sparingly to affected area two times daily as needed 3/10/15   Greg Naylor MD       CURRENT MEDICATIONS:    Current Facility-Administered Medications   Medication     [START ON 10/15/2019] acetaminophen (TYLENOL) tablet 650 mg     acetaminophen (TYLENOL) tablet 975 mg     aspirin (ASA) chewable tablet 162 mg     atorvastatin (LIPITOR) tablet 40 mg     benzocaine-menthol (CEPACOL) 15-3.6 MG lozenge 1 lozenge     [START ON 10/13/2019] clindamycin (CLEOCIN) infusion 900 mg     glucose gel 15-30 g    Or     dextrose 50 % injection 25-50 mL    Or     glucagon injection 1 mg     diphenhydrAMINE (BENADRYL) solution 12.5 mg "    Or     diphenhydrAMINE (BENADRYL) injection 12.5 mg     [START ON 10/13/2019] enoxaparin (LOVENOX) injection 40 mg     glimepiride (AMARYL) tablet 4 mg     HYDROmorphone (PF) (DILAUDID) injection 0.3-0.5 mg     [START ON 10/13/2019] insulin aspart (NovoLOG) inj (RAPID ACTING)     insulin aspart (NovoLOG) inj (RAPID ACTING)     lactated ringers infusion     [START ON 10/13/2019] Lidocaine (LIDOCARE) 4 % Patch 1 patch     lidocaine (LMX4) cream     lidocaine 1 % 0.1-1 mL     lidocaine patch in PLACE     lidocaine patch REMOVAL     [START ON 10/13/2019] melatonin tablet 1 mg     menthol (ICY HOT) 5 % patch 1 patch    And     menthol (ICY HOT) Patch in Place    And     [START ON 10/13/2019] menthol (ICY HOT) patch REMOVAL     metoprolol tartrate (LOPRESSOR) tablet 25 mg     metoprolol tartrate (LOPRESSOR) tablet 25 mg     naloxone (NARCAN) injection 0.1-0.4 mg     ondansetron (ZOFRAN-ODT) ODT tab 4 mg    Or     ondansetron (ZOFRAN) injection 4 mg     oxyCODONE (ROXICODONE) tablet 5-10 mg     pantoprazole (PROTONIX) EC tablet 40 mg     senna-docusate (SENOKOT-S/PERICOLACE) 8.6-50 MG per tablet 1 tablet    Or     senna-docusate (SENOKOT-S/PERICOLACE) 8.6-50 MG per tablet 2 tablet     sodium chloride (PF) 0.9% PF flush 3 mL     sodium chloride (PF) 0.9% PF flush 3 mL     sodium chloride 0.9% infusion         ROS: 10 point ROS neg other than the symptoms noted above in the HPI.    PHYSICAL EXAMINATION:     /54   Pulse 89   Temp 97.2  F (36.2  C) (Oral)   Resp 18   SpO2 94%   Temp (24hrs), Av.8  F (36.6  C), Min:96.8  F (36  C), Max:98.6  F (37  C)      BMI= There is no height or weight on file to calculate BMI.      Intake/Output Summary (Last 24 hours) at 10/12/2019 2010  Last data filed at 10/12/2019 1711  Gross per 24 hour   Intake 600 ml   Output --   Net 600 ml       General: Alert, interactive, NAD.   HEENT: AT/NC. Anicteric.Moist MM.    Neck: Supple. No LAD, JVD appreciated.   Heart/CVS: Normal S1  and S2. Regular.   Chest/Respi: Non labored breathing. CTA BL.   Abdomen/GI: Soft, non distended, non tender. No g/r.  Extremities/MSK: Distal pulses 2+, well perfused. R ankle edema. L LE on ext fixator. Bruising edema L Leg . rest per ortho.   Neuro: Alert and oriented x4. Cranial nerves II-XII are grossly intact.    Skin:  No new rash over exposed areas.       LABORATORY DATA: reviewed.     Recent Results (from the past 24 hour(s))   CBC with platelets differential    Collection Time: 10/12/19 12:12 PM   Result Value Ref Range    WBC 13.5 (H) 4.0 - 11.0 10e9/L    RBC Count 4.16 3.8 - 5.2 10e12/L    Hemoglobin 13.8 11.7 - 15.7 g/dL    Hematocrit 40.3 35.0 - 47.0 %    MCV 97 78 - 100 fl    MCH 33.2 (H) 26.5 - 33.0 pg    MCHC 34.2 31.5 - 36.5 g/dL    RDW 14.4 10.0 - 15.0 %    Platelet Count 647 (H) 150 - 450 10e9/L    Diff Method Automated Method     % Neutrophils 86.0 %    % Lymphocytes 6.7 %    % Monocytes 6.9 %    % Eosinophils 0.0 %    % Basophils 0.1 %    % Immature Granulocytes 0.3 %    Nucleated RBCs 0 0 /100    Absolute Neutrophil 11.7 (H) 1.6 - 8.3 10e9/L    Absolute Lymphocytes 0.9 0.8 - 5.3 10e9/L    Absolute Monocytes 0.9 0.0 - 1.3 10e9/L    Absolute Eosinophils 0.0 0.0 - 0.7 10e9/L    Absolute Basophils 0.0 0.0 - 0.2 10e9/L    Abs Immature Granulocytes 0.0 0 - 0.4 10e9/L    Absolute Nucleated RBC 0.0    Basic metabolic panel    Collection Time: 10/12/19 12:12 PM   Result Value Ref Range    Sodium 138 133 - 144 mmol/L    Potassium 4.4 3.4 - 5.3 mmol/L    Chloride 103 94 - 109 mmol/L    Carbon Dioxide 23 20 - 32 mmol/L    Anion Gap 12 3 - 14 mmol/L    Glucose 246 (H) 70 - 99 mg/dL    Urea Nitrogen 17 7 - 30 mg/dL    Creatinine 0.81 0.52 - 1.04 mg/dL    GFR Estimate 73 >60 mL/min/[1.73_m2]    GFR Estimate If Black 85 >60 mL/min/[1.73_m2]    Calcium 8.5 8.5 - 10.1 mg/dL   INR    Collection Time: 10/12/19 12:12 PM   Result Value Ref Range    INR 1.09 0.86 - 1.14   Partial thromboplastin time    Collection  Time: 10/12/19 12:12 PM   Result Value Ref Range    PTT 31 22 - 37 sec   Hemoglobin A1c    Collection Time: 10/12/19 12:12 PM   Result Value Ref Range    Hemoglobin A1C 7.6 (H) 0 - 5.6 %   ABO/Rh type and screen    Collection Time: 10/12/19 12:12 PM   Result Value Ref Range    ABO O     RH(D) Pos     Antibody Screen Neg     Test Valid Only At          [facility]    Specimen Expires 10/15/2019    ISTAT gases lactate lesly POCT    Collection Time: 10/12/19 12:52 PM   Result Value Ref Range    Ph Venous 7.42 7.32 - 7.43 pH    PCO2 Venous 33 (L) 40 - 50 mm Hg    PO2 Venous 26 25 - 47 mm Hg    Bicarbonate Venous 22 21 - 28 mmol/L    O2 Sat Venous 50 %    Lactic Acid 2.1 0.7 - 2.1 mmol/L   Blood culture    Collection Time: 10/12/19  1:38 PM   Result Value Ref Range    Specimen Description Blood Right Arm     Special Requests Aerobic and anaerobic bottles received     Culture Micro No growth after 3 hours    Glucose by meter    Collection Time: 10/12/19  2:52 PM   Result Value Ref Range    Glucose 185 (H) 70 - 99 mg/dL   Glucose by meter    Collection Time: 10/12/19  4:51 PM   Result Value Ref Range    Glucose 169 (H) 70 - 99 mg/dL   Glucose by meter    Collection Time: 10/12/19  7:12 PM   Result Value Ref Range    Glucose 180 (H) 70 - 99 mg/dL       Recent Results (from the past 24 hour(s))   XR Ankle Left G/E 3 Views    Narrative    LEFT ANKLE THREE OR MORE VIEWS  10/12/2019 12:28 PM     HISTORY:  Trauma and pain.    COMPARISON: None.    FINDINGS: Oblique Walker B distal fibular fracture and transverse  intra-articular fracture medial malleolus. Disruption ankle mortise.  Malleolus fragments and talar dome displaced laterally 1.5 cm with  valgus angulation. Overriding distal fibular fracture. No visible  posterior malleolar fracture. Marked soft tissue swelling.      Impression    IMPRESSION: Bimalleolar ankle fracture/dislocation.     PREETHI GARCIA MD   XR Surgery CHENTE L/T  5 Min Fluoro    Narrative    This exam was marked as non-reportable because it will not be read by a   radiologist or a [place] non-radiologist provider.             XR Surgery CHENTE L/T 5 Min Fluoro    Narrative    This exam was marked as non-reportable because it will not be read by a   radiologist or a [place] non-radiologist provider.             XR Ankle Port Left G/E 3 Views    Narrative    3 views left ankle radiographs 10/12/2019 6:42 PM    History: post op, to be obtained in pacu    Comparison: Same day at 1226    Findings:    AP, oblique, and lateral  views of the left ankle were obtained.     Post reduction of the distal fibular and medial malleolar  fracture/dislocation with marked improvement of the ankle mortise.  External fixator in place, partially visualized. Rotated fragment  between proximal and distal dominant fibular fracture fragments.  Slight posterior angulation of anterior medial malleolar fragment.    Diffuse subcutaneous soft tissue stranding and soft tissue swelling.      Impression    IMPRESSION: Marked improvement of the ankle mortise alignment in this  patient with bimalleolar fracture.    FERNIE Ferguson MD

## 2019-10-13 NOTE — PROGRESS NOTES
Orthopaedic Surgery Progress Note 10/13/2019    Interval Events  S/p L ankle external fixator application with Dr. Brown yesterday.  WOC to assess redness to buttocks; patient states scooting on floor at home d/t injury    Subjective  No acute events overnight.  Pain well controlled. Denies new numbness, tingling, or weakness.  Tolerating diet without nausea or vomiting.  Voiding spontaneously.  +flatus, -BM.   Denies fevers, chills, chest pain, SOB.  Care plan reviewed.  Counseled on elevation of LLE.    Objective  Temp: 97.5  F (36.4  C) Temp src: Axillary BP: 132/67 Pulse: 89 Heart Rate: 95 Resp: 14 SpO2: 96 % O2 Device: Nasal cannula Oxygen Delivery: 1 LPM    Exam:  Gen: No acute distress, resting comfortably in bed.  Resp: Non-labored breathing  Cardio: Regular rate via peripheral pulse  MSK:  LLE:  - Pin site dressings c/d/i  - No skin wrinkle  - Compartments soft and compressible  - SILT tibial/sural/saphenous/DP/SP nerves  - Fires EHL, FHL; unable to assess TA, GSC d/t external fixator  - PT/DP pulses 1+, foot wwp    Recent Labs   Lab 10/12/19  2007 10/12/19  1212   WBC  --  13.5*   HGB  --  13.8   * 647*       Assessment: Sarah Felix is a 74 year old female s/p L ankle external fixator on 10/12/2019 with Dr. Brown. Doing well.    Plan:  Orthopaedics Primary, Medicine co-managing appreciate assistance  Activity: Up with assist.   Weight bearing status: NWB LLE.  Pain management: Transition from IV to PO as tolerated.  Antibiotics: perioperative Clindamycin x 24 hours.  Diet: Diabetic diet.  DVT prophylaxis: Lovenox.  Dressings: Keep pin site dressings c/d/i, change prn  PT/OT: Eval and treat.   Consults: Hospitalist, PT, OT appreciate recs. SW for dispo planning, will likely require TCU upon discharge     Follow-up: Clinic with Dr. Lemus for planning of definitive fixation; date TBD.      Disposition: Pending progress with therapies, pain control on orals, and medical stability,  anticipate discharge to TCU POD#2-3 days    Malik Tobar MD  Orthopaedic Surgery PGY-1  [phone]    Please page me at [phone] with any questions/concerns. If there is no response, if it is a weekend, or if it is during evening hours then please page the orthopaedic surgery resident on call.       Future Appointments   Date Time Provider Department Center   11/18/2019  2:00 PM Wegener, Joel Daniel Irwin, MD [facility]       [facility]  Alerted by nursing that patient developed rash on face, trunk, spreading to arms likely 2/2 IV antibiotics; similar reaction to IV antibiotics in past.  Evaluated by Dr. Alfaro who recommends holding final prophylactic dose of clindamycin.  I agree with Dr. Alfaro and appreciate his assistance in caring for the patient; okay to withhold final dose of clindamycin.    Per nursing, some associated itching, no respiratory compromise.  Benadryl prn for itching.    Malik Tobar MD  Orthopaedic Surgery PGY-1  [phone]

## 2019-10-13 NOTE — PLAN OF CARE
Discharge Planner PT   Patient plan for discharge: TCU  Current status: PT evaluation complete,treatment initiated. Patient transferred supine<>sit EOB with LLE fully supported on foam wedge during entire transfer, MaxA for LLE, otherwise patient able to scoot and position to sit EOB, SBA with VCs. Patient sat EOB several minutes, needs entire leg (ankle<>foot) fully supported when sitting. Patient transferred back into bed MaxA for LLE, SBA for boosting and repositioning when using bed rails.    Patient lives alone, has limited activity tolerance and must negotiate stairs. Based on patient current mobility status and home set up, patient unsafe to discharge home. PT recommending TCU.  Barriers to return to prior living situation: Medical, level of assist, decreased strength, activity tolerance.  Recommendations for discharge: TCU  Rationale for recommendations: Please see above.       Entered by: Janay Ryan 10/13/2019 12:59 PM

## 2019-10-13 NOTE — PLAN OF CARE
VS: Temp: 97.5  F (36.4  C) Temp src: Axillary BP: 132/67 Pulse: 89 Heart Rate: 95 Resp: 14 SpO2: 96 % O2 Device: Nasal cannula Oxygen Delivery: 1 LPM     O2: Sats dropping on capno, placed on 1L per NC   Output: Uses bedpan   Last BM: 10/10/2019   Activity: Bedrest   Skin: Eccymotic LLE. Per notes, potential pressure injury on bottock/coccyx, however pt refused to shift in bed to allow for view of wound.    Pain: Controlled overnight with oral acetaminophen. Pt stated no pain when asked at due time for prn pain medications.    CMS: Pt reports no numbness or tingling   Dressing: CDI   Diet: Regular diet   LDA: PIV right lower forearm infusing. PIV in left lower forearm saline locked   Equipment: Elevation pillow   Plan: Continue to monitor throughout shift and intervene as necessary.

## 2019-10-14 LAB
ANION GAP SERPL CALCULATED.3IONS-SCNC: 7 MMOL/L (ref 3–14)
BUN SERPL-MCNC: 11 MG/DL (ref 7–30)
CALCIUM SERPL-MCNC: 7.7 MG/DL (ref 8.5–10.1)
CHLORIDE SERPL-SCNC: 108 MMOL/L (ref 94–109)
CO2 SERPL-SCNC: 23 MMOL/L (ref 20–32)
CREAT SERPL-MCNC: 0.92 MG/DL (ref 0.52–1.04)
GFR SERPL CREATININE-BSD FRML MDRD: 62 ML/MIN/{1.73_M2}
GLUCOSE BLDC GLUCOMTR-MCNC: 104 MG/DL (ref 70–99)
GLUCOSE BLDC GLUCOMTR-MCNC: 114 MG/DL (ref 70–99)
GLUCOSE BLDC GLUCOMTR-MCNC: 118 MG/DL (ref 70–99)
GLUCOSE BLDC GLUCOMTR-MCNC: 163 MG/DL (ref 70–99)
GLUCOSE BLDC GLUCOMTR-MCNC: 57 MG/DL (ref 70–99)
GLUCOSE BLDC GLUCOMTR-MCNC: 60 MG/DL (ref 70–99)
GLUCOSE BLDC GLUCOMTR-MCNC: 82 MG/DL (ref 70–99)
GLUCOSE BLDC GLUCOMTR-MCNC: 86 MG/DL (ref 70–99)
GLUCOSE BLDC GLUCOMTR-MCNC: 87 MG/DL (ref 70–99)
GLUCOSE BLDC GLUCOMTR-MCNC: 95 MG/DL (ref 70–99)
GLUCOSE SERPL-MCNC: 123 MG/DL (ref 70–99)
HGB BLD-MCNC: 11.2 G/DL (ref 11.7–15.7)
POTASSIUM SERPL-SCNC: 3.7 MMOL/L (ref 3.4–5.3)
SODIUM SERPL-SCNC: 138 MMOL/L (ref 133–144)

## 2019-10-14 PROCEDURE — 85018 HEMOGLOBIN: CPT | Performed by: STUDENT IN AN ORGANIZED HEALTH CARE EDUCATION/TRAINING PROGRAM

## 2019-10-14 PROCEDURE — 25000128 H RX IP 250 OP 636: Performed by: STUDENT IN AN ORGANIZED HEALTH CARE EDUCATION/TRAINING PROGRAM

## 2019-10-14 PROCEDURE — G0463 HOSPITAL OUTPT CLINIC VISIT: HCPCS

## 2019-10-14 PROCEDURE — 25000132 ZZH RX MED GY IP 250 OP 250 PS 637: Performed by: STUDENT IN AN ORGANIZED HEALTH CARE EDUCATION/TRAINING PROGRAM

## 2019-10-14 PROCEDURE — 99207 ZZC CDG-MDM COMPONENT: MEETS LOW - DOWN CODED: CPT | Performed by: INTERNAL MEDICINE

## 2019-10-14 PROCEDURE — 99232 SBSQ HOSP IP/OBS MODERATE 35: CPT | Performed by: INTERNAL MEDICINE

## 2019-10-14 PROCEDURE — 25000132 ZZH RX MED GY IP 250 OP 250 PS 637: Performed by: INTERNAL MEDICINE

## 2019-10-14 PROCEDURE — 80048 BASIC METABOLIC PNL TOTAL CA: CPT | Performed by: STUDENT IN AN ORGANIZED HEALTH CARE EDUCATION/TRAINING PROGRAM

## 2019-10-14 PROCEDURE — 12000001 ZZH R&B MED SURG/OB UMMC

## 2019-10-14 PROCEDURE — 00000146 ZZHCL STATISTIC GLUCOSE BY METER IP

## 2019-10-14 PROCEDURE — 36415 COLL VENOUS BLD VENIPUNCTURE: CPT | Performed by: STUDENT IN AN ORGANIZED HEALTH CARE EDUCATION/TRAINING PROGRAM

## 2019-10-14 RX ORDER — TRIAMCINOLONE ACETONIDE 1 MG/G
CREAM TOPICAL 3 TIMES DAILY
Status: DISCONTINUED | OUTPATIENT
Start: 2019-10-14 | End: 2019-10-18 | Stop reason: HOSPADM

## 2019-10-14 RX ORDER — OXYCODONE HYDROCHLORIDE 5 MG/1
5-10 TABLET ORAL EVERY 4 HOURS PRN
Status: DISCONTINUED | OUTPATIENT
Start: 2019-10-14 | End: 2019-10-18 | Stop reason: HOSPADM

## 2019-10-14 RX ORDER — TRIAMCINOLONE ACETONIDE 1 MG/G
CREAM TOPICAL 3 TIMES DAILY
DISCHARGE
Start: 2019-10-14 | End: 2020-02-12

## 2019-10-14 RX ORDER — ACETAMINOPHEN 325 MG/1
975 TABLET ORAL EVERY 8 HOURS
DISCHARGE
Start: 2019-10-14 | End: 2020-10-21

## 2019-10-14 RX ORDER — BISACODYL 10 MG
10 SUPPOSITORY, RECTAL RECTAL ONCE
Status: COMPLETED | OUTPATIENT
Start: 2019-10-14 | End: 2019-10-14

## 2019-10-14 RX ORDER — OXYCODONE HYDROCHLORIDE 5 MG/1
5-10 TABLET ORAL EVERY 6 HOURS PRN
Refills: 0 | Status: SHIPPED | DISCHARGE
Start: 2019-10-14 | End: 2019-10-14

## 2019-10-14 RX ORDER — PANTOPRAZOLE SODIUM 40 MG/1
40 TABLET, DELAYED RELEASE ORAL DAILY
DISCHARGE
Start: 2019-10-15 | End: 2020-09-21

## 2019-10-14 RX ORDER — DIPHENHYDRAMINE HCL 25 MG
25 CAPSULE ORAL EVERY 6 HOURS PRN
Status: DISCONTINUED | OUTPATIENT
Start: 2019-10-14 | End: 2019-10-18 | Stop reason: HOSPADM

## 2019-10-14 RX ORDER — AMOXICILLIN 250 MG
2 CAPSULE ORAL 2 TIMES DAILY
DISCHARGE
Start: 2019-10-14 | End: 2020-02-12

## 2019-10-14 RX ORDER — OXYCODONE HYDROCHLORIDE 5 MG/1
5-10 TABLET ORAL EVERY 4 HOURS PRN
Qty: 45 TABLET | Refills: 0 | Status: SHIPPED | DISCHARGE
Start: 2019-10-14 | End: 2019-10-18

## 2019-10-14 RX ORDER — CETIRIZINE HYDROCHLORIDE 10 MG/1
10 TABLET ORAL DAILY
Status: DISCONTINUED | OUTPATIENT
Start: 2019-10-14 | End: 2019-10-15

## 2019-10-14 RX ADMIN — RANITIDINE 150 MG: 150 TABLET ORAL at 10:56

## 2019-10-14 RX ADMIN — BENZOCAINE, MENTHOL 1 LOZENGE: 15; 3.6 LOZENGE ORAL at 11:03

## 2019-10-14 RX ADMIN — METOPROLOL TARTRATE 25 MG: 25 TABLET, FILM COATED ORAL at 08:29

## 2019-10-14 RX ADMIN — CETIRIZINE HYDROCHLORIDE 10 MG: 10 TABLET, FILM COATED ORAL at 10:56

## 2019-10-14 RX ADMIN — METOPROLOL TARTRATE 25 MG: 25 TABLET, FILM COATED ORAL at 20:38

## 2019-10-14 RX ADMIN — OXYCODONE HYDROCHLORIDE 10 MG: 5 TABLET ORAL at 01:53

## 2019-10-14 RX ADMIN — SENNOSIDES AND DOCUSATE SODIUM 2 TABLET: 8.6; 5 TABLET ORAL at 08:29

## 2019-10-14 RX ADMIN — OXYCODONE HYDROCHLORIDE 10 MG: 5 TABLET ORAL at 17:44

## 2019-10-14 RX ADMIN — ACETAMINOPHEN 975 MG: 325 TABLET, FILM COATED ORAL at 18:20

## 2019-10-14 RX ADMIN — OXYCODONE HYDROCHLORIDE 10 MG: 5 TABLET ORAL at 08:28

## 2019-10-14 RX ADMIN — PANTOPRAZOLE SODIUM 40 MG: 40 TABLET, DELAYED RELEASE ORAL at 08:29

## 2019-10-14 RX ADMIN — OXYCODONE HYDROCHLORIDE 10 MG: 5 TABLET ORAL at 05:00

## 2019-10-14 RX ADMIN — BISACODYL 10 MG: 10 SUPPOSITORY RECTAL at 16:08

## 2019-10-14 RX ADMIN — RANITIDINE 150 MG: 150 TABLET ORAL at 20:38

## 2019-10-14 RX ADMIN — BENZOCAINE, MENTHOL 1 LOZENGE: 15; 3.6 LOZENGE ORAL at 23:17

## 2019-10-14 RX ADMIN — TRIAMCINOLONE ACETONIDE: 1 CREAM TOPICAL at 08:33

## 2019-10-14 RX ADMIN — LIDOCAINE 1 PATCH: 560 PATCH PERCUTANEOUS; TOPICAL; TRANSDERMAL at 08:28

## 2019-10-14 RX ADMIN — DIPHENHYDRAMINE HYDROCHLORIDE 25 MG: 25 CAPSULE ORAL at 03:53

## 2019-10-14 RX ADMIN — ENOXAPARIN SODIUM 40 MG: 40 INJECTION SUBCUTANEOUS at 10:55

## 2019-10-14 RX ADMIN — BENZOCAINE, MENTHOL 1 LOZENGE: 15; 3.6 LOZENGE ORAL at 00:19

## 2019-10-14 RX ADMIN — ACETAMINOPHEN 975 MG: 325 TABLET, FILM COATED ORAL at 10:56

## 2019-10-14 RX ADMIN — ACETAMINOPHEN 975 MG: 325 TABLET, FILM COATED ORAL at 01:54

## 2019-10-14 RX ADMIN — SENNOSIDES AND DOCUSATE SODIUM 2 TABLET: 8.6; 5 TABLET ORAL at 20:38

## 2019-10-14 RX ADMIN — ATORVASTATIN CALCIUM 40 MG: 40 TABLET, FILM COATED ORAL at 23:08

## 2019-10-14 RX ADMIN — DEXTROSE 15 G: 15 GEL ORAL at 03:47

## 2019-10-14 ASSESSMENT — ACTIVITIES OF DAILY LIVING (ADL)
RETIRED_EATING: 0-->INDEPENDENT
ADLS_ACUITY_SCORE: 18
FALL_HISTORY_WITHIN_LAST_SIX_MONTHS: YES
COGNITION: 0 - NO COGNITION ISSUES REPORTED
ADLS_ACUITY_SCORE: 18
BATHING: 0-->INDEPENDENT
ADLS_ACUITY_SCORE: 18
ADLS_ACUITY_SCORE: 18
TOILETING: 0-->INDEPENDENT
TRANSFERRING: 0-->INDEPENDENT
RETIRED_COMMUNICATION: 0-->UNDERSTANDS/COMMUNICATES WITHOUT DIFFICULTY
AMBULATION: 0-->INDEPENDENT
DRESS: 0-->INDEPENDENT
ADLS_ACUITY_SCORE: 18
ADLS_ACUITY_SCORE: 18
NUMBER_OF_TIMES_PATIENT_HAS_FALLEN_WITHIN_LAST_SIX_MONTHS: 1
WHICH_OF_THE_ABOVE_FUNCTIONAL_RISKS_HAD_A_RECENT_ONSET_OR_CHANGE?: AMBULATION;TRANSFERRING;TOILETING;BATHING;DRESSING;FALL HISTORY
SWALLOWING: 0-->SWALLOWS FOODS/LIQUIDS WITHOUT DIFFICULTY

## 2019-10-14 NOTE — CONSULTS
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 She has listed allergies to many antibiotics, most of which do not have much in common structurally. For now we will carefully review her medication list and treat her symptomatically. On physical exam, she doesn't have clinical signs consistent with DRESS (like marked facial edema, periorbial swelling, or marked facial involvement of the rash although she does have some); however, we would still recommend CBC w/ diff and LFTs to help rule out systemic invovlement.     - Recommend CBC w/ diff and LFTs  - Recommend allegra 180mg Qam with benadryl at bedtime   - Recommend triamcinolone 0.1% cream or ointment (per patient preference) for symptomatic relief of itching    - We may consider a biopsy in the future   - We will arrange follow up in our drug-allergic clinic with Dr. Lake as an outpatient in the future     Thank you for the dermatology consultation. Please do not hesitate to contact the dermatology resident/faculty on call for any additional questions or concerns.     Cindy Rogel MD  Dermatology Resident    Staff Physician Comments:  I saw and evaluated the patient with the resident and I agree with the assessment and plan as documented in the resident's note.    Cruz Lake MD  Professor  Head of Dermato-Allergy Division  Department of Dermatology  [facility]      Dermatology Problem List:  1. Likely delayed type drug hypersensitivity reaction with unclear inciting agent    Date of Admission: Oct 12,  2019   Encounter Date: 10/14/2019     Reason for Consultation:   Generalized rash    History of Present Illness:  Ms. Sarah Felix is a 74 year old female who was admitted after a fall now s/p L ankle external fixator 10/12/19. Dermatology was consulted for generalized rash thought to be 2/2 clindamycin. She developed the rash soon after a prophylactic dose of clindamycin on 10/12. She says it's itchy and started on her trunk then spread out from there. She has gotten rashes like these before in the past with other antibiotics. She has noticed the rashes mainly with IV antibiotics, not with orals. However, she's not sure what oral antibiotics she's able to take compared to IV. Symptomatic treatment to date has included benadryl, cetirizine, and topical triamcinolone cream.       Medication exposure history this admission:   Acetaminophen 10/12-  Aspirin 10/12-  Atorvastatin 10/12-  Cetirizine 10/14-  Clindamycin 10/12 x 2 doses then stopped   Benadryl 10/13-  Enoxaparin 10/13-  Fentanyl 10/12   Hydromorphone 10/12  Insulin glargine + regular insulin 10/12-  Lidocaine injection (intraop)   Metoprolol 10/13-  Midazolam 10/12 (intraop)  Ondansetrol 10/12  Oxycodone 10/13-  Pantoprazole 10/13-  Phenylephrine 10/12 (intraop)  Propofol 10/12 (intraop)   Ranitidine 10/14-  Rocuronium 10/12 (intraop)  Succinycholine 10/12 (intraop)   Sugammadex 10/12 (intraop)     Listed allergies in Epic:   Amlodipine: swelling   Cefazolin: not specified   Levaquin: itching, rash   Cephalosporins: rash   Levofloxacin: itching, rash   Nickel: contact reaction, rash   Metformin: diarrhea, GI disturbance     Past Medical History:   Patient Active Problem List   Diagnosis     CYSTIC LIVER DIS     Allergic rhinitis     Diverticulitis of colon     Disorder of bone and cartilage     Esophageal reflux     Mixed incontinence urge and stress (male)(female)     Cystocele, lateral     Vaginal atrophy     Type 2 diabetes, HbA1c goal < 7% (H)      Hyperlipidemia LDL goal <100     Heart burn     BABB (dyspnea on exertion)     Impingement syndrome, shoulder     Sebaceous hyperplasia     Seborrheic keratosis     Fatty liver     Personal history of other malignant neoplasm of skin     Health Care Home     Advanced directives, counseling/discussion     Hypertension goal BP (blood pressure) < 140/90     Elevated serum creatinine     Peripheral vascular disease (H)     Skin exam, screening for cancer     Essential hypertension, benign (HTN)     CKD (chronic kidney disease) stage 2, GFR 60-89 ml/min     Type 2 diabetes mellitus with hyperglycemia (H)     Type 2 diabetes with stage 2 chronic kidney disease GFR 60-89 (H)     Type 2 diabetes mellitus without complication, with long-term current use of insulin (H)     Senile nuclear sclerosis, right     Ankle fracture     Past Medical History:   Diagnosis Date     Allergic rhinitis, cause unspecified     Allergic rhinitis     Basal cell carcinoma of face 3/2012    Mohs     Contact dermatitis and other eczema, due to unspecified cause      Cyst of thyroid 1998    Thyroid Nodule/Hurthle Cell Neoplasm/Benign     CYSTIC LIVER DIS     multiple liver lesions.  felt to be focal nodular hyperplasia.  annual CT abd.   followed by Dr. Amilcar Kat      Cystocele, lateral      Diabetes mellitus (H)      Diverticulitis of colon (without mention of hemorrhage)(562.11) 6/04    Abd CT     Embolism and thrombosis of unspecified site age 22    post ovarian cyst removed     Esophageal reflux     Hiatal hernia     Fatty liver      Hiatal hernia     small     Hyperlipidemia      Nontoxic uninodular goiter      Osteopenia 4/21/2005    on fosamax  for > 5 yr.  stop 7/2012     Other chronic otitis externa      Other specified disorders of liver     Fatty Liver, Benign appearing liver cysts     Pure hypercholesterolemia      Past Surgical History:   Procedure Laterality Date     C APPENDECTOMY  age 22     C DEXA, BONE DENSITY, AXIAL SKEL  2005      C DEXA, BONE DENSITY, AXIAL SKEL  6/2007    No signif change in Osteopenia     COLONOSCOPY  4/2006    repeat 10 yr     ENDOSCOPIC ULTRASOUND UPPER GASTROINTESTINAL TRACT (GI) N/A 9/6/2018    Procedure: ENDOSCOPIC ULTRASOUND, ESOPHAGOSCOPY / UPPER GASTROINTESTINAL TRACT (GI);  Endoscopic Ultrasound, Esophagogastroduodenoscopy with endoscopic mucosal resection;  Surgeon: Hood Brower MD;  Location: [facility] OR     ESOPHAGOSCOPY, GASTROSCOPY, DUODENOSCOPY (EGD), COMBINED N/A 8/13/2018    Procedure: COMBINED ESOPHAGOSCOPY, GASTROSCOPY, DUODENOSCOPY (EGD), BIOPSY SINGLE OR MULTIPLE;  EGD;  Surgeon: Hood Brower MD;  Location: [facility] OR     ESOPHAGOSCOPY, GASTROSCOPY, DUODENOSCOPY (EGD), COMBINED N/A 3/14/2019    Procedure: Upper Endoscopy;  Surgeon: Hood Brower MD;  Location: [facility] OR     ESOPHAGOSCOPY, GASTROSCOPY, DUODENOSCOPY (EGD), RESECT MUCOSA, COMBINED N/A 9/6/2018    Procedure: COMBINED ESOPHAGOSCOPY, GASTROSCOPY, DUODENOSCOPY (EGD), RESECT MUCOSA;;  Surgeon: Hood Brower MD;  Location: [facility] OR     GYN SURGERY  10/22/08    pelvic support     HEMORRHOIDECTOMY  8/08     MOHS MICROGRAPHIC PROCEDURE       SURGICAL HISTORY OF -   age 22    L ovarian cyst removal, laparotomy     SURGICAL HISTORY OF -   1998    partial thyroidectomy     SURGICAL HISTORY OF -   10/08    Transobturator tape for Urinary Incontinence     SURGICAL HISTORY OF -   11/2011    stress test normal     THYROID SURGERY      Had cyst removed, thyroid gland is intact.     Social History:  Patient reports that she quit smoking about 39 years ago. She has never used smokeless tobacco. She reports current alcohol use. She reports that she does not use drugs.    Family History:  Family History   Problem Relation Age of Onset     Diabetes Mother         at 89 yrs     Hypertension Mother      Arthritis Mother         rheumatoid     Cancer Father         bladder     Cardiovascular Brother         palpitations not on meds.     Glaucoma No family hx of       Macular Degeneration No family hx of      Amblyopia No family hx of        Medications:  Current Facility-Administered Medications   Medication     [START ON 10/15/2019] acetaminophen (TYLENOL) tablet 650 mg     acetaminophen (TYLENOL) tablet 975 mg     alum & mag hydroxide-simethicone (MYLANTA ES/MAALOX  ES) suspension 30 mL     atorvastatin (LIPITOR) tablet 40 mg     benzocaine-menthol (CEPACOL) 15-3.6 MG lozenge 1 lozenge     bisacodyl (DULCOLAX) Suppository 10 mg     cetirizine (zyrTEC) tablet 10 mg     glucose gel 15-30 g    Or     dextrose 50 % injection 25-50 mL    Or     glucagon injection 1 mg     diphenhydrAMINE (BENADRYL) capsule 25 mg     enoxaparin (LOVENOX) injection 40 mg     hydrALAZINE (APRESOLINE) tablet 10 mg     [START ON 10/15/2019] influenza Vac Split High-Dose (FLUZONE) injection 0.5 mL     insulin aspart (NovoLOG) inj (RAPID ACTING)     insulin aspart (NovoLOG) inj (RAPID ACTING)     insulin aspart (NovoLOG) inj (RAPID ACTING)     insulin glargine (LANTUS PEN) injection 30 Units     Lidocaine (LIDOCARE) 4 % Patch 1 patch     lidocaine (LMX4) cream     lidocaine 1 % 0.1-1 mL     lidocaine patch in PLACE     lidocaine patch REMOVAL     melatonin tablet 1 mg     menthol (ICY HOT) 5 % patch 1 patch    And     menthol (ICY HOT) Patch in Place    And     menthol (ICY HOT) patch REMOVAL     metoprolol tartrate (LOPRESSOR) tablet 25 mg     naloxone (NARCAN) injection 0.1-0.4 mg     ondansetron (ZOFRAN-ODT) ODT tab 4 mg    Or     ondansetron (ZOFRAN) injection 4 mg     oxyCODONE (ROXICODONE) tablet 5-10 mg     pantoprazole (PROTONIX) EC tablet 40 mg     ranitidine (ZANTAC) tablet 150 mg     senna-docusate (SENOKOT-S/PERICOLACE) 8.6-50 MG per tablet 1 tablet    Or     senna-docusate (SENOKOT-S/PERICOLACE) 8.6-50 MG per tablet 2 tablet     sodium chloride (PF) 0.9% PF flush 3 mL     sodium chloride (PF) 0.9% PF flush 3 mL     triamcinolone (KENALOG) 0.1 % cream          Allergies   Allergen Reactions      Amlodipine Swelling     Ancef [Cefazolin]      Levaquin Rash     Itching, rash     Metformin Diarrhea and GI Disturbance     Cephalosporins Rash     Levofloxacin Itching and Rash     Nickel Rash     Contact reaction  Contact reaction     Review of Systems:  -As per HPI    Physical exam:  Vitals: /59   Pulse 95   Temp 97.9  F (36.6  C)   Resp 16   SpO2 96%   GEN: This is a well developed, well-nourished female in no acute distress, in a pleasant mood.  SKIN: Total skin excluding the undergarment areas was performed. The exam included the head/face, neck, both arms, chest, back, abdomen, both legs, digits and/or nails.   -Baez skin type: II  - Blanching confluent erythema of the trunk and extremities that also extends up the neck and on to the cheeks   - No marked periorbital edema  - No oral mucosa lesions   - No other lesions of concern on areas examined.     Laboratory:  Results for orders placed or performed during the hospital encounter of 10/12/19 (from the past 24 hour(s))   Glucose by meter   Result Value Ref Range    Glucose 175 (H) 70 - 99 mg/dL   Glucose by meter   Result Value Ref Range    Glucose 186 (H) 70 - 99 mg/dL   Glucose by meter   Result Value Ref Range    Glucose 133 (H) 70 - 99 mg/dL   Glucose by meter   Result Value Ref Range    Glucose 60 (L) 70 - 99 mg/dL   Glucose by meter   Result Value Ref Range    Glucose 57 (L) 70 - 99 mg/dL   Glucose by meter   Result Value Ref Range    Glucose 82 70 - 99 mg/dL   Glucose by meter   Result Value Ref Range    Glucose 87 70 - 99 mg/dL   Glucose by meter   Result Value Ref Range    Glucose 95 70 - 99 mg/dL   Glucose by meter   Result Value Ref Range    Glucose 114 (H) 70 - 99 mg/dL   Hemoglobin   Result Value Ref Range    Hemoglobin 11.2 (L) 11.7 - 15.7 g/dL   Basic metabolic panel   Result Value Ref Range    Sodium 138 133 - 144 mmol/L    Potassium 3.7 3.4 - 5.3 mmol/L    Chloride 108 94 - 109 mmol/L    Carbon Dioxide 23 20 - 32 mmol/L     Anion Gap 7 3 - 14 mmol/L    Glucose 123 (H) 70 - 99 mg/dL    Urea Nitrogen 11 7 - 30 mg/dL    Creatinine 0.92 0.52 - 1.04 mg/dL    GFR Estimate 62 >60 mL/min/[1.73_m2]    GFR Estimate If Black 72 >60 mL/min/[1.73_m2]    Calcium 7.7 (L) 8.5 - 10.1 mg/dL   Glucose by meter   Result Value Ref Range    Glucose 104 (H) 70 - 99 mg/dL     Dr. Lake staffed the patient.    Staff Involved:  Resident/Staff

## 2019-10-14 NOTE — PROGRESS NOTES
"[facility] Nurse Inpatient Pressure Injury Assessment   Reason for consultation: Evaluate and treat buttocks pressure injury      ASSESSMENT  Pressure Injury: on bilateral buttocks, present on admission    Pressure Injury is Stage Deep Tissue Pressure Injury (DTPI)   Contributing factor of the pressure injury: pressure, shear and immobility  Status: initial assessment     TREATMENT PLAN  Bilateral buttocks wound: Every other day: wash with saline and gauze and pat dry. Cover with sacral Mepilex (order #[id number]). Change every other day and as needed if soiled from urine or stool.    Pressure Injury Prevention ACTIVITY MEASURES:  If pt is refusing to turn or reposition they must be educated on the  potential injury from not off loading pressure.  Then this \"educated refusal\" needs to be documented as an \"educated refusal to turn/ reposition\" and document if alert, etc. Additionally, if repeated refusals ensure the charge nurse, nurse manager and the provider is aware.  Follow Kingsley Risk recommendations      CHAIR: Pt should sit on a chair cushion ([id number]) when up to the chair and not sit for more than one hour at a time before fully offloading backside (either stand for a couple of minutes and/or return to bed, positioning on a side) to relieve pressure and re-perfuse tissue.  Additionally, encourage pt to shift side to side every 15 minutes, too.    NOTE: the Z-Flow Pad is NOT a cushion, pt should NOT sit on the Z-Flow pads      BED:  Reposition side to side every 1-2 hours when awake.     No direct supine positioning, position only side to side    Consider Z-Barry Pillows to help keep pt on side (#[id number] or #[id number]- large). *Z-Flows are for positioning, DO NOT SIT ON!    Keep heels elevated    As able keep HOB below 30 degrees    Evaluate for specialty mattress    Use TAPS wedges and perform frequent microturns as pt tolerates    Orders: Written  [facility] Nurse follow-up plan:weekly  Nursing to notify the Provider(s) and " re-consult the WOC Nurse if wound(s) deteriorates or new skin concern.    Patient History  According to provider note(s):  Per Dr Malik Tobar with Ortho: Sarah Felix is a 74 year old female s/p L ankle external fixator on 10/12/2019 with Dr. Brown. Doing well.    Objective Data  Containment of urine/stool: Continent of bowel and Continent of bladder    Current Diet/ Nutrition:  Orders Placed This Encounter      Advance Diet as Tolerated: Regular Diet Adult      Output:   I/O last 3 completed shifts:  In: -   Out: 530 [Urine:530]    Risk Assessment:   Sensory Perception: 4-->no impairment  Moisture: 4-->rarely moist  Activity: 1-->bedfast  Mobility: 2-->very limited  Nutrition: 3-->adequate  Friction and Shear: 2-->potential problem  Kingsley Score: 16      Labs:   Recent Labs   Lab 10/14/19  0544 10/13/19  0605 10/12/19  1212   HGB 11.2* 12.0 13.8   INR  --   --  1.09   WBC  --  8.1 13.5*   A1C  --   --  7.6*       Physical Exam  Skin inspection: focused buttocks, sacrum, perineum, coccyx    Wound Location:  Bilateral buttocks  Date of last Photo: patient declined  Wound History: Patient fell at home on 10/12/19, landed on butt and remained on buttocks x10+ hours at home waiting for help. Deep purple bruising vs pressure injury on bilateral buttock. Left mirian cleft with three intact serous filled bullae measuring 1-2 cm round.   Measurements (length x width x depth, in cm)Left and right fleshy buttock and sacrum 40 cm x 20 cm  x  0 cm   Wound Base:  100 % intact, purple non-blanchable epidermis with three serous filled bullae in the right mirian cleft  Palpation of the wound bed: firm   Periwound skin: intact  Color: purple  Temperature: normal   Drainage: none  Description of drainage: none  Odor: none  Pain: denies to buttocks    Interventions  Current support surface: Standard  Atmos Air mattress- WOC will order low air loss mattress  Current off-loading measures: Foam padding and Pillows under  calves  Repositioning aid: Pillows  Visual inspection of wound(s) completed   Tube Securement: n/a  Wound Care: was done per plan of care.  Supplies: ordered: Geomat cushion and sacral Mepilex  Educated provided: importance of repositioning  Education provided to: patient  and nurse  Discussed importance of:repositioning every 2 hours, off-loading pressure to wound, their role in pressure injury prevention, head elevation <30 degrees, off-loading mattress, nutrition on wound healing and moisture management  Discussed plan of care with Patient and Nurse    Yoly Foote RN, CWOCN

## 2019-10-14 NOTE — PROGRESS NOTES
Social Work Services Progress Note    Hospital Day: 3  Date of Initial Social Work Evaluation:  n/a  Collaborated with:  Pt, [facility],  TCU, Provider, Charge Nurse    Data:  Pt is a 74 year old female with a medical history significant for type 2 diabetes mellitus and hypertension who presents to the Emergency Department for evaluation after a mechanical slip and fall.    SW following for TCU placement.    Intervention:  SW spoke to Brittani from [facility]. [facility] can take pt tomorrow after 2pm. SW spoke to  TCU, they are not guaranteeing bed availability tomorrow.     SW presented options to pt. Pt okay with [facility].     SW scheduled [facility] stretcher ride for 2pm tomorrow 10/15. PCS form completed and left in chart.    PAS:  [id number]    Orders to be submitted in am when completed.     SW offered to Follow-up with family on her behalf, pt declined-will update them herself.     Assessment:  Pt medically cleared to discharge. [facility] accepted pt's admission.    Plan:    Anticipated Disposition:  Facility:  [facility]    Barriers to d/c plan:  none    Follow Up:  Pt to discharge to facility tomorrow 10/15    SAVANA Meadows   Covering for Unit: 8A/10A  [phone]

## 2019-10-14 NOTE — PROGRESS NOTES
Orthopaedic Surgery Progress Note 10/14/2019    Interval Events  OT: patient declined  PT: recommend TCU    Subjective  Pain better controlled after exfix. Discussed that she will discharge to a TCU, then follow up in clinic. The patient denies any chest pain, shortness of breath, fevers, chills, new numbness or tingling down the extremities.      Objective  Temp: 98.3  F (36.8  C) Temp src: Oral BP: 108/54 Pulse: 98 Heart Rate: 87 Resp: 16 SpO2: 97 % O2 Device: None (Room air) Oxygen Delivery: 1.5 LPM    Exam:  Gen: No acute distress, resting comfortably in bed.  Resp: Non-labored breathing  Cardio: Regular rate via peripheral pulse  MSK:  LLE:  - Pin site dressings c/d/i  - No skin wrinkle  - Compartments soft and compressible  - SILT tibial/sural/saphenous/DP/SP nerves  - Fires EHL, FHL; unable to assess TA, GSC d/t external fixator  - PT/DP pulses 1+, foot wwp    Recent Labs   Lab 10/13/19  0605 10/12/19  2007 10/12/19  1212   WBC 8.1  --  13.5*   HGB 12.0  --  13.8   * 594* 647*       Assessment: Sarah Felix is a 74 year old female s/p L ankle external fixator on 10/12/2019 with Dr. Brown. Doing well.     Plan:  Orthopaedics Primary, Medicine co-managing appreciate assistance  Activity: Up with assist.   Weight bearing status: NWB LLE.  Pain management: Transition from IV to PO as tolerated.  Antibiotics: s/p perioperative Clindamycin x 24 hours.  Diet: Diabetic diet.  DVT prophylaxis: Lovenox.  Dressings: Keep pin site dressings c/d/i, change prn  PT/OT: Eval and treat.   Consults: Hospitalist, PT, OT appreciate recs. SW for dispo planning, will likely require TCU upon discharge     Follow-up: Clinic with Dr. Lemus for planning of definitive fixation; date TBD.      Disposition: Will need a TCU at discharge per PT recommendations. She is cleared for discharge, currently awaiting placement.  Discharge orders for transitional care unit have been completed.    Hung Vazquez MD  Orthopaedic  Surgery, PGY-4  Pager: [phone]     Please page me directly with any questions/concerns during regular weekday hours before 5pm. If there is no response, if it is a weekend, or if it is during evening hours then please page the orthopaedic surgery resident on call as listed on [facility] call schedule under the orthopaedics tab.      Please page me at [phone] with any questions/concerns. If there is no response, if it is a weekend, or if it is during evening hours then please page the orthopaedic surgery resident on call.       Future Appointments   Date Time Provider Department [place]   10/14/2019  1:15 PM Alma Delia Mcknight, OT [facility]   10/14/2019  2:45 PM Bernard Wilkerson, PT [facility]   11/18/2019  2:00 PM Wegener, Joel Daniel Irwin, MD [facility]

## 2019-10-14 NOTE — DISCHARGE SUMMARY
[facility], [place]  Orthopaedic Discharge Summary    Patient: Sarah Felix MRN# [id number]   Age/Sex: 74 year old female YOB: 1945      Date of Admission:  10/12/2019  Date of Discharge:  10/18/2019 12:20 PM  Admitting Physician:  Leeanne Brown, *  Discharge Physician:  Hung Mackenzie MD  Primary Care Provider:  Wegener, Joel Daniel Irwin  Discharge location         TCU    DISCHARGE DIAGNOSIS:    * No pre-op diagnosis entered *    PROCEDURE(S):   10/12/2019 Procedure(s):  EXTERNAL FIXATION, LOWER EXTREMITY     BRIEF HISTORY: (copied from H&P on )  74-year-old female seen in consultation for a closed left ankle fracture.  The injury took place yesterday around 10 PM when the patient fell at the stairs at home.  She denies losing consciousness at that time.  She was unable to ambulate and decided not to call emergency department during the night, secondary to concerns that they would break in the door.  Eventually she called for help and was brought into the [facility] emergency department.  Close reduction of her ankle fracture was attempted by the emergency department.  Orthopedic surgery was consulted after for further evaluation and management.     The patient denies any other injuries at this fall.    HOSPITAL COURSE:    Surgery was uncomplicated. Postoperatively the patient was admitted to the orthopedic surgery service.  Medicine was consulted to help with management of medical comorbidities and control blood glucose levels. She also developed a hypersensitivity rash, of unclear etiology. Intravenous antibiotics were provided for 24 hours after the surgery.  Patient received routine nursing cares on the floor.  A clear liquid diet was started and subsequently advance to regular, which the patient tolerated without issue.  Stool softeners were administered while taking pain medications to prevent constipation.  The   patient was able to void independently.  Post operative xrays were obtained. Physical and occupational therapy were initiated for safety training, ADLs, mobility, and ROM exercises. After demonstrating the ability to tolerate a diet, pain control on oral pain medications, and evaluation by physical and occupational therapy, the patient was deemed medically safe for discharge to a TCU. Hospital stay delayed secondary to blood glucose control and leukocytosis, the latter attributed to hypersensitivity reaction.    FOLLOW UP:      Future Appointments   Date Time Provider Department Center   10/14/2019 12:15 PM Alma Delia Mcknight, OT UROT [place]   10/14/2019  3:45 PM Bernard Wilkerson, PT URPT [place]   11/18/2019  2:00 PM Wegener, Joel Daniel Irwin, MD [facility] ORTHOPEDICS - Located 4th Floor (4D ) in the Clinics and Surgery Center at [address] [place] [address].     Department Address: [address] [place] [id number]     [facility] Orthopedic Scheduling contact number: [phone]    Call if you haven't heard regarding these appointments within 7 days of discharge.      PHYSICAL EXAMINATION:  Gen: No acute distress, resting comfortably in bed.  Resp: Non-labored breathing  Cardio: Regular rate via peripheral pulse  MSK:  LLE:  - Pin site dressings c/d/i  - No skin wrinkle  - Compartments soft and compressible  - SILT tibial/sural/saphenous/DP/SP nerves  - Fires EHL, FHL; unable to assess TA, GSC d/t external fixator  - PT/DP pulses 1+, foot wwp           PLANNED DISCHARGE ORDERS:          Discharge Procedure Orders   General info for SNF   Order Comments: Length of Stay Estimate: Short Term Care: Estimated # of Days <30  Condition at Discharge: Improving  Level of care:skilled   Rehabilitation Potential: Excellent  Admission H&P remains valid and up-to-date: Yes  Recent Chemotherapy: N/A  Use Nursing Home Standing Orders: Yes     Mantoux instructions   Order  Comments: Give two-step Mantoux (PPD) Per Facility Policy Yes     Reason for your hospital stay   Order Comments: Ankle fracture     Activity - Up with assistive device     Order Specific Question Answer Comments   Is discharge order? Yes      Follow Up and recommended labs and tests   Order Comments: You will see your surgeon in 1 week for a follow up visit.     Additional Discharge Instructions   Order Comments: -Nonweightbearing left lower extremity  -Keep extremity elevated to minimize swelling  -Continue anticoagulation with Lovenox to avoid blood clots  -Keep  dressing around pins clean  -Okay to have regular diet  -Take stool softeners to avoid constipation  -You will follow-up in clinic with your surgeon to finalize details of surgery  -If you develop fevers, chills, chest pain, shortness of breath, or increased swelling or redness around the incision site please go to the nearest hospital to be evaluated as these are signs of potential complications.     Weight bearing status   Order Comments: Nonweightbearing left lower extremity     Full Code     Order Specific Question Answer Comments   Code status determined by: Discussion with patient/legal decision maker      Physical Therapy Adult Consult   Order Comments: Evaluate and treat as clinically indicated.     Occupational Therapy Adult Consult   Order Comments: Evaluate and treat as clinically indicated.     Advance Diet as Tolerated   Order Comments: Regular diet.     Order Specific Question Answer Comments   Is discharge order? Yes            Hung Vazquez MD  Orthopaedic Surgery, PGY-4

## 2019-10-14 NOTE — CONSULTS
Social Work Services Progress Note    Hospital Day: 3  Date of Initial Social Work Evaluation:  n/a  Collaborated with:  Pt, [facility], [facility], MD    Data:  Pt is a 74 year old female with a medical history significant for type 2 diabetes mellitus and hypertension who presents to the Emergency Department for evaluation after a mechanical slip and fall.    SW following for discharge planning, current recommendation is TCU.    Intervention:  SW met with pt to provide introductions and discuss recommendations. SW discussed TCU. SW and pt discussed TCU and general questions surrounding stay. Pt agreeable to stay.    Pt requesting referrals to:    [facility] - reviewing  [facility] [phone]. Brittani confirmed that the referral was received and would be reviewed.     Per provider, pt is not medically cleared for discharge.    Assessment:  Pt was sleepy during SW visit, pt fell asleep twice during conversation-snoring. Pt agreeable to TCU placement. Only selected two facilities, SW requested that pt select at least one more facility for referrals.    Plan:    Anticipated Disposition:  Facility:  TBD    Barriers to d/c plan:  Bed availability     Follow Up:  SW to continue following for discharge planning.    SAVANA Meadows  Float   Covering for Unit: 8A/10A  [phone]

## 2019-10-14 NOTE — TELEPHONE ENCOUNTER
RECORDS RECEIVED FROM: Skin check possible more surgery.  s/p Left bimalleolar ankle fracture EX FIX.  DOS: 10/12/19 with Dr. Brown.   DATE RECEIVED: 10.16.19   NOTES STATUS DETAILS   OFFICE NOTE from referring provider N/A    OFFICE NOTE from other specialist N/A    DISCHARGE SUMMARY from hospital Internal 10.12.19 [facility] Dr. Brown   DISCHARGE REPORT from the ER N/A    OPERATIVE REPORT Internal 10.12.19   MEDICATION LIST Internal    IMPLANT RECORD/STICKER N/A    LABS     CBC/DIFF Internal 10.13.19   CULTURES N/A    INJECTIONS DONE IN RADIOLOGY N/A    MRI N/A    CT SCAN N/A    XRAYS (IMAGES & REPORTS) Internal 10.12.19   TUMOR     PATHOLOGY  Slides & report N/A

## 2019-10-14 NOTE — PROGRESS NOTES
Social Work Services Progress Note    Hospital Day: 3  Date of Initial Social Work Evaluation:  n/a  Collaborated with:  Pt, [facility], [facility], MD    Data:  Pt is a 74 year old female with a medical history significant for type 2 diabetes mellitus and hypertension who presents to the Emergency Department for evaluation after a mechanical slip and fall.    SW following for discharge planning, current recommendation is TCU.    Intervention:  SW met with pt to provide introductions and discuss recommendations. SW discussed TCU. SW and pt discussed TCU and general questions surrounding stay. Pt agreeable to stay.    Pt requesting referrals to:    [facility]  [facility] [phone]    Per provider, pt is not medically cleared for discharge.    Assessment:  Pt was sleepy during SW visit, pt fell asleep twice during conversation-snoring. Pt agreeable to TCU placement. Only selected two facilities, SW requested that pt select at least one more facility for referrals.    Plan:    Anticipated Disposition:  Facility:  TBD    Barriers to d/c plan:  Bed availability     Follow Up:  SW to continue following for discharge planning.    SAVANA Meadows   Covering for Unit: 8A/10A  [phone]

## 2019-10-14 NOTE — PLAN OF CARE
VS:       Pt A/O X 4. Afebrile. VSS. Lungs-clear bilaterally with both       anterior and posterior. O2 sats > 90% on RA when pt is awake. Sats drop when pt falls asleep and pt placed on 1 LPM via nasal cannula with sats > 90%. IS encouraged. Denies nausea, shortness of breath, and chest pain.     Output:       Bowels- active in all four quadrants. Last BM 10/10, pt is passing gas. Voids spontaneously without difficulty in the bedpan. Pt stated she does not void much when she is not active, bedpan offered multiple times throughout day and pt encouraged to increase oral fluid intake.       Activity:       Pt up sitting at edge of bed with assist of PT and writer this shift. Pt needs a lot of support to LLE or it is too painful. Drop arm commode ordered from [facility] to help potentially support leg while sitting at edge of bed tomorrow when pt works with PT. Pt refusing to try and use bedside commode at this time due to pain and pressure on leg if not supported fully.      Skin:   External fixator to LLE. Bruising and redness to buttocks, WOC consult ordered.      Pain:       Has pain in the LLE and given prn Oxycodone, scheduled Tylenol, and is tolerating well.      CMS:       CMS and Neuro's are intact. Denies numbness and tingling in all extremities.      Dressing:       LLE external fixator is intact with no drainage noted.      Diet:       Pt is on a regular diet and appetite was fair this shift.  and 175 this shift.         LDA:       PIV in right arm infiltrated this shift. Pt tolerating oral fluids. Pt encouraged to drink more fluids.      Equipment:       Foam elevator to LLE.     Plan:       Pt is able to make needs known and the call light is within the pt's reach. Continue to monitor.       Additional Info:       Pt needs UA/UC, new bedpan obtained so sample can be collected and oncoming RN notified.    Pt developed generalized rash to trunk and face overnight, Ortho MD and Dr. Crowell updated.  Most likely related to antibiotic. Instructed by MD to not give second prophylactic dose of Cleocin. Pt denies chest pain or shortness of breath. Pt given prn Benadryl x 1. Pt stated is normally takes rash 2-3 days to go away after reaction to antibiotic and stated it usually gets worse before it gets better. Pt will ask for Benadryl, but likes to take it near bedtime as it makes her sleepy.     Plan to discharge to TCU when medically ready.

## 2019-10-14 NOTE — PLAN OF CARE
VS: /58 (BP Location: Left arm)   Pulse 98   Temp 98.2  F (36.8  C) (Oral)   Resp 17   SpO2 97%    O2: 1 L Nasal cannula. May wean off as tolerated. Patients baseline is shallow breathing especially at night per patient report.   Output: Voids spontaneously in bedpan. Encourage fluids.   Last BM: 10/10/2019, pt. is passing gas   Activity: Bedrest, turns independently   Skin: Rash on chest, back and arms. Applied steroidal cream. External fixator on left ankle.   Pain: Well managed with oxycodone. Using benadryl for itching.   CMS: Intact   Dressing: Dressings on LLE extremities around pins KATHRYN.   Diet: Regular, fair appetite. Encourage fluids.   LDA: N/A   Equipment: Bedpan. External fixator on left ankle.   Plan: TBD   Additional Info: UA sent to lab.

## 2019-10-14 NOTE — PROGRESS NOTES
Record reviewed.  Seen with RN.  Interval progression in pruritic skin eruption involving face anterior posterior trunk and upper and lower extremities.  Typical reaction in the past in relation to IV antibiotics.  Received 1 dose of IV clindamycin.  Ongoing sedation attributed to Benadryl.  Patient would like to continue as it helps her sleep so that the pruritus is less of an issue.  Improved pain control on oxycodone 10 mg every 3 hours as needed.  Not mobilize.  Denies chest comfort dyspnea.  Nonproductive cough.  Attributed to postnasal drainage.  No nausea reflux or abdominal pain.  Passing flatus.  A bowel movement 4 days ago.  No voiding complaints.  Hypoglycemia during the night with a blood glucose drop to 57.  No symptoms per patient.  Received Lantus 40 units last evening.    /59   Pulse 95   Temp 97.9  F (36.6  C)   Resp 16   SpO2 96%   Objective adult female lying supine in bed in no distress.  Alert and oriented.  HEENT oropharynx clear.  No mucosal lesions.  Skin-generalized macular erythematous eruption involving the face neck anterior posterior trunk abdomen proximal upper and lower extremities.  Chest clear lung fields.  Cardiac exam regular without gallop or murmur no click no jugular venous distention.  Abdomen benign without tenderness organomegaly or mass bowel sounds normal.  Extremities well perfused.  No edema.  Left lower extremity fixator in place.  No left thigh right lower extremity calf or thigh tenderness/induration to suggest DVT.    Last Comprehensive Metabolic Panel:  Sodium   Date Value Ref Range Status   10/14/2019 138 133 - 144 mmol/L Final     Potassium   Date Value Ref Range Status   10/14/2019 3.7 3.4 - 5.3 mmol/L Final     Chloride   Date Value Ref Range Status   10/14/2019 108 94 - 109 mmol/L Final     Carbon Dioxide   Date Value Ref Range Status   10/14/2019 23 20 - 32 mmol/L Final     Anion Gap   Date Value Ref Range Status   10/14/2019 7 3 - 14 mmol/L Final      Glucose   Date Value Ref Range Status   10/14/2019 123 (H) 70 - 99 mg/dL Final     Urea Nitrogen   Date Value Ref Range Status   10/14/2019 11 7 - 30 mg/dL Final     Creatinine   Date Value Ref Range Status   10/14/2019 0.92 0.52 - 1.04 mg/dL Final     GFR Estimate   Date Value Ref Range Status   10/14/2019 62 >60 mL/min/[1.73_m2] Final     Comment:     Non  GFR Calc  Starting 12/18/2018, serum creatinine based estimated GFR (eGFR) will be   calculated using the Chronic Kidney Disease Epidemiology Collaboration   (CKD-EPI) equation.       Calcium   Date Value Ref Range Status   10/14/2019 7.7 (L) 8.5 - 10.1 mg/dL Final     Hemoglobin   Date Value Ref Range Status   10/14/2019 11.2 (L) 11.7 - 15.7 g/dL Final   10/13/2019 12.0 11.7 - 15.7 g/dL Final     Assessment:  1.  Status post placement Magi Pelaez 3 external fixator system.  Indication left bimalleolar ankle fracture, subsequent to a fall.  No indication of syncope.  Minimal blood loss.  Improved adequate pain control.  2.  Acute blood loss anemia.  Mild.  No clinical compromise.  3.  Generalized skin abruption consistent with drug reaction.  Prone in the past to similar reaction with intravenous antibiotics.  Unaware of prior clindamycin intolerance.  Distribution not consistent with a contact allergy.  No improvement (not unexpected).  4.  Type 2 diabetes mellitus.  Fair control prior to admission with last A1c 7.6%.  Nocturnal hypoglycemia likely related to the Lantus dose in the setting of the limited p.o. intake..   5.  Hypertension adequate control on the metoprolol with parameters.  Lasix and lisinopril on hold.  6.  Gastroesophageal reflux disease symptomatically controlled on PPI.  7.  Exertional dyspnea with negative stress test 2011.    Plan:  Review meds, orders.  Same opiates.  PRN p.o. Benadryl (decrease interval every 6 hours) with the topical triamcinolone cream.  Add Zyrtec and ranitidine (discontinue Protonix).   Dermatology consult.  Scheduled Tylenol.   Hold glimepiride.  Decrease Lantus 30 units at bedtime.  NovoLog 1 unit per carb unit 3 times daily with meals in addition to medium correction (reviewed with endocrine).  Incentive spirometry, bowel meds,  Lovenox ordered for DVT prophylaxis (per orthopedics).  Mobilize as tolerated.  Trend labs.  Monitor clinically.    Anticipated discharge in 1 to 2 days to TCU once diabetes and skin reduction adequately addressed.

## 2019-10-14 NOTE — PLAN OF CARE
"PT: Attempted to see patient to initiate treatment. She declined therapy in any capacity today as her pain was a 8/10 in her LLE, and she stated \"maybe tomorrow\". He was positioned with her left leg elevated on foam and HOB elevated.     Please see on 10/15 in am to determine PT needs.   "

## 2019-10-14 NOTE — PLAN OF CARE
VS: /54 (BP Location: Left arm)   Pulse 98   Temp 98.3  F (36.8  C) (Oral)   Resp 16   SpO2 97%      O2: Began shift on 1.5 L O2 via NC. Pt weaned to room air later on in the shift. Pt tolerating well. Sats may dip while sleeping/snoring    Output: Voided once on shift in bedpan. 150 mL   Last BM: 10/10/2019   Activity: Bed rest. Repositions independently    Skin: Blood blisters discovered on L Buttock. Blisters cleaned, mepilex applied. Bruising over coccyx. Pin sites CDI. Rash continuing to spread into extremities and causing extreme itching. Rash areas present bright red with small bumps and hot to the touch.  Benadryl not adequately covering rash symptoms per pt report    Pain: Managed well by oral pain medication   CMS: Pt states that L toes feel slightly numb. Denies tingling   Dressing: Both dressings are CDI   Diet: Regular diet   LDA: Currently no PIV access   Equipment: External fixator   Plan: Continue to monitor throughout shift and intervene when necessary.   Additional Info:  at bedtime. Scheduled lantus given. BG 60 at 0235. Recheck BG 57. Pt stated she did not believe either BG checks as she was not exhibiting any signs of hypoglycemia. Juice given and BG rechecked in 15 minutes per hypoglycemia protocol. Recheck BG at 0318 was 82. More juice and pretzels given. 15 min recheck BG at 0343 was 87. Pt was unable to comfortably drink more juice, therefore oral glucose was given. Pt was able to consume half the oral glucose tube. Recheck BG after oral glucose was 95. Dave crackers with peanut butter given. 15 minute recheck BG at 0412 was 114. Pt then allowed to rest.

## 2019-10-15 ENCOUNTER — APPOINTMENT (OUTPATIENT)
Dept: PHYSICAL THERAPY | Facility: CLINIC | Age: 74
DRG: 493 | End: 2019-10-15
Payer: COMMERCIAL

## 2019-10-15 LAB
BACTERIA SPEC CULT: NORMAL
GLUCOSE BLDC GLUCOMTR-MCNC: 102 MG/DL (ref 70–99)
GLUCOSE BLDC GLUCOMTR-MCNC: 168 MG/DL (ref 70–99)
GLUCOSE BLDC GLUCOMTR-MCNC: 70 MG/DL (ref 70–99)
GLUCOSE BLDC GLUCOMTR-MCNC: 72 MG/DL (ref 70–99)
GLUCOSE BLDC GLUCOMTR-MCNC: 85 MG/DL (ref 70–99)
HGB BLD-MCNC: 10.7 G/DL (ref 11.7–15.7)
PLATELET # BLD AUTO: 596 10E9/L (ref 150–450)
SPECIMEN SOURCE: NORMAL

## 2019-10-15 PROCEDURE — 97530 THERAPEUTIC ACTIVITIES: CPT | Mod: GP | Performed by: PHYSICAL THERAPIST

## 2019-10-15 PROCEDURE — 36415 COLL VENOUS BLD VENIPUNCTURE: CPT | Performed by: STUDENT IN AN ORGANIZED HEALTH CARE EDUCATION/TRAINING PROGRAM

## 2019-10-15 PROCEDURE — 90662 IIV NO PRSV INCREASED AG IM: CPT | Performed by: ORTHOPAEDIC SURGERY

## 2019-10-15 PROCEDURE — 25000128 H RX IP 250 OP 636: Performed by: STUDENT IN AN ORGANIZED HEALTH CARE EDUCATION/TRAINING PROGRAM

## 2019-10-15 PROCEDURE — 25000132 ZZH RX MED GY IP 250 OP 250 PS 637: Performed by: ORTHOPAEDIC SURGERY

## 2019-10-15 PROCEDURE — 25000132 ZZH RX MED GY IP 250 OP 250 PS 637: Performed by: INTERNAL MEDICINE

## 2019-10-15 PROCEDURE — 12000001 ZZH R&B MED SURG/OB UMMC

## 2019-10-15 PROCEDURE — 25000128 H RX IP 250 OP 636: Performed by: ORTHOPAEDIC SURGERY

## 2019-10-15 PROCEDURE — 25000132 ZZH RX MED GY IP 250 OP 250 PS 637: Performed by: STUDENT IN AN ORGANIZED HEALTH CARE EDUCATION/TRAINING PROGRAM

## 2019-10-15 PROCEDURE — 99207 ZZC CDG-MDM COMPONENT: MEETS LOW - DOWN CODED: CPT | Performed by: INTERNAL MEDICINE

## 2019-10-15 PROCEDURE — 00000146 ZZHCL STATISTIC GLUCOSE BY METER IP

## 2019-10-15 PROCEDURE — 85018 HEMOGLOBIN: CPT | Performed by: STUDENT IN AN ORGANIZED HEALTH CARE EDUCATION/TRAINING PROGRAM

## 2019-10-15 PROCEDURE — 99232 SBSQ HOSP IP/OBS MODERATE 35: CPT | Performed by: INTERNAL MEDICINE

## 2019-10-15 PROCEDURE — 85049 AUTOMATED PLATELET COUNT: CPT | Performed by: STUDENT IN AN ORGANIZED HEALTH CARE EDUCATION/TRAINING PROGRAM

## 2019-10-15 RX ORDER — FEXOFENADINE HCL 60 MG/1
180 TABLET, FILM COATED ORAL DAILY
Status: DISCONTINUED | OUTPATIENT
Start: 2019-10-16 | End: 2019-10-18 | Stop reason: HOSPADM

## 2019-10-15 RX ADMIN — ACETAMINOPHEN 650 MG: 325 TABLET, FILM COATED ORAL at 19:56

## 2019-10-15 RX ADMIN — TRIAMCINOLONE ACETONIDE: 1 CREAM TOPICAL at 22:20

## 2019-10-15 RX ADMIN — RANITIDINE 150 MG: 150 TABLET ORAL at 19:56

## 2019-10-15 RX ADMIN — OXYCODONE HYDROCHLORIDE 5 MG: 5 TABLET ORAL at 08:27

## 2019-10-15 RX ADMIN — ACETAMINOPHEN 975 MG: 325 TABLET, FILM COATED ORAL at 02:13

## 2019-10-15 RX ADMIN — TRIAMCINOLONE ACETONIDE: 1 CREAM TOPICAL at 15:53

## 2019-10-15 RX ADMIN — OXYCODONE HYDROCHLORIDE 5 MG: 5 TABLET ORAL at 22:00

## 2019-10-15 RX ADMIN — INFLUENZA A VIRUS A/MICHIGAN/45/2015 X-275 (H1N1) ANTIGEN (FORMALDEHYDE INACTIVATED), INFLUENZA A VIRUS A/SINGAPORE/INFIMH-16-0019/2016 IVR-186 (H3N2) ANTIGEN (FORMALDEHYDE INACTIVATED), AND INFLUENZA B VIRUS B/MARYLAND/15/2016 BX-69A (A B/COLORADO/6/2017-LIKE VIRUS) ANTIGEN (FORMALDEHYDE INACTIVATED) 0.5 ML: 60; 60; 60 INJECTION, SUSPENSION INTRAMUSCULAR at 10:25

## 2019-10-15 RX ADMIN — ENOXAPARIN SODIUM 40 MG: 40 INJECTION SUBCUTANEOUS at 10:25

## 2019-10-15 RX ADMIN — RANITIDINE 150 MG: 150 TABLET ORAL at 08:28

## 2019-10-15 RX ADMIN — TRIAMCINOLONE ACETONIDE: 1 CREAM TOPICAL at 05:48

## 2019-10-15 RX ADMIN — METOPROLOL TARTRATE 25 MG: 25 TABLET, FILM COATED ORAL at 19:56

## 2019-10-15 RX ADMIN — CETIRIZINE HYDROCHLORIDE 10 MG: 10 TABLET, FILM COATED ORAL at 08:27

## 2019-10-15 RX ADMIN — LIDOCAINE 1 PATCH: 560 PATCH PERCUTANEOUS; TOPICAL; TRANSDERMAL at 08:25

## 2019-10-15 RX ADMIN — ACETAMINOPHEN 975 MG: 325 TABLET, FILM COATED ORAL at 10:25

## 2019-10-15 RX ADMIN — ATORVASTATIN CALCIUM 40 MG: 40 TABLET, FILM COATED ORAL at 22:00

## 2019-10-15 RX ADMIN — SENNOSIDES AND DOCUSATE SODIUM 2 TABLET: 8.6; 5 TABLET ORAL at 08:27

## 2019-10-15 RX ADMIN — METOPROLOL TARTRATE 25 MG: 25 TABLET, FILM COATED ORAL at 08:27

## 2019-10-15 RX ADMIN — OXYCODONE HYDROCHLORIDE 5 MG: 5 TABLET ORAL at 16:05

## 2019-10-15 ASSESSMENT — ACTIVITIES OF DAILY LIVING (ADL)
ADLS_ACUITY_SCORE: 18

## 2019-10-15 NOTE — PLAN OF CARE
Patient A&O x4, lungs sound clear, Bowel sound active- passing and had a bowel movement today, Denied CP, lightheadedness, dizziness, numbness,  and SOB, no IV access, BG today were 70,82 and 72- appetite fair, encouraged patient to eat but eating very little, sleeping most of the day, drinking well and voiding spontaneously without difficulties, able to wiggle toes, pins sites intact, leg elevated on foam, cream applied to body- redness improving, dressing changed on coccyx, blister open and had small amount of drainage, pain tolerable and taking oxycodone for pain,  incentive spirometer encouraged and done several times, repositioned and turned in bed, refused pneumatic boots and refuse to get out of bed today. Offered bath but patient declined, heels elevated off bed, demonstrates the ability to use call light appropriately, will continue to monitor patient.

## 2019-10-15 NOTE — PLAN OF CARE
VS: VSS   O2: 98% on room air. Denies SOB   Output: Voiding adequate amount and spontaneously   Last BM: 10/14/2019   Activity: Bedrest   Skin: Rash on trunk, face, leg. Bruising over coccyx. Blood blisters on sacrum.   Pain: Comfortably manageable w oxycodone and Tylenol PO   CMS: Reports some numbness and tingling in LLE after extremity is moved.   Dressing: CDI   Diet: Regular. Fair appetite   LDA: No PIV in place   Equipment: External fixator, bedpan, personal belongings   Plan: Continue to monitor   Additional Info:

## 2019-10-15 NOTE — PLAN OF CARE
Discharge Planner PT   Patient plan for discharge: TCU  Current status: Supine <> sit with max A at LLE, sat EOB for ~ 3 minutes unsupported with good sitting balance. Pt declined further OOB activity.   Barriers to return to prior living situation: level of assist, safety, independence, decreased activity tolerance   Recommendations for discharge: TCU  Rationale for recommendations: Pt would benefit from further skilled PT services for bed mobility, transfers, and gait training.        Entered by: Johanny Campbell 10/15/2019 11:59 AM

## 2019-10-15 NOTE — PROGRESS NOTES
Record reviewed.  Seen with RN.  Discussed with Ortho.  TCU bed available this afternoon.  Dermatology consult 1014 regarding skin eruption reviewed.  Felt to be consistent with a drug eruption.  Patient believes subjectively worsened with increase of pruritus and extension of eruption.  Remains on Zyrtec and as needed Benadryl.  O2 1 L overnight until approximately 3 AM.  Adequate pain control aggravated by mobilization to the commode.  Limited use of oxycodone 5 to 10 mg.  No postural dizziness.  Denies chest discomfort or dyspnea.  Occasional cough attributed to postnasal drainage.  Nondescript sputum.  Limited p.o. intake due to abdominal cramping.  Does not feel bloated.  Bowel movement 1014.  Voiding okay.  Lantus 30 units last evening with with the blood sugar dropped this morning to 70.    /56   Pulse 82   Temp 98.1  F (36.7  C)   Resp 16   SpO2 96%   Objective adult female lying supine in bed in no distress.  Alert and oriented.  Skin-generalized macular erythematous eruption involving the face neck anterior posterior trunk abdomen upper and lower extremities.  Facial erythema appears somewhat less intense.  Chest clear lung fields.  Cardiac exam regular without gallop or murmur no click no jugular venous distention.  Abdomen benign without tenderness organomegaly or mass bowel sounds normal.  Extremities well perfused.  No edema.  Left lower extremity fixator in place.  No left thigh right lower extremity calf or thigh tenderness/induration to suggest DVT.    Last Comprehensive Metabolic Panel:  Sodium   Date Value Ref Range Status   10/14/2019 138 133 - 144 mmol/L Final     Potassium   Date Value Ref Range Status   10/14/2019 3.7 3.4 - 5.3 mmol/L Final     Chloride   Date Value Ref Range Status   10/14/2019 108 94 - 109 mmol/L Final     Carbon Dioxide   Date Value Ref Range Status   10/14/2019 23 20 - 32 mmol/L Final     Anion Gap   Date Value Ref Range Status   10/14/2019 7 3 - 14 mmol/L Final      Glucose   Date Value Ref Range Status   10/14/2019 123 (H) 70 - 99 mg/dL Final     Urea Nitrogen   Date Value Ref Range Status   10/14/2019 11 7 - 30 mg/dL Final     Creatinine   Date Value Ref Range Status   10/14/2019 0.92 0.52 - 1.04 mg/dL Final     GFR Estimate   Date Value Ref Range Status   10/14/2019 62 >60 mL/min/[1.73_m2] Final     Comment:     Non  GFR Calc  Starting 12/18/2018, serum creatinine based estimated GFR (eGFR) will be   calculated using the Chronic Kidney Disease Epidemiology Collaboration   (CKD-EPI) equation.       Calcium   Date Value Ref Range Status   10/14/2019 7.7 (L) 8.5 - 10.1 mg/dL Final     Hemoglobin   Date Value Ref Range Status   10/15/2019 10.7 (L) 11.7 - 15.7 g/dL Final   10/14/2019 11.2 (L) 11.7 - 15.7 g/dL Final   Platelet count 596,000.    Assessment:  1.  Status post placement [place] Pelaez 3 external fixator system.  Indication left bimalleolar ankle fracture, subsequent to a fall.  No indication of syncope.  Minimal blood loss.  Fairly adequate pain control.  2.  Acute blood loss anemia.  Mild.  No clinical compromise.  3.  Generalized skin abruption consistent with drug reaction.  Prone in the past to similar reaction with intravenous antibiotics.  Unclear if relates to IV clindamycin or some other additive.  Subjective worsening without dramatic change clinically.  4.  Type 2 diabetes mellitus.  Fair control prior to admission with last A1c 7.6%.  Continued trend toward the a.m. hypoglycemia contributed to by limited p.o. intake.  This is despite holding glimepiride and further reduction in Lantus dose...   5.  Hypertension adequate control on the metoprolol with parameters.  Lasix and lisinopril on hold.  6.  Gastroesophageal reflux disease symptomatically controlled on ranitidine.  7.  Exertional dyspnea with negative stress test 2011.  8.  Hypoxemia likely related to hypoventilation, atelectasis.  Patient does snore.  No knowledge regarding  obstructive sleep apnea.  9.  Thrombocytopenia likely reactive.    Plan:  Defer discharge.    Review meds, orders.  Discontinue Zyrtec.  Start Allegra per dermatology.  Benadryl at at bedtime to help promote sleep.  Same opiates.  PRN p.o. Benadryl (decrease interval every 6 hours) with the topical triamcinolone cream.  Add Zyrtec and ranitidine (discontinue Protonix).  Dermatology consult.  Scheduled Tylenol.   Continue to hold glimepiride.  Decrease Lantus to 24 units at bedtime.  NovoLog 1 unit per carb unit 3 times daily with meals in addition to medium correction (reviewed with endocrine).  Encourage incentive spirometry, bowel meds,  Lovenox ordered for DVT prophylaxis (per orthopedics).  Mobilize as tolerated.  Trend labs.  Monitor clinically.    Anticipated discharge in 1  days to TCU once diabetes and skin reduction adequately addressed.

## 2019-10-15 NOTE — PROGRESS NOTES
Suppository given with no result, patient got up to bedside commode. 1 liters of oxygen via NC while sleeping.

## 2019-10-15 NOTE — PROGRESS NOTES
Orthopaedic Surgery Progress Note 10/15/2019      Subjective  POD#3. NAEO. Pain well controlled. Discharge today to TCU, agreeable.      Objective  Temp: 98.1  F (36.7  C) Temp src: Oral BP: 120/56 Pulse: 82 Heart Rate: 99 Resp: 16 SpO2: 96 % O2 Device: None (Room air) Oxygen Delivery: 1 LPM    Exam:  Gen: No acute distress, resting comfortably in bed.  Resp: Non-labored breathing  Cardio: Regular rate via peripheral pulse  MSK:  LLE:  - Pin site dressings c/d/i  - No skin wrinkle  - Compartments soft and compressible  - SILT tibial/sural/saphenous/DP/SP nerves  - Fires EHL, FHL; unable to assess TA, GSC d/t external fixator  - PT/DP pulses 1+, foot wwp    Recent Labs   Lab 10/15/19  0531 10/14/19  0544 10/13/19  0605 10/12/19  2007 10/12/19  1212   WBC  --   --  8.1  --  13.5*   HGB  --  11.2* 12.0  --  13.8   *  --  536* 594* 647*       Assessment: Sarah Felix is a 74 year old female s/p L ankle external fixator on 10/12/2019 with Dr. Brown. Doing well.     Plan:  Orthopaedics Primary, Medicine co-managing appreciate assistance  Activity: Up with assist.   Weight bearing status: NWB LLE.  Pain management: Transition from IV to PO as tolerated.  Antibiotics: s/p perioperative Clindamycin x 24 hours.  Diet: Diabetic diet.  DVT prophylaxis: Lovenox.  Dressings: Keep pin site dressings c/d/i, change prn  PT/OT: Eval and treat.   Consults: Hospitalist, PT, OT appreciate recs. SW for dispo planning, will likely require TCU upon discharge     Follow-up: Clinic with Dr. Lemus for planning of definitive fixation; date TBD.      Disposition: Discharge today to [facility], cleared.    Hung Vazquez MD  Orthopaedic Surgery, PGY-4  Pager: [phone]     Please page me directly with any questions/concerns during regular weekday hours before 5pm. If there is no response, if it is a weekend, or if it is during evening hours then please page the orthopaedic surgery resident on call as listed on [employer]  call schedule under the orthopaedics tab.      Please page me at [phone] with any questions/concerns. If there is no response, if it is a weekend, or if it is during evening hours then please page the orthopaedic surgery resident on call.       Future Appointments   Date Time Provider Department [place]   10/14/2019  1:15 PM Alma Delia Mcknight, OT UROT [place]   10/14/2019  2:45 PM [facility]   11/18/2019  2:00 PM Wegener, Joel Daniel Irwin, MD [facility]

## 2019-10-15 NOTE — PLAN OF CARE
"OT: Pt declined OT eval on this date, pt states OOB activity causes \"too much pain\" and \"takes too long to recover.\" Will attempt 10/16 OT eval as able.   "

## 2019-10-15 NOTE — PLAN OF CARE
VS: VS stable this shift.    O2: On continuous pulse ox this shift, but sats remained above 90, so continuous pulse ox was stopped. Weaned off of 1L O2 nasal cannula.    Output: Voiding adequate amounts this shift.    Last BM: 10/15/19, smear   Activity: Bedrest, assist of 2   Skin: External fixation on LLE. Skin presents without redness. Swelling in LLE foot and ankle. Red rash all over pt. Body and has been itching all night. Pt. Refused Bendryl. Gave pt. Scheduled and applied all over body. New Sacrum Mepilex dressing applied to cover blood blisters and reddened area of sacrum.    Pain: Pt. Refused any pain medication this shift.    CMS: Numbness in LLE.    Dressing: New Sacrum mepliex dressing applied.    Diet: Regular diet as tolerated by pt.    LDA: No IV. No drains.    Equipment: External fixation, abduction wedge   Plan: Continue to monitor external fixation sites.  Pt. Needs urine sample.    Additional Info: Pt. Awoke in the night a few times due to itching. Tried to offer pt. Benydryl and cool cloths until pharmacy sent Trimicolone cream up.

## 2019-10-15 NOTE — PLAN OF CARE
OT-OT attempted to see pt to complete eval and pt declined due to being too tired. Will attempt to complete eval on 10/15/19 as able.

## 2019-10-16 ENCOUNTER — PRE VISIT (OUTPATIENT)
Dept: ORTHOPEDICS | Facility: CLINIC | Age: 74
End: 2019-10-16

## 2019-10-16 ENCOUNTER — APPOINTMENT (OUTPATIENT)
Dept: PHYSICAL THERAPY | Facility: CLINIC | Age: 74
DRG: 493 | End: 2019-10-16
Payer: COMMERCIAL

## 2019-10-16 ENCOUNTER — PRE VISIT (OUTPATIENT)
Dept: SURGERY | Facility: CLINIC | Age: 74
End: 2019-10-16

## 2019-10-16 ENCOUNTER — APPOINTMENT (OUTPATIENT)
Dept: GENERAL RADIOLOGY | Facility: CLINIC | Age: 74
DRG: 493 | End: 2019-10-16
Attending: INTERNAL MEDICINE
Payer: COMMERCIAL

## 2019-10-16 LAB
ALBUMIN SERPL-MCNC: 2.3 G/DL (ref 3.4–5)
ALBUMIN UR-MCNC: 30 MG/DL
ALP SERPL-CCNC: 132 U/L (ref 40–150)
ALT SERPL W P-5'-P-CCNC: 48 U/L (ref 0–50)
ANION GAP SERPL CALCULATED.3IONS-SCNC: 6 MMOL/L (ref 3–14)
APPEARANCE UR: CLEAR
AST SERPL W P-5'-P-CCNC: 36 U/L (ref 0–45)
BACTERIA #/AREA URNS HPF: ABNORMAL /HPF
BASOPHILS # BLD AUTO: 0 10E9/L (ref 0–0.2)
BASOPHILS NFR BLD AUTO: 0.1 %
BILIRUB SERPL-MCNC: 1.1 MG/DL (ref 0.2–1.3)
BILIRUB UR QL STRIP: NEGATIVE
BUN SERPL-MCNC: 10 MG/DL (ref 7–30)
CALCIUM SERPL-MCNC: 7.8 MG/DL (ref 8.5–10.1)
CHLORIDE SERPL-SCNC: 107 MMOL/L (ref 94–109)
CO2 SERPL-SCNC: 27 MMOL/L (ref 20–32)
COLOR UR AUTO: YELLOW
CREAT SERPL-MCNC: 0.78 MG/DL (ref 0.52–1.04)
DIFFERENTIAL METHOD BLD: ABNORMAL
EOSINOPHIL # BLD AUTO: 0.8 10E9/L (ref 0–0.7)
EOSINOPHIL NFR BLD AUTO: 3.8 %
ERYTHROCYTE [DISTWIDTH] IN BLOOD BY AUTOMATED COUNT: 14.8 % (ref 10–15)
GFR SERPL CREATININE-BSD FRML MDRD: 76 ML/MIN/{1.73_M2}
GLUCOSE BLDC GLUCOMTR-MCNC: 113 MG/DL (ref 70–99)
GLUCOSE BLDC GLUCOMTR-MCNC: 130 MG/DL (ref 70–99)
GLUCOSE BLDC GLUCOMTR-MCNC: 135 MG/DL (ref 70–99)
GLUCOSE BLDC GLUCOMTR-MCNC: 193 MG/DL (ref 70–99)
GLUCOSE SERPL-MCNC: 117 MG/DL (ref 70–99)
GLUCOSE UR STRIP-MCNC: 70 MG/DL
HCT VFR BLD AUTO: 33.1 % (ref 35–47)
HGB BLD-MCNC: 11.1 G/DL (ref 11.7–15.7)
HGB UR QL STRIP: NEGATIVE
IMM GRANULOCYTES # BLD: 0.2 10E9/L (ref 0–0.4)
IMM GRANULOCYTES NFR BLD: 1.2 %
KETONES UR STRIP-MCNC: 40 MG/DL
LEUKOCYTE ESTERASE UR QL STRIP: ABNORMAL
LYMPHOCYTES # BLD AUTO: 1 10E9/L (ref 0.8–5.3)
LYMPHOCYTES NFR BLD AUTO: 4.8 %
MCH RBC QN AUTO: 33.4 PG (ref 26.5–33)
MCHC RBC AUTO-ENTMCNC: 33.5 G/DL (ref 31.5–36.5)
MCV RBC AUTO: 100 FL (ref 78–100)
MONOCYTES # BLD AUTO: 1.3 10E9/L (ref 0–1.3)
MONOCYTES NFR BLD AUTO: 6.6 %
MUCOUS THREADS #/AREA URNS LPF: PRESENT /LPF
NEUTROPHILS # BLD AUTO: 16.7 10E9/L (ref 1.6–8.3)
NEUTROPHILS NFR BLD AUTO: 83.5 %
NITRATE UR QL: NEGATIVE
NRBC # BLD AUTO: 0 10*3/UL
NRBC BLD AUTO-RTO: 0 /100
PH UR STRIP: 6 PH (ref 5–7)
PLATELET # BLD AUTO: 685 10E9/L (ref 150–450)
POTASSIUM SERPL-SCNC: 3.8 MMOL/L (ref 3.4–5.3)
PROCALCITONIN SERPL-MCNC: 0.64 NG/ML
PROT SERPL-MCNC: 5.5 G/DL (ref 6.8–8.8)
RBC # BLD AUTO: 3.32 10E12/L (ref 3.8–5.2)
RBC #/AREA URNS AUTO: 1 /HPF (ref 0–2)
SODIUM SERPL-SCNC: 140 MMOL/L (ref 133–144)
SOURCE: ABNORMAL
SP GR UR STRIP: 1.02 (ref 1–1.03)
SQUAMOUS #/AREA URNS AUTO: 5 /HPF (ref 0–1)
UROBILINOGEN UR STRIP-MCNC: 4 MG/DL (ref 0–2)
WBC # BLD AUTO: 20.1 10E9/L (ref 4–11)
WBC #/AREA URNS AUTO: 6 /HPF (ref 0–5)

## 2019-10-16 PROCEDURE — 25000128 H RX IP 250 OP 636: Performed by: STUDENT IN AN ORGANIZED HEALTH CARE EDUCATION/TRAINING PROGRAM

## 2019-10-16 PROCEDURE — 25000132 ZZH RX MED GY IP 250 OP 250 PS 637: Performed by: STUDENT IN AN ORGANIZED HEALTH CARE EDUCATION/TRAINING PROGRAM

## 2019-10-16 PROCEDURE — 81001 URINALYSIS AUTO W/SCOPE: CPT | Performed by: INTERNAL MEDICINE

## 2019-10-16 PROCEDURE — 71045 X-RAY EXAM CHEST 1 VIEW: CPT

## 2019-10-16 PROCEDURE — 00000146 ZZHCL STATISTIC GLUCOSE BY METER IP

## 2019-10-16 PROCEDURE — 97110 THERAPEUTIC EXERCISES: CPT | Mod: GP

## 2019-10-16 PROCEDURE — 36415 COLL VENOUS BLD VENIPUNCTURE: CPT | Performed by: INTERNAL MEDICINE

## 2019-10-16 PROCEDURE — 80053 COMPREHEN METABOLIC PANEL: CPT | Performed by: INTERNAL MEDICINE

## 2019-10-16 PROCEDURE — 85025 COMPLETE CBC W/AUTO DIFF WBC: CPT | Performed by: INTERNAL MEDICINE

## 2019-10-16 PROCEDURE — 12000001 ZZH R&B MED SURG/OB UMMC

## 2019-10-16 PROCEDURE — 97530 THERAPEUTIC ACTIVITIES: CPT | Mod: GP

## 2019-10-16 PROCEDURE — 99207 ZZC CDG-MDM COMPONENT: MEETS LOW - DOWN CODED: CPT | Performed by: INTERNAL MEDICINE

## 2019-10-16 PROCEDURE — 87086 URINE CULTURE/COLONY COUNT: CPT | Performed by: INTERNAL MEDICINE

## 2019-10-16 PROCEDURE — 99232 SBSQ HOSP IP/OBS MODERATE 35: CPT | Performed by: INTERNAL MEDICINE

## 2019-10-16 PROCEDURE — 84145 PROCALCITONIN (PCT): CPT | Performed by: INTERNAL MEDICINE

## 2019-10-16 PROCEDURE — 25000132 ZZH RX MED GY IP 250 OP 250 PS 637: Performed by: INTERNAL MEDICINE

## 2019-10-16 RX ADMIN — METOPROLOL TARTRATE 25 MG: 25 TABLET, FILM COATED ORAL at 19:54

## 2019-10-16 RX ADMIN — ACETAMINOPHEN 650 MG: 325 TABLET, FILM COATED ORAL at 19:54

## 2019-10-16 RX ADMIN — LIDOCAINE 1 PATCH: 560 PATCH PERCUTANEOUS; TOPICAL; TRANSDERMAL at 08:26

## 2019-10-16 RX ADMIN — TRIAMCINOLONE ACETONIDE: 1 CREAM TOPICAL at 12:39

## 2019-10-16 RX ADMIN — METOPROLOL TARTRATE 25 MG: 25 TABLET, FILM COATED ORAL at 08:27

## 2019-10-16 RX ADMIN — RANITIDINE 150 MG: 150 TABLET ORAL at 19:54

## 2019-10-16 RX ADMIN — TRIAMCINOLONE ACETONIDE: 1 CREAM TOPICAL at 19:55

## 2019-10-16 RX ADMIN — ENOXAPARIN SODIUM 40 MG: 40 INJECTION SUBCUTANEOUS at 12:38

## 2019-10-16 RX ADMIN — RANITIDINE 150 MG: 150 TABLET ORAL at 08:27

## 2019-10-16 RX ADMIN — FEXOFENADINE HCL 180 MG: 60 TABLET, FILM COATED ORAL at 08:27

## 2019-10-16 RX ADMIN — OXYCODONE HYDROCHLORIDE 5 MG: 5 TABLET ORAL at 01:33

## 2019-10-16 RX ADMIN — ATORVASTATIN CALCIUM 40 MG: 40 TABLET, FILM COATED ORAL at 22:23

## 2019-10-16 RX ADMIN — SENNOSIDES AND DOCUSATE SODIUM 1 TABLET: 8.6; 5 TABLET ORAL at 19:57

## 2019-10-16 ASSESSMENT — ACTIVITIES OF DAILY LIVING (ADL)
ADLS_ACUITY_SCORE: 18

## 2019-10-16 NOTE — PLAN OF CARE
VS: Vitals stable this shift.    O2: On 1L Nasal cannula.   Output: Voiding adequate amounts   Last BM: 10/15/19   Activity: Bedrest, Assist x2   Skin: External fixator to LLE, Pin sites scant dried drainage on guaze, blood blisters on buttox (sacral dressing applied), red rash on body, itching all over body and hands   Pain: Managed with Oxycodone 5mg this shift   CMS: Numbness in RLE ankle and foot   Dressing: Sacral dressing CDI   Diet: Regular diet as tolerated by pt.    LDA: No IV, no drains   Equipment: External fixator, abduction pillow, gait belt   Plan: Possible discharge today to TCU   Additional Info: Pt. Mentioned seeing shadows and people that were not in the room during the night. Monitoring and managing pain without Oxycodone the rest of the night.

## 2019-10-16 NOTE — PLAN OF CARE
Patient A&O x4, lungs sound clear, Bowel sound active-  had a bowel movement yesterday, Denied CP, lightheadedness, dizziness, and SOB, numbness in right foot as baseline , drinking well and voiding spontaneously without difficulties, able to wiggle toes, pins sites dressing clean,dry and intact, pain tolerable, lidocaine patch applied to foot/ankle , leg  elevated on foam,sleeping most of the shift, redness remains- cream applied to skin, BG this morning was 135- has not eaten yet, incentive spirometer encouraged and done several times, repositioned and turned in bed, heels elevated off bed, demonstrates the ability to use call light appropriately, will continue to monitor patient.

## 2019-10-16 NOTE — TELEPHONE ENCOUNTER
FUTURE VISIT INFORMATION      SURGERY INFORMATION:    appt per Dr. melgar possible DOS 10/21/19     RECORDS REQUESTED FROM:       Primary Care Provider: Joel Wegener- [facility]    Most recent EKG+ Tracing: 3/11/19    Most recent Cardiac Stress Test: 10/31/11

## 2019-10-16 NOTE — PROGRESS NOTES
Record reviewed.  Seen with RN.  Clinically continues to indicate not feeling well.  Poor appetite.  The limited p.o.  Lantus held the last evening with blood sugars in the 70s to 160 range so as to avoid overnight hypoglycemia.  On 16 units prior to admission in addition to Amaryl twice daily.  Subjective sense of the worsening rash with more extensive involvement of the distal extremities.  Persistent pruritus which appears to be fairly well controlled with Allegra and a at bedtime of Benadryl.  Facilitating sleep.  No chills or sweats.  No headache.  No chest discomfort or dyspnea.  Occasional cough.  No sputum production.  Mild nausea but no emesis.  No reflux or abdominal pain.  Larger bowel movement 10/15.  No diarrhea.  No dysuria.  Prior urine culture greater than 100,000 colonies of mixed delonte.    /60   Pulse 87   Temp 98.4  F (36.9  C)   Resp 16   SpO2 95%   Objective adult female lying supine in bed in no distress.  Alert and oriented.  Skin-generalized macular erythematous eruption involving the face neck anterior posterior trunk abdomen upper and lower extremities.  Facial erythema appears somewhat less intense since onset.  Further extension of the rash more distally and to the upper and lower extremities.  Chest clear lung fields except for faint inspiratory rales right base.  May be atelectatic.  Not using incentive spirometer regularly.  Cardiac exam regular without gallop or murmur no click no jugular venous distention.  Abdomen benign without tenderness organomegaly or mass bowel sounds normal.  Extremities well perfused.  No edema.  Left lower extremity fixator in place.  No left thigh right lower extremity calf or thigh tenderness/induration to suggest DVT.  Per Ortho left lower extremity fixator site looks good.  No overt signs of infection.    Last Comprehensive Metabolic Panel:  Sodium   Date Value Ref Range Status   10/16/2019 140 133 - 144 mmol/L Final     Potassium   Date Value  Ref Range Status   10/16/2019 3.8 3.4 - 5.3 mmol/L Final     Chloride   Date Value Ref Range Status   10/16/2019 107 94 - 109 mmol/L Final     Carbon Dioxide   Date Value Ref Range Status   10/16/2019 27 20 - 32 mmol/L Final     Anion Gap   Date Value Ref Range Status   10/16/2019 6 3 - 14 mmol/L Final     Glucose   Date Value Ref Range Status   10/16/2019 117 (H) 70 - 99 mg/dL Final     Urea Nitrogen   Date Value Ref Range Status   10/16/2019 10 7 - 30 mg/dL Final     Creatinine   Date Value Ref Range Status   10/16/2019 0.78 0.52 - 1.04 mg/dL Final     GFR Estimate   Date Value Ref Range Status   10/16/2019 76 >60 mL/min/[1.73_m2] Final     Comment:     Non  GFR Calc  Starting 12/18/2018, serum creatinine based estimated GFR (eGFR) will be   calculated using the Chronic Kidney Disease Epidemiology Collaboration   (CKD-EPI) equation.       Calcium   Date Value Ref Range Status   10/16/2019 7.8 (L) 8.5 - 10.1 mg/dL Final     Hemoglobin   Date Value Ref Range Status   10/16/2019 11.1 (L) 11.7 - 15.7 g/dL Final   10/15/2019 10.7 (L) 11.7 - 15.7 g/dL Final     Lab Results   Component Value Date    WBC 20.1 10/16/2019     Lab Results   Component Value Date    RBC 3.32 10/16/2019     Lab Results   Component Value Date    HGB 11.1 10/16/2019     Lab Results   Component Value Date    HCT 33.1 10/16/2019     Lab Results   Component Value Date     10/16/2019     Lab Results   Component Value Date    MCH 33.4 10/16/2019     Lab Results   Component Value Date    MCHC 33.5 10/16/2019     Lab Results   Component Value Date    RDW 14.8 10/16/2019     Lab Results   Component Value Date     10/16/2019     Left shift.  0.8% Eos  procal 0.64    Normal hepatic profile    Assessment:  1.  Status post placement [place] Pelaez 3 external fixator system.  Indication left bimalleolar ankle fracture, subsequent to a fall.  No indication of syncope.  Minimal blood loss.  Fairly adequate pain control  compromising mobility.  2.  Acute blood loss anemia.  Mild.  No clinical compromise.  3.  Generalized skin abruption consistent with drug reaction.  Prone in the past to similar reaction with intravenous antibiotics.  Unclear if relates to IV clindamycin or some other additive.  Changes are clinically consistent with expected course.  Has taken up to a week in the past resolved.  Did receive steroids on one occasion.  4.  Type 2 diabetes mellitus.  Fair control prior to admission with last A1c 7.6%.  Has remained relatively euglycemic off Lantus and Amaryl.  Likely relates to low poor appetite, limited intake..   5.  Hypertension adequate control on the metoprolol with parameters.  Lasix and lisinopril on hold.  6.  Gastroesophageal reflux disease symptomatically controlled on ranitidine.  7.  Exertional dyspnea with negative stress test 2011.  8.  Hypoxemia likely related to hypoventilation, atelectasis.  Patient does snore.  No knowledge regarding obstructive sleep apnea.  Resolved.  9.  Marked leukocytosis potentially related to #3 particularly with eosinophilia..  No clear sign of bacterial infection however pro-Donta results somewhat concerning.  Suspect rales right base atelectatic.  Leukemoid response.  Defer antibiotics presently unless patient spikes particularly with multiple IV antibiotic allergies.  Suspect progressive thrombocytopenia likely reactive.    Plan:  Defer discharge.    Planned followed by dermatology.  Continue Allegra and as needed Benadryl.  Same opiates.  Scheduled Tylenol.   Continue NovoLog correction and carb coverage.  Hold Lantus and Amaryl.  Diabetes service aware of patient.  Encourage incentive spirometry, bowel meds, lovenox for DVT prophylaxis (per orthopedics).  Mobilize as tolerated.  Trend labs including follow-up of pro-Donta and a CBC with differential.  UA UC and a chest x-ray for completeness.  Consider ID opinion in particular if patient spikes.  Monitor  clinically.    Anticipated discharge  to TCU once diabetes, leukocytosis and skin reaction adequately addressed.

## 2019-10-16 NOTE — PROGRESS NOTES
Orthopaedic Surgery Progress Note 10/16/2019    Interval Events  None    Subjective  No acute events overnight.  Pain well controlled. Denies new numbness, tingling, or weakness.  Tolerating diet without nausea or vomiting.  Voiding spontaneously.  +flatus, +BM.   Denies fevers, chills, chest pain, SOB.  Continued pruritic rash over chest and upper arms, improved with triamcinolone cream and nightly benadryl.    Objective  Temp: 98  F (36.7  C) Temp src: Oral BP: 128/61 Pulse: 89   Resp: 17 SpO2: 95 % O2 Device: None (Room air)      Exam:  Gen: No acute distress, resting comfortably in bed.  Resp: Non-labored breathing  Cardio: Regular rate via peripheral pulse  MSK:  LLE:  - Pin site dressings c/d/i  - No skin wrinkle  - Compartments soft and compressible  - SILT tibial/sural/saphenous/DP/SP nerves  - Fires EHL, FHL; unable to assess TA, GSC d/t external fixator  - PT/DP pulses 1+, foot wwp    Recent Labs   Lab 10/15/19  0531 10/14/19  0544 10/13/19  0605 10/12/19  2007 10/12/19  1212   WBC  --   --  8.1  --  13.5*   HGB 10.7* 11.2* 12.0  --  13.8   *  --  536* 594* 647*       Assessment: Sarah Felix is a 74 year old female s/p L ankle external fixator on 10/12/2019 with Dr. Brown. Doing well.     Plan:  Orthopaedics Primary, Medicine co-managing appreciate assistance  Activity: Up with assist.   Weight bearing status: NWB LLE.  Pain management: Transition from IV to PO as tolerated.  Antibiotics: s/p perioperative Clindamycin x 24 hours.  Diet: Diabetic diet.  DVT prophylaxis: Lovenox.  Dressings: Keep pin site dressings c/d/i, change prn  PT/OT: Eval and treat.   Consults: Hospitalist, PT, OT appreciate recs. SW for dispo planning, will likely require TCU upon discharge     Follow-up: Clinic with Dr. Lemus for planning of definitive fixation; date TBD.      Disposition: Discharge to [facility] pending blood sugar stabilization, cleared from orthopaedics perspective.     Malik Meixner,  MD  Orthopaedic Surgery PGY-1  [phone]    Please page me at [phone] with any questions/concerns. If there is no response, if it is a weekend, or if it is during evening hours then please page the orthopaedic surgery resident on call.       Future Appointments   Date Time Provider Department [place]   10/16/2019  6:30 AM UR OT OVERFLOW UROT [place]   10/16/2019 10:45 AM Jose Roberto Can MD [facility]   10/16/2019 12:15 PM Awilda Davies PA-C [facility]   10/16/2019  2:45 PM Bernard Wilkerson, PT URPT [place]   11/18/2019  2:00 PM Wegener, Joel Daniel Irwin, MD [facility]       Addendum, 10/16/2019, 1017:  Scheduled for clinic follow-up with Dr. Can today.  Due to current inpatient status, will reschedule clinic visit for next week for skin check/definitive surgical planning.  Dr. Can will hold OR time on 10/28/2019 for possible definitive management of left ankle fracture.    Also, WBC 20.1 today (8.1 POD#1).  Pin site dressings taken down; all pin sites look good without surrounding erythema or drainage.  Kerlix reapplied.    Malik Tobar MD  Orthopaedic Surgery PGY-1  [phone]

## 2019-10-16 NOTE — PLAN OF CARE
"OT:  Patient declined any mobility.  Reports last time she sat EOB, \"the pain put me back two days\".  Patient reports just being washed up in supine, \"I was feeling the bones move\".  OT to hold at this time until able to participate in one discipline.  "

## 2019-10-16 NOTE — PLAN OF CARE
VS: Stable    O2: On continuous pulse ox this shift.   Sats > 92% on RA. IS encouraged.   Output: Voiding adequate amounts through bedpan   Last BM: 10/15/19   Activity: Bedrest, assist of 2   Skin: External fixation on LLE.Swelling in LLE foot and ankle. Red rash all over body. Cream applied per order.    Pain: Given PRN Tylenol and Oxycodone 1x during shift.    CMS: Numbness in LLE- very minimal per pt report.   Able to move affected toes slowly.  Dorsalis pedis +2.   Dressing: Sacrum mepilex: CDI  External dressing : CDI   Diet: Regular   LDA: none   Equipment: External fixation,Leg elevator, personal belongings at bedside   Plan: Continue POC   Additional Info: Lantus held at bedtime per MD.

## 2019-10-16 NOTE — PLAN OF CARE
Discharge Planner PT   Patient plan for discharge: Rehab  Current status: Patient supine in bed on arrival, agreeable to PT session, sitting EOB and supine exercises only. Supine>sit EOB with totA for LLE. L LE supported on chair once seated EOB. Sits EOB for approximately 5 min, able to support herself with modA from behind in propping from elbows to hands. sit> supine with totA for LLE supported throughout. SBA for upper body positioning in bed with heavy use of bed rails. Ended session supine in bed with needs in reach. Completes supine LE strenghthening exercises x10 including R AP, R heel slides, R SLR, R SAQ, B QS.  Barriers to return to prior living situation: independence, safety, pain, decreased activity tolerance  Recommendations for discharge: Rehab  Rationale for recommendations: Would benefit from skilled PT services of a rehabilitation stay in order to improve independence with functional mobility and promote safety before returning home.        Entered by: Griselda Weinberg 10/16/2019 4:30 PM

## 2019-10-17 ENCOUNTER — APPOINTMENT (OUTPATIENT)
Dept: PHYSICAL THERAPY | Facility: CLINIC | Age: 74
DRG: 493 | End: 2019-10-17
Payer: COMMERCIAL

## 2019-10-17 LAB
ANION GAP SERPL CALCULATED.3IONS-SCNC: 7 MMOL/L (ref 3–14)
BACTERIA SPEC CULT: NORMAL
BASOPHILS # BLD AUTO: 0 10E9/L (ref 0–0.2)
BASOPHILS NFR BLD AUTO: 0.2 %
BUN SERPL-MCNC: 8 MG/DL (ref 7–30)
CALCIUM SERPL-MCNC: 7.7 MG/DL (ref 8.5–10.1)
CHLORIDE SERPL-SCNC: 108 MMOL/L (ref 94–109)
CO2 SERPL-SCNC: 25 MMOL/L (ref 20–32)
CREAT SERPL-MCNC: 0.63 MG/DL (ref 0.52–1.04)
DIFFERENTIAL METHOD BLD: ABNORMAL
EOSINOPHIL # BLD AUTO: 0.7 10E9/L (ref 0–0.7)
EOSINOPHIL NFR BLD AUTO: 3.6 %
ERYTHROCYTE [DISTWIDTH] IN BLOOD BY AUTOMATED COUNT: 14.8 % (ref 10–15)
GFR SERPL CREATININE-BSD FRML MDRD: 90 ML/MIN/{1.73_M2}
GLUCOSE BLDC GLUCOMTR-MCNC: 168 MG/DL (ref 70–99)
GLUCOSE BLDC GLUCOMTR-MCNC: 176 MG/DL (ref 70–99)
GLUCOSE BLDC GLUCOMTR-MCNC: 196 MG/DL (ref 70–99)
GLUCOSE BLDC GLUCOMTR-MCNC: 204 MG/DL (ref 70–99)
GLUCOSE BLDC GLUCOMTR-MCNC: 207 MG/DL (ref 70–99)
GLUCOSE SERPL-MCNC: 175 MG/DL (ref 70–99)
HCT VFR BLD AUTO: 33.6 % (ref 35–47)
HGB BLD-MCNC: 11 G/DL (ref 11.7–15.7)
IMM GRANULOCYTES # BLD: 0.9 10E9/L (ref 0–0.4)
IMM GRANULOCYTES NFR BLD: 4.7 %
LYMPHOCYTES # BLD AUTO: 1.3 10E9/L (ref 0.8–5.3)
LYMPHOCYTES NFR BLD AUTO: 7 %
Lab: NORMAL
MCH RBC QN AUTO: 32.4 PG (ref 26.5–33)
MCHC RBC AUTO-ENTMCNC: 32.7 G/DL (ref 31.5–36.5)
MCV RBC AUTO: 99 FL (ref 78–100)
MONOCYTES # BLD AUTO: 1.3 10E9/L (ref 0–1.3)
MONOCYTES NFR BLD AUTO: 7 %
NEUTROPHILS # BLD AUTO: 14.7 10E9/L (ref 1.6–8.3)
NEUTROPHILS NFR BLD AUTO: 77.5 %
NRBC # BLD AUTO: 0 10*3/UL
NRBC BLD AUTO-RTO: 0 /100
PLATELET # BLD AUTO: 683 10E9/L (ref 150–450)
POTASSIUM SERPL-SCNC: 3.5 MMOL/L (ref 3.4–5.3)
PROCALCITONIN SERPL-MCNC: 0.41 NG/ML
RBC # BLD AUTO: 3.39 10E12/L (ref 3.8–5.2)
SODIUM SERPL-SCNC: 140 MMOL/L (ref 133–144)
SPECIMEN SOURCE: NORMAL
WBC # BLD AUTO: 19 10E9/L (ref 4–11)

## 2019-10-17 PROCEDURE — 25000132 ZZH RX MED GY IP 250 OP 250 PS 637: Performed by: INTERNAL MEDICINE

## 2019-10-17 PROCEDURE — 25000132 ZZH RX MED GY IP 250 OP 250 PS 637: Performed by: STUDENT IN AN ORGANIZED HEALTH CARE EDUCATION/TRAINING PROGRAM

## 2019-10-17 PROCEDURE — 99232 SBSQ HOSP IP/OBS MODERATE 35: CPT | Performed by: INTERNAL MEDICINE

## 2019-10-17 PROCEDURE — 00000146 ZZHCL STATISTIC GLUCOSE BY METER IP

## 2019-10-17 PROCEDURE — 97530 THERAPEUTIC ACTIVITIES: CPT | Mod: GP

## 2019-10-17 PROCEDURE — 80048 BASIC METABOLIC PNL TOTAL CA: CPT | Performed by: INTERNAL MEDICINE

## 2019-10-17 PROCEDURE — 25000128 H RX IP 250 OP 636: Performed by: STUDENT IN AN ORGANIZED HEALTH CARE EDUCATION/TRAINING PROGRAM

## 2019-10-17 PROCEDURE — 85025 COMPLETE CBC W/AUTO DIFF WBC: CPT | Performed by: INTERNAL MEDICINE

## 2019-10-17 PROCEDURE — 84145 PROCALCITONIN (PCT): CPT | Performed by: INTERNAL MEDICINE

## 2019-10-17 PROCEDURE — 36415 COLL VENOUS BLD VENIPUNCTURE: CPT | Performed by: INTERNAL MEDICINE

## 2019-10-17 PROCEDURE — 12000001 ZZH R&B MED SURG/OB UMMC

## 2019-10-17 RX ADMIN — OXYCODONE HYDROCHLORIDE 5 MG: 5 TABLET ORAL at 12:28

## 2019-10-17 RX ADMIN — SENNOSIDES AND DOCUSATE SODIUM 1 TABLET: 8.6; 5 TABLET ORAL at 20:21

## 2019-10-17 RX ADMIN — FEXOFENADINE HCL 180 MG: 60 TABLET, FILM COATED ORAL at 07:59

## 2019-10-17 RX ADMIN — LIDOCAINE 1 PATCH: 560 PATCH PERCUTANEOUS; TOPICAL; TRANSDERMAL at 07:59

## 2019-10-17 RX ADMIN — OXYCODONE HYDROCHLORIDE 5 MG: 5 TABLET ORAL at 17:09

## 2019-10-17 RX ADMIN — TRIAMCINOLONE ACETONIDE: 1 CREAM TOPICAL at 07:59

## 2019-10-17 RX ADMIN — OXYCODONE HYDROCHLORIDE 5 MG: 5 TABLET ORAL at 07:59

## 2019-10-17 RX ADMIN — OXYCODONE HYDROCHLORIDE 5 MG: 5 TABLET ORAL at 14:05

## 2019-10-17 RX ADMIN — METOPROLOL TARTRATE 25 MG: 25 TABLET, FILM COATED ORAL at 20:21

## 2019-10-17 RX ADMIN — DIPHENHYDRAMINE HYDROCHLORIDE 25 MG: 25 CAPSULE ORAL at 00:26

## 2019-10-17 RX ADMIN — RANITIDINE 150 MG: 150 TABLET ORAL at 07:59

## 2019-10-17 RX ADMIN — OXYCODONE HYDROCHLORIDE 5 MG: 5 TABLET ORAL at 00:41

## 2019-10-17 RX ADMIN — TRIAMCINOLONE ACETONIDE: 1 CREAM TOPICAL at 14:05

## 2019-10-17 RX ADMIN — DIPHENHYDRAMINE HYDROCHLORIDE 25 MG: 25 CAPSULE ORAL at 22:56

## 2019-10-17 RX ADMIN — INSULIN ASPART 2 UNITS: 100 INJECTION, SOLUTION INTRAVENOUS; SUBCUTANEOUS at 15:05

## 2019-10-17 RX ADMIN — OXYCODONE HYDROCHLORIDE 5 MG: 5 TABLET ORAL at 22:56

## 2019-10-17 RX ADMIN — ENOXAPARIN SODIUM 40 MG: 40 INJECTION SUBCUTANEOUS at 12:28

## 2019-10-17 RX ADMIN — RANITIDINE 150 MG: 150 TABLET ORAL at 20:21

## 2019-10-17 RX ADMIN — ATORVASTATIN CALCIUM 40 MG: 40 TABLET, FILM COATED ORAL at 22:56

## 2019-10-17 RX ADMIN — TRIAMCINOLONE ACETONIDE: 1 CREAM TOPICAL at 20:44

## 2019-10-17 RX ADMIN — METOPROLOL TARTRATE 25 MG: 25 TABLET, FILM COATED ORAL at 07:59

## 2019-10-17 RX ADMIN — INSULIN ASPART 3 UNITS: 100 INJECTION, SOLUTION INTRAVENOUS; SUBCUTANEOUS at 09:21

## 2019-10-17 ASSESSMENT — ACTIVITIES OF DAILY LIVING (ADL)
ADLS_ACUITY_SCORE: 18
ADLS_ACUITY_SCORE: 18
ADLS_ACUITY_SCORE: 16
ADLS_ACUITY_SCORE: 18

## 2019-10-17 NOTE — PLAN OF CARE
VS: VSS ex elevated BP prior to AM meds.   O2: >90% on RA, pulse ox   Output: Voiding without difficulty via bedpan   Last BM: LBM 10/16 per pt report, passing flatus, BS active   Activity: Pt reluctant to get OOB, working with therapies. Rolls in bed with assist of 1. Assist of 2 if getting OOB. NWB to LLE. Therapy transferred pt to wheelchair and then back to bed with sliding board during session today. Pt was able to stand, but unable to pivot per physical therapist.    Skin: Skin intact ex for incision to LLE, generalized body rash-red/raised rash on extremities and trunk, cream applied x2. Bruising to L ankle.    Pain: Pain is being managed with PRN oxycodone 5-10 mg Q4H, given 5mg x1 and 10 mg x1. Please ask pt if she wants medications when they are available.    CMS: CMS intact ex for N to LLE from calf to foot, DP +2 bilaterally, able to wiggle toes. Bilateral LE swelling noted to feet and ankles- improving especially on LLE per pt report.    Dressing: Dressing to LLE CDI, no drainage visible. Lido patch applied to L foot.    Diet: Tolerating regular diet without N/V, BG checks before meals.  this AM- given 4 units. BG check at 1500 was 204 and given 4 units. Carb coverage. States appetite is fair, not great.    LDA: No PIV access.    Equipment: External fixator, foam elevator, bed pan, personal belongings at bedside    Plan: Continue to monitor patient, manage pain. Ortho has cleared for discharge to TCU until surgery date of 10/28. Per medicine note, may discharge tomorrow if rash is improved and pt overall feels improved to [facility].   Additional Info: Appears slightly depressed, flat affect, withdrawn.

## 2019-10-17 NOTE — PLAN OF CARE
VS: Vitals stable    O2: Sats > 91% on RA. Pulse ox in placed. Spot check done.    Output: Voiding adequate amounts  through bedpan   Last BM: 10/15/19   Activity: Bedrest, Assist x2   Skin: External fixator to LLE,  sore on buttcks (sacral dressing applied), red rash on body, itching all over body and hands   Pain: Given Tylenol PRN 1x.   CMS: Numbness in RLE ankle and foot-minimal. Nothing changed since yesterday per pt report   Dressing: Sacral dressing CDI:  Drain site leaking: changed new dressing   Diet: Regular   LDA: No IV, no drains   Equipment: External fixator, abduction pillow, gait belt, pulsate mattress   Plan: Discharged to TCU   Additional Info:

## 2019-10-17 NOTE — PLAN OF CARE
VS: Stable this shift   O2: On room air, maintaining sats >90.   Output: Voiding adequate amounts    Last BM: 10/16/19   Activity: Bedrest, assist x2   Skin: Red papuple rash on all extremities and trunk. Pt. States she is constantly itchy. Bruises to LLE. Swelling to BLE ankles and feet. Blood blisters on buttox covered with sacral mepilex dressing.   Pain: Managed this shift with prn Oxycodone 5mg x1.    CMS: Numbness in LLE lower half of calf and foot. Pt. States it is slight numbness.    Dressing: External fixation. Dressings around pin sites CDI   Diet: Regular diet as tolerated by pt.    LDA: No IV   Equipment: External fixation, abduction pillow   Plan: Continue plan of care   Additional Info: Pt. Was emotional tonight when asked about her pain. She stated that she did not know why Oxycodone had not been offered to her recently. I educated the pt. About safety concerns with possible hallucinations (pt. Stated she saw shadows and thought people were in the room and didn't see people in the room when she turned to look). Pt. States she would like to be offered the pain medication when it is available and to keep her more within the loop of the care she is provided. Pt. Was educated about pain management tonight.

## 2019-10-17 NOTE — PROGRESS NOTES
Pt was seen, course reviewed with orthopedics, Dr Crowell.    Pt feels tired, continues to have moderate ankle pain  Appetite poor  Still with generalized pruritus, rash, not significantly changed.  Feels sad re current status  Last BM yesterday  Minimal use of Benadryl    Afebrile  BP 120s-150s/  HR 90s  BG mid -upper 100s    Alert, fully oriented, appears sl depressed  Lungs clear  CV rrr  Abd soft, non-distended, non-tender  Skin generalized maculopapular rash  L ankle exam per ortho    Results for CHATA LAURENT (MRN [id number]) as of 10/17/2019 09:05   Ref. Range 10/17/2019 06:21   Potassium Latest Ref Range: 3.4 - 5.3 mmol/L 3.5   Chloride Latest Ref Range: 94 - 109 mmol/L 108   Carbon Dioxide Latest Ref Range: 20 - 32 mmol/L 25   Urea Nitrogen Latest Ref Range: 7 - 30 mg/dL 8   Creatinine Latest Ref Range: 0.52 - 1.04 mg/dL 0.63   GFR Estimate Latest Ref Range: >60 mL/min/1.73_m2 90   GFR Estimate If Black Latest Ref Range: >60 mL/min/1.73_m2 >90   Calcium Latest Ref Range: 8.5 - 10.1 mg/dL 7.7 (L)   Anion Gap Latest Ref Range: 3 - 14 mmol/L 7   Procalcitonin Latest Units: ng/ml 0.41   Glucose Latest Ref Range: 70 - 99 mg/dL 175 (H)   WBC Latest Ref Range: 4.0 - 11.0 10e9/L 19.0 (H)   Hemoglobin Latest Ref Range: 11.7 - 15.7 g/dL 11.0 (L)   Hematocrit Latest Ref Range: 35.0 - 47.0 % 33.6 (L)   Platelet Count Latest Ref Range: 150 - 450 10e9/L 683 (H)   RBC Count Latest Ref Range: 3.8 - 5.2 10e12/L 3.39 (L)   MCV Latest Ref Range: 78 - 100 fl 99   MCH Latest Ref Range: 26.5 - 33.0 pg 32.4   MCHC Latest Ref Range: 31.5 - 36.5 g/dL 32.7   RDW Latest Ref Range: 10.0 - 15.0 % 14.8   Diff Method Unknown Automated Method   % Neutrophils Latest Units: % 77.5   % Lymphocytes Latest Units: % 7.0   % Monocytes Latest Units: % 7.0   % Eosinophils Latest Units: % 3.6   % Basophils Latest Units: % 0.2   % Immature Granulocytes Latest Units: % 4.7   Nucleated RBCs Latest Ref Range: 0 /100 0   Absolute Neutrophil  Latest Ref Range: 1.6 - 8.3 10e9/L 14.7 (H)   Absolute Lymphocytes Latest Ref Range: 0.8 - 5.3 10e9/L 1.3   Absolute Monocytes Latest Ref Range: 0.0 - 1.3 10e9/L 1.3   Absolute Eosinophils Latest Ref Range: 0.0 - 0.7 10e9/L 0.7         Assessment    1.  Status post placement Waldo Pelaez 3 external fixator system.  Indication left bimalleolar ankle fracture, subsequent to a fall. Fair pain control  2.  Acute blood loss anemia.  Mild.   3.  Generalized skin abruption consistent with drug reaction.  Prone in the past to similar reaction with intravenous antibiotics.  Unclear if relates to IV clindamycin or some other additive.  Derm has seen, recommends supportive topical tx  4.  Type 2 diabetes mellitus.  Fair control prior to admission with last A1c 7.6%.  Has remained relatively euglycemic off Lantus and Amaryl.  Secondary to poor oral intake  5.  Hypertension adequate control on the metoprolol with parameters.  Lasix and lisinopril on hold.  6  Marked leukocytosis potentially related to #3 particularly with eosinophilia. Doubt systemic infection     Plan  Continue topical tx for dermatitis + Allegra  Encourage increase in activity  Continue sliding scale insulin coverage and carb coverage for now, consider, resumption of low dose Lantus.  Continue to hold lasix and lisinopril  Discharge to TCU, possibly tomorrow, if rash stable and pt is feeling improved overall.  Lovenox for DVT proph

## 2019-10-17 NOTE — PROGRESS NOTES
Patient had a bowel movement this after, UA/UC collected, bath done and linen changed, BG this evening was 130.  Dressing changed on coccyx as per care plan. Refused to get out of bed today. Will continue to monitor patient.

## 2019-10-17 NOTE — PLAN OF CARE
Discharge Planner PT   Patient plan for discharge: TCU  Current status: Patient supine in bed on arrival. Agreeable to PT. Supine>sit EOB totA for L LE only. L LE supported on ground, sit>stand with FWW min-modA. Attempts to pivot to wheelchair, unable. Stand>sit modA. Sliding board transfer to wheelchair, Sd. L LE positioned in elevated and supported position in wheelchair. Extra time required during session for extended rest breaks following exertion and LE positioning throughout.    Barriers to return to prior living situation: Safety, stairs, pain, independence, poor activity tolerance  Recommendations for discharge: TCU  Rationale for recommendations: Would benefit from skilled PT services of a rehabilitation stay in order to improve independence with functional mobility and promote safety before returning home.        Entered by: Griselda Weinberg 10/17/2019 3:10 PM

## 2019-10-17 NOTE — PROGRESS NOTES
Orthopaedic Surgery Progress Note 10/17/2019    Interval Events  None    Subjective  No acute issues overnight. Pain well controlled. Continues to have skin rash. No cough, or sputum production. The patient denies any chest pain, shortness of breath, fevers, chills, new numbness or tingling down the extremities.      Objective  Temp: 96.5  F (35.8  C) Temp src: Oral BP: (!) 147/67 Pulse: 97 Heart Rate: 95 Resp: 16 SpO2: 96 % O2 Device: None (Room air)      Exam:  Gen: No acute distress, resting comfortably in bed.  Resp: Non-labored breathing  Cardio: Regular rate via peripheral pulse  MSK:  LLE:  - Pin site dressings c/d/i  - No skin wrinkle  - Compartments soft and compressible  - SILT tibial/sural/saphenous/DP/SP nerves  - Fires EHL, FHL; unable to assess TA, GSC d/t external fixator  - PT/DP pulses 1+, foot wwp    Recent Labs   Lab 10/17/19  0621 10/16/19  0528 10/15/19  0531  10/13/19  0605   WBC 19.0* 20.1*  --   --  8.1   HGB 11.0* 11.1* 10.7*   < > 12.0   * 685* 596*  --  536*    < > = values in this interval not displayed.       Assessment: Sarah Felix is a 74 year old female s/p L ankle external fixator on 10/12/2019 with Dr. Brown. Doing well.     Plan:  Orthopaedics Primary, Medicine co-managing appreciate assistance  Activity: Up with assist.   Weight bearing status: NWB LLE.  Pain management: Transition from IV to PO as tolerated.  Antibiotics: s/p perioperative Clindamycin x 24 hours.  Diet: Diabetic diet.  DVT prophylaxis: Lovenox.  Dressings: Keep pin site dressings c/d/i, change prn  PT/OT: Eval and treat.   Consults: Hospitalist, PT, OT appreciate recs. SW for dispo planning, will likely require TCU upon discharge     Follow-up: Clinic with Dr. Lemus for planning of definitive fixation; date TBD.      Disposition: Discharge to [facility] pending blood sugar stabilization and leukocytosis; cleared from orthopaedics perspective. Tentaive OR date is 10/28/2019 with Dr Can,  can discharge to TCU and return for surgery.     Hung Vazquez MD  Orthopaedic Surgery, PGY-4  Pager: [phone]     Please page me directly with any questions/concerns during regular weekday hours before 5pm. If there is no response, if it is a weekend, or if it is during evening hours then please page the orthopaedic surgery resident on call as listed on [facility] call schedule under the orthopaedics tab.         Future Appointments   Date Time Provider Department [place]   10/16/2019  6:30 AM UR OT OVERFLOW UROT [place]   10/16/2019 10:45 AM Jose Roberto Can MD [facility]   10/16/2019 12:15 PM Awilda Davies PA-C [facility]   10/16/2019  2:45 PM Bernard Wilkerson, PT URPT [place]   11/18/2019  2:00 PM Wegener, Joel Daniel Irwin, MD [facility]

## 2019-10-17 NOTE — PROGRESS NOTES
Social Work Services Progress Note     Hospital Day: 6  Date of Initial Social Work Evaluation:  n/a  Collaborated with:  Pt, [facility],  TCU, Provider, Charge Nurse     Data:  Pt is a 74 year old female with a medical history significant for type 2 diabetes mellitus and hypertension who presents to the Emergency Department for evaluation after a mechanical slip and fall.     SW following for TCU placement.     Intervention:  DUNCAN spoke to Brittani from [facility]. [facility] can take pt tomorrow at 1pm.     DUNCAN scheduled HE stretcher ride for 12pm tomorrow 10/18. PCS form completed and left in chart.     PAS:  [id number]     Orders to be submitted in am when completed.      SW offered to Follow-up with family on her behalf, pt declined-will update them herself.      Assessment:  Pt medically cleared to discharge. [facility] accepted pt's admission.     Plan:    Anticipated Disposition:  Facility:  [facility]    Barriers to d/c plan:  none    Follow Up:  Pt to discharge to facility tomorrow 10/18    Mark URBAN  Weekend DUNCAN  [phone]  On Call Pager: [phone] 4pm - Midnight

## 2019-10-18 ENCOUNTER — TELEPHONE (OUTPATIENT)
Dept: ORTHOPEDICS | Facility: CLINIC | Age: 74
End: 2019-10-18

## 2019-10-18 ENCOUNTER — OFFICE VISIT - HEALTHEAST (OUTPATIENT)
Dept: GERIATRICS | Facility: CLINIC | Age: 74
End: 2019-10-18

## 2019-10-18 ENCOUNTER — AMBULATORY - HEALTHEAST (OUTPATIENT)
Dept: GERIATRICS | Facility: CLINIC | Age: 74
End: 2019-10-18

## 2019-10-18 VITALS
HEART RATE: 97 BPM | RESPIRATION RATE: 16 BRPM | OXYGEN SATURATION: 94 % | SYSTOLIC BLOOD PRESSURE: 153 MMHG | DIASTOLIC BLOOD PRESSURE: 64 MMHG | TEMPERATURE: 98.1 F

## 2019-10-18 DIAGNOSIS — T78.40XA RASH DUE TO ALLERGY: ICD-10-CM

## 2019-10-18 DIAGNOSIS — R21 RASH DUE TO ALLERGY: ICD-10-CM

## 2019-10-18 DIAGNOSIS — I10 ESSENTIAL HYPERTENSION, BENIGN: ICD-10-CM

## 2019-10-18 DIAGNOSIS — E78.5 HYPERLIPIDEMIA LDL GOAL <100: ICD-10-CM

## 2019-10-18 DIAGNOSIS — K21.9 GASTROESOPHAGEAL REFLUX DISEASE, ESOPHAGITIS PRESENCE NOT SPECIFIED: ICD-10-CM

## 2019-10-18 DIAGNOSIS — I73.9 PERIPHERAL VASCULAR DISEASE (H): ICD-10-CM

## 2019-10-18 DIAGNOSIS — I10 HYPERTENSION GOAL BP (BLOOD PRESSURE) < 140/90: ICD-10-CM

## 2019-10-18 DIAGNOSIS — Z79.4 TYPE 2 DIABETES MELLITUS WITH HYPERGLYCEMIA, WITH LONG-TERM CURRENT USE OF INSULIN (H): ICD-10-CM

## 2019-10-18 DIAGNOSIS — E11.65 TYPE 2 DIABETES MELLITUS WITH HYPERGLYCEMIA, WITH LONG-TERM CURRENT USE OF INSULIN (H): ICD-10-CM

## 2019-10-18 DIAGNOSIS — S82.842D CLOSED BIMALLEOLAR FRACTURE OF LEFT ANKLE WITH ROUTINE HEALING: ICD-10-CM

## 2019-10-18 LAB
ANISOCYTOSIS BLD QL SMEAR: SLIGHT
BACTERIA SPEC CULT: NO GROWTH
BACTERIA SPEC CULT: NO GROWTH
BASOPHILS # BLD AUTO: 0 10E9/L (ref 0–0.2)
BASOPHILS NFR BLD AUTO: 0 %
BURR CELLS BLD QL SMEAR: ABNORMAL
DIFFERENTIAL METHOD BLD: ABNORMAL
EOSINOPHIL # BLD AUTO: 0.5 10E9/L (ref 0–0.7)
EOSINOPHIL NFR BLD AUTO: 2.7 %
ERYTHROCYTE [DISTWIDTH] IN BLOOD BY AUTOMATED COUNT: 15 % (ref 10–15)
GLUCOSE BLDC GLUCOMTR-MCNC: 166 MG/DL (ref 70–99)
GLUCOSE BLDC GLUCOMTR-MCNC: 190 MG/DL (ref 70–99)
HCT VFR BLD AUTO: 34.7 % (ref 35–47)
HGB BLD-MCNC: 11.2 G/DL (ref 11.7–15.7)
LYMPHOCYTES # BLD AUTO: 1.4 10E9/L (ref 0.8–5.3)
LYMPHOCYTES NFR BLD AUTO: 8 %
Lab: NORMAL
Lab: NORMAL
MACROCYTES BLD QL SMEAR: PRESENT
MCH RBC QN AUTO: 32.5 PG (ref 26.5–33)
MCHC RBC AUTO-ENTMCNC: 32.3 G/DL (ref 31.5–36.5)
MCV RBC AUTO: 101 FL (ref 78–100)
METAMYELOCYTES # BLD: 0.3 10E9/L
METAMYELOCYTES NFR BLD MANUAL: 1.8 %
MONOCYTES # BLD AUTO: 1.6 10E9/L (ref 0–1.3)
MONOCYTES NFR BLD AUTO: 8.8 %
MYELOCYTES # BLD: 0.3 10E9/L
MYELOCYTES NFR BLD MANUAL: 1.8 %
NEUTROPHILS # BLD AUTO: 13.6 10E9/L (ref 1.6–8.3)
NEUTROPHILS NFR BLD AUTO: 76.9 %
NRBC # BLD AUTO: 0.2 10*3/UL
NRBC BLD AUTO-RTO: 1 /100
PLATELET # BLD AUTO: 698 10E9/L (ref 150–450)
PLATELET # BLD EST: ABNORMAL 10*3/UL
POIKILOCYTOSIS BLD QL SMEAR: ABNORMAL
RBC # BLD AUTO: 3.45 10E12/L (ref 3.8–5.2)
SPECIMEN SOURCE: NORMAL
SPECIMEN SOURCE: NORMAL
WBC # BLD AUTO: 17.7 10E9/L (ref 4–11)

## 2019-10-18 PROCEDURE — 36415 COLL VENOUS BLD VENIPUNCTURE: CPT | Performed by: INTERNAL MEDICINE

## 2019-10-18 PROCEDURE — 25000131 ZZH RX MED GY IP 250 OP 636 PS 637: Performed by: INTERNAL MEDICINE

## 2019-10-18 PROCEDURE — 25000128 H RX IP 250 OP 636: Performed by: STUDENT IN AN ORGANIZED HEALTH CARE EDUCATION/TRAINING PROGRAM

## 2019-10-18 PROCEDURE — 25000132 ZZH RX MED GY IP 250 OP 250 PS 637: Performed by: STUDENT IN AN ORGANIZED HEALTH CARE EDUCATION/TRAINING PROGRAM

## 2019-10-18 PROCEDURE — 25000132 ZZH RX MED GY IP 250 OP 250 PS 637: Performed by: INTERNAL MEDICINE

## 2019-10-18 PROCEDURE — 00000146 ZZHCL STATISTIC GLUCOSE BY METER IP

## 2019-10-18 PROCEDURE — 85025 COMPLETE CBC W/AUTO DIFF WBC: CPT | Performed by: INTERNAL MEDICINE

## 2019-10-18 PROCEDURE — 99232 SBSQ HOSP IP/OBS MODERATE 35: CPT | Performed by: INTERNAL MEDICINE

## 2019-10-18 RX ORDER — OXYCODONE HYDROCHLORIDE 5 MG/1
5-10 TABLET ORAL EVERY 4 HOURS PRN
Qty: 45 TABLET | Refills: 0 | Status: ON HOLD | DISCHARGE
Start: 2019-10-18 | End: 2019-10-30

## 2019-10-18 RX ORDER — FEXOFENADINE HCL 180 MG/1
180 TABLET ORAL DAILY
DISCHARGE
Start: 2019-10-19 | End: 2020-01-07

## 2019-10-18 RX ORDER — FUROSEMIDE 20 MG
20 TABLET ORAL DAILY
Qty: 270 TABLET | Refills: 1 | DISCHARGE
Start: 2019-10-18 | End: 2019-12-31

## 2019-10-18 RX ORDER — DIPHENHYDRAMINE HCL 25 MG
25 CAPSULE ORAL EVERY 6 HOURS PRN
DISCHARGE
Start: 2019-10-18 | End: 2020-01-07

## 2019-10-18 RX ADMIN — TRIAMCINOLONE ACETONIDE: 1 CREAM TOPICAL at 07:53

## 2019-10-18 RX ADMIN — FEXOFENADINE HCL 180 MG: 60 TABLET, FILM COATED ORAL at 07:52

## 2019-10-18 RX ADMIN — ENOXAPARIN SODIUM 40 MG: 40 INJECTION SUBCUTANEOUS at 11:58

## 2019-10-18 RX ADMIN — INSULIN ASPART 2 UNITS: 100 INJECTION, SOLUTION INTRAVENOUS; SUBCUTANEOUS at 09:51

## 2019-10-18 RX ADMIN — SENNOSIDES AND DOCUSATE SODIUM 1 TABLET: 8.6; 5 TABLET ORAL at 07:53

## 2019-10-18 RX ADMIN — OXYCODONE HYDROCHLORIDE 5 MG: 5 TABLET ORAL at 06:54

## 2019-10-18 RX ADMIN — OXYCODONE HYDROCHLORIDE 5 MG: 5 TABLET ORAL at 11:05

## 2019-10-18 RX ADMIN — METOPROLOL TARTRATE 25 MG: 25 TABLET, FILM COATED ORAL at 07:53

## 2019-10-18 RX ADMIN — LIDOCAINE 1 PATCH: 560 PATCH PERCUTANEOUS; TOPICAL; TRANSDERMAL at 07:53

## 2019-10-18 RX ADMIN — RANITIDINE 150 MG: 150 TABLET ORAL at 07:52

## 2019-10-18 ASSESSMENT — ACTIVITIES OF DAILY LIVING (ADL)
ADLS_ACUITY_SCORE: 16

## 2019-10-18 NOTE — TELEPHONE ENCOUNTER
[facility] Call Center    Phone Message    May a detailed message be left on voicemail: yes    Reason for Call: Other: Other: Pt requesting call back. Pt called and stated that she just a reminder call about an appt that she was not aware of on 10/23 with Dr. Can. Pt is not sure why this appt was made, and she has no way of getting to the appt. Pt would like to discuss      Action Taken: Message routed to:  [facility] ([facility] ortho

## 2019-10-18 NOTE — PLAN OF CARE
VS: VSS ex elevated BP prior to AM meds.   O2: >90% on RA   Output: Voiding without difficulty via bedpan   Last BM: LBM 10/17 per pt report, passing flatus, BS active   Activity: Pt reluctant to get OOB, working with therapies. Rolls in bed with assist of 1. Assist of 2 if getting OOB. NWB to LLE. Use sliding board if transferring to wheelchair. Use gait belt and pillow to help maintain leg position when in wheelchair.    Skin: Skin intact ex for incision to LLE, generalized body rash-red/raised rash on extremities and trunk, cream applied- no change from yesterday. Bruising to L ankle.    Pain: Pain is being managed with PRN oxycodone 5-10 mg Q4H. Please ask pt if she wants medications when they are available.    CMS: CMS intact ex for N to LLE from calf to foot, DP +2 bilaterally, able to wiggle toes. Bilateral LE swelling noted to feet and ankles- improving on LLE per pt report.    Dressing: Dressing to LLE CDI, no drainage visible. Lido patch applied to L foot.    Diet: Tolerating regular diet without N/V, BG checks before meals.  this AM- given 3 units. Carb coverage. States appetite is fair, not great.    LDA: No PIV access.    Equipment: External fixator, foam elevator, bed pan, personal belongings at bedside    Plan: Continue to monitor patient, manage pain. Ortho has cleared for discharge to TCU until surgery date of 10/28. Plan to discharge to [facility] today, HE ride set for 1200.    Additional Info: Appears slightly depressed, flat affect, withdrawn.       Nurse to nurse report was given to [facility] RN. No further questions. Packet sent with pt to give to facility. Pt was premedicated for transport at 1100.

## 2019-10-18 NOTE — PLAN OF CARE
VS: VSS. Denies CP/SOB   O2: >90% on RA   Output: Voiding in bedpan w/o pain or difficulty   Last BM: 10/17/19, uses bedpan   Activity: Up in chair for first time today. 2 assist, anxious about transfers uses slide board/gait belt   Up for meals? Declined   Skin: Intact ex incision, red body rash cream for itching applied   Pain: Pain in LLE, managing with 5 of oxy q 4   CMS: Some numbness in LLE, able to wiggle toes. Good pulses   Dressing: CDI   Diet: Regular, only ate a few bites of dinner   LDA: None   Equipment: External fixator, wedge elevator,    Plan: TBD, ortho cleared for discharge and medicine if rash improves   Additional Info:

## 2019-10-18 NOTE — PLAN OF CARE
VS: VSS. Denies CP/SOB   O2: >90% on RA   Output: Voiding in bedpan w/o pain or difficulty   Last BM: 10/17/19, uses bedpan   Activity: 2 assist, anxious about transfers uses slide board/gait belt   Skin: Intact ex incision, red body rash cream for itching applied   Pain: Pain in LLE, managing with 5 of oxy q 4   CMS: Some numbness in LLE, able to wiggle toes. Good pulses   Dressing: CDI   Diet: Regular   LDA: None   Equipment: External fixator, wedge elevator,    Plan: TBD, ortho cleared for discharge and medicine if rash improves   Additional Info:

## 2019-10-18 NOTE — PLAN OF CARE
PT session cancelled secondary to pt discharged to TCU at noon.      Physical Therapy Discharge Summary    Reason for therapy discharge:    Discharged to transitional care facility.    Progress towards therapy goal(s). See goals on [facility] Plan in [facility] record for goal details.  Goals not met.  Barriers to achieving goals:   limited tolerance for therapy and discharge from facility.    Therapy recommendation(s):    Continued therapy is recommended.  Rationale/Recommendations:  Pt would benefit from further skilled PT for LE strengthening and increase independence with all functional mobility/gait.

## 2019-10-18 NOTE — PROGRESS NOTES
Pt was seen, case reviewed with team.    Pt states rash is about the same  Agrees with plans to discharge to TCU    Appetite remains reduced  Last BM yesterday  No chest pain or SOB    VSS  Afebrile  BG generally mid 100s-low 200s    Alert, in NAD, pleasant  Lungs clear  CV rrr  Abd soft  R calf soft  L calf pins in place, no erythema or drainage      No labs today      Assessment    1.  Status post placement Magi Pelaez 3 external fixator system.  Indication left bimalleolar ankle fracture, subsequent to a fall. Fair pain control  2.  Acute blood loss anemia.  Mild.   3.  Generalized skin abruption consistent with drug reaction.  Prone in the past to similar reaction with intravenous antibiotics.  Unclear if relates to IV clindamycin or some other additive.  Derm has seen, recommends supportive topical tx  4.  Type 2 diabetes mellitus.  Fair control prior to admission with last A1c 7.6%.  BG slowly increasing on resumed reduced dose of Lantus, off Amaryl.   5.  Hypertension adequate control on the metoprolol with parameters.   6  Marked leukocytosis potentially related to #3 particularly with eosinophilia. Doubt systemic infection  7. Chronic lasix use for LE edema, on hold since surgery. Pt requests resumption of reduced dose of lasix    Plan  Discharge to TCU today  Lantus 25 units daily + sliding scale insulin coverage  Lasix 20 mg daily  Lisinopril 2.5 mg daily  Lovenox for dVT proph

## 2019-10-18 NOTE — PROGRESS NOTES
Orthopaedic Surgery Progress Note 10/18/2019    Interval Events  None    Subjective  Afebrile. Frustrated with not being able to walk. Blood sugars overnight have been around 200, which per patient is her baseline. Discussed possible discharge today to a TCU if Medicine allows. The patient denies any chest pain, shortness of breath, fevers, chills, new numbness or tingling down the extremities.    Objective  Temp: 99.2  F (37.3  C) Temp src: Oral BP: 131/57   Heart Rate: 85 Resp: 16 SpO2: 95 % O2 Device: None (Room air)      Exam:  Gen: No acute distress, resting comfortably in bed.  Resp: Non-labored breathing  Cardio: Regular rate via peripheral pulse  MSK:  LLE:  - Pin site dressings c/d/i  - No skin wrinkle  - Compartments soft and compressible  - SILT tibial/sural/saphenous/DP/SP nerves  - Fires EHL, FHL; unable to assess TA, GSC d/t external fixator  - PT/DP pulses 1+, foot wwp    Recent Labs   Lab 10/17/19  0621 10/16/19  0528 10/15/19  0531  10/13/19  0605   WBC 19.0* 20.1*  --   --  8.1   HGB 11.0* 11.1* 10.7*   < > 12.0   * 685* 596*  --  536*    < > = values in this interval not displayed.       Assessment: Sarah Felix is a 74 year old female s/p L ankle external fixator on 10/12/2019 with Dr. Brown. Doing well.     Plan:  Orthopaedics Primary, Medicine co-managing appreciate assistance  Activity: Up with assist.   Weight bearing status: NWB LLE.  Pain management: Transition from IV to PO as tolerated.  Antibiotics: s/p perioperative Clindamycin x 24 hours.  Diet: Diabetic diet.  DVT prophylaxis: Lovenox.  Dressings: Keep pin site dressings c/d/i, change prn  PT/OT: Eval and treat.   Consults: Hospitalist, PT, OT appreciate recs. SW for dispo planning, will likely require TCU upon discharge     Follow-up: Clinic with Dr. Lemus for planning of definitive fixation; date TBD.      Disposition: Discharge to [facility] pending Medicine clearance; cleared from orthopaedics perspective.  Tentaive OR date is 10/28/2019 with Dr Can, can discharge to TCU and return for surgery.     Hung Vazquez MD  Orthopaedic Surgery, PGY-4  Pager: [phone]     Please page me directly with any questions/concerns during regular weekday hours before 5pm. If there is no response, if it is a weekend, or if it is during evening hours then please page the orthopaedic surgery resident on call as listed on [facility] call schedule under the orthopaedics tab.         Future Appointments   Date Time Provider Department Center   10/16/2019  6:30 AM UR OT OVERFLOW UROT [place]   10/16/2019 10:45 AM Jose Roberto Can MD [facility]   10/16/2019 12:15 PM Awilda Davies PA-C [facility]   10/16/2019  2:45 PM Bernard Wilkerson, PT URPT [place]   11/18/2019  2:00 PM Wegener, Joel Daniel Irwin, MD [facility]

## 2019-10-21 ENCOUNTER — TELEPHONE (OUTPATIENT)
Dept: ALLERGY | Facility: CLINIC | Age: 74
End: 2019-10-21

## 2019-10-21 ENCOUNTER — OFFICE VISIT - HEALTHEAST (OUTPATIENT)
Dept: GERIATRICS | Facility: CLINIC | Age: 74
End: 2019-10-21

## 2019-10-21 ENCOUNTER — AMBULATORY - HEALTHEAST (OUTPATIENT)
Dept: GERIATRICS | Facility: CLINIC | Age: 74
End: 2019-10-21

## 2019-10-21 ENCOUNTER — TRANSFERRED RECORDS (OUTPATIENT)
Dept: HEALTH INFORMATION MANAGEMENT | Facility: CLINIC | Age: 74
End: 2019-10-21

## 2019-10-21 DIAGNOSIS — K76.0 FATTY LIVER: ICD-10-CM

## 2019-10-21 DIAGNOSIS — Q44.6 CONGENITAL CYSTIC DISEASE OF LIVER: ICD-10-CM

## 2019-10-21 DIAGNOSIS — K21.9 GASTROESOPHAGEAL REFLUX DISEASE, ESOPHAGITIS PRESENCE NOT SPECIFIED: ICD-10-CM

## 2019-10-21 DIAGNOSIS — E11.65 TYPE 2 DIABETES MELLITUS WITH HYPERGLYCEMIA, WITH LONG-TERM CURRENT USE OF INSULIN (H): ICD-10-CM

## 2019-10-21 DIAGNOSIS — Z79.4 TYPE 2 DIABETES MELLITUS WITH HYPERGLYCEMIA, WITH LONG-TERM CURRENT USE OF INSULIN (H): ICD-10-CM

## 2019-10-21 DIAGNOSIS — E78.5 HYPERLIPIDEMIA LDL GOAL <100: ICD-10-CM

## 2019-10-21 DIAGNOSIS — S82.842D CLOSED BIMALLEOLAR FRACTURE OF LEFT ANKLE WITH ROUTINE HEALING: ICD-10-CM

## 2019-10-21 DIAGNOSIS — I10 ESSENTIAL HYPERTENSION, BENIGN: ICD-10-CM

## 2019-10-21 NOTE — LETTER
[facility]  Allergy  [address] [address]  Phone: [phone]  Fax: [phone]       You have been referred to our Allergy clinic to have allergy testing completed.    Please call [phone] to schedule your appointment with Dr. Lake.

## 2019-10-21 NOTE — TELEPHONE ENCOUNTER
----- Message from Pratik Nash MD sent at 10/20/2019 10:14 AM CDT -----  Regarding: Scheduling  Hello,    Can we please schedule this recently hospitalized patient with Dr. Lake for drug allergy testing in the next several weeks or next available (not urgent)?    Thank you,  Pratik Nash MD  Dermatology Resident

## 2019-10-21 NOTE — TELEPHONE ENCOUNTER
Sarah declined scheduling at this time. She notes she will call back to schedule.    Requesting letter be sent to her to remind her to call to schedule.    Letter sent.

## 2019-10-23 ENCOUNTER — OFFICE VISIT (OUTPATIENT)
Dept: ORTHOPEDICS | Facility: CLINIC | Age: 74
End: 2019-10-23
Payer: COMMERCIAL

## 2019-10-23 ENCOUNTER — TELEPHONE (OUTPATIENT)
Dept: DERMATOLOGY | Facility: CLINIC | Age: 74
End: 2019-10-23

## 2019-10-23 VITALS — BODY MASS INDEX: 32.15 KG/M2 | HEIGHT: 65 IN | WEIGHT: 193 LBS

## 2019-10-23 DIAGNOSIS — S82.842A CLOSED BIMALLEOLAR FRACTURE OF LEFT ANKLE, INITIAL ENCOUNTER: Primary | ICD-10-CM

## 2019-10-23 ASSESSMENT — MIFFLIN-ST. JEOR: SCORE: 1386.32

## 2019-10-23 NOTE — TELEPHONE ENCOUNTER
----- Message from Pat Childress LPN sent at 10/16/2019  8:33 AM CDT -----  Regarding: FW: Allergy testing from inpatient    ----- Message -----  From: Cindy Rogel MD  Sent: 10/15/2019   5:43 PM CDT  To: Pat Childress LPN, Cruz Lake MD  Subject: Allergy testing from inpatient                   Hi Pat,   I also saw this patient with Dr. Lake this week in the hospital. He wants to have her follow up in derm clinic for drug allergy testing in the future. Thanks!     Best,  Cindy

## 2019-10-23 NOTE — NURSING NOTE
"Reason For Visit:   Chief Complaint   Patient presents with     Consult     left bimalleolar ankle fracture DOI 10/12/19       Ht 1.651 m (5' 5\")   Wt 87.5 kg (193 lb)   BMI 32.12 kg/m      Pain Assessment  Patient Currently in Pain: Yes  0-10 Pain Scale: 8  Primary Pain Location: Ankle(left)  Pain Descriptors: Throbbing    Arambula Gabriella, ATC    "

## 2019-10-23 NOTE — LETTER
10/23/2019       RE: Sarah Felix  [address]  [place] [address]     Dear Colleague,    Thank you for referring your patient, Sarah Felix, to the [facility] at [facility]. Please see a copy of my visit note below.    This 74-year-old woman had a bimalleolar ankle fracture that was unstable after reduction and was ultimately placed into an external fixator.  She is been in a nursing facility.  She is had a hard time getting up and very much weight on the right leg but has recently been able to do that.  She gets cramping in the left leg when she is up for 15 minutes or more into a chair.    Reviewed her patient survey information in the EMR.    On examination she is alert oriented has normal mood and affect and is in no acute distress.  Respirations are regular and unlabored eyes are nonicteric.  In her left lower extremity she has some mild swelling and ecchymosis but no erythema.  The pin sites seem clean and the external fixator is in addition.    I reviewed her x-rays which show a bimalleolar displaced fracture.    We discussed surgery and the need for medial lateral fixation followed by nonweightbearing for 6 weeks.  She understands the risk of infection.  She described the episode of getting severe rash after what she thought was 2 doses of antibiotics.  We suspect that she got clindamycin.  We will plan on using vancomycin for her upcoming surgery.  I have answered all of her questions.    Again, thank you for allowing me to participate in the care of your patient.      Sincerely,    Jose Roberto Can MD

## 2019-10-23 NOTE — NURSING NOTE
Teaching Flowsheet   Relevant Diagnosis: Internal fixation left ankle medial and lateral side  Teaching Topic: preop     Person(s) involved in teaching:   Patient     Motivation Level:  Asks Questions: Yes  Eager to Learn: Yes  Cooperative: Yes  Receptive (willing/able to accept information): Yes  Any cultural factors/Orthodox beliefs that may influence understanding or compliance? No       Patient demonstrates understanding of the following:  Reason for the appointment, diagnosis and treatment plan: Yes  Knowledge of proper use of medications and conditions for which they are ordered (with special attention to potential side effects or drug interactions): Yes  Which situations necessitate calling provider and whom to contact: Yes       Teaching Concerns Addressed:        Proper use and care of soap (medical equip, care aids, etc.): Yes  Nutritional needs and diet plan: Yes  Pain management techniques: Yes  Wound Care: Yes  How and/when to access community resources: NA     Instructional Materials Used/Given: surgery packet reviewed with patient.  She is in a rehab and paperwork given to patient to give to them  She will obtain H&P with MD in facility.  She has no other questions at this time.

## 2019-10-23 NOTE — PROGRESS NOTES
This 74-year-old woman had a bimalleolar ankle fracture that was unstable after reduction and was ultimately placed into an external fixator.  She is been in a nursing facility.  She is had a hard time getting up and very much weight on the right leg but has recently been able to do that.  She gets cramping in the left leg when she is up for 15 minutes or more into a chair.    Reviewed her patient survey information in the EMR.    On examination she is alert oriented has normal mood and affect and is in no acute distress.  Respirations are regular and unlabored eyes are nonicteric.  In her left lower extremity she has some mild swelling and ecchymosis but no erythema.  The pin sites seem clean and the external fixator is in addition.    I reviewed her x-rays which show a bimalleolar displaced fracture.    We discussed surgery and the need for medial lateral fixation followed by nonweightbearing for 6 weeks.  She understands the risk of infection.  She described the episode of getting severe rash after what she thought was 2 doses of antibiotics.  We suspect that she got clindamycin.  We will plan on using vancomycin for her upcoming surgery.  I have answered all of her questions.

## 2019-10-24 ENCOUNTER — TELEPHONE (OUTPATIENT)
Dept: ORTHOPEDICS | Facility: CLINIC | Age: 74
End: 2019-10-24

## 2019-10-24 ENCOUNTER — TRANSFERRED RECORDS (OUTPATIENT)
Dept: HEALTH INFORMATION MANAGEMENT | Facility: CLINIC | Age: 74
End: 2019-10-24

## 2019-10-24 NOTE — TELEPHONE ENCOUNTER
Spoke with Charanjit regarding patients surgery with Dr. Can on Monday 10/28/19.   Charanjit is aware that he will be receiving a pre-op phone call sometime tomorrow as to what time patient should arrive. All questions were answered

## 2019-10-24 NOTE — TELEPHONE ENCOUNTER
Left VM for Charanjit, patients care coordinator at [phone].     Requested Charanjit call back at [phone] to go over patient surgery information

## 2019-10-25 ENCOUNTER — OFFICE VISIT - HEALTHEAST (OUTPATIENT)
Dept: GERIATRICS | Facility: CLINIC | Age: 74
End: 2019-10-25

## 2019-10-25 ENCOUNTER — ANESTHESIA EVENT (OUTPATIENT)
Dept: SURGERY | Facility: CLINIC | Age: 74
End: 2019-10-25
Payer: COMMERCIAL

## 2019-10-25 DIAGNOSIS — I10 HYPERTENSION GOAL BP (BLOOD PRESSURE) < 140/90: ICD-10-CM

## 2019-10-25 DIAGNOSIS — Z79.4 TYPE 2 DIABETES MELLITUS WITH HYPERGLYCEMIA, WITH LONG-TERM CURRENT USE OF INSULIN (H): ICD-10-CM

## 2019-10-25 DIAGNOSIS — S82.842D CLOSED BIMALLEOLAR FRACTURE OF LEFT ANKLE WITH ROUTINE HEALING: ICD-10-CM

## 2019-10-25 DIAGNOSIS — I73.9 PERIPHERAL VASCULAR DISEASE (H): ICD-10-CM

## 2019-10-25 DIAGNOSIS — I10 ESSENTIAL HYPERTENSION, BENIGN: ICD-10-CM

## 2019-10-25 DIAGNOSIS — E11.65 TYPE 2 DIABETES MELLITUS WITH HYPERGLYCEMIA, WITH LONG-TERM CURRENT USE OF INSULIN (H): ICD-10-CM

## 2019-10-25 NOTE — OR NURSING
Pre-op call done with CLEMENTINE , then pt's nurse Adrianna at [facility]. Adrianna, TCU RN advised to consult TCU provider for when to stop Lovenox and call me back today before 1630 to let me know when that will be.

## 2019-10-28 ENCOUNTER — APPOINTMENT (OUTPATIENT)
Dept: GENERAL RADIOLOGY | Facility: CLINIC | Age: 74
End: 2019-10-28
Attending: ORTHOPAEDIC SURGERY
Payer: COMMERCIAL

## 2019-10-28 ENCOUNTER — HOSPITAL ENCOUNTER (OUTPATIENT)
Facility: CLINIC | Age: 74
Discharge: NURSING, REHAB OR LONG-TERM CARE | End: 2019-10-30
Attending: ORTHOPAEDIC SURGERY | Admitting: ORTHOPAEDIC SURGERY
Payer: COMMERCIAL

## 2019-10-28 ENCOUNTER — ANESTHESIA (OUTPATIENT)
Dept: SURGERY | Facility: CLINIC | Age: 74
End: 2019-10-28
Payer: COMMERCIAL

## 2019-10-28 DIAGNOSIS — S82.842A CLOSED BIMALLEOLAR FRACTURE OF LEFT ANKLE, INITIAL ENCOUNTER: ICD-10-CM

## 2019-10-28 DIAGNOSIS — S82.842A CLOSED BIMALLEOLAR FRACTURE OF LEFT ANKLE, INITIAL ENCOUNTER: Primary | ICD-10-CM

## 2019-10-28 DIAGNOSIS — E11.9 TYPE 2 DIABETES, HBA1C GOAL < 7% (H): ICD-10-CM

## 2019-10-28 PROBLEM — Z98.890 POST-OPERATIVE STATE: Status: ACTIVE | Noted: 2019-10-28

## 2019-10-28 LAB
GLUCOSE BLDC GLUCOMTR-MCNC: 158 MG/DL (ref 70–99)
GLUCOSE BLDC GLUCOMTR-MCNC: 162 MG/DL (ref 70–99)
GLUCOSE BLDC GLUCOMTR-MCNC: 190 MG/DL (ref 70–99)

## 2019-10-28 PROCEDURE — 37000009 ZZH ANESTHESIA TECHNICAL FEE, EACH ADDTL 15 MIN: Performed by: ORTHOPAEDIC SURGERY

## 2019-10-28 PROCEDURE — 71000014 ZZH RECOVERY PHASE 1 LEVEL 2 FIRST HR: Performed by: ORTHOPAEDIC SURGERY

## 2019-10-28 PROCEDURE — 25000128 H RX IP 250 OP 636: Performed by: NURSE ANESTHETIST, CERTIFIED REGISTERED

## 2019-10-28 PROCEDURE — 25800030 ZZH RX IP 258 OP 636: Performed by: NURSE ANESTHETIST, CERTIFIED REGISTERED

## 2019-10-28 PROCEDURE — 25000132 ZZH RX MED GY IP 250 OP 250 PS 637: Performed by: PHYSICIAN ASSISTANT

## 2019-10-28 PROCEDURE — 40000277 XR SURGERY CARM FLUORO LESS THAN 5 MIN W STILLS: Mod: TC

## 2019-10-28 PROCEDURE — 27210794 ZZH OR GENERAL SUPPLY STERILE: Performed by: ORTHOPAEDIC SURGERY

## 2019-10-28 PROCEDURE — 27211024 ZZHC OR SUPPLY OTHER OPNP: Performed by: ORTHOPAEDIC SURGERY

## 2019-10-28 PROCEDURE — C1713 ANCHOR/SCREW BN/BN,TIS/BN: HCPCS | Performed by: ORTHOPAEDIC SURGERY

## 2019-10-28 PROCEDURE — C1769 GUIDE WIRE: HCPCS | Performed by: ORTHOPAEDIC SURGERY

## 2019-10-28 PROCEDURE — 82962 GLUCOSE BLOOD TEST: CPT

## 2019-10-28 PROCEDURE — 25000132 ZZH RX MED GY IP 250 OP 250 PS 637: Performed by: STUDENT IN AN ORGANIZED HEALTH CARE EDUCATION/TRAINING PROGRAM

## 2019-10-28 PROCEDURE — 40000171 ZZH STATISTIC PRE-PROCEDURE ASSESSMENT III: Performed by: ORTHOPAEDIC SURGERY

## 2019-10-28 PROCEDURE — 71000015 ZZH RECOVERY PHASE 1 LEVEL 2 EA ADDTL HR: Performed by: ORTHOPAEDIC SURGERY

## 2019-10-28 PROCEDURE — 36000064 ZZH SURGERY LEVEL 4 EA 15 ADDTL MIN - UMMC: Performed by: ORTHOPAEDIC SURGERY

## 2019-10-28 PROCEDURE — 25000131 ZZH RX MED GY IP 250 OP 636 PS 637: Performed by: STUDENT IN AN ORGANIZED HEALTH CARE EDUCATION/TRAINING PROGRAM

## 2019-10-28 PROCEDURE — 36000066 ZZH SURGERY LEVEL 4 W FLUORO 1ST 30 MIN - UMMC: Performed by: ORTHOPAEDIC SURGERY

## 2019-10-28 PROCEDURE — 25000128 H RX IP 250 OP 636: Performed by: STUDENT IN AN ORGANIZED HEALTH CARE EDUCATION/TRAINING PROGRAM

## 2019-10-28 PROCEDURE — 25800030 ZZH RX IP 258 OP 636: Performed by: STUDENT IN AN ORGANIZED HEALTH CARE EDUCATION/TRAINING PROGRAM

## 2019-10-28 PROCEDURE — 25000128 H RX IP 250 OP 636: Performed by: ANESTHESIOLOGY

## 2019-10-28 PROCEDURE — 25000566 ZZH SEVOFLURANE, EA 15 MIN: Performed by: ORTHOPAEDIC SURGERY

## 2019-10-28 PROCEDURE — 37000008 ZZH ANESTHESIA TECHNICAL FEE, 1ST 30 MIN: Performed by: ORTHOPAEDIC SURGERY

## 2019-10-28 PROCEDURE — 25000125 ZZHC RX 250: Performed by: NURSE ANESTHETIST, CERTIFIED REGISTERED

## 2019-10-28 DEVICE — IMP PLATE SYN 1/3 TUBULAR 81MM 07H SS 241.371: Type: IMPLANTABLE DEVICE | Site: ANKLE | Status: FUNCTIONAL

## 2019-10-28 DEVICE — IMP SCR SYN CAN 4.0X18MM FT SS 206.018: Type: IMPLANTABLE DEVICE | Site: ANKLE | Status: FUNCTIONAL

## 2019-10-28 DEVICE — IMP SCR SYN CAN 4.0X20MM FT SS 206.020: Type: IMPLANTABLE DEVICE | Site: ANKLE | Status: FUNCTIONAL

## 2019-10-28 DEVICE — IMP SCR SYN CAN 4.0X46MM LONG THRD SS 207.746: Type: IMPLANTABLE DEVICE | Site: ANKLE | Status: FUNCTIONAL

## 2019-10-28 DEVICE — IMP SCR SYN CORTEX 3.5X24MM SELF TAP SS 204.824: Type: IMPLANTABLE DEVICE | Site: ANKLE | Status: FUNCTIONAL

## 2019-10-28 DEVICE — IMP SCR SYN CAN 4.0X50MM LONG THRD SS 207.750: Type: IMPLANTABLE DEVICE | Site: ANKLE | Status: FUNCTIONAL

## 2019-10-28 DEVICE — IMP SCR SYN CORTEX 3.5X14MM SELF TAP SS 204.814: Type: IMPLANTABLE DEVICE | Site: ANKLE | Status: FUNCTIONAL

## 2019-10-28 RX ORDER — FENTANYL CITRATE 50 UG/ML
INJECTION, SOLUTION INTRAMUSCULAR; INTRAVENOUS PRN
Status: DISCONTINUED | OUTPATIENT
Start: 2019-10-28 | End: 2019-10-28

## 2019-10-28 RX ORDER — ACETAMINOPHEN 325 MG/1
650 TABLET ORAL EVERY 6 HOURS PRN
Status: DISCONTINUED | OUTPATIENT
Start: 2019-10-28 | End: 2019-10-30 | Stop reason: HOSPADM

## 2019-10-28 RX ORDER — LIDOCAINE 40 MG/G
CREAM TOPICAL
Status: DISCONTINUED | OUTPATIENT
Start: 2019-10-28 | End: 2019-10-28 | Stop reason: HOSPADM

## 2019-10-28 RX ORDER — FLUMAZENIL 0.1 MG/ML
0.2 INJECTION, SOLUTION INTRAVENOUS
Status: DISCONTINUED | OUTPATIENT
Start: 2019-10-28 | End: 2019-10-28 | Stop reason: HOSPADM

## 2019-10-28 RX ORDER — SODIUM CHLORIDE, SODIUM LACTATE, POTASSIUM CHLORIDE, CALCIUM CHLORIDE 600; 310; 30; 20 MG/100ML; MG/100ML; MG/100ML; MG/100ML
INJECTION, SOLUTION INTRAVENOUS CONTINUOUS
Status: DISCONTINUED | OUTPATIENT
Start: 2019-10-28 | End: 2019-10-28 | Stop reason: HOSPADM

## 2019-10-28 RX ORDER — AMOXICILLIN 250 MG
1-2 CAPSULE ORAL 2 TIMES DAILY
Qty: 30 TABLET | Refills: 0 | Status: SHIPPED | OUTPATIENT
Start: 2019-10-28 | End: 2020-01-07

## 2019-10-28 RX ORDER — PROPOFOL 10 MG/ML
INJECTION, EMULSION INTRAVENOUS PRN
Status: DISCONTINUED | OUTPATIENT
Start: 2019-10-28 | End: 2019-10-28

## 2019-10-28 RX ORDER — LIDOCAINE 40 MG/G
CREAM TOPICAL
Status: DISCONTINUED | OUTPATIENT
Start: 2019-10-28 | End: 2019-10-30 | Stop reason: HOSPADM

## 2019-10-28 RX ORDER — DIPHENHYDRAMINE HYDROCHLORIDE 50 MG/ML
INJECTION INTRAMUSCULAR; INTRAVENOUS PRN
Status: DISCONTINUED | OUTPATIENT
Start: 2019-10-28 | End: 2019-10-28

## 2019-10-28 RX ORDER — NICOTINE POLACRILEX 4 MG
15-30 LOZENGE BUCCAL
Status: DISCONTINUED | OUTPATIENT
Start: 2019-10-28 | End: 2019-10-30 | Stop reason: HOSPADM

## 2019-10-28 RX ORDER — ONDANSETRON 2 MG/ML
4 INJECTION INTRAMUSCULAR; INTRAVENOUS EVERY 30 MIN PRN
Status: DISCONTINUED | OUTPATIENT
Start: 2019-10-28 | End: 2019-10-28 | Stop reason: HOSPADM

## 2019-10-28 RX ORDER — OXYCODONE HYDROCHLORIDE 5 MG/1
5-10 TABLET ORAL
Status: DISCONTINUED | OUTPATIENT
Start: 2019-10-28 | End: 2019-10-30 | Stop reason: HOSPADM

## 2019-10-28 RX ORDER — LISINOPRIL 2.5 MG/1
2.5 TABLET ORAL DAILY
Status: DISCONTINUED | OUTPATIENT
Start: 2019-10-29 | End: 2019-10-30 | Stop reason: HOSPADM

## 2019-10-28 RX ORDER — ONDANSETRON 4 MG/1
4 TABLET, ORALLY DISINTEGRATING ORAL
Status: DISCONTINUED | OUTPATIENT
Start: 2019-10-28 | End: 2019-10-28

## 2019-10-28 RX ORDER — PANTOPRAZOLE SODIUM 40 MG/1
40 TABLET, DELAYED RELEASE ORAL
Status: DISCONTINUED | OUTPATIENT
Start: 2019-10-29 | End: 2019-10-30 | Stop reason: HOSPADM

## 2019-10-28 RX ORDER — SODIUM CHLORIDE, SODIUM LACTATE, POTASSIUM CHLORIDE, CALCIUM CHLORIDE 600; 310; 30; 20 MG/100ML; MG/100ML; MG/100ML; MG/100ML
INJECTION, SOLUTION INTRAVENOUS CONTINUOUS PRN
Status: DISCONTINUED | OUTPATIENT
Start: 2019-10-28 | End: 2019-10-28

## 2019-10-28 RX ORDER — LIDOCAINE HYDROCHLORIDE 20 MG/ML
INJECTION, SOLUTION INFILTRATION; PERINEURAL PRN
Status: DISCONTINUED | OUTPATIENT
Start: 2019-10-28 | End: 2019-10-28

## 2019-10-28 RX ORDER — AMOXICILLIN 250 MG
2 CAPSULE ORAL 2 TIMES DAILY
Status: DISCONTINUED | OUTPATIENT
Start: 2019-10-28 | End: 2019-10-30 | Stop reason: HOSPADM

## 2019-10-28 RX ORDER — BUPIVACAINE HYDROCHLORIDE 5 MG/ML
INJECTION, SOLUTION EPIDURAL; INTRACAUDAL PRN
Status: DISCONTINUED | OUTPATIENT
Start: 2019-10-28 | End: 2019-10-28

## 2019-10-28 RX ORDER — FENTANYL CITRATE 50 UG/ML
25-50 INJECTION, SOLUTION INTRAMUSCULAR; INTRAVENOUS
Status: DISCONTINUED | OUTPATIENT
Start: 2019-10-28 | End: 2019-10-28 | Stop reason: HOSPADM

## 2019-10-28 RX ORDER — NALOXONE HYDROCHLORIDE 0.4 MG/ML
.1-.4 INJECTION, SOLUTION INTRAMUSCULAR; INTRAVENOUS; SUBCUTANEOUS
Status: DISCONTINUED | OUTPATIENT
Start: 2019-10-28 | End: 2019-10-30 | Stop reason: HOSPADM

## 2019-10-28 RX ORDER — PROCHLORPERAZINE MALEATE 5 MG
5 TABLET ORAL EVERY 6 HOURS PRN
Status: DISCONTINUED | OUTPATIENT
Start: 2019-10-28 | End: 2019-10-30 | Stop reason: HOSPADM

## 2019-10-28 RX ORDER — DEXTROSE MONOHYDRATE 25 G/50ML
25-50 INJECTION, SOLUTION INTRAVENOUS
Status: DISCONTINUED | OUTPATIENT
Start: 2019-10-28 | End: 2019-10-30 | Stop reason: HOSPADM

## 2019-10-28 RX ORDER — NALOXONE HYDROCHLORIDE 0.4 MG/ML
.1-.4 INJECTION, SOLUTION INTRAMUSCULAR; INTRAVENOUS; SUBCUTANEOUS
Status: DISCONTINUED | OUTPATIENT
Start: 2019-10-28 | End: 2019-10-28 | Stop reason: HOSPADM

## 2019-10-28 RX ORDER — ACETAMINOPHEN 325 MG/1
650 TABLET ORAL
Status: DISCONTINUED | OUTPATIENT
Start: 2019-10-28 | End: 2019-10-28

## 2019-10-28 RX ORDER — DIPHENHYDRAMINE HCL 25 MG
25 CAPSULE ORAL EVERY 6 HOURS PRN
Status: DISCONTINUED | OUTPATIENT
Start: 2019-10-28 | End: 2019-10-30 | Stop reason: HOSPADM

## 2019-10-28 RX ORDER — SODIUM CHLORIDE, SODIUM LACTATE, POTASSIUM CHLORIDE, CALCIUM CHLORIDE 600; 310; 30; 20 MG/100ML; MG/100ML; MG/100ML; MG/100ML
INJECTION, SOLUTION INTRAVENOUS CONTINUOUS
Status: DISCONTINUED | OUTPATIENT
Start: 2019-10-28 | End: 2019-10-30 | Stop reason: HOSPADM

## 2019-10-28 RX ORDER — HYDROMORPHONE HCL/0.9% NACL/PF 0.2MG/0.2
0.2 SYRINGE (ML) INTRAVENOUS
Status: DISCONTINUED | OUTPATIENT
Start: 2019-10-28 | End: 2019-10-30 | Stop reason: HOSPADM

## 2019-10-28 RX ORDER — VANCOMYCIN HYDROCHLORIDE 1 G/200ML
1000 INJECTION, SOLUTION INTRAVENOUS
Status: COMPLETED | OUTPATIENT
Start: 2019-10-28 | End: 2019-10-28

## 2019-10-28 RX ORDER — OXYCODONE HYDROCHLORIDE 5 MG/1
5 TABLET ORAL
Status: DISCONTINUED | OUTPATIENT
Start: 2019-10-28 | End: 2019-10-28

## 2019-10-28 RX ORDER — ONDANSETRON 4 MG/1
4 TABLET, ORALLY DISINTEGRATING ORAL EVERY 6 HOURS PRN
Status: DISCONTINUED | OUTPATIENT
Start: 2019-10-28 | End: 2019-10-30 | Stop reason: HOSPADM

## 2019-10-28 RX ORDER — ONDANSETRON 4 MG/1
4 TABLET, ORALLY DISINTEGRATING ORAL EVERY 30 MIN PRN
Status: DISCONTINUED | OUTPATIENT
Start: 2019-10-28 | End: 2019-10-28 | Stop reason: HOSPADM

## 2019-10-28 RX ORDER — OXYCODONE HYDROCHLORIDE 5 MG/1
5-10 TABLET ORAL EVERY 4 HOURS PRN
Qty: 30 TABLET | Refills: 0 | Status: SHIPPED | OUTPATIENT
Start: 2019-10-28 | End: 2019-10-30

## 2019-10-28 RX ORDER — CLINDAMYCIN PHOSPHATE 600 MG/50ML
600 INJECTION, SOLUTION INTRAVENOUS
Status: DISCONTINUED | OUTPATIENT
Start: 2019-10-28 | End: 2019-10-28 | Stop reason: HOSPADM

## 2019-10-28 RX ORDER — METOPROLOL TARTRATE 25 MG/1
25 TABLET, FILM COATED ORAL 2 TIMES DAILY
Status: DISCONTINUED | OUTPATIENT
Start: 2019-10-28 | End: 2019-10-30 | Stop reason: HOSPADM

## 2019-10-28 RX ORDER — CLINDAMYCIN PHOSPHATE 600 MG/50ML
600 INJECTION, SOLUTION INTRAVENOUS SEE ADMIN INSTRUCTIONS
Status: DISCONTINUED | OUTPATIENT
Start: 2019-10-28 | End: 2019-10-28 | Stop reason: HOSPADM

## 2019-10-28 RX ORDER — ONDANSETRON 2 MG/ML
INJECTION INTRAMUSCULAR; INTRAVENOUS PRN
Status: DISCONTINUED | OUTPATIENT
Start: 2019-10-28 | End: 2019-10-28

## 2019-10-28 RX ORDER — HYDROMORPHONE HYDROCHLORIDE 1 MG/ML
.3-.5 INJECTION, SOLUTION INTRAMUSCULAR; INTRAVENOUS; SUBCUTANEOUS EVERY 5 MIN PRN
Status: DISCONTINUED | OUTPATIENT
Start: 2019-10-28 | End: 2019-10-28 | Stop reason: HOSPADM

## 2019-10-28 RX ORDER — ONDANSETRON 2 MG/ML
4 INJECTION INTRAMUSCULAR; INTRAVENOUS EVERY 6 HOURS PRN
Status: DISCONTINUED | OUTPATIENT
Start: 2019-10-28 | End: 2019-10-30 | Stop reason: HOSPADM

## 2019-10-28 RX ORDER — NALOXONE HYDROCHLORIDE 0.4 MG/ML
.1-.4 INJECTION, SOLUTION INTRAMUSCULAR; INTRAVENOUS; SUBCUTANEOUS
Status: DISCONTINUED | OUTPATIENT
Start: 2019-10-28 | End: 2019-10-28

## 2019-10-28 RX ADMIN — SODIUM CHLORIDE, POTASSIUM CHLORIDE, SODIUM LACTATE AND CALCIUM CHLORIDE: 600; 310; 30; 20 INJECTION, SOLUTION INTRAVENOUS at 16:20

## 2019-10-28 RX ADMIN — FENTANYL CITRATE 50 MCG: 50 INJECTION, SOLUTION INTRAMUSCULAR; INTRAVENOUS at 16:20

## 2019-10-28 RX ADMIN — Medication 10 MG: at 16:48

## 2019-10-28 RX ADMIN — SODIUM CHLORIDE, POTASSIUM CHLORIDE, SODIUM LACTATE AND CALCIUM CHLORIDE 100 ML/HR: 600; 310; 30; 20 INJECTION, SOLUTION INTRAVENOUS at 19:52

## 2019-10-28 RX ADMIN — ROCURONIUM BROMIDE 20 MG: 10 INJECTION INTRAVENOUS at 17:14

## 2019-10-28 RX ADMIN — SUGAMMADEX 100 MG: 100 INJECTION, SOLUTION INTRAVENOUS at 18:28

## 2019-10-28 RX ADMIN — DIPHENHYDRAMINE HYDROCHLORIDE 25 MG: 50 INJECTION, SOLUTION INTRAMUSCULAR; INTRAVENOUS at 16:47

## 2019-10-28 RX ADMIN — OXYCODONE HYDROCHLORIDE 5 MG: 5 TABLET ORAL at 23:40

## 2019-10-28 RX ADMIN — ROCURONIUM BROMIDE 10 MG: 10 INJECTION INTRAVENOUS at 16:33

## 2019-10-28 RX ADMIN — FENTANYL CITRATE 50 MCG: 50 INJECTION, SOLUTION INTRAMUSCULAR; INTRAVENOUS at 17:14

## 2019-10-28 RX ADMIN — FENTANYL CITRATE 50 MCG: 50 INJECTION INTRAMUSCULAR; INTRAVENOUS at 11:17

## 2019-10-28 RX ADMIN — Medication 0.2 MG: at 22:42

## 2019-10-28 RX ADMIN — LIDOCAINE HYDROCHLORIDE 60 MG: 20 INJECTION, SOLUTION INFILTRATION; PERINEURAL at 16:33

## 2019-10-28 RX ADMIN — METOPROLOL TARTRATE 25 MG: 25 TABLET, FILM COATED ORAL at 22:42

## 2019-10-28 RX ADMIN — HYDROMORPHONE HYDROCHLORIDE 0.2 MG: 1 INJECTION, SOLUTION INTRAMUSCULAR; INTRAVENOUS; SUBCUTANEOUS at 19:54

## 2019-10-28 RX ADMIN — PROPOFOL 30 MG: 10 INJECTION, EMULSION INTRAVENOUS at 18:40

## 2019-10-28 RX ADMIN — PROPOFOL 70 MG: 10 INJECTION, EMULSION INTRAVENOUS at 16:33

## 2019-10-28 RX ADMIN — FENTANYL CITRATE 50 MCG: 50 INJECTION, SOLUTION INTRAMUSCULAR; INTRAVENOUS at 17:42

## 2019-10-28 RX ADMIN — FENTANYL CITRATE 25 MCG: 50 INJECTION, SOLUTION INTRAMUSCULAR; INTRAVENOUS at 19:04

## 2019-10-28 RX ADMIN — ONDANSETRON 4 MG: 2 INJECTION INTRAMUSCULAR; INTRAVENOUS at 20:23

## 2019-10-28 RX ADMIN — FENTANYL CITRATE 25 MCG: 50 INJECTION, SOLUTION INTRAMUSCULAR; INTRAVENOUS at 19:07

## 2019-10-28 RX ADMIN — FENTANYL CITRATE 50 MCG: 50 INJECTION, SOLUTION INTRAMUSCULAR; INTRAVENOUS at 18:08

## 2019-10-28 RX ADMIN — INSULIN GLARGINE 35 UNITS: 100 INJECTION, SOLUTION SUBCUTANEOUS at 23:50

## 2019-10-28 RX ADMIN — HYDROMORPHONE HYDROCHLORIDE 0.3 MG: 1 INJECTION, SOLUTION INTRAMUSCULAR; INTRAVENOUS; SUBCUTANEOUS at 19:24

## 2019-10-28 RX ADMIN — ONDANSETRON 4 MG: 2 INJECTION INTRAMUSCULAR; INTRAVENOUS at 18:24

## 2019-10-28 RX ADMIN — FENTANYL CITRATE 50 MCG: 50 INJECTION, SOLUTION INTRAMUSCULAR; INTRAVENOUS at 16:33

## 2019-10-28 RX ADMIN — SENNOSIDES AND DOCUSATE SODIUM 2 TABLET: 8.6; 5 TABLET ORAL at 22:42

## 2019-10-28 RX ADMIN — OXYCODONE HYDROCHLORIDE 5 MG: 5 TABLET ORAL at 20:30

## 2019-10-28 RX ADMIN — Medication 80 MG: at 16:33

## 2019-10-28 RX ADMIN — VANCOMYCIN HYDROCHLORIDE 1 G: 1 INJECTION, SOLUTION INTRAVENOUS at 16:50

## 2019-10-28 RX ADMIN — FENTANYL CITRATE 50 MCG: 50 INJECTION INTRAMUSCULAR; INTRAVENOUS at 11:07

## 2019-10-28 RX ADMIN — Medication 30 MG: at 16:42

## 2019-10-28 RX ADMIN — BUPIVACAINE HYDROCHLORIDE 40 ML: 5 INJECTION, SOLUTION EPIDURAL; INTRACAUDAL at 11:23

## 2019-10-28 ASSESSMENT — MIFFLIN-ST. JEOR: SCORE: 1386.32

## 2019-10-28 NOTE — ANESTHESIA PROCEDURE NOTES
Peripheral Nerve Block Procedure Note  Date/Time: 10/28/2019 11:27 AM    Staff:     Anesthesiologist:  Nathaniel Bermeo MD    Resident/CRNA:  Andres Harmon MD    Block performed by resident/CRNA in the presence of a teaching physician    Location: Pre-op  Procedure Start/Stop TImes:      10/28/2019 11:10 AM     10/28/2019 11:26 AM    patient identified, IV checked, site marked, risks and benefits discussed, informed consent, monitors and equipment checked, pre-op evaluation, at physician/surgeon's request and post-op pain management      Correct Patient: Yes      Correct Position: Yes      Correct Site: Yes      Correct Procedure: Yes      Correct Laterality:  Yes    Site Marked:  Yes  Procedure details:     Procedure:  Adductor canal and Popliteal    ASA:  3    Laterality:  Left    Position:  Supine    Sterile Prep: chloraprep and sterile gloves      Local skin infiltration:  1% lidocaine    Needle:  Short bevel    Needle gauge:  17    Needle length (mm):  110    Catheter threaded easily: Yes      Ultrasound: Yes      Ultrasound used to identify targeted nerve, plexus, or vascular structure and placed a needle adjacent to it      Permanent Image entered into patiient's record      Abnormal pain on injection: No      Blood Aspirated: No      Paresthesias:  No    Bleeding at site: No      Test dose negative for signs of intravascular injection: Yes      Bolus via:  Needle    Infusion Method:  Single Shot    Blood aspirated via catheter: No

## 2019-10-28 NOTE — ANESTHESIA CARE TRANSFER NOTE
Patient: Sarah Felix    Procedure(s):  Internal fixation left bimalleolar ankle fracture, removal external fixator    Diagnosis: Closed bimalleolar fracture of left ankle, initial encounter [S82.173K]  Diagnosis Additional Information: No value filed.    Anesthesia Type:   General     Note:  Airway :Face Mask  Patient transferred to:PACU  Comments: VSS. Ventilating well.Handoff Report: Identifed the Patient, Identified the Reponsible Provider, Reviewed the pertinent medical history, Discussed the surgical course, Reviewed Intra-OP anesthesia mangement and issues during anesthesia, Set expectations for post-procedure period and Allowed opportunity for questions and acknowledgement of understanding      Vitals: (Last set prior to Anesthesia Care Transfer)    CRNA VITALS  10/28/2019 1814 - 10/28/2019 1854      10/28/2019             SpO2:  97 %                Electronically Signed By: MALATHI Russo CRNA  October 28, 2019  6:54 PM

## 2019-10-28 NOTE — ANESTHESIA PREPROCEDURE EVALUATION
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  felt to be focal nodular hyperplasia.  annual CT abd.   followed by Dr. Amilcar Kat      Cystocele, lateral      Diabetes mellitus (H)      Diverticulitis of colon (without mention of hemorrhage)(562.11)     Abd CT     Embolism and thrombosis of unspecified site age 22    post ovarian cyst removed     Esophageal reflux     Hiatal hernia     Fatty liver      Hiatal hernia     small     Hyperlipidemia      Nontoxic uninodular goiter      Osteopenia 2005    on fosamax  for > 5 yr.  stop 2012     Other chronic otitis externa      Other specified disorders of liver     Fatty Liver, Benign appearing liver cysts     Pure hypercholesterolemia       Past Surgical History:   Procedure Laterality Date     APPLY EXTERNAL FIXATOR LOWER EXTREMITY Left 10/12/2019    Procedure: Left ankle external fixator placement;  Surgeon: Leeanne Brown MD;  Location: [facility] OR     C APPENDECTOMY  age 22     C DEXA, BONE DENSITY, AXIAL SKEL       C DEXA, BONE DENSITY, AXIAL SKEL  2007    No signif change in Osteopenia     COLONOSCOPY  2006    repeat 10 yr     ENDOSCOPIC ULTRASOUND UPPER GASTROINTESTINAL TRACT (GI) N/A 2018    Procedure: ENDOSCOPIC ULTRASOUND, ESOPHAGOSCOPY / UPPER GASTROINTESTINAL TRACT (GI);  Endoscopic Ultrasound, Esophagogastroduodenoscopy with endoscopic mucosal resection;  Surgeon: Hood Brower,  MD;  Location: [facility] OR     ESOPHAGOSCOPY, GASTROSCOPY, DUODENOSCOPY (EGD), COMBINED N/A 8/13/2018    Procedure: COMBINED ESOPHAGOSCOPY, GASTROSCOPY, DUODENOSCOPY (EGD), BIOPSY SINGLE OR MULTIPLE;  EGD;  Surgeon: Hood Brower MD;  Location: [facility] OR     ESOPHAGOSCOPY, GASTROSCOPY, DUODENOSCOPY (EGD), COMBINED N/A 3/14/2019    Procedure: Upper Endoscopy;  Surgeon: Hood Brower MD;  Location: [facility] OR     ESOPHAGOSCOPY, GASTROSCOPY, DUODENOSCOPY (EGD), RESECT MUCOSA, COMBINED N/A 9/6/2018    Procedure: COMBINED ESOPHAGOSCOPY, GASTROSCOPY, DUODENOSCOPY (EGD), RESECT MUCOSA;;  Surgeon: Hood Brower MD;  Location: [facility] OR     GYN SURGERY  10/22/08    pelvic support     HEMORRHOIDECTOMY  8/08     MOHS MICROGRAPHIC PROCEDURE       SURGICAL HISTORY OF -   age 22    L ovarian cyst removal, laparotomy     SURGICAL HISTORY OF -   1998    partial thyroidectomy     SURGICAL HISTORY OF -   10/08    Transobturator tape for Urinary Incontinence     SURGICAL HISTORY OF -   11/2011    stress test normal     THYROID SURGERY      Had cyst removed, thyroid gland is intact.          Anesthesia Evaluation     . Pt has had prior anesthetic. Type: General           ROS/MED HX    ENT/Pulmonary:     (+)BRENDA risk factors snores loudly, , . .    Neurologic:       Cardiovascular:     (+) Dyslipidemia, hypertension-Peripheral Vascular Disease---. : . . . :. . Previous cardiac testing date:results:date: results:ECG reviewed date:3/2019 results:NSR, occasional PVCs, HR 87 date: results:          METS/Exercise Tolerance:     Hematologic:         Musculoskeletal:         GI/Hepatic:     (+) GERD Asymptomatic on medication, hiatal hernia,       Renal/Genitourinary:     (+) chronic renal disease, type: CRI,       Endo:     (+) type II DM thyroid problem .      Psychiatric:         Infectious Disease:         Malignancy:         Other:                         PHYSICAL EXAM:   Mental Status/Neuro: A/A/O   Airway: Facies: Feasible  Mallampati:  III  Mouth/Opening: Full  TM distance: > 6 cm  Neck ROM: Full   Respiratory: Auscultation: CTAB     Resp. Rate: Normal     Resp. Effort: Normal      CV: Rhythm: Regular  Rate: Age appropriate  Heart: Normal Sounds  Edema: None   Comments:                      LABS:  CBC:   Lab Results   Component Value Date    WBC 17.7 (H) 10/18/2019    WBC 19.0 (H) 10/17/2019    HGB 11.2 (L) 10/18/2019    HGB 11.0 (L) 10/17/2019    HCT 34.7 (L) 10/18/2019    HCT 33.6 (L) 10/17/2019     (H) 10/18/2019     (H) 10/17/2019     BMP:   Lab Results   Component Value Date     10/17/2019     10/16/2019    POTASSIUM 3.5 10/17/2019    POTASSIUM 3.8 10/16/2019    CHLORIDE 108 10/17/2019    CHLORIDE 107 10/16/2019    CO2 25 10/17/2019    CO2 27 10/16/2019    BUN 8 10/17/2019    BUN 10 10/16/2019    CR 0.63 10/17/2019    CR 0.78 10/16/2019     (H) 10/17/2019     (H) 10/16/2019     COAGS:   Lab Results   Component Value Date    PTT 31 10/12/2019    INR 1.09 10/12/2019     POC:   Lab Results   Component Value Date     (H) 10/28/2019     OTHER:   Lab Results   Component Value Date    LACT 2.1 10/12/2019    A1C 7.6 (H) 10/12/2019    CECY 7.7 (L) 10/17/2019    PHOS 3.4 10/19/2015    MAG 1.9 10/13/2019    ALBUMIN 2.3 (L) 10/16/2019    PROTTOTAL 5.5 (L) 10/16/2019    ALT 48 10/16/2019    AST 36 10/16/2019    ALKPHOS 132 10/16/2019    BILITOTAL 1.1 10/16/2019    TSH 2.46 08/19/2019    SED 7 03/25/2004        Preop Vitals    BP Readings from Last 3 Encounters:   10/28/19 (!) 169/86   10/18/19 (!) 153/64   08/19/19 110/72    Pulse Readings from Last 3 Encounters:   10/16/19 97   08/19/19 98   05/21/19 72      Resp Readings from Last 3 Encounters:   10/28/19 14   10/18/19 16   08/19/19 18    SpO2 Readings from Last 3 Encounters:   10/28/19 98%   10/18/19 94%   08/19/19 94%      Temp Readings from Last 1 Encounters:   10/28/19 36.6  C (97.8  F) (Oral)    Ht Readings from Last 1 Encounters:   10/28/19 1.651 m (5'  "5\")      Wt Readings from Last 1 Encounters:   10/28/19 87.5 kg (193 lb)    Estimated body mass index is 32.12 kg/m  as calculated from the following:    Height as of an earlier encounter on 10/28/19: 1.651 m (5' 5\").    Weight as of an earlier encounter on 10/28/19: 87.5 kg (193 lb).     LDA:        Assessment:   ASA SCORE: 3    H&P: History and physical reviewed and following examination; no interval change.   Smoking Status:  Non-Smoker/Unknown   NPO Status: NPO Appropriate     Plan:   Anes. Type:  General   Pre-Medication: None   Induction:  IV (Standard)   Airway: ETT; Oral   Access/Monitoring: PIV   Maintenance: Balanced     Postop Plan:   Postop Pain: Opioids  Postop Sedation/Airway: Not planned  Disposition: Inpatient/Admit     PONV Management:   Adult Risk Factors: Female, Postop Opioids   Prevention: Ondansetron, Propofol     CONSENT: Direct conversation   Plan and risks discussed with: Patient   Blood Products: Consented (ALL Blood Products)                       Anesthesia attending    74 year old female who  has a past medical history of Allergic rhinitis, cause unspecified, Basal cell carcinoma of face (3/2012), Contact dermatitis and other eczema, due to unspecified cause, Cyst of thyroid (1998), CYSTIC LIVER DIS, Cystocele, lateral, Diabetes mellitus (H), Diverticulitis of colon (without mention of hemorrhage)(562.11) (6/04), Embolism and thrombosis of unspecified site (age 22), Esophageal reflux, Fatty liver, Hiatal hernia, Hyperlipidemia, Nontoxic uninodular goiter, Osteopenia (4/21/2005), Other chronic otitis externa, Other specified disorders of liver, and Pure hypercholesterolemia. to OR for Procedure(s):  Internal fixation left ankle medial and lateral side    Hemoglobin   Date Value Ref Range Status   10/18/2019 11.2 (L) 11.7 - 15.7 g/dL Final     Potassium   Date Value Ref Range Status   10/17/2019 3.5 3.4 - 5.3 mmol/L Final     NPO status adequate.    Plan for GA, standard monitors, large " bore IVs, PONV prophylaxis.  Antibiotics per primary service.    Plan discussed with the anesthesia and surgery teams.  Anesthetic risks discussed with the patient, all questions answered.  Consent in chart.    Alejandra Adkins MD

## 2019-10-28 NOTE — OR NURSING
Dr. Can text paged: informed that no transportation arranged for pt as the company can't come after 6PM. If plans for admission, place orders ASAP.    Awaiting call back.

## 2019-10-28 NOTE — OR NURSING
[employer] called to update that we are unsure when pt will need a ride at this time due to surgery time; Ana verbalized that 6PM is the latest pickup time. Pt will not be ready at 6PM so transportation is cancelled for today. Ana verbalized if pt needs a ride tomorrow to call [phone] and arrange.     [facility] called to update, no answer, so voicemail was left and waiting for a call back.      @ 15[facility] called back and verbalized he was not sure of another transportation for this patient today, as he was told by our hospital last week that we could not provide her transportation home. Pt will need to stay overnight tonight and [employer] called in morning.     Called into O.R. and above information given to MD; verbalized the need for admit/transfer orders when the MD/PA has time to place these orders.

## 2019-10-28 NOTE — OR NURSING
Text page sent to Dr. Can: patient is requesting vancomycin and wants the doctor to order if for her surgery.

## 2019-10-28 NOTE — OR NURSING
[facility] nurse called writer and states that if patient goes back to group home with oxycodone or any narcotic, they will need a prescription for that. Writer sent text page to Dr. Can (ortho) with this information.

## 2019-10-28 NOTE — OR NURSING
Called to set up transport for 1700, if changes call [employer]. Called to verify bed at [facility] and spoke with patients RN and informed on current 1700 ride home and will contact TCU for discharge instructions.

## 2019-10-29 LAB
GLUCOSE BLDC GLUCOMTR-MCNC: 192 MG/DL (ref 70–99)
GLUCOSE BLDC GLUCOMTR-MCNC: 193 MG/DL (ref 70–99)
GLUCOSE BLDC GLUCOMTR-MCNC: 200 MG/DL (ref 70–99)
GLUCOSE BLDC GLUCOMTR-MCNC: 215 MG/DL (ref 70–99)
GLUCOSE BLDC GLUCOMTR-MCNC: 255 MG/DL (ref 70–99)
GLUCOSE BLDC GLUCOMTR-MCNC: 258 MG/DL (ref 70–99)

## 2019-10-29 PROCEDURE — 99207 ZZC CDG-CODE CATEGORY CHANGED: CPT | Performed by: NURSE PRACTITIONER

## 2019-10-29 PROCEDURE — 25000128 H RX IP 250 OP 636: Performed by: STUDENT IN AN ORGANIZED HEALTH CARE EDUCATION/TRAINING PROGRAM

## 2019-10-29 PROCEDURE — 25000131 ZZH RX MED GY IP 250 OP 636 PS 637: Performed by: STUDENT IN AN ORGANIZED HEALTH CARE EDUCATION/TRAINING PROGRAM

## 2019-10-29 PROCEDURE — 82962 GLUCOSE BLOOD TEST: CPT | Mod: 91

## 2019-10-29 PROCEDURE — 25000132 ZZH RX MED GY IP 250 OP 250 PS 637: Performed by: STUDENT IN AN ORGANIZED HEALTH CARE EDUCATION/TRAINING PROGRAM

## 2019-10-29 PROCEDURE — 99205 OFFICE O/P NEW HI 60 MIN: CPT | Performed by: NURSE PRACTITIONER

## 2019-10-29 PROCEDURE — 40000893 ZZH STATISTIC PT IP EVAL DEFER

## 2019-10-29 RX ORDER — METHOCARBAMOL 500 MG/1
500 TABLET, FILM COATED ORAL 4 TIMES DAILY
Qty: 50 TABLET | Refills: 0 | Status: SHIPPED | OUTPATIENT
Start: 2019-10-29 | End: 2020-01-07

## 2019-10-29 RX ORDER — METHOCARBAMOL 500 MG/1
500 TABLET, FILM COATED ORAL 4 TIMES DAILY
Status: DISCONTINUED | OUTPATIENT
Start: 2019-10-29 | End: 2019-10-30 | Stop reason: HOSPADM

## 2019-10-29 RX ORDER — CLINDAMYCIN PHOSPHATE 900 MG/50ML
900 INJECTION, SOLUTION INTRAVENOUS EVERY 8 HOURS
Status: DISCONTINUED | OUTPATIENT
Start: 2019-10-29 | End: 2019-10-29

## 2019-10-29 RX ADMIN — OXYCODONE HYDROCHLORIDE 10 MG: 5 TABLET ORAL at 13:46

## 2019-10-29 RX ADMIN — Medication 0.2 MG: at 05:03

## 2019-10-29 RX ADMIN — INSULIN ASPART 2 UNITS: 100 INJECTION, SOLUTION INTRAVENOUS; SUBCUTANEOUS at 14:34

## 2019-10-29 RX ADMIN — OXYCODONE HYDROCHLORIDE 10 MG: 5 TABLET ORAL at 16:44

## 2019-10-29 RX ADMIN — ONDANSETRON 4 MG: 4 TABLET, ORALLY DISINTEGRATING ORAL at 07:57

## 2019-10-29 RX ADMIN — INSULIN GLARGINE 35 UNITS: 100 INJECTION, SOLUTION SUBCUTANEOUS at 22:06

## 2019-10-29 RX ADMIN — INSULIN ASPART 2 UNITS: 100 INJECTION, SOLUTION INTRAVENOUS; SUBCUTANEOUS at 18:49

## 2019-10-29 RX ADMIN — ASPIRIN 325 MG: 325 TABLET, DELAYED RELEASE ORAL at 07:46

## 2019-10-29 RX ADMIN — ACETAMINOPHEN 650 MG: 325 TABLET, FILM COATED ORAL at 07:45

## 2019-10-29 RX ADMIN — OXYCODONE HYDROCHLORIDE 10 MG: 5 TABLET ORAL at 10:40

## 2019-10-29 RX ADMIN — SENNOSIDES AND DOCUSATE SODIUM 1 TABLET: 8.6; 5 TABLET ORAL at 19:49

## 2019-10-29 RX ADMIN — OXYCODONE HYDROCHLORIDE 5 MG: 5 TABLET ORAL at 04:24

## 2019-10-29 RX ADMIN — Medication 0.2 MG: at 02:49

## 2019-10-29 RX ADMIN — OXYCODONE HYDROCHLORIDE 10 MG: 5 TABLET ORAL at 22:51

## 2019-10-29 RX ADMIN — OXYCODONE HYDROCHLORIDE 10 MG: 5 TABLET ORAL at 07:44

## 2019-10-29 RX ADMIN — LISINOPRIL 2.5 MG: 2.5 TABLET ORAL at 07:45

## 2019-10-29 RX ADMIN — METHOCARBAMOL 500 MG: 500 TABLET, FILM COATED ORAL at 14:34

## 2019-10-29 RX ADMIN — OXYCODONE HYDROCHLORIDE 10 MG: 5 TABLET ORAL at 19:49

## 2019-10-29 RX ADMIN — SENNOSIDES AND DOCUSATE SODIUM 1 TABLET: 8.6; 5 TABLET ORAL at 07:45

## 2019-10-29 RX ADMIN — METOPROLOL TARTRATE 25 MG: 25 TABLET, FILM COATED ORAL at 19:56

## 2019-10-29 RX ADMIN — METHOCARBAMOL 500 MG: 500 TABLET, FILM COATED ORAL at 19:49

## 2019-10-29 RX ADMIN — METOPROLOL TARTRATE 25 MG: 25 TABLET, FILM COATED ORAL at 07:45

## 2019-10-29 RX ADMIN — Medication 0.2 MG: at 00:46

## 2019-10-29 RX ADMIN — INSULIN ASPART 2 UNITS: 100 INJECTION, SOLUTION INTRAVENOUS; SUBCUTANEOUS at 08:52

## 2019-10-29 RX ADMIN — ACETAMINOPHEN 650 MG: 325 TABLET, FILM COATED ORAL at 22:06

## 2019-10-29 RX ADMIN — INSULIN ASPART 2 UNITS: 100 INJECTION, SOLUTION INTRAVENOUS; SUBCUTANEOUS at 22:06

## 2019-10-29 RX ADMIN — PANTOPRAZOLE SODIUM 40 MG: 40 TABLET, DELAYED RELEASE ORAL at 07:45

## 2019-10-29 ASSESSMENT — PAIN DESCRIPTION - DESCRIPTORS: DESCRIPTORS: THROBBING

## 2019-10-29 NOTE — PLAN OF CARE
Discharge Planner PT   Patient plan for discharge: Return to TCU  Current status: Pt has bedhold in anticipation of returning TCU.  She reports still having a lot of difficulty with transfers in last week, and today having difficulty with bed mobility.  Lots of pain at L ankle after physical exam by Ortho this morning.    Barriers to return to prior living situation:  Continues to require significant caregiver assist for all mobility and ADL  Recommendations for discharge: Return to TCU  Rationale for recommendations: Pt is unable to safely return home at this time.  Recommend TCU to increase strength, safety with transfers, w/c mobility, return to ambulation  No formal PT evaluation is required as pt's pre-existing discharge plan remains appropriate.  PT to sign off.  The discharge plan is now established, and no further services are indicated while this patient is on observation status.         Entered by: Mirta Trammell 10/29/2019 8:21 AM

## 2019-10-29 NOTE — PROVIDER NOTIFICATION
Ortho text paged re: FYI: pt blood sugar is 258. Per orders to notify if above 180. Will be rechecking at 6 am.

## 2019-10-29 NOTE — PROGRESS NOTES
Social Work Services Discharge Note      Patient Name:  Sarah Felix     Anticipated Discharge Date:  10/30/19    Discharge Disposition:   TCU:  [facility] [phone]    Following MD:  Per facility.      Pre-Admission Screening (PAS) online form has been completed.  The Level of Care (LOC) is:  Determined  Confirmation Code is:  NA, not needed.   Patient/caregiver informed of referral to [facility] for Pre-Admission Screening for skilled nursing facility (SNF) placement and to expect a phone call post discharge from SNF.     Additional Services/Equipment Arranged:  Pt reports she uses [employer] transportation. SW called [facility] to arrange ride however did not have any afternoon rides available, but could transport in the AM on 10/30/19. Updated pt. Pt upset as her bed hold at [facility] was $1000/day and her discharge was already delayed one day previous.     Spoke with [facility] [phone] re: bed hold. Bed hold was confirmed to be $1000. Admissions (Brittani) stated that pt does not have to keep bed hold if she does not way to pay, however she is not guaranteed a bed tomorrow. SW asked if fee could be waived due to transportation concerns, however [facility] stated it could not.     Updated pt. Pt stated she wanted to keep her bed hold. Pt is agreeable to staying additional night in hospital and transferring tomorrow morning.     Arranged ride with [employer] for 9:00AM on 10/30/19. Confirmed ride time with [facility], however Admissions stated pt is unable to come until 11:00AM due to staff not being available to fill medications until then.      [facility] [phone] ride for 11:00AM on 10/30/19.     Patient / Family response to discharge plan: Patient upset regarding additional night in hospital and having to pay bed hold fee at [facility], however is in agreement with plan.     Persons notified of above discharge plan:  Pt, TCU admissions  , bedside RN, [employer].     Staff Discharge Instructions:  Please fax discharge orders and signed hard scripts for any controlled substances.  Please print a packet and send with patient.     CTS Handoff completed:  YES    Medicare Notice of Rights provided to the patient/family:  YES    SAVANA Ponce  6D Adult Acute Care   Ph:[phone]  Pager [phone]  Unit [phone]

## 2019-10-29 NOTE — CONSULTS
[facility], [place]  Consult Note - Hospitalist Service, Gold 7     Date of Admission:  10/28/2019  Consult Requested by: Orthopedic surgery, Dr. Can  Reason for Consult: Medical co-management of diabetes and hypertension in patient s/p left ankle ORIF on 10/28/2019.     Assessment & Plan   Sarah Felix is a 74 year old female admitted on 10/28/2019. She has a past medical history of insulin dependent diabetes mellitus, type II, HTN, LE edema (on chronic lasix) and recent mechanical fall on 10/11 resulting in left bimalleolar ankle fracture initially s/p external fixation (10/12 [facility]) and now s/p left ankle ORIF on 10/28/2019 with Dr. Can.  Patient tolerated procedure well, internal medicine has been consulted for medical co-management and discharge medication recommendations for her diabetes and hypertension.      #Left  bimalleolar ankle fracture 2/2 mechanical fall (10/11/2019) now s/p ORIF (10/28/2019)  Tolerated procedure well.   - Pain control and post-operative care per primary orthopedic team  - Recommend bowel regimen with senna-colace, consider addition of Miralax if needed     #Hypertension  Managed on PTA Metoprolol tartrate 25 mg BID and Lisinopril 2.5 mg daily.  BPs 120s-130s/50s-70s over past 24 hours which is within acceptable range. Most recent Cr WNL.    - Continue PTA medications     #IDDM, type II   Most recent Hgb A1C 7.2 (10/12/2019).  Most recently managed on Glargine 35 units at bedtime, Novolog sliding scale (1-7 units TID with meals and 1-5 units at bedtime). Recently taken off of Amaryl due to poor appetite.  Current glucoses 162-215.  - If plan to discharge patient today, would continue current regimen of Glargine 35 units at bedtime (ordered for you) and Novolog medium sliding scale.  If she remains overnight and her glucoses remain >200 persistently, we can increase her nighttime Glargine.    - TCU physician can make further changes  "to her Glargine after discharge based on glucose trend   - Monitor glucoses 4 x per day qAC and at bedtime   - Hypoglycemia protocol     #GERD  Symptoms currently well controlled.   - Continue PTA Protonix on discharge    #Hx LE edema  Has been managed on PTA Lasix 20 mg daily.  No significant edema on exam, weight is at baseline.    - Lasix 20 mg PO daily can be resumed on discharge    The patient's care was discussed with the Attending Physician, Dr. Castano, Patient and Primary team via this note and text page sent.    Currently, patient is medically stable and internal medicine will sign off.  Please call us if discharge today is canceled and her glucoses become uncontrolled (>200 persistently) and we can re-evaluate on 10/30.  Thank you for allowing us to be a part of this patient's care during her hospitalization.     Griselda Ann NP  [facility], [place]  Pager: [phone]  Please see sticky note for cross cover information  ______________________________________________________________________    Chief Complaint   Left ankle bimalleolar fracture after a mechanical fall now s/p ORIF on 10/28.      History is obtained from the patient and electronic health record    History of Present Illness   Sarah Felix is a 74 year old female who has a past medical history of IDDM type II, HTN, LE edema (on chronic lasix) and recent mechanical fall on 10/11 resulting in left bimalleolar ankle fracture initially s/p external fixation (10/12 [facility]) and now s/p left ankle ORIF on 10/28/2019 with Dr. Can.  Patient tolerated procedure well, internal medicine has been consulted for medical co-management and discharge medication recommendations for her diabetes and hypertension.      Currently, Sarah is doing \"so-so\".  She endorses ongoing pain in her left ankle that is \"just tolerable\".  She wants to ensure that her pain mediation will be ordered on discharge to TCU.  " Otherwise, she denies fevers, chills, nausea, vomiting, chest pain, shortness of breath, abdominal pain.  No dysuria, blood in urine or stool.  Last BM prior to operation.     Review of Systems   The 10 point Review of Systems is negative other than noted in the HPI or here.     Past Medical History    I have reviewed this patient's medical history and updated it with pertinent information if needed.   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  felt to be focal nodular hyperplasia.  annual CT abd.   followed by Dr. Amilcar Kat      Cystocele, lateral      Diabetes mellitus (H)      Diverticulitis of colon (without mention of hemorrhage)(562.11) 6/04    Abd CT     Embolism and thrombosis of unspecified site age 22    post ovarian cyst removed     Esophageal reflux     Hiatal hernia     Fatty liver      Hiatal hernia     small     Hyperlipidemia      Nontoxic uninodular goiter      Osteopenia 4/21/2005    on fosamax  for > 5 yr.  stop 7/2012     Other chronic otitis externa      Other specified disorders of liver     Fatty Liver, Benign appearing liver cysts     Pure hypercholesterolemia        Past Surgical History   I have reviewed this patient's surgical history and updated it with pertinent information if needed.  Past Surgical History:   Procedure Laterality Date     APPLY EXTERNAL FIXATOR LOWER EXTREMITY Left 10/12/2019    Procedure: Left ankle external fixator placement;  Surgeon: Leeanne Brown MD;  Location: [facility] OR     C APPENDECTOMY  age 22     C DEXA, BONE DENSITY, AXIAL SKEL  2005     C DEXA, BONE DENSITY, AXIAL SKEL  6/2007    No signif change in Osteopenia     COLONOSCOPY  4/2006    repeat 10 yr      ENDOSCOPIC ULTRASOUND UPPER GASTROINTESTINAL TRACT (GI) N/A 2018    Procedure: ENDOSCOPIC ULTRASOUND, ESOPHAGOSCOPY / UPPER GASTROINTESTINAL TRACT (GI);  Endoscopic Ultrasound, Esophagogastroduodenoscopy with endoscopic mucosal resection;  Surgeon: Hood Brower MD;  Location: [facility] OR     ESOPHAGOSCOPY, GASTROSCOPY, DUODENOSCOPY (EGD), COMBINED N/A 2018    Procedure: COMBINED ESOPHAGOSCOPY, GASTROSCOPY, DUODENOSCOPY (EGD), BIOPSY SINGLE OR MULTIPLE;  EGD;  Surgeon: Hood Brower MD;  Location: [facility] OR     ESOPHAGOSCOPY, GASTROSCOPY, DUODENOSCOPY (EGD), COMBINED N/A 3/14/2019    Procedure: Upper Endoscopy;  Surgeon: Hood Brower MD;  Location: [facility] OR     ESOPHAGOSCOPY, GASTROSCOPY, DUODENOSCOPY (EGD), RESECT MUCOSA, COMBINED N/A 2018    Procedure: COMBINED ESOPHAGOSCOPY, GASTROSCOPY, DUODENOSCOPY (EGD), RESECT MUCOSA;;  Surgeon: Hood Brower MD;  Location: [facility] OR     GYN SURGERY  10/22/08    pelvic support     HEMORRHOIDECTOMY       MOHS MICROGRAPHIC PROCEDURE       OPEN REDUCTION INTERNAL FIXATION ANKLE Left 10/28/2019    Procedure: Internal fixation left bimalleolar ankle fracture, removal external fixator;  Surgeon: Jose Roberto Can MD;  Location: [facility] OR     SURGICAL HISTORY OF -   age 22    L ovarian cyst removal, laparotomy     SURGICAL HISTORY OF -       partial thyroidectomy     SURGICAL HISTORY OF -   10/08    Transobturator tape for Urinary Incontinence     SURGICAL HISTORY OF -   2011    stress test normal     THYROID SURGERY      Had cyst removed, thyroid gland is intact.       Social History   I have reviewed this patient's social history and updated it with pertinent information if needed.  Social History     Tobacco Use     Smoking status: Former Smoker     Last attempt to quit: 1980     Years since quittin.8     Smokeless tobacco: Never Used     Tobacco comment: smoked from 70-80; smoked about 1ppd   Substance Use Topics     Alcohol use: Yes     Alcohol/week:  0.0 standard drinks     Comment: 6 drinks per week     Drug use: No       Family History   I have reviewed this patient's family history and updated it with pertinent information if needed.   Family History   Problem Relation Age of Onset     Diabetes Mother         at 89 yrs     Hypertension Mother      Arthritis Mother         rheumatoid     Cancer Father         bladder     Cardiovascular Brother         palpitations not on meds.     Glaucoma No family hx of      Macular Degeneration No family hx of      Amblyopia No family hx of        Medications   Current Facility-Administered Medications   Medication     acetaminophen (TYLENOL) tablet 650 mg     glucose gel 15-30 g    Or     dextrose 50 % injection 25-50 mL    Or     glucagon injection 1 mg     diphenhydrAMINE (BENADRYL) capsule 25 mg     [START ON 10/30/2019] enoxaparin ANTICOAGULANT (LOVENOX) injection 40 mg     HYDROmorphone (DILAUDID) injection 0.2 mg     insulin aspart (NovoLOG) inj (RAPID ACTING)     insulin aspart (NovoLOG) inj (RAPID ACTING)     insulin glargine (LANTUS PEN) injection 35 Units     lactated ringers infusion     lidocaine (LMX4) cream     lidocaine 1 % 0.1-1 mL     lisinopril (PRINIVIL/Zestril) tablet 2.5 mg     metoprolol tartrate (LOPRESSOR) tablet 25 mg     naloxone (NARCAN) injection 0.1-0.4 mg     ondansetron (ZOFRAN-ODT) ODT tab 4 mg    Or     ondansetron (ZOFRAN) injection 4 mg     oxyCODONE (ROXICODONE) tablet 5-10 mg     pantoprazole (PROTONIX) EC tablet 40 mg     prochlorperazine (COMPAZINE) injection 5 mg    Or     prochlorperazine (COMPAZINE) tablet 5 mg     senna-docusate (SENOKOT-S/PERICOLACE) 8.6-50 MG per tablet 2 tablet     sodium chloride (PF) 0.9% PF flush 3 mL     sodium chloride (PF) 0.9% PF flush 3 mL       Allergies   Allergies   Allergen Reactions     Amlodipine Swelling     Ancef [Cefazolin]      Levaquin Rash     Itching, rash     Metformin Diarrhea and GI Disturbance     Cephalosporins Rash     Levofloxacin  Itching and Rash     Nickel Rash     Contact reaction  Contact reaction       Physical Exam   Vital Signs: Temp: 98.4  F (36.9  C) Temp src: Oral BP: 134/74 Pulse: 81 Heart Rate: 89 Resp: 16 SpO2: 96 % O2 Device: Nasal cannula Oxygen Delivery: 2 LPM  Weight: 193 lbs 0 oz    General Appearance: NAD  Eyes: Anicteric sclera, PERRLA  HEENT: Atraumatic, normocephalic.  MMM.   Respiratory: Normal effort on RA.  Lungs CTAB, no wheezes, rales or rhonchi.   Cardiovascular: RRR, S1S2.  No murmurs appreciated.   GI: Abdomen soft, NTND.  Bowel sounds hypoactive x 4 quadrants.   Lymph/Hematologic: No abnormal bleeding or bruising.   Genitourinary: Deferred  Skin: No jaundice, no open wounds or rashes on visualized skin   Musculoskeletal: Left ankl in CAM boot, surgical incision not visualized on today's exam (see ortho note for more details)  Neurologic: A+O x 3, no focal deficits.  Sensation intact bilateral LE.  Psychiatric: Affect appropriate.     Data   Results for orders placed or performed during the hospital encounter of 10/28/19 (from the past 24 hour(s))   Glucose by meter   Result Value Ref Range    Glucose 158 (H) 70 - 99 mg/dL   XR Surgery CHENTE Fluoro L/T 5 Min w Stills    Narrative    This exam was marked as non-reportable because it will not be read by a   radiologist or a [place] non-radiologist provider.             Glucose by meter   Result Value Ref Range    Glucose 190 (H) 70 - 99 mg/dL   Glucose by meter   Result Value Ref Range    Glucose 258 (H) 70 - 99 mg/dL   Glucose by meter   Result Value Ref Range    Glucose 215 (H) 70 - 99 mg/dL   Glucose by meter   Result Value Ref Range    Glucose 193 (H) 70 - 99 mg/dL

## 2019-10-29 NOTE — PLAN OF CARE
A&O x4. VSS.  Alert and oriented x4.  Oxycodone 10 mg given for pain x3.  Zofran given x1.  Ankle Immobilized in place, elevated, ice pack in use . Pt tolerating  regular diet.  Purewick patent with good urine output.  No BM this shift.  Plan is for Pt. To discharge today.  Continue plan of care.

## 2019-10-29 NOTE — BRIEF OP NOTE
[facility], [place]    Brief Operative Note    Pre-operative diagnosis: Closed bimalleolar fracture of left ankle, initial encounter [Z65.305F]  Post-operative diagnosis Same    Procedure(s):  Internal fixation left bimalleolar ankle fracture, removal external fixator  Surgeon(s) and Role:     * Jose Roberto Can MD - Primary  Anesthesia: Combined General with Popliteal Block   Estimated blood loss: See operative report  Drains: None  Specimens: * No specimens in log *  Findings:   See operative report.  Complications: None reported  Implants:   Implant Name Type Inv. Item Serial No.  Lot No. LRB No. Used   IMP SCR SYN CORTEX 3.5X14MM SELF TAP .814 Metallic Hardware/[place] IMP SCR SYN CORTEX 3.5X14MM SELF TAP .814  [employer]AT[employer]  Left 3   IMP SCR SYN CORTEX 3.5X24MM SELF TAP .824 Metallic Hardware/[place] IMP SCR SYN CORTEX 3.5X24MM SELF TAP .824  [employer]  Left 1   IMP SCR SYN CAN 4.0X18MM FT .018 Metallic Hardware/[place] IMP SCR SYN CAN 4.0X18MM FT .018  [employer]  Left 1   IMP SCR SYN CAN 4.0X20MM FT .020 Metallic Hardware/[place] IMP SCR SYN CAN 4.0X20MM FT .020  [employer]  Left 1   IMP PLATE SYN 1/3 TUBULAR 81MM 07H .371 Metallic Hardware/[place] IMP PLATE SYN 1/3 TUBULAR 81MM 07H .371  [employer]  Left 1   IMP SCR SYN CAN 4.0X46MM LONG THRD .746 Metallic Hardware/[place] IMP SCR SYN CAN 4.0X46MM LONG THRD .746  [employer]  Left 1   IMP SCR SYN CAN 4.0X50MM LONG THRD .750 Metallic Hardware/[place] IMP SCR SYN CAN 4.0X50MM LONG THRD .750  [employer]  Left 1     Postoperative Plan:  -Outpatient overnight admission  -Pain control as prescribed  -ADAT  -NWB LLE  -DVT ppx:  Lovenox daily x 28 days  -Dispo: Home POD1    Daniel Rousseau MD, PhD  Orthopedic Surgery Resident  [facility]

## 2019-10-29 NOTE — PLAN OF CARE
"    A&O x4. VSS. S/p Internal fixation left bimalleolar ankle fracture, removal external fixator. Ankle Immobilized in place, elevated, ice pack in use . Pt complains of aching pain LLE. PRN dilaudid and PRN oxycodone given to manage pain. Pt tolerating PO fluids, voided, T/R with pillows in used. Pt makes needs and wants known. Call light appropriate.    /59 (BP Location: Left arm)   Pulse 82   Temp 98.6  F (37  C) (Oral)   Resp 16   Ht 1.651 m (5' 5\")   Wt 87.5 kg (193 lb)   SpO2 94%   BMI 32.12 kg/m        "

## 2019-10-29 NOTE — PLAN OF CARE
"S/p Internal fixation left bimalleolar ankle fracture, removal external fixator. Ankle Immobilized in place, ice pack. Pt received IV dilaudid given with some relief. Pt ate, voided, T/R with pillows in used.BS checked and Insulin order.  Pt makes needs and wants known. Call light appropriate, AVSS, Will continue to monitor.     BP (!) 143/70 (BP Location: Left arm)   Pulse 78   Temp 97.7  F (36.5  C) (Oral)   Resp 14   Ht 1.651 m (5' 5\")   Wt 87.5 kg (193 lb)   SpO2 94%   BMI 32.12 kg/m      "

## 2019-10-29 NOTE — PROGRESS NOTES
Orthopaedic Surgery Progress Note 10/29/2019    E: No acute events overnight.    S: Complains of postop pain. Denies numbness or tingling. Denies cp/sob/n/v. Tolerating oral diet.. Plan of care reviewed and questions answered    O:  Temp: 98.4  F (36.9  C) Temp src: Oral BP: 134/74 Pulse: 81 Heart Rate: 89 Resp: 16 SpO2: 96 % O2 Device: Nasal cannula Oxygen Delivery: 2 LPM    Exam:  Gen: No acute distress, resting comfortably in bed.  Resp: Non-labored breathing  MSK:  LLE:  - CAM boot in place  - Dressings c/d/i  - SILT f/sp/dp/t/s/s  - Wiggles toes  - Toes WWP    No lab results found in last 7 days.    Invalid input(s): SEDRATE    Assessment: Sarah Felix is a 74 year old female with DM, HTN s/p left ankle ORIF on 10/28/2019 with Dr. Can. Postoperative pain otherwise doing well.    Plan:  Ortho Primary  Activity: Up with assist.  Weight bearing status: NWB LLE  Antibiotics:  None   Diet: Begin with clear fluids and progress diet as tolerated  DVT prophylaxis:  Lovenox x 28 days  Bracing/Splinting: CAM boot LLE when OOB  Elevation: Elevate LLE on pillows to keep heart as much as possible.  Wound Care: Keep dressings c/d/i  Pain management: Transition from IV to orals as tolerated  Consults: Hospitalist, PT, appreciate assistance in caring for this patient.  Follow-up: Clinic with Dr. Can as scheduled  Disposition: Pending progress with therapies, pain control on orals, and medical stability, anticipate discharge to TCU on POD #1.    Future Appointments   Date Time Provider Department Center   11/13/2019  2:15 PM Jose Roberto Can MD [facility]     Daniel Rousseau MD PhD  Orthopaedic Surgery PGY4  Pager [phone]

## 2019-10-29 NOTE — OR NURSING
Following page sent to Dr. Daniel Rousseau:patient Sarah Felix needs a transfer order and an Operative note before I can send her to a room. Sherry [phone] thank you

## 2019-10-29 NOTE — DISCHARGE SUMMARY
[facility], [place]  Orthopaedic Discharge Summary    Patient: Sarah Felix MRN# [id number]   Age/Sex: 74 year old female YOB: 1945      Date of Admission:  10/28/2019  Date of Discharge:  10/30/19  Admitting Physician:  Jose Roberto Can*  Discharge Physician:  Daniel Rousseau MD  Primary Care Provider:  Wegener, Joel Daniel Irwin    DISCHARGE DIAGNOSIS:  Closed bimalleolar fracture of left ankle, initial encounter [S82.842A]    PROCEDURE(S):   10/28/2019 Procedure(s):  Internal fixation left bimalleolar ankle fracture, removal external fixator     BRIEF HISTORY:  This 74-year-old woman had a bimalleolar ankle fracture that was unstable after reduction and was ultimately placed into an external fixator.  She has been in a nursing facility.  Discussion was had about surgery and the need for medial lateral fixation followed by nonweightbearing for 6 weeks.  She understood the risks including infection.  She wished to proceed and consent was obtained.    HOSPITAL COURSE:    Patient was admitted on 10/28/2019 and underwent the above stated procedure(s). There were no complications and the patient was transferred to the PACU in stable condition.  Medicine was consulted postoperatively to aid in management of medical comorbidities. Patient received routine nursing cares on the floor.  A clear liquid diet was started and advanced to regular on POD1. PT/OT was initiated on POD1 for safety training, ADLs, mobility and ROM.  Patient was kept in a CAM boot and restricted to NWB LLE.  After demonstrating the ability to tolerate a diet, pain control on PO pain meds, and clearance by PT/OT, patient was deemed medically safe for discharge to her prior to admission TCU on 10/30/19.    Antibiotics:  Not needed postop.  DVT Prophylaxis:  Lovenox daily for 4 weeks.  PT/OT Progress: Has met PT/OT goals for safe mobility.  Pain Medications: Weaned off all IV pain meds by time of  discharge.  Inpatient Events: No significant events or complications.     DISCHARGE MEDICATIONS AND PROCEDURES:      Current Discharge Medication List      START taking these medications    Details   !! enoxaparin ANTICOAGULANT (LOVENOX) 40 MG/0.4ML syringe Inject 0.4 mLs (40 mg) Subcutaneous daily for 28 days  Qty: 11.2 mL, Refills: 0    Associated Diagnoses: Closed bimalleolar fracture of left ankle, initial encounter      !! oxyCODONE (ROXICODONE) 5 MG tablet Take 1-2 tablets (5-10 mg) by mouth every 4 hours as needed for moderate to severe pain  Qty: 30 tablet, Refills: 0    Associated Diagnoses: Closed bimalleolar fracture of left ankle, initial encounter      !! senna-docusate (SENOKOT-S/PERICOLACE) 8.6-50 MG tablet Take 1-2 tablets by mouth 2 times daily  Qty: 30 tablet, Refills: 0    Associated Diagnoses: Closed bimalleolar fracture of left ankle, initial encounter       !! - Potential duplicate medications found. Please discuss with provider.      CONTINUE these medications which have NOT CHANGED    Details   acetaminophen (TYLENOL) 325 MG tablet Take 3 tablets (975 mg) by mouth every 8 hours    Associated Diagnoses: Closed fracture of left ankle, initial encounter      atorvastatin (LIPITOR) 40 MG tablet TAKE 1 TABLET DAILY  Qty: 30 tablet, Refills: 0    Comments: ++ Needs bloodwork before further refills++  Associated Diagnoses: Hyperlipidemia LDL goal <100      blood glucose (NO BRAND SPECIFIED) lancets standard Use to test blood sugar 4 times daily or as directed.Lancets One Touch 100s Delica- 4x a day  Qty: 120 each, Refills: 5    Associated Diagnoses: Type 2 diabetes mellitus without complication, without long-term current use of insulin (H)      !! blood glucose (NO BRAND SPECIFIED) test strip Use to test blood sugar 4 times daily or as directed.One touch ultra strips blue 100- 4x a day.  Qty: 120 strip, Refills: 5    Associated Diagnoses: Type 2 diabetes mellitus without complication, without  long-term current use of insulin (H)      !! blood glucose (ONETOUCH ULTRA) test strip 1 strip by In Vitro route 4 times daily.  Qty: 360 each, Refills: 3    Associated Diagnoses: Type 2 diabetes, HbA1c goal < 7% (H)      blood glucose monitoring (NO BRAND SPECIFIED) meter device kit Use to test blood sugar 4 times daily or as directed.  Qty: 1 kit, Refills: 0    Comments: ++ONE TOUCH ULTRA 2 BRAND, PLEASE++  Associated Diagnoses: Type 2 diabetes, HbA1c goal < 7% (H)      CALCIUM 500 + D 500-200 MG-IU OR TABS one tab twice per day  Qty: 100, Refills: 0    Associated Diagnoses: Osteopenia      Cholecalciferol (VITAMIN D PO) Take  by mouth. Pt consuming 3000 units per day       diphenhydrAMINE (BENADRYL) 25 MG capsule Take 1 capsule (25 mg) by mouth every 6 hours as needed for itching or other (rash)    Associated Diagnoses: Dermatitis      fexofenadine (ALLEGRA) 180 MG tablet Take 1 tablet (180 mg) by mouth daily    Associated Diagnoses: Dermatitis      furosemide (LASIX) 20 MG tablet Take 1 tablet (20 mg) by mouth daily  Qty: 270 tablet, Refills: 1    Associated Diagnoses: Dependent edema; CKD (chronic kidney disease) stage 2, GFR 60-89 ml/min      !! insulin aspart (NOVOLOG PEN) 100 UNIT/ML pen Inject 1-7 Units Subcutaneous 3 times daily (before meals) Medium Correction Scale for Pre meal Blood glucose  Do Not give Correction Insulin if Pre-Meal BG < 140.   Pre-Meal BG levels: Corresponding Insulin Units  140-189: 1 unit; 190-239:2 units.   240-289: 3 units; 290-339: 4 units.   340-399: 5 units; 400-449: 6 units   = or > 450 give 7 units.   Notify MD if glucose > or = 350 mg/dL after administration of correction dose.    Associated Diagnoses: Type 2 diabetes, HbA1c goal < 7% (H)      insulin glargine (LANTUS PEN) 100 UNIT/ML pen Inject 25 Units Subcutaneous At Bedtime    Comments: If Lantus is not covered by insurance, may substitute Basaglar at same dose and frequency.    Associated Diagnoses: Type 2 diabetes,  HbA1c goal < 7% (H)      lisinopril (PRINIVIL/ZESTRIL) 2.5 MG tablet TAKE 1 TABLET DAILY  Qty: 90 tablet, Refills: 1    Associated Diagnoses: CKD (chronic kidney disease) stage 2, GFR 60-89 ml/min      metoprolol tartrate (LOPRESSOR) 25 MG tablet TAKE 1 TABLET TWICE A DAY  Qty: 180 tablet, Refills: 1    Associated Diagnoses: Essential hypertension      !! oxyCODONE (ROXICODONE) 5 MG tablet Take 1-2 tablets (5-10 mg) by mouth every 4 hours as needed for moderate to severe pain (1 pill for pain 1-5, 2 pills for pain 6-10)  Qty: 45 tablet, Refills: 0    Associated Diagnoses: Closed fracture of left ankle, initial encounter      pantoprazole (PROTONIX) 40 MG EC tablet Take 1 tablet (40 mg) by mouth daily    Associated Diagnoses: Closed fracture of left ankle, initial encounter      !! senna-docusate (SENOKOT-S/PERICOLACE) 8.6-50 MG tablet Take 2 tablets by mouth 2 times daily    Associated Diagnoses: Closed fracture of left ankle, initial encounter      triamcinolone (KENALOG) 0.1 % external cream Apply topically 3 times daily    Associated Diagnoses: Seborrheic keratosis      alum & mag hydroxide-simethicone (MAALOX REGULAR STRENGTH) 200-200-20 MG/5ML SUSP Take 30 mLs by mouth as needed       !! enoxaparin (LOVENOX) 40 MG/0.4ML syringe Inject 0.4 mLs (40 mg) Subcutaneous every 24 hours    Associated Diagnoses: Closed fracture of left ankle, initial encounter      !! insulin aspart (NOVOLOG PEN) 100 UNIT/ML pen Inject 1-5 Units Subcutaneous At Bedtime MEDIUM INSULIN RESISTANCE DOSING    Do Not give Bedtime Correction Insulin if BG less than  200.   For  - 249 give 1 units.   For  - 299 give 2 units.   For  - 349 give 3 units.   For  -399 give 4 units.   For BG greater than or equal to 400 give 5 units.  Notify provider if glucose greater than or equal to 350 mg/dL after administration of correction dose.  If given at mealtime, administer within 30 minutes of start of meal    Associated Diagnoses:  "Type 2 diabetes, HbA1c goal < 7% (H)      insulin pen needle (BD REY U/F) 32G X 4 MM miscellaneous Use one pen needle twice daily (with lantus and victoza)  Qty: 180 each, Refills: 3    Associated Diagnoses: Type 2 diabetes mellitus with stage 2 chronic kidney disease, with long-term current use of insulin (H)      insulin syringe-needle U-100 (BD INSULIN SYRINGE ULTRAFINE) 31G X 5/16\" 1 ML miscellaneous Use 1 syringes twice daily for insulin and victoza and for symptoms of hypoglycemia  Qty: 100 each, Refills: 3    Associated Diagnoses: Type 2 diabetes mellitus with stage 2 chronic kidney disease, without long-term current use of insulin (H)      ONETOUCH DELICA LANCETS 33G MISC 1 each 4 times daily  Qty: 360 each, Refills: 3    Associated Diagnoses: Type 2 diabetes, HbA1c goal < 7% (H)      order for DME [employer]. Patient can use for 3 months  Qty: 1 Units, Refills: 0    Associated Diagnoses: Closed bimalleolar fracture of left ankle, initial encounter       !! - Potential duplicate medications found. Please discuss with provider.            Discharge Procedure Orders   Discharge Instructions   Order Comments: Review outpatient procedure discharge instructions with patient as directed by Provider     Notify Provider   Order Comments: CALL YOUR PHYSICIAN IF:  1.  Your pain begins to worsen and is unable to be controlled with your medications.  2.  Excessive redness or drainage of cloudy or bloody material from the wounds (Clear red tinted fluid and some mild drainage should be expected). Drainage of any kind 5 days after surgery should be reported to the doctor.  3.  You have a temperature elevation greater than 101.5    4.  You have pain, swelling or redness in your calf. You have numbness or weakness in your leg or foot.    You many call your physician at [phone] during business hours.    For after hours or on weekends, you may call the hospital at [phone] and ask for the orthopedic resident " on call.     Ice to affected area   Order Comments: Ice pack to surgical site every 15 minutes per hour for 24 hours     Shower   Order Comments: Keep incisions covered and dry when showering until sutures are removed.     Dressing Change    Order Comments: IF leaking, remove and redress. Wash hands before and after and use gloves. Otherwise change dressing on POD #7.     Dressing Change   Order Comments: Change dressing on fourth day after surgery. Cover as needed for comfort.      Diet as Tolerated   Order Comments: Return to diet before surgery, unless instructed otherwise.     Return to clinic   Order Comments: Return to clinic in 2 weeks     No driving or operating machinery    Order Comments: until the day after procedure     No Alcohol   Order Comments: For 24 hours post procedure     No weight bearing   Order Comments: Non-weight bearing left lower extremity     General info for SNF   Order Comments: Length of Stay Estimate: Short Term Care: Estimated # of Days <30  Condition at Discharge: Improving  Level of care:board and care  Rehabilitation Potential: Good  Admission H&P remains valid and up-to-date: Yes  Recent Chemotherapy: N/A  Use Nursing Home Standing Orders: Yes     Mantoux instructions   Order Comments: Give two-step Mantoux (PPD) Per Facility Policy Yes     Reason for your hospital stay   Order Comments: Left ankle surgery     Glucose monitor nursing POCT   Order Comments: Before meals and at bedtime     Activity - Up with assistive device     Order Specific Question Answer Comments   Is discharge order? Yes      Weight bearing status   Order Comments: No weight bearing left lower extremity     Wound care   Order Comments: Site:   Left ankle  Instructions:  Keep dressings clean, dry, and intact. Change dressings after 7 days then okay to shower running water over wounds but avoid submerging.     Follow up with [facility]     Order Specific Question Answer Comments   Type of Referral  Surgical    Specialty Foot and Ankle    When should patient be seen Other (see comments) already scheduled   Reason for follow-up Left ankle ORIF on 10/28/2019    Preferred Provider Dr. Jose Roberto Can      Adult [facility] Follow-up and recommended labs and tests   Order Comments: 2 weeks with Dr. Can    Appointments on [facility] and/or [facility] (with [facility] or [facility] provider or service). Call [phone] if you haven't heard regarding these appointments within 7 days of discharge.     Full Code     Order Specific Question Answer Comments   Code status determined by: Unable to determine; FULL CODE until documents or legal decision maker available      Fall precautions     Advance Diet as Tolerated   Order Comments: Follow this diet upon discharge: Diabetic     Order Specific Question Answer Comments   Is discharge order? Yes        FOLLOWUP:    Future Appointments   Date Time Provider Department Center   11/13/2019  2:15 PM Jose Roberto Can MD [facility]     Appointments at [facility]: [address] [place] [address]  Please call [phone] if you haven't heard regarding these appointments within 7 days of discharge.    ATTESTATION:  I have reviewed today's vital signs, notes, medications, labs and imaging.  Patient seen and discussed with Dr. Can who is in agreement with the above.    Daniel Rousseau MD, PhD  Orthopaedic Surgery Resident, PGY4  Pager: [phone]    Addendum and final signature by:   Min Toledo MD  Orthopedic Surgery PGY-4

## 2019-10-29 NOTE — PROVIDER NOTIFICATION
FYI- Pt Sarah Felix, 6D, room 16-1. Pt unable to discharge today as no transportation available. Please advise if you would like her status changed from outpatient to observation.   Thanks,

## 2019-10-29 NOTE — ANESTHESIA POSTPROCEDURE EVALUATION
Anesthesia POST Procedure Evaluation    Patient: Sarah Felix   MRN:     [id number] Gender:   female   Age:    74 year old :      1945        Preoperative Diagnosis: Closed bimalleolar fracture of left ankle, initial encounter [S82.786W]   Procedure(s):  Internal fixation left bimalleolar ankle fracture, removal external fixator   Postop Comments: No value filed.       Anesthesia Type:  Not documented  General    Reportable Event: NO     PAIN: Uncomplicated   Sign Out status: Comfortable, Well controlled pain     PONV: No PONV   Sign Out status:  No Nausea or Vomiting     Neuro/Psych: Uneventful perioperative course   Sign Out Status: Preoperative baseline; Age appropriate mentation     Airway/Resp.: Uneventful perioperative course   Sign Out Status: Non labored breathing, age appropriate RR; Resp. Status within EXPECTED Parameters     CV: Uneventful perioperative course   Sign Out status: Appropriate BP and perfusion indices; Appropriate HR/Rhythm     Disposition:   Sign Out in:  PACU  Disposition:  Floor  Recovery Course: Uneventful  Follow-Up: Not required           Last Anesthesia Record Vitals:  CRNA VITALS  10/28/2019 1814 - 10/28/2019 1914      10/28/2019             SpO2:  97 %          Last PACU Vitals:  Vitals Value Taken Time   /52 10/28/2019  9:00 PM   Temp 36.4  C (97.5  F) 10/28/2019  7:15 PM   Pulse 78 10/28/2019  9:00 PM   Resp 19 10/28/2019  9:00 PM   SpO2 96 % 10/28/2019  9:06 PM   Temp src     NIBP 130/58 10/28/2019  6:47 PM   Pulse     SpO2 97 % 10/28/2019  6:48 PM   Resp     Temp     Ht Rate 99 10/28/2019  6:47 PM   Temp 2     Vitals shown include unvalidated device data.      Electronically Signed By: Pat Owens MD, 2019, 9:08 PM

## 2019-10-30 VITALS
HEART RATE: 81 BPM | HEIGHT: 65 IN | DIASTOLIC BLOOD PRESSURE: 65 MMHG | TEMPERATURE: 98.2 F | RESPIRATION RATE: 16 BRPM | WEIGHT: 193 LBS | SYSTOLIC BLOOD PRESSURE: 132 MMHG | BODY MASS INDEX: 32.15 KG/M2 | OXYGEN SATURATION: 93 %

## 2019-10-30 LAB
GLUCOSE BLDC GLUCOMTR-MCNC: 220 MG/DL (ref 70–99)
GLUCOSE BLDC GLUCOMTR-MCNC: 77 MG/DL (ref 70–99)

## 2019-10-30 PROCEDURE — 82962 GLUCOSE BLOOD TEST: CPT

## 2019-10-30 PROCEDURE — 25000132 ZZH RX MED GY IP 250 OP 250 PS 637: Performed by: STUDENT IN AN ORGANIZED HEALTH CARE EDUCATION/TRAINING PROGRAM

## 2019-10-30 PROCEDURE — 25000128 H RX IP 250 OP 636: Performed by: STUDENT IN AN ORGANIZED HEALTH CARE EDUCATION/TRAINING PROGRAM

## 2019-10-30 RX ORDER — OXYCODONE HYDROCHLORIDE 5 MG/1
5 TABLET ORAL EVERY 4 HOURS PRN
Qty: 30 TABLET | Refills: 0 | Status: SHIPPED | OUTPATIENT
Start: 2019-10-30 | End: 2020-02-12

## 2019-10-30 RX ADMIN — METOPROLOL TARTRATE 25 MG: 25 TABLET, FILM COATED ORAL at 07:51

## 2019-10-30 RX ADMIN — METHOCARBAMOL 500 MG: 500 TABLET, FILM COATED ORAL at 07:51

## 2019-10-30 RX ADMIN — OXYCODONE HYDROCHLORIDE 10 MG: 5 TABLET ORAL at 11:12

## 2019-10-30 RX ADMIN — OXYCODONE HYDROCHLORIDE 5 MG: 5 TABLET ORAL at 08:05

## 2019-10-30 RX ADMIN — DIPHENHYDRAMINE HYDROCHLORIDE 25 MG: 25 CAPSULE ORAL at 02:10

## 2019-10-30 RX ADMIN — OXYCODONE HYDROCHLORIDE 10 MG: 5 TABLET ORAL at 02:09

## 2019-10-30 RX ADMIN — PANTOPRAZOLE SODIUM 40 MG: 40 TABLET, DELAYED RELEASE ORAL at 07:51

## 2019-10-30 RX ADMIN — ACETAMINOPHEN 650 MG: 325 TABLET, FILM COATED ORAL at 08:05

## 2019-10-30 RX ADMIN — LISINOPRIL 2.5 MG: 2.5 TABLET ORAL at 07:51

## 2019-10-30 RX ADMIN — ENOXAPARIN SODIUM 40 MG: 40 INJECTION SUBCUTANEOUS at 07:51

## 2019-10-30 RX ADMIN — SENNOSIDES AND DOCUSATE SODIUM 1 TABLET: 8.6; 5 TABLET ORAL at 07:50

## 2019-10-30 RX ADMIN — OXYCODONE HYDROCHLORIDE 10 MG: 5 TABLET ORAL at 05:06

## 2019-10-30 RX ADMIN — OXYCODONE HYDROCHLORIDE 5 MG: 5 TABLET ORAL at 08:24

## 2019-10-30 NOTE — PLAN OF CARE
Neuro: A&Ox4  Musculoskeletal: Significantly impaired AROM in LLE. Able to move upper extremities and RLE against resistance and gravity.   Cardiac: WDL  Respiratory: WDL, all fields clear and equal bilaterally   GI: Appetite poor, ate 25% of meal. All other areas WDL  Genitourinary: Pt has not been up to void. Pt expressed no issues.   Skin: Unable to assess LLE due to device. RLE has 2+ pitting edema, pt verbalized this was more swelling than normal for her.   Peripheral Neurovascular: WDL  Skin: WDL ex. LLE, unable to visualize     Pt to discharge to TCU, ride scheduled for 1100 today. Gave 10 mg oxycodone and 650 mg acetaminophen for pain this AM.

## 2019-10-30 NOTE — OP NOTE
DATE OF SURGERY: 10/28/2019    PREOPERATIVE DIAGNOSIS: Left ankle bimalleolar fracture    POSTOPERATIVE DIAGNOSIS: Left ankle bimalleolar fracture    PROCEDURE: #1 removal of left ankle spanning external fixator, #2 internal fixation bimalleolar left ankle fracture    SURGEON: Jose Roberto Can MD     ASSISTANT: None    PATIENT HISTORY: This patient had an unstable left ankle fracture and had an external fixator placed.  She presents now for definitive fixation and understands the risks of bleeding infection pain numbness stiffness weakness limp.    DESCRIPTION OF PROCEDURE: The patient was placed in a supine position and then underwent successful induction of general anesthesia.  I removed the frame of the external fixator and prepped the pins with Betadine.  I then cut the a calcaneal pin at the skin on the lateral side and backed it out through the medial side.  The tibial pins were also backed out.  The patient's left leg was then washed and sterilely prepped and draped.  Began on the lateral side by making an incision to the skin sharply.  The subcutaneous tissue was also divided and the fracture was exposed.  The fracture was cleaned out and held in place with 2 clamps.  Then placed an interfragmentary screw from anterior to posterior.  Next I placed a semitubular plate on the lateral side.  It was contoured and held with 3 screws above the fracture site and 2 below.  I was happy with the fixation of all the screws.  And then irrigated the side and turned my attention to the medial side.  I made a curved incision anteriorly and distally to the medial malleolus.  The fracture was exposed cleaned and held in place with a clamp.  I placed 2 guidewires for the 4.0 cannulated screws.  I measured and placed a 50 and a 46 mm screw one after the other and compressed the fracture site.  Good fixation was obtained.  The guidewires were removed and then I copiously irrigated.  I closed the lateral side in 2 layers  with Vicryl and then nylon was used in the skin.  The medial side was closed also with Vicryl the subcutaneous tissue nylon in the skin followed by Adaptic over the closed incisions and Xeroform over the pin sites.  Sterile dressings sterile web roll and an Ace wrap were applied followed by a cam boot.  The patient was examined with C arm along the way to confirm that the reduction was good.  She was extubated and taken to the recovery room in stable condition there are no complications.  The blood loss was 10 mL.      Jose Roberto Can MD

## 2019-10-30 NOTE — PROGRESS NOTES
Pt discharged to TCU. Went over, and gave copy of discharge instructions to pt to bring to TCU facility. PIV removed, any questions and concerns addressed. Pt transported via litter.

## 2019-10-30 NOTE — PLAN OF CARE
Outpatient/Observation goals to be met before discharge home:    ~ Return to baseline mental status: YES     ~ Hypercapnia, hypoventilation or hypoxia resolved for at least 2 hours without supplemental oxygen: YES    ~ Deficits in sensation, mobility or coordination have resolved if spinal or regional anesthesia was used: YES

## 2019-10-30 NOTE — PLAN OF CARE
"Patient alert and oriented x 4. No chest pain. Pt reports of SOB with exertion, none at rest. No abdominal pain, nausea, or vomiting. Tolerating PO diet. Pain to LLE, in CAM boot and elevated. PRN oxy given q3. Ice also applied. Non-weightbearing to LLE.  Purewick in place, good urine output. No BM. Unable to complete full skin as pt refused to turn. Reports it is too painful. Pt reports of foam barrier on coccyx. Encouraged repositioning as possible and gave pressure injury education. Plan for patient to discharge tomorrow at 11 AM to TCU. Will continue to monitor.     BP (!) 145/64   Pulse 101   Temp 98.3  F (36.8  C) (Oral)   Resp 18   Ht 1.651 m (5' 5\")   Wt 87.5 kg (193 lb)   SpO2 92%   BMI 32.12 kg/m      "

## 2019-10-30 NOTE — PROGRESS NOTES
Orthopaedic Surgery Progress Note 10/30/2019    E: No acute events overnight.    S: Continued pain limiting ability to participate with therapy, has not mobilized at this time. Comfortable at rest; able to get sleep overnight. Denies numbness or tingling. Denies cp/sob/n/v. Tolerating oral diet.. Understands plan for discharge to TCU today.     O:  Temp: 98.2  F (36.8  C) Temp src: Oral BP: 132/65 Pulse: 81 Heart Rate: 91 Resp: 16 SpO2: 93 % O2 Device: None (Room air)      Exam:  Gen: No acute distress, resting comfortably in bed.  Resp: Non-labored breathing  MSK:  LLE:  - CAM boot in place  - Dressings c/d/i  - SILT f/sp/dp/t/s/s  - Wiggles toes, fires EHL/FHL  - Toes WWP    Assessment: Sarah Felix is a 74 year old female with DM, HTN s/p left ankle ORIF on 10/28/2019 with Dr. Can.     Plan:  Ortho Primary  Activity: Up with assist.  Weight bearing status: NWB LLE   Diet: Begin with clear fluids and progress diet as tolerated  DVT prophylaxis: Lovenox x 28 days  Bracing/Splinting: CAM boot LLE when OOB  Elevation: Elevate LLE on pillows to keep heart as much as possible.  Wound Care: Keep dressings c/d/i  Pain management: Transition from IV to orals as tolerated  Consults: Hospitalist, PT, appreciate assistance in caring for this patient.  Follow-up: Clinic with Dr. Can as scheduled  Disposition: Pending progress with therapies, pain control on orals, and medical stability, anticipate discharge to TCU this morning.     Future Appointments   Date Time Provider Department Center   11/13/2019  2:15 PM Jose Roberto Can MD [facility]     Min Toledo MD  Orthopedic Surgery PGY-4    Please contact me directly regarding this patient during normal business hours Mon-Fri before 5pm @ [phone].  IF it is Night, a Weekend, or there is No Response, then please page the Orthopaedic Resident On Call in [facility]. Thank you!

## 2019-10-30 NOTE — PROGRESS NOTES
Updated by SW that ride not available until tomorrow AM. Pt to stay the night, primary team updated via text page.

## 2019-10-31 ENCOUNTER — AMBULATORY - HEALTHEAST (OUTPATIENT)
Dept: ADMINISTRATIVE | Facility: CLINIC | Age: 74
End: 2019-10-31

## 2019-11-01 ENCOUNTER — OFFICE VISIT - HEALTHEAST (OUTPATIENT)
Dept: GERIATRICS | Facility: CLINIC | Age: 74
End: 2019-11-01

## 2019-11-01 ENCOUNTER — PATIENT OUTREACH (OUTPATIENT)
Dept: CARE COORDINATION | Facility: CLINIC | Age: 74
End: 2019-11-01

## 2019-11-01 DIAGNOSIS — Z79.4 TYPE 2 DIABETES MELLITUS WITH HYPERGLYCEMIA, WITH LONG-TERM CURRENT USE OF INSULIN (H): ICD-10-CM

## 2019-11-01 DIAGNOSIS — K21.9 GASTROESOPHAGEAL REFLUX DISEASE, ESOPHAGITIS PRESENCE NOT SPECIFIED: ICD-10-CM

## 2019-11-01 DIAGNOSIS — S82.842D CLOSED BIMALLEOLAR FRACTURE OF LEFT ANKLE WITH ROUTINE HEALING: ICD-10-CM

## 2019-11-01 DIAGNOSIS — I73.9 PERIPHERAL VASCULAR DISEASE (H): ICD-10-CM

## 2019-11-01 DIAGNOSIS — E11.65 TYPE 2 DIABETES MELLITUS WITH HYPERGLYCEMIA, WITH LONG-TERM CURRENT USE OF INSULIN (H): ICD-10-CM

## 2019-11-01 DIAGNOSIS — I10 ESSENTIAL HYPERTENSION, BENIGN: ICD-10-CM

## 2019-11-01 NOTE — PROGRESS NOTES
Clinic Care Coordination Contact  Care Coordination Transition Communication         Clinical Data: Patient was hospitalized at [facility]   Date of Admission:         10/28/2019  Date of Discharge:          10/30/19  DISCHARGE DIAGNOSIS:  Closed bimalleolar fracture of left ankle, initial encounter [S82.842A]     PROCEDURE(S):   10/28/2019 Procedure(s):  Internal fixation left bimalleolar ankle fracture, removal external fixator     Transition to Facility:              Facility Name: [facility]              Contact name and phone number:  [phone]    Plan: RN/SW Care Coordinator will await notification from facility staff informing RN/SW Care Coordinator of patient's discharge plans/needs. RN/SW Care Coordinator will review chart and outreach to facility staff every 4 weeks and as needed.

## 2019-11-04 ENCOUNTER — OFFICE VISIT - HEALTHEAST (OUTPATIENT)
Dept: GERIATRICS | Facility: CLINIC | Age: 74
End: 2019-11-04

## 2019-11-04 DIAGNOSIS — E78.5 HYPERLIPIDEMIA LDL GOAL <100: ICD-10-CM

## 2019-11-04 DIAGNOSIS — K21.9 GASTROESOPHAGEAL REFLUX DISEASE, ESOPHAGITIS PRESENCE NOT SPECIFIED: ICD-10-CM

## 2019-11-04 DIAGNOSIS — Z79.4 TYPE 2 DIABETES MELLITUS WITH HYPERGLYCEMIA, WITH LONG-TERM CURRENT USE OF INSULIN (H): ICD-10-CM

## 2019-11-04 DIAGNOSIS — I10 ESSENTIAL HYPERTENSION, BENIGN: ICD-10-CM

## 2019-11-04 DIAGNOSIS — S82.842D CLOSED BIMALLEOLAR FRACTURE OF LEFT ANKLE WITH ROUTINE HEALING: ICD-10-CM

## 2019-11-04 DIAGNOSIS — E11.65 TYPE 2 DIABETES MELLITUS WITH HYPERGLYCEMIA, WITH LONG-TERM CURRENT USE OF INSULIN (H): ICD-10-CM

## 2019-11-04 DIAGNOSIS — K76.0 FATTY LIVER: ICD-10-CM

## 2019-11-04 DIAGNOSIS — Q44.6 CONGENITAL CYSTIC DISEASE OF LIVER: ICD-10-CM

## 2019-11-08 ENCOUNTER — OFFICE VISIT - HEALTHEAST (OUTPATIENT)
Dept: GERIATRICS | Facility: CLINIC | Age: 74
End: 2019-11-08

## 2019-11-08 DIAGNOSIS — K21.9 GASTROESOPHAGEAL REFLUX DISEASE, ESOPHAGITIS PRESENCE NOT SPECIFIED: ICD-10-CM

## 2019-11-08 DIAGNOSIS — I73.9 PERIPHERAL VASCULAR DISEASE (H): ICD-10-CM

## 2019-11-08 DIAGNOSIS — E11.65 TYPE 2 DIABETES MELLITUS WITH HYPERGLYCEMIA, WITH LONG-TERM CURRENT USE OF INSULIN (H): ICD-10-CM

## 2019-11-08 DIAGNOSIS — S82.842D CLOSED BIMALLEOLAR FRACTURE OF LEFT ANKLE WITH ROUTINE HEALING: ICD-10-CM

## 2019-11-08 DIAGNOSIS — Z79.4 TYPE 2 DIABETES MELLITUS WITH HYPERGLYCEMIA, WITH LONG-TERM CURRENT USE OF INSULIN (H): ICD-10-CM

## 2019-11-08 DIAGNOSIS — I10 ESSENTIAL HYPERTENSION, BENIGN: ICD-10-CM

## 2019-11-13 ENCOUNTER — OFFICE VISIT (OUTPATIENT)
Dept: ORTHOPEDICS | Facility: CLINIC | Age: 74
End: 2019-11-13
Payer: COMMERCIAL

## 2019-11-13 ENCOUNTER — OFFICE VISIT - HEALTHEAST (OUTPATIENT)
Dept: GERIATRICS | Facility: CLINIC | Age: 74
End: 2019-11-13

## 2019-11-13 VITALS — WEIGHT: 193 LBS | HEIGHT: 65 IN | BODY MASS INDEX: 32.15 KG/M2

## 2019-11-13 DIAGNOSIS — S82.842D CLOSED BIMALLEOLAR FRACTURE OF LEFT ANKLE WITH ROUTINE HEALING: ICD-10-CM

## 2019-11-13 DIAGNOSIS — S82.842A CLOSED BIMALLEOLAR FRACTURE OF LEFT ANKLE, INITIAL ENCOUNTER: Primary | ICD-10-CM

## 2019-11-13 DIAGNOSIS — E11.65 TYPE 2 DIABETES MELLITUS WITH HYPERGLYCEMIA, WITH LONG-TERM CURRENT USE OF INSULIN (H): ICD-10-CM

## 2019-11-13 DIAGNOSIS — E78.5 HYPERLIPIDEMIA LDL GOAL <100: ICD-10-CM

## 2019-11-13 DIAGNOSIS — Z79.4 TYPE 2 DIABETES MELLITUS WITH HYPERGLYCEMIA, WITH LONG-TERM CURRENT USE OF INSULIN (H): ICD-10-CM

## 2019-11-13 DIAGNOSIS — I10 ESSENTIAL HYPERTENSION, BENIGN: ICD-10-CM

## 2019-11-13 DIAGNOSIS — Q44.6 CONGENITAL CYSTIC DISEASE OF LIVER: ICD-10-CM

## 2019-11-13 DIAGNOSIS — K21.9 GASTROESOPHAGEAL REFLUX DISEASE, ESOPHAGITIS PRESENCE NOT SPECIFIED: ICD-10-CM

## 2019-11-13 DIAGNOSIS — K76.0 FATTY LIVER: ICD-10-CM

## 2019-11-13 ASSESSMENT — MIFFLIN-ST. JEOR: SCORE: 1386.32

## 2019-11-13 NOTE — PROGRESS NOTES
This patient is healing well after her ankle fracture.  The pin sites from external fixator revealed.  Her lateral ankle wound is not completely sealed up with the top of there is no active drainage or erythema.  We will plan on doing a dry dressing changes on her and keeping her in the boot.  I would like to see her back in 4 weeks with x-rays.  At that point I expect to allow her to weight-bear as tolerated.

## 2019-11-13 NOTE — LETTER
11/13/2019       RE: Sarah Felix  [address] [place] [address]     Dear Colleague,    Thank you for referring your patient, Sarah Felix, to the [facility]TH[facility] at [facility]. Please see a copy of my visit note below.    This patient is healing well after her ankle fracture.  The pin sites from external fixator revealed.  Her lateral ankle wound is not completely sealed up with the top of there is no active drainage or erythema.  We will plan on doing a dry dressing changes on her and keeping her in the boot.  I would like to see her back in 4 weeks with x-rays.  At that point I expect to allow her to weight-bear as tolerated.    Again, thank you for allowing me to participate in the care of your patient.      Sincerely,    Jose Roberto Can MD

## 2019-11-13 NOTE — NURSING NOTE
"Reason For Visit:   Chief Complaint   Patient presents with     RECHECK     left bimalleolar ankle internal fixation DOS 10/28/19       Ht 1.651 m (5' 5\")   Wt 87.5 kg (193 lb)   BMI 32.12 kg/m      Pain Assessment  Patient Currently in Pain: Yes  0-10 Pain Scale: 8  Primary Pain Location: Foot(right)  Pain Descriptors: Numbness, Jabbing  Aggravating Factors: Movement(therapy sessions cause pain)    Arambula PABLITO Quiroz    "

## 2019-11-15 ENCOUNTER — OFFICE VISIT - HEALTHEAST (OUTPATIENT)
Dept: GERIATRICS | Facility: CLINIC | Age: 74
End: 2019-11-15

## 2019-11-15 DIAGNOSIS — S82.842D CLOSED BIMALLEOLAR FRACTURE OF LEFT ANKLE WITH ROUTINE HEALING: ICD-10-CM

## 2019-11-15 DIAGNOSIS — Z79.4 TYPE 2 DIABETES MELLITUS WITH HYPERGLYCEMIA, WITH LONG-TERM CURRENT USE OF INSULIN (H): ICD-10-CM

## 2019-11-15 DIAGNOSIS — E11.65 TYPE 2 DIABETES MELLITUS WITH HYPERGLYCEMIA, WITH LONG-TERM CURRENT USE OF INSULIN (H): ICD-10-CM

## 2019-11-15 DIAGNOSIS — I73.9 PERIPHERAL VASCULAR DISEASE (H): ICD-10-CM

## 2019-11-15 DIAGNOSIS — I10 ESSENTIAL HYPERTENSION, BENIGN: ICD-10-CM

## 2019-11-18 ENCOUNTER — OFFICE VISIT - HEALTHEAST (OUTPATIENT)
Dept: GERIATRICS | Facility: CLINIC | Age: 74
End: 2019-11-18

## 2019-11-18 ENCOUNTER — DOCUMENTATION ONLY (OUTPATIENT)
Dept: FAMILY MEDICINE | Facility: CLINIC | Age: 74
End: 2019-11-18

## 2019-11-18 ENCOUNTER — TRANSFERRED RECORDS (OUTPATIENT)
Dept: HEALTH INFORMATION MANAGEMENT | Facility: CLINIC | Age: 74
End: 2019-11-18

## 2019-11-18 DIAGNOSIS — I10 ESSENTIAL HYPERTENSION, BENIGN: ICD-10-CM

## 2019-11-18 DIAGNOSIS — Q44.6 CONGENITAL CYSTIC DISEASE OF LIVER: ICD-10-CM

## 2019-11-18 DIAGNOSIS — K21.9 GASTROESOPHAGEAL REFLUX DISEASE, ESOPHAGITIS PRESENCE NOT SPECIFIED: ICD-10-CM

## 2019-11-18 DIAGNOSIS — E11.65 TYPE 2 DIABETES MELLITUS WITH HYPERGLYCEMIA, WITH LONG-TERM CURRENT USE OF INSULIN (H): ICD-10-CM

## 2019-11-18 DIAGNOSIS — Z79.4 TYPE 2 DIABETES MELLITUS WITH HYPERGLYCEMIA, WITH LONG-TERM CURRENT USE OF INSULIN (H): ICD-10-CM

## 2019-11-18 DIAGNOSIS — E78.5 HYPERLIPIDEMIA LDL GOAL <100: ICD-10-CM

## 2019-11-18 DIAGNOSIS — S82.842D CLOSED BIMALLEOLAR FRACTURE OF LEFT ANKLE WITH ROUTINE HEALING: ICD-10-CM

## 2019-11-18 DIAGNOSIS — K76.0 FATTY LIVER: ICD-10-CM

## 2019-11-18 NOTE — PROGRESS NOTES
The patient would like completed form mailed to the [employer] and a copy mailed to her as well.  The writer confirmed with the patient that address on record is current.

## 2019-11-22 ENCOUNTER — OFFICE VISIT - HEALTHEAST (OUTPATIENT)
Dept: GERIATRICS | Facility: CLINIC | Age: 74
End: 2019-11-22

## 2019-11-22 DIAGNOSIS — S82.842D CLOSED BIMALLEOLAR FRACTURE OF LEFT ANKLE WITH ROUTINE HEALING: ICD-10-CM

## 2019-11-22 DIAGNOSIS — Z79.4 TYPE 2 DIABETES MELLITUS WITH HYPERGLYCEMIA, WITH LONG-TERM CURRENT USE OF INSULIN (H): ICD-10-CM

## 2019-11-22 DIAGNOSIS — E11.65 TYPE 2 DIABETES MELLITUS WITH HYPERGLYCEMIA, WITH LONG-TERM CURRENT USE OF INSULIN (H): ICD-10-CM

## 2019-11-22 DIAGNOSIS — I73.9 PERIPHERAL VASCULAR DISEASE (H): ICD-10-CM

## 2019-11-22 DIAGNOSIS — I10 ESSENTIAL HYPERTENSION, BENIGN: ICD-10-CM

## 2019-11-25 ENCOUNTER — APPOINTMENT (OUTPATIENT)
Dept: GENERAL RADIOLOGY | Facility: CLINIC | Age: 74
End: 2019-11-25
Attending: EMERGENCY MEDICINE
Payer: COMMERCIAL

## 2019-11-25 ENCOUNTER — HOSPITAL ENCOUNTER (EMERGENCY)
Facility: CLINIC | Age: 74
Discharge: HOME | End: 2019-11-26
Attending: EMERGENCY MEDICINE | Admitting: EMERGENCY MEDICINE
Payer: COMMERCIAL

## 2019-11-25 ENCOUNTER — APPOINTMENT (OUTPATIENT)
Dept: CT IMAGING | Facility: CLINIC | Age: 74
End: 2019-11-25
Attending: EMERGENCY MEDICINE
Payer: COMMERCIAL

## 2019-11-25 DIAGNOSIS — R06.02 SHORTNESS OF BREATH: ICD-10-CM

## 2019-11-25 DIAGNOSIS — K76.89 LIVER CYST: ICD-10-CM

## 2019-11-25 LAB
ANION GAP SERPL CALCULATED.3IONS-SCNC: 6 MMOL/L (ref 3–14)
BASOPHILS # BLD AUTO: 0.1 10E9/L (ref 0–0.2)
BASOPHILS NFR BLD AUTO: 0.6 %
BUN SERPL-MCNC: 15 MG/DL (ref 7–30)
CALCIUM SERPL-MCNC: 9.3 MG/DL (ref 8.5–10.1)
CHLORIDE SERPL-SCNC: 105 MMOL/L (ref 94–109)
CO2 SERPL-SCNC: 25 MMOL/L (ref 20–32)
CREAT SERPL-MCNC: 0.84 MG/DL (ref 0.52–1.04)
DIFFERENTIAL METHOD BLD: ABNORMAL
EOSINOPHIL # BLD AUTO: 0.4 10E9/L (ref 0–0.7)
EOSINOPHIL NFR BLD AUTO: 3.5 %
ERYTHROCYTE [DISTWIDTH] IN BLOOD BY AUTOMATED COUNT: 13.9 % (ref 10–15)
GFR SERPL CREATININE-BSD FRML MDRD: 70 ML/MIN/{1.73_M2}
GLUCOSE SERPL-MCNC: 300 MG/DL (ref 70–99)
HCT VFR BLD AUTO: 40.7 % (ref 35–47)
HGB BLD-MCNC: 13.1 G/DL (ref 11.7–15.7)
IMM GRANULOCYTES # BLD: 0 10E9/L (ref 0–0.4)
IMM GRANULOCYTES NFR BLD: 0.4 %
LYMPHOCYTES # BLD AUTO: 1.9 10E9/L (ref 0.8–5.3)
LYMPHOCYTES NFR BLD AUTO: 18.6 %
MCH RBC QN AUTO: 30.5 PG (ref 26.5–33)
MCHC RBC AUTO-ENTMCNC: 32.2 G/DL (ref 31.5–36.5)
MCV RBC AUTO: 95 FL (ref 78–100)
MONOCYTES # BLD AUTO: 0.9 10E9/L (ref 0–1.3)
MONOCYTES NFR BLD AUTO: 8.9 %
NEUTROPHILS # BLD AUTO: 6.8 10E9/L (ref 1.6–8.3)
NEUTROPHILS NFR BLD AUTO: 68 %
NRBC # BLD AUTO: 0 10*3/UL
NRBC BLD AUTO-RTO: 0 /100
NT-PROBNP SERPL-MCNC: 89 PG/ML (ref 0–900)
PLATELET # BLD AUTO: 588 10E9/L (ref 150–450)
POTASSIUM SERPL-SCNC: 3.8 MMOL/L (ref 3.4–5.3)
RBC # BLD AUTO: 4.29 10E12/L (ref 3.8–5.2)
SODIUM SERPL-SCNC: 136 MMOL/L (ref 133–144)
TROPONIN I SERPL-MCNC: <0.015 UG/L (ref 0–0.04)
WBC # BLD AUTO: 10 10E9/L (ref 4–11)

## 2019-11-25 PROCEDURE — 84484 ASSAY OF TROPONIN QUANT: CPT | Performed by: EMERGENCY MEDICINE

## 2019-11-25 PROCEDURE — 25000128 H RX IP 250 OP 636: Performed by: EMERGENCY MEDICINE

## 2019-11-25 PROCEDURE — 71046 X-RAY EXAM CHEST 2 VIEWS: CPT

## 2019-11-25 PROCEDURE — 71275 CT ANGIOGRAPHY CHEST: CPT

## 2019-11-25 PROCEDURE — 83880 ASSAY OF NATRIURETIC PEPTIDE: CPT | Performed by: EMERGENCY MEDICINE

## 2019-11-25 PROCEDURE — 93010 ELECTROCARDIOGRAM REPORT: CPT | Mod: Z6 | Performed by: EMERGENCY MEDICINE

## 2019-11-25 PROCEDURE — 80048 BASIC METABOLIC PNL TOTAL CA: CPT | Performed by: EMERGENCY MEDICINE

## 2019-11-25 PROCEDURE — 85025 COMPLETE CBC W/AUTO DIFF WBC: CPT | Performed by: EMERGENCY MEDICINE

## 2019-11-25 PROCEDURE — 99285 EMERGENCY DEPT VISIT HI MDM: CPT | Mod: 25 | Performed by: EMERGENCY MEDICINE

## 2019-11-25 PROCEDURE — 93005 ELECTROCARDIOGRAM TRACING: CPT | Performed by: EMERGENCY MEDICINE

## 2019-11-25 RX ORDER — IOPAMIDOL 755 MG/ML
65 INJECTION, SOLUTION INTRAVASCULAR ONCE
Status: COMPLETED | OUTPATIENT
Start: 2019-11-25 | End: 2019-11-25

## 2019-11-25 RX ADMIN — IOPAMIDOL 65 ML: 755 INJECTION, SOLUTION INTRAVENOUS at 22:55

## 2019-11-25 ASSESSMENT — ENCOUNTER SYMPTOMS
VOMITING: 0
SHORTNESS OF BREATH: 1
HEADACHES: 0
ARTHRALGIAS: 0
NECK STIFFNESS: 0
CONFUSION: 0
DIFFICULTY URINATING: 0
NAUSEA: 0
ABDOMINAL PAIN: 0
FEVER: 0
COUGH: 0
EYE REDNESS: 0
COLOR CHANGE: 0

## 2019-11-25 NOTE — ED AVS SNAPSHOT
[facility], [place], Emergency Department  [address] [place] [id number]  Phone:  [phone]                                    Sarah Felix   MRN: [id number]    Department:  [facility], Emergency Department   Date of Visit:  11/25/2019           After Visit Summary Signature Page    I have received my discharge instructions, and my questions have been answered. I have discussed any challenges I see with this plan with the nurse or doctor.    ..........................................................................................................................................  Patient/Patient Representative Signature      ..........................................................................................................................................  Patient Representative Print Name and Relationship to Patient    ..................................................               ................................................  Date                                   Time    ..........................................................................................................................................  Reviewed by Signature/Title    ...................................................              ..............................................  Date                                               Time          22EPIC Rev 08/18

## 2019-11-26 VITALS
SYSTOLIC BLOOD PRESSURE: 121 MMHG | HEART RATE: 80 BPM | DIASTOLIC BLOOD PRESSURE: 68 MMHG | OXYGEN SATURATION: 100 % | BODY MASS INDEX: 32.45 KG/M2 | RESPIRATION RATE: 11 BRPM | WEIGHT: 195 LBS | TEMPERATURE: 97.8 F

## 2019-11-26 NOTE — ED TRIAGE NOTES
Pt arrives via EMS. Awake and alert. Reports SOB @ rest since 1800 today. Pt reports BABB @ baseline. Per EMS, 100% on RA en route, VSS. Pt does not wear O2 @ baseline. Diabetic. Recently broke left ankle and had surgery.

## 2019-11-26 NOTE — PROGRESS NOTES
Spoke with patient and she need to fill in and sign and date the upper part of form   Mailing form to patients address per patient

## 2019-11-26 NOTE — ED NOTES
Bed: ED31  Expected date:   Expected time:   Means of arrival:   Comments:  [facility] fire- 74 F respiratory distress.

## 2019-11-26 NOTE — ED PROVIDER NOTES
[place] EMERGENCY DEPARTMENT ([facility])  November 25, 2019    History     Chief Complaint   Patient presents with     Shortness of Breath     HPI  Sarah Felix is a 74 year old female with a history of left bimalleolar ankle fracture status post ORIF on 10/28/2019 who presents with shortness of breath.  Patient reports that she was laying in bed at her acute rehab facility when she had sudden onset of dyspnea.  She states that she has had episodes of shortness of breath previously, however always with exertion.  She had no associated chest pain, nausea, or vomiting.  Denies cough or recent illness.  She does have some mild lower extremity swelling.  She has been on Lovenox prophylactically.  No prior history of blood clots.    I have reviewed the Medications, Allergies, Past Medical and Surgical History, and Social History in the Epic system.    Review of Systems   Constitutional: Negative for fever.   HENT: Negative for congestion.    Eyes: Negative for redness.   Respiratory: Positive for shortness of breath. Negative for cough.    Cardiovascular: Positive for leg swelling. Negative for chest pain.   Gastrointestinal: Negative for abdominal pain, nausea and vomiting.   Genitourinary: Negative for difficulty urinating.   Musculoskeletal: Negative for arthralgias and neck stiffness.   Skin: Negative for color change.   Neurological: Negative for headaches.   Psychiatric/Behavioral: Negative for confusion.       Physical Exam   BP: 128/68  Heart Rate: 91  Temp: 97.8  F (36.6  C)  Resp: 24  Weight: 88.5 kg (195 lb)  SpO2: 99 %      Physical Exam  Vitals signs and nursing note reviewed.   Constitutional:       General: She is not in acute distress.     Appearance: She is well-developed. She is not diaphoretic.   HENT:      Head: Atraumatic.      Mouth/Throat:      Pharynx: No oropharyngeal exudate.   Eyes:      General: No scleral icterus.     Pupils: Pupils are equal, round, and reactive to  light.   Neck:      Musculoskeletal: Neck supple.   Cardiovascular:      Rate and Rhythm: Normal rate and regular rhythm.      Heart sounds: Normal heart sounds.   Pulmonary:      Effort: Tachypnea present. No respiratory distress.      Breath sounds: Normal breath sounds. No wheezing or rales.   Abdominal:      General: Bowel sounds are normal.      Palpations: Abdomen is soft.      Tenderness: There is no abdominal tenderness.   Musculoskeletal:         General: No tenderness.      Right lower leg: Edema (1+) present.      Comments: Left lower extremity in a cam walker boot.   Skin:     General: Skin is warm.      Capillary Refill: Capillary refill takes less than 2 seconds.      Findings: No erythema or rash.   Neurological:      General: No focal deficit present.      Mental Status: She is alert and oriented to person, place, and time.   Psychiatric:         Mood and Affect: Mood normal.         ED Course        Procedures             EKG Interpretation:      Interpreted by Janay Mccormick DO  Time reviewed: 2113  Symptoms at time of EKG: SOB   Rhythm: normal sinus   Rate: normal  Axis: normal  Ectopy: none  Conduction: normal  ST Segments/ T Waves: No ST-T wave changes  Q Waves: none  Comparison to prior: Unchanged from 12/04/1998    Clinical Impression: normal EKG        Labs Ordered and Resulted from Time of ED Arrival Up to the Time of Departure from the ED   CBC WITH PLATELETS DIFFERENTIAL - Abnormal; Notable for the following components:       Result Value    Platelet Count 588 (*)     All other components within normal limits   BASIC METABOLIC PANEL - Abnormal; Notable for the following components:    Glucose 300 (*)     All other components within normal limits   TROPONIN I   NT PROBNP INPATIENT   PERIPHERAL IV CATHETER            Assessments & Plan (with Medical Decision Making)   Patient was seen and examined.  EKG shows normal sinus rhythm without evidence of ischemia or arrhythmia.  Chest x-ray shows  no acute cardiopulmonary abnormalities.  Blood work shows normal white count, kidney function, troponin, and BNP.  CTA of the chest was ordered showing no evidence of pulmonary embolism or other acute abnormality.  Incidental finding of a known liver cyst noted, slightly enlarged from previous.  Patient's vitals are stable.  She is no longer tachypneic and not hypoxic.  She will be discharged back to her rehab facility.  She understands to return to the emergency department for any new or worsening symptoms.  Discharged in stable condition.    I have reviewed the nursing notes.    I have reviewed the findings, diagnosis, plan and need for follow up with the patient.    New Prescriptions    No medications on file       Final diagnoses:   Shortness of breath   Liver cyst       11/25/2019   [facility], [place], EMERGENCY DEPARTMENT     Janay Mccormick DO  11/26/19 0048

## 2019-11-26 NOTE — DISCHARGE INSTRUCTIONS
You have a cyst on your liver, incidentally seen on CT scan of your chest. This is an old cyst but appears to have grown in size from the last scan performed in 2011. Please follow up with your primary care physician in relation to this finding.

## 2019-11-29 ENCOUNTER — OFFICE VISIT - HEALTHEAST (OUTPATIENT)
Dept: GERIATRICS | Facility: CLINIC | Age: 74
End: 2019-11-29

## 2019-11-29 DIAGNOSIS — E11.65 TYPE 2 DIABETES MELLITUS WITH HYPERGLYCEMIA, WITH LONG-TERM CURRENT USE OF INSULIN (H): ICD-10-CM

## 2019-11-29 DIAGNOSIS — Q44.6 CONGENITAL CYSTIC DISEASE OF LIVER: ICD-10-CM

## 2019-11-29 DIAGNOSIS — I73.9 PERIPHERAL VASCULAR DISEASE (H): ICD-10-CM

## 2019-11-29 DIAGNOSIS — Z79.4 TYPE 2 DIABETES MELLITUS WITH HYPERGLYCEMIA, WITH LONG-TERM CURRENT USE OF INSULIN (H): ICD-10-CM

## 2019-11-29 DIAGNOSIS — I10 ESSENTIAL HYPERTENSION, BENIGN: ICD-10-CM

## 2019-11-29 DIAGNOSIS — S82.842D CLOSED BIMALLEOLAR FRACTURE OF LEFT ANKLE WITH ROUTINE HEALING: ICD-10-CM

## 2019-12-04 ENCOUNTER — AMBULATORY - HEALTHEAST (OUTPATIENT)
Dept: OTHER | Facility: CLINIC | Age: 74
End: 2019-12-04

## 2019-12-04 ENCOUNTER — DOCUMENTATION ONLY (OUTPATIENT)
Dept: OTHER | Facility: CLINIC | Age: 74
End: 2019-12-04

## 2019-12-04 ENCOUNTER — TRANSFERRED RECORDS (OUTPATIENT)
Dept: HEALTH INFORMATION MANAGEMENT | Facility: CLINIC | Age: 74
End: 2019-12-04

## 2019-12-04 ENCOUNTER — OFFICE VISIT - HEALTHEAST (OUTPATIENT)
Dept: GERIATRICS | Facility: CLINIC | Age: 74
End: 2019-12-04

## 2019-12-04 DIAGNOSIS — I10 ESSENTIAL HYPERTENSION, BENIGN: ICD-10-CM

## 2019-12-04 DIAGNOSIS — Q44.6 CONGENITAL CYSTIC DISEASE OF LIVER: ICD-10-CM

## 2019-12-04 DIAGNOSIS — Z79.4 TYPE 2 DIABETES MELLITUS WITH HYPERGLYCEMIA, WITH LONG-TERM CURRENT USE OF INSULIN (H): ICD-10-CM

## 2019-12-04 DIAGNOSIS — S82.842D CLOSED BIMALLEOLAR FRACTURE OF LEFT ANKLE WITH ROUTINE HEALING: ICD-10-CM

## 2019-12-04 DIAGNOSIS — E11.65 TYPE 2 DIABETES MELLITUS WITH HYPERGLYCEMIA, WITH LONG-TERM CURRENT USE OF INSULIN (H): ICD-10-CM

## 2019-12-04 DIAGNOSIS — E78.5 HYPERLIPIDEMIA LDL GOAL <100: ICD-10-CM

## 2019-12-04 DIAGNOSIS — K21.9 GASTROESOPHAGEAL REFLUX DISEASE, ESOPHAGITIS PRESENCE NOT SPECIFIED: ICD-10-CM

## 2019-12-04 DIAGNOSIS — K76.0 FATTY LIVER: ICD-10-CM

## 2019-12-04 ASSESSMENT — MIFFLIN-ST. JEOR: SCORE: 1392.21

## 2019-12-05 ENCOUNTER — PATIENT OUTREACH (OUTPATIENT)
Dept: CARE COORDINATION | Facility: CLINIC | Age: 74
End: 2019-12-05

## 2019-12-05 NOTE — PROGRESS NOTES
Clinic Care Coordination Contact    Situation: Patient chart reviewed by care coordinator.    Background: Patient listed as potential.    Assessment: Patient currently admitted to SNF.       Plan/Recommendations: CCC will watch the chart and reach out to the patient once she is discharged to home.

## 2019-12-09 ENCOUNTER — OFFICE VISIT - HEALTHEAST (OUTPATIENT)
Dept: GERIATRICS | Facility: CLINIC | Age: 74
End: 2019-12-09

## 2019-12-09 ENCOUNTER — TRANSFERRED RECORDS (OUTPATIENT)
Dept: HEALTH INFORMATION MANAGEMENT | Facility: CLINIC | Age: 74
End: 2019-12-09

## 2019-12-09 DIAGNOSIS — E78.5 HYPERLIPIDEMIA LDL GOAL <100: ICD-10-CM

## 2019-12-09 DIAGNOSIS — K21.9 GASTROESOPHAGEAL REFLUX DISEASE, ESOPHAGITIS PRESENCE NOT SPECIFIED: ICD-10-CM

## 2019-12-09 DIAGNOSIS — S82.842D CLOSED BIMALLEOLAR FRACTURE OF LEFT ANKLE WITH ROUTINE HEALING: ICD-10-CM

## 2019-12-09 DIAGNOSIS — Q44.6 CONGENITAL CYSTIC DISEASE OF LIVER: ICD-10-CM

## 2019-12-09 DIAGNOSIS — I10 ESSENTIAL HYPERTENSION, BENIGN: ICD-10-CM

## 2019-12-09 DIAGNOSIS — Z79.4 TYPE 2 DIABETES MELLITUS WITH HYPERGLYCEMIA, WITH LONG-TERM CURRENT USE OF INSULIN (H): ICD-10-CM

## 2019-12-09 DIAGNOSIS — E11.65 TYPE 2 DIABETES MELLITUS WITH HYPERGLYCEMIA, WITH LONG-TERM CURRENT USE OF INSULIN (H): ICD-10-CM

## 2019-12-09 DIAGNOSIS — K76.0 FATTY LIVER: ICD-10-CM

## 2019-12-09 ASSESSMENT — MIFFLIN-ST. JEOR: SCORE: 1392.21

## 2019-12-11 DIAGNOSIS — S82.842A CLOSED BIMALLEOLAR FRACTURE OF LEFT ANKLE, INITIAL ENCOUNTER: Primary | ICD-10-CM

## 2019-12-13 ENCOUNTER — ANCILLARY PROCEDURE (OUTPATIENT)
Dept: GENERAL RADIOLOGY | Facility: CLINIC | Age: 74
End: 2019-12-13
Attending: ORTHOPAEDIC SURGERY
Payer: COMMERCIAL

## 2019-12-13 ENCOUNTER — OFFICE VISIT (OUTPATIENT)
Dept: ORTHOPEDICS | Facility: CLINIC | Age: 74
End: 2019-12-13
Payer: COMMERCIAL

## 2019-12-13 ENCOUNTER — OFFICE VISIT - HEALTHEAST (OUTPATIENT)
Dept: GERIATRICS | Facility: CLINIC | Age: 74
End: 2019-12-13

## 2019-12-13 VITALS — BODY MASS INDEX: 32.49 KG/M2 | WEIGHT: 195 LBS | HEIGHT: 65 IN

## 2019-12-13 DIAGNOSIS — S82.842D CLOSED BIMALLEOLAR FRACTURE OF LEFT ANKLE WITH ROUTINE HEALING, SUBSEQUENT ENCOUNTER: Primary | ICD-10-CM

## 2019-12-13 DIAGNOSIS — I73.9 PERIPHERAL VASCULAR DISEASE (H): ICD-10-CM

## 2019-12-13 DIAGNOSIS — S82.842A CLOSED BIMALLEOLAR FRACTURE OF LEFT ANKLE, INITIAL ENCOUNTER: ICD-10-CM

## 2019-12-13 DIAGNOSIS — Q44.6 CONGENITAL CYSTIC DISEASE OF LIVER: ICD-10-CM

## 2019-12-13 DIAGNOSIS — S82.842D CLOSED BIMALLEOLAR FRACTURE OF LEFT ANKLE WITH ROUTINE HEALING: ICD-10-CM

## 2019-12-13 DIAGNOSIS — I10 ESSENTIAL HYPERTENSION, BENIGN: ICD-10-CM

## 2019-12-13 DIAGNOSIS — E11.65 TYPE 2 DIABETES MELLITUS WITH HYPERGLYCEMIA, WITH LONG-TERM CURRENT USE OF INSULIN (H): ICD-10-CM

## 2019-12-13 DIAGNOSIS — Z79.4 TYPE 2 DIABETES MELLITUS WITH HYPERGLYCEMIA, WITH LONG-TERM CURRENT USE OF INSULIN (H): ICD-10-CM

## 2019-12-13 DIAGNOSIS — R45.89 ANXIETY ABOUT HEALTH: ICD-10-CM

## 2019-12-13 ASSESSMENT — MIFFLIN-ST. JEOR: SCORE: 1395.39

## 2019-12-13 NOTE — LETTER
12/13/2019       RE: Sarah Felix  [address] [place] [address]     Dear Colleague,    Thank you for referring your patient, Sarah Felix, to the [facility] ORTHOPAEDIC CL[facility]IC at [facility]. Please see a copy of my visit note below.    This 74-year-old woman is about 6 weeks out from a left bimalleolar ankle fracture fixation.  She had an external fixator for couple weeks prior to that.  She has been nonweightbearing and wearing a cam boot constantly.    X-rays show excellent alignment initial healing of her fracture.    I would like her to wear the cam boot only for walking but not at nighttime.  She may be 100% weightbearing on the left ankle and begin working on stretching strengthening and range of motion exercises with therapy.  I will see her back in 6 weeks for an examination.  I have answered all of her questions today.    Again, thank you for allowing me to participate in the care of your patient.      Sincerely,    Jose Roberto Can MD

## 2019-12-13 NOTE — NURSING NOTE
"Reason For Visit:   Chief Complaint   Patient presents with     RECHECK     followup left bimalleolar ankle fracture internal fixation and removal of external fixator DOS 10/28/19       Ht 1.651 m (5' 5\")   Wt 88.5 kg (195 lb)   BMI 32.45 kg/m      Pain Assessment  Patient Currently in Pain: Yes  0-10 Pain Scale: 5  Primary Pain Location: Ankle(left)  Pain Descriptors: Throbbing    Arambula Gabriella, ATC    "

## 2019-12-13 NOTE — PROGRESS NOTES
This 74-year-old woman is about 6 weeks out from a left bimalleolar ankle fracture fixation.  She had an external fixator for couple weeks prior to that.  She has been nonweightbearing and wearing a cam boot constantly.    X-rays show excellent alignment initial healing of her fracture.    I would like her to wear the cam boot only for walking but not at nighttime.  She may be 100% weightbearing on the left ankle and begin working on stretching strengthening and range of motion exercises with therapy.  I will see her back in 6 weeks for an examination.  I have answered all of her questions today.

## 2019-12-16 ENCOUNTER — OFFICE VISIT - HEALTHEAST (OUTPATIENT)
Dept: GERIATRICS | Facility: CLINIC | Age: 74
End: 2019-12-16

## 2019-12-16 DIAGNOSIS — E11.65 TYPE 2 DIABETES MELLITUS WITH HYPERGLYCEMIA, WITH LONG-TERM CURRENT USE OF INSULIN (H): ICD-10-CM

## 2019-12-16 DIAGNOSIS — B37.2 INTERTRIGINOUS CANDIDIASIS: ICD-10-CM

## 2019-12-16 DIAGNOSIS — K21.9 GASTROESOPHAGEAL REFLUX DISEASE, ESOPHAGITIS PRESENCE NOT SPECIFIED: ICD-10-CM

## 2019-12-16 DIAGNOSIS — Q44.6 CONGENITAL CYSTIC DISEASE OF LIVER: ICD-10-CM

## 2019-12-16 DIAGNOSIS — I10 ESSENTIAL HYPERTENSION, BENIGN: ICD-10-CM

## 2019-12-16 DIAGNOSIS — E78.5 HYPERLIPIDEMIA LDL GOAL <100: ICD-10-CM

## 2019-12-16 DIAGNOSIS — Z79.4 TYPE 2 DIABETES MELLITUS WITH HYPERGLYCEMIA, WITH LONG-TERM CURRENT USE OF INSULIN (H): ICD-10-CM

## 2019-12-16 DIAGNOSIS — S82.842D CLOSED BIMALLEOLAR FRACTURE OF LEFT ANKLE WITH ROUTINE HEALING: ICD-10-CM

## 2019-12-16 ASSESSMENT — MIFFLIN-ST. JEOR: SCORE: 1392.21

## 2019-12-18 DIAGNOSIS — Z85.828 PERSONAL HISTORY OF OTHER MALIGNANT NEOPLASM OF SKIN: ICD-10-CM

## 2019-12-18 DIAGNOSIS — I10 HYPERTENSION GOAL BP (BLOOD PRESSURE) < 140/90: Primary | Chronic | ICD-10-CM

## 2019-12-18 DIAGNOSIS — S82.842A CLOSED BIMALLEOLAR FRACTURE OF LEFT ANKLE, INITIAL ENCOUNTER: ICD-10-CM

## 2019-12-20 ENCOUNTER — OFFICE VISIT - HEALTHEAST (OUTPATIENT)
Dept: GERIATRICS | Facility: CLINIC | Age: 74
End: 2019-12-20

## 2019-12-20 DIAGNOSIS — E11.65 TYPE 2 DIABETES MELLITUS WITH HYPERGLYCEMIA, WITH LONG-TERM CURRENT USE OF INSULIN (H): ICD-10-CM

## 2019-12-20 DIAGNOSIS — I73.9 PERIPHERAL VASCULAR DISEASE (H): ICD-10-CM

## 2019-12-20 DIAGNOSIS — I10 ESSENTIAL HYPERTENSION, BENIGN: ICD-10-CM

## 2019-12-20 DIAGNOSIS — S82.842D CLOSED BIMALLEOLAR FRACTURE OF LEFT ANKLE WITH ROUTINE HEALING: ICD-10-CM

## 2019-12-20 DIAGNOSIS — Z79.4 TYPE 2 DIABETES MELLITUS WITH HYPERGLYCEMIA, WITH LONG-TERM CURRENT USE OF INSULIN (H): ICD-10-CM

## 2019-12-20 DIAGNOSIS — R45.89 ANXIETY ABOUT HEALTH: ICD-10-CM

## 2019-12-23 ENCOUNTER — AMBULATORY - HEALTHEAST (OUTPATIENT)
Dept: GERIATRICS | Facility: CLINIC | Age: 74
End: 2019-12-23

## 2019-12-24 ENCOUNTER — MEDICAL CORRESPONDENCE (OUTPATIENT)
Dept: HEALTH INFORMATION MANAGEMENT | Facility: CLINIC | Age: 74
End: 2019-12-24

## 2019-12-26 ENCOUNTER — TELEPHONE (OUTPATIENT)
Dept: FAMILY MEDICINE | Facility: CLINIC | Age: 74
End: 2019-12-26

## 2019-12-26 ENCOUNTER — PATIENT OUTREACH (OUTPATIENT)
Dept: CARE COORDINATION | Facility: CLINIC | Age: 74
End: 2019-12-26

## 2019-12-26 DIAGNOSIS — E11.65 TYPE 2 DIABETES MELLITUS WITH HYPERGLYCEMIA, WITH LONG-TERM CURRENT USE OF INSULIN (H): ICD-10-CM

## 2019-12-26 DIAGNOSIS — Z85.828 PERSONAL HISTORY OF OTHER MALIGNANT NEOPLASM OF SKIN: ICD-10-CM

## 2019-12-26 DIAGNOSIS — Z79.4 TYPE 2 DIABETES MELLITUS WITH HYPERGLYCEMIA, WITH LONG-TERM CURRENT USE OF INSULIN (H): ICD-10-CM

## 2019-12-26 DIAGNOSIS — N18.2 CKD (CHRONIC KIDNEY DISEASE) STAGE 2, GFR 60-89 ML/MIN: Primary | ICD-10-CM

## 2019-12-26 DIAGNOSIS — N18.2 CKD (CHRONIC KIDNEY DISEASE) STAGE 2, GFR 60-89 ML/MIN: ICD-10-CM

## 2019-12-26 DIAGNOSIS — R60.9 DEPENDENT EDEMA: ICD-10-CM

## 2019-12-26 NOTE — PROGRESS NOTES
Lolita Carlisle            WELL FAX THE Rx  Lolita Carlisle ....................  2017   12:25 PM           The Clinic Community Health Worker spoke with  the patient today to discuss possible Clinic Care Coordination enrollment.  The service was described to the patient and immediate needs were discussed.  The patient agreed to enrollment and an assessment was scheduled.  The patient was provided with contact information for the clinic CHW.             Assessment date: 12/27/2019 at 2:00PM    Notes:  Transportation  In home services

## 2019-12-26 NOTE — TELEPHONE ENCOUNTER
"Requested Prescriptions   Pending Prescriptions Disp Refills     furosemide (LASIX) 20 MG tablet [Pharmacy Med Name: FUROSEMIDE TABS 20MG] 270 tablet 4     Sig: TAKE 2 TABLETS IN THE MORNING AND 1 TABLET IN THE AFTERNOON  Last Written Prescription Date:  10/18/2019  Last Fill Quantity: 280 tablet,  # refills: 1   Last Office Visit: 8/19/2019   Future Office Visit:    Next 5 appointments (look out 90 days)    Dec 31, 2019  1:40 PM CST  Office Visit with Joel Daniel Irwin Wegener, MD  [facility] ([facility]) [address] [place] [address] [phone]              Diuretics (Including Combos) Protocol Passed - 12/26/2019  1:30 PM        Passed - Blood pressure under 140/90 in past 12 months     BP Readings from Last 3 Encounters:   11/26/19 121/68   10/30/19 132/65   10/18/19 (!) 153/64           Passed - Recent (12 mo) or future (30 days) visit within the authorizing provider's specialty     Patient has had an office visit with the authorizing provider or a provider within the authorizing providers department within the previous 12 mos or has a future within next 30 days. See \"Patient Info\" tab in inbasket, or \"Choose Columns\" in Meds & Orders section of the refill encounter.            Passed - Medication is active on med list        Passed - Patient is age 18 or older        Passed - No active pregancy on record        Passed - Normal serum creatinine on file in past 12 months     Recent Labs   Lab Test 11/25/19  2155   CR 0.84            Passed - Normal serum potassium on file in past 12 months     Recent Labs   Lab Test 11/25/19  2155   POTASSIUM 3.8            Passed - Normal serum sodium on file in past 12 months     Recent Labs   Lab Test 11/25/19  2155               Passed - No positive pregnancy test in past 12 months          "

## 2019-12-26 NOTE — TELEPHONE ENCOUNTER
Reason for Call:  Other call back    Detailed comments: [facility] is requesting the okay to try Tubigrip to help with patients lower extremity and ankle swelling. Please return call to advise. Thanks!    Phone Number Patient can be reached at: [phone]    Best Time: Any    Can we leave a detailed message on this number? YES    Call taken on 12/26/2019 at 4:46 PM by Tamra Foley

## 2019-12-27 ENCOUNTER — PATIENT OUTREACH (OUTPATIENT)
Dept: NURSING | Facility: CLINIC | Age: 74
End: 2019-12-27
Payer: COMMERCIAL

## 2019-12-27 ASSESSMENT — ACTIVITIES OF DAILY LIVING (ADL): DEPENDENT_IADLS:: TRANSPORTATION;SHOPPING;CLEANING

## 2019-12-27 NOTE — TELEPHONE ENCOUNTER
Writer CHAYITO on HC nurses confidential VM giving ok to use Tubigrip per MD.     Thanks! Martha Loomis RN

## 2019-12-27 NOTE — PROGRESS NOTES
Clinic Care Coordination Contact    Clinic Care Coordination Contact  OUTREACH    Referral Information:  Referral Source: Other, specify    Primary Diagnosis: Orthopedic    Chief Complaint   Patient presents with     Clinic Care Coordination - Initial        Clinic Utilization  Difficulty keeping appointments:: No  Compliance Concerns: No  No-Show Concerns: No  No PCP office visit in Past Year: No  Utilization    Last refreshed: 12/27/2019  2:31 PM:  Hospital Admissions 1           Last refreshed: 12/27/2019  2:31 PM:  ED Visits 1           Last refreshed: 12/27/2019  2:31 PM:  No Show Count (past year) 0              Current as of: 12/27/2019  2:31 PM              Clinical Concerns:  Current Medical Concerns:  DM,htn,ankle fx, pvd  Current Behavioral Concerns:     Education Provided to patient: yes   Pain  Pain (GOAL):: No  Health Maintenance Reviewed: Up to date  Clinical Pathway: None    Medication Management:  Patient completes a MSU box weekly     Functional Status:  Dependent ADLs:: Ambulation-walker  Dependent IADLs:: Transportation, Shopping, Cleaning  Bed or wheelchair confined:: No  Mobility Status: Independent w/Device  Fallen 2 or more times in the past year?: No  Any fall with injury in the past year?: Yes    Living Situation:  Current living arrangement:: I live alone  [facility]    Diet/Exercise/Sleep:  Diet:: Diabetic diet  Inadequate nutrition (GOAL):: No  Food Insecurity: No  Tube Feeding: No  Exercise:: Unable to exercise  Inadequate activity/exercise (GOAL):: No  Significant changes in sleep pattern (GOAL): No    Transportation:  Transportation concerns (GOAL):: No  Transportation means:: ([employer])     Psychosocial:  Baptism or spiritual beliefs that impact treatment:: No  Mental health DX:: No  Mental health management concern (GOAL):: No  Informal Support system:: Friends     Financial/Insurance:   Financial/Insurance concerns (GOAL):: No  Denies any  "burdens.       Resources and Interventions:  Current Resources:    ;   Community Resources: None  Supplies used at home:: None  Equipment   Currently Used at Home: shower chair, walker, rolling         Referrals Placed: None    74yr F PMH: DM,htn,ankle fx, pvd.  Currently lives in a private home alone.  Recently admitted to [facility] for 2 months for a left bimalleolar fx.  Currently she has PT twice weekly and a HHA twice weekly to assist with bathing.  She is ambulating with a rolling walker.  She also has grab bars and a shower chair in her home.  She is not currently driving due to the fx but using [employer] for transportation.  She is feeling \"better since coming home\".  She states she is working with [employer] to obtain a railing for her home.  She has paid them 2/3 of the price.  Patient will continue to reach out to [employer] to coordinate railing installation.  Writer also gave Sarah information regarding [employer] home delivered groceries.  Patient able to call [phone] Mon-Fri 9-Noon cost of delivery $9.95.  They stated they only take a 'certain number of deliveries per day'.  Mondays are their busiest day.  Call early in the day to be certain to get your order in per their .  Patient stated an understanding.  Sarah to f/u with PCP on 12/31/19.  She denies any needs at this time.  Patient not enrolled due to no goals established.  Writer encouraged patient to notify PCP if she has any needs at her upcoming appointment or in the future.     Goals:   Goals    None             Barriers: Limited caregiver support  Strengths: resourceful  Patient/Caregiver understanding: Patient stated an understanding       Future Appointments              In 4 days Wegener, Joel Daniel Irwin, MD [facility],     In 4 weeks Jose Roberto Can MD [facility], [facility]          Plan: Patient not enrolled to [facility].  No Goals " determined/established.

## 2019-12-31 RX ORDER — FUROSEMIDE 20 MG
TABLET ORAL
Qty: 270 TABLET | Refills: 1 | Status: SHIPPED | OUTPATIENT
Start: 2019-12-31 | End: 2020-06-24

## 2019-12-31 NOTE — TELEPHONE ENCOUNTER
Prescription approved per [facility] Refill Protocol.  Thanks! Martha Loomis RN

## 2020-01-06 DIAGNOSIS — Z53.9 DIAGNOSIS NOT YET DEFINED: Primary | ICD-10-CM

## 2020-01-06 PROCEDURE — G0180 MD CERTIFICATION HHA PATIENT: HCPCS | Performed by: FAMILY MEDICINE

## 2020-01-07 ENCOUNTER — TELEPHONE (OUTPATIENT)
Dept: FAMILY MEDICINE | Facility: CLINIC | Age: 75
End: 2020-01-07

## 2020-01-07 ENCOUNTER — OFFICE VISIT (OUTPATIENT)
Dept: FAMILY MEDICINE | Facility: CLINIC | Age: 75
End: 2020-01-07
Payer: COMMERCIAL

## 2020-01-07 DIAGNOSIS — S82.899S CLOSED FRACTURE OF ANKLE, UNSPECIFIED LATERALITY, SEQUELA: Primary | ICD-10-CM

## 2020-01-07 DIAGNOSIS — E11.9 TYPE 2 DIABETES, HBA1C GOAL < 7% (H): ICD-10-CM

## 2020-01-07 DIAGNOSIS — I10 HYPERTENSION GOAL BP (BLOOD PRESSURE) < 140/90: Chronic | ICD-10-CM

## 2020-01-07 DIAGNOSIS — E78.5 HYPERLIPIDEMIA LDL GOAL <100: ICD-10-CM

## 2020-01-07 PROBLEM — S82.842D CLOSED BIMALLEOLAR FRACTURE OF LEFT ANKLE WITH ROUTINE HEALING: Status: ACTIVE | Noted: 2019-10-11

## 2020-01-07 PROBLEM — B37.2 INTERTRIGINOUS CANDIDIASIS: Status: ACTIVE | Noted: 2019-12-16

## 2020-01-07 LAB — HBA1C MFR BLD: 8.5 % (ref 0–5.6)

## 2020-01-07 PROCEDURE — 83036 HEMOGLOBIN GLYCOSYLATED A1C: CPT | Performed by: PHYSICIAN ASSISTANT

## 2020-01-07 PROCEDURE — 36415 COLL VENOUS BLD VENIPUNCTURE: CPT | Performed by: PHYSICIAN ASSISTANT

## 2020-01-07 PROCEDURE — 82043 UR ALBUMIN QUANTITATIVE: CPT | Performed by: PHYSICIAN ASSISTANT

## 2020-01-07 PROCEDURE — 80061 LIPID PANEL: CPT | Performed by: PHYSICIAN ASSISTANT

## 2020-01-07 PROCEDURE — 80053 COMPREHEN METABOLIC PANEL: CPT | Performed by: PHYSICIAN ASSISTANT

## 2020-01-07 PROCEDURE — 99214 OFFICE O/P EST MOD 30 MIN: CPT | Performed by: PHYSICIAN ASSISTANT

## 2020-01-07 RX ORDER — POTASSIUM CHLORIDE 750 MG/1
TABLET, EXTENDED RELEASE ORAL
COMMUNITY
Start: 2019-02-19 | End: 2020-02-09

## 2020-01-07 RX ORDER — LIRAGLUTIDE 6 MG/ML
1.2 INJECTION SUBCUTANEOUS DAILY
Qty: 3 ML | Refills: 5 | Status: CANCELLED | OUTPATIENT
Start: 2020-01-07

## 2020-01-07 RX ORDER — GLIMEPIRIDE 4 MG/1
TABLET ORAL
Qty: 180 TABLET | Refills: 0 | COMMUNITY
Start: 2020-01-07 | End: 2020-03-18

## 2020-01-07 NOTE — PROGRESS NOTES
"Subjective     Sarah Felix is a 74 year old female who presents to clinic today for the following health issues:    [facility]     Hospital Follow-up Visit:    Hospital/Nursing Home/IP Rehab Facility: TCU  Date of Admission: October  Date of Discharge: 12/21/19  Reason(s) for Admission: ankle fracture            Problems taking medications regularly:  None       Medication changes since discharge: unsure, per patient.       Problems adhering to non-medication therapy:  none    Summary of hospitalization:  [facility] discharge summary reviewed  Diagnostic Tests/Treatments reviewed.  Follow up needed: orthopedics (1/24/2020)  Other Healthcare Providers Involved in Patient s Care:   Homecare  Update since discharge: stable.   Post Discharge Medication Reconciliation: discharge medications reconciled, continue medications without change. Pending diabetes results today  Plan of care communicated with patient     Coding guidelines for this visit:  Type of Medical   Decision Making Face-to-Face Visit       within 7 Days of discharge Face-to-Face Visit        within 14 days of discharge   Moderate Complexity [id number]   High Complexity [id number]          Currently lives in a private home alone.  Recently admitted to TCU for 2 months for a left bimalleolar fx.  Currently she has PT twice weekly and a HHA once weekly to assist with bathing.  She is ambulating with a rolling walker.  Brother recently helped install a railing.  Patient using \"go go [employer]\" for transportation.    Diabetes Follow-up    How often are you checking your blood sugar? Three times daily  Blood sugar testing frequency justification:  Adjustment of medication(s)  What time of day are you checking your blood sugars (select all that apply)?  Before and after meals  Have you had any blood sugars above 200?  Yes occasionally then will take 1-2 units  Have you had any blood sugars below 70?  No    What symptoms do you notice when your blood " sugar is low?  None    What concerns do you have today about your diabetes? None and Other: still need sliding scale/short acting insulin?     Do you have any of these symptoms? (Select all that apply)  No numbness or tingling in feet.  No redness, sores or blisters on feet.  No complaints of excessive thirst.  No reports of blurry vision.  No significant changes to weight.     Have you had a diabetic eye exam in the last 12 months? No     Patient taking Lantus 38 units at bedtime currently (previously up to 50).  Patient taking glimepiride 4 mg two times a day.  Using sliding scale and will take 1-2 units short acting insulin every other day or so as needed.  Previously on Victoza 1.2 units/dose as well, but insurance wasn't covering it well so only took for a few months.    BP Readings from Last 2 Encounters:   01/07/20 118/64   11/26/19 121/68     Hemoglobin A1C (%)   Date Value   01/07/2020 8.5 (H)   10/12/2019 7.6 (H)     LDL Cholesterol Calculated (mg/dL)   Date Value   10/22/2018 77   11/09/2017 73       Diabetes Management Resources      Patient Active Problem List   Diagnosis     CYSTIC LIVER DIS     Allergic rhinitis     Diverticulitis of colon     Disorder of bone and cartilage     Esophageal reflux     Mixed incontinence urge and stress (male)(female)     Cystocele, lateral     Vaginal atrophy     Type 2 diabetes, HbA1c goal < 7% (H)     Hyperlipidemia LDL goal <100     Heart burn     BABB (dyspnea on exertion)     Impingement syndrome, shoulder     Sebaceous hyperplasia     Seborrheic keratosis     Fatty liver     Personal history of other malignant neoplasm of skin     Health Care Home     Hypertension goal BP (blood pressure) < 140/90     Elevated serum creatinine     Peripheral vascular disease (H)     Skin exam, screening for cancer     Essential hypertension, benign (HTN)     CKD (chronic kidney disease) stage 2, GFR 60-89 ml/min     Type 2 diabetes mellitus with hyperglycemia (H)     Type 2  diabetes with stage 2 chronic kidney disease GFR 60-89 (H)     Type 2 diabetes mellitus without complication, with long-term current use of insulin (H)     Senile nuclear sclerosis, right     Ankle fracture     Closed bimalleolar fracture of left ankle with routine healing     Post-operative state     Intertriginous candidiasis     Past Surgical History:   Procedure Laterality Date     APPLY EXTERNAL FIXATOR LOWER EXTREMITY Left 10/12/2019    Procedure: Left ankle external fixator placement;  Surgeon: Leeanne Brown MD;  Location: [facility] OR     C APPENDECTOMY  age 22     C DEXA, BONE DENSITY, AXIAL SKEL  2005     C DEXA, BONE DENSITY, AXIAL SKEL  6/2007    No signif change in Osteopenia     COLONOSCOPY  4/2006    repeat 10 yr     ENDOSCOPIC ULTRASOUND UPPER GASTROINTESTINAL TRACT (GI) N/A 9/6/2018    Procedure: ENDOSCOPIC ULTRASOUND, ESOPHAGOSCOPY / UPPER GASTROINTESTINAL TRACT (GI);  Endoscopic Ultrasound, Esophagogastroduodenoscopy with endoscopic mucosal resection;  Surgeon: Hood Brower MD;  Location: [facility] OR     ESOPHAGOSCOPY, GASTROSCOPY, DUODENOSCOPY (EGD), COMBINED N/A 8/13/2018    Procedure: COMBINED ESOPHAGOSCOPY, GASTROSCOPY, DUODENOSCOPY (EGD), BIOPSY SINGLE OR MULTIPLE;  EGD;  Surgeon: Hood Brower MD;  Location: [facility]     ESOPHAGOSCOPY, GASTROSCOPY, DUODENOSCOPY (EGD), COMBINED N/A 3/14/2019    Procedure: Upper Endoscopy;  Surgeon: Hood Brower MD;  Location: [facility] OR     ESOPHAGOSCOPY, GASTROSCOPY, DUODENOSCOPY (EGD), RESECT MUCOSA, COMBINED N/A 9/6/2018    Procedure: COMBINED ESOPHAGOSCOPY, GASTROSCOPY, DUODENOSCOPY (EGD), RESECT MUCOSA;;  Surgeon: Hood Brower MD;  Location: [facility] OR     GYN SURGERY  10/22/08    pelvic support     HEMORRHOIDECTOMY  8/08     MOHS MICROGRAPHIC PROCEDURE       OPEN REDUCTION INTERNAL FIXATION ANKLE Left 10/28/2019    Procedure: Internal fixation left bimalleolar ankle fracture, removal external fixator;  Surgeon: Jose Roberto Can MD;  Location: [facility] OR      SURGICAL HISTORY OF -   age 22    L ovarian cyst removal, laparotomy     SURGICAL HISTORY OF -       partial thyroidectomy     SURGICAL HISTORY OF -   10/08    Transobturator tape for Urinary Incontinence     SURGICAL HISTORY OF -   2011    stress test normal     THYROID SURGERY      Had cyst removed, thyroid gland is intact.       Social History     Tobacco Use     Smoking status: Former Smoker     Last attempt to quit: 1980     Years since quittin.0     Smokeless tobacco: Never Used     Tobacco comment: smoked from 70-80; smoked about 1ppd   Substance Use Topics     Alcohol use: Yes     Alcohol/week: 0.0 standard drinks     Comment: 6 drinks per week     Family History   Problem Relation Age of Onset     Diabetes Mother         at 89 yrs     Hypertension Mother      Arthritis Mother         rheumatoid     Cancer Father         bladder     Cardiovascular Brother         palpitations not on meds.     Glaucoma No family hx of      Macular Degeneration No family hx of      Amblyopia No family hx of          Current Outpatient Medications   Medication Sig Dispense Refill     acetaminophen (TYLENOL) 325 MG tablet Take 3 tablets (975 mg) by mouth every 8 hours       alum & mag hydroxide-simethicone (MAALOX REGULAR STRENGTH) 200-200-20 MG/5ML SUSP Take 30 mLs by mouth as needed        atorvastatin (LIPITOR) 40 MG tablet TAKE 1 TABLET DAILY 30 tablet 0     blood glucose (NO BRAND SPECIFIED) lancets standard Use to test blood sugar 4 times daily or as directed.Lancets One Touch 100s Delica- 4x a day 120 each 5     blood glucose (NO BRAND SPECIFIED) test strip Use to test blood sugar 4 times daily or as directed.One touch ultra strips blue 100- 4x a day. 120 strip 5     blood glucose (ONETOUCH ULTRA) test strip 1 strip by In Vitro route 4 times daily. 360 each 3     blood glucose monitoring (NO BRAND SPECIFIED) meter device kit Use to test blood sugar 4 times daily or as directed. 1 kit 0     CALCIUM 500 + D  "500-200 MG-IU OR TABS one tab twice per day 100 0     Cholecalciferol (VITAMIN D PO) Take  by mouth. Pt consuming 3000 units per day        furosemide (LASIX) 20 MG tablet TAKE 2 TABLETS IN THE MORNING AND 1 TABLET IN THE AFTERNOON 270 tablet 1     glimepiride (AMARYL) 4 MG tablet  180 tablet 0     insulin aspart (NOVOLOG PEN) 100 UNIT/ML pen Inject 1-7 Units Subcutaneous 3 times daily (before meals) Medium Correction Scale for Pre meal Blood glucose  Do Not give Correction Insulin if Pre-Meal BG < 140.   Pre-Meal BG levels: Corresponding Insulin Units  140-189: 1 unit; 190-239:2 units.   240-289: 3 units; 290-339: 4 units.   340-399: 5 units; 400-449: 6 units   = or > 450 give 7 units.   Notify MD if glucose > or = 350 mg/dL after administration of correction dose.       insulin aspart (NOVOLOG PEN) 100 UNIT/ML pen Inject 1-5 Units Subcutaneous At Bedtime MEDIUM INSULIN RESISTANCE DOSING    Do Not give Bedtime Correction Insulin if BG less than  200.   For  - 249 give 1 units.   For  - 299 give 2 units.   For  - 349 give 3 units.   For  -399 give 4 units.   For BG greater than or equal to 400 give 5 units.  Notify provider if glucose greater than or equal to 350 mg/dL after administration of correction dose.  If given at mealtime, administer within 30 minutes of start of meal       insulin glargine (LANTUS PEN) 100 UNIT/ML pen Inject 35 Units Subcutaneous At Bedtime       insulin pen needle (BD REY U/F) 32G X 4 MM miscellaneous Use one pen needle twice daily (with lantus and victoza) 180 each 3     insulin syringe-needle U-100 (BD INSULIN SYRINGE ULTRAFINE) 31G X 5/16\" 1 ML miscellaneous Use 1 syringes twice daily for insulin and victoza and for symptoms of hypoglycemia 100 each 3     lisinopril (PRINIVIL/ZESTRIL) 2.5 MG tablet TAKE 1 TABLET DAILY 90 tablet 1     metoprolol tartrate (LOPRESSOR) 25 MG tablet TAKE 1 TABLET TWICE A  tablet 1     ONETOUCH DELICA LANCETS 33G MISC 1 each 4 " times daily 360 each 3     order for DME Roll-A-Bout Walker. Patient can use for 3 months 1 Units 0     oxyCODONE (ROXICODONE) 5 MG tablet Take 1 tablet (5 mg) by mouth every 4 hours as needed for moderate to severe pain 30 tablet 0     pantoprazole (PROTONIX) 40 MG EC tablet Take 1 tablet (40 mg) by mouth daily       potassium chloride ER (K-TAB/KLOR-CON) 10 MEQ CR tablet        senna-docusate (SENOKOT-S/PERICOLACE) 8.6-50 MG tablet Take 2 tablets by mouth 2 times daily       triamcinolone (KENALOG) 0.1 % external cream Apply topically 3 times daily       Allergies   Allergen Reactions     Amlodipine Swelling     Ancef [Cefazolin]      Levaquin Rash     Itching, rash     Metformin Diarrhea and GI Disturbance     Cephalosporins Rash     Clindamycin Rash     Levofloxacin Itching and Rash     Nickel Rash     Contact reaction  Contact reaction     Recent Labs   Lab Test 01/07/20  1417 11/25/19  2155 10/17/19  0621 10/16/19  0528  10/12/19  1212 08/19/19  1429  10/22/18  1516  11/09/17  1403 07/26/17  1434  10/19/15  1541   A1C 8.5*  --   --   --   --  7.6* 7.5*   < > 8.3*   < >  --  7.0*   < > 7.1*   LDL  --   --   --   --   --   --   --   --  77  --  73 66   < >  --    HDL  --   --   --   --   --   --   --   --  59  --  51 50   < >  --    TRIG  --   --   --   --   --   --   --   --  184*  --  118 164*   < >  --    ALT  --   --   --  48  --   --   --   --   --   --  42  --   --  47   CR  --  0.84 0.63 0.78   < > 0.81  --    < >  --    < > 0.81  --    < > 0.90   GFRESTIMATED  --  70 90 76   < > 73  --    < >  --    < > 70  --    < > 62   GFRESTBLACK  --  81 >90 88   < > 85  --    < >  --    < > 85  --    < > 75   POTASSIUM  --  3.8 3.5 3.8   < > 4.4  --    < >  --    < > 3.3*  --    < > 3.9   TSH  --   --   --   --   --   --  2.46  --   --   --   --  2.11  --   --     < > = values in this interval not displayed.      BP Readings from Last 3 Encounters:   01/07/20 118/64   11/26/19 121/68   10/30/19 132/65    Wt Readings  from Last 3 Encounters:   12/13/19 88.5 kg (195 lb)   11/25/19 88.5 kg (195 lb)   11/13/19 87.5 kg (193 lb)                    Reviewed and updated as needed this visit by Provider         Review of Systems   ROS COMP: Constitutional, HEENT, cardiovascular, pulmonary, GI, , musculoskeletal, neuro, skin, endocrine and psych systems are negative, except as otherwise noted.      Objective    /64 (BP Location: Left arm, Patient Position: Sitting, Cuff Size: Adult Regular)   Pulse 91   Temp 97.6  F (36.4  C) (Oral)   Resp 16   SpO2 94%   There is no height or weight on file to calculate BMI.  Physical Exam   GENERAL: healthy, alert and no distress  RESP: lungs clear to auscultation - no rales, rhonchi or wheezes  CV: regular rate and rhythm, normal S1 S2, no S3 or S4, no murmur, click or rub, no peripheral edema and peripheral pulses strong  MS: no gross musculoskeletal defects noted, 2+ pitting edema in left lower leg; 1+ in right lower leg  PSYCH: mentation appears normal, affect normal/bright    Diagnostic Test Results:  Labs reviewed in Epic        Assessment & Plan     (S82.899S) Closed fracture of ankle, unspecified laterality, sequela  (primary encounter diagnosis)  Comment: well healing  Plan: working with Physical Therapy and has follow up with orthopedics scheduled; continue with compression stockings as tolerated.    (E11.9) Type 2 diabetes, HbA1c goal < 7% (H)  Comment: Long standing, chronic, UNcontrolled  Plan: Hemoglobin A1c, Albumin Random Urine         Quantitative with Creat Ratio, Comprehensive         metabolic panel        Increase your Lantus (insulin) dose by 3 units every 3 days until your AM fasting blood sugars are in the goal range (). Your current Lantus dose is 38 units daily; caution with short acting insulin for hypoglycemia and follow up in one month with PCP.    (I10) Hypertension goal BP (blood pressure) < 140/90  Comment: Long standing, chronic, controlled  Plan:  "Albumin Random Urine Quantitative with Creat         Ratio        Labs updated today; refills to be sent to pharmacy as needed.    (E78.5) Hyperlipidemia LDL goal <100  Comment: Long standing, chronic, controlled  Plan: Lipid panel reflex to direct LDL Fasting        Labs updated today, refills to be sent to pharmacy as needed.        ICD-10-CM    1. Closed fracture of ankle, unspecified laterality, sequela S82.899S    2. Type 2 diabetes, HbA1c goal < 7% (H) E11.9 Hemoglobin A1c     Albumin Random Urine Quantitative with Creat Ratio     Comprehensive metabolic panel     glimepiride (AMARYL) 4 MG tablet   3. Hypertension goal BP (blood pressure) < 140/90 I10 Albumin Random Urine Quantitative with Creat Ratio     potassium chloride ER (K-TAB/KLOR-CON) 10 MEQ CR tablet   4. Hyperlipidemia LDL goal <100 E78.5 Lipid panel reflex to direct LDL Fasting        BMI:   Estimated body mass index is 32.45 kg/m  as calculated from the following:    Height as of 12/13/19: 1.651 m (5' 5\").    Weight as of 12/13/19: 88.5 kg (195 lb).   Weight management plan: Discussed healthy diet and exercise guidelines        Patient Instructions   Labs updated today.  Increase your Lantus (insulin) dose by 3 units every 3 days until your AM fasting blood sugars are in the goal range (). Your current Lantus dose is 38 units daily  Follow up in one month.         Return in about 4 weeks (around 2/4/2020) for Routine visit, or sooner with worsening symptoms.    Rowan Camacho PA-C  [facility]    "

## 2020-01-07 NOTE — PATIENT INSTRUCTIONS
Labs updated today.  Increase your Lantus (insulin) dose by 3 units every 3 days until your AM fasting blood sugars are in the goal range (). Your current Lantus dose is 38 units daily  Follow up in one month.

## 2020-01-08 LAB
ALBUMIN SERPL-MCNC: 3.6 G/DL (ref 3.4–5)
ALP SERPL-CCNC: 113 U/L (ref 40–150)
ALT SERPL W P-5'-P-CCNC: 36 U/L (ref 0–50)
ANION GAP SERPL CALCULATED.3IONS-SCNC: 5 MMOL/L (ref 3–14)
AST SERPL W P-5'-P-CCNC: 17 U/L (ref 0–45)
BILIRUB SERPL-MCNC: 0.7 MG/DL (ref 0.2–1.3)
BUN SERPL-MCNC: 11 MG/DL (ref 7–30)
CALCIUM SERPL-MCNC: 8.9 MG/DL (ref 8.5–10.1)
CHLORIDE SERPL-SCNC: 102 MMOL/L (ref 94–109)
CHOLEST SERPL-MCNC: 139 MG/DL
CO2 SERPL-SCNC: 28 MMOL/L (ref 20–32)
CREAT SERPL-MCNC: 0.8 MG/DL (ref 0.52–1.04)
CREAT UR-MCNC: 213 MG/DL
GFR SERPL CREATININE-BSD FRML MDRD: 73 ML/MIN/{1.73_M2}
GLUCOSE SERPL-MCNC: 236 MG/DL (ref 70–99)
HDLC SERPL-MCNC: 45 MG/DL
LDLC SERPL CALC-MCNC: 63 MG/DL
MICROALBUMIN UR-MCNC: 27 MG/L
MICROALBUMIN/CREAT UR: 12.82 MG/G CR (ref 0–25)
NONHDLC SERPL-MCNC: 94 MG/DL
POTASSIUM SERPL-SCNC: 4 MMOL/L (ref 3.4–5.3)
PROT SERPL-MCNC: 6.8 G/DL (ref 6.8–8.8)
SODIUM SERPL-SCNC: 135 MMOL/L (ref 133–144)
TRIGL SERPL-MCNC: 154 MG/DL

## 2020-01-09 VITALS
DIASTOLIC BLOOD PRESSURE: 64 MMHG | RESPIRATION RATE: 16 BRPM | OXYGEN SATURATION: 94 % | HEART RATE: 91 BPM | SYSTOLIC BLOOD PRESSURE: 118 MMHG | TEMPERATURE: 97.6 F

## 2020-01-09 NOTE — RESULT ENCOUNTER NOTE
"Mary Smith  The rest of your routine labs are stable and within normal limits.    Please contact the office with any questions or concerns.    Rowan Clancy \"Adilson\" XU Camacho    "

## 2020-01-13 DIAGNOSIS — N18.2 CKD (CHRONIC KIDNEY DISEASE) STAGE 2, GFR 60-89 ML/MIN: ICD-10-CM

## 2020-01-13 RX ORDER — LISINOPRIL 2.5 MG/1
TABLET ORAL
Qty: 90 TABLET | Refills: 3 | Status: SHIPPED | OUTPATIENT
Start: 2020-01-13 | End: 2021-01-04

## 2020-01-13 NOTE — TELEPHONE ENCOUNTER
"Requested Prescriptions   Pending Prescriptions Disp Refills     lisinopril (PRINIVIL/ZESTRIL) 2.5 MG tablet [Pharmacy Med Name: LISINOPRIL TABS 2.5MG] 90 tablet 4     Sig: TAKE 1 TABLET DAILY  Last Written Prescription Date:  5/16/2019  Last Fill Quantity: 90 tablet,  # refills: 1   Last Office Visit: 1/7/2020   Future Office Visit:    Next 5 appointments (look out 90 days)    Feb 11, 2020  3:00 PM CST  SHORT with Joel Daniel Irwin Wegener, MD  [facility] ([facility]) [address] [place] [id number] [phone]              ACE Inhibitors (Including Combos) Protocol Passed - 1/13/2020 12:16 PM        Passed - Blood pressure under 140/90 in past 12 months     BP Readings from Last 3 Encounters:   01/07/20 118/64   11/26/19 121/68   10/30/19 132/65           Passed - Recent (12 mo) or future (30 days) visit within the authorizing provider's specialty     Patient has had an office visit with the authorizing provider or a provider within the authorizing providers department within the previous 12 mos or has a future within next 30 days. See \"Patient Info\" tab in inbasket, or \"Choose Columns\" in Meds & Orders section of the refill encounter.            Passed - Medication is active on med list        Passed - Patient is age 18 or older        Passed - No active pregnancy on record        Passed - Normal serum creatinine on file in past 12 months     Recent Labs   Lab Test 01/07/20  1417   CR 0.80           Passed - Normal serum potassium on file in past 12 months     Recent Labs   Lab Test 01/07/20  1417   POTASSIUM 4.0           Passed - No positive pregnancy test within past 12 months          "

## 2020-01-14 NOTE — TELEPHONE ENCOUNTER
Signed Prescriptions:                        Disp   Refills    lisinopril (PRINIVIL/ZESTRIL) 2.5 MG [id number] tab*3        Sig: TAKE 1 TABLET DAILY  Authorizing Provider: WEGENER, JOEL DANIEL IRWIN  Ordering User: OLMAN HINES

## 2020-01-21 ENCOUNTER — THERAPY VISIT (OUTPATIENT)
Dept: PHYSICAL THERAPY | Facility: CLINIC | Age: 75
End: 2020-01-21
Attending: ORTHOPAEDIC SURGERY
Payer: COMMERCIAL

## 2020-01-21 DIAGNOSIS — S82.842D CLOSED BIMALLEOLAR FRACTURE OF LEFT ANKLE WITH ROUTINE HEALING, SUBSEQUENT ENCOUNTER: ICD-10-CM

## 2020-01-21 PROCEDURE — 97112 NEUROMUSCULAR REEDUCATION: CPT | Mod: GP | Performed by: PHYSICAL THERAPIST

## 2020-01-21 PROCEDURE — 97162 PT EVAL MOD COMPLEX 30 MIN: CPT | Mod: GP | Performed by: PHYSICAL THERAPIST

## 2020-01-24 ENCOUNTER — OFFICE VISIT (OUTPATIENT)
Dept: ORTHOPEDICS | Facility: CLINIC | Age: 75
End: 2020-01-24
Payer: COMMERCIAL

## 2020-01-24 VITALS — BODY MASS INDEX: 32.45 KG/M2 | WEIGHT: 195 LBS

## 2020-01-24 DIAGNOSIS — S82.842D CLOSED BIMALLEOLAR FRACTURE OF LEFT ANKLE WITH ROUTINE HEALING, SUBSEQUENT ENCOUNTER: Primary | ICD-10-CM

## 2020-01-24 NOTE — LETTER
1/24/2020       RE: Sarah Felix  [address] [place] [address]     Dear Colleague,    Thank you for referring your patient, Sarah Felix, to the [facility] at [facility]. Please see a copy of my visit note below.    I was present with the resident during the history and exam.  I discussed the case with the resident and agree with the findings as documented in the assessment and plan.        Department of Orthopedic Surgery  Clinic Progress Note    PATIENT NAME: Sarah Felix   MRN: [id number]  AGE: 74 year old  BMI: Body mass index is 32.45 kg/m .    REASON FOR VISIT: Surgical Followup of the Left Ankle and Surgical Followup (12 wk pop DOS 10/28/19 Internal fixation left bimalleolar ankle fracture, removal external fixator )      HISTORY OF PRESENT ILLNESS:  Sarah Felix is a 74 year old female who presents for follow up of now 12 weeks after the above-stated procedure.  She reports that she has been doing okay but she still has significant swelling and a moderate amount of pain in her left lower extremity.  She denies any issues with her incisions including redness or drainage.  She continues to ambulate with a cane for stability.    She does feel that she is continuing to slowly improve.  She was doing home therapy for a long period of time and recently transition to outpatient PT she has had one therapy visit at [facility].    She is questions today regarding if this is the appropriate progression postoperatively.    No new numbness tingling or weakness is reported.    No recent illnesses are reported.      PHYSICAL EXAM:  On physical examination the patient appears the stated age, is in no acute distress, affect is appropriate, and breathing is non-labored.  Wt 88.5 kg (195 lb)   BMI 32.45 kg/m     Data Unavailable  Body mass index is 32.45 kg/m .    No acute distress    Left lower  extremity:    Well-healed medial and lateral ankle incisions.  There is 2+ lymphedema in the left lower extremity through the knee.    No sores or wounds are noted.    Palpable DP.    Sensation intact to light touch saphenous, sural, superficial peroneal, deep peroneal, and tibial  Fires EHL, FHL, tibialis and gastroc    Ankle range of motion is 30 degrees of plantarflexion to dorsiflexion which is 5 degrees short of neutral     IMAGING:     No new imaging to review today     ASSESSMENT/PLAN: Sarah Felix is a 74 year old female who is now 3 months status post open reduction internal fixation of a left bimalleolar ankle fracture, which was initially temporized with spanning external fixation.    1. Left bimalleolar ankle fracture, closed,  2. Status post open reduction internal fixation 10/28/2019    We reviewed the clinical findings today with the patient.  We discussed her postoperative course.  He findings were continued lower extremity edema which is been exacerbated by her fracture and surgery.  She does have contralateral lymphedema.  We discussed that the swelling can limit range of motion about the ankle.  We additionally discussed that she is now just begun outpatient PT, and often the range of activities they do in the outpatient setting is more aggressive.  We would recommend the patient continue with outpatient PT and provided a new referral prescription today, she is attending therapy at the [facility].    Patient can continue to be weightbearing as tolerated on the left lower extremity.  She should ask her physical therapist to teach her a home exercise regimen.  She should continue to use assistive ices such as cane as instructed by them.    For pain she can continue with Tylenol and should ask her primary care provider if it is permissible to take NSAIDs such as Motrin or Advil.    She may follow-up with us on a as needed basis.      Patient seen and discussed with Dr. Can  who agrees with the above assessment and plan.     Oral Kwon MD  Orthopedic Surgery PGY-4    Again, thank you for allowing me to participate in the care of your patient.      Sincerely,    Jose Roberto Can MD

## 2020-01-24 NOTE — PROGRESS NOTES
Department of Orthopedic Surgery  Clinic Progress Note    PATIENT NAME: Sarah Felix   MRN: [id number]  AGE: 74 year old  BMI: Body mass index is 32.45 kg/m .      REASON FOR VISIT: Surgical Followup of the Left Ankle and Surgical Followup (12 wk pop DOS 10/28/19 Internal fixation left bimalleolar ankle fracture, removal external fixator )      HISTORY OF PRESENT ILLNESS:  Sarah Felix is a 74 year old female who presents for follow up of now 12 weeks after the above-stated procedure.  She reports that she has been doing okay but she still has significant swelling and a moderate amount of pain in her left lower extremity.  She denies any issues with her incisions including redness or drainage.  She continues to ambulate with a cane for stability.    She does feel that she is continuing to slowly improve.  She was doing home therapy for a long period of time and recently transition to outpatient PT she has had one therapy visit at [facility].    She is questions today regarding if this is the appropriate progression postoperatively.    No new numbness tingling or weakness is reported.    No recent illnesses are reported.      PHYSICAL EXAM:  On physical examination the patient appears the stated age, is in no acute distress, affect is appropriate, and breathing is non-labored.  Wt 88.5 kg (195 lb)   BMI 32.45 kg/m    Data Unavailable  Body mass index is 32.45 kg/m .    No acute distress    Left lower extremity:    Well-healed medial and lateral ankle incisions.  There is 2+ lymphedema in the left lower extremity through the knee.    No sores or wounds are noted.    Palpable DP.    Sensation intact to light touch saphenous, sural, superficial peroneal, deep peroneal, and tibial  Fires EHL, FHL, tibialis and gastroc    Ankle range of motion is 30 degrees of plantarflexion to dorsiflexion which is 5 degrees short of neutral     IMAGING:     No new imaging to review today      ASSESSMENT/PLAN: Sarah Felix is a 74 year old female who is now 3 months status post open reduction internal fixation of a left bimalleolar ankle fracture, which was initially temporized with spanning external fixation.    1. Left bimalleolar ankle fracture, closed,  2. Status post open reduction internal fixation 10/28/2019    We reviewed the clinical findings today with the patient.  We discussed her postoperative course.  He findings were continued lower extremity edema which is been exacerbated by her fracture and surgery.  She does have contralateral lymphedema.  We discussed that the swelling can limit range of motion about the ankle.  We additionally discussed that she is now just begun outpatient PT, and often the range of activities they do in the outpatient setting is more aggressive.  We would recommend the patient continue with outpatient PT and provided a new referral prescription today, she is attending therapy at the [facility].    Patient can continue to be weightbearing as tolerated on the left lower extremity.  She should ask her physical therapist to teach her a home exercise regimen.  She should continue to use assistive ices such as cane as instructed by them.    For pain she can continue with Tylenol and should ask her primary care provider if it is permissible to take NSAIDs such as Motrin or Advil.    She may follow-up with us on a as needed basis.      Patient seen and discussed with Dr. Can who agrees with the above assessment and plan.       Oral Kwon MD  Orthopedic Surgery PGY-4

## 2020-01-24 NOTE — PATIENT INSTRUCTIONS
Please continue PT with [facility]  You may bear weight on the left ankle as tolerated  Continue to take tylenol for pain  Ask your primary physician if NSAIDs such as motrin or advil are permissible  You were referred to lymphedema therapy as well

## 2020-01-28 ENCOUNTER — THERAPY VISIT (OUTPATIENT)
Dept: PHYSICAL THERAPY | Facility: CLINIC | Age: 75
End: 2020-01-28
Payer: COMMERCIAL

## 2020-01-28 DIAGNOSIS — S82.842D CLOSED BIMALLEOLAR FRACTURE OF LEFT ANKLE WITH ROUTINE HEALING, SUBSEQUENT ENCOUNTER: Primary | ICD-10-CM

## 2020-01-28 PROCEDURE — 97110 THERAPEUTIC EXERCISES: CPT | Mod: GP | Performed by: PHYSICAL THERAPIST

## 2020-01-28 PROCEDURE — 97140 MANUAL THERAPY 1/> REGIONS: CPT | Mod: GP | Performed by: PHYSICAL THERAPIST

## 2020-01-31 ENCOUNTER — THERAPY VISIT (OUTPATIENT)
Dept: PHYSICAL THERAPY | Facility: CLINIC | Age: 75
End: 2020-01-31
Payer: COMMERCIAL

## 2020-01-31 DIAGNOSIS — S82.842D CLOSED BIMALLEOLAR FRACTURE OF LEFT ANKLE WITH ROUTINE HEALING, SUBSEQUENT ENCOUNTER: ICD-10-CM

## 2020-01-31 PROCEDURE — 97110 THERAPEUTIC EXERCISES: CPT | Mod: GP | Performed by: PHYSICAL THERAPIST

## 2020-01-31 PROCEDURE — 97140 MANUAL THERAPY 1/> REGIONS: CPT | Mod: GP | Performed by: PHYSICAL THERAPIST

## 2020-02-04 ENCOUNTER — THERAPY VISIT (OUTPATIENT)
Dept: PHYSICAL THERAPY | Facility: CLINIC | Age: 75
End: 2020-02-04
Payer: COMMERCIAL

## 2020-02-04 DIAGNOSIS — S82.842D CLOSED BIMALLEOLAR FRACTURE OF LEFT ANKLE WITH ROUTINE HEALING, SUBSEQUENT ENCOUNTER: Primary | ICD-10-CM

## 2020-02-04 PROCEDURE — 97140 MANUAL THERAPY 1/> REGIONS: CPT | Mod: GP | Performed by: PHYSICAL THERAPIST

## 2020-02-04 PROCEDURE — 97110 THERAPEUTIC EXERCISES: CPT | Mod: GP | Performed by: PHYSICAL THERAPIST

## 2020-02-06 DIAGNOSIS — I10 HYPERTENSION GOAL BP (BLOOD PRESSURE) < 140/90: Chronic | ICD-10-CM

## 2020-02-06 DIAGNOSIS — E11.9 TYPE 2 DIABETES, HBA1C GOAL < 7% (H): ICD-10-CM

## 2020-02-07 ENCOUNTER — THERAPY VISIT (OUTPATIENT)
Dept: PHYSICAL THERAPY | Facility: CLINIC | Age: 75
End: 2020-02-07
Payer: COMMERCIAL

## 2020-02-07 DIAGNOSIS — S82.842D CLOSED BIMALLEOLAR FRACTURE OF LEFT ANKLE WITH ROUTINE HEALING, SUBSEQUENT ENCOUNTER: Primary | ICD-10-CM

## 2020-02-07 PROCEDURE — 97110 THERAPEUTIC EXERCISES: CPT | Mod: GP | Performed by: PHYSICAL THERAPIST

## 2020-02-07 PROCEDURE — 97140 MANUAL THERAPY 1/> REGIONS: CPT | Mod: GP | Performed by: PHYSICAL THERAPIST

## 2020-02-07 NOTE — TELEPHONE ENCOUNTER
"Requested Prescriptions   Pending Prescriptions Disp Refills     LANTUS SOLOSTAR 100 UNIT/ML soln [Pharmacy Med Name: LANTUS SOLOSTAR PEN 100U/ML]  Last Written Prescription Date:  10-29-19  Last Fill Quantity: 0,  # refills: 0   Last office visit: 1/7/2020 with prescribing provider:  ERIC Camacho   Future Office Visit:   Next 5 appointments (look out 90 days)    Feb 12, 2020  3:00 PM CST  SHORT with Joel Daniel Irwin Wegener, MD  [facility] ([facility]) [address] [place] [id number] [phone]       60 mL 3     Sig: INJECT BETWEEN 45 UNITS AND 60 UNITS AT BEDTIME       Long Acting Insulin Protocol Passed - 2/6/2020  3:34 PM        Passed - Blood pressure less than 140/90 in past 6 months     BP Readings from Last 3 Encounters:   01/07/20 118/64   11/26/19 121/68   10/30/19 132/65           Passed - LDL on file in past 12 months     Recent Labs   Lab Test 01/07/20  1417   LDL 63           Passed - Microalbumin on file in past 12 months     Recent Labs   Lab Test 01/07/20  1417   MICROL 27   UMALCR 12.82           Passed - Serum creatinine on file in past 12 months     Recent Labs   Lab Test 01/07/20  1417   CR 0.80           Passed - HgbA1C in past 3 or 6 months     If HgbA1C is 8 or greater, it needs to be on file within the past 3 months.  If less than 8, must be on file within the past 6 months.     Recent Labs   Lab Test 01/07/20  1417   A1C 8.5*           Passed - Medication is active on med list        Passed - Patient is age 18 or older        Passed - Recent (6 mo) or future (30 days) visit within the authorizing provider's specialty     Patient had office visit in the last 6 months or has a visit in the next 30 days with authorizing provider or within the authorizing provider's specialty.  See \"Patient Info\" tab in inbasket, or \"Choose Columns\" in Meds & Orders section of the refill encounter.       ____________________________________         potassium " "chloride ER (K-TAB/KLOR-CON) 10 MEQ CR tablet [Pharmacy Med Name: POT CHLOR ER (WAX) TABS 10MEQ]  Last Written Prescription Date:  2-19-19  Last Fill Quantity: 0,  # refills: 0   Last office visit: 1/7/2020 with prescribing provider:  ERIC Camacho   Future Office Visit:   Next 5 appointments (look out 90 days)    Feb 12, 2020  3:00 PM CST  SHORT with Joel Daniel Irwin Wegener, MD  [facility] ([facility]) [address] [place] [id number] [phone]       180 tablet 4     Sig: TAKE 1 TABLET TWICE A DAY       Potassium Supplements Protocol Passed - 2/6/2020  3:34 PM        Passed - Recent (12 mo) or future (30 days) visit within the authorizing provider's department     Patient has had an office visit with the authorizing provider or a provider within the authorizing providers department within the previous 12 mos or has a future within next 30 days. See \"Patient Info\" tab in inbasket, or \"Choose Columns\" in Meds & Orders section of the refill encounter.            Passed - Medication is active on med list        Passed - Patient is age 18 or older        Passed - Normal serum potassium in past 12 months     Recent Labs   Lab Test 01/07/20  1417   POTASSIUM 4.0            "

## 2020-02-09 RX ORDER — INSULIN GLARGINE 100 [IU]/ML
INJECTION, SOLUTION SUBCUTANEOUS
Qty: 60 ML | Refills: 0 | Status: SHIPPED | OUTPATIENT
Start: 2020-02-09 | End: 2020-02-12

## 2020-02-09 RX ORDER — POTASSIUM CHLORIDE 750 MG/1
TABLET, EXTENDED RELEASE ORAL
Qty: 180 TABLET | Refills: 1 | Status: SHIPPED | OUTPATIENT
Start: 2020-02-09 | End: 2020-02-12

## 2020-02-10 ENCOUNTER — HOSPITAL ENCOUNTER (OUTPATIENT)
Dept: PHYSICAL THERAPY | Facility: CLINIC | Age: 75
Setting detail: THERAPIES SERIES
End: 2020-02-10
Attending: ORTHOPAEDIC SURGERY
Payer: COMMERCIAL

## 2020-02-10 DIAGNOSIS — S82.842D CLOSED BIMALLEOLAR FRACTURE OF LEFT ANKLE WITH ROUTINE HEALING, SUBSEQUENT ENCOUNTER: ICD-10-CM

## 2020-02-10 DIAGNOSIS — I89.0 LYMPHEDEMA OF LEFT LOWER EXTREMITY: Primary | ICD-10-CM

## 2020-02-10 PROCEDURE — 97161 PT EVAL LOW COMPLEX 20 MIN: CPT | Mod: GP | Performed by: PHYSICAL THERAPIST

## 2020-02-10 PROCEDURE — 97140 MANUAL THERAPY 1/> REGIONS: CPT | Mod: GP | Performed by: PHYSICAL THERAPIST

## 2020-02-10 PROCEDURE — 97535 SELF CARE MNGMENT TRAINING: CPT | Mod: GP | Performed by: PHYSICAL THERAPIST

## 2020-02-10 NOTE — PROGRESS NOTES
[facility]        OUTPATIENT PHYSICAL THERAPY EDEMA EVALUATION  PLAN OF TREATMENT FOR OUTPATIENT REHABILITATION  (COMPLETE FOR INITIAL CLAIMS ONLY)  Patient's Last Name, First Name, Sarah Rubio                           Provider s Name:   [facility] Medical Record No.  [id number]     Start of Care Date:  02/10/20   Onset Date:  10/28/19   Type:  PT   Medical Diagnosis:  bimalleolar ankle fracture , L   Therapy Diagnosis:  L LE lymphedema Visits from SOC:  1                                     __________________________________________________________________________________   Plan of Treatment/Functional Goals:    Manual lymph drainage, Gradient compression bandaging, Fit for compression garment, Exercises, Precautions to prevent infection / exacerbation, Education, Manual therapy, ADL training, Skin care / precautions, Scar mobilization, Soft tissue mobilization, Myofascial release, Home management program development        GOALS  1. Goal description: In order to improve tolerance for functional mobility, ADLs, and recreational activities outside of physical therapy, patient will demonstrate independence with home program of exercise and lifestyle modification and possible compression.        Target date: 05/01/20  2. Goal description: Patient will have decreased negative  impact on daily life from lymphedema and be able to  perform daily activities and mobility with less difficulty as indicated by improved LLIS score of 9 or more points       Target date: 05/15/20  3. Goal description: In order to fit more easily into clothing and decrease the risk of infection, pt will have 5% or more decrease in volume of L LE.        Target date: 05/01/20              Treatment Frequency: Other (see comments)        Pat Smith, PT                                     I CERTIFY THE NEED FOR THESE SERVICES FURNISHED UNDER        THIS PLAN OF TREATMENT AND WHILE UNDER MY CARE     (Physician co-signature of this document indicates review and certification of the therapy plan).                   Certification date from: 02/10/20       Certification date to: 05/10/20           Referring physician: Oral Kwon MD and Dr Joel Wegener   Initial Assessment  See Epic Evaluation- Start of care: 02/10/20

## 2020-02-10 NOTE — PROGRESS NOTES
02/10/20 1300   Quick Adds   Quick Adds Certification   Rehab Discipline   Discipline PT   Type of Visit   Type of visit Initial Edema Evaluation       present No   General Information   Start of care 02/10/20   Referring physician Oral Kwon MD    Orders Evaluate and treat as indicated   Order date 01/24/20   Medical diagnosis s/p bimalleolar fracture with ORIF and lymphedema, L ankle   Onset of illness / date of surgery 10/10/19   Edema onset 10/28/19   Affected body parts LLE   Edema etiology Surgery;Trauma   Edema etiology comments fall and ankle fracture   Pertinent history of current problem (PT: include personal factors and/or comorbidities that impact the POC; OT: include additional occupational profile info) Fell while descending stairs. On10/28/19 had internal fixation left bimalleolar ankle fracture and removal of external fixator.  Pt reports years of chronic pain in ankle. She reports the ankle fracture happened while descending stairs. She has had swollen ankles before. She is on lasix.  2018 had a fracture of L tibia. WEnt to [facility] for 2 months and was NWB. Currently WBAT. She has on-going PT at [facility] for ROM and strengthening.   Surgical / medical history reviewed Yes   Prior level of functional mobility IND   Prior treatment Elevation;Compression garments  (light compression sleeve and stockings)   Community support Family / friend caregiver  (brother lives in [place] and helps intermittently)   Patient role / employment history Retired  (RN)   Psychosocial concerns Impaired coping   Living environment [place] [facility]   Living environment comments difficult to do stairs   Current assistive devices Quad cane   Fall Risk Screen   Fall screen completed by PT   Have you fallen 2 or more times in the past year? No   Have you fallen and had an injury in the past year? Yes   Timed Up and Go score (seconds) 18.4   Is patient a fall risk? Yes   Fall screen comments  falls education begun   Abuse Screen (yes response referral indicated)   Feels Unsafe at Home or Work/School no   Feels Threatened by Someone no   Does Anyone Try to Keep You From Having Contact with Others or Doing Things Outside Your Home? no   Physical Signs of Abuse Present no   System Outcome Measures   Outcome Measures Lymphedema   Lymphedema Life Impact Scale (score range 0-72). A higher score indicates greater impairment. 34   Subjective Report   Patient report of symptoms sharp pain and aching   Precautions   Precautions comments WBAT   Patient / Family Goals   Patient / family goals statement I want decrease in swelling and pain   Pain   Patient currently in pain Yes   Pain location L lower leg   Pain rating 3-7/10   Pain description Sharp;Ache   Pain description comment sharp intermittent pain when she is sitting still, constant ache 3/10   Vitals Signs   Weight 180  (lost 10# with this fracture)   Cognitive Status   Orientation Orientation to person, place and time   Level of consciousness Alert   Follows commands and answers questions 100% of the time   Personal safety and judgement Intact   Edema Exam / Assessment   Skin condition Pitting;Intact;Dryness;Temperature   Skin condition comments warm L ankle and lower leg, well healed incisions, puffy edema and shiny skin on L lower leg, puffy B ankles   Pitting 2+   Pitting location 1+ on dorsal foot and 2+ on L lower leg   Scar Yes   Location anterior lower leg   Mobility fair   Stemmer sign Positive   Stemmer sign comments B 2nd toes   Girth Measurements   Girth Measurements Refer to separate girth measurement flowsheet   Volume LE   Right LE (mL) 2529  (to 40 cm)   Left LE (mL) 2651  (to 40 cm )   LE volume comparison LLE volume greater than RLE volume   % difference 6.3   Range of Motion   ROM comments L ankle df: 2*, pf 50*   Gait / Locomotion   Gait / Locomotion wide JENNIFER, turns slowly. walks more securely with use of 4 pronged cane   Planned Edema  Interventions   Planned edema interventions Manual lymph drainage;Gradient compression bandaging;Fit for compression garment;Exercises;Precautions to prevent infection / exacerbation;Education;Manual therapy;ADL training;Skin care / precautions;Scar mobilization;Soft tissue mobilization;Myofascial release;Home management program development   Clinical Impression   Criteria for skilled therapeutic intervention met Yes   Therapy diagnosis L LE lymphedema   Influenced by the following impairments / conditions Stage 2   Functional limitations due to impairments / conditions decreased ankle ROM, gait is less steady and has to limit amount of walking   Clinical Presentation Stable/Uncomplicated   Clinical Presentation Rationale no recent changes   Clinical Decision Making (Complexity) Low complexity   Treatment Frequency Other (see comments)   Patient / family and/or staff in agreement with plan of care Yes   Risks and benefits of therapy have been explained Yes   Clinical impression comments Pt with L ankle ORIF and continued swelling since Oct 2019 fall and surgery. Would benefit from above interventions to reduce edema.   Education Assessment   Preferred learning style Listening;Reading;Demonstration;Pictures / video   Barriers to learning No barriers   Goal 1   Goal identifier home program   Goal description In order to improve tolerance for functional mobility, ADLs, and recreational activities outside of physical therapy, patient will demonstrate independence with home program of exercise and lifestyle modification and possible compression.    Target date 05/01/20   Goal 2   Goal identifier LLIS   Goal description Patient will have decreased negative  impact on daily life from lymphedema and be able to  perform daily activities and mobility with less difficulty as indicated by improved LLIS score of 9 or more points   Target date 05/15/20   Goal 3   Goal identifier volume   Goal description In order to fit more  easily into clothing and decrease the risk of infection, pt will have 5% or more decrease in volume of L LE.    Target date 05/01/20   Total Evaluation Time   PT Eval, Low Complexity Minutes (12195) 25   Certification   Certification date from 02/10/20   Certification date to 05/10/20   Medical Diagnosis bimalleolar ankle fracture , L   Certification I certify the need for these services furnished under this plan of treatment and while under my care.  (Physician co-signature of this document indicates review and certification of the therapy plan).

## 2020-02-12 ENCOUNTER — OFFICE VISIT (OUTPATIENT)
Dept: FAMILY MEDICINE | Facility: CLINIC | Age: 75
End: 2020-02-12
Payer: COMMERCIAL

## 2020-02-12 VITALS
HEART RATE: 69 BPM | TEMPERATURE: 97.9 F | RESPIRATION RATE: 14 BRPM | HEIGHT: 65 IN | BODY MASS INDEX: 29.99 KG/M2 | DIASTOLIC BLOOD PRESSURE: 70 MMHG | OXYGEN SATURATION: 95 % | SYSTOLIC BLOOD PRESSURE: 110 MMHG | WEIGHT: 180 LBS

## 2020-02-12 DIAGNOSIS — K59.01 SLOW TRANSIT CONSTIPATION: ICD-10-CM

## 2020-02-12 DIAGNOSIS — R21 RASH: Primary | ICD-10-CM

## 2020-02-12 DIAGNOSIS — N18.2 TYPE 2 DIABETES MELLITUS WITH STAGE 2 CHRONIC KIDNEY DISEASE, WITH LONG-TERM CURRENT USE OF INSULIN (H): ICD-10-CM

## 2020-02-12 DIAGNOSIS — E11.22 TYPE 2 DIABETES MELLITUS WITH STAGE 2 CHRONIC KIDNEY DISEASE, WITH LONG-TERM CURRENT USE OF INSULIN (H): ICD-10-CM

## 2020-02-12 DIAGNOSIS — I10 HYPERTENSION GOAL BP (BLOOD PRESSURE) < 140/90: Chronic | ICD-10-CM

## 2020-02-12 DIAGNOSIS — Z79.4 TYPE 2 DIABETES MELLITUS WITH STAGE 2 CHRONIC KIDNEY DISEASE, WITH LONG-TERM CURRENT USE OF INSULIN (H): ICD-10-CM

## 2020-02-12 DIAGNOSIS — S82.892D CLOSED FRACTURE OF LEFT ANKLE WITH ROUTINE HEALING, SUBSEQUENT ENCOUNTER: ICD-10-CM

## 2020-02-12 PROCEDURE — 99214 OFFICE O/P EST MOD 30 MIN: CPT | Performed by: FAMILY MEDICINE

## 2020-02-12 ASSESSMENT — MIFFLIN-ST. JEOR: SCORE: 1327.35

## 2020-02-12 NOTE — PATIENT INSTRUCTIONS
Current blood sugars are in range.     Goal blood sugars  fasting and less than 160 ideally after meals.     Not using short acting insulin.     I will refill kcl, lantus, nysatin, triamcinolon, senna    Follow up two months lab visit for a1c check and/or yearly physical.

## 2020-02-12 NOTE — PROGRESS NOTES
Subjective     Sarah Felix is a 74 year old female who presents to clinic today for the following health issues:    HPI   Diabetes Follow-up       How often are you checking your blood sugar? Three times daily    What time of day are you checking your blood sugars (select all that apply)?  Before and after meals    Have you had any blood sugars above 200?  Yes sometimes after  meal     Have you had any blood sugars below 70? No     What symptoms do you notice when your blood sugar is low?  Shaky    What concerns do you have today about your diabetes? None and Other:  Test result , medication      Do you have any of these symptoms? (Select all that apply)  No numbness or tingling in feet.  No redness, sores or blisters on feet.  No complaints of excessive thirst.  No reports of blurry vision.  No significant changes to weight.     Have you had a diabetic eye exam in the last 12 months? Yes- Date of last eye exam: 2018         Type 2 diabetes mellitus with stage 2 chronic kidney disease, with long-term current use of insulin (H)  Closed fracture of left ankle with routine healing, subsequent encounter  Hypertension goal BP (blood pressure) < 140/90  Rash  Slow transit constipation :sadly had fall and left tib/fib fracture since I saw her last requiring surgery/hospitalization and rehab stay.  Very stressful.  Back home now.  Using cane outside but walking without cane in house.  Lost 22 lbs through this.     Using up to 60 units lantus at home.  Only used short acting in hospital/rehab/not at home.  Uses glimepiride which she knows is for mealtime sugars.     Check sugars often since being home past month and sugars 100-120s consistently fasting and mostly 160 or less other times  Has some 200s recorded.  No hypoglycemia.  Feels hypoglycemic below 100.      a1c 8.5 today and she wonders how that could be true with current blood sugars.           Problem list, Medication list, Allergies, and  Medical/Social/Surgical histories reviewed in [facility] and updated as appropriate.  Labs reviewed in [facility]  BP Readings from Last 3 Encounters:   02/12/20 110/70   01/07/20 118/64   11/26/19 121/68    Wt Readings from Last 3 Encounters:   02/12/20 81.6 kg (180 lb)   01/24/20 88.5 kg (195 lb)   12/13/19 88.5 kg (195 lb)                  Patient Active Problem List   Diagnosis     CYSTIC LIVER DIS     Allergic rhinitis     Diverticulitis of colon     Disorder of bone and cartilage     Esophageal reflux     Mixed incontinence urge and stress (male)(female)     Cystocele, lateral     Vaginal atrophy     Type 2 diabetes, HbA1c goal < 7% (H)     Hyperlipidemia LDL goal <100     Heart burn     BABB (dyspnea on exertion)     Impingement syndrome, shoulder     Sebaceous hyperplasia     Seborrheic keratosis     Fatty liver     Personal history of other malignant neoplasm of skin     Health Care Home     Hypertension goal BP (blood pressure) < 140/90     Elevated serum creatinine     Peripheral vascular disease (H)     Skin exam, screening for cancer     Essential hypertension, benign (HTN)     CKD (chronic kidney disease) stage 2, GFR 60-89 ml/min     Type 2 diabetes mellitus with hyperglycemia (H)     Type 2 diabetes with stage 2 chronic kidney disease GFR 60-89 (H)     Type 2 diabetes mellitus without complication, with long-term current use of insulin (H)     Senile nuclear sclerosis, right     Ankle fracture     Closed bimalleolar fracture of left ankle with routine healing     Post-operative state     Intertriginous candidiasis     Slow transit constipation     Past Surgical History:   Procedure Laterality Date     APPLY EXTERNAL FIXATOR LOWER EXTREMITY Left 10/12/2019    Procedure: Left ankle external fixator placement;  Surgeon: Leeanne Brown MD;  Location: [facility] OR     C APPENDECTOMY  age 22     C DEXA, BONE DENSITY, AXIAL SKEL  2005     C DEXA, BONE DENSITY, AXIAL SKEL  6/2007    No signif change in Osteopenia      COLONOSCOPY  2006    repeat 10 yr     ENDOSCOPIC ULTRASOUND UPPER GASTROINTESTINAL TRACT (GI) N/A 2018    Procedure: ENDOSCOPIC ULTRASOUND, ESOPHAGOSCOPY / UPPER GASTROINTESTINAL TRACT (GI);  Endoscopic Ultrasound, Esophagogastroduodenoscopy with endoscopic mucosal resection;  Surgeon: Hood Brower MD;  Location: [facility]     ESOPHAGOSCOPY, GASTROSCOPY, DUODENOSCOPY (EGD), COMBINED N/A 2018    Procedure: COMBINED ESOPHAGOSCOPY, GASTROSCOPY, DUODENOSCOPY (EGD), BIOPSY SINGLE OR MULTIPLE;  EGD;  Surgeon: Hood Brower MD;  Location: [facility]     ESOPHAGOSCOPY, GASTROSCOPY, DUODENOSCOPY (EGD), COMBINED N/A 3/14/2019    Procedure: Upper Endoscopy;  Surgeon: Hood Brower MD;  Location: [facility]     ESOPHAGOSCOPY, GASTROSCOPY, DUODENOSCOPY (EGD), RESECT MUCOSA, COMBINED N/A 2018    Procedure: COMBINED ESOPHAGOSCOPY, GASTROSCOPY, DUODENOSCOPY (EGD), RESECT MUCOSA;;  Surgeon: Hood Brower MD;  Location: [facility] OR     GYN SURGERY  10/22/08    pelvic support     HEMORRHOIDECTOMY       MOHS MICROGRAPHIC PROCEDURE       OPEN REDUCTION INTERNAL FIXATION ANKLE Left 10/28/2019    Procedure: Internal fixation left bimalleolar ankle fracture, removal external fixator;  Surgeon: Jose Roberto Can MD;  Location: [facility] OR     SURGICAL HISTORY OF -   age 22    L ovarian cyst removal, laparotomy     SURGICAL HISTORY OF -       partial thyroidectomy     SURGICAL HISTORY OF -   10/08    Transobturator tape for Urinary Incontinence     SURGICAL HISTORY OF -   2011    stress test normal     THYROID SURGERY      Had cyst removed, thyroid gland is intact.       Social History     Tobacco Use     Smoking status: Former Smoker     Last attempt to quit: 1980     Years since quittin.1     Smokeless tobacco: Never Used     Tobacco comment: smoked from 70-80; smoked about 1ppd   Substance Use Topics     Alcohol use: Yes     Alcohol/week: 0.0 standard drinks     Comment: 6 drinks per week     Family History   Problem  Relation Age of Onset     Diabetes Mother         at 89 yrs     Hypertension Mother      Arthritis Mother         rheumatoid     Cancer Father         bladder     Cardiovascular Brother         palpitations not on meds.     Glaucoma No family hx of      Macular Degeneration No family hx of      Amblyopia No family hx of          Current Outpatient Medications   Medication Sig Dispense Refill     acetaminophen (TYLENOL) 325 MG tablet Take 3 tablets (975 mg) by mouth every 8 hours       alum & mag hydroxide-simethicone (MAALOX REGULAR STRENGTH) 200-200-20 MG/5ML SUSP Take 30 mLs by mouth as needed        atorvastatin (LIPITOR) 40 MG tablet TAKE 1 TABLET DAILY 30 tablet 0     blood glucose (NO BRAND SPECIFIED) lancets standard Use to test blood sugar 4 times daily or as directed.Lancets One Touch 100s Delica- 4x a day 120 each 5     blood glucose (NO BRAND SPECIFIED) test strip Use to test blood sugar 4 times daily or as directed.One touch ultra strips blue 100- 4x a day. 120 strip 5     blood glucose (ONETOUCH ULTRA) test strip 1 strip by In Vitro route 4 times daily. 360 each 3     blood glucose monitoring (NO BRAND SPECIFIED) meter device kit Use to test blood sugar 4 times daily or as directed. 1 kit 0     CALCIUM 500 + D 500-200 MG-IU OR TABS one tab twice per day 100 0     Cholecalciferol (VITAMIN D PO) Take  by mouth. Pt consuming 3000 units per day        furosemide (LASIX) 20 MG tablet TAKE 2 TABLETS IN THE MORNING AND 1 TABLET IN THE AFTERNOON 270 tablet 1     glimepiride (AMARYL) 4 MG tablet  180 tablet 0     insulin aspart (NOVOLOG PEN) 100 UNIT/ML pen Inject 1-7 Units Subcutaneous 3 times daily (before meals) Medium Correction Scale for Pre meal Blood glucose  Do Not give Correction Insulin if Pre-Meal BG < 140.   Pre-Meal BG levels: Corresponding Insulin Units  140-189: 1 unit; 190-239:2 units.   240-289: 3 units; 290-339: 4 units.   340-399: 5 units; 400-449: 6 units   = or > 450 give 7 units.   Notify  "MD if glucose > or = 350 mg/dL after administration of correction dose.       insulin aspart (NOVOLOG PEN) 100 UNIT/ML pen Inject 1-5 Units Subcutaneous At Bedtime MEDIUM INSULIN RESISTANCE DOSING    Do Not give Bedtime Correction Insulin if BG less than  200.   For  - 249 give 1 units.   For  - 299 give 2 units.   For  - 349 give 3 units.   For  -399 give 4 units.   For BG greater than or equal to 400 give 5 units.  Notify provider if glucose greater than or equal to 350 mg/dL after administration of correction dose.  If given at mealtime, administer within 30 minutes of start of meal       insulin pen needle (BD REY U/F) 32G X 4 MM miscellaneous Use one pen needle twice daily (with lantus and victoza) 180 each 3     insulin syringe-needle U-100 (BD INSULIN SYRINGE ULTRAFINE) 31G X 5/16\" 1 ML miscellaneous Use 1 syringes twice daily for insulin and victoza and for symptoms of hypoglycemia 100 each 3     LANTUS SOLOSTAR 100 UNIT/ML soln 60 units daily or as directed. 60 mL 3     lisinopril (PRINIVIL/ZESTRIL) 2.5 MG tablet TAKE 1 TABLET DAILY 90 tablet 3     metoprolol tartrate (LOPRESSOR) 25 MG tablet TAKE 1 TABLET TWICE A  tablet 1     nystatin (MYCOSTATIN) 772583 UNIT/GM external ointment Apply topically 2 times daily 30 g 3     ONETOUCH DELICA LANCETS 33G MISC 1 each 4 times daily 360 each 3     pantoprazole (PROTONIX) 40 MG EC tablet Take 1 tablet (40 mg) by mouth daily       potassium chloride ER (K-TAB/KLOR-CON) 10 MEQ CR tablet Take 1 tablet (10 mEq) by mouth 2 times daily 180 tablet 3     senna-docusate (SENOKOT-S/PERICOLACE) 8.6-50 MG tablet Take 2 tablets by mouth 2 times daily 360 tablet 3     triamcinolone (KENALOG) 0.1 % external cream Apply topically 3 times daily 90 g 3     order for DME Compression socks - knee (Patient not taking: Reported on 2/12/2020) 1 Units 0     order for DME Roll-A-Bout Walker. Patient can use for 3 months (Patient not taking: Reported on " "2/12/2020) 1 Units 0     Allergies   Allergen Reactions     Amlodipine Swelling     Ancef [Cefazolin]      Levaquin Rash     Itching, rash     Metformin Diarrhea and GI Disturbance     Cephalosporins Rash     Clindamycin Rash     Levofloxacin Itching and Rash     Nickel Rash     Contact reaction  Contact reaction     Recent Labs   Lab Test 01/07/20  1417 11/25/19  2155  10/16/19  0528  10/12/19  1212 08/19/19  1429  10/22/18  1516  11/09/17  1403 07/26/17  1434   A1C 8.5*  --   --   --   --  7.6* 7.5*   < > 8.3*   < >  --  7.0*   LDL 63  --   --   --   --   --   --   --  77  --  73 66   HDL 45*  --   --   --   --   --   --   --  59  --  51 50   TRIG 154*  --   --   --   --   --   --   --  184*  --  118 164*   ALT 36  --   --  48  --   --   --   --   --   --  42  --    CR 0.80 0.84   < > 0.78   < > 0.81  --    < >  --    < > 0.81  --    GFRESTIMATED 73 70   < > 76   < > 73  --    < >  --    < > 70  --    GFRESTBLACK 85 81   < > 88   < > 85  --    < >  --    < > 85  --    POTASSIUM 4.0 3.8   < > 3.8   < > 4.4  --    < >  --    < > 3.3*  --    TSH  --   --   --   --   --   --  2.46  --   --   --   --  2.11    < > = values in this interval not displayed.        ROS:  Constitutional, HEENT, cardiovascular, pulmonary, GI, , musculoskeletal, neuro, skin, endocrine and psych systems are negative, except as otherwise noted.        OBJECTIVE:  /70 (BP Location: Left arm, Patient Position: Sitting, Cuff Size: Adult Regular)   Pulse 69   Temp 97.9  F (36.6  C) (Oral)   Resp 14   Ht 1.651 m (5' 5\")   Wt 81.6 kg (180 lb)   SpO2 95%   BMI 29.95 kg/m      EXAM:  GENERAL APPEARANCE: healthy, alert and no distress  Lab Results   Component Value Date    A1C 8.5 01/07/2020    A1C 7.6 10/12/2019    A1C 7.5 08/19/2019    A1C 7.8 05/21/2019    A1C 8.1 03/04/2019     Reviewed a1cs above.     ASSESSMENT AND PLAN  Patient Instructions   Current blood sugars are in range.     Goal blood sugars  fasting and less than 160 " "ideally after meals.     Not using short acting insulin.     I will refill kcl, lantus, nysatin, triamcinolon, senna    Follow up two months lab visit for a1c check and/or yearly physical.     She will also plant to do eye exam and have records sent to me.       ASSESSMENT AND PLAN  1. Type 2 diabetes mellitus with stage 2 chronic kidney disease, with long-term current use of insulin (H)  Uncontrolled by a1c but controlled by current blood sugars.  I suspect lower with stress/weight loss.  Will watch closely.  I suspect net a1c in two [facility] will be lower.   - LANTUS SOLOSTAR 100 UNIT/ML soln; 60 units daily or as directed.  Dispense: 60 mL; Refill: 3    2. Closed fracture of left ankle with routine healing, subsequent encounter  Recovering.     3. Hypertension goal BP (blood pressure) < 140/90  Controlled.  Refilled.   - potassium chloride ER (K-TAB/KLOR-CON) 10 MEQ CR tablet; Take 1 tablet (10 mEq) by mouth 2 times daily  Dispense: 180 tablet; Refill: 3    4. Rash  Refills given.   - triamcinolone (KENALOG) 0.1 % external cream; Apply topically 3 times daily  Dispense: 90 g; Refill: 3  - nystatin (MYCOSTATIN) 836860 UNIT/GM external ointment; Apply topically 2 times daily  Dispense: 30 g; Refill: 3    5. Slow transit constipation  Refills.   - senna-docusate (SENOKOT-S/PERICOLACE) 8.6-50 MG tablet; Take 2 tablets by mouth 2 times daily  Dispense: 360 tablet; Refill: 3        MYCHART SIGN UP: [email or web address].[email or web address] , 1-863.691.5268    E-VISITS CAN BE DONE FOR CARE/PRESCRIPTIONS WHICH MAY NOT NEED AN IN-PERSON ASSESSMENT - click \"on-line care, then request e-visit\".      ONCARE VISIT/PRESCRIPTIONS: [email or web address]  - we treat nearly 50 common conditions with one hour response time     RADIOLOGY SCHEDULING  [facility]:  [phone]   [facility]: [phone]  [facility]: [phone]    Mammogram and Colonoscopy [phone]    Smoking Cessation: www.quitplan.org, 3-594-884-PLAN " ([phone])    Pharmacy savings card application: www.[employer]    CONSUMER PRICE LINE for estimates of test costs:  [phone]         Joel Wegener, MD

## 2020-02-13 PROBLEM — K59.01 SLOW TRANSIT CONSTIPATION: Status: ACTIVE | Noted: 2020-02-13

## 2020-02-13 RX ORDER — POTASSIUM CHLORIDE 750 MG/1
10 TABLET, EXTENDED RELEASE ORAL 2 TIMES DAILY
Qty: 180 TABLET | Refills: 3 | Status: SHIPPED | OUTPATIENT
Start: 2020-02-13 | End: 2021-01-28

## 2020-02-13 RX ORDER — NYSTATIN 100000 U/G
OINTMENT TOPICAL 2 TIMES DAILY
Qty: 30 G | Refills: 3 | Status: SHIPPED | OUTPATIENT
Start: 2020-02-13 | End: 2021-09-27

## 2020-02-13 RX ORDER — AMOXICILLIN 250 MG
2 CAPSULE ORAL 2 TIMES DAILY
Qty: 360 TABLET | Refills: 3 | Status: SHIPPED | OUTPATIENT
Start: 2020-02-13 | End: 2020-12-14

## 2020-02-13 RX ORDER — TRIAMCINOLONE ACETONIDE 1 MG/G
CREAM TOPICAL 3 TIMES DAILY
Qty: 90 G | Refills: 3 | Status: SHIPPED | OUTPATIENT
Start: 2020-02-13

## 2020-02-13 RX ORDER — INSULIN GLARGINE 100 [IU]/ML
INJECTION, SOLUTION SUBCUTANEOUS
Qty: 60 ML | Refills: 3 | Status: SHIPPED | OUTPATIENT
Start: 2020-02-13 | End: 2021-04-12

## 2020-02-14 ENCOUNTER — THERAPY VISIT (OUTPATIENT)
Dept: PHYSICAL THERAPY | Facility: CLINIC | Age: 75
End: 2020-02-14
Payer: COMMERCIAL

## 2020-02-14 DIAGNOSIS — S82.842D CLOSED BIMALLEOLAR FRACTURE OF LEFT ANKLE WITH ROUTINE HEALING, SUBSEQUENT ENCOUNTER: Primary | ICD-10-CM

## 2020-02-14 PROCEDURE — 97530 THERAPEUTIC ACTIVITIES: CPT | Mod: GP | Performed by: PHYSICAL THERAPIST

## 2020-02-14 PROCEDURE — 97110 THERAPEUTIC EXERCISES: CPT | Mod: GP | Performed by: PHYSICAL THERAPIST

## 2020-02-14 PROCEDURE — 97140 MANUAL THERAPY 1/> REGIONS: CPT | Mod: GP | Performed by: PHYSICAL THERAPIST

## 2020-02-21 ENCOUNTER — THERAPY VISIT (OUTPATIENT)
Dept: PHYSICAL THERAPY | Facility: CLINIC | Age: 75
End: 2020-02-21
Payer: COMMERCIAL

## 2020-02-21 DIAGNOSIS — S82.842D CLOSED BIMALLEOLAR FRACTURE OF LEFT ANKLE WITH ROUTINE HEALING, SUBSEQUENT ENCOUNTER: Primary | ICD-10-CM

## 2020-02-21 PROCEDURE — 97140 MANUAL THERAPY 1/> REGIONS: CPT | Mod: GP | Performed by: PHYSICAL THERAPIST

## 2020-02-21 PROCEDURE — 97110 THERAPEUTIC EXERCISES: CPT | Mod: GP | Performed by: PHYSICAL THERAPIST

## 2020-02-21 PROCEDURE — 97530 THERAPEUTIC ACTIVITIES: CPT | Mod: GP | Performed by: PHYSICAL THERAPIST

## 2020-02-24 ENCOUNTER — HOSPITAL ENCOUNTER (OUTPATIENT)
Dept: PHYSICAL THERAPY | Facility: CLINIC | Age: 75
Setting detail: THERAPIES SERIES
End: 2020-02-24
Attending: STUDENT IN AN ORGANIZED HEALTH CARE EDUCATION/TRAINING PROGRAM
Payer: COMMERCIAL

## 2020-02-24 DIAGNOSIS — R21 RASH: Primary | ICD-10-CM

## 2020-02-24 DIAGNOSIS — E78.5 HYPERLIPIDEMIA LDL GOAL <100: ICD-10-CM

## 2020-02-24 PROCEDURE — 97140 MANUAL THERAPY 1/> REGIONS: CPT | Mod: GP

## 2020-02-24 PROCEDURE — 97535 SELF CARE MNGMENT TRAINING: CPT | Mod: GP

## 2020-02-24 RX ORDER — NYSTATIN 100000 U/G
CREAM TOPICAL 2 TIMES DAILY
Qty: 30 G | Refills: 3 | Status: SHIPPED | OUTPATIENT
Start: 2020-02-24

## 2020-02-24 RX ORDER — ATORVASTATIN CALCIUM 40 MG/1
40 TABLET, FILM COATED ORAL DAILY
Qty: 90 TABLET | Refills: 2 | Status: SHIPPED | OUTPATIENT
Start: 2020-02-24 | End: 2020-12-04

## 2020-02-24 NOTE — TELEPHONE ENCOUNTER
Reason for Call:  Medication or medication refill:    Do you use a [facility]?  Name of the pharmacy and phone number for the current request:  [facility] pharmacy mail order    Name of the medication requested: NYSTATIN CREAM Pt want cream not ointment.   Atorvastatin Calcium 40 MG Oral Tablet (LIPITOR) Pt want 90 day/supply with 3 refills.    Other request:See abpve    Can we leave a detailed message on this number? YES    Phone number patient can be reached at: Home number on file [phone] (home)    Best Time: Any time    Call taken on 2/24/2020 at 5:13 PM by Keila Aquino

## 2020-02-24 NOTE — TELEPHONE ENCOUNTER
"Dr. Wegener-Please review and sign if agree.  Patient requests Nystatin cream instead of ointment-pended.    Atorvastatin will be addressed via RN refill protocol.    Atorvastatin refilled according to refill protocol.  Requested Prescriptions   Pending Prescriptions Disp Refills     atorvastatin (LIPITOR) 40 MG tablet 90 tablet      Sig: Take 1 tablet (40 mg) by mouth daily       Statins Protocol Passed - 2/24/2020  5:19 PM        Passed - LDL on file in past 12 months     Recent Labs   Lab Test 01/07/20  1417   LDL 63             Passed - No abnormal creatine kinase in past 12 months     No lab results found.             Passed - Recent (12 mo) or future (30 days) visit within the authorizing provider's specialty     Patient has had an office visit with the authorizing provider or a provider within the authorizing providers department within the previous 12 mos or has a future within next 30 days. See \"Patient Info\" tab in inbasket, or \"Choose Columns\" in Meds & Orders section of the refill encounter.              Passed - Medication is active on med list        Passed - Patient is age 18 or older        Passed - No active pregnancy on record        Passed - No positive pregnancy test in past 12 months        nystatin (MYCOSTATIN) 497756 UNIT/GM external cream 30 g 3     Sig: Apply topically 2 times daily       Antifungal Agents Passed - 2/24/2020  5:19 PM        Passed - Recent (12 mo) or future (30 days) visit within the authorizing provider's specialty     Patient has had an office visit with the authorizing provider or a provider within the authorizing providers department within the previous 12 mos or has a future within next 30 days. See \"Patient Info\" tab in inbasket, or \"Choose Columns\" in Meds & Orders section of the refill encounter.              Passed - Not Fluconazole or Terconazole      If oral Fluconazole or Terconazole, may refill if indicated in progress notes.           Passed - Medication is " active on med list            Thank you!  PASCALE Wills, BSN, RN

## 2020-02-25 ENCOUNTER — HOSPITAL ENCOUNTER (OUTPATIENT)
Dept: PHYSICAL THERAPY | Facility: CLINIC | Age: 75
Setting detail: THERAPIES SERIES
End: 2020-02-25
Attending: STUDENT IN AN ORGANIZED HEALTH CARE EDUCATION/TRAINING PROGRAM
Payer: COMMERCIAL

## 2020-02-25 PROCEDURE — 97140 MANUAL THERAPY 1/> REGIONS: CPT | Mod: GP

## 2020-02-28 ENCOUNTER — HOSPITAL ENCOUNTER (OUTPATIENT)
Dept: PHYSICAL THERAPY | Facility: CLINIC | Age: 75
Setting detail: THERAPIES SERIES
End: 2020-02-28
Attending: STUDENT IN AN ORGANIZED HEALTH CARE EDUCATION/TRAINING PROGRAM
Payer: COMMERCIAL

## 2020-02-28 PROCEDURE — 97140 MANUAL THERAPY 1/> REGIONS: CPT | Mod: GP

## 2020-03-02 ENCOUNTER — THERAPY VISIT (OUTPATIENT)
Dept: PHYSICAL THERAPY | Facility: CLINIC | Age: 75
End: 2020-03-02
Payer: COMMERCIAL

## 2020-03-02 DIAGNOSIS — S82.842D CLOSED BIMALLEOLAR FRACTURE OF LEFT ANKLE WITH ROUTINE HEALING, SUBSEQUENT ENCOUNTER: Primary | ICD-10-CM

## 2020-03-02 PROCEDURE — 97530 THERAPEUTIC ACTIVITIES: CPT | Mod: GP | Performed by: PHYSICAL THERAPIST

## 2020-03-02 PROCEDURE — 97110 THERAPEUTIC EXERCISES: CPT | Mod: GP | Performed by: PHYSICAL THERAPIST

## 2020-03-02 PROCEDURE — 97112 NEUROMUSCULAR REEDUCATION: CPT | Mod: GP | Performed by: PHYSICAL THERAPIST

## 2020-03-03 ENCOUNTER — HOSPITAL ENCOUNTER (OUTPATIENT)
Dept: PHYSICAL THERAPY | Facility: CLINIC | Age: 75
Setting detail: THERAPIES SERIES
End: 2020-03-03
Attending: STUDENT IN AN ORGANIZED HEALTH CARE EDUCATION/TRAINING PROGRAM
Payer: COMMERCIAL

## 2020-03-03 PROCEDURE — 97140 MANUAL THERAPY 1/> REGIONS: CPT | Mod: GP

## 2020-03-03 PROCEDURE — 97110 THERAPEUTIC EXERCISES: CPT | Mod: GP

## 2020-03-04 ENCOUNTER — HOSPITAL ENCOUNTER (OUTPATIENT)
Dept: PHYSICAL THERAPY | Facility: CLINIC | Age: 75
Setting detail: THERAPIES SERIES
End: 2020-03-04
Attending: STUDENT IN AN ORGANIZED HEALTH CARE EDUCATION/TRAINING PROGRAM
Payer: COMMERCIAL

## 2020-03-04 PROCEDURE — 97140 MANUAL THERAPY 1/> REGIONS: CPT | Mod: GP

## 2020-03-06 ENCOUNTER — HOSPITAL ENCOUNTER (OUTPATIENT)
Dept: PHYSICAL THERAPY | Facility: CLINIC | Age: 75
Setting detail: THERAPIES SERIES
End: 2020-03-06
Attending: STUDENT IN AN ORGANIZED HEALTH CARE EDUCATION/TRAINING PROGRAM
Payer: COMMERCIAL

## 2020-03-06 PROCEDURE — 97140 MANUAL THERAPY 1/> REGIONS: CPT | Mod: GP

## 2020-03-09 ENCOUNTER — THERAPY VISIT (OUTPATIENT)
Dept: PHYSICAL THERAPY | Facility: CLINIC | Age: 75
End: 2020-03-09
Payer: COMMERCIAL

## 2020-03-09 DIAGNOSIS — S82.842D CLOSED BIMALLEOLAR FRACTURE OF LEFT ANKLE WITH ROUTINE HEALING, SUBSEQUENT ENCOUNTER: Primary | ICD-10-CM

## 2020-03-09 PROCEDURE — 97112 NEUROMUSCULAR REEDUCATION: CPT | Mod: GP | Performed by: PHYSICAL THERAPIST

## 2020-03-09 PROCEDURE — 97110 THERAPEUTIC EXERCISES: CPT | Mod: GP | Performed by: PHYSICAL THERAPIST

## 2020-03-09 NOTE — PROGRESS NOTES
PROGRESS  REPORT    Progress reporting period is from 1/21/20 to 3/9/20.       SUBJECTIVE  Subjective changes noted by patient:  Sarah reports her ankle is not painful at rest or on the go during the day. She continues to get twinges of pain at night, particularly if she has been very active that day. Her balance and strength is improving. She has been ambulating up to 30 minutes with her SPC or shopping cart and 10 minutes without an assistive device.   Her left anterior shoulder has been very painful without an injury to set this off. It limits her ability to flex and abduct her arm, cook, clean, and dress herself.         Current pain level is   1/10 left ankle.   1/10 left shoulder at rest and 8/10 with movement.     Previous pain level was  7/10 at her left ankle   Changes in function:  Yes (See Goal flowsheet attached for changes in current functional level)  Adverse reaction to treatment or activity: None    OBJECTIVE  Changes noted in objective findings:  Yes,   ANKLE:   ROM L ROM R   Dorsiflexion 13 20   Plantarflexion 55 60   Inversion 50 60   Eversion 20 20       Gait: Ambulates without assistive device, equal step length with reduced left arm swing.    SHOULDER SCREEN:    Shoulder:   AROM L AROM R   Flex 50 160   Abd 30 165   Extension 60 70   ER 65 80     Palpation: significantly to palpation at biceps tendon  Special tests positive: speeds test      ASSESSMENT/PLAN  Updated problem list and treatment plan: internal fixation of left ankle bimalleolar fracture on 10/28/19    Pain -  hot/cold therapy, US, manual therapy, splint/taping/bracing/orthotics, education, directional preference exercise and home program  Decreased ROM/flexibility - manual therapy and therapeutic exercise  Decreased strength - therapeutic exercise and therapeutic activities  Impaired balance - neuro re-education and therapeutic activities  Decreased proprioception - neuro re-education and therapeutic activities  Impaired gait -  gait training  Impaired muscle performance - neuro re-education  Decreased function - therapeutic activities  STG/LTGs have been met or progress has been made towards goals:  Yes (See Goal flow sheet completed today.)  Assessment of Progress: The patient's condition is improving.  Self Management Plans:  Patient has been instructed in a home treatment program.  I have re-evaluated this patient and find that the nature, scope, duration and intensity of the therapy is appropriate for the medical condition of the patient.  Sarah continues to require the following intervention to meet STG and LTG's:  PT    Recommendations:  This patient would benefit from continued therapy.     Frequency:  2 X week, once daily  Duration:  for 2 weeks tapering to 2 X a months over 2 months    This patient would benefit from further evaluation for her new onset left shoulder pain.    Please refer to the daily flowsheet for treatment today, total treatment time and time spent performing 1:1 timed codes.

## 2020-03-11 ENCOUNTER — HOSPITAL ENCOUNTER (OUTPATIENT)
Dept: PHYSICAL THERAPY | Facility: CLINIC | Age: 75
Setting detail: THERAPIES SERIES
End: 2020-03-11
Attending: STUDENT IN AN ORGANIZED HEALTH CARE EDUCATION/TRAINING PROGRAM
Payer: COMMERCIAL

## 2020-03-11 PROCEDURE — 97140 MANUAL THERAPY 1/> REGIONS: CPT | Mod: GP

## 2020-03-13 ENCOUNTER — HOSPITAL ENCOUNTER (OUTPATIENT)
Dept: PHYSICAL THERAPY | Facility: CLINIC | Age: 75
Setting detail: THERAPIES SERIES
End: 2020-03-13
Attending: STUDENT IN AN ORGANIZED HEALTH CARE EDUCATION/TRAINING PROGRAM
Payer: COMMERCIAL

## 2020-03-13 ENCOUNTER — TELEPHONE (OUTPATIENT)
Dept: ORTHOPEDICS | Facility: CLINIC | Age: 75
End: 2020-03-13

## 2020-03-13 DIAGNOSIS — M25.512 LEFT SHOULDER PAIN, UNSPECIFIED CHRONICITY: Primary | ICD-10-CM

## 2020-03-13 PROCEDURE — 97140 MANUAL THERAPY 1/> REGIONS: CPT | Mod: GP

## 2020-03-13 NOTE — TELEPHONE ENCOUNTER
Patient was referred to a shoulder provider by Dr Can.  Patient states she has had left shoulder pain and weakness x 3 weeks with no known injury.  She reports no relief with over-the-counter medication.  She states she has not had shoulder surgery in the past.    Appointment scheduled 03/18/20 with Dr Brown.  Patient is agreeable with this plan.

## 2020-03-14 DIAGNOSIS — E11.9 TYPE 2 DIABETES, HBA1C GOAL < 7% (H): ICD-10-CM

## 2020-03-15 ENCOUNTER — HEALTH MAINTENANCE LETTER (OUTPATIENT)
Age: 75
End: 2020-03-15

## 2020-03-15 NOTE — TELEPHONE ENCOUNTER
DIAGNOSIS: Left shoulder pain.  Ref by Dr. Can   APPOINTMENT DATE: 3.18.20   NOTES STATUS DETAILS   OFFICE NOTE from referring provider Internal 3.13.20 Dr. Can   OFFICE NOTE from other specialist N/A    DISCHARGE SUMMARY from hospital N/a    DISCHARGE REPORT from the ER N/A    OPERATIVE REPORT N/A    MEDICATION LIST Internal    IMPLANT RECORD/STICKER N/A    LABS     CBC/DIFF Internal 11.25.19   CULTURES N/A    INJECTIONS DONE IN RADIOLOGY N/A    MRI N/A    CT SCAN N/A    XRAYS (IMAGES & REPORTS) Internal *sched* for 3.18.20- Shoulder   TUMOR     PATHOLOGY  Slides & report N/A

## 2020-03-16 NOTE — TELEPHONE ENCOUNTER
"Requested Prescriptions   Pending Prescriptions Disp Refills     glimepiride (AMARYL) 4 MG tablet [Pharmacy Med Name: GLIMEPIRIDE TABS 4MG] 180 tablet 3     Sig: TAKE 1 TABLET TWICE A DAY WITH MEALS (BREAKFAST AND DINNER) (PLEASE SCHEDULE LAB APPOINTMENT PRIOR TO REFILL REQUESTS)  Last Written Prescription Date:  1/7/2020  Last Fill Quantity: 180 tablet,  # refills: 0   Last Office Visit: 2/12/2020   Future Office Visit:            Sulfonylurea Agents Passed - 3/14/2020  5:05 PM        Passed - Blood pressure less than 140/90 in past 6 months     BP Readings from Last 3 Encounters:   02/12/20 110/70   01/07/20 118/64   11/26/19 121/68           Passed - Patient has documented LDL within the past 12 mos.     Recent Labs   Lab Test 01/07/20  1417   LDL 63           Passed - Patient has had a Microalbumin in the past 15 mos.     Recent Labs   Lab Test 01/07/20  1417   MICROL 27   UMALCR 12.82           Passed - Patient has documented A1c within the specified period of time.     If HgbA1C is 8 or greater, it needs to be on file within the past 3 months.  If less than 8, must be on file within the past 6 months.     Recent Labs   Lab Test 01/07/20  1417   A1C 8.5*           Passed - Medication is active on med list        Passed - Patient is age 18 or older        Passed - No active pregnancy on record        Passed - Patient has a recent creatinine (normal) within the past 12 mos.     Recent Labs   Lab Test 01/07/20  1417   CR 0.80     Ok to refill medication if creatinine is low          Passed - Patient has not had a positive pregnancy test within the past 12 mos.        Passed - Recent (6 mo) or future (30 days) visit within the authorizing provider's specialty     Patient had office visit in the last 6 months or has a visit in the next 30 days with authorizing provider or within the authorizing provider's specialty.  See \"Patient Info\" tab in inbasket, or \"Choose Columns\" in Meds & Orders section of the refill " encounter.

## 2020-03-17 ENCOUNTER — TELEPHONE (OUTPATIENT)
Dept: ORTHOPEDICS | Facility: CLINIC | Age: 75
End: 2020-03-17

## 2020-03-17 ENCOUNTER — TELEPHONE (OUTPATIENT)
Dept: FAMILY MEDICINE | Facility: CLINIC | Age: 75
End: 2020-03-17

## 2020-03-17 NOTE — TELEPHONE ENCOUNTER
I would recommend an 3-visit or telephone visit.  For the near future I plan to be at [place] on tuesdays.     Joel Wegener,MD

## 2020-03-17 NOTE — TELEPHONE ENCOUNTER
Reason for Call:  Other call back    Detailed comments: leny from ortho states they had to cancel pts evaluation, pt upset in a lot of pain wondering if anything can be done    Phone Number Patient can be reached at: Home number on file [phone] (home)    Best Time: asap    Can we leave a detailed message on this number? YES    Call taken on 3/17/2020 at 1:57 PM by Yola Haro

## 2020-03-17 NOTE — TELEPHONE ENCOUNTER
[facility] Call Center    Phone Message    May a detailed message be left on voicemail: yes     Reason for Call: Other: Pt said she got a call from Ortho but whoever called hung up before she got to the phone. Please call pt back if someone did try to reach her. Thank you.      Action Taken: Message routed to:  [facility] Ortho    Travel Screening: Not Applicable

## 2020-03-18 ENCOUNTER — TELEPHONE (OUTPATIENT)
Dept: ORTHOPEDICS | Facility: CLINIC | Age: 75
End: 2020-03-18

## 2020-03-18 ENCOUNTER — PRE VISIT (OUTPATIENT)
Dept: ORTHOPEDICS | Facility: CLINIC | Age: 75
End: 2020-03-18

## 2020-03-18 DIAGNOSIS — M25.512 LEFT SHOULDER PAIN, UNSPECIFIED CHRONICITY: Primary | ICD-10-CM

## 2020-03-18 RX ORDER — GLIMEPIRIDE 4 MG/1
TABLET ORAL
Qty: 180 TABLET | Refills: 3 | Status: SHIPPED | OUTPATIENT
Start: 2020-03-18 | End: 2021-04-05

## 2020-03-18 NOTE — TELEPHONE ENCOUNTER
I reached out to patient by phone to triage her left shoulder pain. Wanted to allow her to avoid coming in to [facility] given pandemic and patient risk factors (DM, over age 72).    Patient notes she has had 4 weeks of pain. No traumatic event. She describes that she can't raise her arm out in front of her due to pain. Also uncomfortable with active assisted. Reaching out in front or up are uncomfortable. She reported her symptoms to PT who evaluated her on 3/9/2020. At that visit, concern was for long head biceps etiology of pain. That visit is only shoulder PT thus far. States she cannot take NSAIDs orally.   Her PT documented AROM of FE to 50, ER to 65.      I outlined that without any history of any sort of trauma, combined with her ER in PT the risk of her having a fracture is quite low (not impossible but low).   Discussed initiating formal nonoperative treatment. Discussed option of topical Voltaren and formal shoulder PT (this may be able to be done via exercises sent by email).  If symptoms are not improving would see if patient could be seen in 2 weeks (pending pandemic conditions).   Patient agreeable to initiating this plan.

## 2020-03-18 NOTE — TELEPHONE ENCOUNTER
Routing refill request to provider for review/approval because:  Medication is reported/historical    Thank You!  Anabella Bradshaw, RN  Triage Nurse

## 2020-03-18 NOTE — TELEPHONE ENCOUNTER
Patient was informed she would receive a telephone call from Dr Brown today.  She is agreeable with this.

## 2020-03-23 ENCOUNTER — THERAPY VISIT (OUTPATIENT)
Dept: PHYSICAL THERAPY | Facility: CLINIC | Age: 75
End: 2020-03-23
Payer: COMMERCIAL

## 2020-03-23 DIAGNOSIS — S82.842D CLOSED BIMALLEOLAR FRACTURE OF LEFT ANKLE WITH ROUTINE HEALING, SUBSEQUENT ENCOUNTER: Primary | ICD-10-CM

## 2020-03-23 PROCEDURE — 97110 THERAPEUTIC EXERCISES: CPT | Mod: GP | Performed by: PHYSICAL THERAPIST

## 2020-03-23 PROCEDURE — 97112 NEUROMUSCULAR REEDUCATION: CPT | Mod: GP | Performed by: PHYSICAL THERAPIST

## 2020-03-30 ENCOUNTER — THERAPY VISIT (OUTPATIENT)
Dept: PHYSICAL THERAPY | Facility: CLINIC | Age: 75
End: 2020-03-30
Payer: COMMERCIAL

## 2020-03-30 DIAGNOSIS — S82.842D CLOSED BIMALLEOLAR FRACTURE OF LEFT ANKLE WITH ROUTINE HEALING, SUBSEQUENT ENCOUNTER: Primary | ICD-10-CM

## 2020-03-30 PROCEDURE — 97112 NEUROMUSCULAR REEDUCATION: CPT | Mod: GP | Performed by: PHYSICAL THERAPIST

## 2020-03-30 PROCEDURE — 97140 MANUAL THERAPY 1/> REGIONS: CPT | Mod: GP | Performed by: PHYSICAL THERAPIST

## 2020-03-30 PROCEDURE — 97110 THERAPEUTIC EXERCISES: CPT | Mod: GP | Performed by: PHYSICAL THERAPIST

## 2020-04-03 ENCOUNTER — VIRTUAL VISIT (OUTPATIENT)
Dept: PHYSICAL THERAPY | Facility: CLINIC | Age: 75
End: 2020-04-03
Payer: COMMERCIAL

## 2020-04-03 DIAGNOSIS — S82.842D CLOSED BIMALLEOLAR FRACTURE OF LEFT ANKLE WITH ROUTINE HEALING, SUBSEQUENT ENCOUNTER: Primary | ICD-10-CM

## 2020-04-03 PROCEDURE — 97110 THERAPEUTIC EXERCISES: CPT | Mod: 95 | Performed by: PHYSICAL THERAPIST

## 2020-04-03 NOTE — PROGRESS NOTES
"Physical Therapy Virtual Follow Up Visit      The patient has been notified of following:     \"This virtual visit will be conducted between you and your provider. We have found that certain health care needs can be provided without the need for physical presence.  This service lets us provide the care you need with a virtual visit.\"    Due to external, as well as internal [facility] management of the COVID-19 Virus, Sarah Felix was not seen in our clinic.  As a substitution, we implemented a virtual visit to manage this patient's condition utilizing the PTRx virtual visit platform via the patient s existing code.  The provider, Brooke Trammell, reviewed the patient's chart, PTRx prescription, and spoke with the patient to determine the following telemedicine visit is appropriate and effective for the patient's care.    The following type of visit was completed:   Telephone Visit:  A telephone visit was used in conjunction with the online PTRx exercise platform.        S: Sarah Felix is a 74 year old female. Connected virtually on the PTRx platform to discuss their condition/progress. They noted improvements in heel pain, range of motion.  They noted ongoing pain or limitations due to being stuck inside. She has not been able to go walking or able to do yard work. .     Current pain level: 1/10      O: Patient demonstrated near equal range of motion compared to her contralateral side with mild tightness at end range.     PTRx Content from today's visit:  Exercise Name: Foot Yoga, Sets: 1 - Reps: 10-15 - Sessions: 2, Notes: standing  Verbal review Exercise Name: Toe Raises, Sets: 1-2 - Reps: 20 - Sessions: 2, Notes: keep the weight on the big toe side of your feet  Exercise Name: Standing Gastroc Stretch, Sets: 2 - Reps: 30 seconds - Sessions: 2  Exercise Name: Balance Toe Taps - Reps: 6 on each side - Sessions: 2, Notes: hold on with your right hand  Exercise Name: Towel Gather, Sets: 1 - Reps: " "5 - Sessions: 3-4, Notes: don't worry about the towel   Exercise Name: Great Toe Extension with Patient Generated OP, Sets: 2 x 30\"    A:   Patient is progressing as expected.  Response to therapy has shown an improvement in  pain level and ROM       P: Patient will continue with the exercise program assigned on their PTRx code and will add the following measures to manage their pain/condition: Great Toe Extension stretch     Current treatment program is being advanced to more complex exercises.      Virtual visit contact time    Time of service began: 11:42 AM  Time of service ended: 12:02 PM  Total Time for set up, visit, and documentation: 20 minutes    [facility] Plan: [facility] MEDICARE / Product Type: HMO /   .  Procedure Code/s   Therapeutic Exercise (25624): 20 minutes    I have reviewed the note as documented above.  This accurately captures the substance of my conversation with the patient.  Provider location: [place], [place] ([facility]/State)  Patient location: [address] [place] [address]    ___________________________________________________              "

## 2020-04-10 ENCOUNTER — VIRTUAL VISIT (OUTPATIENT)
Dept: PHYSICAL THERAPY | Facility: CLINIC | Age: 75
End: 2020-04-10
Payer: COMMERCIAL

## 2020-04-10 DIAGNOSIS — S82.842D CLOSED BIMALLEOLAR FRACTURE OF LEFT ANKLE WITH ROUTINE HEALING, SUBSEQUENT ENCOUNTER: Primary | ICD-10-CM

## 2020-04-10 PROCEDURE — 97110 THERAPEUTIC EXERCISES: CPT | Mod: 95 | Performed by: PHYSICAL THERAPIST

## 2020-04-10 NOTE — PROGRESS NOTES
"Physical Therapy Virtual Follow Up Visit      The patient has been notified of following:     \"This virtual visit will be conducted between you and your provider. We have found that certain health care needs can be provided without the need for physical presence.  This service lets us provide the care you need with a virtual visit.\"    Due to external, as well as internal [facility] management of the COVID-19 Virus, Sarah Felix was not seen in our clinic.  As a substitution, we implemented a virtual visit to manage this patient's condition utilizing the PTRx virtual visit platform via the patient s existing code.  The provider, Brooke Trammell, reviewed the patient's chart, PTRx prescription, and spoke with the patient to determine the following telemedicine visit is appropriate and effective for the patient's care.    The following type of visit was completed:   Telephone Visit:  A telephone visit was used in conjunction with the online PTRx exercise platform.        S: Sarah Felix is a 74 year old female. Connected virtually on the PTRx platform to discuss their condition/progress. They noted improvements in ankle mobility, walking endurance, shoulder range of motion and pain. She is no longer using a SPC to walk and navigates stairs normally with a railing.  They noted ongoing pain or limitations with stiffness in her ankle with the first few steps.      Current pain level: 1/10      O: Patient demonstrated limited great toe flexion compared to her contralateral side.     PTRx Content from today's visit:  Exercise Name: Foot Yoga, Sets: 1 - Reps: 10-15 - Sessions: 2, Notes: standing  Exercise Name: Tandem Walking and Balance, Sets: 2 - Reps: 30 seconds - Sessions: 2, Notes: lessen the support from your hands as tolerated  Exercise Name: Towel Gather, Sets: 1 - Reps: 5 - Sessions: 3-4, Notes: don't worry about the towel   Exercise Name: Great Toe Extension with Patient Generated OP, Sets: 2 " x 30  Exercise Name: Great Toe flexion with Patient Generated OP, Sets: 2 x 30    A:   Patient is progressing as expected.  Response to therapy has shown an improvement in  gait, posture and function      P: Patient will continue with the exercise program assigned on their PTRx code and will add the following measures to manage their pain/condition: great toe flexion stretch     Frequency and/or duration have been changed to:  Yes,  1 X  month      Virtual visit contact time    Time of service began: 11:40 AM  Time of service ended: 12:05 PM  Total Time for set up, visit, and documentation: 28 minutes    Payor: [facility] / Plan: [facility] MEDICARE / Product Type: HMO /   .  Procedure Code/s   Therapeutic Exercise (85992): 25 minutes    I have reviewed the note as documented above.  This accurately captures the substance of my conversation with the patient.  Provider location: [place], [place] ([facility])  Patient location: [address] [place] [address]

## 2020-05-07 DIAGNOSIS — I10 ESSENTIAL HYPERTENSION: ICD-10-CM

## 2020-05-11 RX ORDER — METOPROLOL TARTRATE 25 MG/1
TABLET, FILM COATED ORAL
Qty: 180 TABLET | Refills: 2 | Status: SHIPPED | OUTPATIENT
Start: 2020-05-11 | End: 2021-02-17

## 2020-06-01 NOTE — PROGRESS NOTES
Patient did not return for further treatment and no additional progress was noted.  Please refer to the progress note and goal flowsheet completed on  4/10/20 for discharge information.

## 2020-06-01 NOTE — PROGRESS NOTES
Outpatient Physical Therapy Discharge Note     Patient: Sarah Felix  : 1945    Beginning/End Dates of Reporting Period:  02/10/2020 to 2020    Referring Provider: Dr. Oral Kwon    Therapy Diagnosis: BLE Lymphedema     Client Self Report: Reports doing well, Compression garments ordered and to arrive next week.    Objective Measurements:  Objective Measure: Volume  Details: 3/13/20 R: 2216.61 mL (-12.3% Since Eval) L: 2122.54 mL (-19.9% since Eval)  Objective Measure: Skin  Details: No pitting, intact, mild dry skin      Outcome Measures (most recent score):  Lymphedema Life Impact Scale (score range 0-72). A higher score indicates greater impairment.: 3    Goals:  Goal Identifier home program   Goal Description In order to improve tolerance for functional mobility, ADLs, and recreational activities outside of physical therapy, patient will demonstrate independence with home program of exercise and lifestyle modification and possible compression.    Target Date 20   Date Met   20   Progress:     Goal Identifier LLIS   Goal Description Patient will have decreased negative  impact on daily life from lymphedema and be able to  perform daily activities and mobility with less difficulty as indicated by improved LLIS score of 9 or more points   Target Date 05/15/20   Date Met  20   Progress:     Goal Identifier volume   Goal Description In order to fit more easily into clothing and decrease the risk of infection, pt will have 5% or more decrease in volume of L LE.    Target Date 20   Date Met  20   Progress:         Progress Toward Goals:   Progress this reporting period: Pt has met all goals at this time demonstrating compliance with GCB, and HEP throughout therapy course. Compression stockings have been ordered, with pt to transition to daytime use of compression stockings, with GCB overnight at least 2x a week, more if edema increases.      Plan:  Discharge from  therapy.    Discharge:    Reason for Discharge: Patient has met all goals.    Equipment Issued: Bandaging supplies, BLE compression stockings ordered    Discharge Plan: Patient to continue home program.

## 2020-06-19 NOTE — PROGRESS NOTES
"Outpatient Physical Therapy Discharge Note     Patient: Sarah Felix  : 1945    Beginning/End Dates of Reporting Period:  02/10/2020 to 2020    Referring Provider: Oral Daugherty Diagnosis: LLE edema s/p bimalleolar fx     Client Self Report: Per call with pt, edema has maintained, with continued good fit of compression stockings. She reports no GCB at this time as the swelling has not gotten worse, and admits to days without compression as it \"is to hot to wear socks\" No further questions or concerns noted.    Objective Measurements:  Objective Measure: Volume  Details: 3/13/20 R: 2216.61 mL (-12.3% Since Eval) L: 2122.54 mL (-19.9% since Eval)  Objective Measure: Skin  Details: No pitting, intact, mild dry skin       Outcome Measures (most recent score):  Lymphedema Life Impact Scale (score range 0-72). A higher score indicates greater impairment.: 3    Goals:  Goal Identifier home program   Goal Description In order to improve tolerance for functional mobility, ADLs, and recreational activities outside of physical therapy, patient will demonstrate independence with home program of exercise and lifestyle modification and possible compression.    Target Date 20   Date Met   2020   Progress:     Goal Identifier LLIS   Goal Description Patient will have decreased negative  impact on daily life from lymphedema and be able to  perform daily activities and mobility with less difficulty as indicated by improved LLIS score of 9 or more points   Target Date 05/15/20   Date Met  20   Progress:     Goal Identifier volume   Goal Description In order to fit more easily into clothing and decrease the risk of infection, pt will have 5% or more decrease in volume of L LE.    Target Date 20   Date Met  20   Progress:       Progress Toward Goals:   Pt has met all goals with no further edema intervention indicated at this time. Due to COVID, unable to follow up in person, but " per phone call with pt no further concerns noted with pt agreeable to discharge. During therapy course pt had a significant reduction in BLE edema with volume reduction in RLE of 12.3%, and LLE reduction of 19.9% as measured at last available session. Pt has received knee high compression garments and wears during daytime hours.     Plan:  Discharge from therapy.    Discharge:    Reason for Discharge: Patient has met all goals.    Equipment Issued: Bandaging supplies, knee high compression stockings    Discharge Plan: Patient to continue home program.

## 2020-08-19 ENCOUNTER — ANCILLARY PROCEDURE (OUTPATIENT)
Dept: MAMMOGRAPHY | Facility: CLINIC | Age: 75
End: 2020-08-19
Attending: FAMILY MEDICINE
Payer: COMMERCIAL

## 2020-08-19 DIAGNOSIS — Z12.31 VISIT FOR SCREENING MAMMOGRAM: ICD-10-CM

## 2020-09-02 ENCOUNTER — OFFICE VISIT (OUTPATIENT)
Dept: OPHTHALMOLOGY | Facility: CLINIC | Age: 75
End: 2020-09-02
Attending: OPHTHALMOLOGY
Payer: COMMERCIAL

## 2020-09-02 DIAGNOSIS — E11.9 DIABETES MELLITUS TYPE 2 WITHOUT RETINOPATHY (H): ICD-10-CM

## 2020-09-02 DIAGNOSIS — Z96.1 PSEUDOPHAKIA, RIGHT EYE: ICD-10-CM

## 2020-09-02 DIAGNOSIS — H25.12 AGE-RELATED NUCLEAR CATARACT, LEFT: Primary | ICD-10-CM

## 2020-09-02 PROCEDURE — 76519 ECHO EXAM OF EYE: CPT | Mod: ZF | Performed by: OPHTHALMOLOGY

## 2020-09-02 PROCEDURE — G0463 HOSPITAL OUTPT CLINIC VISIT: HCPCS | Mod: ZF

## 2020-09-02 ASSESSMENT — REFRACTION_WEARINGRX
SPECS_TYPE: MR 2016
OS_AXIS: 012
OS_ADD: +2.50
OS_CYLINDER: +1.25
OS_SPHERE: -2.00
OD_CYLINDER: +2.50
OS_SPHERE: -1.50
OD_CYLINDER: +2.25
OD_ADD: +2.50
OS_AXIS: 010
OS_CYLINDER: +1.50
OD_ADD: +2.50
OD_AXIS: 018
OD_SPHERE: -2.00
OS_ADD: +2.50
OD_AXIS: 014
OD_SPHERE: -2.50

## 2020-09-02 ASSESSMENT — TONOMETRY
OS_IOP_MMHG: 18
OD_IOP_MMHG: 15
IOP_METHOD: TONOPEN

## 2020-09-02 ASSESSMENT — VISUAL ACUITY
OS_PH_CC: 20/50
OD_CC+: -2
OD_CC: 20/25
OS_PH_CC+: +2
CORRECTION_TYPE: GLASSES
METHOD: SNELLEN - LINEAR
OS_CC: 20/400

## 2020-09-02 ASSESSMENT — SLIT LAMP EXAM - LIDS
COMMENTS: NORMAL
COMMENTS: NORMAL

## 2020-09-02 ASSESSMENT — REFRACTION_MANIFEST
OS_AXIS: 002
OS_CYLINDER: +1.00
OS_SPHERE: -5.00

## 2020-09-02 ASSESSMENT — EXTERNAL EXAM - LEFT EYE: OS_EXAM: NORMAL

## 2020-09-02 ASSESSMENT — CUP TO DISC RATIO
OD_RATIO: 0.2
OS_RATIO: 0.2

## 2020-09-02 ASSESSMENT — EXTERNAL EXAM - RIGHT EYE: OD_EXAM: NORMAL

## 2020-09-02 ASSESSMENT — CONF VISUAL FIELD
METHOD: COUNTING FINGERS
OD_NORMAL: 1
OS_NORMAL: 1

## 2020-09-02 NOTE — PROGRESS NOTES
HPI  Sarah Felix is a 74 year old female here for left eye cataract evaluation. Over the last year, her left eye vision has gotten worse and worse. It is now so severely blurry that she can't read because the left eye vision is interfering with her good right eye vision. She also can't see much at all at distance. Her current glasses do not help. There is associated worsening glare. No redness/pain/discharge. No flashes/floaters.     Assessment & Plan      (H25.13) Nuclear senile cataract of left eye  Comment: Visually significant with BCVA of 20/40- left eye with large myopic shift. Dense NS on exam.    Dilates to: 7 mm  Alpha blockers/Flomax: None  Trauma/Pseudoxfoliation: None  Fuchs dystrophy/guttae: None    Diabetes: YES, no retinopathy  Anticoagulation: YES, warfarin    Cyl:  0.28 @ 148 OS    Surgical plan:  Topical  Aim mild myopia (-1.50 to -2.00) - may want to be a bit more minus for more near vision OS.  She has reflux, may have difficulty lying completely flat.  She may have difficulty finding someone to stay with her, if that's the case, can consider topical/local    (Z96.1) Pseudophakia, right eye   Comment: Clear visual axis. On target refractively (mild myopia)  Plan: Observe     (E11.9) Diabetes mellitus type 2 without retinopathy (H)  Comment: Diagnosed around 2007. On insulin and oral hypoglycemics. Last A1c 8.5 1/2020. No diabetic retinopathy.  Plan: Discussed the importance of tight blood glucose control in the prevention of diabetic retinopathy. Recommend yearly dilated eye exam.     -----------------------------------------------------------------------------------    Patient disposition:   Return for scheduled procedure, or sooner as needed.     Teaching statement:  Complete documentation of historical and exam elements from today's encounter can be found in the full encounter summary report (not reduplicated in this progress note). I personally obtained the chief complaint(s) and  history of present illness.  I confirmed and edited as necessary the review of systems, past medical/surgical history, family history, social history, and examination findings as documented by others; and I examined the patient myself. I personally reviewed the relevant tests, images, and reports as documented above.     I formulated and edited as necessary the assessment and plan and discussed the findings and management plan with the patient and family.    Janay Amezcua MD  Comprehensive Ophthalmology & Ocular Pathology  Department of Ophthalmology and Visual Neurosciences  [email or web address]  Pager [phone]

## 2020-09-02 NOTE — NURSING NOTE
Chief Complaints and History of Present Illnesses   Patient presents with     Cataract     Chief Complaint(s) and History of Present Illness(es)     Cataract     Laterality: left eye    Associated symptoms: blurred vision.  Negative for glare and haloes    Course: gradually worsening    Activities affected: reading    Treatments tried: no treatments              Comments     Pt feels her vision LE has become more blurry since last visit.  Complains of feeling like she has some discharge in the corners of her eyes occasionally.  Denies any flashes, floaters, pain, pressure, irritation, and tearing.  Ocular meds: None    Mimi Farah OT 3:28 PM September 2, 2020

## 2020-09-03 ENCOUNTER — TELEPHONE (OUTPATIENT)
Dept: OPHTHALMOLOGY | Facility: CLINIC | Age: 75
End: 2020-09-03

## 2020-09-03 NOTE — TELEPHONE ENCOUNTER
Called patient to schedule left eye procedure with Dr. Amezcua.  Patient reports that she does not have someone that can stay wit her for 24 hours after her procedure.    Patient reports that her and Dr. Amezcua talked about a lesser sedation, which would allow her to not have to have someone with her for 24 hours after her procedure.    A message has been sent to the patients care team.    Patient also reports that she needs to call [employer] transportation to see if she can get a ride to and from surgery.    Patient has chosen 9/25 as a date of surgery.    Patient was given my direct dial to call back [phone] when she had her transportation in place.

## 2020-09-04 DIAGNOSIS — Z11.59 ENCOUNTER FOR SCREENING FOR OTHER VIRAL DISEASES: Primary | ICD-10-CM

## 2020-09-04 PROBLEM — H25.12 AGE-RELATED NUCLEAR CATARACT, LEFT: Status: ACTIVE | Noted: 2020-09-04

## 2020-09-04 NOTE — TELEPHONE ENCOUNTER
Spoke with patient to schedule left eye surgery with Dr. Janay Amezcua.    Surgery was scheduled on 9/25/20 at [facility]  Patient will have H&P at [facility] with Dr. Yang.     Patient is aware a COVID-19 test is needed before their procedure. The test should be with-in 4 days of their procedure.   Test Details: Date 9/21/20 Location [facility].    Patient was advised that the covid test must be placed on 9/21/20 in order to be valid for her surgery procedure on 9/25.    Patient was offered to have test done in the Li[facility] system but the patient declined.    Post-Op visit was scheduled on 9/25/20 and 10/21/20.    Patient was advised a / is needed day of surgery. As well as, for 24 hours after their surgery procedure.    Surgery packet was mailed 9/4, patient has my direct contact information for any further questions [phone].

## 2020-09-21 ENCOUNTER — OFFICE VISIT (OUTPATIENT)
Dept: FAMILY MEDICINE | Facility: CLINIC | Age: 75
End: 2020-09-21
Payer: COMMERCIAL

## 2020-09-21 VITALS
DIASTOLIC BLOOD PRESSURE: 70 MMHG | TEMPERATURE: 98.3 F | WEIGHT: 195.6 LBS | HEIGHT: 65 IN | BODY MASS INDEX: 32.59 KG/M2 | SYSTOLIC BLOOD PRESSURE: 122 MMHG | HEART RATE: 68 BPM | OXYGEN SATURATION: 96 % | RESPIRATION RATE: 16 BRPM

## 2020-09-21 DIAGNOSIS — E11.9 TYPE 2 DIABETES, HBA1C GOAL < 7% (H): ICD-10-CM

## 2020-09-21 DIAGNOSIS — Z11.59 ENCOUNTER FOR SCREENING FOR OTHER VIRAL DISEASES: ICD-10-CM

## 2020-09-21 DIAGNOSIS — Z01.818 PREOP GENERAL PHYSICAL EXAM: Primary | ICD-10-CM

## 2020-09-21 DIAGNOSIS — Z23 NEED FOR PROPHYLACTIC VACCINATION AND INOCULATION AGAINST INFLUENZA: ICD-10-CM

## 2020-09-21 DIAGNOSIS — I10 ESSENTIAL HYPERTENSION, BENIGN: ICD-10-CM

## 2020-09-21 DIAGNOSIS — H25.9 SENILE CATARACT OF LEFT EYE, UNSPECIFIED AGE-RELATED CATARACT TYPE: ICD-10-CM

## 2020-09-21 LAB — HBA1C MFR BLD: 8.1 % (ref 0–5.6)

## 2020-09-21 PROCEDURE — U0003 INFECTIOUS AGENT DETECTION BY NUCLEIC ACID (DNA OR RNA); SEVERE ACUTE RESPIRATORY SYNDROME CORONAVIRUS 2 (SARS-COV-2) (CORONAVIRUS DISEASE [COVID-19]), AMPLIFIED PROBE TECHNIQUE, MAKING USE OF HIGH THROUGHPUT TECHNOLOGIES AS DESCRIBED BY CMS-2020-01-R: HCPCS | Performed by: OPHTHALMOLOGY

## 2020-09-21 PROCEDURE — 83036 HEMOGLOBIN GLYCOSYLATED A1C: CPT | Performed by: FAMILY MEDICINE

## 2020-09-21 PROCEDURE — 90662 IIV NO PRSV INCREASED AG IM: CPT | Performed by: FAMILY MEDICINE

## 2020-09-21 PROCEDURE — 99215 OFFICE O/P EST HI 40 MIN: CPT | Mod: 25 | Performed by: FAMILY MEDICINE

## 2020-09-21 PROCEDURE — 36415 COLL VENOUS BLD VENIPUNCTURE: CPT | Performed by: FAMILY MEDICINE

## 2020-09-21 PROCEDURE — G0008 ADMIN INFLUENZA VIRUS VAC: HCPCS | Performed by: FAMILY MEDICINE

## 2020-09-21 RX ORDER — ASPIRIN 81 MG/1
162 TABLET, CHEWABLE ORAL DAILY
COMMUNITY
End: 2023-02-16

## 2020-09-21 ASSESSMENT — MIFFLIN-ST. JEOR: SCORE: 1393.12

## 2020-09-21 NOTE — PROGRESS NOTES
[facility] [address] [place] [address]  Phone: [phone]  Primary Provider: Wegener, Joel Daniel Irwin  Pre-op Performing Provider: NINA BARCLAY    PREOPERATIVE EVALUATION:  Today's date: 9/21/2020    Sarah Felix is a 75 year old female who presents for a preoperative evaluation.    Surgical Information:  Surgery Details 9/21/2020   Surgery/Procedure: cataract left eye   Surgery Location: [facility]   Surgeon: latisha   Surgery Date: 09/25/2020   Time of Surgery: 8:35am     Fax number for surgical facility: Note does not need to be faxed, will be available electronically in Epic.  Type of Anesthesia Anticipated: Local    Subjective     HPI related to upcoming procedure: left eye cataract surgery.    Currently using around 45 units of lantus before she goes to bed.       Preop Questions 3/11/2019   1. Have you ever had a heart attack or stroke? No   3. Do you have chest pain with activity? No   4. Do you have a history of  heart failure? No   5. Do you currently have a cold, bronchitis or symptoms of other infection? No   6. Do you have a cough, shortness of breath, or wheezing? No   7. Do you or anyone in your family have previous history of blood clots? YES - pt had blood clot    8. Do you or does anyone in your family have a serious bleeding problem such as prolonged bleeding following surgeries or cuts? No   9. Have you ever had problems with anemia or been told to take iron pills? No   10. Have you had any abnormal blood loss such as black, tarry or bloody stools, or abnormal vaginal bleeding? No   11. Have you ever had a blood transfusion? No   13. Have you or any of your relatives ever had problems with anesthesia? No   14. Do you have sleep apnea, excessive snoring or daytime drowsiness? YES - daytime drowsiness    15. Do you have any artifical heart valves or other implanted medical devices like a pacemaker, defibrillator, or continuous  glucose monitor? No   16. Do you have artificial joints? No   18. Is there any chance that you may be pregnant? No   Additional: pt has acid reflux    Patient does not have a Health Care Directive or Living Will: Advance Directive received and scanned. Click on Code in the patient header to view.    RX monitoring program ([place]) reviewed:  not reviewed      DIABETES - Patient has a longstanding history of DiabetesType Type II . Patient is being treated with oral agents and insulin injections and denies significant side effects. Control has been fair. Complicating factors include but are not limited to: hypertension and hyperlipidemia.       Review of Systems  CONSTITUTIONAL: NEGATIVE for fever, chills, change in weight  ENT/MOUTH: NEGATIVE for ear, mouth and throat problems  RESP: NEGATIVE for significant cough or SOB  CV: NEGATIVE for chest pain, palpitations or peripheral edema    Patient Active Problem List    Diagnosis Date Noted     Age-related nuclear cataract, left 09/04/2020     Priority: Medium     Added automatically from request for surgery [id number]       Slow transit constipation 02/13/2020     Priority: Medium     Intertriginous candidiasis 12/16/2019     Priority: Medium     Post-operative state 10/28/2019     Priority: Medium     Ankle fracture 10/12/2019     Priority: Medium     Closed bimalleolar fracture of left ankle with routine healing 10/11/2019     Priority: Medium     Added automatically from request for surgery [id number]       Senile nuclear sclerosis, right 02/15/2018     Priority: Medium     Type 2 diabetes mellitus without complication, with long-term current use of insulin (H) 02/01/2016     Priority: Medium     Type 2 diabetes with stage 2 chronic kidney disease GFR 60-89 (H) 10/23/2015     Priority: Medium     Type 2 diabetes mellitus with hyperglycemia (H) 10/19/2015     Priority: Medium     Essential hypertension, benign (HTN) 06/06/2014     Priority: Medium     CKD (chronic kidney  disease) stage 2, GFR 60-89 ml/min 06/06/2014     Priority: Medium     Skin exam, screening for cancer 03/07/2014     Priority: Medium     Peripheral vascular disease (H) 12/27/2013     Priority: Medium     Problem list name updated by automated process. Provider to review       Elevated serum creatinine 09/11/2013     Priority: Medium     Hypertension goal BP (blood pressure) < 140/90 08/01/2013     Priority: Medium     Personal history of other malignant neoplasm of skin 10/19/2012     Priority: Medium     Health Care Home 10/19/2012     Priority: Medium     Bonnie Rdz RN-BSN, [facility]  [phone].  FPA / [facility]G [facility]      DX V65.8 REPLACED WITH 32773 HEALTH CARE HOME (04/08/2013)       Fatty liver      Priority: Medium     Sebaceous hyperplasia 01/22/2012     Priority: Medium     Seborrheic keratosis 01/22/2012     Priority: Medium     BABB (dyspnea on exertion) 11/09/2011     Priority: Medium     Normal stress test 2011  Seen by Pulmonary: deconditioning.  Possible mild asthma.       Heart burn 10/17/2011     Priority: Medium     Type 2 diabetes, HbA1c goal < 7% (H) 10/31/2010     Priority: Medium     Per provider       Hyperlipidemia LDL goal <100 10/31/2010     Priority: Medium     Per provider       Vaginal atrophy 10/22/2010     Priority: Medium     Mixed incontinence urge and stress (male)(female) 06/24/2007     Priority: Medium     Cystocele, lateral 06/24/2007     Priority: Medium     Esophageal reflux      Priority: Medium     Disorder of bone and cartilage 04/21/2005     Priority: Medium     Problem list name updated by automated process. Provider to review       Diverticulitis of colon 07/08/2004     Priority: Medium     Abd CT  Problem list name updated by automated process. Provider to review       CYSTIC LIVER DIS 06/25/2004     Priority: Medium     multiple liver lesions.  felt to be focal nodular hyperplasia.  annual CT abd.    followed by Dr. Amilcar Kat          Allergic rhinitis 06/25/2004     Priority: Medium     Allergic rhinitis  Problem list name updated by automated process. 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  felt to be focal nodular hyperplasia.  annual CT abd.   followed by Dr. Amilcar Kat      Cystocele, lateral      Diabetes mellitus (H)      Diverticulitis of colon (without mention of hemorrhage)(562.11) 6/04    Abd CT     Embolism and thrombosis of unspecified site age 22    post ovarian cyst removed     Esophageal reflux     Hiatal hernia     Fatty liver      Hiatal hernia     small     Hyperlipidemia      Nontoxic uninodular goiter      Osteopenia 4/21/2005    on fosamax  for > 5 yr.  stop 7/2012     Other chronic otitis externa      Other specified disorders of liver     Fatty Liver, Benign appearing liver cysts     Pure hypercholesterolemia      Past Surgical History:   Procedure Laterality Date     APPLY EXTERNAL FIXATOR LOWER EXTREMITY Left 10/12/2019    Procedure: Left ankle external fixator placement;  Surgeon: Leeanne Brown MD;  Location: [facility] OR     C APPENDECTOMY  age 22     C DEXA, BONE DENSITY, AXIAL SKEL  2005     C DEXA, BONE DENSITY, AXIAL SKEL  6/2007    No signif change in Osteopenia     COLONOSCOPY  4/2006    repeat 10 yr     ENDOSCOPIC ULTRASOUND UPPER GASTROINTESTINAL TRACT (GI) N/A 9/6/2018    Procedure: ENDOSCOPIC ULTRASOUND, ESOPHAGOSCOPY / UPPER GASTROINTESTINAL TRACT (GI);  Endoscopic Ultrasound, Esophagogastroduodenoscopy with endoscopic mucosal resection;  Surgeon: Hood Brower MD;  Location: [facility] OR     ESOPHAGOSCOPY, GASTROSCOPY, DUODENOSCOPY (EGD), COMBINED N/A 8/13/2018    Procedure: COMBINED  ESOPHAGOSCOPY, GASTROSCOPY, DUODENOSCOPY (EGD), BIOPSY SINGLE OR MULTIPLE;  EGD;  Surgeon: Hood Brower MD;  [facility]     ESOPHAGOSCOPY, GASTROSCOPY, DUODENOSCOPY (EGD), COMBINED N/A 3/14/2019    Procedure: Upper Endoscopy;  Surgeon: Hood Brower MD;  Location: [facility]     ESOPHAGOSCOPY, GASTROSCOPY, DUODENOSCOPY (EGD), RESECT MUCOSA, COMBINED N/A 9/6/2018    Procedure: COMBINED ESOPHAGOSCOPY, GASTROSCOPY, DUODENOSCOPY (EGD), RESECT MUCOSA;;  Surgeon: Hood Brower MD;  Location: [facility] OR     GYN SURGERY  10/22/08    pelvic support     HEMORRHOIDECTOMY  8/08     MOHS MICROGRAPHIC PROCEDURE       OPEN REDUCTION INTERNAL FIXATION ANKLE Left 10/28/2019    Procedure: Internal fixation left bimalleolar ankle fracture, removal external fixator;  Surgeon: Jose Roberto Can MD;  Location: [facility] OR     SURGICAL HISTORY OF -   age 22    L ovarian cyst removal, laparotomy     SURGICAL HISTORY OF -   1998    partial thyroidectomy     SURGICAL HISTORY OF -   10/08    Transobturator tape for Urinary Incontinence     SURGICAL HISTORY OF -   11/2011    stress test normal     THYROID SURGERY      Had cyst removed, thyroid gland is intact.     Current Outpatient Medications   Medication Sig Dispense Refill     acetaminophen (TYLENOL) 325 MG tablet Take 3 tablets (975 mg) by mouth every 8 hours       aspirin (ASA) 81 MG chewable tablet Take 162 mg by mouth daily       atorvastatin (LIPITOR) 40 MG tablet Take 1 tablet (40 mg) by mouth daily 90 tablet 2     blood glucose (NO BRAND SPECIFIED) lancets standard Use to test blood sugar 4 times daily or as directed.Lancets One Touch 100s Delica- 4x a day 120 each 5     blood glucose (NO BRAND SPECIFIED) test strip Use to test blood sugar 4 times daily or as directed.One touch ultra strips blue 100- 4x a day. 120 strip 5     blood glucose (ONETOUCH ULTRA) test strip 1 strip by In Vitro route 4 times daily. 360 each 3     blood glucose monitoring (NO BRAND SPECIFIED) meter device  "kit Use to test blood sugar 4 times daily or as directed. 1 kit 0     CALCIUM 500 + D 500-200 MG-IU OR TABS one tab twice per day 100 0     Cholecalciferol (VITAMIN D PO) Take  by mouth. Pt consuming 3000 units per day        furosemide (LASIX) 20 MG tablet TAKE 2 TABLETS IN THE MORNING AND 1 TABLET IN THE AFTERNOON 270 tablet 3     glimepiride (AMARYL) 4 MG tablet TAKE 1 TABLET TWICE A DAY WITH MEALS (BREAKFAST AND DINNER) (PLEASE SCHEDULE LAB APPOINTMENT PRIOR TO REFILL REQUESTS) 180 tablet 3     insulin aspart (NOVOLOG PEN) 100 UNIT/ML pen Inject 1-7 Units Subcutaneous 3 times daily (before meals) Medium Correction Scale for Pre meal Blood glucose  Do Not give Correction Insulin if Pre-Meal BG < 140.   Pre-Meal BG levels: Corresponding Insulin Units  140-189: 1 unit; 190-239:2 units.   240-289: 3 units; 290-339: 4 units.   340-399: 5 units; 400-449: 6 units   = or > 450 give 7 units.   Notify MD if glucose > or = 350 mg/dL after administration of correction dose.       insulin aspart (NOVOLOG PEN) 100 UNIT/ML pen Inject 1-5 Units Subcutaneous At Bedtime MEDIUM INSULIN RESISTANCE DOSING    Do Not give Bedtime Correction Insulin if BG less than  200.   For  - 249 give 1 units.   For  - 299 give 2 units.   For  - 349 give 3 units.   For  -399 give 4 units.   For BG greater than or equal to 400 give 5 units.  Notify provider if glucose greater than or equal to 350 mg/dL after administration of correction dose.  If given at mealtime, administer within 30 minutes of start of meal       insulin pen needle (BD REY U/F) 32G X 4 MM miscellaneous USE 1 PEN NEEDLE TWICE A DAY (WITH LANTUS AND VICTOZA) 180 each 3     insulin syringe-needle U-100 (BD INSULIN SYRINGE ULTRAFINE) 31G X 5/16\" 1 ML miscellaneous Use 1 syringes twice daily for insulin and victoza and for symptoms of hypoglycemia 100 each 3     LANTUS SOLOSTAR 100 UNIT/ML soln 60 units daily or as directed. 60 mL 3     lisinopril " (PRINIVIL/ZESTRIL) 2.5 MG tablet TAKE 1 TABLET DAILY 90 tablet 3     metoprolol tartrate (LOPRESSOR) 25 MG tablet TAKE 1 TABLET TWICE A  tablet 2     nystatin (MYCOSTATIN) 956837 UNIT/GM external cream Apply topically 2 times daily 30 g 3     nystatin (MYCOSTATIN) 271824 UNIT/GM external ointment Apply topically 2 times daily 30 g 3     ONETOUCH DELICA LANCETS 33G MISC 1 each 4 times daily 360 each 3     order for DME Compression socks - knee 1 Units 0     order for DME Roll-A-Bout Walker. Patient can use for 3 months 1 Units 0     potassium chloride ER (K-TAB/KLOR-CON) 10 MEQ CR tablet Take 1 tablet (10 mEq) by mouth 2 times daily 180 tablet 3     triamcinolone (KENALOG) 0.1 % external cream Apply topically 3 times daily 90 g 3     alum & mag hydroxide-simethicone (MAALOX REGULAR STRENGTH) 200-200-20 MG/5ML SUSP Take 30 mLs by mouth as needed        diclofenac (VOLTAREN) 1 % topical gel Place 2 g onto the skin 4 times daily (Patient not taking: Reported on 2020) 100 g 0     senna-docusate (SENOKOT-S/PERICOLACE) 8.6-50 MG tablet Take 2 tablets by mouth 2 times daily (Patient not taking: Reported on 2020) 360 tablet 3     Allergies   Allergen Reactions     Amlodipine Swelling     Ancef [Cefazolin]      Levaquin Rash     Itching, rash     Metformin Diarrhea and GI Disturbance     Cephalosporins Rash     Clindamycin Rash     Levofloxacin Itching and Rash     Nickel Rash     Contact reaction  Contact reaction        Social History     Tobacco Use     Smoking status: Former Smoker     Last attempt to quit: 1980     Years since quittin.7     Smokeless tobacco: Never Used     Tobacco comment: smoked from 70-80; smoked about 1ppd   Substance Use Topics     Alcohol use: Yes     Alcohol/week: 0.0 standard drinks     Comment: 6 drinks per week        History   Drug Use No         Objective   /70 (BP Location: Left arm, Patient Position: Sitting, Cuff Size: Adult Large)   Pulse 68   Temp 98.3  " F (36.8  C) (Oral)   Resp 16   Ht 1.651 m (5' 5\")   Wt 88.7 kg (195 lb 9.6 oz)   SpO2 96%   BMI 32.55 kg/m    Physical Exam    GENERAL APPEARANCE: healthy, alert and no distress     EYES: EOMI, PERRL     RESP: lungs clear to auscultation - no rales, rhonchi or wheezes     CV: regular rates and rhythm, normal S1 S2, no S3 or S4 and no murmur, click or rub     SKIN: no suspicious lesions or rashes on visible parts      NEURO: Normal strength and tone, sensory exam grossly normal, mentation intact and speech normal     PSYCH: mentation appears normal. and affect normal/bright    Recent Labs   Lab Test 01/07/20  1417 11/25/19  2155 10/18/19  1113  10/12/19  1212   HGB  --  13.1 11.2*   < > 13.8   PLT  --  588* 698*   < > 647*   INR  --   --   --   --  1.09    136  --    < > 138   POTASSIUM 4.0 3.8  --    < > 4.4   CR 0.80 0.84  --    < > 0.81   A1C 8.5*  --   --   --  7.6*    < > = values in this interval not displayed.        PRE-OP Diagnostics:  No labs due for the preop but she is overdue for a1c and requesting a1c.           Assessment & Plan   The proposed surgical procedure is considered LOW risk.    REVISED CARDIAC RISK INDEX  The patient has the following serious cardiovascular risks for perioperative complications:  Diabetes Mellitus (on Insulin) = 1 point    INTERPRETATION: 1 point: Class II (low risk - 0.9% complication rate)       1. Preop general physical exam       2. Senile cataract of left eye, unspecified age-related cataract type       3. Type 2 diabetes, HbA1c goal < 7% (H)  See pt instructions for changes for surgery.   - Hemoglobin A1c    4. Need for prophylactic vaccination and inoculation against influenza     - FLUZONE HIGH DOSE 65+  [80515]  - Vaccine Administration, Initial [35956]    5. Encounter for screening for other viral diseases     - Asymptomatic COVID-19 Virus (Coronavirus) by PCR    6. Essential hypertension, benign (HTN)   doing well.       The patient has the following " additional risks and recommendations for perioperative complications:      DIABETES:  - Patient is on insulin therapy; diabetic NPO guidelines provided and discussed.     MEDICATION INSTRUCTIONS:    DIABETIC Medications:   - LONG ACTING INSULIN (e.g. glargine, detemir): Take 80% of the usual evening or morning dose before surgery. See pt instructions. Currently using 45 units per day.   - SULFONYLUREA (e.g. glyburide, glipizide): HOLD day of surgery    RECOMMENDATION:  APPROVAL GIVEN to proceed with proposed procedure, without further diagnostic evaluation.       Signed Electronically by: Trey Lam MD, MD    Copy of this evaluation report is provided to requesting physician.    Preop [facility] Preop Guidelines    Revised Cardiac Risk Index

## 2020-09-21 NOTE — PATIENT INSTRUCTIONS

## 2020-09-22 LAB
SARS-COV-2 RNA SPEC QL NAA+PROBE: NOT DETECTED
SPECIMEN SOURCE: NORMAL

## 2020-09-23 DIAGNOSIS — H25.12 AGE-RELATED NUCLEAR CATARACT, LEFT: Primary | ICD-10-CM

## 2020-09-23 RX ORDER — PREDNISOLONE ACETATE 10 MG/ML
SUSPENSION/ DROPS OPHTHALMIC
Qty: 1 BOTTLE | Refills: 1 | Status: SHIPPED | OUTPATIENT
Start: 2020-09-23 | End: 2020-12-14

## 2020-09-23 RX ORDER — TOBRAMYCIN 3 MG/ML
SOLUTION/ DROPS OPHTHALMIC
Qty: 1 BOTTLE | Refills: 0 | Status: SHIPPED | OUTPATIENT
Start: 2020-09-23 | End: 2020-10-21

## 2020-09-25 ENCOUNTER — HOSPITAL ENCOUNTER (OUTPATIENT)
Facility: AMBULATORY SURGERY CENTER | Age: 75
End: 2020-09-25
Attending: OPHTHALMOLOGY
Payer: COMMERCIAL

## 2020-09-25 ENCOUNTER — OFFICE VISIT (OUTPATIENT)
Dept: OPHTHALMOLOGY | Facility: CLINIC | Age: 75
End: 2020-09-25
Payer: COMMERCIAL

## 2020-09-25 VITALS
SYSTOLIC BLOOD PRESSURE: 129 MMHG | RESPIRATION RATE: 18 BRPM | DIASTOLIC BLOOD PRESSURE: 62 MMHG | BODY MASS INDEX: 32.49 KG/M2 | HEIGHT: 65 IN | HEART RATE: 75 BPM | OXYGEN SATURATION: 96 % | WEIGHT: 195 LBS | TEMPERATURE: 97.9 F

## 2020-09-25 DIAGNOSIS — Z96.1 PSEUDOPHAKIA, LEFT EYE: Primary | ICD-10-CM

## 2020-09-25 DIAGNOSIS — H25.12 AGE-RELATED NUCLEAR CATARACT, LEFT: ICD-10-CM

## 2020-09-25 LAB
GLUCOSE BLDC GLUCOMTR-MCNC: 165 MG/DL (ref 70–99)
GLUCOSE BLDC GLUCOMTR-MCNC: 189 MG/DL (ref 70–99)

## 2020-09-25 DEVICE — EYE IMP IOL AMO PCL TECNIS ZCB00 21.5: Type: IMPLANTABLE DEVICE | Site: EYE | Status: FUNCTIONAL

## 2020-09-25 RX ORDER — CYCLOPENTOLAT/TROPIC/PHENYLEPH 1%-1%-2.5%
1 DROPS (EA) OPHTHALMIC (EYE)
Status: COMPLETED | OUTPATIENT
Start: 2020-09-25 | End: 2020-09-25

## 2020-09-25 RX ORDER — TOBRAMYCIN 3 MG/ML
SOLUTION/ DROPS OPHTHALMIC PRN
Status: DISCONTINUED | OUTPATIENT
Start: 2020-09-25 | End: 2020-09-25 | Stop reason: HOSPADM

## 2020-09-25 RX ORDER — TETRACAINE HYDROCHLORIDE 5 MG/ML
SOLUTION OPHTHALMIC PRN
Status: DISCONTINUED | OUTPATIENT
Start: 2020-09-25 | End: 2020-09-25 | Stop reason: HOSPADM

## 2020-09-25 RX ORDER — DICLOFENAC SODIUM 1 MG/ML
1 SOLUTION/ DROPS OPHTHALMIC
Status: COMPLETED | OUTPATIENT
Start: 2020-09-25 | End: 2020-09-25

## 2020-09-25 RX ORDER — PROPARACAINE HYDROCHLORIDE 5 MG/ML
1 SOLUTION/ DROPS OPHTHALMIC ONCE
Status: COMPLETED | OUTPATIENT
Start: 2020-09-25 | End: 2020-09-25

## 2020-09-25 RX ORDER — LIDOCAINE HYDROCHLORIDE 10 MG/ML
INJECTION, SOLUTION EPIDURAL; INFILTRATION; INTRACAUDAL; PERINEURAL PRN
Status: DISCONTINUED | OUTPATIENT
Start: 2020-09-25 | End: 2020-09-25 | Stop reason: HOSPADM

## 2020-09-25 RX ORDER — BALANCED SALT SOLUTION 6.4; .75; .48; .3; 3.9; 1.7 MG/ML; MG/ML; MG/ML; MG/ML; MG/ML; MG/ML
SOLUTION OPHTHALMIC PRN
Status: DISCONTINUED | OUTPATIENT
Start: 2020-09-25 | End: 2020-09-25 | Stop reason: HOSPADM

## 2020-09-25 RX ADMIN — DICLOFENAC SODIUM 1 DROP: 1 SOLUTION/ DROPS OPHTHALMIC at 07:12

## 2020-09-25 RX ADMIN — PROPARACAINE HYDROCHLORIDE 1 DROP: 5 SOLUTION/ DROPS OPHTHALMIC at 07:11

## 2020-09-25 RX ADMIN — Medication 1 DROP: at 07:12

## 2020-09-25 RX ADMIN — Medication 1 DROP: at 07:20

## 2020-09-25 RX ADMIN — DICLOFENAC SODIUM 1 DROP: 1 SOLUTION/ DROPS OPHTHALMIC at 07:25

## 2020-09-25 RX ADMIN — DICLOFENAC SODIUM 1 DROP: 1 SOLUTION/ DROPS OPHTHALMIC at 07:20

## 2020-09-25 RX ADMIN — Medication 1 DROP: at 07:26

## 2020-09-25 ASSESSMENT — EXTERNAL EXAM - LEFT EYE: OS_EXAM: NORMAL

## 2020-09-25 ASSESSMENT — TONOMETRY
OS_IOP_MMHG: 17
IOP_METHOD: TONOPEN

## 2020-09-25 ASSESSMENT — SLIT LAMP EXAM - LIDS: COMMENTS: NORMAL

## 2020-09-25 ASSESSMENT — VISUAL ACUITY
METHOD: SNELLEN - LINEAR
OS_SC+: -1
OS_SC: 20/100

## 2020-09-25 ASSESSMENT — MIFFLIN-ST. JEOR: SCORE: 1390.39

## 2020-09-25 NOTE — PROGRESS NOTES
POD#0, status post cataract surgery, left  eye    No complaints.  Denies eye pain.    Impression/Plan:  Pseudophakia, OS: POD0, good post-operative appearance. IOP reasonable.    - Fluoroquinolone antibiotic 4x daily in the surgical eye for 1 week  - Prednisolone 4x daily in the surgical eye for 1 week, then weekly taper      Eye protection at all times and eye shield at night for 1 week.    Limited activities with no exercise or heavy lifting for 1 week.    Instructed patient to contact us for decreasing vision, eye pain, new floaters or flashes of light or other concerning symptoms.    Written instructions given    Return to clinic 3-4 weeks with refraction and dilation.    Teaching statement:  Complete documentation of historical and exam elements from today's encounter can be found in the full encounter summary report (not reduplicated in this progress note). I personally obtained the chief complaint(s) and history of present illness.  I confirmed and edited as necessary the review of systems, past medical/surgical history, family history, social history, and examination findings as documented by others; and I examined the patient myself. I personally reviewed the relevant tests, images, and reports as documented above.     I formulated and edited as necessary the assessment and plan and discussed the findings and management plan with the patient and family.    Janay Amezcua MD  Comprehensive Ophthalmology & Ocular Pathology  Department of Ophthalmology and Visual Neurosciences  [email or web address]  Pager [phone]

## 2020-09-25 NOTE — NURSING NOTE
Chief Complaints and History of Present Illnesses   Patient presents with     Post Op (Ophthalmology) Left Eye     POD#0 s/p CE/IOL LE     Chief Complaint(s) and History of Present Illness(es)     Post Op (Ophthalmology) Left Eye     Laterality: left eye    Quality: blurred vision    Associated symptoms: photophobia.  Negative for eye pain, nausea, floaters, flashes and double vision    Pain scale: 0/10    Comments: POD#0 s/p CE/IOL LE              Comments     patinet feeling well after sx per pt    Rebecca Armenta COT September 25, 2020 10:01 AM

## 2020-09-25 NOTE — PROCEDURES
Ophthalmology Post-op Procedure Note    Surgical Service:    Ophthalmology & Visual Sciences  Date Performed:      September 25, 2020  Location: [facility]      Pre-operative Diagnosis: Visually significant cataract, left eye  Post-operative Diagnosis:  Pseudophakia, left eye  Operative Procedure:  Phacoemulsification with intraocular lens implantation, left eye    Surgeon(s):  Fellow/Staff Surgeon:       Janay Amezcua MD  Resident Surgeon:            Petar Medina MD    Anesthesia:   Topical/Local  Findings:  No unusual findings   Blood Loss:    Minimal  Implants:  ZCB00 21.5 intraocular lens  Specimens:  None     Complications:  The patient did not experience any complications.   Condition: Stable    Operative Report Completion:    Description of Operation/Procedure:  After appropriate informed consent was obtained, the patient was brought to the operating room. The appropriate cardiac and blood pressure monitors were placed. A final pause occurred just before the start of the procedure during which the entire procedure team actively confirmed the correct patient, procedure, site, special equipment and special requirements. The patient was prepped and draped in the usual sterile fashion using 5% povidone/iodine.     An eyelid speculum was placed to open the eyelids. A paracentesis port was placed approximately sixty degrees to the left of the planned temporal incision location using the sideport blade. Approximately 0.8 cc of 1% nonpreserved lidocaine was placed into the anterior chamber. The anterior chamber was filled with dispersive viscoelastic. A clear corneal temporal incision was made with a metal 2.6 mm keratome. A round continuous tear capsulorhexis was initiated with a cystotome and completed with the Utrata forceps. Balanced salt solution on a cannula was used to perform hydrodissection. The nucleus was removed using phacoemulsification with a chop technique. The remaining cortical material was  removed using the irrigation/aspiration handpiece. The capsular bag was filled with dispersive viscoelastic. A lens of the  model and power listed above was placed into the capsular bag using the cartridge injection system. The remaining viscoelastic was removed using the irrigation aspiration handpiece. The paracentesis and temporal wounds were hydrated with balanced salt solution. Intracameral moxifloxacin was administered. At the conclusion of the case, the wounds were felt to be watertight, the pupil was round, the lens was centered, and the anterior chamber was deep. A few drops of antibiotic and prednisolone were given to the operative eye. The eyelid speculum was removed. A shield was placed over the operative eye.      Attending Attestation:  I was present for and performed the entire procedure.  Janay Amezcua MD

## 2020-10-20 ENCOUNTER — TELEPHONE (OUTPATIENT)
Dept: ONCOLOGY | Facility: CLINIC | Age: 75
End: 2020-10-20

## 2020-10-20 NOTE — TELEPHONE ENCOUNTER
Pt called in to triage reporting R breast pain x1 month. State she gets her annual mammograms in the breast center at [facility] but does not see or have a provider within [facility], pcp is Dr. Wegener he used to be at [facility] but since clinic closed she is unsure where his home clinic is now. Report sharp, short shooting pains that occurs frequently whether she is at rest or doing activity. She has not noticed any lumps, discharge, skin changes, swelling or pain along shoulder, neck, arm. Had a normal mammogram in August. Requesting to see someone in the breast center, routed to Lexi Rivera for advice, left message on breast center RN line for advice.

## 2020-10-21 ENCOUNTER — OFFICE VISIT (OUTPATIENT)
Dept: OPHTHALMOLOGY | Facility: CLINIC | Age: 75
End: 2020-10-21
Attending: OPHTHALMOLOGY
Payer: COMMERCIAL

## 2020-10-21 DIAGNOSIS — H52.13 MYOPIC ASTIGMATISM OF BOTH EYES: ICD-10-CM

## 2020-10-21 DIAGNOSIS — E11.9 DIABETES MELLITUS TYPE 2 WITHOUT RETINOPATHY (H): ICD-10-CM

## 2020-10-21 DIAGNOSIS — H52.203 MYOPIC ASTIGMATISM OF BOTH EYES: ICD-10-CM

## 2020-10-21 DIAGNOSIS — H52.4 PRESBYOPIA: ICD-10-CM

## 2020-10-21 DIAGNOSIS — Z96.1 PSEUDOPHAKIA, BOTH EYES: Primary | ICD-10-CM

## 2020-10-21 PROCEDURE — 99024 POSTOP FOLLOW-UP VISIT: CPT | Performed by: OPHTHALMOLOGY

## 2020-10-21 PROCEDURE — G0463 HOSPITAL OUTPT CLINIC VISIT: HCPCS | Mod: 25

## 2020-10-21 PROCEDURE — 92015 DETERMINE REFRACTIVE STATE: CPT

## 2020-10-21 ASSESSMENT — CONF VISUAL FIELD
OS_NORMAL: 1
OD_NORMAL: 1
METHOD: COUNTING FINGERS

## 2020-10-21 ASSESSMENT — VISUAL ACUITY
OS_SC: 20/100
OD_CC+: -1
METHOD: SNELLEN - LINEAR
OD_SC+: -1
OS_PH_SC: 20/30
OS_PH_SC+: -2
OD_SC: 20/30
OD_PH_SC+: -2
OD_PH_SC: 20/20
OS_SC+: -1

## 2020-10-21 ASSESSMENT — REFRACTION_WEARINGRX
OS_ADD: +2.50
OD_ADD: +2.50
OS_SPHERE: -1.50
OS_AXIS: 010
OD_CYLINDER: +2.50
OS_ADD: +2.50
OS_CYLINDER: +1.50
OD_AXIS: 018
OD_ADD: +2.50
OD_SPHERE: -2.00

## 2020-10-21 ASSESSMENT — CUP TO DISC RATIO
OS_RATIO: 0.2
OD_RATIO: 0.2

## 2020-10-21 ASSESSMENT — SLIT LAMP EXAM - LIDS
COMMENTS: NORMAL
COMMENTS: NORMAL

## 2020-10-21 ASSESSMENT — REFRACTION_MANIFEST
OD_CYLINDER: +0.75
OS_ADD: +2.25
OD_ADD: +2.25
OS_CYLINDER: +0.50
OS_SPHERE: -1.75
OS_AXIS: 021
OD_SPHERE: -1.00
OD_AXIS: 021

## 2020-10-21 ASSESSMENT — TONOMETRY
IOP_METHOD: TONOPEN
OD_IOP_MMHG: 19
OS_IOP_MMHG: 19

## 2020-10-21 ASSESSMENT — EXTERNAL EXAM - LEFT EYE: OS_EXAM: NORMAL

## 2020-10-21 NOTE — NURSING NOTE
Chief Complaints and History of Present Illnesses   Patient presents with     Post Op (Ophthalmology) Left Eye     Chief Complaint(s) and History of Present Illness(es)     Post Op (Ophthalmology) Left Eye     Laterality: left eye              Comments     Sarah is here 3-4 weeks post cataract surgery LE. She says she occassionally feels something like an eyelash LE since her surgery. That feeling is lesioning.   She feels vision LE is good. RE was done about two years ago.     Adalid Rendon COT 3:27 PM October 21, 2020

## 2020-10-21 NOTE — TELEPHONE ENCOUNTER
Spoke with patient, reviewed appointment information for tomorrow. She will plan to attend appointments as scheduled.     Future Appointments   Date Time Provider Department Center   10/21/2020  3:00 PM Janay Amezcua MD [facility]   10/22/2020  9:00 AM Lexi Rivera PA-C [facility]   10/22/2020  9:30 AM [id number]   10/22/2020 10:00 AM [id number]

## 2020-10-21 NOTE — PROGRESS NOTES
NEW CONSULTATION  Oct 22, 2020     Sarah Felix is a 75 year old woman who presents with right breast pain.     HPI:    She developed right lower and outer breast pain about 1 month ago. The pain seem to occur she she lays down or when she first gets up. The pain feels like a shooting jagging pain. She thinks the pain is in the breast tissue and not in the chest wall. She wears a bra when she is out and about and not bra when she is at home. She denies any breast mass, skin changes, nipple inversion or nipple discharge.     BREAST-SPECIFIC HISTORY:    Previous breast imaging: Yes   - 12/18/03 mammo  - 1/21/05 Smammo BI-RADS 1  - 1/25/06 Smammo BI-RADS 1  - 2/2/07 Smammo right breast nodular density BI-RADS 0  - 2/12/07 right Dmammo BI-RADS 2  - 2/6/08 Smammo BI-RADS 2  - 2/13/09 Smammo BI-RADS 2  - 3/3/10 Smammo BI-RADS 1  - 1/21/11 Smammo BI-RADS 1  - 4/3/12 Smammo BI-RADS 1  - 4/8/13 Smammo BI-RADS 1  - 4/17/14 Smammo BI-RADS 1  - 4/30/15 Smammo BI-RADS 1  - 7/14/16 Smammo BI-RADS 1  - 7/18/17 Smammo BI-RADS 1  - 7/20/18 Smammo BI-RADS 1  - 8/12/19 Smammo BI-RADS 1  - 8/19/20 Smammo BI-RADS 1    Prior breast biopsies/surgeries: No    Prior history of breast cancer: No  Prior radiation history: No  Self breast exams: Yes  Breast density: scattered fibroglandular densities    GYN HISTORY:  G0   Age at menarche: 15  Menopausal: mid 50's.   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  felt to be focal nodular hyperplasia.  annual CT abd.   followed by Dr. Amilcar Kat      Cystocele, lateral      Diabetes mellitus (H)      Diverticulitis of colon (without mention of hemorrhage)(562.11) 6/04    Abd CT     Embolism and thrombosis of unspecified site age 22    post ovarian cyst removed     Esophageal reflux     Hiatal hernia     Fatty liver      Hiatal hernia     small     Hyperlipidemia      Nontoxic uninodular goiter      Osteopenia 4/21/2005    on fosamax  for > 5 yr.  stop 7/2012     Other chronic otitis externa      Other specified disorders of liver     Fatty Liver, Benign appearing liver cysts     Pure hypercholesterolemia      PAST SURGICAL HISTORY   Past Surgical History:   Procedure Laterality Date     APPLY EXTERNAL FIXATOR LOWER EXTREMITY Left 10/12/2019    Procedure: Left ankle external fixator placement;  Surgeon: Leeanne Brown MD;  Location: [facility]     C APPENDECTOMY  age 22     C DEXA, BONE DENSITY, AXIAL SKEL  2005     C DEXA, BONE DENSITY, AXIAL SKEL  6/2007    No signif change in Osteopenia     COLONOSCOPY  4/2006    repeat 10 yr     ENDOSCOPIC ULTRASOUND UPPER GASTROINTESTINAL TRACT (GI) N/A 9/6/2018    Procedure: ENDOSCOPIC ULTRASOUND, ESOPHAGOSCOPY / UPPER GASTROINTESTINAL TRACT (GI);  Endoscopic Ultrasound, Esophagogastroduodenoscopy with endoscopic mucosal resection;  Surgeon: Hood Brower MD;  Location: [facility]     ESOPHAGOSCOPY, GASTROSCOPY, DUODENOSCOPY (EGD), COMBINED N/A 8/13/2018    Procedure: COMBINED ESOPHAGOSCOPY, GASTROSCOPY, DUODENOSCOPY (EGD), BIOPSY SINGLE OR MULTIPLE;  EGD;  Surgeon: Hood Brower MD;  Location: [facility]     ESOPHAGOSCOPY, GASTROSCOPY, DUODENOSCOPY (EGD), COMBINED N/A 3/14/2019    Procedure: Upper Endoscopy;  Surgeon: Hood Brower MD;  Location: [facility]     ESOPHAGOSCOPY, GASTROSCOPY, DUODENOSCOPY (EGD), RESECT MUCOSA, COMBINED  N/A 9/6/2018    Procedure: COMBINED ESOPHAGOSCOPY, GASTROSCOPY, DUODENOSCOPY (EGD), RESECT MUCOSA;;  Surgeon: Hood Brower MD;  Location: [facility]     GYN SURGERY  10/22/08    pelvic support     HEMORRHOIDECTOMY  8/08     MOHS MICROGRAPHIC PROCEDURE       OPEN REDUCTION INTERNAL FIXATION ANKLE Left 10/28/2019    Procedure: Internal fixation left bimalleolar ankle fracture, removal external fixator;  Surgeon: Jose Roberto Can MD;  Location: [facility]     PHACOEMULSIFICATION CLEAR CORNEA WITH STANDARD INTRAOCULAR LENS IMPLANT Left 9/25/2020    Procedure: LEFT CATARACT REMOVAL WITH INTRAOCULAR LENS IMPLANT;  Surgeon: Janay Amezcua MD;  Location: [facility]     SURGICAL HISTORY OF -   age 22    L ovarian cyst removal, laparotomy     SURGICAL HISTORY OF -   1998    partial thyroidectomy     SURGICAL HISTORY OF -   10/08    Transobturator tape for Urinary Incontinence     SURGICAL HISTORY OF -   11/2011    stress test normal     THYROID SURGERY      Had cyst removed, thyroid gland is intact.     MEDICATIONS  Current Outpatient Medications   Medication Sig Dispense Refill     aspirin (ASA) 81 MG chewable tablet Take 162 mg by mouth daily       atorvastatin (LIPITOR) 40 MG tablet Take 1 tablet (40 mg) by mouth daily 90 tablet 2     blood glucose (NO BRAND SPECIFIED) lancets standard Use to test blood sugar 4 times daily or as directed.Lancets One Touch 100s Delica- 4x a day 120 each 5     blood glucose (NO BRAND SPECIFIED) test strip Use to test blood sugar 4 times daily or as directed.One touch ultra strips blue 100- 4x a day. 120 strip 5     blood glucose (ONETOUCH ULTRA) test strip 1 strip by In Vitro route 4 times daily. 360 each 3     blood glucose monitoring (NO BRAND SPECIFIED) meter device kit Use to test blood sugar 4 times daily or as directed. 1 kit 0     CALCIUM 500 + D 500-200 MG-IU OR TABS one tab twice per day 100 0     Cholecalciferol (VITAMIN D PO) Take  by mouth. Pt consuming 3000 units per day         "furosemide (LASIX) 20 MG tablet TAKE 2 TABLETS IN THE MORNING AND 1 TABLET IN THE AFTERNOON 270 tablet 3     glimepiride (AMARYL) 4 MG tablet TAKE 1 TABLET TWICE A DAY WITH MEALS (BREAKFAST AND DINNER) (PLEASE SCHEDULE LAB APPOINTMENT PRIOR TO REFILL REQUESTS) 180 tablet 3     insulin aspart (NOVOLOG PEN) 100 UNIT/ML pen Inject 1-5 Units Subcutaneous At Bedtime MEDIUM INSULIN RESISTANCE DOSING    Do Not give Bedtime Correction Insulin if BG less than  200.   For  - 249 give 1 units.   For  - 299 give 2 units.   For  - 349 give 3 units.   For  -399 give 4 units.   For BG greater than or equal to 400 give 5 units.  Notify provider if glucose greater than or equal to 350 mg/dL after administration of correction dose.  If given at mealtime, administer within 30 minutes of start of meal       insulin pen needle (BD REY U/F) 32G X 4 MM miscellaneous USE 1 PEN NEEDLE TWICE A DAY (WITH LANTUS AND VICTOZA) 180 each 3     insulin syringe-needle U-100 (BD INSULIN SYRINGE ULTRAFINE) 31G X 5/16\" 1 ML miscellaneous Use 1 syringes twice daily for insulin and victoza and for symptoms of hypoglycemia 100 each 3     LANTUS SOLOSTAR 100 UNIT/ML soln 60 units daily or as directed. 60 mL 3     lisinopril (PRINIVIL/ZESTRIL) 2.5 MG tablet TAKE 1 TABLET DAILY 90 tablet 3     metoprolol tartrate (LOPRESSOR) 25 MG tablet TAKE 1 TABLET TWICE A  tablet 2     nystatin (MYCOSTATIN) 065938 UNIT/GM external cream Apply topically 2 times daily 30 g 3     ONETOUCH DELICA LANCETS 33G MISC 1 each 4 times daily 360 each 3     potassium chloride ER (K-TAB/KLOR-CON) 10 MEQ CR tablet Take 1 tablet (10 mEq) by mouth 2 times daily 180 tablet 3     prednisoLONE acetate (PRED FORTE) 1 % ophthalmic suspension 1 drop in surgical eye as directed, 4x daily after surgery for 1 week, 3x daily for 1 week, 2x daily for 1 week, daily for 1 week, then stop 1 Bottle 1     triamcinolone (KENALOG) 0.1 % external cream Apply topically 3 " times daily 90 g 3     alum & mag hydroxide-simethicone (MAALOX REGULAR STRENGTH) 200-200-20 MG/5ML SUSP Take 30 mLs by mouth as needed        diclofenac (VOLTAREN) 1 % topical gel Place 2 g onto the skin 4 times daily (Patient not taking: Reported on 10/22/2020) 100 g 0     insulin aspart (NOVOLOG PEN) 100 UNIT/ML pen Inject 1-7 Units Subcutaneous 3 times daily (before meals) Medium Correction Scale for Pre meal Blood glucose  Do Not give Correction Insulin if Pre-Meal BG < 140.   Pre-Meal BG levels: Corresponding Insulin Units  140-189: 1 unit; 190-239:2 units.   240-289: 3 units; 290-339: 4 units.   340-399: 5 units; 400-449: 6 units   = or > 450 give 7 units.   Notify MD if glucose > or = 350 mg/dL after administration of correction dose. (Patient not taking: Reported on 10/22/2020)       nystatin (MYCOSTATIN) 175810 UNIT/GM external ointment Apply topically 2 times daily (Patient not taking: Reported on 10/22/2020) 30 g 3     order for DME Compression socks - knee (Patient not taking: Reported on 10/22/2020) 1 Units 0     order for DME Roll-A-Bout Walker. Patient can use for 3 months (Patient not taking: Reported on 10/22/2020) 1 Units 0     senna-docusate (SENOKOT-S/PERICOLACE) 8.6-50 MG tablet Take 2 tablets by mouth 2 times daily (Patient not taking: Reported on 10/22/2020) 360 tablet 3     ALLERGIES  Allergies   Allergen Reactions     Amlodipine Swelling     Ancef [Cefazolin]      Levaquin Rash     Itching, rash     Metformin Diarrhea and GI Disturbance     Cephalosporins Rash     Clindamycin Rash     Levofloxacin Itching and Rash     Nickel Rash     Contact reaction  Contact reaction      SOCIAL HISTORY:  Smokes: No, quit 1980  EtOH: Yes daily wine with meal  Exercise: activities of daily living. Yard work.   Works as RN in [employer], Retired.     ROS:  Change in vision No  Headaches: no  Respiratory: No shortness of breath, dyspnea on exertion, cough, or hemoptysis   Cardiovascular: negative  "  Gastrointestinal: negative Abdominal pain: no  Breast: right breast pain  Musculoskeletal: negative Joint pain No Back pain: no  Psychiatric: negative  Hematologic/Lymphatic/Immunologic: negative  Endocrine: negative    EXAM  /77   Pulse 84   Temp 97.6  F (36.4  C) (Oral)   Resp 18   Ht 1.651 m (5' 5\")   Wt 90.9 kg (200 lb 6.4 oz)   SpO2 97%   BMI 33.35 kg/m     PHYSICAL EXAM  Respiratory: breathing non labored.   Breasts: Examination was done in both the upright and supine positions.  Breasts are symmetrical . No dominant fixed or suspicious masses noted. No skin or nipple changes. No nipple discharge.   No clavicular, cervical, or axillary lymphadenopathy.     INVESTIGATIONS:    Right diagnostic mammogram and right breast: no concerning findings per Radiology, final report pending.     ASSESSMENT/PLAN:    Sarah Felix is a 75 year old woman with right breast pain. There were no concerning findings on breast imaging today.     1) Right breast pain.   There were no concerning findings on breast imaging today. We discussed breast pains association with breast cancer is low. The following was suggested. Her breast pain may be chest call pain related to costochondritis and she will also follow up with her primary care provider.   -  Supportive Bra that was professionally fit.   -  Wear comfortable breast support while sleeping.   -  Consider eliminating/decrease caffeine (chocolate, tea, coffee, soda) for a two week period of time to see if pain improves/resolves  -  Eat a low fat diet and improve omega 3 fatty acid intake   -  Flax seed 25 grams (2 tablespoons) daily   - Maintain a healthy BMI <25.  - Tylenol, ibuprofen, or neproxen.     2) She will continue yearly screening mammogram and clinical encounter. Be familiar with your breast and how they normally feel and appear. Promptly report any changes to your healthcare provider.   - Screening mammogram due: 8/20/21      Lexi Rivera " PA-C    Total time spent face to face with the patient was 40 minutes. 30 minutes was spent counseling the patient as described above.

## 2020-10-21 NOTE — PROGRESS NOTES
HPI  Sarah Felix is a 75 year old female here for follow-up after CE/IOL left eye. She is pleased with her improvement in vision. No redness/pain/discharge. No flashes/floaters. She has one left of the prednisolone.     Assessment & Plan      (Z96.1) Pseudophakia, both eyes  (primary encounter diagnosis)  Comment: Good post-operative appearance left eye. Right eye undilated today, but with clear visual axis.  Plan: Complete drop taper.    (E11.9) Diabetes mellitus type 2 without retinopathy (H)  Comment: Diagnosed around 2007. On insulin and oral hypoglycemics. Last A1c 8.1 9/2020. No diabetic retinopathy.  Plan: Discussed the importance of tight blood glucose control in the prevention of diabetic retinopathy. Recommend yearly dilated eye exam.    (H52.203,  H52.13) Myopic astigmatism of both eyes  (H52.4) Presbyopia  Comment: Good vision with refraction  Plan: Given updated glasses Rx.      -----------------------------------------------------------------------------------    Patient disposition:   Return in about 1 year (around 10/21/2021). or sooner as needed.    Teaching statement:  Complete documentation of historical and exam elements from today's encounter can be found in the full encounter summary report (not reduplicated in this progress note). I personally obtained the chief complaint(s) and history of present illness.  I confirmed and edited as necessary the review of systems, past medical/surgical history, family history, social history, and examination findings as documented by others; and I examined the patient myself. I personally reviewed the relevant tests, images, and reports as documented above.     I formulated and edited as necessary the assessment and plan and discussed the findings and management plan with the patient and family.    Janay Amezcua MD  Comprehensive Ophthalmology & Ocular Pathology  Department of Ophthalmology and Visual Neurosciences  [email or web address]  Pager  [phone]

## 2020-10-22 ENCOUNTER — ANCILLARY PROCEDURE (OUTPATIENT)
Dept: MAMMOGRAPHY | Facility: CLINIC | Age: 75
End: 2020-10-22
Attending: PHYSICIAN ASSISTANT
Payer: COMMERCIAL

## 2020-10-22 ENCOUNTER — OFFICE VISIT (OUTPATIENT)
Dept: SURGERY | Facility: CLINIC | Age: 75
End: 2020-10-22
Attending: PHYSICIAN ASSISTANT
Payer: COMMERCIAL

## 2020-10-22 VITALS
RESPIRATION RATE: 18 BRPM | TEMPERATURE: 97.6 F | WEIGHT: 200.4 LBS | OXYGEN SATURATION: 97 % | SYSTOLIC BLOOD PRESSURE: 121 MMHG | HEIGHT: 65 IN | BODY MASS INDEX: 33.39 KG/M2 | HEART RATE: 84 BPM | DIASTOLIC BLOOD PRESSURE: 77 MMHG

## 2020-10-22 DIAGNOSIS — N64.4 BREAST PAIN, RIGHT: Primary | ICD-10-CM

## 2020-10-22 DIAGNOSIS — N64.4 BREAST PAIN, RIGHT: ICD-10-CM

## 2020-10-22 PROCEDURE — 76642 ULTRASOUND BREAST LIMITED: CPT | Mod: RT | Performed by: STUDENT IN AN ORGANIZED HEALTH CARE EDUCATION/TRAINING PROGRAM

## 2020-10-22 PROCEDURE — G0279 TOMOSYNTHESIS, MAMMO: HCPCS | Performed by: STUDENT IN AN ORGANIZED HEALTH CARE EDUCATION/TRAINING PROGRAM

## 2020-10-22 PROCEDURE — G0463 HOSPITAL OUTPT CLINIC VISIT: HCPCS

## 2020-10-22 PROCEDURE — 99215 OFFICE O/P EST HI 40 MIN: CPT | Performed by: PHYSICIAN ASSISTANT

## 2020-10-22 PROCEDURE — 77065 DX MAMMO INCL CAD UNI: CPT | Mod: RT | Performed by: STUDENT IN AN ORGANIZED HEALTH CARE EDUCATION/TRAINING PROGRAM

## 2020-10-22 ASSESSMENT — MIFFLIN-ST. JEOR: SCORE: 1414.89

## 2020-10-22 ASSESSMENT — PAIN SCALES - GENERAL: PAINLEVEL: EXTREME PAIN (8)

## 2020-10-22 NOTE — NURSING NOTE
"Oncology Rooming Note    October 22, 2020 9:18 AM   Sarah Felix is a 75 year old female who presents for:    Chief Complaint   Patient presents with     Oncology Clinic Visit     Initial Vitals: /77   Pulse 84   Temp 97.6  F (36.4  C) (Oral)   Resp 18   Ht 1.651 m (5' 5\")   Wt 90.9 kg (200 lb 6.4 oz)   SpO2 97%   BMI 33.35 kg/m   Estimated body mass index is 33.35 kg/m  as calculated from the following:    Height as of this encounter: 1.651 m (5' 5\").    Weight as of this encounter: 90.9 kg (200 lb 6.4 oz). Body surface area is 2.04 meters squared.  Extreme Pain (8) Comment: Data Unavailable   No LMP recorded. Patient is postmenopausal.  Allergies reviewed: Yes  Medications reviewed: Yes    Medications: Medication refills not needed today.  Pharmacy name entered into Informed Trades:    [facility] - [place], [place] - [address]  [facility]  [facility]  [facility] - [place], [place] - [address] [place] [address]  WRITTEN PRESCRIPTION REQUESTED    Clinical concerns: Patients complains of sharp right breast pain that started a month ago. Pain is always around a 6 from a scare of 1-10. Nothing seems to help pain.       Eva Moreira MA            "

## 2020-10-22 NOTE — PATIENT INSTRUCTIONS
Sarah Felix is a 75 year old woman with right breast pain. There were no concerning findings on breast imaging today.     1) Right breast pain.   There were no concerning findings on breast imaging today. We discussed breast pains association with breast cancer is low. The following was suggested. Her breast pain may be chest call pain related to costochondritis and she will also follow up with her primary care provider.   -  Supportive Bra that was professionally fit.   -  Wear comfortable breast support while sleeping.   -  Consider eliminating/decrease caffeine (chocolate, tea, coffee, soda) for a two week period of time to see if pain improves/resolves  -  Eat a low fat diet and improve omega 3 fatty acid intake   -  Flax seed 25 grams (2 tablespoons) daily   - Maintain a healthy BMI <25.  - Tylenol, ibuprofen, or neproxen.     2) She will continue yearly screening mammogram and clinical encounter. Be familiar with your breast and how they normally feel and appear. Promptly report any changes to your healthcare provider.   - Screening mammogram due: 8/20/21

## 2020-10-22 NOTE — LETTER
10/22/2020         RE: Sarah Felix  [address] [place] [address]        Dear Colleague,    Thank you for referring your patient, Sarah Felix, to the [facility]. Please see a copy of my visit note below.    NEW CONSULTATION  Oct 22, 2020     Sarah Felix is a 75 year old woman who presents with right breast pain.     HPI:    She developed right lower and outer breast pain about 1 month ago. The pain seem to occur she she lays down or when she first gets up. The pain feels like a shooting jagging pain. She thinks the pain is in the breast tissue and not in the chest wall. She wears a bra when she is out and about and not bra when she is at home. She denies any breast mass, skin changes, nipple inversion or nipple discharge.     BREAST-SPECIFIC HISTORY:    Previous breast imaging: Yes   - 12/18/03 mammo  - 1/21/05 Smammo BI-RADS 1  - 1/25/06 Smammo BI-RADS 1  - 2/2/07 Smammo right breast nodular density BI-RADS 0  - 2/12/07 right Dmammo BI-RADS 2  - 2/6/08 Smammo BI-RADS 2  - 2/13/09 Smammo BI-RADS 2  - 3/3/10 Smammo BI-RADS 1  - 1/21/11 Smammo BI-RADS 1  - 4/3/12 Smammo BI-RADS 1  - 4/8/13 Smammo BI-RADS 1  - 4/17/14 Smammo BI-RADS 1  - 4/30/15 Smammo BI-RADS 1  - 7/14/16 Smammo BI-RADS 1  - 7/18/17 Smammo BI-RADS 1  - 7/20/18 Smammo BI-RADS 1  - 8/12/19 Smammo BI-RADS 1  - 8/19/20 Smammo BI-RADS 1    Prior breast biopsies/surgeries: No    Prior history of breast cancer: No  Prior radiation history: No  Self breast exams: Yes  Breast density: scattered fibroglandular densities    GYN HISTORY:  G0   Age at menarche: 15  Menopausal: mid 50's.   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  felt to be focal nodular hyperplasia.  annual CT abd.   followed by Dr. Amilcar Kat      Cystocele, lateral      Diabetes mellitus (H)      Diverticulitis of colon (without mention of hemorrhage)(562.11) 6/04    Abd CT     Embolism and thrombosis of unspecified site age 22    post ovarian cyst removed     Esophageal reflux     Hiatal hernia     Fatty liver      Hiatal hernia     small     Hyperlipidemia      Nontoxic uninodular goiter      Osteopenia 4/21/2005    on fosamax  for > 5 yr.  stop 7/2012     Other chronic otitis externa      Other specified disorders of liver     Fatty Liver, Benign appearing liver cysts     Pure hypercholesterolemia      PAST SURGICAL HISTORY   Past Surgical History:   Procedure Laterality Date     APPLY EXTERNAL FIXATOR LOWER EXTREMITY Left 10/12/2019    Procedure: Left ankle external fixator placement;  Surgeon: Leeanne Brown MD;  Location: [facility] OR     C APPENDECTOMY  age 22     C DEXA, BONE DENSITY, AXIAL SKEL  2005     C DEXA, BONE DENSITY, AXIAL SKEL  6/2007    No signif change in Osteopenia     COLONOSCOPY  4/2006    repeat 10 yr     ENDOSCOPIC ULTRASOUND UPPER GASTROINTESTINAL TRACT (GI) N/A 9/6/2018    Procedure: ENDOSCOPIC ULTRASOUND, ESOPHAGOSCOPY / UPPER GASTROINTESTINAL TRACT (GI);  Endoscopic Ultrasound, Esophagogastroduodenoscopy with endoscopic mucosal resection;  Surgeon: Hood Brower MD;  Location: [facility]     ESOPHAGOSCOPY, GASTROSCOPY, DUODENOSCOPY (EGD), COMBINED N/A 8/13/2018    Procedure: COMBINED ESOPHAGOSCOPY, GASTROSCOPY, DUODENOSCOPY (EGD), BIOPSY SINGLE OR MULTIPLE;  EGD;   Surgeon: Hood Brower MD;  [facility]     ESOPHAGOSCOPY, GASTROSCOPY, DUODENOSCOPY (EGD), COMBINED N/A 3/14/2019    Procedure: Upper Endoscopy;  Surgeon: Hood Brower MD;  [facility]     ESOPHAGOSCOPY, GASTROSCOPY, DUODENOSCOPY (EGD), RESECT MUCOSA, COMBINED N/A 9/6/2018    Procedure: COMBINED ESOPHAGOSCOPY, GASTROSCOPY, DUODENOSCOPY (EGD), RESECT MUCOSA;;  Surgeon: Hood Brower MD;  Location: [facility] OR     GYN SURGERY  10/22/08    pelvic support     HEMORRHOIDECTOMY  8/08     MOHS MICROGRAPHIC PROCEDURE       OPEN REDUCTION INTERNAL FIXATION ANKLE Left 10/28/2019    Procedure: Internal fixation left bimalleolar ankle fracture, removal external fixator;  Surgeon: Jose Roberto Can MD;  Location: [facility]     PHACOEMULSIFICATION CLEAR CORNEA WITH STANDARD INTRAOCULAR LENS IMPLANT Left 9/25/2020    Procedure: LEFT CATARACT REMOVAL WITH INTRAOCULAR LENS IMPLANT;  Surgeon: Janay Amezcua MD;  Location[facility]     SURGICAL HISTORY OF -   age 22    L ovarian cyst removal, laparotomy     SURGICAL HISTORY OF -   1998    partial thyroidectomy     SURGICAL HISTORY OF -   10/08    Transobturator tape for Urinary Incontinence     SURGICAL HISTORY OF -   11/2011    stress test normal     THYROID SURGERY      Had cyst removed, thyroid gland is intact.     MEDICATIONS  Current Outpatient Medications   Medication Sig Dispense Refill     aspirin (ASA) 81 MG chewable tablet Take 162 mg by mouth daily       atorvastatin (LIPITOR) 40 MG tablet Take 1 tablet (40 mg) by mouth daily 90 tablet 2     blood glucose (NO BRAND SPECIFIED) lancets standard Use to test blood sugar 4 times daily or as directed.Lancets One Touch 100s Delica- 4x a day 120 each 5     blood glucose (NO BRAND SPECIFIED) test strip Use to test blood sugar 4 times daily or as directed.One touch ultra strips blue 100- 4x a day. 120 strip 5     blood glucose (ONETOUCH ULTRA) test strip 1 strip by In Vitro route 4 times daily. 360 each 3     blood glucose  "monitoring (NO BRAND SPECIFIED) meter device kit Use to test blood sugar 4 times daily or as directed. 1 kit 0     CALCIUM 500 + D 500-200 MG-IU OR TABS one tab twice per day 100 0     Cholecalciferol (VITAMIN D PO) Take  by mouth. Pt consuming 3000 units per day        furosemide (LASIX) 20 MG tablet TAKE 2 TABLETS IN THE MORNING AND 1 TABLET IN THE AFTERNOON 270 tablet 3     glimepiride (AMARYL) 4 MG tablet TAKE 1 TABLET TWICE A DAY WITH MEALS (BREAKFAST AND DINNER) (PLEASE SCHEDULE LAB APPOINTMENT PRIOR TO REFILL REQUESTS) 180 tablet 3     insulin aspart (NOVOLOG PEN) 100 UNIT/ML pen Inject 1-5 Units Subcutaneous At Bedtime MEDIUM INSULIN RESISTANCE DOSING    Do Not give Bedtime Correction Insulin if BG less than  200.   For  - 249 give 1 units.   For  - 299 give 2 units.   For  - 349 give 3 units.   For  -399 give 4 units.   For BG greater than or equal to 400 give 5 units.  Notify provider if glucose greater than or equal to 350 mg/dL after administration of correction dose.  If given at mealtime, administer within 30 minutes of start of meal       insulin pen needle (BD REY U/F) 32G X 4 MM miscellaneous USE 1 PEN NEEDLE TWICE A DAY (WITH LANTUS AND VICTOZA) 180 each 3     insulin syringe-needle U-100 (BD INSULIN SYRINGE ULTRAFINE) 31G X 5/16\" 1 ML miscellaneous Use 1 syringes twice daily for insulin and victoza and for symptoms of hypoglycemia 100 each 3     LANTUS SOLOSTAR 100 UNIT/ML soln 60 units daily or as directed. 60 mL 3     lisinopril (PRINIVIL/ZESTRIL) 2.5 MG tablet TAKE 1 TABLET DAILY 90 tablet 3     metoprolol tartrate (LOPRESSOR) 25 MG tablet TAKE 1 TABLET TWICE A  tablet 2     nystatin (MYCOSTATIN) 085933 UNIT/GM external cream Apply topically 2 times daily 30 g 3     ONETOUCH DELICA LANCETS 33G MISC 1 each 4 times daily 360 each 3     potassium chloride ER (K-TAB/KLOR-CON) 10 MEQ CR tablet Take 1 tablet (10 mEq) by mouth 2 times daily 180 tablet 3     prednisoLONE " acetate (PRED FORTE) 1 % ophthalmic suspension 1 drop in surgical eye as directed, 4x daily after surgery for 1 week, 3x daily for 1 week, 2x daily for 1 week, daily for 1 week, then stop 1 Bottle 1     triamcinolone (KENALOG) 0.1 % external cream Apply topically 3 times daily 90 g 3     alum & mag hydroxide-simethicone (MAALOX REGULAR STRENGTH) 200-200-20 MG/5ML SUSP Take 30 mLs by mouth as needed        diclofenac (VOLTAREN) 1 % topical gel Place 2 g onto the skin 4 times daily (Patient not taking: Reported on 10/22/2020) 100 g 0     insulin aspart (NOVOLOG PEN) 100 UNIT/ML pen Inject 1-7 Units Subcutaneous 3 times daily (before meals) Medium Correction Scale for Pre meal Blood glucose  Do Not give Correction Insulin if Pre-Meal BG < 140.   Pre-Meal BG levels: Corresponding Insulin Units  140-189: 1 unit; 190-239:2 units.   240-289: 3 units; 290-339: 4 units.   340-399: 5 units; 400-449: 6 units   = or > 450 give 7 units.   Notify MD if glucose > or = 350 mg/dL after administration of correction dose. (Patient not taking: Reported on 10/22/2020)       nystatin (MYCOSTATIN) 681756 UNIT/GM external ointment Apply topically 2 times daily (Patient not taking: Reported on 10/22/2020) 30 g 3     order for DME Compression socks - knee (Patient not taking: Reported on 10/22/2020) 1 Units 0     order for DME Roll-A-Bout Walker. Patient can use for 3 months (Patient not taking: Reported on 10/22/2020) 1 Units 0     senna-docusate (SENOKOT-S/PERICOLACE) 8.6-50 MG tablet Take 2 tablets by mouth 2 times daily (Patient not taking: Reported on 10/22/2020) 360 tablet 3     ALLERGIES  Allergies   Allergen Reactions     Amlodipine Swelling     Ancef [Cefazolin]      Levaquin Rash     Itching, rash     Metformin Diarrhea and GI Disturbance     Cephalosporins Rash     Clindamycin Rash     Levofloxacin Itching and Rash     Nickel Rash     Contact reaction  Contact reaction      SOCIAL HISTORY:  Smokes: No, quit 1980  EtOH: Yes daily  "wine with meal  Exercise: activities of daily living. Yard work.   Works as RN in [employer], Retired.     ROS:  Change in vision No  Headaches: no  Respiratory: No shortness of breath, dyspnea on exertion, cough, or hemoptysis   Cardiovascular: negative   Gastrointestinal: negative Abdominal pain: no  Breast: right breast pain  Musculoskeletal: negative Joint pain No Back pain: no  Psychiatric: negative  Hematologic/Lymphatic/Immunologic: negative  Endocrine: negative    EXAM  /77   Pulse 84   Temp 97.6  F (36.4  C) (Oral)   Resp 18   Ht 1.651 m (5' 5\")   Wt 90.9 kg (200 lb 6.4 oz)   SpO2 97%   BMI 33.35 kg/m     PHYSICAL EXAM  Respiratory: breathing non labored.   Breasts: Examination was done in both the upright and supine positions.  Breasts are symmetrical . No dominant fixed or suspicious masses noted. No skin or nipple changes. No nipple discharge.   No clavicular, cervical, or axillary lymphadenopathy.     INVESTIGATIONS:    Right diagnostic mammogram and right breast: no concerning findings per Radiology, final report pending.     ASSESSMENT/PLAN:    Sarah Felix is a 75 year old woman with right breast pain. There were no concerning findings on breast imaging today.     1) Right breast pain.   There were no concerning findings on breast imaging today. We discussed breast pains association with breast cancer is low. The following was suggested. Her breast pain may be chest call pain related to costochondritis and she will also follow up with her primary care provider.   -  Supportive Bra that was professionally fit.   -  Wear comfortable breast support while sleeping.   -  Consider eliminating/decrease caffeine (chocolate, tea, coffee, soda) for a two week period of time to see if pain improves/resolves  -  Eat a low fat diet and improve omega 3 fatty acid intake   -  Flax seed 25 grams (2 tablespoons) daily   - Maintain a healthy BMI <25.  - Tylenol, ibuprofen, or neproxen.     2) " She will continue yearly screening mammogram and clinical encounter. Be familiar with your breast and how they normally feel and appear. Promptly report any changes to your healthcare provider.   - Screening mammogram due: 8/20/21      Lexi Rivera PA-C    Total time spent face to face with the patient was 40 minutes. 30 minutes was spent counseling the patient as described above.       Again, thank you for allowing me to participate in the care of your patient.        Sincerely,        Lexi Rivera PA-C

## 2020-12-14 ENCOUNTER — OFFICE VISIT (OUTPATIENT)
Dept: FAMILY MEDICINE | Facility: CLINIC | Age: 75
End: 2020-12-14
Payer: COMMERCIAL

## 2020-12-14 VITALS
DIASTOLIC BLOOD PRESSURE: 77 MMHG | RESPIRATION RATE: 16 BRPM | TEMPERATURE: 98.4 F | OXYGEN SATURATION: 96 % | WEIGHT: 195 LBS | SYSTOLIC BLOOD PRESSURE: 120 MMHG | BODY MASS INDEX: 32.45 KG/M2 | HEART RATE: 80 BPM

## 2020-12-14 DIAGNOSIS — N18.2 TYPE 2 DIABETES MELLITUS WITH STAGE 2 CHRONIC KIDNEY DISEASE, WITH LONG-TERM CURRENT USE OF INSULIN (H): ICD-10-CM

## 2020-12-14 DIAGNOSIS — Z00.00 ENCOUNTER FOR MEDICARE ANNUAL WELLNESS EXAM: Primary | ICD-10-CM

## 2020-12-14 DIAGNOSIS — H57.89 EYE IRRITATION: ICD-10-CM

## 2020-12-14 DIAGNOSIS — Z79.4 TYPE 2 DIABETES MELLITUS WITH STAGE 2 CHRONIC KIDNEY DISEASE, WITH LONG-TERM CURRENT USE OF INSULIN (H): ICD-10-CM

## 2020-12-14 DIAGNOSIS — E11.22 TYPE 2 DIABETES MELLITUS WITH STAGE 2 CHRONIC KIDNEY DISEASE, WITH LONG-TERM CURRENT USE OF INSULIN (H): ICD-10-CM

## 2020-12-14 LAB — HBA1C MFR BLD: 9.4 % (ref 0–5.6)

## 2020-12-14 PROCEDURE — 83036 HEMOGLOBIN GLYCOSYLATED A1C: CPT | Performed by: FAMILY MEDICINE

## 2020-12-14 PROCEDURE — G0439 PPPS, SUBSEQ VISIT: HCPCS | Performed by: FAMILY MEDICINE

## 2020-12-14 PROCEDURE — 99213 OFFICE O/P EST LOW 20 MIN: CPT | Mod: 25 | Performed by: FAMILY MEDICINE

## 2020-12-14 PROCEDURE — 36415 COLL VENOUS BLD VENIPUNCTURE: CPT | Performed by: FAMILY MEDICINE

## 2020-12-14 NOTE — PROGRESS NOTES
Subjective     Sarah Felix is a 75 year old female who presents to clinic today for the following health issues:    HPI         Diabetes Follow-up    How often are you checking your blood sugar? Two times daily  Blood sugar testing frequency justification:  Uncontrolled diabetes  What time of day are you checking your blood sugars (select all that apply)?  Before and after meals  Have you had any blood sugars above 200?  Yes sometimes  Have you had any blood sugars below 70?  No    What symptoms do you notice when your blood sugar is low?  None    What concerns do you have today about your diabetes? High readings       Do you have any of these symptoms? (Select all that apply)  No numbness or tingling in feet.  No redness, sores or blisters on feet.  No complaints of excessive thirst.  No reports of blurry vision.  No significant changes to weight.    Left eye has been read, and had some drainage. Had cataract surgery on left eye.    Wants potassium capsule for next fill.   Long acting takes between 51 and 60 units  Lantus at night. Morning fasting blood sugars 180s recently. Has been lower in the past.   Diet has been the same.   BP Readings from Last 2 Encounters:   10/22/20 121/77   09/25/20 129/62     Hemoglobin A1C (%)   Date Value   09/21/2020 8.1 (H)   01/07/2020 8.5 (H)     LDL Cholesterol Calculated (mg/dL)   Date Value   01/07/2020 63   10/22/2018 77       {Reference  Diabetes Management Resources :897639}    {Reference  Diabetes Log - 7 days :082269}    {additonal problems for provider to add (Optional):500752}    Review of Systems   {ROS COMP (Optional):094845}      Objective    There were no vitals taken for this visit.  There is no height or weight on file to calculate BMI.  Physical Exam   {Exam List (Optional):676620}    {Diagnostic Test Results (Optional):197018}        {PROVIDER CHARTING PREFERENCE:303180}

## 2020-12-14 NOTE — PROGRESS NOTES
"  SUBJECTIVE:   Sarah Felix is a 75 year old female who presents for Preventive Visit.      Patient has been advised of split billing requirements and indicates understanding: Yes  Are you in the first 12 months of your Medicare Part B coverage?  No  Here for medicare wellness visit and to discuss a few other issues.     Left eye: Had cataract surgery on left eye in 10/2020. Has had more watering from eye and has been more red. Mentioned these symptoms during post-op follow-up and reassured that the symptoms would improve. States symptoms resolved much quicker after she has cataract surgery on her right eye. Wonders if she should follow up with ophthalmologist.     History of dependent edema. Takes furosemide and potassium. States potassium pills are difficult to swallow. Was originally on one 20 mg KCl, currently on two 10 mg tabs. Wonders if she could change to capsules.    History of type two diabetes. Takes long acting insulin, between 51 and 60 units at night. She is also on glimepiride 4 mg BID.  Morning fasting blood sugars 180s recently. Has been lower in the past. Diet has been the same. Has left over novolog from hospital discharge a year ago. States occasionally taking it when her blood sugars are high. Wonders if she should be on a short acting insulin. Grazes throughout the day. Does not consistently eat meals or one large meal.       Physical Health:    In general, how would you rate your overall physical health? good    Outside of work, how many days during the week do you exercise? none    Outside of work, approximately how many minutes a day do you exercise?not applicable    If you drink alcohol do you typically have >3 drinks per day or >7 drinks per week? No    Do you usually eat at least 4 servings of fruit and vegetables a day, include whole grains & fiber and avoid regularly eating high fat or \"junk\" foods? Yes    Do you have any problems taking medications regularly?  No    Do you have " any side effects from medications? none    Needs assistance for the following daily activities: no assistance needed    Which of the following safety concerns are present in your home?  none identified     Hearing impairment: No    In the past 6 months, have you been bothered by leaking of urine? no    Mental Health:    In general, how would you rate your overall mental or emotional health? good    Do you feel safe in your environment? Yes    Fall risk:  Fallen 2 or more times in the past year?: No  Any fall with injury in the past year?: No    Cognitive Screenin) Repeat 3 items (Leader, Season, Table)    2) Clock draw:   NORMAL  3) 3 item recall: Recalls 3 objects  Results: 3 items recalled: COGNITIVE IMPAIRMENT LESS LIKELY    Mini-CogTM Copyright [employer]. Licensed by the author for use in [facility]; reprinted with permission ([email or web address]). All rights reserved.      Reviewed and updated as needed this visit by clinical staff  Tobacco              Reviewed and updated as needed this visit by Provider                Social History     Tobacco Use     Smoking status: Former Smoker     Quit date: 1980     Years since quittin.9     Smokeless tobacco: Never Used     Tobacco comment: smoked from 70-80; smoked about 1ppd   Substance Use Topics     Alcohol use: Yes     Alcohol/week: 0.0 standard drinks     Comment: 6 drinks per week                           Current providers sharing in care for this patient include:   Patient Care Team:  Remington Browning DO as PCP - General (Family Medicine)  Joan Lopez [facility] as Pharmacist  Wegener, Joel Daniel Irwin, MD as Assigned PCP  Anita Treadwell MD as Referring Physician (OB/Gyn)  Murphy Sargent MD as MD (Urology)  Apolinar Artis MD as MD (Orthopedics)  Janay Amezcua MD as MD (Ophthalmology)  Jose Roberto Can MD as Assigned Musculoskeletal Provider  Janay Amezcua MD as Assigned Surgical Provider    The following  health maintenance items are reviewed in Epic and correct as of today:  Health Maintenance   Topic Date Due     ZOSTER IMMUNIZATION (2 of 3) 05/30/2012     DIABETIC FOOT EXAM  10/22/2019     BMP  01/07/2021     LIPID  01/07/2021     MICROALBUMIN  01/07/2021     A1C  06/14/2021     EYE EXAM  10/21/2021     MAMMO SCREENING  10/22/2021     MEDICARE ANNUAL WELLNESS VISIT  12/14/2021     FALL RISK ASSESSMENT  12/14/2021     ADVANCE CARE PLANNING  12/04/2024     DTAP/TDAP/TD IMMUNIZATION (3 - Td) 02/17/2025     COLORECTAL CANCER SCREENING  09/16/2026     DEXA  01/03/2033     HEPATITIS C SCREENING  Completed     PHQ-2  Completed     INFLUENZA VACCINE  Completed     Pneumococcal Vaccine: 65+ Years  Completed     Pneumococcal Vaccine: Pediatrics (0 to 5 Years) and At-Risk Patients (6 to 64 Years)  Aged Out     IPV IMMUNIZATION  Aged Out     MENINGITIS IMMUNIZATION  Aged Out     Pneumonia Vaccine:Adults age 65+ who received their first dose of Pneumovax (PPSV23) prior to age 65 years: Should be given PCV 13 > 1 year after their most recent PPSV23 AND should be given a another dose of PPSV23 > 5 years after their most recent dose of PPSV23  Mammogram Screening: Mammogram Screening: Patient over age 50, mutual decision to screen reflected in health maintenance.    ROS:  CONSTITUTIONAL: NEGATIVE for fever, chills, change in weight  INTEGUMENTARY/SKIN: NEGATIVE for worrisome rashes, moles or lesions  EYES: NEGATIVE for vision changes or irritation  ENT/MOUTH: NEGATIVE for ear, mouth and throat problems  RESP: NEGATIVE for significant cough or SOB  BREAST: NEGATIVE for masses, tenderness or discharge  CV: NEGATIVE for chest pain, palpitations or peripheral edema  GI: NEGATIVE for nausea, abdominal pain, heartburn, or change in bowel habits  : NEGATIVE for frequency, dysuria, or hematuria  MUSCULOSKELETAL: NEGATIVE for significant arthralgias or myalgia  NEURO: NEGATIVE for weakness, dizziness or paresthesias  ENDOCRINE: NEGATIVE  "for temperature intolerance, skin/hair changes  HEME: NEGATIVE for bleeding problems  PSYCHIATRIC: NEGATIVE for changes in mood or affect    OBJECTIVE:   /77 (BP Location: Left arm, Patient Position: Sitting, Cuff Size: Adult Large)   Pulse 80   Temp 98.4  F (36.9  C) (Tympanic)   Resp 16   Wt 88.5 kg (195 lb)   SpO2 96%   BMI 32.45 kg/m   Estimated body mass index is 32.45 kg/m  as calculated from the following:    Height as of 10/22/20: 1.651 m (5' 5\").    Weight as of this encounter: 88.5 kg (195 lb).  EXAM:   GENERAL APPEARANCE: healthy, alert and no distress  EYES: Eyes grossly normal to inspection, PERRL and conjunctivae and sclerae normal  HENT: ear canals and TM's normal  NECK: no adenopathy, no asymmetry, masses, or scars and thyroid normal to palpation  RESP: lungs clear to auscultation - no rales, rhonchi or wheezes  CV: regular rate and rhythm, normal S1 S2, no S3 or S4, no murmur, click or rub, no peripheral edema and peripheral pulses strong  ABDOMEN: soft, nontender, no hepatosplenomegaly, no masses and bowel sounds normal  MS: no musculoskeletal defects are noted and gait is age appropriate without ataxia  SKIN: no suspicious lesions or rashes  NEURO: Normal strength and tone, sensory exam grossly normal, mentation intact and speech normal  PSYCH: mentation appears normal and affect normal/bright    ASSESSMENT / PLAN:   Sarah was seen today for diabetes and physical.    Diagnoses and all orders for this visit:    Encounter for Medicare annual wellness exam  -Up to date on mammogram and colonoscopy  -Aged out of pap testing  -Lipid screen done 1/2020    Diabetes mellitus (H)  - Hemoglobin A1c  -Discussed logging blood sugars and bring to next visit  -Would like to wait until new year to discuss med changes    Eye irritation -left eye s/p cataract surgery 10/2020  -No obvious discharge or redness on visual exam  -Encouraged patient to follow-up with ophthalmology     Patient has been " "advised of split billing requirements and indicates understanding: Yes    COUNSELING:  Reviewed preventive health counseling, as reflected in patient instructions       Regular exercise       Healthy diet/nutrition       Colon cancer screening    Estimated body mass index is 32.45 kg/m  as calculated from the following:    Height as of 10/22/20: 1.651 m (5' 5\").    Weight as of this encounter: 88.5 kg (195 lb).      She reports that she quit smoking about 40 years ago. She has never used smokeless tobacco.    Appropriate preventive services were discussed with this patient, including applicable screening as appropriate for cardiovascular disease, diabetes, osteopenia/osteoporosis, and glaucoma.  As appropriate for age/gender, discussed screening for colorectal cancer, prostate cancer, breast cancer, and cervical cancer. Checklist reviewing preventive services available has been given to the patient.    Reviewed patients plan of care and provided an AVS. The Basic Care Plan (routine screening as documented in Health Maintenance) for Sarah meets the Care Plan requirement. This Care Plan has been established and reviewed with the Patient.    Counseling Resources:  ATP IV Guidelines  Pooled Cohorts Equation Calculator  Breast Cancer Risk Calculator  BRCA-Related Cancer Risk Assessment: FHS-7 Tool  FRAX Risk Assessment  ICSI Preventive Guidelines  Dietary Guidelines for Americans, 2010  USDA's MyPlate  ASA Prophylaxis  Lung CA Screening    Remington Browning DO  [facility]  "

## 2020-12-14 NOTE — PATIENT INSTRUCTIONS
Patient Education   Personalized Prevention Plan  You are due for the preventive services outlined below.  Your care team is available to assist you in scheduling these services.  If you have already completed any of these items, please share that information with your care team to update in your medical record.  Health Maintenance Due   Topic Date Due     Zoster (Shingles) Vaccine (2 of 3) 05/30/2012     Diabetic Foot Exam  10/22/2019     FALL RISK ASSESSMENT  12/27/2020     Basic Metabolic Panel  01/07/2021     Cholesterol Lab  01/07/2021     Kidney Microalbumin Urine Test  01/07/2021        Patient Education   Personalized Prevention Plan  You are due for the preventive services outlined below.  Your care team is available to assist you in scheduling these services.  If you have already completed any of these items, please share that information with your care team to update in your medical record.  Health Maintenance Due   Topic Date Due     Zoster (Shingles) Vaccine (2 of 3) 05/30/2012     Diabetic Foot Exam  10/22/2019     FALL RISK ASSESSMENT  12/27/2020     Basic Metabolic Panel  01/07/2021     Cholesterol Lab  01/07/2021     Kidney Microalbumin Urine Test  01/07/2021        Patient Education   Personalized Prevention Plan  You are due for the preventive services outlined below.  Your care team is available to assist you in scheduling these services.  If you have already completed any of these items, please share that information with your care team to update in your medical record.  Health Maintenance Due   Topic Date Due     Zoster (Shingles) Vaccine (2 of 3) 05/30/2012     Diabetic Foot Exam  10/22/2019     FALL RISK ASSESSMENT  12/27/2020     Basic Metabolic Panel  01/07/2021     Cholesterol Lab  01/07/2021     Kidney Microalbumin Urine Test  01/07/2021        Patient Education   Personalized Prevention Plan  You are due for the preventive services outlined below.  Your care team is available to assist  you in scheduling these services.  If you have already completed any of these items, please share that information with your care team to update in your medical record.  Health Maintenance Due   Topic Date Due     Zoster (Shingles) Vaccine (2 of 3) 05/30/2012     Diabetic Foot Exam  10/22/2019     FALL RISK ASSESSMENT  12/27/2020     Basic Metabolic Panel  01/07/2021     Cholesterol Lab  01/07/2021     Kidney Microalbumin Urine Test  01/07/2021

## 2021-01-20 ENCOUNTER — OFFICE VISIT (OUTPATIENT)
Dept: FAMILY MEDICINE | Facility: CLINIC | Age: 76
End: 2021-01-20
Payer: COMMERCIAL

## 2021-01-20 VITALS
HEIGHT: 65 IN | TEMPERATURE: 98.4 F | RESPIRATION RATE: 16 BRPM | BODY MASS INDEX: 32.82 KG/M2 | WEIGHT: 197 LBS | DIASTOLIC BLOOD PRESSURE: 76 MMHG | SYSTOLIC BLOOD PRESSURE: 113 MMHG | OXYGEN SATURATION: 94 % | HEART RATE: 77 BPM

## 2021-01-20 DIAGNOSIS — E11.9 TYPE 2 DIABETES, HBA1C GOAL < 7% (H): Primary | ICD-10-CM

## 2021-01-20 PROCEDURE — 99213 OFFICE O/P EST LOW 20 MIN: CPT | Performed by: FAMILY MEDICINE

## 2021-01-20 ASSESSMENT — MIFFLIN-ST. JEOR: SCORE: 1399.47

## 2021-01-20 NOTE — PROGRESS NOTES
"  Assessment & Plan     Type 2 diabetes, HbA1c goal < 7% (H) -uncontrolled  Had long discussion about medication management. Patient adamant with starting meal-time insulin along with staying on current regimen of Lantus and glimepiride.   Plan to start patient on 4 units of aspart daily with first meal of day, monitor pre and 2-hour post-prandial blood sugars twice a day along with am fasting blood sugars.   - MED THERAPY MANAGE REFERRAL -for further medication management. Discussed patient's case with [facility] pharmacist.  - insulin aspart (NOVOLOG VIAL) 100 UNITS/ML vial  Dispense: 2.4 mL; Refill: 0  - insulin syringe-needle U-100 (31G X 5/16\" 0.3 ML) 31G X 5/16\" 0.3 ML miscellaneous  Dispense: 90 each; Refill: 0  -Follow-up in clinic in one month  -Prior visits, labs and records reviewed.     25 minutes spent on the date of the encounter doing chart review, history and exam, documentation and further activities as noted above    No follow-ups on file.    [facility]    Noelle Smith is a 75 year old who presents to clinic today for the following health issues:    ZITA Smith is a new patient to me. She was follow-up with Dr. Okeefe up until the last few months. She has a history of type 2 diabetes. Patient here for diabetic follow-up. Recent A1c 9.4% (12/14/20) from 8.1% (9/21/20).  Has been on metformin in the past but this has cause GI discomfort and discontinued. Currently takes 51 units of Lantus daily and glimepiride 4 mg BID with meals. Fasting blood sugars range anywhere from 120s to 200. She endorses eating several small meals a day vs 3 full meals due to her acid reflux. She would like to start on a short-acting insulin to better regulate her blood sugars. Was on basal-bolus regimen in 2019 during her say at a rehab facility, states sugars were better then.     Diabetes Follow-up    How often are you checking your blood sugar? Two times daily  Blood " "sugar testing frequency justification:  Uncontrolled diabetes  What time of day are you checking your blood sugars (select all that apply)?  Before and after meals  Have you had any blood sugars above 200?  Yes   Have you had any blood sugars below 70?  No    What symptoms do you notice when your blood sugar is low?  None    What concerns do you have today about your diabetes?      Do you have any of these symptoms? (Select all that apply)  No numbness or tingling in feet.  No redness, sores or blisters on feet.  No complaints of excessive thirst.  No reports of blurry vision.  No significant changes to weight.      BP Readings from Last 2 Encounters:   01/20/21 113/76   12/14/20 120/77     Hemoglobin A1C (%)   Date Value   12/14/2020 9.4 (H)   09/21/2020 8.1 (H)     LDL Cholesterol Calculated (mg/dL)   Date Value   01/07/2020 63   10/22/2018 77                 How many servings of fruits and vegetables do you eat daily?  2-3    On average, how many sweetened beverages do you drink each day (Examples: soda, juice, sweet tea, etc.  Do NOT count diet or artificially sweetened beverages)?   0    How many days per week do you miss taking your medication? 0      Review of Systems   CONSTITUTIONAL: NEGATIVE for fever, chills, change in weight  EYES: NEGATIVE for vision changes or irritation  RESP: NEGATIVE for significant cough or SOB  CV: NEGATIVE for chest pain, palpitations or peripheral edema  NEURO: NEGATIVE for weakness, dizziness or paresthesias      Objective    /76 (BP Location: Left arm, Patient Position: Sitting, Cuff Size: Adult Large)   Pulse 77   Temp 98.4  F (36.9  C) (Tympanic)   Resp 16   Ht 1.651 m (5' 5\")   Wt 89.4 kg (197 lb)   SpO2 94%   BMI 32.78 kg/m    Body mass index is 32.78 kg/m .  Physical Exam   GENERAL: healthy, alert and no distress  EYES: Eyes grossly normal to inspection,  conjunctivae and sclerae normal  RESP: lungs clear to auscultation - no rales, rhonchi or wheezes  CV: " regular rate and rhythm, normal S1 S2, no S3 or S4, no murmur, click or rub, no peripheral edema and peripheral pulses strong  NEURO: Normal strength and tone, mentation intact and speech normal  PSYCH: mentation appears normal, affect normal/bright

## 2021-01-20 NOTE — PATIENT INSTRUCTIONS
Patient Education     Understanding Carbohydrates    A car needs the right type of fuel to run. And you need the right kind of food to function. To keep your energy level up, your body needs food that has carbohydrates. But carbs raise blood sugar levels higher and faster than other kinds of food. Your dietitian will work with you to figure out the amount of carbs you need. Carbs come in 3 types: starches, sugars, and fiber.  Starches  Starches are found in grains, some vegetables, and beans. Grain products include bread, pasta, cereal, and tortillas. Starchy vegetables include potatoes, peas, corn, lima beans, yams, and squash. Kidney beans, vaughan beans, black beans, garbanzo beans, and lentils also have starches.  Sugars  Sugars are found naturally in many foods. Or they can be added. Foods that contain natural sugar include fruits and fruit juices, dairy products, honey, and molasses. Added sugars are found in most desserts, processed foods, candy, regular soda, and fruit drinks. These are very helpful to treat low blood sugar (hypoglycemia). They give you sugar quickly. Try to keep at least 15 to 20 grams of these simple sugars with you at all times. Eat or drink these if you start to have symptoms of low blood sugar.  Fiber  Fiber comes from plant foods. Your body can't digest most fiber. Instead of raising blood sugar levels like other carbs, fiber stops blood sugar from rising too fast. Fiber is found in fruits, vegetables, whole grains, beans, peas, and many nuts.  Carb counting  Keep track of the amount of carbs you eat. This can help you keep the right balance of carbs, physical activity, and medicine. The amount of carbs you need will be different from what other people need. How much you need depends on many things. These include your health, the medicines you take, and how active you are. Your healthcare team will help you figure out the right amount of carbs for you. You may start with 45 to 60 grams  of carbs per meal, depending on your case. Carb counting is a system that helps you keep track of the carbohydrates you eat at each meal.  Carbs come from a variety of foods. These include grains, starchy vegetables, fruit, milk, beans, and snack foods. You can either count carbohydrate grams or carbohydrate servings. When you count carbohydrate servings, 1 carbohydrate serving = 15 grams of carbohydrates.  Here are some examples of foods that have about 15 grams of carbohydrates (1 serving of carbohydrates):    1/2 cup of canned or frozen fruit    A small piece of fresh fruit (4 ounces)    1 slice of bread    1/2 cup of oatmeal    1/3 cup of rice    4 to 6 crackers    1/2 English muffin    1/2 cup of black beans    1/4 of a large baked potato (3 ounces)    2/3 cup of plain fat-free yogurt    1 cup of soup    1/2 cup of casserole    6 chicken nuggets    2-inch-square brownie or cake without frosting    2 small cookies    1/2 cup of ice cream or sherbet  Carb counting is easier when food labels are available. Look at the label to see how many grams of total carbohydrates per serving the food contains. Then you can figure out how much you should eat. If your food doesn't have a nutrition label, you should be able to get an idea how many carbs there are per serving by using a book or website.  Two very important lines to look at on the label are the serving size and the total carbohydrate amount per serving. Here are some tips for using food labels to count your carbs:    Check the serving size. The information on the label is based on that serving size. If you eat more than the listed serving size, you may have to double or triple the other information on the label.     Check the total grams of carbohydrates. Total carbohydrate from the label includes sugar, starch, and fiber. Be sure to use the total carbohydrate number and not sugar alone.    Know how many grams of carbs you can have.  Be familiar with the matching  portion sizes.    Compare labels. Compare the labels of different products. Look at serving sizes and total carbs to find the products that work best for you.     Don't forget protein and fat. With the focus on carb counting, it might be easy to forget protein and fat in your meals. Don't forget to include sources of protein and healthy fat to balance your meals. Also watch how much salt (sodium) you eat. This is especially true if you have high blood pressure. If you have diabetes, limit the amount of sodium to less than 2,300 mg a day.  It s also important to be consistent with the amount of carbs and time you eat when taking a fixed dose of diabetes medicine. Work with your healthcare provider or dietitian if you need more help. He or she can help you keep track of your carbs. He or she can also help you figure out how many grams of carbs you should have.  [employer] last reviewed this educational content on 9/1/2018 2000-2020 [employer]. [address] [place] [address], [place] [address]. All rights reserved. This information is not intended as a substitute for professional medical care. Always follow your healthcare professional's instructions.

## 2021-01-21 ENCOUNTER — TELEPHONE (OUTPATIENT)
Dept: FAMILY MEDICINE | Facility: CLINIC | Age: 76
End: 2021-01-21

## 2021-01-21 DIAGNOSIS — Z79.4 TYPE 2 DIABETES MELLITUS WITH STAGE 2 CHRONIC KIDNEY DISEASE, WITH LONG-TERM CURRENT USE OF INSULIN (H): ICD-10-CM

## 2021-01-21 DIAGNOSIS — E11.22 TYPE 2 DIABETES MELLITUS WITH STAGE 2 CHRONIC KIDNEY DISEASE, WITH LONG-TERM CURRENT USE OF INSULIN (H): ICD-10-CM

## 2021-01-21 DIAGNOSIS — N18.2 TYPE 2 DIABETES MELLITUS WITH STAGE 2 CHRONIC KIDNEY DISEASE, WITH LONG-TERM CURRENT USE OF INSULIN (H): ICD-10-CM

## 2021-01-21 RX ORDER — BLOOD SUGAR DIAGNOSTIC
STRIP MISCELLANEOUS
Qty: 90 EACH | Refills: 0 | Status: SHIPPED | OUTPATIENT
Start: 2021-01-21 | End: 2021-09-27

## 2021-01-21 RX ORDER — PEN NEEDLE, DIABETIC 32GX 5/32"
NEEDLE, DISPOSABLE MISCELLANEOUS
Qty: 180 EACH | Refills: 1 | Status: SHIPPED | OUTPATIENT
Start: 2021-01-21 | End: 2021-08-09

## 2021-01-21 RX ORDER — PEN NEEDLE, DIABETIC 32GX 5/32"
NEEDLE, DISPOSABLE MISCELLANEOUS
Qty: 180 EACH | Refills: 3 | Status: CANCELLED | OUTPATIENT
Start: 2021-01-21

## 2021-01-21 NOTE — TELEPHONE ENCOUNTER
----- Message from Remington Browning DO sent at 1/21/2021 12:37 PM CST -----  Regarding: Insulin rx  Pls call patient:    Insulin aspart and syringes sent to pharmacy ([facility]). Case discussed with Cruz Lopez ([facility] pharmacist). Start with 4 units of aspart with first meal of the day. Check pre-prandial and 2hr post-prandial blood sugars with first meal of the day, continue to check am fasting. Cruz will plan to meet with patient to adjust insulin and other diabetic education. Follow-up in clinic in 1 month. Bring blood sugar logs to all visits.     Thank you!

## 2021-01-21 NOTE — TELEPHONE ENCOUNTER
Writer called patient, reviewed message per Dr. Browning, along with MTM referral scheduling number.    Patient verbalized understanding and in agreement with plan.    Patient stated about 10 years ago her insurance did not cover MTM visits and they were too costly out of pocket.  Explained to patient part of MTM referral is to check whether insurance covers those visits.    Patient stated insulin pen needle order needs to be sent to [facility].  This testing supply was sent to [facility] but patient would like it sent to local pharmacy.  Order sent.    Patient expressed frustration over phone system and difficulty getting in contact with the clinic.    Writer asked patient if she uses [employer] and patient stated no computer access at this time.    Patient stated she will try calling clinic in future otherwise will have to come into clinic to relay her questions/concerns.    MEGAN MunozN, RN  [facility]

## 2021-01-26 NOTE — PROGRESS NOTES
Medication Therapy Management (MTM) Encounter    ASSESSMENT:                            Medication Adherence/Access: See below for considerations    Type 2 Diabetes: Patient is not meeting A1c goal of < 8%. Self monitoring of blood glucose is not at goal of fasting  mg/dL and post prandial < 180 mg/dL. Patient would benefit from ideal SMBG: Check blood sugars pre-prandial, 2 hours post-prandial, and with symptoms of hypoglycemia, Basal Insulin (Lantus) :  stay on the same dose., Bolus / Rapid Acting Insulin (Novolog) : increase dose to see plan for dosing details, Sulfonylurea (Glimepiride) :  stay on the same dose., Increased exercise, Increase in home glucose monitoring and weight loss recommended-we discussed lower carb foods--most likely she will stay with her pasta and rice as is .     Hypokalemia:  Stable --BUT --needs new potassium formulation --we discussed options --decided to change her to daily 20meq Mtab that is dissolvable in water --then drink that once daily after a meal to help with GI irritation.       PLAN:                            1. Remember to check your blood sugar around 3 times per day     2. Start mealtime insulin today with 4 units three times per day with your three biggest snacks/meals. Goal to keep most blood sugars between 110 and 210. If after one week blood sugars still need improvement, increase by 2 units/dose. Ok to increase up to 10 units/dose up to three times per day over the next several weeks.    3. Start new potassium supplement that dissolves in water and take 20 mEq daily.    It was great to speak with you today.  I value your experience and would be very thankful for your time with providing feedback on our clinic survey. You may receive a survey via email or text message in the next few days.     Next MTM visit: 3/9/21 @ 3 pm for blood sugar review. Please bring your blood sugar meter to the appointment.    SUBJECTIVE/OBJECTIVE:                          Sarah STAPLES  Elvira is a 75 year old female coming in for an initial visit. She was referred to me from Remington Browning  .      Reason for visit: Dm consult/insulin.      Allergies/ADRs: Reviewed in chart  Tobacco: She reports that she quit smoking about 41 years ago. She has never used smokeless tobacco.  Alcohol: 7-9 beverages / week typically wine 1 or 2 glasses/night  Caffeine: 2 cans diet coke sodas/day  Activity: minimal due to recent ankle fracture  Past Medical History: Reviewed in chart  Personal Healthcare Goals: get covid vaccine, limit any falls, improve a1c    Medication Adherence/Access: having difficulty tolerating 2 x day potassium.       Type 2 Diabetes:  Currently taking : glimepiride 8 mg daily (typically using 0.25 tablets - 1 mg with each small meal/snack), Lantus 51 units daily. Trying to start meal time insulin (novolog) to get a1c controlled.  Patient is not experiencing side effects.  Blood sugar monitorin time(s) daily. Ranges (patient reported): See below  181 at noon, experiencing sugars in 200s most days  Symptoms of low blood sugar? sweaty, Frequency of lows- not often (she admits she cannot tolerate bs's <110- if they go lower than that she feels crummy , sweaty. )  Symptoms of high blood sugar? none  Eye exam: up to date  Foot exam: due  Diet: she's maintained similar diet and notes sugars now being elevated  eats 4-5 smaller meals - pasta and rices  Fruit and some fruit juices   Breakfast: cereal 2-3 times per week  Exercise: minimal activity due to recent broken ankle, she was a bit more active prior to breaking the ankle/going through rehab  Aspirin: Taking 162 mg daily and denies side effects  Statin: Yes: atorvastatin 40 mg   ACEi/ARB: Yes: lisinopril 2.5 mg.   Urine Albumin:   Lab Results   Component Value Date    UMALCR 12.82 2020      Lab Results   Component Value Date    A1C 9.4 2020    A1C 8.1 2020    A1C 8.5 2020    A1C 7.6 10/12/2019    A1C 7.5 2019        Hypokalemia:  Patient needs potassium supplement due to concurrent lasix rx -60mg./day . She has been taking 10meq tablet KCL twice daily but has swallowing issues for sure with any potassium pills as well as some GI irritation at times.        Today's Vitals: /82   Pulse 81   Wt 199 lb 9.6 oz (90.5 kg)   SpO2 94%   BMI 33.22 kg/m    ----------------    Medicare Part D topics discussed:Self-monitoring, Recommendations to doctor, Medication changes, Timing of medication and Lifestyle changes    I spent 60 minutes with this patient today (an extra 15 minutes was spent creating the Medication Action Plan). All changes were made via collaborative practice agreement with Remington Browning  . A copy of the visit note was provided to the patient's primary care provider.    The patient was given a summary of these recommendations.     Joan Lopez [facility].  Medication Therapy Management Provider  [phone]  [facility].        Medication Therapy Recommendations  Hypokalemia    Current Medication: potassium chloride ER (KLOR-CON M) 20 MEQ CR tablet   Rationale: Dosage form inappropriate - Ineffective medication - Effectiveness   Recommendation: Change Medication Formulation  - potassium chloride ER 10 MEQ CR tablet - dissolve 1 tab in 4-6 ounces water and drink once daily after a meal.   Status: Accepted per CPA         Type 2 diabetes mellitus without complication, with long-term current use of insulin (H)    Current Medication: insulin aspart (NOVOLOG VIAL) 100 UNITS/ML vial   Rationale: Synergistic therapy - Needs additional medication therapy - Indication   Recommendation: Start Medication - NovoLOG FLEXPEN 100 UNIT/ML soln - start mealtime insulin at 4 units -3 x day before main meals, increase slowly up 10 units -3 x day if needed.   Status: Accepted per CPA

## 2021-01-28 ENCOUNTER — OFFICE VISIT (OUTPATIENT)
Dept: PHARMACY | Facility: CLINIC | Age: 76
End: 2021-01-28
Attending: FAMILY MEDICINE
Payer: COMMERCIAL

## 2021-01-28 VITALS
HEART RATE: 81 BPM | BODY MASS INDEX: 33.22 KG/M2 | OXYGEN SATURATION: 94 % | DIASTOLIC BLOOD PRESSURE: 82 MMHG | WEIGHT: 199.6 LBS | SYSTOLIC BLOOD PRESSURE: 122 MMHG

## 2021-01-28 DIAGNOSIS — E11.22 TYPE 2 DIABETES MELLITUS WITH STAGE 2 CHRONIC KIDNEY DISEASE, WITH LONG-TERM CURRENT USE OF INSULIN (H): Primary | ICD-10-CM

## 2021-01-28 DIAGNOSIS — E87.6 HYPOKALEMIA: ICD-10-CM

## 2021-01-28 DIAGNOSIS — N18.2 TYPE 2 DIABETES MELLITUS WITH STAGE 2 CHRONIC KIDNEY DISEASE, WITH LONG-TERM CURRENT USE OF INSULIN (H): Primary | ICD-10-CM

## 2021-01-28 DIAGNOSIS — Z79.4 TYPE 2 DIABETES MELLITUS WITH STAGE 2 CHRONIC KIDNEY DISEASE, WITH LONG-TERM CURRENT USE OF INSULIN (H): Primary | ICD-10-CM

## 2021-01-28 PROCEDURE — 99607 MTMS BY PHARM ADDL 15 MIN: CPT | Performed by: PHARMACIST

## 2021-01-28 PROCEDURE — 99605 MTMS BY PHARM NP 15 MIN: CPT | Performed by: PHARMACIST

## 2021-01-28 RX ORDER — POTASSIUM CHLORIDE 1500 MG/1
20 TABLET, EXTENDED RELEASE ORAL DAILY
Qty: 90 TABLET | Refills: 1 | Status: SHIPPED | OUTPATIENT
Start: 2021-01-28 | End: 2021-08-09 | Stop reason: SINTOL

## 2021-01-28 NOTE — Clinical Note
Remington--simran--priyank and I saw Sarah today --started her on mealtime insulin and tweaked her dose ; also we changed her potassium to a dissolvable pill for ease of use for her.  6 weeks f/up bs with me.    Chrystal.-Joan/Priyank

## 2021-01-28 NOTE — PATIENT INSTRUCTIONS
Recommendations from today's MTM visit:                                                    1. Remember to check your blood sugar around 3 times per day     2. Start mealtime insulin today with 4 units three times per day with your three biggest snacks/meals. Goal to keep most blood sugars between 110 and 210. If after one week blood sugars still need improvement, increase by 2 units/dose. Ok to increase up to 10 units/dose up to three times per day over the next several weeks.    3. Start new potassium supplement that dissolves in water and take 20 mEq daily.    It was great to speak with you today.  I value your experience and would be very thankful for your time with providing feedback on our clinic survey. You may receive a survey via email or text message in the next few days.     Next MTM visit: 3/9/21 @ 3 pm for blood sugar review. Please bring your blood sugar meter to the appointment.    To schedule another MTM appointment, please call the clinic directly or you may call the MTM scheduling line at [phone] or toll-free at [phone].     My Clinical Pharmacist's contact information:                                                      It was a pleasure talking with you today!  Please feel free to contact me with any questions or concerns you have.      Joan Lopez [facility].  Medication Therapy Management Provider  [phone]    Sameer Sotelo  [facility].

## 2021-02-03 NOTE — TELEPHONE ENCOUNTER
I apologized to patient because I cannot find any call that we placed to her today.  She sounded frustrated because she had been on hold quite a long time.  I looked through chart but could not find a call out.  I asked her if there was something I could help her with, but she just wanted to know why someone had called her.    Flory Woods RN    
complains of pain/discomfort

## 2021-02-16 DIAGNOSIS — I10 ESSENTIAL HYPERTENSION: ICD-10-CM

## 2021-02-17 RX ORDER — METOPROLOL TARTRATE 25 MG/1
TABLET, FILM COATED ORAL
Qty: 180 TABLET | Refills: 3 | Status: SHIPPED | OUTPATIENT
Start: 2021-02-17 | End: 2022-01-03

## 2021-03-05 ENCOUNTER — IMMUNIZATION (OUTPATIENT)
Dept: NURSING | Facility: CLINIC | Age: 76
End: 2021-03-05
Payer: COMMERCIAL

## 2021-03-05 PROCEDURE — 0001A PR COVID VAC PFIZER DIL RECON 30 MCG/0.3 ML IM: CPT

## 2021-03-05 PROCEDURE — 91300 PR COVID VAC PFIZER DIL RECON 30 MCG/0.3 ML IM: CPT

## 2021-03-07 NOTE — PROGRESS NOTES
Medication Therapy Management (MTM) Encounter    ASSESSMENT:                            Medication Adherence/Access: See below for considerations    Type 2 Diabetes: Patient is not meeting A1c goal of < 8%. Self monitoring of blood glucose is not at goal of fasting  mg/dL and post prandial < 180 mg/dL. Patient would benefit from ideal SMBG: Check blood sugars pre-prandial, 2 hours post-prandial, and with symptoms of hypoglycemia, Basal Insulin (Lantus) :  stay on the same dose., Bolus / Rapid Acting Insulin (Novolog) : Change doage form to pen , remind her to discard pen in use after 28 days , dosing wise - start 3 units -3 x day before biggest grazing meals of the day --then slowly increase up to 10 units -3 x day before meals if needed.   , Sulfonylurea (Glimepiride) :  stay on the same dose., Increased exercise, Increase in home glucose monitoring and weight loss recommended-we discussed lower carb foods--most likely she will stay with her pasta and rice as is .     Hypokalemia:  Stable --but still working on dissolving her pill 100% in 4 ounces liquid -then swallow it --see plan for details.       PLAN:                            1. FYI--with regards to the potassium pill--see if you can break it up a bit in your glass of water so it's all dissolved -then drink it .  2. FYI--Blood sugars last 7 days --improving , your not having any low blood sugars ,  Lets have you inject the mealtime insulin Novolog -3 x day - start with 3 units and slowly work up to a maximum of 10 units - 3 x day . Keep Lantus at 51 units daily at midnight.    Also- lets change your Novolog from vials to pens --as once you opened or used either a vial or pen --you have to discard that pen or vial after 28 days .     Do your best with reducing carbs/sugars in daily diet if so desired.     It was great to speak with you today.  I value your experience and would be very thankful for your time with providing feedback on our clinic survey.  You may receive a survey via email or text message in the next few days.     Next MTM visit:  6 weeks blood sugar review -  at 2pm.     SUBJECTIVE/OBJECTIVE:                          Sarah Felix is a 75 year old female coming in for a follow-up (21) visit. She was referred to me from Remington Browning  .      Reason for visit:  BS and K+ review.       Allergies/ADRs: Reviewed in chart  Tobacco: She reports that she quit smoking about 41 years ago. She has never used smokeless tobacco.  Alcohol: 7-9 beverages / week typically wine 1 or 2 glasses/night  Caffeine: 2 cans diet coke sodas/day  Activity: minimal due to recent ankle fracture  Past Medical History: Reviewed in chart  Personal Healthcare Goals: get covid vaccine, limit any falls, improve a1c    Medication Adherence/Access: having difficulty tolerating 2 x day potassium.       Type 2 Diabetes:  Currently taking : glimepiride 4 mg.  Tablet twice daily ,  Lantus 51 units daily at midnight daily. She did start Novolog vial - inject 10 units once /day in am before snack meal of the day , maybe a second time later in day if she has high carb meal.   Patient is not experiencing side effects.  SMB days =177 n=9, 14 days = 195 n=17, 30 days = 187 n=33        Date FBG/ 2hours post Lunch/2hours post Dinner /2hours post    3-9-21 169 7;04am 128 1;41pm     3-8 179 9am 155 2;34pm     3-7 154 10;00am      3-6 260 10:11am(drank OJ)      3-5 164 9:18am      3-4 188 11;01am       3-3 196 11;42am                Symptoms of low blood sugar? sweaty, Frequency of lows- not often (she admits she cannot tolerate bs's <110- if they go lower than that she feels crummy , sweaty. )  Symptoms of high blood sugar? none  Eye exam: up to date  Foot exam: due  Diet: she's maintained similar diet and notes sugars now being elevated  eats 4-5 smaller meals - pasta and rices  Fruit and some fruit juices   Breakfast: cereal 2-3 times per week, drink 2 glasses OJ  or grapefruit juice /day .   Exercise: minimal activity due to recent broken ankle, she was a bit more active prior to breaking the ankle/going through rehab  Aspirin: Taking 162 mg daily and denies side effects  Statin: Yes: atorvastatin 40 mg   ACEi/ARB: Yes: lisinopril 2.5 mg.   Urine Albumin:   Lab Results   Component Value Date    UMALCR 12.82 01/07/2020      Lab Results   Component Value Date    A1C 9.4 12/14/2020    A1C 8.1 09/21/2020    A1C 8.5 01/07/2020    A1C 7.6 10/12/2019    A1C 7.5 08/19/2019       Hypokalemia:  Patient needs potassium supplement due to concurrent lasix rx -60mg./day . She has been taking 10meq tablet KCL twice daily but has swallowing issues for sure with any potassium pills as well as some GI irritation at times.  We did start her on 20meq dissolvable pill --she is working on this but notes when pill dissolves to bottom of the glass --still fiddling to try and get the entire pill in her.         Today's Vitals: /72   Pulse 80   Wt 196 lb 3.2 oz (89 kg)   SpO2 92%   BMI 32.65 kg/m    ----------------    Medicare Part D topics discussed:Recommendations to doctor, Medication changes, Timing of medication and Lifestyle changes    I spent 30 minutes with this patient today. All changes were made via collaborative practice agreement with Remington Browning  . A copy of the visit note was provided to the patient's primary care provider.    The patient was given a summary of these recommendations.     Joan Lopez [facility].  Medication Therapy Management Provider  [phone]         Medication Therapy Recommendations  Hypokalemia    Current Medication: potassium chloride ER (KLOR-CON M) 20 MEQ CR tablet   Rationale: Does not understand instructions - Adherence - Adherence   Recommendation: Provide Education - potassium chloride ER 20 MEQ CR tablet - dissolve 1 pill entirely in 4 ounces water --then drink once daily.   Status: Accepted per CPA         Type 2 diabetes mellitus with  hyperglycemia (H)    Current Medication: insulin aspart (NOVOLOG PEN) 100 UNIT/ML pen   Rationale: Frequency inappropriate - Dosage too low - Effectiveness   Recommendation: Increase Frequency - NovoLOG FLEXPEN 100 UNIT/ML soln - inject 3-10 units -3 x day as directed , discard unused pen or vial after opening it after 28 days .   Status: Accepted per CPA

## 2021-03-09 ENCOUNTER — OFFICE VISIT (OUTPATIENT)
Dept: PHARMACY | Facility: CLINIC | Age: 76
End: 2021-03-09
Payer: COMMERCIAL

## 2021-03-09 VITALS
BODY MASS INDEX: 32.65 KG/M2 | SYSTOLIC BLOOD PRESSURE: 122 MMHG | DIASTOLIC BLOOD PRESSURE: 72 MMHG | WEIGHT: 196.2 LBS | OXYGEN SATURATION: 92 % | HEART RATE: 80 BPM

## 2021-03-09 DIAGNOSIS — N18.2 TYPE 2 DIABETES MELLITUS WITH STAGE 2 CHRONIC KIDNEY DISEASE, WITH LONG-TERM CURRENT USE OF INSULIN (H): Primary | ICD-10-CM

## 2021-03-09 DIAGNOSIS — Z79.4 TYPE 2 DIABETES MELLITUS WITH STAGE 2 CHRONIC KIDNEY DISEASE, WITH LONG-TERM CURRENT USE OF INSULIN (H): Primary | ICD-10-CM

## 2021-03-09 DIAGNOSIS — E87.6 HYPOKALEMIA: ICD-10-CM

## 2021-03-09 DIAGNOSIS — E11.22 TYPE 2 DIABETES MELLITUS WITH STAGE 2 CHRONIC KIDNEY DISEASE, WITH LONG-TERM CURRENT USE OF INSULIN (H): Primary | ICD-10-CM

## 2021-03-09 PROCEDURE — 99606 MTMS BY PHARM EST 15 MIN: CPT | Performed by: PHARMACIST

## 2021-03-09 PROCEDURE — 99607 MTMS BY PHARM ADDL 15 MIN: CPT | Performed by: PHARMACIST

## 2021-03-09 NOTE — Clinical Note
Lino-she is starting to use mealtime insulin Novolog --I adjusted dose and frequency today -- will recheck bs's in 6 weeks.    Chrystal.-samuel
Detail Level: Zone

## 2021-03-09 NOTE — PATIENT INSTRUCTIONS
Recommendations from today's MTM visit:                                                       1. FYI--with regards to the potassium pill--see if you can break it up a bit in your glass of water so it's all dissolved -then drink it .  2. FYI--Blood sugars last 7 days --improving , your not having any low blood sugars ,  Lets have you inject the mealtime insulin Novolog -3 x day - start with 3 units and slowly work up to a maximum of 10 units - 3 x day . Keep Lantus at 51 units daily at midnight.    Also- lets change your Novolog from vials to pens --as once you opened or used either a vial or pen --you have to discard that pen or vial after 28 days .     Do your best with reducing carbs/sugars in daily diet if so desired.     It was great to speak with you today.  I value your experience and would be very thankful for your time with providing feedback on our clinic survey. You may receive a survey via email or text message in the next few days.     Next MTM visit:  6 weeks blood sugar review - April 20th -2021 at 2pm.     To schedule another MTM appointment, please call the clinic directly or you may call the MTM scheduling line at [phone] or toll-free at [phone].     My Clinical Pharmacist's contact information:                                                      It was a pleasure talking with you today!  Please feel free to contact me with any questions or concerns you have.      Joan Lopez Rph.  Medication Therapy Management Provider  [phone]

## 2021-03-26 ENCOUNTER — IMMUNIZATION (OUTPATIENT)
Dept: NURSING | Facility: CLINIC | Age: 76
End: 2021-03-26
Attending: INTERNAL MEDICINE
Payer: COMMERCIAL

## 2021-03-26 PROCEDURE — 0002A PR COVID VAC PFIZER DIL RECON 30 MCG/0.3 ML IM: CPT

## 2021-03-26 PROCEDURE — 91300 PR COVID VAC PFIZER DIL RECON 30 MCG/0.3 ML IM: CPT

## 2021-04-03 DIAGNOSIS — E11.9 TYPE 2 DIABETES, HBA1C GOAL < 7% (H): ICD-10-CM

## 2021-04-05 RX ORDER — GLIMEPIRIDE 4 MG/1
TABLET ORAL
Qty: 180 TABLET | Refills: 3 | Status: SHIPPED | OUTPATIENT
Start: 2021-04-05 | End: 2022-04-13

## 2021-04-05 NOTE — TELEPHONE ENCOUNTER
Routing to PCP to advise  Pt not seen at [facility]  Melodie REYES RN

## 2021-04-12 ENCOUNTER — TELEPHONE (OUTPATIENT)
Dept: FAMILY MEDICINE | Facility: CLINIC | Age: 76
End: 2021-04-12

## 2021-04-12 DIAGNOSIS — E78.5 HYPERLIPIDEMIA LDL GOAL <100: ICD-10-CM

## 2021-04-12 DIAGNOSIS — N18.2 TYPE 2 DIABETES MELLITUS WITH STAGE 2 CHRONIC KIDNEY DISEASE, WITH LONG-TERM CURRENT USE OF INSULIN (H): ICD-10-CM

## 2021-04-12 DIAGNOSIS — E11.22 TYPE 2 DIABETES MELLITUS WITH STAGE 2 CHRONIC KIDNEY DISEASE, WITH LONG-TERM CURRENT USE OF INSULIN (H): ICD-10-CM

## 2021-04-12 DIAGNOSIS — Z79.4 TYPE 2 DIABETES MELLITUS WITH STAGE 2 CHRONIC KIDNEY DISEASE, WITH LONG-TERM CURRENT USE OF INSULIN (H): ICD-10-CM

## 2021-04-12 RX ORDER — ATORVASTATIN CALCIUM 40 MG/1
40 TABLET, FILM COATED ORAL DAILY
Qty: 90 TABLET | Refills: 1 | Status: SHIPPED | OUTPATIENT
Start: 2021-04-12 | End: 2021-09-02

## 2021-04-12 RX ORDER — INSULIN GLARGINE 100 [IU]/ML
50-60 INJECTION, SOLUTION SUBCUTANEOUS AT BEDTIME
Qty: 60 ML | Refills: 3 | Status: SHIPPED | OUTPATIENT
Start: 2021-04-12 | End: 2023-05-23

## 2021-04-12 NOTE — TELEPHONE ENCOUNTER
4-12-21      mtm called patient -- she verified lantus is 5-60 units at hs daily --wants 4 boxes ordered , also needs refill on Atorvastatin --mtm will approve both as she will see me next week on 4-20-21 for lipid lab recheck.    Joan Lopez [facility].  Medication Therapy Management Provider  [phone]

## 2021-04-12 NOTE — TELEPHONE ENCOUNTER
Cruz, patient is calling for a refill of her Lantus solostar and Lipitor. She said the pharmacy said we refused these med but it looks like we never received a request for these from the pharmacy, so I called the pharmacy to find out where they are sending the requests and I have been on hold with them for 25 minutes now and I cannot wait longer on the phone for them. Please sign off on meds if ok and she is due for a lipid panel.    Thank you

## 2021-04-19 NOTE — PROGRESS NOTES
Medication Therapy Management (MTM) Encounter    ASSESSMENT:                            Medication Adherence/Access: See below for considerations    Type 2 Diabetes: Patient is not meeting A1c goal of < 8%. Self monitoring of blood glucose is not at goal of fasting  mg/dL and post prandial < 180 mg/dL. Patient would benefit from ideal SMBG: Check blood sugars more often because that triggers a second Novolog injection , pre-prandial, 2 hours post-prandial, and with symptoms of hypoglycemia, Basal Insulin (Lantus) :  stay on the same dose., Bolus / Rapid Acting Insulin (Novolog) : Inject 3-10 units days at each main meal up to 3 x day --see plan for details- start 3 units -3 x day before biggest grazing meals of the day --then slowly increase up to 10 units -3 x day before meals if needed.   , Sulfonylurea (Glimepiride) :  stay on the same dose., Increased exercise, Increase in home glucose monitoring-see plan  and weight loss recommended-we discussed lower carb foods--most likely she will stay with her pasta and rice as is .     Hypokalemia:  Stable --new dissolvable pill working better.       PLAN:                            1. FYI- Your blood sugars are much improved since you added in the Novolog meal time insulin . Continue your Glimepiride and lantus as is , strive for injecting the Novolog insulin twice daily everyday of the week. It's Ok to inject your novolog right at the end of a snack or a meal . But the key is to check your blood sugar at least 2-3 x day and then decide         Follow-up:  Tuesday May 18th -2021 at 2pm. Please bring blood sugar meter to your visit.     SUBJECTIVE/OBJECTIVE:                          Sarah Felix is a 75 year old female coming in for a follow-up (3-9-21) visit. She was referred to me from Remington Browning  .      Reason for visit:  4 weeks BS review post log insulin start .      Allergies/ADRs: Reviewed in chart  Tobacco: She reports that she quit smoking about 41  years ago. She has never used smokeless tobacco.  Alcohol: 7-9 beverages / week typically wine 1 or 2 glasses/night  Caffeine: 2 cans diet coke sodas/day  Activity: minimal due to recent ankle fracture  Past Medical History: Reviewed in chart  Personal Healthcare Goals: get covid vaccine, limit any falls, improve a1c    Medication Adherence/Access:  No issues other than late delivery insulins from her mail order.       Type 2 Diabetes:  Currently taking : glimepiride 4 mg.  Tablet twice daily ,  Lantus 51 units daily at midnight daily. She did start Novolog vial - inject 10 units twice /day in am before biggest snack meal of the day on most days but admits some days(1/3 of the time) just one log insulin injection.   Patient is not experiencing side effects.    SMBG: see chart below.     7 days avg.= 168 n=9  14 days = 168 n=16  30 days = 163 n=33        Date FBG/ 2hours post Lunch/2hours post Dinner /2hours post    4-20-21 181  7:02am  Ate 1/4 cup potato salad /208 12;25 pm -she drank OJ before hand     4-19-21 179 11;58am   109 12;01am   4-18-21 95  8;06am(she felt ok ) she skipped her novolog dose then.   She never injects a Novolog insulin dose without checking her blood sugar before hand !    4-17-21 168 10;51am      4-16-21 201 8am      4-15-21  195  12;30pm     4-14-21  172 2;35pm           7 days =177 n=9, 14 days = 195 n=17, 30 days = 187 n=33        Date FBG/ 2hours post Lunch/2hours post Dinner /2hours post    3-9-21 169 7;04am 128 1;41pm     3-8 179 9am 155 2;34pm     3-7 154 10;00am      3-6 260 10:11am(drank OJ)      3-5 164 9:18am      3-4 188 11;01am       3-3 196 11;42am                Symptoms of low blood sugar? sweaty, Frequency of lows- not often (she admits she cannot tolerate bs's <110- if they go lower than that she feels crummy , sweaty. )  Symptoms of high blood sugar? none  Eye exam: up to date  Foot exam: due  Diet: she's maintained similar diet and notes sugars now being elevated  eats  4-5 smaller meals - pasta and rices  Fruit and some fruit juices   Breakfast: cereal 2-3 times per week, drink 2 glasses OJ or grapefruit juice /day .   Exercise: minimal activity due to recent broken ankle, she was a bit more active prior to breaking the ankle/going through rehab  Aspirin: Taking 162 mg daily and denies side effects  Statin: Yes: atorvastatin 40 mg   ACEi/ARB: Yes: lisinopril 2.5 mg.   Urine Albumin:   Lab Results   Component Value Date    UMALCR 12.82 01/07/2020      Lab Results   Component Value Date    A1C 9.4 12/14/2020    A1C 8.1 09/21/2020    A1C 8.5 01/07/2020    A1C 7.6 10/12/2019    A1C 7.5 08/19/2019       Hypokalemia:  Patient reports the dissolvable potassium pill is working much better .   Potassium   Date Value Ref Range Status   01/07/2020 4.0 3.4 - 5.3 mmol/L Final           Today's Vitals: /80   Pulse 84   Wt 199 lb 12.8 oz (90.6 kg)   SpO2 94%   BMI 33.25 kg/m    ----------------        I spent 30 minutes with this patient today. All changes were made via collaborative practice agreement with Remington Browning  . A copy of the visit note was provided to the patient's primary care provider.    The patient was given a summary of these recommendations.     Joan Lopez R[facility].  Medication Therapy Management Provider  [phone]       Medication Therapy Recommendations  Type 2 diabetes mellitus without complication, with long-term current use of insulin (H)    Current Medication: blood glucose (ONETOUCH ULTRA) test strip   Rationale: Frequency inappropriate - Dosage too low - Effectiveness   Recommendation: Increase Frequency - blood glucose test strip - test blood sugars 2-3 x day as directed.   Status: Accepted per CPA          Current Medication: insulin aspart (NOVOLOG PEN) 100 UNIT/ML pen   Rationale: Frequency inappropriate - Dosage too low - Effectiveness   Recommendation: Increase Frequency - NovoLOG FLEXPEN 100 UNIT/ML soln - increase frequency to 2-3 x day as  directed.   Status: Accepted per CPA

## 2021-04-20 ENCOUNTER — OFFICE VISIT (OUTPATIENT)
Dept: PHARMACY | Facility: CLINIC | Age: 76
End: 2021-04-20
Payer: COMMERCIAL

## 2021-04-20 VITALS
HEART RATE: 84 BPM | WEIGHT: 199.8 LBS | OXYGEN SATURATION: 94 % | SYSTOLIC BLOOD PRESSURE: 136 MMHG | BODY MASS INDEX: 33.25 KG/M2 | DIASTOLIC BLOOD PRESSURE: 80 MMHG

## 2021-04-20 DIAGNOSIS — E11.22 TYPE 2 DIABETES MELLITUS WITH STAGE 2 CHRONIC KIDNEY DISEASE, WITH LONG-TERM CURRENT USE OF INSULIN (H): Primary | ICD-10-CM

## 2021-04-20 DIAGNOSIS — Z79.4 TYPE 2 DIABETES MELLITUS WITH STAGE 2 CHRONIC KIDNEY DISEASE, WITH LONG-TERM CURRENT USE OF INSULIN (H): Primary | ICD-10-CM

## 2021-04-20 DIAGNOSIS — N18.2 TYPE 2 DIABETES MELLITUS WITH STAGE 2 CHRONIC KIDNEY DISEASE, WITH LONG-TERM CURRENT USE OF INSULIN (H): Primary | ICD-10-CM

## 2021-04-20 DIAGNOSIS — E87.6 HYPOKALEMIA: ICD-10-CM

## 2021-04-20 PROCEDURE — 99607 MTMS BY PHARM ADDL 15 MIN: CPT | Performed by: PHARMACIST

## 2021-04-20 PROCEDURE — 99606 MTMS BY PHARM EST 15 MIN: CPT | Performed by: PHARMACIST

## 2021-04-20 NOTE — PATIENT INSTRUCTIONS
Recommendations from today's MTM visit:                                                       1. FYI- Your blood sugars are much improved since you added in the Novolog meal time insulin . Continue your Glimepiride and lantus as is , strive for injecting the Novolog insulin twice daily everyday of the week. It's Ok to inject your novolog right at the end of a snack or a meal . But the key is to check your blood sugar at least 2-3 x day and then decide         Follow-up:  Tuesday May 18th -2021 at 2pm. Please bring blood sugar meter to your visit.       It was great to speak with you today.  I value your experience and would be very thankful for your time with providing feedback on our clinic survey. You may receive a survey via email or text message in the next few days.     To schedule another MTM appointment, please call the clinic directly or you may call the MTM scheduling line at [phone] or toll-free at [phone].     My Clinical Pharmacist's contact information:                                                      Please feel free to contact me with any questions or concerns you have.      Joan Lopez Rph.  Medication Therapy Management Provider  [phone]

## 2021-05-05 ENCOUNTER — TELEPHONE (OUTPATIENT)
Dept: PHARMACY | Facility: CLINIC | Age: 76
End: 2021-05-05

## 2021-05-05 DIAGNOSIS — E11.9 TYPE 2 DIABETES MELLITUS WITHOUT COMPLICATION, WITHOUT LONG-TERM CURRENT USE OF INSULIN (H): ICD-10-CM

## 2021-05-05 DIAGNOSIS — E11.9 TYPE 2 DIABETES, HBA1C GOAL < 7% (H): ICD-10-CM

## 2021-05-05 RX ORDER — LANCETS 30 GAUGE
1 EACH MISCELLANEOUS 4 TIMES DAILY
Qty: 400 EACH | Refills: 3 | Status: SHIPPED | OUTPATIENT
Start: 2021-05-05

## 2021-05-05 NOTE — TELEPHONE ENCOUNTER
5-5-21      Pt. Calls in need of [facility] mail order --one touch plus delica lancets and one touch ultra test strips --4 /day --90 day supply.    mtm will re-order for her today .    Joan Lopez [facility].  Medication Therapy Management Provider  [phone]

## 2021-05-16 NOTE — PROGRESS NOTES
Medication Therapy Management (MTM) Encounter    ASSESSMENT:                            Medication Adherence/Access: See below for considerations    Type 2 Diabetes: Patient is not meeting A1c goal of < 8%. Self monitoring of blood glucose is not at goal of fasting  mg/dL and post prandial < 180 mg/dL. Patient would benefit from ideal SMBG: Check blood sugars more often because that triggers a second Novolog injection , pre-prandial, 2 hours post-prandial, and with symptoms of hypoglycemia, Basal Insulin (Lantus) :  stay on the same dose--see plan if early morning hypoglycemia occurs., Bolus / Rapid Acting Insulin (Novolog) : Inject 3-10 units days at each main meal up to 3 x day --see plan for details- start 3 units -3 x day before biggest grazing meals of the day --then slowly increase up to 10 units -3 x day before meals if needed.   , Sulfonylurea (Glimepiride) :  stay on the same dose., Increased exercise, Increase in home glucose monitoring-see plan  and weight loss recommended-we discussed lower carb foods--most likely she will stay with her pasta and rice as is .     Hypokalemia:  Stable --new dissolvable pill working better.       PLAN:                            1.  FYI--Blood sugars improving rapidly with mealtime insulin--if you desire a third dose of Novolog during the day --make sure you check blood sugar before each time you inject the Novolog insulin, and then --look at how much carb is in that snack /meal.     Keep all other diabetes medications same doses for now --if your blood sugars are <70 --especially early AM --contact me asap--we will decrease your midnite Lantus dose.     Follow-up: See me in June for A1c lab recheck--(6-22-21) at 2pm.    Please bring blood sugar meter to the visit    SUBJECTIVE/OBJECTIVE:                          Sarah Felix is a 75 year old female coming in for a follow-up (4-20-21) visit. She was referred to me from Remington Browning  .      Reason for visit:  4  weeks BS review post log insulin start .      Allergies/ADRs: Reviewed in chart  Tobacco: She reports that she quit smoking about 41 years ago. She has never used smokeless tobacco.  Alcohol: 7-9 beverages / week typically wine 1 or 2 glasses/night  Caffeine: 2 cans diet coke sodas/day  Activity: minimal due to recent ankle fracture  Past Medical History: Reviewed in chart  Personal Healthcare Goals: get covid vaccine, limit any falls, improve a1c    Medication Adherence/Access:  No issues other than late delivery insulins from her mail order.       Type 2 Diabetes:  Currently taking : glimepiride 4 mg.  Tablet twice daily ,  Lantus 51 units daily at midnight daily. She did start Novolog vial - inject 10 units twice /day in am before biggest snack meal of the day on most days but admits some days(1/3 of the time) just one log insulin injection.   Patient is not experiencing side effects.    SMBG: see chart below.     7 days avg= 155 n=13  14 days ywo=709 n=27  30 days avg.=168 n=54      Date FBG/ 2hours post Lunch/2hours post Dinner /2hours post    5-18-21 100 9;48am       5-17 225 7;13am 160 2;35pm  145 12;29am(ate a snack)   5-16 218 8;58am(post bfast)  125 4;01am      5-15 138  8;09am      5-14 88 7;09am  (felt shaky) 161 1 ;36pm  152 12;54am   5-13 203 9;27am after bfast      5-12 96 10:26am            7 days avg.= 168 n=9  14 days = 168 n=16  30 days = 163 n=33        Date FBG/ 2hours post Lunch/2hours post Dinner /2hours post    4-20-21 181  7:02am  Ate 1/4 cup potato salad /208 12;25 pm -she drank OJ before hand     4-19-21 179 11;58am   109 12;01am   4-18-21 95  8;06am(she felt ok ) she skipped her novolog dose then.   She never injects a Novolog insulin dose without checking her blood sugar before hand !    4-17-21 168 10;51am      4-16-21 201 8am      4-15-21  195  12;30pm     4-14-21  172 2;35pm           7 days =177 n=9, 14 days = 195 n=17, 30 days = 187 n=33        Date FBG/ 2hours post Lunch/2hours  post Dinner /2hours post    3-9-21 169 7;04am 128 1;41pm     3-8 179 9am 155 2;34pm     3-7 154 10;00am      3-6 260 10:11am(drank OJ)      3-5 164 9:18am      3-4 188 11;01am       3-3 196 11;42am                Symptoms of low blood sugar? sweaty, Frequency of lows- not often (she admits she cannot tolerate bs's <110- if they go lower than that she feels crummy , sweaty. )  Symptoms of high blood sugar? none  Eye exam: up to date  Foot exam: due  Diet: she's maintained similar diet and notes sugars now being elevated  eats 4-5 smaller meals - pasta and rices  Fruit and some fruit juices   Breakfast: cereal 2-3 times per week, drink 2 glasses OJ or grapefruit juice /day .   Exercise: minimal activity due to recent broken ankle, she was a bit more active prior to breaking the ankle/going through rehab  Aspirin: Taking 162 mg daily and denies side effects  Statin: Yes: atorvastatin 40 mg   ACEi/ARB: Yes: lisinopril 2.5 mg.   Urine Albumin:   Lab Results   Component Value Date    UMALCR 12.82 01/07/2020      Lab Results   Component Value Date    A1C 9.4 12/14/2020    A1C 8.1 09/21/2020    A1C 8.5 01/07/2020    A1C 7.6 10/12/2019    A1C 7.5 08/19/2019       Hypokalemia:  Patient reports the dissolvable potassium pill is working much better .   Potassium   Date Value Ref Range Status   01/07/2020 4.0 3.4 - 5.3 mmol/L Final           Today's Vitals: /70   Pulse 82   Wt 197 lb (89.4 kg)   SpO2 92%   BMI 32.78 kg/m    ----------------        I spent 30 minutes with this patient today. All changes were made via collaborative practice agreement with Remington Browning  . A copy of the visit note was provided to the patient's primary care provider.    The patient was given a summary of these recommendations.     Joan Lopez Rph.  Medication Therapy Management Provider  [phone]     Medication Therapy Recommendations  Type 2 diabetes mellitus without complication, with long-term current use of insulin (H)     Current Medication: insulin aspart (NOVOLOG PEN) 100 UNIT/ML pen   Rationale: Frequency inappropriate - Dosage too low - Effectiveness   Recommendation: Increase Frequency - NovoLOG FLEXPEN 100 UNIT/ML soln - inject 5-10 units three times daily before meals.   Status: Accepted per CPA   Note: reming pt. to check blood sugars befoe each log insulin injection.

## 2021-05-18 ENCOUNTER — OFFICE VISIT (OUTPATIENT)
Dept: PHARMACY | Facility: CLINIC | Age: 76
End: 2021-05-18
Payer: COMMERCIAL

## 2021-05-18 VITALS
BODY MASS INDEX: 32.78 KG/M2 | DIASTOLIC BLOOD PRESSURE: 70 MMHG | WEIGHT: 197 LBS | OXYGEN SATURATION: 92 % | HEART RATE: 82 BPM | SYSTOLIC BLOOD PRESSURE: 118 MMHG

## 2021-05-18 DIAGNOSIS — E11.9 TYPE 2 DIABETES, HBA1C GOAL < 7% (H): Primary | ICD-10-CM

## 2021-05-18 DIAGNOSIS — E87.6 HYPOKALEMIA: ICD-10-CM

## 2021-05-18 PROCEDURE — 99606 MTMS BY PHARM EST 15 MIN: CPT | Performed by: PHARMACIST

## 2021-05-18 PROCEDURE — 99607 MTMS BY PHARM ADDL 15 MIN: CPT | Performed by: PHARMACIST

## 2021-05-18 NOTE — PATIENT INSTRUCTIONS
Recommendations from today's MTM visit:                                                       1.  FYI--Blood sugars improving rapidly with mealtime insulin--if you desire a third dose of Novolog during the day --make sure you check blood sugar before each time you inject the Novolog insulin, and then --look at how much carb is in that snack /meal.     Keep all other diabetes medications same doses for now --if your blood sugars are <70 --especially early AM --contact me asap--we will decrease your midnite Lantus dose.     Follow-up: See me in June for A1c lab recheck--(6-22-21) at 2pm.    Please bring blood sugar meter to the visit.   It was great to speak with you today.  I value your experience and would be very thankful for your time with providing feedback on our clinic survey. You may receive a survey via email or text message in the next few days.     To schedule another MTM appointment, please call the clinic directly or you may call the MTM scheduling line at [phone] or toll-free at [phone].     My Clinical Pharmacist's contact information:                                                      Please feel free to contact me with any questions or concerns you have.      Joan Lopez Rph.  Medication Therapy Management Provider  [phone]

## 2021-05-18 NOTE — Clinical Note
Remington--simran--novolog working well- A1c recheck with me in June --lets see how that goes.     [place]

## 2021-05-26 VITALS
HEART RATE: 85 BPM | DIASTOLIC BLOOD PRESSURE: 77 MMHG | SYSTOLIC BLOOD PRESSURE: 131 MMHG | RESPIRATION RATE: 18 BRPM | OXYGEN SATURATION: 94 % | TEMPERATURE: 98.7 F

## 2021-05-26 VITALS
RESPIRATION RATE: 16 BRPM | DIASTOLIC BLOOD PRESSURE: 75 MMHG | OXYGEN SATURATION: 98 % | SYSTOLIC BLOOD PRESSURE: 123 MMHG | HEART RATE: 82 BPM | TEMPERATURE: 99.1 F

## 2021-05-26 VITALS
HEART RATE: 81 BPM | DIASTOLIC BLOOD PRESSURE: 77 MMHG | TEMPERATURE: 99 F | RESPIRATION RATE: 20 BRPM | OXYGEN SATURATION: 98 % | SYSTOLIC BLOOD PRESSURE: 120 MMHG

## 2021-05-26 VITALS
SYSTOLIC BLOOD PRESSURE: 111 MMHG | RESPIRATION RATE: 20 BRPM | OXYGEN SATURATION: 95 % | OXYGEN SATURATION: 91 % | HEART RATE: 78 BPM | RESPIRATION RATE: 18 BRPM | TEMPERATURE: 97.8 F | DIASTOLIC BLOOD PRESSURE: 64 MMHG | SYSTOLIC BLOOD PRESSURE: 120 MMHG | DIASTOLIC BLOOD PRESSURE: 62 MMHG | HEART RATE: 78 BPM | TEMPERATURE: 95.6 F

## 2021-05-26 VITALS
RESPIRATION RATE: 17 BRPM | HEART RATE: 65 BPM | OXYGEN SATURATION: 95 % | SYSTOLIC BLOOD PRESSURE: 128 MMHG | DIASTOLIC BLOOD PRESSURE: 76 MMHG | HEART RATE: 76 BPM | TEMPERATURE: 98.8 F | OXYGEN SATURATION: 97 % | DIASTOLIC BLOOD PRESSURE: 55 MMHG | SYSTOLIC BLOOD PRESSURE: 111 MMHG | TEMPERATURE: 98.9 F | RESPIRATION RATE: 18 BRPM

## 2021-05-26 VITALS
HEART RATE: 80 BPM | RESPIRATION RATE: 18 BRPM | DIASTOLIC BLOOD PRESSURE: 67 MMHG | TEMPERATURE: 97.6 F | SYSTOLIC BLOOD PRESSURE: 122 MMHG | OXYGEN SATURATION: 96 %

## 2021-06-02 NOTE — PROGRESS NOTES
[facility]      Facility:    [facility] [[id number]]   Code Status: UNKNOWN  [place] [facility] : 10/12 to 10/18/19     NO DISCHARGE SUMMARY IN CARE EVERYWHERE      Chief Complaint/Reason for Visit:  Chief Complaint   Patient presents with     H & P     Left bimalleolar fx with external fixation       HPI:   Sarah is a 74 y.o. female with a history of type 2 diabetes.    She was going down the stairs  at home on 10/11/2019 in the late evening.  Her ankle gave out and she slipped 3-4 stairs on her buttocks. She had pain and deformity of her left ankle. She waited till the following day  to call for help, she did not want the paramedics breaking down her door.    She was brought to the [facility].  X-ray showed bimalleolar ankle fracture.  It was a severely displaced  fracture, ED attempted to reduce it.  Ended up calling orthopedics  who performed aclosed fracture reduction under conscious sedation in the emergency department.  Later in the day, she went to the OR for an external fixator placement.  She was non-weightbearing on the left leg, enoxaparin for DVT prophylaxis.    She was given Doxycycline perioperatively, and developed a diffuse rash. Triamcinolone cream was prescribed.    She had an open area on her coccyx from falling on the stairs, then scooting along her floor prior to calling for help. It was covered with Mepilex foam dressing.    She transferred to [facility].      Past Medical History:  Active Non-Hospital Problems    Diagnosis     Esophageal reflux     Fatty liver     Type 2 diabetes mellitus with hyperglycemia (H)     Essential hypertension, benign     Peripheral vascular disease (H)     Problem list name updated by automated process. Provider to review       Hypertension goal BP (blood pressure) < 140/90     Personal history of other malignant neoplasm of skin     Hyperlipidemia LDL goal <100     Per  provider       Diverticulitis of colon     Abd CT  Problem list name updated by automated process. Provider to review       Congenital cystic disease of liver     multiple liver lesions.  felt to be focal nodular hyperplasia.  annual CT abd.    followed by Dr. Amilcar Kat               Surgical History:  Past Surgical History:   Procedure Laterality Date     C APPENDECTOMY age 22     C DEXA, BONE DENSITY, AXIAL SKEL 2005     C DEXA, BONE DENSITY, AXIAL SKEL 6/2007   No signif change in Osteopenia     COLONOSCOPY 4/2006   repeat 10 yr     ENDOSCOPIC ULTRASOUND UPPER GASTROINTESTINAL TRACT (GI) N/A 9/6/2018   Procedure: ENDOSCOPIC ULTRASOUND, ESOPHAGOSCOPY / UPPER GASTROINTESTINAL TRACT (GI); Endoscopic Ultrasound, Esophagogastroduodenoscopy with endoscopic mucosal resection; Surgeon: Hood Brower MD; Location: [facility]     ESOPHAGOSCOPY, GASTROSCOPY, DUODENOSCOPY (EGD), COMBINED N/A 8/13/2018   Procedure: COMBINED ESOPHAGOSCOPY, GASTROSCOPY, DUODENOSCOPY (EGD), BIOPSY SINGLE OR MULTIPLE; EGD; Surgeon: Hood Brower MD; [facility]     ESOPHAGOSCOPY, GASTROSCOPY, DUODENOSCOPY (EGD), COMBINED N/A 3/14/2019   Procedure: Upper Endoscopy; Surgeon: Hood Brower MD; Location: [facility] OR     ESOPHAGOSCOPY, GASTROSCOPY, DUODENOSCOPY (EGD), RESECT MUCOSA, COMBINED N/A 9/6/2018   Procedure: COMBINED ESOPHAGOSCOPY, GASTROSCOPY, DUODENOSCOPY (EGD), RESECT MUCOSA;; Surgeon: Hood Brower MD; Location: [facility] OR     GYN SURGERY 10/22/08   pelvic support     HEMORRHOIDECTOMY 8/08     MOHS MICROGRAPHIC PROCEDURE     SURGICAL HISTORY OF - age 22   L ovarian cyst removal, laparotomy     SURGICAL HISTORY OF - 1998   partial thyroidectomy     SURGICAL HISTORY OF - 10/08   Transobturator tape for Urinary Incontinence     SURGICAL HISTORY OF - 11/2011   stress test normal     THYROID SURGERY   Had cyst removed, thyroid gland is intact.       Family History:     Diabetes Mother   at 89 yrs     Hypertension Mother     Arthritis Mother    rheumatoid     Cancer Father   bladder     Cardiovascular Brother   palpitations not on meds.       Social History:    Social History     Socioeconomic History     Marital status: Single     Spouse name: Not on file     Number of children: None     Years of education: Not on file     Highest education level: Not on file   Occupational History     Nurse   Social Needs     Financial resource strain: Not on file     Food insecurity:     Worry: Not on file     Inability: Not on file     Transportation needs:     Medical: Not on file     Non-medical: Not on file   Tobacco Use     Smoking status: Ex smoker, 10 pack year   Substance and Sexual Activity     Alcohol use: 6 drinks per week     Drug use: Not on file     Sexual activity: Not on file   Lifestyle     Physical activity:     Days per week: Not on file     Minutes per session: Not on file     Stress: Not on file   Relationships     Social connections:     Talks on phone: Not on file     Gets together: Not on file     Attends Yazdanism service: Not on file     Active member of club or organization: Not on file     Attends meetings of clubs or organizations: Not on file     Relationship status: Not on file     Intimate partner violence:     Fear of current or ex partner: Not on file     Emotionally abused: Not on file     Physically abused: Not on file     Forced sexual activity: Not on file          Review of Systems     Blood pressure 123/75, pulse 82, temperature 99.1  F (37.3  C), resp. rate 16, SpO2 98 %.        Physical Exam  Constitutional:       Comments: Highly irritable, older  female, overweight   HENT:      Right Ear: External ear normal.      Left Ear: External ear normal.      Mouth/Throat:      Mouth: Mucous membranes are moist.   Eyes:      Extraocular Movements: Extraocular movements intact.      Conjunctiva/sclera: Conjunctivae normal.   Neck:      Musculoskeletal: Normal range of motion.   Cardiovascular:      Rate and Rhythm: Normal rate  and regular rhythm.      Heart sounds: No murmur.   Pulmonary:      Breath sounds: Normal breath sounds. No rales.   Abdominal:      General: Bowel sounds are normal.      Palpations: Abdomen is soft.      Tenderness: There is no tenderness.   Musculoskeletal:         General: Tenderness and signs of injury present.      Comments: Both arms and right leg normal strength and coordination   Skin:     General: Skin is warm and dry.      Coloration: Skin is jaundiced.      Findings: Rash present.      Comments: Wide spread papular rash from shoulders distally, worse around the upper thigh buttock area  Left ankle external fixator, no visible erythema or fluid discharge  Buttock/coccyx area not observed, patient supine in bed, difficulty with mobility  Lidocaine patch on her left pretibial area   Neurological:      General: No focal deficit present.      Mental Status: She is alert and oriented to person, place, and time.      Motor: No weakness.   Psychiatric:      Comments: Voices unhappiness and irritation with , accessibility to light switches, size of the room, excess ability to the room phone, having to wait, all within the first hour of being at [facility]                Allergies   Allergen Reactions     Clindamycin Rash   Allergen Reactions     Amlodipine Swelling     Ancef [Cefazolin]     Levaquin Rash   Itching, rash     Metformin Diarrhea and GI Disturbance     Cephalosporins Rash     Levofloxacin Itching and Rash     Nickel Rash   Contact reaction        Medication List:    Medications at Time of Discharge    Medication Sig Start Date   acetaminophen (TYLENOL) 325 MG tablet    Indications: Closed fracture of left ankle, initial encounter Take 3 tablets (975 mg) by mouth every 8 hours 10/14/2019   alum & mag hydroxide-simethicone (MAALOX REGULAR STRENGTH) 200-200-20 MG/5ML SUSP   Take 30 mLs by mouth as needed      atorvastatin (LIPITOR) 40 MG tablet    Indications: Hyperlipidemia LDL goal <100 TAKE 1  TABLET DAILY 10/02/2019   CALCIUM 500 + D 500-200 MG-IU OR TABS    Indications: Osteopenia one tab twice per day 07/15/2009   Cholecalciferol (VITAMIN D PO)   Take by mouth. Pt consuming 3000 units per day      diphenhydrAMINE (BENADRYL) 25 MG capsule    Indications: Dermatitis Take 1 capsule (25 mg) by mouth every 6 hours as needed for itching or other (rash) 10/18/2019   enoxaparin (LOVENOX) 40 MG/0.4ML syringe    Indications: Closed fracture of left ankle, initial encounter Inject 0.4 mLs (40 mg) Subcutaneous every 24 hours 10/14/2019   fexofenadine (ALLEGRA) 180 MG tablet    Indications: Dermatitis Take 1 tablet (180 mg) by mouth daily 10/19/2019   furosemide (LASIX) 20 MG tablet    Indications: Dependent edema, CKD (chronic kidney disease) stage 2, GFR 60-89 ml/min Take 1 tablet (20 mg) by mouth daily 10/18/2019   insulin aspart (NOVOLOG PEN) 100 UNIT/ML pen    Indications: Type 2 diabetes, HbA1c goal < 7% (H) Inject 1-7 Units Subcutaneous 3 times daily (before meals) Medium Correction Scale for Pre meal Blood glucose  Do Not give Correction Insulin if Pre-Meal BG < 140.   Pre-Meal BG levels: Corresponding Insulin Units  140-189: 1 unit; 190-239:2 units.   240-289: 3 units; 290-339: 4 units.   340-399: 5 units; 400-449: 6 units  = or > 450 give 7 units.   Notify MD if glucose > or = 350 mg/dL after administration of correction dose. 10/18/2019   insulin aspart (NOVOLOG PEN) 100 UNIT/ML pen    Indications: Type 2 diabetes, HbA1c goal < 7% (H) Inject 1-5 Units Subcutaneous At Bedtime MEDIUM INSULIN RESISTANCE DOSING    Do Not give Bedtime Correction Insulin if BG less than  200.   For  - 249 give 1 units.   For  - 299 give 2 units.   For  - 349 give 3 units.   For  -399 give 4 units.   For BG greater than or equal to 400 give 5 units.  Notify provider if glucose greater than or equal to 350 mg/dL after administration of correction dose.  If given at mealtime, administer within 30 minutes  of start of meal 10/18/2019   insulin glargine (LANTUS PEN) 100 UNIT/ML pen    Indications: Type 2 diabetes, HbA1c goal < 7% (H) Inject 25 Units Subcutaneous At Bedtime 10/18/2019   lisinopril (PRINIVIL/ZESTRIL) 2.5 MG tablet    Indications: CKD (chronic kidney disease) stage 2, GFR 60-89 ml/min TAKE 1 TABLET DAILY 05/16/2019   metoprolol tartrate (LOPRESSOR) 25 MG tablet    Indications: Essential hypertension TAKE 1 TABLET TWICE A DAY 09/04/2019   oxyCODONE (ROXICODONE) 5 MG tablet    Indications: Closed fracture of left ankle, initial encounter Take 1-2 tablets (5-10 mg) by mouth every 4 hours as needed for moderate to severe pain (1 pill for pain 1-5, 2 pills for pain 6-10) 10/18/2019   pantoprazole (PROTONIX) 40 MG EC tablet    Indications: Closed fracture of left ankle, initial encounter Take 1 tablet (40 mg) by mouth daily 10/15/2019   senna-docusate (SENOKOT-S/PERICOLACE) 8.6-50 MG tablet    Indications: Closed fracture of left ankle, initial encounter Take 2 tablets by mouth 2 times daily 10/14/2019   triamcinolone (KENALOG) 0.1 % external cream    Indications: Seborrheic keratosis Apply topically 3 times daily 10/14/2019         Labs:   10/17/2019                                                        Ref. Range   Potassium Latest Ref Range: 3.4 - 5.3 mmol/L              3.5   Chloride Latest Ref Range: 94 - 109 mmol/L                  108   Carbon Dioxide Latest Ref Range: 20 - 32 mmol/L         25   Urea Nitrogen Latest Ref Range: 7 - 30 mg/dL                8   Creatinine Latest Ref Range: 0.52 - 1.04 mg/dL              0.63   GFR Estimate Latest Ref Range: >60 mL/min/1.73_m2 90      Calcium Latest Ref Range: 8.5 - 10.1 mg/dL                    7.7 (L)   Anion Gap Latest Ref Range: 3 - 14 mmol/L                     7    Procalcitonin Latest Units: ng/ml                                        0.41   Glucose Latest Ref Range: 70 - 99 mg/dL                        175 (H)     WBC Latest Ref Range: 4.0 - 11.0  10e9/L 19.0 (H)   Hemoglobin Latest Ref Range: 11.7 - 15.7 g/dL 11.0 (L)   Hematocrit Latest Ref Range: 35.0 - 47.0 % 33.6 (L)   Platelet Count Latest Ref Range: 150 - 450 10e9/L 683 (H)     Diff   % Neutrophils Latest Units: % 77.5   % Lymphocytes Latest Units: % 7.0   % Monocytes Latest Units: % 7.0   % Eosinophils Latest Units: % 3.6         Assessment / Plan:    ICD-10-CM    1. Closed bimalleolar fracture of left ankle with routine healing S82.842D  external fixator, patient understands her be definitive surgery.  Patient states she is happy with asking for oxycodone 1 or 2 5 mg tabs depending on her pain level. Has a lidocaine patch   2. Type 2 diabetes mellitus with hyperglycemia, with long-term current use of insulin (H) E11.65 On Grlargine, Aspart U100. Also on a statin. A1c=7.6    Z79.4    3. Peripheral vascular disease (H) I73.9 On lasix daiy for leg edema   4. Gastroesophageal reflux disease, esophagitis presence not specified K21.9 Zantac   5. Essential hypertension, benign I10 Good reading on metoprolol and Lisinopril   6. Hypertension goal BP (blood pressure) < 140/90 I10    7.  8. Hyperlipidemia LDL goal <100  Rash due to allergy   E78.5  R12 Lipitor  Finds triamcinolone helpful, also Benadryl and allegra           Electronically signed by: Sunita Santiago MD

## 2021-06-02 NOTE — PROGRESS NOTES
[facility]      Facility:    [facility] [[id number]]   Code Status: UNKNOWN  Renny / TERESE JEREZ [place] : 10/12 to 10/18/19     NO DISCHARGE SUMMARY IN CARE EVERYWHERE      Chief Complaint/Reason for Visit:  Chief Complaint   Patient presents with     H & P     Readmission after definitive ankle repair, removal of external fixator       HPI:   Sarah is a 74 y.o. female with a history of type 2 diabetes.    She was going down the stairs  at home on 10/11/2019 in the late evening.  Her ankle gave out and she slipped 3-4 stairs on her buttocks. She had pain and deformity of her left ankle. She waited till the following day  to call for help, she did not want the paramedics breaking down her door.    She was brought to the [facility].  X-ray showed bimalleolar ankle fracture.  It was a severely displaced  fracture, ED attempted to reduce it.  Ended up calling orthopedics  who performed aclosed fracture reduction under conscious sedation in the emergency department.  Later in the day, she went to the OR for an external fixator placement.  She was non-weightbearing on the left leg, enoxaparin for DVT prophylaxis.    She was given Doxycycline perioperatively, and developed a diffuse rash. Triamcinolone cream was prescribed.    She had an open area on her coccyx from falling on the stairs, then scooting along her floor prior to calling for help. It was covered with Mepilex foam dressing.    She transferred to [facility].  __________________________________________________________________________  Hospitalization #2: 10/28/19  Removal of external fixator, plate and screws open reduction fixation.  Nonweightbearing left lower extremity  DVT prophylaxis: Lovenox daily x28 days    She returned to [facility]    Past Medical History:  Active Non-Hospital Problems    Diagnosis     Esophageal reflux     Fatty liver     Closed  bimalleolar fracture of left ankle with routine healing     Type 2 diabetes mellitus with hyperglycemia (H)     Essential hypertension, benign     Peripheral vascular disease (H)     Problem list name updated by automated process. Provider to review       Hypertension goal BP (blood pressure) < 140/90     Personal history of other malignant neoplasm of skin     Hyperlipidemia LDL goal <100     Per provider       Diverticulitis of colon     Abd CT  Problem list name updated by automated process. Provider to review       Congenital cystic disease of liver     multiple liver lesions.  felt to be focal nodular hyperplasia.  annual CT abd.    followed by Dr. Amilcar Kat               Surgical History:  Past Surgical History:   Procedure Laterality Date     C APPENDECTOMY age 22     C DEXA, BONE DENSITY, AXIAL SKEL 2005     C DEXA, BONE DENSITY, AXIAL SKEL 6/2007   No signif change in Osteopenia     COLONOSCOPY 4/2006   repeat 10 yr     ENDOSCOPIC ULTRASOUND UPPER GASTROINTESTINAL TRACT (GI) N/A 9/6/2018   Procedure: ENDOSCOPIC ULTRASOUND, ESOPHAGOSCOPY / UPPER GASTROINTESTINAL TRACT (GI); Endoscopic Ultrasound, Esophagogastroduodenoscopy with endoscopic mucosal resection; Surgeon: Hood Brower MD; Location: [facility] OR     ESOPHAGOSCOPY, GASTROSCOPY, DUODENOSCOPY (EGD), COMBINED N/A 8/13/2018   Procedure: COMBINED ESOPHAGOSCOPY, GASTROSCOPY, DUODENOSCOPY (EGD), BIOPSY SINGLE OR MULTIPLE; EGD; Surgeon: Hood Brower MD; Location:  OR     ESOPHAGOSCOPY, GASTROSCOPY, DUODENOSCOPY (EGD), COMBINED N/A 3/14/2019   Procedure: Upper Endoscopy; Surgeon: Hood Brower MD; Location: [facility] OR     ESOPHAGOSCOPY, GASTROSCOPY, DUODENOSCOPY (EGD), RESECT MUCOSA, COMBINED N/A 9/6/2018   Procedure: COMBINED ESOPHAGOSCOPY, GASTROSCOPY, DUODENOSCOPY (EGD), RESECT MUCOSA;; Surgeon: Hood Brower MD; Location: [facility] OR     GYN SURGERY 10/22/08   pelvic support     HEMORRHOIDECTOMY 8/08     MOHS MICROGRAPHIC PROCEDURE     SURGICAL HISTORY OF -  age 22   L ovarian cyst removal, laparotomy     SURGICAL HISTORY OF - 1998   partial thyroidectomy     SURGICAL HISTORY OF - 10/08   Transobturator tape for Urinary Incontinence     SURGICAL HISTORY OF - 11/2011   stress test normal     THYROID SURGERY   Had cyst removed, thyroid gland is intact.       Family History:     Diabetes Mother   at 89 yrs     Hypertension Mother     Arthritis Mother   rheumatoid     Cancer Father   bladder     Cardiovascular Brother   palpitations not on meds.       Social History:    Social History     Socioeconomic History     Marital status: Single     Spouse name: Not on file     Number of children: None     Years of education: Not on file     Highest education level: Not on file   Occupational History     Nurse   Social Needs     Financial resource strain: Not on file     Food insecurity:     Worry: Not on file     Inability: Not on file     Transportation needs:     Medical: Not on file     Non-medical: Not on file   Tobacco Use     Smoking status: Ex smoker, 10 pack year   Substance and Sexual Activity     Alcohol use: 6 drinks per week     Drug use: Not on file     Sexual activity: Not on file   Lifestyle     Physical activity:     Days per week: Not on file     Minutes per session: Not on file     Stress: Not on file   Relationships     Social connections:     Talks on phone: Not on file     Gets together: Not on file     Attends Yazdanism service: Not on file     Active member of club or organization: Not on file     Attends meetings of clubs or organizations: Not on file     Relationship status: Not on file     Intimate partner violence:     Fear of current or ex partner: Not on file     Emotionally abused: Not on file     Physically abused: Not on file     Forced sexual activity: Not on file          Review of Systems   Pain control is adequate, mild constipation.  Should like Proshield for her buttocks area  We discussed and will order heel guard for her right heel  She is  using oxycodone every 4 hours as needed, generally requiring 10 mg as her pain is between 6 and 10/10  She has some mild constipation, does not require a change of medications at this time  Skin of her left posterior calf and Achilles area is very dry, stinging itchy: She asked for and received her own lotion be applied with improvement (divider intervention)  A comprehensive review of systems is otherwise negative    Blood pressure 131/77, pulse 85, temperature 98.7  F (37.1  C), resp. rate 18, SpO2 94 %.      BMI= 32.12    Physical Exam  Constitutional:       Comments:   female, overweight, more calm and accepting   HENT:      Right Ear: External ear normal.      Left Ear: External ear normal.      Mouth/Throat:      Mouth: Mucous membranes are moist.   Eyes:      Extraocular Movements: Extraocular movements intact.      Conjunctiva/sclera: Conjunctivae normal.   Neck:      Musculoskeletal: Normal range of motion.   Cardiovascular:      Rate and Rhythm: Normal rate and regular rhythm.      Heart sounds: No murmur.   Pulmonary:      Breath sounds: Normal breath sounds. No rales.   Abdominal:      General: Bowel sounds are normal.      Palpations: Abdomen is soft.      Tenderness: There is no abdominal tenderness.   Musculoskeletal:         General: Tenderness and signs of injury present.      Comments: Both arms and right leg normal strength and coordination  Immobilizer boot left knee distally   Skin:     General: Skin is warm and dry.      Coloration: Skin is not jaundiced.      Findings: No rash.      Comments: Minimal residual rash mostly around left hip  Left pretibial area and bilateral ankle skin from previous external fixator placement: Dry, thin eschar covering all 3 sites  Malleoli or incisions with minimal serosanguineous discharge   Neurological:      General: No focal deficit present.      Mental Status: She is alert and oriented to person, place, and time.      Motor: No weakness.    Psychiatric:         Mood and Affect: Mood normal.         Thought Content: Thought content normal.                Allergies   Allergen Reactions     Amlodipine Swelling     Cefazolin      Metformin Diarrhea     Cephalosporins Rash     Clindamycin Rash     Levaquin [Levofloxacin] Rash     Itching, rash       Nickel Rash     Contact reaction     Allergen Reactions     Amlodipine Swelling     Ancef [Cefazolin]     Levaquin Rash   Itching, rash     Metformin Diarrhea and GI Disturbance     Cephalosporins Rash     Levofloxacin Itching and Rash     Nickel Rash   Contact reaction        Medication List:    Current Outpatient Medications   Medication Sig     acetaminophen (TYLENOL) 325 MG tablet Take 975 mg by mouth every 8 (eight) hours.     aluminum-magnesium hydroxide-simethicone (MAALOX ADVANCED) 200-200-20 mg/5 mL Susp Take 30 mL by mouth 3 (three) times a day as needed.     atorvastatin (LIPITOR) 40 MG tablet Take 40 mg by mouth at bedtime.     calcium-vitamin D (CALCIUM-VITAMIN D) 500 mg(1,250mg) -200 unit per tablet Take 1 tablet by mouth 2 (two) times a day with meals.     cholecalciferol, vitamin D3, 3,000 unit Tab Take 3,000 Units by mouth daily.     diphenhydrAMINE (BENADRYL) 25 mg capsule Take 25 mg by mouth every 6 (six) hours as needed for itching.     enoxaparin (LOVENOX) 40 mg/0.4 mL syringe Inject 40 mg under the skin daily.     fexofenadine (ALLEGRA) 180 MG tablet Take 180 mg by mouth daily.     furosemide (LASIX) 20 MG tablet Take 20 mg by mouth daily.     glimepiride (AMARYL) 4 MG tablet Take 4 mg by mouth daily before breakfast.     insulin aspart U-100 (NOVOLOG) 100 unit/mL injection Inject under the skin 3 (three) times a day before meals. Inject as per  sliding scale: if 0 - 139 = O UNITS DO NOT GIVE  CORRECTION INSULIN IF PRE MEAL BG < 140;  140 - 189 = 1 UNIT; 190 - 239 = 2 UNITS; 240 - 289 =  3 UNITS; 290 - 339 = 4 UNITS; 340 - 399 = 5 UNITS;  400 - 449 = 6 UNITS; 450+ = 7 UNITS IF BG = OR  >  450 GIVE 7 UNIT     insulin aspart U-100 (NOVOLOG) 100 unit/mL injection Inject under the skin at bedtime. Inject as per  sliding scale: if 0 - 199 = 0 UNITS DO NOT GIVE  BEDTIME CORRECTION DOSE IF BG LESS THAN  200; 200 - 249 = 1 UNIT; 250 - 299 = 2 UNITS; 300 -  349 = 3 UNITS; 350 - 399 = 4 UNITS; 400+ = 5  UNITS FOR BG GREATER THAN OR EQUAL TO  400, GIVE 5 UNITS     insulin aspart U-100 (NOVOLOG) 100 unit/mL injection Inject under the skin 2 (two) times a day before meals. Administer 2 units at lunch and 4 units at supper.     insulin glargine (LANTUS) 100 unit/mL vial Inject 35 Units under the skin daily.            lisinopril (PRINIVIL,ZESTRIL) 2.5 MG tablet Take 2.5 mg by mouth daily.     methocarbamol (ROBAXIN) 500 MG tablet Take 500 mg by mouth 4 (four) times a day.     metoprolol tartrate (LOPRESSOR) 25 MG tablet Take 25 mg by mouth 2 (two) times a day.     oxyCODONE (ROXICODONE) 5 MG immediate release tablet Take 5-10 mg by mouth every 4 (four) hours as needed for pain.     pantoprazole (PROTONIX) 40 MG tablet Take 40 mg by mouth daily.     senna-docusate (PERICOLACE) 8.6-50 mg tablet Take 2 tablets by mouth 2 (two) times a day.     triamcinolone (KENALOG) 0.1 % cream Apply 1 application topically 3 (three) times a day.           Labs: no new labs     10/17/2019                                                        Ref. Range   Potassium Latest Ref Range: 3.4 - 5.3 mmol/L              3.5   Chloride Latest Ref Range: 94 - 109 mmol/L                  108   Carbon Dioxide Latest Ref Range: 20 - 32 mmol/L         25   Urea Nitrogen Latest Ref Range: 7 - 30 mg/dL                8   Creatinine Latest Ref Range: 0.52 - 1.04 mg/dL              0.63   GFR Estimate Latest Ref Range: >60 mL/min/1.73_m2 90      Calcium Latest Ref Range: 8.5 - 10.1 mg/dL                    7.7 (L)   Anion Gap Latest Ref Range: 3 - 14 mmol/L                     7    Procalcitonin Latest Units: ng/ml                                         0.41   Glucose Latest Ref Range: 70 - 99 mg/dL                        175 (H)     WBC Latest Ref Range: 4.0 - 11.0 10e9/L 19.0 (H)   Hemoglobin Latest Ref Range: 11.7 - 15.7 g/dL 11.0 (L)   Hematocrit Latest Ref Range: 35.0 - 47.0 % 33.6 (L)   Platelet Count Latest Ref Range: 150 - 450 10e9/L 683 (H)     Diff   % Neutrophils Latest Units: % 77.5   % Lymphocytes Latest Units: % 7.0   % Monocytes Latest Units: % 7.0   % Eosinophils Latest Units: % 3.6         Assessment / Plan:    ICD-10-CM    1. Closed bimalleolar fracture of left ankle with routine healing S82.842D  oxycodone is helping her pain, generally 10 mg when she takes it.  Has less pain now than previously   2. Type 2 diabetes mellitus with hyperglycemia, with long-term current use of insulin (H) E11.65 On Grlargine, Aspart U100. Also on a statin. A1c=7.6    Z79.4    3. Peripheral vascular disease (H) I73.9 On lasix daiy for leg edema   4. Gastroesophageal reflux disease, esophagitis presence not specified K21.9 Zantac, no sx   5. Essential hypertension, benign I10 Good reading on metoprolol and Lisinopril     Will write an order for her good quality skin lotion to be applied to the dry skin of her posterior calf q shift, but to avoid the incisions on each malleolus      Electronically signed by: Sunita Santiago MD

## 2021-06-02 NOTE — PROGRESS NOTES
[facility]    Name:   Sarah Felix  : 1945  Facility:   [facility] [[id number]]   Room:   Code Status: FULL CODE and POLST AVAILABLE -   Fac type:   SNF (Skilled Nursing Facility, TCU) -     PCP:  Wegener, Joel D, MD    CHIEF COMPLAINT / REASON FOR VISIT:  Chief Complaint   Patient presents with     Follow-up     TCU follow-up after hospitalization for left bimalleolar fracture with external fixation.      [facility] from 10/12/2019 until 10/18/2019 (left bimalleolar fracture with external fixation)      HPI: Sarah is a 74 y.o. 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  There was pain and deformity of the left ankle, but she waited until the next day to call for help.  She did not want the paramedics breaking down her door.  She was brought to [facility] where x-rays showed a bimalleolar ankle fracture which was severely displaced, and an attempted reduction in the ED necessitated calling orthopedics who performed a closed fracture reduction under conscious sedation in the ED.  Later in the day, she went to the OR for an external fixator placement.  She is nonweightbearing on the left leg, and she is receiving enoxaparin for DVT prophylaxis.  She was given doxycycline perioperatively and developed a diffuse rash.  Triamcinolone cream was prescribed.    She did have an open area on her sacrum up from a falling on the stairs, then scooting along the floor prior to calling for help.  It has been covered with a Mepilex dressing.      TCU ISSUES    Pain management: Being moved around a lot is causing her a good deal of pain and making her feel exhausted.  She  "tells me that she is asking for pain medications every 4 hours but only asking for 5 mg rather than 10 mg.  She now has an air mattress.    Dyspnea: I did receive a call over the weekend and was informed that the patient was short of breath.  She tells me she was so weak that she could not move, and aids assisted in moving her up in bed, but she could not breathe, felt exhausted, and oxygen was applied.  By late afternoon, she tells me she did not need the oxygen.  Nursing staff were concerned about a possible PE; however, she has been on enoxaparin, has experienced no chest pain, and she seems to be doing okay now.  She did request oxygen \"just to help me sleep.\"  She does not have a history of sleep apnea.    Sinus congestion/pruritus: She is still itchy from her allergic reaction to ceftazidime or doxycycline (she says a former, but Dr. Santiago has listed the latter).  She does not feel that the triamcinolone is offering sufficient coverage  She does tell me that the rash has been retreating.  It is now on her thighs and back and is still significantly pruritic.  I suggested we could try something oral.  She is already receiving Benadryl, but she tells me she can only take it at night, as it makes her too sleepy.  I had discussed providing her with loratadine, although I see she already has orders for fexofenadine 180 mg daily.  We will add an additional 60 mg daily.    Diabetes mellitus type 2: She had apparently been on Lantus and glimepiride at home, but for some reason the latter was discontinued in the hospital, her dose was cut in half (from 51 units to 25 units), and she was placed on sliding scale NovoLog.  Currently, fasting blood glucose levels range between 167 and 195.  At lunch, they range between 212 and 298.  Supper levels range between 209 and 245 and, at bedtime, the range is between 238 and 273.  We are going to try to get her back to her home dosing.  We will start by increasing her Lantus " "insulin to 35 units and see how she is doing in a few days.      ROS:   No headaches or chest pains, coughing or congestion, nausea or vomiting, dizziness, dysuria, constipation (she tells me she is taking half the prescribed senna) or diarrhea, difficulty chewing or swallowing.  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 felt to be focal nodular hyperplasia. annual CT abd. followed by Dr. Amilcar Kat      Cystocele, lateral      Diverticulitis of colon 06/2004    abd CT     DM (diabetes mellitus) (H)      Embolism and thrombosis (H) age 22    post ovarian cyst removed     Esophageal reflux      Fatty liver      Hiatal hernia     small     HLD (hyperlipidemia)      Nontoxic uninodular goiter      Osteopenia 04/21/2005    on fosamax for > 5 yr. stop 7/2012     Pure hypercholesterolemia               Family History   Problem Relation Age of Onset     Rheum arthritis Mother      Diabetes Mother      Hypertension Mother      Cancer Father      Other Brother         palpitations     Social History     Socioeconomic History     Marital status: Single     Spouse name: Not on file     Number of children: Not on file     Years of education: Not on file     Highest education level: Not on file   Occupational History     Not on file   Social Needs     Financial resource strain: Not on file     Food insecurity:     Worry: Not on file     Inability: Not on file     Transportation needs:     Medical: Not on file     Non-medical: Not on file   Tobacco Use     Smoking status: Former Smoker     Packs/day: 1.00     Years: 10.00     Pack years: 10.00     Smokeless tobacco: Never Used   Substance and Sexual Activity     Alcohol " use: Yes     Alcohol/week: 6.0 standard drinks     Types: 6 Standard drinks or equivalent per week     Frequency: 4 or more times a week     Drug use: Not on file     Sexual activity: Not on file   Lifestyle     Physical activity:     Days per week: Not on file     Minutes per session: Not on file     Stress: Not on file   Relationships     Social connections:     Talks on phone: Not on file     Gets together: Not on file     Attends Mosque service: Not on file     Active member of club or organization: Not on file     Attends meetings of clubs or organizations: Not on file     Relationship status: Not on file     Intimate partner violence:     Fear of current or ex partner: Not on file     Emotionally abused: Not on file     Physically abused: Not on file     Forced sexual activity: Not on file   Other Topics Concern     Not on file   Social History Narrative     Not on file       MEDICATIONS: Reviewed from the MAR, physician orders, and/or earlier progress notes.  Updated by me today (10/21/2019) with increases in fexofenadine and Lantus reflected below.  Post Discharge Medication Reconciliation Status: discharge medications reconciled and changed, per note/orders (see AVS).  Current Outpatient Medications   Medication Sig     fexofenadine (ALLEGRA) 60 MG tablet Take 60 mg by mouth daily.     acetaminophen (TYLENOL) 325 MG tablet Take 975 mg by mouth every 8 (eight) hours.     aluminum-magnesium hydroxide-simethicone (MAALOX ADVANCED) 200-200-20 mg/5 mL Susp Take 30 mL by mouth 3 (three) times a day as needed.     atorvastatin (LIPITOR) 40 MG tablet Take 40 mg by mouth at bedtime.     calcium-vitamin D (CALCIUM-VITAMIN D) 500 mg(1,250mg) -200 unit per tablet Take 1 tablet by mouth 2 (two) times a day with meals.     cholecalciferol, vitamin D3, 3,000 unit Tab Take 3,000 Units by mouth daily.     diphenhydrAMINE (BENADRYL) 25 mg capsule Take 25 mg by mouth every 6 (six) hours as needed for itching.      enoxaparin (LOVENOX) 40 mg/0.4 mL syringe Inject 40 mg under the skin daily.     fexofenadine (ALLEGRA) 180 MG tablet Take 180 mg by mouth daily.     furosemide (LASIX) 20 MG tablet Take 20 mg by mouth daily.     insulin aspart U-100 (NOVOLOG) 100 unit/mL injection Inject under the skin 3 (three) times a day before meals. Inject as per  sliding scale: if 0 - 139 = O UNITS DO NOT GIVE  CORRECTION INSULIN IF PRE MEAL BG < 140;  140 - 189 = 1 UNIT; 190 - 239 = 2 UNITS; 240 - 289 =  3 UNITS; 290 - 339 = 4 UNITS; 340 - 399 = 5 UNITS;  400 - 449 = 6 UNITS; 450+ = 7 UNITS IF BG = OR >  450 GIVE 7 UNIT     insulin aspart U-100 (NOVOLOG) 100 unit/mL injection Inject under the skin at bedtime. Inject as per  sliding scale: if 0 - 199 = 0 UNITS DO NOT GIVE  BEDTIME CORRECTION DOSE IF BG LESS THAN  200; 200 - 249 = 1 UNIT; 250 - 299 = 2 UNITS; 300 -  349 = 3 UNITS; 350 - 399 = 4 UNITS; 400+ = 5  UNITS FOR BG GREATER THAN OR EQUAL TO  400, GIVE 5 UNITS     insulin glargine (LANTUS) 100 unit/mL vial Inject 35 Units under the skin daily.            lisinopril (PRINIVIL,ZESTRIL) 2.5 MG tablet Take 2.5 mg by mouth daily.     metoprolol tartrate (LOPRESSOR) 25 MG tablet Take 25 mg by mouth 2 (two) times a day.     oxyCODONE (ROXICODONE) 5 MG immediate release tablet Take 5-10 mg by mouth every 4 (four) hours as needed for pain.     pantoprazole (PROTONIX) 40 MG tablet Take 40 mg by mouth daily.     senna-docusate (PERICOLACE) 8.6-50 mg tablet Take 2 tablets by mouth 2 (two) times a day.     triamcinolone (KENALOG) 0.1 % cream Apply 1 application topically 3 (three) times a day.     ALLERGIES:   Allergies   Allergen Reactions     Amlodipine Swelling     Cefazolin      Metformin Diarrhea     Cephalosporins Rash     Clindamycin Rash     Levaquin [Levofloxacin] Rash     Itching, rash       Nickel Rash     Contact reaction       DIET: Regular, regular texture, thin liquids.    Vitals:    10/21/19 1710   BP: 110/68   Pulse: 88   Resp: 19  "  Temp: 98.7  F (37.1  C)   SpO2: 98%   Height: 5' 5\" (1.651 m)     There is no height or weight on file to calculate BMI.    EXAMINATION:   General: Moderately obese middle-aged female, lying in bed.  Head: Normocephalic and atraumatic.   Eyes: PERRLA, sclerae clear.   ENT: Moist oral mucosa.  She has her own teeth.  No rhinorrhea or nasal discharge.  Hearing is unimpaired.  Cardiovascular: Regular rate and rhythm.  No appreciable murmur.  Respiratory: Lungs clear to auscultation bilaterally.   Abdomen: Soft and nontender.   Musculoskeletal/Extremities: Age-related degenerative joint disease.  Left external hardware has 2 anterior mid shin pins as well as pins through the medial and lateral heels.  The ankle area is tender to light palpation.  Leg is elevated on a foam wedge.  Integument: No rashes, clinically significant lesions, or skin breakdown.   Cognitive/Psychiatric:     DIAGNOSTICS:   No results found for this or any previous visit.  No results found for: WBC, HGB, HCT, MCV, PLT  CrCl cannot be calculated (No successful lab value found.).      ASSESSMENT/Plan:      ICD-10-CM    1. Closed bimalleolar fracture of left ankle with routine healing S82.842D    2. Type 2 diabetes mellitus with hyperglycemia, with long-term current use of insulin (H) E11.65     Z79.4    3. Essential hypertension, benign I10    4. Hyperlipidemia LDL goal <100 E78.5    5. Gastroesophageal reflux disease, esophagitis presence not specified K21.9    6. Fatty liver K76.0    7. Congenital cystic disease of liver Q44.6      CHANGES:    1.  Increase fexofenadine from 180 mg daily to 240 mg daily.  Do this by providing a 180 mg tab and a 60 mg tab (for pruritus).  2.  Increase Lantus insulin from 25 units to 35 units.    CARE PLAN:    The care plan has been reviewed and all orders signed. Changes to care plan, if any, as noted. Otherwise, continue current plan of care.  Total time spent with this patient was approximately 35 minutes, with " greater than 50% spent in counseling and coordination of care that included addressing her complaints and working with her to make adjustments in her insulin dosing.    The above has been created using voice recognition software. Please be aware that this may unintentionally  produce inaccuracies and/or nonsensical sentences.      Electronically signed by: Juan Holman CNP

## 2021-06-03 VITALS
TEMPERATURE: 98.7 F | HEART RATE: 88 BPM | RESPIRATION RATE: 19 BRPM | DIASTOLIC BLOOD PRESSURE: 68 MMHG | OXYGEN SATURATION: 98 % | HEIGHT: 65 IN | BODY MASS INDEX: 32.2 KG/M2 | SYSTOLIC BLOOD PRESSURE: 110 MMHG

## 2021-06-03 VITALS
RESPIRATION RATE: 18 BRPM | BODY MASS INDEX: 32.12 KG/M2 | DIASTOLIC BLOOD PRESSURE: 74 MMHG | SYSTOLIC BLOOD PRESSURE: 137 MMHG | HEIGHT: 65 IN | HEART RATE: 80 BPM | TEMPERATURE: 97.5 F | OXYGEN SATURATION: 97 %

## 2021-06-03 NOTE — PROGRESS NOTES
[facility]    Name:   Sarah Felix  : 1945  Facility:   [facility] [[id number]]   Room:   Code Status: FULL CODE and POLST AVAILABLE -   Fac type:   SNF (Skilled Nursing Facility, TCU) -     PCP:  Wegener, Joel D, MD    CHIEF COMPLAINT / REASON FOR VISIT:  Chief Complaint   Patient presents with     Follow-up     TCU follow-up after hospitalization for left bimalleolar fracture with external fixation and subsequent internal fixation.      [facility] from 10/12/2019 until 10/18/2019 (left bimalleolar fracture with external fixation)  [facility] from 10/28/2019 until 10/30/2019 (left bimalleolar fracture ORIF)      HPI: Sarah is a 74 y.o. 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      She was going down the stairs at home on 10/11/2019 in the late evening, when her ankle gave out, and she slipped 3-4 stairs onto her buttocks.  There was pain and deformity of the left ankle, but she waited until the next day to call for help.  She did not want the paramedics breaking down her door.  She was brought to [facility] where x-rays showed a bimalleolar ankle fracture which was severely displaced, and an attempted reduction in the ED necessitated calling orthopedics who performed a closed fracture reduction under conscious sedation in the ED.  Later in the day, she went to the OR for an external fixator placement.  She is nonweightbearing on the left leg, and she is receiving enoxaparin for DVT prophylaxis.  She was given doxycycline perioperatively and developed a diffuse rash.  Triamcinolone cream was prescribed.    She did have an open area on her sacrum up from a falling on the stairs, then scooting along the floor prior to calling for help.  It has been covered with a  "Mepilex dressing.    She returned to the hospital on 10/28/2019 for external fixation hardware removal and ORIF, performed by Dr. Can that day.  She tolerated the procedure well and returned here on 10/30/2019.      TCU ISSUES    Primary diagnosis: She is to have the dressing changed at her ORIF site on day 4 and day 7.  This was performed on day 4.  Today is day 7.    Pain management: Since the ORIF, she appears to be in less pain.  She is receiving 1 to 2 tablets of oxycodone IR (5 mg) every 4 hours as needed, still rating her pain at an \"8.\"  She has not yet had a dose this morning.  She also has an air mattress.     Today, she describes the leg as feeling \"swollen, tightness, numb,\" and the pin sites do ache.    She is now experiencing right ankle pain, stating that her right side is taking a lot of additional stress, possibly overcompensating.  Holly is her therapist, and I suggested she discuss this with her.    Fatigue: She tells me she needs frequent rest.  This is not something new, as it has been going on for some time.  She says that she has not had any significant follow-up with her PCP.  I wonder whether she is experiencing chronic fatigue syndrome.    Dyspnea: I did receive a call over the weekend during her initial stay and was informed that the patient was short of breath.  She told me she was so weak that she could not move, and aides assisted in moving her up in bed, but she could not breathe, felt exhausted, and oxygen was applied.  By late afternoon, she did not need the oxygen.  Nursing staff were concerned about a possible PE; however, she had been on enoxaparin, had experienced no chest pain, and seemed to be doing okay.  She did request oxygen \"just to help me sleep.\"  She does not have a history of sleep apnea.  She still reiterates that she cannot breathe well when lying flat.  She does not have a history of heart failure.    Sinus congestion/pruritus: She is still itchy from her " "allergic reaction to ceftazidime or doxycycline (she says a former, but Dr. Santiago has listed the latter).  She does not feel that the triamcinolone is offering sufficient coverage  She does tell me that the rash has been retreating.  It is now on her thighs and back and is still significantly pruritic.  I suggested we could try something oral.  She is already receiving Benadryl, but she has told me she can only take it at night, as it makes her too sleepy.  In fact, she has been using it as a sleep aid of sorts, as \"it has not touched the itching.\"  In my last visit, I could see she already had orders for fexofenadine 180 mg daily.  We added an additional 60 mg daily.  In the hospital, of course, the dose was brought back down to 180 mg.  Pruritus is not significant; however, she shows me her upper chest where she has been scratching.    Diabetes mellitus type 2: She had apparently been on Lantus and glimepiride at home, but for some reason the latter was discontinued in the hospital, her dose was cut in half (from 51 units to 25 units), and she was placed on sliding scale NovoLog.  Prior to her second hospitalization, fasting blood glucose levels ranged between 167 and 195.  At lunch, they ranged between 212 and 298.  Supper levels ranged between 209 and 245 and, at bedtime, the range was between 238 and 273.  The plan was to get her back to her home dosing.  We started by increasing her Lantus insulin to 35 units.    Currently, fasting blood glucose levels look pretty good, ranging between 117 and 177 (with outliers of 88 and 236).  They are quite a bit higher at lunch, however, ranging between 211 and 290 (with a 168 outlier).  At supper, results are similar, ranging between 196 and 286 (with a 160 outlier).  Bedtime blood glucose levels are even higher, ranging from a low of 196 to a high of 325.  Given the reasonable fasting blood glucose levels, I am not apt to increase the Lantus.  She tells me they may be " "checking her blood glucose levels after she has been \"grazing.\"  She does appear to need scheduled prandial dosing.  We will authorize 2 units at lunch and 4 units at supper and observe how this affects bedtime levels.     Buttock wound: As noted in the hospital discharge summary, she sustained open area on her sacrum from a falling on the stairs, then scooting along the floor prior to calling for help.  It has been covered with a Mepilex dressing.  Dr. Santiago ordered Proshield 3 times daily and as needed, and this has been working quite well.    Insomnia: She tends to go to bed rather late.  She tells me she may go to bed as late as 4 AM.  She does not require any sleep aid.    Gastric reflux: She is on 40 mg of pantoprazole daily.  She does feel uncomfortable lying down but does not feel she needs this medication.  She will cough and feel like she is going to vomit, but if she sits up and burps, she then feels okay.  Unsure how long she has been on this, we will change the scheduled dose to every other day.      ROS:   No headaches or chest pains, coughing or congestion, dizziness, dysuria, constipation (she tells me she is taking half the prescribed senna) or diarrhea, difficulty chewing or swallowing.     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 felt to be focal nodular hyperplasia. annual CT abd. followed by Dr. Amilcar Kat      Cystocele, lateral      Diverticulitis of colon 06/2004    abd CT     DM (diabetes mellitus) (H)      Embolism and thrombosis (H) age 22    post ovarian cyst removed     Esophageal reflux      Fatty liver      Hiatal hernia     small     HLD (hyperlipidemia)      Nontoxic uninodular goiter      " Osteopenia 04/21/2005    on fosamax for > 5 yr. stop 7/2012     Pure hypercholesterolemia               Family History   Problem Relation Age of Onset     Rheum arthritis Mother      Diabetes Mother      Hypertension Mother      Cancer Father      Other Brother         palpitations     Social History     Socioeconomic History     Marital status: Single     Spouse name: Not on file     Number of children: Not on file     Years of education: Not on file     Highest education level: Not on file   Occupational History     Not on file   Social Needs     Financial resource strain: Not on file     Food insecurity:     Worry: Not on file     Inability: Not on file     Transportation needs:     Medical: Not on file     Non-medical: Not on file   Tobacco Use     Smoking status: Former Smoker     Packs/day: 1.00     Years: 10.00     Pack years: 10.00     Smokeless tobacco: Never Used   Substance and Sexual Activity     Alcohol use: Yes     Alcohol/week: 6.0 standard drinks     Types: 6 Standard drinks or equivalent per week     Frequency: 4 or more times a week     Drug use: Not on file     Sexual activity: Not on file   Lifestyle     Physical activity:     Days per week: Not on file     Minutes per session: Not on file     Stress: Not on file   Relationships     Social connections:     Talks on phone: Not on file     Gets together: Not on file     Attends [employer] service: Not on file     Active member of club or organization: Not on file     Attends meetings of clubs or organizations: Not on file     Relationship status: Not on file     Intimate partner violence:     Fear of current or ex partner: Not on file     Emotionally abused: Not on file     Physically abused: Not on file     Forced sexual activity: Not on file   Other Topics Concern     Not on file   Social History Narrative     Not on file       MEDICATIONS: Reviewed from the MAR, physician orders, and/or earlier progress notes.  Updated by me today (10/21/2019)  with increases in fexofenadine and Lantus reflected below.  Post Discharge Medication Reconciliation Status: discharge medications reconciled and changed, per note/orders (see AVS).  Current Outpatient Medications   Medication Sig     acetaminophen (TYLENOL) 325 MG tablet Take 975 mg by mouth every 8 (eight) hours.     aluminum-magnesium hydroxide-simethicone (MAALOX ADVANCED) 200-200-20 mg/5 mL Susp Take 30 mL by mouth 3 (three) times a day as needed.     atorvastatin (LIPITOR) 40 MG tablet Take 40 mg by mouth at bedtime.     calcium-vitamin D (CALCIUM-VITAMIN D) 500 mg(1,250mg) -200 unit per tablet Take 1 tablet by mouth 2 (two) times a day with meals.     cholecalciferol, vitamin D3, 3,000 unit Tab Take 3,000 Units by mouth daily.     diphenhydrAMINE (BENADRYL) 25 mg capsule Take 25 mg by mouth every 6 (six) hours as needed for itching.     enoxaparin (LOVENOX) 40 mg/0.4 mL syringe Inject 40 mg under the skin daily.     fexofenadine (ALLEGRA) 180 MG tablet Take 180 mg by mouth daily.     furosemide (LASIX) 20 MG tablet Take 20 mg by mouth daily.     insulin aspart U-100 (NOVOLOG) 100 unit/mL injection Inject under the skin 3 (three) times a day before meals. Inject as per  sliding scale: if 0 - 139 = O UNITS DO NOT GIVE  CORRECTION INSULIN IF PRE MEAL BG < 140;  140 - 189 = 1 UNIT; 190 - 239 = 2 UNITS; 240 - 289 =  3 UNITS; 290 - 339 = 4 UNITS; 340 - 399 = 5 UNITS;  400 - 449 = 6 UNITS; 450+ = 7 UNITS IF BG = OR >  450 GIVE 7 UNIT     insulin aspart U-100 (NOVOLOG) 100 unit/mL injection Inject under the skin at bedtime. Inject as per  sliding scale: if 0 - 199 = 0 UNITS DO NOT GIVE  BEDTIME CORRECTION DOSE IF BG LESS THAN  200; 200 - 249 = 1 UNIT; 250 - 299 = 2 UNITS; 300 -  349 = 3 UNITS; 350 - 399 = 4 UNITS; 400+ = 5  UNITS FOR BG GREATER THAN OR EQUAL TO  400, GIVE 5 UNITS     insulin glargine (LANTUS) 100 unit/mL vial Inject 35 Units under the skin daily.            lisinopril (PRINIVIL,ZESTRIL) 2.5 MG tablet  "Take 2.5 mg by mouth daily.     methocarbamol (ROBAXIN) 500 MG tablet Take 500 mg by mouth 4 (four) times a day.     metoprolol tartrate (LOPRESSOR) 25 MG tablet Take 25 mg by mouth 2 (two) times a day.     oxyCODONE (ROXICODONE) 5 MG immediate release tablet Take 5-10 mg by mouth every 4 (four) hours as needed for pain.     pantoprazole (PROTONIX) 40 MG tablet Take 40 mg by mouth daily.     senna-docusate (PERICOLACE) 8.6-50 mg tablet Take 2 tablets by mouth 2 (two) times a day.     triamcinolone (KENALOG) 0.1 % cream Apply 1 application topically 3 (three) times a day.     ALLERGIES:   Allergies   Allergen Reactions     Amlodipine Swelling     Cefazolin      Metformin Diarrhea     Cephalosporins Rash     Clindamycin Rash     Levaquin [Levofloxacin] Rash     Itching, rash       Nickel Rash     Contact reaction       DIET: Diabetic, regular texture, thin liquids.    Vitals:    11/04/19 1223   BP: 137/74   Pulse: 80   Resp: 18   Temp: 97.5  F (36.4  C)   SpO2: 97%   Height: 5' 5\" (1.651 m)     There is no height or weight on file to calculate BMI.    EXAMINATION:   General: Moderately obese middle-aged female,sitting in a recliner.  Head: Normocephalic and atraumatic.   Eyes: PERRLA, sclerae clear.   ENT: Moist oral mucosa.  She has her own teeth.  No rhinorrhea or nasal discharge.  Hearing is unimpaired.  Cardiovascular: Regular rate and rhythm.  No appreciable murmur.  Respiratory: Lungs clear to auscultation bilaterally.   Abdomen: Soft and nontender.   Musculoskeletal/Extremities: Age-related degenerative joint disease.  She is wearing a boot on the left lower extremity.  Leg is currently wrapped.  Good CMS.  Integument: No rashes, clinically significant lesions, or skin breakdown.   Cognitive/Psychiatric: Alert and oriented x3.  Affect is euthymic.    DIAGNOSTICS:   No results found for this or any previous visit.  No results found for: WBC, HGB, HCT, MCV, PLT  CrCl cannot be calculated (No successful lab value " found.).      ASSESSMENT/Plan:      ICD-10-CM    1. Closed bimalleolar fracture of left ankle with routine healing S82.842D    2. Type 2 diabetes mellitus with hyperglycemia, with long-term current use of insulin (H) E11.65     Z79.4    3. Essential hypertension, benign I10    4. Hyperlipidemia LDL goal <100 E78.5    5. Gastroesophageal reflux disease, esophagitis presence not specified K21.9    6. Fatty liver K76.0    7. Congenital cystic disease of liver Q44.6      CHANGES:    1.  Change pantoprazole from 40 mg daily to 40 mg every other day.  2.  Add scheduled prandial NovoLog, 2 units at lunch and 4 units at supper.    CARE PLAN:    The care plan has been reviewed and all orders signed. Changes to care plan, if any, as noted. Otherwise, continue current plan of care.  Total time spent with this patient was approximately 35 minutes, with greater than 50% spent in counseling and coordination of care that included addressing her complaints and working with her to make adjustments in her insulin dosing.    The above has been created using voice recognition software. Please be aware that this may unintentionally  produce inaccuracies and/or nonsensical sentences.      Electronically signed by: Juan Holman, CNP

## 2021-06-03 NOTE — PROGRESS NOTES
[facility]      Facility:    [facility] [[id number]]   Code Status: UNKNOWN  Renny / TERESE [facility] : 10/12 to 10/18/19  U [facility] 10/28/19 - 10/30/19    ORIF     NO DISCHARGE SUMMARY IN CARE EVERYWHERE      Chief Complaint/Reason for Visit:  Chief Complaint   Patient presents with     Review Of Multiple Medical Conditions     Left bimalleolar ankle fracture       HPI:   Sarah is a 74 y.o. female with a history of type 2 diabetes.    She was going down the stairs  at home on 10/11/2019 in the late evening.  Her ankle gave out and she slipped 3-4 stairs on her buttocks. She had pain and deformity of her left ankle. She waited till the following day  to call for help, she did not want the paramedics breaking down her door.    She was brought to the [facility].  X-ray showed bimalleolar ankle fracture.  It was a severely displaced  fracture, ED attempted to reduce it.  Ended up calling orthopedics  who performed aclosed fracture reduction under conscious sedation in the emergency department.  Later in the day, she went to the OR for an external fixator placement.  She was non-weightbearing on the left leg, enoxaparin for DVT prophylaxis.    She was given Doxycycline perioperatively, and developed a diffuse rash. Triamcinolone cream was prescribed.    She had an open area on her coccyx from falling on the stairs, then scooting along her floor prior to calling for help. It was covered with Mepilex foam dressing.    She transferred to [facility].  __________________________________________________________________________  Hospitalization #2: 10/28/19  Removal of external fixator, plate and screws open reduction fixation.  Nonweightbearing left lower extremity  DVT prophylaxis: Lovenox daily x28 days    She returned to [facility]    UPDATE:  Things are generally going well  Her sugars are variable: There occasionally high  but generally are okay.  The NovoLog started at lunch and supper, has been increased to 60 units, 8 units respectively   She has a request to decrease her pantoprazole from daily to every other day (done)  She was discharged without wound care orders: Just recommend dry gauze, change every 48 hours and as needed  Lotion on her left leg skin is very helpful    Past Medical History:  Active Non-Hospital Problems    Diagnosis     Esophageal reflux     Fatty liver     Closed bimalleolar fracture of left ankle with routine healing     Type 2 diabetes mellitus with hyperglycemia (H)     Essential hypertension, benign     Peripheral vascular disease (H)     Problem list name updated by automated process. Provider to review       Hypertension goal BP (blood pressure) < 140/90     Personal history of other malignant neoplasm of skin     Hyperlipidemia LDL goal <100     Per provider       Diverticulitis of colon     Abd CT  Problem list name updated by automated process. Provider to review       Congenital cystic disease of liver     multiple liver lesions.  felt to be focal nodular hyperplasia.  annual CT abd.    followed by Dr. Amilcar Kat               Surgical History:  Past Surgical History:   Procedure Laterality Date     C APPENDECTOMY age 22     C DEXA, BONE DENSITY, AXIAL SKEL 2005     C DEXA, BONE DENSITY, AXIAL SKEL 6/2007   No signif change in Osteopenia     COLONOSCOPY 4/2006   repeat 10 yr     ENDOSCOPIC ULTRASOUND UPPER GASTROINTESTINAL TRACT (GI) N/A 9/6/2018   Procedure: ENDOSCOPIC ULTRASOUND, ESOPHAGOSCOPY / UPPER GASTROINTESTINAL TRACT (GI); Endoscopic Ultrasound, Esophagogastroduodenoscopy with endoscopic mucosal resection; Surgeon: Hood Brower MD; [facility]     ESOPHAGOSCOPY, GASTROSCOPY, DUODENOSCOPY (EGD), COMBINED N/A 8/13/2018   Procedure: COMBINED ESOPHAGOSCOPY, GASTROSCOPY, DUODENOSCOPY (EGD), BIOPSY SINGLE OR MULTIPLE; EGD; Surgeon: Hood Brower MD; Location:  OR     ESOPHAGOSCOPY,  GASTROSCOPY, DUODENOSCOPY (EGD), COMBINED N/A 3/14/2019   Procedure: Upper Endoscopy; Surgeon: Hood Brower MD; [facility]     ESOPHAGOSCOPY, GASTROSCOPY, DUODENOSCOPY (EGD), RESECT MUCOSA, COMBINED N/A 9/6/2018   Procedure: COMBINED ESOPHAGOSCOPY, GASTROSCOPY, DUODENOSCOPY (EGD), RESECT MUCOSA;; Surgeon: Hood Brower MD; Location[facility] OR     GYN SURGERY 10/22/08   pelvic support     HEMORRHOIDECTOMY 8/08     MOHS MICROGRAPHIC PROCEDURE     SURGICAL HISTORY OF - age 22   L ovarian cyst removal, laparotomy     SURGICAL HISTORY OF - 1998   partial thyroidectomy     SURGICAL HISTORY OF - 10/08   Transobturator tape for Urinary Incontinence     SURGICAL HISTORY OF - 11/2011   stress test normal     THYROID SURGERY   Had cyst removed, thyroid gland is intact.                  Review of Systems   Pain control is adequate.  Had pain when the aide accidentally jostled her foot from the side  Discussed trial of decreasing oxycodone.  It makes her nervous, she would like to in fact have 10 mg in the morning to cover her morning therapies, 10 mg at noon to cover afternoon therapy; she would like to keep the range of 5 to 10 mg oxycodone as needed every 4 hours, but has already been trying to move toward just having 5 mg, we agreed should try to cut back.  Her glucose is still tend to be higher in the evenings, sometimes presupper, yesterday before supper 249      Blood pressure 128/76, pulse 76, temperature 98.9  F (37.2  C), resp. rate 18, SpO2 95 %.      BMI= 32.12    Physical Exam     Constitutional:          female, overweight,  calm   Cardiovascular:      Rate and Rhythm: Normal rate , rhythm. No murmur.   Pulmonary:      Breath sounds: End inspiratory rales right posterior base  Abdominal:      General: Bowel sounds are normal.      Palpations: Abdomen is soft, non tender.   Musculoskeletal:         General: Tenderness mild.  Left lower leg in the immobilizer     Comments: Both arms and right leg normal  strength and coordination  Skin:     General: Skin is warm and dry.  Skin inspected under the boot/immobilizer: No areas of rubbing  Dry, thin eschar covering all 3 sites of the previous external fixator, no erythema  Malleoli incisions with a dot each of dried blood on the gauze  Neurological:      General: No focal deficit present.      Mental Status: She is alert and oriented to person, place, and time.   Psychiatric:          Mood and thought content normal.       Allergies   Allergen Reactions     Amlodipine Swelling     Cefazolin      Metformin Diarrhea     Cephalosporins Rash     Clindamycin Rash     Levaquin [Levofloxacin] Rash     Itching, rash       Nickel Rash     Contact reaction           Medication List:    Current Outpatient Medications   Medication Sig     acetaminophen (TYLENOL) 325 MG tablet Take 975 mg by mouth every 8 (eight) hours.     aluminum-magnesium hydroxide-simethicone (MAALOX ADVANCED) 200-200-20 mg/5 mL Susp Take 30 mL by mouth 3 (three) times a day as needed.     atorvastatin (LIPITOR) 40 MG tablet Take 40 mg by mouth at bedtime.     calcium-vitamin D (CALCIUM-VITAMIN D) 500 mg(1,250mg) -200 unit per tablet Take 1 tablet by mouth 2 (two) times a day with meals.     cholecalciferol, vitamin D3, 3,000 unit Tab Take 3,000 Units by mouth daily.     diphenhydrAMINE (BENADRYL) 25 mg capsule Take 25 mg by mouth every 6 (six) hours as needed for itching.     enoxaparin (LOVENOX) 40 mg/0.4 mL syringe Inject 40 mg under the skin daily.     fexofenadine (ALLEGRA) 180 MG tablet Take 180 mg by mouth daily.     furosemide (LASIX) 20 MG tablet Take 20 mg by mouth daily.     glimepiride (AMARYL) 4 MG tablet Take 4 mg by mouth daily before breakfast.     insulin aspart U-100 (NOVOLOG) 100 unit/mL injection Inject under the skin 3 (three) times a day before meals. Inject as per  sliding scale: if 0 - 139 = O UNITS DO NOT GIVE  CORRECTION INSULIN IF PRE MEAL BG < 140;  140 - 189 = 1 UNIT; 190 - 239 = 2  UNITS; 240 - 289 =  3 UNITS; 290 - 339 = 4 UNITS; 340 - 399 = 5 UNITS;  400 - 449 = 6 UNITS; 450+ = 7 UNITS IF BG = OR >  450 GIVE 7 UNIT     insulin aspart U-100 (NOVOLOG) 100 unit/mL injection Inject under the skin at bedtime. Inject as per  sliding scale: if 0 - 199 = 0 UNITS DO NOT GIVE  BEDTIME CORRECTION DOSE IF BG LESS THAN  200; 200 - 249 = 1 UNIT; 250 - 299 = 2 UNITS; 300 -  349 = 3 UNITS; 350 - 399 = 4 UNITS; 400+ = 5  UNITS FOR BG GREATER THAN OR EQUAL TO  400, GIVE 5 UNITS     insulin aspart U-100 (NOVOLOG) 100 unit/mL injection Inject under the skin 2 (two) times a day before meals. Administer 2 units at lunch and 4 units at supper.     insulin glargine (LANTUS) 100 unit/mL vial Inject 35 Units under the skin daily.            lisinopril (PRINIVIL,ZESTRIL) 2.5 MG tablet Take 2.5 mg by mouth daily.     methocarbamol (ROBAXIN) 500 MG tablet Take 500 mg by mouth 4 (four) times a day.     metoprolol tartrate (LOPRESSOR) 25 MG tablet Take 25 mg by mouth 2 (two) times a day.     oxyCODONE (ROXICODONE) 5 MG immediate release tablet Take 5-10 mg by mouth every 4 (four) hours as needed for pain.     pantoprazole (PROTONIX) 40 MG tablet Take 40 mg by mouth daily.     senna-docusate (PERICOLACE) 8.6-50 mg tablet Take 2 tablets by mouth 2 (two) times a day.     triamcinolone (KENALOG) 0.1 % cream Apply 1 application topically 3 (three) times a day.           Labs: no new labs   Ref Range & Units 10/17/19 0621   Sodium 133 - 144 mmol/L 140    Potassium 3.4 - 5.3 mmol/L 3.5    Chloride 94 - 109 mmol/L 108    Carbon Dioxide 20 - 32 mmol/L 25    Anion Gap 3 - 14 mmol/L 7    Glucose 70 - 99 mg/dL 175High     Urea Nitrogen 7 - 30 mg/dL 8    Creatinine 0.52 - 1.04 mg/dL 0.63    GFR Estimate >60 mL/min/1.73_m2 90       Ref Range & Units 10/18/19 1113   WBC 4.0 - 11.0 10e9/L 17.7High     Hemoglobin 11.7 - 15.7 g/dL 11.2Low     RDW 10.0 - 15.0 % 15.0    Platelet Count 150 - 450 10e9/L 698High           Assessment / Plan:     ICD-10-CM    1. Closed bimalleolar fracture of left ankle with routine healing S82.842D  oxycodone is helping her pain, generally 10 mg when she takes it.  Has less pain now than previously   2. Type 2 diabetes mellitus with hyperglycemia, with long-term current use of insulin (H) E11.65 On Grlargine, Aspart U100. Also on a statin. A1c=7.6    Z79.4    3. Peripheral vascular disease (H) I73.9 On lasix daiy for leg edema   4. Essential hypertension, benign I10 Good reading on metoprolol and Lisinopril       Electronically signed by: Sunita Santiago MD

## 2021-06-03 NOTE — PROGRESS NOTES
[facility]      Facility:    [facility] [[id number]]   Code Status: UNKNOWN  Renny / TERESE [facility] : 10/12 to 10/18/19  U [facility] 10/28/19 - 10/30/19    ORIF     NO DISCHARGE SUMMARY IN CARE EVERYWHERE      Chief Complaint/Reason for Visit:  Chief Complaint   Patient presents with     Review Of Multiple Medical Conditions       HPI:   Sarah is a 74 y.o. female with a history of type 2 diabetes.    She was going down the stairs  at home on 10/11/2019 in the late evening.  Her ankle gave out and she slipped 3-4 stairs on her buttocks. She had pain and deformity of her left ankle. She waited till the following day  to call for help, she did not want the paramedics breaking down her door.    She was brought to the [facility].  X-ray showed bimalleolar ankle fracture.  It was a severely displaced  fracture, ED attempted to reduce it.  Ended up calling orthopedics  who performed aclosed fracture reduction under conscious sedation in the emergency department.  Later in the day, she went to the OR for an external fixator placement.  She was non-weightbearing on the left leg, enoxaparin for DVT prophylaxis.    She was given Doxycycline perioperatively, and developed a diffuse rash. Triamcinolone cream was prescribed.    She had an open area on her coccyx from falling on the stairs, then scooting along her floor prior to calling for help. It was covered with Mepilex foam dressing.    She transferred to [facility].  __________________________________________________________________________  Hospitalization #2: 10/28/19  Removal of external fixator, plate and screws open reduction fixation.  Nonweightbearing left lower extremity  DVT prophylaxis: Lovenox daily x28 days    She returned to [facility]    UPDATE:  She had sudden dyspnea at the facility on 11/25: She went to the emergency department at the [facility]  [place].  Work-up was completely negative, she returned to [facility].      Past Medical History:  Active Non-Hospital Problems    Diagnosis     Esophageal reflux     Fatty liver     Closed bimalleolar fracture of left ankle with routine healing     Type 2 diabetes mellitus with hyperglycemia (H)     Essential hypertension, benign     Peripheral vascular disease (H)     Problem list name updated by automated process. Provider to review       Hypertension goal BP (blood pressure) < 140/90     Personal history of other malignant neoplasm of skin     Hyperlipidemia LDL goal <100     Per provider       Diverticulitis of colon     Abd CT  Problem list name updated by automated process. Provider to review       Congenital cystic disease of liver     multiple liver lesions.  felt to be focal nodular hyperplasia.  annual CT abd.    followed by Dr. Amilcar Kat               Surgical History:  Past Surgical History:   Procedure Laterality Date     C APPENDECTOMY age 22     C DEXA, BONE DENSITY, AXIAL SKEL 2005     C DEXA, BONE DENSITY, AXIAL SKEL 6/2007   No signif change in Osteopenia     COLONOSCOPY 4/2006   repeat 10 yr     ENDOSCOPIC ULTRASOUND UPPER GASTROINTESTINAL TRACT (GI) N/A 9/6/2018   Procedure: ENDOSCOPIC ULTRASOUND, ESOPHAGOSCOPY / UPPER GASTROINTESTINAL TRACT (GI); Endoscopic Ultrasound, Esophagogastroduodenoscopy with endoscopic mucosal resection; Surgeon: Hood Brower MD; [facility]     ESOPHAGOSCOPY, GASTROSCOPY, DUODENOSCOPY (EGD), COMBINED N/A 8/13/2018   Procedure: COMBINED ESOPHAGOSCOPY, GASTROSCOPY, DUODENOSCOPY (EGD), BIOPSY SINGLE OR MULTIPLE; EGD; Surgeon: Hood Brower MD; [facility]     ESOPHAGOSCOPY, GASTROSCOPY, DUODENOSCOPY (EGD), COMBINED N/A 3/14/2019   Procedure: Upper Endoscopy; Surgeon: Hood Brower MD; [facility]     ESOPHAGOSCOPY, GASTROSCOPY, DUODENOSCOPY (EGD), RESECT MUCOSA, COMBINED N/A 9/6/2018   Procedure: COMBINED ESOPHAGOSCOPY, GASTROSCOPY,  DUODENOSCOPY (EGD), RESECT MUCOSA;; Surgeon: Hood Brower MD; [facility]     GYN SURGERY 10/22/08   pelvic support     HEMORRHOIDECTOMY 8/08     MOHS MICROGRAPHIC PROCEDURE     SURGICAL HISTORY OF - age 22   L ovarian cyst removal, laparotomy     SURGICAL HISTORY OF - 1998   partial thyroidectomy     SURGICAL HISTORY OF - 10/08   Transobturator tape for Urinary Incontinence     SURGICAL HISTORY OF - 11/2011   stress test normal     THYROID SURGERY   Had cyst removed, thyroid gland is intact.                  Review of Systems   No shortness of breath  L ankle is achy  Impatient for weight bearing. Wants to go home.  She is getting too much stool softener, consider change to by request only.  She does not want decongestant (fexofenadine) , Benadryl, methocarbamol, Protonix.  She does not have heartburn  We had given her nasal saline spray because the air is quite dry in the facility.  She had a friend bring in her Tow pot which is more helpful.  Bored.        Blood pressure 122/67, pulse 80, temperature 97.6  F (36.4  C), resp. rate 18, SpO2 96 %.        BMI= 32.12    Physical Exam     Constitutional:          female, overweight.  Cardiovascular:      Rate and Rhythm: Normal rate , rhythm. No murmur.   Pulmonary:      Breath sounds: Still end inspiratory rales right posterior base  Abdominal:      General: Bowel sounds are normal.      Palpations: Abdomen is soft, non tender.   Musculoskeletal:         General:   Left lower leg in the immobilizer, minimal dorsal foot swelling      Both arms and right leg normal strength and coordination  Skin:     General: Skin is warm and dry.    Surgical wounds: no erythema, drainage.  Neurological:      General: No focal deficit present.      Mental Status: She is alert and oriented to person, place, and time.   Psychiatric:          Mood and thought content normal. Quite demanding at times of staff       Allergies   Allergen Reactions     Amlodipine Swelling      Cefazolin      Metformin Diarrhea     Cephalosporins Rash     Clindamycin Rash     Levaquin [Levofloxacin] Rash     Itching, rash       Nickel Rash     Contact reaction           Medication List:    Current Outpatient Medications   Medication Sig     acetaminophen (TYLENOL) 325 MG tablet Take 975 mg by mouth every 8 (eight) hours.     aluminum-magnesium hydroxide-simethicone (MAALOX ADVANCED) 200-200-20 mg/5 mL Susp Take 30 mL by mouth 3 (three) times a day as needed.     atorvastatin (LIPITOR) 40 MG tablet Take 40 mg by mouth at bedtime.     calcium-vitamin D (CALCIUM-VITAMIN D) 500 mg(1,250mg) -200 unit per tablet Take 1 tablet by mouth 2 (two) times a day with meals.     cholecalciferol, vitamin D3, 3,000 unit Tab Take 3,000 Units by mouth daily.     diphenhydrAMINE (BENADRYL) 25 mg capsule Take 25 mg by mouth every 6 (six) hours as needed for itching.     fexofenadine (ALLEGRA) 180 MG tablet Take 180 mg by mouth daily.     furosemide (LASIX) 20 MG tablet Take 20 mg by mouth daily.     glimepiride (AMARYL) 4 MG tablet Take 4 mg by mouth daily before breakfast.     insulin aspart U-100 (NOVOLOG) 100 unit/mL injection Inject under the skin 3 (three) times a day before meals. Inject as per  sliding scale: if 0 - 139 = O UNITS DO NOT GIVE  CORRECTION INSULIN IF PRE MEAL BG < 140;  140 - 189 = 1 UNIT; 190 - 239 = 2 UNITS; 240 - 289 =  3 UNITS; 290 - 339 = 4 UNITS; 340 - 399 = 5 UNITS;  400 - 449 = 6 UNITS; 450+ = 7 UNITS IF BG = OR >  450 GIVE 7 UNIT     insulin aspart U-100 (NOVOLOG) 100 unit/mL injection Inject under the skin at bedtime. Inject as per  sliding scale: if 0 - 199 = 0 UNITS DO NOT GIVE  BEDTIME CORRECTION DOSE IF BG LESS THAN  200; 200 - 249 = 1 UNIT; 250 - 299 = 2 UNITS; 300 -  349 = 3 UNITS; 350 - 399 = 4 UNITS; 400+ = 5  UNITS FOR BG GREATER THAN OR EQUAL TO  400, GIVE 5 UNITS     insulin aspart U-100 (NOVOLOG) 100 unit/mL injection Inject under the skin 2 (two) times a day before meals.  Administer 2 units at lunch and 4 units at supper.     insulin glargine (LANTUS) 100 unit/mL vial Inject 38 Units under the skin daily.            lisinopril (PRINIVIL,ZESTRIL) 2.5 MG tablet Take 2.5 mg by mouth daily.     methocarbamol (ROBAXIN) 500 MG tablet Take 500 mg by mouth 4 (four) times a day.     metoprolol tartrate (LOPRESSOR) 25 MG tablet Take 25 mg by mouth 2 (two) times a day.     oxyCODONE (ROXICODONE) 5 MG immediate release tablet Take 5-10 mg by mouth every 4 (four) hours as needed for pain.     oxyCODONE (ROXICODONE) 5 MG immediate release tablet Take 10 mg by mouth 2 (two) times a day with meals. 0730 and noon to cover morning and afternoon therapies respectively     pantoprazole (PROTONIX) 40 MG tablet Take 40 mg by mouth daily.     senna-docusate (PERICOLACE) 8.6-50 mg tablet Take 2 tablets by mouth 2 (two) times a day.     triamcinolone (KENALOG) 0.1 % cream Apply 1 application topically 3 (three) times a day.           Labs:    Ref Range & Units 11/25/19 2155   WBC 4.0 - 11.0 10e9/L 10.0    RBC Count 3.8 - 5.2 10e12/L 4.29    Hemoglobin 11.7 - 15.7 g/dL 13.1    Hematocrit 35.0 - 47.0 % 40.7    MCV 78 - 100 fl 95    MCH 26.5 - 33.0 pg 30.5    MCHC 31.5 - 36.5 g/dL 32.2    RDW 10.0 - 15.0 % 13.9    Platelet Count 150 - 450 10e9/L 588High     % Neutrophils  % 68.0      Sodium 133 - 144 mmol/L 136    Potassium 3.4 - 5.3 mmol/L 3.8    Comment: Specimen slightly hemolyzed, potassium may be falsely elevated   Chloride 94 - 109 mmol/L 105    Carbon Dioxide 20 - 32 mmol/L 25    Anion Gap 3 - 14 mmol/L 6    Glucose 70 - 99 mg/dL 300High     Urea Nitrogen 7 - 30 mg/dL 15    Creatinine 0.52 - 1.04 mg/dL 0.84    GFR Estimate >60 mL/min/1.73_m2 70           Ref Range & Units 10/17/19 0621   Sodium 133 - 144 mmol/L 140    Potassium 3.4 - 5.3 mmol/L 3.5    Chloride 94 - 109 mmol/L 108    Carbon Dioxide 20 - 32 mmol/L 25    Anion Gap 3 - 14 mmol/L 7    Glucose 70 - 99 mg/dL 175High     Urea Nitrogen 7 - 30  mg/dL 8    Creatinine 0.52 - 1.04 mg/dL 0.63    GFR Estimate >60 mL/min/1.73_m2 90       Ref Range & Units 11/25/19 2155   N-Terminal Pro BNP Inpatient 0 - 900 pg/mL 89         Ref Range & Units 10/18/19 1113   WBC 4.0 - 11.0 10e9/L 17.7High     Hemoglobin 11.7 - 15.7 g/dL 11.2Low     RDW 10.0 - 15.0 % 15.0    Platelet Count 150 - 450 10e9/L 698High           Assessment / Plan:    ICD-10-CM    1. Closed bimalleolar fracture of left ankle with routine healing S82.842D  oxycodone is helping her pain, generally 10 mg when she takes it.  Has less pain now than previously   2. Type 2 diabetes mellitus with hyperglycemia, with long-term current use of insulin (H) E11.65 On Grlargine, Aspart U100. Also on a statin. A1c=7.6    Z79.4    3. Peripheral vascular disease (H) I73.9 On lasix daiy for leg edema   4. Essential hypertension I10  Well controlled   5.  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

## 2021-06-03 NOTE — PROGRESS NOTES
[facility]    Facility:   [facility] [[id number]]     Code Status: FULL CODE  PCP: Wegener, Joel D, MD   Phone: [phone]   Fax: [phone]   NOTE: [facility] preop form filled out and faxed to [phone]      Assessment/Plan:        Visit for Preoperative Exam.     Proceed with proposed surgery without additional clinical clarifications.   Lovenox will be held after 10/25 dose  Request preop at the [facility] drop basic metabolic profile.  No ECG done in the transitional care unit.  Can be done if desired by the surgeon    Subjective:     Scheduled Procedure: Internal fixation of the left bimalleolar fracture, removal of external fixator  Surgery Date: 10/28/2019  Surgery Location: [facility]  Surgeon: Juan José    Current Outpatient Medications   Medication Sig Dispense Refill     acetaminophen (TYLENOL) 325 MG tablet Take 975 mg by mouth every 8 (eight) hours.       aluminum-magnesium hydroxide-simethicone (MAALOX ADVANCED) 200-200-20 mg/5 mL Susp Take 30 mL by mouth 3 (three) times a day as needed.       atorvastatin (LIPITOR) 40 MG tablet Take 40 mg by mouth at bedtime.       calcium-vitamin D (CALCIUM-VITAMIN D) 500 mg(1,250mg) -200 unit per tablet Take 1 tablet by mouth 2 (two) times a day with meals.       cholecalciferol, vitamin D3, 3,000 unit Tab Take 3,000 Units by mouth daily.       diphenhydrAMINE (BENADRYL) 25 mg capsule Take 25 mg by mouth every 6 (six) hours as needed for itching.       enoxaparin (LOVENOX) 40 mg/0.4 mL syringe Inject 40 mg under the skin daily.       fexofenadine (ALLEGRA) 180 MG tablet Take 180 mg by mouth daily.       furosemide (LASIX) 20 MG tablet Take 20 mg by mouth daily.       insulin aspart U-100 (NOVOLOG) 100 unit/mL injection Inject under the skin 3 (three) times a day before meals. Inject as per  sliding scale: if 0 - 139 = O UNITS DO NOT GIVE  CORRECTION INSULIN IF PRE MEAL BG <  140;  140 - 189 = 1 UNIT; 190 - 239 = 2 UNITS; 240 - 289 =  3 UNITS; 290 - 339 = 4 UNITS; 340 - 399 = 5 UNITS;  400 - 449 = 6 UNITS; 450+ = 7 UNITS IF BG = OR >  450 GIVE 7 UNIT       insulin aspart U-100 (NOVOLOG) 100 unit/mL injection Inject under the skin at bedtime. Inject as per  sliding scale: if 0 - 199 = 0 UNITS DO NOT GIVE  BEDTIME CORRECTION DOSE IF BG LESS THAN  200; 200 - 249 = 1 UNIT; 250 - 299 = 2 UNITS; 300 -  349 = 3 UNITS; 350 - 399 = 4 UNITS; 400+ = 5  UNITS FOR BG GREATER THAN OR EQUAL TO  400, GIVE 5 UNITS       insulin aspart U-100 (NOVOLOG) 100 unit/mL injection Inject under the skin 2 (two) times a day before meals. Administer 2 units at lunch and 4 units at supper.       insulin glargine (LANTUS) 100 unit/mL vial Inject 35 Units under the skin daily.              lisinopril (PRINIVIL,ZESTRIL) 2.5 MG tablet Take 2.5 mg by mouth daily.       methocarbamol (ROBAXIN) 500 MG tablet Take 500 mg by mouth 4 (four) times a day.       metoprolol tartrate (LOPRESSOR) 25 MG tablet Take 25 mg by mouth 2 (two) times a day.       oxyCODONE (ROXICODONE) 5 MG immediate release tablet Take 5-10 mg by mouth every 4 (four) hours as needed for pain.       pantoprazole (PROTONIX) 40 MG tablet Take 40 mg by mouth daily.       senna-docusate (PERICOLACE) 8.6-50 mg tablet Take 2 tablets by mouth 2 (two) times a day.       triamcinolone (KENALOG) 0.1 % cream Apply 1 application topically 3 (three) times a day.       No current facility-administered medications for this visit.        Allergies   Allergen Reactions     Amlodipine Swelling     Cefazolin      Metformin Diarrhea     Cephalosporins Rash     Clindamycin Rash     Levaquin [Levofloxacin] Rash     Itching, rash       Nickel Rash     Contact reaction           There is no immunization history on file for this patient.    Patient Active Problem List   Diagnosis     Congenital cystic disease of liver     Esophageal reflux     Essential hypertension, benign      Fatty liver     Diverticulitis of colon     Hypertension goal BP (blood pressure) < 140/90     Hyperlipidemia LDL goal <100     Peripheral vascular disease (H)     Type 2 diabetes mellitus with hyperglycemia (H)     Personal history of other malignant neoplasm of skin     Closed bimalleolar fracture of left ankle with routine healing       Past Medical History:   Diagnosis Date     Allergic rhinitis      Basal cell carcinoma of face 03/2012    m[facility]s     Bimalleolar fracture of left ankle      Chronic otitis externa      Contact dermatitis      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 felt to be focal nodular hyperplasia. annual CT abd. followed by Dr. Amilcar Kat      Cystocele, lateral      Diverticulitis of colon 06/2004    abd CT     DM (diabetes mellitus) (H)      Embolism and thrombosis (H) age 22    post ovarian cyst removed     Esophageal reflux      Fatty liver      Hiatal hernia     small     HLD (hyperlipidemia)      Nontoxic uninodular goiter      Osteopenia 04/21/2005    on fosamax for > 5 yr. stop 7/2012     Pure hypercholesterolemia        Social History     Socioeconomic History     Marital status: Single     Spouse name: Not on file     Number of children: Not on file     Years of education: Not on file     Highest education level: Not on file   Occupational History     Not on file   Social Needs     Financial resource strain: Not on file     Food insecurity:     Worry: Not on file     Inability: Not on file     Transportation needs:     Medical: Not on file     Non-medical: Not on file   Tobacco Use     Smoking status: Former Smoker     Packs/day: 1.00     Years: 10.00     Pack years: 10.00     Smokeless tobacco: Never Used   Substance and Sexual Activity     Alcohol use: Yes     Alcohol/week: 6.0 standard drinks     Types: 6 Standard drinks or equivalent per week     Frequency: 4 or more times a week     Drug use:  Not on file     Sexual activity: Not on file   Lifestyle     Physical activity:     Days per week: Not on file     Minutes per session: Not on file     Stress: Not on file   Relationships     Social connections:     Talks on phone: Not on file     Gets together: Not on file     Attends Denominational service: Not on file     Active member of club or organization: Not on file     Attends meetings of clubs or organizations: Not on file     Relationship status: Not on file     Intimate partner violence:     Fear of current or ex partner: Not on file     Emotionally abused: Not on file     Physically abused: Not on file     Forced sexual activity: Not on file   Other Topics Concern     Not on file   Social History Narrative     Not on file       Past Surgical History:   Procedure Laterality Date     APPENDECTOMY  age 22     APPLY EXTERNAL FIXATOR LOWER EXTREMITY Left 10/12/2019    Procedure: Left ankle external fixator placement; Surgeon: Leeanne Brown MD; Location: [facility]       ENDOSCOPIC ULTRASOUND UPPER GASTROINTESTINAL TRACT (GI)  09/06/2018     ESOPHAGOGASTRODUODENOSCOPY  08/13/2018, 3/14/19     ESOPHAGOSCOPY, GASTROSCOPY, DUODENOSCOPY (EGD), RESECT MUCOSA, COMBINED  09/06/2018     HEMORRHOID SURGERY       MOHS SURGERY       ORIF ANKLE FRACTURE BIMALLEOLAR Left 10/28/2019    Internal fixation left bimalleolar ankle fracture, removal external fixator     OVARIAN CYST REMOVAL Left age 22    L ovarian cyst removal, laparotomy       PELVIC SUPPORT SURGERY       THYROIDECTOMY, PARTIAL  1998     Transobturator tape for Urinary Incontinence    2008       History of Present Illness  Recent Health  Fever: no  Chills: no  Fatigue: yes  Chest Pain: no  Cough: no  Dyspnea: no  Urinary Frequency: no  Nausea: no  Vomiting: no  Diarrhea: no  Abdominal Pain: no  Easy Bruising: no  Lower Extremity Swelling: yes, mild  Poor Exercise Tolerance: no    Most recent Health Maintenance Visit:  2 week(s) ago    Pertinent History  Prior  Anesthesia: yes  Previous Anesthesia Reaction:  no  Diabetes: yes  Cardiovascular Disease: no  Pulmonary Disease: no  Renal Disease: no  GI Disease: no  Sleep Apnea: no  Thromboembolic Problems: yes, history of thromboembolism post ovarian cyst removal surgery age 22  Clotting Disorder: yes  Bleeding Disorder: no  Transfusion Reaction: unknown  Impaired Immunity: no  Steroid use in the last 6 months: no  Frequent Aspirin use: no    Family history of   Family History   Problem Relation Age of Onset     Rheum arthritis Mother      Diabetes Mother      Hypertension Mother      Cancer Father      Other Brother         palpitations         Social history of there are no concerns regarding care after surgery    After surgery, the patient plans to recover at a rehabilitation center.    Review of Systems  Review of Systems   Comprehensive ROS is negative          Objective:         Vitals:    10/26/19 1200   BP: 120/77   Pulse: 81   Resp: 20   Temp: 99  F (37.2  C)   SpO2: 98%       Physical Exam    Physical Exam  Constitutional:       Comments: Highly irritable, older  female, overweight   HENT:      Right Ear: External ear normal.      Left Ear: External ear normal.      Mouth/Throat:      Mouth: Mucous membranes are moist.   Eyes:      Extraocular Movements: Extraocular movements intact.      Conjunctiva/sclera: Conjunctivae normal.   Neck:      Musculoskeletal: Normal range of motion.   Cardiovascular:      Rate and Rhythm: Normal rate and regular rhythm.      Heart sounds: No murmur.   Pulmonary:      Breath sounds: Normal breath sounds. No rales.   Abdominal:      General: Bowel sounds are normal.      Palpations: Abdomen is soft.      Tenderness: There is no tenderness.   Musculoskeletal:         General: Tenderness and signs of injury present.      Comments: Both arms and right leg normal strength and coordination   Skin:     General: Skin is warm and dry.      Minimal residual papular rash from shoulders  distally, worse around the upper thigh buttock area  Left ankle external fixator, no visible erythema or fluid discharge  Buttock/coccyx area not observed, patient supine in bed, difficulty with mobility  Lidocaine patch on her left pretibial area   Neurological:      General: No focal deficit present.      Mental Status: She is alert and oriented to person, place, and time.      Motor: No weakness.   Psychiatric:      Exacting standards for care givers, somewhat irritable     Electronically signed by: Sunita Santiago MD

## 2021-06-03 NOTE — PROGRESS NOTES
[facility]    Name:   Sarah Felix  : 1945  Facility:   [facility] [[id number]]   Room:   Code Status: FULL CODE and POLST AVAILABLE -   Fac type:   SNF (Skilled Nursing Facility, TCU) -     PCP:  Wegener, Joel D, MD    CHIEF COMPLAINT / REASON FOR VISIT:  Chief Complaint   Patient presents with     Follow-up     TCU follow-up after hospitalization for left bimalleolar fracture with external fixation and subsequent internal fixation.      [facility] from 10/12/2019 until 10/18/2019 (left bimalleolar fracture with external fixation)  [facility] from 10/28/2019 until 10/30/2019 (left bimalleolar fracture ORIF)    Patient was last seen by me on 2019 and subsequently seen by Dr. Santiago on 11/15/2019.      HPI: Sarah is a 74 y.o. 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      She was going down the stairs at home on 10/11/2019 in the late evening, when her ankle gave out, and she slipped 3-4 stairs onto her buttocks.  There was pain and deformity of the left ankle, but she waited until the next day to call for help.  She did not want the paramedics breaking down her door.  She was brought to [facility] where x-rays showed a bimalleolar ankle fracture which was severely displaced, and an attempted reduction in the ED necessitated calling orthopedics who performed a closed fracture reduction under conscious sedation in the ED.  Later in the day, she went to the OR for an external fixator placement.  She is nonweightbearing on the left leg, and she is receiving enoxaparin for DVT prophylaxis.  She was given doxycycline perioperatively and developed a diffuse rash.  Triamcinolone cream was prescribed.    She did have an open area on her sacrum up from a falling on the  "stairs, then scooting along the floor prior to calling for help.  It has been covered with a Mepilex dressing.    She returned to the hospital on 10/28/2019 for external fixation hardware removal and ORIF, performed by Dr. Can that day.  She tolerated the procedure well and returned here on 10/30/2019.      TCU ISSUES    Primary diagnosis: She has an appointment with Ortho at the [facility] on 11/13/2019.      Pain management: Since the ORIF, she appears to be in less pain.  She is receiving 1 to 2 tablets of oxycodone IR (5 mg) every 4 hours as needed.  She tells me that pain is gradually improving.  She does have more pain with therapy, and she describes it as a \"jabbing pain, not constant.\"  She had orders for 5 to 10 mg of oxycodone.  She felt that she was taking 5 mg about a third of the time and 10 mg about two thirds at the time, depending on the activity.  Dr. Santiago scheduled 10 mg doses at 7:30 AM and at noon, with PRN dosing remaining unchanged.    Fatigue: She still needs frequent rest.  This is not something new, as it has been going on for some time.  She says that she has not had any significant follow-up with her PCP.  I wonder whether she is experiencing chronic fatigue syndrome.    Diabetes mellitus type 2: She had apparently been on Lantus and glimepiride at home, but for some reason the latter was discontinued in the hospital, her dose was cut in half (from 51 units to 25 units), and she was placed on sliding scale NovoLog.  Prior to her second hospitalization, fasting blood glucose levels ranged between 167 and 195.  At lunch, they ranged between 212 and 298.  Supper levels ranged between 209 and 245 and, at bedtime, the range was between 238 and 273.  The plan was to get her back to her home dosing.  We started by increasing her Lantus insulin to 35 units.  At my prior visit, we authorized 2 units at lunch and 4 units at supper to observe how this affected bedtime " "levels.    With blood glucose levels looking better: Fasting levels between 102 and 175 (with 87 outlier);  Lunch levels between 165 and 234; supper levels between 151 and 215 (with a 302 outlier); and bedtime levels ranging from a low of 180 to a high of 243, we resumed her glimepiride 4 mg twice daily and placed a hold on the prandial NovoLog.      She tends to spread out her meals and may not finish a meal, instead saving part of it for later, giving herself 5 meals per day rather than 3.  She tends to feel hypoglycemic symptoms at <100 and \"really bad in the 70s.\"  Note: She tells me that her last A1c was ~7.3.  Her PCP wants her A1c to be <7.0.  Blood glucose levels are looking better but are still elevated, particularly at supper and bedtime.  We will try increasing the glargine from 35 units to 38 units.    Buttock wound: As noted in the hospital discharge summary, she sustained open area on her sacrum from a falling on the stairs, then scooting along the floor prior to calling for help.  It has been covered with a Mepilex dressing.  Dr. Santiago ordered Proshield 3 times daily and as needed, and this has been working quite well.    Insomnia: She tends to go to bed rather late at home.  She tells me she may go to bed as late as 4 AM.  She does not require any sleep aid, although she does have orders for diphenhydramine (for itching) which she has not been taking.    Gastric reflux: She is on 40 mg of pantoprazole daily.  She does feel uncomfortable lying down but does not feel she needs this medication.  She will cough and feel like she is going to vomit, but if she sits up and burps, she then feels okay.  Unsure how long she has been on this, we changed the scheduled dose to every other day without issue.    Constipation: When she began having excessive BMs (and cramps), dosing of her senna S was halved, from 2 tablets twice daily to 1 tablet twice daily.      ROS:   No headaches or chest pains, coughing or " congestion, dizziness, dysuria, difficulty chewing or swallowing, integumentary issues, or problems with appetite.     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 felt to be focal nodular hyperplasia. annual CT abd. followed by Dr. Amilcar Kat      Cystocele, lateral      Diverticulitis of colon 06/2004    abd CT     DM (diabetes mellitus) (H)      Embolism and thrombosis (H) age 22    post ovarian cyst removed     Esophageal reflux      Fatty liver      Hiatal hernia     small     HLD (hyperlipidemia)      Nontoxic uninodular goiter      Osteopenia 04/21/2005    on fosamax for > 5 yr. stop 7/2012     Pure hypercholesterolemia               Family History   Problem Relation Age of Onset     Rheum arthritis Mother      Diabetes Mother      Hypertension Mother      Cancer Father      Other Brother         palpitations     Social History     Socioeconomic History     Marital status: Single     Spouse name: Not on file     Number of children: Not on file     Years of education: Not on file     Highest education level: Not on file   Occupational History     Not on file   Social Needs     Financial resource strain: Not on file     Food insecurity:     Worry: Not on file     Inability: Not on file     Transportation needs:     Medical: Not on file     Non-medical: Not on file   Tobacco Use     Smoking status: Former Smoker     Packs/day: 1.00     Years: 10.00     Pack years: 10.00     Smokeless tobacco: Never Used   Substance and Sexual Activity     Alcohol use: Yes     Alcohol/week: 6.0 standard drinks     Types: 6 Standard drinks or equivalent per week     Frequency: 4 or more times a week     Drug use: Not on file     Sexual activity:  Not on file   Lifestyle     Physical activity:     Days per week: Not on file     Minutes per session: Not on file     Stress: Not on file   Relationships     Social connections:     Talks on phone: Not on file     Gets together: Not on file     Attends Sabianism service: Not on file     Active member of club or organization: Not on file     Attends meetings of clubs or organizations: Not on file     Relationship status: Not on file     Intimate partner violence:     Fear of current or ex partner: Not on file     Emotionally abused: Not on file     Physically abused: Not on file     Forced sexual activity: Not on file   Other Topics Concern     Not on file   Social History Narrative     Not on file       MEDICATIONS: Reviewed from the MAR, physician orders, and/or earlier progress notes.   Post Discharge Medication Reconciliation Status: medication reconciliation previously completed during another office visit.  Updated by me today (11/18/2019) with an increase in glargine reflected below.  Current Outpatient Medications   Medication Sig     acetaminophen (TYLENOL) 325 MG tablet Take 975 mg by mouth every 8 (eight) hours.     aluminum-magnesium hydroxide-simethicone (MAALOX ADVANCED) 200-200-20 mg/5 mL Susp Take 30 mL by mouth 3 (three) times a day as needed.     atorvastatin (LIPITOR) 40 MG tablet Take 40 mg by mouth at bedtime.     calcium-vitamin D (CALCIUM-VITAMIN D) 500 mg(1,250mg) -200 unit per tablet Take 1 tablet by mouth 2 (two) times a day with meals.     cholecalciferol, vitamin D3, 3,000 unit Tab Take 3,000 Units by mouth daily.     diphenhydrAMINE (BENADRYL) 25 mg capsule Take 25 mg by mouth every 6 (six) hours as needed for itching.     enoxaparin (LOVENOX) 40 mg/0.4 mL syringe Inject 40 mg under the skin daily.     fexofenadine (ALLEGRA) 180 MG tablet Take 180 mg by mouth daily.     furosemide (LASIX) 20 MG tablet Take 20 mg by mouth daily.     glimepiride (AMARYL) 4 MG tablet Take 4 mg by mouth  daily before breakfast.     insulin aspart U-100 (NOVOLOG) 100 unit/mL injection Inject under the skin 3 (three) times a day before meals. Inject as per  sliding scale: if 0 - 139 = O UNITS DO NOT GIVE  CORRECTION INSULIN IF PRE MEAL BG < 140;  140 - 189 = 1 UNIT; 190 - 239 = 2 UNITS; 240 - 289 =  3 UNITS; 290 - 339 = 4 UNITS; 340 - 399 = 5 UNITS;  400 - 449 = 6 UNITS; 450+ = 7 UNITS IF BG = OR >  450 GIVE 7 UNIT     insulin aspart U-100 (NOVOLOG) 100 unit/mL injection Inject under the skin at bedtime. Inject as per  sliding scale: if 0 - 199 = 0 UNITS DO NOT GIVE  BEDTIME CORRECTION DOSE IF BG LESS THAN  200; 200 - 249 = 1 UNIT; 250 - 299 = 2 UNITS; 300 -  349 = 3 UNITS; 350 - 399 = 4 UNITS; 400+ = 5  UNITS FOR BG GREATER THAN OR EQUAL TO  400, GIVE 5 UNITS     insulin aspart U-100 (NOVOLOG) 100 unit/mL injection Inject under the skin 2 (two) times a day before meals. Administer 2 units at lunch and 4 units at supper.     insulin glargine (LANTUS) 100 unit/mL vial Inject 38 Units under the skin daily.            lisinopril (PRINIVIL,ZESTRIL) 2.5 MG tablet Take 2.5 mg by mouth daily.     methocarbamol (ROBAXIN) 500 MG tablet Take 500 mg by mouth 4 (four) times a day.     metoprolol tartrate (LOPRESSOR) 25 MG tablet Take 25 mg by mouth 2 (two) times a day.     oxyCODONE (ROXICODONE) 5 MG immediate release tablet Take 5-10 mg by mouth every 4 (four) hours as needed for pain.     oxyCODONE (ROXICODONE) 5 MG immediate release tablet Take 10 mg by mouth 2 (two) times a day with meals. 0730 and noon to cover morning and afternoon therapies respectively     pantoprazole (PROTONIX) 40 MG tablet Take 40 mg by mouth daily.     senna-docusate (PERICOLACE) 8.6-50 mg tablet Take 2 tablets by mouth 2 (two) times a day.     triamcinolone (KENALOG) 0.1 % cream Apply 1 application topically 3 (three) times a day.     ALLERGIES:   Allergies   Allergen Reactions     Amlodipine Swelling     Cefazolin      Metformin Diarrhea      "Cephalosporins Rash     Clindamycin Rash     Levaquin [Levofloxacin] Rash     Itching, rash       Nickel Rash     Contact reaction       DIET: Diabetic, regular texture, thin liquids.    Vitals:    11/18/19 1449   BP: 113/70   Pulse: 87   Resp: 22   Temp: 97.7  F (36.5  C)   SpO2: 97%   Height: 5' 5\" (1.651 m)     There is no height or weight on file to calculate BMI.    EXAMINATION:   General: Moderately obese middle-aged female,sitting in a recliner.  Head: Normocephalic and atraumatic.   Eyes: PERRLA, sclerae clear.   ENT: Moist oral mucosa.  She has her own teeth.  No rhinorrhea or nasal discharge.  Hearing is unimpaired.  Cardiovascular: Regular rate and rhythm.  No appreciable murmur.  Respiratory: Lungs clear to auscultation bilaterally.   Abdomen: Soft and nontender.   Musculoskeletal/Extremities: Age-related degenerative joint disease.  She is wearing a boot on the left lower extremity.  Leg is currently wrapped.  Good CMS.  Integument: No rashes, clinically significant lesions, or skin breakdown.   Cognitive/Psychiatric: Alert and oriented x3.  Affect is euthymic.    DIAGNOSTICS:   No results found for this or any previous visit.  No results found for: WBC, HGB, HCT, MCV, PLT  CrCl cannot be calculated (No successful lab value found.).      ASSESSMENT/Plan:      ICD-10-CM    1. Closed bimalleolar fracture of left ankle with routine healing S82.842D    2. Type 2 diabetes mellitus with hyperglycemia, with long-term current use of insulin (H) E11.65     Z79.4    3. Essential hypertension, benign I10    4. Hyperlipidemia LDL goal <100 E78.5    5. Gastroesophageal reflux disease, esophagitis presence not specified K21.9    6. Fatty liver K76.0    7. Congenital cystic disease of liver Q44.6      CHANGES:    Increase glargine from 35 units to 38 units.    CARE PLAN:    The care plan has been reviewed and all orders signed. Changes to care plan, if any, as noted. Otherwise, continue current plan of care.  Total " time spent with this patient was approximately 35 minutes, with greater than 50% spent in counseling and coordination of care that included addressing reviewing recent blood glucose levels and making adjustments to diabetes management.    The above has been created using voice recognition software. Please be aware that this may unintentionally  produce inaccuracies and/or nonsensical sentences.      Electronically signed by: Juan Holman CNP

## 2021-06-03 NOTE — PROGRESS NOTES
[facility]    Name:   Sarah Felix  : 1945  Facility:   [facility] [[id number]]   Room:   Code Status: FULL CODE and POLST AVAILABLE -   Fac type:   SNF (Skilled Nursing Facility, TCU) -     PCP:  Wegener, Joel D, MD    CHIEF COMPLAINT / REASON FOR VISIT:  Chief Complaint   Patient presents with     Follow-up     TCU follow-up after hospitalization for left bimalleolar fracture with external fixation and subsequent internal fixation.      [facility] from 10/12/2019 until 10/18/2019 (left bimalleolar fracture with external fixation)  [facility] from 10/28/2019 until 10/30/2019 (left bimalleolar fracture ORIF)    Patient was last seen by me on 2019.      HPI: Sarah is a 74 y.o. 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      She was going down the stairs at home on 10/11/2019 in the late evening, when her ankle gave out, and she slipped 3-4 stairs onto her buttocks.  There was pain and deformity of the left ankle, but she waited until the next day to call for help.  She did not want the paramedics breaking down her door.  She was brought to [facility] where x-rays showed a bimalleolar ankle fracture which was severely displaced, and an attempted reduction in the ED necessitated calling orthopedics who performed a closed fracture reduction under conscious sedation in the ED.  Later in the day, she went to the OR for an external fixator placement.  She is nonweightbearing on the left leg, and she is receiving enoxaparin for DVT prophylaxis.  She was given doxycycline perioperatively and developed a diffuse rash.  Triamcinolone cream was prescribed.    She did have an open area on her sacrum up from a falling on the stairs, then scooting along the floor prior to  "calling for help.  It has been covered with a Mepilex dressing.    She returned to the hospital on 10/28/2019 for external fixation hardware removal and ORIF, performed by Dr. Can that day.  She tolerated the procedure well and returned here on 10/30/2019.      TCU ISSUES    Primary diagnosis: She has an appointment with Ortho at the [facility] today.    Pain management: Since the ORIF, she appears to be in less pain.  She is receiving 1 to 2 tablets of oxycodone IR (5 mg) every 4 hours as needed.  She tells me that pain is gradually improving.  She does have more pain with therapy, and she describes it as a \"jabbing pain, not constant.\"  She has orders for 5 to 10 mg of oxycodone.  She feels that she takes 5 mg about a third of the time and 10 mg about two thirds at the time, depending on the activity    Fatigue: She still needs frequent rest.  This is not something new, as it has been going on for some time.  She says that she has not had any significant follow-up with her PCP.  I wonder whether she is experiencing chronic fatigue syndrome.    Diabetes mellitus type 2: She had apparently been on Lantus and glimepiride at home, but for some reason the latter was discontinued in the hospital, her dose was cut in half (from 51 units to 25 units), and she was placed on sliding scale NovoLog.  Prior to her second hospitalization, fasting blood glucose levels ranged between 167 and 195.  At lunch, they ranged between 212 and 298.  Supper levels ranged between 209 and 245 and, at bedtime, the range was between 238 and 273.  The plan was to get her back to her home dosing.  We started by increasing her Lantus insulin to 35 units.  At my last visit, we authorized 2 units at lunch and 4 units at supper and observe how this affects bedtime levels.    Currently, fasting blood glucose levels range between 102 and 175 (with 87 outlier).  At lunch, they range between 165 and 234.  At supper, they range between " "151 and 215 (with a 302 outlier).  Bedtime blood glucose levels range from a low of 180 to a high of 243. She told me they may be checking her blood glucose levels after she has been \"grazing.\"  She did appear to need scheduled prandial dosing.  However, we would like to try to return her to oral medications if possible.  We are going to resume her home glimepiride dose of 4 mg twice daily with meals and hold scheduled NovoLog at this juncture.  She tends to feel hypoglycemic symptoms at <100 and \"really bad in the 70s.\"  Note: She tells me that her last A1c was ~7.3.  Her PCP wants her A1c to be <7.0.    Buttock wound: As noted in the hospital discharge summary, she sustained open area on her sacrum from a falling on the stairs, then scooting along the floor prior to calling for help.  It has been covered with a Mepilex dressing.  Dr. Santiago ordered Proshield 3 times daily and as needed, and this has been working quite well.    Insomnia: She tends to go to bed rather late at home.  She tells me she may go to bed as late as 4 AM.  She does not require any sleep aid, although she does have orders for diphenhydramine (for itching) which she has not been taking.    Gastric reflux: She is on 40 mg of pantoprazole daily.  She does feel uncomfortable lying down but does not feel she needs this medication.  She will cough and feel like she is going to vomit, but if she sits up and burps, she then feels okay.  Unsure how long she has been on this, we changed the scheduled dose to every other day without issue.    Constipation: When she began having excessive BMs (and cramps), dosing of her senna S was halved, from 2 tablets twice daily to 1 tablet twice daily.      ROS:   No headaches or chest pains, coughing or congestion, dizziness, dysuria, difficulty chewing or swallowing, integumentary issues, or problems with appetite.     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 felt to be focal nodular hyperplasia. annual CT abd. followed by Dr. Amilcar Kat      Cystocele, lateral      Diverticulitis of colon 06/2004    abd CT     DM (diabetes mellitus) (H)      Embolism and thrombosis (H) age 22    post ovarian cyst removed     Esophageal reflux      Fatty liver      Hiatal hernia     small     HLD (hyperlipidemia)      Nontoxic uninodular goiter      Osteopenia 04/21/2005    on fosamax for > 5 yr. stop 7/2012     Pure hypercholesterolemia               Family History   Problem Relation Age of Onset     Rheum arthritis Mother      Diabetes Mother      Hypertension Mother      Cancer Father      Other Brother         palpitations     Social History     Socioeconomic History     Marital status: Single     Spouse name: Not on file     Number of children: Not on file     Years of education: Not on file     Highest education level: Not on file   Occupational History     Not on file   Social Needs     Financial resource strain: Not on file     Food insecurity:     Worry: Not on file     Inability: Not on file     Transportation needs:     Medical: Not on file     Non-medical: Not on file   Tobacco Use     Smoking status: Former Smoker     Packs/day: 1.00     Years: 10.00     Pack years: 10.00     Smokeless tobacco: Never Used   Substance and Sexual Activity     Alcohol use: Yes     Alcohol/week: 6.0 standard drinks     Types: 6 Standard drinks or equivalent per week     Frequency: 4 or more times a week     Drug use: Not on file     Sexual activity: Not on file   Lifestyle     Physical activity:     Days per week: Not on file     Minutes per session: Not on file     Stress: Not on file   Relationships     Social connections:     Talks on phone: Not on file      Gets together: Not on file     Attends Hoahaoism service: Not on file     Active member of club or organization: Not on file     Attends meetings of clubs or organizations: Not on file     Relationship status: Not on file     Intimate partner violence:     Fear of current or ex partner: Not on file     Emotionally abused: Not on file     Physically abused: Not on file     Forced sexual activity: Not on file   Other Topics Concern     Not on file   Social History Narrative     Not on file       MEDICATIONS: Reviewed from the MAR, physician orders, and/or earlier progress notes.  Updated by me today (10/21/2019) with increases in fexofenadine and Lantus reflected below.  Post Discharge Medication Reconciliation Status: discharge medications reconciled and changed, per note/orders (see AVS).  Current Outpatient Medications   Medication Sig     glimepiride (AMARYL) 4 MG tablet Take 4 mg by mouth daily before breakfast.     acetaminophen (TYLENOL) 325 MG tablet Take 975 mg by mouth every 8 (eight) hours.     aluminum-magnesium hydroxide-simethicone (MAALOX ADVANCED) 200-200-20 mg/5 mL Susp Take 30 mL by mouth 3 (three) times a day as needed.     atorvastatin (LIPITOR) 40 MG tablet Take 40 mg by mouth at bedtime.     calcium-vitamin D (CALCIUM-VITAMIN D) 500 mg(1,250mg) -200 unit per tablet Take 1 tablet by mouth 2 (two) times a day with meals.     cholecalciferol, vitamin D3, 3,000 unit Tab Take 3,000 Units by mouth daily.     diphenhydrAMINE (BENADRYL) 25 mg capsule Take 25 mg by mouth every 6 (six) hours as needed for itching.     enoxaparin (LOVENOX) 40 mg/0.4 mL syringe Inject 40 mg under the skin daily.     fexofenadine (ALLEGRA) 180 MG tablet Take 180 mg by mouth daily.     furosemide (LASIX) 20 MG tablet Take 20 mg by mouth daily.     insulin aspart U-100 (NOVOLOG) 100 unit/mL injection Inject under the skin 3 (three) times a day before meals. Inject as per  sliding scale: if 0 - 139 = O UNITS DO NOT  "GIVE  CORRECTION INSULIN IF PRE MEAL BG < 140;  140 - 189 = 1 UNIT; 190 - 239 = 2 UNITS; 240 - 289 =  3 UNITS; 290 - 339 = 4 UNITS; 340 - 399 = 5 UNITS;  400 - 449 = 6 UNITS; 450+ = 7 UNITS IF BG = OR >  450 GIVE 7 UNIT     insulin aspart U-100 (NOVOLOG) 100 unit/mL injection Inject under the skin at bedtime. Inject as per  sliding scale: if 0 - 199 = 0 UNITS DO NOT GIVE  BEDTIME CORRECTION DOSE IF BG LESS THAN  200; 200 - 249 = 1 UNIT; 250 - 299 = 2 UNITS; 300 -  349 = 3 UNITS; 350 - 399 = 4 UNITS; 400+ = 5  UNITS FOR BG GREATER THAN OR EQUAL TO  400, GIVE 5 UNITS     insulin aspart U-100 (NOVOLOG) 100 unit/mL injection Inject under the skin 2 (two) times a day before meals. Administer 2 units at lunch and 4 units at supper.     insulin glargine (LANTUS) 100 unit/mL vial Inject 35 Units under the skin daily.            lisinopril (PRINIVIL,ZESTRIL) 2.5 MG tablet Take 2.5 mg by mouth daily.     methocarbamol (ROBAXIN) 500 MG tablet Take 500 mg by mouth 4 (four) times a day.     metoprolol tartrate (LOPRESSOR) 25 MG tablet Take 25 mg by mouth 2 (two) times a day.     oxyCODONE (ROXICODONE) 5 MG immediate release tablet Take 5-10 mg by mouth every 4 (four) hours as needed for pain.     pantoprazole (PROTONIX) 40 MG tablet Take 40 mg by mouth daily.     senna-docusate (PERICOLACE) 8.6-50 mg tablet Take 2 tablets by mouth 2 (two) times a day.     triamcinolone (KENALOG) 0.1 % cream Apply 1 application topically 3 (three) times a day.     ALLERGIES:   Allergies   Allergen Reactions     Amlodipine Swelling     Cefazolin      Metformin Diarrhea     Cephalosporins Rash     Clindamycin Rash     Levaquin [Levofloxacin] Rash     Itching, rash       Nickel Rash     Contact reaction       DIET: Diabetic, regular texture, thin liquids.    Vitals:    11/13/19 1452   BP: 118/56   Pulse: 71   Resp: 18   Temp: (!) 94.7  F (34.8  C)   SpO2: 98%   Height: 5' 5\" (1.651 m)     There is no height or weight on file to calculate " BMI.    EXAMINATION:   General: Moderately obese middle-aged female,sitting in a recliner.  Head: Normocephalic and atraumatic.   Eyes: PERRLA, sclerae clear.   ENT: Moist oral mucosa.  She has her own teeth.  No rhinorrhea or nasal discharge.  Hearing is unimpaired.  Cardiovascular: Regular rate and rhythm.  No appreciable murmur.  Respiratory: Lungs clear to auscultation bilaterally.   Abdomen: Soft and nontender.   Musculoskeletal/Extremities: Age-related degenerative joint disease.  She is wearing a boot on the left lower extremity.  Leg is currently wrapped.  Good CMS.  Integument: No rashes, clinically significant lesions, or skin breakdown.   Cognitive/Psychiatric: Alert and oriented x3.  Affect is euthymic.    DIAGNOSTICS:   No results found for this or any previous visit.  No results found for: WBC, HGB, HCT, MCV, PLT  CrCl cannot be calculated (No successful lab value found.).      ASSESSMENT/Plan:      ICD-10-CM    1. Closed bimalleolar fracture of left ankle with routine healing S82.842D    2. Type 2 diabetes mellitus with hyperglycemia, with long-term current use of insulin (H) E11.65     Z79.4    3. Essential hypertension, benign I10    4. Hyperlipidemia LDL goal <100 E78.5    5. Gastroesophageal reflux disease, esophagitis presence not specified K21.9    6. Fatty liver K76.0    7. Congenital cystic disease of liver Q44.6      CHANGES:    1.  Place scheduled mealtime NovoLog on hold.  2.  Resume glimepiride to 4 mg 1 tab p.o. twice daily with meals (for diabetes mellitus type 2).    CARE PLAN:    The care plan has been reviewed and all orders signed. Changes to care plan, if any, as noted. Otherwise, continue current plan of care.  Total time spent with this patient was approximately 35 minutes, with greater than 50% spent in counseling and coordination of care that included addressing reviewing recent blood glucose levels and making adjustments to diabetes management.    The above has been created  using voice recognition software. Please be aware that this may unintentionally  produce inaccuracies and/or nonsensical sentences.      Electronically signed by: Juan Holman CNP

## 2021-06-03 NOTE — PROGRESS NOTES
[facility]      Facility:    [facility] [[id number]]   Code Status: UNKNOWN  [facility] / [facility] : 10/12 to 10/18/19  [facility] 10/28/19 - 10/30/19    ORIF     NO DISCHARGE SUMMARY IN CARE EVERYWHERE      Chief Complaint/Reason for Visit:  Chief Complaint   Patient presents with     Review Of Multiple Medical Conditions     Left bimalleolar fracture, status post definitive open reduction internal fixation       HPI:   Sarah is a 74 y.o. female with a history of type 2 diabetes.    She was going down the stairs  at home on 10/11/2019 in the late evening.  Her ankle gave out and she slipped 3-4 stairs on her buttocks. She had pain and deformity of her left ankle. She waited till the following day  to call for help, she did not want the paramedics breaking down her door.    She was brought to the [facility].  X-ray showed bimalleolar ankle fracture.  It was a severely displaced  fracture, ED attempted to reduce it.  Ended up calling orthopedics  who performed aclosed fracture reduction under conscious sedation in the emergency department.  Later in the day, she went to the OR for an external fixator placement.  She was non-weightbearing on the left leg, enoxaparin for DVT prophylaxis.    She was given Doxycycline perioperatively, and developed a diffuse rash. Triamcinolone cream was prescribed.    She had an open area on her coccyx from falling on the stairs, then scooting along her floor prior to calling for help. It was covered with Mepilex foam dressing.    She transferred to [facility].  __________________________________________________________________________  Hospitalization #2: 10/28/19  Removal of external fixator, plate and screws open reduction fixation.  Nonweightbearing left lower extremity  DVT prophylaxis: Lovenox daily x28 days    She returned to [facility]    UPDATE:  Things are generally going  well  Her sugars are variable: There occasionally high but generally are okay.  The NovoLog started at lunch and supper, has been increased to 60 units, 8 units respectively   She has a request to decrease her pantoprazole from daily to every other day (done)  She was discharged without wound care orders: Just recommend dry gauze, change every 48 hours and as needed  Lotion on her left leg skin is very helpful    Past Medical History:  Active Non-Hospital Problems    Diagnosis     Esophageal reflux     Fatty liver     Closed bimalleolar fracture of left ankle with routine healing     Type 2 diabetes mellitus with hyperglycemia (H)     Essential hypertension, benign     Peripheral vascular disease (H)     Problem list name updated by automated process. Provider to review       Hypertension goal BP (blood pressure) < 140/90     Personal history of other malignant neoplasm of skin     Hyperlipidemia LDL goal <100     Per provider       Diverticulitis of colon     Abd CT  Problem list name updated by automated process. Provider to review       Congenital cystic disease of liver     multiple liver lesions.  felt to be focal nodular hyperplasia.  annual CT abd.    followed by Dr. Amilcar Kat               Surgical History:  Past Surgical History:   Procedure Laterality Date     C APPENDECTOMY age 22     C DEXA, BONE DENSITY, AXIAL SKEL 2005     C DEXA, BONE DENSITY, AXIAL SKEL 6/2007   No signif change in Osteopenia     COLONOSCOPY 4/2006   repeat 10 yr     ENDOSCOPIC ULTRASOUND UPPER GASTROINTESTINAL TRACT (GI) N/A 9/6/2018   Procedure: ENDOSCOPIC ULTRASOUND, ESOPHAGOSCOPY / UPPER GASTROINTESTINAL TRACT (GI); Endoscopic Ultrasound, Esophagogastroduodenoscopy with endoscopic mucosal resection; Surgeon: Hood Brower MD; Location: [facility] OR     ESOPHAGOSCOPY, GASTROSCOPY, DUODENOSCOPY (EGD), COMBINED N/A 8/13/2018   Procedure: COMBINED ESOPHAGOSCOPY, GASTROSCOPY, DUODENOSCOPY (EGD), BIOPSY SINGLE OR MULTIPLE; EGD; Surgeon:  Hood Brower MD; Location: [facility] OR     ESOPHAGOSCOPY, GASTROSCOPY, DUODENOSCOPY (EGD), COMBINED N/A 3/14/2019   Procedure: Upper Endoscopy; Surgeon: Hood Brower MD; Location: [facility] OR     ESOPHAGOSCOPY, GASTROSCOPY, DUODENOSCOPY (EGD), RESECT MUCOSA, COMBINED N/A 9/6/2018   Procedure: COMBINED ESOPHAGOSCOPY, GASTROSCOPY, DUODENOSCOPY (EGD), RESECT MUCOSA;; Surgeon: Hood Brower MD; Location: [facility] OR     GYN SURGERY 10/22/08   pelvic support     HEMORRHOIDECTOMY 8/08     MOHS MICROGRAPHIC PROCEDURE     SURGICAL HISTORY OF - age 22   L ovarian cyst removal, laparotomy     SURGICAL HISTORY OF - 1998   partial thyroidectomy     SURGICAL HISTORY OF - 10/08   Transobturator tape for Urinary Incontinence     SURGICAL HISTORY OF - 11/2011   stress test normal     THYROID SURGERY   Had cyst removed, thyroid gland is intact.                  Review of Systems   Pain control is adequate    Blood pressure 111/55, pulse 65, temperature 98.8  F (37.1  C), resp. rate 17, SpO2 97 %.      BMI= 32.12    Physical Exam   Physical Exam  Constitutional:          female, overweight,  calm and accepting   Cardiovascular:      Rate and Rhythm: Normal rate , rhythm. No murmur.   Pulmonary:      Breath sounds: Normal breath sounds. No rales.   Abdominal:      General: Bowel sounds are normal.      Palpations: Abdomen is soft.      Tenderness: There is no abdominal tenderness.   Musculoskeletal:         General: Tenderness mild.  Left lower leg in the immobilizer     Comments: Both arms and right leg normal strength and coordination  Immobilizer boot left knee distally   Skin:     General: Skin is warm and dry.  Skin inspected under the boot/immobilizer: No areas of rubbing  Dry, thin eschar covering all 3 sites of the previous external fixator, no erythema  Malleoli incisions with minimal serosanguineous discharge   Neurological:      General: No focal deficit present.      Mental Status: She is alert and oriented to person, place,  and time.      Motor: No weakness.   Psychiatric:          Mood and thought content normal.       Allergies   Allergen Reactions     Amlodipine Swelling     Cefazolin      Metformin Diarrhea     Cephalosporins Rash     Clindamycin Rash     Levaquin [Levofloxacin] Rash     Itching, rash       Nickel Rash     Contact reaction           Medication List:    Current Outpatient Medications   Medication Sig     acetaminophen (TYLENOL) 325 MG tablet Take 975 mg by mouth every 8 (eight) hours.     aluminum-magnesium hydroxide-simethicone (MAALOX ADVANCED) 200-200-20 mg/5 mL Susp Take 30 mL by mouth 3 (three) times a day as needed.     atorvastatin (LIPITOR) 40 MG tablet Take 40 mg by mouth at bedtime.     calcium-vitamin D (CALCIUM-VITAMIN D) 500 mg(1,250mg) -200 unit per tablet Take 1 tablet by mouth 2 (two) times a day with meals.     cholecalciferol, vitamin D3, 3,000 unit Tab Take 3,000 Units by mouth daily.     diphenhydrAMINE (BENADRYL) 25 mg capsule Take 25 mg by mouth every 6 (six) hours as needed for itching.     enoxaparin (LOVENOX) 40 mg/0.4 mL syringe Inject 40 mg under the skin daily.     fexofenadine (ALLEGRA) 180 MG tablet Take 180 mg by mouth daily.     furosemide (LASIX) 20 MG tablet Take 20 mg by mouth daily.     glimepiride (AMARYL) 4 MG tablet Take 4 mg by mouth daily before breakfast.     insulin aspart U-100 (NOVOLOG) 100 unit/mL injection Inject under the skin 3 (three) times a day before meals. Inject as per  sliding scale: if 0 - 139 = O UNITS DO NOT GIVE  CORRECTION INSULIN IF PRE MEAL BG < 140;  140 - 189 = 1 UNIT; 190 - 239 = 2 UNITS; 240 - 289 =  3 UNITS; 290 - 339 = 4 UNITS; 340 - 399 = 5 UNITS;  400 - 449 = 6 UNITS; 450+ = 7 UNITS IF BG = OR >  450 GIVE 7 UNIT     insulin aspart U-100 (NOVOLOG) 100 unit/mL injection Inject under the skin at bedtime. Inject as per  sliding scale: if 0 - 199 = 0 UNITS DO NOT GIVE  BEDTIME CORRECTION DOSE IF BG LESS THAN  200; 200 - 249 = 1 UNIT; 250 - 299 = 2  UNITS; 300 -  349 = 3 UNITS; 350 - 399 = 4 UNITS; 400+ = 5  UNITS FOR BG GREATER THAN OR EQUAL TO  400, GIVE 5 UNITS     insulin aspart U-100 (NOVOLOG) 100 unit/mL injection Inject under the skin 2 (two) times a day before meals. Administer 2 units at lunch and 4 units at supper.     insulin glargine (LANTUS) 100 unit/mL vial Inject 35 Units under the skin daily.            lisinopril (PRINIVIL,ZESTRIL) 2.5 MG tablet Take 2.5 mg by mouth daily.     methocarbamol (ROBAXIN) 500 MG tablet Take 500 mg by mouth 4 (four) times a day.     metoprolol tartrate (LOPRESSOR) 25 MG tablet Take 25 mg by mouth 2 (two) times a day.     oxyCODONE (ROXICODONE) 5 MG immediate release tablet Take 5-10 mg by mouth every 4 (four) hours as needed for pain.     pantoprazole (PROTONIX) 40 MG tablet Take 40 mg by mouth daily.     senna-docusate (PERICOLACE) 8.6-50 mg tablet Take 2 tablets by mouth 2 (two) times a day.     triamcinolone (KENALOG) 0.1 % cream Apply 1 application topically 3 (three) times a day.           Labs: no new labs   Ref Range & Units 10/17/19 0621   Sodium 133 - 144 mmol/L 140    Potassium 3.4 - 5.3 mmol/L 3.5    Chloride 94 - 109 mmol/L 108    Carbon Dioxide 20 - 32 mmol/L 25    Anion Gap 3 - 14 mmol/L 7    Glucose 70 - 99 mg/dL 175High     Urea Nitrogen 7 - 30 mg/dL 8    Creatinine 0.52 - 1.04 mg/dL 0.63    GFR Estimate >60 mL/min/1.73_m2 90       Ref Range & Units 10/18/19 1113   WBC 4.0 - 11.0 10e9/L 17.7High     Hemoglobin 11.7 - 15.7 g/dL 11.2Low     RDW 10.0 - 15.0 % 15.0    Platelet Count 150 - 450 10e9/L 698High           Assessment / Plan:    ICD-10-CM    1. Closed bimalleolar fracture of left ankle with routine healing S82.001U  oxycodone is helping her pain, generally 10 mg when she takes it.  Has less pain now than previously   2. Type 2 diabetes mellitus with hyperglycemia, with long-term current use of insulin (H) E11.65 On Grlargine, Aspart U100. Also on a statin. A1c=7.6    Z79.4    3. Peripheral  vascular disease (H) I73.9 On lasix daiy for leg edema   4. Gastroesophageal reflux disease, esophagitis presence not specified K21.9 Zantac, no sx   5. Essential hypertension, benign I10 Good reading on metoprolol and Lisinopril       Electronically signed by: Sunita Santiago MD

## 2021-06-04 VITALS
TEMPERATURE: 96.1 F | HEART RATE: 98 BPM | HEIGHT: 65 IN | WEIGHT: 195.4 LBS | OXYGEN SATURATION: 92 % | RESPIRATION RATE: 18 BRPM | SYSTOLIC BLOOD PRESSURE: 119 MMHG | DIASTOLIC BLOOD PRESSURE: 67 MMHG | BODY MASS INDEX: 32.55 KG/M2

## 2021-06-04 VITALS
DIASTOLIC BLOOD PRESSURE: 70 MMHG | OXYGEN SATURATION: 97 % | RESPIRATION RATE: 22 BRPM | BODY MASS INDEX: 32.12 KG/M2 | TEMPERATURE: 97.7 F | SYSTOLIC BLOOD PRESSURE: 113 MMHG | HEART RATE: 87 BPM | HEIGHT: 65 IN

## 2021-06-04 VITALS
HEART RATE: 80 BPM | DIASTOLIC BLOOD PRESSURE: 74 MMHG | SYSTOLIC BLOOD PRESSURE: 136 MMHG | BODY MASS INDEX: 32.52 KG/M2 | WEIGHT: 195.4 LBS | TEMPERATURE: 96.1 F | RESPIRATION RATE: 16 BRPM | OXYGEN SATURATION: 92 %

## 2021-06-04 VITALS
HEIGHT: 65 IN | OXYGEN SATURATION: 98 % | SYSTOLIC BLOOD PRESSURE: 118 MMHG | BODY MASS INDEX: 32.12 KG/M2 | TEMPERATURE: 94.7 F | DIASTOLIC BLOOD PRESSURE: 56 MMHG | RESPIRATION RATE: 18 BRPM | HEART RATE: 71 BPM

## 2021-06-04 VITALS
BODY MASS INDEX: 32.55 KG/M2 | WEIGHT: 195.4 LBS | OXYGEN SATURATION: 95 % | DIASTOLIC BLOOD PRESSURE: 61 MMHG | HEART RATE: 80 BPM | SYSTOLIC BLOOD PRESSURE: 124 MMHG | HEIGHT: 65 IN | RESPIRATION RATE: 18 BRPM | TEMPERATURE: 97.4 F

## 2021-06-04 VITALS
SYSTOLIC BLOOD PRESSURE: 120 MMHG | TEMPERATURE: 96.2 F | HEIGHT: 65 IN | RESPIRATION RATE: 19 BRPM | BODY MASS INDEX: 32.55 KG/M2 | WEIGHT: 195.4 LBS | OXYGEN SATURATION: 98 % | DIASTOLIC BLOOD PRESSURE: 65 MMHG | HEART RATE: 75 BPM

## 2021-06-04 NOTE — PROGRESS NOTES
[facility]      Facility:    [facility] [[id number]]   Code Status: UNKNOWN  [place] / U [facility] : 10/12 to 10/18/19  U [facility] 10/28/19 - 10/30/19    ORIF     NO DISCHARGE SUMMARY IN CARE EVERYWHERE      Chief Complaint/Reason for Visit:  Chief Complaint   Patient presents with     Review Of Multiple Medical Conditions     L bimalleolar Fx       HPI:   Sarah is a 74 y.o. female with a history of type 2 diabetes.    She was going down the stairs  at home on 10/11/2019 in the late evening.  Her ankle gave out and she slipped 3-4 stairs on her buttocks. She had pain and deformity of her left ankle. She waited till the following day  to call for help, she did not want the paramedics breaking down her door.    She was brought to the [facility].  X-ray showed bimalleolar ankle fracture.  It was a severely displaced  fracture, ED attempted to reduce it.  Ended up calling orthopedics  who performed aclosed fracture reduction under conscious sedation in the emergency department.  Later in the day, she went to the OR for an external fixator placement.  She was non-weightbearing on the left leg, enoxaparin for DVT prophylaxis.    She was given Doxycycline perioperatively, and developed a diffuse rash. Triamcinolone cream was prescribed.    She had an open area on her coccyx from falling on the stairs, then scooting along her floor prior to calling for help. It was covered with Mepilex foam dressing.    She transferred to [facility].  __________________________________________________________________________  Hospitalization #2: 10/28/19  Removal of external fixator, plate and screws open reduction fixation.  Nonweightbearing left lower extremity  DVT prophylaxis: Lovenox daily x28 days    She returned to [facility]    UPDATE:  She had sudden dyspnea at the facility on 11/25: She went to the emergency department at the  [facility].  Work-up was completely negative, she returned to [facility].      Past Medical History:  Active Non-Hospital Problems    Diagnosis     Intertriginous candidiasis     Esophageal reflux     Fatty liver     Closed bimalleolar fracture of left ankle with routine healing     Type 2 diabetes mellitus with hyperglycemia (H)     Essential hypertension, benign     Peripheral vascular disease (H)     Problem list name updated by automated process. Provider to review       Hypertension goal BP (blood pressure) < 140/90     Personal history of other malignant neoplasm of skin     Hyperlipidemia LDL goal <100     Per provider       Diverticulitis of colon     Abd CT  Problem list name updated by automated process. Provider to review       Congenital cystic disease of liver     multiple liver lesions.  felt to be focal nodular hyperplasia.  annual CT abd.    followed by Dr. Amilcar Kat               Surgical History:  Past Surgical History:   Procedure Laterality Date     C APPENDECTOMY age 22     C DEXA, BONE DENSITY, AXIAL SKEL 2005     C DEXA, BONE DENSITY, AXIAL SKEL 6/2007   No signif change in Osteopenia     COLONOSCOPY 4/2006   repeat 10 yr     ENDOSCOPIC ULTRASOUND UPPER GASTROINTESTINAL TRACT (GI) N/A 9/6/2018   Procedure: ENDOSCOPIC ULTRASOUND, ESOPHAGOSCOPY / UPPER GASTROINTESTINAL TRACT (GI); Endoscopic Ultrasound, Esophagogastroduodenoscopy with endoscopic mucosal resection; Surgeon: Hood Brower MD; [facility]     ESOPHAGOSCOPY, GASTROSCOPY, DUODENOSCOPY (EGD), COMBINED N/A 8/13/2018   Procedure: COMBINED ESOPHAGOSCOPY, GASTROSCOPY, DUODENOSCOPY (EGD), BIOPSY SINGLE OR MULTIPLE; EGD; Surgeon: Hood Brower MD; [facility]     ESOPHAGOSCOPY, GASTROSCOPY, DUODENOSCOPY (EGD), COMBINED N/A 3/14/2019   Procedure: Upper Endoscopy; Surgeon: Hood Brower MD; [facility]     ESOPHAGOSCOPY, GASTROSCOPY, DUODENOSCOPY (EGD), RESECT MUCOSA, COMBINED N/A  "9/6/2018   Procedure: COMBINED ESOPHAGOSCOPY, GASTROSCOPY, DUODENOSCOPY (EGD), RESECT MUCOSA;; Surgeon: Hood Brower MD; [facility]     GYN SURGERY 10/22/08   pelvic support     HEMORRHOIDECTOMY 8/08     MOHS MICROGRAPHIC PROCEDURE     SURGICAL HISTORY OF - age 22   L ovarian cyst removal, laparotomy     SURGICAL HISTORY OF - 1998   partial thyroidectomy     SURGICAL HISTORY OF - 10/08   Transobturator tape for Urinary Incontinence     SURGICAL HISTORY OF - 11/2011   stress test normal     THYROID SURGERY   Had cyst removed, thyroid gland is intact.                  Review of Systems   Crying, \"I am at my wits end.  I need to leave.\"   Has her follow up appointment early this afternoon        Blood pressure 136/74, pulse 80, temperature (!) 96.1  F (35.6  C), resp. rate 16, weight 195 lb 6.4 oz (88.6 kg), SpO2 92 %.        BMI= 32.12    Physical Exam     Constitutional:          female, overweight.  Cardiovascular:      Rate and Rhythm: Normal rate , rhythm. No murmur.   Pulmonary:      Breath sounds: clear  Abdominal:      + BS,  soft, non tender.   Musculoskeletal:         General:   Left lower leg in the immobilizer, minimal dorsal foot swelling      Both arms and right leg normal strength and coordination  Using a knee tricycle well  Skin:     General: Skin is warm and dry.    Surgical wounds: healed  Neurological:      General: No focal deficit present.      Mental Status: She is alert and oriented to person, place, and time.   Psychiatric:          Mood: anxious, frustrated      Allergies   Allergen Reactions     Amlodipine Swelling     Cefazolin      Metformin Diarrhea     Cephalosporins Rash     Clindamycin Rash     Levaquin [Levofloxacin] Rash     Itching, rash       Nickel Rash     Contact reaction           Medication List:    Current Outpatient Medications   Medication Sig     acetaminophen (TYLENOL) 325 MG tablet Take 975 mg by mouth every 8 (eight) hours.     aluminum-magnesium " hydroxide-simethicone (MAALOX ADVANCED) 200-200-20 mg/5 mL Susp Take 30 mL by mouth 3 (three) times a day as needed.     atorvastatin (LIPITOR) 40 MG tablet Take 40 mg by mouth at bedtime.     calcium-vitamin D (CALCIUM-VITAMIN D) 500 mg(1,250mg) -200 unit per tablet Take 1 tablet by mouth 2 (two) times a day with meals.     cholecalciferol, vitamin D3, 3,000 unit Tab Take 3,000 Units by mouth daily.     furosemide (LASIX) 20 MG tablet Take 20 mg by mouth daily.     glimepiride (AMARYL) 4 MG tablet Take 4 mg by mouth daily before breakfast.      insulin aspart U-100 (NOVOLOG) 100 unit/mL injection Inject under the skin 3 (three) times a day before meals. Inject as per  sliding scale: if 0 - 139 = O UNITS DO NOT GIVE  CORRECTION INSULIN IF PRE MEAL BG < 140;  140 - 189 = 1 UNIT; 190 - 239 = 2 UNITS; 240 - 289 =  3 UNITS; 290 - 339 = 4 UNITS; 340 - 399 = 5 UNITS;  400 - 449 = 6 UNITS; 450+ = 7 UNITS IF BG = OR >  450 GIVE 7 UNIT     insulin aspart U-100 (NOVOLOG) 100 unit/mL injection Inject under the skin at bedtime. Inject as per  sliding scale: if 0 - 199 = 0 UNITS DO NOT GIVE  BEDTIME CORRECTION DOSE IF BG LESS THAN  200; 200 - 249 = 1 UNIT; 250 - 299 = 2 UNITS; 300 -  349 = 3 UNITS; 350 - 399 = 4 UNITS; 400+ = 5  UNITS FOR BG GREATER THAN OR EQUAL TO  400, GIVE 5 UNITS     insulin aspart U-100 (NOVOLOG) 100 unit/mL injection Inject under the skin 2 (two) times a day before meals. Administer 2 units at lunch and 4 units at supper.     insulin glargine (LANTUS) 100 unit/mL vial Inject 38 Units under the skin daily.            linaGLIPtin 5 mg Tab Take by mouth.     lisinopril (PRINIVIL,ZESTRIL) 2.5 MG tablet Take 2.5 mg by mouth daily.     metoprolol tartrate (LOPRESSOR) 25 MG tablet Take 25 mg by mouth 2 (two) times a day.     nystatin (MYCOSTATIN) cream Apply topically 2 (two) times a day.     oxyCODONE (ROXICODONE) 5 MG immediate release tablet Take 5 mg by mouth every 4 (four) hours as needed for pain.       oxyCODONE (ROXICODONE) 5 MG immediate release tablet Take 10 mg by mouth 2 (two) times a day with meals. 0730 and noon to cover morning and afternoon therapies respectively     senna-docusate (PERICOLACE) 8.6-50 mg tablet Take 2 tablets by mouth 2 (two) times a day.     triamcinolone (KENALOG) 0.1 % cream Apply 1 application topically 3 (three) times a day.           Labs:       Ref Range & Units 11/25/19 2155   WBC 4.0 - 11.0 10e9/L 10.0    RBC Count 3.8 - 5.2 10e12/L 4.29    Hemoglobin 11.7 - 15.7 g/dL 13.1    Hematocrit 35.0 - 47.0 % 40.7    MCV 78 - 100 fl 95    MCH 26.5 - 33.0 pg 30.5    MCHC 31.5 - 36.5 g/dL 32.2    RDW 10.0 - 15.0 % 13.9    Platelet Count 150 - 450 10e9/L 588High     % Neutrophils  % 68.0       Sodium 133 - 144 mmol/L 136    Potassium 3.4 - 5.3 mmol/L 3.8    Comment: Specimen slightly hemolyzed, potassium may be falsely elevated   Chloride 94 - 109 mmol/L 105    Carbon Dioxide 20 - 32 mmol/L 25    Anion Gap 3 - 14 mmol/L 6    Glucose 70 - 99 mg/dL 300High     Urea Nitrogen 7 - 30 mg/dL 15    Creatinine 0.52 - 1.04 mg/dL 0.84    GFR Estimate >60 mL/min/1.73_m2 70           Ref Range & Units 11/25/19 2155   N-Terminal Pro BNP Inpatient 0 - 900 pg/mL 89              Assessment / Plan:    ICD-10-CM    1. Closed bimalleolar fracture of left ankle with routine healing S82.842D  oxycodone is helping her pain, generally 10 mg when she takes it.  Has less pain now than previously   2. Type 2 diabetes mellitus with hyperglycemia, with long-term current use of insulin (H)    E11.65 On Glargine, Aspart U100. Glucoses are variable. Is requiring sliding scale Novolog at times A1c=7.6    Z79.4    3. Peripheral vascular disease (H) I73.9 On lasix daiy for leg edema   4. Anxiety about health F41.8  encouraged her to deep breathe, she is likely to get some degree if not full degree of weightbearing today at her appointment.  We discussed that leaving without weightbearing would leave her at high risk for  reinjury.   5. Essential hypertension I10  Well controlled   6.  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

## 2021-06-04 NOTE — PROGRESS NOTES
[facility]      Facility:    [facility] [[id number]]   Code Status: FULL  [place] / [facility] : 10/12 to 10/18/19  [facility] [place] 10[facility] - 10/30/19    ORIF     NO DISCHARGE SUMMARY IN CARE EVERYWHERE      Chief Complaint/Reason for Visit:  Chief Complaint   Patient presents with     Review Of Multiple Medical Conditions       HPI:   Sarah is a 74 y.o. female with a history of type 2 diabetes.    She was going down the stairs  at home on 10/11/2019 in the late evening.  Her ankle gave out and she slipped 3-4 stairs on her buttocks. She had pain and deformity of her left ankle. She waited till the following day  to call for help, she did not want the paramedics breaking down her door.    She was brought to the [facility].  X-ray showed bimalleolar ankle fracture.  It was a severely displaced  fracture, ED attempted to reduce it.  Ended up calling orthopedics  who performed aclosed fracture reduction under conscious sedation in the emergency department.  Later in the day, she went to the OR for an external fixator placement.  She was non-weightbearing on the left leg, enoxaparin for DVT prophylaxis.    She was given Doxycycline perioperatively, and developed a diffuse rash. Triamcinolone cream was prescribed.    She had an open area on her coccyx from falling on the stairs, then scooting along her floor prior to calling for help. It was covered with Mepilex foam dressing.    She transferred to [facility].  __________________________________________________________________________  Hospitalization #2: 10/28/19  Removal of external fixator, plate and screws open reduction fixation.  Nonweightbearing left lower extremity  DVT prophylaxis: Lovenox daily x28 days    She returned to [facility]    UPDATE:  Things are generally going well  Her sugars are ok in AM, but generally high otherwise. She has had glucoses from mid  200s to mid 300s: NEGRO Holman recently increased her Lantus to 38 unnits  Non weight bearing  Last covered day of therapy : 12/4.  Next appointment with the orthopedic office = 12/13.  She will have to pay to stay at the facility.  Her friends state her house was uninhabitable junk, she is a hoarder, no functional refrigerator.  Her friends believe she got a discharge to [facility], patient is not interested in this.    Past Medical History:  Active Non-Hospital Problems    Diagnosis     Esophageal reflux     Fatty liver     Closed bimalleolar fracture of left ankle with routine healing     Type 2 diabetes mellitus with hyperglycemia (H)     Essential hypertension, benign     Peripheral vascular disease (H)     Problem list name updated by automated process. Provider to review       Hypertension goal BP (blood pressure) < 140/90     Personal history of other malignant neoplasm of skin     Hyperlipidemia LDL goal <100     Per provider       Diverticulitis of colon     Abd CT  Problem list name updated by automated process. Provider to review       Congenital cystic disease of liver     multiple liver lesions.  felt to be focal nodular hyperplasia.  annual CT abd.    followed by Dr. Amilcar Kat               Surgical History:  Past Surgical History:   Procedure Laterality Date     C APPENDECTOMY age 22     C DEXA, BONE DENSITY, AXIAL SKEL 2005     C DEXA, BONE DENSITY, AXIAL SKEL 6/2007   No signif change in Osteopenia     COLONOSCOPY 4/2006   repeat 10 yr     ENDOSCOPIC ULTRASOUND UPPER GASTROINTESTINAL TRACT (GI) N/A 9/6/2018   Procedure: ENDOSCOPIC ULTRASOUND, ESOPHAGOSCOPY / UPPER GASTROINTESTINAL TRACT (GI); Endoscopic Ultrasound, Esophagogastroduodenoscopy with endoscopic mucosal resection; Surgeon: Hood Brower MD; [facility]     ESOPHAGOSCOPY, GASTROSCOPY, DUODENOSCOPY (EGD), COMBINED N/A 8/13/2018   Procedure: COMBINED ESOPHAGOSCOPY, GASTROSCOPY, DUODENOSCOPY (EGD), BIOPSY SINGLE OR MULTIPLE; EGD;  Surgeon: Hood Brower MD; [facility]     ESOPHAGOSCOPY, GASTROSCOPY, DUODENOSCOPY (EGD), COMBINED N/A 3/14/2019   Procedure: Upper Endoscopy; Surgeon: Hood Brower MD; [facility]     ESOPHAGOSCOPY, GASTROSCOPY, DUODENOSCOPY (EGD), RESECT MUCOSA, COMBINED N/A 9/6/2018   Procedure: COMBINED ESOPHAGOSCOPY, GASTROSCOPY, DUODENOSCOPY (EGD), RESECT MUCOSA;; Surgeon: Hood Brower MD; [facility]     GYN SURGERY 10/22/08   pelvic support     HEMORRHOIDECTOMY 8/08     MOHS MICROGRAPHIC PROCEDURE     SURGICAL HISTORY OF - age 22   L ovarian cyst removal, laparotomy     SURGICAL HISTORY OF - 1998   partial thyroidectomy     SURGICAL HISTORY OF - 10/08   Transobturator tape for Urinary Incontinence     SURGICAL HISTORY OF - 11/2011   stress test normal     THYROID SURGERY   Had cyst removed, thyroid gland is intact.                  Review of Systems   Pain control is adequate.  Had pain when the aide accidentally jostled her foot from the side  Is trying to use less frequent PRN oxycodone doses.        Blood pressure 120/64, pulse 78, temperature 97.8  F (36.6  C), resp. rate 18, SpO2 95 %.    BMI= 32.12    Physical Exam     Constitutional:          female, overweight,  Anxious, frequent complaints  Cardiovascular:      Rate and Rhythm: Normal rate , rhythm. No murmur.   Pulmonary:      Breath sounds: End inspiratory rales right posterior base  Abdominal:      General: Normal bowel sounds       Palpations: Abdomen is soft, non tender, obese.   Musculoskeletal:         General: Tenderness mild.  Left lower leg in the immobilizer     Comments: Both arms and right leg normal strength and coordination  Skin:     General: Skin is warm and dry.  Skin inspected under the boot/immobilizer: No areas of rubbing  Dry, thin eschar covering all 3 sites of the previous external fixator, no erythema  Malleoli incisions dry  Neurological:      General: No focal deficit present.      Mental Status: She is alert and  oriented to person, place, and time.   Psychiatric:          Mood : anxious       Allergies   Allergen Reactions     Amlodipine Swelling     Cefazolin      Metformin Diarrhea     Cephalosporins Rash     Clindamycin Rash     Levaquin [Levofloxacin] Rash     Itching, rash       Nickel Rash     Contact reaction           Medication List:    Current Outpatient Medications   Medication Sig     acetaminophen (TYLENOL) 325 MG tablet Take 975 mg by mouth every 8 (eight) hours.     aluminum-magnesium hydroxide-simethicone (MAALOX ADVANCED) 200-200-20 mg/5 mL Susp Take 30 mL by mouth 3 (three) times a day as needed.     atorvastatin (LIPITOR) 40 MG tablet Take 40 mg by mouth at bedtime.     calcium-vitamin D (CALCIUM-VITAMIN D) 500 mg(1,250mg) -200 unit per tablet Take 1 tablet by mouth 2 (two) times a day with meals.     cholecalciferol, vitamin D3, 3,000 unit Tab Take 3,000 Units by mouth daily.     furosemide (LASIX) 20 MG tablet Take 20 mg by mouth daily.     glimepiride (AMARYL) 4 MG tablet Take 4 mg by mouth 2 (two) times a day.      insulin aspart U-100 (NOVOLOG) 100 unit/mL injection Inject under the skin 3 (three) times a day before meals. Inject as per  sliding scale: if 0 - 139 = O UNITS DO NOT GIVE  CORRECTION INSULIN IF PRE MEAL BG < 140;  140 - 189 = 1 UNIT; 190 - 239 = 2 UNITS; 240 - 289 =  3 UNITS; 290 - 339 = 4 UNITS; 340 - 399 = 5 UNITS;  400 - 449 = 6 UNITS; 450+ = 7 UNITS IF BG = OR >  450 GIVE 7 UNIT     insulin aspart U-100 (NOVOLOG) 100 unit/mL injection Inject under the skin at bedtime. Inject as per  sliding scale: if 0 - 199 = 0 UNITS DO NOT GIVE  BEDTIME CORRECTION DOSE IF BG LESS THAN  200; 200 - 249 = 1 UNIT; 250 - 299 = 2 UNITS; 300 -  349 = 3 UNITS; 350 - 399 = 4 UNITS; 400+ = 5  UNITS FOR BG GREATER THAN OR EQUAL TO  400, GIVE 5 UNITS     insulin aspart U-100 (NOVOLOG) 100 unit/mL injection Inject under the skin 2 (two) times a day before meals. Administer 2 units at lunch and 4 units at  supper.     insulin glargine (LANTUS) 100 unit/mL vial Inject 38 Units under the skin daily.            lisinopril (PRINIVIL,ZESTRIL) 2.5 MG tablet Take 2.5 mg by mouth daily.     metoprolol tartrate (LOPRESSOR) 25 MG tablet Take 25 mg by mouth 2 (two) times a day.     oxyCODONE (ROXICODONE) 5 MG immediate release tablet Take 5-10 mg by mouth every 4 (four) hours as needed for pain.     oxyCODONE (ROXICODONE) 5 MG immediate release tablet Take 10 mg by mouth 2 (two) times a day with meals. 0730 and noon to cover morning and afternoon therapies respectively     senna-docusate (PERICOLACE) 8.6-50 mg tablet Take 2 tablets by mouth 2 (two) times a day.     triamcinolone (KENALOG) 0.1 % cream Apply 1 application topically 3 (three) times a day.           Labs: no new labs       Ref Range & Units 10/17/19 0621   Sodium 133 - 144 mmol/L 140    Potassium 3.4 - 5.3 mmol/L 3.5    Chloride 94 - 109 mmol/L 108    Carbon Dioxide 20 - 32 mmol/L 25    Anion Gap 3 - 14 mmol/L 7    Glucose 70 - 99 mg/dL 175High     Urea Nitrogen 7 - 30 mg/dL 8    Creatinine 0.52 - 1.04 mg/dL 0.63    GFR Estimate >60 mL/min/1.73_m2 90       Ref Range & Units 10/18/19 1113   WBC 4.0 - 11.0 10e9/L 17.7High     Hemoglobin 11.7 - 15.7 g/dL 11.2Low     RDW 10.0 - 15.0 % 15.0    Platelet Count 150 - 450 10e9/L 698High           Assessment / Plan:    ICD-10-CM    1. Closed bimalleolar fracture of left ankle with routine healing S82.842D  Uses oxycodone  10 mg before therapy AM and afternoon, which is helping her pain.   2. Type 2 diabetes mellitus with hyperglycemia, with long-term current use of insulin (H) E11.65 On Glargine, Aspart U100. Also on a statin. A1c=7.6    Z79.4    3. Peripheral vascular disease (H) I73.9 On lasix daiy for leg edema   4. Essential hypertension, benign I10 Well controlled on metoprolol and Lisinopril       Electronically signed by: Sunita Santiago MD

## 2021-06-04 NOTE — PROGRESS NOTES
[facility]    Name:   Sarah Felix  : 1945  Facility:   [facility] [[id number]]   Room:   Code Status: FULL CODE and POLST AVAILABLE -   Fac type:   SNF (Skilled Nursing Facility, TCU) -     PCP:  Wegener, Joel D, MD    CHIEF COMPLAINT / REASON FOR VISIT:  Chief Complaint   Patient presents with     Follow-up     TCU follow-up after hospitalization for left bimalleolar fracture with external fixation and subsequent internal fixation.      [facility] from 10/12/2019 until 10/18/2019 (left bimalleolar fracture with external fixation)  [facility] from 10/28/2019 until 10/30/2019 (left bimalleolar fracture ORIF)    Patient was last seen by me on 2019.      HPI: Sarah is a 74 y.o. 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      She was going down the stairs at home on 10/11/2019 in the late evening, when her ankle gave out, and she slipped 3-4 stairs onto her buttocks.  There was pain and deformity of the left ankle, but she waited until the next day to call for help.  She did not want the paramedics breaking down her door.  She was brought to [facility] where x-rays showed a bimalleolar ankle fracture which was severely displaced, and an attempted reduction in the ED necessitated calling orthopedics who performed a closed fracture reduction under conscious sedation in the ED.  Later in the day, she went to the OR for an external fixator placement.  She is nonweightbearing on the left leg, and she is receiving enoxaparin for DVT prophylaxis.  She was given doxycycline perioperatively and developed a diffuse rash.  Triamcinolone cream was prescribed.    She did have an open area on her sacrum up from a falling on the stairs, then scooting along the floor prior to  "calling for help.  It has been covered with a Mepilex dressing.    She returned to the hospital on 10/28/2019 for external fixation hardware removal and ORIF, performed by Dr. Can that day.  She tolerated the procedure well and returned here on 10/30/2019.    ED VISIT    She was sent to [facility] ED on 11/25/2019 due to shortness of breath.  She stated, \"it was like someone was sitting on my chest.\"  All tests returned negative results.  She stated she still has trouble breathing once in a while.  She has a Cathi pot which seems to help.    The also noted the liver cyst (left hepatic lobe) on a chest CT.  It appeared to have grown significantly in size from the last CT performed on 11/14/2011, measuring 8.3 x 7.2 cm.  The patient stated she has had cysts in many places, thyroid, ovaries, and also the left kidney.    Dyspnea and tachypnea resolved, and she was discharged back here.      TCU ISSUES    Today: She complains of not feeling well today.  She feels that she has lost her appetite, is having a bit of nausea, wants to stop scheduled acetaminophen as she says it is upsetting her stomach, and she wants resumption of her stool softener at the previous dose.  We will change acetaminophen from scheduled to as needed and also resume her senna S2 tabs twice daily.    Bimalleolar fracture: She had an appointment with Ortho at the [facility] on 11/13/2019.  Her next appointment is scheduled for 12/13/2019.    Pain management: Since the ORIF, she appears to be in less pain.  She is receiving 1 to 2 tablets of oxycodone IR (5 mg) every 4 hours as needed.  She tells me that pain is gradually improving.  She does have more pain with therapy. She had orders for 5 to 10 mg of oxycodone.  She felt that she was taking 5 mg about a third of the time and 10 mg about two thirds at the time, depending on the activity.  Dr. Santiago scheduled 10 mg doses at 7:30 AM and at noon, with PRN dosing remaining " "unchanged.     Today, she tells me the pain is always there.  It can be achy, depending on the activity.  She does take one pill before going to bed.    Diabetes mellitus type 2: Today, she tells me, \"because I have not been eating much, and they've been lower.  Actually, there is barely any difference.    She had apparently been on Lantus and glimepiride at home, but for some reason the latter was discontinued in the hospital, her dose was cut in half (from 51 units to 25 units), and she was placed on sliding scale NovoLog.  Prior to her second hospitalization, fasting blood glucose levels ranged between 167 and 195.  At lunch, they ranged between 212 and 298.  Supper levels ranged between 209 and 245 and, at bedtime, the range was between 238 and 273.  The plan was to get her back to her home dosing.  We started by increasing her Lantus insulin to 35 units (later increased to 38 units).  At a prior visit, we authorized 2 units of NovoLog at lunch and 4 units at supper to observe how this affected bedtime levels.     With blood glucose levels looking better, we resumed her glimepiride 4 mg twice daily and placed a hold on the prandial NovoLog.  However, blood glucose levels now are quite high, especially at lunch, supper, and bedtime.  She tends to spread out her meals and may not finish a meal, instead saving part of it for later, giving herself 5 meals per day rather than 3.  This is problematic in terms of checking blood glucose levels with each meal.    Fasting levels range between 114 and 187.  At lunch, the range between 200 and 270.  At supper, the range is between 210 and 266.  At bedtime, the range is between 249 and 348.    At this juncture, it almost looks like a certainty that she will require prandial NovoLog dosing on a scheduled basis.  We will address this at the next visit and also consider the possibility of adding linagliptin 5 mg daily.    Buttock wound: As noted in the hospital discharge " summary, she sustained open area on her sacrum from a falling on the stairs, then scooting along the floor prior to calling for help.  It has been covered with a Mepilex dressing.  Dr. Santiago ordered Proshield 3 times daily and as needed, and this has worked quite well.    Insomnia: She tends to go to bed rather late at home.  She tells me she may go to bed as late as 4 AM.  She does not require any sleep aid, although she does have orders for diphenhydramine (for itching) which she has not been taking.  She is sleeping now.     Gastric reflux: She is on 40 mg of pantoprazole daily.  She does feel uncomfortable lying down but does not feel she needs this medication.  She will cough and feel like she is going to vomit, but if she sits up and burps, she then feels okay.  Unsure how long she has been on this, we changed the scheduled dose to every other day without issue.  Denies any symptoms.    Constipation: When she began having excessive BMs (and cramps), dosing of her senna S was halved, from 2 tablets twice daily to 1 tablet twice daily.  Currently, no problems.    Medication changes: Dr. Santiago, per patient request, discontinued fexofenadine, pantoprazole, methocarbamol, and diphenhydramine at an earlier visit.    ROS:   No headaches or chest pains, coughing or congestion, dizziness, dysuria, difficulty chewing or swallowing, integumentary issues, or problems with appetite.     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 felt to be focal nodular hyperplasia. annual CT abd. followed by Dr. Amilcar Kat      Cystocele, lateral      Diverticulitis of colon 06/2004    abd CT     DM (diabetes mellitus) (H)      Embolism and  thrombosis (H) age 22    post ovarian cyst removed     Esophageal reflux      Fatty liver      Hiatal hernia     small     HLD (hyperlipidemia)      Nontoxic uninodular goiter      Osteopenia 04/21/2005    on fosamax for > 5 yr. stop 7/2012     Pure hypercholesterolemia               Family History   Problem Relation Age of Onset     Rheum arthritis Mother      Diabetes Mother      Hypertension Mother      Cancer Father      Other Brother         palpitations     Social History     Socioeconomic History     Marital status: Single     Spouse name: Not on file     Number of children: Not on file     Years of education: Not on file     Highest education level: Not on file   Occupational History     Not on file   Social Needs     Financial resource strain: Not on file     Food insecurity:     Worry: Not on file     Inability: Not on file     Transportation needs:     Medical: Not on file     Non-medical: Not on file   Tobacco Use     Smoking status: Former Smoker     Packs/day: 1.00     Years: 10.00     Pack years: 10.00     Smokeless tobacco: Never Used   Substance and Sexual Activity     Alcohol use: Yes     Alcohol/week: 6.0 standard drinks     Types: 6 Standard drinks or equivalent per week     Frequency: 4 or more times a week     Drug use: Not on file     Sexual activity: Not on file   Lifestyle     Physical activity:     Days per week: Not on file     Minutes per session: Not on file     Stress: Not on file   Relationships     Social connections:     Talks on phone: Not on file     Gets together: Not on file     Attends [employer] service: Not on file     Active member of club or organization: Not on file     Attends meetings of clubs or organizations: Not on file     Relationship status: Not on file     Intimate partner violence:     Fear of current or ex partner: Not on file     Emotionally abused: Not on file     Physically abused: Not on file     Forced sexual activity: Not on file   Other Topics Concern      Not on file   Social History Narrative     Not on file       MEDICATIONS: Reviewed from the MAR, physician orders, and/or earlier progress notes.   Post Discharge Medication Reconciliation Status: medication reconciliation previously completed during another office visit.    Current Outpatient Medications   Medication Sig     acetaminophen (TYLENOL) 325 MG tablet Take 975 mg by mouth every 8 (eight) hours.     aluminum-magnesium hydroxide-simethicone (MAALOX ADVANCED) 200-200-20 mg/5 mL Susp Take 30 mL by mouth 3 (three) times a day as needed.     atorvastatin (LIPITOR) 40 MG tablet Take 40 mg by mouth at bedtime.     calcium-vitamin D (CALCIUM-VITAMIN D) 500 mg(1,250mg) -200 unit per tablet Take 1 tablet by mouth 2 (two) times a day with meals.     cholecalciferol, vitamin D3, 3,000 unit Tab Take 3,000 Units by mouth daily.     furosemide (LASIX) 20 MG tablet Take 20 mg by mouth daily.     glimepiride (AMARYL) 4 MG tablet Take 4 mg by mouth 2 (two) times a day.      insulin aspart U-100 (NOVOLOG) 100 unit/mL injection Inject under the skin 3 (three) times a day before meals. Inject as per  sliding scale: if 0 - 139 = O UNITS DO NOT GIVE  CORRECTION INSULIN IF PRE MEAL BG < 140;  140 - 189 = 1 UNIT; 190 - 239 = 2 UNITS; 240 - 289 =  3 UNITS; 290 - 339 = 4 UNITS; 340 - 399 = 5 UNITS;  400 - 449 = 6 UNITS; 450+ = 7 UNITS IF BG = OR >  450 GIVE 7 UNIT     insulin aspart U-100 (NOVOLOG) 100 unit/mL injection Inject under the skin at bedtime. Inject as per  sliding scale: if 0 - 199 = 0 UNITS DO NOT GIVE  BEDTIME CORRECTION DOSE IF BG LESS THAN  200; 200 - 249 = 1 UNIT; 250 - 299 = 2 UNITS; 300 -  349 = 3 UNITS; 350 - 399 = 4 UNITS; 400+ = 5  UNITS FOR BG GREATER THAN OR EQUAL TO  400, GIVE 5 UNITS     insulin aspart U-100 (NOVOLOG) 100 unit/mL injection Inject under the skin 2 (two) times a day before meals. Administer 2 units at lunch and 4 units at supper.     insulin glargine (LANTUS) 100 unit/mL vial Inject 38 Units  "under the skin daily.            lisinopril (PRINIVIL,ZESTRIL) 2.5 MG tablet Take 2.5 mg by mouth daily.     metoprolol tartrate (LOPRESSOR) 25 MG tablet Take 25 mg by mouth 2 (two) times a day.     oxyCODONE (ROXICODONE) 5 MG immediate release tablet Take 5-10 mg by mouth every 4 (four) hours as needed for pain.     oxyCODONE (ROXICODONE) 5 MG immediate release tablet Take 10 mg by mouth 2 (two) times a day with meals. 0730 and noon to cover morning and afternoon therapies respectively     senna-docusate (PERICOLACE) 8.6-50 mg tablet Take 2 tablets by mouth 2 (two) times a day.     triamcinolone (KENALOG) 0.1 % cream Apply 1 application topically 3 (three) times a day.     ALLERGIES:   Allergies   Allergen Reactions     Amlodipine Swelling     Cefazolin      Metformin Diarrhea     Cephalosporins Rash     Clindamycin Rash     Levaquin [Levofloxacin] Rash     Itching, rash       Nickel Rash     Contact reaction       DIET: Diabetic, regular texture, thin liquids.    Vitals:    12/09/19 1535   BP: 119/67   Pulse: 98   Resp: 18   Temp: (!) 96.1  F (35.6  C)   SpO2: 92%   Weight: 195 lb 6.4 oz (88.6 kg)   Height: 5' 5\" (1.651 m)     Body mass index is 32.52 kg/m .    EXAMINATION:   General: Moderately obese middle-aged female,sitting up in bed.  Head: Normocephalic and atraumatic.   Eyes: PERRLA, sclerae clear.   ENT: Moist oral mucosa.  She has her own teeth.  No rhinorrhea or nasal discharge.  Hearing is unimpaired.  Cardiovascular: Sinus tachycardia.  No appreciable murmur.  Respiratory: Lungs clear to auscultation bilaterally.   Abdomen: Soft and nontender.  Bowel sounds positive in all 4 quadrants.  Musculoskeletal/Extremities: Age-related degenerative joint disease.  She is wearing a boot on the left lower extremity.  Leg is currently wrapped.  Good CMS.  Integument: No rashes, clinically significant lesions, or skin breakdown.   Cognitive/Psychiatric: Alert and oriented x3.  Affect is quite " dysthymic.    DIAGNOSTICS:   No results found for this or any previous visit.  No results found for: WBC, HGB, HCT, MCV, PLT  CrCl cannot be calculated (No successful lab value found.).      ASSESSMENT/Plan:      ICD-10-CM    1. Closed bimalleolar fracture of left ankle with routine healing S82.842D    2. Type 2 diabetes mellitus with hyperglycemia, with long-term current use of insulin (H) E11.65     Z79.4    3. Essential hypertension, benign I10    4. Hyperlipidemia LDL goal <100 E78.5    5. Gastroesophageal reflux disease, esophagitis presence not specified K21.9    6. Fatty liver K76.0    7. Congenital cystic disease of liver Q44.6      CHANGES:    1.  Resume senna S2 tabs twice daily.  2.  Change acetaminophen from scheduled to as needed.    CARE PLAN:    The care plan has been reviewed and all orders signed. Changes to care plan, if any, as noted. Otherwise, continue current plan of care.  Total time spent with this patient was approximately 35 minutes, with greater than 50% spent in counseling and coordination of care that included addressing some of her medication concerns as well as a review of her blood glucose levels.  We will need to discuss this further and possibly institute linagliptin, discontinuing her glimepiride which has not proven effective in lowering her prandial and bedtime blood glucose levels.    The above has been created using voice recognition software. Please be aware that this may unintentionally  produce inaccuracies and/or nonsensical sentences.      Electronically signed by: Juan Holman, NIECY

## 2021-06-04 NOTE — PROGRESS NOTES
[facility]    Name:   Sarah Felix  : 1945  Facility:   [facility] [[id number]]   Room:   Code Status: FULL CODE and POLST AVAILABLE -   Fac type:   SNF (Skilled Nursing Facility, TCU) -     PCP:  Wegener, Joel D, MD    CHIEF COMPLAINT / REASON FOR VISIT:  Chief Complaint   Patient presents with     Follow-up     TCU follow-up after hospitalization for left bimalleolar fracture with external fixation and subsequent internal fixation.      [facility] from 10/12/2019 until 10/18/2019 (left bimalleolar fracture with external fixation)  [facility] from 10/28/2019 until 10/30/2019 (left bimalleolar fracture ORIF)    Patient was last seen by me on 2019 and subsequently seen by Dr. Santiago on 2019.      HPI: Sarah is a 74 y.o. 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      She was going down the stairs at home on 10/11/2019 in the late evening, when her ankle gave out, and she slipped 3-4 stairs onto her buttocks.  There was pain and deformity of the left ankle, but she waited until the next day to call for help.  She did not want the paramedics breaking down her door.  She was brought to [facility] where x-rays showed a bimalleolar ankle fracture which was severely displaced, and an attempted reduction in the ED necessitated calling orthopedics who performed a closed fracture reduction under conscious sedation in the ED.  Later in the day, she went to the OR for an external fixator placement.  She is nonweightbearing on the left leg, and she is receiving enoxaparin for DVT prophylaxis.  She was given doxycycline perioperatively and developed a diffuse rash.  Triamcinolone cream was prescribed.    She did have an open area on her sacrum up from a falling on the  "stairs, then scooting along the floor prior to calling for help.  It has been covered with a Mepilex dressing.    She returned to the hospital on 10/28/2019 for external fixation hardware removal and ORIF, performed by Dr. Can that day.  She tolerated the procedure well and returned here on 10/30/2019.    ED VISIT    She was sent to [facility] ED on 11/25/2019 due to shortness of breath.  She stated, \"it was like someone was sitting on my chest.\"  All tests returned negative results.  She stated she still has trouble breathing once in a while.  She has a [employer] pot which seems to help.    The also noted the liver cyst (left hepatic lobe) on a chest CT.  It appeared to have grown significantly in size from the last CT performed on 11/14/2011, measuring 8.3 x 7.2 cm.  The patient stated she has had cysts in many places, thyroid, ovaries, and also the left kidney.    Dyspnea and tachypnea resolved, and she was discharged back here.      TCU ISSUES    Today: She is feeling down today.  She did have a follow-up appointment with Dr. Can on 12/13/2019 and was given full weightbearing status, noting that she should be able to discharge home soon.  He must continue to wear the boot and, when able to walk comfortably in that, she may switch to regular shoes.  She has a follow-up appointment 6 weeks hence.    Since able to bear full weight, pain has increased.  She continues to have a scheduled oxycodone 10 mg twice daily (0700 and 1130).  Previously with PRN dosing of 1-2 tabs every 4 hours, Dr. Santiago decreased this to 1 tab every 4 hours as needed on 12/13/2019.    When last seen, she complained of not feeling well.  She felt that she had lost her appetite and was experiencing a bit of nausea she feels that she has lost her appetite, is having a bit of nausea, and so her acetaminophen was changed from scheduled to as needed, and we resumed her previous senna S dose.    Diabetes mellitus type 2: Blood glucose " "levels are \"the usual.\"  We have made increases to her Lantus.  She had apparently been on Lantus and glimepiride at home, but for some reason the latter was discontinued in the hospital, her dose was cut in half (from 51 units to 25 units), and she was placed on sliding scale NovoLog.  Prior to her second hospitalization, fasting blood glucose levels ranged between 167 and 195.  At lunch, they ranged between 212 and 298.  Supper levels ranged between 209 and 245 and, at bedtime, the range was between 238 and 273.  The plan was to get her back to her home dosing.  We started by increasing her Lantus insulin to 35 units (later increased to 38 units).  At a prior visit, we authorized 2 units of NovoLog at lunch and 4 units at supper to observe how this affected bedtime levels.     With blood glucose levels looking better, we resumed her glimepiride 4 mg twice daily and placed a hold on the prandial NovoLog.  However, blood glucose levels were still quite high, especially at lunch, supper, and bedtime.  She tends to spread out her meals and may not finish a meal, instead saving part of it for later, giving herself 5 meals per day rather than 3.  This is problematic in terms of checking blood glucose levels with each meal.    Fasting levels range between 120 and 255.  At lunch, the range between 183 and 298.  At supper, the range is between 213 and 320.  At bedtime, the range is between 226 and 357.    At this juncture, it almost looks like a certainty that she will require prandial NovoLog dosing on a scheduled basis.  However, we will start by adding linagliptin 5 mg daily and decreasing the dose of glimepiride to 4 mg daily.    Buttock wound: As noted in the hospital discharge summary, she sustained open area on her sacrum from a falling on the stairs, then scooting along the floor prior to calling for help.  It has been covered with a Mepilex dressing.  Dr. Santiago ordered Proshield 3 times daily and as needed, and " "this has worked quite well, and she tells me it is \"not a problem.\"    Intertriginous candidiasis: Yeasty rash under breasts and belly per patient.  We will order nystatin cream.    Insomnia: She tends to go to bed rather late at home.  She tells me she may go to bed as late as 4 AM.  She does not require any sleep aid, although she does have orders for diphenhydramine (for itching) which she has not been taking.  She is sleeping now.     Gastric reflux: She is on 40 mg of pantoprazole daily.  She does feel uncomfortable lying down but does not feel she needs this medication.  She will cough and feel like she is going to vomit, but if she sits up and burps, she then feels okay.  Unsure how long she has been on this, we changed the scheduled dose to every other day without issue.  Denies any symptoms.    Constipation: When she began having excessive BMs (and cramps), dosing of her senna S was halved, from 2 tablets twice daily to 1 tablet twice daily.  Currently, no problems.    Medication changes: Dr. Santiago, per patient request, discontinued fexofenadine, pantoprazole, methocarbamol, and diphenhydramine at an earlier visit.    ROS:   No headaches or chest pains, coughing or congestion, dizziness, dysuria, difficulty chewing or swallowing, integumentary issues, or problems with appetite.     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 felt to be focal nodular hyperplasia. annual CT abd. followed by Dr. Amilcar Kat      Cystocele, lateral      Diverticulitis of colon 06/2004    abd CT     DM (diabetes mellitus) (H)      Embolism and thrombosis (H) age 22    post ovarian cyst removed     Esophageal reflux      Fatty liver      Hiatal " hernia     small     HLD (hyperlipidemia)      Nontoxic uninodular goiter      Osteopenia 04/21/2005    on fosamax for > 5 yr. stop 7/2012     Pure hypercholesterolemia               Family History   Problem Relation Age of Onset     Rheum arthritis Mother      Diabetes Mother      Hypertension Mother      Cancer Father      Other Brother         palpitations     Social History     Socioeconomic History     Marital status: Single     Spouse name: Not on file     Number of children: Not on file     Years of education: Not on file     Highest education level: Not on file   Occupational History     Not on file   Social Needs     Financial resource strain: Not on file     Food insecurity:     Worry: Not on file     Inability: Not on file     Transportation needs:     Medical: Not on file     Non-medical: Not on file   Tobacco Use     Smoking status: Former Smoker     Packs/day: 1.00     Years: 10.00     Pack years: 10.00     Smokeless tobacco: Never Used   Substance and Sexual Activity     Alcohol use: Yes     Alcohol/week: 6.0 standard drinks     Types: 6 Standard drinks or equivalent per week     Frequency: 4 or more times a week     Drug use: Not on file     Sexual activity: Not on file   Lifestyle     Physical activity:     Days per week: Not on file     Minutes per session: Not on file     Stress: Not on file   Relationships     Social connections:     Talks on phone: Not on file     Gets together: Not on file     Attends [employer] service: Not on file     Active member of club or organization: Not on file     Attends meetings of clubs or organizations: Not on file     Relationship status: Not on file     Intimate partner violence:     Fear of current or ex partner: Not on file     Emotionally abused: Not on file     Physically abused: Not on file     Forced sexual activity: Not on file   Other Topics Concern     Not on file   Social History Narrative     Not on file       MEDICATIONS: Reviewed from the MAR,  physician orders, and/or earlier progress notes.   Post Discharge Medication Reconciliation Status: medication reconciliation previously completed during another office visit.  Updated by me today (12/16/2019) with a decrease in glimepiride, the addition of linagliptin, and the addition of nystatin cream reflected below.    Current Outpatient Medications   Medication Sig     linaGLIPtin 5 mg Tab Take by mouth.     nystatin (MYCOSTATIN) cream Apply topically 2 (two) times a day.     acetaminophen (TYLENOL) 325 MG tablet Take 975 mg by mouth every 8 (eight) hours.     aluminum-magnesium hydroxide-simethicone (MAALOX ADVANCED) 200-200-20 mg/5 mL Susp Take 30 mL by mouth 3 (three) times a day as needed.     atorvastatin (LIPITOR) 40 MG tablet Take 40 mg by mouth at bedtime.     calcium-vitamin D (CALCIUM-VITAMIN D) 500 mg(1,250mg) -200 unit per tablet Take 1 tablet by mouth 2 (two) times a day with meals.     cholecalciferol, vitamin D3, 3,000 unit Tab Take 3,000 Units by mouth daily.     furosemide (LASIX) 20 MG tablet Take 20 mg by mouth daily.     glimepiride (AMARYL) 4 MG tablet Take 4 mg by mouth daily before breakfast.      insulin aspart U-100 (NOVOLOG) 100 unit/mL injection Inject under the skin 3 (three) times a day before meals. Inject as per  sliding scale: if 0 - 139 = O UNITS DO NOT GIVE  CORRECTION INSULIN IF PRE MEAL BG < 140;  140 - 189 = 1 UNIT; 190 - 239 = 2 UNITS; 240 - 289 =  3 UNITS; 290 - 339 = 4 UNITS; 340 - 399 = 5 UNITS;  400 - 449 = 6 UNITS; 450+ = 7 UNITS IF BG = OR >  450 GIVE 7 UNIT     insulin aspart U-100 (NOVOLOG) 100 unit/mL injection Inject under the skin at bedtime. Inject as per  sliding scale: if 0 - 199 = 0 UNITS DO NOT GIVE  BEDTIME CORRECTION DOSE IF BG LESS THAN  200; 200 - 249 = 1 UNIT; 250 - 299 = 2 UNITS; 300 -  349 = 3 UNITS; 350 - 399 = 4 UNITS; 400+ = 5  UNITS FOR BG GREATER THAN OR EQUAL TO  400, GIVE 5 UNITS     insulin aspart U-100 (NOVOLOG) 100 unit/mL injection Inject  "under the skin 2 (two) times a day before meals. Administer 2 units at lunch and 4 units at supper.     insulin glargine (LANTUS) 100 unit/mL vial Inject 38 Units under the skin daily.            lisinopril (PRINIVIL,ZESTRIL) 2.5 MG tablet Take 2.5 mg by mouth daily.     metoprolol tartrate (LOPRESSOR) 25 MG tablet Take 25 mg by mouth 2 (two) times a day.     oxyCODONE (ROXICODONE) 5 MG immediate release tablet Take 5 mg by mouth every 4 (four) hours as needed for pain.      oxyCODONE (ROXICODONE) 5 MG immediate release tablet Take 10 mg by mouth 2 (two) times a day with meals. 0730 and noon to cover morning and afternoon therapies respectively     senna-docusate (PERICOLACE) 8.6-50 mg tablet Take 2 tablets by mouth 2 (two) times a day.     triamcinolone (KENALOG) 0.1 % cream Apply 1 application topically 3 (three) times a day.     ALLERGIES:   Allergies   Allergen Reactions     Amlodipine Swelling     Cefazolin      Metformin Diarrhea     Cephalosporins Rash     Clindamycin Rash     Levaquin [Levofloxacin] Rash     Itching, rash       Nickel Rash     Contact reaction       DIET: Diabetic, regular texture, thin liquids.    Vitals:    12/16/19 1105   BP: 120/65   Pulse: 75   Resp: 19   Temp: (!) 96.2  F (35.7  C)   SpO2: 98%   Weight: 195 lb 6.4 oz (88.6 kg)   Height: 5' 5\" (1.651 m)     Body mass index is 32.52 kg/m .    EXAMINATION:   General: Moderately obese middle-aged female,sitting up in bed.  Head: Normocephalic and atraumatic.   Eyes: PERRLA, sclerae clear.   ENT: Moist oral mucosa.  She has her own teeth.  No rhinorrhea or nasal discharge.  Hearing is unimpaired.  Cardiovascular: Sinus tachycardia.  No appreciable murmur.  Respiratory: Lungs clear to auscultation bilaterally.   Abdomen: Soft and nontender.  Bowel sounds positive in all 4 quadrants.  Musculoskeletal/Extremities: Age-related degenerative joint disease.  She is wearing a boot on the left lower extremity.  Leg is currently wrapped.  Good " CMS.  Integument: Yeasty rash beneath breasts and belly.  We are ordering nystatin cream.  Cognitive/Psychiatric: Alert and oriented x3.  Affect is quite dysthymic.    DIAGNOSTICS:   No results found for this or any previous visit.  No results found for: WBC, HGB, HCT, MCV, PLT  CrCl cannot be calculated (No successful lab value found.).      ASSESSMENT/Plan:      ICD-10-CM    1. Closed bimalleolar fracture of left ankle with routine healing S82.842D    2. Type 2 diabetes mellitus with hyperglycemia, with long-term current use of insulin (H) E11.65     Z79.4    3. Essential hypertension, benign I10    4. Hyperlipidemia LDL goal <100 E78.5    5. Gastroesophageal reflux disease, esophagitis presence not specified K21.9    6. Congenital cystic disease of liver Q44.6    7. Intertriginous candidiasis B37.2      CHANGES:    1.  Decrease glimepiride from 4 mg twice daily to 4 mg every morning.  2.  Add linagliptin 5 mg every morning.  3.  Nystatin cream to intertriginous candidal areas (beneath breasts and belly).    CARE PLAN:    The care plan has been reviewed and all orders signed. Changes to care plan, if any, as noted. Otherwise, continue current plan of care.  Total time spent with this patient was approximately 35 minutes, with greater than 50% spent in counseling and coordination of care that included addressing continuing elevated blood glucose levels.    The above has been created using voice recognition software. Please be aware that this may unintentionally  produce inaccuracies and/or nonsensical sentences.      Electronically signed by: Juan Holman CNP

## 2021-06-04 NOTE — PROGRESS NOTES
[facility]    Name:   Sarah Felix  : 1945  Facility:   [facility] [[id number]]   Room:   Code Status: FULL CODE and POLST AVAILABLE -   Fac type:   SNF (Skilled Nursing Facility, TCU) -     PCP:  Wegener, Joel D, MD    CHIEF COMPLAINT / REASON FOR VISIT:  Chief Complaint   Patient presents with     Follow-up     TCU follow-up after hospitalization for left bimalleolar fracture with external fixation and subsequent internal fixation.      [facility] from 10/12/2019 until 10/18/2019 (left bimalleolar fracture with external fixation)  [facility] from 10/28/2019 until 10/30/2019 (left bimalleolar fracture ORIF)    Patient was last seen by me on 2019 and subsequently seen by Dr. Santiago on 2019.      HPI: Sarah is a 74 y.o. 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      She was going down the stairs at home on 10/11/2019 in the late evening, when her ankle gave out, and she slipped 3-4 stairs onto her buttocks.  There was pain and deformity of the left ankle, but she waited until the next day to call for help.  She did not want the paramedics breaking down her door.  She was brought to [facility] where x-rays showed a bimalleolar ankle fracture which was severely displaced, and an attempted reduction in the ED necessitated calling orthopedics who performed a closed fracture reduction under conscious sedation in the ED.  Later in the day, she went to the OR for an external fixator placement.  She is nonweightbearing on the left leg, and she is receiving enoxaparin for DVT prophylaxis.  She was given doxycycline perioperatively and developed a diffuse rash.  Triamcinolone cream was prescribed.    She did have an open area on her sacrum up from a falling on the  "stairs, then scooting along the floor prior to calling for help.  It has been covered with a Mepilex dressing.    She returned to the hospital on 10/28/2019 for external fixation hardware removal and ORIF, performed by Dr. Can that day.  She tolerated the procedure well and returned here on 10/30/2019.    ED VISIT    She was sent to [facility] ED on 11/25/2019 due to shortness of breath.  She stated, \"it was like someone was sitting on my chest.\"  All tests returned negative results.  She stated she still has trouble breathing once in a while.  She has a Gautier pot which seems to help.    The also noted the liver cyst (left hepatic lobe) on a chest CT.  It appeared to have grown significantly in size from the last CT performed on 11/14/2011, measuring 8.3 x 7.2 cm.  The patient stated she has had cysts in many places, thyroid, ovaries, and also the left kidney.    Dyspnea and tachypnea resolved, and she was discharged back here.      TCU ISSUES    Bimalleolar fracture: She had an appointment with Ortho at the [facility] on 11/13/2019.  Her next appointment is scheduled for 12/13/2019.    Pain management: Since the ORIF, she appears to be in less pain.  She is receiving 1 to 2 tablets of oxycodone IR (5 mg) every 4 hours as needed.  She tells me that pain is gradually improving.  She does have more pain with therapy. She had orders for 5 to 10 mg of oxycodone.  She felt that she was taking 5 mg about a third of the time and 10 mg about two thirds at the time, depending on the activity.  Dr. Santiago scheduled 10 mg doses at 7:30 AM and at noon, with PRN dosing remaining unchanged.     Today, she tells me the pain is always there.  It can be achy, depending on the activity.  She does take one pill before going to bed.    Diabetes mellitus type 2: She had apparently been on Lantus and glimepiride at home, but for some reason the latter was discontinued in the hospital, her dose was cut in half (from " 51 units to 25 units), and she was placed on sliding scale NovoLog.  Prior to her second hospitalization, fasting blood glucose levels ranged between 167 and 195.  At lunch, they ranged between 212 and 298.  Supper levels ranged between 209 and 245 and, at bedtime, the range was between 238 and 273.  The plan was to get her back to her home dosing.  We started by increasing her Lantus insulin to 35 units (later increased to 38 units).  At my prior visit, we authorized 2 units of NovoLog at lunch and 4 units at supper to observe how this affected bedtime levels.     With blood glucose levels looking better, we resumed her glimepiride 4 mg twice daily and placed a hold on the prandial NovoLog.  However, blood glucose levels now are quite high at mealtimes.  She tends to spread out her meals and may not finish a meal, instead saving part of it for later, giving herself 5 meals per day rather than 3.  This is problematic in terms of checking blood glucose levels with each meal.    Blood glucose levels have risen.  Fasting levels range between 110 and 145.  At lunch, the range between 244 and 314.  At supper, the range is between 197 and 304.  At bedtime, the range is between 240 and 333.    We are going to try increasing her glimepiride from 4 mg to 6 mg.  At this juncture, it almost looks like a certainty that she will require prandial NovoLog dosing on a scheduled basis.    Buttock wound: As noted in the hospital discharge summary, she sustained open area on her sacrum from a falling on the stairs, then scooting along the floor prior to calling for help.  It has been covered with a Mepilex dressing.  Dr. Santiago ordered Proshield 3 times daily and as needed, and this has been working quite well.    Insomnia: She tends to go to bed rather late at home.  She tells me she may go to bed as late as 4 AM.  She does not require any sleep aid, although she does have orders for diphenhydramine (for itching) which she has not  been taking.    Gastric reflux: She is on 40 mg of pantoprazole daily.  She does feel uncomfortable lying down but does not feel she needs this medication.  She will cough and feel like she is going to vomit, but if she sits up and burps, she then feels okay.  Unsure how long she has been on this, we changed the scheduled dose to every other day without issue.    Constipation: When she began having excessive BMs (and cramps), dosing of her senna S was halved, from 2 tablets twice daily to 1 tablet twice daily.  Currently, no problems.    Medication changes: Dr. Santiago, per patient request, discontinued fexofenadine, pantoprazole, methocarbamol, and diphenhydramine.    ROS:   No headaches or chest pains, coughing or congestion, dizziness, dysuria, difficulty chewing or swallowing, integumentary issues, or problems with appetite.     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 felt to be focal nodular hyperplasia. annual CT abd. followed by Dr. Amiclar Kat      Cystocele, lateral      Diverticulitis of colon 06/2004    abd CT     DM (diabetes mellitus) (H)      Embolism and thrombosis (H) age 22    post ovarian cyst removed     Esophageal reflux      Fatty liver      Hiatal hernia     small     HLD (hyperlipidemia)      Nontoxic uninodular goiter      Osteopenia 04/21/2005    on fosamax for > 5 yr. stop 7/2012     Pure hypercholesterolemia               Family History   Problem Relation Age of Onset     Rheum arthritis Mother      Diabetes Mother      Hypertension Mother      Cancer Father      Other Brother         palpitations     Social History     Socioeconomic History     Marital status: Single     Spouse name: Not on file      Number of children: Not on file     Years of education: Not on file     Highest education level: Not on file   Occupational History     Not on file   Social Needs     Financial resource strain: Not on file     Food insecurity:     Worry: Not on file     Inability: Not on file     Transportation needs:     Medical: Not on file     Non-medical: Not on file   Tobacco Use     Smoking status: Former Smoker     Packs/day: 1.00     Years: 10.00     Pack years: 10.00     Smokeless tobacco: Never Used   Substance and Sexual Activity     Alcohol use: Yes     Alcohol/week: 6.0 standard drinks     Types: 6 Standard drinks or equivalent per week     Frequency: 4 or more times a week     Drug use: Not on file     Sexual activity: Not on file   Lifestyle     Physical activity:     Days per week: Not on file     Minutes per session: Not on file     Stress: Not on file   Relationships     Social connections:     Talks on phone: Not on file     Gets together: Not on file     Attends Restorationism service: Not on file     Active member of club or organization: Not on file     Attends meetings of clubs or organizations: Not on file     Relationship status: Not on file     Intimate partner violence:     Fear of current or ex partner: Not on file     Emotionally abused: Not on file     Physically abused: Not on file     Forced sexual activity: Not on file   Other Topics Concern     Not on file   Social History Narrative     Not on file       MEDICATIONS: Reviewed from the MAR, physician orders, and/or earlier progress notes.   Post Discharge Medication Reconciliation Status: medication reconciliation previously completed during another office visit.  Updated by me today (12/04/2019) with an increase in glimepiride and discontinuation of 4 medications reflected below.  Current Outpatient Medications   Medication Sig     acetaminophen (TYLENOL) 325 MG tablet Take 975 mg by mouth every 8 (eight) hours.     aluminum-magnesium  hydroxide-simethicone (MAALOX ADVANCED) 200-200-20 mg/5 mL Susp Take 30 mL by mouth 3 (three) times a day as needed.     atorvastatin (LIPITOR) 40 MG tablet Take 40 mg by mouth at bedtime.     calcium-vitamin D (CALCIUM-VITAMIN D) 500 mg(1,250mg) -200 unit per tablet Take 1 tablet by mouth 2 (two) times a day with meals.     cholecalciferol, vitamin D3, 3,000 unit Tab Take 3,000 Units by mouth daily.     diphenhydrAMINE (BENADRYL) 25 mg capsule Take 25 mg by mouth every 6 (six) hours as needed for itching.     fexofenadine (ALLEGRA) 180 MG tablet Take 180 mg by mouth daily.     furosemide (LASIX) 20 MG tablet Take 20 mg by mouth daily.     glimepiride (AMARYL) 4 MG tablet Take 6 mg by mouth daily before breakfast.      insulin aspart U-100 (NOVOLOG) 100 unit/mL injection Inject under the skin 3 (three) times a day before meals. Inject as per  sliding scale: if 0 - 139 = O UNITS DO NOT GIVE  CORRECTION INSULIN IF PRE MEAL BG < 140;  140 - 189 = 1 UNIT; 190 - 239 = 2 UNITS; 240 - 289 =  3 UNITS; 290 - 339 = 4 UNITS; 340 - 399 = 5 UNITS;  400 - 449 = 6 UNITS; 450+ = 7 UNITS IF BG = OR >  450 GIVE 7 UNIT     insulin aspart U-100 (NOVOLOG) 100 unit/mL injection Inject under the skin at bedtime. Inject as per  sliding scale: if 0 - 199 = 0 UNITS DO NOT GIVE  BEDTIME CORRECTION DOSE IF BG LESS THAN  200; 200 - 249 = 1 UNIT; 250 - 299 = 2 UNITS; 300 -  349 = 3 UNITS; 350 - 399 = 4 UNITS; 400+ = 5  UNITS FOR BG GREATER THAN OR EQUAL TO  400, GIVE 5 UNITS     insulin aspart U-100 (NOVOLOG) 100 unit/mL injection Inject under the skin 2 (two) times a day before meals. Administer 2 units at lunch and 4 units at supper.     insulin glargine (LANTUS) 100 unit/mL vial Inject 38 Units under the skin daily.            lisinopril (PRINIVIL,ZESTRIL) 2.5 MG tablet Take 2.5 mg by mouth daily.     methocarbamol (ROBAXIN) 500 MG tablet Take 500 mg by mouth 4 (four) times a day.     metoprolol tartrate (LOPRESSOR) 25 MG tablet Take 25 mg by  "mouth 2 (two) times a day.     oxyCODONE (ROXICODONE) 5 MG immediate release tablet Take 5-10 mg by mouth every 4 (four) hours as needed for pain.     oxyCODONE (ROXICODONE) 5 MG immediate release tablet Take 10 mg by mouth 2 (two) times a day with meals. 0730 and noon to cover morning and afternoon therapies respectively     pantoprazole (PROTONIX) 40 MG tablet Take 40 mg by mouth daily.     senna-docusate (PERICOLACE) 8.6-50 mg tablet Take 2 tablets by mouth 2 (two) times a day.     triamcinolone (KENALOG) 0.1 % cream Apply 1 application topically 3 (three) times a day.     ALLERGIES:   Allergies   Allergen Reactions     Amlodipine Swelling     Cefazolin      Metformin Diarrhea     Cephalosporins Rash     Clindamycin Rash     Levaquin [Levofloxacin] Rash     Itching, rash       Nickel Rash     Contact reaction       DIET: Diabetic, regular texture, thin liquids.    Vitals:    12/04/19 1440   BP: 124/61   Pulse: 80   Resp: 18   Temp: 97.4  F (36.3  C)   SpO2: 95%   Weight: 195 lb 6.4 oz (88.6 kg)   Height: 5' 5\" (1.651 m)     Body mass index is 32.52 kg/m .    EXAMINATION:   General: Moderately obese middle-aged female,sitting in a recliner.  Head: Normocephalic and atraumatic.   Eyes: PERRLA, sclerae clear.   ENT: Moist oral mucosa.  She has her own teeth.  No rhinorrhea or nasal discharge.  Hearing is unimpaired.  Cardiovascular: Regular rate and rhythm.  No appreciable murmur.  Respiratory: Lungs clear to auscultation bilaterally.   Abdomen: Soft and nontender.   Musculoskeletal/Extremities: Age-related degenerative joint disease.  She is wearing a boot on the left lower extremity.  Leg is currently wrapped.  Good CMS.  Integument: No rashes, clinically significant lesions, or skin breakdown.   Cognitive/Psychiatric: Alert and oriented x3.  Affect is euthymic.    DIAGNOSTICS:   No results found for this or any previous visit.  No results found for: WBC, HGB, HCT, MCV, PLT  CrCl cannot be calculated (No " successful lab value found.).      ASSESSMENT/Plan:      ICD-10-CM    1. Closed bimalleolar fracture of left ankle with routine healing S82.842D    2. Type 2 diabetes mellitus with hyperglycemia, with long-term current use of insulin (H) E11.65     Z79.4    3. Essential hypertension, benign I10    4. Hyperlipidemia LDL goal <100 E78.5    5. Gastroesophageal reflux disease, esophagitis presence not specified K21.9    6. Fatty liver K76.0    7. Congenital cystic disease of liver Q44.6      CHANGES:    Increase glimepiride from 4 mg to 6 mg daily.    CARE PLAN:    The care plan has been reviewed and all orders signed. Changes to care plan, if any, as noted. Otherwise, continue current plan of care.  Total time spent with this patient was approximately 35 minutes, with greater than 50% spent in counseling and coordination of care that included addressing reviewing recent blood glucose levels and making adjustments to diabetes management.    The above has been created using voice recognition software. Please be aware that this may unintentionally  produce inaccuracies and/or nonsensical sentences.      Electronically signed by: Juan Holman, NIECY

## 2021-06-04 NOTE — PROGRESS NOTES
[facility]    Facility:   [facility] [[id number]]    Code Status: FULL CODE  PCP: Wegener, Joel D, MD   Phone: [phone]   Fax: [phone]      CHIEF COMPLAINT/REASON FOR VISIT:  Chief Complaint   Patient presents with     Discharge Summary       HISTORY COURSE:  Sarah fell down in her house on 10/11/2019, delayed calling 911 because she did not want them in her house (a hoarder), so she had approximately 12 hours where she tried to get herself presentable.    She presented to the [facility]:  Imaging showed bimalleolar fracture on the left side which was severely displaced.  Attempted reduction in the emergency department unsuccessful.  Orthopedics did do a closed fracture reduction under conscious sedation.  Later that day she went to the OR for external fixator.    She returned to the hospital on 10/28/2019 for external fixator removal and open reduction internal fixation.  On postop day 2 she returned to [facility] in a cam boot, nonweightbearing.    She went to use the knee tricycle, reached a max in therapy, and had to stay  In TCU until she got weightbearing at her 12/13/2019 appointment.   She did have more pain as she started weightbearing.  Oxycodone 10 mg in the morning and 10 mg before lunch was helpful so she can do her weightbearing, with using an occasional as needed dose of 5 mg as needed.  She is estranged from her brother, and lives alone.  She did have a couple friends to try to declot her some of her house preparation for her return.    She had an ED visit on 11/25/2019 for shortness of breath, complete work-up was negative.      Review of Systems   None.  Just trying to cut back on her oxycodone both as needed and the scheduled doses.  States she will continue to use sliding scale at home; will discuss her management with Dr Wegener  4 wheeled walker was ordered for her, she is fretting about whether it will  be delivered in time for her discharge on 12/21/2019 at noon    Vitals:    12/20/19 1100   BP: 111/62   Pulse: 78   Resp: 20   Temp: (!) 95.6  F (35.3  C)   SpO2: 91%       Physical Exam  Constitutional:          female, overweight.  Cardiovascular:      Rate and Rhythm: Normal rate , rhythm. No murmur.   Pulmonary:      Breath sounds: clear  Abdominal:      + BS,  soft, non tender.   Musculoskeletal:         General:   Left lower leg in the immobilizer, minimal dorsal foot swelling      Both arms and right leg normal strength and coordination  Using a walker well  Skin:     General: Skin is warm and dry.    Surgical wounds: healed  Neurological:      General: No focal deficit present.      Mental Status: She is alert and oriented to person, place, and time.   Psychiatric:          Mood: glad to be going home, a bit nervous       Allergies   Allergen Reactions     Amlodipine Swelling     Cefazolin      Metformin Diarrhea     Cephalosporins Rash     Clindamycin Rash     Levaquin [Levofloxacin] Rash     Itching, rash       Nickel Rash     Contact reaction         MEDICATION LIST:  Current Outpatient Medications   Medication Sig     acetaminophen (TYLENOL) 325 MG tablet Take 975 mg by mouth every 8 (eight) hours as needed.      aluminum-magnesium hydroxide-simethicone (MAALOX ADVANCED) 200-200-20 mg/5 mL Susp Take 30 mL by mouth 3 (three) times a day as needed.     atorvastatin (LIPITOR) 40 MG tablet Take 40 mg by mouth at bedtime.     calcium-vitamin D (CALCIUM-VITAMIN D) 500 mg(1,250mg) -200 unit per tablet Take 1 tablet by mouth 2 (two) times a day with meals.     cholecalciferol, vitamin D3, 3,000 unit Tab Take 3,000 Units by mouth daily.     furosemide (LASIX) 20 MG tablet Take 20 mg by mouth daily.     glimepiride (AMARYL) 4 MG tablet Take 4 mg by mouth daily before breakfast.      insulin aspart U-100 (NOVOLOG) 100 unit/mL injection Inject under the skin 3 (three) times a day before meals. Inject as  per  sliding scale: if 0 - 139 = O UNITS DO NOT GIVE  CORRECTION INSULIN IF PRE MEAL BG < 140;  140 - 189 = 1 UNIT; 190 - 239 = 2 UNITS; 240 - 289 =  3 UNITS; 290 - 339 = 4 UNITS; 340 - 399 = 5 UNITS;  400 - 449 = 6 UNITS; 450+ = 7 UNITS IF BG = OR >  450 GIVE 7 UNIT     insulin aspart U-100 (NOVOLOG) 100 unit/mL injection Inject under the skin at bedtime. Inject as per  sliding scale: if 0 - 199 = 0 UNITS DO NOT GIVE  BEDTIME CORRECTION DOSE IF BG LESS THAN  200; 200 - 249 = 1 UNIT; 250 - 299 = 2 UNITS; 300 -  349 = 3 UNITS; 350 - 399 = 4 UNITS; 400+ = 5  UNITS FOR BG GREATER THAN OR EQUAL TO  400, GIVE 5 UNITS     insulin aspart U-100 (NOVOLOG) 100 unit/mL injection Inject under the skin 2 (two) times a day before meals. Administer 2 units at lunch and 4 units at supper.     insulin glargine (LANTUS) 100 unit/mL vial Inject 38 Units under the skin daily.            linaGLIPtin 5 mg Tab Take by mouth.     lisinopril (PRINIVIL,ZESTRIL) 2.5 MG tablet Take 2.5 mg by mouth daily.     metoprolol tartrate (LOPRESSOR) 25 MG tablet Take 25 mg by mouth 2 (two) times a day.     nystatin (MYCOSTATIN) cream Apply topically 2 (two) times a day.     oxyCODONE (ROXICODONE) 5 MG immediate release tablet Take 5 mg by mouth every 4 (four) hours as needed for pain.      oxyCODONE (ROXICODONE) 5 MG immediate release tablet Take 10 mg by mouth 2 (two) times a day with meals. 0730 and noon to cover morning and afternoon therapies respectively     senna-docusate (PERICOLACE) 8.6-50 mg tablet Take 2 tablets by mouth 2 (two) times a day.     triamcinolone (KENALOG) 0.1 % cream Apply 1 application topically 3 (three) times a day.       DISCHARGE DIAGNOSIS:    ICD-10-CM   1. Closed bimalleolar fracture of left ankle with routine healing S82.842D   2. Type 2 diabetes mellitus with hyperglycemia, with long-term current use of insulin (H) E11.65    Z79.4   3. Essential hypertension, benign I10   4. Peripheral vascular disease (H) I73.9   5.  Anxiety about health F41.8         MEDICAL EQUIPMENT NEEDS:  4 wheeled walker    DISCHARGE PLAN/FACE TO FACE:  I certify that services are/were furnished while this patient was under the care of a physician and that a physician or an allowed non-physician practitioner (NPP), had a face-to-face encounter that meets the physician face-to-face encounter requirements. The encounter was in whole, or in part, related to the primary reason for home health. The patient is confined to his/her home and needs intermittent skilled nursing, physical therapy, speech-language pathology, or the continued need for occupational therapy. A plan of care has been established by a physician and is periodically reviewed by a physician.  Date of Face-to-Face Encounter: 12/20/19    I certify that, based on my findings, the following services are medically necessary home health services:   - Home PT  - Home Health Aide    My clinical findings support the need for the above skilled services because: She is newly weightbearing after 2 months of nonweightbearing, needs further conditioning, home safety eval, and practiced with mobility.  This patient is homebound because: She is deconditioned and at risk for a fall .    The patient is, or has been, under my care and I have initiated the establishment of the plan of care. This patient will be followed by a physician who will periodically review the plan of care.    Schedule follow up visit with primary care provider within 7 days to reestablish care.      Post Discharge Medication Reconciliation Status: discharge medications reconciled, continue medications without change      Electronically signed by: Sunita Santiago MD

## 2021-06-18 NOTE — PROGRESS NOTES
Medication Therapy Management (MTM) Encounter    ASSESSMENT:                            Medication Adherence/Access: See below for considerations    Type 2 Diabetes: Patient is not meeting A1c goal of < 8%(8.3%). Self monitoring of blood glucose is not at goal of fasting  mg/dL and post prandial < 180 mg/dL. Patient would benefit from ideal SMBG: Check blood sugars more often because that triggers a second Novolog injection , pre-prandial, 2 hours post-prandial, and with symptoms of hypoglycemia, Basal Insulin (Lantus) :  stay on the same dose--see plan if early morning hypoglycemia occurs., Bolus / Rapid Acting Insulin (Novolog) : Inject 3-10 units days at each main meal up to 3 x day --see plan for details- start 3 units -3 x day before biggest grazing meals of the day --then slowly increase up to 10 units -3 x day before meals if needed.   , Sulfonylurea (Glimepiride) :  stay on the same dose., Increased exercise, Increase in home glucose monitoring-see plan  and weight loss recommended-we discussed lower carb foods--most likely she will stay with her pasta and rice as is .     Hypokalemia:  Stable --new dissolvable pill working better.       PLAN:                            1. A1c today = 8.3% --down from 9.4% .    Stay on same dose of Glimepiride and lantus , but fiddle with the Novolog  Dose  (meal time insulin) -try to get 3 doses in but watch how much insulin you take (10 units is your usual dose but ok to take less  To avoid the real low blood sugars as they wipe you out.)      Follow-up:  6 weeks blood sugar review; Monday August 9th at 3pm  Please bring your blood sugar meter to the visit.     SUBJECTIVE/OBJECTIVE:                          Sarah Felix is a 75 year old female coming in for a follow-up (5-18-21) visit. She was referred to me from Remington Browning  .      Reason for visit:  A1c lab recheck.       Allergies/ADRs: Reviewed in chart  Tobacco: She reports that she quit smoking about 41  years ago. She has never used smokeless tobacco.  Alcohol: 7-9 beverages / week typically wine 1 or 2 glasses/night  Caffeine: 2 cans diet coke sodas/day  Activity: minimal due to recent ankle fracture  Past Medical History: Reviewed in chart  Personal Healthcare Goals: get covid vaccine, limit any falls, improve a1c    Medication Adherence/Access:  No issues other than late delivery insulins from her mail order.       Type 2 Diabetes:  Currently taking : glimepiride 4 mg.  Tablet twice daily ,  Lantus 51 units daily at midnight daily. She did start Novolog vial - inject 10 units twice /day in am before biggest snack meal of the day on most days but admits some days(1/3 of the time) just one log insulin injection.   Patient is not experiencing side effects.    SMBG: see chart below.     June 21st -21.   7 days = 171 n=14  14 daYS =165 n=26  30 days =168 n=67      Date FBG/ 2hours post Lunch/2hours post Dinner /2hours post    6-21-21 210 7;04am 165 1 pm      6-20-21 239 6;47am 69  4;28pm 228 11;34pm    6-19-21  76 12;32pm  157 3:12am   6-18-21 184 6;48am      6-17-21 110 9;48am 141 4;20pm  162 3;31am   6-16 244 10:06am      6-15 81 9;43am  323 9:08pm           May 18th -21:  7 days avg= 155 n=13  14 days ztc=047 n=27  30 days avg.=168 n=54      Date FBG/ 2hours post Lunch/2hours post Dinner /2hours post    5-18-21 100 9;48am       5-17 225 7;13am 160 2;35pm  145 12;29am(ate a snack)   5-16 218 8;58am(post bfast)  125 4;01am      5-15 138  8;09am      5-14 88 7;09am  (felt shaky) 161 1 ;36pm  152 12;54am   5-13 203 9;27am after bfast      5-12 96 10:26am            7 days avg.= 168 n=9  14 days = 168 n=16  30 days = 163 n=33        Date FBG/ 2hours post Lunch/2hours post Dinner /2hours post    4-20-21 181  7:02am  Ate 1/4 cup potato salad /208 12;25 pm -she drank OJ before hand     4-19-21 179 11;58am   109 12;01am   4-18-21 95  8;06am(she felt ok ) she skipped her novolog dose then.   She never injects a Novolog  insulin dose without checking her blood sugar before hand !    4-17-21 168 10;51am      4-16-21 201 8am      4-15-21  195  12;30pm     4-14-21  172 2;35pm           7 days =177 n=9, 14 days = 195 n=17, 30 days = 187 n=33        Date FBG/ 2hours post Lunch/2hours post Dinner /2hours post    3-9-21 169 7;04am 128 1;41pm     3-8 179 9am 155 2;34pm     3-7 154 10;00am      3-6 260 10:11am(drank OJ)      3-5 164 9:18am      3-4 188 11;01am       3-3 196 11;42am          Symptoms of low blood sugar? sweaty, Frequency of lows- not often (she admits she cannot tolerate bs's <110- if they go lower than that she feels crummy , sweaty. )  Symptoms of high blood sugar? none  Eye exam: up to date  Foot exam: due  Diet: she's maintained similar diet and notes sugars now being elevated  eats 4-5 smaller meals - pasta and rices  Fruit and some fruit juices   Breakfast: cereal 2-3 times per week, drink 2 glasses OJ or grapefruit juice /day .   Exercise: minimal activity due to recent broken ankle, she was a bit more active prior to breaking the ankle/going through rehab  Aspirin: Taking 162 mg daily and denies side effects  Statin: Yes: atorvastatin 40 mg   ACEi/ARB: Yes: lisinopril 2.5 mg.   Urine Albumin:   Lab Results   Component Value Date    UMALCR 12.82 01/07/2020      Lab Results   Component Value Date    A1C 8.3 06/21/2021    A1C 9.4 12/14/2020    A1C 8.1 09/21/2020    A1C 8.5 01/07/2020    A1C 7.6 10/12/2019         Hypokalemia:  Patient reports the dissolvable potassium pill is working much better .   Potassium   Date Value Ref Range Status   01/07/2020 4.0 3.4 - 5.3 mmol/L Final           Today's Vitals: /62   Pulse 85   Wt 200 lb (90.7 kg)   SpO2 95%   BMI 33.28 kg/m    ----------------        I spent 30 minutes with this patient today. All changes were made via collaborative practice agreement with Remington Browning  . A copy of the visit note was provided to the patient's primary care provider.    The patient was  given a summary of these recommendations.     Joan Lopez Rph.  Medication Therapy Management Provider  [phone]         Medication Therapy Recommendations  Type 2 diabetes mellitus without complication, with long-term current use of insulin (H)    Current Medication: insulin aspart (NOVOLOG PEN) 100 UNIT/ML pen   Rationale: Dose too low - Dosage too low - Effectiveness   Recommendation: Increase Frequency - NovoLOG FLEXPEN 100 UNIT/ML soln - inject 3 x day before main meals, adjuts dose based on pre-meal blood sugar and carb content of that meal.   Status: Accepted per CPA

## 2021-06-19 NOTE — LETTER
Letter by Sunita Santiago MD at      Author: Sunita Santiago MD Service: -- Author Type: --    Filed:  Encounter Date: 11/1/2019 Status: Signed         Patient: Sarah Felix   MR Number: [id number]   YOB: 1945   Date of Visit: 11/1/2019     [facility]      Facility:    [facility] [[id number]]   Code Status: UNKNOWN  [place] / [facility] : 10/12 to 10/18/19     NO DISCHARGE SUMMARY IN CARE EVERYWHERE      Chief Complaint/Reason for Visit:  Chief Complaint   Patient presents with   ? H & P     Readmission after definitive ankle repair, removal of external fixator       HPI:   Sarah is a 74 y.o. female with a history of type 2 diabetes.    She was going down the stairs  at home on 10/11/2019 in the late evening.  Her ankle gave out and she slipped 3-4 stairs on her buttocks. She had pain and deformity of her left ankle. She waited till the following day  to call for help, she did not want the paramedics breaking down her door.    She was brought to the [facility].  X-ray showed bimalleolar ankle fracture.  It was a severely displaced  fracture, ED attempted to reduce it.  Ended up calling orthopedics  who performed aclosed fracture reduction under conscious sedation in the emergency department.  Later in the day, she went to the OR for an external fixator placement.  She was non-weightbearing on the left leg, enoxaparin for DVT prophylaxis.    She was given Doxycycline perioperatively, and developed a diffuse rash. Triamcinolone cream was prescribed.    She had an open area on her coccyx from falling on the stairs, then scooting along her floor prior to calling for help. It was covered with Mepilex foam dressing.    She transferred to [facility].  __________________________________________________________________________  Hospitalization #2: 10/28/19  Removal of external fixator, plate and screws  open reduction fixation.  Nonweightbearing left lower extremity  DVT prophylaxis: Lovenox daily x28 days    She returned to [facility]    Past Medical History:  Active Non-Hospital Problems    Diagnosis   ? Esophageal reflux   ? Fatty liver   ? Closed bimalleolar fracture of left ankle with routine healing   ? Type 2 diabetes mellitus with hyperglycemia (H)   ? Essential hypertension, benign   ? Peripheral vascular disease (H)     Problem list name updated by automated process. Provider to review     ? Hypertension goal BP (blood pressure) < 140/90   ? Personal history of other malignant neoplasm of skin   ? Hyperlipidemia LDL goal <100     Per provider     ? Diverticulitis of colon     Abd CT  Problem list name updated by automated process. Provider to review     ? Congenital cystic disease of liver     multiple liver lesions.  felt to be focal nodular hyperplasia.  annual CT abd.    followed by Dr. Amilcar Kat               Surgical History:  Past Surgical History:   Procedure Laterality Date   ? C APPENDECTOMY age 22   ? C DEXA, BONE DENSITY, AXIAL SKEL 2005   ? C DEXA, BONE DENSITY, AXIAL SKEL 6/2007   No signif change in Osteopenia   ? COLONOSCOPY 4/2006   repeat 10 yr   ? ENDOSCOPIC ULTRASOUND UPPER GASTROINTESTINAL TRACT (GI) N/A 9/6/2018   Procedure: ENDOSCOPIC ULTRASOUND, ESOPHAGOSCOPY / UPPER GASTROINTESTINAL TRACT (GI); Endoscopic Ultrasound, Esophagogastroduodenoscopy with endoscopic mucosal resection; Surgeon: Hood Brower MD; Location: [facility] OR   ? ESOPHAGOSCOPY, GASTROSCOPY, DUODENOSCOPY (EGD), COMBINED N/A 8/13/2018   Procedure: COMBINED ESOPHAGOSCOPY, GASTROSCOPY, DUODENOSCOPY (EGD), BIOPSY SINGLE OR MULTIPLE; EGD; Surgeon: Hood Brower MD; Location: [facility] OR   ? ESOPHAGOSCOPY, GASTROSCOPY, DUODENOSCOPY (EGD), COMBINED N/A 3/14/2019   Procedure: Upper Endoscopy; Surgeon: Hood Brower MD; Location: [facility] OR   ? ESOPHAGOSCOPY, GASTROSCOPY, DUODENOSCOPY (EGD), RESECT MUCOSA, COMBINED N/A  9/6/2018   Procedure: COMBINED ESOPHAGOSCOPY, GASTROSCOPY, DUODENOSCOPY (EGD), RESECT MUCOSA;; Surgeon: Hood Brower MD; Location: [facility] OR   ? GYN SURGERY 10/22/08   pelvic support   ? HEMORRHOIDECTOMY 8/08   ? MOHS MICROGRAPHIC PROCEDURE   ? SURGICAL HISTORY OF - age 22   L ovarian cyst removal, laparotomy   ? SURGICAL HISTORY OF - 1998   partial thyroidectomy   ? SURGICAL HISTORY OF - 10/08   Transobturator tape for Urinary Incontinence   ? SURGICAL HISTORY OF - 11/2011   stress test normal   ? THYROID SURGERY   Had cyst removed, thyroid gland is intact.       Family History:   ? Diabetes Mother   at 89 yrs   ? Hypertension Mother   ? Arthritis Mother   rheumatoid   ? Cancer Father   bladder   ? Cardiovascular Brother   palpitations not on meds.       Social History:    Social History     Socioeconomic History   ? Marital status: Single     Spouse name: Not on file   ? Number of children: None   ? Years of education: Not on file   ? 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 Financial resource strain: Not on file   ? Food insecurity:     Worry: Not on file     Inability: Not on file   ? Transportation needs:     Medical: Not on file     Non-medical: Not on file   Tobacco Use   ? Smoking status: Ex smoker, 10 pack year   Substance and Sexual Activity   ? Alcohol use: 6 drinks per week   ? Drug use: Not on file   ? Sexual activity: Not on file   Lifestyle   ? Physical activity:     Days per week: Not on file     Minutes per session: Not on file   ? Stress: Not on file   Relationships   ? Social connections:     Talks on phone: Not on file     Gets together: Not on file     Attends Buddhist service: Not on file     Active member of club or organization: Not on file     Attends meetings of clubs or organizations: Not on file     Relationship status: Not on file   ? Intimate partner violence:     Fear of current or ex partner: Not on file     Emotionally abused: Not on  file     Physically abused: Not on file     Forced sexual activity: Not on file          Review of Systems   Pain control is adequate, mild constipation.  Should like Proshield for her buttocks area  We discussed and will order heel guard for her right heel  She is using oxycodone every 4 hours as needed, generally requiring 10 mg as her pain is between 6 and 10/10  She has some mild constipation, does not require a change of medications at this time  Skin of her left posterior calf and Achilles area is very dry, stinging itchy: She asked for and received her own lotion be applied with improvement (divider intervention)  A comprehensive review of systems is otherwise negative    Blood pressure 131/77, pulse 85, temperature 98.7  F (37.1  C), resp. rate 18, SpO2 94 %.      BMI= 32.12    Physical Exam  Constitutional:       Comments:   female, overweight, more calm and accepting   HENT:      Right Ear: External ear normal.      Left Ear: External ear normal.      Mouth/Throat:      Mouth: Mucous membranes are moist.   Eyes:      Extraocular Movements: Extraocular movements intact.      Conjunctiva/sclera: Conjunctivae normal.   Neck:      Musculoskeletal: Normal range of motion.   Cardiovascular:      Rate and Rhythm: Normal rate and regular rhythm.      Heart sounds: No murmur.   Pulmonary:      Breath sounds: Normal breath sounds. No rales.   Abdominal:      General: Bowel sounds are normal.      Palpations: Abdomen is soft.      Tenderness: There is no abdominal tenderness.   Musculoskeletal:         General: Tenderness and signs of injury present.      Comments: Both arms and right leg normal strength and coordination  Immobilizer boot left knee distally   Skin:     General: Skin is warm and dry.      Coloration: Skin is not jaundiced.      Findings: No rash.      Comments: Minimal residual rash mostly around left hip  Left pretibial area and bilateral ankle skin from previous external fixator placement:  Dry, thin eschar covering all 3 sites  Malleoli or incisions with minimal serosanguineous discharge   Neurological:      General: No focal deficit present.      Mental Status: She is alert and oriented to person, place, and time.      Motor: No weakness.   Psychiatric:         Mood and Affect: Mood normal.         Thought Content: Thought content normal.                Allergies   Allergen Reactions   ? Amlodipine Swelling   ? Cefazolin    ? Metformin Diarrhea   ? Cephalosporins Rash   ? Clindamycin Rash   ? Levaquin [Levofloxacin] Rash     Itching, rash     ? Nickel Rash     Contact reaction     Allergen Reactions   ? Amlodipine Swelling   ? Ancef [Cefazolin]   ? Levaquin Rash   Itching, rash   ? Metformin Diarrhea and GI Disturbance   ? Cephalosporins Rash   ? Levofloxacin Itching and Rash   ? Nickel Rash   Contact reaction        Medication List:    Current Outpatient Medications   Medication Sig   ? acetaminophen (TYLENOL) 325 MG tablet Take 975 mg by mouth every 8 (eight) hours.   ? aluminum-magnesium hydroxide-simethicone (MAALOX ADVANCED) 200-200-20 mg/5 mL Susp Take 30 mL by mouth 3 (three) times a day as needed.   ? atorvastatin (LIPITOR) 40 MG tablet Take 40 mg by mouth at bedtime.   ? calcium-vitamin D (CALCIUM-VITAMIN D) 500 mg(1,250mg) -200 unit per tablet Take 1 tablet by mouth 2 (two) times a day with meals.   ? cholecalciferol, vitamin D3, 3,000 unit Tab Take 3,000 Units by mouth daily.   ? diphenhydrAMINE (BENADRYL) 25 mg capsule Take 25 mg by mouth every 6 (six) hours as needed for itching.   ? enoxaparin (LOVENOX) 40 mg/0.4 mL syringe Inject 40 mg under the skin daily.   ? fexofenadine (ALLEGRA) 180 MG tablet Take 180 mg by mouth daily.   ? furosemide (LASIX) 20 MG tablet Take 20 mg by mouth daily.   ? glimepiride (AMARYL) 4 MG tablet Take 4 mg by mouth daily before breakfast.   ? insulin aspart U-100 (NOVOLOG) 100 unit/mL injection Inject under the skin 3 (three) times a day before meals. Inject  as per  sliding scale: if 0 - 139 = O UNITS DO NOT GIVE  CORRECTION INSULIN IF PRE MEAL BG < 140;  140 - 189 = 1 UNIT; 190 - 239 = 2 UNITS; 240 - 289 =  3 UNITS; 290 - 339 = 4 UNITS; 340 - 399 = 5 UNITS;  400 - 449 = 6 UNITS; 450+ = 7 UNITS IF BG = OR >  450 GIVE 7 UNIT   ? insulin aspart U-100 (NOVOLOG) 100 unit/mL injection Inject under the skin at bedtime. Inject as per  sliding scale: if 0 - 199 = 0 UNITS DO NOT GIVE  BEDTIME CORRECTION DOSE IF BG LESS THAN  200; 200 - 249 = 1 UNIT; 250 - 299 = 2 UNITS; 300 -  349 = 3 UNITS; 350 - 399 = 4 UNITS; 400+ = 5  UNITS FOR BG GREATER THAN OR EQUAL TO  400, GIVE 5 UNITS   ? insulin aspart U-100 (NOVOLOG) 100 unit/mL injection Inject under the skin 2 (two) times a day before meals. Administer 2 units at lunch and 4 units at supper.   ? insulin glargine (LANTUS) 100 unit/mL vial Inject 35 Units under the skin daily.          ? lisinopril (PRINIVIL,ZESTRIL) 2.5 MG tablet Take 2.5 mg by mouth daily.   ? methocarbamol (ROBAXIN) 500 MG tablet Take 500 mg by mouth 4 (four) times a day.   ? metoprolol tartrate (LOPRESSOR) 25 MG tablet Take 25 mg by mouth 2 (two) times a day.   ? oxyCODONE (ROXICODONE) 5 MG immediate release tablet Take 5-10 mg by mouth every 4 (four) hours as needed for pain.   ? pantoprazole (PROTONIX) 40 MG tablet Take 40 mg by mouth daily.   ? senna-docusate (PERICOLACE) 8.6-50 mg tablet Take 2 tablets by mouth 2 (two) times a day.   ? triamcinolone (KENALOG) 0.1 % cream Apply 1 application topically 3 (three) times a day.           Labs: no new labs     10/17/2019                                                        Ref. Range   Potassium Latest Ref Range: 3.4 - 5.3 mmol/L              3.5   Chloride Latest Ref Range: 94 - 109 mmol/L                  108   Carbon Dioxide Latest Ref Range: 20 - 32 mmol/L         25   Urea Nitrogen Latest Ref Range: 7 - 30 mg/dL                8   Creatinine Latest Ref Range: 0.52 - 1.04 mg/dL              0.63   GFR  Estimate Latest Ref Range: >60 mL/min/1.73_m2 90      Calcium Latest Ref Range: 8.5 - 10.1 mg/dL                    7.7 (L)   Anion Gap Latest Ref Range: 3 - 14 mmol/L                     7    Procalcitonin Latest Units: ng/ml                                        0.41   Glucose Latest Ref Range: 70 - 99 mg/dL                        175 (H)     WBC Latest Ref Range: 4.0 - 11.0 10e9/L 19.0 (H)   Hemoglobin Latest Ref Range: 11.7 - 15.7 g/dL 11.0 (L)   Hematocrit Latest Ref Range: 35.0 - 47.0 % 33.6 (L)   Platelet Count Latest Ref Range: 150 - 450 10e9/L 683 (H)     Diff   % Neutrophils Latest Units: % 77.5   % Lymphocytes Latest Units: % 7.0   % Monocytes Latest Units: % 7.0   % Eosinophils Latest Units: % 3.6         Assessment / Plan:    ICD-10-CM    1. Closed bimalleolar fracture of left ankle with routine healing S82.842D  oxycodone is helping her pain, generally 10 mg when she takes it.  Has less pain now than previously   2. Type 2 diabetes mellitus with hyperglycemia, with long-term current use of insulin (H) E11.65 On Grlargine, Aspart U100. Also on a statin. A1c=7.6    Z79.4    3. Peripheral vascular disease (H) I73.9 On lasix daiy for leg edema   4. Gastroesophageal reflux disease, esophagitis presence not specified K21.9 Zantac, no sx   5. Essential hypertension, benign I10 Good reading on metoprolol and Lisinopril     Will write an order for her good quality skin lotion to be applied to the dry skin of her posterior calf q shift, but to avoid the incisions on each malleolus      Electronically signed by: Sunita Santiago MD

## 2021-06-19 NOTE — LETTER
Letter by Juan Holman CNP at      Author: Juan Holman CNP Service: -- Author Type: --    Filed:  Encounter Date: 2019 Status: Signed         Patient: Sarah Felix   MR Number: [id number]   YOB: 1945   Date of Visit: 2019     [facility]    Name:   Sarah Felix  : 1945  Facility:   [facility] [[id number]]   Room:   Code Status: FULL CODE and POLST AVAILABLE -   Fac type:   SNF (Skilled Nursing Facility, TCU) -     PCP:  Wegener, Joel D, MD    CHIEF COMPLAINT / REASON FOR VISIT:  Chief Complaint   Patient presents with   ? Follow-up     TCU follow-up after hospitalization for left bimalleolar fracture with external fixation and subsequent internal fixation.      [facility] from 10/12/2019 until 10/18/2019 (left bimalleolar fracture with external fixation)  [facility] from 10/28/2019 until 10/30/2019 (left bimalleolar fracture ORIF)    Patient was last seen by me on 2019 and subsequently seen by Dr. Santiago on 2019.      HPI: aSrah is a 74 y.o. 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      She was going down the stairs at home on 10/11/2019 in the late evening, when her ankle gave out, and she slipped 3-4 stairs onto her buttocks.  There was pain and deformity of the left ankle, but she waited until the next day to call for help.  She did not want the paramedics breaking down her door.  She was brought to [facility] where x-rays showed a bimalleolar ankle fracture which was severely displaced, and an attempted reduction in the ED necessitated calling orthopedics who performed a closed fracture reduction under conscious sedation in the ED.  Later in the day, she went to the OR for an external fixator  "placement.  She is nonweightbearing on the left leg, and she is receiving enoxaparin for DVT prophylaxis.  She was given doxycycline perioperatively and developed a diffuse rash.  Triamcinolone cream was prescribed.    She did have an open area on her sacrum up from a falling on the stairs, then scooting along the floor prior to calling for help.  It has been covered with a Mepilex dressing.    She returned to the hospital on 10/28/2019 for external fixation hardware removal and ORIF, performed by Dr. Can that day.  She tolerated the procedure well and returned here on 10/30/2019.    ED VISIT    She was sent to [facility] ED on 11/25/2019 due to shortness of breath.  She stated, \"it was like someone was sitting on my chest.\"  All tests returned negative results.  She stated she still has trouble breathing once in a while.  She has a Holland pot which seems to help.    The also noted the liver cyst (left hepatic lobe) on a chest CT.  It appeared to have grown significantly in size from the last CT performed on 11/14/2011, measuring 8.3 x 7.2 cm.  The patient stated she has had cysts in many places, thyroid, ovaries, and also the left kidney.    Dyspnea and tachypnea resolved, and she was discharged back here.      TCU ISSUES    Bimalleolar fracture: She had an appointment with Ortho at the [facility] on 11/13/2019.  Her next appointment is scheduled for 12/13/2019.    Pain management: Since the ORIF, she appears to be in less pain.  She is receiving 1 to 2 tablets of oxycodone IR (5 mg) every 4 hours as needed.  She tells me that pain is gradually improving.  She does have more pain with therapy. She had orders for 5 to 10 mg of oxycodone.  She felt that she was taking 5 mg about a third of the time and 10 mg about two thirds at the time, depending on the activity.  Dr. Santiago scheduled 10 mg doses at 7:30 AM and at noon, with PRN dosing remaining unchanged.     Today, she tells me the pain is " always there.  It can be achy, depending on the activity.  She does take one pill before going to bed.    Diabetes mellitus type 2: She had apparently been on Lantus and glimepiride at home, but for some reason the latter was discontinued in the hospital, her dose was cut in half (from 51 units to 25 units), and she was placed on sliding scale NovoLog.  Prior to her second hospitalization, fasting blood glucose levels ranged between 167 and 195.  At lunch, they ranged between 212 and 298.  Supper levels ranged between 209 and 245 and, at bedtime, the range was between 238 and 273.  The plan was to get her back to her home dosing.  We started by increasing her Lantus insulin to 35 units (later increased to 38 units).  At my prior visit, we authorized 2 units of NovoLog at lunch and 4 units at supper to observe how this affected bedtime levels.     With blood glucose levels looking better, we resumed her glimepiride 4 mg twice daily and placed a hold on the prandial NovoLog.  However, blood glucose levels now are quite high at mealtimes.  She tends to spread out her meals and may not finish a meal, instead saving part of it for later, giving herself 5 meals per day rather than 3.  This is problematic in terms of checking blood glucose levels with each meal.    Blood glucose levels have risen.  Fasting levels range between 110 and 145.  At lunch, the range between 244 and 314.  At supper, the range is between 197 and 304.  At bedtime, the range is between 240 and 333.    We are going to try increasing her glimepiride from 4 mg to 6 mg.  At this juncture, it almost looks like a certainty that she will require prandial NovoLog dosing on a scheduled basis.    Buttock wound: As noted in the hospital discharge summary, she sustained open area on her sacrum from a falling on the stairs, then scooting along the floor prior to calling for help.  It has been covered with a Mepilex dressing.  Dr. Santiago ordered Proshield 3  times daily and as needed, and this has been working quite well.    Insomnia: She tends to go to bed rather late at home.  She tells me she may go to bed as late as 4 AM.  She does not require any sleep aid, although she does have orders for diphenhydramine (for itching) which she has not been taking.    Gastric reflux: She is on 40 mg of pantoprazole daily.  She does feel uncomfortable lying down but does not feel she needs this medication.  She will cough and feel like she is going to vomit, but if she sits up and burps, she then feels okay.  Unsure how long she has been on this, we changed the scheduled dose to every other day without issue.    Constipation: When she began having excessive BMs (and cramps), dosing of her senna S was halved, from 2 tablets twice daily to 1 tablet twice daily.  Currently, no problems.    Medication changes: Dr. Santiago, per patient request, discontinued fexofenadine, pantoprazole, methocarbamol, and diphenhydramine.    ROS:   No headaches or chest pains, coughing or congestion, dizziness, dysuria, difficulty chewing or swallowing, integumentary issues, or problems with appetite.     Past Medical History:   Diagnosis Date   ? Allergic rhinitis    ? Basal cell carcinoma of face 03/2012    mohs   ? Bimalleolar fracture of left ankle    ? Chronic otitis externa    ? Contact dermatitis    ? 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 Cystic disease of liver     multiple liver lesions. felt to be focal nodular hyperplasia. annual CT abd. followed by Dr. Amilcar Kat    ? Cystocele, lateral    ? Diverticulitis of colon 06/2004    abd CT   ? DM (diabetes mellitus) (H)    ? Embolism and thrombosis (H) age 22    post ovarian cyst removed   ? Esophageal reflux    ? Fatty liver    ? Hiatal hernia     small   ? HLD (hyperlipidemia)    ? Nontoxic uninodular goiter    ? Osteopenia 04/21/2005    on fosamax for > 5 yr. stop 7/2012   ? Pure hypercholesterolemia                Family History   Problem Relation Age of Onset   ? Rheum arthritis Mother    ? Diabetes Mother    ? Hypertension Mother    ? Cancer Father    ? Other Brother         palpitations     Social History     Socioeconomic History   ? Marital status: Single     Spouse name: Not on file   ? Number of children: Not on file   ? Years of education: Not on file   ? Highest education level: Not on file   Occupational History   ? Not on file   Social Needs   ? Financial resource strain: Not on file   ? Food insecurity:     Worry: Not on file     Inability: Not on file   ? Transportation needs:     Medical: Not on file     Non-medical: Not on file   Tobacco Use   ? Smoking status: Former Smoker     Packs/day: 1.00     Years: 10.00     Pack years: 10.00   ? Smokeless tobacco: Never Used   Substance and Sexual Activity   ? Alcohol use: Yes     Alcohol/week: 6.0 standard drinks     Types: 6 Standard drinks or equivalent per week     Frequency: 4 or more times a week   ? Drug use: Not on file   ? Sexual activity: Not on file   Lifestyle   ? Physical activity:     Days per week: Not on file     Minutes per session: Not on file   ? Stress: Not on file   Relationships   ? Social connections:     Talks on phone: Not on file     Gets together: Not on file     Attends Islam service: Not on file     Active member of club or organization: Not on file     Attends meetings of clubs or organizations: Not on file     Relationship status: Not on file   ? Intimate partner violence:     Fear of current or ex partner: Not on file     Emotionally abused: Not on file     Physically abused: Not on file     Forced sexual activity: Not on file   Other Topics Concern   ? Not on file   Social History Narrative   ? Not on file       MEDICATIONS: Reviewed from the MAR, physician orders, and/or earlier progress notes.   Post Discharge Medication Reconciliation Status: medication reconciliation previously completed during another office visit.  Updated  by me today (12/04/2019) with an increase in glimepiride and discontinuation of 4 medications reflected below.  Current Outpatient Medications   Medication Sig   ? acetaminophen (TYLENOL) 325 MG tablet Take 975 mg by mouth every 8 (eight) hours.   ? aluminum-magnesium hydroxide-simethicone (MAALOX ADVANCED) 200-200-20 mg/5 mL Susp Take 30 mL by mouth 3 (three) times a day as needed.   ? atorvastatin (LIPITOR) 40 MG tablet Take 40 mg by mouth at bedtime.   ? calcium-vitamin D (CALCIUM-VITAMIN D) 500 mg(1,250mg) -200 unit per tablet Take 1 tablet by mouth 2 (two) times a day with meals.   ? cholecalciferol, vitamin D3, 3,000 unit Tab Take 3,000 Units by mouth daily.   ? diphenhydrAMINE (BENADRYL) 25 mg capsule Take 25 mg by mouth every 6 (six) hours as needed for itching.   ? fexofenadine (ALLEGRA) 180 MG tablet Take 180 mg by mouth daily.   ? furosemide (LASIX) 20 MG tablet Take 20 mg by mouth daily.   ? glimepiride (AMARYL) 4 MG tablet Take 6 mg by mouth daily before breakfast.    ? insulin aspart U-100 (NOVOLOG) 100 unit/mL injection Inject under the skin 3 (three) times a day before meals. Inject as per  sliding scale: if 0 - 139 = O UNITS DO NOT GIVE  CORRECTION INSULIN IF PRE MEAL BG < 140;  140 - 189 = 1 UNIT; 190 - 239 = 2 UNITS; 240 - 289 =  3 UNITS; 290 - 339 = 4 UNITS; 340 - 399 = 5 UNITS;  400 - 449 = 6 UNITS; 450+ = 7 UNITS IF BG = OR >  450 GIVE 7 UNIT   ? insulin aspart U-100 (NOVOLOG) 100 unit/mL injection Inject under the skin at bedtime. Inject as per  sliding scale: if 0 - 199 = 0 UNITS DO NOT GIVE  BEDTIME CORRECTION DOSE IF BG LESS THAN  200; 200 - 249 = 1 UNIT; 250 - 299 = 2 UNITS; 300 -  349 = 3 UNITS; 350 - 399 = 4 UNITS; 400+ = 5  UNITS FOR BG GREATER THAN OR EQUAL TO  400, GIVE 5 UNITS   ? insulin aspart U-100 (NOVOLOG) 100 unit/mL injection Inject under the skin 2 (two) times a day before meals. Administer 2 units at lunch and 4 units at supper.   ? insulin glargine (LANTUS) 100 unit/mL vial  "Inject 38 Units under the skin daily.          ? lisinopril (PRINIVIL,ZESTRIL) 2.5 MG tablet Take 2.5 mg by mouth daily.   ? methocarbamol (ROBAXIN) 500 MG tablet Take 500 mg by mouth 4 (four) times a day.   ? metoprolol tartrate (LOPRESSOR) 25 MG tablet Take 25 mg by mouth 2 (two) times a day.   ? oxyCODONE (ROXICODONE) 5 MG immediate release tablet Take 5-10 mg by mouth every 4 (four) hours as needed for pain.   ? oxyCODONE (ROXICODONE) 5 MG immediate release tablet Take 10 mg by mouth 2 (two) times a day with meals. 0730 and noon to cover morning and afternoon therapies respectively   ? pantoprazole (PROTONIX) 40 MG tablet Take 40 mg by mouth daily.   ? senna-docusate (PERICOLACE) 8.6-50 mg tablet Take 2 tablets by mouth 2 (two) times a day.   ? triamcinolone (KENALOG) 0.1 % cream Apply 1 application topically 3 (three) times a day.     ALLERGIES:   Allergies   Allergen Reactions   ? Amlodipine Swelling   ? Cefazolin    ? Metformin Diarrhea   ? Cephalosporins Rash   ? Clindamycin Rash   ? Levaquin [Levofloxacin] Rash     Itching, rash     ? Nickel Rash     Contact reaction       DIET: Diabetic, regular texture, thin liquids.    Vitals:    12/04/19 1440   BP: 124/61   Pulse: 80   Resp: 18   Temp: 97.4  F (36.3  C)   SpO2: 95%   Weight: 195 lb 6.4 oz (88.6 kg)   Height: 5' 5\" (1.651 m)     Body mass index is 32.52 kg/m .    EXAMINATION:   General: Moderately obese middle-aged female,sitting in a recliner.  Head: Normocephalic and atraumatic.   Eyes: PERRLA, sclerae clear.   ENT: Moist oral mucosa.  She has her own teeth.  No rhinorrhea or nasal discharge.  Hearing is unimpaired.  Cardiovascular: Regular rate and rhythm.  No appreciable murmur.  Respiratory: Lungs clear to auscultation bilaterally.   Abdomen: Soft and nontender.   Musculoskeletal/Extremities: Age-related degenerative joint disease.  She is wearing a boot on the left lower extremity.  Leg is currently wrapped.  Good CMS.  Integument: No rashes, " clinically significant lesions, or skin breakdown.   Cognitive/Psychiatric: Alert and oriented x3.  Affect is euthymic.    DIAGNOSTICS:   No results found for this or any previous visit.  No results found for: WBC, HGB, HCT, MCV, PLT  CrCl cannot be calculated (No successful lab value found.).      ASSESSMENT/Plan:      ICD-10-CM    1. Closed bimalleolar fracture of left ankle with routine healing S82.842D    2. Type 2 diabetes mellitus with hyperglycemia, with long-term current use of insulin (H) E11.65     Z79.4    3. Essential hypertension, benign I10    4. Hyperlipidemia LDL goal <100 E78.5    5. Gastroesophageal reflux disease, esophagitis presence not specified K21.9    6. Fatty liver K76.0    7. Congenital cystic disease of liver Q44.6      CHANGES:    Increase glimepiride from 4 mg to 6 mg daily.    CARE PLAN:    The care plan has been reviewed and all orders signed. Changes to care plan, if any, as noted. Otherwise, continue current plan of care.  Total time spent with this patient was approximately 35 minutes, with greater than 50% spent in counseling and coordination of care that included addressing reviewing recent blood glucose levels and making adjustments to diabetes management.    The above has been created using voice recognition software. Please be aware that this may unintentionally  produce inaccuracies and/or nonsensical sentences.      Electronically signed by: Juan Holman CNP

## 2021-06-19 NOTE — LETTER
Letter by Sunita Santiago MD at      Author: Sunita Santiago MD Service: -- Author Type: --    Filed:  Encounter Date: 10/18/2019 Status: Signed         Patient: Sarah Felix   MR Number: [id number]   YOB: 1945   Date of Visit: 10/18/2019     [facility]      Facility:    [facility] [[id number]]   Code Status: UNKNOWN  [place] / [facility] : 10/12 to 10/18/19     NO DISCHARGE SUMMARY IN CARE EVERYWHERE      Chief Complaint/Reason for Visit:  Chief Complaint   Patient presents with   ? H & P     Left bimalleolar fx with external fixation       HPI:   Sarah is a 74 y.o. female with a history of type 2 diabetes.    She was going down the stairs  at home on 10/11/2019 in the late evening.  Her ankle gave out and she slipped 3-4 stairs on her buttocks. She had pain and deformity of her left ankle. She waited till the following day  to call for help, she did not want the paramedics breaking down her door.    She was brought to the [facility].  X-ray showed bimalleolar ankle fracture.  It was a severely displaced  fracture, ED attempted to reduce it.  Ended up calling orthopedics  who performed aclosed fracture reduction under conscious sedation in the emergency department.  Later in the day, she went to the OR for an external fixator placement.  She was non-weightbearing on the left leg, enoxaparin for DVT prophylaxis.    She was given Doxycycline perioperatively, and developed a diffuse rash. Triamcinolone cream was prescribed.    She had an open area on her coccyx from falling on the stairs, then scooting along her floor prior to calling for help. It was covered with Mepilex foam dressing.    She transferred to [facility].      Past Medical History:  Active Non-Hospital Problems    Diagnosis   ? Esophageal reflux   ? Fatty liver   ? Type 2 diabetes mellitus with hyperglycemia (H)   ? Essential  hypertension, benign   ? Peripheral vascular disease (H)     Problem list name updated by automated process. Provider to review     ? Hypertension goal BP (blood pressure) < 140/90   ? Personal history of other malignant neoplasm of skin   ? Hyperlipidemia LDL goal <100     Per provider     ? Diverticulitis of colon     Abd CT  Problem list name updated by automated process. Provider to review     ? Congenital cystic disease of liver     multiple liver lesions.  felt to be focal nodular hyperplasia.  annual CT abd.    followed by Dr. Amilcar Kat               Surgical History:  Past Surgical History:   Procedure Laterality Date   ? C APPENDECTOMY age 22   ? C DEXA, BONE DENSITY, AXIAL SKEL 2005   ? C DEXA, BONE DENSITY, AXIAL SKEL 6/2007   No signif change in Osteopenia   ? COLONOSCOPY 4/2006   repeat 10 yr   ? ENDOSCOPIC ULTRASOUND UPPER GASTROINTESTINAL TRACT (GI) N/A 9/6/2018   Procedure: ENDOSCOPIC ULTRASOUND, ESOPHAGOSCOPY / UPPER GASTROINTESTINAL TRACT (GI); Endoscopic Ultrasound, Esophagogastroduodenoscopy with endoscopic mucosal resection; Surgeon: Hood Brower MD; [facility]   ? ESOPHAGOSCOPY, GASTROSCOPY, DUODENOSCOPY (EGD), COMBINED N/A 8/13/2018   Procedure: COMBINED ESOPHAGOSCOPY, GASTROSCOPY, DUODENOSCOPY (EGD), BIOPSY SINGLE OR MULTIPLE; EGD; Surgeon: Hood Brower MD; [facility]   ? ESOPHAGOSCOPY, GASTROSCOPY, DUODENOSCOPY (EGD), COMBINED N/A 3/14/2019   Procedure: Upper Endoscopy; Surgeon: Hood Brower MD; [facility]   ? ESOPHAGOSCOPY, GASTROSCOPY, DUODENOSCOPY (EGD), RESECT MUCOSA, COMBINED N/A 9/6/2018   Procedure: COMBINED ESOPHAGOSCOPY, GASTROSCOPY, DUODENOSCOPY (EGD), RESECT MUCOSA;; Surgeon: Hood Brower MD; [facility] OR   ? GYN SURGERY 10/22/08   pelvic support   ? HEMORRHOIDECTOMY 8/08   ? MOHS MICROGRAPHIC PROCEDURE   ? SURGICAL HISTORY OF - age 22   L ovarian cyst removal, laparotomy   ? SURGICAL HISTORY OF - 1998   partial thyroidectomy   ? SURGICAL HISTORY OF -  10/08   Transobturator tape for Urinary Incontinence   ? SURGICAL HISTORY OF - 11/2011   stress test normal   ? THYROID SURGERY   Had cyst removed, thyroid gland is intact.       Family History:   ? Diabetes Mother   at 89 yrs   ? Hypertension Mother   ? Arthritis Mother   rheumatoid   ? Cancer Father   bladder   ? Cardiovascular Brother   palpitations not on meds.       Social History:    Social History     Socioeconomic History   ? Marital status: Single     Spouse name: Not on file   ? Number of children: None   ? Years of education: Not on file   ? 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 Financial resource strain: Not on file   ? Food insecurity:     Worry: Not on file     Inability: Not on file   ? Transportation needs:     Medical: Not on file     Non-medical: Not on file   Tobacco Use   ? Smoking status: Ex smoker, 10 pack year   Substance and Sexual Activity   ? Alcohol use: 6 drinks per week   ? Drug use: Not on file   ? Sexual activity: Not on file   Lifestyle   ? Physical activity:     Days per week: Not on file     Minutes per session: Not on file   ? Stress: Not on file   Relationships   ? Social connections:     Talks on phone: Not on file     Gets together: Not on file     Attends Yarsanism service: Not on file     Active member of club or organization: Not on file     Attends meetings of clubs or organizations: Not on file     Relationship status: Not on file   ? Intimate partner violence:     Fear of current or ex partner: Not on file     Emotionally abused: Not on file     Physically abused: Not on file     Forced sexual activity: Not on file          Review of Systems     Blood pressure 123/75, pulse 82, temperature 99.1  F (37.3  C), resp. rate 16, SpO2 98 %.        Physical Exam  Constitutional:       Comments: Highly irritable, older  female, overweight   HENT:      Right Ear: External ear normal.      Left Ear: External ear normal.       Mouth/Throat:      Mouth: Mucous membranes are moist.   Eyes:      Extraocular Movements: Extraocular movements intact.      Conjunctiva/sclera: Conjunctivae normal.   Neck:      Musculoskeletal: Normal range of motion.   Cardiovascular:      Rate and Rhythm: Normal rate and regular rhythm.      Heart sounds: No murmur.   Pulmonary:      Breath sounds: Normal breath sounds. No rales.   Abdominal:      General: Bowel sounds are normal.      Palpations: Abdomen is soft.      Tenderness: There is no tenderness.   Musculoskeletal:         General: Tenderness and signs of injury present.      Comments: Both arms and right leg normal strength and coordination   Skin:     General: Skin is warm and dry.      Coloration: Skin is jaundiced.      Findings: Rash present.      Comments: Wide spread papular rash from shoulders distally, worse around the upper thigh buttock area  Left ankle external fixator, no visible erythema or fluid discharge  Buttock/coccyx area not observed, patient supine in bed, difficulty with mobility  Lidocaine patch on her left pretibial area   Neurological:      General: No focal deficit present.      Mental Status: She is alert and oriented to person, place, and time.      Motor: No weakness.   Psychiatric:      Comments: Voices unhappiness and irritation with , accessibility to light switches, size of the room, excess ability to the room phone, having to wait, all within the first hour of being at [facility]                Allergies   Allergen Reactions   ? Clindamycin Rash   Allergen Reactions   ? Amlodipine Swelling   ? Ancef [Cefazolin]   ? Levaquin Rash   Itching, rash   ? Metformin Diarrhea and GI Disturbance   ? Cephalosporins Rash   ? Levofloxacin Itching and Rash   ? Nickel Rash   Contact reaction        Medication List:    Medications at Time of Discharge    Medication Sig Start Date   acetaminophen (TYLENOL) 325 MG tablet    Indications: Closed fracture of left ankle, initial  encounter Take 3 tablets (975 mg) by mouth every 8 hours 10/14/2019   alum & mag hydroxide-simethicone (MAALOX REGULAR STRENGTH) 200-200-20 MG/5ML SUSP   Take 30 mLs by mouth as needed      atorvastatin (LIPITOR) 40 MG tablet    Indications: Hyperlipidemia LDL goal <100 TAKE 1 TABLET DAILY 10/02/2019   CALCIUM 500 + D 500-200 MG-IU OR TABS    Indications: Osteopenia one tab twice per day 07/15/2009   Cholecalciferol (VITAMIN D PO)   Take by mouth. Pt consuming 3000 units per day      diphenhydrAMINE (BENADRYL) 25 MG capsule    Indications: Dermatitis Take 1 capsule (25 mg) by mouth every 6 hours as needed for itching or other (rash) 10/18/2019   enoxaparin (LOVENOX) 40 MG/0.4ML syringe    Indications: Closed fracture of left ankle, initial encounter Inject 0.4 mLs (40 mg) Subcutaneous every 24 hours 10/14/2019   fexofenadine (ALLEGRA) 180 MG tablet    Indications: Dermatitis Take 1 tablet (180 mg) by mouth daily 10/19/2019   furosemide (LASIX) 20 MG tablet    Indications: Dependent edema, CKD (chronic kidney disease) stage 2, GFR 60-89 ml/min Take 1 tablet (20 mg) by mouth daily 10/18/2019   insulin aspart (NOVOLOG PEN) 100 UNIT/ML pen    Indications: Type 2 diabetes, HbA1c goal < 7% (H) Inject 1-7 Units Subcutaneous 3 times daily (before meals) Medium Correction Scale for Pre meal Blood glucose  Do Not give Correction Insulin if Pre-Meal BG < 140.   Pre-Meal BG levels: Corresponding Insulin Units  140-189: 1 unit; 190-239:2 units.   240-289: 3 units; 290-339: 4 units.   340-399: 5 units; 400-449: 6 units  = or > 450 give 7 units.   Notify MD if glucose > or = 350 mg/dL after administration of correction dose. 10/18/2019   insulin aspart (NOVOLOG PEN) 100 UNIT/ML pen    Indications: Type 2 diabetes, HbA1c goal < 7% (H) Inject 1-5 Units Subcutaneous At Bedtime MEDIUM INSULIN RESISTANCE DOSING    Do Not give Bedtime Correction Insulin if BG less than  200.   For  - 249 give 1 units.   For  - 299 give 2  units.   For  - 349 give 3 units.   For  -399 give 4 units.   For BG greater than or equal to 400 give 5 units.  Notify provider if glucose greater than or equal to 350 mg/dL after administration of correction dose.  If given at mealtime, administer within 30 minutes of start of meal 10/18/2019   insulin glargine (LANTUS PEN) 100 UNIT/ML pen    Indications: Type 2 diabetes, HbA1c goal < 7% (H) Inject 25 Units Subcutaneous At Bedtime 10/18/2019   lisinopril (PRINIVIL/ZESTRIL) 2.5 MG tablet    Indications: CKD (chronic kidney disease) stage 2, GFR 60-89 ml/min TAKE 1 TABLET DAILY 05/16/2019   metoprolol tartrate (LOPRESSOR) 25 MG tablet    Indications: Essential hypertension TAKE 1 TABLET TWICE A DAY 09/04/2019   oxyCODONE (ROXICODONE) 5 MG tablet    Indications: Closed fracture of left ankle, initial encounter Take 1-2 tablets (5-10 mg) by mouth every 4 hours as needed for moderate to severe pain (1 pill for pain 1-5, 2 pills for pain 6-10) 10/18/2019   pantoprazole (PROTONIX) 40 MG EC tablet    Indications: Closed fracture of left ankle, initial encounter Take 1 tablet (40 mg) by mouth daily 10/15/2019   senna-docusate (SENOKOT-S/PERICOLACE) 8.6-50 MG tablet    Indications: Closed fracture of left ankle, initial encounter Take 2 tablets by mouth 2 times daily 10/14/2019   triamcinolone (KENALOG) 0.1 % external cream    Indications: Seborrheic keratosis Apply topically 3 times daily 10/14/2019         Labs:   10/17/2019                                                        Ref. Range   Potassium Latest Ref Range: 3.4 - 5.3 mmol/L              3.5   Chloride Latest Ref Range: 94 - 109 mmol/L                  108   Carbon Dioxide Latest Ref Range: 20 - 32 mmol/L         25   Urea Nitrogen Latest Ref Range: 7 - 30 mg/dL                8   Creatinine Latest Ref Range: 0.52 - 1.04 mg/dL              0.63   GFR Estimate Latest Ref Range: >60 mL/min/1.73_m2 90      Calcium Latest Ref Range: 8.5 - 10.1 mg/dL                     7.7 (L)   Anion Gap Latest Ref Range: 3 - 14 mmol/L                     7    Procalcitonin Latest Units: ng/ml                                        0.41   Glucose Latest Ref Range: 70 - 99 mg/dL                        175 (H)     WBC Latest Ref Range: 4.0 - 11.0 10e9/L 19.0 (H)   Hemoglobin Latest Ref Range: 11.7 - 15.7 g/dL 11.0 (L)   Hematocrit Latest Ref Range: 35.0 - 47.0 % 33.6 (L)   Platelet Count Latest Ref Range: 150 - 450 10e9/L 683 (H)     Diff   % Neutrophils Latest Units: % 77.5   % Lymphocytes Latest Units: % 7.0   % Monocytes Latest Units: % 7.0   % Eosinophils Latest Units: % 3.6         Assessment / Plan:    ICD-10-CM    1. Closed bimalleolar fracture of left ankle with routine healing S82.842D  external fixator, patient understands her be definitive surgery.  Patient states she is happy with asking for oxycodone 1 or 2 5 mg tabs depending on her pain level. Has a lidocaine patch   2. Type 2 diabetes mellitus with hyperglycemia, with long-term current use of insulin (H) E11.65 On Grlargine, Aspart U100. Also on a statin. A1c=7.6    Z79.4    3. Peripheral vascular disease (H) I73.9 On lasix daiy for leg edema   4. Gastroesophageal reflux disease, esophagitis presence not specified K21.9 Zantac   5. Essential hypertension, benign I10 Good reading on metoprolol and Lisinopril   6. Hypertension goal BP (blood pressure) < 140/90 I10    7.  8. Hyperlipidemia LDL goal <100  Rash due to allergy   E78.5  R12 Lipitor  Finds triamcinolone helpful, also Benadryl and allegra           Electronically signed by: Sunita Santiago MD

## 2021-06-19 NOTE — LETTER
Letter by Sunita Santiago MD at      Author: Sunita Santiago MD Service: -- Author Type: --    Filed:  Encounter Date: 11/8/2019 Status: Signed         Patient: Sarah Felix   MR Number: [id number]   YOB: 1945   Date of Visit: 11/8/2019     [facility]      Facility:    [facility] [[id number]]   Code Status: UNKNOWN  [place] / [facility] : 10/12 to 10/18/19  [facility] 10/28/19 - 10/30/19    [facility]     NO DISCHARGE SUMMARY IN CARE EVERYWHERE      Chief Complaint/Reason for Visit:  Chief Complaint   Patient presents with   ? Review Of Multiple Medical Conditions     Left bimalleolar fracture, status post definitive open reduction internal fixation       HPI:   Sarah is a 74 y.o. female with a history of type 2 diabetes.    She was going down the stairs  at home on 10/11/2019 in the late evening.  Her ankle gave out and she slipped 3-4 stairs on her buttocks. She had pain and deformity of her left ankle. She waited till the following day  to call for help, she did not want the paramedics breaking down her door.    She was brought to the [facility].  X-ray showed bimalleolar ankle fracture.  It was a severely displaced  fracture, ED attempted to reduce it.  Ended up calling orthopedics  who performed aclosed fracture reduction under conscious sedation in the emergency department.  Later in the day, she went to the OR for an external fixator placement.  She was non-weightbearing on the left leg, enoxaparin for DVT prophylaxis.    She was given Doxycycline perioperatively, and developed a diffuse rash. Triamcinolone cream was prescribed.    She had an open area on her coccyx from falling on the stairs, then scooting along her floor prior to calling for help. It was covered with Mepilex foam dressing.    She transferred to [facility]  TCU.  __________________________________________________________________________  Hospitalization #2: 10/28/19  Removal of external fixator, plate and screws open reduction fixation.  Nonweightbearing left lower extremity  DVT prophylaxis: Lovenox daily x28 days    She returned to [facility]    UPDATE:  Things are generally going well  Her sugars are variable: There occasionally high but generally are okay.  The NovoLog started at lunch and supper, has been increased to 60 units, 8 units respectively   She has a request to decrease her pantoprazole from daily to every other day (done)  She was discharged without wound care orders: Just recommend dry gauze, change every 48 hours and as needed  Lotion on her left leg skin is very helpful    Past Medical History:  Active Non-Hospital Problems    Diagnosis   ? Esophageal reflux   ? Fatty liver   ? Closed bimalleolar fracture of left ankle with routine healing   ? Type 2 diabetes mellitus with hyperglycemia (H)   ? Essential hypertension, benign   ? Peripheral vascular disease (H)     Problem list name updated by automated process. Provider to review     ? Hypertension goal BP (blood pressure) < 140/90   ? Personal history of other malignant neoplasm of skin   ? Hyperlipidemia LDL goal <100     Per provider     ? Diverticulitis of colon     Abd CT  Problem list name updated by automated process. Provider to review     ? Congenital cystic disease of liver     multiple liver lesions.  felt to be focal nodular hyperplasia.  annual CT abd.    followed by Dr. Amilcar Kat               Surgical History:  Past Surgical History:   Procedure Laterality Date   ? C APPENDECTOMY age 22   ? C DEXA, BONE DENSITY, AXIAL SKEL 2005   ? C DEXA, BONE DENSITY, AXIAL SKEL 6/2007   No signif change in Osteopenia   ? COLONOSCOPY 4/2006   repeat 10 yr   ? ENDOSCOPIC ULTRASOUND UPPER GASTROINTESTINAL TRACT (GI) N/A 9/6/2018   Procedure: ENDOSCOPIC ULTRASOUND, ESOPHAGOSCOPY /  UPPER GASTROINTESTINAL TRACT (GI); Endoscopic Ultrasound, Esophagogastroduodenoscopy with endoscopic mucosal resection; Surgeon: Hood Brower MD; [facility]   ? ESOPHAGOSCOPY, GASTROSCOPY, DUODENOSCOPY (EGD), COMBINED N/A 8/13/2018   Procedure: COMBINED ESOPHAGOSCOPY, GASTROSCOPY, DUODENOSCOPY (EGD), BIOPSY SINGLE OR MULTIPLE; EGD; Surgeon: Hood Brower MD; [facility]   ? ESOPHAGOSCOPY, GASTROSCOPY, DUODENOSCOPY (EGD), COMBINED N/A 3/14/2019   Procedure: Upper Endoscopy; Surgeon: Hood Brower MD; [facility]   ? ESOPHAGOSCOPY, GASTROSCOPY, DUODENOSCOPY (EGD), RESECT MUCOSA, COMBINED N/A 9/6/2018   Procedure: COMBINED ESOPHAGOSCOPY, GASTROSCOPY, DUODENOSCOPY (EGD), RESECT MUCOSA;; Surgeon: Hood Brower MD; [facility] OR   ? GYN SURGERY 10/22/08   pelvic support   ? HEMORRHOIDECTOMY 8/08   ? MOHS MICROGRAPHIC PROCEDURE   ? SURGICAL HISTORY OF - age 22   L ovarian cyst removal, laparotomy   ? SURGICAL HISTORY OF - 1998   partial thyroidectomy   ? SURGICAL HISTORY OF - 10/08   Transobturator tape for Urinary Incontinence   ? SURGICAL HISTORY OF - 11/2011   stress test normal   ? THYROID SURGERY   Had cyst removed, thyroid gland is intact.                  Review of Systems   Pain control is adequate    Blood pressure 111/55, pulse 65, temperature 98.8  F (37.1  C), resp. rate 17, SpO2 97 %.      BMI= 32.12    Physical Exam   Physical Exam  Constitutional:          female, overweight,  calm and accepting   Cardiovascular:      Rate and Rhythm: Normal rate , rhythm. No murmur.   Pulmonary:      Breath sounds: Normal breath sounds. No rales.   Abdominal:      General: Bowel sounds are normal.      Palpations: Abdomen is soft.      Tenderness: There is no abdominal tenderness.   Musculoskeletal:         General: Tenderness mild.  Left lower leg in the immobilizer     Comments: Both arms and right leg normal strength and coordination  Immobilizer boot left knee distally   Skin:     General:  Skin is warm and dry.  Skin inspected under the boot/immobilizer: No areas of rubbing  Dry, thin eschar covering all 3 sites of the previous external fixator, no erythema  Malleoli incisions with minimal serosanguineous discharge   Neurological:      General: No focal deficit present.      Mental Status: She is alert and oriented to person, place, and time.      Motor: No weakness.   Psychiatric:          Mood and thought content normal.       Allergies   Allergen Reactions   ? Amlodipine Swelling   ? Cefazolin    ? Metformin Diarrhea   ? Cephalosporins Rash   ? Clindamycin Rash   ? Levaquin [Levofloxacin] Rash     Itching, rash     ? Nickel Rash     Contact reaction           Medication List:    Current Outpatient Medications   Medication Sig   ? acetaminophen (TYLENOL) 325 MG tablet Take 975 mg by mouth every 8 (eight) hours.   ? aluminum-magnesium hydroxide-simethicone (MAALOX ADVANCED) 200-200-20 mg/5 mL Susp Take 30 mL by mouth 3 (three) times a day as needed.   ? atorvastatin (LIPITOR) 40 MG tablet Take 40 mg by mouth at bedtime.   ? calcium-vitamin D (CALCIUM-VITAMIN D) 500 mg(1,250mg) -200 unit per tablet Take 1 tablet by mouth 2 (two) times a day with meals.   ? cholecalciferol, vitamin D3, 3,000 unit Tab Take 3,000 Units by mouth daily.   ? diphenhydrAMINE (BENADRYL) 25 mg capsule Take 25 mg by mouth every 6 (six) hours as needed for itching.   ? enoxaparin (LOVENOX) 40 mg/0.4 mL syringe Inject 40 mg under the skin daily.   ? fexofenadine (ALLEGRA) 180 MG tablet Take 180 mg by mouth daily.   ? furosemide (LASIX) 20 MG tablet Take 20 mg by mouth daily.   ? glimepiride (AMARYL) 4 MG tablet Take 4 mg by mouth daily before breakfast.   ? insulin aspart U-100 (NOVOLOG) 100 unit/mL injection Inject under the skin 3 (three) times a day before meals. Inject as per  sliding scale: if 0 - 139 = O UNITS DO NOT GIVE  CORRECTION INSULIN IF PRE MEAL BG < 140;  140 - 189 = 1 UNIT; 190 - 239 = 2 UNITS; 240 - 289 =  3  UNITS; 290 - 339 = 4 UNITS; 340 - 399 = 5 UNITS;  400 - 449 = 6 UNITS; 450+ = 7 UNITS IF BG = OR >  450 GIVE 7 UNIT   ? insulin aspart U-100 (NOVOLOG) 100 unit/mL injection Inject under the skin at bedtime. Inject as per  sliding scale: if 0 - 199 = 0 UNITS DO NOT GIVE  BEDTIME CORRECTION DOSE IF BG LESS THAN  200; 200 - 249 = 1 UNIT; 250 - 299 = 2 UNITS; 300 -  349 = 3 UNITS; 350 - 399 = 4 UNITS; 400+ = 5  UNITS FOR BG GREATER THAN OR EQUAL TO  400, GIVE 5 UNITS   ? insulin aspart U-100 (NOVOLOG) 100 unit/mL injection Inject under the skin 2 (two) times a day before meals. Administer 2 units at lunch and 4 units at supper.   ? insulin glargine (LANTUS) 100 unit/mL vial Inject 35 Units under the skin daily.          ? lisinopril (PRINIVIL,ZESTRIL) 2.5 MG tablet Take 2.5 mg by mouth daily.   ? methocarbamol (ROBAXIN) 500 MG tablet Take 500 mg by mouth 4 (four) times a day.   ? metoprolol tartrate (LOPRESSOR) 25 MG tablet Take 25 mg by mouth 2 (two) times a day.   ? oxyCODONE (ROXICODONE) 5 MG immediate release tablet Take 5-10 mg by mouth every 4 (four) hours as needed for pain.   ? pantoprazole (PROTONIX) 40 MG tablet Take 40 mg by mouth daily.   ? senna-docusate (PERICOLACE) 8.6-50 mg tablet Take 2 tablets by mouth 2 (two) times a day.   ? triamcinolone (KENALOG) 0.1 % cream Apply 1 application topically 3 (three) times a day.           Labs: no new labs   Ref Range & Units 10/17/19 0621   Sodium 133 - 144 mmol/L 140    Potassium 3.4 - 5.3 mmol/L 3.5    Chloride 94 - 109 mmol/L 108    Carbon Dioxide 20 - 32 mmol/L 25    Anion Gap 3 - 14 mmol/L 7    Glucose 70 - 99 mg/dL 175High     Urea Nitrogen 7 - 30 mg/dL 8    Creatinine 0.52 - 1.04 mg/dL 0.63    GFR Estimate >60 mL/min/1.73_m2 90       Ref Range & Units 10/18/19 1113   WBC 4.0 - 11.0 10e9/L 17.7High     Hemoglobin 11.7 - 15.7 g/dL 11.2Low     RDW 10.0 - 15.0 % 15.0    Platelet Count 150 - 450 10e9/L 698High           Assessment / Plan:    ICD-10-CM    1. Closed  bimalleolar fracture of left ankle with routine healing S82.842D  oxycodone is helping her pain, generally 10 mg when she takes it.  Has less pain now than previously   2. Type 2 diabetes mellitus with hyperglycemia, with long-term current use of insulin (H) E11.65 On Grlargine, Aspart U100. Also on a statin. A1c=7.6    Z79.4    3. Peripheral vascular disease (H) I73.9 On lasix daiy for leg edema   4. Gastroesophageal reflux disease, esophagitis presence not specified K21.9 Zantac, no sx   5. Essential hypertension, benign I10 Good reading on metoprolol and Lisinopril       Electronically signed by: Sunita Santiago MD

## 2021-06-19 NOTE — LETTER
Letter by Juan Holman CNP at      Author: Juan Holman CNP Service: -- Author Type: --    Filed:  Encounter Date: 2019 Status: Signed         Patient: Sarah Felix   MR Number: [id number]   YOB: 1945   Date of Visit: 2019     [facility]    Name:   Sarah Felix  : 1945  Facility:   [facility] [[id number]]   Room:   Code Status: FULL CODE and POLST AVAILABLE -   Fac type:   SNF (Skilled Nursing Facility, TCU) -     PCP:  Wegener, Joel D, MD    CHIEF COMPLAINT / REASON FOR VISIT:  Chief Complaint   Patient presents with   ? Follow-up     TCU follow-up after hospitalization for left bimalleolar fracture with external fixation and subsequent internal fixation.      [facility] from 10/12/2019 until 10/18/2019 (left bimalleolar fracture with external fixation)  [facility] from 10/28/2019 until 10/30/2019 (left bimalleolar fracture ORIF)    Patient was last seen by me on 2019.      HPI: Sarah is a 74 y.o. 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      She was going down the stairs at home on 10/11/2019 in the late evening, when her ankle gave out, and she slipped 3-4 stairs onto her buttocks.  There was pain and deformity of the left ankle, but she waited until the next day to call for help.  She did not want the paramedics breaking down her door.  She was brought to [facility] where x-rays showed a bimalleolar ankle fracture which was severely displaced, and an attempted reduction in the ED necessitated calling orthopedics who performed a closed fracture reduction under conscious sedation in the ED.  Later in the day, she went to the OR for an external fixator placement.  She is nonweightbearing on the left leg,  "and she is receiving enoxaparin for DVT prophylaxis.  She was given doxycycline perioperatively and developed a diffuse rash.  Triamcinolone cream was prescribed.    She did have an open area on her sacrum up from a falling on the stairs, then scooting along the floor prior to calling for help.  It has been covered with a Mepilex dressing.    She returned to the hospital on 10/28/2019 for external fixation hardware removal and ORIF, performed by Dr. Can that day.  She tolerated the procedure well and returned here on 10/30/2019.    ED VISIT    She was sent to [facility] ED on 11/25/2019 due to shortness of breath.  She stated, \"it was like someone was sitting on my chest.\"  All tests returned negative results.  She stated she still has trouble breathing once in a while.  She has a Tillson pot which seems to help.    The also noted the liver cyst (left hepatic lobe) on a chest CT.  It appeared to have grown significantly in size from the last CT performed on 11/14/2011, measuring 8.3 x 7.2 cm.  The patient stated she has had cysts in many places, thyroid, ovaries, and also the left kidney.    Dyspnea and tachypnea resolved, and she was discharged back here.      TCU ISSUES    Today: She complains of not feeling well today.  She feels that she has lost her appetite, is having a bit of nausea, wants to stop scheduled acetaminophen as she says it is upsetting her stomach, and she wants resumption of her stool softener at the previous dose.  We will change acetaminophen from scheduled to as needed and also resume her senna S2 tabs twice daily.    Bimalleolar fracture: She had an appointment with Ortho at the [facility] on 11/13/2019.  Her next appointment is scheduled for 12/13/2019.    Pain management: Since the ORIF, she appears to be in less pain.  She is receiving 1 to 2 tablets of oxycodone IR (5 mg) every 4 hours as needed.  She tells me that pain is gradually improving.  She does have more pain " "with therapy. She had orders for 5 to 10 mg of oxycodone.  She felt that she was taking 5 mg about a third of the time and 10 mg about two thirds at the time, depending on the activity.  Dr. Santiago scheduled 10 mg doses at 7:30 AM and at noon, with PRN dosing remaining unchanged.     Today, she tells me the pain is always there.  It can be achy, depending on the activity.  She does take one pill before going to bed.    Diabetes mellitus type 2: Today, she tells me, \"because I have not been eating much, and they've been lower.  Actually, there is barely any difference.    She had apparently been on Lantus and glimepiride at home, but for some reason the latter was discontinued in the hospital, her dose was cut in half (from 51 units to 25 units), and she was placed on sliding scale NovoLog.  Prior to her second hospitalization, fasting blood glucose levels ranged between 167 and 195.  At lunch, they ranged between 212 and 298.  Supper levels ranged between 209 and 245 and, at bedtime, the range was between 238 and 273.  The plan was to get her back to her home dosing.  We started by increasing her Lantus insulin to 35 units (later increased to 38 units).  At a prior visit, we authorized 2 units of NovoLog at lunch and 4 units at supper to observe how this affected bedtime levels.     With blood glucose levels looking better, we resumed her glimepiride 4 mg twice daily and placed a hold on the prandial NovoLog.  However, blood glucose levels now are quite high, especially at lunch, supper, and bedtime.  She tends to spread out her meals and may not finish a meal, instead saving part of it for later, giving herself 5 meals per day rather than 3.  This is problematic in terms of checking blood glucose levels with each meal.    Fasting levels range between 114 and 187.  At lunch, the range between 200 and 270.  At supper, the range is between 210 and 266.  At bedtime, the range is between 249 and 348.    At this " juncture, it almost looks like a certainty that she will require prandial NovoLog dosing on a scheduled basis.  We will address this at the next visit and also consider the possibility of adding linagliptin 5 mg daily.    Buttock wound: As noted in the hospital discharge summary, she sustained open area on her sacrum from a falling on the stairs, then scooting along the floor prior to calling for help.  It has been covered with a Mepilex dressing.  Dr. Santiago ordered Proshield 3 times daily and as needed, and this has worked quite well.    Insomnia: She tends to go to bed rather late at home.  She tells me she may go to bed as late as 4 AM.  She does not require any sleep aid, although she does have orders for diphenhydramine (for itching) which she has not been taking.  She is sleeping now.     Gastric reflux: She is on 40 mg of pantoprazole daily.  She does feel uncomfortable lying down but does not feel she needs this medication.  She will cough and feel like she is going to vomit, but if she sits up and burps, she then feels okay.  Unsure how long she has been on this, we changed the scheduled dose to every other day without issue.  Denies any symptoms.    Constipation: When she began having excessive BMs (and cramps), dosing of her senna S was halved, from 2 tablets twice daily to 1 tablet twice daily.  Currently, no problems.    Medication changes: Dr. Santiago, per patient request, discontinued fexofenadine, pantoprazole, methocarbamol, and diphenhydramine at an earlier visit.    ROS:   No headaches or chest pains, coughing or congestion, dizziness, dysuria, difficulty chewing or swallowing, integumentary issues, or problems with appetite.     Past Medical History:   Diagnosis Date   ? Allergic rhinitis    ? Basal cell carcinoma of face 03/2012    mohs   ? Bimalleolar fracture of left ankle    ? Chronic otitis externa    ? Contact dermatitis    ? 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 Cystic disease of liver     multiple liver lesions. felt to be focal nodular hyperplasia. annual CT abd. followed by Dr. Amilcar Kat    ? Cystocele, lateral    ? Diverticulitis of colon 06/2004    abd CT   ? DM (diabetes mellitus) (H)    ? Embolism and thrombosis (H) age 22    post ovarian cyst removed   ? Esophageal reflux    ? Fatty liver    ? Hiatal hernia     small   ? HLD (hyperlipidemia)    ? Nontoxic uninodular goiter    ? Osteopenia 04/21/2005    on fosamax for > 5 yr. stop 7/2012   ? Pure hypercholesterolemia               Family History   Problem Relation Age of Onset   ? Rheum arthritis Mother    ? Diabetes Mother    ? Hypertension Mother    ? Cancer Father    ? Other Brother         palpitations     Social History     Socioeconomic History   ? Marital status: Single     Spouse name: Not on file   ? Number of children: Not on file   ? Years of education: Not on file   ? Highest education level: Not on file   Occupational History   ? Not on file   Social Needs   ? Financial resource strain: Not on file   ? Food insecurity:     Worry: Not on file     Inability: Not on file   ? Transportation needs:     Medical: Not on file     Non-medical: Not on file   Tobacco Use   ? Smoking status: Former Smoker     Packs/day: 1.00     Years: 10.00     Pack years: 10.00   ? Smokeless tobacco: Never Used   Substance and Sexual Activity   ? Alcohol use: Yes     Alcohol/week: 6.0 standard drinks     Types: 6 Standard drinks or equivalent per week     Frequency: 4 or more times a week   ? Drug use: Not on file   ? Sexual activity: Not on file   Lifestyle   ? Physical activity:     Days per week: Not on file     Minutes per session: Not on file   ? Stress: Not on file   Relationships   ? Social connections:     Talks on phone: Not on file     Gets together: Not on file     Attends [employer] service: Not on file     Active member of club or organization: Not on file     Attends meetings of clubs or  organizations: Not on file     Relationship status: Not on file   ? Intimate partner violence:     Fear of current or ex partner: Not on file     Emotionally abused: Not on file     Physically abused: Not on file     Forced sexual activity: Not on file   Other Topics Concern   ? Not on file   Social History Narrative   ? Not on file       MEDICATIONS: Reviewed from the MAR, physician orders, and/or earlier progress notes.   Post Discharge Medication Reconciliation Status: medication reconciliation previously completed during another office visit.    Current Outpatient Medications   Medication Sig   ? acetaminophen (TYLENOL) 325 MG tablet Take 975 mg by mouth every 8 (eight) hours.   ? aluminum-magnesium hydroxide-simethicone (MAALOX ADVANCED) 200-200-20 mg/5 mL Susp Take 30 mL by mouth 3 (three) times a day as needed.   ? atorvastatin (LIPITOR) 40 MG tablet Take 40 mg by mouth at bedtime.   ? calcium-vitamin D (CALCIUM-VITAMIN D) 500 mg(1,250mg) -200 unit per tablet Take 1 tablet by mouth 2 (two) times a day with meals.   ? cholecalciferol, vitamin D3, 3,000 unit Tab Take 3,000 Units by mouth daily.   ? furosemide (LASIX) 20 MG tablet Take 20 mg by mouth daily.   ? glimepiride (AMARYL) 4 MG tablet Take 4 mg by mouth 2 (two) times a day.    ? insulin aspart U-100 (NOVOLOG) 100 unit/mL injection Inject under the skin 3 (three) times a day before meals. Inject as per  sliding scale: if 0 - 139 = O UNITS DO NOT GIVE  CORRECTION INSULIN IF PRE MEAL BG < 140;  140 - 189 = 1 UNIT; 190 - 239 = 2 UNITS; 240 - 289 =  3 UNITS; 290 - 339 = 4 UNITS; 340 - 399 = 5 UNITS;  400 - 449 = 6 UNITS; 450+ = 7 UNITS IF BG = OR >  450 GIVE 7 UNIT   ? insulin aspart U-100 (NOVOLOG) 100 unit/mL injection Inject under the skin at bedtime. Inject as per  sliding scale: if 0 - 199 = 0 UNITS DO NOT GIVE  BEDTIME CORRECTION DOSE IF BG LESS THAN  200; 200 - 249 = 1 UNIT; 250 - 299 = 2 UNITS; 300 -  349 = 3 UNITS; 350 - 399 = 4 UNITS; 400+ =  "5  UNITS FOR BG GREATER THAN OR EQUAL TO  400, GIVE 5 UNITS   ? insulin aspart U-100 (NOVOLOG) 100 unit/mL injection Inject under the skin 2 (two) times a day before meals. Administer 2 units at lunch and 4 units at supper.   ? insulin glargine (LANTUS) 100 unit/mL vial Inject 38 Units under the skin daily.          ? lisinopril (PRINIVIL,ZESTRIL) 2.5 MG tablet Take 2.5 mg by mouth daily.   ? metoprolol tartrate (LOPRESSOR) 25 MG tablet Take 25 mg by mouth 2 (two) times a day.   ? oxyCODONE (ROXICODONE) 5 MG immediate release tablet Take 5-10 mg by mouth every 4 (four) hours as needed for pain.   ? oxyCODONE (ROXICODONE) 5 MG immediate release tablet Take 10 mg by mouth 2 (two) times a day with meals. 0730 and noon to cover morning and afternoon therapies respectively   ? senna-docusate (PERICOLACE) 8.6-50 mg tablet Take 2 tablets by mouth 2 (two) times a day.   ? triamcinolone (KENALOG) 0.1 % cream Apply 1 application topically 3 (three) times a day.     ALLERGIES:   Allergies   Allergen Reactions   ? Amlodipine Swelling   ? Cefazolin    ? Metformin Diarrhea   ? Cephalosporins Rash   ? Clindamycin Rash   ? Levaquin [Levofloxacin] Rash     Itching, rash     ? Nickel Rash     Contact reaction       DIET: Diabetic, regular texture, thin liquids.    Vitals:    12/09/19 1535   BP: 119/67   Pulse: 98   Resp: 18   Temp: (!) 96.1  F (35.6  C)   SpO2: 92%   Weight: 195 lb 6.4 oz (88.6 kg)   Height: 5' 5\" (1.651 m)     Body mass index is 32.52 kg/m .    EXAMINATION:   General: Moderately obese middle-aged female,sitting up in bed.  Head: Normocephalic and atraumatic.   Eyes: PERRLA, sclerae clear.   ENT: Moist oral mucosa.  She has her own teeth.  No rhinorrhea or nasal discharge.  Hearing is unimpaired.  Cardiovascular: Sinus tachycardia.  No appreciable murmur.  Respiratory: Lungs clear to auscultation bilaterally.   Abdomen: Soft and nontender.  Bowel sounds positive in all 4 quadrants.  Musculoskeletal/Extremities: " Age-related degenerative joint disease.  She is wearing a boot on the left lower extremity.  Leg is currently wrapped.  Good CMS.  Integument: No rashes, clinically significant lesions, or skin breakdown.   Cognitive/Psychiatric: Alert and oriented x3.  Affect is quite dysthymic.    DIAGNOSTICS:   No results found for this or any previous visit.  No results found for: WBC, HGB, HCT, MCV, PLT  CrCl cannot be calculated (No successful lab value found.).      ASSESSMENT/Plan:      ICD-10-CM    1. Closed bimalleolar fracture of left ankle with routine healing S82.842D    2. Type 2 diabetes mellitus with hyperglycemia, with long-term current use of insulin (H) E11.65     Z79.4    3. Essential hypertension, benign I10    4. Hyperlipidemia LDL goal <100 E78.5    5. Gastroesophageal reflux disease, esophagitis presence not specified K21.9    6. Fatty liver K76.0    7. Congenital cystic disease of liver Q44.6      CHANGES:    1.  Resume senna S2 tabs twice daily.  2.  Change acetaminophen from scheduled to as needed.    CARE PLAN:    The care plan has been reviewed and all orders signed. Changes to care plan, if any, as noted. Otherwise, continue current plan of care.  Total time spent with this patient was approximately 35 minutes, with greater than 50% spent in counseling and coordination of care that included addressing some of her medication concerns as well as a review of her blood glucose levels.  We will need to discuss this further and possibly institute linagliptin, discontinuing her glimepiride which has not proven effective in lowering her prandial and bedtime blood glucose levels.    The above has been created using voice recognition software. Please be aware that this may unintentionally  produce inaccuracies and/or nonsensical sentences.      Electronically signed by: Juan Holman, NIECY

## 2021-06-19 NOTE — LETTER
Letter by Juan Holman CNP at      Author: Juan Holman CNP Service: -- Author Type: --    Filed:  Encounter Date: 10/21/2019 Status: Signed         Patient: Sarah Felix   MR Number: [id number]   YOB: 1945   Date of Visit: 10/21/2019     [facility]    Name:   Sarah Felix  : 1945  Facility:   [facility] [[id number]]   Room:   Code Status: FULL CODE and POLST AVAILABLE -   Fac type:   SNF (Skilled Nursing Facility, TCU) -     PCP:  Wegener, Joel D, MD    CHIEF COMPLAINT / REASON FOR VISIT:  Chief Complaint   Patient presents with   ? Follow-up     TCU follow-up after hospitalization for left bimalleolar fracture with external fixation.      [facility] from 10/12/2019 until 10/18/2019 (left bimalleolar fracture with external fixation)      HPI: Sarah is a 74 y.o. 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  There was pain and deformity of the left ankle, but she waited until the next day to call for help.  She did not want the paramedics breaking down her door.  She was brought to [facility] where x-rays showed a bimalleolar ankle fracture which was severely displaced, and an attempted reduction in the ED necessitated calling orthopedics who performed a closed fracture reduction under conscious sedation in the ED.  Later in the day, she went to the OR for an external fixator placement.  She is nonweightbearing on the left leg, and she is receiving enoxaparin for DVT prophylaxis.  She was given doxycycline perioperatively and developed a diffuse rash.  Triamcinolone cream was prescribed.    She did have an open  "area on her sacrum up from a falling on the stairs, then scooting along the floor prior to calling for help.  It has been covered with a Mepilex dressing.      TCU ISSUES    Pain management: Being moved around a lot is causing her a good deal of pain and making her feel exhausted.  She tells me that she is asking for pain medications every 4 hours but only asking for 5 mg rather than 10 mg.  She now has an air mattress.    Dyspnea: I did receive a call over the weekend and was informed that the patient was short of breath.  She tells me she was so weak that she could not move, and aids assisted in moving her up in bed, but she could not breathe, felt exhausted, and oxygen was applied.  By late afternoon, she tells me she did not need the oxygen.  Nursing staff were concerned about a possible PE; however, she has been on enoxaparin, has experienced no chest pain, and she seems to be doing okay now.  She did request oxygen \"just to help me sleep.\"  She does not have a history of sleep apnea.    Sinus congestion/pruritus: She is still itchy from her allergic reaction to ceftazidime or doxycycline (she says a former, but Dr. Santiago has listed the latter).  She does not feel that the triamcinolone is offering sufficient coverage  She does tell me that the rash has been retreating.  It is now on her thighs and back and is still significantly pruritic.  I suggested we could try something oral.  She is already receiving Benadryl, but she tells me she can only take it at night, as it makes her too sleepy.  I had discussed providing her with loratadine, although I see she already has orders for fexofenadine 180 mg daily.  We will add an additional 60 mg daily.    Diabetes mellitus type 2: She had apparently been on Lantus and glimepiride at home, but for some reason the latter was discontinued in the hospital, her dose was cut in half (from 51 units to 25 units), and she was placed on sliding scale NovoLog.  Currently, " "fasting blood glucose levels range between 167 and 195.  At lunch, they range between 212 and 298.  Supper levels range between 209 and 245 and, at bedtime, the range is between 238 and 273.  We are going to try to get her back to her home dosing.  We will start by increasing her Lantus insulin to 35 units and see how she is doing in a few days.      ROS:   No headaches or chest pains, coughing or congestion, nausea or vomiting, dizziness, dysuria, constipation (she tells me she is taking half the prescribed senna) or diarrhea, difficulty chewing or swallowing.  Appetite is \"so-so.\"    Past Medical History:   Diagnosis Date   ? Allergic rhinitis    ? Basal cell carcinoma of face 03/2012    mohs   ? Bimalleolar fracture of left ankle    ? Chronic otitis externa    ? Contact dermatitis    ? 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 Cystic disease of liver     multiple liver lesions. felt to be focal nodular hyperplasia. annual CT abd. followed by Dr. Amilcar Kat    ? Cystocele, lateral    ? Diverticulitis of colon 06/2004    abd CT   ? DM (diabetes mellitus) (H)    ? Embolism and thrombosis (H) age 22    post ovarian cyst removed   ? Esophageal reflux    ? Fatty liver    ? Hiatal hernia     small   ? HLD (hyperlipidemia)    ? Nontoxic uninodular goiter    ? Osteopenia 04/21/2005    on fosamax for > 5 yr. stop 7/2012   ? Pure hypercholesterolemia               Family History   Problem Relation Age of Onset   ? Rheum arthritis Mother    ? Diabetes Mother    ? Hypertension Mother    ? Cancer Father    ? Other Brother         palpitations     Social History     Socioeconomic History   ? Marital status: Single     Spouse name: Not on file   ? Number of children: Not on file   ? Years of education: Not on file   ? Highest education level: Not on file   Occupational History   ? Not on file   Social Needs   ? Financial resource strain: Not on file   ? Food insecurity:     Worry: Not on file     " Inability: Not on file   ? Transportation needs:     Medical: Not on file     Non-medical: Not on file   Tobacco Use   ? Smoking status: Former Smoker     Packs/day: 1.00     Years: 10.00     Pack years: 10.00   ? Smokeless tobacco: Never Used   Substance and Sexual Activity   ? Alcohol use: Yes     Alcohol/week: 6.0 standard drinks     Types: 6 Standard drinks or equivalent per week     Frequency: 4 or more times a week   ? Drug use: Not on file   ? Sexual activity: Not on file   Lifestyle   ? Physical activity:     Days per week: Not on file     Minutes per session: Not on file   ? Stress: Not on file   Relationships   ? Social connections:     Talks on phone: Not on file     Gets together: Not on file     Attends L[employer] service: Not on file     Active member of club or organization: Not on file     Attends meetings of clubs or organizations: Not on file     Relationship status: Not on file   ? Intimate partner violence:     Fear of current or ex partner: Not on file     Emotionally abused: Not on file     Physically abused: Not on file     Forced sexual activity: Not on file   Other Topics Concern   ? Not on file   Social History Narrative   ? Not on file       MEDICATIONS: Reviewed from the MAR, physician orders, and/or earlier progress notes.  Updated by me today (10/21/2019) with increases in fexofenadine and Lantus reflected below.  Post Discharge Medication Reconciliation Status: discharge medications reconciled and changed, per note/orders (see AVS).  Current Outpatient Medications   Medication Sig   ? fexofenadine (ALLEGRA) 60 MG tablet Take 60 mg by mouth daily.   ? acetaminophen (TYLENOL) 325 MG tablet Take 975 mg by mouth every 8 (eight) hours.   ? aluminum-magnesium hydroxide-simethicone (MAALOX ADVANCED) 200-200-20 mg/5 mL Susp Take 30 mL by mouth 3 (three) times a day as needed.   ? atorvastatin (LIPITOR) 40 MG tablet Take 40 mg by mouth at bedtime.   ? calcium-vitamin D (CALCIUM-VITAMIN D) 500  mg(1,250mg) -200 unit per tablet Take 1 tablet by mouth 2 (two) times a day with meals.   ? cholecalciferol, vitamin D3, 3,000 unit Tab Take 3,000 Units by mouth daily.   ? diphenhydrAMINE (BENADRYL) 25 mg capsule Take 25 mg by mouth every 6 (six) hours as needed for itching.   ? enoxaparin (LOVENOX) 40 mg/0.4 mL syringe Inject 40 mg under the skin daily.   ? fexofenadine (ALLEGRA) 180 MG tablet Take 180 mg by mouth daily.   ? furosemide (LASIX) 20 MG tablet Take 20 mg by mouth daily.   ? insulin aspart U-100 (NOVOLOG) 100 unit/mL injection Inject under the skin 3 (three) times a day before meals. Inject as per  sliding scale: if 0 - 139 = O UNITS DO NOT GIVE  CORRECTION INSULIN IF PRE MEAL BG < 140;  140 - 189 = 1 UNIT; 190 - 239 = 2 UNITS; 240 - 289 =  3 UNITS; 290 - 339 = 4 UNITS; 340 - 399 = 5 UNITS;  400 - 449 = 6 UNITS; 450+ = 7 UNITS IF BG = OR >  450 GIVE 7 UNIT   ? insulin aspart U-100 (NOVOLOG) 100 unit/mL injection Inject under the skin at bedtime. Inject as per  sliding scale: if 0 - 199 = 0 UNITS DO NOT GIVE  BEDTIME CORRECTION DOSE IF BG LESS THAN  200; 200 - 249 = 1 UNIT; 250 - 299 = 2 UNITS; 300 -  349 = 3 UNITS; 350 - 399 = 4 UNITS; 400+ = 5  UNITS FOR BG GREATER THAN OR EQUAL TO  400, GIVE 5 UNITS   ? insulin glargine (LANTUS) 100 unit/mL vial Inject 35 Units under the skin daily.          ? lisinopril (PRINIVIL,ZESTRIL) 2.5 MG tablet Take 2.5 mg by mouth daily.   ? metoprolol tartrate (LOPRESSOR) 25 MG tablet Take 25 mg by mouth 2 (two) times a day.   ? oxyCODONE (ROXICODONE) 5 MG immediate release tablet Take 5-10 mg by mouth every 4 (four) hours as needed for pain.   ? pantoprazole (PROTONIX) 40 MG tablet Take 40 mg by mouth daily.   ? senna-docusate (PERICOLACE) 8.6-50 mg tablet Take 2 tablets by mouth 2 (two) times a day.   ? triamcinolone (KENALOG) 0.1 % cream Apply 1 application topically 3 (three) times a day.     ALLERGIES:   Allergies   Allergen Reactions   ? Amlodipine Swelling   ?  "Cefazolin    ? Metformin Diarrhea   ? Cephalosporins Rash   ? Clindamycin Rash   ? Levaquin [Levofloxacin] Rash     Itching, rash     ? Nickel Rash     Contact reaction       DIET: Regular, regular texture, thin liquids.    Vitals:    10/21/19 1710   BP: 110/68   Pulse: 88   Resp: 19   Temp: 98.7  F (37.1  C)   SpO2: 98%   Height: 5' 5\" (1.651 m)     There is no height or weight on file to calculate BMI.    EXAMINATION:   General: Moderately obese middle-aged female, lying in bed.  Head: Normocephalic and atraumatic.   Eyes: PERRLA, sclerae clear.   ENT: Moist oral mucosa.  She has her own teeth.  No rhinorrhea or nasal discharge.  Hearing is unimpaired.  Cardiovascular: Regular rate and rhythm.  No appreciable murmur.  Respiratory: Lungs clear to auscultation bilaterally.   Abdomen: Soft and nontender.   Musculoskeletal/Extremities: Age-related degenerative joint disease.  Left external hardware has 2 anterior mid shin pins as well as pins through the medial and lateral heels.  The ankle area is tender to light palpation.  Leg is elevated on a foam wedge.  Integument: No rashes, clinically significant lesions, or skin breakdown.   Cognitive/Psychiatric:     DIAGNOSTICS:   No results found for this or any previous visit.  No results found for: WBC, HGB, HCT, MCV, PLT  CrCl cannot be calculated (No successful lab value found.).      ASSESSMENT/Plan:      ICD-10-CM    1. Closed bimalleolar fracture of left ankle with routine healing S82.842D    2. Type 2 diabetes mellitus with hyperglycemia, with long-term current use of insulin (H) E11.65     Z79.4    3. Essential hypertension, benign I10    4. Hyperlipidemia LDL goal <100 E78.5    5. Gastroesophageal reflux disease, esophagitis presence not specified K21.9    6. Fatty liver K76.0    7. Congenital cystic disease of liver Q44.6      CHANGES:    1.  Increase fexofenadine from 180 mg daily to 240 mg daily.  Do this by providing a 180 mg tab and a 60 mg tab (for " pruritus).  2.  Increase Lantus insulin from 25 units to 35 units.    CARE PLAN:    The care plan has been reviewed and all orders signed. Changes to care plan, if any, as noted. Otherwise, continue current plan of care.  Total time spent with this patient was approximately 35 minutes, with greater than 50% spent in counseling and coordination of care that included addressing her complaints and working with her to make adjustments in her insulin dosing.    The above has been created using voice recognition software. Please be aware that this may unintentionally  produce inaccuracies and/or nonsensical sentences.      Electronically signed by: Juan Holman, NIECY

## 2021-06-19 NOTE — LETTER
Letter by Juan Holman CNP at      Author: Juan Holman CNP Service: -- Author Type: --    Filed:  Encounter Date: 2019 Status: Signed         Patient: Sarah Felix   MR Number: [id number]   YOB: 1945   Date of Visit: 2019     [facility]    Name:   Sarah Felix  : 1945  Facility:   [facility] [[id number]]   Room:   Code Status: FULL CODE and POLST AVAILABLE -   Fac type:   SNF (Skilled Nursing Facility, TCU) -     PCP:  Wegener, Joel D, MD    CHIEF COMPLAINT / REASON FOR VISIT:  Chief Complaint   Patient presents with   ? Follow-up     TCU follow-up after hospitalization for left bimalleolar fracture with external fixation and subsequent internal fixation.      [facility] from 10/12/2019 until 10/18/2019 (left bimalleolar fracture with external fixation)  [facility] from 10/28/2019 until 10/30/2019 (left bimalleolar fracture ORIF)    Patient was last seen by me on 2019.      HPI: Sarah is a 74 y.o. 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      She was going down the stairs at home on 10/11/2019 in the late evening, when her ankle gave out, and she slipped 3-4 stairs onto her buttocks.  There was pain and deformity of the left ankle, but she waited until the next day to call for help.  She did not want the paramedics breaking down her door.  She was brought to [facility] where x-rays showed a bimalleolar ankle fracture which was severely displaced, and an attempted reduction in the ED necessitated calling orthopedics who performed a closed fracture reduction under conscious sedation in the ED.  Later in the day, she went to the OR for an external fixator placement.  She is nonweightbearing on the left leg,  "and she is receiving enoxaparin for DVT prophylaxis.  She was given doxycycline perioperatively and developed a diffuse rash.  Triamcinolone cream was prescribed.    She did have an open area on her sacrum up from a falling on the stairs, then scooting along the floor prior to calling for help.  It has been covered with a Mepilex dressing.    She returned to the hospital on 10/28/2019 for external fixation hardware removal and ORIF, performed by Dr. Can that day.  She tolerated the procedure well and returned here on 10/30/2019.      TCU ISSUES    Primary diagnosis: She has an appointment with Ortho at the [facility] today.    Pain management: Since the ORIF, she appears to be in less pain.  She is receiving 1 to 2 tablets of oxycodone IR (5 mg) every 4 hours as needed.  She tells me that pain is gradually improving.  She does have more pain with therapy, and she describes it as a \"jabbing pain, not constant.\"  She has orders for 5 to 10 mg of oxycodone.  She feels that she takes 5 mg about a third of the time and 10 mg about two thirds at the time, depending on the activity    Fatigue: She still needs frequent rest.  This is not something new, as it has been going on for some time.  She says that she has not had any significant follow-up with her PCP.  I wonder whether she is experiencing chronic fatigue syndrome.    Diabetes mellitus type 2: She had apparently been on Lantus and glimepiride at home, but for some reason the latter was discontinued in the hospital, her dose was cut in half (from 51 units to 25 units), and she was placed on sliding scale NovoLog.  Prior to her second hospitalization, fasting blood glucose levels ranged between 167 and 195.  At lunch, they ranged between 212 and 298.  Supper levels ranged between 209 and 245 and, at bedtime, the range was between 238 and 273.  The plan was to get her back to her home dosing.  We started by increasing her Lantus insulin to 35 units.  " "At my last visit, we authorized 2 units at lunch and 4 units at supper and observe how this affects bedtime levels.    Currently, fasting blood glucose levels range between 102 and 175 (with 87 outlier).  At lunch, they range between 165 and 234.  At supper, they range between 151 and 215 (with a 302 outlier).  Bedtime blood glucose levels range from a low of 180 to a high of 243. She told me they may be checking her blood glucose levels after she has been \"grazing.\"  She did appear to need scheduled prandial dosing.  However, we would like to try to return her to oral medications if possible.  We are going to resume her home glimepiride dose of 4 mg twice daily with meals and hold scheduled NovoLog at this juncture.  She tends to feel hypoglycemic symptoms at <100 and \"really bad in the 70s.\"  Note: She tells me that her last A1c was ~7.3.  Her PCP wants her A1c to be <7.0.    Buttock wound: As noted in the hospital discharge summary, she sustained open area on her sacrum from a falling on the stairs, then scooting along the floor prior to calling for help.  It has been covered with a Mepilex dressing.  Dr. Santiago ordered Proshield 3 times daily and as needed, and this has been working quite well.    Insomnia: She tends to go to bed rather late at home.  She tells me she may go to bed as late as 4 AM.  She does not require any sleep aid, although she does have orders for diphenhydramine (for itching) which she has not been taking.    Gastric reflux: She is on 40 mg of pantoprazole daily.  She does feel uncomfortable lying down but does not feel she needs this medication.  She will cough and feel like she is going to vomit, but if she sits up and burps, she then feels okay.  Unsure how long she has been on this, we changed the scheduled dose to every other day without issue.    Constipation: When she began having excessive BMs (and cramps), dosing of her senna S was halved, from 2 tablets twice daily to 1 tablet " twice daily.      ROS:   No headaches or chest pains, coughing or congestion, dizziness, dysuria, difficulty chewing or swallowing, integumentary issues, or problems with appetite.     Past Medical History:   Diagnosis Date   ? Allergic rhinitis    ? Basal cell carcinoma of face 03/2012    mohs   ? Bimalleolar fracture of left ankle    ? Chronic otitis externa    ? Contact dermatitis    ? 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 Cystic disease of liver     multiple liver lesions. felt to be focal nodular hyperplasia. annual CT abd. followed by Dr. Amilcar Kat    ? Cystocele, lateral    ? Diverticulitis of colon 06/2004    abd CT   ? DM (diabetes mellitus) (H)    ? Embolism and thrombosis (H) age 22    post ovarian cyst removed   ? Esophageal reflux    ? Fatty liver    ? Hiatal hernia     small   ? HLD (hyperlipidemia)    ? Nontoxic uninodular goiter    ? Osteopenia 04/21/2005    on fosamax for > 5 yr. stop 7/2012   ? Pure hypercholesterolemia               Family History   Problem Relation Age of Onset   ? Rheum arthritis Mother    ? Diabetes Mother    ? Hypertension Mother    ? Cancer Father    ? Other Brother         palpitations     Social History     Socioeconomic History   ? Marital status: Single     Spouse name: Not on file   ? Number of children: Not on file   ? Years of education: Not on file   ? Highest education level: Not on file   Occupational History   ? Not on file   Social Needs   ? Financial resource strain: Not on file   ? Food insecurity:     Worry: Not on file     Inability: Not on file   ? Transportation needs:     Medical: Not on file     Non-medical: Not on file   Tobacco Use   ? Smoking status: Former Smoker     Packs/day: 1.00     Years: 10.00     Pack years: 10.00   ? Smokeless tobacco: Never Used   Substance and Sexual Activity   ? Alcohol use: Yes     Alcohol/week: 6.0 standard drinks     Types: 6 Standard drinks or equivalent per week     Frequency: 4 or  more times a week   ? Drug use: Not on file   ? Sexual activity: Not on file   Lifestyle   ? Physical activity:     Days per week: Not on file     Minutes per session: Not on file   ? Stress: Not on file   Relationships   ? Social connections:     Talks on phone: Not on file     Gets together: Not on file     Attends Denominational service: Not on file     Active member of club or organization: Not on file     Attends meetings of clubs or organizations: Not on file     Relationship status: Not on file   ? Intimate partner violence:     Fear of current or ex partner: Not on file     Emotionally abused: Not on file     Physically abused: Not on file     Forced sexual activity: Not on file   Other Topics Concern   ? Not on file   Social History Narrative   ? Not on file       MEDICATIONS: Reviewed from the MAR, physician orders, and/or earlier progress notes.  Updated by me today (10/21/2019) with increases in fexofenadine and Lantus reflected below.  Post Discharge Medication Reconciliation Status: discharge medications reconciled and changed, per note/orders (see AVS).  Current Outpatient Medications   Medication Sig   ? glimepiride (AMARYL) 4 MG tablet Take 4 mg by mouth daily before breakfast.   ? acetaminophen (TYLENOL) 325 MG tablet Take 975 mg by mouth every 8 (eight) hours.   ? aluminum-magnesium hydroxide-simethicone (MAALOX ADVANCED) 200-200-20 mg/5 mL Susp Take 30 mL by mouth 3 (three) times a day as needed.   ? atorvastatin (LIPITOR) 40 MG tablet Take 40 mg by mouth at bedtime.   ? calcium-vitamin D (CALCIUM-VITAMIN D) 500 mg(1,250mg) -200 unit per tablet Take 1 tablet by mouth 2 (two) times a day with meals.   ? cholecalciferol, vitamin D3, 3,000 unit Tab Take 3,000 Units by mouth daily.   ? diphenhydrAMINE (BENADRYL) 25 mg capsule Take 25 mg by mouth every 6 (six) hours as needed for itching.   ? enoxaparin (LOVENOX) 40 mg/0.4 mL syringe Inject 40 mg under the skin daily.   ? fexofenadine (ALLEGRA) 180 MG  tablet Take 180 mg by mouth daily.   ? furosemide (LASIX) 20 MG tablet Take 20 mg by mouth daily.   ? insulin aspart U-100 (NOVOLOG) 100 unit/mL injection Inject under the skin 3 (three) times a day before meals. Inject as per  sliding scale: if 0 - 139 = O UNITS DO NOT GIVE  CORRECTION INSULIN IF PRE MEAL BG < 140;  140 - 189 = 1 UNIT; 190 - 239 = 2 UNITS; 240 - 289 =  3 UNITS; 290 - 339 = 4 UNITS; 340 - 399 = 5 UNITS;  400 - 449 = 6 UNITS; 450+ = 7 UNITS IF BG = OR >  450 GIVE 7 UNIT   ? insulin aspart U-100 (NOVOLOG) 100 unit/mL injection Inject under the skin at bedtime. Inject as per  sliding scale: if 0 - 199 = 0 UNITS DO NOT GIVE  BEDTIME CORRECTION DOSE IF BG LESS THAN  200; 200 - 249 = 1 UNIT; 250 - 299 = 2 UNITS; 300 -  349 = 3 UNITS; 350 - 399 = 4 UNITS; 400+ = 5  UNITS FOR BG GREATER THAN OR EQUAL TO  400, GIVE 5 UNITS   ? insulin aspart U-100 (NOVOLOG) 100 unit/mL injection Inject under the skin 2 (two) times a day before meals. Administer 2 units at lunch and 4 units at supper.   ? insulin glargine (LANTUS) 100 unit/mL vial Inject 35 Units under the skin daily.          ? lisinopril (PRINIVIL,ZESTRIL) 2.5 MG tablet Take 2.5 mg by mouth daily.   ? methocarbamol (ROBAXIN) 500 MG tablet Take 500 mg by mouth 4 (four) times a day.   ? metoprolol tartrate (LOPRESSOR) 25 MG tablet Take 25 mg by mouth 2 (two) times a day.   ? oxyCODONE (ROXICODONE) 5 MG immediate release tablet Take 5-10 mg by mouth every 4 (four) hours as needed for pain.   ? pantoprazole (PROTONIX) 40 MG tablet Take 40 mg by mouth daily.   ? senna-docusate (PERICOLACE) 8.6-50 mg tablet Take 2 tablets by mouth 2 (two) times a day.   ? triamcinolone (KENALOG) 0.1 % cream Apply 1 application topically 3 (three) times a day.     ALLERGIES:   Allergies   Allergen Reactions   ? Amlodipine Swelling   ? Cefazolin    ? Metformin Diarrhea   ? Cephalosporins Rash   ? Clindamycin Rash   ? Levaquin [Levofloxacin] Rash     Itching, rash     ? Nickel Rash  "    Contact reaction       DIET: Diabetic, regular texture, thin liquids.    Vitals:    11/13/19 1452   BP: 118/56   Pulse: 71   Resp: 18   Temp: (!) 94.7  F (34.8  C)   SpO2: 98%   Height: 5' 5\" (1.651 m)     There is no height or weight on file to calculate BMI.    EXAMINATION:   General: Moderately obese middle-aged female,sitting in a recliner.  Head: Normocephalic and atraumatic.   Eyes: PERRLA, sclerae clear.   ENT: Moist oral mucosa.  She has her own teeth.  No rhinorrhea or nasal discharge.  Hearing is unimpaired.  Cardiovascular: Regular rate and rhythm.  No appreciable murmur.  Respiratory: Lungs clear to auscultation bilaterally.   Abdomen: Soft and nontender.   Musculoskeletal/Extremities: Age-related degenerative joint disease.  She is wearing a boot on the left lower extremity.  Leg is currently wrapped.  Good CMS.  Integument: No rashes, clinically significant lesions, or skin breakdown.   Cognitive/Psychiatric: Alert and oriented x3.  Affect is euthymic.    DIAGNOSTICS:   No results found for this or any previous visit.  No results found for: WBC, HGB, HCT, MCV, PLT  CrCl cannot be calculated (No successful lab value found.).      ASSESSMENT/Plan:      ICD-10-CM    1. Closed bimalleolar fracture of left ankle with routine healing S82.842D    2. Type 2 diabetes mellitus with hyperglycemia, with long-term current use of insulin (H) E11.65     Z79.4    3. Essential hypertension, benign I10    4. Hyperlipidemia LDL goal <100 E78.5    5. Gastroesophageal reflux disease, esophagitis presence not specified K21.9    6. Fatty liver K76.0    7. Congenital cystic disease of liver Q44.6      CHANGES:    1.  Place scheduled mealtime NovoLog on hold.  2.  Resume glimepiride to 4 mg 1 tab p.o. twice daily with meals (for diabetes mellitus type 2).    CARE PLAN:    The care plan has been reviewed and all orders signed. Changes to care plan, if any, as noted. Otherwise, continue current plan of care.  Total time " spent with this patient was approximately 35 minutes, with greater than 50% spent in counseling and coordination of care that included addressing reviewing recent blood glucose levels and making adjustments to diabetes management.    The above has been created using voice recognition software. Please be aware that this may unintentionally  produce inaccuracies and/or nonsensical sentences.      Electronically signed by: Juan Holman CNP

## 2021-06-19 NOTE — LETTER
Letter by Juan Holman CNP at      Author: Juan Holman CNP Service: -- Author Type: --    Filed:  Encounter Date: 2019 Status: Signed         Patient: Sarah Felix   MR Number: [id number]   YOB: 1945   Date of Visit: 2019     [facility]    Name:   Sarah Felix  : 1945  Facility:   [facility] [[id number]]   Room:   Code Status: FULL CODE and POLST AVAILABLE -   Fac type:   SNF (Skilled Nursing Facility, TCU) -     PCP:  Wegener, Joel D, MD    CHIEF COMPLAINT / REASON FOR VISIT:  Chief Complaint   Patient presents with   ? Follow-up     TCU follow-up after hospitalization for left bimalleolar fracture with external fixation and subsequent internal fixation.      [facility] from 10/12/2019 until 10/18/2019 (left bimalleolar fracture with external fixation)  [facility] from 10/28/2019 until 10/30/2019 (left bimalleolar fracture ORIF)      HPI: Sarah is a 74 y.o. 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      She was going down the stairs at home on 10/11/2019 in the late evening, when her ankle gave out, and she slipped 3-4 stairs onto her buttocks.  There was pain and deformity of the left ankle, but she waited until the next day to call for help.  She did not want the paramedics breaking down her door.  She was brought to [facility] where x-rays showed a bimalleolar ankle fracture which was severely displaced, and an attempted reduction in the ED necessitated calling orthopedics who performed a closed fracture reduction under conscious sedation in the ED.  Later in the day, she went to the OR for an external fixator placement.  She is nonweightbearing on the left leg, and she is receiving enoxaparin for DVT  "prophylaxis.  She was given doxycycline perioperatively and developed a diffuse rash.  Triamcinolone cream was prescribed.    She did have an open area on her sacrum up from a falling on the stairs, then scooting along the floor prior to calling for help.  It has been covered with a Mepilex dressing.    She returned to the hospital on 10/28/2019 for external fixation hardware removal and ORIF, performed by Dr. Can that day.  She tolerated the procedure well and returned here on 10/30/2019.      TCU ISSUES    Primary diagnosis: She is to have the dressing changed at her ORIF site on day 4 and day 7.  This was performed on day 4.  Today is day 7.    Pain management: Since the ORIF, she appears to be in less pain.  She is receiving 1 to 2 tablets of oxycodone IR (5 mg) every 4 hours as needed, still rating her pain at an \"8.\"  She has not yet had a dose this morning.  She also has an air mattress.     Today, she describes the leg as feeling \"swollen, tightness, numb,\" and the pin sites do ache.    She is now experiencing right ankle pain, stating that her right side is taking a lot of additional stress, possibly overcompensating.  Holly is her therapist, and I suggested she discuss this with her.    Fatigue: She tells me she needs frequent rest.  This is not something new, as it has been going on for some time.  She says that she has not had any significant follow-up with her PCP.  I wonder whether she is experiencing chronic fatigue syndrome.    Dyspnea: I did receive a call over the weekend during her initial stay and was informed that the patient was short of breath.  She told me she was so weak that she could not move, and aides assisted in moving her up in bed, but she could not breathe, felt exhausted, and oxygen was applied.  By late afternoon, she did not need the oxygen.  Nursing staff were concerned about a possible PE; however, she had been on enoxaparin, had experienced no chest pain, and seemed to be " "doing okay.  She did request oxygen \"just to help me sleep.\"  She does not have a history of sleep apnea.  She still reiterates that she cannot breathe well when lying flat.  She does not have a history of heart failure.    Sinus congestion/pruritus: She is still itchy from her allergic reaction to ceftazidime or doxycycline (she says a former, but Dr. Santiago has listed the latter).  She does not feel that the triamcinolone is offering sufficient coverage  She does tell me that the rash has been retreating.  It is now on her thighs and back and is still significantly pruritic.  I suggested we could try something oral.  She is already receiving Benadryl, but she has told me she can only take it at night, as it makes her too sleepy.  In fact, she has been using it as a sleep aid of sorts, as \"it has not touched the itching.\"  In my last visit, I could see she already had orders for fexofenadine 180 mg daily.  We added an additional 60 mg daily.  In the hospital, of course, the dose was brought back down to 180 mg.  Pruritus is not significant; however, she shows me her upper chest where she has been scratching.    Diabetes mellitus type 2: She had apparently been on Lantus and glimepiride at home, but for some reason the latter was discontinued in the hospital, her dose was cut in half (from 51 units to 25 units), and she was placed on sliding scale NovoLog.  Prior to her second hospitalization, fasting blood glucose levels ranged between 167 and 195.  At lunch, they ranged between 212 and 298.  Supper levels ranged between 209 and 245 and, at bedtime, the range was between 238 and 273.  The plan was to get her back to her home dosing.  We started by increasing her Lantus insulin to 35 units.    Currently, fasting blood glucose levels look pretty good, ranging between 117 and 177 (with outliers of 88 and 236).  They are quite a bit higher at lunch, however, ranging between 211 and 290 (with a 168 outlier).  At " "supper, results are similar, ranging between 196 and 286 (with a 160 outlier).  Bedtime blood glucose levels are even higher, ranging from a low of 196 to a high of 325.  Given the reasonable fasting blood glucose levels, I am not apt to increase the Lantus.  She tells me they may be checking her blood glucose levels after she has been \"grazing.\"  She does appear to need scheduled prandial dosing.  We will authorize 2 units at lunch and 4 units at supper and observe how this affects bedtime levels.     Buttock wound: As noted in the hospital discharge summary, she sustained open area on her sacrum from a falling on the stairs, then scooting along the floor prior to calling for help.  It has been covered with a Mepilex dressing.  Dr. Santiago ordered Proshield 3 times daily and as needed, and this has been working quite well.    Insomnia: She tends to go to bed rather late.  She tells me she may go to bed as late as 4 AM.  She does not require any sleep aid.    Gastric reflux: She is on 40 mg of pantoprazole daily.  She does feel uncomfortable lying down but does not feel she needs this medication.  She will cough and feel like she is going to vomit, but if she sits up and burps, she then feels okay.  Unsure how long she has been on this, we will change the scheduled dose to every other day.      ROS:   No headaches or chest pains, coughing or congestion, dizziness, dysuria, constipation (she tells me she is taking half the prescribed senna) or diarrhea, difficulty chewing or swallowing.     Past Medical History:   Diagnosis Date   ? Allergic rhinitis    ? Basal cell carcinoma of face 03/2012    [facility]   ? Bimalleolar fracture of left ankle    ? Chronic otitis externa    ? Contact dermatitis    ? 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 Cystic disease of liver     multiple liver lesions. felt to be focal nodular hyperplasia. annual CT abd. followed by Dr. Amilcar Kat    ? Cystocele, " lateral    ? Diverticulitis of colon 06/2004    abd CT   ? DM (diabetes mellitus) (H)    ? Embolism and thrombosis (H) age 22    post ovarian cyst removed   ? Esophageal reflux    ? Fatty liver    ? Hiatal hernia     small   ? HLD (hyperlipidemia)    ? Nontoxic uninodular goiter    ? Osteopenia 04/21/2005    on fosamax for > 5 yr. stop 7/2012   ? Pure hypercholesterolemia               Family History   Problem Relation Age of Onset   ? Rheum arthritis Mother    ? Diabetes Mother    ? Hypertension Mother    ? Cancer Father    ? Other Brother         palpitations     Social History     Socioeconomic History   ? Marital status: Single     Spouse name: Not on file   ? Number of children: Not on file   ? Years of education: Not on file   ? Highest education level: Not on file   Occupational History   ? Not on file   Social Needs   ? Financial resource strain: Not on file   ? Food insecurity:     Worry: Not on file     Inability: Not on file   ? Transportation needs:     Medical: Not on file     Non-medical: Not on file   Tobacco Use   ? Smoking status: Former Smoker     Packs/day: 1.00     Years: 10.00     Pack years: 10.00   ? Smokeless tobacco: Never Used   Substance and Sexual Activity   ? Alcohol use: Yes     Alcohol/week: 6.0 standard drinks     Types: 6 Standard drinks or equivalent per week     Frequency: 4 or more times a week   ? Drug use: Not on file   ? Sexual activity: Not on file   Lifestyle   ? Physical activity:     Days per week: Not on file     Minutes per session: Not on file   ? Stress: Not on file   Relationships   ? Social connections:     Talks on phone: Not on file     Gets together: Not on file     Attends [employer] service: Not on file     Active member of club or organization: Not on file     Attends meetings of clubs or organizations: Not on file     Relationship status: Not on file   ? Intimate partner violence:     Fear of current or ex partner: Not on file     Emotionally abused: Not on file      Physically abused: Not on file     Forced sexual activity: Not on file   Other Topics Concern   ? Not on file   Social History Narrative   ? Not on file       MEDICATIONS: Reviewed from the MAR, physician orders, and/or earlier progress notes.  Updated by me today (10/21/2019) with increases in fexofenadine and Lantus reflected below.  Post Discharge Medication Reconciliation Status: discharge medications reconciled and changed, per note/orders (see AVS).  Current Outpatient Medications   Medication Sig   ? acetaminophen (TYLENOL) 325 MG tablet Take 975 mg by mouth every 8 (eight) hours.   ? aluminum-magnesium hydroxide-simethicone (MAALOX ADVANCED) 200-200-20 mg/5 mL Susp Take 30 mL by mouth 3 (three) times a day as needed.   ? atorvastatin (LIPITOR) 40 MG tablet Take 40 mg by mouth at bedtime.   ? calcium-vitamin D (CALCIUM-VITAMIN D) 500 mg(1,250mg) -200 unit per tablet Take 1 tablet by mouth 2 (two) times a day with meals.   ? cholecalciferol, vitamin D3, 3,000 unit Tab Take 3,000 Units by mouth daily.   ? diphenhydrAMINE (BENADRYL) 25 mg capsule Take 25 mg by mouth every 6 (six) hours as needed for itching.   ? enoxaparin (LOVENOX) 40 mg/0.4 mL syringe Inject 40 mg under the skin daily.   ? fexofenadine (ALLEGRA) 180 MG tablet Take 180 mg by mouth daily.   ? furosemide (LASIX) 20 MG tablet Take 20 mg by mouth daily.   ? insulin aspart U-100 (NOVOLOG) 100 unit/mL injection Inject under the skin 3 (three) times a day before meals. Inject as per  sliding scale: if 0 - 139 = O UNITS DO NOT GIVE  CORRECTION INSULIN IF PRE MEAL BG < 140;  140 - 189 = 1 UNIT; 190 - 239 = 2 UNITS; 240 - 289 =  3 UNITS; 290 - 339 = 4 UNITS; 340 - 399 = 5 UNITS;  400 - 449 = 6 UNITS; 450+ = 7 UNITS IF BG = OR >  450 GIVE 7 UNIT   ? insulin aspart U-100 (NOVOLOG) 100 unit/mL injection Inject under the skin at bedtime. Inject as per  sliding scale: if 0 - 199 = 0 UNITS DO NOT GIVE  BEDTIME CORRECTION DOSE IF BG LESS THAN  200; 200 -  "249 = 1 UNIT; 250 - 299 = 2 UNITS; 300 -  349 = 3 UNITS; 350 - 399 = 4 UNITS; 400+ = 5  UNITS FOR BG GREATER THAN OR EQUAL TO  400, GIVE 5 UNITS   ? insulin glargine (LANTUS) 100 unit/mL vial Inject 35 Units under the skin daily.          ? lisinopril (PRINIVIL,ZESTRIL) 2.5 MG tablet Take 2.5 mg by mouth daily.   ? methocarbamol (ROBAXIN) 500 MG tablet Take 500 mg by mouth 4 (four) times a day.   ? metoprolol tartrate (LOPRESSOR) 25 MG tablet Take 25 mg by mouth 2 (two) times a day.   ? oxyCODONE (ROXICODONE) 5 MG immediate release tablet Take 5-10 mg by mouth every 4 (four) hours as needed for pain.   ? pantoprazole (PROTONIX) 40 MG tablet Take 40 mg by mouth daily.   ? senna-docusate (PERICOLACE) 8.6-50 mg tablet Take 2 tablets by mouth 2 (two) times a day.   ? triamcinolone (KENALOG) 0.1 % cream Apply 1 application topically 3 (three) times a day.     ALLERGIES:   Allergies   Allergen Reactions   ? Amlodipine Swelling   ? Cefazolin    ? Metformin Diarrhea   ? Cephalosporins Rash   ? Clindamycin Rash   ? Levaquin [Levofloxacin] Rash     Itching, rash     ? Nickel Rash     Contact reaction       DIET: Diabetic, regular texture, thin liquids.    Vitals:    11/04/19 1223   BP: 137/74   Pulse: 80   Resp: 18   Temp: 97.5  F (36.4  C)   SpO2: 97%   Height: 5' 5\" (1.651 m)     There is no height or weight on file to calculate BMI.    EXAMINATION:   General: Moderately obese middle-aged female,sitting in a recliner.  Head: Normocephalic and atraumatic.   Eyes: PERRLA, sclerae clear.   ENT: Moist oral mucosa.  She has her own teeth.  No rhinorrhea or nasal discharge.  Hearing is unimpaired.  Cardiovascular: Regular rate and rhythm.  No appreciable murmur.  Respiratory: Lungs clear to auscultation bilaterally.   Abdomen: Soft and nontender.   Musculoskeletal/Extremities: Age-related degenerative joint disease.  She is wearing a boot on the left lower extremity.  Leg is currently wrapped.  Good CMS.  Integument: No rashes, " clinically significant lesions, or skin breakdown.   Cognitive/Psychiatric: Alert and oriented x3.  Affect is euthymic.    DIAGNOSTICS:   No results found for this or any previous visit.  No results found for: WBC, HGB, HCT, MCV, PLT  CrCl cannot be calculated (No successful lab value found.).      ASSESSMENT/Plan:      ICD-10-CM    1. Closed bimalleolar fracture of left ankle with routine healing S82.842D    2. Type 2 diabetes mellitus with hyperglycemia, with long-term current use of insulin (H) E11.65     Z79.4    3. Essential hypertension, benign I10    4. Hyperlipidemia LDL goal <100 E78.5    5. Gastroesophageal reflux disease, esophagitis presence not specified K21.9    6. Fatty liver K76.0    7. Congenital cystic disease of liver Q44.6      CHANGES:    1.  Change pantoprazole from 40 mg daily to 40 mg every other day.  2.  Add scheduled prandial NovoLog, 2 units at lunch and 4 units at supper.    CARE PLAN:    The care plan has been reviewed and all orders signed. Changes to care plan, if any, as noted. Otherwise, continue current plan of care.  Total time spent with this patient was approximately 35 minutes, with greater than 50% spent in counseling and coordination of care that included addressing her complaints and working with her to make adjustments in her insulin dosing.    The above has been created using voice recognition software. Please be aware that this may unintentionally  produce inaccuracies and/or nonsensical sentences.      Electronically signed by: Juan Holman CNP

## 2021-06-19 NOTE — LETTER
Letter by Juan Holman CNP at      Author: Juan Holman CNP Service: -- Author Type: --    Filed:  Encounter Date: 2019 Status: Signed         Patient: Sarah Felix   MR Number: [id number]   YOB: 1945   Date of Visit: 2019     [facility]    Name:   Sarah Felix  : 1945  Facility:   [facility] [[id number]]   Room:   Code Status: FULL CODE and POLST AVAILABLE -   Fac type:   SNF (Skilled Nursing Facility, TCU) -     PCP:  Wegener, Joel D, MD    CHIEF COMPLAINT / REASON FOR VISIT:  Chief Complaint   Patient presents with   ? Follow-up     TCU follow-up after hospitalization for left bimalleolar fracture with external fixation and subsequent internal fixation.      [facility] from 10/12/2019 until 10/18/2019 (left bimalleolar fracture with external fixation)  [facility] from 10/28/2019 until 10/30/2019 (left bimalleolar fracture ORIF)    Patient was last seen by me on 2019 and subsequently seen by Dr. Santiago on 11/15/2019.      HPI: Sarah is a 74 y.o. 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      She was going down the stairs at home on 10/11/2019 in the late evening, when her ankle gave out, and she slipped 3-4 stairs onto her buttocks.  There was pain and deformity of the left ankle, but she waited until the next day to call for help.  She did not want the paramedics breaking down her door.  She was brought to [facility] where x-rays showed a bimalleolar ankle fracture which was severely displaced, and an attempted reduction in the ED necessitated calling orthopedics who performed a closed fracture reduction under conscious sedation in the ED.  Later in the day, she went to the OR for an external fixator  "placement.  She is nonweightbearing on the left leg, and she is receiving enoxaparin for DVT prophylaxis.  She was given doxycycline perioperatively and developed a diffuse rash.  Triamcinolone cream was prescribed.    She did have an open area on her sacrum up from a falling on the stairs, then scooting along the floor prior to calling for help.  It has been covered with a Mepilex dressing.    She returned to the hospital on 10/28/2019 for external fixation hardware removal and ORIF, performed by Dr. Can that day.  She tolerated the procedure well and returned here on 10/30/2019.      TCU ISSUES    Primary diagnosis: She has an appointment with Ortho at the [facility] on 11/13/2019.      Pain management: Since the ORIF, she appears to be in less pain.  She is receiving 1 to 2 tablets of oxycodone IR (5 mg) every 4 hours as needed.  She tells me that pain is gradually improving.  She does have more pain with therapy, and she describes it as a \"jabbing pain, not constant.\"  She had orders for 5 to 10 mg of oxycodone.  She felt that she was taking 5 mg about a third of the time and 10 mg about two thirds at the time, depending on the activity.  Dr. Santiago scheduled 10 mg doses at 7:30 AM and at noon, with PRN dosing remaining unchanged.    Fatigue: She still needs frequent rest.  This is not something new, as it has been going on for some time.  She says that she has not had any significant follow-up with her PCP.  I wonder whether she is experiencing chronic fatigue syndrome.    Diabetes mellitus type 2: She had apparently been on Lantus and glimepiride at home, but for some reason the latter was discontinued in the hospital, her dose was cut in half (from 51 units to 25 units), and she was placed on sliding scale NovoLog.  Prior to her second hospitalization, fasting blood glucose levels ranged between 167 and 195.  At lunch, they ranged between 212 and 298.  Supper levels ranged between 209 and " "245 and, at bedtime, the range was between 238 and 273.  The plan was to get her back to her home dosing.  We started by increasing her Lantus insulin to 35 units.  At my prior visit, we authorized 2 units at lunch and 4 units at supper to observe how this affected bedtime levels.    With blood glucose levels looking better: Fasting levels between 102 and 175 (with 87 outlier);  Lunch levels between 165 and 234; supper levels between 151 and 215 (with a 302 outlier); and bedtime levels ranging from a low of 180 to a high of 243, we resumed her glimepiride 4 mg twice daily and placed a hold on the prandial NovoLog.      She tends to spread out her meals and may not finish a meal, instead saving part of it for later, giving herself 5 meals per day rather than 3.  She tends to feel hypoglycemic symptoms at <100 and \"really bad in the 70s.\"  Note: She tells me that her last A1c was ~7.3.  Her PCP wants her A1c to be <7.0.  Blood glucose levels are looking better but are still elevated, particularly at supper and bedtime.  We will try increasing the glargine from 35 units to 38 units.    Buttock wound: As noted in the hospital discharge summary, she sustained open area on her sacrum from a falling on the stairs, then scooting along the floor prior to calling for help.  It has been covered with a Mepilex dressing.  Dr. Santiago ordered Proshield 3 times daily and as needed, and this has been working quite well.    Insomnia: She tends to go to bed rather late at home.  She tells me she may go to bed as late as 4 AM.  She does not require any sleep aid, although she does have orders for diphenhydramine (for itching) which she has not been taking.    Gastric reflux: She is on 40 mg of pantoprazole daily.  She does feel uncomfortable lying down but does not feel she needs this medication.  She will cough and feel like she is going to vomit, but if she sits up and burps, she then feels okay.  Unsure how long she has been on " this, we changed the scheduled dose to every other day without issue.    Constipation: When she began having excessive BMs (and cramps), dosing of her senna S was halved, from 2 tablets twice daily to 1 tablet twice daily.      ROS:   No headaches or chest pains, coughing or congestion, dizziness, dysuria, difficulty chewing or swallowing, integumentary issues, or problems with appetite.     Past Medical History:   Diagnosis Date   ? Allergic rhinitis    ? Basal cell carcinoma of face 03/2012    mohs   ? Bimalleolar fracture of left ankle    ? Chronic otitis externa    ? Contact dermatitis    ? 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 Cystic disease of liver     multiple liver lesions. felt to be focal nodular hyperplasia. annual CT abd. followed by Dr. Amilcar Kat    ? Cystocele, lateral    ? Diverticulitis of colon 06/2004    abd CT   ? DM (diabetes mellitus) (H)    ? Embolism and thrombosis (H) age 22    post ovarian cyst removed   ? Esophageal reflux    ? Fatty liver    ? Hiatal hernia     small   ? HLD (hyperlipidemia)    ? Nontoxic uninodular goiter    ? Osteopenia 04/21/2005    on fosamax for > 5 yr. stop 7/2012   ? Pure hypercholesterolemia               Family History   Problem Relation Age of Onset   ? Rheum arthritis Mother    ? Diabetes Mother    ? Hypertension Mother    ? Cancer Father    ? Other Brother         palpitations     Social History     Socioeconomic History   ? Marital status: Single     Spouse name: Not on file   ? Number of children: Not on file   ? Years of education: Not on file   ? Highest education level: Not on file   Occupational History   ? Not on file   Social Needs   ? Financial resource strain: Not on file   ? Food insecurity:     Worry: Not on file     Inability: Not on file   ? Transportation needs:     Medical: Not on file     Non-medical: Not on file   Tobacco Use   ? Smoking status: Former Smoker     Packs/day: 1.00     Years: 10.00     Pack  years: 10.00   ? Smokeless tobacco: Never Used   Substance and Sexual Activity   ? Alcohol use: Yes     Alcohol/week: 6.0 standard drinks     Types: 6 Standard drinks or equivalent per week     Frequency: 4 or more times a week   ? Drug use: Not on file   ? Sexual activity: Not on file   Lifestyle   ? Physical activity:     Days per week: Not on file     Minutes per session: Not on file   ? Stress: Not on file   Relationships   ? Social connections:     Talks on phone: Not on file     Gets together: Not on file     Attends Yazdanism service: Not on file     Active member of club or organization: Not on file     Attends meetings of clubs or organizations: Not on file     Relationship status: Not on file   ? Intimate partner violence:     Fear of current or ex partner: Not on file     Emotionally abused: Not on file     Physically abused: Not on file     Forced sexual activity: Not on file   Other Topics Concern   ? Not on file   Social History Narrative   ? Not on file       MEDICATIONS: Reviewed from the MAR, physician orders, and/or earlier progress notes.   Post Discharge Medication Reconciliation Status: medication reconciliation previously completed during another office visit.  Updated by me today (11/18/2019) with an increase in glargine reflected below.  Current Outpatient Medications   Medication Sig   ? acetaminophen (TYLENOL) 325 MG tablet Take 975 mg by mouth every 8 (eight) hours.   ? aluminum-magnesium hydroxide-simethicone (MAALOX ADVANCED) 200-200-20 mg/5 mL Susp Take 30 mL by mouth 3 (three) times a day as needed.   ? atorvastatin (LIPITOR) 40 MG tablet Take 40 mg by mouth at bedtime.   ? calcium-vitamin D (CALCIUM-VITAMIN D) 500 mg(1,250mg) -200 unit per tablet Take 1 tablet by mouth 2 (two) times a day with meals.   ? cholecalciferol, vitamin D3, 3,000 unit Tab Take 3,000 Units by mouth daily.   ? diphenhydrAMINE (BENADRYL) 25 mg capsule Take 25 mg by mouth every 6 (six) hours as needed for  itching.   ? enoxaparin (LOVENOX) 40 mg/0.4 mL syringe Inject 40 mg under the skin daily.   ? fexofenadine (ALLEGRA) 180 MG tablet Take 180 mg by mouth daily.   ? furosemide (LASIX) 20 MG tablet Take 20 mg by mouth daily.   ? glimepiride (AMARYL) 4 MG tablet Take 4 mg by mouth daily before breakfast.   ? insulin aspart U-100 (NOVOLOG) 100 unit/mL injection Inject under the skin 3 (three) times a day before meals. Inject as per  sliding scale: if 0 - 139 = O UNITS DO NOT GIVE  CORRECTION INSULIN IF PRE MEAL BG < 140;  140 - 189 = 1 UNIT; 190 - 239 = 2 UNITS; 240 - 289 =  3 UNITS; 290 - 339 = 4 UNITS; 340 - 399 = 5 UNITS;  400 - 449 = 6 UNITS; 450+ = 7 UNITS IF BG = OR >  450 GIVE 7 UNIT   ? insulin aspart U-100 (NOVOLOG) 100 unit/mL injection Inject under the skin at bedtime. Inject as per  sliding scale: if 0 - 199 = 0 UNITS DO NOT GIVE  BEDTIME CORRECTION DOSE IF BG LESS THAN  200; 200 - 249 = 1 UNIT; 250 - 299 = 2 UNITS; 300 -  349 = 3 UNITS; 350 - 399 = 4 UNITS; 400+ = 5  UNITS FOR BG GREATER THAN OR EQUAL TO  400, GIVE 5 UNITS   ? insulin aspart U-100 (NOVOLOG) 100 unit/mL injection Inject under the skin 2 (two) times a day before meals. Administer 2 units at lunch and 4 units at supper.   ? insulin glargine (LANTUS) 100 unit/mL vial Inject 38 Units under the skin daily.          ? lisinopril (PRINIVIL,ZESTRIL) 2.5 MG tablet Take 2.5 mg by mouth daily.   ? methocarbamol (ROBAXIN) 500 MG tablet Take 500 mg by mouth 4 (four) times a day.   ? metoprolol tartrate (LOPRESSOR) 25 MG tablet Take 25 mg by mouth 2 (two) times a day.   ? oxyCODONE (ROXICODONE) 5 MG immediate release tablet Take 5-10 mg by mouth every 4 (four) hours as needed for pain.   ? oxyCODONE (ROXICODONE) 5 MG immediate release tablet Take 10 mg by mouth 2 (two) times a day with meals. 0730 and noon to cover morning and afternoon therapies respectively   ? pantoprazole (PROTONIX) 40 MG tablet Take 40 mg by mouth daily.   ? senna-docusate  "(PERICOLACE) 8.6-50 mg tablet Take 2 tablets by mouth 2 (two) times a day.   ? triamcinolone (KENALOG) 0.1 % cream Apply 1 application topically 3 (three) times a day.     ALLERGIES:   Allergies   Allergen Reactions   ? Amlodipine Swelling   ? Cefazolin    ? Metformin Diarrhea   ? Cephalosporins Rash   ? Clindamycin Rash   ? Levaquin [Levofloxacin] Rash     Itching, rash     ? Nickel Rash     Contact reaction       DIET: Diabetic, regular texture, thin liquids.    Vitals:    11/18/19 1449   BP: 113/70   Pulse: 87   Resp: 22   Temp: 97.7  F (36.5  C)   SpO2: 97%   Height: 5' 5\" (1.651 m)     There is no height or weight on file to calculate BMI.    EXAMINATION:   General: Moderately obese middle-aged female,sitting in a recliner.  Head: Normocephalic and atraumatic.   Eyes: PERRLA, sclerae clear.   ENT: Moist oral mucosa.  She has her own teeth.  No rhinorrhea or nasal discharge.  Hearing is unimpaired.  Cardiovascular: Regular rate and rhythm.  No appreciable murmur.  Respiratory: Lungs clear to auscultation bilaterally.   Abdomen: Soft and nontender.   Musculoskeletal/Extremities: Age-related degenerative joint disease.  She is wearing a boot on the left lower extremity.  Leg is currently wrapped.  Good CMS.  Integument: No rashes, clinically significant lesions, or skin breakdown.   Cognitive/Psychiatric: Alert and oriented x3.  Affect is euthymic.    DIAGNOSTICS:   No results found for this or any previous visit.  No results found for: WBC, HGB, HCT, MCV, PLT  CrCl cannot be calculated (No successful lab value found.).      ASSESSMENT/Plan:      ICD-10-CM    1. Closed bimalleolar fracture of left ankle with routine healing S82.842D    2. Type 2 diabetes mellitus with hyperglycemia, with long-term current use of insulin (H) E11.65     Z79.4    3. Essential hypertension, benign I10    4. Hyperlipidemia LDL goal <100 E78.5    5. Gastroesophageal reflux disease, esophagitis presence not specified K21.9    6. Fatty " liver K76.0    7. Congenital cystic disease of liver Q44.6      CHANGES:    Increase glargine from 35 units to 38 units.    CARE PLAN:    The care plan has been reviewed and all orders signed. Changes to care plan, if any, as noted. Otherwise, continue current plan of care.  Total time spent with this patient was approximately 35 minutes, with greater than 50% spent in counseling and coordination of care that included addressing reviewing recent blood glucose levels and making adjustments to diabetes management.    The above has been created using voice recognition software. Please be aware that this may unintentionally  produce inaccuracies and/or nonsensical sentences.      Electronically signed by: Juan Holman, NIECY

## 2021-06-19 NOTE — LETTER
Letter by Sunita Santiago MD at      Author: Sunita Santiago MD Service: -- Author Type: --    Filed:  Encounter Date: 11/15/2019 Status: Signed         Patient: Sarah Felix   MR Number: [id number]   YOB: 1945   Date of Visit: 11/15/2019     [facility]      Facility:    [facility] [[id number]]   Code Status: UNKNOWN  [place] / U [facility] : 10/12 to 10/18/19  U [facility] 10/28/19 - 10/30/19    ORIF     NO DISCHARGE SUMMARY IN CARE EVERYWHERE      Chief Complaint/Reason for Visit:  Chief Complaint   Patient presents with   ? Review Of Multiple Medical Conditions     Left bimalleolar ankle fracture       HPI:   Sarah is a 74 y.o. female with a history of type 2 diabetes.    She was going down the stairs  at home on 10/11/2019 in the late evening.  Her ankle gave out and she slipped 3-4 stairs on her buttocks. She had pain and deformity of her left ankle. She waited till the following day  to call for help, she did not want the paramedics breaking down her door.    She was brought to the [facility].  X-ray showed bimalleolar ankle fracture.  It was a severely displaced  fracture, ED attempted to reduce it.  Ended up calling orthopedics  who performed aclosed fracture reduction under conscious sedation in the emergency department.  Later in the day, she went to the OR for an external fixator placement.  She was non-weightbearing on the left leg, enoxaparin for DVT prophylaxis.    She was given Doxycycline perioperatively, and developed a diffuse rash. Triamcinolone cream was prescribed.    She had an open area on her coccyx from falling on the stairs, then scooting along her floor prior to calling for help. It was covered with Mepilex foam dressing.    She transferred to [facility].  __________________________________________________________________________  Hospitalization #2: 10/28/19  Removal of  external fixator, plate and screws open reduction fixation.  Nonweightbearing left lower extremity  DVT prophylaxis: Lovenox daily x28 days    She returned to [facility]    UPDATE:  Things are generally going well  Her sugars are variable: There occasionally high but generally are okay.  The NovoLog started at lunch and supper, has been increased to 60 units, 8 units respectively   She has a request to decrease her pantoprazole from daily to every other day (done)  She was discharged without wound care orders: Just recommend dry gauze, change every 48 hours and as needed  Lotion on her left leg skin is very helpful    Past Medical History:  Active Non-Hospital Problems    Diagnosis   ? Esophageal reflux   ? Fatty liver   ? Closed bimalleolar fracture of left ankle with routine healing   ? Type 2 diabetes mellitus with hyperglycemia (H)   ? Essential hypertension, benign   ? Peripheral vascular disease (H)     Problem list name updated by automated process. Provider to review     ? Hypertension goal BP (blood pressure) < 140/90   ? Personal history of other malignant neoplasm of skin   ? Hyperlipidemia LDL goal <100     Per provider     ? Diverticulitis of colon     Abd CT  Problem list name updated by automated process. Provider to review     ? Congenital cystic disease of liver     multiple liver lesions.  felt to be focal nodular hyperplasia.  annual CT abd.    followed by Dr. Amilcar Kat               Surgical History:  Past Surgical History:   Procedure Laterality Date   ? C APPENDECTOMY age 22   ? C DEXA, BONE DENSITY, AXIAL SKEL 2005   ? C DEXA, BONE DENSITY, AXIAL SKEL 6/2007   No signif change in Osteopenia   ? COLONOSCOPY 4/2006   repeat 10 yr   ? ENDOSCOPIC ULTRASOUND UPPER GASTROINTESTINAL TRACT (GI) N/A 9/6/2018   Procedure: ENDOSCOPIC ULTRASOUND, ESOPHAGOSCOPY / UPPER GASTROINTESTINAL TRACT (GI); Endoscopic Ultrasound, Esophagogastroduodenoscopy with endoscopic mucosal resection;  Surgeon: Hood Brower MD; Location: [facility]   ? ESOPHAGOSCOPY, GASTROSCOPY, DUODENOSCOPY (EGD), COMBINED N/A 8/13/2018   Procedure: COMBINED ESOPHAGOSCOPY, GASTROSCOPY, DUODENOSCOPY (EGD), BIOPSY SINGLE OR MULTIPLE; EGD; Surgeon: Hood Brower MD; [facility]   ? ESOPHAGOSCOPY, GASTROSCOPY, DUODENOSCOPY (EGD), COMBINED N/A 3/14/2019   Procedure: Upper Endoscopy; Surgeon: Hood Brower MD; Location: [facility] OR   ? ESOPHAGOSCOPY, GASTROSCOPY, DUODENOSCOPY (EGD), RESECT MUCOSA, COMBINED N/A 9/6/2018   Procedure: COMBINED ESOPHAGOSCOPY, GASTROSCOPY, DUODENOSCOPY (EGD), RESECT MUCOSA;; Surgeon: Hood Brower MD; Location: [facility] OR   ? GYN SURGERY 10/22/08   pelvic support   ? HEMORRHOIDECTOMY 8/08   ? MOHS MICROGRAPHIC PROCEDURE   ? SURGICAL HISTORY OF - age 22   L ovarian cyst removal, laparotomy   ? SURGICAL HISTORY OF - 1998   partial thyroidectomy   ? SURGICAL HISTORY OF - 10/08   Transobturator tape for Urinary Incontinence   ? SURGICAL HISTORY OF - 11/2011   stress test normal   ? THYROID SURGERY   Had cyst removed, thyroid gland is intact.                  Review of Systems   Pain control is adequate.  Had pain when the aide accidentally jostled her foot from the side  Discussed trial of decreasing oxycodone.  It makes her nervous, she would like to in fact have 10 mg in the morning to cover her morning therapies, 10 mg at noon to cover afternoon therapy; she would like to keep the range of 5 to 10 mg oxycodone as needed every 4 hours, but has already been trying to move toward just having 5 mg, we agreed should try to cut back.  Her glucose is still tend to be higher in the evenings, sometimes presupper, yesterday before supper 249      Blood pressure 128/76, pulse 76, temperature 98.9  F (37.2  C), resp. rate 18, SpO2 95 %.      BMI= 32.12    Physical Exam     Constitutional:          female, overweight,  calm   Cardiovascular:      Rate and Rhythm: Normal rate , rhythm. No murmur.   Pulmonary:       Breath sounds: End inspiratory rales right posterior base  Abdominal:      General: Bowel sounds are normal.      Palpations: Abdomen is soft, non tender.   Musculoskeletal:         General: Tenderness mild.  Left lower leg in the immobilizer     Comments: Both arms and right leg normal strength and coordination  Skin:     General: Skin is warm and dry.  Skin inspected under the boot/immobilizer: No areas of rubbing  Dry, thin eschar covering all 3 sites of the previous external fixator, no erythema  Malleoli incisions with a dot each of dried blood on the gauze  Neurological:      General: No focal deficit present.      Mental Status: She is alert and oriented to person, place, and time.   Psychiatric:          Mood and thought content normal.       Allergies   Allergen Reactions   ? Amlodipine Swelling   ? Cefazolin    ? Metformin Diarrhea   ? Cephalosporins Rash   ? Clindamycin Rash   ? Levaquin [Levofloxacin] Rash     Itching, rash     ? Nickel Rash     Contact reaction           Medication List:    Current Outpatient Medications   Medication Sig   ? acetaminophen (TYLENOL) 325 MG tablet Take 975 mg by mouth every 8 (eight) hours.   ? aluminum-magnesium hydroxide-simethicone (MAALOX ADVANCED) 200-200-20 mg/5 mL Susp Take 30 mL by mouth 3 (three) times a day as needed.   ? atorvastatin (LIPITOR) 40 MG tablet Take 40 mg by mouth at bedtime.   ? calcium-vitamin D (CALCIUM-VITAMIN D) 500 mg(1,250mg) -200 unit per tablet Take 1 tablet by mouth 2 (two) times a day with meals.   ? cholecalciferol, vitamin D3, 3,000 unit Tab Take 3,000 Units by mouth daily.   ? diphenhydrAMINE (BENADRYL) 25 mg capsule Take 25 mg by mouth every 6 (six) hours as needed for itching.   ? enoxaparin (LOVENOX) 40 mg/0.4 mL syringe Inject 40 mg under the skin daily.   ? fexofenadine (ALLEGRA) 180 MG tablet Take 180 mg by mouth daily.   ? furosemide (LASIX) 20 MG tablet Take 20 mg by mouth daily.   ? glimepiride (AMARYL) 4 MG tablet Take 4 mg  by mouth daily before breakfast.   ? insulin aspart U-100 (NOVOLOG) 100 unit/mL injection Inject under the skin 3 (three) times a day before meals. Inject as per  sliding scale: if 0 - 139 = O UNITS DO NOT GIVE  CORRECTION INSULIN IF PRE MEAL BG < 140;  140 - 189 = 1 UNIT; 190 - 239 = 2 UNITS; 240 - 289 =  3 UNITS; 290 - 339 = 4 UNITS; 340 - 399 = 5 UNITS;  400 - 449 = 6 UNITS; 450+ = 7 UNITS IF BG = OR >  450 GIVE 7 UNIT   ? insulin aspart U-100 (NOVOLOG) 100 unit/mL injection Inject under the skin at bedtime. Inject as per  sliding scale: if 0 - 199 = 0 UNITS DO NOT GIVE  BEDTIME CORRECTION DOSE IF BG LESS THAN  200; 200 - 249 = 1 UNIT; 250 - 299 = 2 UNITS; 300 -  349 = 3 UNITS; 350 - 399 = 4 UNITS; 400+ = 5  UNITS FOR BG GREATER THAN OR EQUAL TO  400, GIVE 5 UNITS   ? insulin aspart U-100 (NOVOLOG) 100 unit/mL injection Inject under the skin 2 (two) times a day before meals. Administer 2 units at lunch and 4 units at supper.   ? insulin glargine (LANTUS) 100 unit/mL vial Inject 35 Units under the skin daily.          ? lisinopril (PRINIVIL,ZESTRIL) 2.5 MG tablet Take 2.5 mg by mouth daily.   ? methocarbamol (ROBAXIN) 500 MG tablet Take 500 mg by mouth 4 (four) times a day.   ? metoprolol tartrate (LOPRESSOR) 25 MG tablet Take 25 mg by mouth 2 (two) times a day.   ? oxyCODONE (ROXICODONE) 5 MG immediate release tablet Take 5-10 mg by mouth every 4 (four) hours as needed for pain.   ? pantoprazole (PROTONIX) 40 MG tablet Take 40 mg by mouth daily.   ? senna-docusate (PERICOLACE) 8.6-50 mg tablet Take 2 tablets by mouth 2 (two) times a day.   ? triamcinolone (KENALOG) 0.1 % cream Apply 1 application topically 3 (three) times a day.           Labs: no new labs   Ref Range & Units 10/17/19 0621   Sodium 133 - 144 mmol/L 140    Potassium 3.4 - 5.3 mmol/L 3.5    Chloride 94 - 109 mmol/L 108    Carbon Dioxide 20 - 32 mmol/L 25    Anion Gap 3 - 14 mmol/L 7    Glucose 70 - 99 mg/dL 175High     Urea Nitrogen 7 - 30 mg/dL 8     Creatinine 0.52 - 1.04 mg/dL 0.63    GFR Estimate >60 mL/min/1.73_m2 90       Ref Range & Units 10/18/19 1113   WBC 4.0 - 11.0 10e9/L 17.7High     Hemoglobin 11.7 - 15.7 g/dL 11.2Low     RDW 10.0 - 15.0 % 15.0    Platelet Count 150 - 450 10e9/L 698High           Assessment / Plan:    ICD-10-CM    1. Closed bimalleolar fracture of left ankle with routine healing S82.842D  oxycodone is helping her pain, generally 10 mg when she takes it.  Has less pain now than previously   2. Type 2 diabetes mellitus with hyperglycemia, with long-term current use of insulin (H) E11.65 On Grlargine, Aspart U100. Also on a statin. A1c=7.6    Z79.4    3. Peripheral vascular disease (H) I73.9 On lasix daiy for leg edema   4. Essential hypertension, benign I10 Good reading on metoprolol and Lisinopril       Electronically signed by: Sunita Santiago MD

## 2021-06-19 NOTE — LETTER
Letter by Sunita Santiago MD at      Author: Sunita Santiago MD Service: -- Author Type: --    Filed:  Encounter Date: 10/25/2019 Status: Signed         Patient: Sarah Felix   MR Number: [id number]   YOB: 1945   Date of Visit: 10/25/2019     [facility]    Facility:   [facility] [[id number]]     Code Status: FULL CODE  PCP: Wegener, Joel D, MD   Phone: [phone]   Fax: [phone]   NOTE: [facility] preop form filled out and faxed to [phone]      Assessment/Plan:        Visit for Preoperative Exam.     Proceed with proposed surgery without additional clinical clarifications.   Lovenox will be held after 10/25 dose  Request preop at the [facility] drop basic metabolic profile.  No ECG done in the transitional care unit.  Can be done if desired by the surgeon    Subjective:     Scheduled Procedure: Internal fixation of the left bimalleolar fracture, removal of external fixator  Surgery Date: 10/28/2019  Surgery Location: [facility]  Surgeon: Juan José    Current Outpatient Medications   Medication Sig Dispense Refill   ? acetaminophen (TYLENOL) 325 MG tablet Take 975 mg by mouth every 8 (eight) hours.     ? aluminum-magnesium hydroxide-simethicone (MAALOX ADVANCED) 200-200-20 mg/5 mL Susp Take 30 mL by mouth 3 (three) times a day as needed.     ? atorvastatin (LIPITOR) 40 MG tablet Take 40 mg by mouth at bedtime.     ? calcium-vitamin D (CALCIUM-VITAMIN D) 500 mg(1,250mg) -200 unit per tablet Take 1 tablet by mouth 2 (two) times a day with meals.     ? cholecalciferol, vitamin D3, 3,000 unit Tab Take 3,000 Units by mouth daily.     ? diphenhydrAMINE (BENADRYL) 25 mg capsule Take 25 mg by mouth every 6 (six) hours as needed for itching.     ? enoxaparin (LOVENOX) 40 mg/0.4 mL syringe Inject 40 mg under the skin daily.     ? fexofenadine (ALLEGRA) 180 MG tablet Take 180 mg by mouth daily.     ?  furosemide (LASIX) 20 MG tablet Take 20 mg by mouth daily.     ? insulin aspart U-100 (NOVOLOG) 100 unit/mL injection Inject under the skin 3 (three) times a day before meals. Inject as per  sliding scale: if 0 - 139 = O UNITS DO NOT GIVE  CORRECTION INSULIN IF PRE MEAL BG < 140;  140 - 189 = 1 UNIT; 190 - 239 = 2 UNITS; 240 - 289 =  3 UNITS; 290 - 339 = 4 UNITS; 340 - 399 = 5 UNITS;  400 - 449 = 6 UNITS; 450+ = 7 UNITS IF BG = OR >  450 GIVE 7 UNIT     ? insulin aspart U-100 (NOVOLOG) 100 unit/mL injection Inject under the skin at bedtime. Inject as per  sliding scale: if 0 - 199 = 0 UNITS DO NOT GIVE  BEDTIME CORRECTION DOSE IF BG LESS THAN  200; 200 - 249 = 1 UNIT; 250 - 299 = 2 UNITS; 300 -  349 = 3 UNITS; 350 - 399 = 4 UNITS; 400+ = 5  UNITS FOR BG GREATER THAN OR EQUAL TO  400, GIVE 5 UNITS     ? insulin aspart U-100 (NOVOLOG) 100 unit/mL injection Inject under the skin 2 (two) times a day before meals. Administer 2 units at lunch and 4 units at supper.     ? insulin glargine (LANTUS) 100 unit/mL vial Inject 35 Units under the skin daily.            ? lisinopril (PRINIVIL,ZESTRIL) 2.5 MG tablet Take 2.5 mg by mouth daily.     ? methocarbamol (ROBAXIN) 500 MG tablet Take 500 mg by mouth 4 (four) times a day.     ? metoprolol tartrate (LOPRESSOR) 25 MG tablet Take 25 mg by mouth 2 (two) times a day.     ? oxyCODONE (ROXICODONE) 5 MG immediate release tablet Take 5-10 mg by mouth every 4 (four) hours as needed for pain.     ? pantoprazole (PROTONIX) 40 MG tablet Take 40 mg by mouth daily.     ? senna-docusate (PERICOLACE) 8.6-50 mg tablet Take 2 tablets by mouth 2 (two) times a day.     ? triamcinolone (KENALOG) 0.1 % cream Apply 1 application topically 3 (three) times a day.       No current facility-administered medications for this visit.        Allergies   Allergen Reactions   ? Amlodipine Swelling   ? Cefazolin    ? Metformin Diarrhea   ? Cephalosporins Rash   ? Clindamycin Rash   ? Levaquin [Levofloxacin]  Rash     Itching, rash     ? Nickel Rash     Contact reaction           There is no immunization history on file for this patient.    Patient Active Problem List   Diagnosis   ? Congenital cystic disease of liver   ? Esophageal reflux   ? Essential hypertension, benign   ? Fatty liver   ? Diverticulitis of colon   ? Hypertension goal BP (blood pressure) < 140/90   ? Hyperlipidemia LDL goal <100   ? Peripheral vascular disease (H)   ? Type 2 diabetes mellitus with hyperglycemia (H)   ? Personal history of other malignant neoplasm of skin   ? Closed bimalleolar fracture of left ankle with routine healing       Past Medical History:   Diagnosis Date   ? Allergic rhinitis    ? Basal cell carcinoma of face 03/2012    mohs   ? Bimalleolar fracture of left ankle    ? Chronic otitis externa    ? Contact dermatitis    ? 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 Cystic disease of liver     multiple liver lesions. felt to be focal nodular hyperplasia. annual CT abd. followed by Dr. Amilcar Kat    ? Cystocele, lateral    ? Diverticulitis of colon 06/2004    abd CT   ? DM (diabetes mellitus) (H)    ? Embolism and thrombosis (H) age 22    post ovarian cyst removed   ? Esophageal reflux    ? Fatty liver    ? Hiatal hernia     small   ? HLD (hyperlipidemia)    ? Nontoxic uninodular goiter    ? Osteopenia 04/21/2005    on fosamax for > 5 yr. stop 7/2012   ? Pure hypercholesterolemia        Social History     Socioeconomic History   ? Marital status: Single     Spouse name: Not on file   ? Number of children: Not on file   ? Years of education: Not on file   ? Highest education level: Not on file   Occupational History   ? Not on file   Social Needs   ? Financial resource strain: Not on file   ? Food insecurity:     Worry: Not on file     Inability: Not on file   ? Transportation needs:     Medical: Not on file     Non-medical: Not on file   Tobacco Use   ? Smoking status: Former Smoker     Packs/day:  1.00     Years: 10.00     Pack years: 10.00   ? Smokeless tobacco: Never Used   Substance and Sexual Activity   ? Alcohol use: Yes     Alcohol/week: 6.0 standard drinks     Types: 6 Standard drinks or equivalent per week     Frequency: 4 or more times a week   ? Drug use: Not on file   ? Sexual activity: Not on file   Lifestyle   ? Physical activity:     Days per week: Not on file     Minutes per session: Not on file   ? Stress: Not on file   Relationships   ? Social connections:     Talks on phone: Not on file     Gets together: Not on file     Attends Tenriism service: Not on file     Active member of club or organization: Not on file     Attends meetings of clubs or organizations: Not on file     Relationship status: Not on file   ? Intimate partner violence:     Fear of current or ex partner: Not on file     Emotionally abused: Not on file     Physically abused: Not on file     Forced sexual activity: Not on file   Other Topics Concern   ? Not on file   Social History Narrative   ? Not on file       Past Surgical History:   Procedure Laterality Date   ? APPENDECTOMY  age 22   ? APPLY EXTERNAL FIXATOR LOWER EXTREMITY Left 10/12/2019    Procedure: Left ankle external fixator placement; Surgeon: Leeanne Brown MD; Location: [facility] OR     ? ENDOSCOPIC ULTRASOUND UPPER GASTROINTESTINAL TRACT (GI)  09/06/2018   ? ESOPHAGOGASTRODUODENOSCOPY  08/13/2018, 3/14/19   ? ESOPHAGOSCOPY, GASTROSCOPY, DUODENOSCOPY (EGD), RESECT MUCOSA, COMBINED  09/06/2018   ? HEMORRHOID SURGERY     ? MOHS SURGERY     ? ORIF ANKLE FRACTURE BIMALLEOLAR Left 10/28/2019    Internal fixation left bimalleolar ankle fracture, removal external fixator   ? OVARIAN CYST REMOVAL Left age 22    L ovarian cyst removal, laparotomy     ? PELVIC SUPPORT SURGERY     ? THYROIDECTOMY, PARTIAL  1998   ? Transobturator tape for Urinary Incontinence    2008       History of Present Illness  Recent Health  Fever: no  Chills: no  Fatigue: yes  Chest Pain:  no  Cough: no  Dyspnea: no  Urinary Frequency: no  Nausea: no  Vomiting: no  Diarrhea: no  Abdominal Pain: no  Easy Bruising: no  Lower Extremity Swelling: yes, mild  Poor Exercise Tolerance: no    Most recent Health Maintenance Visit:  2 week(s) ago    Pertinent History  Prior Anesthesia: yes  Previous Anesthesia Reaction:  no  Diabetes: yes  Cardiovascular Disease: no  Pulmonary Disease: no  Renal Disease: no  GI Disease: no  Sleep Apnea: no  Thromboembolic Problems: yes, history of thromboembolism post ovarian cyst removal surgery age 22  Clotting Disorder: yes  Bleeding Disorder: no  Transfusion Reaction: unknown  Impaired Immunity: no  Steroid use in the last 6 months: no  Frequent Aspirin use: no    Family history of   Family History   Problem Relation Age of Onset   ? Rheum arthritis Mother    ? Diabetes Mother    ? Hypertension Mother    ? Cancer Father    ? Other Brother         palpitations         Social history of there are no concerns regarding care after surgery    After surgery, the patient plans to recover at a rehabilitation center.    Review of Systems  Review of Systems   Comprehensive ROS is negative          Objective:         Vitals:    10/26/19 1200   BP: 120/77   Pulse: 81   Resp: 20   Temp: 99  F (37.2  C)   SpO2: 98%       Physical Exam    Physical Exam  Constitutional:       Comments: Highly irritable, older  female, overweight   HENT:      Right Ear: External ear normal.      Left Ear: External ear normal.      Mouth/Throat:      Mouth: Mucous membranes are moist.   Eyes:      Extraocular Movements: Extraocular movements intact.      Conjunctiva/sclera: Conjunctivae normal.   Neck:      Musculoskeletal: Normal range of motion.   Cardiovascular:      Rate and Rhythm: Normal rate and regular rhythm.      Heart sounds: No murmur.   Pulmonary:      Breath sounds: Normal breath sounds. No rales.   Abdominal:      General: Bowel sounds are normal.      Palpations: Abdomen is soft.       Tenderness: There is no tenderness.   Musculoskeletal:         General: Tenderness and signs of injury present.      Comments: Both arms and right leg normal strength and coordination   Skin:     General: Skin is warm and dry.      Minimal residual papular rash from shoulders distally, worse around the upper thigh buttock area  Left ankle external fixator, no visible erythema or fluid discharge  Buttock/coccyx area not observed, patient supine in bed, difficulty with mobility  Lidocaine patch on her left pretibial area   Neurological:      General: No focal deficit present.      Mental Status: She is alert and oriented to person, place, and time.      Motor: No weakness.   Psychiatric:      Exacting standards for care givers, somewhat irritable     Electronically signed by: Sunita Santiago MD

## 2021-06-20 NOTE — LETTER
Letter by Sunita Santiago MD at      Author: Sunita Santiago MD Service: -- Author Type: --    Filed:  Encounter Date: 11/22/2019 Status: Signed         Patient: Sarah Felix   MR Number: [id number]   YOB: 1945   Date of Visit: 11/22/2019     [facility]      Facility:    [facility] [[id number]]   Code Status: FULL  [place] [facility] : 10/12 to 10/18/19  [facility] [place] 10/28/19 - 10/30/19    [facility]     NO DISCHARGE SUMMARY IN CARE EVERYWHERE      Chief Complaint/Reason for Visit:  Chief Complaint   Patient presents with   ? Review Of Multiple Medical Conditions       HPI:   Sarah is a 74 y.o. female with a history of type 2 diabetes.    She was going down the stairs  at home on 10/11/2019 in the late evening.  Her ankle gave out and she slipped 3-4 stairs on her buttocks. She had pain and deformity of her left ankle. She waited till the following day  to call for help, she did not want the paramedics breaking down her door.    She was brought to the [facility].  X-ray showed bimalleolar ankle fracture.  It was a severely displaced  fracture, ED attempted to reduce it.  Ended up calling orthopedics  who performed aclosed fracture reduction under conscious sedation in the emergency department.  Later in the day, she went to the OR for an external fixator placement.  She was non-weightbearing on the left leg, enoxaparin for DVT prophylaxis.    She was given Doxycycline perioperatively, and developed a diffuse rash. Triamcinolone cream was prescribed.    She had an open area on her coccyx from falling on the stairs, then scooting along her floor prior to calling for help. It was covered with Mepilex foam dressing.    She transferred to [facility].  __________________________________________________________________________  Hospitalization #2: 10/28/19  Removal of external fixator, plate and screws open  reduction fixation.  Nonweightbearing left lower extremity  DVT prophylaxis: Lovenox daily x28 days    She returned to [facility]    UPDATE:  Things are generally going well  Her sugars are ok in AM, but generally high otherwise. She has had glucoses from mid 200s to mid 300s: NEGRO Holman recently increased her Lantus to 38 unnits  Non weight bearing  Last covered day of therapy : 12/4.  Next appointment with the orthopedic office = 12/13.  She will have to pay to stay at the facility.  Her friends state her house was uninhabitable junk, she is a hoarder, no functional refrigerator.  Her friends believe she got a discharge to [facility], patient is not interested in this.    Past Medical History:  Active Non-Hospital Problems    Diagnosis   ? Esophageal reflux   ? Fatty liver   ? Closed bimalleolar fracture of left ankle with routine healing   ? Type 2 diabetes mellitus with hyperglycemia (H)   ? Essential hypertension, benign   ? Peripheral vascular disease (H)     Problem list name updated by automated process. Provider to review     ? Hypertension goal BP (blood pressure) < 140/90   ? Personal history of other malignant neoplasm of skin   ? Hyperlipidemia LDL goal <100     Per provider     ? Diverticulitis of colon     Abd CT  Problem list name updated by automated process. Provider to review     ? Congenital cystic disease of liver     multiple liver lesions.  felt to be focal nodular hyperplasia.  annual CT abd.    followed by Dr. Amilcar Kat               Surgical History:  Past Surgical History:   Procedure Laterality Date   ? C APPENDECTOMY age 22   ? C DEXA, BONE DENSITY, AXIAL SKEL 2005   ? C DEXA, BONE DENSITY, AXIAL SKEL 6/2007   No signif change in Osteopenia   ? COLONOSCOPY 4/2006   repeat 10 yr   ? ENDOSCOPIC ULTRASOUND UPPER GASTROINTESTINAL TRACT (GI) N/A 9/6/2018   Procedure: ENDOSCOPIC ULTRASOUND, ESOPHAGOSCOPY / UPPER GASTROINTESTINAL TRACT (GI); Endoscopic Ultrasound,  Esophagogastroduodenoscopy with endoscopic mucosal resection; Surgeon: Hood Brower MD; Location: [facility] OR   ? ESOPHAGOSCOPY, GASTROSCOPY, DUODENOSCOPY (EGD), COMBINED N/A 8/13/2018   Procedure: COMBINED ESOPHAGOSCOPY, GASTROSCOPY, DUODENOSCOPY (EGD), BIOPSY SINGLE OR MULTIPLE; EGD; Surgeon: Hood Brower MD; Location: [facility]   ? ESOPHAGOSCOPY, GASTROSCOPY, DUODENOSCOPY (EGD), COMBINED N/A 3/14/2019   Procedure: Upper Endoscopy; Surgeon: Hood Brower MD; Location: [facility] OR   ? ESOPHAGOSCOPY, GASTROSCOPY, DUODENOSCOPY (EGD), RESECT MUCOSA, COMBINED N/A 9/6/2018   Procedure: COMBINED ESOPHAGOSCOPY, GASTROSCOPY, DUODENOSCOPY (EGD), RESECT MUCOSA;; Surgeon: Hood Brower MD; Location: [facility] OR   ? GYN SURGERY 10/22/08   pelvic support   ? HEMORRHOIDECTOMY 8/08   ? MOHS MICROGRAPHIC PROCEDURE   ? SURGICAL HISTORY OF - age 22   L ovarian cyst removal, laparotomy   ? SURGICAL HISTORY OF - 1998   partial thyroidectomy   ? SURGICAL HISTORY OF - 10/08   Transobturator tape for Urinary Incontinence   ? SURGICAL HISTORY OF - 11/2011   stress test normal   ? THYROID SURGERY   Had cyst removed, thyroid gland is intact.                  Review of Systems   Pain control is adequate.  Had pain when the aide accidentally jostled her foot from the side  Is trying to use less frequent PRN oxycodone doses.        Blood pressure 120/64, pulse 78, temperature 97.8  F (36.6  C), resp. rate 18, SpO2 95 %.    BMI= 32.12    Physical Exam     Constitutional:          female, overweight,  Anxious, frequent complaints  Cardiovascular:      Rate and Rhythm: Normal rate , rhythm. No murmur.   Pulmonary:      Breath sounds: End inspiratory rales right posterior base  Abdominal:      General: Normal bowel sounds       Palpations: Abdomen is soft, non tender, obese.   Musculoskeletal:         General: Tenderness mild.  Left lower leg in the immobilizer     Comments: Both arms and right leg normal strength and coordination  Skin:     General:  Skin is warm and dry.  Skin inspected under the boot/immobilizer: No areas of rubbing  Dry, thin eschar covering all 3 sites of the previous external fixator, no erythema  Malleoli incisions dry  Neurological:      General: No focal deficit present.      Mental Status: She is alert and oriented to person, place, and time.   Psychiatric:          Mood : anxious       Allergies   Allergen Reactions   ? Amlodipine Swelling   ? Cefazolin    ? Metformin Diarrhea   ? Cephalosporins Rash   ? Clindamycin Rash   ? Levaquin [Levofloxacin] Rash     Itching, rash     ? Nickel Rash     Contact reaction           Medication List:    Current Outpatient Medications   Medication Sig   ? acetaminophen (TYLENOL) 325 MG tablet Take 975 mg by mouth every 8 (eight) hours.   ? aluminum-magnesium hydroxide-simethicone (MAALOX ADVANCED) 200-200-20 mg/5 mL Susp Take 30 mL by mouth 3 (three) times a day as needed.   ? atorvastatin (LIPITOR) 40 MG tablet Take 40 mg by mouth at bedtime.   ? calcium-vitamin D (CALCIUM-VITAMIN D) 500 mg(1,250mg) -200 unit per tablet Take 1 tablet by mouth 2 (two) times a day with meals.   ? cholecalciferol, vitamin D3, 3,000 unit Tab Take 3,000 Units by mouth daily.   ? furosemide (LASIX) 20 MG tablet Take 20 mg by mouth daily.   ? glimepiride (AMARYL) 4 MG tablet Take 4 mg by mouth 2 (two) times a day.    ? insulin aspart U-100 (NOVOLOG) 100 unit/mL injection Inject under the skin 3 (three) times a day before meals. Inject as per  sliding scale: if 0 - 139 = O UNITS DO NOT GIVE  CORRECTION INSULIN IF PRE MEAL BG < 140;  140 - 189 = 1 UNIT; 190 - 239 = 2 UNITS; 240 - 289 =  3 UNITS; 290 - 339 = 4 UNITS; 340 - 399 = 5 UNITS;  400 - 449 = 6 UNITS; 450+ = 7 UNITS IF BG = OR >  450 GIVE 7 UNIT   ? insulin aspart U-100 (NOVOLOG) 100 unit/mL injection Inject under the skin at bedtime. Inject as per  sliding scale: if 0 - 199 = 0 UNITS DO NOT GIVE  BEDTIME CORRECTION DOSE IF BG LESS THAN  200; 200 - 249 = 1 UNIT; 250 -  299 = 2 UNITS; 300 -  349 = 3 UNITS; 350 - 399 = 4 UNITS; 400+ = 5  UNITS FOR BG GREATER THAN OR EQUAL TO  400, GIVE 5 UNITS   ? insulin aspart U-100 (NOVOLOG) 100 unit/mL injection Inject under the skin 2 (two) times a day before meals. Administer 2 units at lunch and 4 units at supper.   ? insulin glargine (LANTUS) 100 unit/mL vial Inject 38 Units under the skin daily.          ? lisinopril (PRINIVIL,ZESTRIL) 2.5 MG tablet Take 2.5 mg by mouth daily.   ? metoprolol tartrate (LOPRESSOR) 25 MG tablet Take 25 mg by mouth 2 (two) times a day.   ? oxyCODONE (ROXICODONE) 5 MG immediate release tablet Take 5-10 mg by mouth every 4 (four) hours as needed for pain.   ? oxyCODONE (ROXICODONE) 5 MG immediate release tablet Take 10 mg by mouth 2 (two) times a day with meals. 0730 and noon to cover morning and afternoon therapies respectively   ? senna-docusate (PERICOLACE) 8.6-50 mg tablet Take 2 tablets by mouth 2 (two) times a day.   ? triamcinolone (KENALOG) 0.1 % cream Apply 1 application topically 3 (three) times a day.           Labs: no new labs       Ref Range & Units 10/17/19 0621   Sodium 133 - 144 mmol/L 140    Potassium 3.4 - 5.3 mmol/L 3.5    Chloride 94 - 109 mmol/L 108    Carbon Dioxide 20 - 32 mmol/L 25    Anion Gap 3 - 14 mmol/L 7    Glucose 70 - 99 mg/dL 175High     Urea Nitrogen 7 - 30 mg/dL 8    Creatinine 0.52 - 1.04 mg/dL 0.63    GFR Estimate >60 mL/min/1.73_m2 90       Ref Range & Units 10/18/19 1113   WBC 4.0 - 11.0 10e9/L 17.7High     Hemoglobin 11.7 - 15.7 g/dL 11.2Low     RDW 10.0 - 15.0 % 15.0    Platelet Count 150 - 450 10e9/L 698High           Assessment / Plan:    ICD-10-CM    1. Closed bimalleolar fracture of left ankle with routine healing S82.842D  Uses oxycodone  10 mg before therapy AM and afternoon, which is helping her pain.   2. Type 2 diabetes mellitus with hyperglycemia, with long-term current use of insulin (H) E11.65 On Glargine, Aspart U100. Also on a statin. A1c=7.6    Z79.4    3.  Peripheral vascular disease (H) I73.9 On lasix daiy for leg edema   4. Essential hypertension, benign I10 Well controlled on metoprolol and Lisinopril       Electronically signed by: Sunita Santiago MD

## 2021-06-20 NOTE — LETTER
Letter by Juan Holman CNP at      Author: Juan Holman CNP Service: -- Author Type: --    Filed:  Encounter Date: 2019 Status: Signed         Patient: Sarah Felix   MR Number: [id number]   YOB: 1945   Date of Visit: 2019     [facility]    Name:   Sarah Felix  : 1945  Facility:   [facility] [[id number]]   Room:   Code Status: FULL CODE and POLST AVAILABLE -   Fac type:   SNF (Skilled Nursing Facility, TCU) -     PCP:  Wegener, Joel D, MD    CHIEF COMPLAINT / REASON FOR VISIT:  Chief Complaint   Patient presents with   ? Follow-up     TCU follow-up after hospitalization for left bimalleolar fracture with external fixation and subsequent internal fixation.      [facility] from 10/12/2019 until 10/18/2019 (left bimalleolar fracture with external fixation)  [facility] from 10/28/2019 until 10/30/2019 (left bimalleolar fracture ORIF)    Patient was last seen by me on 2019 and subsequently seen by Dr. Santiago on 2019.      HPI: Sarah is a 74 y.o. 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      She was going down the stairs at home on 10/11/2019 in the late evening, when her ankle gave out, and she slipped 3-4 stairs onto her buttocks.  There was pain and deformity of the left ankle, but she waited until the next day to call for help.  She did not want the paramedics breaking down her door.  She was brought to [facility] where x-rays showed a bimalleolar ankle fracture which was severely displaced, and an attempted reduction in the ED necessitated calling orthopedics who performed a closed fracture reduction under conscious sedation in the ED.  Later in the day, she went to the OR for an external fixator  "placement.  She is nonweightbearing on the left leg, and she is receiving enoxaparin for DVT prophylaxis.  She was given doxycycline perioperatively and developed a diffuse rash.  Triamcinolone cream was prescribed.    She did have an open area on her sacrum up from a falling on the stairs, then scooting along the floor prior to calling for help.  It has been covered with a Mepilex dressing.    She returned to the hospital on 10/28/2019 for external fixation hardware removal and ORIF, performed by Dr. Can that day.  She tolerated the procedure well and returned here on 10/30/2019.    ED VISIT    She was sent to [facility] ED on 11/25/2019 due to shortness of breath.  She stated, \"it was like someone was sitting on my chest.\"  All tests returned negative results.  She stated she still has trouble breathing once in a while.  She has a Conception pot which seems to help.    The also noted the liver cyst (left hepatic lobe) on a chest CT.  It appeared to have grown significantly in size from the last CT performed on 11/14/2011, measuring 8.3 x 7.2 cm.  The patient stated she has had cysts in many places, thyroid, ovaries, and also the left kidney.    Dyspnea and tachypnea resolved, and she was discharged back here.      TCU ISSUES    Today: She is feeling down today.  She did have a follow-up appointment with Dr. Can on 12/13/2019 and was given full weightbearing status, noting that she should be able to discharge home soon.  He must continue to wear the boot and, when able to walk comfortably in that, she may switch to regular shoes.  She has a follow-up appointment 6 weeks hence.    Since able to bear full weight, pain has increased.  She continues to have a scheduled oxycodone 10 mg twice daily (0700 and 1130).  Previously with PRN dosing of 1-2 tabs every 4 hours, Dr. Santiago decreased this to 1 tab every 4 hours as needed on 12/13/2019.    When last seen, she complained of not feeling well.  She felt that " "she had lost her appetite and was experiencing a bit of nausea she feels that she has lost her appetite, is having a bit of nausea, and so her acetaminophen was changed from scheduled to as needed, and we resumed her previous senna S dose.    Diabetes mellitus type 2: Blood glucose levels are \"the usual.\"  We have made increases to her Lantus.  She had apparently been on Lantus and glimepiride at home, but for some reason the latter was discontinued in the hospital, her dose was cut in half (from 51 units to 25 units), and she was placed on sliding scale NovoLog.  Prior to her second hospitalization, fasting blood glucose levels ranged between 167 and 195.  At lunch, they ranged between 212 and 298.  Supper levels ranged between 209 and 245 and, at bedtime, the range was between 238 and 273.  The plan was to get her back to her home dosing.  We started by increasing her Lantus insulin to 35 units (later increased to 38 units).  At a prior visit, we authorized 2 units of NovoLog at lunch and 4 units at supper to observe how this affected bedtime levels.     With blood glucose levels looking better, we resumed her glimepiride 4 mg twice daily and placed a hold on the prandial NovoLog.  However, blood glucose levels were still quite high, especially at lunch, supper, and bedtime.  She tends to spread out her meals and may not finish a meal, instead saving part of it for later, giving herself 5 meals per day rather than 3.  This is problematic in terms of checking blood glucose levels with each meal.    Fasting levels range between 120 and 255.  At lunch, the range between 183 and 298.  At supper, the range is between 213 and 320.  At bedtime, the range is between 226 and 357.    At this juncture, it almost looks like a certainty that she will require prandial NovoLog dosing on a scheduled basis.  However, we will start by adding linagliptin 5 mg daily and decreasing the dose of glimepiride to 4 mg daily.    Buttock " "wound: As noted in the hospital discharge summary, she sustained open area on her sacrum from a falling on the stairs, then scooting along the floor prior to calling for help.  It has been covered with a Mepilex dressing.  Dr. Santiago ordered Proshield 3 times daily and as needed, and this has worked quite well, and she tells me it is \"not a problem.\"    Intertriginous candidiasis: Yeasty rash under breasts and belly per patient.  We will order nystatin cream.    Insomnia: She tends to go to bed rather late at home.  She tells me she may go to bed as late as 4 AM.  She does not require any sleep aid, although she does have orders for diphenhydramine (for itching) which she has not been taking.  She is sleeping now.     Gastric reflux: She is on 40 mg of pantoprazole daily.  She does feel uncomfortable lying down but does not feel she needs this medication.  She will cough and feel like she is going to vomit, but if she sits up and burps, she then feels okay.  Unsure how long she has been on this, we changed the scheduled dose to every other day without issue.  Denies any symptoms.    Constipation: When she began having excessive BMs (and cramps), dosing of her senna S was halved, from 2 tablets twice daily to 1 tablet twice daily.  Currently, no problems.    Medication changes: Dr. Santiago, per patient request, discontinued fexofenadine, pantoprazole, methocarbamol, and diphenhydramine at an earlier visit.    ROS:   No headaches or chest pains, coughing or congestion, dizziness, dysuria, difficulty chewing or swallowing, integumentary issues, or problems with appetite.     Past Medical History:   Diagnosis Date   ? Allergic rhinitis    ? Basal cell carcinoma of face 03/2012    mohs   ? Bimalleolar fracture of left ankle    ? Chronic otitis externa    ? Contact dermatitis    ? 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 Cystic disease of liver     multiple liver lesions. felt to be focal " nodular hyperplasia. annual CT abd. followed by Dr. Amilcar Kat    ? Cystocele, lateral    ? Diverticulitis of colon 06/2004    abd CT   ? DM (diabetes mellitus) (H)    ? Embolism and thrombosis (H) age 22    post ovarian cyst removed   ? Esophageal reflux    ? Fatty liver    ? Hiatal hernia     small   ? HLD (hyperlipidemia)    ? Nontoxic uninodular goiter    ? Osteopenia 04/21/2005    on fosamax for > 5 yr. stop 7/2012   ? Pure hypercholesterolemia               Family History   Problem Relation Age of Onset   ? Rheum arthritis Mother    ? Diabetes Mother    ? Hypertension Mother    ? Cancer Father    ? Other Brother         palpitations     Social History     Socioeconomic History   ? Marital status: Single     Spouse name: Not on file   ? Number of children: Not on file   ? Years of education: Not on file   ? Highest education level: Not on file   Occupational History   ? Not on file   Social Needs   ? Financial resource strain: Not on file   ? Food insecurity:     Worry: Not on file     Inability: Not on file   ? Transportation needs:     Medical: Not on file     Non-medical: Not on file   Tobacco Use   ? Smoking status: Former Smoker     Packs/day: 1.00     Years: 10.00     Pack years: 10.00   ? Smokeless tobacco: Never Used   Substance and Sexual Activity   ? Alcohol use: Yes     Alcohol/week: 6.0 standard drinks     Types: 6 Standard drinks or equivalent per week     Frequency: 4 or more times a week   ? Drug use: Not on file   ? Sexual activity: Not on file   Lifestyle   ? Physical activity:     Days per week: Not on file     Minutes per session: Not on file   ? Stress: Not on file   Relationships   ? Social connections:     Talks on phone: Not on file     Gets together: Not on file     Attends Sikhism service: Not on file     Active member of club or organization: Not on file     Attends meetings of clubs or organizations: Not on file     Relationship status: Not on file   ? Intimate partner violence:      Fear of current or ex partner: Not on file     Emotionally abused: Not on file     Physically abused: Not on file     Forced sexual activity: Not on file   Other Topics Concern   ? Not on file   Social History Narrative   ? Not on file       MEDICATIONS: Reviewed from the MAR, physician orders, and/or earlier progress notes.   Post Discharge Medication Reconciliation Status: medication reconciliation previously completed during another office visit.  Updated by me today (12/16/2019) with a decrease in glimepiride, the addition of linagliptin, and the addition of nystatin cream reflected below.    Current Outpatient Medications   Medication Sig   ? linaGLIPtin 5 mg Tab Take by mouth.   ? nystatin (MYCOSTATIN) cream Apply topically 2 (two) times a day.   ? acetaminophen (TYLENOL) 325 MG tablet Take 975 mg by mouth every 8 (eight) hours.   ? aluminum-magnesium hydroxide-simethicone (MAALOX ADVANCED) 200-200-20 mg/5 mL Susp Take 30 mL by mouth 3 (three) times a day as needed.   ? atorvastatin (LIPITOR) 40 MG tablet Take 40 mg by mouth at bedtime.   ? calcium-vitamin D (CALCIUM-VITAMIN D) 500 mg(1,250mg) -200 unit per tablet Take 1 tablet by mouth 2 (two) times a day with meals.   ? cholecalciferol, vitamin D3, 3,000 unit Tab Take 3,000 Units by mouth daily.   ? furosemide (LASIX) 20 MG tablet Take 20 mg by mouth daily.   ? glimepiride (AMARYL) 4 MG tablet Take 4 mg by mouth daily before breakfast.    ? insulin aspart U-100 (NOVOLOG) 100 unit/mL injection Inject under the skin 3 (three) times a day before meals. Inject as per  sliding scale: if 0 - 139 = O UNITS DO NOT GIVE  CORRECTION INSULIN IF PRE MEAL BG < 140;  140 - 189 = 1 UNIT; 190 - 239 = 2 UNITS; 240 - 289 =  3 UNITS; 290 - 339 = 4 UNITS; 340 - 399 = 5 UNITS;  400 - 449 = 6 UNITS; 450+ = 7 UNITS IF BG = OR >  450 GIVE 7 UNIT   ? insulin aspart U-100 (NOVOLOG) 100 unit/mL injection Inject under the skin at bedtime. Inject as per  sliding scale: if 0 - 199 =  "0 UNITS DO NOT GIVE  BEDTIME CORRECTION DOSE IF BG LESS THAN  200; 200 - 249 = 1 UNIT; 250 - 299 = 2 UNITS; 300 -  349 = 3 UNITS; 350 - 399 = 4 UNITS; 400+ = 5  UNITS FOR BG GREATER THAN OR EQUAL TO  400, GIVE 5 UNITS   ? insulin aspart U-100 (NOVOLOG) 100 unit/mL injection Inject under the skin 2 (two) times a day before meals. Administer 2 units at lunch and 4 units at supper.   ? insulin glargine (LANTUS) 100 unit/mL vial Inject 38 Units under the skin daily.          ? lisinopril (PRINIVIL,ZESTRIL) 2.5 MG tablet Take 2.5 mg by mouth daily.   ? metoprolol tartrate (LOPRESSOR) 25 MG tablet Take 25 mg by mouth 2 (two) times a day.   ? oxyCODONE (ROXICODONE) 5 MG immediate release tablet Take 5 mg by mouth every 4 (four) hours as needed for pain.    ? oxyCODONE (ROXICODONE) 5 MG immediate release tablet Take 10 mg by mouth 2 (two) times a day with meals. 0730 and noon to cover morning and afternoon therapies respectively   ? senna-docusate (PERICOLACE) 8.6-50 mg tablet Take 2 tablets by mouth 2 (two) times a day.   ? triamcinolone (KENALOG) 0.1 % cream Apply 1 application topically 3 (three) times a day.     ALLERGIES:   Allergies   Allergen Reactions   ? Amlodipine Swelling   ? Cefazolin    ? Metformin Diarrhea   ? Cephalosporins Rash   ? Clindamycin Rash   ? Levaquin [Levofloxacin] Rash     Itching, rash     ? Nickel Rash     Contact reaction       DIET: Diabetic, regular texture, thin liquids.    Vitals:    12/16/19 1105   BP: 120/65   Pulse: 75   Resp: 19   Temp: (!) 96.2  F (35.7  C)   SpO2: 98%   Weight: 195 lb 6.4 oz (88.6 kg)   Height: 5' 5\" (1.651 m)     Body mass index is 32.52 kg/m .    EXAMINATION:   General: Moderately obese middle-aged female,sitting up in bed.  Head: Normocephalic and atraumatic.   Eyes: PERRLA, sclerae clear.   ENT: Moist oral mucosa.  She has her own teeth.  No rhinorrhea or nasal discharge.  Hearing is unimpaired.  Cardiovascular: Sinus tachycardia.  No appreciable " murmur.  Respiratory: Lungs clear to auscultation bilaterally.   Abdomen: Soft and nontender.  Bowel sounds positive in all 4 quadrants.  Musculoskeletal/Extremities: Age-related degenerative joint disease.  She is wearing a boot on the left lower extremity.  Leg is currently wrapped.  Good CMS.  Integument: Yeasty rash beneath breasts and belly.  We are ordering nystatin cream.  Cognitive/Psychiatric: Alert and oriented x3.  Affect is quite dysthymic.    DIAGNOSTICS:   No results found for this or any previous visit.  No results found for: WBC, HGB, HCT, MCV, PLT  CrCl cannot be calculated (No successful lab value found.).      ASSESSMENT/Plan:      ICD-10-CM    1. Closed bimalleolar fracture of left ankle with routine healing S82.842D    2. Type 2 diabetes mellitus with hyperglycemia, with long-term current use of insulin (H) E11.65     Z79.4    3. Essential hypertension, benign I10    4. Hyperlipidemia LDL goal <100 E78.5    5. Gastroesophageal reflux disease, esophagitis presence not specified K21.9    6. Congenital cystic disease of liver Q44.6    7. Intertriginous candidiasis B37.2      CHANGES:    1.  Decrease glimepiride from 4 mg twice daily to 4 mg every morning.  2.  Add linagliptin 5 mg every morning.  3.  Nystatin cream to intertriginous candidal areas (beneath breasts and belly).    CARE PLAN:    The care plan has been reviewed and all orders signed. Changes to care plan, if any, as noted. Otherwise, continue current plan of care.  Total time spent with this patient was approximately 35 minutes, with greater than 50% spent in counseling and coordination of care that included addressing continuing elevated blood glucose levels.    The above has been created using voice recognition software. Please be aware that this may unintentionally  produce inaccuracies and/or nonsensical sentences.      Electronically signed by: Juan Holman CNP

## 2021-06-20 NOTE — LETTER
Letter by Sunita Santiago MD at      Author: Sunita Santiago MD Service: -- Author Type: --    Filed:  Encounter Date: 12/13/2019 Status: Signed         Patient: Sarah Felix   MR Number: [id number]   YOB: 1945   Date of Visit: 12/13/2019     [facility]      Facility:    [facility] [[id number]]   Code Status: UNKNOWN  [place] / [facility] : 10/12 to 10/18/19  [facility] 10/28/19 - 10/30/19    ORIF     NO DISCHARGE SUMMARY IN CARE EVERYWHERE      Chief Complaint/Reason for Visit:  Chief Complaint   Patient presents with   ? Review Of Multiple Medical Conditions     L bimalleolar Fx       HPI:   Sarah is a 74 y.o. female with a history of type 2 diabetes.    She was going down the stairs  at home on 10/11/2019 in the late evening.  Her ankle gave out and she slipped 3-4 stairs on her buttocks. She had pain and deformity of her left ankle. She waited till the following day  to call for help, she did not want the paramedics breaking down her door.    She was brought to the [facility].  X-ray showed bimalleolar ankle fracture.  It was a severely displaced  fracture, ED attempted to reduce it.  Ended up calling orthopedics  who performed aclosed fracture reduction under conscious sedation in the emergency department.  Later in the day, she went to the OR for an external fixator placement.  She was non-weightbearing on the left leg, enoxaparin for DVT prophylaxis.    She was given Doxycycline perioperatively, and developed a diffuse rash. Triamcinolone cream was prescribed.    She had an open area on her coccyx from falling on the stairs, then scooting along her floor prior to calling for help. It was covered with Mepilex foam dressing.    She transferred to [facility].  __________________________________________________________________________  Hospitalization #2: 10/28/19  Removal of external fixator,  plate and screws open reduction fixation.  Nonweightbearing left lower extremity  DVT prophylaxis: Lovenox daily x28 days    She returned to [facility]    UPDATE:  She had sudden dyspnea at the facility on 11/25: She went to the emergency department at the [facility].  Work-up was completely negative, she returned to [facility].      Past Medical History:  Active Non-Hospital Problems    Diagnosis   ? Intertriginous candidiasis   ? Esophageal reflux   ? Fatty liver   ? Closed bimalleolar fracture of left ankle with routine healing   ? Type 2 diabetes mellitus with hyperglycemia (H)   ? Essential hypertension, benign   ? Peripheral vascular disease (H)     Problem list name updated by automated process. Provider to review     ? Hypertension goal BP (blood pressure) < 140/90   ? Personal history of other malignant neoplasm of skin   ? Hyperlipidemia LDL goal <100     Per provider     ? Diverticulitis of colon     Abd CT  Problem list name updated by automated process. Provider to review     ? Congenital cystic disease of liver     multiple liver lesions.  felt to be focal nodular hyperplasia.  annual CT abd.    followed by Dr. Amilcar Kat               Surgical History:  Past Surgical History:   Procedure Laterality Date   ? C APPENDECTOMY age 22   ? C DEXA, BONE DENSITY, AXIAL SKEL 2005   ? C DEXA, BONE DENSITY, AXIAL SKEL 6/2007   No signif change in Osteopenia   ? COLONOSCOPY 4/2006   repeat 10 yr   ? ENDOSCOPIC ULTRASOUND UPPER GASTROINTESTINAL TRACT (GI) N/A 9/6/2018   Procedure: ENDOSCOPIC ULTRASOUND, ESOPHAGOSCOPY / UPPER GASTROINTESTINAL TRACT (GI); Endoscopic Ultrasound, Esophagogastroduodenoscopy with endoscopic mucosal resection; Surgeon: Hood Brower MD; [facility]   ? ESOPHAGOSCOPY, GASTROSCOPY, DUODENOSCOPY (EGD), COMBINED N/A 8/13/2018   Procedure: COMBINED ESOPHAGOSCOPY, GASTROSCOPY, DUODENOSCOPY (EGD), BIOPSY SINGLE OR MULTIPLE; EGD;  "Surgeon: Hood Brower MD; [facility]   ? ESOPHAGOSCOPY, GASTROSCOPY, DUODENOSCOPY (EGD), COMBINED N/A 3/14/2019   Procedure: Upper Endoscopy; Surgeon: Hood Brower MD; Location: [facility] OR   ? ESOPHAGOSCOPY, GASTROSCOPY, DUODENOSCOPY (EGD), RESECT MUCOSA, COMBINED N/A 9/6/2018   Procedure: COMBINED ESOPHAGOSCOPY, GASTROSCOPY, DUODENOSCOPY (EGD), RESECT MUCOSA;; Surgeon: Hood Brower MD; Location: [facility] OR   ? GYN SURGERY 10/22/08   pelvic support   ? HEMORRHOIDECTOMY 8/08   ? MOHS MICROGRAPHIC PROCEDURE   ? SURGICAL HISTORY OF - age 22   L ovarian cyst removal, laparotomy   ? SURGICAL HISTORY OF - 1998   partial thyroidectomy   ? SURGICAL HISTORY OF - 10/08   Transobturator tape for Urinary Incontinence   ? SURGICAL HISTORY OF - 11/2011   stress test normal   ? THYROID SURGERY   Had cyst removed, thyroid gland is intact.                  Review of Systems   Crying, \"I am at my wits end.  I need to leave.\"   Has her follow up appointment early this afternoon        Blood pressure 136/74, pulse 80, temperature (!) 96.1  F (35.6  C), resp. rate 16, weight 195 lb 6.4 oz (88.6 kg), SpO2 92 %.        BMI= 32.12    Physical Exam     Constitutional:          female, overweight.  Cardiovascular:      Rate and Rhythm: Normal rate , rhythm. No murmur.   Pulmonary:      Breath sounds: clear  Abdominal:      + BS,  soft, non tender.   Musculoskeletal:         General:   Left lower leg in the immobilizer, minimal dorsal foot swelling      Both arms and right leg normal strength and coordination  Using a knee tricycle well  Skin:     General: Skin is warm and dry.    Surgical wounds: healed  Neurological:      General: No focal deficit present.      Mental Status: She is alert and oriented to person, place, and time.   Psychiatric:          Mood: anxious, frustrated      Allergies   Allergen Reactions   ? Amlodipine Swelling   ? Cefazolin    ? Metformin Diarrhea   ? Cephalosporins Rash   ? Clindamycin Rash   ? Levaquin " [Levofloxacin] Rash     Itching, rash     ? Nickel Rash     Contact reaction           Medication List:    Current Outpatient Medications   Medication Sig   ? acetaminophen (TYLENOL) 325 MG tablet Take 975 mg by mouth every 8 (eight) hours.   ? aluminum-magnesium hydroxide-simethicone (MAALOX ADVANCED) 200-200-20 mg/5 mL Susp Take 30 mL by mouth 3 (three) times a day as needed.   ? atorvastatin (LIPITOR) 40 MG tablet Take 40 mg by mouth at bedtime.   ? calcium-vitamin D (CALCIUM-VITAMIN D) 500 mg(1,250mg) -200 unit per tablet Take 1 tablet by mouth 2 (two) times a day with meals.   ? cholecalciferol, vitamin D3, 3,000 unit Tab Take 3,000 Units by mouth daily.   ? furosemide (LASIX) 20 MG tablet Take 20 mg by mouth daily.   ? glimepiride (AMARYL) 4 MG tablet Take 4 mg by mouth daily before breakfast.    ? insulin aspart U-100 (NOVOLOG) 100 unit/mL injection Inject under the skin 3 (three) times a day before meals. Inject as per  sliding scale: if 0 - 139 = O UNITS DO NOT GIVE  CORRECTION INSULIN IF PRE MEAL BG < 140;  140 - 189 = 1 UNIT; 190 - 239 = 2 UNITS; 240 - 289 =  3 UNITS; 290 - 339 = 4 UNITS; 340 - 399 = 5 UNITS;  400 - 449 = 6 UNITS; 450+ = 7 UNITS IF BG = OR >  450 GIVE 7 UNIT   ? insulin aspart U-100 (NOVOLOG) 100 unit/mL injection Inject under the skin at bedtime. Inject as per  sliding scale: if 0 - 199 = 0 UNITS DO NOT GIVE  BEDTIME CORRECTION DOSE IF BG LESS THAN  200; 200 - 249 = 1 UNIT; 250 - 299 = 2 UNITS; 300 -  349 = 3 UNITS; 350 - 399 = 4 UNITS; 400+ = 5  UNITS FOR BG GREATER THAN OR EQUAL TO  400, GIVE 5 UNITS   ? insulin aspart U-100 (NOVOLOG) 100 unit/mL injection Inject under the skin 2 (two) times a day before meals. Administer 2 units at lunch and 4 units at supper.   ? insulin glargine (LANTUS) 100 unit/mL vial Inject 38 Units under the skin daily.          ? linaGLIPtin 5 mg Tab Take by mouth.   ? lisinopril (PRINIVIL,ZESTRIL) 2.5 MG tablet Take 2.5 mg by mouth daily.   ? metoprolol  tartrate (LOPRESSOR) 25 MG tablet Take 25 mg by mouth 2 (two) times a day.   ? nystatin (MYCOSTATIN) cream Apply topically 2 (two) times a day.   ? oxyCODONE (ROXICODONE) 5 MG immediate release tablet Take 5 mg by mouth every 4 (four) hours as needed for pain.    ? oxyCODONE (ROXICODONE) 5 MG immediate release tablet Take 10 mg by mouth 2 (two) times a day with meals. 0730 and noon to cover morning and afternoon therapies respectively   ? senna-docusate (PERICOLACE) 8.6-50 mg tablet Take 2 tablets by mouth 2 (two) times a day.   ? triamcinolone (KENALOG) 0.1 % cream Apply 1 application topically 3 (three) times a day.           Labs:       Ref Range & Units 11/25/19 2155   WBC 4.0 - 11.0 10e9/L 10.0    RBC Count 3.8 - 5.2 10e12/L 4.29    Hemoglobin 11.7 - 15.7 g/dL 13.1    Hematocrit 35.0 - 47.0 % 40.7    MCV 78 - 100 fl 95    MCH 26.5 - 33.0 pg 30.5    MCHC 31.5 - 36.5 g/dL 32.2    RDW 10.0 - 15.0 % 13.9    Platelet Count 150 - 450 10e9/L 588High     % Neutrophils  % 68.0       Sodium 133 - 144 mmol/L 136    Potassium 3.4 - 5.3 mmol/L 3.8    Comment: Specimen slightly hemolyzed, potassium may be falsely elevated   Chloride 94 - 109 mmol/L 105    Carbon Dioxide 20 - 32 mmol/L 25    Anion Gap 3 - 14 mmol/L 6    Glucose 70 - 99 mg/dL 300High     Urea Nitrogen 7 - 30 mg/dL 15    Creatinine 0.52 - 1.04 mg/dL 0.84    GFR Estimate >60 mL/min/1.73_m2 70           Ref Range & Units 11/25/19 2155   N-Terminal Pro BNP Inpatient 0 - 900 pg/mL 89              Assessment / Plan:    ICD-10-CM    1. Closed bimalleolar fracture of left ankle with routine healing S82.842D  oxycodone is helping her pain, generally 10 mg when she takes it.  Has less pain now than previously   2. Type 2 diabetes mellitus with hyperglycemia, with long-term current use of insulin (H)    E11.65 On Glargine, Aspart U100. Glucoses are variable. Is requiring sliding scale Novolog at times A1c=7.6    Z79.4    3. Peripheral vascular disease (H) I73.9 On lasix  marni for leg edema   4. Anxiety about health F41.8  encouraged her to deep breathe, she is likely to get some degree if not full degree of weightbearing today at her appointment.  We discussed that leaving without weightbearing would leave her at high risk for reinjury.   5. Essential hypertension I10  Well controlled   6.  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

## 2021-06-20 NOTE — LETTER
Letter by Sunita Santiago MD at      Author: Sunita Santiago MD Service: -- Author Type: --    Filed:  Encounter Date: 12/20/2019 Status: Signed         Patient: Sarah Felix   MR Number: [id number]   YOB: 1945   Date of Visit: 12/20/2019     [facility]    Facility:   [facility] [[id number]]    Code Status: FULL CODE  PCP: Wegener, Joel D, MD   Phone: [phone]   Fax: [phone]      CHIEF COMPLAINT/REASON FOR VISIT:  Chief Complaint   Patient presents with   ? Discharge Summary       HISTORY COURSE:  Sarah fell down in her house on 10/11/2019, delayed calling 911 because she did not want them in her house (a hoarder), so she had approximately 12 hours where she tried to get herself presentable.    She presented to the [facility]:  Imaging showed bimalleolar fracture on the left side which was severely displaced.  Attempted reduction in the emergency department unsuccessful.  Orthopedics did do a closed fracture reduction under conscious sedation.  Later that day she went to the OR for external fixator.    She returned to the hospital on 10/28/2019 for external fixator removal and open reduction internal fixation.  On postop day 2 she returned to [facility] in a cam boot, nonweightbearing.    She went to use the knee tricycle, reached a max in therapy, and had to stay  In TCU until she got weightbearing at her 12/13/2019 appointment.   She did have more pain as she started weightbearing.  Oxycodone 10 mg in the morning and 10 mg before lunch was helpful so she can do her weightbearing, with using an occasional as needed dose of 5 mg as needed.  She is estranged from her brother, and lives alone.  She did have a couple friends to try to declot her some of her house preparation for her return.    She had an ED visit on 11/25/2019 for shortness of breath, complete work-up was negative.      Review of  Systems   None.  Just trying to cut back on her oxycodone both as needed and the scheduled doses.  States she will continue to use sliding scale at home; will discuss her management with Dr Wegener  4 wheeled walker was ordered for her, she is fretting about whether it will be delivered in time for her discharge on 12/21/2019 at noon    Vitals:    12/20/19 1100   BP: 111/62   Pulse: 78   Resp: 20   Temp: (!) 95.6  F (35.3  C)   SpO2: 91%       Physical Exam  Constitutional:          female, overweight.  Cardiovascular:      Rate and Rhythm: Normal rate , rhythm. No murmur.   Pulmonary:      Breath sounds: clear  Abdominal:      + BS,  soft, non tender.   Musculoskeletal:         General:   Left lower leg in the immobilizer, minimal dorsal foot swelling      Both arms and right leg normal strength and coordination  Using a walker well  Skin:     General: Skin is warm and dry.    Surgical wounds: healed  Neurological:      General: No focal deficit present.      Mental Status: She is alert and oriented to person, place, and time.   Psychiatric:          Mood:  glad to be going home, a bit nervous       Allergies   Allergen Reactions   ? Amlodipine Swelling   ? Cefazolin    ? Metformin Diarrhea   ? Cephalosporins Rash   ? Clindamycin Rash   ? Levaquin [Levofloxacin] Rash     Itching, rash     ? Nickel Rash     Contact reaction         MEDICATION LIST:  Current Outpatient Medications   Medication Sig   ? acetaminophen (TYLENOL) 325 MG tablet Take 975 mg by mouth every 8 (eight) hours as needed.    ? aluminum-magnesium hydroxide-simethicone (MAALOX ADVANCED) 200-200-20 mg/5 mL Susp Take 30 mL by mouth 3 (three) times a day as needed.   ? atorvastatin (LIPITOR) 40 MG tablet Take 40 mg by mouth at bedtime.   ? calcium-vitamin D (CALCIUM-VITAMIN D) 500 mg(1,250mg) -200 unit per tablet Take 1 tablet by mouth 2 (two) times a day with meals.   ? cholecalciferol, vitamin D3, 3,000 unit Tab Take 3,000 Units by mouth  daily.   ? furosemide (LASIX) 20 MG tablet Take 20 mg by mouth daily.   ? glimepiride (AMARYL) 4 MG tablet Take 4 mg by mouth daily before breakfast.    ? insulin aspart U-100 (NOVOLOG) 100 unit/mL injection Inject under the skin 3 (three) times a day before meals. Inject as per  sliding scale: if 0 - 139 = O UNITS DO NOT GIVE  CORRECTION INSULIN IF PRE MEAL BG < 140;  140 - 189 = 1 UNIT; 190 - 239 = 2 UNITS; 240 - 289 =  3 UNITS; 290 - 339 = 4 UNITS; 340 - 399 = 5 UNITS;  400 - 449 = 6 UNITS; 450+ = 7 UNITS IF BG = OR >  450 GIVE 7 UNIT   ? insulin aspart U-100 (NOVOLOG) 100 unit/mL injection Inject under the skin at bedtime. Inject as per  sliding scale: if 0 - 199 = 0 UNITS DO NOT GIVE  BEDTIME CORRECTION DOSE IF BG LESS THAN  200; 200 - 249 = 1 UNIT; 250 - 299 = 2 UNITS; 300 -  349 = 3 UNITS; 350 - 399 = 4 UNITS; 400+ = 5  UNITS FOR BG GREATER THAN OR EQUAL TO  400, GIVE 5 UNITS   ? insulin aspart U-100 (NOVOLOG) 100 unit/mL injection Inject under the skin 2 (two) times a day before meals. Administer 2 units at lunch and 4 units at supper.   ? insulin glargine (LANTUS) 100 unit/mL vial Inject 38 Units under the skin daily.          ? linaGLIPtin 5 mg Tab Take by mouth.   ? lisinopril (PRINIVIL,ZESTRIL) 2.5 MG tablet Take 2.5 mg by mouth daily.   ? metoprolol tartrate (LOPRESSOR) 25 MG tablet Take 25 mg by mouth 2 (two) times a day.   ? nystatin (MYCOSTATIN) cream Apply topically 2 (two) times a day.   ? oxyCODONE (ROXICODONE) 5 MG immediate release tablet Take 5 mg by mouth every 4 (four) hours as needed for pain.    ? oxyCODONE (ROXICODONE) 5 MG immediate release tablet Take 10 mg by mouth 2 (two) times a day with meals. 0730 and noon to cover morning and afternoon therapies respectively   ? senna-docusate (PERICOLACE) 8.6-50 mg tablet Take 2 tablets by mouth 2 (two) times a day.   ? triamcinolone (KENALOG) 0.1 % cream Apply 1 application topically 3 (three) times a day.       DISCHARGE DIAGNOSIS:    ICD-10-CM    1. Closed bimalleolar fracture of left ankle with routine healing S82.842D   2. Type 2 diabetes mellitus with hyperglycemia, with long-term current use of insulin (H) E11.65    Z79.4   3. Essential hypertension, benign I10   4. Peripheral vascular disease (H) I73.9   5. Anxiety about health F41.8         MEDICAL EQUIPMENT NEEDS:  4 wheeled walker    DISCHARGE PLAN/FACE TO FACE:  I certify that services are/were furnished while this patient was under the care of a physician and that a physician or an allowed non-physician practitioner (NPP), had a face-to-face encounter that meets the physician face-to-face encounter requirements. The encounter was in whole, or in part, related to the primary reason for home health. The patient is confined to his/her home and needs intermittent skilled nursing, physical therapy, speech-language pathology, or the continued need for occupational therapy. A plan of care has been established by a physician and is periodically reviewed by a physician.  Date of Face-to-Face Encounter: 12/20/19    I certify that, based on my findings, the following services are medically necessary home health services:   - Home PT  - Home Health Aide    My clinical findings support the need for the above skilled services because: She is newly weightbearing after 2 months of nonweightbearing, needs further conditioning, home safety eval, and practiced with mobility.  This patient is homebound because: She is deconditioned and at risk for a fall .    The patient is, or has been, under my care and I have initiated the establishment of the plan of care. This patient will be followed by a physician who will periodically review the plan of care.    Schedule follow up visit with primary care provider within 7 days to reestablish care.      Post Discharge Medication Reconciliation Status: discharge medications reconciled, continue medications without change      Electronically signed by: Sunita Santiago,  MD

## 2021-06-20 NOTE — LETTER
Letter by Sunita Santiago MD at      Author: Sunita Santiago MD Service: -- Author Type: --    Filed:  Encounter Date: 11/29/2019 Status: Signed         Patient: Sarah Felix   MR Number: [id number]   YOB: 1945   Date of Visit: 11/29/2019     [facility]      Facility:    [facility] [[id number]]   Code Status: UNKNOWN  [place] / [facility] : 10/12 to 10/18/19  [facility] 10/28/19 - 10/30/19    ORIF     NO DISCHARGE SUMMARY IN CARE EVERYWHERE      Chief Complaint/Reason for Visit:  Chief Complaint   Patient presents with   ? Review Of Multiple Medical Conditions       HPI:   Sarah is a 74 y.o. female with a history of type 2 diabetes.    She was going down the stairs  at home on 10/11/2019 in the late evening.  Her ankle gave out and she slipped 3-4 stairs on her buttocks. She had pain and deformity of her left ankle. She waited till the following day  to call for help, she did not want the paramedics breaking down her door.    She was brought to the [facility].  X-ray showed bimalleolar ankle fracture.  It was a severely displaced  fracture, ED attempted to reduce it.  Ended up calling orthopedics  who performed aclosed fracture reduction under conscious sedation in the emergency department.  Later in the day, she went to the OR for an external fixator placement.  She was non-weightbearing on the left leg, enoxaparin for DVT prophylaxis.    She was given Doxycycline perioperatively, and developed a diffuse rash. Triamcinolone cream was prescribed.    She had an open area on her coccyx from falling on the stairs, then scooting along her floor prior to calling for help. It was covered with Mepilex foam dressing.    She transferred to [facility].  __________________________________________________________________________  Hospitalization #2: 10/28/19  Removal of external fixator, plate and screws open  reduction fixation.  Nonweightbearing left lower extremity  DVT prophylaxis: Lovenox daily x28 days    She returned to [facility]    UPDATE:  She had sudden dyspnea at the facility on 11/25: She went to the emergency department at the [facility].  Work-up was completely negative, she returned to [facility].      Past Medical History:  Active Non-Hospital Problems    Diagnosis   ? Esophageal reflux   ? Fatty liver   ? Closed bimalleolar fracture of left ankle with routine healing   ? Type 2 diabetes mellitus with hyperglycemia (H)   ? Essential hypertension, benign   ? Peripheral vascular disease (H)     Problem list name updated by automated process. Provider to review     ? Hypertension goal BP (blood pressure) < 140/90   ? Personal history of other malignant neoplasm of skin   ? Hyperlipidemia LDL goal <100     Per provider     ? Diverticulitis of colon     Abd CT  Problem list name updated by automated process. Provider to review     ? Congenital cystic disease of liver     multiple liver lesions.  felt to be focal nodular hyperplasia.  annual CT abd.    followed by Dr. Amilcar Kat               Surgical History:  Past Surgical History:   Procedure Laterality Date   ? C APPENDECTOMY age 22   ? C DEXA, BONE DENSITY, AXIAL SKEL 2005   ? C DEXA, BONE DENSITY, AXIAL SKEL 6/2007   No signif change in Osteopenia   ? COLONOSCOPY 4/2006   repeat 10 yr   ? ENDOSCOPIC ULTRASOUND UPPER GASTROINTESTINAL TRACT (GI) N/A 9/6/2018   Procedure: ENDOSCOPIC ULTRASOUND, ESOPHAGOSCOPY / UPPER GASTROINTESTINAL TRACT (GI); Endoscopic Ultrasound, Esophagogastroduodenoscopy with endoscopic mucosal resection; Surgeon: Hood Brower MD; [facility]   ? ESOPHAGOSCOPY, GASTROSCOPY, DUODENOSCOPY (EGD), COMBINED N/A 8/13/2018   Procedure: COMBINED ESOPHAGOSCOPY, GASTROSCOPY, DUODENOSCOPY (EGD), BIOPSY SINGLE OR MULTIPLE; EGD; Surgeon: Hood Brower MD; Location:  OR   ?  ESOPHAGOSCOPY, GASTROSCOPY, DUODENOSCOPY (EGD), COMBINED N/A 3/14/2019   Procedure: Upper Endoscopy; Surgeon: Hood Brower MD; Location[facility] OR   ? ESOPHAGOSCOPY, GASTROSCOPY, DUODENOSCOPY (EGD), RESECT MUCOSA, COMBINED N/A 9/6/2018   Procedure: COMBINED ESOPHAGOSCOPY, GASTROSCOPY, DUODENOSCOPY (EGD), RESECT MUCOSA;; Surgeon: Hood Brower MD; Location: [facility] OR   ? GYN SURGERY 10/22/08   pelvic support   ? HEMORRHOIDECTOMY 8/08   ? MOHS MICROGRAPHIC PROCEDURE   ? SURGICAL HISTORY OF - age 22   L ovarian cyst removal, laparotomy   ? SURGICAL HISTORY OF - 1998   partial thyroidectomy   ? SURGICAL HISTORY OF - 10/08   Transobturator tape for Urinary Incontinence   ? SURGICAL HISTORY OF - 11/2011   stress test normal   ? THYROID SURGERY   Had cyst removed, thyroid gland is intact.                  Review of Systems   No shortness of breath  L ankle is achy  Impatient for weight bearing. Wants to go home.  She is getting too much stool softener, consider change to by request only.  She does not want decongestant (fexofenadine) , Benadryl, methocarbamol, Protonix.  She does not have heartburn  We had given her nasal saline spray because the air is quite dry in the facility.  She had a friend bring in her [place] pot which is more helpful.  Bored.        Blood pressure 122/67, pulse 80, temperature 97.6  F (36.4  C), resp. rate 18, SpO2 96 %.        BMI= 32.12    Physical Exam     Constitutional:          female, overweight.  Cardiovascular:      Rate and Rhythm: Normal rate , rhythm. No murmur.   Pulmonary:      Breath sounds: Still end inspiratory rales right posterior base  Abdominal:      General: Bowel sounds are normal.      Palpations: Abdomen is soft, non tender.   Musculoskeletal:         General:   Left lower leg in the immobilizer, minimal dorsal foot swelling      Both arms and right leg normal strength and coordination  Skin:     General: Skin is warm and dry.    Surgical wounds: no erythema,  drainage.  Neurological:      General: No focal deficit present.      Mental Status: She is alert and oriented to person, place, and time.   Psychiatric:          Mood and thought content normal. Quite demanding at times of staff       Allergies   Allergen Reactions   ? Amlodipine Swelling   ? Cefazolin    ? Metformin Diarrhea   ? Cephalosporins Rash   ? Clindamycin Rash   ? Levaquin [Levofloxacin] Rash     Itching, rash     ? Nickel Rash     Contact reaction           Medication List:    Current Outpatient Medications   Medication Sig   ? acetaminophen (TYLENOL) 325 MG tablet Take 975 mg by mouth every 8 (eight) hours.   ? aluminum-magnesium hydroxide-simethicone (MAALOX ADVANCED) 200-200-20 mg/5 mL Susp Take 30 mL by mouth 3 (three) times a day as needed.   ? atorvastatin (LIPITOR) 40 MG tablet Take 40 mg by mouth at bedtime.   ? calcium-vitamin D (CALCIUM-VITAMIN D) 500 mg(1,250mg) -200 unit per tablet Take 1 tablet by mouth 2 (two) times a day with meals.   ? cholecalciferol, vitamin D3, 3,000 unit Tab Take 3,000 Units by mouth daily.   ? diphenhydrAMINE (BENADRYL) 25 mg capsule Take 25 mg by mouth every 6 (six) hours as needed for itching.   ? fexofenadine (ALLEGRA) 180 MG tablet Take 180 mg by mouth daily.   ? furosemide (LASIX) 20 MG tablet Take 20 mg by mouth daily.   ? glimepiride (AMARYL) 4 MG tablet Take 4 mg by mouth daily before breakfast.   ? insulin aspart U-100 (NOVOLOG) 100 unit/mL injection Inject under the skin 3 (three) times a day before meals. Inject as per  sliding scale: if 0 - 139 = O UNITS DO NOT GIVE  CORRECTION INSULIN IF PRE MEAL BG < 140;  140 - 189 = 1 UNIT; 190 - 239 = 2 UNITS; 240 - 289 =  3 UNITS; 290 - 339 = 4 UNITS; 340 - 399 = 5 UNITS;  400 - 449 = 6 UNITS; 450+ = 7 UNITS IF BG = OR >  450 GIVE 7 UNIT   ? insulin aspart U-100 (NOVOLOG) 100 unit/mL injection Inject under the skin at bedtime. Inject as per  sliding scale: if 0 - 199 = 0 UNITS DO NOT GIVE  BEDTIME CORRECTION DOSE IF  BG LESS THAN  200; 200 - 249 = 1 UNIT; 250 - 299 = 2 UNITS; 300 -  349 = 3 UNITS; 350 - 399 = 4 UNITS; 400+ = 5  UNITS FOR BG GREATER THAN OR EQUAL TO  400, GIVE 5 UNITS   ? insulin aspart U-100 (NOVOLOG) 100 unit/mL injection Inject under the skin 2 (two) times a day before meals. Administer 2 units at lunch and 4 units at supper.   ? insulin glargine (LANTUS) 100 unit/mL vial Inject 38 Units under the skin daily.          ? lisinopril (PRINIVIL,ZESTRIL) 2.5 MG tablet Take 2.5 mg by mouth daily.   ? methocarbamol (ROBAXIN) 500 MG tablet Take 500 mg by mouth 4 (four) times a day.   ? metoprolol tartrate (LOPRESSOR) 25 MG tablet Take 25 mg by mouth 2 (two) times a day.   ? oxyCODONE (ROXICODONE) 5 MG immediate release tablet Take 5-10 mg by mouth every 4 (four) hours as needed for pain.   ? oxyCODONE (ROXICODONE) 5 MG immediate release tablet Take 10 mg by mouth 2 (two) times a day with meals. 0730 and noon to cover morning and afternoon therapies respectively   ? pantoprazole (PROTONIX) 40 MG tablet Take 40 mg by mouth daily.   ? senna-docusate (PERICOLACE) 8.6-50 mg tablet Take 2 tablets by mouth 2 (two) times a day.   ? triamcinolone (KENALOG) 0.1 % cream Apply 1 application topically 3 (three) times a day.           Labs:    Ref Range & Units 11/25/19 2155   WBC 4.0 - 11.0 10e9/L 10.0    RBC Count 3.8 - 5.2 10e12/L 4.29    Hemoglobin 11.7 - 15.7 g/dL 13.1    Hematocrit 35.0 - 47.0 % 40.7    MCV 78 - 100 fl 95    MCH 26.5 - 33.0 pg 30.5    MCHC 31.5 - 36.5 g/dL 32.2    RDW 10.0 - 15.0 % 13.9    Platelet Count 150 - 450 10e9/L 588High     % Neutrophils  % 68.0      Sodium 133 - 144 mmol/L 136    Potassium 3.4 - 5.3 mmol/L 3.8    Comment: Specimen slightly hemolyzed, potassium may be falsely elevated   Chloride 94 - 109 mmol/L 105    Carbon Dioxide 20 - 32 mmol/L 25    Anion Gap 3 - 14 mmol/L 6    Glucose 70 - 99 mg/dL 300High     Urea Nitrogen 7 - 30 mg/dL 15    Creatinine 0.52 - 1.04 mg/dL 0.84    GFR Estimate >60  mL/min/1.73_m2 70           Ref Range & Units 10/17/19 0621   Sodium 133 - 144 mmol/L 140    Potassium 3.4 - 5.3 mmol/L 3.5    Chloride 94 - 109 mmol/L 108    Carbon Dioxide 20 - 32 mmol/L 25    Anion Gap 3 - 14 mmol/L 7    Glucose 70 - 99 mg/dL 175High     Urea Nitrogen 7 - 30 mg/dL 8    Creatinine 0.52 - 1.04 mg/dL 0.63    GFR Estimate >60 mL/min/1.73_m2 90       Ref Range & Units 11/25/19 2155   N-Terminal Pro BNP Inpatient 0 - 900 pg/mL 89         Ref Range & Units 10/18/19 1113   WBC 4.0 - 11.0 10e9/L 17.7High     Hemoglobin 11.7 - 15.7 g/dL 11.2Low     RDW 10.0 - 15.0 % 15.0    Platelet Count 150 - 450 10e9/L 698High           Assessment / Plan:    ICD-10-CM    1. Closed bimalleolar fracture of left ankle with routine healing S82.842D  oxycodone is helping her pain, generally 10 mg when she takes it.  Has less pain now than previously   2. Type 2 diabetes mellitus with hyperglycemia, with long-term current use of insulin (H) E11.65 On Grlargine, Aspart U100. Also on a statin. A1c=7.6    Z79.4    3. Peripheral vascular disease (H) I73.9 On lasix daiy for leg edema   4. Essential hypertension I10  Well controlled   5.  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

## 2021-06-21 ENCOUNTER — OFFICE VISIT (OUTPATIENT)
Dept: PHARMACY | Facility: CLINIC | Age: 76
End: 2021-06-21
Payer: COMMERCIAL

## 2021-06-21 VITALS
SYSTOLIC BLOOD PRESSURE: 100 MMHG | WEIGHT: 200 LBS | BODY MASS INDEX: 33.28 KG/M2 | OXYGEN SATURATION: 95 % | DIASTOLIC BLOOD PRESSURE: 62 MMHG | HEART RATE: 85 BPM

## 2021-06-21 DIAGNOSIS — Z79.4 TYPE 2 DIABETES MELLITUS WITH STAGE 2 CHRONIC KIDNEY DISEASE, WITH LONG-TERM CURRENT USE OF INSULIN (H): ICD-10-CM

## 2021-06-21 DIAGNOSIS — N18.2 TYPE 2 DIABETES MELLITUS WITH STAGE 2 CHRONIC KIDNEY DISEASE, WITH LONG-TERM CURRENT USE OF INSULIN (H): ICD-10-CM

## 2021-06-21 DIAGNOSIS — E87.6 HYPOKALEMIA: ICD-10-CM

## 2021-06-21 DIAGNOSIS — E11.22 TYPE 2 DIABETES MELLITUS WITH STAGE 2 CHRONIC KIDNEY DISEASE, WITH LONG-TERM CURRENT USE OF INSULIN (H): ICD-10-CM

## 2021-06-21 DIAGNOSIS — E11.9 TYPE 2 DIABETES, HBA1C GOAL < 7% (H): Primary | ICD-10-CM

## 2021-06-21 LAB — HBA1C MFR BLD: 8.3 % (ref 0–5.6)

## 2021-06-21 PROCEDURE — 99607 MTMS BY PHARM ADDL 15 MIN: CPT | Performed by: PHARMACIST

## 2021-06-21 PROCEDURE — 99606 MTMS BY PHARM EST 15 MIN: CPT | Performed by: PHARMACIST

## 2021-06-21 PROCEDURE — 36415 COLL VENOUS BLD VENIPUNCTURE: CPT | Performed by: FAMILY MEDICINE

## 2021-06-21 PROCEDURE — 83036 HEMOGLOBIN GLYCOSYLATED A1C: CPT | Performed by: FAMILY MEDICINE

## 2021-06-21 NOTE — PATIENT INSTRUCTIONS
Recommendations from today's MTM visit:                                                       1. A1c today = 8.3% --down from 9.4% .    Stay on same dose of Glimepiride and lantus , but fiddle with the Novolog  Dose  (meal time insulin) -try to get 3 doses in but watch how much insulin you take (10 units is your usual dose but ok to take less  To avoid the real low blood sugars as they wipe you out.)      Follow-up:  6 weeks blood sugar review; Monday August 9th at 3pm  Please bring your blood sugar meter to the visit.       It was great to speak with you today.  I value your experience and would be very thankful for your time with providing feedback on our clinic survey. You may receive a survey via email or text message in the next few days.     To schedule another MTM appointment, please call the clinic directly or you may call the MTM scheduling line at [phone] or toll-free at [phone].     My Clinical Pharmacist's contact information:                                                      Please feel free to contact me with any questions or concerns you have.      Joan Lopez Rph.  Medication Therapy Management Provider  [phone]

## 2021-07-03 NOTE — ADDENDUM NOTE
Addendum Note by Hira Campos CNP at 12/4/2019  2:39 PM     Author: Hira Campos CNP Service: -- Author Type: Nurse Practitioner    Filed: 12/4/2019  3:26 PM Encounter Date: 12/4/2019 Status: Signed    : Hira Campos CNP (Nurse Practitioner)    Addended by: HIRA CAMPOS on: 12/4/2019 03:26 PM        Modules accepted: Orders

## 2021-07-09 DIAGNOSIS — N18.2 CKD (CHRONIC KIDNEY DISEASE) STAGE 2, GFR 60-89 ML/MIN: ICD-10-CM

## 2021-07-09 RX ORDER — LISINOPRIL 2.5 MG/1
2.5 TABLET ORAL DAILY
Qty: 90 TABLET | Refills: 1 | Status: SHIPPED | OUTPATIENT
Start: 2021-07-09 | End: 2022-01-20

## 2021-07-27 NOTE — PROGRESS NOTES
Medication Therapy Management (MTM) Encounter    ASSESSMENT:                            Medication Adherence/Access: See below for considerations    Type 2 Diabetes: Patient is not meeting A1c goal of < 8%(8.3%). Self monitoring of blood glucose is not at goal of fasting  mg/dL and post prandial < 180 mg/dL. Patient would benefit from ideal SMBG: Check blood sugars more often because that triggers a second Novolog injection , pre-prandial, 2 hours post-prandial, and with symptoms of hypoglycemia, Basal Insulin (Lantus) :  stay on the same dose--see plan if early morning hypoglycemia occurs., Bolus / Rapid Acting Insulin (Novolog) : Inject 15 units days at each main meal up to 3 x day .  , Sulfonylurea (Glimepiride) :  stay on the same dose., Increased exercise, Increase in home glucose monitoring-see plan  and weight loss recommended-we discussed lower carb foods--most likely she will stay with her pasta and rice as is .     Hypokalemia:  Stable K+ lab , but pt. Prefers a capsule now rather than dissolveable tablet that she feels bothers her mouth .       PLAN:                            1.  FYI--blood sugars are improving nicely --stay on 51 units lantus daily + 4mg. Glimepiride twice daily , just concentrate on how often you inject 15 units Novolog on any given day (at biggest main meal and biggest carb snack of the day ).     2. FYI--Lets change your potassium pills from dissolvable pills to the 10meq capsule that you swallow --1 pill twice daily at end of a main meal with glass of water.     3. FYI--Consider seeing Dr. Fay as your new primary care provider. She can look at your right side flank pain issue .         Follow-up: Return in 7 weeks (on 9/27/2021), or 3pm., for A1c lab, Medication Therapy Management Visit.    SUBJECTIVE/OBJECTIVE:                          Sarah Felix is a 75 year old female coming in for a follow-up (6-21-21) visit. She was referred to me from Remington Browning  .       Reason for visit:   6 weeks bs review.      Allergies/ADRs: Reviewed in chart  Tobacco: She reports that she quit smoking about 41 years ago. She has never used smokeless tobacco.  Alcohol: 7-9 beverages / week typically wine 1 or 2 glasses/night  Caffeine: 2 cans diet coke sodas/day  Activity: minimal due to recent ankle fracture  Past Medical History: Reviewed in chart  Personal Healthcare Goals: get covid vaccine, limit any falls, improve a1c    Medication Adherence/Access:  No issues other than late delivery insulins from her mail order.       Type 2 Diabetes:  Currently taking : glimepiride 4 mg.  Tablet twice daily ,  Lantus 51 units daily at midnight daily. She did start Novolog vial - inject 15 units usually  twice /day in am + before biggest snack meal of the day on most days .   Patient is not experiencing side effects.    SMBG: see chart below.     August 9th-2021 :    7 days =145 n=12  14 days =147 n=24  30 days =158 n=48    Date FBG/ 2hours post Lunch/2hours post Dinner /2hours post    8-9 135 6:08am      8-8  152 11:22am  83 12;13am   8-7 127 9:40am      8-6 209 9:16am   195 1;32am   8-5 101 10:48am  143 7:02am       8-4 167 11:42am      8-3 151  7:22am   158 2:18am         June 21st -21.   7 days = 171 n=14  14 daYS =165 n=26  30 days =168 n=67      Date FBG/ 2hours post Lunch/2hours post Dinner /2hours post    6-21-21 210 7;04am 165 1 pm      6-20-21 239 6;47am 69  4;28pm 228 11;34pm    6-19-21  76 12;32pm  157 3:12am   6-18-21 184 6;48am      6-17-21 110 9;48am 141 4;20pm  162 3;31am   6-16 244 10:06am      6-15 81 9;43am  323 9:08pm           May 18th -21:  7 days avg= 155 n=13  14 days bbu=100 n=27  30 days avg.=168 n=54      Date FBG/ 2hours post Lunch/2hours post Dinner /2hours post    5-18-21 100 9;48am       5-17 225 7;13am 160 2;35pm  145 12;29am(ate a snack)   5-16 218 8;58am(post bfast)  125 4;01am      5-15 138  8;09am      5-14 88 7;09am  (felt shaky) 161 1 ;36pm  152 12;54am   5-13  203 9;27am after bfast      5-12 96 10:26am            7 days avg.= 168 n=9  14 days = 168 n=16  30 days = 163 n=33        Date FBG/ 2hours post Lunch/2hours post Dinner /2hours post    4-20-21 181  7:02am  Ate 1/4 cup potato salad /208 12;25 pm -she drank OJ before hand     4-19-21 179 11;58am   109 12;01am   4-18-21 95  8;06am(she felt ok ) she skipped her novolog dose then.   She never injects a Novolog insulin dose without checking her blood sugar before hand !    4-17-21 168 10;51am      4-16-21 201 8am      4-15-21  195  12;30pm     4-14-21  172 2;35pm           7 days =177 n=9, 14 days = 195 n=17, 30 days = 187 n=33        Date FBG/ 2hours post Lunch/2hours post Dinner /2hours post    3-9-21 169 7;04am 128 1;41pm     3-8 179 9am 155 2;34pm     3-7 154 10;00am      3-6 260 10:11am(drank OJ)      3-5 164 9:18am      3-4 188 11;01am       3-3 196 11;42am          Symptoms of low blood sugar? sweaty, Frequency of lows- not often (she admits she cannot tolerate bs's <110- if they go lower than that she feels crummy , sweaty. )  Symptoms of high blood sugar? none  Eye exam: up to date  Foot exam: due  Diet: she's maintained similar diet and notes sugars now being elevated  eats 4-5 smaller meals - pasta and rices  Fruit and some fruit juices   Breakfast: cereal 2-3 times per week, drink 2 glasses OJ or grapefruit juice /day .   Exercise: minimal activity due to recent broken ankle, she was a bit more active prior to breaking the ankle/going through rehab  Aspirin: Taking 162 mg daily and denies side effects  Statin: Yes: atorvastatin 40 mg   ACEi/ARB: Yes: lisinopril 2.5 mg.   Urine Albumin:   Lab Results   Component Value Date    UMALCR 12.82 01/07/2020      Lab Results   Component Value Date    A1C 8.3 06/21/2021    A1C 9.4 12/14/2020    A1C 8.1 09/21/2020    A1C 8.5 01/07/2020    A1C 7.6 10/12/2019         Hypokalemia:  Patient reports the dissolvable potassium pill is working much better .   Potassium   Date  Value Ref Range Status   01/07/2020 4.0 3.4 - 5.3 mmol/L Final           Today's Vitals: /68   Pulse 92   Wt 201 lb (91.2 kg)   SpO2 94%   BMI 33.45 kg/m    ----------------        I spent 30 minutes with this patient today. All changes were made via collaborative practice agreement with Remington Browning  . A copy of the visit note was provided to the patient's primary care provider.    The patient was given a summary of these recommendations.     Joan Lopez [facility].  Medication Therapy Management Provider  [phone]         Medication Therapy Recommendations  Hypokalemia    Current Medication: potassium chloride ER (MICRO-K) 10 MEQ CR capsule   Rationale: Dosage form inappropriate - Ineffective medication - Effectiveness   Recommendation: Change Medication Formulation  - potassium chloride ER 10 MEQ CR capsule - 1 capsule twice daily.   Status: Accepted per CPA

## 2021-08-09 ENCOUNTER — OFFICE VISIT (OUTPATIENT)
Dept: PHARMACY | Facility: CLINIC | Age: 76
End: 2021-08-09
Payer: COMMERCIAL

## 2021-08-09 VITALS
OXYGEN SATURATION: 94 % | BODY MASS INDEX: 33.45 KG/M2 | DIASTOLIC BLOOD PRESSURE: 68 MMHG | HEART RATE: 92 BPM | WEIGHT: 201 LBS | SYSTOLIC BLOOD PRESSURE: 118 MMHG

## 2021-08-09 DIAGNOSIS — E11.22 TYPE 2 DIABETES MELLITUS WITH STAGE 2 CHRONIC KIDNEY DISEASE, WITH LONG-TERM CURRENT USE OF INSULIN (H): Primary | ICD-10-CM

## 2021-08-09 DIAGNOSIS — N18.2 TYPE 2 DIABETES MELLITUS WITH STAGE 2 CHRONIC KIDNEY DISEASE, WITH LONG-TERM CURRENT USE OF INSULIN (H): Primary | ICD-10-CM

## 2021-08-09 DIAGNOSIS — Z79.4 TYPE 2 DIABETES MELLITUS WITH STAGE 2 CHRONIC KIDNEY DISEASE, WITH LONG-TERM CURRENT USE OF INSULIN (H): Primary | ICD-10-CM

## 2021-08-09 DIAGNOSIS — E87.6 HYPOKALEMIA: ICD-10-CM

## 2021-08-09 PROCEDURE — 99607 MTMS BY PHARM ADDL 15 MIN: CPT | Performed by: PHARMACIST

## 2021-08-09 PROCEDURE — 99606 MTMS BY PHARM EST 15 MIN: CPT | Performed by: PHARMACIST

## 2021-08-09 RX ORDER — PEN NEEDLE, DIABETIC 32GX 5/32"
NEEDLE, DISPOSABLE MISCELLANEOUS
Qty: 400 EACH | Refills: 11 | Status: SHIPPED | OUTPATIENT
Start: 2021-08-09 | End: 2023-11-17

## 2021-08-09 RX ORDER — POTASSIUM CHLORIDE 750 MG/1
10 CAPSULE, EXTENDED RELEASE ORAL 2 TIMES DAILY
Qty: 180 CAPSULE | Refills: 3 | Status: SHIPPED | OUTPATIENT
Start: 2021-08-09 | End: 2022-08-16

## 2021-08-09 NOTE — Clinical Note
Collette--simran--I am referring kayce to you per her request to see an experienced MD . I told her you are tops on my list for that . She has some current rt. Flank pain issue that I want her to see you to assess.    [place]

## 2021-08-09 NOTE — PATIENT INSTRUCTIONS
Recommendations from today's MTM visit:                                                       1.  FYI--blood sugars are improving nicely --stay on 51 units lantus daily + 4mg. Glimepiride twice daily , just concentrate on how often you inject 15 units Novolog on any given day (at biggest main meal and biggest carb snack of the day ).     2. FYI--Lets change your potassium pills from dissolvable pills to the 10meq capsule that you swallow --1 pill twice daily at end of a main meal with glass of water.     3. FYI--Consider seeing Dr. Fay as your new primary care provider. She can look at your right side flank pain issue .         Follow-up: Return in 7 weeks (on 9/27/2021), or 3pm., for A1c lab, Medication Therapy Management Visit.    It was great to speak with you today.  I value your experience and would be very thankful for your time with providing feedback on our clinic survey. You may receive a survey via email or text message in the next few days.     To schedule another MTM appointment, please call the clinic directly or you may call the MTM scheduling line at [phone] or toll-free at [phone].     My Clinical Pharmacist's contact information:                                                      Please feel free to contact me with any questions or concerns you have.      Joan Lopez Rph.  Medication Therapy Management Provider  [phone]

## 2021-09-02 ENCOUNTER — OFFICE VISIT (OUTPATIENT)
Dept: FAMILY MEDICINE | Facility: CLINIC | Age: 76
End: 2021-09-02
Payer: COMMERCIAL

## 2021-09-02 ENCOUNTER — TELEPHONE (OUTPATIENT)
Dept: FAMILY MEDICINE | Facility: CLINIC | Age: 76
End: 2021-09-02

## 2021-09-02 VITALS
RESPIRATION RATE: 18 BRPM | DIASTOLIC BLOOD PRESSURE: 64 MMHG | OXYGEN SATURATION: 95 % | WEIGHT: 202 LBS | HEART RATE: 77 BPM | TEMPERATURE: 97.4 F | BODY MASS INDEX: 33.61 KG/M2 | SYSTOLIC BLOOD PRESSURE: 104 MMHG

## 2021-09-02 DIAGNOSIS — E78.5 HYPERLIPIDEMIA LDL GOAL <100: ICD-10-CM

## 2021-09-02 DIAGNOSIS — R10.11 RUQ ABDOMINAL PAIN: Primary | ICD-10-CM

## 2021-09-02 DIAGNOSIS — Z12.11 SCREEN FOR COLON CANCER: ICD-10-CM

## 2021-09-02 DIAGNOSIS — E11.9 TYPE 2 DIABETES, HBA1C GOAL < 7% (H): ICD-10-CM

## 2021-09-02 DIAGNOSIS — D12.6 ADENOMA OF COLON: ICD-10-CM

## 2021-09-02 LAB
ALBUMIN UR-MCNC: NEGATIVE MG/DL
APPEARANCE UR: CLEAR
BACTERIA #/AREA URNS HPF: ABNORMAL /HPF
BASOPHILS # BLD AUTO: 0.1 10E3/UL (ref 0–0.2)
BASOPHILS NFR BLD AUTO: 1 %
BILIRUB UR QL STRIP: NEGATIVE
COLOR UR AUTO: YELLOW
EOSINOPHIL # BLD AUTO: 0.3 10E3/UL (ref 0–0.7)
EOSINOPHIL NFR BLD AUTO: 3 %
ERYTHROCYTE [DISTWIDTH] IN BLOOD BY AUTOMATED COUNT: 16.3 % (ref 10–15)
GLUCOSE UR STRIP-MCNC: NEGATIVE MG/DL
HCT VFR BLD AUTO: 40.2 % (ref 35–47)
HGB BLD-MCNC: 13.2 G/DL (ref 11.7–15.7)
HGB UR QL STRIP: ABNORMAL
IMM GRANULOCYTES # BLD: 0 10E3/UL
IMM GRANULOCYTES NFR BLD: 0 %
KETONES UR STRIP-MCNC: NEGATIVE MG/DL
LEUKOCYTE ESTERASE UR QL STRIP: NEGATIVE
LIPASE SERPL-CCNC: 162 U/L (ref 73–393)
LYMPHOCYTES # BLD AUTO: 1.5 10E3/UL (ref 0.8–5.3)
LYMPHOCYTES NFR BLD AUTO: 14 %
MCH RBC QN AUTO: 31.4 PG (ref 26.5–33)
MCHC RBC AUTO-ENTMCNC: 32.8 G/DL (ref 31.5–36.5)
MCV RBC AUTO: 96 FL (ref 78–100)
MONOCYTES # BLD AUTO: 0.8 10E3/UL (ref 0–1.3)
MONOCYTES NFR BLD AUTO: 8 %
NEUTROPHILS # BLD AUTO: 7.6 10E3/UL (ref 1.6–8.3)
NEUTROPHILS NFR BLD AUTO: 74 %
NITRATE UR QL: NEGATIVE
PH UR STRIP: 5 [PH] (ref 5–7)
PLATELET # BLD AUTO: 916 10E3/UL (ref 150–450)
RBC # BLD AUTO: 4.21 10E6/UL (ref 3.8–5.2)
RBC #/AREA URNS AUTO: ABNORMAL /HPF
SP GR UR STRIP: 1.02 (ref 1–1.03)
SQUAMOUS #/AREA URNS AUTO: ABNORMAL /LPF
UROBILINOGEN UR STRIP-ACNC: 0.2 E.U./DL
WBC # BLD AUTO: 10.3 10E3/UL (ref 4–11)
WBC #/AREA URNS AUTO: ABNORMAL /HPF

## 2021-09-02 PROCEDURE — 80061 LIPID PANEL: CPT | Performed by: FAMILY MEDICINE

## 2021-09-02 PROCEDURE — 85025 COMPLETE CBC W/AUTO DIFF WBC: CPT | Performed by: FAMILY MEDICINE

## 2021-09-02 PROCEDURE — 36415 COLL VENOUS BLD VENIPUNCTURE: CPT | Performed by: FAMILY MEDICINE

## 2021-09-02 PROCEDURE — 99214 OFFICE O/P EST MOD 30 MIN: CPT | Performed by: FAMILY MEDICINE

## 2021-09-02 PROCEDURE — 82043 UR ALBUMIN QUANTITATIVE: CPT | Performed by: FAMILY MEDICINE

## 2021-09-02 PROCEDURE — 83690 ASSAY OF LIPASE: CPT | Performed by: FAMILY MEDICINE

## 2021-09-02 PROCEDURE — 81001 URINALYSIS AUTO W/SCOPE: CPT | Performed by: FAMILY MEDICINE

## 2021-09-02 PROCEDURE — 80053 COMPREHEN METABOLIC PANEL: CPT | Performed by: FAMILY MEDICINE

## 2021-09-02 RX ORDER — ATORVASTATIN CALCIUM 40 MG/1
40 TABLET, FILM COATED ORAL DAILY
Qty: 90 TABLET | Refills: 3 | Status: SHIPPED | OUTPATIENT
Start: 2021-09-02 | End: 2022-08-16

## 2021-09-02 NOTE — PROGRESS NOTES
Assessment & Plan     RUQ abdominal pain  Currently resolved after lasting about a month.  Ddx considered includes: GB or biliary disease (stone disease or tumors), liver disease (hepatitis, tumor, abscess), pancreatic disease (pancreatitis or cancer), kidney disease (nephrolithiasis or pyelonephritis), PUD, dyspepsia, RLL pneumonia, MSK pain, spine disease with neuropathic/radicular pain, zoster, intestinal ischemia.  We'll check labs today but I anticipate normal results.  She will let me know if symptoms recur and I would consider imaging at that point.    - Comprehensive metabolic panel (BMP + Alb, Alk Phos, ALT, AST, Total. Bili, TP)  - CBC with platelets and differential  - Lipase  - UA Macro with Reflex to Micro and Culture - lab collect  - Urine Microscopic    Type 2 diabetes, HbA1c goal < 7% (H)  - Comprehensive metabolic panel (BMP + Alb, Alk Phos, ALT, AST, Total. Bili, TP)  - Albumin Random Urine Quantitative with Creat Ratio    Hypelipidemia LDL goal <100  - atorvastatin (LIPITOR) 40 MG tablet  Dispense: 90 tablet; Refill: 3  - Lipid panel reflex to direct LDL Non-fasting    Adenoma of colon  Screen for colon cancer  She is due for 5-year screening colonoscopy  - Adult Gastro Ref - Procedure Only    No follow-ups on file.    Heide Fay MD  [facility]        Noelle Smith is a 75 year old who presents for the following health issues     HPI     Abdominal/Flank Pain  Onset/Duration: started about a month ago  Spent 1 week on the couch - couldn't move, got slightly better   Had an appointment with LUIS EDUARDO Mcneil pharmacist, 3 weeks ago and felt uncomfortable the whole time   Description:   Character: Sharp  Location: Right side pain, front right side abdominal pain   Radiation: Back and Pelvic region  Intensity: mostly resolved   Progression of Symptoms:  Symptoms have mostly gone away   Accompanying Signs & Symptoms:  Fever/Chills: no  Gas/Bloating: no  Nausea:  no  Vomitting: no  Diarrhea: no  Constipation: no  Dysuria or Hematuria: no  History:   Trauma: no   diverticulitis is on left side; flare up once a year  Previous similar pain: no  Previous tests done: none  Precipitating factors:   Does the pain change with:     Food: no    Bowel Movement: no    Urination: no   Other factors:  no  Therapies tried and outcome: none   No LMP recorded. Patient is postmenopausal.    RUQ and RLQ radiating around her side to her right back.  Was tender to the touch.  No rash.  Not exacerbated by food/eating.    Has a history of diverticulitis but that pain has been on the left side.  No change in bowel or bladder habits.  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    s/p appy, ovarian cystectomy.  GB is intact.    Review of Systems   as above       Objective    /64   Pulse 77   Temp 97.4  F (36.3  C) (Oral)   Resp 18   Wt 91.6 kg (202 lb)   SpO2 95%   BMI 33.61 kg/m    Body mass index is 33.61 kg/m .  Physical Exam   GEN:  no apparent distress  EYES: sclerae and conjunctivae clear with no discharge  LUNGS:  normal respiratory effort, and lungs clear to auscultation bilaterally - no rales, rhonchi or wheezes  CV: regular rate and rhythm, normal S1 S2, no S3 or S4 and no murmur, click or rub  ABD:  soft, mild epigastric tenderness to palpation without rebound/guarding, no mass, no hepatosplenomegaly, no hernias

## 2021-09-02 NOTE — PATIENT INSTRUCTIONS
Did you know?   I do telehealth (video) visits exclusively on Wednesdays.  I still do in-person visits at [facility] ([phone]) on Mondays, Tuesdays and Thursdays.  You can schedule a video visit for follow-up appointments as well as future appointments for certain conditions.  Please see the below link.  [email or web address]    If you have not already done so,  I encourage you to sign up for [email or web address] [email or web address]  This will allow you to review your results, securely communicate with your provider care team, and schedule virtual visits as well.    To schedule any ordered imaging studies you can call [facility] Scheduling at [phone].  If you are scheduling a DEXA (bone density) scan please do NOT schedule this at the [facility].  Please ask that it be done at [facility] in [place].  Other preferred DEXA locations include [place] (at the [facility]), [place], or [place].

## 2021-09-03 ENCOUNTER — DOCUMENTATION ONLY (OUTPATIENT)
Dept: OTHER | Facility: CLINIC | Age: 76
End: 2021-09-03

## 2021-09-03 ENCOUNTER — TELEPHONE (OUTPATIENT)
Dept: FAMILY MEDICINE | Facility: CLINIC | Age: 76
End: 2021-09-03

## 2021-09-03 DIAGNOSIS — D75.839 THROMBOCYTOSIS: Primary | ICD-10-CM

## 2021-09-03 DIAGNOSIS — Z11.59 SCREENING FOR VIRAL DISEASE: ICD-10-CM

## 2021-09-03 DIAGNOSIS — K76.0 FATTY LIVER: ICD-10-CM

## 2021-09-03 DIAGNOSIS — K76.89 LIVER CYST: ICD-10-CM

## 2021-09-03 DIAGNOSIS — R74.01 ELEVATED TRANSAMINASE LEVEL: ICD-10-CM

## 2021-09-03 LAB
ALBUMIN SERPL-MCNC: 3.6 G/DL (ref 3.4–5)
ALP SERPL-CCNC: 101 U/L (ref 40–150)
ALT SERPL W P-5'-P-CCNC: 52 U/L (ref 0–50)
ANION GAP SERPL CALCULATED.3IONS-SCNC: 8 MMOL/L (ref 3–14)
AST SERPL W P-5'-P-CCNC: 36 U/L (ref 0–45)
BILIRUB SERPL-MCNC: 0.8 MG/DL (ref 0.2–1.3)
BUN SERPL-MCNC: 10 MG/DL (ref 7–30)
CALCIUM SERPL-MCNC: 9.1 MG/DL (ref 8.5–10.1)
CHLORIDE BLD-SCNC: 107 MMOL/L (ref 94–109)
CHOLEST SERPL-MCNC: 168 MG/DL
CO2 SERPL-SCNC: 24 MMOL/L (ref 20–32)
CREAT SERPL-MCNC: 0.93 MG/DL (ref 0.52–1.04)
FASTING STATUS PATIENT QL REPORTED: NO
GFR SERPL CREATININE-BSD FRML MDRD: 61 ML/MIN/1.73M2
GLUCOSE BLD-MCNC: 83 MG/DL (ref 70–99)
HDLC SERPL-MCNC: 47 MG/DL
LDLC SERPL CALC-MCNC: 76 MG/DL
NONHDLC SERPL-MCNC: 121 MG/DL
POTASSIUM BLD-SCNC: 4 MMOL/L (ref 3.4–5.3)
PROT SERPL-MCNC: 6.5 G/DL (ref 6.8–8.8)
SODIUM SERPL-SCNC: 139 MMOL/L (ref 133–144)
TRIGL SERPL-MCNC: 226 MG/DL

## 2021-09-03 NOTE — RESULT ENCOUNTER NOTE
Ubaldo Smith,  Your blood counts show that your platelet level is very high.  It looks like you've had moderately elevated platelets in the past but yesterday's was much higher.       I would like to recheck this in one week (along with some other labs).  Please schedule a lab-only appointment for next Thursday or Friday.  You do not need to fast for that.      You have a borderline elevated liver test (ALT) which you have had intermittently in the past.  I do see it has been several years since your liver (with the known cysts) was imaged.  I'd like to do an ultrasound and have ordered that.  You can call [facility] Scheduling [phone] to schedule that.    Heide Fay MD

## 2021-09-03 NOTE — TELEPHONE ENCOUNTER
Please call with results.  Her platelet level is quite high.  She's had moderately elevated platelet levels in the past but this is markedly higher.  She also has one slightly elevated liver enzyme (ALT) which she has had intermittently in the past.      I would like to recheck her blood counts in one week.  We'll also do a peripheral smear, viral hepatitis studies (as I don't see that she's been tested for Hep B or A) and inflammatory markers.      I would also like to get an ultrasound of her liver as she has the known large cyst and her last Abd CT was in 2015.      Please schedule her for a lab-only appointment at the end of next week (Sept 9 or 10).    Heide Fay MD     Office Visit on 09/02/2021   Component Date Value Ref Range Status     Sodium 09/02/2021 139  133 - 144 mmol/L Final     Potassium 09/02/2021 4.0  3.4 - 5.3 mmol/L Final     Chloride 09/02/2021 107  94 - 109 mmol/L Final     Carbon Dioxide (CO2) 09/02/2021 24  20 - 32 mmol/L Final     Anion Gap 09/02/2021 8  3 - 14 mmol/L Final     Urea Nitrogen 09/02/2021 10  7 - 30 mg/dL Final     Creatinine 09/02/2021 0.93  0.52 - 1.04 mg/dL Final     Calcium 09/02/2021 9.1  8.5 - 10.1 mg/dL Final     Glucose 09/02/2021 83  70 - 99 mg/dL Final     Alkaline Phosphatase 09/02/2021 101  40 - 150 U/L Final     AST 09/02/2021 36  0 - 45 U/L Final     ALT 09/02/2021 52* 0 - 50 U/L Final     Protein Total 09/02/2021 6.5* 6.8 - 8.8 g/dL Final     Albumin 09/02/2021 3.6  3.4 - 5.0 g/dL Final     Bilirubin Total 09/02/2021 0.8  0.2 - 1.3 mg/dL Final     GFR Estimate 09/02/2021 61  >60 mL/min/1.73m2 Final    As of July 11, 2021, eGFR is calculated by the CKD-EPI creatinine equation, without race adjustment. eGFR can be influenced by muscle mass, exercise, and diet. The reported eGFR is an estimation only and is only applicable if the renal function is stable.     Lipase 09/02/2021 162  73 - 393 U/L Final     Cholesterol 09/02/2021 168  <200 mg/dL Final    Age 0-19  years  Desirable: <170 mg/dL  Borderline high:  170-199 mg/dl  High:            >199 mg/dl    Age 20 years and older  Desirable: <200 mg/dL     Triglycerides 09/02/2021 226* <150 mg/dL Final    0-9 years:  Normal:    Less than 75 mg/dL  Borderline high:  75-99 mg/dL  High:             Greater than or equal to 100 mg/dL    0-19 years:  Normal:    Less than 90 mg/dL  Borderline high:   mg/dL  High:             Greater than or equal to 130 mg/dL    20 years and older:  Normal:    Less than 150 mg/dL  Borderline high:  150-199 mg/dL  High:             200-499 mg/dL  Very high:   Greater than or equal to 500 mg/dL     Direct Measure HDL 09/02/2021 47* >=50 mg/dL Final    0-19 years:       Greater than or equal to 45 mg/dL   Low: Less than 40 mg/dL   Borderline low: 40-44 mg/dL     20 years and older:   Female: Greater than or equal to 50 mg/dL   Male:   Greater than or equal to 40 mg/dL          LDL Cholesterol Calculated 09/02/2021 76  <=100 mg/dL Final    Age 0-19 years:  Desirable: 0-110 mg/dL   Borderline high: 110-129 mg/dL   High: >= 130 mg/dL    Age 20 years and older:  Desirable: <100mg/dL  Above desirable: 100-129 mg/dL   Borderline high: 130-159 mg/dL   High: 160-189 mg/dL   Very high: >= 190 mg/dL     Non HDL Cholesterol 09/02/2021 121  <130 mg/dL Final    0-19 years:  Desirable:          Less than 120 mg/dL  Borderline high:   120-144 mg/dL  High:                   Greater than or equal to 145 mg/dL    20 years and older:  Desirable:          130 mg/dL  Above Desirable: 130-159 mg/dL  Borderline high:   160-189 mg/dL  High:               190-219 mg/dL  Very high:     Greater than or equal to 220 mg/dL     Patient Fasting > 8hrs? 09/02/2021 No   Final     Color Urine 09/02/2021 Yellow  Colorless, Straw, Light Yellow, Yellow Final     Appearance Urine 09/02/2021 Clear  Clear Final     Glucose Urine 09/02/2021 Negative  Negative mg/dL Final     Bilirubin Urine 09/02/2021 Negative  Negative Final      Ketones Urine 09/02/2021 Negative  Negative mg/dL Final     Specific Gravity Urine 09/02/2021 1.020  1.003 - 1.035 Final     Blood Urine 09/02/2021 Small* Negative Final     pH Urine 09/02/2021 5.0  5.0 - 7.0 Final     Protein Albumin Urine 09/02/2021 Negative  Negative mg/dL Final     Urobilinogen Urine 09/02/2021 0.2  0.2, 1.0 E.U./dL Final     Nitrite Urine 09/02/2021 Negative  Negative Final     Leukocyte Esterase Urine 09/02/2021 Negative  Negative Final     WBC Count 09/02/2021 10.3  4.0 - 11.0 10e3/uL Final     RBC Count 09/02/2021 4.21  3.80 - 5.20 10e6/uL Final     Hemoglobin 09/02/2021 13.2  11.7 - 15.7 g/dL Final     Hematocrit 09/02/2021 40.2  35.0 - 47.0 % Final     MCV 09/02/2021 96  78 - 100 fL Final     MCH 09/02/2021 31.4  26.5 - 33.0 pg Final     MCHC 09/02/2021 32.8  31.5 - 36.5 g/dL Final     RDW 09/02/2021 16.3* 10.0 - 15.0 % Final     Platelet Count 09/02/2021 916* 150 - 450 10e3/uL Final     % Neutrophils 09/02/2021 74  % Final     % Lymphocytes 09/02/2021 14  % Final     % Monocytes 09/02/2021 8  % Final     % Eosinophils 09/02/2021 3  % Final     % Basophils 09/02/2021 1  % Final     % Immature Granulocytes 09/02/2021 0  % Final     Absolute Neutrophils 09/02/2021 7.6  1.6 - 8.3 10e3/uL Final     Absolute Lymphocytes 09/02/2021 1.5  0.8 - 5.3 10e3/uL Final     Absolute Monocytes 09/02/2021 0.8  0.0 - 1.3 10e3/uL Final     Absolute Eosinophils 09/02/2021 0.3  0.0 - 0.7 10e3/uL Final     Absolute Basophils 09/02/2021 0.1  0.0 - 0.2 10e3/uL Final     Absolute Immature Granulocytes 09/02/2021 0.0  <=0.0 10e3/uL Final     Bacteria Urine 09/02/2021 None Seen  None Seen /HPF Final     RBC Urine 09/02/2021 2-5* 0-2 /HPF /HPF Final     WBC Urine 09/02/2021 0-5  0-5 /HPF /HPF Final     Squamous Epithelials Urine 09/02/2021 Few* None Seen /LPF Final

## 2021-09-04 LAB
CREAT UR-MCNC: 61 MG/DL
MICROALBUMIN UR-MCNC: <5 MG/L
MICROALBUMIN/CREAT UR: NORMAL MG/G{CREAT}

## 2021-09-07 NOTE — TELEPHONE ENCOUNTER
Left message on machine to call back.  Ask to speak to any triage nurse, let them know it's a return call.    Leave a number and time that you can be reached.   Please give Dr Fay's message as below and schedule as requested by provider.  Tessie Paez RN  [facility]

## 2021-09-10 ENCOUNTER — ANCILLARY PROCEDURE (OUTPATIENT)
Dept: MAMMOGRAPHY | Facility: CLINIC | Age: 76
End: 2021-09-10
Attending: FAMILY MEDICINE
Payer: COMMERCIAL

## 2021-09-10 ENCOUNTER — LAB (OUTPATIENT)
Dept: LAB | Facility: CLINIC | Age: 76
End: 2021-09-10
Payer: COMMERCIAL

## 2021-09-10 DIAGNOSIS — K76.0 FATTY LIVER: ICD-10-CM

## 2021-09-10 DIAGNOSIS — D75.839 THROMBOCYTOSIS: ICD-10-CM

## 2021-09-10 DIAGNOSIS — R16.0 HEPATOMEGALY: Primary | ICD-10-CM

## 2021-09-10 DIAGNOSIS — Z11.59 SCREENING FOR VIRAL DISEASE: ICD-10-CM

## 2021-09-10 DIAGNOSIS — Z12.31 VISIT FOR SCREENING MAMMOGRAM: ICD-10-CM

## 2021-09-10 DIAGNOSIS — R74.01 ELEVATED TRANSAMINASE LEVEL: ICD-10-CM

## 2021-09-10 LAB
BASOPHILS # BLD AUTO: 0.1 10E3/UL (ref 0–0.2)
BASOPHILS NFR BLD AUTO: 1 %
CRP SERPL-MCNC: <2.9 MG/L (ref 0–8)
EOSINOPHIL # BLD AUTO: 0.3 10E3/UL (ref 0–0.7)
EOSINOPHIL NFR BLD AUTO: 4 %
ERYTHROCYTE [DISTWIDTH] IN BLOOD BY AUTOMATED COUNT: 16.5 % (ref 10–15)
ERYTHROCYTE [SEDIMENTATION RATE] IN BLOOD BY WESTERGREN METHOD: 1 MM/HR (ref 0–30)
HCT VFR BLD AUTO: 44.7 % (ref 35–47)
HGB BLD-MCNC: 14.6 G/DL (ref 11.7–15.7)
IMM GRANULOCYTES # BLD: 0.1 10E3/UL
IMM GRANULOCYTES NFR BLD: 1 %
LYMPHOCYTES # BLD AUTO: 1.2 10E3/UL (ref 0.8–5.3)
LYMPHOCYTES NFR BLD AUTO: 14 %
MCH RBC QN AUTO: 31.5 PG (ref 26.5–33)
MCHC RBC AUTO-ENTMCNC: 32.7 G/DL (ref 31.5–36.5)
MCV RBC AUTO: 96 FL (ref 78–100)
MONOCYTES # BLD AUTO: 0.6 10E3/UL (ref 0–1.3)
MONOCYTES NFR BLD AUTO: 7 %
NEUTROPHILS # BLD AUTO: 5.9 10E3/UL (ref 1.6–8.3)
NEUTROPHILS NFR BLD AUTO: 73 %
PLATELET # BLD AUTO: 385 10E3/UL (ref 150–450)
RBC # BLD AUTO: 4.64 10E6/UL (ref 3.8–5.2)
WBC # BLD AUTO: 8.1 10E3/UL (ref 4–11)

## 2021-09-10 PROCEDURE — 86709 HEPATITIS A IGM ANTIBODY: CPT

## 2021-09-10 PROCEDURE — 77067 SCR MAMMO BI INCL CAD: CPT | Mod: GC

## 2021-09-10 PROCEDURE — 86708 HEPATITIS A ANTIBODY: CPT

## 2021-09-10 PROCEDURE — 85652 RBC SED RATE AUTOMATED: CPT

## 2021-09-10 PROCEDURE — 86706 HEP B SURFACE ANTIBODY: CPT

## 2021-09-10 PROCEDURE — 85025 COMPLETE CBC W/AUTO DIFF WBC: CPT

## 2021-09-10 PROCEDURE — 86140 C-REACTIVE PROTEIN: CPT

## 2021-09-10 PROCEDURE — 36415 COLL VENOUS BLD VENIPUNCTURE: CPT

## 2021-09-10 PROCEDURE — 82728 ASSAY OF FERRITIN: CPT

## 2021-09-10 PROCEDURE — 77063 BREAST TOMOSYNTHESIS BI: CPT | Mod: GC

## 2021-09-10 PROCEDURE — 87340 HEPATITIS B SURFACE AG IA: CPT

## 2021-09-11 LAB — FERRITIN SERPL-MCNC: 222 NG/ML (ref 8–252)

## 2021-09-13 LAB
HAV IGG SER QL IA: NONREACTIVE
HAV IGM SERPL QL IA: NONREACTIVE
HBV SURFACE AB SERPL IA-ACNC: 41.51 M[IU]/ML
HBV SURFACE AG SERPL QL IA: NONREACTIVE

## 2021-09-13 PROCEDURE — 36415 COLL VENOUS BLD VENIPUNCTURE: CPT

## 2021-09-13 PROCEDURE — 85060 BLOOD SMEAR INTERPRETATION: CPT | Performed by: PATHOLOGY

## 2021-09-14 LAB
PATH REPORT.COMMENTS IMP SPEC: NORMAL
PATH REPORT.COMMENTS IMP SPEC: NORMAL
PATH REPORT.FINAL DX SPEC: NORMAL
PATH REPORT.MICROSCOPIC SPEC OTHER STN: NORMAL
PATH REPORT.MICROSCOPIC SPEC OTHER STN: NORMAL

## 2021-09-16 ENCOUNTER — ANCILLARY PROCEDURE (OUTPATIENT)
Dept: ULTRASOUND IMAGING | Facility: CLINIC | Age: 76
End: 2021-09-16
Attending: FAMILY MEDICINE
Payer: COMMERCIAL

## 2021-09-16 DIAGNOSIS — K76.89 LIVER CYST: ICD-10-CM

## 2021-09-16 DIAGNOSIS — K76.0 FATTY LIVER: ICD-10-CM

## 2021-09-16 PROCEDURE — 76705 ECHO EXAM OF ABDOMEN: CPT | Mod: GC | Performed by: RADIOLOGY

## 2021-09-17 NOTE — RESULT ENCOUNTER NOTE
Ubaldo Smith,  Your ultrasound shows stable liver cysts but your liver is enlarged with ongoing evidence of fatty liver.  I recommend that you see a liver specialist and have referred you to GI/Hepatology.  You can call [phone] to schedule that.    Heide Fay MD

## 2021-09-17 NOTE — RESULT ENCOUNTER NOTE
Ubaldo Smith,  Your blood test results are all finally back.  Most of your results are normal or as expected, including ferritin (a measure of iron storage), CRP and ESR (nonspecific tests for inflammation).  You have no Hepatitis A antibodies.  You do have Hepatitis B surface antibodies but no Heaptitis B surface antigens which is consistent with prior Hep B immunization.      Your blood counts are improved but your blood morphology shows ongoing significantly elevated platelets.  I recommend that you have a hematology consult and have referred you for that.  Their schedulers should call you in a few business days to get that set up.  Heide Fay MD

## 2021-09-23 NOTE — PROGRESS NOTES
Medication Therapy Management (MTM) Encounter    ASSESSMENT:                            Medication Adherence/Access: See below for considerations    Type 2 Diabetes: Stable. Patient is meeting A1c goal of < 8%. Self monitoring of blood glucose is not at goal of fasting  mg/dL and post prandial <180mg/dL. Patient is meeting average glucose goal of <150mg/dl, however highs and lows. Consider staying on same dose of Novolog (15 units 2-3 times daily with meal) and Lantus (51units daily at bedtime). It would be beneficial to monitor blood sugar trends and meals to determine if lows are occurring more frequently when fasting or from over-adjusting meal time insulin. Consider reducing Novolog or Lantus if needed. Increased exercise would be beneficial.     simran--she is seeing liver specialist -fatty liver disease? We discussed non-drug ways to improve that .    Immunizations: Consider receiving Covid-19 booster ([employer]) at [facility] next week.     Hypokalemia:  Stable. Now able to swallow the micro-k capsules easily.       PLAN:                            1. Covid-19 Pfizer vaccine booster was approved for 6 months. It will be available at the [facility] next door on Thursday (9/30/21) .     2. Your A1C is rapidly lowering -- it is at 7.8% today! It is possible that in the future we will need to cut some Lantus (if your first sugar in the morning is low), or cut back on Novolog (if sugar is low after lunch/meal). At this time continue as you are but be cautious that you are not having frequent low blood sugar episodes.     3. See the liver specialist on Thursday if they discuss fatty liver disease with you they usually recommend weight loss and reduced carb and sugar consumption.     Follow-up: Return in about 14 weeks (around 1/3/2022), or 3pm, for A1c lab, Blood sugar meter review.    SUBJECTIVE/OBJECTIVE:                          Sarah Felix is a 76 year old female coming in for a follow-up  (8-9-21) visit. She was referred to me from Dr. Collette Fay.       Reason for visit:   A1c recheck post Novolog insulin start.   3rd immunization Covid-19 (Pfizer), needs rx refill for furosemide , needs pcp change to [facility].     She has an ongoing issue with platelets --up and down --she would like a printout of those labs to reviw with hematology December-2021. mtm will print out for her today .     Allergies/ADRs: Reviewed in chart  Tobacco: She reports that she quit smoking about 41 years ago. She has never used smokeless tobacco.  Alcohol: 7-9 beverages / week typically wine 1 or 2 glasses/night - reports that she hasn't had any alcohol in the last week and a half until they figure out her liver situation.   Caffeine: 2 cans diet coke sodas/day  Activity: reports she has low energy and stamina, if she does any activity she has to take breaks frequenty but now that the weather is better she is doing more outdoor work.   Past Medical History: Reviewed in chart  Personal Healthcare Goals: figure out what is going on with liver or blood, would like to get 3rd vaccine, improve a1c    Medication Adherence/Access:  No issues reported       Type 2 Diabetes:  Currently taking : glimepiride 4 mg.  Tablet twice daily ,  Lantus 51 units daily at midnight daily. Novolog vial - inject 15 units usually twice /day in am (averages twice daily but depending on what she is eating it can be 3 times daily) + before biggest snack meal of the day on most days .    Reports that she has been having more lows, thinks that she may not be eating as much carbs in her meals. Patient is not experiencing side effects.    BP Readings from Last 3 Encounters:   09/27/21 110/70   09/02/21 104/64   08/09/21 118/68     SMBG: see chart below.     9/27/21:   7 days =133 n=12  14 days =136 n=20  30 days = 145 n=41    Date FBG/ 2hours post Lunch/2hours post Dinner /2hours post    9/27 167mg/dL 4:10am      9/26 226mg/dL 2:26am 67mg/dL 1:04pm     9/25  65mg/dL 8:45am 92mg/dL 2:22pm     9/24  119mg/dL 1:30pm     9/23 110mg/dL 11:09am 215mg/dL 11:31pm     9/22 186mg/dL 10:21am      9/21 207mg/dL 5:17pm           August 9th-2021 :     7 days =145 n=12  14 days =147 n=24  30 days =158 n=48     Date FBG/ 2hours post Lunch/2hours post Dinner /2hours post     8-9 135 6:08am         8-8   152 11:22am   83 12;13am   8-7 127 9:40am         8-6 209 9:16am     195 1;32am   8-5 101 10:48am  143 7:02am          8-4 167 11:42am         8-3 151  7:22am     158 2:18am        June 21st -21.   7 days = 171 n=14  14 days =165 n=26  30 days =168 n=67    Date FBG/ 2hours post Lunch/2hours post Dinner /2hours post    6-21-21 210 7;04am 165 1 pm      6-20-21 239 6;47am 69  4;28pm 228 11;34pm    6-19-21  76 12;32pm  157 3:12am   6-18-21 184 6;48am      6-17-21 110 9;48am 141 4;20pm  162 3;31am   6-16 244 10:06am      6-15 81 9;43am  323 9:08pm       Symptoms of low blood sugar? sweaty, Frequency of lows has been increasing (she admits she cannot tolerate bs's <110- if they go lower than that she feels crummy , sweaty. )  Symptoms of high blood sugar? none  Eye exam: up to date  Foot exam: due  Diet: did not discuss diet at todays visit  Previously: she's maintained similar diet and notes sugars now being elevated  eats 4-5 smaller meals - pasta and rices  Fruit and some fruit juices   Breakfast: cereal 2-3 times per week, drink 2 glasses OJ or grapefruit juice /day .   Exercise: see social history   Aspirin: Taking 162 mg daily and denies side effects  Statin: Yes: atorvastatin 40 mg   ACEi/ARB: Yes: lisinopril 2.5 mg.   Urine Albumin:   Lab Results   Component Value Date    UMALCR  09/02/2021      Comment:      Unable to calculate:  Urine creatinine or albumin value below detectable level      Lab Results   Component Value Date    A1C 7.8 09/27/2021    A1C 8.3 06/21/2021    A1C 9.4 12/14/2020    A1C 8.1 09/21/2020    A1C 8.5 01/07/2020    A1C 7.6 10/12/2019     Immunizations: Patient  interest in receiving Covid-19 booster(pfizer) --mtm checked --will be available next week --as that will be >6 months for her from last covid vaccine shot.     Hypokalemia:  Patient reports the new potassium capsule is working much better and is easy to take.   Potassium   Date Value Ref Range Status   09/02/2021 4.0 3.4 - 5.3 mmol/L Final   01/07/2020 4.0 3.4 - 5.3 mmol/L Final       Today's Vitals: /70   Pulse 75   Wt 198 lb (89.8 kg)   SpO2 91%   BMI 32.95 kg/m    ----------------    I spent 50 minutes with this patient today. All changes were made via collaborative practice agreement with Heide Fay. A copy of the visit note was provided to the patient's primary care provider.    The patient was given a summary of these recommendations.     Joan Lopez Rph.  Medication Therapy Management Provider  [phone]    Lizzy Sellers  [facility]      Medication Therapy Recommendations  No medication therapy recommendations to display

## 2021-09-27 ENCOUNTER — LAB (OUTPATIENT)
Dept: LAB | Facility: CLINIC | Age: 76
End: 2021-09-27
Payer: COMMERCIAL

## 2021-09-27 ENCOUNTER — OFFICE VISIT (OUTPATIENT)
Dept: PHARMACY | Facility: CLINIC | Age: 76
End: 2021-09-27
Payer: COMMERCIAL

## 2021-09-27 VITALS
SYSTOLIC BLOOD PRESSURE: 110 MMHG | HEART RATE: 75 BPM | WEIGHT: 198 LBS | BODY MASS INDEX: 32.95 KG/M2 | DIASTOLIC BLOOD PRESSURE: 70 MMHG | OXYGEN SATURATION: 91 %

## 2021-09-27 DIAGNOSIS — Z79.4 TYPE 2 DIABETES MELLITUS WITH STAGE 2 CHRONIC KIDNEY DISEASE, WITH LONG-TERM CURRENT USE OF INSULIN (H): Primary | ICD-10-CM

## 2021-09-27 DIAGNOSIS — E11.22 TYPE 2 DIABETES MELLITUS WITH STAGE 2 CHRONIC KIDNEY DISEASE, WITH LONG-TERM CURRENT USE OF INSULIN (H): ICD-10-CM

## 2021-09-27 DIAGNOSIS — E87.6 HYPOKALEMIA: ICD-10-CM

## 2021-09-27 DIAGNOSIS — N18.2 TYPE 2 DIABETES MELLITUS WITH STAGE 2 CHRONIC KIDNEY DISEASE, WITH LONG-TERM CURRENT USE OF INSULIN (H): Primary | ICD-10-CM

## 2021-09-27 DIAGNOSIS — Z23 ENCOUNTER FOR IMMUNIZATION: ICD-10-CM

## 2021-09-27 DIAGNOSIS — N18.2 TYPE 2 DIABETES MELLITUS WITH STAGE 2 CHRONIC KIDNEY DISEASE, WITH LONG-TERM CURRENT USE OF INSULIN (H): ICD-10-CM

## 2021-09-27 DIAGNOSIS — E11.22 TYPE 2 DIABETES MELLITUS WITH STAGE 2 CHRONIC KIDNEY DISEASE, WITH LONG-TERM CURRENT USE OF INSULIN (H): Primary | ICD-10-CM

## 2021-09-27 DIAGNOSIS — Z79.4 TYPE 2 DIABETES MELLITUS WITH STAGE 2 CHRONIC KIDNEY DISEASE, WITH LONG-TERM CURRENT USE OF INSULIN (H): ICD-10-CM

## 2021-09-27 LAB — HBA1C MFR BLD: 7.8 % (ref 0–5.6)

## 2021-09-27 PROCEDURE — 36415 COLL VENOUS BLD VENIPUNCTURE: CPT

## 2021-09-27 PROCEDURE — 83036 HEMOGLOBIN GLYCOSYLATED A1C: CPT

## 2021-09-27 PROCEDURE — 99606 MTMS BY PHARM EST 15 MIN: CPT | Performed by: PHARMACIST

## 2021-09-27 RX ORDER — FUROSEMIDE 20 MG
TABLET ORAL
Qty: 270 TABLET | Refills: 1 | Status: SHIPPED | OUTPATIENT
Start: 2021-09-27 | End: 2022-01-03

## 2021-09-27 NOTE — Clinical Note
Collette--lino--thx again for taking kayce as a new patient --she needs your expertise/experience.     A1c is improving 7.8% now . Lino--we spent a lot of time with her to help her with mychart issues , lasix refill, when she could get pfizer covid booster etc.  She was quite happy we could address all of her concerns today . She will be seeing liver specialist this Thursday .     -Joan/Lizzy.

## 2021-09-27 NOTE — TELEPHONE ENCOUNTER
RECORDS RECEIVED FROM: Internal   Appt Date: 09.30.2021   NOTES STATUS DETAILS   OFFICE NOTE from referring provider Internal 09.10.2021 Consult Heide Fay MD   OFFICE NOTES from other specialists N/A    DISCHARGE SUMMARY from hospital N/A    MEDICATION LIST Internal    LIVER BIOSPY (IF APPLICABLE)      PATHOLOGY REPORTS  N/A    IMAGING     ENDOSCOPY (IF AVAILABLE) Internal 03.14.2019   COLONOSCOPY (IF AVAILABLE) N/A    ULTRASOUND LIVER Internal 09.16.2021 Limited Abdominal Ultrasound   CT OF ABDOMEN N/A    MRI OF LIVER N/A    FIBROSCAN, US ELASTOGRAPHY, FIBROSIS SCAN, MR ELASTOGRAPHY N/A    LABS     HEPATIC PANEL (LIVER PANEL) N/A    BASIC METABOLIC PANEL Internal 11.25.2019   COMPLETE METABOLIC PANEL Internal 09.10.2021   COMPLETE BLOOD COUNT (CBC) Internal 09.10.2021   INTERNATIONAL NORMALIZED RATIO (INR) Internal 10.12.2019   HEPATITIS C ANTIBODY N/A    HEPATITIS C VIRAL LOAD/PCR N/A    HEPATITIS C GENOTYPE N/A    HEPATITIS B SURFACE ANTIGEN Internal 09.10.2021   HEPATITIS B SURFACE ANTIBODY Internal 09.10.2021   HEPATITIS B DNA QUANT LEVEL N/A    HEPATITIS B CORE ANTIBODY N/A

## 2021-09-27 NOTE — PATIENT INSTRUCTIONS
Recommendations from today's MTM visit:                                                       1. Covid-19 Pfizer vaccine booster was approved for 6 months. It will be available at the [facility] next door on Thursday (9/30/21) .     2. Your A1C is rapidly lowering -- it is at 7.8% today! It is possible that in the future we will need to cut some Lantus (if your first sugar in the morning is low), or cut back on Novolog (if sugar is low after lunch/meal). At this time continue as you are but be cautious that you are not having frequent low blood sugar episodes.     3. See the liver specialist on Thursday if they discuss fatty liver disease with you they usually recommend weight loss and reduced carb and sugar consumption.     Follow-up: Return in about 14 weeks (around 1/3/2022), or 3pm, for A1c lab, Blood sugar meter review.    It was great to speak with you today.  I value your experience and would be very thankful for your time with providing feedback on our clinic survey. You may receive a survey via email or text message in the next few days.     To schedule another MTM appointment, please call the clinic directly or you may call the MTM scheduling line at [phone] or toll-free at [phone].     My Clinical Pharmacist's contact information:                                                      Please feel free to contact me with any questions or concerns you have.      Joan Lopez [facility].  Medication Therapy Management Provider  [phone]    Lizzy Sellers  [facility]

## 2021-09-30 ENCOUNTER — OFFICE VISIT (OUTPATIENT)
Dept: GASTROENTEROLOGY | Facility: CLINIC | Age: 76
End: 2021-09-30
Attending: FAMILY MEDICINE
Payer: COMMERCIAL

## 2021-09-30 ENCOUNTER — PRE VISIT (OUTPATIENT)
Dept: GASTROENTEROLOGY | Facility: CLINIC | Age: 76
End: 2021-09-30

## 2021-09-30 VITALS
WEIGHT: 198.6 LBS | HEART RATE: 95 BPM | TEMPERATURE: 98.1 F | SYSTOLIC BLOOD PRESSURE: 120 MMHG | HEIGHT: 65 IN | DIASTOLIC BLOOD PRESSURE: 74 MMHG | BODY MASS INDEX: 33.09 KG/M2

## 2021-09-30 DIAGNOSIS — R16.0 HEPATOMEGALY: ICD-10-CM

## 2021-09-30 DIAGNOSIS — I10 HYPERTENSION GOAL BP (BLOOD PRESSURE) < 140/90: Chronic | ICD-10-CM

## 2021-09-30 DIAGNOSIS — R74.01 ELEVATED TRANSAMINASE LEVEL: ICD-10-CM

## 2021-09-30 DIAGNOSIS — E11.9 TYPE 2 DIABETES MELLITUS WITHOUT COMPLICATION, WITH LONG-TERM CURRENT USE OF INSULIN (H): Primary | ICD-10-CM

## 2021-09-30 DIAGNOSIS — K76.0 FATTY LIVER: ICD-10-CM

## 2021-09-30 DIAGNOSIS — Z79.4 TYPE 2 DIABETES MELLITUS WITHOUT COMPLICATION, WITH LONG-TERM CURRENT USE OF INSULIN (H): Primary | ICD-10-CM

## 2021-09-30 PROCEDURE — G0463 HOSPITAL OUTPT CLINIC VISIT: HCPCS

## 2021-09-30 PROCEDURE — 99204 OFFICE O/P NEW MOD 45 MIN: CPT | Performed by: INTERNAL MEDICINE

## 2021-09-30 ASSESSMENT — MIFFLIN-ST. JEOR: SCORE: 1401.72

## 2021-09-30 ASSESSMENT — PAIN SCALES - GENERAL: PAINLEVEL: NO PAIN (0)

## 2021-09-30 NOTE — LETTER
9/30/2021         RE: Sarah Felix  [address]  [place] [address]        Dear Colleague,    Thank you for referring your patient, Sarah Felix, to the [facility] [place]. Please see a copy of my visit note below.    Date of Service: 9/30/2021     Referring Provider: Heide Fay MD    Subjective:            Sarah Felix is a 76 year old female presenting for evaluation of abnormal liver tests    History of Present Illness   Sarah Felix is a 76 year old female with past medical history of DM on insulin, dyslipidemia, liver cysts who presents with hepatomegaly and elevated liver tests    Several weeks ago she notes having an episode of severe acute right upper quadrant pain discomfort that was so severe that she did not leave her home or the recumbent position for much of a week.  This pain spontaneously resolved and she is not had any issues since.  Given her symptoms she underwent lab evaluation which demonstrated elevated serum ALT and AST.  She underwent an abdominal ultrasound which did demonstrate hepatic cysts, which she has been known to have, and enlarged liver and evidence suggesting fatty infiltration of the liver.    She denies significant history of alcohol use and denies a history of IV inhaled drug use.  Denies any overt family history of liver disease.  Notes that she is working on managing her diabetes and currently has an A1c greater than 7%.  Notes that she is not very physically active which she relates to AN ankle fracture requiring corrective hardware approximately 1 year ago.    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  felt to be focal nodular hyperplasia.  annual CT abd.   followed by Dr. Amilcar Kat      Cystocele, lateral      Diabetes mellitus (H)      Diverticulitis of colon (without mention of hemorrhage)(562.11) 6/04    Abd CT     Embolism and thrombosis of unspecified site age 22    post ovarian cyst removed     Esophageal reflux     Hiatal hernia     Fatty liver      Hiatal hernia     small     Hyperlipidemia      Nontoxic uninodular goiter      Osteopenia 4/21/2005    on fosamax  for > 5 yr.  stop 7/2012     Other chronic otitis externa      Other specified disorders of liver     Fatty Liver, Benign appearing liver cysts     Pure hypercholesterolemia        Surgical History:  Past Surgical History:   Procedure Laterality Date     APPENDECTOMY  age 22     APPLY EXTERNAL FIXATOR LOWER EXTREMITY Left 10/12/2019    Procedure: Left ankle external fixator placement;  Surgeon: Leeanne Brown MD;  Location: [facility]     C APPENDECTOMY  age 22     C DEXA, BONE DENSITY, AXIAL SKEL  2005     C DEXA, BONE DENSITY, AXIAL SKEL  6/2007    No signif change in Osteopenia     CATARACT IOL, RT/LT       COLONOSCOPY  4/2006    repeat 10 yr     ENDOSCOPIC ULTRASOUND UPPER GASTROINTESTINAL TRACT (GI) N/A 9/6/2018    Procedure: ENDOSCOPIC ULTRASOUND, ESOPHAGOSCOPY / UPPER GASTROINTESTINAL TRACT (GI);  Endoscopic Ultrasound, Esophagogastroduodenoscopy with endoscopic mucosal resection;  Surgeon: Hood Brower MD;  [facility]     ESOPHAGOSCOPY, GASTROSCOPY, DUODENOSCOPY (EGD), COMBINED N/A 8/13/2018    Procedure: COMBINED ESOPHAGOSCOPY, GASTROSCOPY, DUODENOSCOPY (EGD), BIOPSY SINGLE OR MULTIPLE;  EGD;  Surgeon: Hood Brower MD;  [facility]     ESOPHAGOSCOPY, GASTROSCOPY, DUODENOSCOPY (EGD), COMBINED N/A 3/14/2019    Procedure: Upper Endoscopy;  Surgeon: Hood Brower MD;  [facility]     ESOPHAGOSCOPY, GASTROSCOPY, DUODENOSCOPY (EGD), COMBINED  08/13/2018, 3/14/19     ESOPHAGOSCOPY, GASTROSCOPY,  DUODENOSCOPY (EGD), RESECT MUCOSA, COMBINED N/A 9/6/2018    Procedure: COMBINED ESOPHAGOSCOPY, GASTROSCOPY, DUODENOSCOPY (EGD), RESECT MUCOSA;;  Surgeon: Hood Brower MD;  [facility]     GYN SURGERY  10/22/08    pelvic support     HEMORRHOID SURGERY       HEMORRHOIDECTOMY  8/08     MOHS MICROGRAPHIC PROCEDURE       MOHS MICROGRAPHIC PROCEDURE       OPEN REDUCTION INTERNAL FIXATION ANKLE Left 10/28/2019    Procedure: Internal fixation left bimalleolar ankle fracture, removal external fixator;  Surgeon: Jose Roberto Can MD;  [facility]     ORIF ANKLE FRACTURE BIMALLEOLAR Left 10/28/2019    Internal fixation left bimalleolar ankle fracture, removal external fixator     OTHER SURGICAL HISTORY  2008    Transobturator tape for Urinary Incontinence       OTHER SURGICAL HISTORY      PELVIC SUPPORT SURGERY     OTHER SURGICAL HISTORY  09/06/2018    ENDOSCOPIC ULTRASOUND UPPER GASTROINTESTINAL TRACT (GI)     OTHER SURGICAL HISTORY  09/06/2018    ESOPHAGOSCOPY, GASTROSCOPY, DUODENOSCOPY (EGD), RESECT MUCOSA, COMBINED     OTHER SURGICAL HISTORY Left 10/12/2019    APPLY EXTERNAL FIXATOR LOWER EXTREMITYProcedure: Left ankle external fixator placement; Surgeon: Leeanne Brown MD; Location: [facility]       OVARIAN CYST REMOVAL Left age 22    L ovarian cyst removal, laparotomy       PHACOEMULSIFICATION CLEAR CORNEA WITH STANDARD INTRAOCULAR LENS IMPLANT Left 9/25/2020    Procedure: LEFT CATARACT REMOVAL WITH INTRAOCULAR LENS IMPLANT;  Surgeon: Janay Amezcua MD;  [facility]     SURGICAL HISTORY OF -   age 22    L ovarian cyst removal, laparotomy     SURGICAL HISTORY OF -   1998    partial thyroidectomy     SURGICAL HISTORY OF -   10/08    Transobturator tape for Urinary Incontinence     SURGICAL HISTORY OF -   11/2011    stress test normal     THYROID SURGERY      Had cyst removed, thyroid gland is intact.     THYROIDECTOMY, PARTIAL  1998       Social History:  Social History     Tobacco Use      "Smoking status: Former Smoker     Quit date: 1980     Years since quittin.7     Smokeless tobacco: Never Used     Tobacco comment: smoked from 70-80; smoked about 1ppd   Vaping Use     Vaping Use: Never used   Substance Use Topics     Alcohol use: Yes     Alcohol/week: 0.0 standard drinks     Comment: 6-7 drinks per week     Drug use: No        Family History:  Family History   Problem Relation Age of Onset     Diabetes Mother      Hypertension Mother      Arthritis Mother         rheumatoid     Cancer Father      Cardiovascular Brother         palpitations not on meds.     Glaucoma No family hx of      Macular Degeneration No family hx of      Amblyopia No family hx of      Rheumatoid Arthritis Mother      Other - See Comments Brother         palpitations       Medications:  Current Outpatient Medications   Medication     aspirin (ASA) 81 MG chewable tablet     atorvastatin (LIPITOR) 40 MG tablet     blood glucose (ONETOUCH ULTRA) test strip     CALCIUM 500 + D 500-200 MG-IU OR TABS     Cholecalciferol (VITAMIN D PO)     furosemide (LASIX) 20 MG tablet     glimepiride (AMARYL) 4 MG tablet     insulin aspart (NOVOLOG PEN) 100 UNIT/ML pen     insulin pen needle (BD REY U/F) 32G X 4 MM miscellaneous     insulin syringe-needle U-100 (BD INSULIN SYRINGE ULTRAFINE) 31G X 5/16\" 1 ML miscellaneous     Lancets (ONETOUCH DELICA PLUS [id number]) MISC     LANTUS SOLOSTAR 100 UNIT/ML soln     lisinopril (ZESTRIL) 2.5 MG tablet     metoprolol tartrate (LOPRESSOR) 25 MG tablet     nystatin (MYCOSTATIN) 967533 UNIT/GM external cream     potassium chloride ER (MICRO-K) 10 MEQ CR capsule     triamcinolone (KENALOG) 0.1 % external cream     No current facility-administered medications for this visit.       Review of Systems  A complete 10 point review of systems was asked and answered in the negative unless specifically commented upon in the HPI    Objective:         Vitals:    21 1032   BP: 120/74   Pulse: 95   Temp: " "98.1  F (36.7  C)   TempSrc: Oral   Weight: 90.1 kg (198 lb 9.6 oz)   Height: 1.651 m (5' 5\")     Body mass index is 33.05 kg/m .     Physical Exam    Vitals reviewed.   Constitutional: Well-developed, well-nourished, in no apparent distress.    HEENT: Normocephalic. no scleral icterus.   Neck/Lymph: Normal ROM  Cardiac:  Regular rate   Respiratory: Normal respiratory excursion   GI:  Abdomen soft, non-distended,  BS present.   Skin:  No jaundice.   Peripheral Vascular: trace left lower extremity edema. 2+ pulses in all extremities  Musculoskeletal:  ROM intact, normal muscle bulk    Psychiatric: Normal mood and affect. Behavior is normal.  Neuro:  no tremor    Labs and Diagnostic tests:  Lab Results   Component Value Date    BILITOTAL 0.8 09/02/2021    BILITOTAL 0.7 01/07/2020    ALT 52 09/02/2021    ALT 36 01/07/2020    AST 36 09/02/2021    AST 17 01/07/2020    ALKPHOS 101 09/02/2021    ALKPHOS 113 01/07/2020     Lab Results   Component Value Date    ALBUMIN 3.6 09/02/2021    ALBUMIN 3.6 01/07/2020    PROTTOTAL 6.5 09/02/2021    PROTTOTAL 6.8 01/07/2020          Imaging:  Abd US 9/16  FINDINGS:   Fluid: No evidence of ascites or pleural effusions.     Liver: The liver increased echogenicity with normal echotexture,  measuring 18.4 cm in craniocaudal dimension. There is an avascular  anechoic septated lesion of the left of the liver measuring 8.7 x 9.1  x 7.1 cm, stable. There is an avascular hypoechoic septated lesion  with acoustic enhancement of the left lobe of the liver measuring 1.1  x 1.1 x 1.0 cm, stable. There are areas of fat sparing adjacent to the  gallbladder fossa.     Gallbladder: There is no wall thickening, pericholecystic fluid, or  positive sonographic Hammer's sign. Numerous echogenic mobile  gallstones with sludge.  Bile Ducts: Both the intra- and extrahepatic biliary system are of  normal caliber.  The common bile duct measures 6 mm in diameter.     Pancreas: Pancreas is not well visualized on " this exam.     Kidney: The right kidney measures 12.1 cm long. There is no  hydronephrosis or hydroureter, no shadowing renal calculi, cystic  lesion or mass. There is an echogenic focus in the upper pole of the  right kidney measuring 0.5 x 0.8 x 0.4 cm without twinkle artifact or  posterior acoustic shadowing.           Assessment and Plan:    Abnormal Liver Tests:    - *These available data I do think she has abnormal liver tests related to nonalcoholic fatty liver disease  -Her risk factors for this include: Obesity, diabetes mellitus on insulin, dyslipidemia, sedentary lifestyle  -Discussed the management of these conditions as per below  -Fatty infiltration will certainly lead to hepatomegaly, so this is not a surprising finding  -We will plan to repeat liver tests with lifestyle modifications in 6 months    Non-Alcoholic Fatty Liver Disease  - I had a long discussion with the patient about nonalcoholic fatty liver disease (NAFLD).  We discussed how fat deposits in the liver, how this leads to inflammation, and how chronic inflammation (ENAMORADO) can ultimately lead to scarring and cirrhosis.  The long-term complications of fatty liver disease were discussed with the patient, including the increased risk of cardiovascular disease complications,the risk of developing diabetes (if not already), and variable progression to cirrhosis and end stage liver disease.  Management of NAFLD/ENAMORADO  - We spent time discussing an appropriate diet, exercise and weight loss plan.      - Recommend exercise regularly: 4+ times per week, with an average of about 45 minutes per day.      - It has shown that patients who exercise regularly can have improvement of insulin resistance and resolution of fatty liver disease, even if they are not able to lose weight.     - Recommend a low-carbohydrate, low-calorie diet (2284-6739 calories per day).    - An ideal weight loss plan would be to lose 7-10% of body weight over the next six  "months  - Recommend aggressive diabetes management: ideal goal Hgb A1c goal of =/< 7%. If possible addition of insulin sensitizing agents like metformin or liraglutide will be helpful.    - Management of cholesterol is also very important.    - The use of \"statins\" (HMG-CoA reductase medications) are an effective means of therapy and are not contra-indicated in those with abnormal liver tests OR those with cirrhosis.  The value of these medications in this population far outweigh the minor risks of abnormal liver tests.   - Goal LDL in those with ENAMORADO are < 100 mg/dL  - Consider the utility of liberalizing coffee consumption as some data that this may slow progression and reverse effects of ENAMORADO-related fibrosis.  - We plan to order liver tests to be checked every 6 months months to assess for dynamic changes, as a potential marker of another cause of chronic liver disease.      Liver Cysts:   This has been an historical issue and noted that there has been mild interval growth of hepatic cysts over 1+ years time  - No evidence of blood to suggest cysts were cause of pain episode  - No routine follow up warranted       Thank you very much for the opportunity to participate in the care of this patient.  If you have any further questions, please don't hesitate to contact our office.    Thomas M. Leventhal, M.D.   of Medicine  Advanced & Transplant Hepatology  [facility]        Again, thank you for allowing me to participate in the care of your patient.        Sincerely,        Thomas M. Leventhal, MD    "

## 2021-09-30 NOTE — NURSING NOTE
"Chief Complaint   Patient presents with     Consult     establish care with fatty liver     /74   Pulse 95   Temp 98.1  F (36.7  C) (Oral)   Ht 1.651 m (5' 5\")   Wt 90.1 kg (198 lb 9.6 oz)   BMI 33.05 kg/m    Britt Christian CMA on 9/30/2021 at 10:34 AM    "

## 2021-09-30 NOTE — PATIENT INSTRUCTIONS
"- Consider pool therapy    Non-Alcoholic Fatty Liver Disease  - I had a long discussion with the patient about nonalcoholic fatty liver disease (NAFLD).  We discussed how fat deposits in the liver, how this leads to inflammation, and how chronic inflammation (ENAMORADO) can ultimately lead to scarring and cirrhosis.  The long-term complications of fatty liver disease were discussed with the patient, including the increased risk of cardiovascular disease complications,the risk of developing diabetes (if not already), and variable progression to cirrhosis and end stage liver disease.  Management of NAFLD/ENAMORADO  - We spent time discussing an appropriate diet, exercise and weight loss plan.      - Recommend exercise regularly: 4+ times per week, with an average of about 45 minutes per day.      - It has shown that patients who exercise regularly can have improvement of insulin resistance and resolution of fatty liver disease, even if they are not able to lose weight.     - Recommend a low-carbohydrate, low-calorie diet (1361-6035 calories per day).    - An ideal weight loss plan would be to lose 7-10% of body weight over the next six months  - Recommend aggressive diabetes management: ideal goal Hgb A1c goal of =/< 7%. If possible addition of insulin sensitizing agents like metformin or liraglutide will be helpful.    - Management of cholesterol is also very important.    - The use of \"statins\" (HMG-CoA reductase medications) are an effective means of therapy and are not contra-indicated in those with abnormal liver tests OR those with cirrhosis.  The value of these medications in this population far outweigh the minor risks of abnormal liver tests.   - Goal LDL in those with ENAMORADO are < 100 mg/dL  - Consider the utility of liberalizing coffee consumption as some data that this may slow progression and reverse effects of ENAMORADO-related fibrosis.  "

## 2021-10-01 NOTE — PROGRESS NOTES
Date of Service: 9/30/2021     Referring Provider: Heide Fay MD    Subjective:            Sarah Felix is a 76 year old female presenting for evaluation of abnormal liver tests    History of Present Illness   Sarah Felix is a 76 year old female with past medical history of DM on insulin, dyslipidemia, liver cysts who presents with hepatomegaly and elevated liver tests    Several weeks ago she notes having an episode of severe acute right upper quadrant pain discomfort that was so severe that she did not leave her home or the recumbent position for much of a week.  This pain spontaneously resolved and she is not had any issues since.  Given her symptoms she underwent lab evaluation which demonstrated elevated serum ALT and AST.  She underwent an abdominal ultrasound which did demonstrate hepatic cysts, which she has been known to have, and enlarged liver and evidence suggesting fatty infiltration of the liver.    She denies significant history of alcohol use and denies a history of IV inhaled drug use.  Denies any overt family history of liver disease.  Notes that she is working on managing her diabetes and currently has an A1c greater than 7%.  Notes that she is not very physically active which she relates to AN ankle fracture requiring corrective hardware approximately 1 year ago.    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  felt to be focal nodular hyperplasia.  annual CT abd.   followed by Dr. Amilcar Kat      Cystocele, lateral      Diabetes mellitus (H)      Diverticulitis of colon (without mention of hemorrhage)(562.11) 6/04    Abd CT     Embolism and thrombosis of  unspecified site age 22    post ovarian cyst removed     Esophageal reflux     Hiatal hernia     Fatty liver      Hiatal hernia     small     Hyperlipidemia      Nontoxic uninodular goiter      Osteopenia 4/21/2005    on fosamax  for > 5 yr.  stop 7/2012     Other chronic otitis externa      Other specified disorders of liver     Fatty Liver, Benign appearing liver cysts     Pure hypercholesterolemia        Surgical History:  Past Surgical History:   Procedure Laterality Date     APPENDECTOMY  age 22     APPLY EXTERNAL FIXATOR LOWER EXTREMITY Left 10/12/2019    Procedure: Left ankle external fixator placement;  Surgeon: Leeanne Brown MD;  Location: [facility]     C APPENDECTOMY  age 22     C DEXA, BONE DENSITY, AXIAL SKEL  2005     C DEXA, BONE DENSITY, AXIAL SKEL  6/2007    No signif change in Osteopenia     CATARACT IOL, RT/LT       COLONOSCOPY  4/2006    repeat 10 yr     ENDOSCOPIC ULTRASOUND UPPER GASTROINTESTINAL TRACT (GI) N/A 9/6/2018    Procedure: ENDOSCOPIC ULTRASOUND, ESOPHAGOSCOPY / UPPER GASTROINTESTINAL TRACT (GI);  Endoscopic Ultrasound, Esophagogastroduodenoscopy with endoscopic mucosal resection;  Surgeon: Hood Brower MD;  [facility]: [facility]     ESOPHAGOSCOPY, GASTROSCOPY, DUODENOSCOPY (EGD), COMBINED N/A 8/13/2018    Procedure: COMBINED ESOPHAGOSCOPY, GASTROSCOPY, DUODENOSCOPY (EGD), BIOPSY SINGLE OR MULTIPLE;  EGD;  Surgeon: Hood Brower MD;  Location:  OR     ESOPHAGOSCOPY, GASTROSCOPY, DUODENOSCOPY (EGD), COMBINED N/A 3/14/2019    Procedure: Upper Endoscopy;  Surgeon: Hood Brower MD;  [facility]     ESOPHAGOSCOPY, GASTROSCOPY, DUODENOSCOPY (EGD), COMBINED  08/13/2018, 3/14/19     ESOPHAGOSCOPY, GASTROSCOPY, DUODENOSCOPY (EGD), RESECT MUCOSA, COMBINED N/A 9/6/2018    Procedure: COMBINED ESOPHAGOSCOPY, GASTROSCOPY, DUODENOSCOPY (EGD), RESECT MUCOSA;;  Surgeon: Hood Brower MD;  [facility]: [facility]     GYN SURGERY  10/22/08    pelvic support     HEMORRHOID SURGERY        HEMORRHOIDECTOMY       MOHS MICROGRAPHIC PROCEDURE       MOHS MICROGRAPHIC PROCEDURE       OPEN REDUCTION INTERNAL FIXATION ANKLE Left 10/28/2019    Procedure: Internal fixation left bimalleolar ankle fracture, removal external fixator;  Surgeon: Jose Roberto Can MD;  [facility]     ORIF ANKLE FRACTURE BIMALLEOLAR Left 10/28/2019    Internal fixation left bimalleolar ankle fracture, removal external fixator     OTHER SURGICAL HISTORY      Transobturator tape for Urinary Incontinence       OTHER SURGICAL HISTORY      PELVIC SUPPORT SURGERY     OTHER SURGICAL HISTORY  2018    ENDOSCOPIC ULTRASOUND UPPER GASTROINTESTINAL TRACT (GI)     OTHER SURGICAL HISTORY  2018    ESOPHAGOSCOPY, GASTROSCOPY, DUODENOSCOPY (EGD), RESECT MUCOSA, COMBINED     OTHER SURGICAL HISTORY Left 10/12/2019    APPLY EXTERNAL FIXATOR LOWER EXTREMITYProcedure: Left ankle external fixator placement; Surgeon: Leeanne Brown MD; Location: [facility]       OVARIAN CYST REMOVAL Left age 22    L ovarian cyst removal, laparotomy       PHACOEMULSIFICATION CLEAR CORNEA WITH STANDARD INTRAOCULAR LENS IMPLANT Left 2020    Procedure: LEFT CATARACT REMOVAL WITH INTRAOCULAR LENS IMPLANT;  Surgeon: Janay Amezcua MD;  [facility]     SURGICAL HISTORY OF -   age 22    L ovarian cyst removal, laparotomy     SURGICAL HISTORY OF -       partial thyroidectomy     SURGICAL HISTORY OF -   10/08    Transobturator tape for Urinary Incontinence     SURGICAL HISTORY OF -   2011    stress test normal     THYROID SURGERY      Had cyst removed, thyroid gland is intact.     THYROIDECTOMY, PARTIAL         Social History:  Social History     Tobacco Use     Smoking status: Former Smoker     Quit date: 1980     Years since quittin.7     Smokeless tobacco: Never Used     Tobacco comment: smoked from 70-80; smoked about 1ppd   Vaping Use     Vaping Use: Never used   Substance Use Topics     Alcohol use: Yes      "Alcohol/week: 0.0 standard drinks     Comment: 6-7 drinks per week     Drug use: No        Family History:  Family History   Problem Relation Age of Onset     Diabetes Mother      Hypertension Mother      Arthritis Mother         rheumatoid     Cancer Father      Cardiovascular Brother         palpitations not on meds.     Glaucoma No family hx of      Macular Degeneration No family hx of      Amblyopia No family hx of      Rheumatoid Arthritis Mother      Other - See Comments Brother         palpitations       Medications:  Current Outpatient Medications   Medication     aspirin (ASA) 81 MG chewable tablet     atorvastatin (LIPITOR) 40 MG tablet     blood glucose (ONETOUCH ULTRA) test strip     CALCIUM 500 + D 500-200 MG-IU OR TABS     Cholecalciferol (VITAMIN D PO)     furosemide (LASIX) 20 MG tablet     glimepiride (AMARYL) 4 MG tablet     insulin aspart (NOVOLOG PEN) 100 UNIT/ML pen     insulin pen needle (BD REY U/F) 32G X 4 MM miscellaneous     insulin syringe-needle U-100 (BD INSULIN SYRINGE ULTRAFINE) 31G X 5/16\" 1 ML miscellaneous     Lancets (ONETOUCH DELICA PLUS [id number]) MISC     LANTUS SOLOSTAR 100 UNIT/ML soln     lisinopril (ZESTRIL) 2.5 MG tablet     metoprolol tartrate (LOPRESSOR) 25 MG tablet     nystatin (MYCOSTATIN) 721645 UNIT/GM external cream     potassium chloride ER (MICRO-K) 10 MEQ CR capsule     triamcinolone (KENALOG) 0.1 % external cream     No current facility-administered medications for this visit.       Review of Systems  A complete 10 point review of systems was asked and answered in the negative unless specifically commented upon in the HPI    Objective:         Vitals:    09/30/21 1032   BP: 120/74   Pulse: 95   Temp: 98.1  F (36.7  C)   TempSrc: Oral   Weight: 90.1 kg (198 lb 9.6 oz)   Height: 1.651 m (5' 5\")     Body mass index is 33.05 kg/m .     Physical Exam    Vitals reviewed.   Constitutional: Well-developed, well-nourished, in no apparent distress.    HEENT: " Normocephalic. no scleral icterus.   Neck/Lymph: Normal ROM  Cardiac:  Regular rate   Respiratory: Normal respiratory excursion   GI:  Abdomen soft, non-distended,  BS present.   Skin:  No jaundice.   Peripheral Vascular: trace left lower extremity edema. 2+ pulses in all extremities  Musculoskeletal:  ROM intact, normal muscle bulk    Psychiatric: Normal mood and affect. Behavior is normal.  Neuro:  no tremor    Labs and Diagnostic tests:  Lab Results   Component Value Date    BILITOTAL 0.8 09/02/2021    BILITOTAL 0.7 01/07/2020    ALT 52 09/02/2021    ALT 36 01/07/2020    AST 36 09/02/2021    AST 17 01/07/2020    ALKPHOS 101 09/02/2021    ALKPHOS 113 01/07/2020     Lab Results   Component Value Date    ALBUMIN 3.6 09/02/2021    ALBUMIN 3.6 01/07/2020    PROTTOTAL 6.5 09/02/2021    PROTTOTAL 6.8 01/07/2020          Imaging:  Abd US 9/16  FINDINGS:   Fluid: No evidence of ascites or pleural effusions.     Liver: The liver increased echogenicity with normal echotexture,  measuring 18.4 cm in craniocaudal dimension. There is an avascular  anechoic septated lesion of the left of the liver measuring 8.7 x 9.1  x 7.1 cm, stable. There is an avascular hypoechoic septated lesion  with acoustic enhancement of the left lobe of the liver measuring 1.1  x 1.1 x 1.0 cm, stable. There are areas of fat sparing adjacent to the  gallbladder fossa.     Gallbladder: There is no wall thickening, pericholecystic fluid, or  positive sonographic Hammer's sign. Numerous echogenic mobile  gallstones with sludge.  Bile Ducts: Both the intra- and extrahepatic biliary system are of  normal caliber.  The common bile duct measures 6 mm in diameter.     Pancreas: Pancreas is not well visualized on this exam.     Kidney: The right kidney measures 12.1 cm long. There is no  hydronephrosis or hydroureter, no shadowing renal calculi, cystic  lesion or mass. There is an echogenic focus in the upper pole of the  right kidney measuring 0.5 x 0.8 x 0.4  cm without twinkle artifact or  posterior acoustic shadowing.           Assessment and Plan:    Abnormal Liver Tests:    - *These available data I do think she has abnormal liver tests related to nonalcoholic fatty liver disease  -Her risk factors for this include: Obesity, diabetes mellitus on insulin, dyslipidemia, sedentary lifestyle  -Discussed the management of these conditions as per below  -Fatty infiltration will certainly lead to hepatomegaly, so this is not a surprising finding  -We will plan to repeat liver tests with lifestyle modifications in 6 months    Non-Alcoholic Fatty Liver Disease  - I had a long discussion with the patient about nonalcoholic fatty liver disease (NAFLD).  We discussed how fat deposits in the liver, how this leads to inflammation, and how chronic inflammation (ENAMORADO) can ultimately lead to scarring and cirrhosis.  The long-term complications of fatty liver disease were discussed with the patient, including the increased risk of cardiovascular disease complications,the risk of developing diabetes (if not already), and variable progression to cirrhosis and end stage liver disease.  Management of NAFLD/ENAMORADO  - We spent time discussing an appropriate diet, exercise and weight loss plan.      - Recommend exercise regularly: 4+ times per week, with an average of about 45 minutes per day.      - It has shown that patients who exercise regularly can have improvement of insulin resistance and resolution of fatty liver disease, even if they are not able to lose weight.     - Recommend a low-carbohydrate, low-calorie diet (4576-2754 calories per day).    - An ideal weight loss plan would be to lose 7-10% of body weight over the next six months  - Recommend aggressive diabetes management: ideal goal Hgb A1c goal of =/< 7%. If possible addition of insulin sensitizing agents like metformin or liraglutide will be helpful.    - Management of cholesterol is also very important.    - The use of  "\"statins\" (HMG-CoA reductase medications) are an effective means of therapy and are not contra-indicated in those with abnormal liver tests OR those with cirrhosis.  The value of these medications in this population far outweigh the minor risks of abnormal liver tests.   - Goal LDL in those with ENAMORADO are < 100 mg/dL  - Consider the utility of liberalizing coffee consumption as some data that this may slow progression and reverse effects of ENAMORADO-related fibrosis.  - We plan to order liver tests to be checked every 6 months months to assess for dynamic changes, as a potential marker of another cause of chronic liver disease.      Liver Cysts:   This has been an historical issue and noted that there has been mild interval growth of hepatic cysts over 1+ years time  - No evidence of blood to suggest cysts were cause of pain episode  - No routine follow up warranted       Thank you very much for the opportunity to participate in the care of this patient.  If you have any further questions, please don't hesitate to contact our office.    Thomas M. Leventhal, M.D.   of Medicine  Advanced & Transplant Hepatology  [facility]    "

## 2021-10-23 ENCOUNTER — HEALTH MAINTENANCE LETTER (OUTPATIENT)
Age: 76
End: 2021-10-23

## 2021-11-29 NOTE — PROGRESS NOTES
Medication Therapy Management (MTM) Encounter    ASSESSMENT:                            Medication Adherence/Access: See below for considerations    Type 2 Diabetes: Stable. Patient is meeting A1c goal of < 8%. Self monitoring of blood glucose is not at goal of fasting  mg/dL and post prandial <180mg/dL. Patient is meeting average glucose goal of <150mg/dl, however highs and lows. Consider staying on same dose of  Glimepiride 4mg. Twice daily,  Novolog (Increase dose to 20 units  units 2-3 times daily with meal--concentrate on not missing injections) and Lantus (51units daily at bedtime). It would be beneficial to monitor blood sugar trends and meals to determine if lows are occurring more frequently when fasting or from over-adjusting meal time insulin.  Increased exercise/low carb/calorie diet + wt. Loss  would be beneficial for her NAFLD.     We discussed jardiance add on or maybe pioglitazone after next A1c 1-2022.     Immunizations: up to date .    Hypokalemia:  Stable. Now able to swallow the micro-k capsules easily.       PLAN:                            1.  FYI--Please be diligent injecting your mealtime insulin--Novolog --and if your 2 hour after meal sugars are consistently >200---increase your Novolog dose to 20 units at each meal --unless your pre-meal blood sugar is <100--then skip the shot at that meal.  Stay on glimepiride and lantus doses as is for now.    Lets consider either Pioglitazone 15mg./day OR Jardiance 10mg. Tablet after next A1c with me January 3rd -2022 . At 3pm.       2. FYI--do your best controlling carbs/sugars in the daily diet, be as active as you can exercise wise.        Follow-up: Return in about 5 weeks (around 1/3/2022), or 3 PM, for Blood sugar meter review, Medication Therapy Management Visit, A1c lab.        SUBJECTIVE/OBJECTIVE:                          Sarah Felix is a 76 year old female called for a follow-up (9-27-21) visit. She was referred to me from   Collette Fay.       Reason for visit:   bs review. Saw liver md has nafld --she is struggling with finding an exercise she can do and isnt super interested in carb/calorie restriction per liver md.       Allergies/ADRs: Reviewed in chart  Tobacco: She reports that she quit smoking about 41 years ago. She has never used smokeless tobacco.  Alcohol: 7-9 beverages / week typically wine 1 or 2 glasses/night - reports that she hasn't had any alcohol in the last week and a half until they figure out her liver situation.   Caffeine: 2 cans diet coke sodas/day  Activity: reports she has low energy and stamina, if she does any activity she has to take breaks frequenty but now that the weather is better she is doing more outdoor work.   Past Medical History: Reviewed in chart  Personal Healthcare Goals: figure out what is going on with liver or blood, would like to get 3rd vaccine, improve a1c    Medication Adherence/Access:  Missed a few insulin doses.       Type 2 Diabetes:  Currently taking : glimepiride 4 mg.  Tablet twice daily ,  Lantus 51 units daily at midnight daily. Novolog vial - inject 15 units usually twice /day in am (averages twice daily but depending on what she is eating it can be 3 times daily) + before biggest snack meal of the day on most days .    SMBG: see below.     She self reports bs's:  This am bs was 72 .woke up sweaty .     Fasting  Am bs's : usually - 150  2 hour post meal: 200+--especially with thanksgiving.       She has had more 200's bs recently.        Patient is not experiencing side effects.    She reports bad ankle --hard to walk, swimming was suggested per liver md --she doesn't have access or lives close to one?     She doesn't have any exercise equipment where she lives.       Symptoms of low blood sugar? sweaty, Frequency of lows has been increasing (she admits she cannot tolerate bs's <110- if they go lower than that she feels crummy , sweaty. )  Symptoms of high blood sugar?  none  Eye exam: up to date  Foot exam: due  Diet: did not discuss diet at todays visit  Previously: she's maintained similar diet and notes sugars now being elevated  eats 4-5 smaller meals - pasta and rices  Fruit and some fruit juices   Breakfast: cereal 2-3 times per week, drink 2 glasses OJ or grapefruit juice /day .   Exercise: see social history --saw liver specialist suggested swimming.   Aspirin: Taking 162 mg daily and denies side effects  Statin: Yes: atorvastatin 40 mg   ACEi/ARB: Yes: lisinopril 2.5 mg.   Urine Albumin:   Lab Results   Component Value Date    UMALCR  09/02/2021      Comment:      Unable to calculate:  Urine creatinine or albumin value below detectable level      Lab Results   Component Value Date    A1C 7.8 09/27/2021    A1C 8.3 06/21/2021    A1C 9.4 12/14/2020    A1C 8.1 09/21/2020    A1C 8.5 01/07/2020    A1C 7.6 10/12/2019     Immunizations:  Up to date .     Hypokalemia:  Patient reports the new potassium capsule is working much better and is easy to take.   Potassium   Date Value Ref Range Status   09/02/2021 4.0 3.4 - 5.3 mmol/L Final   01/07/2020 4.0 3.4 - 5.3 mmol/L Final       Hyperlipidemia: Current therapy includes : atorvastatin 40mg daily.  Patient reports no significant myalgias or other side effects.  The 10-year ASCVD risk score (Josh KEI Jr., et al., 2013) is: 29.7%    Values used to calculate the score:      Age: 76 years      Sex: Female      Is Non- : No      Diabetic: Yes      Tobacco smoker: No      Systolic Blood Pressure: 120 mmHg      Is BP treated: Yes      HDL Cholesterol: 47 mg/dL      Total Cholesterol: 168 mg/dL  Recent Labs   Lab Test 09/02/21  1501 01/07/20  1417   CHOL 168 139   HDL 47* 45*   LDL 76 63   TRIG 226* 154*           ----------------    I spent 30 minutes with this patient today (an extra 15 minutes was spent creating the Medication Action Plan). All changes were made via collaborative practice agreement with Heide NEGRON  MD Nya. A copy of the visit note was provided to the patient's primary care provider.    The patient was mailed a summary of these recommendations. She admits computer not working at home to view mychart .     Joan Lopez Rph.  Medication Therapy Management Provider  [phone]      Telemedicine Visit Details  Type of service:  Telephone visit  Start Time: 2:00   PM  End Time: 2 :30  PM  Originating Location (patient location): Home  Distant Location (provider location):  [facility]     Medication Therapy Recommendations  Type 2 diabetes mellitus without complication, with long-term current use of insulin (H)    Current Medication: insulin aspart (NOVOLOG PEN) 100 UNIT/ML pen   Rationale: Dose too low - Dosage too low - Effectiveness   Recommendation: Increase Dose   Status: Accepted per CPA

## 2021-11-30 ENCOUNTER — VIRTUAL VISIT (OUTPATIENT)
Dept: PHARMACY | Facility: CLINIC | Age: 76
End: 2021-11-30
Payer: COMMERCIAL

## 2021-11-30 DIAGNOSIS — Z79.4 TYPE 2 DIABETES MELLITUS WITH STAGE 2 CHRONIC KIDNEY DISEASE, WITH LONG-TERM CURRENT USE OF INSULIN (H): Primary | ICD-10-CM

## 2021-11-30 DIAGNOSIS — E87.6 HYPOKALEMIA: ICD-10-CM

## 2021-11-30 DIAGNOSIS — N18.2 TYPE 2 DIABETES MELLITUS WITH STAGE 2 CHRONIC KIDNEY DISEASE, WITH LONG-TERM CURRENT USE OF INSULIN (H): Primary | ICD-10-CM

## 2021-11-30 DIAGNOSIS — Z23 ENCOUNTER FOR IMMUNIZATION: ICD-10-CM

## 2021-11-30 DIAGNOSIS — E11.22 TYPE 2 DIABETES MELLITUS WITH STAGE 2 CHRONIC KIDNEY DISEASE, WITH LONG-TERM CURRENT USE OF INSULIN (H): Primary | ICD-10-CM

## 2021-11-30 PROCEDURE — 99607 MTMS BY PHARM ADDL 15 MIN: CPT | Performed by: PHARMACIST

## 2021-11-30 PROCEDURE — 99606 MTMS BY PHARM EST 15 MIN: CPT | Performed by: PHARMACIST

## 2021-11-30 NOTE — LETTER
"        Date: 2021    Sarah Felix  [address]  [place] [address]    Dear Ms. Felix,    Thank you for talking with me on 21 about your health and medications. Medicare s MTM (Medication Therapy Management) program helps you understand your medications and use them safely.      This letter includes an action plan (Medication Action Plan) and medication list (Personal Medication List). The action plan has steps you should take to help you get the best results from your medications. The medication list will help you keep track of your medications and how to use them the right way.       Have your action plan and medication list with you when you talk with your doctors, pharmacists, and other healthcare providers in your care team.     Ask your doctors, pharmacists, and other healthcare providers to update the action plan and medication list at every visit.     Take your medication list with you if you go to the hospital or emergency room.     Give a copy of the action plan and medication list to your family or caregivers.     If you want to talk about this letter or any of the papers with it, please call   [phone].We look forward to working with you, your doctors, and other healthcare providers to help you stay healthy through the [facility] MTM program.    Sincerely,  Joan Lopez [facility]    Enclosed: Medication Action Plan and Personal Medication List    MEDICATION ACTION PLAN FOR Sarah Felix,  1945     This action plan will help you get the best results from your medications if you:   1. Read \"What we talked about.\"   2. Take the steps listed in the \"What I need to do\" boxes.   3. Fill in \"What I did and when I did it.\"   4. Fill in \"My follow-up plan\" and \"Questions I want to ask.\"     Have this action plan with you when you talk with your doctors, pharmacists, and other healthcare providers in your care team. Share this with your family or caregivers too.  DATE " PREPARED: 2021  What we talked about: your blood sugars                                                   What I need to do: increase your mealtime insulin -Novolog dose to 20 units at meal times , strive for better adherence to this medication.        What I did and when I did it:                                              My follow-up plan:      A1c lab recheck 1-3-22.            Questions I want to ask:              If you have any questions about your action plan, call Joan Lopez [facility], Phone: [phone] , Monday-Friday 8-4:30pm.           PERSONAL MEDICATION LIST FOR Sarah Felix, HILARY 1945     This medication list was made for you after we talked. We also used information from your doctor's chart.      Use blank rows to add new medications. Then fill in the dates you started using them.    Cross out medications when you no longer use them. Then write the date and why you stopped using them.    Ask your doctors, pharmacists, and other healthcare providers to update this list at every visit. Keep this list up-to-date with:       Prescription medications    Over the counter drugs     Herbals    Vitamins    Minerals      If you go to the hospital or emergency room, take this list with you. Share this with your family or caregivers too.     DATE PREPARED: 2021  Allergies or side effects: Amlodipine, Ancef [cefazolin], Levaquin, Metformin, Cephalosporins, Clindamycin, Levofloxacin, and Nickel     Medication:  ASPIRIN 81 MG PO CHEW      How I use it:  Take 162 mg by mouth 2 times daily       Why I use it:  prevent mi/stroke    Prescriber:  Patient Reported      Date I started using it:       Date I stopped using it:         Why I stopped using it:            Medication:  ATORVASTATIN CALCIUM 40 MG PO TABS      How I use it:  Take 1 tablet (40 mg) by mouth daily      Why I use it: Hyperlipidemia LDL goal <100    Prescriber:  Heide Fay MD      Date I started using it:       Date I  stopped using it:         Why I stopped using it:            Medication:  BD PEN NEEDLE REY U/F 32G X 4 MM MISC      How I use it:  USE 1 PEN NEEDLE four times daily. (WITH LANTUS AND Novolog)      Why I use it: Type 2 diabetes mellitus with stage 2 chronic kidney disease, with long-term current use of insulin (H)    Prescriber:  Remington Browning DO      Date I started using it:       Date I stopped using it:         Why I stopped using it:            Medication:  CALCIUM 500 + D 500-200 MG-IU OR TABS      How I use it:  one tab twice per day      Why I use it: Osteopenia    Prescriber:  Ute Campbell MD      Date I started using it:       Date I stopped using it:         Why I stopped using it:            Medication:  FUROSEMIDE 20 MG PO TABS      How I use it:  TAKE 2 TABLETS IN THE MORNING AND 1 TABLET IN THE AFTERNOON      Why I use it:  water pill    Prescriber:  Heide Fay MD      Date I started using it:       Date I stopped using it:         Why I stopped using it:            Medication:  GLIMEPIRIDE 4 MG PO TABS      How I use it:  TAKE 1 TABLET TWICE A DAY WITH MEALS (BREAKFAST AND DINNER) (PLEASE SCHEDULE LAB APPOINTMENT PRIOR TO REFILL REQUESTS). Patient is splitting into quarter tablets and using with each snack as she is unable to eat large meals.      Why I use it: Type 2 diabetes, HbA1c goal < 7% (H)    Prescriber:  Remington Browning DO      Date I started using it:       Date I stopped using it:         Why I stopped using it:            Medication:  GLUCOSE BLOOD VI STRP      How I use it:  1 strip by In Vitro route 4 times daily.      Why I use it: Type 2 diabetes, HbA1c goal < 7% (H)    Prescriber:  Remington Browning DO      Date I started using it:       Date I stopped using it:         Why I stopped using it:            Medication:  INSULIN ASPART  UNIT/ML SC SOPN      How I use it:  Inject 15 Units Subcutaneous 3 times daily (with meals)      Why I use it: Type 2 diabetes mellitus  "with stage 2 chronic kidney disease, with long-term current use of insulin (H)    Prescriber:  Remington Browning,       Date I started using it:       Date I stopped using it:         Why I stopped using it:            Medication:  INSULIN SYRINGE-NEEDLE U-100 31G X 5/16\" 1 ML MISC      How I use it:  Use 1 syringes twice daily for insulin and victoza and for symptoms of hypoglycemia      Why I use it: Type 2 diabetes mellitus with stage 2 chronic kidney disease, without long-term current use of insulin (H)    Prescriber:  Joel Daniel Irwin Wegener, MD      Date I started using it:       Date I stopped using it:         Why I stopped using it:            Medication:  LANTUS SOLOSTAR   UNIT/ML SC SOPN      How I use it:  Inject 50-60 Units Subcutaneous At Bedtime      Why I use it: Type 2 diabetes mellitus with stage 2 chronic kidney disease, with long-term current use of insulin (H)    Prescriber:  Remington Browning DO      Date I started using it:       Date I stopped using it:         Why I stopped using it:            Medication:  LISINOPRIL 2.5 MG PO TABS      How I use it:  Take 1 tablet (2.5 mg) by mouth daily      Why I use it: CKD (chronic kidney disease) stage 2, GFR 60-89 ml/min    Prescriber:  Remington Browning DO      Date I started using it:       Date I stopped using it:         Why I stopped using it:            Medication:  METOPROLOL TARTRATE 25 MG PO TABS      How I use it:  TAKE 1 TABLET TWICE A DAY      Why I use it: Essential hypertension    Prescriber:  Remington Browning DO      Date I started using it:       Date I stopped using it:         Why I stopped using it:            Medication:  NYSTATIN 827100 UNIT/GM EX CREA      How I use it:  Apply topically 2 times daily      Why I use it: Rash    Prescriber:  Joel Daniel Irwin Wegener, MD      Date I started using it:       Date I stopped using it:         Why I stopped using it:            Medication:  ONETOUCH DELICA PLUS [id number] MISC      How I use it: "  1 each 4 times daily      Why I use it: Type 2 diabetes, HbA1c goal < 7% (H)    Prescriber:  Remington Browning DO      Date I started using it:       Date I stopped using it:         Why I stopped using it:            Medication:  POTASSIUM CHLORIDE ER 10 MEQ PO CPCR      How I use it:  Take 1 capsule (10 mEq) by mouth 2 times daily      Why I use it: Hypokalemia    Prescriber:  Remington Browning DO      Date I started using it:       Date I stopped using it:         Why I stopped using it:            Medication:  TRIAMCINOLONE ACETONIDE 0.1 % EX CREA      How I use it:  Apply topically 3 times daily      Why I use it: Rash    Prescriber:  Joel Daniel Irwin Wegener, MD      Date I started using it:       Date I stopped using it:         Why I stopped using it:            Medication:  VITAMIN D PO      How I use it:  Take  by mouth. Pt consuming 3000 units per day       Why I use it:  supplement     Prescriber:  Patient Reported      Date I started using it:       Date I stopped using it:         Why I stopped using it:            Medication:         How I use it:         Why I use it:      Prescriber:         Date I started using it:       Date I stopped using it:         Why I stopped using it:            Medication:         How I use it:         Why I use it:      Prescriber:         Date I started using it:       Date I stopped using it:         Why I stopped using it:            Medication:         How I use it:         Why I use it:      Prescriber:         Date I started using it:       Date I stopped using it:         Why I stopped using it:              Other Information:     If you have any questions about your medication list, call Joan Lopez [facility], Phone: [phone] , Monday-Friday 8-4:30pm.    According to the Paperwork Reduction Act of 1995, no persons are required to respond to a collection of information unless it displays a valid OMB control number. The valid OMB number for this information collection is  [id number]. The time required to complete this information collection is estimated to average 40 minutes per response, including the time to review instructions, searching existing data resources, gather the data needed, and complete and review the information collection. If you have any comments concerning the accuracy of the time estimate(s) or suggestions for improving this form, please write to: CMS, Attn: FRANCIS Reports Clearance Officer, [address] [place] [phone].

## 2021-11-30 NOTE — Clinical Note
Collette--simran--spoke with Sarah today --she will try to improve diet and exercise per nafld diagnosis per liver md .  I will see her 1-3-22 for a1c --will attempt to add jardiance if affordable?    Will let you know how it goes.    Anaid

## 2021-11-30 NOTE — PATIENT INSTRUCTIONS
Recommendations from today's MTM visit:                                                       1.  FYI--Please be diligent injecting your mealtime insulin--Novolog --and if your 2 hour after meal sugars are consistently >200---increase your Novolog dose to 20 units at each meal --unless your pre-meal blood sugar is <100--then skip the shot at that meal.  Stay on glimepiride and lantus doses as is for now.    Lets consider either Pioglitazone 15mg./day OR Jardiance 10mg. Tablet after next A1c with me January 3rd -2022 . At 3pm.       2. FYI--do your best controlling carbs/sugars in the daily diet, be as active as you can exercise wise.        Follow-up: Return in about 5 weeks (around 1/3/2022), or 3 PM, for Blood sugar meter review, Medication Therapy Management Visit, A1c lab.    It was great to speak with you today.  I value your experience and would be very thankful for your time with providing feedback on our clinic survey. You may receive a survey via email or text message in the next few days.     To schedule another MTM appointment, please call the clinic directly or you may call the MTM scheduling line at [phone] or toll-free at [phone].     My Clinical Pharmacist's contact information:                                                      Please feel free to contact me with any questions or concerns you have.      Joan Lopez Rp.  Medication Therapy Management Provider  [phone]

## 2021-12-14 ENCOUNTER — ONCOLOGY VISIT (OUTPATIENT)
Dept: ONCOLOGY | Facility: CLINIC | Age: 76
End: 2021-12-14
Attending: FAMILY MEDICINE
Payer: COMMERCIAL

## 2021-12-14 VITALS
OXYGEN SATURATION: 95 % | DIASTOLIC BLOOD PRESSURE: 83 MMHG | HEART RATE: 85 BPM | RESPIRATION RATE: 18 BRPM | SYSTOLIC BLOOD PRESSURE: 135 MMHG | HEIGHT: 65 IN | BODY MASS INDEX: 33.72 KG/M2 | TEMPERATURE: 97.9 F | WEIGHT: 202.4 LBS

## 2021-12-14 DIAGNOSIS — D72.828 OTHER ELEVATED WHITE BLOOD CELL COUNT: ICD-10-CM

## 2021-12-14 DIAGNOSIS — D75.839 THROMBOCYTOSIS: ICD-10-CM

## 2021-12-14 LAB
BASOPHILS # BLD AUTO: 0.1 10E3/UL (ref 0–0.2)
BASOPHILS # BLD AUTO: 0.1 10E3/UL (ref 0–0.2)
BASOPHILS NFR BLD AUTO: 1 %
BASOPHILS NFR BLD AUTO: 1 %
EOSINOPHIL # BLD AUTO: 0.2 10E3/UL (ref 0–0.7)
EOSINOPHIL # BLD AUTO: 0.2 10E3/UL (ref 0–0.7)
EOSINOPHIL NFR BLD AUTO: 3 %
EOSINOPHIL NFR BLD AUTO: 3 %
ERYTHROCYTE [DISTWIDTH] IN BLOOD BY AUTOMATED COUNT: 17.2 % (ref 10–15)
ERYTHROCYTE [DISTWIDTH] IN BLOOD BY AUTOMATED COUNT: 17.3 % (ref 10–15)
HCT VFR BLD AUTO: 40.7 % (ref 35–47)
HCT VFR BLD AUTO: 41.3 % (ref 35–47)
HGB BLD-MCNC: 13.5 G/DL (ref 11.7–15.7)
HGB BLD-MCNC: 13.5 G/DL (ref 11.7–15.7)
IMM GRANULOCYTES # BLD: 0.1 10E3/UL
IMM GRANULOCYTES # BLD: 0.1 10E3/UL
IMM GRANULOCYTES NFR BLD: 1 %
IMM GRANULOCYTES NFR BLD: 1 %
INTERPRETATION: NORMAL
LYMPHOCYTES # BLD AUTO: 1.4 10E3/UL (ref 0.8–5.3)
LYMPHOCYTES # BLD AUTO: 1.5 10E3/UL (ref 0.8–5.3)
LYMPHOCYTES NFR BLD AUTO: 16 %
LYMPHOCYTES NFR BLD AUTO: 17 %
MCH RBC QN AUTO: 30.3 PG (ref 26.5–33)
MCH RBC QN AUTO: 30.7 PG (ref 26.5–33)
MCHC RBC AUTO-ENTMCNC: 32.7 G/DL (ref 31.5–36.5)
MCHC RBC AUTO-ENTMCNC: 33.2 G/DL (ref 31.5–36.5)
MCV RBC AUTO: 93 FL (ref 78–100)
MCV RBC AUTO: 93 FL (ref 78–100)
MONOCYTES # BLD AUTO: 0.5 10E3/UL (ref 0–1.3)
MONOCYTES # BLD AUTO: 0.6 10E3/UL (ref 0–1.3)
MONOCYTES NFR BLD AUTO: 6 %
MONOCYTES NFR BLD AUTO: 6 %
NEUTROPHILS # BLD AUTO: 6.5 10E3/UL (ref 1.6–8.3)
NEUTROPHILS # BLD AUTO: 6.6 10E3/UL (ref 1.6–8.3)
NEUTROPHILS NFR BLD AUTO: 72 %
NEUTROPHILS NFR BLD AUTO: 73 %
NRBC # BLD AUTO: 0 10E3/UL
NRBC # BLD AUTO: 0 10E3/UL
NRBC BLD AUTO-RTO: 0 /100
NRBC BLD AUTO-RTO: 0 /100
PATH REPORT.COMMENTS IMP SPEC: NORMAL
PATH REPORT.COMMENTS IMP SPEC: NORMAL
PATH REPORT.FINAL DX SPEC: NORMAL
PATH REPORT.MICROSCOPIC SPEC OTHER STN: NORMAL
PATH REPORT.MICROSCOPIC SPEC OTHER STN: NORMAL
PATH REPORT.RELEVANT HX SPEC: NORMAL
PLATELET # BLD AUTO: 756 10E3/UL (ref 150–450)
PLATELET # BLD AUTO: 760 10E3/UL (ref 150–450)
RBC # BLD AUTO: 4.4 10E6/UL (ref 3.8–5.2)
RBC # BLD AUTO: 4.46 10E6/UL (ref 3.8–5.2)
RETICS # AUTO: 0.05 10E6/UL (ref 0.03–0.1)
RETICS/RBC NFR AUTO: 1.2 % (ref 0.5–2)
SIGNIFICANT RESULTS: NORMAL
SPECIMEN DESCRIPTION: NORMAL
TEST DETAILS, MDL: NORMAL
WBC # BLD AUTO: 8.8 10E3/UL (ref 4–11)
WBC # BLD AUTO: 9 10E3/UL (ref 4–11)

## 2021-12-14 PROCEDURE — 99205 OFFICE O/P NEW HI 60 MIN: CPT | Performed by: INTERNAL MEDICINE

## 2021-12-14 PROCEDURE — 36415 COLL VENOUS BLD VENIPUNCTURE: CPT | Performed by: INTERNAL MEDICINE

## 2021-12-14 PROCEDURE — G0452 MOLECULAR PATHOLOGY INTERPR: HCPCS | Mod: 26 | Performed by: PATHOLOGY

## 2021-12-14 PROCEDURE — 81219 CALR GENE COM VARIANTS: CPT | Performed by: INTERNAL MEDICINE

## 2021-12-14 PROCEDURE — 85025 COMPLETE CBC W/AUTO DIFF WBC: CPT | Performed by: INTERNAL MEDICINE

## 2021-12-14 PROCEDURE — G0463 HOSPITAL OUTPT CLINIC VISIT: HCPCS

## 2021-12-14 PROCEDURE — 85060 BLOOD SMEAR INTERPRETATION: CPT | Performed by: PATHOLOGY

## 2021-12-14 PROCEDURE — 85045 AUTOMATED RETICULOCYTE COUNT: CPT | Performed by: INTERNAL MEDICINE

## 2021-12-14 PROCEDURE — 81270 JAK2 GENE: CPT | Performed by: INTERNAL MEDICINE

## 2021-12-14 ASSESSMENT — PAIN SCALES - GENERAL: PAINLEVEL: NO PAIN (0)

## 2021-12-14 ASSESSMENT — MIFFLIN-ST. JEOR: SCORE: 1418.96

## 2021-12-14 NOTE — LETTER
2021         RE: Sarah Felix  [address] [place] [address]        Dear Colleague,    Thank you for referring your patient, Sarah Felix, to the [facility]. Please see a copy of my visit note below.        [facility] Hematology [facility]ation  [address] [place] [address]  Phone: [phone]    Outpatient Visit Note:    Patient: Sarah Felix  MRN: [id number]  : 1945  IVAN: Dec 14, 2021     Reason for Consultation:  Sarah Felix is a referred by Dr Heide Fay for evaluation and treatment of thrombocytosis.    Assessment: Sarah Felix is a 76 year old woman with chronic thrombocytosis, currently unclear whether secondary to cholelithiasis or myeloproliferative neoplasm (ET most likely).      Recommendations:  1. I counseled the patient about possible explanations for thrombocytosis including inflammation, iron deficiency (currenlty iron replete) or ET.  We discussed natural history and treatment of MPNs and I explained the treatment is to reduce risk of arterial and venous thrombosis.  2. Check CBC, smear and MPN panel by NGS today  3. I'll call her with the results of the MPN panel, if pos then start HU and continue ASA, goal normal PLT count to reduce risk of thrombosis.      60 minutes spent on the date of the encounter doing chart review, review of test results, interpretation of tests, patient visit and documentation     Manjeet Wilde MD   of Medicine, Division of Hematology, Oncology and Transplantation  [employer]     -------------------------------  History: Sarah Felix is a 76 year old woman with history of type 2 diabetes, hypertension, multiple drug allergies to antimicrobials, and recent diagnosis of cholelithiasis and abdominal pain now resolved, who is referred for evaluation of thrombocytosis.  She reports that she is otherwise  feeling well.  Her abdominal pain lasted for about a month or so and then resolved spontaneously.  She had no fevers chills or night sweats.  She notes no pulmonary erythema and pain.  She notes no post bathing pruritus.  She has had no easy bruising or bleeding symptoms.  She is fatigued but notes this is a chronic problem for her.      She is feeling well today with no complaints.    Medical history is notable for Type 2 diabetes, hypertension, Fatty liver and cholelithiasis on recent ultrasound.  No history of CAD or stroke.  DVT provoked by surgery years ago.    Surgical history is reviewed in the EMR and is notable for ankle fracture years ago requiring fixation and complicated by DVT.      Medications are reviewed in the EMR and notable for ASA 81mg BID    Physical Exam:  Vitals: B/P: 135/83, T: 97.9, P: 85, R: 18, Wt: 202 lbs 6.4 oz Body mass index is 33.68 kg/m .   Exam:   Gen: Appears well, no distress  HEENT: no scleral icterus or hemorrhage, no wet purpura, no lymphadenopathy  CV: regular, no murmurs  Pulm: clear  Abd: soft, nontender, no splenomegaly  Ext: no edema  Skin: no ecchymoses or hematomas  Neuro: no focal deficits, affect and cognition are normal    Labs:  Results for CHATA LAURENT (MRN [id number]) as of 12/14/2021 12:42   Ref. Range 12/14/2021 10:08   WBC Latest Ref Range: 4.0 - 11.0 10e3/uL 9.0   Hemoglobin Latest Ref Range: 11.7 - 15.7 g/dL 13.5   Hematocrit Latest Ref Range: 35.0 - 47.0 % 40.7   Platelet Count Latest Ref Range: 150 - 450 10e3/uL 756 (H)       Imaging:  Ultrasound 9/21:  IMPRESSION:   1.  Stable appearance of the hepatic cysts with thin septations as described above.  2.   Echogenic and large liver consistent with hepatic steatosis.  3.  Cholelithiasis and sludge without evidence of cholecystitis.  4.  Echogenic focus in the right kidney, likely vascular calcification or milk of calcium cyst.      Again, thank you for allowing me to participate in the care of your  patient.        Sincerely,        Manjeet Wilde MD

## 2021-12-14 NOTE — PROGRESS NOTES
[facility]ematology Consult[facility]  [address] [place] [address]  Phone: [phone]    Outpatient Visit Note:    Patient: Sarah Felix  MRN: [id number]  : 1945  IVAN: Dec 14, 2021     Reason for Consultation:  Sarah Felix is a referred by Dr Heide Fay for evaluation and treatment of thrombocytosis.    Assessment: Sarah Felix is a 76 year old woman with chronic thrombocytosis, currently unclear whether secondary to cholelithiasis or myeloproliferative neoplasm (ET most likely).      Recommendations:  1. I counseled the patient about possible explanations for thrombocytosis including inflammation, iron deficiency (currenlty iron replete) or ET.  We discussed natural history and treatment of MPNs and I explained the treatment is to reduce risk of arterial and venous thrombosis.  2. Check CBC, smear and MPN panel by NGS today  3. I'll call her with the results of the MPN panel, if pos then start HU and continue ASA, goal normal PLT count to reduce risk of thrombosis.      60 minutes spent on the date of the encounter doing chart review, review of test results, interpretation of tests, patient visit and documentation     Manjeet Wilde MD   of Medicine, Division of Hematology, Oncology and Transplantation  [facility]     Addendum:  NGS panel is negative MPN so offered reassurance.  No follow up needed as this is reactive and no treatment is needed.    Manjeet Wilde MD   of Medicine, Division of Hematology, Oncology and Transplantation  [facility]   -------------------------------  History: Sarah Felix is a 76 year old woman with history of type 2 diabetes, hypertension, multiple drug allergies to antimicrobials, and recent diagnosis of cholelithiasis and abdominal pain now resolved, who is referred for evaluation of thrombocytosis.  She reports that she  is otherwise feeling well.  Her abdominal pain lasted for about a month or so and then resolved spontaneously.  She had no fevers chills or night sweats.  She notes no pulmonary erythema and pain.  She notes no post bathing pruritus.  She has had no easy bruising or bleeding symptoms.  She is fatigued but notes this is a chronic problem for her.      She is feeling well today with no complaints.    Medical history is notable for Type 2 diabetes, hypertension, Fatty liver and cholelithiasis on recent ultrasound.  No history of CAD or stroke.  DVT provoked by surgery years ago.    Surgical history is reviewed in the EMR and is notable for ankle fracture years ago requiring fixation and complicated by DVT.      Medications are reviewed in the EMR and notable for ASA 81mg BID    Physical Exam:  Vitals: B/P: 135/83, T: 97.9, P: 85, R: 18, Wt: 202 lbs 6.4 oz Body mass index is 33.68 kg/m .   Exam:   Gen: Appears well, no distress  HEENT: no scleral icterus or hemorrhage, no wet purpura, no lymphadenopathy  CV: regular, no murmurs  Pulm: clear  Abd: soft, nontender, no splenomegaly  Ext: no edema  Skin: no ecchymoses or hematomas  Neuro: no focal deficits, affect and cognition are normal    Labs:  Results for CHATA LAURENT (MRN [id number]) as of 12/14/2021 12:42   Ref. Range 12/14/2021 10:08   WBC Latest Ref Range: 4.0 - 11.0 10e3/uL 9.0   Hemoglobin Latest Ref Range: 11.7 - 15.7 g/dL 13.5   Hematocrit Latest Ref Range: 35.0 - 47.0 % 40.7   Platelet Count Latest Ref Range: 150 - 450 10e3/uL 756 (H)       Imaging:  Ultrasound 9/21:  IMPRESSION:   1.  Stable appearance of the hepatic cysts with thin septations as described above.  2.   Echogenic and large liver consistent with hepatic steatosis.  3.  Cholelithiasis and sludge without evidence of cholecystitis.  4.  Echogenic focus in the right kidney, likely vascular calcification or milk of calcium cyst.

## 2021-12-14 NOTE — NURSING NOTE
"Oncology Rooming Note    December 14, 2021 9:15 AM   Sarah Felix is a 76 year old female who presents for:    Chief Complaint   Patient presents with     Oncology Clinic Visit     [facility] NEW - THROMBOCYTOSIS     Initial Vitals: /83   Pulse 85   Temp 97.9  F (36.6  C) (Oral)   Resp 18   Ht 1.651 m (5' 5\")   Wt 91.8 kg (202 lb 6.4 oz)   SpO2 95%   BMI 33.68 kg/m   Estimated body mass index is 33.68 kg/m  as calculated from the following:    Height as of this encounter: 1.651 m (5' 5\").    Weight as of this encounter: 91.8 kg (202 lb 6.4 oz). Body surface area is 2.05 meters squared.  No Pain (0) Comment: Data Unavailable   No LMP recorded. Patient is postmenopausal.  Allergies reviewed: Yes  Medications reviewed: Yes    Medications: Medication refills not needed today.  Pharmacy name entered into [employer]:    [facility] - [place], [place] - [facility] [address]  [facility]  [facility]  [facility] - [place], [place] - [facility]  WRITTEN PRESCRIPTION REQUESTED    Clinical concerns: No new concerns. Andry was notified.      Norberto Noel LPN            "

## 2021-12-14 NOTE — PATIENT INSTRUCTIONS
Your visit the [facility] was to discuss your elevated platelet count for the last couple of years.  Most recently in September your platelet count became normal on repeat testing, which is a good sign.  Elevated platelets happen either because of another condition such as low iron or infection/inflammation OR from a bone marrow condition where the bone marrow makes too many cells called Essential Thrombocythemia.    If the gene test today is negative, then given your recent symptoms, I suspect your recent elevated platelet count could have been from gall stones (see your on your ultrasound test).      If your gene test is positive, then treatment includes aspirin (no change for you) and a medicine called Hydroxyurea, which is a pill taken every day to lower the platelet count back to normal.  It requires frequent monitoring for side effects in the beginning.  The main purpose for treatment is to lower your risk of blood clots (including stroke and heart attack).    The gene test usually takes a couple weeks to come back and we will reach out to you about these results when they return    If you have questions or concerns before your next appointment you can call Dr Wilde's Care Coordinator, Denise Pinzon at: [phone]     You can also reach me through [employer], but please use this non-urgent questions

## 2021-12-14 NOTE — NURSING NOTE
Patient labs drawn in clinic via venipuncture. Patient tolerated well. See flowsheet for details.      Rivka Pena, DANIS on 12/14/2021 at 10:19 AM

## 2022-01-02 NOTE — PROGRESS NOTES
Medication Therapy Management (MTM) Encounter    ASSESSMENT:                            Medication Adherence/Access: See below for considerations    Type 2 Diabetes: Stable. Patient is meeting A1c goal of < 8%. Self monitoring of blood glucose is not at goal of fasting  mg/dL and post prandial <180mg/dL. Patient is meeting average glucose goal of <150mg/dl, however highs and lows. Consider staying on same dose of  Glimepiride 4mg. Twice daily,  Novolog -we discussed needs more aggressive dosing-  2-3 times daily with meal--but adjust dose based on carb content meal + premeal bs-her TDD log should be closer to 40 units /day  and Lantus (51units daily at bedtime). It would be beneficial to monitor blood sugar trends and meals to determine if lows are occurring more frequently when fasting or from over-adjusting meal time insulin.  Increased exercise/low carb/calorie diet + wt. Loss  would be beneficial for her NAFLD.     We discussed jardiance add on or maybe pioglitazone or trulicity after next A1c 4-2022.     Immunizations: up to date .    Hypokalemia:  Stable. Now able to swallow the micro-k capsules easily.       PLAN:                            1. A1c today = 8.4% - up from 7.8% .    Lets keep lantus dose at 51 units/day , Glimepiride at 4mg. Twice daily + Novolog mealtime insulin -15 units each dose --BUT commit to taking this at least 3 x day to better control your blood sugars .  Try to test blood sugars before meals as best you can during /day and maybe once /day 2 hours after any meal --keep blood sugars between 100-200 as best you can .         Follow-up: Return in about 15 weeks (around 4/18/2022), or 3 PM, for Blood sugar meter review, Medication Therapy Management Visit, A1c lab.      SUBJECTIVE/OBJECTIVE:                          Sarah Felix is a 76 year old female coming in for a follow-up (11-30-21) visit. She was referred to me from Dr. Collette Fay.       Reason for visit:   A1c lab  recheck.    She will f/up with cancer md to discuss results recent labs .         Allergies/ADRs: Reviewed in chart  Tobacco: She reports that she quit smoking about 42 years ago. She has never used smokeless tobacco.  Alcohol: 7-9 beverages / week typically wine 1 or 2 glasses/night - reports that she hasn't had any alcohol in the last week and a half until they figure out her liver situation.   Caffeine: 2 cans diet coke sodas/day  Activity: reports she has low energy and stamina, if she does any activity she has to take breaks frequenty but now that the weather is better she is doing more outdoor work.   Past Medical History: Reviewed in chart  Personal Healthcare Goals: figure out what is going on with liver or blood, would like to get 3rd vaccine, improve a1c    Medication Adherence/Access:  Missed a few insulin doses.       Type 2 Diabetes:  Currently taking : glimepiride 4 mg.  Tablet twice daily ,  Lantus 51 units daily at midnight daily. Novolog vial - inject 15 units usually twice /day in (averages twice daily but depending on what she is eating it can be 3 times daily) + before biggest snack meal of the day on most days . She admits eats small amounts food thru out the day due to bad reflux disease.     SMBG: see below.     7,14,30 days bs av,156,157  On 9,19,44 tests.     Date FBG/ 2hours post Lunch/2hours post Dinner /2hours post    1-3-22 126 10:35am      22 157 8;31am      22 132 9;41am      21 144 8:41am      21 159 7;45am      21 193 10;05am  116 4;06pm    21 204 10:19am         21   bs of 58 at 4;07am .      Patient is not experiencing side effects.    She reports bad ankle --hard to walk, swimming was suggested per liver md --she doesn't have access or lives close to one?     She doesn't have any exercise equipment where she lives.        Saw liver md has nafld --she is struggling with finding an exercise she can do and isnt super interested in  carb/calorie restriction per liver md.       Symptoms of low blood sugar? sweaty, Frequency of lows has been increasing (she admits she cannot tolerate bs's <110- if they go lower than that she feels crummy , sweaty. )  Symptoms of high blood sugar? none  Eye exam: up to date  Foot exam: due  Diet: did not discuss diet at todays visit  Previously: she's maintained similar diet and notes sugars now being elevated  eats 4-5 smaller meals - pasta and rices  Fruit and some fruit juices   Breakfast: cereal 2-3 times per week, drink 2 glasses OJ or grapefruit juice /day .   Exercise: see social history --saw liver specialist suggested swimming.   Aspirin: Taking 162 mg daily and denies side effects  Statin: Yes: atorvastatin 40 mg   ACEi/ARB: Yes: lisinopril 2.5 mg.   Urine Albumin:   Lab Results   Component Value Date    UMALCR  09/02/2021      Comment:      Unable to calculate:  Urine creatinine or albumin value below detectable level            Lab Results   Component Value Date    A1C 8.4 01/03/2022    A1C 7.8 09/27/2021    A1C 8.3 06/21/2021    A1C 9.4 12/14/2020    A1C 8.1 09/21/2020    A1C 8.5 01/07/2020    A1C 7.6 10/12/2019           Immunizations:  Up to date .      Hypokalemia:  Patient reports the new potassium capsule is working much better and is easy to take.   Potassium   Date Value Ref Range Status   09/02/2021 4.0 3.4 - 5.3 mmol/L Final   01/07/2020 4.0 3.4 - 5.3 mmol/L Final       Hyperlipidemia: Current therapy includes : atorvastatin 40mg daily.  Patient reports no significant myalgias or other side effects.  The 10-year ASCVD risk score (Josh KEI Jr., et al., 2013) is: 25.6%    Values used to calculate the score:      Age: 76 years      Sex: Female      Is Non- : No      Diabetic: Yes      Tobacco smoker: No      Systolic Blood Pressure: 110 mmHg      Is BP treated: Yes      HDL Cholesterol: 47 mg/dL      Total Cholesterol: 168 mg/dL  Recent Labs   Lab Test 09/02/21  1501  01/07/20  1417   CHOL 168 139   HDL 47* 45*   LDL 76 63   TRIG 226* 154*         BP Readings from Last 3 Encounters:   01/03/22 110/66   12/14/21 135/83   09/30/21 120/74     ----------------    I spent 30 minutes with this patient today. All changes were made via collaborative practice agreement with Heide Fay MD. A copy of the visit note was provided to the patient's primary care provider.    The patient was given a summary of these recommendations. She admits computer not working at home to view mychart .     Joan Lopez [facility].  Medication Therapy Management Provider  [phone]           Medication Therapy Recommendations  Type 2 diabetes mellitus without complication, with long-term current use of insulin (H)    Current Medication: insulin aspart (NOVOLOG PEN) 100 UNIT/ML pen   Rationale: Dose too low - Dosage too low - Effectiveness   Recommendation: Increase Dose   Status: Accepted per CPA

## 2022-01-03 ENCOUNTER — OFFICE VISIT (OUTPATIENT)
Dept: PHARMACY | Facility: CLINIC | Age: 77
End: 2022-01-03
Payer: COMMERCIAL

## 2022-01-03 VITALS
DIASTOLIC BLOOD PRESSURE: 66 MMHG | HEART RATE: 69 BPM | BODY MASS INDEX: 33.45 KG/M2 | OXYGEN SATURATION: 93 % | SYSTOLIC BLOOD PRESSURE: 110 MMHG | WEIGHT: 201 LBS

## 2022-01-03 DIAGNOSIS — N18.2 TYPE 2 DIABETES MELLITUS WITH STAGE 2 CHRONIC KIDNEY DISEASE, WITH LONG-TERM CURRENT USE OF INSULIN (H): Primary | ICD-10-CM

## 2022-01-03 DIAGNOSIS — I10 ESSENTIAL HYPERTENSION: ICD-10-CM

## 2022-01-03 DIAGNOSIS — E87.6 HYPOKALEMIA: ICD-10-CM

## 2022-01-03 DIAGNOSIS — Z23 ENCOUNTER FOR IMMUNIZATION: ICD-10-CM

## 2022-01-03 DIAGNOSIS — Z79.4 TYPE 2 DIABETES MELLITUS WITH STAGE 2 CHRONIC KIDNEY DISEASE, WITH LONG-TERM CURRENT USE OF INSULIN (H): Primary | ICD-10-CM

## 2022-01-03 DIAGNOSIS — E78.5 HYPERLIPIDEMIA LDL GOAL <100: ICD-10-CM

## 2022-01-03 DIAGNOSIS — E11.22 TYPE 2 DIABETES MELLITUS WITH STAGE 2 CHRONIC KIDNEY DISEASE, WITH LONG-TERM CURRENT USE OF INSULIN (H): Primary | ICD-10-CM

## 2022-01-03 PROCEDURE — 99607 MTMS BY PHARM ADDL 15 MIN: CPT | Performed by: PHARMACIST

## 2022-01-03 PROCEDURE — 99605 MTMS BY PHARM NP 15 MIN: CPT | Performed by: PHARMACIST

## 2022-01-03 RX ORDER — METOPROLOL TARTRATE 25 MG/1
25 TABLET, FILM COATED ORAL 2 TIMES DAILY
Qty: 180 TABLET | Refills: 1 | Status: SHIPPED | OUTPATIENT
Start: 2022-01-03 | End: 2022-09-29

## 2022-01-03 RX ORDER — FUROSEMIDE 20 MG
TABLET ORAL
Qty: 270 TABLET | Refills: 1 | Status: SHIPPED | OUTPATIENT
Start: 2022-01-03 | End: 2022-09-27

## 2022-01-03 NOTE — LETTER
January 3, 2022  Sarah Felix  [address] [place] [address]    [facility]        Thank you for talking with me on Bob 3, 2022 about your health and medications. As a follow-up to our conversation, I have included two documents:      1. Your Recommended To-Do List has steps you should take to get the best results from your medications.  2. Your Medication List will help you keep track of your medications and how to take them.    If you want to talk about these documents, please call Joan Lopez RPH at phone: [phone], Monday-Friday 8-4:30pm.    I look forward to working with you and your doctors to make sure your medications work well for you.    Sincerely,    Joan Lopez RPH

## 2022-01-03 NOTE — LETTER
"Recommended To-Do List      Prepared on: 1/3/2022     You can get the best results from your medications by completing the items on this \"To-Do List.\"      Bring your To-Do List when you go to your doctor. And, share it with your family or caregivers.    My To-Do List:  What we talked about: What I should do:   Your medication dosage being too low    Increase your dosage of insulin aspart (NovoLOG PEN)- try for total daily dose maybe 30-40 units to better control your blood sugars .           What we talked about: What I should do:     Test blood sugars at least 3 x day -maybe fasting in AM and 1-2 franki times/day-2 hours post meal if possible ?                "

## 2022-01-03 NOTE — PATIENT INSTRUCTIONS
Recommendations from today's MTM visit:                                                       1. A1c today = 8.4% - up from 7.8% .    Lets keep lantus dose at 51 units/day , Glimepiride at 4mg. Twice daily + Novolog mealtime insulin -15 units each dose --BUT commit to taking this at least 3 x day to better control your blood sugars .  Try to test blood sugars before meals as best you can during /day and maybe once /day 2 hours after any meal --keep blood sugars between 100-200 as best you can .         Follow-up: Return in about 15 weeks (around 4/18/2022), or 3 PM, for Blood sugar meter review, Medication Therapy Management Visit, A1c lab.      It was great to speak with you today.  I value your experience and would be very thankful for your time with providing feedback on our clinic survey. You may receive a survey via email or text message in the next few days.     To schedule another MTM appointment, please call the clinic directly or you may call the MTM scheduling line at [phone] or toll-free at [phone].     My Clinical Pharmacist's contact information:                                                      Please feel free to contact me with any questions or concerns you have.      Joan Lopez Rph.  Medication Therapy Management Provider  [phone]

## 2022-01-03 NOTE — LETTER
"_  Medication List        Prepared on: 1/3/2022     Bring your Medication List when you go to the doctor, hospital, or   emergency room. And, share it with your family or caregivers.     Note any changes to how you take your medications.  Cross out medications when you no longer use them.    Medication How I take it Why I use it Prescriber   aspirin (ASA) 81 MG chewable tablet Take 162 mg by mouth daily  Reduce platelets  Patient Reported   atorvastatin (LIPITOR) 40 MG tablet Take 1 tablet (40 mg) by mouth daily Hyperlipidemia LDL Goal <100 Heide Fay MD   blood glucose (ONETOUCH ULTRA) test strip 1 strip by In Vitro route 4 times daily. Type 2 Diabetes, HbA1c Goal < 7% (H) Remington Browning, DO   CALCIUM 500 + D 500-200 MG-IU OR TABS one tab twice per day Osteopenia Ute Campbell MD   Cholecalciferol (VITAMIN D PO) Take  by mouth. Pt consuming 3000 units per day  Vitamin supplement  Patient Reported   furosemide (LASIX) 20 MG tablet TAKE 2 TABLETS IN THE MORNING AND 1 TABLET IN THE AFTERNOON Essential Hypertension Heide Fay MD   glimepiride (AMARYL) 4 MG tablet TAKE 1 TABLET TWICE A DAY WITH MEALS (BREAKFAST AND DINNER) (PLEASE SCHEDULE LAB APPOINTMENT PRIOR TO REFILL REQUESTS). Patient is splitting into quarter tablets and using with each snack as she is unable to eat large meals. Type 2 Diabetes, HbA1c Goal < 7% (H) Remington Browning, DO   insulin aspart (NOVOLOG PEN) 100 UNIT/ML pen Inject 15 Units Subcutaneous 3 times daily (with meals) Type 2 diabetes mellitus with stage 2 chronic kidney disease, with long-term current use of insulin (H) Remington Browning, DO   insulin pen needle (BD REY U/F) 32G X 4 MM miscellaneous USE 1 PEN NEEDLE four times daily. (WITH LANTUS AND Novolog) Type 2 diabetes mellitus with stage 2 chronic kidney disease, with long-term current use of insulin (H) Remington Browning, DO   insulin syringe-needle U-100 (BD INSULIN SYRINGE ULTRAFINE) 31G X 5/16\" 1 ML miscellaneous Use 1 syringes " twice daily for insulin and victoza and for symptoms of hypoglycemia Type 2 diabetes mellitus with stage 2 chronic kidney disease, without long-term current use of insulin (H) Joel Daniel Irwin Wegener, MD   Lancets (ONETOUCH DELICA PLUS [id number]) MISC 1 each 4 times daily Type 2 Diabetes, HbA1c Goal < 7% (H) Remington Nallely, DO   LANTUS SOLOSTAR 100 UNIT/ML soln Inject 51 Units Subcutaneous At Bedtime. Type 2 diabetes mellitus with stage 2 chronic kidney disease, with long-term current use of insulin (H) Remington Nallely, DO   lisinopril (ZESTRIL) 2.5 MG tablet Take 1 tablet (2.5 mg) by mouth daily CKD (Chronic Kidney Disease) Stage 2, GFR 60-89 ml/min Remington Nallely, DO   metoprolol tartrate (LOPRESSOR) 25 MG tablet Take 1 tablet (25 mg) by mouth 2 times daily Essential Hypertension Heide Fay MD   nystatin (MYCOSTATIN) 539733 UNIT/GM external cream Apply topically 2 times daily Rash Joel Daniel Irwin Wegener, MD   potassium chloride ER (MICRO-K) 10 MEQ CR capsule Take 1 capsule (10 mEq) by mouth 2 times daily Hypokalemia [facility], [facility]   triamcinolone (KENALOG) 0.1 % external cream Apply topically 3 times daily Rash Joel Daniel Irwin Wegener, MD         Add new medications, over-the-counter drugs, herbals, vitamins, or  minerals in the blank rows below.    Medication How I take it Why I use it Prescriber                          Allergies:      amlodipine; ancef [cefazolin]; levaquin; metformin; cephalosporins; clindamycin; levofloxacin; nickel        Side effects I have had:              Other Information:             My notes and questions:

## 2022-01-03 NOTE — Clinical Note
Collette--simran--saw kayce today --she is awaiting call back from Dr. Wilde to see if she needs any additional medication for her elevated platelets , liver US.  We discussed add-on DM meds today as a1c up to 8.4% --I stated to her she could try trulicity or jardiance or pioglitazone but she freely admitted not dosing her novolog frequently enough so I allowed her a 100 day trial of more aggressive Novolog dosing with a1c recheck with me 4-18-22.  If next a1c not <8 % then she must choose one of those add-on DM meds --she is agreeable to the plan .    Thanks , Joan Lopez, [facility].  Medication Therapy Management Provider  [phone]

## 2022-01-18 NOTE — NURSING NOTE
Reason For Visit:   Chief Complaint   Patient presents with     Left Ankle - Surgical Followup     Surgical Followup     12 wk pop DOS 10/28/19 Internal fixation left bimalleolar ankle fracture, removal external fixator        Wt 88.5 kg (195 lb)   BMI 32.45 kg/m           Lainey Bose, ATC     Home

## 2022-01-20 ENCOUNTER — TELEPHONE (OUTPATIENT)
Dept: PHARMACY | Facility: CLINIC | Age: 77
End: 2022-01-20
Payer: COMMERCIAL

## 2022-01-20 DIAGNOSIS — N18.2 CKD (CHRONIC KIDNEY DISEASE) STAGE 2, GFR 60-89 ML/MIN: ICD-10-CM

## 2022-01-20 RX ORDER — LISINOPRIL 2.5 MG/1
2.5 TABLET ORAL DAILY
Qty: 90 TABLET | Refills: 1 | Status: SHIPPED | OUTPATIENT
Start: 2022-01-20 | End: 2022-07-04

## 2022-01-20 NOTE — TELEPHONE ENCOUNTER
1-20-22        Pt. Called mtm today requesting refill on her 2.5mg. lisinopril rx --[facility] --mtm will do that for her today .    Joan Lopez [facility].  Medication Therapy Management Provider  [phone]

## 2022-02-12 ENCOUNTER — HEALTH MAINTENANCE LETTER (OUTPATIENT)
Age: 77
End: 2022-02-12

## 2022-03-25 DIAGNOSIS — K76.0 FATTY LIVER: Primary | ICD-10-CM

## 2022-03-28 ENCOUNTER — LAB (OUTPATIENT)
Dept: LAB | Facility: CLINIC | Age: 77
End: 2022-03-28
Attending: PHYSICIAN ASSISTANT
Payer: COMMERCIAL

## 2022-03-28 ENCOUNTER — OFFICE VISIT (OUTPATIENT)
Dept: GASTROENTEROLOGY | Facility: CLINIC | Age: 77
End: 2022-03-28
Attending: PHYSICIAN ASSISTANT
Payer: COMMERCIAL

## 2022-03-28 VITALS
RESPIRATION RATE: 18 BRPM | OXYGEN SATURATION: 97 % | HEART RATE: 78 BPM | SYSTOLIC BLOOD PRESSURE: 126 MMHG | DIASTOLIC BLOOD PRESSURE: 66 MMHG | HEIGHT: 65 IN | BODY MASS INDEX: 34.04 KG/M2 | WEIGHT: 204.3 LBS | TEMPERATURE: 97.4 F

## 2022-03-28 DIAGNOSIS — K76.0 FATTY LIVER: ICD-10-CM

## 2022-03-28 DIAGNOSIS — K76.0 FATTY LIVER: Primary | ICD-10-CM

## 2022-03-28 LAB
ALBUMIN SERPL-MCNC: 3.6 G/DL (ref 3.4–5)
ALP SERPL-CCNC: 103 U/L (ref 40–150)
ALT SERPL W P-5'-P-CCNC: 65 U/L (ref 0–50)
ANION GAP SERPL CALCULATED.3IONS-SCNC: 8 MMOL/L (ref 3–14)
AST SERPL W P-5'-P-CCNC: 54 U/L (ref 0–45)
BILIRUB DIRECT SERPL-MCNC: 0.2 MG/DL (ref 0–0.2)
BILIRUB SERPL-MCNC: 1.1 MG/DL (ref 0.2–1.3)
BUN SERPL-MCNC: 12 MG/DL (ref 7–30)
CALCIUM SERPL-MCNC: 8.7 MG/DL (ref 8.5–10.1)
CHLORIDE BLD-SCNC: 102 MMOL/L (ref 94–109)
CO2 SERPL-SCNC: 28 MMOL/L (ref 20–32)
CREAT SERPL-MCNC: 0.86 MG/DL (ref 0.52–1.04)
ERYTHROCYTE [DISTWIDTH] IN BLOOD BY AUTOMATED COUNT: 17.2 % (ref 10–15)
GFR SERPL CREATININE-BSD FRML MDRD: 70 ML/MIN/1.73M2
GLUCOSE BLD-MCNC: 317 MG/DL (ref 70–99)
HCT VFR BLD AUTO: 42.1 % (ref 35–47)
HGB BLD-MCNC: 13.4 G/DL (ref 11.7–15.7)
INR PPP: 1.03 (ref 0.85–1.15)
MCH RBC QN AUTO: 31.2 PG (ref 26.5–33)
MCHC RBC AUTO-ENTMCNC: 31.8 G/DL (ref 31.5–36.5)
MCV RBC AUTO: 98 FL (ref 78–100)
PLATELET # BLD AUTO: 787 10E3/UL (ref 150–450)
POTASSIUM BLD-SCNC: 4.1 MMOL/L (ref 3.4–5.3)
PROT SERPL-MCNC: 7 G/DL (ref 6.8–8.8)
RBC # BLD AUTO: 4.29 10E6/UL (ref 3.8–5.2)
SODIUM SERPL-SCNC: 138 MMOL/L (ref 133–144)
WBC # BLD AUTO: 10.2 10E3/UL (ref 4–11)

## 2022-03-28 PROCEDURE — 82248 BILIRUBIN DIRECT: CPT | Performed by: PATHOLOGY

## 2022-03-28 PROCEDURE — 85610 PROTHROMBIN TIME: CPT | Performed by: PATHOLOGY

## 2022-03-28 PROCEDURE — 36415 COLL VENOUS BLD VENIPUNCTURE: CPT | Performed by: PATHOLOGY

## 2022-03-28 PROCEDURE — 99214 OFFICE O/P EST MOD 30 MIN: CPT | Performed by: PHYSICIAN ASSISTANT

## 2022-03-28 PROCEDURE — 85027 COMPLETE CBC AUTOMATED: CPT | Performed by: PATHOLOGY

## 2022-03-28 PROCEDURE — 80053 COMPREHEN METABOLIC PANEL: CPT | Performed by: PATHOLOGY

## 2022-03-28 PROCEDURE — G0463 HOSPITAL OUTPT CLINIC VISIT: HCPCS

## 2022-03-28 ASSESSMENT — PAIN SCALES - GENERAL: PAINLEVEL: NO PAIN (0)

## 2022-03-28 NOTE — PROGRESS NOTES
For example, Gets winded carrying  Limits activity.   Happens for the past 1-15 years.       Weight has been stable. Pasta, vegetables, karen.      Swellling in ankles, on lasix.     Might have BRBPR  Since hemorrhoid surgery. Happens twice weekly.     Alcohol - may have 1-2 day a few times week.

## 2022-03-28 NOTE — LETTER
3/28/2022     RE: Sarah Felix  [address] [place] [address]    Dear Colleague,    Thank you for referring your patient, Sarah Felix, to the [facility] [place]. Please see a copy of my visit note below.    Hepatology Follow-up Clinic note  Sarah Felix   Date of Birth 1945  Date of Service 3/28/2022    Reason for follow-up: NAFLD         Assessment/plan:   Sarah Felix is a 76 year old female with history of hepatic steatosis. Risk factors for fatty liver disease includes: obesity, HTN, hyperlipidemia and diabetes. Discussed importance of evaluating baseline fibrosis to determine need for routine hepatology care. We reviewed NAFLD treatment includes slow gradual weight loss, as well as optimization of risk factors including hyperlipidemia/blood glucose.  Transaminases remain mildly elevated. Overall, liver function is normal. No overt stigmata of advanced liver disease.      - Will obtain a fibrosis scan in the near future   - Recommend slow gradual weight loss   - Maintain good control of cholesterol and blood glucose   - Improve nutrition:  emphasizing limit carbohydrates, especially simple carbohydrates.  - Increase physical activity as able   - Limit alcohol use to <3 drinks a week and less than one a day.  - Follow up in six months     Jose Archer PA-C   [facility]    -----------------------------------------------------       HPI:   Sarah Felix is a 76 year old female presenting for follow-up.     Hepatic steatosis:   Risk factors: Obesity, diabetes mellitus on insulin, dyslipidemia, sedentary lifestyle    Patient was last seen by Dr. Leventhal on 9/30/21. No recent hospitalizations or ER visits.     Physical activity is overall limited. For example, Gets winded carrying in groceries and then has to rest. This has been a constant for the past past 15 years .    Weight has been stable. Pasta,  vegetables, meats.  Swellling in ankles, on lasix.     Might have BRBRP intermittently since hemorrhoid surgery. She states this happens twice weekly.     Patient denies jaundice, lower extremity edema, abdominal distension or confusion.  Patient also denies melena, hematochezia or hematemesis. Patient denies weight loss, fevers, sweats or chills.    Alcohol use includes that she may have 1-2 drinks a few times week.     Medical hx Surgical hx   Past Medical History:   Diagnosis Date     Allergic rhinitis, cause unspecified      Basal cell carcinoma of face 3/2012     Contact dermatitis and other eczema, due to unspecified cause      Cyst of thyroid 1998     CYSTIC LIVER DIS      Cystocele, lateral      Diabetes mellitus (H)      Diverticulitis of colon (without mention of hemorrhage)(562.11) 6/04     Embolism and thrombosis of unspecified site age 22     Esophageal reflux      Fatty liver      Hiatal hernia      Hyperlipidemia      Nontoxic uninodular goiter      Osteopenia 4/21/2005     Other chronic otitis externa      Other specified disorders of liver      Pure hypercholesterolemia     Past Surgical History:   Procedure Laterality Date     APPENDECTOMY  age 22     APPLY EXTERNAL FIXATOR LOWER EXTREMITY Left 10/12/2019    Procedure: Left ankle external fixator placement;  Surgeon: Leeanne Brown MD;  [facility]     C DEXA, BONE DENSITY, AXIAL SKEL  2005     C DEXA, BONE DENSITY, AXIAL SKEL  6/2007    No signif change in Osteopenia     CATARACT IOL, RT/LT       COLONOSCOPY  4/2006    repeat 10 yr     ENDOSCOPIC ULTRASOUND UPPER GASTROINTESTINAL TRACT (GI) N/A 9/6/2018    Procedure: ENDOSCOPIC ULTRASOUND, ESOPHAGOSCOPY / UPPER GASTROINTESTINAL TRACT (GI);  Endoscopic Ultrasound, Esophagogastroduodenoscopy with endoscopic mucosal resection;  Surgeon: Hood Brower MD;  [facility]     ESOPHAGOSCOPY, GASTROSCOPY, DUODENOSCOPY (EGD), COMBINED N/A 8/13/2018    Procedure: COMBINED ESOPHAGOSCOPY,  GASTROSCOPY, DUODENOSCOPY (EGD), BIOPSY SINGLE OR MULTIPLE;  EGD;  Surgeon: Hood Brower MD;  [facility]     ESOPHAGOSCOPY, GASTROSCOPY, DUODENOSCOPY (EGD), COMBINED N/A 3/14/2019    Procedure: Upper Endoscopy;  Surgeon: Hood Brower MD;  [facility]     ESOPHAGOSCOPY, GASTROSCOPY, DUODENOSCOPY (EGD), COMBINED  08/13/2018, 3/14/19     ESOPHAGOSCOPY, GASTROSCOPY, DUODENOSCOPY (EGD), RESECT MUCOSA, COMBINED N/A 9/6/2018    Procedure: COMBINED ESOPHAGOSCOPY, GASTROSCOPY, DUODENOSCOPY (EGD), RESECT MUCOSA;;  Surgeon: Hood Brower MD;  [facility]     GYN SURGERY  10/22/08    pelvic support     HEMORRHOID SURGERY       HEMORRHOIDECTOMY  8/08     MOHS MICROGRAPHIC PROCEDURE       MOHS MICROGRAPHIC PROCEDURE       OPEN REDUCTION INTERNAL FIXATION ANKLE Left 10/28/2019    Procedure: Internal fixation left bimalleolar ankle fracture, removal external fixator;  Surgeon: Jose Roberto Can MD;  [facility]     ORIF ANKLE FRACTURE BIMALLEOLAR Left 10/28/2019    Internal fixation left bimalleolar ankle fracture, removal external fixator     OTHER SURGICAL HISTORY  2008    Transobturator tape for Urinary Incontinence       OTHER SURGICAL HISTORY      PELVIC SUPPORT SURGERY     OTHER SURGICAL HISTORY  09/06/2018    ENDOSCOPIC ULTRASOUND UPPER GASTROINTESTINAL TRACT (GI)     OTHER SURGICAL HISTORY  09/06/2018    ESOPHAGOSCOPY, GASTROSCOPY, DUODENOSCOPY (EGD), RESECT MUCOSA, COMBINED     OTHER SURGICAL HISTORY Left 10/12/2019    APPLY EXTERNAL FIXATOR LOWER EXTREMITYProcedure: Left ankle external fixator placement; Surgeon: Leeanne Brown MD; [facility]       OVARIAN CYST REMOVAL Left age 22    L ovarian cyst removal, laparotomy       PHACOEMULSIFICATION CLEAR CORNEA WITH STANDARD INTRAOCULAR LENS IMPLANT Left 9/25/2020    Procedure: LEFT CATARACT REMOVAL WITH INTRAOCULAR LENS IMPLANT;  Surgeon: Janay Amezcua MD;  [facility]     SURGICAL HISTORY OF -   age 22    L ovarian cyst  "removal, laparotomy     SURGICAL HISTORY OF -   1998    partial thyroidectomy     SURGICAL HISTORY OF -   10/08    Transobturator tape for Urinary Incontinence     SURGICAL HISTORY OF -   11/2011    stress test normal     THYROID SURGERY      Had cyst removed, thyroid gland is intact.     THYROIDECTOMY, PARTIAL  1998     ZZC APPENDECTOMY  age 22                 Medications:     Current Outpatient Medications   Medication     aspirin (ASA) 81 MG chewable tablet     atorvastatin (LIPITOR) 40 MG tablet     blood glucose (ONETOUCH ULTRA) test strip     CALCIUM 500 + D 500-200 MG-IU OR TABS     Cholecalciferol (VITAMIN D PO)     furosemide (LASIX) 20 MG tablet     glimepiride (AMARYL) 4 MG tablet     insulin aspart (NOVOLOG PEN) 100 UNIT/ML pen     insulin pen needle (BD REY U/F) 32G X 4 MM miscellaneous     insulin syringe-needle U-100 (BD INSULIN SYRINGE ULTRAFINE) 31G X 5/16\" 1 ML miscellaneous     Lancets (ONETOUCH DELICA PLUS [id number]) MISC     LANTUS SOLOSTAR 100 UNIT/ML soln     lisinopril (ZESTRIL) 2.5 MG tablet     metoprolol tartrate (LOPRESSOR) 25 MG tablet     nystatin (MYCOSTATIN) 676380 UNIT/GM external cream     potassium chloride ER (MICRO-K) 10 MEQ CR capsule     triamcinolone (KENALOG) 0.1 % external cream     No current facility-administered medications for this visit.            Allergies:     Allergies   Allergen Reactions     Amlodipine Swelling     Ancef [Cefazolin]      Levaquin Rash     Itching, rash     Metformin Diarrhea and GI Disturbance     Cephalosporins Rash     Clindamycin Rash     Levofloxacin Itching and Rash     Nickel Rash     Contact reaction  Contact reaction            Review of Systems:   10 points ROS was obtained and highlighted in the HPI, otherwise negative.          Physical Exam:   /66 (BP Location: Right arm, Patient Position: Sitting, Cuff Size: Adult Large)   Pulse 78   Temp 97.4  F (36.3  C) (Oral)   Resp 18   Ht 1.651 m (5' 5\")   Wt 92.7 kg (204 lb 4.8 oz)  "  SpO2 97%   BMI 34.00 kg/m      Gen: A&Ox3, NAD, well developed  HEENT: non-icteric  CV: regular rate  Lung: no conversational dyspnea   Lym- no palpable lymphadenopathy  Abd: soft, NT, ND,   Ext: no edema, intact pulses.   Skin: No rash no palmar erythema, telangiectasias or jaundice  Neuro: grossly intact, no asterixis   Psych: appropriate mood and affects           Data:   Reviewed in person and significant for:    Lab Results   Component Value Date     09/02/2021     01/07/2020      Lab Results   Component Value Date    POTASSIUM 4.0 09/02/2021    POTASSIUM 4.0 01/07/2020     Lab Results   Component Value Date    CHLORIDE 107 09/02/2021    CHLORIDE 102 01/07/2020     Lab Results   Component Value Date    CO2 24 09/02/2021    CO2 28 01/07/2020     Lab Results   Component Value Date    BUN 10 09/02/2021    BUN 11 01/07/2020     Lab Results   Component Value Date    CR 0.93 09/02/2021    CR 0.80 01/07/2020       Lab Results   Component Value Date    WBC 8.8 12/14/2021    WBC 9.0 12/14/2021    WBC 10.0 11/25/2019     Lab Results   Component Value Date    HGB 13.5 12/14/2021    HGB 13.5 12/14/2021    HGB 13.1 11/25/2019     Lab Results   Component Value Date    HCT 41.3 12/14/2021    HCT 40.7 12/14/2021    HCT 40.7 11/25/2019     Lab Results   Component Value Date    MCV 93 12/14/2021    MCV 93 12/14/2021    MCV 95 11/25/2019     Lab Results   Component Value Date     12/14/2021     12/14/2021     11/25/2019       Lab Results   Component Value Date    AST 36 09/02/2021    AST 17 01/07/2020     Lab Results   Component Value Date    ALT 52 09/02/2021    ALT 36 01/07/2020     Lab Results   Component Value Date    BILICONJ 0.0 04/14/2010      Lab Results   Component Value Date    BILITOTAL 0.8 09/02/2021    BILITOTAL 0.7 01/07/2020       Lab Results   Component Value Date    ALBUMIN 3.6 09/02/2021    ALBUMIN 3.6 01/07/2020     Lab Results   Component Value Date    PROTTOTAL 6.5  09/02/2021    PROTTOTAL 6.8 01/07/2020      Lab Results   Component Value Date    ALKPHOS 101 09/02/2021    ALKPHOS 113 01/07/2020       Lab Results   Component Value Date    INR 1.09 10/12/2019     US ABDOMEN LIMITED:   9/6/2021:     EXAMINATION: Limited Abdominal Ultrasound, 9/16/2021 3:05 PM      COMPARISON: CT of the abdomen and pelvis dated 1/3/2018  HISTORY: Fatty liver     FINDINGS:   Fluid: No evidence of ascites or pleural effusions.     Liver: The liver increased echogenicity with normal echotexture,  measuring 18.4 cm in craniocaudal dimension. There is an avascular  anechoic septated lesion of the left of the liver measuring 8.7 x 9.1  x 7.1 cm, stable. There is an avascular hypoechoic septated lesion  with acoustic enhancement of the left lobe of the liver measuring 1.1  x 1.1 x 1.0 cm, stable. There are areas of fat sparing adjacent to the  gallbladder fossa.     Gallbladder: There is no wall thickening, pericholecystic fluid, or  positive sonographic Hammer's sign. Numerous echogenic mobile  gallstones with sludge.  Bile Ducts: Both the intra- and extrahepatic biliary system are of  normal caliber.  The common bile duct measures 6 mm in diameter.     Pancreas: Pancreas is not well visualized on this exam.     Kidney: The right kidney measures 12.1 cm long. There is no  hydronephrosis or hydroureter, no shadowing renal calculi, cystic  lesion or mass. There is an echogenic focus in the upper pole of the  right kidney measuring 0.5 x 0.8 x 0.4 cm without twinkle artifact or  posterior acoustic shadowing.                                                                   IMPRESSION:   1.  Stable appearance of the hepatic cysts with thin septations as  described above.  2.   Echogenic and large liver consistent with hepatic steatosis.  3.  Cholelithiasis and sludge without evidence of cholecystitis.  4.  Echogenic focus in the right kidney, likely vascular calcification  or milk of calcium cyst.     Again,  thank you for allowing me to participate in the care of your patient.      Sincerely,    Jose Archer PA-C

## 2022-03-28 NOTE — PROGRESS NOTES
Hepatology Follow-up Clinic note  Sarah Felix   Date of Birth 1945  Date of Service 3/28/2022    Reason for follow-up: NAFLD         Assessment/plan:   Sarah Felix is a 76 year old female with history of hepatic steatosis. Risk factors for fatty liver disease includes: obesity, HTN, hyperlipidemia and diabetes. Discussed importance of evaluating baseline fibrosis to determine need for routine hepatology care. We reviewed NAFLD treatment includes slow gradual weight loss, as well as optimization of risk factors including hyperlipidemia/blood glucose.  Transaminases remain mildly elevated. Overall, liver function is normal. No overt stigmata of advanced liver disease.      - Will obtain a fibrosis scan in the near future   - Recommend slow gradual weight loss   - Maintain good control of cholesterol and blood glucose   - Improve nutrition:  emphasizing limit carbohydrates, especially simple carbohydrates.  - Increase physical activity as able   - Limit alcohol use to <3 drinks a week and less than one a day.  - Follow up in six months     Jose Archer PA-C   [facility]    -----------------------------------------------------       HPI:   Sarah Felix is a 76 year old female presenting for follow-up.     Hepatic steatosis:   Risk factors: Obesity, diabetes mellitus on insulin, dyslipidemia, sedentary lifestyle    Patient was last seen by Dr. Leventhal on 9/30/21. No recent hospitalizations or ER visits.     Physical activity is overall limited. For example, Gets winded carrying in groceries and then has to rest. This has been a constant for the past past 15 years .    Weight has been stable. Pasta, vegetables, meats.  Swellling in ankles, on lasix.     Might have BRBRP intermittently since hemorrhoid surgery. She states this happens twice weekly.     Patient denies jaundice, lower extremity edema, abdominal distension or confusion.  Patient also denies melena,  hematochezia or hematemesis. Patient denies weight loss, fevers, sweats or chills.    Alcohol use includes that she may have 1-2 drinks a few times week.     Medical hx Surgical hx   Past Medical History:   Diagnosis Date     Allergic rhinitis, cause unspecified      Basal cell carcinoma of face 3/2012     Contact dermatitis and other eczema, due to unspecified cause      Cyst of thyroid 1998     CYSTIC LIVER DIS      Cystocele, lateral      Diabetes mellitus (H)      Diverticulitis of colon (without mention of hemorrhage)(562.11) 6/04     Embolism and thrombosis of unspecified site age 22     Esophageal reflux      Fatty liver      Hiatal hernia      Hyperlipidemia      Nontoxic uninodular goiter      Osteopenia 4/21/2005     Other chronic otitis externa      Other specified disorders of liver      Pure hypercholesterolemia     Past Surgical History:   Procedure Laterality Date     APPENDECTOMY  age 22     APPLY EXTERNAL FIXATOR LOWER EXTREMITY Left 10/12/2019    Procedure: Left ankle external fixator placement;  Surgeon: Leeanne Brown MD;  [facility]     C DEXA, BONE DENSITY, AXIAL SKEL  2005     C DEXA, BONE DENSITY, AXIAL SKEL  6/2007    No signif change in Osteopenia     CATARACT IOL, RT/LT       COLONOSCOPY  4/2006    repeat 10 yr     ENDOSCOPIC ULTRASOUND UPPER GASTROINTESTINAL TRACT (GI) N/A 9/6/2018    Procedure: ENDOSCOPIC ULTRASOUND, ESOPHAGOSCOPY / UPPER GASTROINTESTINAL TRACT (GI);  Endoscopic Ultrasound, Esophagogastroduodenoscopy with endoscopic mucosal resection;  Surgeon: Hood Brower MD;  [facility]     ESOPHAGOSCOPY, GASTROSCOPY, DUODENOSCOPY (EGD), COMBINED N/A 8/13/2018    Procedure: COMBINED ESOPHAGOSCOPY, GASTROSCOPY, DUODENOSCOPY (EGD), BIOPSY SINGLE OR MULTIPLE;  EGD;  Surgeon: Hood Brower MD;  Location:  OR     ESOPHAGOSCOPY, GASTROSCOPY, DUODENOSCOPY (EGD), COMBINED N/A 3/14/2019    Procedure: Upper Endoscopy;  Surgeon: Hood Brower MD;  [facility]      ESOPHAGOSCOPY, GASTROSCOPY, DUODENOSCOPY (EGD), COMBINED  08/13/2018, 3/14/19     ESOPHAGOSCOPY, GASTROSCOPY, DUODENOSCOPY (EGD), RESECT MUCOSA, COMBINED N/A 9/6/2018    Procedure: COMBINED ESOPHAGOSCOPY, GASTROSCOPY, DUODENOSCOPY (EGD), RESECT MUCOSA;;  Surgeon: Hood Brower MD;  [facility]     GYN SURGERY  10/22/08    pelvic support     HEMORRHOID SURGERY       HEMORRHOIDECTOMY  8/08     MOHS MICROGRAPHIC PROCEDURE       MOHS MICROGRAPHIC PROCEDURE       OPEN REDUCTION INTERNAL FIXATION ANKLE Left 10/28/2019    Procedure: Internal fixation left bimalleolar ankle fracture, removal external fixator;  Surgeon: Jose Roberto Can MD;  [facility]     ORIF ANKLE FRACTURE BIMALLEOLAR Left 10/28/2019    Internal fixation left bimalleolar ankle fracture, removal external fixator     OTHER SURGICAL HISTORY  2008    Transobturator tape for Urinary Incontinence       OTHER SURGICAL HISTORY      PELVIC SUPPORT SURGERY     OTHER SURGICAL HISTORY  09/06/2018    ENDOSCOPIC ULTRASOUND UPPER GASTROINTESTINAL TRACT (GI)     OTHER SURGICAL HISTORY  09/06/2018    ESOPHAGOSCOPY, GASTROSCOPY, DUODENOSCOPY (EGD), RESECT MUCOSA, COMBINED     OTHER SURGICAL HISTORY Left 10/12/2019    APPLY EXTERNAL FIXATOR LOWER EXTREMITYProcedure: Left ankle external fixator placement; Surgeon: Leeanne Brown MD; [facility]       OVARIAN CYST REMOVAL Left age 22    L ovarian cyst removal, laparotomy       PHACOEMULSIFICATION CLEAR CORNEA WITH STANDARD INTRAOCULAR LENS IMPLANT Left 9/25/2020    Procedure: LEFT CATARACT REMOVAL WITH INTRAOCULAR LENS IMPLANT;  Surgeon: Janay Amezcua MD;  [facility]     SURGICAL HISTORY OF -   age 22    L ovarian cyst removal, laparotomy     SURGICAL HISTORY OF -   1998    partial thyroidectomy     SURGICAL HISTORY OF -   10/08    Transobturator tape for Urinary Incontinence     SURGICAL HISTORY OF -   11/2011    stress test normal     THYROID SURGERY      Had cyst removed, thyroid  "gland is intact.     THYROIDECTOMY, PARTIAL  1998     ZZC APPENDECTOMY  age 22                 Medications:     Current Outpatient Medications   Medication     aspirin (ASA) 81 MG chewable tablet     atorvastatin (LIPITOR) 40 MG tablet     blood glucose (ONETOUCH ULTRA) test strip     CALCIUM 500 + D 500-200 MG-IU OR TABS     Cholecalciferol (VITAMIN D PO)     furosemide (LASIX) 20 MG tablet     glimepiride (AMARYL) 4 MG tablet     insulin aspart (NOVOLOG PEN) 100 UNIT/ML pen     insulin pen needle (BD REY U/F) 32G X 4 MM miscellaneous     insulin syringe-needle U-100 (BD INSULIN SYRINGE ULTRAFINE) 31G X 5/16\" 1 ML miscellaneous     Lancets (ONETOUCH DELICA PLUS [id number]) MISC     LANTUS SOLOSTAR 100 UNIT/ML soln     lisinopril (ZESTRIL) 2.5 MG tablet     metoprolol tartrate (LOPRESSOR) 25 MG tablet     nystatin (MYCOSTATIN) 722004 UNIT/GM external cream     potassium chloride ER (MICRO-K) 10 MEQ CR capsule     triamcinolone (KENALOG) 0.1 % external cream     No current facility-administered medications for this visit.            Allergies:     Allergies   Allergen Reactions     Amlodipine Swelling     Ancef [Cefazolin]      Levaquin Rash     Itching, rash     Metformin Diarrhea and GI Disturbance     Cephalosporins Rash     Clindamycin Rash     Levofloxacin Itching and Rash     Nickel Rash     Contact reaction  Contact reaction            Review of Systems:   10 points ROS was obtained and highlighted in the HPI, otherwise negative.          Physical Exam:   /66 (BP Location: Right arm, Patient Position: Sitting, Cuff Size: Adult Large)   Pulse 78   Temp 97.4  F (36.3  C) (Oral)   Resp 18   Ht 1.651 m (5' 5\")   Wt 92.7 kg (204 lb 4.8 oz)   SpO2 97%   BMI 34.00 kg/m      Gen: A&Ox3, NAD, well developed  HEENT: non-icteric  CV: regular rate  Lung: no conversational dyspnea   Lym- no palpable lymphadenopathy  Abd: soft, NT, ND,   Ext: no edema, intact pulses.   Skin: No rash no palmar erythema, " telangiectasias or jaundice  Neuro: grossly intact, no asterixis   Psych: appropriate mood and affects           Data:   Reviewed in person and significant for:    Lab Results   Component Value Date     09/02/2021     01/07/2020      Lab Results   Component Value Date    POTASSIUM 4.0 09/02/2021    POTASSIUM 4.0 01/07/2020     Lab Results   Component Value Date    CHLORIDE 107 09/02/2021    CHLORIDE 102 01/07/2020     Lab Results   Component Value Date    CO2 24 09/02/2021    CO2 28 01/07/2020     Lab Results   Component Value Date    BUN 10 09/02/2021    BUN 11 01/07/2020     Lab Results   Component Value Date    CR 0.93 09/02/2021    CR 0.80 01/07/2020       Lab Results   Component Value Date    WBC 8.8 12/14/2021    WBC 9.0 12/14/2021    WBC 10.0 11/25/2019     Lab Results   Component Value Date    HGB 13.5 12/14/2021    HGB 13.5 12/14/2021    HGB 13.1 11/25/2019     Lab Results   Component Value Date    HCT 41.3 12/14/2021    HCT 40.7 12/14/2021    HCT 40.7 11/25/2019     Lab Results   Component Value Date    MCV 93 12/14/2021    MCV 93 12/14/2021    MCV 95 11/25/2019     Lab Results   Component Value Date     12/14/2021     12/14/2021     11/25/2019       Lab Results   Component Value Date    AST 36 09/02/2021    AST 17 01/07/2020     Lab Results   Component Value Date    ALT 52 09/02/2021    ALT 36 01/07/2020     Lab Results   Component Value Date    BILICONJ 0.0 04/14/2010      Lab Results   Component Value Date    BILITOTAL 0.8 09/02/2021    BILITOTAL 0.7 01/07/2020       Lab Results   Component Value Date    ALBUMIN 3.6 09/02/2021    ALBUMIN 3.6 01/07/2020     Lab Results   Component Value Date    PROTTOTAL 6.5 09/02/2021    PROTTOTAL 6.8 01/07/2020      Lab Results   Component Value Date    ALKPHOS 101 09/02/2021    ALKPHOS 113 01/07/2020       Lab Results   Component Value Date    INR 1.09 10/12/2019     US ABDOMEN LIMITED:   9/6/2021:     EXAMINATION: Limited Abdominal  Ultrasound, 9/16/2021 3:05 PM      COMPARISON: CT of the abdomen and pelvis dated 1/3/2018  HISTORY: Fatty liver     FINDINGS:   Fluid: No evidence of ascites or pleural effusions.     Liver: The liver increased echogenicity with normal echotexture,  measuring 18.4 cm in craniocaudal dimension. There is an avascular  anechoic septated lesion of the left of the liver measuring 8.7 x 9.1  x 7.1 cm, stable. There is an avascular hypoechoic septated lesion  with acoustic enhancement of the left lobe of the liver measuring 1.1  x 1.1 x 1.0 cm, stable. There are areas of fat sparing adjacent to the  gallbladder fossa.     Gallbladder: There is no wall thickening, pericholecystic fluid, or  positive sonographic Hammer's sign. Numerous echogenic mobile  gallstones with sludge.  Bile Ducts: Both the intra- and extrahepatic biliary system are of  normal caliber.  The common bile duct measures 6 mm in diameter.     Pancreas: Pancreas is not well visualized on this exam.     Kidney: The right kidney measures 12.1 cm long. There is no  hydronephrosis or hydroureter, no shadowing renal calculi, cystic  lesion or mass. There is an echogenic focus in the upper pole of the  right kidney measuring 0.5 x 0.8 x 0.4 cm without twinkle artifact or  posterior acoustic shadowing.                                                                   IMPRESSION:   1.  Stable appearance of the hepatic cysts with thin septations as  described above.  2.   Echogenic and large liver consistent with hepatic steatosis.  3.  Cholelithiasis and sludge without evidence of cholecystitis.  4.  Echogenic focus in the right kidney, likely vascular calcification  or milk of calcium cyst.

## 2022-03-28 NOTE — NURSING NOTE
"Chief Complaint   Patient presents with     RECHECK     NAFLD and liver cysts     Vital signs:  Temp: 97.4  F (36.3  C) Temp src: Oral BP: 126/66 Pulse: 78   Resp: 18 SpO2: 97 %     Height: 165.1 cm (5' 5\") Weight: 92.7 kg (204 lb 4.8 oz)  Estimated body mass index is 34 kg/m  as calculated from the following:    Height as of this encounter: 1.651 m (5' 5\").    Weight as of this encounter: 92.7 kg (204 lb 4.8 oz).        Janay Shelton, [facility]  3/28/2022 2:18 PM      "

## 2022-03-28 NOTE — LETTER
3/28/2022         RE: Sarah Felix  [address] [place] [address]      Hepatology Follow-up Clinic note  Sarah Felix   Date of Birth 1945  Date of Service 3/28/2022    Reason for follow-up: NAFLD         Assessment/plan:   Sarah Felix is a 76 year old female with history of hepatic steatosis. Risk factors for fatty liver disease includes: obesity, HTN, hyperlipidemia and diabetes. Discussed importance of evaluating baseline fibrosis to determine need for routine hepatology care. We reviewed NAFLD treatment includes slow gradual weight loss, as well as optimization of risk factors including hyperlipidemia/blood glucose.  Transaminases remain mildly elevated. Overall, liver function is normal. No overt stigmata of advanced liver disease.      - Will obtain a fibrosis scan in the near future   - Recommend slow gradual weight loss   - Maintain good control of cholesterol and blood glucose   - Improve nutrition:  emphasizing limit carbohydrates, especially simple carbohydrates.  - Increase physical activity as able   - Limit alcohol use to <3 drinks a week and less than one a day.  - Follow up in six months     Jose Archer PA-C   [facility] Hepatology clinic    -----------------------------------------------------       HPI:   Sarah Felix is a 76 year old female presenting for follow-up.     Hepatic steatosis:   Risk factors: Obesity, diabetes mellitus on insulin, dyslipidemia, sedentary lifestyle    Patient was last seen by Dr. Leventhal on 9/30/21. No recent hospitalizations or ER visits.     Physical activity is overall limited. For example, Gets winded carrying in groceries and then has to rest. This has been a constant for the past past 15 years .    Weight has been stable. Pasta, vegetables, meats.  Swellling in ankles, on lasix.     Might have BRBRP intermittently since hemorrhoid surgery. She states this happens twice weekly.     Patient denies  jaundice, lower extremity edema, abdominal distension or confusion.  Patient also denies melena, hematochezia or hematemesis. Patient denies weight loss, fevers, sweats or chills.    Alcohol use includes that she may have 1-2 drinks a few times week.     Medical hx Surgical hx   Past Medical History:   Diagnosis Date     Allergic rhinitis, cause unspecified      Basal cell carcinoma of face 3/2012     Contact dermatitis and other eczema, due to unspecified cause      Cyst of thyroid 1998     CYSTIC LIVER DIS      Cystocele, lateral      Diabetes mellitus (H)      Diverticulitis of colon (without mention of hemorrhage)(562.11) 6/04     Embolism and thrombosis of unspecified site age 22     Esophageal reflux      Fatty liver      Hiatal hernia      Hyperlipidemia      Nontoxic uninodular goiter      Osteopenia 4/21/2005     Other chronic otitis externa      Other specified disorders of liver      Pure hypercholesterolemia     Past Surgical History:   Procedure Laterality Date     APPENDECTOMY  age 22     APPLY EXTERNAL FIXATOR LOWER EXTREMITY Left 10/12/2019    Procedure: Left ankle external fixator placement;  Surgeon: Leeanne Brown MD;  [facility]: [facility]     C DEXA, BONE DENSITY, AXIAL SKEL  2005     C DEXA, BONE DENSITY, AXIAL SKEL  6/2007    No signif change in Osteopenia     CATARACT IOL, RT/LT       COLONOSCOPY  4/2006    repeat 10 yr     ENDOSCOPIC ULTRASOUND UPPER GASTROINTESTINAL TRACT (GI) N/A 9/6/2018    Procedure: ENDOSCOPIC ULTRASOUND, ESOPHAGOSCOPY / UPPER GASTROINTESTINAL TRACT (GI);  Endoscopic Ultrasound, Esophagogastroduodenoscopy with endoscopic mucosal resection;  Surgeon: Hood Brower MD;  [facility]     ESOPHAGOSCOPY, GASTROSCOPY, DUODENOSCOPY (EGD), COMBINED N/A 8/13/2018    Procedure: COMBINED ESOPHAGOSCOPY, GASTROSCOPY, DUODENOSCOPY (EGD), BIOPSY SINGLE OR MULTIPLE;  EGD;  Surgeon: Hood Brwoer MD;  [facility]     ESOPHAGOSCOPY, GASTROSCOPY, DUODENOSCOPY (EGD), COMBINED  N/A 3/14/2019    Procedure: Upper Endoscopy;  Surgeon: Hood Brower MD;  [facility]     ESOPHAGOSCOPY, GASTROSCOPY, DUODENOSCOPY (EGD), COMBINED  08/13/2018, 3/14/19     ESOPHAGOSCOPY, GASTROSCOPY, DUODENOSCOPY (EGD), RESECT MUCOSA, COMBINED N/A 9/6/2018    Procedure: COMBINED ESOPHAGOSCOPY, GASTROSCOPY, DUODENOSCOPY (EGD), RESECT MUCOSA;;  Surgeon: Hood Brower MD;  [facility]     GYN SURGERY  10/22/08    pelvic support     HEMORRHOID SURGERY       HEMORRHOIDECTOMY  8/08     MOHS MICROGRAPHIC PROCEDURE       MOHS MICROGRAPHIC PROCEDURE       OPEN REDUCTION INTERNAL FIXATION ANKLE Left 10/28/2019    Procedure: Internal fixation left bimalleolar ankle fracture, removal external fixator;  Surgeon: Jose Roberto Can MD;  [facility]     ORIF ANKLE FRACTURE BIMALLEOLAR Left 10/28/2019    Internal fixation left bimalleolar ankle fracture, removal external fixator     OTHER SURGICAL HISTORY  2008    Transobturator tape for Urinary Incontinence       OTHER SURGICAL HISTORY      PELVIC SUPPORT SURGERY     OTHER SURGICAL HISTORY  09/06/2018    ENDOSCOPIC ULTRASOUND UPPER GASTROINTESTINAL TRACT (GI)     OTHER SURGICAL HISTORY  09/06/2018    ESOPHAGOSCOPY, GASTROSCOPY, DUODENOSCOPY (EGD), RESECT MUCOSA, COMBINED     OTHER SURGICAL HISTORY Left 10/12/2019    APPLY EXTERNAL FIXATOR LOWER EXTREMITYProcedure: Left ankle external fixator placement; Surgeon: Leeanne Brown MD; [facility]       OVARIAN CYST REMOVAL Left age 22    L ovarian cyst removal, laparotomy       PHACOEMULSIFICATION CLEAR CORNEA WITH STANDARD INTRAOCULAR LENS IMPLANT Left 9/25/2020    Procedure: LEFT CATARACT REMOVAL WITH INTRAOCULAR LENS IMPLANT;  Surgeon: Janay Amezcua MD;  [facility]     SURGICAL HISTORY OF -   age 22    L ovarian cyst removal, laparotomy     SURGICAL HISTORY OF -   1998    partial thyroidectomy     SURGICAL HISTORY OF -   10/08    Transobturator tape for Urinary Incontinence     SURGICAL  "HISTORY OF -   11/2011    stress test normal     THYROID SURGERY      Had cyst removed, thyroid gland is intact.     THYROIDECTOMY, PARTIAL  1998     ZZC APPENDECTOMY  age 22                 Medications:     Current Outpatient Medications   Medication     aspirin (ASA) 81 MG chewable tablet     atorvastatin (LIPITOR) 40 MG tablet     blood glucose (ONETOUCH ULTRA) test strip     CALCIUM 500 + D 500-200 MG-IU OR TABS     Cholecalciferol (VITAMIN D PO)     furosemide (LASIX) 20 MG tablet     glimepiride (AMARYL) 4 MG tablet     insulin aspart (NOVOLOG PEN) 100 UNIT/ML pen     insulin pen needle (BD REY U/F) 32G X 4 MM miscellaneous     insulin syringe-needle U-100 (BD INSULIN SYRINGE ULTRAFINE) 31G X 5/16\" 1 ML miscellaneous     Lancets (ONETOUCH DELICA PLUS [id number]) MISC     LANTUS SOLOSTAR 100 UNIT/ML soln     lisinopril (ZESTRIL) 2.5 MG tablet     metoprolol tartrate (LOPRESSOR) 25 MG tablet     nystatin (MYCOSTATIN) 242874 UNIT/GM external cream     potassium chloride ER (MICRO-K) 10 MEQ CR capsule     triamcinolone (KENALOG) 0.1 % external cream     No current facility-administered medications for this visit.            Allergies:     Allergies   Allergen Reactions     Amlodipine Swelling     Ancef [Cefazolin]      Levaquin Rash     Itching, rash     Metformin Diarrhea and GI Disturbance     Cephalosporins Rash     Clindamycin Rash     Levofloxacin Itching and Rash     Nickel Rash     Contact reaction  Contact reaction            Review of Systems:   10 points ROS was obtained and highlighted in the HPI, otherwise negative.          Physical Exam:   /66 (BP Location: Right arm, Patient Position: Sitting, Cuff Size: Adult Large)   Pulse 78   Temp 97.4  F (36.3  C) (Oral)   Resp 18   Ht 1.651 m (5' 5\")   Wt 92.7 kg (204 lb 4.8 oz)   SpO2 97%   BMI 34.00 kg/m      Gen: A&Ox3, NAD, well developed  HEENT: non-icteric  CV: regular rate  Lung: no conversational dyspnea   Lym- no palpable " lymphadenopathy  Abd: soft, NT, ND,   Ext: no edema, intact pulses.   Skin: No rash no palmar erythema, telangiectasias or jaundice  Neuro: grossly intact, no asterixis   Psych: appropriate mood and affects           Data:   Reviewed in person and significant for:    Lab Results   Component Value Date     09/02/2021     01/07/2020      Lab Results   Component Value Date    POTASSIUM 4.0 09/02/2021    POTASSIUM 4.0 01/07/2020     Lab Results   Component Value Date    CHLORIDE 107 09/02/2021    CHLORIDE 102 01/07/2020     Lab Results   Component Value Date    CO2 24 09/02/2021    CO2 28 01/07/2020     Lab Results   Component Value Date    BUN 10 09/02/2021    BUN 11 01/07/2020     Lab Results   Component Value Date    CR 0.93 09/02/2021    CR 0.80 01/07/2020       Lab Results   Component Value Date    WBC 8.8 12/14/2021    WBC 9.0 12/14/2021    WBC 10.0 11/25/2019     Lab Results   Component Value Date    HGB 13.5 12/14/2021    HGB 13.5 12/14/2021    HGB 13.1 11/25/2019     Lab Results   Component Value Date    HCT 41.3 12/14/2021    HCT 40.7 12/14/2021    HCT 40.7 11/25/2019     Lab Results   Component Value Date    MCV 93 12/14/2021    MCV 93 12/14/2021    MCV 95 11/25/2019     Lab Results   Component Value Date     12/14/2021     12/14/2021     11/25/2019       Lab Results   Component Value Date    AST 36 09/02/2021    AST 17 01/07/2020     Lab Results   Component Value Date    ALT 52 09/02/2021    ALT 36 01/07/2020     Lab Results   Component Value Date    BILICONJ 0.0 04/14/2010      Lab Results   Component Value Date    BILITOTAL 0.8 09/02/2021    BILITOTAL 0.7 01/07/2020       Lab Results   Component Value Date    ALBUMIN 3.6 09/02/2021    ALBUMIN 3.6 01/07/2020     Lab Results   Component Value Date    PROTTOTAL 6.5 09/02/2021    PROTTOTAL 6.8 01/07/2020      Lab Results   Component Value Date    ALKPHOS 101 09/02/2021    ALKPHOS 113 01/07/2020       Lab Results   Component  Value Date    INR 1.09 10/12/2019     US ABDOMEN LIMITED:   9/6/2021:     EXAMINATION: Limited Abdominal Ultrasound, 9/16/2021 3:05 PM      COMPARISON: CT of the abdomen and pelvis dated 1/3/2018  HISTORY: Fatty liver     FINDINGS:   Fluid: No evidence of ascites or pleural effusions.     Liver: The liver increased echogenicity with normal echotexture,  measuring 18.4 cm in craniocaudal dimension. There is an avascular  anechoic septated lesion of the left of the liver measuring 8.7 x 9.1  x 7.1 cm, stable. There is an avascular hypoechoic septated lesion  with acoustic enhancement of the left lobe of the liver measuring 1.1  x 1.1 x 1.0 cm, stable. There are areas of fat sparing adjacent to the  gallbladder fossa.     Gallbladder: There is no wall thickening, pericholecystic fluid, or  positive sonographic Hammer's sign. Numerous echogenic mobile  gallstones with sludge.  Bile Ducts: Both the intra- and extrahepatic biliary system are of  normal caliber.  The common bile duct measures 6 mm in diameter.     Pancreas: Pancreas is not well visualized on this exam.     Kidney: The right kidney measures 12.1 cm long. There is no  hydronephrosis or hydroureter, no shadowing renal calculi, cystic  lesion or mass. There is an echogenic focus in the upper pole of the  right kidney measuring 0.5 x 0.8 x 0.4 cm without twinkle artifact or  posterior acoustic shadowing.                                                                   IMPRESSION:   1.  Stable appearance of the hepatic cysts with thin septations as  described above.  2.   Echogenic and large liver consistent with hepatic steatosis.  3.  Cholelithiasis and sludge without evidence of cholecystitis.  4.  Echogenic focus in the right kidney, likely vascular calcification  or milk of calcium cyst.           Jose Archer PA-C

## 2022-03-28 NOTE — PATIENT INSTRUCTIONS
Physical Activity   - Increase physical activity level by more than 60 minutes/week as able    Limit Carbohydrates In Diet:   What foods are Carbohydrates?    - Main groups: Grains, Fruits, Starchy Vegetables (corn, potatoes, peas, beans) and Sweets/Desserts    When you do have carbohydrates - be mindful of the portions.   - Have ones with more fiber in the them (brown rice, whole grain breads/pastas)   - Fruits - choose ones with edible skins (apples, pears, etc) or seeds (berries)

## 2022-04-01 NOTE — PATIENT INSTRUCTIONS
"ASSESSMENT AND PLAN  1. Tongue dysplasia  referal given to ENT clinic since oral surgery not covered.   - OTOLARYNGOLOGY REFERRAL    2. Oropharyngeal dysphagia  Chronic but slightly worsening in context of chronic gerd.  May be stricture vs pre-cancerous change.  Referral for upper endoscopy gien.     - GASTROENTEROLOGY ADULT REF PROCEDURE ONLY [facility] [phone]    3. Gastroesophageal reflux disease without esophagitis    - GASTROENTEROLOGY ADULT REF PROCEDURE ONLY [facility] [phone]    4. Axillary mass, left  Resolved.  No lymphadenopathy today.  Planning mammogram in July .           MYCHART FOR ON-LINE CARE(VISITS), LABS, REFILLS, MESSAGING, ETC [email or web address]    E-VISIT: click \"on-line care, then request e-visit\".  E-visits work well for following up on issues we have discussed in clinic previously which may need new prescriptions, new prescriptions or substantial discussion. These are always done by me (Dr. Wegener).     ONCARE VISIT:  [email or web address]  - we treat nearly 50 common conditions through on-care.  These are done in an hour by on-call staff.     RADIOLOGY:  [facility]:  [phone]   [facility]: [phone]    Mammogram and Colonoscopy [phone]    Smoking Cessation: [email or web address], [phone]-PLAN ([phone])      CONSUMER PRICE LINE for estimates of test costs:  [phone]       " Statement Selected

## 2022-04-15 NOTE — PROGRESS NOTES
Medication Therapy Management (MTM) Encounter    ASSESSMENT:                            Medication Adherence/Access: See below for considerations    Type 2 Diabetes: Needs improvement: Patient is NOT meeting A1c goal of < 8%. Self monitoring of blood glucose is not at goal of fasting  mg/dL and post prandial <180mg/dL. Patient is NOT meeting average glucose goal of <150mg/dl, however highs and lows. Consider staying on same dose of  Glimepiride 4mg. Twice daily,  Novolog -20 units -  2-3 times daily with meal--but adjust dose based on carb content meal + premeal bs-her TDD log should be closer to 40 units /day  and Lantus insulin continue - (51units daily at bedtime). It would be beneficial to monitor blood sugar trends and meals to determine if lows are occurring more frequently when fasting or from over-adjusting meal time insulin.  Increased exercise/low carb/calorie diet + wt. Loss  would be beneficial for her NAFLD. We discussed glp-1 /sglt-2 meds--fear of donut hole cost will not give those today but she is agreeable to 15mg. Pioglitazone pill daily to reduce insulin resistance /reverse fatty liver disease as much as possible.         Immunizations: up to date .(could consider new shingles vaccine shingrix in 2022).     Hypokalemia:  Stable lab of 4.1. Now able to swallow the micro-k capsules easily.     Hyperlipidemia: Stable.  Patient is on high intensity statin which is indicated based on 2019 ACC/AHA guidelines for lipid management.      Hypertension: Stable. Patient is meeting blood pressure goal of < 140/90mmHg.        PLAN:                              1. A1c today = 8.2% -- lets ADD -15mg. Pioglitazone once daily pill to your glimepiride and insulins . This medication will help reduce insulin resistance /maybe reverse some fatty liver disease. Monitor blood sugars 2-3 x day --let me know if any low blood sugars (<70)--we can cut back on both insulins if that occurs.   2. FYI--Have gastro f/up lab  1 pm on 9-23-22 and then see Jose at 2 pm that day for liver follow-up.           Follow-up: Return in about 14 weeks (around 7/25/2022), or 3 PM, for Medication Therapy Management Visit, Blood sugar meter review, A1c lab, Lab Work.          SUBJECTIVE/OBJECTIVE:                          Sarah Felix is a 76 year old female coming in for a follow-up (1-30-22) visit. She was referred to me from Dr. Collette Fay.       Reason for visit:   CMR-has 30$ MTM [employer] incentive. mtm will do cmr and fill out form for her today .      A1c lab recheck.    She did see liver provider --has hepatic steatosis.          Allergies/ADRs: Reviewed in chart  Tobacco: She reports that she quit smoking about 42 years ago. She has never used smokeless tobacco.  Alcohol: 7-9 beverages / week typically wine 1 or 2 glasses/night - reports that she hasn't had any alcohol in the last week and a half until they figure out her liver situation.   Caffeine: 2 cans diet coke sodas/day  Activity: reports she has low energy and stamina, if she does any activity she has to take breaks frequenty but now that the weather is better she is doing more outdoor work.   Past Medical History: Reviewed in chart  Personal Healthcare Goals: figure out what is going on with liver or blood, would like to get 3rd vaccine, improve a1c    Medication Adherence/Access:  Missed a few insulin doses.       Type 2 Diabetes:  Currently taking : glimepiride 4 mg.  Tablet twice daily ,  Lantus 51 units daily at midnight daily. Novolog vial - inject  20 units usually twice /day in (averages twice daily but depending on what she is eating it can be 3 times daily)--she reports sometimes not any log -because she wouldve started off that day with a low bs.    She admits eats small amounts food thru out the day due to bad reflux disease.     SMBG: see below.       7 days avg= 140, 14 days = 157, 30 days =158.    Date FBG/ 2hours post Lunch/2hours post Dinner /2hours post     4-18-22 4-17-22 174  10 am  /91 7:21pm 121 2;28am   4-16-22  66 12;13pm  Felt sweaty , pit in stomach, eat and then better      4-15-22 192 10:41am      4-14-22   181 7:24pm 134 2;35am   4-12-22 161 9am       4-11-22  179 1:12pm           Patient is experiencing side effects: some hypoglycemia    She reports bad ankle --hard to walk, swimming was suggested per liver md --she doesn't have access or lives close to one?     She doesn't have any exercise equipment where she lives.        Saw liver md has nafld --she is struggling with finding an exercise she can do and isnt super interested in carb/calorie restriction per liver md.       Symptoms of low blood sugar? sweaty, Frequency of lows has been increasing (she admits she cannot tolerate bs's <110- if they go lower than that she feels crummy , sweaty. )  Symptoms of high blood sugar? none  Eye exam: up to date  Foot exam: due  Diet: doing her best to control carbs in daily diet.   Previously: she's maintained similar diet and notes sugars now being elevated  eats 4-5 smaller meals - pasta and rices  Fruit and some fruit juices   Breakfast: cereal 2-3 times per week, drink 2 glasses OJ or grapefruit juice /day .   Exercise: see social history --saw liver specialist suggested swimming.   Aspirin: Taking 162 mg daily and denies side effects  Statin: Yes: atorvastatin 40 mg   ACEi/ARB: Yes: lisinopril 2.5 mg.   Urine Albumin:   Lab Results   Component Value Date    UMALCR  09/02/2021      Comment:      Unable to calculate:  Urine creatinine or albumin value below detectable level            Lab Results   Component Value Date    A1C 8.2 04/18/2022    A1C 8.4 01/03/2022    A1C 7.8 09/27/2021    A1C 8.3 06/21/2021    A1C 9.4 12/14/2020    A1C 8.1 09/21/2020    A1C 8.5 01/07/2020    A1C 7.6 10/12/2019             Immunizations:  Up to date .  (consider new shingles vaccine -shingrix in 2022).     Hypokalemia:  Patient reports the new potassium capsule is working  much better and is easy to take.   Potassium   Date Value Ref Range Status   03/28/2022 4.1 3.4 - 5.3 mmol/L Final   01/07/2020 4.0 3.4 - 5.3 mmol/L Final       Hyperlipidemia: Current therapy includes : atorvastatin 40mg daily.  Patient reports no significant myalgias or other side effects.  The 10-year ASCVD risk score (Josh CHOUDHURY Jr., et al., 2013) is: 28.8%    Values used to calculate the score:      Age: 76 years      Sex: Female      Is Non- : No      Diabetic: Yes      Tobacco smoker: No      Systolic Blood Pressure: 118 mmHg      Is BP treated: Yes      HDL Cholesterol: 47 mg/dL      Total Cholesterol: 168 mg/dL  Recent Labs   Lab Test 09/02/21  1501 01/07/20  1417   CHOL 168 139   HDL 47* 45*   LDL 76 63   TRIG 226* 154*         Hypertension: Current medications include : Metoprolol 25mg twicew daily, Lisinopril 2.5mg./day + furosemide 20mg -2 tabs in am and 1 in afternoon.  Patient does not self-monitor blood pressure.  Patient reports no current medication side effects.  BP Readings from Last 3 Encounters:   04/18/22 118/66   03/28/22 126/66   01/03/22 110/66           ----------------    I spent 45 minutes with this patient today. All changes were made via collaborative practice agreement with Heide Fay MD. A copy of the visit note was provided to the patient's primary care provider.    The patient was given a summary of these recommendations. She admits computer not working at home to view mychart .     Joan Lopez [facility].  Medication Therapy Management Provider  [phone]           Medication Therapy Recommendations  Type 2 diabetes mellitus without complication, with long-term current use of insulin (H)    Current Medication: pioglitazone (ACTOS) 15 MG tablet   Rationale: Synergistic therapy - Needs additional medication therapy - Indication   Recommendation: Start Medication   Status: Accepted per CPA

## 2022-04-18 ENCOUNTER — OFFICE VISIT (OUTPATIENT)
Dept: PHARMACY | Facility: CLINIC | Age: 77
End: 2022-04-18
Payer: COMMERCIAL

## 2022-04-18 ENCOUNTER — LAB (OUTPATIENT)
Dept: LAB | Facility: CLINIC | Age: 77
End: 2022-04-18
Payer: COMMERCIAL

## 2022-04-18 VITALS
BODY MASS INDEX: 33.48 KG/M2 | WEIGHT: 201.2 LBS | DIASTOLIC BLOOD PRESSURE: 66 MMHG | OXYGEN SATURATION: 94 % | HEART RATE: 76 BPM | SYSTOLIC BLOOD PRESSURE: 118 MMHG

## 2022-04-18 DIAGNOSIS — N18.2 TYPE 2 DIABETES MELLITUS WITH STAGE 2 CHRONIC KIDNEY DISEASE, WITH LONG-TERM CURRENT USE OF INSULIN (H): Primary | ICD-10-CM

## 2022-04-18 DIAGNOSIS — Z79.4 TYPE 2 DIABETES MELLITUS WITH STAGE 2 CHRONIC KIDNEY DISEASE, WITH LONG-TERM CURRENT USE OF INSULIN (H): Primary | ICD-10-CM

## 2022-04-18 DIAGNOSIS — E11.9 TYPE 2 DIABETES, HBA1C GOAL < 7% (H): ICD-10-CM

## 2022-04-18 DIAGNOSIS — E78.5 HYPERLIPIDEMIA LDL GOAL <100: ICD-10-CM

## 2022-04-18 DIAGNOSIS — E11.22 TYPE 2 DIABETES MELLITUS WITH STAGE 2 CHRONIC KIDNEY DISEASE, WITH LONG-TERM CURRENT USE OF INSULIN (H): ICD-10-CM

## 2022-04-18 DIAGNOSIS — Z23 ENCOUNTER FOR IMMUNIZATION: ICD-10-CM

## 2022-04-18 DIAGNOSIS — E87.6 HYPOKALEMIA: ICD-10-CM

## 2022-04-18 DIAGNOSIS — E11.22 TYPE 2 DIABETES MELLITUS WITH STAGE 2 CHRONIC KIDNEY DISEASE, WITH LONG-TERM CURRENT USE OF INSULIN (H): Primary | ICD-10-CM

## 2022-04-18 DIAGNOSIS — N18.2 TYPE 2 DIABETES MELLITUS WITH STAGE 2 CHRONIC KIDNEY DISEASE, WITH LONG-TERM CURRENT USE OF INSULIN (H): ICD-10-CM

## 2022-04-18 DIAGNOSIS — Z79.4 TYPE 2 DIABETES MELLITUS WITH STAGE 2 CHRONIC KIDNEY DISEASE, WITH LONG-TERM CURRENT USE OF INSULIN (H): ICD-10-CM

## 2022-04-18 DIAGNOSIS — I10 ESSENTIAL HYPERTENSION: ICD-10-CM

## 2022-04-18 LAB — HBA1C MFR BLD: 8.2 % (ref 0–5.6)

## 2022-04-18 PROCEDURE — 99606 MTMS BY PHARM EST 15 MIN: CPT | Performed by: PHARMACIST

## 2022-04-18 PROCEDURE — 36415 COLL VENOUS BLD VENIPUNCTURE: CPT

## 2022-04-18 PROCEDURE — 83036 HEMOGLOBIN GLYCOSYLATED A1C: CPT

## 2022-04-18 PROCEDURE — 99607 MTMS BY PHARM ADDL 15 MIN: CPT | Performed by: PHARMACIST

## 2022-04-18 RX ORDER — PIOGLITAZONEHYDROCHLORIDE 15 MG/1
15 TABLET ORAL DAILY
Qty: 90 TABLET | Refills: 1 | Status: SHIPPED | OUTPATIENT
Start: 2022-04-18 | End: 2022-07-25 | Stop reason: SINTOL

## 2022-04-18 RX ORDER — GLIMEPIRIDE 4 MG/1
TABLET ORAL
Qty: 180 TABLET | Refills: 1 | Status: SHIPPED | OUTPATIENT
Start: 2022-04-18 | End: 2022-09-27

## 2022-04-18 NOTE — PATIENT INSTRUCTIONS
"Recommendations from today's MTM visit:                                                       A1c today = 8.2% -- lets ADD -15mg. Pioglitazone once daily pill to your glimepiride and insulins . This medication will help reduce insulin resistance /maybe reverse some fatty liver disease.Monitor blood sugars 2-3 x day --let me know if any low blood sugars (<70)--we can cut back on both insulins if that occurs.     2. FYI--Have gastro f/up lab 1 pm on 9-23-22 and then see Grebernardo at 2 pm that day for liver follow-up.           Follow-up: Return in about 14 weeks (around 7/25/2022), or 3 PM, for Medication Therapy Management Visit, Blood sugar meter review, A1c lab, Lab Work.    It was great speaking with you today.  I value your experience and would be very thankful for your time in providing feedback in our clinic survey. In the next few days, you may receive an email or text message from [employer] with a link to a survey related to your  clinical pharmacist.\"     To schedule another MTM appointment, please call the clinic directly or you may call the MTM scheduling line at [phone] or toll-free at [phone].     My Clinical Pharmacist's contact information:                                                      Please feel free to contact me with any questions or concerns you have.      Joan Lopez [facility].  Medication Therapy Management Provider  [phone]    "

## 2022-04-18 NOTE — Clinical Note
Jose--simran--I added Pioglitazone today to help with insulin resistance and hopefully her fatty liver disease --maybe I can back off some insulin fi she responds (a1c =8.2% today) ; would like to give her glp-1 or sglt-2 drug but cost/donut hole of insurance issues are an issue.  She was quite confused today that she had ever seen you (3-28-22 Ov with you)  and was convinced she had no f/up appt./lab with you --I printed off info for her to remind her of your 9-23-22 lab/OV f/up with you. I dont think we are going to move the needle on dietary changes with her --will try to see improvement thru rx med changes.   Let me know if any questions?  Thank you,Joan Lopez [facility]. Medication Therapy Management Provider [phone]

## 2022-04-19 NOTE — RESULT ENCOUNTER NOTE
Ubaldo Smith,  I'm glad to see you are working with Joan Lopez, [facility], MTM Pharmacist on your diabetes.  I would like to see you for your medicare annual wellness visit (preventive visit) as you are overdue for preventive health care.  Please schedule that with me.  Unfortunately my schedule is booked out into June but I look forward to seeing you then.    Heide Fay MD

## 2022-04-19 NOTE — PROGRESS NOTES
4-19-22    Hi Joan -   She's also overdue to see me, have medicare annual wellness visit (preventive visit), diabetic eye exam, colonoscopy.  I ordered colonoscopy last September (the only time I've seen her) - she never scheduled.     Heide Fay MD       [facility] will send message from pcp to patient via [employer] today .    Joan Lopez [facility].  Medication Therapy Management Provider  [phone]    Liver flo:    Thanks for the message. Sorry I didn't make more of a memorable impression!     Unfortunate about insurance coverage, but I understand how that goes. The FibroScan will really be helpful. I doubt she had an significant scarring in her liver but that will help assess.     Continued optimization of blood glucose will really be the main treatment for fatty liver, when weight loss and nutrition/exercise are limited.     Thanks!     Jose Archer PA-C

## 2022-06-22 PROBLEM — S82.842D CLOSED BIMALLEOLAR FRACTURE OF LEFT ANKLE WITH ROUTINE HEALING: Status: RESOLVED | Noted: 2019-10-11 | Resolved: 2022-06-22

## 2022-06-22 PROBLEM — S82.899A ANKLE FRACTURE: Status: RESOLVED | Noted: 2019-10-12 | Resolved: 2022-06-22

## 2022-06-22 PROBLEM — Z98.890 POST-OPERATIVE STATE: Status: RESOLVED | Noted: 2019-10-28 | Resolved: 2022-06-22

## 2022-06-22 NOTE — PATIENT INSTRUCTIONS
I recommend that patients 50 and over get the Shingrix vaccine at a pharmacy.  The pharmacist will let you know beforehand if there is a copay and can administer the vaccine.  The Shingrix vaccination is a 2-shot series.  The second dose is given 2-6 months after the first.  (It cannot be given sooner than 2 months after the first dose - at the earliest.)  You should be aware that patients are reporting feeling a bit under the weather after the injection, including fatigue, malaise, and low-grade fever that may last a couple days.  Rarely patients can get a mild, shingles-like rash.   But these symptoms are much better than the Shingles disease.    If you have MyChart:  1) I kindly request that you check your MyChart prior to all appointments with me and complete any assigned questionnaires ahead of time.    2) You may receive auto-released results from our system before I have the opportunity to review and comment.  Be assured I will review and comment on all of your results as soon as I can.        FYI:  1) I do virtual (video) visits exclusively on Wednesdays.  I still do in-person visits at [facility] ([phone]) on Mondays, Tuesdays and Thursdays.  You can schedule a video visit for many conditions.  Please follow this link:  [email or web address]  2) My schedule has been booking out very far in advance (2 months).  I apologize for the lack of timely access.  If you need to be seen for a chronic condition or preventive (wellness) visit, please be sure to schedule that appointment 2-3 months in advance.  If you have a concern that you feel cannot wait until my next available appointment (such as a hospital follow-up or new symptom of concern) please ask to speak to one of the [facility] nurses who may be able to access a sooner appointment.    I do ask that all patients who are taking chronic medications for conditions that I am managing schedule an  in-person visit with me at least once a year.     To schedule any ordered imaging studies (including mammogram and/or DEXA scan) you can call [facility] Scheduling at [phone].      Patient Education   Personalized Prevention Plan  You are due for the preventive services outlined below.  Your care team is available to assist you in scheduling these services.  If you have already completed any of these items, please share that information with your care team to update in your medical record.  Health Maintenance Due   Topic Date Due     Zoster (Shingles) Vaccine (2 of 3) 05/30/2012     Diabetic Foot Exam  10/22/2019     Osteoporosis Screening  01/03/2021     Eye Exam  10/21/2021     COVID-19 Vaccine (4 - Booster for Pfizer series) 02/04/2022     Your Health Risk Assessment indicates you feel you are not in good health    A healthy lifestyle helps keep the body fit and the mind alert. It helps protect you from disease, helps you fight disease, and helps prevent chronic disease (disease that doesn't go away) from getting worse. This is important as you get older and begin to notice twinges in muscles and joints and a decline in the strength and stamina you once took for granted. A healthy lifestyle includes good healthcare, good nutrition, weight control, recreation, and regular exercise. Avoid harmful substances and do what you can to keep safe. Another part of a healthy lifestyle is stay mentally active and socially involved.    Good healthcare     Have a wellness visit every year.     If you have new symptoms, let us know right away. Don't wait until the next checkup.     Take medicines exactly as prescribed and keep your medicines in a safe place. Tell us if your medicine causes problems.   Healthy diet and weight control     Eat 3 or 4 small, nutritious, low-fat, high-fiber meals a day. Include a variety of fruits, vegetables, and whole-grain foods.     Make sure you get enough calcium in your diet.  Calcium, vitamin D, and exercise help prevent osteoporosis (bone thinning).     If you live alone, try eating with others when you can. That way you get a good meal and have company while you eat it.     Try to keep a healthy weight. If you eat more calories than your body uses for energy, it will be stored as fat and you will gain weight.     Recreation   Recreation is not limited to sports and team events. It includes any activity that provides relaxation, interest, enjoyment, and exercise. Recreation provides an outlet for physical, mental, and social energy. It can give a sense of worth and achievement. It can help you stay healthy.    Mental Exercise and Social Involvement  Mental and emotional health is as important as physical health. Keep in touch with friends and family. Stay as active as possible. Continue to learn and challenge yourself.   Things you can do to stay mentally active are:    Learn something new, like a foreign language or musical instrument.     Play SCRABBLE or do crossword puzzles. If you cannot find people to play these games with you at home, you can play them with others on your computer through the Internet.     Join a games club--anything from card games to chess or checkers or lawn bowling.     Start a new hobby.     Go back to school.     Volunteer.     Read.   Keep up with world events.    Exercise for a Healthier Heart  You may wonder how you can improve the health of your heart. If you re thinking about exercise, you re on the right track. You don t need to become an athlete. But you do need a certain amount of brisk exercise to help strengthen your heart. If you have been diagnosed with a heart condition, your healthcare provider may advise exercise to help stabilize your condition. To help make exercise a habit, choose safe, fun activities.      Exercise with a friend. When activity is fun, you're more likely to stick with it.   Before you start  Check with your healthcare  provider before starting an exercise program. This is especially important if you have not been active for a while. It's also important if you have a long-term (chronic) health problem such as heart disease, diabetes, or obesity. Or if you are at high risk for having these problems.   Why exercise?  Exercising regularly offers many healthy rewards. It can help you do all of the following:     Improve your blood cholesterol level to help prevent further heart trouble    Lower your blood pressure to help prevent a stroke or heart attack    Control diabetes, or reduce your risk of getting this disease    Improve your heart and lung function    Reach and stay at a healthy weight    Make your muscles stronger so you can stay active    Prevent falls and fractures by slowing the loss of bone mass (osteoporosis)    Manage stress better    Reduce your blood pressure    Improve your sense of self and your body image  Exercise tips      Ease into your routine. Set small goals. Then build on them. If you are not sure what your activity level should be, talk with your healthcare provider first before starting an exercise routine.    Exercise on most days. Aim for a total of 150 minutes (2 hours and 30 minutes) or more of moderate-intensity aerobic activity each week. Or 75 minutes (1 hour and 15 minutes) or more of vigorous-intensity aerobic activity each week. Or try for a combination of both. Moderate activity means that you breathe heavier and your heart rate increases but you can still talk. Think about doing 40 minutes of moderate exercise, 3 to 4 times a week. For best results, activity should last for about 40 minutes to lower blood pressure and cholesterol. It's OK to work up to the 40-minute period over time. Examples of moderate-intensity activity are walking 1 mile in 15 minutes. Or doing 30 to 45 minutes of yard work.    Step up your daily activity level.  Along with your exercise program, try being more active the  whole day. Walk instead of drive. Or park further away so that you take more steps each day. Do more household tasks or yard work. You may not be able to meet the advised mount of physical activity. But doing some moderate- or vigorous-intensity aerobic activity can help reduce your risk for heart disease. Your healthcare provider can help you figure out what is best for you.    Choose 1 or more activities you enjoy.  Walking is one of the easiest things you can do. You can also try swimming, riding a bike, dancing, or taking an exercise class.    When to call your healthcare provider  Call your healthcare provider if you have any of these:     Chest pain or feel dizzy or lightheaded    Burning, tightness, pressure, or heaviness in your chest, neck, shoulders, back, or arms    Abnormal shortness of breath    More joint or muscle pain    A very fast or irregular heartbeat (palpitations)  Jacques last reviewed this educational content on 7/1/2019 2000-2021 [employer]. All rights reserved. This information is not intended as a substitute for professional medical care. Always follow your healthcare professional's instructions.        Activities of Daily Living    Your Health Risk Assessment indicates you have difficulties with activities of daily living such as housework, bathing, preparing meals, taking medication, etc. Please make a follow up appointment for us to address this issue in more detail.    Signs of Hearing Loss      Hearing much better with one ear can be a sign of hearing loss.   Hearing loss is a problem shared by many people. In fact, it is one of the most common health problems, particularly as people age. Most people age 65 and older have some hearing loss. By age 80, almost everyone does. Hearing loss often occurs slowly over the years. So you may not realize your hearing has gotten worse.  Have your hearing checked  Call your healthcare provider if you:    Have to strain to hear  normal conversation    Have to watch other people s faces very carefully to follow what they re saying    Need to ask people to repeat what they ve said    Often misunderstand what people are saying    Turn the volume of the television or radio up so high that others complain    Feel that people are mumbling when they re talking to you    Find that the effort to hear leaves you feeling tired and irritated    Notice, when using the phone, that you hear better with one ear than the other  [employer] last reviewed this educational content on 1/1/2020 2000-2021 [employer]. All rights reserved. This information is not intended as a substitute for professional medical care. Always follow your healthcare professional's instructions.          Urinary Incontinence, Female (Adult)   Urinary incontinence means loss of bladder control. This problem affects many women, especially as they get older. If you have incontinence, you may be embarrassed to ask for help. But know that this problem can be treated.   Types of Incontinence  There are different types of incontinence. Two of the main types are described here. You can have more than one type.     Stress incontinence. With this type, urine leaks when pressure (stress) is put on the bladder. This may happen when you cough, sneeze, or laugh. Stress incontinence most often occurs because the pelvic floor muscles that support the bladder and urethra are weak. This can happen after pregnancy and vaginal childbirth or a hysterectomy. It can also be due to excess body weight or hormone changes.    Urge incontinence (also called overactive bladder). With this type, a sudden urge to urinate is felt often. This may happen even though there may not be much urine in the bladder. The need to urinate often during the night is common. Urge incontinence most often occurs because of bladder spasms. This may be due to bladder irritation or infection. Damage to bladder nerves or  pelvic muscles, constipation, and certain medicines can also lead to urge incontinence.  Treatment depends on the cause. Further evaluation is needed to find the type you have. This will likely include an exam and certain tests. Based on the results, you and your healthcare provider can then plan treatment. Until a diagnosis is made, the home care tips below can help ease symptoms.   Home care    Do pelvic floor muscle exercises, if they are prescribed. The pelvic floor muscles help support the bladder and urethra. Many women find that their symptoms improve when doing special exercises that strengthen these muscles. To do the exercises, contract the muscles you would use to stop your stream of urine. But do this when you re not urinating. Hold for 10 seconds, then relax. Repeat 10 to 20 times in a row, at least 3 times a day. Your healthcare provider may give you other instructions for how to do the exercises and how often.    Keep a bladder diary. This helps track how often and how much you urinate over a set period of time. Bring this diary with you to your next visit with the provider. The information can help your provider learn more about your bladder problem.    Lose weight, if advised to by your provider. Extra weight puts pressure on the bladder. Your provider can help you create a weight-loss plan that s right for you. This may include exercising more and making certain diet changes.    Don't have foods and drinks that may irritate the bladder. These can include alcohol and caffeinated drinks.    Quit smoking. Smoking and other tobacco use can lead to a long-term (chronic) cough that strains the pelvic floor muscles. Smoking may also damage the bladder and urethra. Talk with your provider about treatments or methods you can use to quit smoking.    If drinking large amounts of fluid makes you have symptoms, you may be advised to limit your fluid intake. You may also be advised to drink most of your fluids  during the day and to limit fluids at night.    If you re worried about urine leakage or accidents, you may wear absorbent pads to catch urine. Change the pads often. This helps reduce discomfort. It may also reduce the risk of skin or bladder infections.    Follow-up care  Follow up with your healthcare provider, or as directed. It may take some to find the right treatment for your problem. But healthy lifestyle changes can be made right away. These include such things as exercising on a regular basis, eating a healthy diet, losing weight (if needed), and quitting smoking. Your treatment plan may include special therapies or medicines. Certain procedures or surgery may also be options. Talk about any questions you have with your provider.   When to seek medical advice  Call the healthcare provider right away if any of these occur:    Fever of 100.4 F (38 C) or higher, or as directed by your provider    Bladder pain or fullness    Belly swelling    Nausea or vomiting    Back pain    Weakness, dizziness, or fainting  [employer] last reviewed this educational content on 1/1/2020 2000-2021 [employer]. All rights reserved. This information is not intended as a substitute for professional medical care. Always follow your healthcare professional's instructions.        Your Health Risk Assessment indicates you feel you are not in good emotional health.    Recreation   Recreation is not limited to sports and team events. It includes any activity that provides relaxation, interest, enjoyment, and exercise. Recreation provides an outlet for physical, mental, and social energy. It can give a sense of worth and achievement. It can help you stay healthy.    Mental Exercise and Social Involvement  Mental and emotional health is as important as physical health. Keep in touch with friends and family. Stay as active as possible. Continue to learn and challenge yourself.   Things you can do to stay mentally active  are:    Learn something new, like a foreign language or musical instrument.     Play SCRABBLE or do crossword puzzles. If you cannot find people to play these games with you at home, you can play them with others on your computer through the Internet.     Join a games club--anything from card games to chess or checkers or lawn bowling.     Start a new hobby.     Go back to school.     Volunteer.     Read.   Keep up with world events.

## 2022-06-23 ENCOUNTER — OFFICE VISIT (OUTPATIENT)
Dept: FAMILY MEDICINE | Facility: CLINIC | Age: 77
End: 2022-06-23
Payer: COMMERCIAL

## 2022-06-23 VITALS
DIASTOLIC BLOOD PRESSURE: 81 MMHG | TEMPERATURE: 97.9 F | RESPIRATION RATE: 16 BRPM | HEIGHT: 64 IN | BODY MASS INDEX: 35.26 KG/M2 | OXYGEN SATURATION: 95 % | SYSTOLIC BLOOD PRESSURE: 128 MMHG | WEIGHT: 206.5 LBS | HEART RATE: 78 BPM

## 2022-06-23 DIAGNOSIS — Z23 NEED FOR VACCINATION: ICD-10-CM

## 2022-06-23 DIAGNOSIS — Z78.0 ASYMPTOMATIC POSTMENOPAUSAL STATUS: ICD-10-CM

## 2022-06-23 DIAGNOSIS — D12.6 TUBULAR ADENOMA OF COLON: ICD-10-CM

## 2022-06-23 DIAGNOSIS — E78.2 MIXED HYPERLIPIDEMIA: ICD-10-CM

## 2022-06-23 DIAGNOSIS — E11.65 TYPE 2 DIABETES MELLITUS WITH HYPERGLYCEMIA, WITH LONG-TERM CURRENT USE OF INSULIN (H): ICD-10-CM

## 2022-06-23 DIAGNOSIS — M85.852 OSTEOPENIA OF BOTH HIPS: ICD-10-CM

## 2022-06-23 DIAGNOSIS — Z00.00 ENCOUNTER FOR MEDICARE ANNUAL WELLNESS EXAM: Primary | ICD-10-CM

## 2022-06-23 DIAGNOSIS — Z79.4 TYPE 2 DIABETES MELLITUS WITH HYPERGLYCEMIA, WITH LONG-TERM CURRENT USE OF INSULIN (H): ICD-10-CM

## 2022-06-23 DIAGNOSIS — M85.851 OSTEOPENIA OF BOTH HIPS: ICD-10-CM

## 2022-06-23 DIAGNOSIS — E66.01 SEVERE OBESITY (BMI 35.0-39.9) WITH COMORBIDITY (H): ICD-10-CM

## 2022-06-23 PROCEDURE — 0054A COVID-19,PF,PFIZER (12+ YRS): CPT | Performed by: FAMILY MEDICINE

## 2022-06-23 PROCEDURE — 91305 COVID-19,PF,PFIZER (12+ YRS): CPT | Performed by: FAMILY MEDICINE

## 2022-06-23 PROCEDURE — 99207 PR FOOT EXAM NO CHARGE: CPT | Mod: 25 | Performed by: FAMILY MEDICINE

## 2022-06-23 PROCEDURE — 99397 PER PM REEVAL EST PAT 65+ YR: CPT | Mod: 25 | Performed by: FAMILY MEDICINE

## 2022-06-23 RX ORDER — MAGNESIUM HYDROXIDE/ALUMINUM HYDROXICE/SIMETHICONE 120; 1200; 1200 MG/30ML; MG/30ML; MG/30ML
30 SUSPENSION ORAL
COMMUNITY
Start: 2019-10-30 | End: 2023-07-11

## 2022-06-23 ASSESSMENT — ENCOUNTER SYMPTOMS
HEARTBURN: 0
MYALGIAS: 0
FREQUENCY: 0
DIARRHEA: 0
PALPITATIONS: 0
CHILLS: 0
ABDOMINAL PAIN: 0
BREAST MASS: 0
WEAKNESS: 1
HEMATURIA: 0
EYE PAIN: 0
HEADACHES: 0
JOINT SWELLING: 0
CONSTIPATION: 0
SORE THROAT: 0
NAUSEA: 0
PARESTHESIAS: 0
COUGH: 0
SHORTNESS OF BREATH: 0
ARTHRALGIAS: 0
HEMATOCHEZIA: 0
FEVER: 0
DIZZINESS: 0
NERVOUS/ANXIOUS: 0
DYSURIA: 0

## 2022-06-23 ASSESSMENT — ACTIVITIES OF DAILY LIVING (ADL): CURRENT_FUNCTION: HOUSEWORK REQUIRES ASSISTANCE

## 2022-06-23 NOTE — PROGRESS NOTES
"SUBJECTIVE:   Sarah Felix is a 76 year old female who presents for Preventive Visit.      Patient has been advised of split billing requirements and indicates understanding: Yes     Are you in the first 12 months of your Medicare coverage?  No    Healthy Habits:     In general, how would you rate your overall health?  Fair    Frequency of exercise:  None    Do you usually eat at least 4 servings of fruit and vegetables a day, include whole grains    & fiber and avoid regularly eating high fat or \"junk\" foods?  Yes    Taking medications regularly:  Yes    Ability to successfully perform activities of daily living:  Housework requires assistance    Home Safety:  No safety concerns identified    Hearing Impairment:  Difficulty following a conversation in a noisy restaurant or crowded room    In the past 6 months, have you been bothered by leaking of urine? Yes    In general, how would you rate your overall mental or emotional health?  Fair      PHQ-2 Total Score: 0    Additional concerns today:  No    Do you feel safe in your environment? Yes    Have you ever done Advance Care Planning? (For example, a Health Directive, POLST, or a discussion with a medical provider or your loved ones about your wishes): No, advance care planning information given to patient to review.  Patient declined advance care planning discussion at this time.      Fall risk  Fallen 2 or more times in the past year?: No  Any fall with injury in the past year?: No    Cognitive Screening   1) Repeat 3 items (Leader, Season, Table)    2) Clock draw: NORMAL  3) 3 item recall: Recalls 2 objects   Results: NORMAL clock, 1-2 items recalled: COGNITIVE IMPAIRMENT LESS LIKELY    Mini-CogTM Copyright H[employer]. Licensed by the author for use in [facility]; reprinted with permission (thompson@.[facility]). All rights reserved.        Reviewed and updated as needed this visit by clinical staff   Tobacco  Allergies  Meds  Problems           "     Reviewed and updated as needed this visit by Provider    Allergies  Meds  Problems              Social History     Tobacco Use     Smoking status: Former Smoker     Quit date: 1980     Years since quittin.5     Smokeless tobacco: Never Used     Tobacco comment: smoked from 70-80; smoked about 1ppd   Substance Use Topics     Alcohol use: Yes     Alcohol/week: 0.0 standard drinks     Comment: 6-7 drinks per week         Alcohol Use 2022   Prescreen: >3 drinks/day or >7 drinks/week? No   Prescreen: >3 drinks/day or >7 drinks/week? -       Current providers sharing in care for this patient include:   Patient Care Team:  Heide Fay MD as PCP - General (Family Medicine)  Joan Lopez RPH as Pharmacist  Anita Treadwell MD as Referring Physician (OB/Gyn)  Murphy Sargent MD as MD (Urology)  Apolinar Artis MD as MD (Orthopedics)  Janay Amezcua MD as MD (Ophthalmology)  Leventhal, Thomas Michael, MD as MD (Gastroenterology)  Heide Fay MD as MD (Family Medicine)  Manjeet Wilde MD as MD (Hematology)  Leventhal, Thomas Michael, MD as Assigned Gastroenterology Provider  Remington Browning DO as Assigned PCP  Joan Lopez RPH as Assigned MTM Pharmacist    The following health maintenance items are reviewed in Epic and correct as of today:  Health Maintenance Due   Topic Date Due     ZOSTER IMMUNIZATION (2 of 3) 2012     DEXA  2021     COLORECTAL CANCER SCREENING  2021     EYE EXAM  10/21/2021     BP Readings from Last 3 Encounters:   22 128/81   22 118/66   22 126/66    Wt Readings from Last 3 Encounters:   22 93.7 kg (206 lb 8 oz)   22 91.3 kg (201 lb 3.2 oz)   22 92.7 kg (204 lb 4.8 oz)            Review of Systems   Constitutional: Negative for chills and fever.   HENT: Negative for congestion, ear pain, hearing loss and sore throat.    Eyes: Negative for pain and visual disturbance.   Respiratory: Negative  "for cough and shortness of breath.    Cardiovascular: Positive for peripheral edema. Negative for chest pain and palpitations.   Gastrointestinal: Negative for abdominal pain, constipation, diarrhea, heartburn, hematochezia and nausea.   Breasts:  Negative for tenderness, breast mass and discharge.   Genitourinary: Negative for dysuria, frequency, genital sores, hematuria, pelvic pain, urgency, vaginal bleeding and vaginal discharge.   Musculoskeletal: Negative for arthralgias, joint swelling and myalgias.   Skin: Negative for rash.   Neurological: Positive for weakness. Negative for dizziness, headaches and paresthesias.   Psychiatric/Behavioral: Negative for mood changes. The patient is not nervous/anxious.          OBJECTIVE:   /81   Pulse 78   Temp 97.9  F (36.6  C) (Temporal)   Resp 16   Ht 1.626 m (5' 4\")   Wt 93.7 kg (206 lb 8 oz)   SpO2 95%   BMI 35.45 kg/m   Estimated body mass index is 35.45 kg/m  as calculated from the following:    Height as of this encounter: 1.626 m (5' 4\").    Weight as of this encounter: 93.7 kg (206 lb 8 oz).  Physical Exam  GENERAL APPEARANCE: healthy, alert and no distress  EYES: Eyes grossly normal to inspection, conjunctivae and sclerae normal  HENT: ear canals and TM's normal  NECK: old surgical scar over lower anterior neck, no adenopathy, no asymmetry, no masses, and thyroid normal to palpation  RESP: lungs clear to auscultation - no rales, rhonchi or wheezes  CV: regular rate and rhythm, normal S1 S2, no S3 or S4, no murmur, click or rub, 1+ peripheral edema in ankles/feet  ABDOMEN: soft, nontender, no hepatosplenomegaly, no masses   MS: no musculoskeletal defects are noted and gait is age appropriate without ataxia  SKIN: no suspicious lesions or rashes  NEURO: Normal strength and tone, sensory exam grossly normal, mentation intact and speech normal  PSYCH: mentation appears normal and affect normal/bright   DIABETIC FOOT EXAM:  Bilateral feet examined.  No " "lesions or deformities noted.  Skin is warm and dry.  Pedal pulses are intact.  Sensation is intact to nylon filament exam.     ASSESSMENT / PLAN:     Encounter for Medicare annual wellness exam      Type 2 diabetes mellitus with hyperglycemia, with long-term current use of insulin (H)  BMI 35.0-39.9 with comorbidity (H)  Recent Labs   Lab Test 04/18/22  1516 01/03/22  1508 09/27/21  1510   A1C 8.2* 8.4* 7.8*   She has been working with Joan Lopez, [facility] Pharmacist on her diabetes control and he manages her insulin dosing.  She does have severe obesity which impacts her diabetes control but her weight has been stable (no weight gain).    - OPTOMETRY REFERRAL  - FOOT EXAM    Osteopenia of both hips  Asymptomatic postmenopausal status  - DEXA HIP/PELVIS/SPINE - Future    Tubular adenoma of colon  She is due for colonoscopy but states she does not have anyone who can drive her for this exam.  I have placed a care coordinator referral to see if there are any community resources that could help her with a .    - Primary Care - Care Coordination Referral    Need for vaccination  I also recommended Shingrix.  - COVID-19,PF,PFIZER (12+ YRS)        COUNSELING:  Reviewed preventive health counseling, as reflected in patient instructions    Estimated body mass index is 35.45 kg/m  as calculated from the following:    Height as of this encounter: 1.626 m (5' 4\").    Weight as of this encounter: 93.7 kg (206 lb 8 oz).  Wt Readings from Last 5 Encounters:   06/23/22 93.7 kg (206 lb 8 oz)   04/18/22 91.3 kg (201 lb 3.2 oz)   03/28/22 92.7 kg (204 lb 4.8 oz)   01/03/22 91.2 kg (201 lb)   12/14/21 91.8 kg (202 lb 6.4 oz)    Weight management plan: Discussed healthy diet and exercise guidelines    She reports that she quit smoking about 42 years ago. She has never used smokeless tobacco.      Appropriate preventive services were discussed with this patient, including applicable screening as appropriate for cardiovascular " disease, diabetes, osteopenia/osteoporosis, and glaucoma.  As appropriate for age/gender, discussed screening for colorectal cancer, prostate cancer, breast cancer, and cervical cancer. Checklist reviewing preventive services available has been given to the patient.    Reviewed patients plan of care and provided an AVS. The Basic Care Plan (routine screening as documented in Health Maintenance) for Sarah meets the Care Plan requirement. This Care Plan has been established and reviewed with the Patient.    Counseling Resources:  ATP IV Guidelines  Pooled Cohorts Equation Calculator  Breast Cancer Risk Calculator  Breast Cancer: Medication to Reduce Risk  FRAX Risk Assessment  ICSI Preventive Guidelines  Dietary Guidelines for Americans, 2010  [employer] My[employer]ate  ASA Prophylaxis  Lung CA Screening    Heide Fay MD  [facility]    Identified Health Risks:    The patient was provided with suggestions to help her develop a healthy physical lifestyle.  She is at risk for lack of exercise and has been provided with information to increase physical activity for the benefit of her well-being.  The patient reports that she has difficulty with activities of daily living. I have placed a care coordination referral.  The patient was provided with written information regarding signs of hearing loss.  Information on urinary incontinence and treatment options given to patient.  The patient was provided with suggestions to help her develop a healthy emotional lifestyle.

## 2022-06-24 ENCOUNTER — PATIENT OUTREACH (OUTPATIENT)
Dept: CARE COORDINATION | Facility: CLINIC | Age: 77
End: 2022-06-24
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
## 2022-06-27 ENCOUNTER — PATIENT OUTREACH (OUTPATIENT)
Dept: NURSING | Facility: CLINIC | Age: 77
End: 2022-06-27
Attending: FAMILY MEDICINE

## 2022-06-27 DIAGNOSIS — D12.6 TUBULAR ADENOMA OF COLON: ICD-10-CM

## 2022-06-27 SDOH — ECONOMIC STABILITY: TRANSPORTATION INSECURITY
IN THE PAST 12 MONTHS, HAS LACK OF TRANSPORTATION KEPT YOU FROM MEETINGS, WORK, OR FROM GETTING THINGS NEEDED FOR DAILY LIVING?: NO

## 2022-06-27 SDOH — ECONOMIC STABILITY: TRANSPORTATION INSECURITY
IN THE PAST 12 MONTHS, HAS THE LACK OF TRANSPORTATION KEPT YOU FROM MEDICAL APPOINTMENTS OR FROM GETTING MEDICATIONS?: NO

## 2022-06-27 ASSESSMENT — ACTIVITIES OF DAILY LIVING (ADL): DEPENDENT_IADLS:: INDEPENDENT
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
## 2022-06-27 NOTE — LETTER
[facility]  Patient Centered Plan of Care  About Me:        Patient Name:  Sarah Felix    YOB: 1945  Age:         76 year old   Renny MRN:    [id number] Telephone Information:  Home Phone [phone]   Mobile Not on file.       Address:  [address] [place] [address] Email address:  [email or web address]      Emergency Contact(s)    Name Relationship Lgl Grd Work Phone Home Phone Mobile Phone   1. YURI FELIX Brother No  [phone]   2. YURI FELIX Other   [phone]           Primary language:  English     needed? No   [facility] [employer]:  [phone] op. 1  Other communication barriers:Lack of coping    Preferred Method of Communication:  Mail  Current living arrangement: I live alone    Mobility Status/ Medical Equipment: Independent        Health Maintenance  Health Maintenance Reviewed: No data recorded    My Access Plan  Medical Emergency 911   Primary Clinic Line [facility] [phone]   24 Hour Appointment Line [phone] or  [phone] ([phone]) (toll-free)   24 Hour Nurse Line [phone] (toll-free)   Preferred Urgent Care No data recorded   Preferred Hospital No data recorded   Preferred Pharmacy [facility] [place], [place] - [address]     Behavioral Health Crisis Line The National Suicide Prevention Lifeline at 1-925.404.7797 or 911             My Care Team Members  Patient Care Team       Relationship Specialty Notifications Start End    Heide Fay MD PCP - General Family Medicine  9/27/21     Phone: [phone] Fax: [phone]         [address] [facility] [facility] [address]    Joan Lopez [facility] Pharmacist   11/7/11      [address] [facility] [address]    Anita Treadwell MD Referring Physician OB/Gyn  3/30/15     Phone: [phone] Fax: [phone]         [address] [place] [address]  [facility] [address]    Murphy Sargent MD MD Urology  8/19/15     Phone: [phone] Fax: [phone]         [address] [facility] [address]    Apolinar Artis MD MD Orthopedics  5/26/17     Phone: [phone] Fax: [phone]         [address] [facility] [address]    Janay Amezcua MD MD Ophthalmology  8/12/20     Phone: [phone] Fax: [phone]         [address] [facility] [address]    Leventhal, Thomas Michael, MD MD Gastroenterology  9/20/21     Referred by Heide Hopson for Elevated transaminase level, Fatty Liver and Hepatomegaly    Phone: [phone] Fax: [phone]         [address] [place] [address]    Heide Fay MD MD Family Medicine  9/20/21     Referred Pt to Hepatology for Elevated transaminase level, fatty liver and Hepatomegaly    Phone: [phone] Fax: [phone] [address] [facility] [address]    Manjeet Wilde MD MD Hematology All results, Admissions 9/21/21     Phone: [phone] Fax: [phone]         [address] [facility] [address]    Leventhal, Thomas Michael, MD Assigned Gastroenterology Provider   10/10/21     Phone: [phone] Fax: [phone]         [address] [place] [address]    Joan Lopez, [facility] Assigned MTM Pharmacist   5/28/22      [address] [facility] [address]    Heide Fay MD Assigned PCP   6/18/22     Phone: [phone] Fax: [phone]         [address] [facility] [address]    Daniel Murphy MD MD Ophthalmology  6/27/22     Phone: [phone] Fax: [phone]         [address] [facility] [address]    Mony Daily Lead Care Coordinator  Admissions 6/27/22             My Care Plans  Self Management and Treatment Plan  Goals and (Comments)   Goals        General     Psychosocial (pt-stated)      Notes - Note edited  6/28/2022  8:55 AM by Killian  Mony     Goal Statement: I want to obtain transportation in order to complete a colonoscopy  Date Goal set: 6/27/2022  Barriers: lack of transportation  Strengths: motivated to complete   Date to Achieve By: 9/27/2022  Patient expressed understanding of goal: Yes  Action steps to achieve this goal:  1. I will review resources provided by [facility]  2. I will set up a colonoscopy appointment  3. I will set up transportation  4. I will keep in touch with care coordination.                 Action Plans on File:                       Advance Care Plans/Directives Type:   No data recorded    My Medical and Care Information  Problem List   Patient Active Problem List   Diagnosis     CYSTIC LIVER DIS     Allergic rhinitis     Diverticulitis of colon     Osteopenia of both hips     Esophageal reflux     Mixed incontinence urge and stress (male)(female)     Cystocele, lateral     Vaginal atrophy     Mixed hyperlipidemia     Heart burn     BABB (dyspnea on exertion)     Sebaceous hyperplasia     Seborrheic keratosis     Fatty liver     Personal history of other malignant neoplasm of skin     Health Care Home     Hypertension goal BP (blood pressure) < 140/90     Elevated serum creatinine     Skin exam, screening for cancer     Essential hypertension, benign (HTN)     CKD (chronic kidney disease) stage 2, GFR 60-89 ml/min     Type 2 diabetes mellitus with hyperglycemia (H)     Type 2 diabetes with stage 2 chronic kidney disease GFR 60-89 (H)     Type 2 diabetes mellitus without complication, with long-term current use of insulin (H)     Senile nuclear sclerosis, right     Intertriginous candidiasis     Slow transit constipation     Age-related nuclear cataract, left     Basal cell carcinoma of face     Tubular adenoma of colon     BMI 35.0-39.9 with comorbidity (H)      Current Medications and Allergies:  See printed Medication Report.    Care Coordination Start Date: 6/27/2022   Frequency of Care Coordination: monthly     Form  Last Updated: 06/28/2022

## 2022-06-27 NOTE — LETTER
[facility] CARE COORDINATION  [address] [facility] [address]  June 28, 2022    Sarah Felix  [address] [place] [address]      Dear Sarah,    I am a clinic care coordinator who works with Heide Fay MD with the [facility]. I wanted to thank you for spending the time to talk with me.  Below is a description of clinic care coordination and how I can further assist you.       The clinic care coordination team is made up of a registered nurse, , financial resource worker and community health worker who understand the health care system. The goal of clinic care coordination is to help you manage your health and improve access to the health care system. Our team works alongside your provider to assist you in determining your health and social needs. We can help you obtain health care and community resources, providing you with necessary information and education. We can work with you through any barriers and develop a care plan that helps coordinate and strengthen the communication between you and your care team.    Please feel free to contact me with any questions or concerns regarding care coordination and what we can offer.      We are focused on providing you with the highest-quality healthcare experience possible.    Below are the transportation resources we discussed    -[facility] [employer] [phone] Insurance & Private Pay    -[employer]  [phone] MEDICAL TRANSPORT ONLY    -[facility]: [phone] Medical transport: Accept medical assistance or private pay   Non wheelchair is $18 non wheelchair or $25 wheelchair $1.50  per mile  Serves [facility];     [facility]   [phone]     Sincerely,     WILMAR Bonilla   [facility] Primary Care - Clinic Care Coordination  [phone]      Enclosed: I have enclosed a copy of the Patient Centered Plan of  Care. This has helpful information and goals that we have talked about. Please keep this in an easy to access place to use as needed.

## 2022-06-28 ENCOUNTER — PATIENT OUTREACH (OUTPATIENT)
Dept: NURSING | Facility: CLINIC | Age: 77
End: 2022-06-28

## 2022-06-28 DIAGNOSIS — Z12.11 SCREEN FOR COLON CANCER: ICD-10-CM

## 2022-06-28 DIAGNOSIS — D12.6 TUBULAR ADENOMA OF COLON: Primary | ICD-10-CM

## 2022-06-28 NOTE — PROGRESS NOTES
Clinic Care Coordination Contact    Follow Up Progress Note      Assessment: CC called out to patient to discuss the after care process of colonscopy. CC consulted with RN and CHW and they shared that [facility] does not require someone to stay with you after procedure. Patient shared she was okay with this and has family that can call out to her to make sure she is okay. SWCC will reach out to PCP and request an order be sent to [facility].    Care Gaps:    Health Maintenance Due   Topic Date Due     ZOSTER IMMUNIZATION (2 of 3) 05/30/2012     DEXA  01/03/2021     COLORECTAL CANCER SCREENING  09/16/2021     EYE EXAM  10/21/2021       Goals addressed this encounter:    Goals Addressed                    This Visit's Progress       Psychosocial (pt-stated)   10%      Goal Statement: I want to obtain transportation in order to complete a colonoscopy  Date Goal set: 6/27/2022  Barriers: lack of transportation  Strengths: motivated to complete   Date to Achieve By: 9/27/2022  Patient expressed understanding of goal: Yes  Action steps to achieve this goal:  1. I will review resources provided by [facility]  2. I will set up a colonoscopy appointment  3. I will set up transportation  4. I will keep in touch with care coordination.                Intervention/Education provided during outreach: [facility] provided active listening during this outreach.     Outreach Frequency: monthly      Plan:   FYI to PCP  Care Coordinator will follow up in one week.    WILMAR Bonilla   [facility] Primary Care - Clinic Care Coordination  [phone]

## 2022-07-02 DIAGNOSIS — N18.2 CKD (CHRONIC KIDNEY DISEASE) STAGE 2, GFR 60-89 ML/MIN: ICD-10-CM

## 2022-07-04 RX ORDER — LISINOPRIL 2.5 MG/1
TABLET ORAL
Qty: 90 TABLET | Refills: 2 | Status: SHIPPED | OUTPATIENT
Start: 2022-07-04 | End: 2023-02-16

## 2022-07-04 NOTE — TELEPHONE ENCOUNTER
---Prescription approved per [facility].       Flory Woods RN BSN     [facility]        --Last visit:  6/23/22 Nya.

## 2022-07-05 NOTE — TELEPHONE ENCOUNTER
Patient left message with Joan Lopez [facility] requesting this on Saturday. I left a message back letting her know her request was taken care of and she could call me with follow up questions.    Elkin GibbsD, [facility]  Medication Therapy Management Provider  Phone: [phone]  [email or web address]

## 2022-07-06 ENCOUNTER — PATIENT OUTREACH (OUTPATIENT)
Dept: NURSING | Facility: CLINIC | Age: 77
End: 2022-07-06

## 2022-07-06 NOTE — PROGRESS NOTES
Clinic Care Coordination Contact    Follow Up Progress Note      Writer reached out to patient at request of DUNCAN POOLE to follow up on colonoscopy scheduling. Patient shares that she has not heard from [facility] to schedule colonoscopy. Writer provided patient with phone number ([phone]) for [facility] as well as address. Patient shares she will call to schedule colonoscopy and call DUNCAN CC for assistance with transportation scheduling. Writer provided patient with DUNCAN CC direct number.  Care coordination will reach out in 2 weeks for follow up.     Pamela Weems, RN Care Coordinator     [facility] Ambulatory Care Management  [facility]

## 2022-07-09 ENCOUNTER — OFFICE VISIT (OUTPATIENT)
Dept: OPHTHALMOLOGY | Facility: CLINIC | Age: 77
End: 2022-07-09
Attending: FAMILY MEDICINE
Payer: COMMERCIAL

## 2022-07-09 DIAGNOSIS — H26.491 PCO (POSTERIOR CAPSULAR OPACIFICATION), RIGHT: Primary | ICD-10-CM

## 2022-07-09 DIAGNOSIS — E11.65 TYPE 2 DIABETES MELLITUS WITH HYPERGLYCEMIA, WITH LONG-TERM CURRENT USE OF INSULIN (H): ICD-10-CM

## 2022-07-09 DIAGNOSIS — Z79.4 TYPE 2 DIABETES MELLITUS WITH HYPERGLYCEMIA, WITH LONG-TERM CURRENT USE OF INSULIN (H): ICD-10-CM

## 2022-07-09 PROCEDURE — 92014 COMPRE OPH EXAM EST PT 1/>: CPT | Mod: GC | Performed by: STUDENT IN AN ORGANIZED HEALTH CARE EDUCATION/TRAINING PROGRAM

## 2022-07-09 ASSESSMENT — VISUAL ACUITY
METHOD: SNELLEN - LINEAR
OS_CC: 20/30
CORRECTION_TYPE: GLASSES
OD_CC: 20/30

## 2022-07-09 ASSESSMENT — REFRACTION_WEARINGRX
OS_ADD: +2.50
OD_AXIS: 019
OS_SPHERE: -2.00
OS_AXIS: 013
OD_SPHERE: -2.50
OD_CYLINDER: +2.25
OS_CYLINDER: +1.50
OD_ADD: +2.50

## 2022-07-09 ASSESSMENT — REFRACTION_MANIFEST
OD_SPHERE: -1.75
OD_CYLINDER: +0.75
OS_ADD: +2.50
OS_SPHERE: -2.25
OD_ADD: +2.50
OS_AXIS: 020
OS_CYLINDER: +1.50
OD_AXIS: 015

## 2022-07-09 ASSESSMENT — CUP TO DISC RATIO
OD_RATIO: 0.2
OS_RATIO: 0.2

## 2022-07-09 ASSESSMENT — CONF VISUAL FIELD
METHOD: COUNTING FINGERS
OS_NORMAL: 1
OD_NORMAL: 1

## 2022-07-09 ASSESSMENT — EXTERNAL EXAM - LEFT EYE: OS_EXAM: NORMAL

## 2022-07-09 ASSESSMENT — SLIT LAMP EXAM - LIDS
COMMENTS: NORMAL
COMMENTS: NORMAL

## 2022-07-09 ASSESSMENT — TONOMETRY
IOP_METHOD: ICARE
OS_IOP_MMHG: 17
OD_IOP_MMHG: 20

## 2022-07-09 NOTE — NURSING NOTE
Chief Complaints and History of Present Illnesses   Patient presents with     Diabetic Eye Exam     Chief Complaint(s) and History of Present Illness(es)     Diabetic Eye Exam     Vision: is stable    Associated symptoms: blurred vision (right eye).  Negative for flashes and floaters    Diabetes Type: Type 2    Blood Sugars: fluctuates              Comments     75yo female here for annual diabetic exam. Her last eye exam was about 2 years ago. After her cataract surgeries, she felt the vision in the right eye was never clear. Vision in the left is doing fine. No flashes or floaters. No pain.    Last BS: ~120 this morning  Lab Results       Component                Value               Date                       A1C                      8.2                 04/18/2022                 A1C                      8.4                 01/03/2022                 A1C                      7.8                 09/27/2021                 A1C                      8.3                 06/21/2021                 A1C                      9.4                 12/14/2020                 A1C                      8.1                 09/21/2020                 A1C                      8.5                 01/07/2020                 A1C                      7.6                 10/12/2019              Andres Felix COT 11:09 AM July 9, 2022

## 2022-07-09 NOTE — PROGRESS NOTES
HPI  Sarah Felix is a 76 year old female here for evaluation of blurry vision of the right eye and a diabetic eye exam.    She feels like her vision in the right eye never improved after cataract surgery. It is not as good as her left eye.     She has had difficulty controlling her A1c - most recently it was in the 8% range. A new medication she is using - pioglitazone - has not been easy for her to take with poor control and frequent hypoglycemia.     No other concerns about her eyes.    Assessment & Plan      # Diabetes mellitus, type 2   - diagnosed age 62   - using insuline  07/09/22 - no retinopathy on exam. FLR absent. Given visual complaint, will check OCT Mac at next visit (for YAG) to confirm no IRF.     #Pseudophakia OU  07/09/22 significant PCO OD>OS likely explains pt's CC that vision has never been as good OD as OS. Recommed YAG. Refrain from filling glasses prescription until it is rechecked after YAG. Rx not released today.    RTC next available to Alberto or Carley for YAG OD, V/T/ dilate only OD, OCT Mac OU    ATTESTATION     Attending Physician Attestation:      Complete documentation of historical and exam elements from today's encounter can be found in the full encounter summary report (not reduplicated in this progress note).  I personally obtained the chief complaint(s) and history of present illness.  I confirmed and edited as necessary the review of systems, past medical/surgical history, family history, social history, and examination findings as documented by others; and I examined the patient myself.  I personally reviewed the relevant tests, images, and reports as documented above.  I formulated and edited as necessary the assessment and plan and discussed the findings and management plan with the patient and family    Daniel Murphy MD  Retina Fellow  Department of Ophthalmology  [facility]  Pager: [phone]

## 2022-07-09 NOTE — PATIENT INSTRUCTIONS
Today we discussed your blurry vision in the right eye. The vision in this eye appears to be limited by opacification of the capsule that contains your artificial lens.     Plan: We will call and schedule you an appointment for a YAG capsulotomy -- a laser procedure done in the clinic to clear the opacities from the lens. This should help improve your vision.     We saw no evidence of diabetic retinopathy today, but we will check a scan at your next visit to make sure there is not any subtle swelling of the retina that might also be limiting your vision.    Thank you again for your patience today!    Daniel Murphy MD  Vitreoretinal Surgical Fellow  [facility]

## 2022-07-20 ENCOUNTER — PATIENT OUTREACH (OUTPATIENT)
Dept: CARE COORDINATION | Facility: CLINIC | Age: 77
End: 2022-07-20

## 2022-07-20 NOTE — PROGRESS NOTES
Clinic Care Coordination Contact  [facility]/Voicemail       Clinical Data: Care Coordinator Outreach  Outreach attempted x 1.  Left message on patient's voicemail with call back information and requested return call.  Plan: Care Coordinator will try to reach patient again in 10 business days.    WILMAR Bonilla   [facility] Primary Care - Clinic Care Coordination  [phone]

## 2022-07-24 NOTE — PROGRESS NOTES
Medication Therapy Management (MTM) Encounter    ASSESSMENT:                            Medication Adherence/Access: See below for considerations    Type 2 Diabetes: Needs improvement: Patient is NOT meeting A1c goal of < 8%. Self monitoring of blood glucose is not at goal of fasting  mg/dL and post prandial <180mg/dL. Patient is NOT meeting average glucose goal of <150mg/dl, however highs and lows. Consider stopping Pioglitazone(side effects) and re-start Novolog with meals. May also benefti from re-trialing metformin ER with slow titration to be mindful of previous GI side effects. May benefit from increasing Novolog to 10-15 units with meals and continue with current Lantus dose of 44 units at bedtime.  Increased exercise/low carb/calorie diet + wt. Loss  would be beneficial for her NAFLD. We discussed glp-1 /sglt-2 meds--fear of donut hole cost will not give those today.     Immunizations: up to date. (could consider new shingles vaccine shingrix in 2022).     Hypokalemia:  Stable lab of 4.1. Now able to swallow the micro-k capsules easily.     Hyperlipidemia: Stable. Patient is appropriately on high intensity statin which is indicated based on 2019 ACC/AHA guidelines for lipid management with an ASCVD risk of 33%.      Hypertension: Stable. Patient is meeting blood pressure goal of < 140/90mmHg. Slight concern for patient's overall feeling of fatigue is from her BP dropping too low. May benefit from decreasing Metoprolol.       PLAN:                              1. A1c today = 8.6% (elevated from April)   2. Start metformin ER 500mg (after eating) with a slow titration. Start with 1 tablet once daily for 7 days and increase as tolerable to a goal dose of 1000 mg twice daily. If better tolerated, can take all tablets at once.   3. Stop pioglitazone   4. Increase Novolog to 10-15 units with meals  5. Continue taking Lantus 44 units at bedtime   6. Monitor blood sugars 2-3 x day --let me know if any low  blood sugars (<70)--we can cut back on both insulins if that occurs.   7. Decrease metoprolol to 1/2 tablet twice daily     Follow-up: Return in about 6  weeks on 9/12 @ 3PM for Medication Therapy Management Visit, Blood sugar meter review and to review BP       SUBJECTIVE/OBJECTIVE:                          Sarah Felix is a 76 year old female coming in for a follow-up ( 4-18-22) visit. She was referred to me from Dr. Collette Fay.     Reason for visit:         Allergies/ADRs: Reviewed in chart  Tobacco: She reports that she quit smoking about 42 years ago. She has never used smokeless tobacco.  Alcohol: 7-9 beverages / week typically wine 1 or 2 glasses/night - reports that she hasn't had any alcohol in the last week and a half until they figure out her liver situation.   Caffeine: 2 cans diet coke sodas/day  Activity: reports she has low energy and stamina, if she does any activity she has to take breaks frequenty but now that the weather is better she is doing more outdoor work.   Past Medical History: Reviewed in chart  Personal Healthcare Goals: figure out what is going on with liver or blood, would like to get 3rd vaccine, improve a1c    Medication Adherence/Access:  Overall feeling lousy since starting Pioglitazone and would like to stop this medication    Type 2 Diabetes:  Currently taking : glimepiride 4 mg twice daily,  Lantus 44 units daily at midnight daily. Novolog 10 units usually once daily     She has been feeling low lately (shakey, pit in her stomach, overall off and whoosy) but when she checks she is actually in the high 200s.     Has also been having a few lows (50-60s) and subsequently decreased Lantus from 51 units to 44 units at night and decreased Novolog to 10 units once daily     Symptoms of low blood sugar? sweaty, Frequency of lows has been increasing (she admits she cannot tolerate bs's <110- if they go lower than that she feels crummy , sweaty. )  Symptoms of high blood sugar?  Fatigue, shaky, pit in stomach, overall feeling poor   Eye exam: up to date  Foot exam: up to date  Diet: doing her best to control carbs in daily diet.   Previously: she's maintained similar diet and notes sugars now being elevated  eats 4-5 smaller meals - pasta and rices  Fruit and some fruit juices   Breakfast: cereal 2-3 times per week, drink 2 glasses OJ or grapefruit juice /day .   Exercise: see social history --saw liver specialist suggested swimming.   Aspirin: Taking 162 mg daily and denies side effects  Statin: Yes: atorvastatin 40 mg   ACEi/ARB: Yes: lisinopril 2.5 mg.   Urine Albumin:   Lab Results   Component Value Date    UMALCR  09/02/2021      Comment:      Unable to calculate:  Urine creatinine or albumin value below detectable level     SMBG: see below.     7 days avg= 162, 14 days = 168, 30 days =166.    Date FBG/ 2hours post Lunch/2hours post Dinner /2hours post    7/25  113 11:24 AM      7/24   191 12:35 PM      7/23 180 5:26  11:18  4:14 PM    7/22  62 1:08 PM     7/21 150 9:14 AM      7/20 109 11:32 AM  179 3:11PM    7/19 254 2:54  10:11 AM         Lab Results   Component Value Date    A1C 8.6 07/25/2022    A1C 8.2 04/18/2022    A1C 8.4 01/03/2022    A1C 7.8 09/27/2021    A1C 8.3 06/21/2021    A1C 9.4 12/14/2020    A1C 8.1 09/21/2020    A1C 8.5 01/07/2020    A1C 7.6 10/12/2019         Immunizations:  Up to date .  (consider new shingles vaccine -shingrix in 2022).     Hypokalemia:  Patient reports the new potassium capsule is working much better and is easy to take.   Potassium   Date Value Ref Range Status   03/28/2022 4.1 3.4 - 5.3 mmol/L Final   01/07/2020 4.0 3.4 - 5.3 mmol/L Final       Hyperlipidemia: Current therapy includes : atorvastatin 40mg daily.  Patient reports no significant myalgias or other side effects.  The 10-year ASCVD risk score ([place] KEI Jr., et al., 2013) is: 24.8%    Values used to calculate the score:      Age: 76 years      Sex: Female      Is  Non- : No      Diabetic: Yes      Tobacco smoker: No      Systolic Blood Pressure: 108 mmHg      Is BP treated: Yes      HDL Cholesterol: 47 mg/dL      Total Cholesterol: 168 mg/dL  Recent Labs   Lab Test 09/02/21  1501 01/07/20  1417   CHOL 168 139   HDL 47* 45*   LDL 76 63   TRIG 226* 154*     Hypertension: Current medications include : Metoprolol 25mg twicew daily, Lisinopril 2.5mg./day + furosemide 20mg -2 tabs in am and 1 in afternoon.  Patient does not self-monitor blood pressure.  She has been feeling fatigued. In clinic her BP is on the lower end. Potentially causing her overall feeling of fatigue .    BP Readings from Last 3 Encounters:   07/25/22 108/60   06/23/22 128/81   04/18/22 118/66       ----------------    I spent 45 minutes with this patient today. All changes were made via collaborative practice agreement with Heide Fay MD. A copy of the visit note was provided to the patient's primary care provider.    The patient was given a summary of these recommendations. She admits computer not working at home to view mychart .     Joan Lopez [facility].  Medication Therapy Management Provider  [phone]      Keyanna Morales, PharmD, MPH   MTM Pharmacy Resident      Medication Therapy Recommendations  Essential hypertension    Current Medication: metoprolol tartrate (LOPRESSOR) 25 MG tablet   Rationale: Dose too high - Dosage too high - Safety   Recommendation: Decrease Dose   Status: Accepted per CPA         Type 2 diabetes mellitus without complication, with long-term current use of insulin (H)    Current Medication: insulin aspart (NOVOLOG PEN) 100 UNIT/ML pen   Rationale: Dose too low - Dosage too low - Effectiveness   Recommendation: Increase Dose   Status: Accepted per CPA          Current Medication: metFORMIN (GLUCOPHAGE XR) 500 MG 24 hr tablet   Rationale: Synergistic therapy - Needs additional medication therapy - Indication   Recommendation: Start Medication    Status: Accepted per CPA          Current Medication: pioglitazone (ACTOS) 15 MG tablet (Discontinued)   Rationale: Undesirable effect - Adverse medication event - Safety   Recommendation: Discontinue Medication   Status: Accepted per CPA

## 2022-07-25 ENCOUNTER — LAB (OUTPATIENT)
Dept: LAB | Facility: CLINIC | Age: 77
End: 2022-07-25
Payer: COMMERCIAL

## 2022-07-25 ENCOUNTER — OFFICE VISIT (OUTPATIENT)
Dept: PHARMACY | Facility: CLINIC | Age: 77
End: 2022-07-25
Payer: COMMERCIAL

## 2022-07-25 VITALS
DIASTOLIC BLOOD PRESSURE: 60 MMHG | HEART RATE: 78 BPM | BODY MASS INDEX: 35.02 KG/M2 | SYSTOLIC BLOOD PRESSURE: 108 MMHG | OXYGEN SATURATION: 93 % | WEIGHT: 204 LBS

## 2022-07-25 DIAGNOSIS — I10 ESSENTIAL HYPERTENSION: ICD-10-CM

## 2022-07-25 DIAGNOSIS — Z79.4 TYPE 2 DIABETES MELLITUS WITH STAGE 2 CHRONIC KIDNEY DISEASE, WITH LONG-TERM CURRENT USE OF INSULIN (H): Primary | ICD-10-CM

## 2022-07-25 DIAGNOSIS — E78.5 HYPERLIPIDEMIA LDL GOAL <100: ICD-10-CM

## 2022-07-25 DIAGNOSIS — N18.2 TYPE 2 DIABETES MELLITUS WITH STAGE 2 CHRONIC KIDNEY DISEASE, WITH LONG-TERM CURRENT USE OF INSULIN (H): Primary | ICD-10-CM

## 2022-07-25 DIAGNOSIS — E11.22 TYPE 2 DIABETES MELLITUS WITH STAGE 2 CHRONIC KIDNEY DISEASE, WITH LONG-TERM CURRENT USE OF INSULIN (H): Primary | ICD-10-CM

## 2022-07-25 DIAGNOSIS — Z23 ENCOUNTER FOR IMMUNIZATION: ICD-10-CM

## 2022-07-25 DIAGNOSIS — E87.6 HYPOKALEMIA: ICD-10-CM

## 2022-07-25 LAB — HBA1C MFR BLD: 8.6 % (ref 0–5.6)

## 2022-07-25 PROCEDURE — 83036 HEMOGLOBIN GLYCOSYLATED A1C: CPT

## 2022-07-25 PROCEDURE — 36415 COLL VENOUS BLD VENIPUNCTURE: CPT

## 2022-07-25 PROCEDURE — 99607 MTMS BY PHARM ADDL 15 MIN: CPT | Performed by: PHARMACIST

## 2022-07-25 PROCEDURE — 99606 MTMS BY PHARM EST 15 MIN: CPT | Performed by: PHARMACIST

## 2022-07-25 RX ORDER — METFORMIN HCL 500 MG
1000 TABLET, EXTENDED RELEASE 24 HR ORAL 2 TIMES DAILY WITH MEALS
Qty: 120 TABLET | Refills: 5 | Status: SHIPPED | OUTPATIENT
Start: 2022-07-25 | End: 2022-08-25

## 2022-07-25 NOTE — PATIENT INSTRUCTIONS
"Recommendations from today's MTM visit:                                                      A1c today = 8.6% (elevated from April)   2. Start metformin ER 500mg (after eating) with a slow titration. Start with 1 tablet once daily for 7 days and increase as tolerable to a goal dose of 1000 mg twice daily. If better tolerated, can take all tablets at once.   3. Stop pioglitazone   4. Increase Novolog to 10-15 units with meals  5. Continue taking Lantus 44 units at bedtime   6. Monitor blood sugars 2-3 x day --let me know if any low blood sugars (<70)--we can cut back on both insulins if that occurs.   7. Decrease metoprolol to 1/2 tablet twice daily     Follow-up: No follow-ups on file.    It was great speaking with you today.  I value your experience and would be very thankful for your time in providing feedback in our clinic survey. In the next few days, you may receive an email or text message from [employer] with a link to a survey related to your  clinical pharmacist.\"     To schedule another MTM appointment, please call the clinic directly or you may call the MTM scheduling line at [phone] or toll-free at [phone].     My Clinical Pharmacist's contact information:                                                      Please feel free to contact me with any questions or concerns you have.      Joan Lopez [facility].  Medication Therapy Management Provider  [phone]    Keyanna Morales, PharmD, MPH   MT Pharmacy Resident         "

## 2022-07-25 NOTE — Clinical Note
Collette--simran --we saw kayce today -- a1c is up 8.6% --she didn't like actos so we stopped it --gave her med options --she agreed to retrial of metformin ER form now with slow start/titration. Let see how that goes.   We also decreased her metoprolol due to a little hypotension /fatigue issues.  Recheck with me in 6 weeks for progress report.  Martha Briceño-resident pharm-D

## 2022-08-05 ENCOUNTER — PATIENT OUTREACH (OUTPATIENT)
Dept: NURSING | Facility: CLINIC | Age: 77
End: 2022-08-05

## 2022-08-05 NOTE — PROGRESS NOTES
Clinic Care Coordination Contact    Follow Up Progress Note      Assessment: [facility] called out to patient to review goals and progress. Patient states that her brother has been sick and she has not had time to make the appointment. She shares it's on her list of things to do. SWCC gave patient well wishes and willl check back in a month.    Care Gaps:    Health Maintenance Due   Topic Date Due     ZOSTER IMMUNIZATION (2 of 3) 05/30/2012     DEXA  01/03/2021     COLORECTAL CANCER SCREENING  09/16/2021     LIPID  09/02/2022     MICROALBUMIN  09/02/2022         Goals addressed this encounter:    Goals Addressed                    This Visit's Progress       Psychosocial (pt-stated)   10%      Goal Statement: I want to obtain transportation in order to complete a colonoscopy  Date Goal set: 6/27/2022  Barriers: lack of transportation  Strengths: motivated to complete   Date to Achieve By: 9/27/2022  Patient expressed understanding of goal: Yes  Action steps to achieve this goal:  1. I will review resources provided by [facility]  2. I will set up a colonoscopy appointment  3. I will set up transportation  4. I will keep in touch with care coordination.                Intervention/Education provided during outreach: [facility] provided active listening during this outreach.     Outreach Frequency: monthly      Plan:   Care Coordinator will follow up in one month.    WILMAR Bonilla   [facility] Primary Care - Clinic Care Coordination  [phone]

## 2022-08-19 DIAGNOSIS — E11.22 TYPE 2 DIABETES MELLITUS WITH STAGE 2 CHRONIC KIDNEY DISEASE, WITH LONG-TERM CURRENT USE OF INSULIN (H): ICD-10-CM

## 2022-08-19 DIAGNOSIS — N18.2 TYPE 2 DIABETES MELLITUS WITH STAGE 2 CHRONIC KIDNEY DISEASE, WITH LONG-TERM CURRENT USE OF INSULIN (H): ICD-10-CM

## 2022-08-19 DIAGNOSIS — Z79.4 TYPE 2 DIABETES MELLITUS WITH STAGE 2 CHRONIC KIDNEY DISEASE, WITH LONG-TERM CURRENT USE OF INSULIN (H): ICD-10-CM

## 2022-08-22 ENCOUNTER — PATIENT OUTREACH (OUTPATIENT)
Dept: CARE COORDINATION | Facility: CLINIC | Age: 77
End: 2022-08-22

## 2022-08-22 NOTE — PROGRESS NOTES
Clinic Care Coordination - Chart Review Only    Situation/Background: Patient chart reviewed by care coordinator related to [employer] conversion.    Assessment: Patient continues to be followed by Clinic Care Coordination.    Plan: Patient's chart updated to align with [employer] program for ongoing patient management.    WILMAR Bonilla   [facility] Primary Care - Clinic Care Coordination  [phone]

## 2022-08-24 NOTE — TELEPHONE ENCOUNTER
--Orders at [facility] pharmacy and now [facility] is requesting.  --Left voice mail asking patient to call back and ask to speak with triage nurse OR check MyChart for clarification on a request we received.

## 2022-08-25 RX ORDER — METFORMIN HCL 500 MG
1000 TABLET, EXTENDED RELEASE 24 HR ORAL 2 TIMES DAILY WITH MEALS
Qty: 360 TABLET | Refills: 1 | Status: SHIPPED | OUTPATIENT
Start: 2022-08-25 | End: 2023-06-19

## 2022-08-25 NOTE — TELEPHONE ENCOUNTER
Routing refill request to provider for review/approval because:  --Ordered by MTM only so RN not able to authorize.  --Order was recently sent to [facility] pharmacy and now patient [facility].    --Last Written Prescription Date:     Disp Refills Start End CHAD   metFORMIN (GLUCOPHAGE XR) 500 MG 24 hr tablet 120 tablet 5 7/25/2022  --   Sig - Route: Take 2 tablets (1,000 mg) by mouth 2 times daily (with meals         --Last visit at [facility]:  6/23/2022 [facility].    --Future Visit:   Next 5 appointments (look out 90 days)    Sep 12, 2022  3:00 PM  Pharmacist Visit with Joan Lopez RPH  [facility] [address] [place] [address] [phone]

## 2022-09-07 ENCOUNTER — PATIENT OUTREACH (OUTPATIENT)
Dept: CARE COORDINATION | Facility: CLINIC | Age: 77
End: 2022-09-07

## 2022-09-07 NOTE — PROGRESS NOTES
Clinic Care Coordination Contact    Follow Up Progress Note      Assessment: [facility] called out to patient to follow up and review goals. Patient has not made the colonoscopy appointment. Her brother has been ill. She shares she feels overwhelmed and exhausted. She understands the importance of the procedure and wants to get it done but has not had the energy to do it. [facility] inquired about how they could assist her. She shared the continued check in phone calls are helpful. She shared she will call if she schedules or has questions.     Care Gaps:    Health Maintenance Due   Topic Date Due     ZOSTER IMMUNIZATION (2 of 3) 05/30/2012     DEXA  01/03/2021     COLORECTAL CANCER SCREENING  09/16/2021     INFLUENZA VACCINE (1) 09/01/2022     LIPID  09/02/2022     MICROALBUMIN  09/02/2022     MAMMO SCREENING  09/10/2022       Care Plans  Care Plan: Transportation     Problem: Lack of transportation     Goal: Establish reliable transportation     Start Date: 6/27/2022 Expected End Date: 9/27/2022    Note:     Goal Statement: I want to obtain transportation in order to complete a colonoscopy  Date Goal set: 6/27/2022  Barriers: lack of transportation  Strengths: motivated to complete   Date to Achieve By: 9/27/2022  Patient expressed understanding of goal: Yes  Action steps to achieve this goal:  1. I will review resources provided by [facility]  2. I will set up a colonoscopy appointment  3. I will set up transportation  4. I will keep in touch with care coordination.                          Intervention/Education provided during outreach: [facility] provided active listening during this outreach.     Outreach Frequency: monthly    Plan:   Care Coordinator will follow up in one month.    WILMAR Bonilla   [facility] - Clinic Care Coordination  [phone]

## 2022-09-09 NOTE — PROGRESS NOTES
Medication Therapy Management (MTM) Encounter    ASSESSMENT:                            Medication Adherence/Access: See below for considerations    Type 2 Diabetes: Needs improvement: Patient is NOT meeting A1c goal of < 8%. Self monitoring of blood glucose is not at goal of fasting  mg/dL and post prandial <180mg/dL. Patient is NOW meeting average glucose goal of <150mg/dl, however highs and lows.   Metformin ;  Stay on 1 x500mg. tab twice daily, ok to fiddle with a 3rd dose.   Novolog to 10units with meals and continue with current Lantus dose of 36 units at bedtime.  Increased exercise/low carb/calorie diet + wt. Loss  would be beneficial for her NAFLD. We discussed glp-1 /sglt-2 meds--fear of donut hole cost will not give those today.     Immunizations: up to date. (could consider new shingles vaccine shingrix in 2022).     Hypokalemia:  Stable lab of 4.1. Now able to swallow the micro-k capsules easily.     Hyperlipidemia: Stable. Patient is appropriately on high intensity statin which is indicated based on 2019 ACC/AHA guidelines for lipid management with an ASCVD risk of 33%.      Hypertension: Stable. Patient is meeting blood pressure goal of < 140/90mmHg. .       PLAN:                              1. FYI-- Weight down 15 lbs. in past 2 months--wow 206 to 191 today--congratulations!  Also blood sugars despite less insulin look great !!  Continue Metformin twice daily as is (can try a 3rd pill /day as an experiment for a few days if you want?), Continue Lantus ,Novolog, Glimepiride as is.    Also if your not hungry do not eat , eat small meals as you are, if cough reflex worsens --let me know if you need acid reflux pill.    2. I placed all 7 future lab orders for you today - fast off food x 12 hours , water for sure as one lab is urine albumin.     3. BLOOD PRESSURE great today 128/70mmhg.  Continue all blood pressure meds as is .           Follow-up: Return in about 9 weeks (around 11/14/2022), or 2  PM (fast x 12 hours beforehand for labs), for Lab Work, Medication Therapy Management Visit, Blood sugar meter review, Weight loss.    SUBJECTIVE/OBJECTIVE:                          Sarah eFlix is a 77 year old female coming in for a follow-up ( 22) visit. She was referred to me from Dr. Collette Fay.     Reason for visit:   6 weeks bs review post metformin start.     Has daily coughing that triggers her to throw up. Metformin related?   Feels its mucus down her throat .       Allergies/ADRs: Reviewed in chart  Tobacco: She reports that she quit smoking about 42 years ago. She has never used smokeless tobacco.  Alcohol: 7-9 beverages / week typically wine 1 or 2 glasses/night - reports that she hasn't had any alcohol in the last week and a half until they figure out her liver situation.   Caffeine: 2 cans diet coke sodas/day  Activity: reports she has low energy and stamina, if she does any activity she has to take breaks frequenty but now that the weather is better she is doing more outdoor work.   Past Medical History: Reviewed in chart  Personal Healthcare Goals: figure out what is going on with liver or blood, would like to get 3rd vaccine, improve a1c    Medication Adherence/Access:  Overall feeling lousy since starting Pioglitazone and would like to stop this medication    Type 2 Diabetes:  Currently taking : glimepiride 4 mg twice daily,  Lantus 36 units daily at midnight daily. Metformin 500mg. ER tabs -- 1 tab twice daily now.   Novolog -10 units at most once 1 x day with biggest viktoriya.   Potential side effects : 4 x week coughing that triggers her to throw up --usually after laying down for awhile.   Some mild diarrhea when Metformin started but resolved now.     SMB days bs ohi=728 n=10  14 days bs egw=772 n=18  30 days bs avg= 147 n=39      Date FBG/ 2hours post Lunch/2hours post Dinner /2hours post    22 130 10am      22   173 5;08pm    10 120 6;14am       9   177 5pm   111 3;39am   9-8  170 11;24am     9-7 81 5:17am 152 3;04pm      9-6   69 5pm 160 4:41 am        Wt Readings from Last 5 Encounters:   09/12/22 191 lb 8 oz (86.9 kg)   07/25/22 204 lb (92.5 kg)   06/23/22 206 lb 8 oz (93.7 kg)   04/18/22 201 lb 3.2 oz (91.3 kg)   03/28/22 204 lb 4.8 oz (92.7 kg)         Symptoms of low blood sugar? sweaty, Frequency of lows has been increasing (she admits she cannot tolerate bs's <110- if they go lower than that she feels crummy , sweaty. )  Symptoms of high blood sugar? Fatigue, shaky, pit in stomach, overall feeling poor   Eye exam: up to date  Foot exam: up to date  Diet: doing her best to control carbs in daily diet.   Previously: she's maintained similar diet and notes sugars now being elevated  eats 4-5 smaller meals - pasta and rices  Fruit and some fruit juices   Breakfast: cereal 2-3 times per week, drink 2 glasses OJ or grapefruit juice /day .   Exercise: see social history --saw liver specialist suggested swimming.   Aspirin: Taking 162 mg daily and denies side effects  Statin: Yes: atorvastatin 40 mg   ACEi/ARB: Yes: lisinopril 2.5 mg.   Urine Albumin:   Lab Results   Component Value Date    UMALCR  09/02/2021      Comment:      Unable to calculate:  Urine creatinine or albumin value below detectable level         Lab Results   Component Value Date    A1C 8.6 07/25/2022    A1C 8.2 04/18/2022    A1C 8.4 01/03/2022    A1C 7.8 09/27/2021    A1C 8.3 06/21/2021    A1C 9.4 12/14/2020    A1C 8.1 09/21/2020    A1C 8.5 01/07/2020    A1C 7.6 10/12/2019         Immunizations:  Up to date .  (consider new shingles vaccine -shingrix in 2022).     Hypokalemia:  Patient reports the new potassium capsule is working much better and is easy to take.   Potassium   Date Value Ref Range Status   03/28/2022 4.1 3.4 - 5.3 mmol/L Final   01/07/2020 4.0 3.4 - 5.3 mmol/L Final       Hyperlipidemia: Current therapy includes : atorvastatin 40mg daily.  Patient reports no significant myalgias or  other side effects.  The 10-year ASCVD risk score (Hancock KEI JrEder, et al., 2013) is: 36.2%    Values used to calculate the score:      Age: 77 years      Sex: Female      Is Non- : No      Diabetic: Yes      Tobacco smoker: No      Systolic Blood Pressure: 128 mmHg      Is BP treated: Yes      HDL Cholesterol: 47 mg/dL      Total Cholesterol: 168 mg/dL  Recent Labs   Lab Test 09/02/21  1501 01/07/20  1417   CHOL 168 139   HDL 47* 45*   LDL 76 63   TRIG 226* 154*       Hypertension: Current medications include : Metoprolol 25mg twice daily, Lisinopril 2.5mg./day + furosemide 20mg -2 tabs in am and 1 in afternoon.  Patient does not self-monitor blood pressure.  She has been feeling fatigued. In clinic her BP is on the lower end. Potentially causing her overall feeling of fatigue .    BP Readings from Last 3 Encounters:   09/12/22 128/70   07/25/22 108/60   06/23/22 128/81       ----------------    I spent 50 minutes with this patient today. All changes were made via collaborative practice agreement with Heide Fay MD. A copy of the visit note was provided to the patient's primary care provider.    The patient was given a summary of these recommendations. She admits computer not working at home to view mychart .     Joan Lopez [facility].  Medication Therapy Management Provider  [phone]           Medication Therapy Recommendations  No medication therapy recommendations to display

## 2022-09-12 ENCOUNTER — OFFICE VISIT (OUTPATIENT)
Dept: PHARMACY | Facility: CLINIC | Age: 77
End: 2022-09-12
Payer: COMMERCIAL

## 2022-09-12 VITALS
DIASTOLIC BLOOD PRESSURE: 70 MMHG | BODY MASS INDEX: 32.87 KG/M2 | SYSTOLIC BLOOD PRESSURE: 128 MMHG | OXYGEN SATURATION: 92 % | WEIGHT: 191.5 LBS | HEART RATE: 107 BPM

## 2022-09-12 DIAGNOSIS — E87.6 HYPOKALEMIA: ICD-10-CM

## 2022-09-12 DIAGNOSIS — Z79.4 TYPE 2 DIABETES MELLITUS WITH STAGE 2 CHRONIC KIDNEY DISEASE, WITH LONG-TERM CURRENT USE OF INSULIN (H): Primary | ICD-10-CM

## 2022-09-12 DIAGNOSIS — E11.22 TYPE 2 DIABETES MELLITUS WITH STAGE 2 CHRONIC KIDNEY DISEASE, WITH LONG-TERM CURRENT USE OF INSULIN (H): Primary | ICD-10-CM

## 2022-09-12 DIAGNOSIS — N18.2 TYPE 2 DIABETES MELLITUS WITH STAGE 2 CHRONIC KIDNEY DISEASE, WITH LONG-TERM CURRENT USE OF INSULIN (H): Primary | ICD-10-CM

## 2022-09-12 DIAGNOSIS — I10 ESSENTIAL HYPERTENSION: ICD-10-CM

## 2022-09-12 DIAGNOSIS — E78.5 HYPERLIPIDEMIA LDL GOAL <100: ICD-10-CM

## 2022-09-12 DIAGNOSIS — Z23 ENCOUNTER FOR IMMUNIZATION: ICD-10-CM

## 2022-09-12 DIAGNOSIS — Z13.21 ENCOUNTER FOR VITAMIN DEFICIENCY SCREENING: ICD-10-CM

## 2022-09-12 PROCEDURE — 99606 MTMS BY PHARM EST 15 MIN: CPT | Performed by: PHARMACIST

## 2022-09-12 NOTE — PATIENT INSTRUCTIONS
"Recommendations from today's MTM visit:                                                         1. FYI-- Weight down 15 lbs. in past 2 months--wow 206 to 191 today--congratulations!  Also blood sugars despite less insulin look great !!  Continue Metformin twice daily as is (can try a 3rd pill /day as an experiment for a few days if you want?), Continue Lantus ,Novolog, Glimepiride as is.    Also if your not hungry do not eat , eat small meals as you are, if cough reflex worsens --let me know if you need acid reflux pill.    2. I placed all 7 future lab orders for you today - fast off food x 12 hours , water for sure as one lab is urine albumin.     3. BLOOD PRESSURE great today 128/70mmhg.  Continue all blood pressure meds as is .           Follow-up: Return in about 9 weeks (around 11/14/2022), or 2 PM (Fast x 12 hours no food , water only)., for Lab Work, Medication Therapy Management Visit, Blood sugar meter review, Weight loss.    It was great speaking with you today.  I value your experience and would be very thankful for your time in providing feedback in our clinic survey. In the next few days, you may receive an email or text message from [employer] with a link to a survey related to your  clinical pharmacist.\"     To schedule another MTM appointment, please call the clinic directly or you may call the MTM scheduling line at [phone] or toll-free at [phone].     My Clinical Pharmacist's contact information:                                                      Please feel free to contact me with any questions or concerns you have.      Joan Lopez [facility].  Medication Therapy Management Provider  [phone]    "

## 2022-09-12 NOTE — Clinical Note
Dorian Murdock:  simran--every now and then I get new with medication change -- I talked kayce into  trialing Er dosage form of Metformin and she is tolerating 1 tab 2 x day --happy to report she feels full all the time and yes get this lost 15 lbs down to 191lbs today !!  Blood sugars dramatically better even on less insulins.   She will see dorian 9-23 for fibrosis scan etc,  she will see me 11-14-22 for fasting lipids, a1c, CMP labs etc.  Let know if any questions?  Thank you both ,Joan Lopez, [facility]. Medication Therapy Management Provider [phone]

## 2022-09-23 ENCOUNTER — LAB (OUTPATIENT)
Dept: LAB | Facility: CLINIC | Age: 77
End: 2022-09-23
Attending: PHYSICIAN ASSISTANT
Payer: COMMERCIAL

## 2022-09-23 ENCOUNTER — OFFICE VISIT (OUTPATIENT)
Dept: GASTROENTEROLOGY | Facility: CLINIC | Age: 77
End: 2022-09-23
Attending: PHYSICIAN ASSISTANT
Payer: COMMERCIAL

## 2022-09-23 VITALS
BODY MASS INDEX: 32.27 KG/M2 | WEIGHT: 188 LBS | SYSTOLIC BLOOD PRESSURE: 134 MMHG | DIASTOLIC BLOOD PRESSURE: 86 MMHG | OXYGEN SATURATION: 98 % | HEART RATE: 103 BPM

## 2022-09-23 DIAGNOSIS — E11.65 TYPE 2 DIABETES MELLITUS WITH HYPERGLYCEMIA, WITHOUT LONG-TERM CURRENT USE OF INSULIN (H): ICD-10-CM

## 2022-09-23 DIAGNOSIS — Z79.4 TYPE 2 DIABETES MELLITUS WITH STAGE 2 CHRONIC KIDNEY DISEASE, WITH LONG-TERM CURRENT USE OF INSULIN (H): ICD-10-CM

## 2022-09-23 DIAGNOSIS — E66.01 SEVERE OBESITY (BMI 35.0-39.9) WITH COMORBIDITY (H): ICD-10-CM

## 2022-09-23 DIAGNOSIS — K76.0 FATTY LIVER: Primary | ICD-10-CM

## 2022-09-23 DIAGNOSIS — Z13.21 ENCOUNTER FOR VITAMIN DEFICIENCY SCREENING: ICD-10-CM

## 2022-09-23 DIAGNOSIS — E78.5 HYPERLIPIDEMIA LDL GOAL <100: ICD-10-CM

## 2022-09-23 DIAGNOSIS — E11.22 TYPE 2 DIABETES MELLITUS WITH STAGE 2 CHRONIC KIDNEY DISEASE, WITH LONG-TERM CURRENT USE OF INSULIN (H): ICD-10-CM

## 2022-09-23 DIAGNOSIS — N18.2 TYPE 2 DIABETES MELLITUS WITH STAGE 2 CHRONIC KIDNEY DISEASE, WITH LONG-TERM CURRENT USE OF INSULIN (H): ICD-10-CM

## 2022-09-23 DIAGNOSIS — K76.0 FATTY LIVER: ICD-10-CM

## 2022-09-23 LAB
ALBUMIN SERPL BCG-MCNC: 4.2 G/DL (ref 3.5–5.2)
ALP SERPL-CCNC: 94 U/L (ref 35–104)
ALT SERPL W P-5'-P-CCNC: 28 U/L (ref 10–35)
ANION GAP SERPL CALCULATED.3IONS-SCNC: 15 MMOL/L (ref 7–15)
AST SERPL W P-5'-P-CCNC: 24 U/L (ref 10–35)
BILIRUB SERPL-MCNC: 0.9 MG/DL
BUN SERPL-MCNC: 13.9 MG/DL (ref 8–23)
CALCIUM SERPL-MCNC: 9.2 MG/DL (ref 8.8–10.2)
CHLORIDE SERPL-SCNC: 99 MMOL/L (ref 98–107)
CHOLEST SERPL-MCNC: 131 MG/DL
CREAT SERPL-MCNC: 1.06 MG/DL (ref 0.51–0.95)
CREAT UR-MCNC: 202 MG/DL
DEPRECATED CALCIDIOL+CALCIFEROL SERPL-MC: 44 UG/L (ref 20–75)
DEPRECATED HCO3 PLAS-SCNC: 23 MMOL/L (ref 22–29)
GFR SERPL CREATININE-BSD FRML MDRD: 55 ML/MIN/1.73M2
GLUCOSE SERPL-MCNC: 253 MG/DL (ref 70–99)
HBA1C MFR BLD: 9.9 %
HDLC SERPL-MCNC: 49 MG/DL
LDLC SERPL CALC-MCNC: 55 MG/DL
MICROALBUMIN UR-MCNC: 195 MG/L
MICROALBUMIN/CREAT UR: 96.53 MG/G CR (ref 0–25)
NONHDLC SERPL-MCNC: 82 MG/DL
POTASSIUM SERPL-SCNC: 4 MMOL/L (ref 3.4–5.3)
PROT SERPL-MCNC: 7.1 G/DL (ref 6.4–8.3)
SODIUM SERPL-SCNC: 137 MMOL/L (ref 136–145)
TRIGL SERPL-MCNC: 137 MG/DL
TSH SERPL DL<=0.005 MIU/L-ACNC: 3.55 UIU/ML (ref 0.3–4.2)
VIT B12 SERPL-MCNC: 187 PG/ML (ref 232–1245)

## 2022-09-23 PROCEDURE — 82043 UR ALBUMIN QUANTITATIVE: CPT | Mod: 90 | Performed by: PATHOLOGY

## 2022-09-23 PROCEDURE — 80053 COMPREHEN METABOLIC PANEL: CPT | Performed by: PATHOLOGY

## 2022-09-23 PROCEDURE — 83036 HEMOGLOBIN GLYCOSYLATED A1C: CPT | Mod: 90 | Performed by: PATHOLOGY

## 2022-09-23 PROCEDURE — 91200 LIVER ELASTOGRAPHY: CPT | Mod: 26 | Performed by: PHYSICIAN ASSISTANT

## 2022-09-23 PROCEDURE — 84443 ASSAY THYROID STIM HORMONE: CPT | Mod: 90 | Performed by: PATHOLOGY

## 2022-09-23 PROCEDURE — 36415 COLL VENOUS BLD VENIPUNCTURE: CPT | Performed by: PATHOLOGY

## 2022-09-23 PROCEDURE — 82607 VITAMIN B-12: CPT | Mod: 90 | Performed by: PATHOLOGY

## 2022-09-23 PROCEDURE — 99000 SPECIMEN HANDLING OFFICE-LAB: CPT | Performed by: PATHOLOGY

## 2022-09-23 PROCEDURE — 99215 OFFICE O/P EST HI 40 MIN: CPT | Mod: 25 | Performed by: PHYSICIAN ASSISTANT

## 2022-09-23 PROCEDURE — 82306 VITAMIN D 25 HYDROXY: CPT | Mod: 90 | Performed by: PATHOLOGY

## 2022-09-23 PROCEDURE — 80061 LIPID PANEL: CPT | Mod: 90 | Performed by: PATHOLOGY

## 2022-09-23 NOTE — LETTER
9/23/2022         RE: Sarah Felix  [address] [place] [address]        Dear Colleague,    Thank you for referring your patient, Sarah Felix, to the [facility] [place]. Please see a copy of my visit note below.    Hepatology Follow-up Clinic note  Sarah Felix   Date of Birth 1945  Date of Service 9/23/2022    Reason for follow-up: NAFLD         Assessment/plan:   Sarah Felix is a 77 year old female with history of elevated LFTs due to NAFLD. She had made several lifestyle changes over the past year leading to a 15 lb weight loss over the past few months. Her recent transaminases are normal. FibroScan today showing F0-F1, 7.2 kilopascals. We discussed the slow progression of fibrosis and primary treatment continues to be optimizing risk factors and weight loss.     # NALFD:   - Continue slow gradual weight loss   - Can consider GLP-1 Inhibitor to aid in both weight loss and insulin resistance. Discuss with primary care and MTM.   - Continue optimization of cholesterol. Statins okay with mildly elevated LFT's.    - Limit alcohol to 3 drinks a week      # Can consider repeat FibroScan in 3-5 years, especially if transaminases remain elevated     # Follow-up in clinic as needed    Jose Archer PA-C   [facility] Hepatology [facility]    Total time for E/M services performed on the date of the encounter 45 minutes; excluding performing and official interpretation of fibrosis scan reads.  This included review of previous: clinic visits, hospital records, lab results, imaging studies, and procedural documentation. Time also includes patient visit, documentations.  The findings from this review are summarized in the above note.   -----------------------------------------------------       HPI:   Sarah Felix is a 77 year old female presenting for follow-up.     Hepatic steatosis:   Risk factors: Obesity, diabetes mellitus on  "insulin, dyslipidemia, sedentary lifestyle, regular alcohol    Patient was last seen by me on 3/28/2022. No recent hospitalizations or ER visits.     Restarted Metformin ER recently. She is tolerating this better now.     Weight was up to 205 lbs and now down to 188 lbs.. Appetite not there, which she thinks is likely due to medications. Eating small amounts. No abdomen.     Have small bowel movements 2-3 times a day.     Patient denies jaundice, lower extremity edema, abdominal distension or confusion.  Patient also denies melena, hematochezia or hematemesis.    Patient denies weight loss, fevers, sweats or chills.    May have 2 drinks (2 shots) rum and coke 2-3 times a week.            Medications:     Current Outpatient Medications   Medication     alum & mag hydroxide-simethicone (MAALOX) 200-200-20 MG/5ML SUSP suspension     aspirin (ASA) 81 MG chewable tablet     atorvastatin (LIPITOR) 40 MG tablet     blood glucose (ONETOUCH ULTRA) test strip     CALCIUM 500 + D 500-200 MG-IU OR TABS     Cholecalciferol (VITAMIN D PO)     furosemide (LASIX) 20 MG tablet     glimepiride (AMARYL) 4 MG tablet     insulin aspart (NOVOLOG PEN) 100 UNIT/ML pen     insulin pen needle (BD REY U/F) 32G X 4 MM miscellaneous     insulin syringe-needle U-100 (BD INSULIN SYRINGE ULTRAFINE) 31G X 5/16\" 1 ML miscellaneous     Lancets (ONETOUCH DELICA PLUS GLHGKH48I) MISC     LANTUS SOLOSTAR 100 UNIT/ML soln     lisinopril (ZESTRIL) 2.5 MG tablet     metFORMIN (GLUCOPHAGE XR) 500 MG 24 hr tablet     metoprolol tartrate (LOPRESSOR) 25 MG tablet     nystatin (MYCOSTATIN) 250558 UNIT/GM external cream     potassium chloride ER (MICRO-K) 10 MEQ CR capsule     triamcinolone (KENALOG) 0.1 % external cream     No current facility-administered medications for this visit.            Review of Systems:   10 points ROS was obtained and highlighted in the HPI, otherwise negative.          Physical Exam:   /86   Pulse 103   Wt 85.3 kg (188 lb)  "  SpO2 98%   Breastfeeding No   BMI 32.27 kg/m      Gen: A&Ox3, NAD, well developed  HEENT: non-icteric  CV: RRR, no overt murmurs  Lung: CTA Bilatererally, no wheezing or crackles.   Lym- no palpable lymphadenopathy  Abd: soft, NT, ND, no organomegaly.   Ext: no edema, intact pulses.   Skin: No rash, no palmar erythema, telangiectasias or jaundice  Neuro: grossly intact, no asterixis   Psych: appropriate mood and affects         Data:   Reviewed in person and significant for:    Lab Results   Component Value Date     03/28/2022     01/07/2020      Lab Results   Component Value Date    POTASSIUM 4.1 03/28/2022    POTASSIUM 4.0 01/07/2020     Lab Results   Component Value Date    CHLORIDE 102 03/28/2022    CHLORIDE 102 01/07/2020     Lab Results   Component Value Date    CO2 28 03/28/2022    CO2 28 01/07/2020     Lab Results   Component Value Date    BUN 12 03/28/2022    BUN 11 01/07/2020     Lab Results   Component Value Date    CR 0.86 03/28/2022    CR 0.80 01/07/2020       Lab Results   Component Value Date    WBC 10.2 03/28/2022    WBC 10.0 11/25/2019     Lab Results   Component Value Date    HGB 13.4 03/28/2022    HGB 13.1 11/25/2019     Lab Results   Component Value Date    HCT 42.1 03/28/2022    HCT 40.7 11/25/2019     Lab Results   Component Value Date    MCV 98 03/28/2022    MCV 95 11/25/2019     Lab Results   Component Value Date     03/28/2022     11/25/2019       Lab Results   Component Value Date    AST 54 03/28/2022    AST 17 01/07/2020     Lab Results   Component Value Date    ALT 65 03/28/2022    ALT 36 01/07/2020     Lab Results   Component Value Date    BILICONJ 0.0 04/14/2010      Lab Results   Component Value Date    BILITOTAL 1.1 03/28/2022    BILITOTAL 0.7 01/07/2020       Lab Results   Component Value Date    ALBUMIN 3.6 03/28/2022    ALBUMIN 3.6 01/07/2020     Lab Results   Component Value Date    PROTTOTAL 7.0 03/28/2022    PROTTOTAL 6.8 01/07/2020      Lab Results    Component Value Date    ALKPHOS 103 03/28/2022    ALKPHOS 113 01/07/2020       Lab Results   Component Value Date    INR 1.03 03/28/2022    INR 1.09 10/12/2019     EXAMINATION: Limited Abdominal Ultrasound, 9/16/2021 3:05 PM      COMPARISON: CT of the abdomen and pelvis dated 1/3/2018  HISTORY: Fatty liver     FINDINGS:   Fluid: No evidence of ascites or pleural effusions.     Liver: The liver increased echogenicity with normal echotexture,  measuring 18.4 cm in craniocaudal dimension. There is an avascular  anechoic septated lesion of the left of the liver measuring 8.7 x 9.1  x 7.1 cm, stable. There is an avascular hypoechoic septated lesion  with acoustic enhancement of the left lobe of the liver measuring 1.1  x 1.1 x 1.0 cm, stable. There are areas of fat sparing adjacent to the  gallbladder fossa.     Gallbladder: There is no wall thickening, pericholecystic fluid, or  positive sonographic Hammer's sign. Numerous echogenic mobile  gallstones with sludge.  Bile Ducts: Both the intra- and extrahepatic biliary system are of  normal caliber.  The common bile duct measures 6 mm in diameter.     Pancreas: Pancreas is not well visualized on this exam.     Kidney: The right kidney measures 12.1 cm long. There is no  hydronephrosis or hydroureter, no shadowing renal calculi, cystic  lesion or mass. There is an echogenic focus in the upper pole of the  right kidney measuring 0.5 x 0.8 x 0.4 cm without twinkle artifact or  posterior acoustic shadowing.                                                                      IMPRESSION:   1.  Stable appearance of the hepatic cysts with thin septations as  described above.  2.   Echogenic and large liver consistent with hepatic steatosis.  3.  Cholelithiasis and sludge without evidence of cholecystitis.  4.  Echogenic focus in the right kidney, likely vascular calcification  or milk of calcium cyst.      Again, thank you for allowing me to participate in the care of your  patient.        Sincerely,        Jose Archer PA-C

## 2022-09-23 NOTE — PROGRESS NOTES
Hepatology Follow-up Clinic note  Sarah Felix   Date of Birth 1945  Date of Service 9/23/2022    Reason for follow-up:          Assessment/plan:   Sarah Felix is a 77 year old female with *** .        Follow-up in clinic *** or sooner as needed    Jose Archer PA-C   [facility] Hepatology clinic    -----------------------------------------------------       HPI:   Sarah Felix is a 77 year old female with *** presenting for follow-up.     Dx:   Etiology:   Bx:      Patient reports***    Hospitalizations***, ER***    Current labs include: AST, ALT, Alk Phos, Bilirubin, Indirect Bilirubin, Creatine, Hemoglobin, Platelets, INR***    Patient denies jaundice, lower extremity edema, abdominal distension or confusion.      Patient also denies melena, hematochezia or hematemesis.    Patient denies weight loss, fevers, sweats or chills.    Current ETOH includes ***. No previous IV/IN drug use. Works ***     Health Maintenance:  Colonoscopy: ***  HCC screening: ***  Dexa scan: ***  Hep A/B vaccines: ***      Medical hx Surgical hx   Past Medical History:   Diagnosis Date     Allergic rhinitis, cause unspecified      Basal cell carcinoma of face 3/2012     Closed bimalleolar fracture of left ankle with routine healing 10/11/2019     Contact dermatitis and other eczema, due to unspecified cause      Cyst of thyroid 1998     CYSTIC LIVER DIS      Cystocele, lateral      Diabetes mellitus (H)      Diverticulitis of colon (without mention of hemorrhage)(562.11) 6/04     Embolism and thrombosis of unspecified site age 22     Esophageal reflux      Fatty liver      Hiatal hernia      Hyperlipidemia      Nontoxic uninodular goiter      Osteopenia 4/21/2005     Other chronic otitis externa      Other specified disorders of liver      Pure hypercholesterolemia     Past Surgical History:   Procedure Laterality Date     APPENDECTOMY  age 22     APPLY EXTERNAL FIXATOR LOWER EXTREMITY Left 10/12/2019     Procedure: Left ankle external fixator placement;  Surgeon: Leeanne Brown MD;  [facility]     C DEXA, BONE DENSITY, AXIAL SKEL  2005     C DEXA, BONE DENSITY, AXIAL SKEL  6/2007    No signif change in Osteopenia     CATARACT IOL, RT/LT       COLONOSCOPY  4/2006    repeat 10 yr     ENDOSCOPIC ULTRASOUND UPPER GASTROINTESTINAL TRACT (GI) N/A 9/6/2018    Procedure: ENDOSCOPIC ULTRASOUND, ESOPHAGOSCOPY / UPPER GASTROINTESTINAL TRACT (GI);  Endoscopic Ultrasound, Esophagogastroduodenoscopy with endoscopic mucosal resection;  Surgeon: Hood Brower MD;  [facility]     ESOPHAGOSCOPY, GASTROSCOPY, DUODENOSCOPY (EGD), COMBINED N/A 8/13/2018    Procedure: COMBINED ESOPHAGOSCOPY, GASTROSCOPY, DUODENOSCOPY (EGD), BIOPSY SINGLE OR MULTIPLE;  EGD;  Surgeon: Hood Brower MD;  [facility]     ESOPHAGOSCOPY, GASTROSCOPY, DUODENOSCOPY (EGD), COMBINED N/A 3/14/2019    Procedure: Upper Endoscopy;  Surgeon: Hood Brower MD;  [facility]     ESOPHAGOSCOPY, GASTROSCOPY, DUODENOSCOPY (EGD), COMBINED  08/13/2018, 3/14/19     ESOPHAGOSCOPY, GASTROSCOPY, DUODENOSCOPY (EGD), RESECT MUCOSA, COMBINED N/A 9/6/2018    Procedure: COMBINED ESOPHAGOSCOPY, GASTROSCOPY, DUODENOSCOPY (EGD), RESECT MUCOSA;;  Surgeon: Hood Brower MD;  [facility]     GYN SURGERY  10/22/08    pelvic support     HEMORRHOID SURGERY       HEMORRHOIDECTOMY  8/08     MOHS MICROGRAPHIC PROCEDURE       MOHS MICROGRAPHIC PROCEDURE       OPEN REDUCTION INTERNAL FIXATION ANKLE Left 10/28/2019    Procedure: Internal fixation left bimalleolar ankle fracture, removal external fixator;  Surgeon: Jose Roberto Can MD;  [facility]     ORIF ANKLE FRACTURE BIMALLEOLAR Left 10/28/2019    Internal fixation left bimalleolar ankle fracture, removal external fixator     OTHER SURGICAL HISTORY  2008    Transobturator tape for Urinary Incontinence       OTHER SURGICAL HISTORY      PELVIC SUPPORT SURGERY     OTHER SURGICAL HISTORY  09/06/2018  "   ENDOSCOPIC ULTRASOUND UPPER GASTROINTESTINAL TRACT (GI)     OTHER SURGICAL HISTORY  09/06/2018    ESOPHAGOSCOPY, GASTROSCOPY, DUODENOSCOPY (EGD), RESECT MUCOSA, COMBINED     OTHER SURGICAL HISTORY Left 10/12/2019    APPLY EXTERNAL FIXATOR LOWER EXTREMITYProcedure: Left ankle external fixator placement; Surgeon: Leeanne Brown MD; [facility]       OVARIAN CYST REMOVAL Left age 22    L ovarian cyst removal, laparotomy       PHACOEMULSIFICATION CLEAR CORNEA WITH STANDARD INTRAOCULAR LENS IMPLANT Left 9/25/2020    Procedure: LEFT CATARACT REMOVAL WITH INTRAOCULAR LENS IMPLANT;  Surgeon: Janay Amezcua MD;  [facility]     SURGICAL HISTORY OF -   age 22    L ovarian cyst removal, laparotomy     SURGICAL HISTORY OF -   1998    partial thyroidectomy     SURGICAL HISTORY OF -   10/08    Transobturator tape for Urinary Incontinence     SURGICAL HISTORY OF -   11/2011    stress test normal     THYROID SURGERY      Had cyst removed, thyroid gland is intact.     THYROIDECTOMY, PARTIAL  1998     ZZC APPENDECTOMY  age 22                 Medications:     Current Outpatient Medications   Medication     alum & mag hydroxide-simethicone (MAALOX) 200-200-20 MG/5ML SUSP suspension     aspirin (ASA) 81 MG chewable tablet     atorvastatin (LIPITOR) 40 MG tablet     blood glucose (ONETOUCH ULTRA) test strip     CALCIUM 500 + D 500-200 MG-IU OR TABS     Cholecalciferol (VITAMIN D PO)     furosemide (LASIX) 20 MG tablet     glimepiride (AMARYL) 4 MG tablet     insulin aspart (NOVOLOG PEN) 100 UNIT/ML pen     insulin pen needle (BD REY U/F) 32G X 4 MM miscellaneous     insulin syringe-needle U-100 (BD INSULIN SYRINGE ULTRAFINE) 31G X 5/16\" 1 ML miscellaneous     Lancets (ONETOUCH DELICA PLUS [id number]) MISC     LANTUS SOLOSTAR 100 UNIT/ML soln     lisinopril (ZESTRIL) 2.5 MG tablet     metFORMIN (GLUCOPHAGE XR) 500 MG 24 hr tablet     metoprolol tartrate (LOPRESSOR) 25 MG tablet     nystatin (MYCOSTATIN) [id number] " UNIT/GM external cream     potassium chloride ER (MICRO-K) 10 MEQ CR capsule     triamcinolone (KENALOG) 0.1 % external cream     No current facility-administered medications for this visit.            Allergies:     Allergies   Allergen Reactions     Actos [Pioglitazone]      Fatigue - felt crummy      Amlodipine Swelling     Ancef [Cefazolin]      Levaquin Rash     Itching, rash     Cephalosporins Rash     Clindamycin Rash     Levofloxacin Itching and Rash     Nickel Rash     Contact reaction  Contact reaction            Review of Systems:   10 points ROS was obtained and highlighted in the HPI, otherwise negative.          Physical Exam:   VS:  There were no vitals taken for this visit.    ***  Gen- well, NAD, A+Ox3, normal color  Lym- no palpable LAD  CVS- RRR  RS- CTA  Abd-   Extr- hands normal, no PABLO  Skin- no rash or jaundice  Neuro- no asterixis  Psych- normal mood           Data:   Reviewed in person and significant for:    Lab Results   Component Value Date     03/28/2022     01/07/2020      Lab Results   Component Value Date    POTASSIUM 4.1 03/28/2022    POTASSIUM 4.0 01/07/2020     Lab Results   Component Value Date    CHLORIDE 102 03/28/2022    CHLORIDE 102 01/07/2020     Lab Results   Component Value Date    CO2 28 03/28/2022    CO2 28 01/07/2020     Lab Results   Component Value Date    BUN 12 03/28/2022    BUN 11 01/07/2020     Lab Results   Component Value Date    CR 0.86 03/28/2022    CR 0.80 01/07/2020       Lab Results   Component Value Date    WBC 10.2 03/28/2022    WBC 10.0 11/25/2019     Lab Results   Component Value Date    HGB 13.4 03/28/2022    HGB 13.1 11/25/2019     Lab Results   Component Value Date    HCT 42.1 03/28/2022    HCT 40.7 11/25/2019     Lab Results   Component Value Date    MCV 98 03/28/2022    MCV 95 11/25/2019     Lab Results   Component Value Date     03/28/2022     11/25/2019       Lab Results   Component Value Date    AST 54 03/28/2022    AST  17 01/07/2020     Lab Results   Component Value Date    ALT 65 03/28/2022    ALT 36 01/07/2020     Lab Results   Component Value Date    BILICONJ 0.0 04/14/2010      Lab Results   Component Value Date    BILITOTAL 1.1 03/28/2022    BILITOTAL 0.7 01/07/2020       Lab Results   Component Value Date    ALBUMIN 3.6 03/28/2022    ALBUMIN 3.6 01/07/2020     Lab Results   Component Value Date    PROTTOTAL 7.0 03/28/2022    PROTTOTAL 6.8 01/07/2020      Lab Results   Component Value Date    ALKPHOS 103 03/28/2022    ALKPHOS 113 01/07/2020       Lab Results   Component Value Date    INR 1.03 03/28/2022    INR 1.09 10/12/2019

## 2022-09-23 NOTE — PATIENT INSTRUCTIONS
"The scan to assess scar tissue (fibrosis) showed that you have minimal to no scarring. We give the liver fibrosis a score from 0 to 4. 0 means no scar tissue. 1 means a little bit (mild). 2 is some (moderate). 3 is called \"bridging fibrosis\" and represents significant scarring (severe). 4 indicates cirrhosis.     Your fibrosis score was 0-1.     S3 (Steatosis Grade) means Higher than 67% amount of liver with fatty changes     It was a pleasure to see you at your recent visit. Please let me know if you have any questions or concerns.     Clinic Staff - [phone] option 3     Sincerely,     Jose Archer PA-C   [facility], [id number]  [place]  [address].        "

## 2022-09-23 NOTE — PROGRESS NOTES
Hepatology Follow-up Clinic note  Sarah Felix   Date of Birth 1945  Date of Service 9/23/2022    Reason for follow-up: NAFLD         Assessment/plan:   Sarah Felix is a 77 year old female with history of elevated LFTs due to NAFLD. She had made several lifestyle changes over the past year leading to a 15 lb weight loss over the past few months. Her recent transaminases are normal. FibroScan today showing F0-F1, 7.2 kilopascals. We discussed the slow progression of fibrosis and primary treatment continues to be optimizing risk factors and weight loss.     # NALFD:   - Continue slow gradual weight loss   - Can consider GLP-1 Inhibitor to aid in both weight loss and insulin resistance. Discuss with primary care and MTM.   - Continue optimization of cholesterol. Statins okay with mildly elevated LFT's.    - Limit alcohol to 3 drinks a week      # Can consider repeat FibroScan in 3-5 years, especially if transaminases remain elevated     # Follow-up in clinic as needed    Jose Archer PA-C   [facility] Hepatology clinic    Total time for E/M services performed on the date of the encounter 45 minutes; excluding performing and official interpretation of fibrosis scan reads.  This included review of previous: clinic visits, hospital records, lab results, imaging studies, and procedural documentation. Time also includes patient visit, documentations.  The findings from this review are summarized in the above note.   -----------------------------------------------------       HPI:   Sarah Felix is a 77 year old female presenting for follow-up.     Hepatic steatosis:   Risk factors: Obesity, diabetes mellitus on insulin, dyslipidemia, sedentary lifestyle, regular alcohol    Patient was last seen by me on 3/28/2022. No recent hospitalizations or ER visits.     Restarted Metformin ER recently. She is tolerating this better now.     Weight was up to 205 lbs and now down to 188 lbs..  "Appetite not there, which she thinks is likely due to medications. Eating small amounts. No abdomen.     Have small bowel movements 2-3 times a day.     Patient denies jaundice, lower extremity edema, abdominal distension or confusion.  Patient also denies melena, hematochezia or hematemesis.    Patient denies weight loss, fevers, sweats or chills.    May have 2 drinks (2 shots) rum and coke 2-3 times a week.            Medications:     Current Outpatient Medications   Medication     alum & mag hydroxide-simethicone (MAALOX) 200-200-20 MG/5ML SUSP suspension     aspirin (ASA) 81 MG chewable tablet     atorvastatin (LIPITOR) 40 MG tablet     blood glucose (ONETOUCH ULTRA) test strip     CALCIUM 500 + D 500-200 MG-IU OR TABS     Cholecalciferol (VITAMIN D PO)     furosemide (LASIX) 20 MG tablet     glimepiride (AMARYL) 4 MG tablet     insulin aspart (NOVOLOG PEN) 100 UNIT/ML pen     insulin pen needle (BD REY U/F) 32G X 4 MM miscellaneous     insulin syringe-needle U-100 (BD INSULIN SYRINGE ULTRAFINE) 31G X 5/16\" 1 ML miscellaneous     Lancets (ONETOUCH DELICA PLUS [id number]) MISC     LANTUS SOLOSTAR 100 UNIT/ML soln     lisinopril (ZESTRIL) 2.5 MG tablet     metFORMIN (GLUCOPHAGE XR) 500 MG 24 hr tablet     metoprolol tartrate (LOPRESSOR) 25 MG tablet     nystatin (MYCOSTATIN) [id number] UNIT/GM external cream     potassium chloride ER (MICRO-K) 10 MEQ CR capsule     triamcinolone (KENALOG) 0.1 % external cream     No current facility-administered medications for this visit.            Review of Systems:   10 points ROS was obtained and highlighted in the HPI, otherwise negative.          Physical Exam:   /86   Pulse 103   Wt 85.3 kg (188 lb)   SpO2 98%   Breastfeeding No   BMI 32.27 kg/m      Gen: A&Ox3, NAD, well developed  HEENT: non-icteric  CV: RRR, no overt murmurs  Lung: CTA Bilatererally, no wheezing or crackles.   Lym- no palpable lymphadenopathy  Abd: soft, NT, ND, no organomegaly.   Ext: no edema, " intact pulses.   Skin: No rash, no palmar erythema, telangiectasias or jaundice  Neuro: grossly intact, no asterixis   Psych: appropriate mood and affects         Data:   Reviewed in person and significant for:    Lab Results   Component Value Date     03/28/2022     01/07/2020      Lab Results   Component Value Date    POTASSIUM 4.1 03/28/2022    POTASSIUM 4.0 01/07/2020     Lab Results   Component Value Date    CHLORIDE 102 03/28/2022    CHLORIDE 102 01/07/2020     Lab Results   Component Value Date    CO2 28 03/28/2022    CO2 28 01/07/2020     Lab Results   Component Value Date    BUN 12 03/28/2022    BUN 11 01/07/2020     Lab Results   Component Value Date    CR 0.86 03/28/2022    CR 0.80 01/07/2020       Lab Results   Component Value Date    WBC 10.2 03/28/2022    WBC 10.0 11/25/2019     Lab Results   Component Value Date    HGB 13.4 03/28/2022    HGB 13.1 11/25/2019     Lab Results   Component Value Date    HCT 42.1 03/28/2022    HCT 40.7 11/25/2019     Lab Results   Component Value Date    MCV 98 03/28/2022    MCV 95 11/25/2019     Lab Results   Component Value Date     03/28/2022     11/25/2019       Lab Results   Component Value Date    AST 54 03/28/2022    AST 17 01/07/2020     Lab Results   Component Value Date    ALT 65 03/28/2022    ALT 36 01/07/2020     Lab Results   Component Value Date    BILICONJ 0.0 04/14/2010      Lab Results   Component Value Date    BILITOTAL 1.1 03/28/2022    BILITOTAL 0.7 01/07/2020       Lab Results   Component Value Date    ALBUMIN 3.6 03/28/2022    ALBUMIN 3.6 01/07/2020     Lab Results   Component Value Date    PROTTOTAL 7.0 03/28/2022    PROTTOTAL 6.8 01/07/2020      Lab Results   Component Value Date    ALKPHOS 103 03/28/2022    ALKPHOS 113 01/07/2020       Lab Results   Component Value Date    INR 1.03 03/28/2022    INR 1.09 10/12/2019     EXAMINATION: Limited Abdominal Ultrasound, 9/16/2021 3:05 PM      COMPARISON: CT of the abdomen and pelvis  dated 1/3/2018  HISTORY: Fatty liver     FINDINGS:   Fluid: No evidence of ascites or pleural effusions.     Liver: The liver increased echogenicity with normal echotexture,  measuring 18.4 cm in craniocaudal dimension. There is an avascular  anechoic septated lesion of the left of the liver measuring 8.7 x 9.1  x 7.1 cm, stable. There is an avascular hypoechoic septated lesion  with acoustic enhancement of the left lobe of the liver measuring 1.1  x 1.1 x 1.0 cm, stable. There are areas of fat sparing adjacent to the  gallbladder fossa.     Gallbladder: There is no wall thickening, pericholecystic fluid, or  positive sonographic Hammer's sign. Numerous echogenic mobile  gallstones with sludge.  Bile Ducts: Both the intra- and extrahepatic biliary system are of  normal caliber.  The common bile duct measures 6 mm in diameter.     Pancreas: Pancreas is not well visualized on this exam.     Kidney: The right kidney measures 12.1 cm long. There is no  hydronephrosis or hydroureter, no shadowing renal calculi, cystic  lesion or mass. There is an echogenic focus in the upper pole of the  right kidney measuring 0.5 x 0.8 x 0.4 cm without twinkle artifact or  posterior acoustic shadowing.                                                                      IMPRESSION:   1.  Stable appearance of the hepatic cysts with thin septations as  described above.  2.   Echogenic and large liver consistent with hepatic steatosis.  3.  Cholelithiasis and sludge without evidence of cholecystitis.  4.  Echogenic focus in the right kidney, likely vascular calcification  or milk of calcium cyst.

## 2022-09-26 NOTE — RESULT ENCOUNTER NOTE
Ubaldo Smith,  Thanks for completing your labs.  You have several new abnormalities.  None of these are urgent but I would like to discuss this all with you.  Please schedule a virtual visit (a scheduled video or telephone visit) with me.  I do those on Wednesdays and have availability this Wednesday.  Heide Fay MD

## 2022-09-27 ENCOUNTER — TELEPHONE (OUTPATIENT)
Dept: PHARMACY | Facility: CLINIC | Age: 77
End: 2022-09-27

## 2022-09-27 DIAGNOSIS — I10 ESSENTIAL HYPERTENSION: ICD-10-CM

## 2022-09-27 DIAGNOSIS — E11.9 TYPE 2 DIABETES, HBA1C GOAL < 7% (H): ICD-10-CM

## 2022-09-27 RX ORDER — GLIMEPIRIDE 4 MG/1
TABLET ORAL
Qty: 180 TABLET | Refills: 1 | Status: SHIPPED | OUTPATIENT
Start: 2022-09-27 | End: 2023-05-26

## 2022-09-27 RX ORDER — FUROSEMIDE 20 MG
TABLET ORAL
Qty: 270 TABLET | Refills: 1 | Status: SHIPPED | OUTPATIENT
Start: 2022-09-27 | End: 2023-02-16

## 2022-09-27 NOTE — TELEPHONE ENCOUNTER
9-27-22    mtm called patient to discuss recent lab results; she remarks she needs [facility]ills [facility] for glimepiride and furosemide.     Also Vitamin B-12 is too low at 187 --start over the counter B-12 pill -1,000 mcg./day .    Her a1c is surprising ? Is that accurate ???    Lab Results   Component Value Date    A1C 9.9 09/23/2022    A1C 8.6 07/25/2022    A1C 8.2 04/18/2022    A1C 8.4 01/03/2022    A1C 7.8 09/27/2021    A1C 8.3 06/21/2021    A1C 9.4 12/14/2020    A1C 8.1 09/21/2020    A1C 8.5 01/07/2020    A1C 7.6 10/12/2019     She is trying to increase metformin to 3 tabs /day now .    lantus is 36 units/day .    Novolog is rare now --10 units at big carb meal.     Glimepiride 4mg. Tab twice daily.    She reports her bs last night at midnight =186, today ate meatloaf, cheese, apple, and a leftover mashed potatos and corn  from fridge .  She will check her bs in 2 hours and send me result via [employer] or [email or web address].     Check daily fasting in am and 2 hours after a main meal.     Joan Lopez Rph.  Medication Therapy Management Provider  [phone]

## 2022-09-28 NOTE — TELEPHONE ENCOUNTER
9-28-22      mtm f/up via phone --patient reports 3 am bs this morning of 152 and fasting 9:40am bs today of 113.    mtm reiterated she is to check 2 hours after biggest meal/day next 7 days and call me with bs readings . If those are all good I will repeat a1c 11-1-422  At next mtm OV .    If they are high --will encourage restart of her novolog mealtime insulin.    Thx.    Joan Lopez Rp.  Medication Therapy Management Provider  [phone]

## 2022-09-29 RX ORDER — FUROSEMIDE 20 MG
TABLET ORAL
Qty: 270 TABLET | Refills: 3 | OUTPATIENT
Start: 2022-09-29

## 2022-09-29 RX ORDER — METOPROLOL TARTRATE 25 MG/1
TABLET, FILM COATED ORAL
Qty: 180 TABLET | Refills: 3 | Status: SHIPPED | OUTPATIENT
Start: 2022-09-29 | End: 2023-02-16

## 2022-09-29 RX ORDER — GLIMEPIRIDE 4 MG/1
TABLET ORAL
Qty: 180 TABLET | Refills: 3 | OUTPATIENT
Start: 2022-09-29

## 2022-09-29 NOTE — TELEPHONE ENCOUNTER
Metoprolol.   Prescription approved per [facility] Refill Protocol.    Glimepiride.   Duplicate. Ordered on 9/27/2022 by [email or web address]    Furosemide.   Duplicate. Ordered on 9/27/2022 by FREDY Aguilar RN  [facility]

## 2022-10-02 NOTE — RESULT ENCOUNTER NOTE
Patient is not reading her MyChart messages.  Please relay this result note.  If she is having trouble accessing [employer] please have her contact IT support.  Otherwise we can deactivate her MyChart so she will get lab letters.

## 2022-10-03 ENCOUNTER — E-VISIT (OUTPATIENT)
Dept: FAMILY MEDICINE | Facility: CLINIC | Age: 77
End: 2022-10-03
Payer: COMMERCIAL

## 2022-10-03 ENCOUNTER — TELEPHONE (OUTPATIENT)
Dept: FAMILY MEDICINE | Facility: CLINIC | Age: 77
End: 2022-10-03

## 2022-10-03 DIAGNOSIS — N39.0 ACUTE UTI (URINARY TRACT INFECTION): Primary | ICD-10-CM

## 2022-10-03 PROCEDURE — 99421 OL DIG E/M SVC 5-10 MIN: CPT | Performed by: FAMILY MEDICINE

## 2022-10-03 RX ORDER — SULFAMETHOXAZOLE/TRIMETHOPRIM 800-160 MG
1 TABLET ORAL 2 TIMES DAILY
Qty: 10 TABLET | Refills: 0 | Status: SHIPPED | OUTPATIENT
Start: 2022-10-03 | End: 2022-10-08

## 2022-10-03 NOTE — TELEPHONE ENCOUNTER
I called pt.     She is willing to do a virtual visit but now there are no openings left for you on Wed the 5th    I offered Wed Oct 12th- pt said this is too long to wait, she says she is now having urinary symptoms including burning with urination and she would like to talk about this as well as the lab results.     Dr. Fay- would you want to double book her for a virtual on Wed the 5th? Or should she come into clinic on Thurs with your approval req slot?      [phone]; ok to leave detailed message.     Ivett Elizondo, MEGANN RN  [facility]

## 2022-10-03 NOTE — PATIENT INSTRUCTIONS
Dear Sarah Felix    After reviewing your responses, I've been able to diagnose you with a urinary tract infection, which is a common infection of the bladder with bacteria.  This is not a sexually transmitted infection, though urinating immediately after intercourse can help prevent infections.  Drinking lots of fluids is also helpful to clear your current infection and prevent the next one.      I have sent a prescription for antibiotics to your pharmacy to treat this infection.  HOWEVER, I strongly recommend that you leave a urine sample prior to starting the antibiotics due to the prevalence of antibiotic resistance.  Please schedule a lab-only appointment at any [facility] to leave a urine sample for urinalysis and culture as soon as possible, preferably some time today.  Once you have left the urine sample go ahead and start the antibiotic.  If the culture shows you have a resistant organism we can then adjust your antibiotic accordingly.     It is important that you take all of your prescribed medication even if your symptoms are improving after a few doses.  Taking all of your medicine helps prevent the symptoms from returning.     If your symptoms worsen, you develop pain in your back or stomach, develop fevers, or are not improving in 5 days, please contact your primary care provider for an appointment or visit any of our convenient Walk-in or Urgent Care Centers to be seen, which can be found on our website here.    Thanks again for choosing us as your health care partner,    Heide Fay MD

## 2022-10-03 NOTE — TELEPHONE ENCOUNTER
No, there is no rush to discussing her abnormal labs.  She can schedule in the next available virtual visit.      If she is having new urinary symptoms she should start an e-visit or come in to urgent care.     Heide Fay MD  [facility]

## 2022-10-03 NOTE — TELEPHONE ENCOUNTER
Heide Fay MD   10/2/2022  5:59 PM CDT Back to Top        Patient is not reading her [employer] messages.  Please relay this result note.  If she is having trouble accessing [employer] please have her contact IT support.  Otherwise we can deactivate her [employer] so she will get lab letters.      Heide Fay MD   9/26/2022  6:04 PM CDT         Ubaldo Smith  Thanks for completing your labs.  You have several new abnormalities.  None of these are urgent but I would like to discuss this all with you.  Please schedule a virtual visit (a scheduled video or telephone visit) with me.  I do those on Wednesdays and have availability this Wednesday.  Heide Fay MD

## 2022-10-03 NOTE — TELEPHONE ENCOUNTER
I called pt back    Scheduled for 10/12 phone visit, she will try to do an E-Visit today and if can't figure it out she will go to MEGAN ChaseN RN  [facility]

## 2022-10-07 ENCOUNTER — TELEPHONE (OUTPATIENT)
Dept: PHARMACY | Facility: CLINIC | Age: 77
End: 2022-10-07

## 2022-10-07 NOTE — TELEPHONE ENCOUNTER
10-7-22        Sarah calls with bs updates:    Date FBG/ 2hours post Lunch/2hours post Dinner /2hours post    10-7-22 98 @ 8am       10-6-22 65 @ 8 am  142 @ 1:30 pm -2 hours post meal      10-5-22 108 @6 am  287 2;30 pm -- drinking cranberry juice for uti. She stopped juice now.     10-4-22   63 @ 8;20am 3 pm =120      10-3-22 2 am =145 10 am 130 - post bfast      10-2-22 77 @ 10 am   168 - 6pm -2 hours post meal    10-1-22 62 @8am  170 @10 pm- 2 hours post meal       9-30-22 : 71 at 2am ,. Noon 58 -crappy, 10 pm =184 - 2 hours post meal    9-29-22:  84 2:40am  , 66 10;45am           191 - 2 hours post meal      Her current lantus dose is 36 units once daily -midafternnon. Glimepiride 4mg twice daily, metformin 500mg er tabs --2 am and 1 pm -mild stomach issues , Novolog -not using any of this insulin currently.    Using in date test strips  Per patient.     Her B-12 - level was 187 --she picked up 2500mcg./sl-pill-- she will take 3-4 x week --the  Recheck lab 3-2023.     Plan:    1. reduce lantus insulin to 33 units now, (try to eliminate bs <70), stay on all same dm meds --recheck A1c November 2022.     2. Stop drinking cranberry juice now for uti prevention.     Joan Lopez [facility].  Medication Therapy Management Provider  [phone]

## 2022-10-10 ENCOUNTER — PATIENT OUTREACH (OUTPATIENT)
Dept: CARE COORDINATION | Facility: CLINIC | Age: 77
End: 2022-10-10

## 2022-10-10 ENCOUNTER — HEALTH MAINTENANCE LETTER (OUTPATIENT)
Age: 77
End: 2022-10-10

## 2022-10-10 NOTE — LETTER
[facility] CARE COORDINATION  [address] [facility] [address]  October 10, 2022    Sarah Felix  [address] [place] [address]      Dear Sarah,        I am a clinic social work care coordinator who works with Heide Fay MD with the [facility]. I wanted to thank you for spending the time to talk with me.    Please feel free to contact me with any questions or concerns regarding care coordination and what we can offer.      We are focused on providing you with the highest-quality healthcare experience possible.    Sincerely,     WILMAR Boinlla   [facility] Primary [facility] - Clinic Care Coordination  [phone]

## 2022-10-10 NOTE — LETTER
[facility]  Patient Centered Plan of Care  About Me:        Patient Name:  Sarah Felix    YOB: 1945  Age:         77 year old   Renny MRN:    [id number] Telephone Information:  Home Phone [phone]   Mobile [phone]       Address:  [address] [place] [address] Email address:  [email or web address]      Emergency Contact(s)    Name Relationship Lgl Grd Work Phone Home Phone Mobile Phone   1. YURI FELIX Brother No  [phone]   2. YURI FELIX Other   [phone]           Primary language:  English     needed? No   [facility]:  [phone] op. 1  Other communication barriers:Lack of coping    Preferred Method of Communication:  Mail  Current living arrangement: I live alone    Mobility Status/ Medical Equipment: Independent        Health Maintenance  Health Maintenance Reviewed: No data recorded    My Access Plan  Medical Emergency 911   Primary Clinic Line [facility] [phone]   24 Hour Appointment Line [phone] or  [phone]) (toll-free)   24 Hour Nurse Line [phone] (toll-free)   Preferred Urgent Care No data recorded   Preferred Hospital No data recorded   Preferred Pharmacy [facility] - [place], [place] - [address]     Behavioral Health Crisis Line The National Suicide Prevention Lifeline at 1-862.501.1053 or Text/Call 758             My Care Team Members  Patient Care Team       Relationship Specialty Notifications Start End    Heide Fay MD PCP - General Family Medicine  9/27/21     Phone: [phone] Fax: [phone]         [address] [facility] [facility] [address]    Joan Lopez RPH Pharmacist   11/7/11      [address] [facility] [address]    Anita Treadwell MD Referring Physician OB/Gyn  3/30/15     Phone: [phone] Fax: [phone]         [address] [place], [place] [address]NATALIA SWANN  [facility] [address] [facility] [address]    Murphy Sargent MD MD Urology  8/19/15     Phone: [phone] Fax: [phone]         [address] [facility] [address] [facility] [address]    Apolinar Artis MD MD Orthopedics  5/26/17     Phone: [phone] Fax: [phone]         3 [facility] [address]    Janay Amezcua MD MD Ophthalmology  8/12/20     Phone: [phone] Fax: [phone]         [address] [facility] [address]    Leventhal, Thomas Michael, MD MD Gastroenterology  9/20/21     Referred by Heide Hopson for Elevated transaminase level, Fatty Liver and Hepatomegaly    Phone: [phone] Fax: [phone]         [address] [place] [address]    Heide Fay MD MD Family Medicine  9/20/21     Referred Pt to Hepatology for Elevated transaminase level, fatty liver and Hepatomegaly    Phone: [phone] Fax: [phone]         [facility] [address]    Manjeet Wilde MD MD Hematology All results, Admissions 9/21/21     Phone: [phone] Fax: [phone]         [address] [place] [address]    Leventhal, Thomas Michael, MD Assigned Gastroenterology Provider   10/10/21     Phone: [phone] Fax: [phone]         [address] [place] [address]    Heide Fay MD Assigned PCP   6/18/22     Phone: [phone] Fax: [phone]         [facility] [address]    Daniel Murphy MD MD Ophthalmology  6/27/22     Phone: [phone] Fax: [phone]         [facility] [address]    Mony Daily Lead Care Coordinator  Admissions 6/27/22     Joan Lopez RPH Assigned MTM Pharmacist   9/28/22      [address] [facility] [address]            My Care Plans  Self Management and Treatment Plan  Care Plan  Care Plan: Transportation     Problem: Lack of transportation     Goal: Establish reliable transportation      Start Date: 6/27/2022 Expected End Date: 9/27/2022    Note:     Goal Statement: I want to obtain transportation in order to complete a colonoscopy  Date Goal set: 6/27/2022  Barriers: lack of transportation  Strengths: motivated to complete   Date to Achieve By: 9/27/2022  Patient expressed understanding of goal: Yes  Action steps to achieve this goal:  1. I will review resources provided by [facility]  2. I will set up a colonoscopy appointment  3. I will set up transportation  4. I will keep in touch with care coordination.                           Action Plans on File:                       Advance Care Plans/Directives Type:   No data recorded    My Medical and Care Information  Problem List   Patient Active Problem List   Diagnosis     CYSTIC LIVER DIS     Allergic rhinitis     Diverticulitis of colon     Osteopenia of both hips     Esophageal reflux     Mixed incontinence urge and stress (male)(female)     Cystocele, lateral     Vaginal atrophy     Mixed hyperlipidemia     Heart burn     BABB (dyspnea on exertion)     Sebaceous hyperplasia     Seborrheic keratosis     Fatty liver     Personal history of other malignant neoplasm of skin     Health Care Home     Hypertension goal BP (blood pressure) < 140/90     Elevated serum creatinine     Skin exam, screening for cancer     Essential hypertension, benign (HTN)     CKD (chronic kidney disease) stage 2, GFR 60-89 ml/min     Type 2 diabetes mellitus with hyperglycemia (H)     Type 2 diabetes with stage 2 chronic kidney disease GFR 60-89 (H)     Type 2 diabetes mellitus without complication, with long-term current use of insulin (H)     Senile nuclear sclerosis, right     Intertriginous candidiasis     Slow transit constipation     Age-related nuclear cataract, left     Basal cell carcinoma of face     Tubular adenoma of colon     BMI 35.0-39.9 with comorbidity (H)      Current Medications and Allergies:  See printed Medication Report.    Care Coordination Start Date:  6/27/2022   Frequency of Care Coordination: monthly     Form Last Updated: 10/10/2022

## 2022-10-10 NOTE — PROGRESS NOTES
Clinic Care Coordination Contact    Follow Up Progress Note      Assessment: Psychiatric called out to patient to complete monthly outreach. Patient shares she has some concerns regarding her medications and A1C. She shared her A1C is high and that she has not been feeling well. She shares she is exhausted and has no appetite. She plans to talk with PCP about this at her appointment on 10/12. Patient has not scheduled her colonoscopy yet. CC inquired about CC continuing to make calls and patient requested CC continue.     Care Gaps:    Health Maintenance Due   Topic Date Due     ZOSTER IMMUNIZATION (2 of 3) 05/30/2012     DEXA  01/03/2021     COLORECTAL CANCER SCREENING  09/16/2021     COVID-19 Vaccine (5 - Booster for Pfizer series) 08/18/2022     INFLUENZA VACCINE (1) 09/01/2022     MAMMO SCREENING  09/10/2022       Care Plans  Care Plan: Transportation     Problem: Lack of transportation     Goal: Establish reliable transportation     Start Date: 6/27/2022 Expected End Date: 9/27/2022    Note:     Goal Statement: I want to obtain transportation in order to complete a colonoscopy  Date Goal set: 6/27/2022  Barriers: lack of transportation  Strengths: motivated to complete   Date to Achieve By: 9/27/2022  Patient expressed understanding of goal: Yes  Action steps to achieve this goal:  1. I will review resources provided by Psychiatric  2. I will set up a colonoscopy appointment  3. I will set up transportation  4. I will keep in touch with care coordination.                          Intervention/Education provided during outreach: Psychiatric provided active listening during this outreach.     Outreach Frequency: monthly    Plan:   Care Coordinator will follow up in one month.    WILMAR Bonilla   [facility] Primary [facility] - Clinic Care Coordination  [phone]

## 2022-10-12 ENCOUNTER — VIRTUAL VISIT (OUTPATIENT)
Dept: FAMILY MEDICINE | Facility: CLINIC | Age: 77
End: 2022-10-12
Payer: COMMERCIAL

## 2022-10-12 ENCOUNTER — TELEPHONE (OUTPATIENT)
Dept: FAMILY MEDICINE | Facility: CLINIC | Age: 77
End: 2022-10-12

## 2022-10-12 DIAGNOSIS — R79.89 ELEVATED SERUM CREATININE: ICD-10-CM

## 2022-10-12 DIAGNOSIS — E11.65 TYPE 2 DIABETES MELLITUS WITH HYPERGLYCEMIA, WITHOUT LONG-TERM CURRENT USE OF INSULIN (H): Primary | ICD-10-CM

## 2022-10-12 DIAGNOSIS — R80.9 MICROALBUMINURIA: ICD-10-CM

## 2022-10-12 DIAGNOSIS — E53.8 VITAMIN B12 DEFICIENCY (NON ANEMIC): ICD-10-CM

## 2022-10-12 PROCEDURE — 99443 PR PHYSICIAN TELEPHONE EVALUATION 21-30 MIN: CPT | Mod: 95 | Performed by: FAMILY MEDICINE

## 2022-10-12 RX ORDER — FLASH GLUCOSE SENSOR
KIT MISCELLANEOUS
Qty: 2 EACH | Refills: 11 | Status: SHIPPED | OUTPATIENT
Start: 2022-10-12 | End: 2022-12-12

## 2022-10-12 RX ORDER — CYANOCOBALAMIN (VITAMIN B-12) 2500 MCG
TABLET, SUBLINGUAL SUBLINGUAL
Start: 2022-10-12

## 2022-10-12 NOTE — TELEPHONE ENCOUNTER
Central Prior Authorization Team   Phone: [phone]    PA Initiation    Medication: Continuous Blood Gluc Sensor (FREESTYLE CARMELA 14 DAY SENSOR) [facility]  Insurance Company: [employer] - Phone [phone] Fax [phone]  Pharmacy Filling the Rx: [facility] - [place], [place] - [address]  Filling Pharmacy Phone: [phone]  Filling Pharmacy Fax: [phone]  Start Date: 10/12/2022

## 2022-10-12 NOTE — PATIENT INSTRUCTIONS
Ubaldo Smith,  I see that you have not scheduled the DEXA (bone density) scan and the colonoscopy that I ordered in June when I saw you for your medicare annual wellness visit (preventive visit).  I still recommend that you schedule both.  You are also now due for a mammogram as well.      You can call [facility] Scheduling [phone] to schedule the DEXA scan and the mammogram.    You can call [facility] at [phone] to schedule the colonoscopy.    If you have MyChart:  1) I kindly request that you check your MyChart prior to all appointments with me and complete any assigned questionnaires ahead of time.    2) You may receive auto-released results from our system before I have the opportunity to review and comment.  Be assured I will review and comment on all of your results as soon as I can.      If you do not have MyChart:  1) I encourage you to sign up for Mychart ([email or web address]).  This will allow you to review your results, securely communicate with your care team, and schedule virtual visits as well.  2) Please be aware that result letters from the clinic can take up to 2 weeks but urgent results will be called to you.      I do ask that all patients who are taking chronic medications for conditions that I am managing schedule an in-person visit with me at least once a year.

## 2022-10-12 NOTE — PROGRESS NOTES
Sarah is a 77 year old who is being evaluated via a billable telephone visit.      What phone number would you like to be contacted at? [phone]  How would you like to obtain your AVS?  Mail to pt.         Assessment & Plan     Type 2 diabetes mellitus with hyperglycemia, without long-term current use of insulin (H)  Uncontrolled.  She has concern about discrepancy between home glucometer and lab results ( on last BMP and A1c 9.9).  I recommended that she bring her home glucometer with her to next MTM visit and have it checked against fingerstick glucose in lab.  I also recommended CGM and she was agreeable but feels she'll need help downloading the lorena to her cellphone.  (She does have a smartphone.)  She'll  the sensors and bring to her MTM appointment next month.    - Continuous Blood Gluc Sensor (FREESTYLE CARMELA 14 DAY SENSOR) [facility]  Dispense: 2 each; Refill: 11    Vitamin B12 deficiency (non anemic)  We'll monitor B12 levels.   - vitamin B-12 (CYANOCOBALAMIN) 2500 MCG sublingual tablet    Elevated serum creatinine  Microalbuminuria  Discussed that these are both relatively mild abnormalities but should be monitored and are another reason to work on better control of her T2DM.      I am concerned that she is struggling with MyChart and not reading her messages including lab result messages and e-visit responses.  I recommended that she consider deactivating her MyChart so that she will get written lab results in the mail and telephone calls which may be more reliable in her case.     Patient Instructions   Hi Sarah,  I see that you have not scheduled the DEXA (bone density) scan and the colonoscopy that I ordered in June when I saw you for your medicare annual wellness visit (preventive visit).  I still recommend that you schedule both.  You are also now due for a mammogram as well.      You can call [facility] Scheduling [phone] to schedule the DEXA scan and the mammogram.    You  can call [facility] at [phone] to schedule the colonoscopy.    If you have MyChart:  1) I kindly request that you check your MyChart prior to all appointments with me and complete any assigned questionnaires ahead of time.    2) You may receive auto-released results from our system before I have the opportunity to review and comment.  Be assured I will review and comment on all of your results as soon as I can.      If you do not have MyChart:  1) I encourage you to sign up for M-[facility] ([email or web address]).  This will allow you to review your results, securely communicate with your care team, and schedule virtual visits as well.  2) Please be aware that result letters from the clinic can take up to 2 weeks but urgent results will be called to you.      I do ask that all patients who are taking chronic medications for conditions that I am managing schedule an in-person visit with me at least once a year.       No follow-ups on file.    Heide Fay MD  [facility]        Noelle Smith is a 77 year old, presenting for the following health issues:  Results      HPI       Discuss lab results per Heide Fay    Vitamin B12 deficiency - started taking Vitamin B12 2,500 mcg three times week.  She has not had any numbness/tingling.      High A1c.  Has been working with Joan Lopez, [facility] Pharmacist on this and is puzzled by discrepancy between her home glucometer and A1c.      Mild rise in creatinine and new elevation in ACR.    Recent UTI.  Started an e-visit but never read my response.  I had advised her to schedule lab-only appointment to leave urine sample prior to starting antibiotic.  She just took the antibiotic.      She states she has a hard time with [employer].  We have noticed that she often does not read her [employer] result messages.        Review of Systems         Objective    Vitals - Patient Reported  Weight (Patient Reported): 83.9 kg (185  "lb)  Height (Patient Reported): 163.8 cm (5' 4.5\")  BMI (Based on Pt Reported Ht/Wt): 31.26      Vitals:  No vitals were obtained today due to virtual visit.    Physical Exam   GEN:  no apparent distress  PSYCH: Alert and engaged; coherent speech with normal rate and volume; able to articulate logical thoughts, no tangential thoughts, no apparent hallucinations or delusions; affect is normal  RESP: No cough, no audible wheezing, able to talk in full sentences  Remainder of exam unable to be completed due to telephone visits     Lab on 09/23/2022   Component Date Value Ref Range Status     Hemoglobin A1C 09/23/2022 9.9 (H)  <5.7 % Final    Normal <5.7%   Prediabetes 5.7-6.4%    Diabetes 6.5% or higher     Note: Adopted from ADA consensus guidelines.     Cholesterol 09/23/2022 131  <200 mg/dL Final     Triglycerides 09/23/2022 137  <150 mg/dL Final     Direct Measure HDL 09/23/2022 49 (L)  >=50 mg/dL Final     LDL Cholesterol Calculated 09/23/2022 55  <=100 mg/dL Final     Non HDL Cholesterol 09/23/2022 82  <130 mg/dL Final     Albumin Urine mg/L 09/23/2022 195.0  mg/L Final    The reference ranges have not been established in urine albumin. The results should be integrated into the clinical context for interpretation.     Albumin Urine mg/g Cr 09/23/2022 96.53 (H)  0.00 - 25.00 mg/g Cr Final    Microalbuminuria is defined as an albumin:creatinine ratio of 17 to 299 for males and 25 to 299 for females. A ratio of albumin:creatinine of 300 or higher is indicative of overt proteinuria.  Due to biologic variability, positive results should be confirmed by a second, first-morning random or 24-hour timed urine specimen. If there is discrepancy, a third specimen is recommended. When 2 out of 3 results are in the microalbuminuria range, this is evidence for incipient nephropathy and warrants increased efforts at glucose control, blood pressure control, and institution of therapy with an angiotensin-converting-enzyme (ACE) " inhibitor (if the patient can tolerate it).       Creatinine Urine mg/dL 09/23/2022 202.0  mg/dL Final    The reference ranges have not been established in urine creatinine. The results should be integrated into the clinical context for interpretation.     Sodium 09/23/2022 137  136 - 145 mmol/L Final     Potassium 09/23/2022 4.0  3.4 - 5.3 mmol/L Final     Chloride 09/23/2022 99  98 - 107 mmol/L Final     Carbon Dioxide (CO2) 09/23/2022 23  22 - 29 mmol/L Final     Anion Gap 09/23/2022 15  7 - 15 mmol/L Final     Urea Nitrogen 09/23/2022 13.9  8.0 - 23.0 mg/dL Final     Creatinine 09/23/2022 1.06 (H)  0.51 - 0.95 mg/dL Final     Calcium 09/23/2022 9.2  8.8 - 10.2 mg/dL Final     Glucose 09/23/2022 253 (H)  70 - 99 mg/dL Final     Alkaline Phosphatase 09/23/2022 94  35 - 104 U/L Final     AST 09/23/2022 24  10 - 35 U/L Final     ALT 09/23/2022 28  10 - 35 U/L Final     Protein Total 09/23/2022 7.1  6.4 - 8.3 g/dL Final     Albumin 09/23/2022 4.2  3.5 - 5.2 g/dL Final     Bilirubin Total 09/23/2022 0.9  <=1.2 mg/dL Final     GFR Estimate 09/23/2022 55 (L)  >60 mL/min/1.73m2 Final    Effective December 21, 2021 eGFRcr in adults is calculated using the 2021 CKD-EPI creatinine equation which includes age and gender (Edwardo et al., NEJM, DOI: 10.1056/ZPJHgz8204517)     TSH 09/23/2022 3.55  0.30 - 4.20 uIU/mL Final     Vitamin B12 09/23/2022 187 (L)  232 - 1,245 pg/mL Final     Vitamin D, Total (25-Hydroxy) 09/23/2022 44  20 - 75 ug/L Final           Phone call duration: 35 minutes  12:30 PM   1:05 PM

## 2022-10-12 NOTE — TELEPHONE ENCOUNTER
Prior Authorization Approval        Authorization Effective Date: 9/12/2022  Authorization Expiration Date: 10/11/2025  Medication: Continuous Blood Gluc Sensor (FREESTYLE CARMELA 14 DAY SENSOR) MIS-PA APPROVED   Approved Dose/Quantity:   Reference #:     Insurance Company: [employer] - Phone [phone] Fax [phone]  Expected CoPay:       CoPay Card Available:      Foundation Assistance Needed:    Which Pharmacy is filling the prescription (Not needed for infusion/clinic administered): [facility] - [place], [place] - [address]  Pharmacy Notified: Yes  Patient Notified: Yes

## 2022-10-12 NOTE — Clinical Note
Joan, she is really struggling with MyChart.  Doesn't read messages and doesn't seem to know how.  Do you have any cognitive concerns or maybe just a tech issue?  I recommended CGM but I'm not sure she can do the tech portion.  She said she would pick it up and bring to appointment with you.  She insists that her BG's on her home meter are good.  Puzzling. Collette

## 2022-10-12 NOTE — TELEPHONE ENCOUNTER
Prior Authorization Retail Medication Request    Medication/Dose: freestyle valorie  ICD code (if different than what is on RX):    Previously Tried and Failed:    Rationale:      Insurance Name: [facility] part d   Insurance ID: [id number]      Pharmacy Information (if different than what is on RX)  Name:  [facility]  Phone:  [phone]

## 2022-10-24 ENCOUNTER — ANCILLARY PROCEDURE (OUTPATIENT)
Dept: MAMMOGRAPHY | Facility: CLINIC | Age: 77
End: 2022-10-24
Attending: FAMILY MEDICINE
Payer: COMMERCIAL

## 2022-10-24 DIAGNOSIS — Z12.31 VISIT FOR SCREENING MAMMOGRAM: ICD-10-CM

## 2022-10-24 PROCEDURE — 77063 BREAST TOMOSYNTHESIS BI: CPT | Mod: GC | Performed by: RADIOLOGY

## 2022-10-24 PROCEDURE — 77067 SCR MAMMO BI INCL CAD: CPT | Mod: GC | Performed by: RADIOLOGY

## 2022-10-25 NOTE — RESULT ENCOUNTER NOTE
Ubaldo Smith,  I am happy to see that you completed your mammogram and that your result is normal!  Heide Fay MD

## 2022-11-14 ENCOUNTER — PATIENT OUTREACH (OUTPATIENT)
Dept: CARE COORDINATION | Facility: CLINIC | Age: 77
End: 2022-11-14

## 2022-11-14 NOTE — PROGRESS NOTES
Clinic Care Coordination Contact  [facility]/Voicemail       Clinical Data: Care Coordinator Outreach  Outreach attempted x 1.  Left message on patient's voicemail with call back information and requested return call.  Plan: Care Coordinator will try to reach patient again in 10 business days.    WILMAR Bonilla   [facility] Primary Care - Clinic Care Coordination  [phone]

## 2022-11-28 ENCOUNTER — PATIENT OUTREACH (OUTPATIENT)
Dept: CARE COORDINATION | Facility: CLINIC | Age: 77
End: 2022-11-28

## 2022-11-28 NOTE — PROGRESS NOTES
Clinic Care Coordination Contact    Follow Up Progress Note      Assessment: [facility] called out to patient to review progress towards goals. Patient shares she has not scheduled her colonoscopy but plans to this month now that things have slowed down. Patient shares she has been doing okay. She shares her A1C is high but her blood sugars have not been high. She shares that she is supposed to meet with MTYOSI Lopez soon. CC reviewed her chart and noted she missed an appointment on 11/14/22. Patient shares she did not have it written down. [facility] will send a message to Joan about rescheduling with the patient.     Care Gaps:    Health Maintenance Due   Topic Date Due     ZOSTER IMMUNIZATION (2 of 3) 05/30/2012     DEXA  01/03/2021     COLORECTAL CANCER SCREENING  09/16/2021     COVID-19 Vaccine (5 - Booster for Pfizer series) 08/18/2022     INFLUENZA VACCINE (1) 09/01/2022       Care Plans  Care Plan: Transportation     Problem: Lack of transportation     Goal: Establish reliable transportation     Start Date: 6/27/2022 Expected End Date: 9/27/2022    Note:     Goal Statement: I want to obtain transportation in order to complete a colonoscopy  Date Goal set: 6/27/2022  Barriers: lack of transportation  Strengths: motivated to complete   Date to Achieve By: 9/27/2022  Patient expressed understanding of goal: Yes  Action steps to achieve this goal:  1. I will review resources provided by [facility]  2. I will set up a colonoscopy appointment  3. I will set up transportation  4. I will keep in touch with care coordination.                          Intervention/Education provided during outreach: [facility] provided active listening during this outreach.     Outreach Frequency: monthly    Plan:   Care Coordinator will follow up in one month.    WILMAR Bonilla   [facility] Primary [facility] - Clinic Care Coordination  [phone]

## 2022-12-05 ENCOUNTER — LAB (OUTPATIENT)
Dept: LAB | Facility: CLINIC | Age: 77
End: 2022-12-05
Payer: COMMERCIAL

## 2022-12-05 ENCOUNTER — OFFICE VISIT (OUTPATIENT)
Dept: PHARMACY | Facility: CLINIC | Age: 77
End: 2022-12-05
Payer: COMMERCIAL

## 2022-12-05 VITALS — WEIGHT: 179.8 LBS | DIASTOLIC BLOOD PRESSURE: 62 MMHG | SYSTOLIC BLOOD PRESSURE: 118 MMHG | BODY MASS INDEX: 30.86 KG/M2

## 2022-12-05 DIAGNOSIS — Z23 ENCOUNTER FOR IMMUNIZATION: ICD-10-CM

## 2022-12-05 DIAGNOSIS — E87.6 HYPOKALEMIA: ICD-10-CM

## 2022-12-05 DIAGNOSIS — I10 ESSENTIAL HYPERTENSION: ICD-10-CM

## 2022-12-05 DIAGNOSIS — E78.5 HYPERLIPIDEMIA LDL GOAL <100: ICD-10-CM

## 2022-12-05 DIAGNOSIS — E11.9 TYPE 2 DIABETES, HBA1C GOAL < 7% (H): ICD-10-CM

## 2022-12-05 DIAGNOSIS — Z13.21 ENCOUNTER FOR VITAMIN DEFICIENCY SCREENING: ICD-10-CM

## 2022-12-05 DIAGNOSIS — E11.9 TYPE 2 DIABETES, HBA1C GOAL < 7% (H): Primary | ICD-10-CM

## 2022-12-05 LAB — HBA1C MFR BLD: 7.3 % (ref 0–5.6)

## 2022-12-05 PROCEDURE — 36415 COLL VENOUS BLD VENIPUNCTURE: CPT

## 2022-12-05 PROCEDURE — 83036 HEMOGLOBIN GLYCOSYLATED A1C: CPT

## 2022-12-05 PROCEDURE — 99606 MTMS BY PHARM EST 15 MIN: CPT | Performed by: PHARMACIST

## 2022-12-05 PROCEDURE — 99607 MTMS BY PHARM ADDL 15 MIN: CPT | Performed by: PHARMACIST

## 2022-12-05 NOTE — Clinical Note
Collette--great news today --metformin working exceptionally well for her --she is down 27lbs since June and a1c 7.3% today --I did apply her first valorie cgm today and have remote access to her bs's now.  Jose--wt.loss now at 27lbs --, a1c 7.3% --please redo liver tests in 2023 on her --I think it'll show excellent improvement.  All is good --[email or web address]

## 2022-12-05 NOTE — PATIENT INSTRUCTIONS
"Recommendations from today's MTM visit:                                                         1. A1c today = 7.3% - fantastic results --down from 9.9% --your morning low blood sugars are too low --<70 is too low --lets reduce your Lantus to 35 units to avoid <70 blood sugars when you awaken.  Please keep same dose of Glimepiride and metformin as is .     Your weight today is 179.8lbs --down from 206 lbs- June-2022 --down 27lbs. --fantastic!    Thank you for applying your [employer] CGM sensor -please scan your blood sugars before all meals/snacks and 2 hours after , or at least once every 8 hours  .       Follow-up: Return in about 9 weeks (around 2/6/2023), or @ 3 PM via Telephone, for Medication Therapy Management Visit, Blood sugar meter review.    It was great speaking with you today.  I value your experience and would be very thankful for your time in providing feedback in our clinic survey. In the next few days, you may receive an email or text message from [employer] with a link to a survey related to your  clinical pharmacist.\"     To schedule another MTM appointment, please call the clinic directly or you may call the MTM scheduling line at [phone] or toll-free at [phone].     My Clinical Pharmacist's contact information:                                                      Please feel free to contact me with any questions or concerns you have.      Joan Lopez Rph.  Medication Therapy Management Provider  [phone]    "

## 2022-12-12 ENCOUNTER — TELEPHONE (OUTPATIENT)
Dept: PHARMACY | Facility: CLINIC | Age: 77
End: 2022-12-12

## 2022-12-12 DIAGNOSIS — E11.65 TYPE 2 DIABETES MELLITUS WITH HYPERGLYCEMIA, WITHOUT LONG-TERM CURRENT USE OF INSULIN (H): ICD-10-CM

## 2022-12-12 RX ORDER — FLASH GLUCOSE SENSOR
KIT MISCELLANEOUS
Qty: 6 EACH | Refills: 3 | Status: SHIPPED | OUTPATIENT
Start: 2022-12-12 | End: 2023-09-21

## 2022-12-12 NOTE — TELEPHONE ENCOUNTER
12-12-22    Patient calls and requests order for valorie cgm sensors from [employer] --she finds it a very handy , dandy thing to use .    mtm will re-order today .    Joan Lopez [facility].  Medication Therapy Management Provider  [phone]

## 2022-12-28 ENCOUNTER — PATIENT OUTREACH (OUTPATIENT)
Dept: CARE COORDINATION | Facility: CLINIC | Age: 77
End: 2022-12-28

## 2022-12-29 ENCOUNTER — TELEPHONE (OUTPATIENT)
Dept: OPHTHALMOLOGY | Facility: CLINIC | Age: 77
End: 2022-12-29

## 2022-12-29 ENCOUNTER — PATIENT OUTREACH (OUTPATIENT)
Dept: CARE COORDINATION | Facility: CLINIC | Age: 77
End: 2022-12-29

## 2022-12-29 NOTE — TELEPHONE ENCOUNTER
Called and spoke to Sarah    Made an appointment for 01/25 @ 1 pm with Dr. Dominguez     Repeated the time / date a few times. Going to send out a new pt packet .     Gretchen Block Communication Facilitator on 12/29/2022 at 1:43 PM

## 2022-12-29 NOTE — PROGRESS NOTES
Clinic Care Coordination Contact    Community Health Worker Follow Up    Care Gaps:     Health Maintenance Due   Topic Date Due     ZOSTER IMMUNIZATION (2 of 3) 05/30/2012     DEXA  01/03/2021     COLORECTAL CANCER SCREENING  09/16/2021       Care Gaps Last addressed on 10/12/23    Care Plan:   Care Plan: Transportation     Problem: Lack of transportation     Goal: Establish reliable transportation     Start Date: 6/27/2022 Expected End Date: 9/27/2022    This Visit's Progress: 10% Recent Progress: 10%    Note:     Goal Statement: I want to obtain transportation in order to complete a colonoscopy  Date Goal set: 6/27/2022  Barriers: lack of transportation  Strengths: motivated to complete   Date to Achieve By: 9/27/2022  Patient expressed understanding of goal: Yes  Action steps to achieve this goal:  1. I will review resources provided by [facility]  2. I will set up a colonoscopy appointment (Patient states that it's not a priority at this moment and plans to schedule this after the holidays)  3. I will set up transportation  4. I will keep in touch with care coordination.                          Intervention and Education during outreach:     Sarah states that she has not been doing well because of her diverticulitis and because her brother has been ill. She states that she has not scheduled a colonoscopy because of everything that has been going on. She states that she will take a look at scheduling a colonoscopy after the holidays. CHW acknowledged and asked if she needs any assistance from CC at this time. Sarah declined. Sarah mentioned that she had a procedure done at the eye clinic a few months ago and was told that she would receive a follow up call but states that she never received a call. CHW looked into patient's chart and saw that her last appointment with the opthalmology department was on 7/9/22 with Dr. Murphy. CHW notified patient of this. CHW asked patient if she would like to speak to someone  from the opthalmology department, patient stated that she would. CHW transferred patient to ophthalmology. CHW encouraged patient to reach out if any questions or concerns come up. Patient acknowledged.     CHW Plan: CHW will do next patient outreach in one month.    CHIQUITA Centeno, B.A. [facility] Care Coordination  [facility]:   [facility]  [phone]

## 2022-12-29 NOTE — TELEPHONE ENCOUNTER
No diabetic retinopathy and recommendation for YAG cap per Dr. Murphy vision in July.    Ok for Dr. Villalpando if pt prefers earlier visit than first avaiable with Retina provider    Note to patient communicator to reach out for scheduling    Anthony Monterroso RN 10:25 AM 12/29/22    ----    [facility] Center    Phone Message    May a detailed message be left on voicemail: yes     Reason for Call: Appointment Intake    Referring Provider Name: Dr. Murphy  Diagnosis and/or Symptoms: RTC next available to [place] or [place] for YAG OD, V/T/ dilate only OD, OCT Mac OU    Action Taken: Message routed to:  [facility] eye    Travel Screening: Not Applicable

## 2023-01-13 DIAGNOSIS — Z79.4 TYPE 2 DIABETES MELLITUS WITH HYPERGLYCEMIA, WITH LONG-TERM CURRENT USE OF INSULIN (H): Primary | ICD-10-CM

## 2023-01-13 DIAGNOSIS — H43.393 VITREOUS SYNERESIS OF BOTH EYES: ICD-10-CM

## 2023-01-13 DIAGNOSIS — E11.65 TYPE 2 DIABETES MELLITUS WITH HYPERGLYCEMIA, WITH LONG-TERM CURRENT USE OF INSULIN (H): Primary | ICD-10-CM

## 2023-01-19 ENCOUNTER — TELEPHONE (OUTPATIENT)
Dept: PHARMACY | Facility: CLINIC | Age: 78
End: 2023-01-19
Payer: COMMERCIAL

## 2023-01-19 DIAGNOSIS — E78.5 HYPERLIPIDEMIA LDL GOAL <100: ICD-10-CM

## 2023-01-19 RX ORDER — ATORVASTATIN CALCIUM 40 MG/1
40 TABLET, FILM COATED ORAL DAILY
Qty: 90 TABLET | Refills: 3 | Status: SHIPPED | OUTPATIENT
Start: 2023-01-19 | End: 2024-04-09

## 2023-01-19 NOTE — TELEPHONE ENCOUNTER
1-19-23      Patient called and wants atorvastatin refill.    Recent Labs   Lab Test 09/23/22  1320 09/02/21  1501   CHOL 131 168   HDL 49* 47*   LDL 55 76   TRIG 137 226*         ldl at goal mtm will refill.    Joan Lopez Rph.  Medication Therapy Management Provider  [phone]

## 2023-01-25 ENCOUNTER — TELEPHONE (OUTPATIENT)
Dept: OPHTHALMOLOGY | Facility: CLINIC | Age: 78
End: 2023-01-25
Payer: COMMERCIAL

## 2023-01-25 NOTE — TELEPHONE ENCOUNTER
Pt prefers Wednesday's and afternoon appt    Scheduled first available Wednesday afternoon on March 1st at 2 PM    Pt aware of date/time/location at [facility]    Anthony Monterroso RN 10:07 AM 01/25/23            [facility]    Phone Message    May a detailed message be left on voicemail: yes     Reason for Call: Other: Patient calling to r/s her YAG appt.  Please call.  Thank you.     Action Taken: Message routed to:  [facility] Ophthalmology    Travel Screening: Not Applicable

## 2023-01-31 ENCOUNTER — PATIENT OUTREACH (OUTPATIENT)
Dept: CARE COORDINATION | Facility: CLINIC | Age: 78
End: 2023-01-31
Payer: COMMERCIAL

## 2023-01-31 NOTE — PROGRESS NOTES
Clinic Care Coordination Contact    Community Health Worker Follow Up    Care Gaps:     Health Maintenance Due   Topic Date Due     ZOSTER IMMUNIZATION (2 of 3) 05/30/2012     DEXA  01/03/2021     COLORECTAL CANCER SCREENING  09/16/2021     PHQ-2 (once per calendar year)  01/01/2023       Care Gaps Last addressed on 10/12/22    Care Plan:   Care Plan: Transportation     Problem: Lack of transportation     Goal: Establish reliable transportation     Start Date: 6/27/2022 Expected End Date: 9/27/2022    This Visit's Progress: 10% Recent Progress: 10%    Note:     Goal Statement: I want to obtain transportation in order to complete a colonoscopy  Date Goal set: 6/27/2022  Barriers: lack of transportation  Strengths: motivated to complete   Date to Achieve By: 9/27/2022  Patient expressed understanding of goal: Yes  Action steps to achieve this goal:  1. I will review resources provided by [facility]  2. I will set up a colonoscopy appointment (Patient states that it's not a priority at this moment and plans to schedule this after the holidays)  3. I will set up transportation  4. I will keep in touch with care coordination.                          Intervention and Education during outreach:     Patient states that scheduling a colonoscopy is on her list of things to do, but her brother's health has been declining. Patient states that she would like to focus on her brother's situation for the moment and will take a look at scheduling a colonoscopy in the future. Patient states that she has no needs or concerns for CC at this time. CHW encouraged patient to reach out if she would like assistance with anything. Patient acknowledged.    CHW Plan: CHW will do next patient outreach in one month.    CHIQUITA Centeno, B.A. [facility] Care Coordination  [facility]:   [facility]  [phone]'

## 2023-02-01 ENCOUNTER — PATIENT OUTREACH (OUTPATIENT)
Dept: CARE COORDINATION | Facility: CLINIC | Age: 78
End: 2023-02-01
Payer: COMMERCIAL

## 2023-02-01 NOTE — PROGRESS NOTES
Clinic Care Coordination Contact    DUNCAN CC reviewed chart and updated Lead CC to Chula Kat RN CC per DUNCAN CC departure.    Mony Daily [facility] - Clinic Care Coordination  [phone]

## 2023-02-02 NOTE — PROGRESS NOTES
Medication Therapy Management (MTM) Encounter    ASSESSMENT:                            Medication Adherence/Access: See below for considerations    Type 2 Diabetes: Stable:  Patient is meeting A1c goal of </7.5%. Self monitoring of blood glucose is at goal of fasting  mg/dL and post prandial <180mg/dL. Patient is NOW meeting average glucose goal of <150mg/dl, however highs and lows.    Metformin ;  Stay on 500mg. Daily dose .  Basal Insulin (Lantus ) : stay on 2 x week dosing as is for now.    Novolog (mealtime insulin ) : HOLD now -not needed.  SFU: Stay on Glimepiride 4mg. Twice daily.     Increased exercise/low carb/calorie diet + wt. Loss  would be beneficial for her NAFLD--this is working well --continue as is .     Immunizations: up to date. (could consider new shingles vaccine shingrix in 2022).     Hypokalemia:  Stable lab of 4.0. Now able to swallow the micro-k capsules easily.     Hyperlipidemia: Stable. Patient is appropriately on high intensity statin which is indicated based on 2019 ACC/AHA guidelines for lipid management with an ASCVD risk of 33%.      Hypertension: Stable. Patient is meeting blood pressure goal of < 140/90mmHg.     Vitamin B12: is at goal of 600-1000pg/mL. Pt would benefit from vitamin B12 lab recheck in 3-6 months.        PLAN:                                        SUBJECTIVE/OBJECTIVE:                          Sarah Felix is a 77 year old female called for a follow-up ( 12-5-22) visit. She was referred to me from Dr. Collette Fay.     Reason for visit:   a1c /bs review. Cgm start ? Losing weight no appetite post metformin use. Can only eat 1/4 meal at a time.   Feels lethargic but it is long standing.   More clumsy now -like her feet/anlkles have stiffened up .  Admits depressed but declines medication at this time.   She doesn't fell well at all-winded /no energy. Depression? Weather related?      Allergies/ADRs: Reviewed in chart  Tobacco: She reports that she  quit smoking about 43 years ago. She has never used smokeless tobacco.  Alcohol: 7-9 beverages / week typically wine 1 or 2 glasses/night - reports that she hasn't had any alcohol in the last week and a half until they figure out her liver situation.   Caffeine: 2 cans diet coke sodas/day  Activity: reports she has low energy and stamina, if she does any activity she has to take breaks frequenty but now that the weather is better she is doing more outdoor work.   Past Medical History: Reviewed in chart  Personal Healthcare Goals: figure out what is going on with liver or blood, would like to get 3rd vaccine, improve a1c    Medication Adherence/Access:  No issues    Type 2 Diabetes:  Currently taking : glimepiride 4 mg twice daily,  Lantus insulin now (20 units)- 1-2 x week now if dessert or juice spiked blood sugar-  units daily at midnight daily. Metformin 500mg. ER tabs -- 1/day now due to SE's.    Novolog -on hold right now. Rare 1-2 x injections in last 90 days to combat sweet tooth.   Potential side effects : more diarrhea lately.       SMBG:            Wt Readings from Last 5 Encounters:   12/05/22 179 lb 12.8 oz (81.6 kg)   09/23/22 188 lb (85.3 kg)   09/12/22 191 lb 8 oz (86.9 kg)   07/25/22 204 lb (92.5 kg)   06/23/22 206 lb 8 oz (93.7 kg)   home weight 2-6-23 is 163lbs. (no appetite / lethargic).       Symptoms of low blood sugar? sweaty, Frequency of lows has been increasing (she admits she cannot tolerate bs's <110- if they go lower than that she feels crummy , sweaty. )  Symptoms of high blood sugar? Fatigue, shaky, pit in stomach, overall feeling poor   Eye exam: up to date  Foot exam: up to date  Diet: doing her best to control carbs in daily diet.   Previously: she's maintained similar diet and notes sugars now being elevated  eats 4-5 smaller meals - pasta and rices  Fruit and some fruit juices   Breakfast: cereal 2-3 times per week, drink 2 glasses OJ or grapefruit juice /day .   Exercise: see  social history --saw liver specialist suggested swimming.   Aspirin: Taking 162 mg daily and denies side effects  Statin: Yes: atorvastatin 40 mg   ACEi/ARB: Yes: lisinopril 2.5 mg.   Urine Albumin:   Lab Results   Component Value Date    UMALCR 96.53 (H) 09/23/2022       Lab Results   Component Value Date    A1C 7.3 12/05/2022    A1C 9.9 09/23/2022    A1C 8.6 07/25/2022    A1C 8.2 04/18/2022    A1C 8.4 01/03/2022    A1C 8.3 06/21/2021    A1C 9.4 12/14/2020    A1C 8.1 09/21/2020    A1C 8.5 01/07/2020    A1C 7.6 10/12/2019             Immunizations:  Up to date .  (consider new shingles vaccine -shingrix in 2022).     Hypokalemia:  Patient reports the new potassium capsule is working much better and is easy to take.   Potassium   Date Value Ref Range Status   09/23/2022 4.0 3.4 - 5.3 mmol/L Final   03/28/2022 4.1 3.4 - 5.3 mmol/L Final   01/07/2020 4.0 3.4 - 5.3 mmol/L Final       Hyperlipidemia: Current therapy includes : atorvastatin 40mg daily.  Patient reports no significant myalgias or other side effects.  The 10-year ASCVD risk score (Ammy VELEZ, et al., 2019) is: 31.1%    Values used to calculate the score:      Age: 77 years      Sex: Female      Is Non- : No      Diabetic: Yes      Tobacco smoker: No      Systolic Blood Pressure: 118 mmHg      Is BP treated: Yes      HDL Cholesterol: 49 mg/dL      Total Cholesterol: 131 mg/dL  Recent Labs   Lab Test 09/23/22  1320 09/02/21  1501   CHOL 131 168   HDL 49* 47*   LDL 55 76   TRIG 137 226*       Hypertension: Current medications include : Metoprolol 25mg twice daily, Lisinopril 2.5mg./day + furosemide 20mg -2 tabs in am and 1 in afternoon.  Patient does not self-monitor blood pressure.  She has been feeling fatigued. In clinic her BP is on the lower end. Potentially causing her overall feeling of fatigue .    BP Readings from Last 3 Encounters:   12/05/22 118/62   09/23/22 134/86   09/12/22 128/70     Vitamin B12: level was 187 on 9-23-22.  Vitamin B12 helps support memory and lessens fatigue. She is taking daily OTC pill- 2500mcg. B-12 pill --3 x week now.           --------------------------------------------------------------------------------------------------    I spent 30 minutes with this patient today. All changes were made via collaborative practice agreement with Heide Fay MD. A copy of the visit note was provided to the patient's primary care provider.    The patient was mailed a summary of these recommendations. She admits computer not working at home to view mychart .     Joan Lopez [facility].  Medication Therapy Management Provider  [phone]           Medication Therapy Recommendations  No medication therapy recommendations to display

## 2023-02-06 ENCOUNTER — VIRTUAL VISIT (OUTPATIENT)
Dept: PHARMACY | Facility: CLINIC | Age: 78
End: 2023-02-06
Payer: COMMERCIAL

## 2023-02-06 DIAGNOSIS — Z23 ENCOUNTER FOR IMMUNIZATION: ICD-10-CM

## 2023-02-06 DIAGNOSIS — E87.6 HYPOKALEMIA: ICD-10-CM

## 2023-02-06 DIAGNOSIS — E11.65 TYPE 2 DIABETES MELLITUS WITH HYPERGLYCEMIA, WITHOUT LONG-TERM CURRENT USE OF INSULIN (H): Primary | ICD-10-CM

## 2023-02-06 DIAGNOSIS — E78.5 HYPERLIPIDEMIA LDL GOAL <100: ICD-10-CM

## 2023-02-06 DIAGNOSIS — I10 ESSENTIAL HYPERTENSION: ICD-10-CM

## 2023-02-06 PROCEDURE — 99605 MTMS BY PHARM NP 15 MIN: CPT | Performed by: PHARMACIST

## 2023-02-06 NOTE — LETTER
"Recommended To-Do List      Prepared on: Feb 6, 2023       You can get the best results from your medications by completing the items on this \"To-Do List.\"      Bring your To-Do List when you go to your doctor. And, share it with your family or caregivers.    My To-Do List:  What we talked about: What I should do:    What my medicines are for, how to know if my medicines are working, made sure my medicines are safe for me and reviewed how to take my medicines.     Take my medicines every day; make appt. With / Nya otero to discuss Depression/wt.loss etc.                   "

## 2023-02-06 NOTE — PROGRESS NOTES
"Hi Joan,  Yes, that is an alarming amount of weight loss and I should see her but please do not just instruct her to schedule an appointment with me.  My schedule is booked out 3 months and this is an urgent issue.  Any time you feel, in your professional opinion, that a patient needs to see me promptly please notify the  to use one of my \"APPROVAL REQUIRED\" slots.  The [facility] reception staff know how to do this.  The [facility] RN's know how to do this.  But if she just calls central scheduling or tries to schedule it through [employer] she will not have access to those slots.  (This is why my panel is closed.)  Collette  "

## 2023-02-06 NOTE — LETTER
_  Medication List        Prepared on: Feb 6, 2023     Bring your Medication List when you go to the doctor, hospital, or   emergency room. And, share it with your family or caregivers.     Note any changes to how you take your medications.  Cross out medications when you no longer use them.    Medication How I take it Why I use it Prescriber   alum & mag hydroxide-simethicone (MAALOX) 200-200-20 MG/5ML SUSP suspension Take 30 mLs by mouth gerd  Patient Reported   aspirin (ASA) 81 MG chewable tablet Take 162 mg by mouth daily  Prevent Mi/stroke  Patient Reported   atorvastatin (LIPITOR) 40 MG tablet Take 1 tablet (40 mg) by mouth daily Hyperlipidemia LDL Goal <100 Heide Fay MD   blood glucose (ONETOUCH ULTRA) test strip 1 strip by In Vitro route 4 times daily. Type 2 Diabetes, HbA1c Goal < 7% (H) Remington Browning, DO   CALCIUM 500 + D 500-200 MG-IU OR TABS one tab twice per day Osteopenia Ute Campbell MD   Cholecalciferol (VITAMIN D PO) Pt consuming 3000 units per day Supplement  Patient Reported   Continuous Blood Gluc Sensor (FREESTYLE CARMELA 14 DAY SENSOR) MISC Change every 14 days. Type 2 diabetes mellitus with hyperglycemia, without long-term current use of insulin (H) Heide Fay MD   furosemide (LASIX) 20 MG tablet TAKE 2 TABLETS IN THE MORNING AND 1 TABLET IN THE AFTERNOON Essential Hypertension Heide Fay MD   glimepiride (AMARYL) 4 MG tablet TAKE 1 TABLET TWICE A DAY WITH MEALS (BREAKFAST AND DINNER) Type 2 Diabetes, HbA1c Goal < 7% (H) Heide Fay MD   insulin aspart (NOVOLOG PEN) 100 UNIT/ML pen Inject 15 Units Subcutaneous 3 times daily (with meals) Type 2 diabetes mellitus with stage 2 chronic kidney disease, with long-term current use of insulin (H) Remington Browning DO   insulin pen needle (BD REY U/F) 32G X 4 MM miscellaneous USE 1 PEN NEEDLE four times daily. (WITH LANTUS AND Novolog) Type 2 diabetes mellitus with stage 2 chronic kidney disease, with long-term current use  "of insulin (H) Remington Browning, DO   insulin syringe-needle U-100 (BD INSULIN SYRINGE ULTRAFINE) 31G X 5/16\" 1 ML miscellaneous Use 1 syringes twice daily for insulin and victoza and for symptoms of hypoglycemia Type 2 diabetes mellitus with stage 2 chronic kidney disease, without long-term current use of insulin (H) Joel Daniel Irwin Wegener, MD   Lancets (ONETOUCH DELICA PLUS ANFLBG02I) MISC 1 each 4 times daily Type 2 Diabetes, HbA1c Goal < 7% (H) Remington Browning, DO   LANTUS SOLOSTAR 100 UNIT/ML soln Inject 50-60 Units Subcutaneous At Bedtime Type 2 diabetes mellitus with stage 2 chronic kidney disease, with long-term current use of insulin (H) Remington Browning, DO   lisinopril (ZESTRIL) 2.5 MG tablet TAKE 1 TABLET DAILY CKD (Chronic Kidney Disease) Stage 2, GFR 60-89 ml/min Heide Fay MD   metFORMIN (GLUCOPHAGE XR) 500 MG 24 hr tablet Take 2 tablets (1,000 mg) by mouth 2 times daily (with meals) Type 2 diabetes mellitus with stage 2 chronic kidney disease, with long-term current use of insulin (H) Heide Fay MD   metoprolol tartrate (LOPRESSOR) 25 MG tablet TAKE 1 TABLET TWICE A DAY Essential Hypertension Heide Fay MD   nystatin (MYCOSTATIN) 157865 UNIT/GM external cream Apply topically 2 times daily Rash Joel Daniel Irwin Wegener, MD   potassium chloride ER (MICRO-K) 10 MEQ CR capsule TAKE 1 CAPSULE TWICE A DAY Hypokalemia Heide Fay MD   triamcinolone (KENALOG) 0.1 % external cream Apply topically 3 times daily Rash Joel Daniel Irwin Wegener, MD   vitamin B-12 (CYANOCOBALAMIN) 2500 MCG sublingual tablet Take 2500 mcg by mouth three times per week Vitamin B12 Deficiency (Non Anemic) Heide Fay MD         Add new medications, over-the-counter drugs, herbals, vitamins, or  minerals in the blank rows below.    Medication How I take it Why I use it Prescriber                          Allergies:      actos [pioglitazone]; amlodipine; ancef [cefazolin]; levaquin; cephalosporins; clindamycin; " levofloxacin; nickel        Side effects I have had:               Other Information:              My notes and questions:

## 2023-02-06 NOTE — LETTER
February 6, 2023  Sarah Felix  [address] [place] [address]    Dear Ms. Felix, [facility]     Thank you for talking with me on Feb 6, 2023 about your health and medications. As a follow-up to our conversation, I have included two documents:      1. Your Recommended To-Do List has steps you should take to get the best results from your medications.  2. Your Medication List will help you keep track of your medications and how to take them.    If you want to talk about these documents, please call Joan Lopez RPH at phone: [phone], Monday-Friday 8-4:30pm.    I look forward to working with you and your doctors to make sure your medications work well for you.    Sincerely,  Joan Lopez RPH  [facility] Pharmacist, [facility]

## 2023-02-06 NOTE — Clinical Note
Heide-- SRINIVASAN-- I spoke with Sarah today --she stated her home weight is 163 lbs today --=yes this is a 43lbs wt.loss in 6+ months --seem to start around time I added metformin but when speaking with her today she is more fatigued, lethargic and that makes sense because no appetite+ wt.loss , she admits depression but declines medication from me today  and perhaps deconditioned as well. I hope nothing serious going on underneath --but I feel strongly she needs some lab work and needs to see you either in clinic or virtually to discuss depression and possible medication?   She will try to make appt. With you asap.  Thanks ,Joan Lopez [facility]. Medication Therapy Management Provider [phone]

## 2023-02-06 NOTE — PATIENT INSTRUCTIONS
"Recommendations from today's MTM visit:                                                         1. FYI--weight loss startles me --down to 163 lbs.(43lbs. wt.loss in 7 months) --please see Dr. Fay for A1c lab recheck, Blood Pressure recheck , please discuss Depression with her --maybe consider a low-dose  medication?        Follow-up: Return in about 3 months (around 5/16/2023), or @ 3PM, for Medication Therapy Management Visit, Blood sugar meter review, Lab Work, Weight loss.    It was great speaking with you today.  I value your experience and would be very thankful for your time in providing feedback in our clinic survey. In the next few days, you may receive an email or text message from [employer] with a link to a survey related to your  clinical pharmacist.\"     To schedule another MTM appointment, please call the clinic directly or you may call the MTM scheduling line at [phone] or toll-free at [phone].     My Clinical Pharmacist's contact information:                                                      Please feel free to contact me with any questions or concerns you have.      Joan Lopez [facility].  Medication Therapy Management Provider  [phone]    "

## 2023-02-08 ENCOUNTER — PATIENT OUTREACH (OUTPATIENT)
Dept: CARE COORDINATION | Facility: CLINIC | Age: 78
End: 2023-02-08
Payer: COMMERCIAL

## 2023-02-08 NOTE — PROGRESS NOTES
Clinic Care Coordination Contact  Care Coordination Clinician Chart Review    Situation: Patient chart reviewed by Care Coordinator.       Background: Care Coordination Program started: 6/27/2022. Initial assessment completed and patient-centered care plan(s) were developed with participation from patient. Lead CC handed patient off to CHW for continued outreaches.       Assessment: Per chart review, patient outreach completed by CC CHW on 1/31/2023.  Patient is not actively working to accomplish goal(s) related to her brother's health. Patient's goal(s) appropriate and relevant at this time. Patient is due for updated Plan of Care.  Assessments will be completed annually or as needed/with change of patient status.      Care Plan: Transportation     Problem: Lack of transportation     Goal: Establish reliable transportation     Start Date: 6/27/2022 Expected End Date: 9/27/2022    Recent Progress: 10%    Note:     Goal Statement: I want to obtain transportation in order to complete a colonoscopy  Date Goal set: 6/27/2022  Barriers: lack of transportation  Strengths: motivated to complete   Date to Achieve By: 9/27/2023  Patient expressed understanding of goal: Yes  Action steps to achieve this goal:  1. I will review resources provided by [facility]  2. I will set up a colonoscopy appointment (Patient states that it's not a priority at this moment and has postponed due to her brother's health)  3. I will set up transportation  4. I will keep in touch with care coordination.    (Updated 2/8/23)     Annual reassessment due: 06/2023                         Plan/Recommendations: The patient will continue working with Care Coordination to achieve goal(s) as above. CHW will continue outreaches at minimum every 30 days and will involve Lead CC as needed or if patient is ready to move to Maintenance. Lead CC will continue to monitor CHW outreaches and patient's progress to goal(s) every 6 weeks.     Plan of Care updated and sent  to patient: Yes, via Vinay Kat RN, BSN, CPHN, CM  [facility] Management  [facility]  Phone: [phone]  Email: [email or web address]

## 2023-02-08 NOTE — LETTER
[facility]  Patient Centered Plan of Care  About Me:        Patient Name:  Sarah Felix    YOB: 1945  Age:         77 year old   Renny MRN:    [id number] Telephone Information:  Home Phone [phone]   Mobile [phone]       Address:  [address] [place] [address] Email address:  [email or web address]      Emergency Contact(s)    Name Relationship Lgl Grd Work Phone Home Phone Mobile Phone   1. YURI FELIX Brother No  [phone]   2. YURI FELIX Other   [phone]           Primary language:  English     needed? No   [facility]:  [phone] op. 1  Other communication barriers:Lack of coping    Preferred Method of Communication:  Mail  Current living arrangement: I live alone    Mobility Status/ Medical Equipment: Independent    Health Maintenance  Health Maintenance Reviewed: No data recorded    My Access Plan  Medical Emergency 911   Primary Clinic Line [facility] [phone]   24 Hour Appointment Line [phone] or  [phone]) (toll-free)   24 Hour Nurse Line [phone] (toll-free)   Preferred Urgent Care No data recorded   Preferred Hospital No data recorded   Preferred Pharmacy [facility] - [place], [place] - [address]     Behavioral Health Crisis Line The National Suicide Prevention Lifeline at 1-493.368.4037 or Text/Call 8     My Care Team Members  Patient Care Team       Relationship Specialty Notifications Start End    Heide Fay MD PCP - General Family Medicine  9/27/21     Phone: [phone] Fax: [phone]         [address] [facility] [address]    Joan Lopez RPH Pharmacist   11/7/11      [address] [facility] [address]    Anita Treadwell MD Referring Physician OB/Gyn  3/30/15     Phone: [phone] Fax: [phone]         [address] [place] [address]  [facility] [address]    Murphy Sargent MD MD Urology  8/19/15     Phone: [phone] Fax: [phone]         [address] [facility] [address]    Apolinar Artis MD MD Orthopedics  5/26/17     Phone: [phone] Fax: [phone]         [address] [facility] [address]    Janay Amezcua MD MD Ophthalmology  8/12/20     Phone: [phone] Fax: [phone]         [address] [facility] [address]    Leventhal, Thomas Michael, MD MD Gastroenterology  9/20/21     Referred by Heide Hopson for Elevated transaminase level, Fatty Liver and Hepatomegaly    Phone: [phone] Fax: [phone]         [address] [place] [address]    Heide Fay MD MD Family Medicine  9/20/21     Referred Pt to Hepatology for Elevated transaminase level, fatty liver and Hepatomegaly    Phone: [phone] Fax: [phone]         [facility] [address]    Manjeet Wilde MD MD Hematology All results, Admissions 9/21/21     Phone: [phone] Fax: [phone]         [address] [place] [address]    Leventhal, Thomas Michael, MD Assigned Gastroenterology Provider   10/10/21     Phone: [phone] Fax: [phone]         [address] [place] [address]    Heide Fay MD Assigned PCP   6/18/22     Phone: [phone] Fax: [phone]         [facility] [address]    Daniel Murphy MD MD Ophthalmology  6/27/22     Phone: [phone] Fax: [phone]         [facility] [address]             Joan Lopez, RPH Assigned MTM Pharmacist   9/28/22      [address] [address] [facility] [address]    Indiana Mckenna MA Community Health Worker Primary Care - CC Admissions 12/28/22     Chula Kat, RN Lead Care Coordinator Primary Care - CC Admissions 2/1/23             My Care Plans  Self Management and Treatment Plan  Care  Plan  Care Plan: Transportation     Problem: Lack of transportation     Goal: Establish reliable transportation     Start Date: 6/27/2022 Expected End Date: 9/27/2022    Recent Progress: 10%    Note:     Goal Statement: I want to obtain transportation in order to complete a colonoscopy  Date Goal set: 6/27/2022  Barriers: lack of transportation  Strengths: motivated to complete   Date to Achieve By: 9/27/2023  Patient expressed understanding of goal: Yes  Action steps to achieve this goal:  1. I will review resources provided by [facility]  2. I will set up a colonoscopy appointment (Patient states that it's not a priority at this moment and has postponed due to her brother's health)  3. I will set up transportation  4. I will keep in touch with care coordination.    (Updated 2/8/23)     Annual reassessment due: 06/2023                         Action Plans on File:     Advance Care Plans/Directives Type:   No data recorded    My Medical and Care Information  Problem List   Patient Active Problem List   Diagnosis     CYSTIC LIVER DIS     Allergic rhinitis     Diverticulitis of colon     Osteopenia of both hips     Esophageal reflux     Mixed incontinence urge and stress (male)(female)     Cystocele, lateral     Vaginal atrophy     Mixed hyperlipidemia     Heart burn     BABB (dyspnea on exertion)     Sebaceous hyperplasia     Seborrheic keratosis     Fatty liver     Personal history of other malignant neoplasm of skin     Health Care Home     Hypertension goal BP (blood pressure) < 140/90     Elevated serum creatinine     Skin exam, screening for cancer     Essential hypertension, benign (HTN)     CKD (chronic kidney disease) stage 2, GFR 60-89 ml/min     Type 2 diabetes mellitus with hyperglycemia (H)     Type 2 diabetes with stage 2 chronic kidney disease GFR 60-89 (H)     Type 2 diabetes mellitus without complication, with long-term current use of insulin (H)     Senile nuclear sclerosis, right     Intertriginous  "candidiasis     Slow transit constipation     Age-related nuclear cataract, left     Basal cell carcinoma of face     Tubular adenoma of colon     BMI 35.0-39.9 with comorbidity (H)      Current Medications and Allergies:    Current Outpatient Medications   Medication     alum & mag hydroxide-simethicone (MAALOX) 200-200-20 MG/5ML SUSP suspension     aspirin (ASA) 81 MG chewable tablet     atorvastatin (LIPITOR) 40 MG tablet     blood glucose (ONETOUCH ULTRA) test strip     CALCIUM 500 + D 500-200 MG-IU OR TABS     Cholecalciferol (VITAMIN D PO)     Continuous Blood Gluc Sensor (FREESTYLE CARMELA 14 DAY SENSOR) MISC     furosemide (LASIX) 20 MG tablet     glimepiride (AMARYL) 4 MG tablet     insulin aspart (NOVOLOG PEN) 100 UNIT/ML pen     insulin pen needle (BD REY U/F) 32G X 4 MM miscellaneous     insulin syringe-needle U-100 (BD INSULIN SYRINGE ULTRAFINE) 31G X 5/16\" 1 ML miscellaneous     Lancets (ONETOUCH DELICA PLUS QLTFVE25C) MISC     LANTUS SOLOSTAR 100 UNIT/ML soln     lisinopril (ZESTRIL) 2.5 MG tablet     metFORMIN (GLUCOPHAGE XR) 500 MG 24 hr tablet     metoprolol tartrate (LOPRESSOR) 25 MG tablet     nystatin (MYCOSTATIN) 499684 UNIT/GM external cream     potassium chloride ER (MICRO-K) 10 MEQ CR capsule     triamcinolone (KENALOG) 0.1 % external cream     vitamin B-12 (CYANOCOBALAMIN) 2500 MCG sublingual tablet     No current facility-administered medications for this visit.        Allergies   Allergen Reactions     Actos [Pioglitazone]      Fatigue - felt crummy      Amlodipine Swelling     Ancef [Cefazolin]      Levaquin Rash     Itching, rash     Cephalosporins Rash     Clindamycin Rash     Levofloxacin Itching and Rash     Nickel Rash     Contact reaction  Contact reaction       Care Coordination Start Date: 6/27/2022   Frequency of Care Coordination: monthly     Form Last Updated: 02/08/2023     "

## 2023-02-16 ENCOUNTER — OFFICE VISIT (OUTPATIENT)
Dept: FAMILY MEDICINE | Facility: CLINIC | Age: 78
End: 2023-02-16
Payer: COMMERCIAL

## 2023-02-16 VITALS
DIASTOLIC BLOOD PRESSURE: 56 MMHG | SYSTOLIC BLOOD PRESSURE: 86 MMHG | RESPIRATION RATE: 18 BRPM | WEIGHT: 163 LBS | HEART RATE: 92 BPM | HEIGHT: 64 IN | TEMPERATURE: 97 F | BODY MASS INDEX: 27.83 KG/M2 | OXYGEN SATURATION: 98 %

## 2023-02-16 DIAGNOSIS — R10.32 LLQ ABDOMINAL PAIN: ICD-10-CM

## 2023-02-16 DIAGNOSIS — N39.0 ACUTE UTI: ICD-10-CM

## 2023-02-16 DIAGNOSIS — R19.7 INTERMITTENT DIARRHEA: ICD-10-CM

## 2023-02-16 DIAGNOSIS — R63.4 UNINTENTIONAL WEIGHT LOSS: Primary | ICD-10-CM

## 2023-02-16 DIAGNOSIS — R53.83 FATIGUE, UNSPECIFIED TYPE: ICD-10-CM

## 2023-02-16 DIAGNOSIS — R10.811 RIGHT UPPER QUADRANT ABDOMINAL TENDERNESS WITHOUT REBOUND TENDERNESS: ICD-10-CM

## 2023-02-16 DIAGNOSIS — I95.2 HYPOTENSION DUE TO DRUGS: ICD-10-CM

## 2023-02-16 DIAGNOSIS — R42 ORTHOSTATIC DIZZINESS: ICD-10-CM

## 2023-02-16 DIAGNOSIS — R06.09 DOE (DYSPNEA ON EXERTION): ICD-10-CM

## 2023-02-16 LAB
ALBUMIN UR-MCNC: 100 MG/DL
APPEARANCE UR: ABNORMAL
BACTERIA #/AREA URNS HPF: ABNORMAL /HPF
BASOPHILS # BLD AUTO: 0.1 10E3/UL (ref 0–0.2)
BASOPHILS NFR BLD AUTO: 1 %
BILIRUB UR QL STRIP: ABNORMAL
COLOR UR AUTO: YELLOW
EOSINOPHIL # BLD AUTO: 0.1 10E3/UL (ref 0–0.7)
EOSINOPHIL NFR BLD AUTO: 1 %
ERYTHROCYTE [DISTWIDTH] IN BLOOD BY AUTOMATED COUNT: 15.6 % (ref 10–15)
ERYTHROCYTE [SEDIMENTATION RATE] IN BLOOD BY WESTERGREN METHOD: 20 MM/HR (ref 0–30)
GLUCOSE UR STRIP-MCNC: 100 MG/DL
HCT VFR BLD AUTO: 36.5 % (ref 35–47)
HGB BLD-MCNC: 12.8 G/DL (ref 11.7–15.7)
HGB UR QL STRIP: ABNORMAL
IMM GRANULOCYTES # BLD: 0.1 10E3/UL
IMM GRANULOCYTES NFR BLD: 1 %
KETONES UR STRIP-MCNC: ABNORMAL MG/DL
LEUKOCYTE ESTERASE UR QL STRIP: ABNORMAL
LYMPHOCYTES # BLD AUTO: 1.2 10E3/UL (ref 0.8–5.3)
LYMPHOCYTES NFR BLD AUTO: 12 %
MCH RBC QN AUTO: 29.8 PG (ref 26.5–33)
MCHC RBC AUTO-ENTMCNC: 35.1 G/DL (ref 31.5–36.5)
MCV RBC AUTO: 85 FL (ref 78–100)
MONOCYTES # BLD AUTO: 0.6 10E3/UL (ref 0–1.3)
MONOCYTES NFR BLD AUTO: 6 %
NEUTROPHILS # BLD AUTO: 8.6 10E3/UL (ref 1.6–8.3)
NEUTROPHILS NFR BLD AUTO: 81 %
NITRATE UR QL: NEGATIVE
PH UR STRIP: 5 [PH] (ref 5–7)
PLATELET # BLD AUTO: 745 10E3/UL (ref 150–450)
RBC # BLD AUTO: 4.3 10E6/UL (ref 3.8–5.2)
RBC #/AREA URNS AUTO: ABNORMAL /HPF
SP GR UR STRIP: >=1.03 (ref 1–1.03)
UROBILINOGEN UR STRIP-ACNC: 0.2 E.U./DL
WBC # BLD AUTO: 10.6 10E3/UL (ref 4–11)
WBC #/AREA URNS AUTO: ABNORMAL /HPF

## 2023-02-16 PROCEDURE — 85652 RBC SED RATE AUTOMATED: CPT | Performed by: FAMILY MEDICINE

## 2023-02-16 PROCEDURE — 87088 URINE BACTERIA CULTURE: CPT | Performed by: FAMILY MEDICINE

## 2023-02-16 PROCEDURE — 80053 COMPREHEN METABOLIC PANEL: CPT | Performed by: FAMILY MEDICINE

## 2023-02-16 PROCEDURE — 83615 LACTATE (LD) (LDH) ENZYME: CPT | Performed by: FAMILY MEDICINE

## 2023-02-16 PROCEDURE — 87389 HIV-1 AG W/HIV-1&-2 AB AG IA: CPT | Performed by: FAMILY MEDICINE

## 2023-02-16 PROCEDURE — 36415 COLL VENOUS BLD VENIPUNCTURE: CPT | Performed by: FAMILY MEDICINE

## 2023-02-16 PROCEDURE — 86803 HEPATITIS C AB TEST: CPT | Performed by: FAMILY MEDICINE

## 2023-02-16 PROCEDURE — 87086 URINE CULTURE/COLONY COUNT: CPT | Performed by: FAMILY MEDICINE

## 2023-02-16 PROCEDURE — 93000 ELECTROCARDIOGRAM COMPLETE: CPT | Performed by: FAMILY MEDICINE

## 2023-02-16 PROCEDURE — 99215 OFFICE O/P EST HI 40 MIN: CPT | Performed by: FAMILY MEDICINE

## 2023-02-16 PROCEDURE — 84443 ASSAY THYROID STIM HORMONE: CPT | Performed by: FAMILY MEDICINE

## 2023-02-16 PROCEDURE — 85025 COMPLETE CBC W/AUTO DIFF WBC: CPT | Performed by: FAMILY MEDICINE

## 2023-02-16 PROCEDURE — 81001 URINALYSIS AUTO W/SCOPE: CPT | Performed by: FAMILY MEDICINE

## 2023-02-16 PROCEDURE — 87186 SC STD MICRODIL/AGAR DIL: CPT | Performed by: FAMILY MEDICINE

## 2023-02-16 PROCEDURE — 84165 PROTEIN E-PHORESIS SERUM: CPT

## 2023-02-16 ASSESSMENT — ENCOUNTER SYMPTOMS
ARTHRALGIAS: 0
WEAKNESS: 0
SINUS PRESSURE: 0
TROUBLE SWALLOWING: 0
BREAST MASS: 0
PARESTHESIAS: 0
CHEST TIGHTNESS: 0
SINUS PAIN: 0
CHOKING: 0
FREQUENCY: 0
RHINORRHEA: 1
NUMBNESS: 0
FATIGUE: 1
HEMATOCHEZIA: 0
ABDOMINAL DISTENTION: 0
BACK PAIN: 0
NAUSEA: 0
HEARTBURN: 0
DIARRHEA: 1
FEVER: 0
HEADACHES: 0
SHORTNESS OF BREATH: 1
ABDOMINAL PAIN: 1
CONSTIPATION: 0
WOUND: 0
COUGH: 0
PALPITATIONS: 0
UNEXPECTED WEIGHT CHANGE: 1
LIGHT-HEADEDNESS: 1
DYSURIA: 1

## 2023-02-16 ASSESSMENT — PATIENT HEALTH QUESTIONNAIRE - PHQ9
5. POOR APPETITE OR OVEREATING: NOT AT ALL
SUM OF ALL RESPONSES TO PHQ QUESTIONS 1-9: 10

## 2023-02-16 ASSESSMENT — ANXIETY QUESTIONNAIRES
GAD7 TOTAL SCORE: 1
6. BECOMING EASILY ANNOYED OR IRRITABLE: NOT AT ALL
2. NOT BEING ABLE TO STOP OR CONTROL WORRYING: NOT AT ALL
1. FEELING NERVOUS, ANXIOUS, OR ON EDGE: NOT AT ALL
GAD7 TOTAL SCORE: 1
7. FEELING AFRAID AS IF SOMETHING AWFUL MIGHT HAPPEN: NOT AT ALL
3. WORRYING TOO MUCH ABOUT DIFFERENT THINGS: SEVERAL DAYS
5. BEING SO RESTLESS THAT IT IS HARD TO SIT STILL: NOT AT ALL
IF YOU CHECKED OFF ANY PROBLEMS ON THIS QUESTIONNAIRE, HOW DIFFICULT HAVE THESE PROBLEMS MADE IT FOR YOU TO DO YOUR WORK, TAKE CARE OF THINGS AT HOME, OR GET ALONG WITH OTHER PEOPLE: VERY DIFFICULT

## 2023-02-16 NOTE — PATIENT INSTRUCTIONS
Stop the lasix, metoprolol, lisinopril and aspirin.      If you have MyChart:  1) I kindly request that you check your MyChart prior to all appointments with me and complete any assigned questionnaires ahead of time.    2) You may receive auto-released results from our system before I have the opportunity to review and comment.  Be assured I will review and comment on all of your results as soon as I can.      FYI:  My schedule has been booking out very far in advance (2 months).  I apologize for the lack of timely access.  If you need to be seen for a chronic condition or preventive (wellness) visit, please be sure to schedule that appointment 2-3 months in advance.  If you have a concern that you feel cannot wait until my next available appointment (such as a hospital follow-up or new symptom of concern) please ask to speak to one of the [place] nurses who may be able to access a sooner appointment.    I do ask that all patients who are taking chronic medications for conditions that I am managing schedule an in-person visit with me at least once a year.     To schedule any ordered imaging studies (including mammogram and/or DEXA scan) you can call [facility] Scheduling at [phone].

## 2023-02-16 NOTE — PROGRESS NOTES
Assessment & Plan     Unintentional weight loss  Right upper quadrant abdominal tenderness without rebound tenderness  LLQ abdomina pain - intermittent  Intermittent diarrhea  Fatigue, unspecified type  Brisk and worrisome unintentional weight loss associated with loss of appetite and somewhat vague abdominal/GI symptoms.  Ddx considered includes: malignancy, depression, dementia, alcoholism, dental problems (she denies), poverty or inability to buy/prepare food, malabsorption, intestinal ischemia, dysphagia, uncontrolled DM, hyperthyroidism, hypercalcemia, chronic heart, lung or renal disease, and chronic infection such as TB or SBE. We'll start w/u with labs and CT Abdomen/pelvis.  I have scheduled her to see me in follow-up in 2-3 weeks.  At that time we'll consider further work-up (including possible CXR and EGD) and I would like to clarify her alcohol use.  Her PHQ-9 scores are primarily related to the fatigue and loss of appetite but we could consider a trial of mirtazapine.  - UA Macro with Reflex to Micro and Culture - lab collect  - CBC with Platelets & Differential  - Comprehensive metabolic panel  - Erythrocyte sedimentation rate auto  - Hepatitis C Screen Reflex to HCV RNA Quant and Genotype  - HIV Antigen Antibody Combo  - Lactate Dehydrogenase  - Occult blood fecal HGB immuno  - Protein electrophoresis  - TSH with free T4 reflex  - CT Abdomen Pelvis w/o Contrast  - EKG 12-lead complete w/read - Clinics        BABB (dyspnea on exertion)   Hypotension due to drugs  Orthostatic dizziness  I advised her to discontinue the lasix, metoprolol and lisinopril.  (She is on very low doses of metoprolol and lisinopril so I think it is safe for her to discontinue all of these.)  I also advised her to discontinue daily ASA as she has no history of ASCVD and no longer needs the ASA for primary prevention.  - EKG 12-lead complete w/read - Clinics      Blood sugar testing frequency justification:  Risk of hypoglycemia  with medication(s)  Depression Screening Follow Up    PHQ 2/16/2023   PHQ-9 Total Score 10   Q9: Thoughts of better off dead/self-harm past 2 weeks Not at all       Follow Up Actions Taken  As docuemented above - her PHQ9 points are primary due to physical symptoms and likely secondary to nonpsychiatric causes     See Patient Instructions    I spent a total of 46 minutes on the day of the visit.   Time spent doing chart review, history and exam, documentation and further activities per the note    Heide Fay MD  [facility]        Noelle Smith is a 77 year old, presenting for the following health issues:  Weight Loss (Lost around 40 pounds. Loss of appetite), Shortness of Breath, and Diabetes      Shortness of Breath  Associated symptoms include abdominal pain and fatigue. Pertinent negatives include no arthralgias, chest pain, congestion, coughing, fever, headaches, nausea, numbness, rash or weakness.   History of Present Illness       Diabetes:   She presents for follow up of diabetes.  She is checking home blood glucose three times daily. She checks blood glucose before meals and at bedtime.  Blood glucose is sometimes over 200 and never under 70. She is aware of hypoglycemia symptoms including shakiness, lethargy and other. She is concerned about other. She is having weight loss.         She eats 2-3 servings of fruits and vegetables daily.She consumes 0 sweetened beverage(s) daily.She exercises with enough effort to increase her heart rate 9 or less minutes per day.  She exercises with enough effort to increase her heart rate 7 days per week.   She is taking medications regularly.     Patient has been following [facility] pharmacist who alerted me to her recent weight loss.  She reports loss of appetite and fatigue for the past 2-3 months.  On her home scale she used to weigh 200#.  Today she weighed in at 160# on her home scale.  She notes some  heart palpitations when she exerts herself - like taking the trash out.  Some LLQ pain intermittently.  Some diarrhea intermittently.    Some coughing with emesis (no nausea) - feeling like something is in her throat.  But no dysphagia.    Some dizziness this morning when she stood up from a bent-over position.  Mood has been poor.  Her brother is dying.    PHQ 8/19/2019 2/16/2023   PHQ-9 Total Score 4 10   Q9: Thoughts of better off dead/self-harm past 2 weeks Not at all Not at all      LUCINDA-7 SCORE 8/19/2019 2/16/2023   Total Score 0 1     Last PHQ-9 2/16/2023   1.  Little interest or pleasure in doing things 2   2.  Feeling down, depressed, or hopeless 1   3.  Trouble falling or staying asleep, or sleeping too much 1   4.  Feeling tired or having little energy 3   5.  Poor appetite or overeating 3   6.  Feeling bad about yourself 0   7.  Trouble concentrating 0   8.  Moving slowly or restless 0   Q9: Thoughts of better off dead/self-harm past 2 weeks 0   PHQ-9 Total Score 10   Difficulty at work, home, or with people Very difficult      Wt Readings from Last 5 Encounters:   02/16/23 73.9 kg (163 lb)   12/05/22 81.6 kg (179 lb 12.8 oz)   09/23/22 85.3 kg (188 lb)   09/12/22 86.9 kg (191 lb 8 oz)   07/25/22 92.5 kg (204 lb)          Review of Systems   Constitutional: Positive for fatigue and unexpected weight change. Negative for fever.   HENT: Positive for rhinorrhea. Negative for congestion, dental problem, sinus pressure, sinus pain and trouble swallowing.    Eyes: Negative for visual disturbance.   Respiratory: Positive for shortness of breath. Negative for cough, choking and chest tightness.    Cardiovascular: Negative for chest pain, palpitations and peripheral edema.   Gastrointestinal: Positive for abdominal pain and diarrhea. Negative for abdominal distention, constipation, heartburn, hematochezia and nausea.   Breasts:  Negative for tenderness, breast mass and discharge.   Genitourinary: Positive for  "dysuria and urgency. Negative for frequency and vaginal bleeding.   Musculoskeletal: Negative for arthralgias and back pain.   Skin: Negative for rash and wound.   Neurological: Positive for light-headedness. Negative for syncope, weakness, numbness, headaches and paresthesias.   Psychiatric/Behavioral: Positive for mood changes.            Objective    BP (!) 86/56   Pulse 92   Temp 97  F (36.1  C) (Temporal)   Resp 18   Ht 1.613 m (5' 3.5\")   Wt 73.9 kg (163 lb)   SpO2 98%   BMI 28.42 kg/m    Body mass index is 28.42 kg/m .  Physical Exam   GENERAL APPEARANCE: healthy, alert and no distress  EYES: Eyes grossly normal to inspection, conjunctivae and sclerae normal  ENT: external ears and nose without lesions or scars, TM's and canals clear bilaterally and oropharynx clear with moist mucus membranes and normal landmarks   NECK: no adenopathy, no asymmetry, masses, or scars and thyroid normal to palpation  RESP: lungs clear to auscultation - no rales, rhonchi or wheezes  CV: regular rate and rhythm, normal S1 S2, no S3 or S4, no murmur, click or rub, no peripheral edema   ABDOMEN: soft, nontender, no hepatosplenomegaly, no masses   MS: no musculoskeletal defects are noted and gait is age appropriate without ataxia  SKIN: no suspicious lesions or rashes  NEURO: Normal strength and tone, sensory exam grossly normal, mentation intact and speech normal  PSYCH: mentation appears normal and affect normal/bright         EKG: appears normal, NSR, normal axis, normal intervals, no acute ST/T changes c/w ischemia, no LVH by voltage criteria, by my interpretation    "

## 2023-02-17 PROBLEM — E66.01 SEVERE OBESITY (BMI 35.0-39.9) WITH COMORBIDITY (H): Status: RESOLVED | Noted: 2022-06-23 | Resolved: 2023-02-17

## 2023-02-17 LAB
ALBUMIN SERPL BCG-MCNC: 4.4 G/DL (ref 3.5–5.2)
ALP SERPL-CCNC: 87 U/L (ref 35–104)
ALT SERPL W P-5'-P-CCNC: 33 U/L (ref 10–35)
ANION GAP SERPL CALCULATED.3IONS-SCNC: 17 MMOL/L (ref 7–15)
AST SERPL W P-5'-P-CCNC: 39 U/L (ref 10–35)
BILIRUB SERPL-MCNC: 0.7 MG/DL
BUN SERPL-MCNC: 15.3 MG/DL (ref 8–23)
CALCIUM SERPL-MCNC: 9.5 MG/DL (ref 8.8–10.2)
CHLORIDE SERPL-SCNC: 97 MMOL/L (ref 98–107)
CREAT SERPL-MCNC: 1.26 MG/DL (ref 0.51–0.95)
DEPRECATED HCO3 PLAS-SCNC: 21 MMOL/L (ref 22–29)
GFR SERPL CREATININE-BSD FRML MDRD: 44 ML/MIN/1.73M2
GLUCOSE SERPL-MCNC: 255 MG/DL (ref 70–99)
LDH SERPL L TO P-CCNC: 323 U/L (ref 0–250)
POTASSIUM SERPL-SCNC: 4.4 MMOL/L (ref 3.4–5.3)
PROT SERPL-MCNC: 6.8 G/DL (ref 6.4–8.3)
SODIUM SERPL-SCNC: 135 MMOL/L (ref 136–145)
TOTAL PROTEIN SERUM FOR ELP: 6.7 G/DL (ref 6.4–8.3)
TSH SERPL DL<=0.005 MIU/L-ACNC: 2.61 UIU/ML (ref 0.3–4.2)

## 2023-02-19 LAB — BACTERIA UR CULT: ABNORMAL

## 2023-02-19 RX ORDER — SULFAMETHOXAZOLE/TRIMETHOPRIM 800-160 MG
1 TABLET ORAL 2 TIMES DAILY
Qty: 10 TABLET | Refills: 0 | Status: SHIPPED | OUTPATIENT
Start: 2023-02-19 | End: 2023-03-13

## 2023-02-19 NOTE — RESULT ENCOUNTER NOTE
Ubaldo Smith,  Your urinalysis is showing you have a urinary tract infection.  I have sent a prescription for the antibiotic Bactrim to your pharmacy.  That is taken twice daily for 5 days.      The rest of your lab results thus far show some mild abnormalities but nothing particularly worrisome.  We can review all of your results when I see you next Thursday.    Heide Fay MD

## 2023-02-20 ENCOUNTER — TELEPHONE (OUTPATIENT)
Dept: FAMILY MEDICINE | Facility: CLINIC | Age: 78
End: 2023-02-20
Payer: COMMERCIAL

## 2023-02-20 DIAGNOSIS — R63.4 UNINTENTIONAL WEIGHT LOSS: Primary | ICD-10-CM

## 2023-02-20 DIAGNOSIS — R06.09 DOE (DYSPNEA ON EXERTION): ICD-10-CM

## 2023-02-20 LAB
ALBUMIN SERPL ELPH-MCNC: 4.1 G/DL (ref 3.7–5.1)
ALPHA1 GLOB SERPL ELPH-MCNC: 0.4 G/DL (ref 0.2–0.4)
ALPHA2 GLOB SERPL ELPH-MCNC: 0.8 G/DL (ref 0.5–0.9)
B-GLOBULIN SERPL ELPH-MCNC: 0.8 G/DL (ref 0.6–1)
GAMMA GLOB SERPL ELPH-MCNC: 0.7 G/DL (ref 0.7–1.6)
HCV AB SERPL QL IA: NONREACTIVE
HIV 1+2 AB+HIV1 P24 AG SERPL QL IA: NONREACTIVE
M PROTEIN SERPL ELPH-MCNC: 0 G/DL
PROT PATTERN SERPL ELPH-IMP: NORMAL

## 2023-02-20 NOTE — TELEPHONE ENCOUNTER
I called pt and let her know    She will get the xray with the CT    FREDY Granados RN  [facility]

## 2023-02-20 NOTE — TELEPHONE ENCOUNTER
"Patient calling in, saw PCP 2/16    She says when she was about to leave her appt, Dr. Fay told her she wanted her to get an Xray but xray was unavailable in the clinic at that time, so she was under the impression that an order would be put in and she could get it done with her CT    No Xray orders in    I did share with her Dr. Fay's note that says \"We'll start w/u with labs and CT Abdomen/pelvis.  I have scheduled her to see me in follow-up in 2-3 weeks.  At that time we'll consider further work-up (including possible CXR and EGD) \"'    Patient then said \"well that's not what she told me so did she change her mind?\"     I did inform her PCP is out of office for the week, she can plan to do the CT as scheduled 2/24 and get an xray if needed when she sees Dr. Fay in March    She wanted me to send in case PCP still on [facility] and could order xray to be done with her CT    MEGAN GranadosN RN  [facility]    "

## 2023-02-20 NOTE — TELEPHONE ENCOUNTER
cxr order placed in case wants to do with Ct . Otherwise wait for PCP Dr Trevizo response when they are back in office

## 2023-02-28 ENCOUNTER — ANCILLARY PROCEDURE (OUTPATIENT)
Dept: CT IMAGING | Facility: CLINIC | Age: 78
End: 2023-02-28
Attending: FAMILY MEDICINE
Payer: COMMERCIAL

## 2023-02-28 ENCOUNTER — ANCILLARY PROCEDURE (OUTPATIENT)
Dept: GENERAL RADIOLOGY | Facility: CLINIC | Age: 78
End: 2023-02-28
Attending: FAMILY MEDICINE
Payer: COMMERCIAL

## 2023-02-28 DIAGNOSIS — R06.09 DOE (DYSPNEA ON EXERTION): ICD-10-CM

## 2023-02-28 DIAGNOSIS — R63.4 UNINTENTIONAL WEIGHT LOSS: ICD-10-CM

## 2023-02-28 DIAGNOSIS — R19.7 INTERMITTENT DIARRHEA: ICD-10-CM

## 2023-02-28 DIAGNOSIS — R10.811 RIGHT UPPER QUADRANT ABDOMINAL TENDERNESS WITHOUT REBOUND TENDERNESS: ICD-10-CM

## 2023-02-28 DIAGNOSIS — R53.83 FATIGUE, UNSPECIFIED TYPE: ICD-10-CM

## 2023-02-28 PROCEDURE — 74177 CT ABD & PELVIS W/CONTRAST: CPT | Performed by: RADIOLOGY

## 2023-02-28 PROCEDURE — 71046 X-RAY EXAM CHEST 2 VIEWS: CPT | Performed by: RADIOLOGY

## 2023-02-28 RX ORDER — IOPAMIDOL 755 MG/ML
90 INJECTION, SOLUTION INTRAVASCULAR ONCE
Status: COMPLETED | OUTPATIENT
Start: 2023-02-28 | End: 2023-02-28

## 2023-02-28 RX ADMIN — IOPAMIDOL 90 ML: 755 INJECTION, SOLUTION INTRAVASCULAR at 16:50

## 2023-02-28 NOTE — RESULT ENCOUNTER NOTE
Mary Felix,  Your results came back and chest xray shows no concerning findings. Please discuss with your PCP Dr Fay regarding any concerns  Have a good day   Dr Cruz MI

## 2023-03-01 ENCOUNTER — OFFICE VISIT (OUTPATIENT)
Dept: OPHTHALMOLOGY | Facility: CLINIC | Age: 78
End: 2023-03-01
Attending: OPHTHALMOLOGY
Payer: COMMERCIAL

## 2023-03-01 DIAGNOSIS — H26.491 PCO (POSTERIOR CAPSULAR OPACIFICATION), RIGHT: ICD-10-CM

## 2023-03-01 DIAGNOSIS — E11.65 TYPE 2 DIABETES MELLITUS WITH HYPERGLYCEMIA, WITH LONG-TERM CURRENT USE OF INSULIN (H): Primary | ICD-10-CM

## 2023-03-01 DIAGNOSIS — Z96.1 PSEUDOPHAKIA OF BOTH EYES: ICD-10-CM

## 2023-03-01 DIAGNOSIS — Z79.4 TYPE 2 DIABETES MELLITUS WITH HYPERGLYCEMIA, WITH LONG-TERM CURRENT USE OF INSULIN (H): Primary | ICD-10-CM

## 2023-03-01 DIAGNOSIS — H43.393 VITREOUS SYNERESIS OF BOTH EYES: ICD-10-CM

## 2023-03-01 DIAGNOSIS — H40.003 GLAUCOMA SUSPECT OF BOTH EYES: ICD-10-CM

## 2023-03-01 DIAGNOSIS — H52.13 MYOPIA OF BOTH EYES: ICD-10-CM

## 2023-03-01 PROCEDURE — 66821 AFTER CATARACT LASER SURGERY: CPT | Mod: RT | Performed by: OPHTHALMOLOGY

## 2023-03-01 PROCEDURE — G0463 HOSPITAL OUTPT CLINIC VISIT: HCPCS | Mod: 25

## 2023-03-01 PROCEDURE — 92134 CPTRZ OPH DX IMG PST SGM RTA: CPT | Performed by: OPHTHALMOLOGY

## 2023-03-01 PROCEDURE — 99214 OFFICE O/P EST MOD 30 MIN: CPT | Mod: 25 | Performed by: OPHTHALMOLOGY

## 2023-03-01 ASSESSMENT — CONF VISUAL FIELD
OD_INFERIOR_NASAL_RESTRICTION: 0
OD_NORMAL: 1
OS_SUPERIOR_TEMPORAL_RESTRICTION: 0
OD_SUPERIOR_TEMPORAL_RESTRICTION: 0
OS_SUPERIOR_NASAL_RESTRICTION: 0
OD_SUPERIOR_NASAL_RESTRICTION: 0
OS_INFERIOR_TEMPORAL_RESTRICTION: 0
OD_INFERIOR_TEMPORAL_RESTRICTION: 0
OS_INFERIOR_NASAL_RESTRICTION: 0
METHOD: COUNTING FINGERS
OS_NORMAL: 1

## 2023-03-01 ASSESSMENT — TONOMETRY
OS_IOP_MMHG: 21
OD_IOP_MMHG: 20
IOP_METHOD: TONOPEN

## 2023-03-01 ASSESSMENT — REFRACTION_WEARINGRX
OD_CYLINDER: +2.25
OD_ADD: +2.50
OS_AXIS: 013
OS_ADD: +2.50
OS_SPHERE: -2.00
OD_AXIS: 019
OD_SPHERE: -2.50
OS_CYLINDER: +1.50

## 2023-03-01 ASSESSMENT — VISUAL ACUITY
OD_CC: 20/60
OD_PH_CC: 20/50
CORRECTION_TYPE: GLASSES
METHOD: SNELLEN - LINEAR
OS_CC: 20/25
OS_CC+: -2

## 2023-03-01 ASSESSMENT — SLIT LAMP EXAM - LIDS
COMMENTS: NORMAL
COMMENTS: NORMAL

## 2023-03-01 ASSESSMENT — CUP TO DISC RATIO
OS_RATIO: 0.2
OD_RATIO: 0.2

## 2023-03-01 ASSESSMENT — EXTERNAL EXAM - LEFT EYE: OS_EXAM: NORMAL

## 2023-03-01 NOTE — PROGRESS NOTES
CC: foggy vision right eye     HPI  Sarah Felix is a 77 year old female here for evaluation of blurry vision of the right eye and a diabetic eye exam.    She feels like her vision in the right eye never improved after cataract surgery. It is not as good as her left eye.     She has had difficulty controlling her A1c. A new medication she is using - pioglitazone - has not been easy for her to take with poor control and frequent hypoglycemia.     Assessment & Plan      # Diabetes mellitus, type 2  - diagnosed age 62  - using insuline; A1c (12/2022) 7.3 (from 9.9)  - no retinopathy on exam.   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 Recommed YAG today    # Glaucoma suspect  each eye large cup with thin and pale rim  IOP 20/21  Pachy next visit; gonio next visit; OCT RNFL ou next visit    RTC 1-2 month for VT, OCT RNFL, gonio, pachy, MRx    Complete documentation of historical and exam elements from today's encounter can be found in the full encounter summary report (not reduplicated in this progress note). I personally obtained the chief complaint(s) and history of present illness.  I confirmed and edited as necessary the review of systems, past medical/surgical history, family history, social history, and examination findings as documented by others; and I examined the patient myself. I personally reviewed the relevant tests, images, and reports as documented above. I formulated and edited as necessary the assessment and plan and discussed the findings and management plan with the patient and family.    Lester Dominguez MD, PhD

## 2023-03-01 NOTE — NURSING NOTE
Chief Complaints and History of Present Illnesses   Patient presents with     Pseudophakia Follow Up     8 month follow up both eyes     Chief Complaint(s) and History of Present Illness(es)     Pseudophakia Follow Up            Comments: 8 month follow up both eyes          Comments    Pt states vision in LE is the same. Vision in RE appearing more blurry. Scratchy sensation in RE that comes and goes, pt not taking any eye drops.  No flashes or floaters. No redness or dryness.  Pt reports losing 40 pounds since last fall. Pt also notes that she has a loss of appetite and is being followed by her primary Dr.    DM2 BS:163 around noon per pt.  Lab Results       Component                Value               Date                       A1C                      7.3                 12/05/2022                 A1C                      9.9                 09/23/2022                 A1C                      8.6                 07/25/2022                 A1C                      8.2                 04/18/2022                 A1C                      8.4                 01/03/2022                 A1C                      8.3                 06/21/2021                 A1C                      9.4                 12/14/2020                 A1C                      8.1                 09/21/2020                 A1C                      8.5                 01/07/2020                 A1C                      7.6                 10/12/2019              MAXIM Zabala March 1, 2023 2:09 PM

## 2023-03-02 ENCOUNTER — PATIENT OUTREACH (OUTPATIENT)
Dept: CARE COORDINATION | Facility: CLINIC | Age: 78
End: 2023-03-02
Payer: COMMERCIAL

## 2023-03-02 NOTE — RESULT ENCOUNTER NOTE
Ubaldo Smith,  We can discuss this next week but the good news is that there is nothing acutely worrisome on your CT scan.      Heide Fay MD

## 2023-03-02 NOTE — PROGRESS NOTES
Clinic Care Coordination Contact    Community Health Worker Follow Up    Care Gaps:     Health Maintenance Due   Topic Date Due     ZOSTER IMMUNIZATION (2 of 3) 05/30/2012     DEXA  01/03/2021     COLORECTAL CANCER SCREENING  09/16/2021     A1C  03/05/2023     MICROALBUMIN  03/23/2023       Care Gaps Last addressed on 2/16/23    Care Plan:   Care Plan: Transportation     Problem: Lack of transportation     Goal: Establish reliable transportation     Start Date: 6/27/2022 Expected End Date: 9/27/2022    Recent Progress: 10%    Note:     Goal Statement: I want to obtain transportation in order to complete a colonoscopy  Date Goal set: 6/27/2022  Barriers: lack of transportation  Strengths: motivated to complete   Date to Achieve By: 9/27/2023  Patient expressed understanding of goal: Yes  Action steps to achieve this goal:  1. I will review resources provided by [facility]  2. I will set up a colonoscopy appointment (Patient states that it's not a priority at this moment)  3. I will set up transportation  4. I will keep in touch with care coordination.    (Updated 2/8/23)     Annual reassessment due: 06/2023                        Intervention and Education during outreach:     Patient states that she has had a lot going on this week. Patient states that she had and eye procedure done yesterday and stated that it was very helpful for her vision. Patient states that she also got an xray and a CT scan done earlier in the week due to her unintentional weight loss. Patient states that she would like to schedule her colonoscopy, but not right at this moment. Patient requested to be called back in a month to touch base on scheduling a colonoscopy. Patient states that she has no other needs or concerns for CC at this time.    CHW Plan: CHW will do next patient outreach in one month.    CHIQUITA Centeno, B.A. [facility] Care Coordination  [facility]:   [facility]  [phone]

## 2023-03-03 PROCEDURE — 82274 ASSAY TEST FOR BLOOD FECAL: CPT | Performed by: FAMILY MEDICINE

## 2023-03-05 PROBLEM — D13.2 ADENOMATOUS POLYP OF DUODENUM: Status: ACTIVE | Noted: 2018-08-13

## 2023-03-05 PROBLEM — K22.2 SCHATZKI'S RING OF DISTAL ESOPHAGUS: Status: ACTIVE | Noted: 2023-03-05

## 2023-03-05 PROBLEM — H25.12 AGE-RELATED NUCLEAR CATARACT, LEFT: Status: RESOLVED | Noted: 2020-09-04 | Resolved: 2023-03-05

## 2023-03-05 PROBLEM — H25.11 SENILE NUCLEAR SCLEROSIS, RIGHT: Status: RESOLVED | Noted: 2018-02-15 | Resolved: 2023-03-05

## 2023-03-06 LAB — HEMOCCULT STL QL IA: NEGATIVE

## 2023-03-07 NOTE — RESULT ENCOUNTER NOTE
Ubaldo Smith,  Thanks for completing the stool blood test.  Your results are negative.     I was sorry that you did not come to your appointment yesterday. Please reschedule so we can review your results together and discuss next steps.      Heide Fay MD

## 2023-03-12 NOTE — PROGRESS NOTES
Assessment & Plan     Unintentional weight loss  Anorexia has resolved and she is starting to gain weight again.  We'll check a few more labs and I do want her to complete the screening colonoscopy.  I will reach out to her care coordinator to see if transportation can be arranged.    - Quantiferon TB Gold Plus  - Folate  - Vitamin B12    Essential hypertension  stable/well controlled but we'll add back the lasix at a lower dose which should help her ankle swelling  - furosemide (LASIX) 20 MG tablet  Dispense: 90 tablet; Refill: 0  - Basic metabolic panel  (Ca, Cl, CO2, Creat, Gluc, K, Na, BUN)    Type 2 diabetes mellitus without complication, with long-term current use of insulin (H)  A1c has risen again.  She is working with Joan Lopez, [facility] Pharmacist on diabetes control.  - Hemoglobin A1c    BABB (dyspnea on exertion)  - Echocardiogram Complete    Acute UTI  I encouraged her to fill the Bactrim and take it.    - sulfamethoxazole-trimethoprim (BACTRIM DS) 800-160 MG tablet  Dispense: 10 tablet; Refill: 0    Hepatic cyst  Renal cyst  Both have been previously characterized by US and need no further assessment    I did turn off her MyChart as she consistently does not read her MyChart messages. I discussed that it is dangerous to be on MyChart but not read her care team's messages.  I think it is safer if we plan on calling her with abnormal labs and sending her results via US mail.        No follow-ups on file.    Heide Fay MD  [facility]        Noelle Smith is a 77 year old, presenting for the following health issues:  Follow Up      History of Present Illness       Mental Health Follow-up:  Patient presents to follow-up on Depression.Patient's depression since last visit has been:  No change  The patient is not having other symptoms associated with depression.      Any significant life events: No  Patient is not feeling anxious or having panic  attacks.  Patient has no concerns about alcohol or drug use.    Diabetes:   She presents for follow up of diabetes.  She is checking home blood glucose two times daily. She checks blood glucose before meals and at bedtime.  Blood glucose is never over 200 and never under 70. She is aware of hypoglycemia symptoms including shakiness, weakness and lethargy. She has no concerns regarding her diabetes at this time.  She is having numbness in feet and weight loss.         Reason for visit:  Follow up of tests    She eats 0-1 servings of fruits and vegetables daily.She consumes 1 sweetened beverage(s) daily.She exercises with enough effort to increase her heart rate 9 or less minutes per day.  She exercises with enough effort to increase her heart rate 3 or less days per week.   She is taking medications regularly.    Today's PHQ-9         PHQ-9 Total Score: 5    PHQ-9 Q9 Thoughts of better off dead/self-harm past 2 weeks :   Not at all    How difficult have these problems made it for you to do your work, take care of things at home, or get along with other people: Somewhat difficult     PHQ 8/19/2019 2/16/2023 3/13/2023   PHQ-9 Total Score 4 10 5   Q9: Thoughts of better off dead/self-harm past 2 weeks Not at all Not at all Not at all     LUCINDA-7 SCORE 8/19/2019 2/16/2023   Total Score 0 1        Sarah is here to follow-up unintentional weight loss associated with anorexia and fatigue.      I saw her 2 weeks ago when she reported a 40# weight loss over 2-3 months on her home scale.  I initiated work-up which included CXR, CT Abd/pelvis, and labs which were unremarkable except for:  - Klebsiella UTI which I treated with Bactrim (consistent with sensitivities)  - thrombocytosis (chronic, previously evaluated by Heme)  - elevated LDH  - worsened renal function (eGFR decline from 55 last fall to 44)  - elevated BG of 255    Of note I did also order occult blood by FIT which was negative.   However, she is overdue for a  "screening colonoscopy and she did report some intermittent LLQ pain and diarrhea.  Today she states that she has no one to drive her to her colonoscopy.    I was also considering an EGD due to her report of vague feeling like something is in her throat (although she denied dysphagia and odynophagia).      I was also considering a TTE vs cardiology consult as she was reporting BABB and palpitations although she has had longstanding BABB that was felt to be due to deconditioning per Pulmonary (2011).    She was also somewhat hypotensive when I saw her last and I instructed her to discontinue the lasix and low-dose metoprolol and lisinopril.      Update since last visit.  She does not use MyChart and did not get any of my MyChart result messages including the message that she had a UTI and that I prescribed her a course of Bactrim.  She never took the Bactrim.  Today she reports that her appetite has started to return and she is gaining weight.  She had YAG on her right eye earlier this month and she can see much better now!  She notes more swelling in her ankles since stopping the lasix.  She continues to have intermittent LLQ pains.    Alcohol use - drinks 2 rum and cokes 3 times per week      BP Readings from Last 2 Encounters:   03/13/23 133/84   02/16/23 (!) 86/56     Hemoglobin A1C (%)   Date Value   03/13/2023 8.3 (H)   12/05/2022 7.3 (H)   06/21/2021 8.3 (H)   12/14/2020 9.4 (H)     LDL Cholesterol Calculated (mg/dL)   Date Value   09/23/2022 55   09/02/2021 76   01/07/2020 63   10/22/2018 77     Wt Readings from Last 3 Encounters:   03/13/23 79.9 kg (176 lb 1 oz)   02/16/23 73.9 kg (163 lb)   12/05/22 81.6 kg (179 lb 12.8 oz)        Review of Systems         Objective    /84 (BP Location: Left arm, Patient Position: Sitting, Cuff Size: Adult Regular)   Pulse 105   Temp 97.3  F (36.3  C) (Temporal)   Resp 18   Ht 1.626 m (5' 4\")   Wt 79.9 kg (176 lb 1 oz)   SpO2 97%   BMI 30.22 kg/m    Body mass " index is 30.22 kg/m .  Physical Exam   GEN:  no apparent distress     Office Visit on 02/16/2023   Component Date Value Ref Range Status     Color Urine 02/16/2023 Yellow  Colorless, Straw, Light Yellow, Yellow Final     Appearance Urine 02/16/2023 Cloudy (A)  Clear Final     Glucose Urine 02/16/2023 100 (A)  Negative mg/dL Final     Bilirubin Urine 02/16/2023 Moderate (A)  Negative Final     Ketones Urine 02/16/2023 Trace (A)  Negative mg/dL Final     Specific Gravity Urine 02/16/2023 >=1.030  1.003 - 1.035 Final     Blood Urine 02/16/2023 Small (A)  Negative Final     pH Urine 02/16/2023 5.0  5.0 - 7.0 Final     Protein Albumin Urine 02/16/2023 100 (A)  Negative mg/dL Final     Urobilinogen Urine 02/16/2023 0.2  0.2, 1.0 E.U./dL Final     Nitrite Urine 02/16/2023 Negative  Negative Final     Leukocyte Esterase Urine 02/16/2023 Small (A)  Negative Final     Sodium 02/16/2023 135 (L)  136 - 145 mmol/L Final     Potassium 02/16/2023 4.4  3.4 - 5.3 mmol/L Final     Chloride 02/16/2023 97 (L)  98 - 107 mmol/L Final     Carbon Dioxide (CO2) 02/16/2023 21 (L)  22 - 29 mmol/L Final     Anion Gap 02/16/2023 17 (H)  7 - 15 mmol/L Final     Urea Nitrogen 02/16/2023 15.3  8.0 - 23.0 mg/dL Final     Creatinine 02/16/2023 1.26 (H)  0.51 - 0.95 mg/dL Final     Calcium 02/16/2023 9.5  8.8 - 10.2 mg/dL Final     Glucose 02/16/2023 255 (H)  70 - 99 mg/dL Final     Alkaline Phosphatase 02/16/2023 87  35 - 104 U/L Final     AST 02/16/2023 39 (H)  10 - 35 U/L Final     ALT 02/16/2023 33  10 - 35 U/L Final     Protein Total 02/16/2023 6.8  6.4 - 8.3 g/dL Final     Albumin 02/16/2023 4.4  3.5 - 5.2 g/dL Final     Bilirubin Total 02/16/2023 0.7  <=1.2 mg/dL Final     GFR Estimate 02/16/2023 44 (L)  >60 mL/min/1.73m2 Final    eGFR calculated using 2021 CKD-EPI equation.     Erythrocyte Sedimentation Rate 02/16/2023 20  0 - 30 mm/hr Final     Hepatitis C Antibody 02/16/2023 Nonreactive  Nonreactive Final     HIV Antigen Antibody Combo  02/16/2023 Nonreactive  Nonreactive Final    HIV-1 p24 Ag & HIV-1/HIV-2 Ab Not Detected     Lactate Dehydrogenase 02/16/2023 323 (H)  0 - 250 U/L Final     TSH 02/16/2023 2.61  0.30 - 4.20 uIU/mL Final     WBC Count 02/16/2023 10.6  4.0 - 11.0 10e3/uL Final     RBC Count 02/16/2023 4.30  3.80 - 5.20 10e6/uL Final     Hemoglobin 02/16/2023 12.8  11.7 - 15.7 g/dL Final     Hematocrit 02/16/2023 36.5  35.0 - 47.0 % Final     MCV 02/16/2023 85  78 - 100 fL Final     MCH 02/16/2023 29.8  26.5 - 33.0 pg Final     MCHC 02/16/2023 35.1  31.5 - 36.5 g/dL Final     RDW 02/16/2023 15.6 (H)  10.0 - 15.0 % Final     Platelet Count 02/16/2023 745 (H)  150 - 450 10e3/uL Final     % Neutrophils 02/16/2023 81  % Final     % Lymphocytes 02/16/2023 12  % Final     % Monocytes 02/16/2023 6  % Final     % Eosinophils 02/16/2023 1  % Final     % Basophils 02/16/2023 1  % Final     % Immature Granulocytes 02/16/2023 1  % Final     Absolute Neutrophils 02/16/2023 8.6 (H)  1.6 - 8.3 10e3/uL Final     Absolute Lymphocytes 02/16/2023 1.2  0.8 - 5.3 10e3/uL Final     Absolute Monocytes 02/16/2023 0.6  0.0 - 1.3 10e3/uL Final     Absolute Eosinophils 02/16/2023 0.1  0.0 - 0.7 10e3/uL Final     Absolute Basophils 02/16/2023 0.1  0.0 - 0.2 10e3/uL Final     Absolute Immature Granulocytes 02/16/2023 0.1  <=0.4 10e3/uL Final     Total Protein Serum for ELP 02/16/2023 6.7  6.4 - 8.3 g/dL Final     Albumin 02/16/2023 4.1  3.7 - 5.1 g/dL Final     Alpha 1 02/16/2023 0.4  0.2 - 0.4 g/dL Final     Alpha 2 02/16/2023 0.8  0.5 - 0.9 g/dL Final     Beta Globulin 02/16/2023 0.8  0.6 - 1.0 g/dL Final     Gamma Globulin 02/16/2023 0.7  0.7 - 1.6 g/dL Final     Monoclonal Peak 02/16/2023 0.0  <=0.0 g/dL Final     ELP Interpretation 02/16/2023 Essentially normal electrophoretic pattern. No obvious monoclonal proteins seen. Pathologic significance requires clinical correlation. Deandre White M.D., Ph.D., Pathologist.   Final     Bacteria Urine 02/16/2023  Few (A)  None Seen /HPF Final     RBC Urine 02/16/2023 0-2  0-2 /HPF /HPF Final     WBC Urine 02/16/2023  (A)  0-5 /HPF /HPF Final     Culture 02/16/2023 50,000-100,000 CFU/mL Klebsiella pneumoniae (A)   Final     CT Abd/Pelvis w/contrast  IMPRESSION:   1.  Large left lobe hepatic cyst with evidence of a thin septation. There is only slight increase from the prior examination from 7 years ago. This supports benignity. Given the septation, ultrasound might be considered to ensure no aggressive features.   Large hepatic cysts can cause symptoms, with no suggestion that acute symptoms should have developed given the long stability.     2.  Left renal cystic lesion likely hemorrhagic cyst growing slightly in the interval between examinations, ultrasound evaluation of the hepatic cyst can also include the renal cyst to ensure this benign impression.     3.  Left inguinal hernia with colon present within. No evidence of incarceration or obstruction.     4.  Gallstones.     5.  Diverticulosis.    CXR:                                                                 IMPRESSION: Thoracic spondylosis, otherwise negative

## 2023-03-13 ENCOUNTER — OFFICE VISIT (OUTPATIENT)
Dept: FAMILY MEDICINE | Facility: CLINIC | Age: 78
End: 2023-03-13
Payer: COMMERCIAL

## 2023-03-13 VITALS
HEIGHT: 64 IN | BODY MASS INDEX: 30.06 KG/M2 | OXYGEN SATURATION: 97 % | SYSTOLIC BLOOD PRESSURE: 133 MMHG | TEMPERATURE: 97.3 F | DIASTOLIC BLOOD PRESSURE: 84 MMHG | HEART RATE: 105 BPM | WEIGHT: 176.06 LBS | RESPIRATION RATE: 18 BRPM

## 2023-03-13 DIAGNOSIS — I10 HYPERTENSION GOAL BP (BLOOD PRESSURE) < 140/90: ICD-10-CM

## 2023-03-13 DIAGNOSIS — K76.89 HEPATIC CYST: ICD-10-CM

## 2023-03-13 DIAGNOSIS — R06.09 DOE (DYSPNEA ON EXERTION): ICD-10-CM

## 2023-03-13 DIAGNOSIS — E11.9 TYPE 2 DIABETES MELLITUS WITHOUT COMPLICATION, WITH LONG-TERM CURRENT USE OF INSULIN (H): ICD-10-CM

## 2023-03-13 DIAGNOSIS — N28.1 RENAL CYST: ICD-10-CM

## 2023-03-13 DIAGNOSIS — Z79.4 TYPE 2 DIABETES MELLITUS WITHOUT COMPLICATION, WITH LONG-TERM CURRENT USE OF INSULIN (H): ICD-10-CM

## 2023-03-13 DIAGNOSIS — R63.4 UNINTENTIONAL WEIGHT LOSS: Primary | ICD-10-CM

## 2023-03-13 DIAGNOSIS — N39.0 ACUTE UTI: ICD-10-CM

## 2023-03-13 DIAGNOSIS — E53.8 FOLATE DEFICIENCY: ICD-10-CM

## 2023-03-13 LAB
ANION GAP SERPL CALCULATED.3IONS-SCNC: 14 MMOL/L (ref 7–15)
BUN SERPL-MCNC: 9 MG/DL (ref 8–23)
CALCIUM SERPL-MCNC: 8.9 MG/DL (ref 8.8–10.2)
CHLORIDE SERPL-SCNC: 107 MMOL/L (ref 98–107)
CREAT SERPL-MCNC: 0.75 MG/DL (ref 0.51–0.95)
DEPRECATED HCO3 PLAS-SCNC: 19 MMOL/L (ref 22–29)
FOLATE SERPL-MCNC: 3.1 NG/ML (ref 4.6–34.8)
GFR SERPL CREATININE-BSD FRML MDRD: 83 ML/MIN/1.73M2
GLUCOSE SERPL-MCNC: 252 MG/DL (ref 70–99)
HBA1C MFR BLD: 8.3 % (ref 0–5.6)
HOLD SPECIMEN: NORMAL
POTASSIUM SERPL-SCNC: 4.4 MMOL/L (ref 3.4–5.3)
SODIUM SERPL-SCNC: 140 MMOL/L (ref 136–145)
VIT B12 SERPL-MCNC: 645 PG/ML (ref 232–1245)

## 2023-03-13 PROCEDURE — 82746 ASSAY OF FOLIC ACID SERUM: CPT | Performed by: FAMILY MEDICINE

## 2023-03-13 PROCEDURE — 80048 BASIC METABOLIC PNL TOTAL CA: CPT | Performed by: FAMILY MEDICINE

## 2023-03-13 PROCEDURE — 82607 VITAMIN B-12: CPT | Performed by: FAMILY MEDICINE

## 2023-03-13 PROCEDURE — 86481 TB AG RESPONSE T-CELL SUSP: CPT | Performed by: FAMILY MEDICINE

## 2023-03-13 PROCEDURE — 99214 OFFICE O/P EST MOD 30 MIN: CPT | Performed by: FAMILY MEDICINE

## 2023-03-13 PROCEDURE — 36415 COLL VENOUS BLD VENIPUNCTURE: CPT | Performed by: FAMILY MEDICINE

## 2023-03-13 PROCEDURE — 83036 HEMOGLOBIN GLYCOSYLATED A1C: CPT | Performed by: FAMILY MEDICINE

## 2023-03-13 RX ORDER — SULFAMETHOXAZOLE/TRIMETHOPRIM 800-160 MG
1 TABLET ORAL 2 TIMES DAILY
Qty: 10 TABLET | Refills: 0 | Status: SHIPPED | OUTPATIENT
Start: 2023-03-13 | End: 2023-05-23

## 2023-03-13 RX ORDER — FUROSEMIDE 20 MG
20 TABLET ORAL DAILY
Qty: 90 TABLET | Refills: 0
Start: 2023-03-13 | End: 2023-06-19

## 2023-03-13 ASSESSMENT — PATIENT HEALTH QUESTIONNAIRE - PHQ9
SUM OF ALL RESPONSES TO PHQ QUESTIONS 1-9: 5
SUM OF ALL RESPONSES TO PHQ QUESTIONS 1-9: 5
10. IF YOU CHECKED OFF ANY PROBLEMS, HOW DIFFICULT HAVE THESE PROBLEMS MADE IT FOR YOU TO DO YOUR WORK, TAKE CARE OF THINGS AT HOME, OR GET ALONG WITH OTHER PEOPLE: SOMEWHAT DIFFICULT

## 2023-03-13 ASSESSMENT — PAIN SCALES - GENERAL: PAINLEVEL: NO PAIN (0)

## 2023-03-13 NOTE — PATIENT INSTRUCTIONS
To schedule echocardiogram call [facility] scheduling [phone]   I will contact your care coordinator and see if we can get you a  to you can complete your colonoscopy

## 2023-03-13 NOTE — LETTER
March 15, 2023      Sarah JHOAN Elvira  [address] [place] [address]        Dear ,    We are writing to inform you of your test results.    Sarah - Your results show:  1) Elevated hemoglobin A1C (a test assessing overall blood sugar control for the last 3 months) reflecting uncontrolled diabetes.  This is also reflected in the elevated blood sugar of 252.  Please schedule an appointment with Joan Lopez, [facility] Pharmacist to adjust your diabetes medications.  2) Low levels of folic acid.  I recommend that you start taking folate 400 mcg daily.  I sent that to your pharmacy as a prescription but since it is an over-the-counter vitamin they may advise you to just get that over-the-counter (in the vitamin section).    The rest of your labs were within acceptable limits.  This includes basic metabolic panel, Vitamin B12, and Quantiferon (test for TB).  Minor highs/lows were noted which are not clinically significant.     I will be in touch again after I receive your echocardiogram result.  I also reached out to your Care Coordinator (Indiana Mckenna) about the higher priority of completing the colonoscopy.  Hopefully you can find a way to get that done.     If all is normal and your weight and appetite remain stable then I do not need to see you again until this summer when you are due for your medicare annual wellness visit (preventive visit).        Resulted Orders   Basic metabolic panel  (Ca, Cl, CO2, Creat, Gluc, K, Na, BUN)   Result Value Ref Range    Sodium 140 136 - 145 mmol/L    Potassium 4.4 3.4 - 5.3 mmol/L    Chloride 107 98 - 107 mmol/L    Carbon Dioxide (CO2) 19 (L) 22 - 29 mmol/L    Anion Gap 14 7 - 15 mmol/L    Urea Nitrogen 9.0 8.0 - 23.0 mg/dL    Creatinine 0.75 0.51 - 0.95 mg/dL    Calcium 8.9 8.8 - 10.2 mg/dL    Glucose 252 (H) 70 - 99 mg/dL    GFR Estimate 83 >60 mL/min/1.73m2      Comment:      eGFR calculated using 2021 CKD-EPI equation.   Hemoglobin A1c   Result  Value Ref Range    Hemoglobin A1C 8.3 (H) 0.0 - 5.6 %      Comment:      Normal <5.7%   Prediabetes 5.7-6.4%    Diabetes 6.5% or higher     Note: Adopted from ADA consensus guidelines.   Folate   Result Value Ref Range    Folic Acid 3.1 (L) 4.6 - 34.8 ng/mL   Vitamin B12   Result Value Ref Range    Vitamin B12 645 232 - 1,245 pg/mL   Quantiferon TB Gold Plus Grey Tube   Result Value Ref Range    Quantiferon Nil Tube 0.04 IU/mL   Quantiferon TB Gold Plus Green Tube   Result Value Ref Range    Quantiferon TB1 Tube 0.05 IU/mL   Quantiferon TB Gold Plus Yellow Tube   Result Value Ref Range    Quantiferon TB2 Tube 0.04    Quantiferon TB Gold Plus Purple Tube   Result Value Ref Range    Quantiferon Mitogen 10.00 IU/mL   Quantiferon TB Gold Plus   Result Value Ref Range    Quantiferon-TB Gold Plus Negative Negative      Comment:      No interferon gamma response to M.tuberculosis antigens was detected. Infection with M.tuberculosis is unlikely, however a single negative result does not exclude infection. In patients at high risk for infection, a second test should be considered in accordance with the 2017 ATS/IDSA/CDC Clinical Pract  ice Guidelines for Diagnosis of Tuberculosis in Adults and Children     TB1 Ag minus Nil Value 0.01 IU/mL    TB2 Ag minus Nil Value 0.00 IU/mL    Mitogen minus Nil Result 9.96 IU/mL    Nil Result 0.04 IU/mL       If you have any questions or concerns, please call the clinic at the number listed above.     Sincerely,    Heide Fay MD

## 2023-03-13 NOTE — Clinical Note
Hi Dr. Wilde,  I sent you this staff message a couple months ago but did not hear back.  Still wondering about clinical significance of the JAK2 and your thoughts on aspirin:  I am seeing this patient tomorrow for follow-up of unintentional weight loss.  In reviewing her chart I see that I sent her to you in 2021 for her persistent thrombocytosis.  You did a myeloproliferative panel that was positive for a JAK2 alteration but I can't find a result note or result letter or a telephone encounter indicating that the patient was notified of the result and if it is clinically significant.     Is this something that needs follow-up?  Could it be related to her new symptoms of unintentional weight loss?   Of note I had recently told her to D/C the daily aspirin (due to her age and current primary prevention guidelines) but does she need that for her thrombocytosis?   Thank you,  Heide Fay MD

## 2023-03-13 NOTE — Clinical Note
Joan - The good news is that her anorexia seems to have resolved and she is gaining weight.  The bad news is that her A1c went up again. Thanks, Collette

## 2023-03-13 NOTE — Clinical Note
Hello, You are working with this patient of mine who is very overdue for her colonoscopy.  I've been following her for unintentional weight loss and her colonoscopy is really a top priority at this ponit.  She states she has no one to drive her to this appointment.  What resources are available for this?   Thank you, Heide Fay MD [facility]

## 2023-03-15 PROBLEM — N28.1 RENAL CYST: Status: ACTIVE | Noted: 2023-03-15

## 2023-03-15 PROBLEM — E53.8 FOLATE DEFICIENCY: Status: ACTIVE | Noted: 2023-03-15

## 2023-03-15 LAB
GAMMA INTERFERON BACKGROUND BLD IA-ACNC: 0.04 IU/ML
M TB IFN-G BLD-IMP: NEGATIVE
M TB IFN-G CD4+ BCKGRND COR BLD-ACNC: 9.96 IU/ML
MITOGEN IGNF BCKGRD COR BLD-ACNC: 0 IU/ML
MITOGEN IGNF BCKGRD COR BLD-ACNC: 0.01 IU/ML
QUANTIFERON MITOGEN: 10 IU/ML
QUANTIFERON NIL TUBE: 0.04 IU/ML
QUANTIFERON TB1 TUBE: 0.05 IU/ML
QUANTIFERON TB2 TUBE: 0.04

## 2023-03-15 RX ORDER — LANOLIN ALCOHOL/MO/W.PET/CERES
400 CREAM (GRAM) TOPICAL DAILY
Qty: 100 TABLET | Refills: 3 | Status: SHIPPED | OUTPATIENT
Start: 2023-03-15

## 2023-03-15 NOTE — RESULT ENCOUNTER NOTE
Letter done.  Sent to [facility] Admin printer ([employer]) to be mailed:    Sarah - Your results show:  1) Elevated hemoglobin A1C (a test assessing overall blood sugar control for the last 3 months) reflecting uncontrolled diabetes.  This is also reflected in the elevated blood sugar of 252.  Please schedule an appointment with Joan Lopez, [facility] Pharmacist to adjust your diabetes medications.  2) Low levels of folic acid.  I recommend that you start taking folate 400 mcg daily.  I sent that to your pharmacy as a prescription but since it is an over-the-counter vitamin they may advise you to just get that over-the-counter (in the vitamin section).    The rest of your labs were within acceptable limits.  This includes basic metabolic panel, Vitamin B12, and Quantiferon (test for TB).  Minor highs/lows were noted which are not clinically significant.     I will be in touch again after I receive your echocardiogram result.  I also reached out to your Care Coordinator (Indiana Mckenna) about the higher priority of completing the colonoscopy.  Hopefully you can find a way to get that done.    If all is normal and your weight and appetite remain stable then I do not need to see you again until this summer when you are due for your medicare annual wellness visit (preventive visit).      Heide Fay MD

## 2023-03-21 ENCOUNTER — PATIENT OUTREACH (OUTPATIENT)
Dept: CARE COORDINATION | Facility: CLINIC | Age: 78
End: 2023-03-21
Payer: COMMERCIAL

## 2023-03-21 ENCOUNTER — TELEPHONE (OUTPATIENT)
Dept: FAMILY MEDICINE | Facility: CLINIC | Age: 78
End: 2023-03-21
Payer: COMMERCIAL

## 2023-03-21 NOTE — TELEPHONE ENCOUNTER
PT called with question about her folic acid rx. PT got a call from pharmacy about this rx.  She didn't know anything about it.  Pt didn't want to start a med that she wasn't suppose to.   In the past, pt felt she picked up a rx for an antibiotic that she was not suppose to start.    I reviewed this entire message with pt.  She already has an appt with [facility] Pharmacist.  PT would like a phone call when she is getting a new med.  I told pt to expect the lab letter in the mail.  MIRANDA Ma          Letter done.  Sent to [facility] Admin printer ([employer]) to be mailed:     Sarah - Your results show:  1) Elevated hemoglobin A1C (a test assessing overall blood sugar control for the last 3 months) reflecting uncontrolled diabetes.  This is also reflected in the elevated blood sugar of 252.  Please schedule an appointment with Joan Lopez [facility], [facility] Pharmacist to adjust your diabetes medications.  2) Low levels of folic acid.  I recommend that you start taking folate 400 mcg daily.  I sent that to your pharmacy as a prescription but since it is an over-the-counter vitamin they may advise you to just get that over-the-counter (in the vitamin section).     The rest of your labs were within acceptable limits.  This includes basic metabolic panel, Vitamin B12, and Quantiferon (test for TB).  Minor highs/lows were noted which are not clinically significant.      I will be in touch again after I receive your echocardiogram result.  I also reached out to your Care Coordinator (Indiana Mckenna) about the higher priority of completing the colonoscopy.  Hopefully you can find a way to get that done.     If all is normal and your weight and appetite remain stable then I do not need to see you again until this summer when you are due for your medicare annual wellness visit (preventive visit).       Heide Fay MD

## 2023-03-21 NOTE — TELEPHONE ENCOUNTER
This is a very challenging patient.  She was on MyChart but never read my messages which caused delays in her care (such as my detailed messages regarding a UTI including instructions for starting antibiotics that the never picked up).      I did tell her that going forward results would be communicated through mail but that we would call her for any worrisome results.  I don't think a low folate level and recommendations to start a vitamin are worrisome, but I will try to remember to also have clinic call her if there are any med changes - even vitamins.    Heide Fay MD  [facility]

## 2023-03-21 NOTE — PROGRESS NOTES
Clinic Care Coordination Contact  Care Coordination Clinician Chart Review    Situation: Patient chart reviewed by Care Coordinator.       Background: Care Coordination Program started: 6/27/2022. Initial assessment completed and patient-centered care plan(s) were developed with participation from patient. Lead CC handed patient off to CHW for continued outreaches.       Assessment: Per chart review, patient outreach completed by CC CHW on 3/2/2023.  Patient is actively working to accomplish goal(s). Patient's goal(s) appropriate and relevant at this time. Patient is not due for updated Plan of Care.  Assessments will be completed annually or as needed/with change of patient status.      Care Plan: Transportation     Problem: Lack of transportation     Goal: Establish reliable transportation     Start Date: 6/27/2022 Expected End Date: 9/27/2022    Recent Progress: 10%    Note:     Goal Statement: I want to obtain transportation in order to complete a colonoscopy  Date Goal set: 6/27/2022  Barriers: lack of transportation  Strengths: motivated to complete   Date to Achieve By: 9/27/2023  Patient expressed understanding of goal: Yes  Action steps to achieve this goal:  1. I will review resources provided by [facility]  2. I will set up a colonoscopy appointment (Patient states that it's not a priority at this moment)  3. I will set up transportation  4. I will keep in touch with care coordination.    (Updated 2/8/23)     Annual reassessment due: 06/2023                        Plan/Recommendations: The patient will continue working with Care Coordination to achieve goal(s) as above. CHW will continue outreaches at minimum every 30 days and will involve Lead CC as needed or if patient is ready to move to Maintenance. Lead CC will continue to monitor CHW outreaches and patient's progress to goal(s) every 6 weeks.     Plan of Care updated and sent to patient: No    Chula Kat, RN, BSN, CPHN, CM  [facility]  Care Management  [facility]  Phone: [phone]  Email: [email or web address]

## 2023-04-04 ENCOUNTER — PATIENT OUTREACH (OUTPATIENT)
Dept: CARE COORDINATION | Facility: CLINIC | Age: 78
End: 2023-04-04
Payer: COMMERCIAL

## 2023-04-04 NOTE — PROGRESS NOTES
Clinic Care Coordination Contact    Community Health Worker Follow Up    Care Gaps:     Health Maintenance Due   Topic Date Due     ZOSTER IMMUNIZATION (2 of 3) 05/30/2012     DEXA  01/03/2021     MICROALBUMIN  03/23/2023     COLORECTAL CANCER SCREENING  03/04/2023       Care Gaps Last addressed on 3/31/23    Care Plan:   Care Plan: Transportation     Problem: Lack of transportation     Goal: Establish reliable transportation     Start Date: 6/27/2022 Expected End Date: 9/27/2022    This Visit's Progress: 20% Recent Progress: 10%    Note:     Goal Statement: I want to obtain transportation in order to complete a colonoscopy  Date Goal set: 6/27/2022  Barriers: lack of transportation  Strengths: motivated to complete   Date to Achieve By: 9/27/2023  Patient expressed understanding of goal: Yes  Action steps to achieve this goal:  1. I will review resources provided by [facility]  2. I will set up a colonoscopy appointment (Patient states that she will call out to the scheduling line to set up a colonoscopy appointment)  3. I will set up transportation  4. I will keep in touch with care coordination.        Annual reassessment due: 06/2023                        Intervention and Education during outreach:     Patient states that she has been doing good. Patient states that she spoke with her niece yesterday and that her niece was kind enough to offer a ride to and from the patient's colonoscopy appointment. Patient states that not having a ride has been a barrier for her, so she is grateful that her niece has offered to help her. CHW offered to transfer the patient to the scheduling line to set up her colonoscopy appointment. Patient asked for the scheduling lines phone number instead. CHW provided patient with the following number [phone].    Patient states that her wutabout access was turned off and it has been difficult to access post appointment information and results. Phone call then got disconnected. CHW  attempted to call patient back and left patient a  with call back information.    CHW Plan: CHW will attempt to reach patient again tomorrow (4/5/23).     CHIQUITA Centeno, B.A. [facility] Care Coordination  [facility]:   [facility]  [phone]

## 2023-04-05 ENCOUNTER — PATIENT OUTREACH (OUTPATIENT)
Dept: CARE COORDINATION | Facility: CLINIC | Age: 78
End: 2023-04-05
Payer: COMMERCIAL

## 2023-04-05 NOTE — PROGRESS NOTES
Clinic Care Coordination Contact  [facility]/Voicemail       Clinical Data: Care Coordinator Outreach  Outreach attempted x 1.  Patient's voicemail box is full. CHW was unable to leave a message.  Plan: Care Coordinator will try to reach patient again in 10 business days.    CHIQUITA Centeno, B.A. [facility] Care Coordination  [facility]:   [facility]  [phone]

## 2023-04-19 ENCOUNTER — PATIENT OUTREACH (OUTPATIENT)
Dept: CARE COORDINATION | Facility: CLINIC | Age: 78
End: 2023-04-19
Payer: COMMERCIAL

## 2023-04-19 NOTE — PROGRESS NOTES
Clinic Care Coordination Contact  [facility]/Voicemail       Clinical Data: Care Coordinator Outreach  Outreach attempted x 2.  Patient's vm box is full. CHW is unable to leave a message.  Plan: Care Coordinator will send unable to contact letter with care coordinator contact information via mail. Care Coordinator will try to reach patient again in 10 business days.    CHIQUITA Centeno, B.A. [facility] Care Coordination  [facility]:   [facility]  [phone]

## 2023-04-19 NOTE — LETTER
[facility] CARE COORDINATION  [address]  [place] [address]    April 19, 2023    Sarah Felix  [address]  [place] [address]      Dear Sarah,    I have been attempting to reach you since our last contact. I would like to continue to work with you and provide any additional support you may need on achieving your health care related goals. I would appreciate if you would give me a call at [phone] to let me know if you would like to continue working together. I know that there are many things that can affect our ability to communicate and I hope we can continue to work together.    All of us at the [facility] are invested in your health and are here to assist you in meeting your goals.     Sincerely,    Indiana Mckenna, CHW, B.A. [facility] Care Coordination  [facility]:   [facility]  [phone]

## 2023-05-02 ENCOUNTER — OFFICE VISIT (OUTPATIENT)
Dept: OPHTHALMOLOGY | Facility: CLINIC | Age: 78
End: 2023-05-02
Attending: OPHTHALMOLOGY
Payer: COMMERCIAL

## 2023-05-02 ENCOUNTER — PATIENT OUTREACH (OUTPATIENT)
Dept: CARE COORDINATION | Facility: CLINIC | Age: 78
End: 2023-05-02
Payer: COMMERCIAL

## 2023-05-02 DIAGNOSIS — H40.003 GLAUCOMA SUSPECT OF BOTH EYES: Primary | ICD-10-CM

## 2023-05-02 DIAGNOSIS — Z79.4 TYPE 2 DIABETES MELLITUS WITH HYPERGLYCEMIA, WITH LONG-TERM CURRENT USE OF INSULIN (H): ICD-10-CM

## 2023-05-02 DIAGNOSIS — H52.13 MYOPIA OF BOTH EYES: ICD-10-CM

## 2023-05-02 DIAGNOSIS — E11.65 TYPE 2 DIABETES MELLITUS WITH HYPERGLYCEMIA, WITH LONG-TERM CURRENT USE OF INSULIN (H): ICD-10-CM

## 2023-05-02 PROCEDURE — 99214 OFFICE O/P EST MOD 30 MIN: CPT | Mod: 24 | Performed by: OPHTHALMOLOGY

## 2023-05-02 PROCEDURE — G0463 HOSPITAL OUTPT CLINIC VISIT: HCPCS | Mod: 25 | Performed by: OPHTHALMOLOGY

## 2023-05-02 PROCEDURE — 76514 ECHO EXAM OF EYE THICKNESS: CPT | Performed by: OPHTHALMOLOGY

## 2023-05-02 PROCEDURE — 92133 CPTRZD OPH DX IMG PST SGM ON: CPT | Performed by: OPHTHALMOLOGY

## 2023-05-02 RX ORDER — LATANOPROST 50 UG/ML
1 SOLUTION/ DROPS OPHTHALMIC AT BEDTIME
Qty: 7.5 ML | Refills: 3 | Status: SHIPPED | OUTPATIENT
Start: 2023-05-02

## 2023-05-02 RX ORDER — LATANOPROST 50 UG/ML
1 SOLUTION/ DROPS OPHTHALMIC AT BEDTIME
Qty: 7.5 ML | Refills: 3 | Status: SHIPPED | OUTPATIENT
Start: 2023-05-02 | End: 2023-05-02

## 2023-05-02 ASSESSMENT — VISUAL ACUITY
OS_CC: 20/40
OD_PH_CC+: -2
METHOD: SNELLEN - LINEAR
OD_CC: 20/30
CORRECTION_TYPE: GLASSES
OS_CC+: -
OD_PH_CC: 20/25

## 2023-05-02 ASSESSMENT — REFRACTION_MANIFEST
OD_CYLINDER: +1.75
OS_ADD: +2.50
OS_CYLINDER: +1.75
OD_SPHERE: -2.75
OD_AXIS: 011
OS_AXIS: 014
OS_SPHERE: -2.75
OD_ADD: +2.50

## 2023-05-02 ASSESSMENT — REFRACTION_WEARINGRX
OS_CYLINDER: +1.50
OS_ADD: +2.50
OD_CYLINDER: +2.25
OS_SPHERE: -2.00
OD_SPHERE: -2.50
OS_AXIS: 013
OD_ADD: +2.50
OD_AXIS: 019

## 2023-05-02 ASSESSMENT — GONIOSCOPY
OS_SUPERIOR: TM
OD_INFERIOR: SS
OD_NASAL: TM
OS_TEMPORAL: SS
OS_NASAL: TM
OS_INFERIOR: SS
OD_TEMPORAL: TM
OD_SUPERIOR: TM

## 2023-05-02 ASSESSMENT — CONF VISUAL FIELD
OD_INFERIOR_TEMPORAL_RESTRICTION: 3
OD_SUPERIOR_TEMPORAL_RESTRICTION: 3
OD_SUPERIOR_NASAL_RESTRICTION: 3
METHOD: COUNTING FINGERS
OS_INFERIOR_TEMPORAL_RESTRICTION: 3
OS_SUPERIOR_NASAL_RESTRICTION: 3
OS_INFERIOR_NASAL_RESTRICTION: 3
OS_SUPERIOR_TEMPORAL_RESTRICTION: 3
OD_INFERIOR_NASAL_RESTRICTION: 3

## 2023-05-02 ASSESSMENT — SLIT LAMP EXAM - LIDS
COMMENTS: NORMAL
COMMENTS: NORMAL

## 2023-05-02 ASSESSMENT — PACHYMETRY
OD_CT(UM): 570
OS_CT(UM): 560

## 2023-05-02 ASSESSMENT — TONOMETRY
OS_IOP_MMHG: 21
OD_IOP_MMHG: 22
IOP_METHOD: TONOPEN

## 2023-05-02 ASSESSMENT — EXTERNAL EXAM - LEFT EYE: OS_EXAM: NORMAL

## 2023-05-02 ASSESSMENT — ENCOUNTER SYMPTOMS: JOINT SWELLING: 1

## 2023-05-02 NOTE — PROGRESS NOTES
CC: foggy vision right eye     INTERVAL: She states that her vision in the right eye has improved significantly with the laser procedure.  Patient denies having any eye discomfort.    HPI  Sarah Felix is a 77 year old female here for evaluation of blurry vision of the right eye and a diabetic eye exam.    She has had difficulty controlling her A1c. A new medication she is using - pioglitazone - has not been easy for her to take with poor control and frequent hypoglycemia.   She was on lasix but was stopped because BP was too low  Imaging:  OCT 05/02/23   OD-Trace Noncentral IRF inferiorly; normal contour with no central fluid  OS-Trace Noncentral IRF inferiorly; normal contour with no central fluid    RNFL 05/02/23  OD-Thinning inferiorly with borderline thinning S,T, & N . General 63 microns  OS-Thinning inferiorly with borderline thinning S & T. General 66 microns    Assessment & Plan      # Diabetes mellitus, type 2  - diagnosed age 62  - using insulin; A1c (12/2022) 7.3 (from 9.9)  - no retinopathy on exam; last dilated 3/1/23  - discussed the importance of good BP/BS/Chol control    #Pseudophakia OU  # PCO trace left eye   S/p YAG OD 3/1/23 and doing well; VA 20/20 OU    # Glaucoma suspect OU  each eye large cup with thin and pale rim  Pachymetry normal OS /boderline thick  right eye  IOP 22/21  RNFL shows diffuse thinning OD>OS    Prescribe latanoprost at bedtime ou   24-2 OVF at next appt    RTC 4 months for 24-2 OVF; no dilation; and MRx  Next DFE due 3/2024    Elvis Slade MD MPH  Vitreoretinal Fellow PGY-5  [facility]     Complete documentation of historical and exam elements from today's encounter can be found in the full encounter summary report (not reduplicated in this progress note). I personally obtained the chief complaint(s) and history of present illness.  I confirmed and edited as necessary the review of systems, past medical/surgical history, family history, social  history, and examination findings as documented by others; and I examined the patient myself. I personally reviewed the relevant tests, images, and reports as documented above. I formulated and edited as necessary the assessment and plan and discussed the findings and management plan with the patient and family.    Lester Dominguez MD, PhD

## 2023-05-02 NOTE — LETTER
[facility]  Patient Centered Plan of Care  About Me:        Patient Name:  Sarah Felix    YOB: 1945  Age:         77 year old   Renny MRN:    [id number] Telephone Information:  Home Phone [phone]   Mobile [phone]       Address:  [facility] [address] [facility] [place] [address] Email address:  [email or web address]      Emergency Contact(s)    Name Relationship Lgl Grd Work Phone Home Phone Mobile Phone   1. YURI FELIX Brother No  [phone]   2. YURI FELIX Other   [phone]           Primary language:  English     needed? No   [employer]:  [phone] op. 1  Other communication barriers:Lack of coping    Preferred Method of Communication:  Mail  Current living arrangement: I live alone    Mobility Status/ Medical Equipment: Independent    Health Maintenance  Health Maintenance Reviewed: No data recorded    My Access Plan  Medical Emergency 911   Primary Clinic Line [facility] [phone]   24 Hour Appointment Line [phone] or  [phone]) (toll-free)   24 Hour Nurse Line [phone] (toll-free)   Preferred Urgent Care No data recorded   Preferred Hospital No data recorded   Preferred Pharmacy [facility] [place], [place] - [address] [place]     Behavioral Health Crisis Line The National Suicide Prevention Lifeline at 1-230.787.8496 or Text/Call 168     My Care Team Members  Patient Care Team         Relationship Specialty Notifications Start End    Heide Fay MD PCP - General Family Medicine  9/27/21     Phone: [phone] Fax: [phone] [address] [place] [address]    Joan Lopez RP Pharmacist   11/7/11      [address] [facility] [address]    Anita Treadwell MD Referring Physician OB/Gyn  3/30/15     Phone: [phone] Fax: [phone]         [address] [facility] [address] [facility] [address]     Murphy Sargent MD MD Urology  8/19/15     Phone: [phone] Fax: [phone]         [address] [facility] [address] [facility] [address]    Apolinar Artis MD MD Orthopedics  5/26/17     Phone: [phone] Fax: [phone]         [address] [facility] [address]    Janay Amezcua MD MD Ophthalmology  8/12/20     Phone: [phone] Fax: [phone]         [address] [facility] [address]    Leventhal, Thomas Michael, MD MD Gastroenterology  9/20/21     Referred by Heide Hopson for Elevated transaminase level, Fatty Liver and Hepatomegaly    Phone: [phone] Fax: [phone]         [address] [facility] [address]    Heide Fay MD MD Family Medicine  9/20/21     Referred Pt to Hepatology for Elevated transaminase level, fatty liver and Hepatomegaly    Phone: [phone] Fax: [phone] [address]  [place] [address]    Manjeet Wilde MD MD Hematology All results, Admissions 9/21/21     Phone: [phone] Fax: [phone]         [address] [facility] [address]    Heide Fay MD Assigned PCP   6/18/22     Phone: [phone] Fax: [phone]         [address] [facility] [facility] [address] [place] [address]    Daniel Murphy MD MD Ophthalmology  6/27/22     Phone: [phone] Fax: [phone] [address] [facility] [address]    Joan Lopez [facility] Assigned MTM Pharmacist   9/28/22      [address] [facility] [address]    Indiana Mckenna MA Community Health Worker Primary Care - CC Admissions 12/28/22     Chula Kat, RN Lead Care Coordinator Primary Care - CC Admissions 2/1/23     Phone: [phone]         Lester Roach MD Assigned Surgical Provider   3/11/23     Phone: [phone] Fax: [phone]         [address] [facility] [address]              My Care Plans  Self Management and Treatment Plan  Care Plan  Care Plan: Transportation       Problem: Lack of  transportation       Goal: Establish reliable transportation       Start Date: 6/27/2022 Expected End Date: 9/27/2022    This Visit's Progress: 30% Recent Progress: 20%    Note:     Goal Statement: I want to obtain transportation in order to complete a colonoscopy  Date Goal set: 6/27/2022  Barriers: lack of transportation  Strengths: motivated to complete   Date to Achieve By: 9/27/2023  Patient expressed understanding of goal: Yes  Action steps to achieve this goal:  1. I will review resources provided by [facility]  2. I will set up a colonoscopy appointment (Patient states that she will call out to the scheduling line to set up a colonoscopy appointment)  3. I will set up transportation  4. I will keep in touch with care coordination.        Annual reassessment due: 06/2023                               Action Plans on File:     Advance Care Plans/Directives Type:   No data recorded    My Medical and Care Information  Problem List   Patient Active Problem List   Diagnosis    Hepatic cyst    Allergic rhinitis    Diverticulitis of colon    Osteopenia of both hips    Esophageal reflux    Mixed incontinence urge and stress (male)(female)    Cystocele, lateral    Vaginal atrophy    Mixed hyperlipidemia    Heart burn    BABB (dyspnea on exertion)    Sebaceous hyperplasia    Seborrheic keratosis    Fatty liver    Personal history of other malignant neoplasm of skin    Health Care Home    Hypertension goal BP (blood pressure) < 140/90    Elevated serum creatinine    Skin exam, screening for cancer    Essential hypertension, benign (HTN)    CKD (chronic kidney disease) stage 2, GFR 60-89 ml/min    Type 2 diabetes mellitus with hyperglycemia (H)    Type 2 diabetes with stage 2 chronic kidney disease GFR 60-89 (H)    Type 2 diabetes mellitus without complication, with long-term current use of insulin (H)    Intertriginous candidiasis    Slow transit constipation    Basal cell carcinoma of face    Tubular adenoma of colon     Schatzki's ring of distal esophagus    Adenomatous polyp of duodenum    Folate deficiency    Renal cyst      Current Medications and Allergies:  See printed Medication Report.    Care Coordination Start Date: 6/27/2022   Frequency of Care Coordination: monthly     Form Last Updated: 05/02/2023

## 2023-05-02 NOTE — PROGRESS NOTES
Clinic Care Coordination Contact  Care Coordination Clinician Chart Review    Situation: Patient chart reviewed by Care Coordinator.       Background: Care Coordination Program started: 6/27/2022. Initial assessment completed and patient-centered care plan(s) were developed with participation from patient. Lead CC handed patient off to CHW for continued outreaches.       Assessment: Per chart review, patient outreach completed by CC CHW on 4/4/23.  Patient is actively working to accomplish goal(s). Patient's goal(s) appropriate and relevant at this time. Patient is due for updated Plan of Care.  Assessments will be completed annually or as needed/with change of patient status.      Care Plan: Transportation     Problem: Lack of transportation     Goal: Establish reliable transportation     Start Date: 6/27/2022 Expected End Date: 9/27/2022    This Visit's Progress: 30% Recent Progress: 20%    Note:     Goal Statement: I want to obtain transportation in order to complete a colonoscopy  Date Goal set: 6/27/2022  Barriers: lack of transportation  Strengths: motivated to complete   Date to Achieve By: 9/27/2023  Patient expressed understanding of goal: Yes  Action steps to achieve this goal:  1. [facility]  2. I will set up a colonoscopy appointment (Patient states that she will call out to the scheduling line to set up a colonoscopy appointment)  3. I will set up transportation  4. I will keep in touch with care coordination.        Annual reassessment due: 06/2023                         Plan/Recommendations: The patient will continue working with Care Coordination to achieve goal(s) as above. CHW will continue outreaches at minimum every 30 days and will involve Lead CC as needed or if patient is ready to move to Maintenance. Lead CC will continue to monitor CHW outreaches and patient's progress to goal(s) every 6 weeks.     Plan of Care updated and sent to patient: Yes, via  mail.    Chula Kat RN, BSN, CPHN, CM  [facility] Ambulatory Care Management  [facility]  Phone: [phone]  Email: [email or web address]

## 2023-05-02 NOTE — NURSING NOTE
Chief Complaints and History of Present Illnesses   Patient presents with     Follow Up     Glaucoma suspect     Chief Complaint(s) and History of Present Illness(es)     Follow Up            Laterality: both eyes    Course: stable    Associated symptoms: Negative for dryness, eye pain, flashes and floaters    Treatments tried: no treatments    Pain scale: 0/10    Comments: Glaucoma suspect          Comments    She states that her vision in the right eye has improved significantly with the laser procedure.  Patient denies having any eye discomfort.    Lab Results       Component                Value               Date                       A1C                      8.3                 03/13/2023                 A1C                      7.3                 12/05/2022                 A1C                      9.9                 09/23/2022                 A1C                      8.6                 07/25/2022                 A1C                      8.2                 04/18/2022                 A1C                      8.3                 06/21/2021                 A1C                      9.4                 12/14/2020                 A1C                      8.1                 09/21/2020                 A1C                      8.5                 01/07/2020              MAYA Osborn 2:52 PM  May 2, 2023

## 2023-05-03 ENCOUNTER — PATIENT OUTREACH (OUTPATIENT)
Dept: CARE COORDINATION | Facility: CLINIC | Age: 78
End: 2023-05-03
Payer: COMMERCIAL

## 2023-05-03 NOTE — PROGRESS NOTES
Clinic Care Coordination Contact  [facility]/Voicemail       Clinical Data: Care Coordinator Outreach  Outreach attempted x 3.  Left message on patient's voicemail with call back information and requested return call.    Plan: Care Coordinator will route patient to RN ZULEMA Quiros to determine if further outreaches should be made.    Indiana Mckenna CHW, B.A. [facility] Care Coordination  [facility]:   [facility]  [phone]

## 2023-05-03 NOTE — PROGRESS NOTES
Clinic Care Coordination Contact    Situation: Patient chart reviewed by care coordinator.    Background: Patient is enrolled into Care Coordination and followed by CHW CC and RN CC.     Assessment: CHW has attempted to reach patient X 3 and sent a [facility] letter to her with no response.     Plan/Recommendations: RN CC closed Care Coordination program due to UTC. Message sent to Primary Care Provider to update on status. No further outreaches will be made.     Chula Kat RN, BSN, CPHN, CM  [facility] Ambulatory Care Management  [facility]  Phone: [phone]  Email: [email or web address]

## 2023-05-17 ENCOUNTER — TELEPHONE (OUTPATIENT)
Dept: FAMILY MEDICINE | Facility: CLINIC | Age: 78
End: 2023-05-17
Payer: COMMERCIAL

## 2023-05-23 ENCOUNTER — OFFICE VISIT (OUTPATIENT)
Dept: PHARMACY | Facility: CLINIC | Age: 78
End: 2023-05-23
Payer: COMMERCIAL

## 2023-05-23 ENCOUNTER — LAB (OUTPATIENT)
Dept: LAB | Facility: CLINIC | Age: 78
End: 2023-05-23
Payer: COMMERCIAL

## 2023-05-23 VITALS
BODY MASS INDEX: 28.44 KG/M2 | DIASTOLIC BLOOD PRESSURE: 72 MMHG | HEART RATE: 103 BPM | SYSTOLIC BLOOD PRESSURE: 132 MMHG | WEIGHT: 165.7 LBS | OXYGEN SATURATION: 95 %

## 2023-05-23 DIAGNOSIS — E11.65 TYPE 2 DIABETES MELLITUS WITH HYPERGLYCEMIA, WITHOUT LONG-TERM CURRENT USE OF INSULIN (H): ICD-10-CM

## 2023-05-23 DIAGNOSIS — R60.0 LOCALIZED EDEMA: ICD-10-CM

## 2023-05-23 DIAGNOSIS — Z23 ENCOUNTER FOR IMMUNIZATION: ICD-10-CM

## 2023-05-23 DIAGNOSIS — E11.22 TYPE 2 DIABETES MELLITUS WITH STAGE 2 CHRONIC KIDNEY DISEASE, WITH LONG-TERM CURRENT USE OF INSULIN (H): ICD-10-CM

## 2023-05-23 DIAGNOSIS — N18.2 TYPE 2 DIABETES MELLITUS WITH STAGE 2 CHRONIC KIDNEY DISEASE, WITH LONG-TERM CURRENT USE OF INSULIN (H): ICD-10-CM

## 2023-05-23 DIAGNOSIS — Z79.4 TYPE 2 DIABETES MELLITUS WITH STAGE 2 CHRONIC KIDNEY DISEASE, WITH LONG-TERM CURRENT USE OF INSULIN (H): ICD-10-CM

## 2023-05-23 DIAGNOSIS — E87.6 HYPOKALEMIA: ICD-10-CM

## 2023-05-23 DIAGNOSIS — E11.65 TYPE 2 DIABETES MELLITUS WITH HYPERGLYCEMIA, WITHOUT LONG-TERM CURRENT USE OF INSULIN (H): Primary | ICD-10-CM

## 2023-05-23 DIAGNOSIS — E78.5 HYPERLIPIDEMIA LDL GOAL <100: ICD-10-CM

## 2023-05-23 DIAGNOSIS — I10 ESSENTIAL HYPERTENSION: ICD-10-CM

## 2023-05-23 LAB
ANION GAP SERPL CALCULATED.3IONS-SCNC: 18 MMOL/L (ref 7–15)
BUN SERPL-MCNC: 14.5 MG/DL (ref 8–23)
CALCIUM SERPL-MCNC: 9.3 MG/DL (ref 8.8–10.2)
CHLORIDE SERPL-SCNC: 100 MMOL/L (ref 98–107)
CREAT SERPL-MCNC: 0.92 MG/DL (ref 0.51–0.95)
DEPRECATED HCO3 PLAS-SCNC: 21 MMOL/L (ref 22–29)
GFR SERPL CREATININE-BSD FRML MDRD: 65 ML/MIN/1.73M2
GLUCOSE SERPL-MCNC: 204 MG/DL (ref 70–99)
HBA1C MFR BLD: 7.1 % (ref 0–5.6)
POTASSIUM SERPL-SCNC: 4.1 MMOL/L (ref 3.4–5.3)
SODIUM SERPL-SCNC: 139 MMOL/L (ref 136–145)

## 2023-05-23 PROCEDURE — 36415 COLL VENOUS BLD VENIPUNCTURE: CPT

## 2023-05-23 PROCEDURE — 99606 MTMS BY PHARM EST 15 MIN: CPT | Performed by: PHARMACIST

## 2023-05-23 PROCEDURE — 80048 BASIC METABOLIC PNL TOTAL CA: CPT

## 2023-05-23 PROCEDURE — 83036 HEMOGLOBIN GLYCOSYLATED A1C: CPT

## 2023-05-23 PROCEDURE — 99607 MTMS BY PHARM ADDL 15 MIN: CPT | Performed by: PHARMACIST

## 2023-05-23 RX ORDER — INSULIN GLARGINE 100 [IU]/ML
INJECTION, SOLUTION SUBCUTANEOUS
COMMUNITY
Start: 2023-05-23 | End: 2023-05-23

## 2023-05-23 NOTE — PROGRESS NOTES
Medication Therapy Management (MTM) Encounter    ASSESSMENT:                            Medication Adherence/Access: See below for considerations--some missed metformin doses.     Type 2 Diabetes:  Patient is meeting A1c goal of </7.5%. Self monitoring of blood glucose is at goal of fasting  mg/dL and post prandial <180mg/dL. Patient is NOW meeting average glucose goal of <150mg/dl, however some highs and lows-not many.   Metformin ;  Stay on 500mg-1,000 mg. Daily dose .  Basal Insulin (Lantus ) :NO more lantus needed with 40lbs wt.loss.    Novolog (mealtime insulin ) : 10 units at high carb meals 4 x week she averages.   SFU: Stay on Glimepiride 4mg. Twice daily.     Increased exercise/low carb/calorie diet + wt. Loss-very beneficial for her NAFLD--this is working well --continue as is .     Immunizations: consider covid booster,  Also could consider new shingles vaccine shingrix in 2023.     Hypokalemia:  Stable lab of 4.1. Now able to swallow the micro-k capsules easily.     Hyperlipidemia: Stable. Patient is appropriately on high intensity statin which is indicated based on 2019 ACC/AHA guidelines for lipid management with an ASCVD risk of 33%.      Hypertension: Stable. Patient is meeting blood pressure goal of < 140/90mmHg. But LLE making her very unstable with swelling in her feet --    Vitamin B12: is at goal of 600-1000pg/mL. Pt would benefit from vitamin B12 lab recheck in 6 months.        PLAN:                              1. A1c today = 7.1% --fantastic -- stay OFF all lantus insulin , stay on metformin , glimepiride and if needed to control a high carb meal/snack-ok to use a little novolog mealtime insulin like you are.  Weight today is excellent at 165.7lbs.     2. Blood Pressure 132/72mmhg --very good today .  Due to edema in ankles/feet --go back to Furosemide 20mg. In AM and early afternoon--call my voicemail or mychart me with swelling /edema update in next 2-4 weeks.    3. Lets re-activate  your Mychart today : user password is [email or web address] keep same user name [email or web address]      4. Please consider-new shingles vaccine sometime in 2023  and  another covid booster in June .       Follow-up: Return in about 25 weeks (around 11/14/2023), or @ 3 PM., for Medication Therapy Management Visit, Blood sugar meter review, BP Recheck.    SUBJECTIVE/OBJECTIVE:                          Sarah Felix is a 77 year old female coming into clinic today for a follow-up ( 2-6-23) visit. She was referred to me from Dr. Collette Fay.     Reason for visit:   New onset glaucoma --new eye drops.     Recent falls x 3 --she states due to excessive fluid in her feet after pcp stopped 60mg./day furosemide and metoprolol and low dose lisinopril.      Allergies/ADRs: Reviewed in chart  Tobacco: She reports that she quit smoking about 43 years ago. Her smoking use included cigarettes. She has never used smokeless tobacco.  Alcohol: 7-9 beverages / week typically wine 1 or 2 glasses/night - reports that she hasn't had any alcohol in the last week and a half until they figure out her liver situation.   Caffeine: 2 cans diet coke sodas/day  Activity: reports she has low energy and stamina, if she does any activity she has to take breaks frequenty but now that the weather is better she is doing more outdoor work.   Past Medical History: Reviewed in chart  Personal Healthcare Goals: figure out what is going on with liver or blood, would like to get 3rd vaccine, improve a1c    Medication Adherence/Access:  Missing some med doses , stopped her lantus--didn't thionk she needed it.     Type 2 Diabetes:  Currently taking : glimepiride 4 mg twice daily,  Novolog  insulin now (10 units)- 3-4 x week now if dessert or juice spiked blood sugar-  units daily at midnight daily. Metformin 500mg. ER tabs -- 1-2/day now about 5 days /week.    Lantus -on hold right now.  Potential side effects : mild diarrhea , lack of appetite at times.        SMBG:                            Wt Readings from Last 5 Encounters:   05/23/23 165 lb 11.2 oz (75.2 kg)   03/13/23 176 lb 1 oz (79.9 kg)   02/16/23 163 lb (73.9 kg)   12/05/22 179 lb 12.8 oz (81.6 kg)   09/23/22 188 lb (85.3 kg)         Symptoms of low blood sugar? sweaty, Frequency of lows has been increasing (she admits she cannot tolerate bs's <110- if they go lower than that she feels crummy , sweaty. )  Symptoms of high blood sugar? Fatigue, shaky, pit in stomach, overall feeling poor   Eye exam: up to date  Foot exam: up to date  Diet: doing her best to control carbs in daily diet.   Previously: she's maintained similar diet and notes sugars now being elevated  eats 4-5 smaller meals - pasta and rices  Fruit and some fruit juices   Breakfast: cereal 2-3 times per week, drink 2 glasses OJ or grapefruit juice /day .   Exercise: see social history --saw liver specialist suggested swimming.   Aspirin: Taking 162 mg daily and denies side effects  Statin: Yes: atorvastatin 40 mg   ACEi/ARB: Yes: off lisinopril 3-23 per pcp.   Urine Albumin:   Lab Results   Component Value Date    UMALCR 96.53 (H) 09/23/2022       Lab Results   Component Value Date    A1C 7.1 05/23/2023    A1C 8.3 03/13/2023    A1C 7.3 12/05/2022    A1C 9.9 09/23/2022    A1C 8.6 07/25/2022    A1C 8.3 06/21/2021    A1C 9.4 12/14/2020    A1C 8.1 09/21/2020    A1C 8.5 01/07/2020    A1C 7.6 10/12/2019     Wt Readings from Last 5 Encounters:   05/23/23 165 lb 11.2 oz (75.2 kg)   03/13/23 176 lb 1 oz (79.9 kg)   02/16/23 163 lb (73.9 kg)   12/05/22 179 lb 12.8 oz (81.6 kg)   09/23/22 188 lb (85.3 kg)           Immunizations:  Up to date .  (consider new shingles vaccine -shingrix in 2022).     Hypokalemia:  Patient reports the new potassium capsule is working much better and is easy to take.   Potassium   Date Value Ref Range Status   05/23/2023 4.1 3.4 - 5.3 mmol/L Final   03/28/2022 4.1 3.4 - 5.3 mmol/L Final   01/07/2020 4.0 3.4 - 5.3 mmol/L  Final       Hyperlipidemia: Current therapy includes : atorvastatin 40mg daily.  Patient reports no significant myalgias or other side effects.  The 10-year ASCVD risk score (Ammy VELEZ, et al., 2019) is: 29.2%    Values used to calculate the score:      Age: 77 years      Sex: Female      Is Non- : No      Diabetic: Yes      Tobacco smoker: No      Systolic Blood Pressure: 132 mmHg      Is BP treated: No      HDL Cholesterol: 49 mg/dL      Total Cholesterol: 131 mg/dL  Recent Labs   Lab Test 09/23/22  1320 09/02/21  1501   CHOL 131 168   HDL 49* 47*   LDL 55 76   TRIG 137 226*       Hypertension: Current medications include : pcp stopped Metoprolol , off Lisinopril  + furosemide 20mg -1 tab in am-she restarted it  (previously 3/day and had no edema issues --pcp stopped lasix altogether 2-16-23).  Patient does not self-monitor blood pressure.    Fell due to balance issues due to LLE. 3 x fell. She feels she needs to go back to 60mg./lasix.     BP Readings from Last 3 Encounters:   05/23/23 132/72   03/13/23 133/84   02/16/23 (!) 86/56     Vitamin B12: level was 187 on 9-23-22. Vitamin B12 helps support memory and lessens fatigue. She is taking daily OTC pill- 2500mcg. B-12 pill --3 x week now.           --------------------------------------------------------------------------------------------------    I spent 30 minutes with this patient today. All changes were made via collaborative practice agreement with Heide Fay MD. A copy of the visit note was provided to the patient's primary care provider.    The patient was given a summary of these recommendations. She admits computer not working at home to view [employer]  [facility] helped her reset username /pword on her cell phone --so now she can receive mychart messages.     Joan Lopez Rph.  Medication Therapy Management Provider  [phone]           Medication Therapy Recommendations  Localized edema    Current Medication:  furosemide (LASIX) 20 MG tablet   Rationale: Dose too low - Dosage too low - Effectiveness   Recommendation: Increase Dose - 1 tab twice daily now   Status: Accepted per CPA   Note: patient will send updated albertot f/neida gallegose on LLE in 2-4 weeks.

## 2023-05-23 NOTE — Clinical Note
Collette--simran-- good news a1c 7.1% , weight 166 lbs , bp wnl, but quite edematous in feet /ankles--she feels reason for her recent 3 falls --hard to keep her balance --so I restarted 20mg bid lasix (she want to go back to 3 -I said lets try 2 and see how that goes). She will f/up with me in 2-4 weeks via [employer] --YES-- I spent an extra 30 minutes with her to activate [employer] on her cell phone since home computer not working . She promises to communicate with both of us this way going forward!  Lets hope that's true...:))  -Joan

## 2023-05-23 NOTE — RESULT ENCOUNTER NOTE
Results discussed directly with patient while patient was present. Any further details documented in the note.   MALATHI Cross CNP

## 2023-05-23 NOTE — PATIENT INSTRUCTIONS
"Recommendations from today's MTM visit:                                                         1. A1c today = 7.1% --fantastic -- stay OFF all lantus insulin , stay on metformin , glimepiride and if needed to control a high carb meal/snack-ok to use a little novolog mealtime insulin like you are. Scan blood sugars at least 3 x day -8 hours apart.   Weight today is excellent at 165.7lbs.     2. Blood Pressure 132/72mmhg --very good today .  Due to edema in ankles/feet --go back to Furosemide 20mg. In AM and early afternoon--call my voicemail or mychart me with swelling /edema update in next 2-4 weeks.    3. Lets re-activate your [employer]t today : user password is [email or web address] keep same user name [email or web address]      4. Please consider-new shingles vaccine sometime in 2023  and  another covid booster in June .       Follow-up: Return in about 25 weeks (around 11/14/2023), or @ 3 PM., for Medication Therapy Management Visit, Blood sugar meter review, BP Recheck.    It was great speaking with you today.  I value your experience and would be very thankful for your time in providing feedback in our clinic survey. In the next few days, you may receive an email or text message from [employer] with a link to a survey related to your  clinical pharmacist.\"     To schedule another MTM appointment, please call the clinic directly or you may call the MTM scheduling line at [phone] or toll-free at [phone].     My Clinical Pharmacist's contact information:                                                      Please feel free to contact me with any questions or concerns you have.      Joan Lopez Rph.  Medication Therapy Management Provider  [phone]    "

## 2023-05-24 ENCOUNTER — PATIENT OUTREACH (OUTPATIENT)
Dept: CARE COORDINATION | Facility: CLINIC | Age: 78
End: 2023-05-24
Payer: COMMERCIAL

## 2023-05-25 DIAGNOSIS — E11.9 TYPE 2 DIABETES, HBA1C GOAL < 7% (H): ICD-10-CM

## 2023-05-26 ENCOUNTER — PATIENT OUTREACH (OUTPATIENT)
Dept: ONCOLOGY | Facility: CLINIC | Age: 78
End: 2023-05-26
Payer: COMMERCIAL

## 2023-05-26 RX ORDER — GLIMEPIRIDE 4 MG/1
TABLET ORAL
Qty: 180 TABLET | Refills: 0 | Status: SHIPPED | OUTPATIENT
Start: 2023-05-26 | End: 2023-08-10

## 2023-05-26 NOTE — TELEPHONE ENCOUNTER
Prescription approved per [facility] Refill Protocol.  Janay AREVALO RN  [facility]

## 2023-05-26 NOTE — PROGRESS NOTES
[facility]: Cancer Care                                                                                          Writer received message to offer pt first available visit with Dr Wilde per her PCP recommendation.  Unsure in notes what prompted need for visit, but pt is agreeable to seeing Dr Wilde.  Appt available on 5/30 at 11:30am and pt agrees with this.  Will send info through [employer] and she states she will read it.  IB sent to scheduling and appt on hold.     Signature:  Mony Restrepo RN

## 2023-05-30 ENCOUNTER — ONCOLOGY VISIT (OUTPATIENT)
Dept: ONCOLOGY | Facility: CLINIC | Age: 78
End: 2023-05-30
Attending: INTERNAL MEDICINE
Payer: COMMERCIAL

## 2023-05-30 VITALS
RESPIRATION RATE: 16 BRPM | SYSTOLIC BLOOD PRESSURE: 139 MMHG | HEART RATE: 109 BPM | TEMPERATURE: 98.6 F | OXYGEN SATURATION: 96 % | BODY MASS INDEX: 27.94 KG/M2 | DIASTOLIC BLOOD PRESSURE: 83 MMHG | WEIGHT: 162.8 LBS

## 2023-05-30 DIAGNOSIS — D47.3 ESSENTIAL THROMBOCYTHEMIA (H): Primary | ICD-10-CM

## 2023-05-30 PROCEDURE — G0463 HOSPITAL OUTPT CLINIC VISIT: HCPCS | Performed by: INTERNAL MEDICINE

## 2023-05-30 PROCEDURE — 99214 OFFICE O/P EST MOD 30 MIN: CPT | Performed by: INTERNAL MEDICINE

## 2023-05-30 RX ORDER — FUROSEMIDE 20 MG
30 TABLET ORAL DAILY
COMMUNITY
End: 2023-07-11

## 2023-05-30 RX ORDER — HYDROXYUREA 500 MG/1
500 CAPSULE ORAL DAILY
Qty: 30 CAPSULE | Refills: 11 | Status: SHIPPED | OUTPATIENT
Start: 2023-05-30 | End: 2023-11-14

## 2023-05-30 ASSESSMENT — PAIN SCALES - GENERAL: PAINLEVEL: NO PAIN (0)

## 2023-05-30 NOTE — LETTER
2023         RE: Sarah Felix  [address] [place] [address]        Dear Colleague,    Thank you for referring your patient, Sarah Felix, to the [facility]. Please see a copy of my visit note below.        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  Today we discussed adding HU for further risk reduction for thrombosis.      Recommendations:  I counseled the patient about my assessment of the cause for her elevated platelet count.  We discussed the diagnosis of ET and Jak2 pos MPN.    Start HU 500mg daily  Resume ASA 81mg daily  CBC monthly  RTC Oct 2023  Call with any questions or concerns    35 minutes spent by me on the date of the encounter doing chart review, review of test results, interpretation of tests, patient visit and documentation       Manjeet Wilde MD   of Medicine, Division of Hematology, Oncology and Transplantation  [employer]     --------------------------------------------------------  Forward History:  Established care Dec 2021 for thrombocytosis, asymptomatic, on ASA.  Jak2 testing pos by my error misinterpreted these results.  PCP notified me May 2023 about whether ASA should be stopped.    History: Sarah Felix is a 77 year old woman with a history of a surgically provoked DVT many years ago (not on anticoagulation), DM2, HTN, with a longstanding history of thrombocytosis who recently stopped her ASA but returns for further discussion on her test results.  Today she reports feeling well.      Medications are  reviewed in the EMR and notable for ASA 81mg    Objective:  Vitals: B/P: 139/83, T: 98.6, P: 109, R: 16, Wt: 162 lbs 12.8 oz  Exam:   Gen: Appears well, no distress  HEENT: no scleral icterus or hemorrhage, no wet purpura, no lymphadenopathy  Ext: no edema    Labs:   Latest Reference Range & Units 09/02/21 15:01 09/10/21 13:03 12/14/21 10:08 03/28/22 13:55 02/16/23 17:16   WBC 4.0 - 11.0 10e3/uL 10.3 8.1 9.0  8.8 10.2 10.6   Hemoglobin 11.7 - 15.7 g/dL 13.2 14.6 13.5  13.5 13.4 12.8   Hematocrit 35.0 - 47.0 % 40.2 44.7 40.7  41.3 42.1 36.5   Platelet Count 150 - 450 10e3/uL 916 (H) 385 756 (H)  760 (H) 787 (H) 745 (H)   (H): Data is abnormally high    Imaging:  CT in Feb 2023 shows no splenomegaly

## 2023-05-30 NOTE — PROGRESS NOTES
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  Today we discussed adding HU for further risk reduction for thrombosis.      Recommendations:  1. I counseled the patient about my assessment of the cause for her elevated platelet count.  We discussed the diagnosis of ET and Jak2 pos MPN.    2. Start HU 500mg daily  3. Resume ASA 81mg daily  4. CBC monthly  5. RTC Oct 2023  6. Call with any questions or concerns    35 minutes spent by me on the date of the encounter doing chart review, review of test results, interpretation of tests, patient visit and documentation       Manjeet Wilde MD   of Medicine, Division of Hematology, Oncology and Transplantation  [employer] [facility] [employer]     --------------------------------------------------------  Forward History:  Established care Dec 2021 for thrombocytosis, asymptomatic, on ASA.  Jak2 testing pos by my error misinterpreted these results.  PCP notified me May 2023 about whether ASA should be stopped.    History: Sarah Felix is a 77 year old woman with a history of a surgically provoked DVT many years ago (not on anticoagulation), DM2, HTN, with a longstanding history of thrombocytosis who recently stopped her ASA but returns for further discussion on her test results.  Today she reports feeling well.      Medications are reviewed in the EMR and notable for ASA 81mg    Objective:  Vitals: B/P: 139/83, T: 98.6, P: 109, R: 16, Wt: 162 lbs 12.8 oz  Exam:   Gen: Appears well, no distress  HEENT: no scleral icterus or hemorrhage, no wet purpura, no lymphadenopathy  Ext: no  edema    Labs:   Latest Reference Range & Units 09/02/21 15:01 09/10/21 13:03 12/14/21 10:08 03/28/22 13:55 02/16/23 17:16   WBC 4.0 - 11.0 10e3/uL 10.3 8.1 9.0  8.8 10.2 10.6   Hemoglobin 11.7 - 15.7 g/dL 13.2 14.6 13.5  13.5 13.4 12.8   Hematocrit 35.0 - 47.0 % 40.2 44.7 40.7  41.3 42.1 36.5   Platelet Count 150 - 450 10e3/uL 916 (H) 385 756 (H)  760 (H) 787 (H) 745 (H)   (H): Data is abnormally high    Imaging:  CT in Feb 2023 shows no splenomegaly

## 2023-05-30 NOTE — PATIENT INSTRUCTIONS
Your visit the [facility] was to discuss your blood test for a bone marrow disease called Essential Thrombocythemia.  Sine the most common complication of this disease is blood clots (deep vein thrombosis, stroke or heart attack), we will treat you with Aspirin 81mg and Hydroxurea (Hydrea) 500mg (1 tablet).  This medicine reduces the platelet count but can cause other blood cells to be low as well so it requires monitoring, especially in the beginning, to ensure safety.  I would like you to get a blood count (CBC) every month, starting about 1 month after you start the medicine, until I see you back in the clinic.    I will plan to see you back in October but if you have questions or concerns before your next appointment you can call my Care Coordinator, Mony at [phone].  You can also reach me through "[employer] but please use this non-urgent questions

## 2023-06-08 ENCOUNTER — PATIENT OUTREACH (OUTPATIENT)
Dept: CARE COORDINATION | Facility: CLINIC | Age: 78
End: 2023-06-08
Payer: COMMERCIAL

## 2023-06-13 ENCOUNTER — TELEPHONE (OUTPATIENT)
Dept: PHARMACY | Facility: CLINIC | Age: 78
End: 2023-06-13
Payer: COMMERCIAL

## 2023-06-13 ENCOUNTER — MYC MEDICAL ADVICE (OUTPATIENT)
Dept: PHARMACY | Facility: CLINIC | Age: 78
End: 2023-06-13

## 2023-06-13 DIAGNOSIS — E11.9 TYPE 2 DIABETES, HBA1C GOAL < 7% (H): ICD-10-CM

## 2023-06-19 ENCOUNTER — TELEPHONE (OUTPATIENT)
Dept: PHARMACY | Facility: CLINIC | Age: 78
End: 2023-06-19
Payer: COMMERCIAL

## 2023-06-19 DIAGNOSIS — I10 ESSENTIAL HYPERTENSION: ICD-10-CM

## 2023-06-19 DIAGNOSIS — E11.22 TYPE 2 DIABETES MELLITUS WITH STAGE 2 CHRONIC KIDNEY DISEASE, WITH LONG-TERM CURRENT USE OF INSULIN (H): ICD-10-CM

## 2023-06-19 DIAGNOSIS — Z79.4 TYPE 2 DIABETES MELLITUS WITH STAGE 2 CHRONIC KIDNEY DISEASE, WITH LONG-TERM CURRENT USE OF INSULIN (H): ICD-10-CM

## 2023-06-19 DIAGNOSIS — I10 HYPERTENSION GOAL BP (BLOOD PRESSURE) < 140/90: ICD-10-CM

## 2023-06-19 DIAGNOSIS — N18.2 TYPE 2 DIABETES MELLITUS WITH STAGE 2 CHRONIC KIDNEY DISEASE, WITH LONG-TERM CURRENT USE OF INSULIN (H): ICD-10-CM

## 2023-06-19 DIAGNOSIS — N18.2 CKD (CHRONIC KIDNEY DISEASE) STAGE 2, GFR 60-89 ML/MIN: ICD-10-CM

## 2023-06-19 RX ORDER — METFORMIN HCL 500 MG
500 TABLET, EXTENDED RELEASE 24 HR ORAL
Qty: 270 TABLET | Refills: 1 | Status: SHIPPED | OUTPATIENT
Start: 2023-06-19 | End: 2023-08-15

## 2023-06-19 RX ORDER — LISINOPRIL 2.5 MG/1
2.5 TABLET ORAL DAILY
Qty: 90 TABLET | Refills: 1 | Status: SHIPPED | OUTPATIENT
Start: 2023-06-19 | End: 2024-02-26

## 2023-06-19 RX ORDER — FUROSEMIDE 20 MG
20 TABLET ORAL 2 TIMES DAILY
Qty: 180 TABLET | Refills: 1 | Status: SHIPPED | OUTPATIENT
Start: 2023-06-19 | End: 2023-09-13

## 2023-06-19 NOTE — TELEPHONE ENCOUNTER
6-19-23      Patient calls today --requests refills for 3 meds-- metformin -3/day, furosemide-2xday, and lisinopril 2.5mg./day.       mtm will refill all 3 today . F/up with mtm 7-11-23.     Lab Results   Component Value Date    A1C 7.1 05/23/2023    A1C 8.3 03/13/2023    A1C 7.3 12/05/2022    A1C 9.9 09/23/2022    A1C 8.6 07/25/2022    A1C 8.3 06/21/2021    A1C 9.4 12/14/2020    A1C 8.1 09/21/2020    A1C 8.5 01/07/2020    A1C 7.6 10/12/2019     BP Readings from Last 3 Encounters:   05/30/23 139/83   05/23/23 132/72   03/13/23 133/84     Joan Lopez Rph.  Medication Therapy Management Provider  [phone]

## 2023-06-20 ENCOUNTER — TELEPHONE (OUTPATIENT)
Dept: FAMILY MEDICINE | Facility: CLINIC | Age: 78
End: 2023-06-20
Payer: COMMERCIAL

## 2023-06-20 NOTE — TELEPHONE ENCOUNTER
Plan does not cover potassium chloride ER (MICRO-K) 10 MEQ CR capsule.  Please start a new PA.    Reason for denial: Missing information.    1) Diagnosis specific to the medication?  Please specify    2) What meds have been tried before this med?  Please specify    3)Would/has treatment of the patient's condition with the preferred, lower tier medication be/has been: 1) less effective as the requested non-preferred medication, 2) likely to cause advers effects, or 3) contraindicated, meaning, it is not recommended by the FDA to treat the patients condition?     Yes or No      Case # [id number]

## 2023-06-21 RX ORDER — METFORMIN HCL 500 MG
TABLET, EXTENDED RELEASE 24 HR ORAL
Qty: 360 TABLET | Refills: 3 | OUTPATIENT
Start: 2023-06-21

## 2023-06-21 RX ORDER — FUROSEMIDE 20 MG
TABLET ORAL
Qty: 270 TABLET | Refills: 3 | OUTPATIENT
Start: 2023-06-21

## 2023-06-21 RX ORDER — LISINOPRIL 2.5 MG/1
TABLET ORAL
Qty: 90 TABLET | Refills: 3 | OUTPATIENT
Start: 2023-06-21

## 2023-06-25 NOTE — TELEPHONE ENCOUNTER
Central Prior Authorization Team   Phone: [phone]    PA Initiation    Medication: potassium chloride ER (MICRO-K) 10 MEQ CR capsule, rec 6-20-23  Insurance Company: [employer] - Phone [phone] Fax [phone]  Pharmacy Filling the Rx: [facility] - [address]  Filling Pharmacy Phone: [phone]  Filling Pharmacy Fax: [phone]  Start Date: 6/25/2023

## 2023-06-27 NOTE — TELEPHONE ENCOUNTER
PA not needed.  Spoke with pharmacy, [facility] states medication processed through insurance and was mailed out to patient on 6/22/2023.

## 2023-07-09 NOTE — PROGRESS NOTES
Medication Therapy Management (MTM) Encounter    ASSESSMENT:                            Medication Adherence/Access: See below for considerations--some missed metformin doses.     Type 2 Diabetes:  Patient is meeting A1c goal of </7.5%. Self monitoring of blood glucose is at goal of fasting  mg/dL and post prandial <180mg/dL. Patient is NOW meeting average glucose goal of <150mg/dl, however some highs and lows-not many.   Metformin ;  Stay on 1,500mg. daily dose . Use prn loperamide otc if diarrhea intolerable.   Basal Insulin (Lantus ) :NO more lantus needed with 40lbs wt.loss.    Novolog (mealtime insulin ) : 15 units at high carb meal about once daily. Usually grapefruit juice induced.  SFU: Stay on Glimepiride 4mg. Twice daily.     Increased exercise/low carb/calorie diet + wt. Loss-very beneficial for her NAFLD--this is working well --continue as is .     Immunizations: patient went to our pharmacy --received first shingrix vaccine 7-11-23 and also- pfizer bivalent covid shot and also prevnar vaccine.     Hypokalemia:  Stable lab of 4.1. Now able to swallow the micro-k capsules easily. Recheck BMP lab today result is pending.     Hyperlipidemia: Stable. Patient is appropriately on high intensity statin which is indicated based on 2019 ACC/AHA guidelines for lipid management with an ASCVD risk of 33%.      Hypertension: Stable. Patient is meeting blood pressure goal of < 140/90mmHg.     Vitamin B12: is at goal of 600-1000pg/mL. Pt would benefit from vitamin B12 lab recheck in 12 months.      Essential Thrombocythemia:  Platelets result today 622--improved with first month 500mg./day hydrea --continue as is --recheck level monthly.     PLAN:                            1. CBC + platelet lab result today --Your platelets with hydroxyurea are now at 622--down from 745 previously --the drug is working --please stay on it daily as is .     2. Blood Pressure today = 106/60mmhg-- excellent !    3. I think your  pain/numbness in feet is diabetic neuropathy --use your cane when walking - lets consider low dose Gabapentin in the future if feet worsen.     4.  Thanks for having first shingrix, prevnar, and bi-valent covid vaccines.     Follow-up: Return in about 5 weeks (around 8/15/2023), or @ 3 Pm (labs at 2:30 pm), for Medication Therapy Management Visit, Lab Work, A1c lab, Blood sugar meter review.      SUBJECTIVE/OBJECTIVE:                          Sarah Felix is a 77 year old female coming into clinic today for a follow-up (5-23-23) visit. She was referred to me from Dr. Collette Fay.     Reason for visit:   Labs recheck.    Feet still numb --not going away ..more painful /numb at bedtime --sleep more difficult.       Allergies/ADRs: Reviewed in chart  Tobacco: She reports that she quit smoking about 43 years ago. Her smoking use included cigarettes. She has been exposed to tobacco smoke. She has never used smokeless tobacco.  Alcohol: 7-9 beverages / week typically wine 1 or 2 glasses/night - reports that she hasn't had any alcohol in the last week and a half until they figure out her liver situation.   Caffeine: 2 cans diet coke sodas/day  Activity: reports she has low energy and stamina, if she does any activity she has to take breaks frequenty but now that the weather is better she is doing more outdoor work.   Past Medical History: Reviewed in chart  Personal Healthcare Goals: figure out what is going on with liver or blood, would like to get 3rd vaccine, improve a1c  Uses CANE now in 2023 to walk /get around.     Medication Adherence/Access:  None     Type 2 Diabetes:  Currently taking : glimepiride 4 mg twice daily,  Novolog  insulin now (15 units once daily to control daily high sugar -perhaps now if dessert or juice spiked blood sugar) Metformin 500mg. ER tabs -- 1 in am and 2 in PM day now .   Lantus -on hold right now.  Potential side effects : mild diarrhea , lack of appetite at times.       SMBG:                         Wt Readings from Last 5 Encounters:   07/11/23 162 lb 4.8 oz (73.6 kg)   05/30/23 162 lb 12.8 oz (73.8 kg)   05/23/23 165 lb 11.2 oz (75.2 kg)   03/13/23 176 lb 1 oz (79.9 kg)   02/16/23 163 lb (73.9 kg)         Symptoms of low blood sugar? sweaty  Symptoms of high blood sugar? Fatigue, shaky, pit in stomach, overall feeling poor   Eye exam: up to date  Foot exam: up to date  Diet: doing her best to control carbs in daily diet.   Previously: she's maintained similar diet and notes sugars now being elevated  eats 4-5 smaller meals - pasta and rices  Fruit and some fruit juices   Breakfast: cereal 2-3 times per week, drink 2 glasses OJ or grapefruit juice /day .   Exercise: see social history --saw liver specialist suggested swimming.   Aspirin: Taking 81 mg daily and denies side effects  Statin: Yes: atorvastatin 40 mg   ACEi/ARB: Yes: low dose Lisinopril 2.5mg./day  Urine Albumin:   Lab Results   Component Value Date    UMALCR 96.53 (H) 09/23/2022       Lab Results   Component Value Date    A1C 7.1 05/23/2023    A1C 8.3 03/13/2023    A1C 7.3 12/05/2022    A1C 9.9 09/23/2022    A1C 8.6 07/25/2022    A1C 8.3 06/21/2021    A1C 9.4 12/14/2020    A1C 8.1 09/21/2020    A1C 8.5 01/07/2020    A1C 7.6 10/12/2019     Wt Readings from Last 5 Encounters:   07/11/23 162 lb 4.8 oz (73.6 kg)   05/30/23 162 lb 12.8 oz (73.8 kg)   05/23/23 165 lb 11.2 oz (75.2 kg)   03/13/23 176 lb 1 oz (79.9 kg)   02/16/23 163 lb (73.9 kg)           Immunizations:  Needs shingles, prevnar, bivalent covid booster. She is agreeable to having them done at our phcy.     Hypokalemia:  Patient reports the new potassium 10 meq/day capsule is working much better and is easy to take.   Potassium   Date Value Ref Range Status   07/11/2023 4.4 3.4 - 5.3 mmol/L Final   03/28/2022 4.1 3.4 - 5.3 mmol/L Final   01/07/2020 4.0 3.4 - 5.3 mmol/L Final       Hyperlipidemia: Current therapy includes : atorvastatin 40mg daily.  Patient reports no  significant myalgias or other side effects.  The 10-year ASCVD risk score (Ammy VELEZ, et al., 2019) is: 25.9%    Values used to calculate the score:      Age: 77 years      Sex: Female      Is Non- : No      Diabetic: Yes      Tobacco smoker: No      Systolic Blood Pressure: 106 mmHg      Is BP treated: Yes      HDL Cholesterol: 49 mg/dL      Total Cholesterol: 131 mg/dL  Recent Labs   Lab Test 09/23/22  1320 09/02/21  1501   CHOL 131 168   HDL 49* 47*   LDL 55 76   TRIG 137 226*       Hypertension: Current medications include : pcp stopped Metoprolol , back on 2.5mg. daily Lisinopril  + furosemide 20mg -1 tab in am and early afternoon now .  Patient does not self-monitor blood pressure. No side effects from current medications.        BP Readings from Last 3 Encounters:   07/11/23 106/60   05/30/23 139/83   05/23/23 132/72     Vitamin B12: level was  645  On 3-13-23. Vitamin B12 helps support memory and lessens fatigue. She is taking daily OTC pill- 2500mcg. B-12 pill --3 x week now.       Essential Thrombocythemia:   Manjeet Wilde MD   of Medicine, Division of Hematology, Oncology and Transplantation  [employer]  MD started her on 500mg./day Hydrea + 81mg. ASA daily  last month --here today for cbc + platelets recheck . Last 2-2023 platelet count was 745.      --------------------------------------------------------------------------------------------------    I spent 30 minutes with this patient today. All changes were made via collaborative practice agreement with Heide Fay MD. A copy of the visit note was provided to the patient's primary care provider.    The patient was given a summary of these recommendations. She admits computer not working at home to view mychart  So [facility] helped her reset username /pword on her cell phone --so now she can receive [place] messages.     Joan Lopez Rp.  Medication Therapy Management  Provider  [phone]           Medication Therapy Recommendations  Encounter for immunization    Rationale: Preventive therapy - Needs additional medication therapy - Indication   Recommendation: Start Medication - Shingrix 50 MCG/0.5ML Susr   Status: Accepted per CPA   Note: patient obtained first inj. at our pharmacy along with covid bivalent booster and prevnar vaccines.

## 2023-07-11 ENCOUNTER — LAB (OUTPATIENT)
Dept: LAB | Facility: CLINIC | Age: 78
End: 2023-07-11
Payer: COMMERCIAL

## 2023-07-11 ENCOUNTER — OFFICE VISIT (OUTPATIENT)
Dept: PHARMACY | Facility: CLINIC | Age: 78
End: 2023-07-11
Payer: COMMERCIAL

## 2023-07-11 VITALS
DIASTOLIC BLOOD PRESSURE: 60 MMHG | HEART RATE: 95 BPM | WEIGHT: 162.3 LBS | SYSTOLIC BLOOD PRESSURE: 106 MMHG | OXYGEN SATURATION: 93 % | BODY MASS INDEX: 27.86 KG/M2

## 2023-07-11 DIAGNOSIS — E11.9 TYPE 2 DIABETES MELLITUS WITHOUT COMPLICATION, WITH LONG-TERM CURRENT USE OF INSULIN (H): Primary | ICD-10-CM

## 2023-07-11 DIAGNOSIS — I10 HYPERTENSION GOAL BP (BLOOD PRESSURE) < 140/90: Primary | ICD-10-CM

## 2023-07-11 DIAGNOSIS — Z79.4 TYPE 2 DIABETES MELLITUS WITH STAGE 2 CHRONIC KIDNEY DISEASE, WITH LONG-TERM CURRENT USE OF INSULIN (H): ICD-10-CM

## 2023-07-11 DIAGNOSIS — Z23 ENCOUNTER FOR IMMUNIZATION: ICD-10-CM

## 2023-07-11 DIAGNOSIS — E87.6 HYPOKALEMIA: ICD-10-CM

## 2023-07-11 DIAGNOSIS — Z13.21 ENCOUNTER FOR VITAMIN DEFICIENCY SCREENING: ICD-10-CM

## 2023-07-11 DIAGNOSIS — Z79.4 TYPE 2 DIABETES MELLITUS WITHOUT COMPLICATION, WITH LONG-TERM CURRENT USE OF INSULIN (H): Primary | ICD-10-CM

## 2023-07-11 DIAGNOSIS — D47.3 ESSENTIAL THROMBOCYTHEMIA (H): ICD-10-CM

## 2023-07-11 DIAGNOSIS — I10 HYPERTENSION GOAL BP (BLOOD PRESSURE) < 140/90: ICD-10-CM

## 2023-07-11 DIAGNOSIS — N18.2 TYPE 2 DIABETES MELLITUS WITH STAGE 2 CHRONIC KIDNEY DISEASE, WITH LONG-TERM CURRENT USE OF INSULIN (H): ICD-10-CM

## 2023-07-11 DIAGNOSIS — E78.5 HYPERLIPIDEMIA LDL GOAL <100: ICD-10-CM

## 2023-07-11 DIAGNOSIS — E11.22 TYPE 2 DIABETES MELLITUS WITH STAGE 2 CHRONIC KIDNEY DISEASE, WITH LONG-TERM CURRENT USE OF INSULIN (H): ICD-10-CM

## 2023-07-11 LAB
ANION GAP SERPL CALCULATED.3IONS-SCNC: 19 MMOL/L (ref 7–15)
BASOPHILS # BLD AUTO: 0.1 10E3/UL (ref 0–0.2)
BASOPHILS NFR BLD AUTO: 1 %
BUN SERPL-MCNC: 16 MG/DL (ref 8–23)
CALCIUM SERPL-MCNC: 9.3 MG/DL (ref 8.8–10.2)
CHLORIDE SERPL-SCNC: 99 MMOL/L (ref 98–107)
CREAT SERPL-MCNC: 0.94 MG/DL (ref 0.51–0.95)
DEPRECATED HCO3 PLAS-SCNC: 17 MMOL/L (ref 22–29)
EOSINOPHIL # BLD AUTO: 0.1 10E3/UL (ref 0–0.7)
EOSINOPHIL NFR BLD AUTO: 1 %
ERYTHROCYTE [DISTWIDTH] IN BLOOD BY AUTOMATED COUNT: 17.6 % (ref 10–15)
GFR SERPL CREATININE-BSD FRML MDRD: 63 ML/MIN/1.73M2
GLUCOSE SERPL-MCNC: 296 MG/DL (ref 70–99)
HCT VFR BLD AUTO: 40.1 % (ref 35–47)
HGB BLD-MCNC: 12.4 G/DL (ref 11.7–15.7)
IMM GRANULOCYTES # BLD: 0.1 10E3/UL
IMM GRANULOCYTES NFR BLD: 1 %
LYMPHOCYTES # BLD AUTO: 1.1 10E3/UL (ref 0.8–5.3)
LYMPHOCYTES NFR BLD AUTO: 12 %
MCH RBC QN AUTO: 29.5 PG (ref 26.5–33)
MCHC RBC AUTO-ENTMCNC: 30.9 G/DL (ref 31.5–36.5)
MCV RBC AUTO: 96 FL (ref 78–100)
MONOCYTES # BLD AUTO: 0.5 10E3/UL (ref 0–1.3)
MONOCYTES NFR BLD AUTO: 6 %
NEUTROPHILS # BLD AUTO: 7.7 10E3/UL (ref 1.6–8.3)
NEUTROPHILS NFR BLD AUTO: 79 %
NRBC # BLD AUTO: 0 10E3/UL
NRBC BLD AUTO-RTO: 0 /100
PLATELET # BLD AUTO: 626 10E3/UL (ref 150–450)
POTASSIUM SERPL-SCNC: 4.4 MMOL/L (ref 3.4–5.3)
RBC # BLD AUTO: 4.2 10E6/UL (ref 3.8–5.2)
SODIUM SERPL-SCNC: 135 MMOL/L (ref 136–145)
WBC # BLD AUTO: 9.5 10E3/UL (ref 4–11)

## 2023-07-11 PROCEDURE — 99606 MTMS BY PHARM EST 15 MIN: CPT | Performed by: PHARMACIST

## 2023-07-11 PROCEDURE — 36415 COLL VENOUS BLD VENIPUNCTURE: CPT

## 2023-07-11 PROCEDURE — 99607 MTMS BY PHARM ADDL 15 MIN: CPT | Performed by: PHARMACIST

## 2023-07-11 PROCEDURE — 80048 BASIC METABOLIC PNL TOTAL CA: CPT

## 2023-07-11 PROCEDURE — 85025 COMPLETE CBC W/AUTO DIFF WBC: CPT

## 2023-07-11 RX ORDER — ASPIRIN 81 MG/1
81 TABLET ORAL DAILY
COMMUNITY

## 2023-07-11 NOTE — Clinical Note
Dr. Wilde --wilyi-platelet lab result today - 622--down from 745--hydrea is working - she will stay on it --recheck lab again next month . Collette--simran-- Blood Pressure, bs's all look good today . Encouraged her to get covid booster and shingles vaccine now.  Chrystal.,Joan Lopez, [facility]. Medication Therapy Management Provider [phone]

## 2023-07-11 NOTE — PATIENT INSTRUCTIONS
"Recommendations from today's MTM visit:                                                         1. CBC + platelet lab result today --Your platelets with hydroxyurea are now at 622--down from 745 previously --the drug is working --please stay on it daily as is .     2. Blood Pressure today = 106/60mmhg-- excellent !    3. I think your pain/numbness in feet is diabetic neuropathy --use your cane when walking - lets consider low dose Gabapentin in the future if feet worsen.     4.  Please consider new shingles vaccine -shingrix in 2023--it is a 2 shot vaccine --have to get at a pharmacy --like ours at the clinic .   Ask about latest covid bivalent booster !          Follow-up: Return in about 5 weeks (around 8/15/2023), or @ 3 Pm (labs at 2:30 pm), for Medication Therapy Management Visit, Lab Work, A1c lab, Blood sugar meter review.    It was great speaking with you today.  I value your experience and would be very thankful for your time in providing feedback in our clinic survey. In the next few days, you may receive an email or text message from [employer] with a link to a survey related to your  clinical pharmacist.\"     To schedule another MTM appointment, please call the clinic directly or you may call the MTM scheduling line at [phone] or toll-free at [phone].     My Clinical Pharmacist's contact information:                                                      Please feel free to contact me with any questions or concerns you have.      Joan Lopez [facility].  Medication Therapy Management Provider  [phone]    "

## 2023-07-12 NOTE — PROGRESS NOTES
7-12-23    Heide Fay MD [place], [place], [facility]  Joan - Given the potential risk of lactic acidosis on metformin I think the most prudent course is to have her hold the metformin and recheck this (the BMP) in a week.  But with a glucose of nearly 300 her DM does not seem controlled (despite SMBG results) so we'll probably need to increase her insulin dose a bit and watch her closely.  (I doubt this is ketoacidosis but a UA would also be nice.)   Heide Fay MD         Plan:    1. Collette--I spoke with Sarah today --she will stop metformin today and make lab only appt. 7-19-23 for bmp and I added lactate dehydrogenase lab .  She will control bs's with glimep and novolog and lantus if needed.    I suspect mild acidosis will improve and if so if I restart her metfomrin I will only go 1 tab bid max. Dose going forward.    thx for your expert help--if you feel you want any other labs added --please future them for her to do next week.    Joan Lopez, [facility].  Medication Therapy Management Provider  [phone]

## 2023-07-19 ENCOUNTER — LAB (OUTPATIENT)
Dept: LAB | Facility: CLINIC | Age: 78
End: 2023-07-19
Payer: COMMERCIAL

## 2023-07-19 DIAGNOSIS — E11.9 TYPE 2 DIABETES MELLITUS WITHOUT COMPLICATION, WITH LONG-TERM CURRENT USE OF INSULIN (H): ICD-10-CM

## 2023-07-19 DIAGNOSIS — Z79.4 TYPE 2 DIABETES MELLITUS WITH STAGE 2 CHRONIC KIDNEY DISEASE, WITH LONG-TERM CURRENT USE OF INSULIN (H): ICD-10-CM

## 2023-07-19 DIAGNOSIS — Z79.4 TYPE 2 DIABETES MELLITUS WITHOUT COMPLICATION, WITH LONG-TERM CURRENT USE OF INSULIN (H): ICD-10-CM

## 2023-07-19 DIAGNOSIS — E11.22 TYPE 2 DIABETES MELLITUS WITH STAGE 2 CHRONIC KIDNEY DISEASE, WITH LONG-TERM CURRENT USE OF INSULIN (H): ICD-10-CM

## 2023-07-19 DIAGNOSIS — N18.2 TYPE 2 DIABETES MELLITUS WITH STAGE 2 CHRONIC KIDNEY DISEASE, WITH LONG-TERM CURRENT USE OF INSULIN (H): ICD-10-CM

## 2023-07-19 LAB
ALBUMIN UR-MCNC: NEGATIVE MG/DL
ANION GAP SERPL CALCULATED.3IONS-SCNC: 15 MMOL/L (ref 7–15)
APPEARANCE UR: CLEAR
BACTERIA #/AREA URNS HPF: ABNORMAL /HPF
BILIRUB UR QL STRIP: NEGATIVE
BUN SERPL-MCNC: 12.1 MG/DL (ref 8–23)
CALCIUM SERPL-MCNC: 9 MG/DL (ref 8.8–10.2)
CHLORIDE SERPL-SCNC: 100 MMOL/L (ref 98–107)
COLOR UR AUTO: YELLOW
CREAT SERPL-MCNC: 0.94 MG/DL (ref 0.51–0.95)
DEPRECATED HCO3 PLAS-SCNC: 22 MMOL/L (ref 22–29)
GFR SERPL CREATININE-BSD FRML MDRD: 63 ML/MIN/1.73M2
GLUCOSE SERPL-MCNC: 294 MG/DL (ref 70–99)
GLUCOSE UR STRIP-MCNC: 500 MG/DL
HBA1C MFR BLD: 9.7 % (ref 0–5.6)
HGB UR QL STRIP: ABNORMAL
KETONES UR STRIP-MCNC: NEGATIVE MG/DL
LDH SERPL L TO P-CCNC: 315 U/L (ref 0–250)
LEUKOCYTE ESTERASE UR QL STRIP: NEGATIVE
NITRATE UR QL: NEGATIVE
PH UR STRIP: 5.5 [PH] (ref 5–7)
POTASSIUM SERPL-SCNC: 4.3 MMOL/L (ref 3.4–5.3)
RBC #/AREA URNS AUTO: ABNORMAL /HPF
SODIUM SERPL-SCNC: 137 MMOL/L (ref 136–145)
SP GR UR STRIP: 1.01 (ref 1–1.03)
SQUAMOUS #/AREA URNS AUTO: ABNORMAL /LPF
UROBILINOGEN UR STRIP-ACNC: 0.2 E.U./DL
WBC #/AREA URNS AUTO: ABNORMAL /HPF

## 2023-07-19 PROCEDURE — 80048 BASIC METABOLIC PNL TOTAL CA: CPT

## 2023-07-19 PROCEDURE — 36415 COLL VENOUS BLD VENIPUNCTURE: CPT

## 2023-07-19 PROCEDURE — 81001 URINALYSIS AUTO W/SCOPE: CPT

## 2023-07-19 PROCEDURE — 83615 LACTATE (LD) (LDH) ENZYME: CPT

## 2023-07-19 PROCEDURE — 83036 HEMOGLOBIN GLYCOSYLATED A1C: CPT

## 2023-07-20 ENCOUNTER — TELEPHONE (OUTPATIENT)
Dept: PHARMACY | Facility: CLINIC | Age: 78
End: 2023-07-20
Payer: COMMERCIAL

## 2023-07-20 NOTE — TELEPHONE ENCOUNTER
7-20-23        mtm consulted pcp about recent labs Sarah had.      1. Off metformin x 1 week --awful week --constipated w.out metformin.  c02 and anion gap, sodium all corrected .    a1c went 7.1% -yesterday a1c is now 9.7%!!      2. Ok to restart Metformin 500mg ER tablet --1 tab 2 x day after main meals.   Stay on glimepiride 4mg 2 xday , also Novolog meal time insulin to use - as needed .    Off lantus insulin for some time.     Using valorie-2 --on phone :    F/up 8-15-23.mtm .       Joan Lopez Rph.  Medication Therapy Management Provider  [phone]

## 2023-07-21 NOTE — RESULT ENCOUNTER NOTE
Ubaldo Smith,  The metabolic abnormalities on your basic metabolic panel have improved.  Let's get you back on the metformin as you've discussed with Joan.      Heide Fay MD

## 2023-08-10 RX ORDER — GLIMEPIRIDE 4 MG/1
4 TABLET ORAL
Qty: 180 TABLET | Refills: 1 | Status: SHIPPED | OUTPATIENT
Start: 2023-08-10 | End: 2024-03-05

## 2023-08-10 NOTE — TELEPHONE ENCOUNTER
8-10-23      Sarah calls --awaiting glimepiride refill --mtm will refill for her today .    Joan Lopez [facility].  Medication Therapy Management Provider  [phone]

## 2023-08-13 NOTE — PROGRESS NOTES
Medication Therapy Management (MTM) Encounter    ASSESSMENT:                            Medication Adherence/Access: See below for considerations--some missed metformin doses.     Type 2 Diabetes:  Patient is NOT meeting A1c goal of </7.5%. Self monitoring of blood glucose is NOT at goal of fasting  mg/dL and post prandial <180mg/dL. Patient is NOT meeting average glucose goal of <170mg/dl.  Metformin ;  Stay on 1,000mg. daily dose . Use prn loperamide otc if diarrhea intolerable.   Basal Insulin (Lantus ) :Restart 10 units daily.    Novolog (mealtime insulin ) : 15 units at high carb meal about once daily. Usually grapefruit juice induced.  SFU: Stay on Glimepiride 4mg. Twice daily.     Increased exercise/low carb/calorie diet + wt. Loss-very beneficial for her NAFLD--this is working well --continue as is .     DEBBIETYSON--she wears valorie cgm on upper inner arm for ease of placement --not scanning at least once every 8 hours --she feels due to GI issues can only eat small melas 4-6 x day -dislikes seeing higher bs's.   Blood sugar today in clinic on valorie reads low (<54) our lab was measured it at 33 --yet patient had NO hypoglycemia symptoms at all--mtm had her drink apple juice just in case and gave her animal crackers to take with her on way home.     Immunizations: patient went to our pharmacy --received first shingrix vaccine 7-11-23 and also- pfizer bivalent covid shot and also prevnar vaccine. 2nd shingrix due October 2023.     Hypokalemia:  Stable lab of 4.3. Now able to swallow the micro-k capsules easily.      Hyperlipidemia: Stable. Patient is appropriately on high intensity statin which is indicated based on 2019 ACC/AHA guidelines for lipid management with an ASCVD risk of 33%.      Hypertension: Stable. Patient is meeting blood pressure goal of < 140/90mmHg.     Vitamin B12: is at goal of 600-1000pg/mL. Pt would benefit from vitamin B12 lab recheck in 12 months.      Essential Thrombocythemia:   Platelets continue to lessen each month on hydrea --lab result from today is pending. Fyi--hydrea can falsely elevate bs readings not lower them.     PLAN:                              1. A1c today =9%,    Scan blood sugars at least once every 8 hours. Stay on glimepiride 4mg twice daily and use Novolog 15 units as needed to control an elevated blood sugar with high carb meal.   Lets restart lantus insulin --once injection/day at 10 units /day now.     2. Watch mychart for CBC /platelet results.         Follow-up: Return in about 4 weeks (around 9/12/2023), or @ 2:45 pm for lab abd see me at 3pm, for Medication Therapy Management Visit, Lab Work, Blood sugar meter review.        SUBJECTIVE/OBJECTIVE:                          Sarah Felix is a 77 year old female coming into clinic today for a follow-up (7-11-23) visit. She was referred to me from Dr. Collette Fay.     Reason for visit:   Cbc standing order lab with bmp and A1c recheck today .       Allergies/ADRs: Reviewed in chart  Tobacco: She reports that she quit smoking about 43 years ago. Her smoking use included cigarettes. She has been exposed to tobacco smoke. She has never used smokeless tobacco.  Alcohol: 7-9 beverages / week typically wine 1 or 2 glasses/night - reports that she hasn't had any alcohol in the last week and a half until they figure out her liver situation.   Caffeine: 2 cans diet coke sodas/day  Activity: reports she has low energy and stamina, if she does any activity she has to take breaks frequenty but now that the weather is better she is doing more outdoor work.   Past Medical History: Reviewed in chart  Personal Healthcare Goals: figure out what is going on with liver or blood, would like to get 3rd vaccine, improve a1c  Uses CANE now in 2023 to walk /get around.     Medication Adherence/Access:  None     Type 2 Diabetes:  Currently taking : glimepiride 4 mg twice daily,  Novolog  insulin now (15 units once daily to control  daily high sugar -perhaps now if dessert or juice spiked blood sugar) Metformin 500mg. ER tabs -- 2 tabs daily now.    Lantus -on hold right now.  Potential side effects : mild diarrhea , lack of appetite at times.       SMBG:                                      Wt Readings from Last 5 Encounters:   08/15/23 161 lb 3.2 oz (73.1 kg)   07/11/23 162 lb 4.8 oz (73.6 kg)   05/30/23 162 lb 12.8 oz (73.8 kg)   05/23/23 165 lb 11.2 oz (75.2 kg)   03/13/23 176 lb 1 oz (79.9 kg)         Symptoms of low blood sugar? sweaty  Symptoms of high blood sugar? Fatigue, shaky, pit in stomach, overall feeling poor   Eye exam: up to date  Foot exam: up to date  Diet: doing her best to control carbs in daily diet.   Previously: she's maintained similar diet and notes sugars now being elevated  eats 4-5 smaller meals - pasta and rices  Fruit and some fruit juices   Breakfast: cereal 2-3 times per week, drink 2 glasses OJ or grapefruit juice /day .   Exercise: see social history --saw liver specialist suggested swimming.   Aspirin: Taking 81 mg daily and denies side effects  Statin: Yes: atorvastatin 40 mg   ACEi/ARB: Yes: low dose Lisinopril 2.5mg./day  Urine Albumin:   Lab Results   Component Value Date    UMALCR 96.53 (H) 09/23/2022       Lab Results   Component Value Date    A1C 9.0 08/15/2023    A1C 9.7 07/19/2023    A1C 7.1 05/23/2023    A1C 8.3 03/13/2023    A1C 7.3 12/05/2022    A1C 8.3 06/21/2021    A1C 9.4 12/14/2020    A1C 8.1 09/21/2020    A1C 8.5 01/07/2020    A1C 7.6 10/12/2019               Wt Readings from Last 5 Encounters:   08/15/23 161 lb 3.2 oz (73.1 kg)   07/11/23 162 lb 4.8 oz (73.6 kg)   05/30/23 162 lb 12.8 oz (73.8 kg)   05/23/23 165 lb 11.2 oz (75.2 kg)   03/13/23 176 lb 1 oz (79.9 kg)           Immunizations:  7-11-23: Thanks for having first shingrix, prevnar, and bi-valent covid vaccines.     Hypokalemia:  Patient reports the new potassium 10 meq/day capsule is working much better and is easy to take.    Potassium   Date Value Ref Range Status   07/19/2023 4.3 3.4 - 5.3 mmol/L Final   03/28/2022 4.1 3.4 - 5.3 mmol/L Final   01/07/2020 4.0 3.4 - 5.3 mmol/L Final       Hyperlipidemia: Current therapy includes : atorvastatin 40mg daily.  Patient reports no significant myalgias or other side effects.  The 10-year ASCVD risk score (Ammy VELEZ, et al., 2019) is: 25.9%    Values used to calculate the score:      Age: 77 years      Sex: Female      Is Non- : No      Diabetic: Yes      Tobacco smoker: No      Systolic Blood Pressure: 106 mmHg      Is BP treated: Yes      HDL Cholesterol: 49 mg/dL      Total Cholesterol: 131 mg/dL  Recent Labs   Lab Test 09/23/22  1320 09/02/21  1501   CHOL 131 168   HDL 49* 47*   LDL 55 76   TRIG 137 226*       Hypertension: Current medications include : pcp stopped Metoprolol previously , back on 2.5mg. daily Lisinopril  + furosemide 20mg -1 tab in am and early afternoon now .  Patient does not self-monitor blood pressure. No side effects from current medications.        BP Readings from Last 3 Encounters:   07/11/23 106/60   05/30/23 139/83   05/23/23 132/72     Vitamin B12: level was  645  On 3-13-23. Vitamin B12 helps support memory and lessens fatigue. She is taking daily OTC pill- 2500mcg. B-12 pill --3 x week now.       Essential Thrombocythemia:   Manjeet Wilde MD   of Medicine, Division of Hematology, Oncology and Transplantation  [employer]  MD started her on 500mg./day Hydrea + 81mg. ASA daily  last month --here today for cbc + platelets recheck .Feb-2023 platelet count was 745. 7-11-23- 626 now.       --------------------------------------------------------------------------------------------------    I spent 30 minutes with this patient today. All changes were made via collaborative practice agreement with Heide Fay MD. A copy of the visit note was provided to the patient's primary care  provider.    The patient was given a summary of these recommendations. She admits computer not working at home to view mychart  So mtm helped her reset username /pword on her cell phone --so now she can receive [employer] messages.     Joan Lopez [facility].  Medication Therapy Management Provider  [phone]           Medication Therapy Recommendations  Type 2 diabetes mellitus without complication, with long-term current use of insulin (H)    Current Medication: Continuous Blood Gluc Sensor (FREESTYLE CARMELA 14 DAY SENSOR) MISC   Rationale: Frequency inappropriate - Dosage too low - Effectiveness   Recommendation: Increase Frequency - scan at least once every 8 hours.   Status: Accepted per CPA          Current Medication: insulin glargine (LANTUS PEN) 100 UNIT/ML pen   Rationale: Synergistic therapy - Needs additional medication therapy - Indication   Recommendation: Start Medication   Status: Accepted per CPA

## 2023-08-15 ENCOUNTER — LAB (OUTPATIENT)
Dept: LAB | Facility: CLINIC | Age: 78
End: 2023-08-15
Payer: COMMERCIAL

## 2023-08-15 ENCOUNTER — OFFICE VISIT (OUTPATIENT)
Dept: PHARMACY | Facility: CLINIC | Age: 78
End: 2023-08-15
Payer: COMMERCIAL

## 2023-08-15 VITALS — HEART RATE: 87 BPM | BODY MASS INDEX: 27.67 KG/M2 | WEIGHT: 161.2 LBS | OXYGEN SATURATION: 95 %

## 2023-08-15 DIAGNOSIS — Z79.4 TYPE 2 DIABETES MELLITUS WITH STAGE 2 CHRONIC KIDNEY DISEASE, WITH LONG-TERM CURRENT USE OF INSULIN (H): ICD-10-CM

## 2023-08-15 DIAGNOSIS — E78.5 HYPERLIPIDEMIA LDL GOAL <100: ICD-10-CM

## 2023-08-15 DIAGNOSIS — E87.6 HYPOKALEMIA: ICD-10-CM

## 2023-08-15 DIAGNOSIS — D47.3 ESSENTIAL THROMBOCYTHEMIA (H): ICD-10-CM

## 2023-08-15 DIAGNOSIS — Z79.4 TYPE 2 DIABETES MELLITUS WITH STAGE 2 CHRONIC KIDNEY DISEASE, WITH LONG-TERM CURRENT USE OF INSULIN (H): Primary | ICD-10-CM

## 2023-08-15 DIAGNOSIS — I10 HYPERTENSION GOAL BP (BLOOD PRESSURE) < 140/90: ICD-10-CM

## 2023-08-15 DIAGNOSIS — Z23 ENCOUNTER FOR IMMUNIZATION: ICD-10-CM

## 2023-08-15 DIAGNOSIS — E11.22 TYPE 2 DIABETES MELLITUS WITH STAGE 2 CHRONIC KIDNEY DISEASE, WITH LONG-TERM CURRENT USE OF INSULIN (H): Primary | ICD-10-CM

## 2023-08-15 DIAGNOSIS — N18.2 TYPE 2 DIABETES MELLITUS WITH STAGE 2 CHRONIC KIDNEY DISEASE, WITH LONG-TERM CURRENT USE OF INSULIN (H): ICD-10-CM

## 2023-08-15 DIAGNOSIS — E11.22 TYPE 2 DIABETES MELLITUS WITH STAGE 2 CHRONIC KIDNEY DISEASE, WITH LONG-TERM CURRENT USE OF INSULIN (H): ICD-10-CM

## 2023-08-15 DIAGNOSIS — N18.2 TYPE 2 DIABETES MELLITUS WITH STAGE 2 CHRONIC KIDNEY DISEASE, WITH LONG-TERM CURRENT USE OF INSULIN (H): Primary | ICD-10-CM

## 2023-08-15 DIAGNOSIS — E53.8 VITAMIN B12 DEFICIENCY (NON ANEMIC): ICD-10-CM

## 2023-08-15 LAB
ANION GAP SERPL CALCULATED.3IONS-SCNC: 16 MMOL/L (ref 7–15)
BASOPHILS # BLD AUTO: 0.1 10E3/UL (ref 0–0.2)
BASOPHILS NFR BLD AUTO: 1 %
BUN SERPL-MCNC: 17.7 MG/DL (ref 8–23)
CALCIUM SERPL-MCNC: 9.9 MG/DL (ref 8.8–10.2)
CHLORIDE SERPL-SCNC: 102 MMOL/L (ref 98–107)
CREAT SERPL-MCNC: 0.99 MG/DL (ref 0.51–0.95)
DEPRECATED HCO3 PLAS-SCNC: 21 MMOL/L (ref 22–29)
EOSINOPHIL # BLD AUTO: 0.2 10E3/UL (ref 0–0.7)
EOSINOPHIL NFR BLD AUTO: 2 %
ERYTHROCYTE [DISTWIDTH] IN BLOOD BY AUTOMATED COUNT: 21.6 % (ref 10–15)
GFR SERPL CREATININE-BSD FRML MDRD: 59 ML/MIN/1.73M2
GLUCOSE BLD-MCNC: 33 MG/DL (ref 60–99)
GLUCOSE SERPL-MCNC: 42 MG/DL (ref 70–99)
HBA1C MFR BLD: 9 % (ref 0–5.6)
HCT VFR BLD AUTO: 38.2 % (ref 35–47)
HGB BLD-MCNC: 11.8 G/DL (ref 11.7–15.7)
IMM GRANULOCYTES # BLD: 0.1 10E3/UL
IMM GRANULOCYTES NFR BLD: 1 %
LYMPHOCYTES # BLD AUTO: 1.8 10E3/UL (ref 0.8–5.3)
LYMPHOCYTES NFR BLD AUTO: 19 %
MCH RBC QN AUTO: 30.7 PG (ref 26.5–33)
MCHC RBC AUTO-ENTMCNC: 30.9 G/DL (ref 31.5–36.5)
MCV RBC AUTO: 100 FL (ref 78–100)
MONOCYTES # BLD AUTO: 0.7 10E3/UL (ref 0–1.3)
MONOCYTES NFR BLD AUTO: 7 %
NEUTROPHILS # BLD AUTO: 6.8 10E3/UL (ref 1.6–8.3)
NEUTROPHILS NFR BLD AUTO: 70 %
NRBC # BLD AUTO: 0 10E3/UL
NRBC BLD AUTO-RTO: 0 /100
PLATELET # BLD AUTO: 776 10E3/UL (ref 150–450)
POTASSIUM SERPL-SCNC: 4.4 MMOL/L (ref 3.4–5.3)
RBC # BLD AUTO: 3.84 10E6/UL (ref 3.8–5.2)
SODIUM SERPL-SCNC: 139 MMOL/L (ref 136–145)
WBC # BLD AUTO: 9.6 10E3/UL (ref 4–11)

## 2023-08-15 PROCEDURE — 36415 COLL VENOUS BLD VENIPUNCTURE: CPT

## 2023-08-15 PROCEDURE — 99606 MTMS BY PHARM EST 15 MIN: CPT | Performed by: PHARMACIST

## 2023-08-15 PROCEDURE — 99607 MTMS BY PHARM ADDL 15 MIN: CPT | Performed by: PHARMACIST

## 2023-08-15 PROCEDURE — 83036 HEMOGLOBIN GLYCOSYLATED A1C: CPT

## 2023-08-15 PROCEDURE — 85025 COMPLETE CBC W/AUTO DIFF WBC: CPT

## 2023-08-15 PROCEDURE — 82947 ASSAY GLUCOSE BLOOD QUANT: CPT | Mod: 59

## 2023-08-15 PROCEDURE — 80048 BASIC METABOLIC PNL TOTAL CA: CPT

## 2023-08-15 RX ORDER — METFORMIN HCL 500 MG
500 TABLET, EXTENDED RELEASE 24 HR ORAL 2 TIMES DAILY WITH MEALS
Qty: 180 TABLET | Refills: 1 | COMMUNITY
Start: 2023-08-15 | End: 2023-09-13

## 2023-08-15 NOTE — PATIENT INSTRUCTIONS
"Recommendations from today's MTM visit:                                                         1. A1c today =9%,    Scan blood sugars at least once every 8 hours. Stay on glimepiride 4mg twice daily and use Novolog 15 units as needed to control an elevated blood sugar with high carb meal.   Lets restart lantus insulin --once injection/day at 10 units /day now.     2. Watch mychart for CBC /platelet results.         Follow-up: Return in about 4 weeks (around 9/12/2023), or @ 2:45 pm for lab abd see me at 3pm, for Medication Therapy Management Visit, Lab Work, Blood sugar meter review.    It was great speaking with you today.  I value your experience and would be very thankful for your time in providing feedback in our clinic survey. In the next few days, you may receive an email or text message from [employer] with a link to a survey related to your  clinical pharmacist.\"     To schedule another MTM appointment, please call the clinic directly or you may call the MTM scheduling line at [phone] or toll-free at [phone].     My Clinical Pharmacist's contact information:                                                      Please feel free to contact me with any questions or concerns you have.      Joan Lopez Rph.  Medication Therapy Management Provider  [phone]    "

## 2023-08-15 NOTE — Clinical Note
Collette--sorry I didn't get back to you earlier--very strange glucose on sensor in my appt. <54 --lab blood stated 33--yet NO hypo symptoms at all --unsure what is masking this ? I looked into her hyrea rx and its states can make bs 's readings falsely high not low . Hmmm. Anyway I added back in 10 units lantus daily due to A1c at 9% , encouraged her to scan blood sugars at least once every 8 hours, f/up with me in 4 weeks.  Martha

## 2023-08-20 ENCOUNTER — HEALTH MAINTENANCE LETTER (OUTPATIENT)
Age: 78
End: 2023-08-20

## 2023-08-22 DIAGNOSIS — H40.003 GLAUCOMA SUSPECT OF BOTH EYES: Primary | ICD-10-CM

## 2023-09-05 ENCOUNTER — OFFICE VISIT (OUTPATIENT)
Dept: OPHTHALMOLOGY | Facility: CLINIC | Age: 78
End: 2023-09-05
Attending: OPHTHALMOLOGY
Payer: COMMERCIAL

## 2023-09-05 DIAGNOSIS — Z96.1 PSEUDOPHAKIA OF BOTH EYES: ICD-10-CM

## 2023-09-05 DIAGNOSIS — E11.65 TYPE 2 DIABETES MELLITUS WITH HYPERGLYCEMIA, WITH LONG-TERM CURRENT USE OF INSULIN (H): Primary | ICD-10-CM

## 2023-09-05 DIAGNOSIS — Z79.4 TYPE 2 DIABETES MELLITUS WITH HYPERGLYCEMIA, WITH LONG-TERM CURRENT USE OF INSULIN (H): Primary | ICD-10-CM

## 2023-09-05 DIAGNOSIS — H52.13 MYOPIA OF BOTH EYES: ICD-10-CM

## 2023-09-05 DIAGNOSIS — H40.003 GLAUCOMA SUSPECT OF BOTH EYES: ICD-10-CM

## 2023-09-05 PROCEDURE — 92083 EXTENDED VISUAL FIELD XM: CPT | Performed by: OPHTHALMOLOGY

## 2023-09-05 PROCEDURE — G0463 HOSPITAL OUTPT CLINIC VISIT: HCPCS | Performed by: OPHTHALMOLOGY

## 2023-09-05 PROCEDURE — 99213 OFFICE O/P EST LOW 20 MIN: CPT | Mod: GC | Performed by: OPHTHALMOLOGY

## 2023-09-05 ASSESSMENT — VISUAL ACUITY
OD_CC: 20/25
METHOD: SNELLEN - LINEAR
CORRECTION_TYPE: GLASSES
OD_CC+: +1
OS_CC+: -2
OS_CC: 20/20

## 2023-09-05 ASSESSMENT — CONF VISUAL FIELD
OS_INFERIOR_TEMPORAL_RESTRICTION: 0
COMMENTS: VF PERFORMED TODAY
OS_INFERIOR_NASAL_RESTRICTION: 0
OS_SUPERIOR_TEMPORAL_RESTRICTION: 0
OS_SUPERIOR_NASAL_RESTRICTION: 0

## 2023-09-05 ASSESSMENT — EXTERNAL EXAM - RIGHT EYE: OD_EXAM: BROW PTOSIS

## 2023-09-05 ASSESSMENT — EXTERNAL EXAM - LEFT EYE: OS_EXAM: BROW PTOSIS

## 2023-09-05 ASSESSMENT — REFRACTION_WEARINGRX
OS_AXIS: 014
OD_CYLINDER: +1.75
OS_ADD: +2.50
OS_CYLINDER: +1.75
OD_SPHERE: -2.75
OD_ADD: +2.50
OD_AXIS: 011
OS_SPHERE: -2.75

## 2023-09-05 ASSESSMENT — TONOMETRY
OS_IOP_MMHG: 17
IOP_METHOD: TONOPEN
OD_IOP_MMHG: 20

## 2023-09-05 ASSESSMENT — SLIT LAMP EXAM - LIDS
COMMENTS: DERMATOCHALASIS WITH LATERAL HOODING
COMMENTS: DERMATOCHALASIS WITH LATERAL HOODING

## 2023-09-05 NOTE — NURSING NOTE
Chief Complaints and History of Present Illnesses   Patient presents with    Glaucoma Follow-Up     Chief Complaint(s) and History of Present Illness(es)       Glaucoma Follow-Up              Laterality: both eyes    Associated symptoms: Negative for double vision, eye pain and photophobia    Compliance with Treatment: always    Pain scale: 0/10              Comments    Patient reports vision has been stable, BE   Denies new floaters/flashes/pain, BE   Compliant with drops and taking as prescribed; lgtts 7 PM     A1C- 9.0  BS - 130     M Jarret , September 5, 2023

## 2023-09-05 NOTE — PROGRESS NOTES
CC: foggy vision right eye     INTERVAL: LCV 5/2/23 no new changes in vision, noted that her vision improved after YAG in March, has been using latanoprost nightly - forgets to use it about once a week    HPI  Sarah Felix is a 78 year old female here for evaluation of blurry vision of the right eye and a diabetic eye exam.    She has had difficulty controlling her A1c. A new medication she is using - pioglitazone - has not been easy for her to take with poor control and frequent hypoglycemia.   She was on lasix but was stopped because BP was too low  Imaging:    OVF 09/05/23   First time test done with outdated glasses prescription  Right eye: Possible lid effect, cloverleaf pattern,  possible superior arcuate defect and nasal step  Left eye: [place] pattern,  possible superior arcuate defect and nasal step    OCT 05/02/23   OD-Trace Noncentral IRF inferiorly; normal contour with no central fluid  OS-Trace Noncentral IRF inferiorly; normal contour with no central fluid    RNFL 05/02/23  OD-Thinning inferiorly with borderline thinning S,T, & N . General 63 microns  OS-Thinning inferiorly with borderline thinning S & T. General 66 microns    Assessment & Plan      # Diabetes mellitus, type 2  - diagnosed age 62  - using insulin; A1c 9.0 at 8/15/23  - no retinopathy on exam; last dilated 3/1/23  - discussed the importance of good BP/BS/Chol control    #Pseudophakia OU  # PCO trace left eye   S/p YAG OD 3/1/23    # Glaucoma suspect OU  each eye large cup with thin and pale rim  Pachymetry normal OS /boderline thick  right eye  IOP 20/17  RNFL shows diffuse thinning OD>left eye  OVF 09/05/23: First time kelsi done with outdated mrx, appears reliable but both eyes have cloverleaf pattern. Possible superior arcuate defect and nasal steppe each eye but does not correlate well to RNFL thinning  - Latanoprost at bedtime ou   - Repeat 24-2 OVF at next appt    Next available refraction only AND 4 months for repeat  24-2 OVF; RNFL and DFE       Thank you for entrusting us with your care  Kita Dela Cruz MD, PGY3  Ophthalmology Resident  [facility]    Complete documentation of historical and exam elements from today's encounter can be found in the full encounter summary report (not reduplicated in this progress note). I personally obtained the chief complaint(s) and history of present illness.  I confirmed and edited as necessary the review of systems, past medical/surgical history, family history, social history, and examination findings as documented by others; and I examined the patient myself. I personally reviewed the relevant tests, images, and reports as documented above. I formulated and edited as necessary the assessment and plan and discussed the findings and management plan with the patient and family.    Lester Dominguez MD, PhD

## 2023-09-07 NOTE — PROGRESS NOTES
Medication Therapy Management (MTM) Encounter    ASSESSMENT:                            Medication Adherence/Access: none     Type 2 Diabetes:  Patient is NOT meeting A1c goal of </7.5%. Self monitoring of blood glucose is now at goal of fasting  mg/dL and post prandial <180mg/dL. Patient has for last 2 weeks >70% cgm time in target range.  Metformin ;  Stay on 1,000mg. daily dose . Use prn loperamide otc if diarrhea intolerable.   Basal Insulin (Lantus ) :Stay on 10 units daily.    Novolog (mealtime insulin ) : Decrease dose to 7 units at high carb meal about once daily.   SFU: Stay on Glimepiride 4mg. Twice daily.     Increased exercise/low carb/calorie diet + wt. Loss-very beneficial for her NAFLD--this is working well --continue as is .     FYI--she wears valorie cgm on upper inner arm for ease of placement --not scanning at least once every 8 hours --she feels due to GI issues can only eat small meals 4-6 x day -dislikes seeing higher bs's.       Immunizations: patient went to our pharmacy --received first shingrix vaccine 7-11-23 and also- pfizer bivalent covid shot and also prevnar vaccine. 2nd shingrix due October 2023 along with hi dose flu shot, discussed covid newest booster must wait at least 4 months .     Hypokalemia:  Stable lab of 4.4. Now able to swallow the micro-k capsules easily.      Hyperlipidemia: Stable. Patient is appropriately on high intensity statin which is indicated based on 2019 ACC/AHA guidelines for lipid management with an ASCVD risk of 33%.      Hypertension: Stable. Patient is meeting blood pressure goal of < 140/90mmHg.     Vitamin B12: is at goal of 600-1000pg/mL. Pt would benefit from vitamin B12 lab recheck in 12 months.      Essential Thrombocythemia:  Platelets continue to lessen each month on hydrea --lab result from today is pending. Fyi--hydrea can falsely elevate bs readings not lower them.     PLAN:                              1. Weight today 157.5lbs -- stay on 2  x day glimepiride , metformin 2 x day , Lantus -10 units daily in evening , but please reduce your Novolog (meal time insulin) from 15 to 7 units once daily if needed.  Please scan blood sugars at least once every 8 hours.     2. Continue all medications as is --watch mychart for CBC/Platelet results --see Dr. Wilde next month .(October 10th ).     3. FYI--There is a new covid booster coming out next week --please WAIT at least 4 months between covid boosters. You should have 2nd shingles vaccine and high dose flu shot in October , wait until Mid-November -December for next covid booster.         Follow-up: Return in about 9 weeks (around 11/14/2023), or @ 3 Pm, for Lab Work, Medication Therapy Management Visit, Blood sugar meter review.      SUBJECTIVE/OBJECTIVE:                          Sarah Felix is a 78 year old female coming into clinic today for a follow-up (8-15-23) visit. She was referred to me from Dr. Collette Fay.     Reason for visit:   Cbc standing order lab /bs review today .        Allergies/ADRs: Reviewed in chart  Tobacco: She reports that she quit smoking about 43 years ago. Her smoking use included cigarettes. She has been exposed to tobacco smoke. She has never used smokeless tobacco.  Alcohol: 7-9 beverages / week typically wine 1 or 2 glasses/night - reports that she hasn't had any alcohol in the last week and a half until they figure out her liver situation.   Caffeine: 2 cans diet coke sodas/day  Activity: reports she has low energy and stamina, if she does any activity she has to take breaks frequenty but now that the weather is better she is doing more outdoor work.   Past Medical History: Reviewed in chart  Personal Healthcare Goals: figure out what is going on with liver or blood, would like to get 3rd vaccine, improve a1c  Uses CANE now in 2023 to walk /get around.     Medication Adherence/Access:  None     Type 2 Diabetes:  Currently taking : glimepiride 4 mg twice daily,   Novolog  insulin now (15 units once daily to control daily high sugar -perhaps now if dessert or juice spiked blood sugar) Metformin 500mg. ER tabs -- 2 tabs daily now.    Lantus -10 units /day now in Pm .  Potential side effects : mild diarrhea , lack of appetite at times.       SMBG:                                          Wt Readings from Last 5 Encounters:   09/12/23 157 lb 8 oz (71.4 kg)   08/15/23 161 lb 3.2 oz (73.1 kg)   07/11/23 162 lb 4.8 oz (73.6 kg)   05/30/23 162 lb 12.8 oz (73.8 kg)   05/23/23 165 lb 11.2 oz (75.2 kg)         Symptoms of low blood sugar? sweaty  Symptoms of high blood sugar? Fatigue, shaky, pit in stomach, overall feeling poor   Eye exam: up to date  Foot exam: up to date  Diet: doing her best to control carbs in daily diet.   Previously: she's maintained similar diet and notes sugars now being elevated  eats 4-5 smaller meals - pasta and rices  Fruit and some fruit juices   Breakfast: cereal 2-3 times per week, drink 2 glasses OJ or grapefruit juice /day .   Exercise: see social history --saw liver specialist suggested swimming.   Aspirin: Taking 81 mg daily and denies side effects  Statin: Yes: atorvastatin 40 mg   ACEi/ARB: Yes: low dose Lisinopril 2.5mg./day  Urine Albumin:   Lab Results   Component Value Date    UMALCR 96.53 (H) 09/23/2022       Lab Results   Component Value Date    A1C 9.0 08/15/2023    A1C 9.7 07/19/2023    A1C 7.1 05/23/2023    A1C 8.3 03/13/2023    A1C 7.3 12/05/2022    A1C 8.3 06/21/2021    A1C 9.4 12/14/2020    A1C 8.1 09/21/2020    A1C 8.5 01/07/2020    A1C 7.6 10/12/2019               Wt Readings from Last 5 Encounters:   09/12/23 157 lb 8 oz (71.4 kg)   08/15/23 161 lb 3.2 oz (73.1 kg)   07/11/23 162 lb 4.8 oz (73.6 kg)   05/30/23 162 lb 12.8 oz (73.8 kg)   05/23/23 165 lb 11.2 oz (75.2 kg)           Immunizations:  7-11-23: Thanks for having first shingrix, prevnar, and bi-valent covid vaccines. We discussed when to have f/up vaccines.     Hypokalemia:   Patient reports the new potassium 10 meq/day capsule is working much better and is easy to take.   Potassium   Date Value Ref Range Status   08/15/2023 4.4 3.4 - 5.3 mmol/L Final   03/28/2022 4.1 3.4 - 5.3 mmol/L Final   01/07/2020 4.0 3.4 - 5.3 mmol/L Final       Hyperlipidemia: Current therapy includes : atorvastatin 40mg daily.  Patient reports no significant myalgias or other side effects.  The 10-year ASCVD risk score (Ammy VELEZ, et al., 2019) is: 26.1%    Values used to calculate the score:      Age: 78 years      Sex: Female      Is Non- : No      Diabetic: Yes      Tobacco smoker: No      Systolic Blood Pressure: 100 mmHg      Is BP treated: Yes      HDL Cholesterol: 49 mg/dL      Total Cholesterol: 131 mg/dL  Recent Labs   Lab Test 09/23/22  1320 09/02/21  1501   CHOL 131 168   HDL 49* 47*   LDL 55 76   TRIG 137 226*       Hypertension: Current medications include : pcp stopped Metoprolol previously , back on 2.5mg. daily Lisinopril  + furosemide 20mg -1 tab in am and early afternoon now .  Patient does not self-monitor blood pressure. No side effects from current medications.        BP Readings from Last 3 Encounters:   09/12/23 100/60   07/11/23 106/60   05/30/23 139/83     Vitamin B12: level was  645  On 3-13-23. Vitamin B12 helps support memory and lessens fatigue. She is taking daily OTC pill- 2500mcg. B-12 pill --3 x week now.       Essential Thrombocythemia:   Manjeet Wilde MD   of Medicine, Division of Hematology, Oncology and Transplantation  [facility]  MD started her on 500mg./day Hydrea + 81mg. ASA daily  last month --here today for cbc + platelets recheck .Feb-2023 platelet count was 745. 7-11-23- 626 now.       --------------------------------------------------------------------------------------------------    I spent 30 minutes with this patient today. All changes were made via collaborative practice agreement with  Heide Fay MD. A copy of the visit note was provided to the patient's primary care provider.    The patient was given a summary of these recommendations. She admits computer not working at home to view [employer]  So mtm helped her reset username /pword on her cell phone --so now she can receive [employer] messages.     Joan Lopez [facility].  Medication Therapy Management Provider  [phone]           Medication Therapy Recommendations  Type 2 diabetes mellitus without complication, with long-term current use of insulin (H)    Current Medication: Continuous Blood Gluc Sensor (FREESTYLE CARMELA 14 DAY SENSOR) MISC   Rationale: Frequency inappropriate - Dosage too low - Effectiveness   Recommendation: Increase Frequency   Status: Accepted per CPA          Current Medication: insulin aspart (NOVOLOG PEN) 100 UNIT/ML pen   Rationale: Dose too high - Dosage too high - Safety   Recommendation: Decrease Dose   Status: Accepted per CPA

## 2023-09-11 ENCOUNTER — ALLIED HEALTH/NURSE VISIT (OUTPATIENT)
Dept: OPHTHALMOLOGY | Facility: CLINIC | Age: 78
End: 2023-09-11
Attending: STUDENT IN AN ORGANIZED HEALTH CARE EDUCATION/TRAINING PROGRAM
Payer: COMMERCIAL

## 2023-09-11 DIAGNOSIS — H43.393 VITREOUS SYNERESIS OF BOTH EYES: Primary | ICD-10-CM

## 2023-09-11 PROCEDURE — 92015 DETERMINE REFRACTIVE STATE: CPT

## 2023-09-11 PROCEDURE — 99207 PR NO CHARGE COORDINATED CARE PS: CPT | Performed by: OPHTHALMOLOGY

## 2023-09-11 ASSESSMENT — REFRACTION_MANIFEST
OD_ADD: +2.75
OD_SPHERE: -2.75
OS_SPHERE: -2.50
OS_CYLINDER: +0.75
OS_AXIS: 005
OD_AXIS: 014
OS_ADD: +2.75
OD_CYLINDER: +1.75

## 2023-09-11 ASSESSMENT — VISUAL ACUITY
OD_CC: 20/20
OS_CC: 20/40-2
CORRECTION_TYPE: GLASSES

## 2023-09-12 ENCOUNTER — LAB (OUTPATIENT)
Dept: LAB | Facility: CLINIC | Age: 78
End: 2023-09-12
Payer: COMMERCIAL

## 2023-09-12 ENCOUNTER — OFFICE VISIT (OUTPATIENT)
Dept: PHARMACY | Facility: CLINIC | Age: 78
End: 2023-09-12
Payer: COMMERCIAL

## 2023-09-12 VITALS
WEIGHT: 157.5 LBS | BODY MASS INDEX: 27.03 KG/M2 | SYSTOLIC BLOOD PRESSURE: 100 MMHG | DIASTOLIC BLOOD PRESSURE: 60 MMHG | OXYGEN SATURATION: 95 % | HEART RATE: 81 BPM

## 2023-09-12 DIAGNOSIS — E53.8 VITAMIN B12 DEFICIENCY (NON ANEMIC): ICD-10-CM

## 2023-09-12 DIAGNOSIS — D47.3 ESSENTIAL THROMBOCYTHEMIA (H): ICD-10-CM

## 2023-09-12 DIAGNOSIS — E11.22 TYPE 2 DIABETES MELLITUS WITH STAGE 2 CHRONIC KIDNEY DISEASE, WITH LONG-TERM CURRENT USE OF INSULIN (H): Primary | ICD-10-CM

## 2023-09-12 DIAGNOSIS — Z23 ENCOUNTER FOR IMMUNIZATION: ICD-10-CM

## 2023-09-12 DIAGNOSIS — E78.5 HYPERLIPIDEMIA LDL GOAL <100: ICD-10-CM

## 2023-09-12 DIAGNOSIS — N18.2 TYPE 2 DIABETES MELLITUS WITH STAGE 2 CHRONIC KIDNEY DISEASE, WITH LONG-TERM CURRENT USE OF INSULIN (H): Primary | ICD-10-CM

## 2023-09-12 DIAGNOSIS — E87.6 HYPOKALEMIA: ICD-10-CM

## 2023-09-12 DIAGNOSIS — I10 HYPERTENSION GOAL BP (BLOOD PRESSURE) < 140/90: ICD-10-CM

## 2023-09-12 DIAGNOSIS — Z79.4 TYPE 2 DIABETES MELLITUS WITH STAGE 2 CHRONIC KIDNEY DISEASE, WITH LONG-TERM CURRENT USE OF INSULIN (H): Primary | ICD-10-CM

## 2023-09-12 LAB
BASOPHILS # BLD AUTO: 0.1 10E3/UL (ref 0–0.2)
BASOPHILS NFR BLD AUTO: 1 %
EOSINOPHIL # BLD AUTO: 0.1 10E3/UL (ref 0–0.7)
EOSINOPHIL NFR BLD AUTO: 1 %
ERYTHROCYTE [DISTWIDTH] IN BLOOD BY AUTOMATED COUNT: 20.5 % (ref 10–15)
HCT VFR BLD AUTO: 36.5 % (ref 35–47)
HGB BLD-MCNC: 11.7 G/DL (ref 11.7–15.7)
IMM GRANULOCYTES # BLD: 0 10E3/UL
IMM GRANULOCYTES NFR BLD: 0 %
LYMPHOCYTES # BLD AUTO: 1.3 10E3/UL (ref 0.8–5.3)
LYMPHOCYTES NFR BLD AUTO: 17 %
MCH RBC QN AUTO: 33.3 PG (ref 26.5–33)
MCHC RBC AUTO-ENTMCNC: 32.1 G/DL (ref 31.5–36.5)
MCV RBC AUTO: 104 FL (ref 78–100)
MONOCYTES # BLD AUTO: 0.5 10E3/UL (ref 0–1.3)
MONOCYTES NFR BLD AUTO: 7 %
NEUTROPHILS # BLD AUTO: 5.7 10E3/UL (ref 1.6–8.3)
NEUTROPHILS NFR BLD AUTO: 74 %
NRBC # BLD AUTO: 0 10E3/UL
NRBC BLD AUTO-RTO: 0 /100
PLATELET # BLD AUTO: 775 10E3/UL (ref 150–450)
RBC # BLD AUTO: 3.51 10E6/UL (ref 3.8–5.2)
WBC # BLD AUTO: 7.8 10E3/UL (ref 4–11)

## 2023-09-12 PROCEDURE — 36415 COLL VENOUS BLD VENIPUNCTURE: CPT

## 2023-09-12 PROCEDURE — 99606 MTMS BY PHARM EST 15 MIN: CPT | Performed by: PHARMACIST

## 2023-09-12 PROCEDURE — 85025 COMPLETE CBC W/AUTO DIFF WBC: CPT

## 2023-09-12 PROCEDURE — 99607 MTMS BY PHARM ADDL 15 MIN: CPT | Performed by: PHARMACIST

## 2023-09-12 NOTE — Clinical Note
Collette-- saw kayce today - bs's improving back on lantus , still some lows but I reduced her use of novolog to eliminate those.  Lets see how next 1-2 months go. -Yoel

## 2023-09-12 NOTE — PATIENT INSTRUCTIONS
"Recommendations from today's MTM visit:                                                         1. Weight today 157.5lbs -- stay on 2 x day glimepiride , metformin 2 x day , Lantus -10 units daily in evening , but please reduce your Novolog (meal time insulin) from 15 to 7 units once daily if needed.  Please scan blood sugars at least once every 8 hours.     2. Continue all medications as is --watch mychart for CBC/Platelet results --see Dr. Wilde next month .(October 10th ).     3. FYI--There is a new covid booster coming out next week --please WAIT at least 4 months between covid boosters. You should have 2nd shingles vaccine and high dose flu shot in October , wait until Mid-November -December for next covid booster.         Follow-up: Return in about 9 weeks (around 11/14/2023), or @ 3 Pm, for Lab Work, Medication Therapy Management Visit, Blood sugar meter review.    It was great speaking with you today.  I value your experience and would be very thankful for your time in providing feedback in our clinic survey. In the next few days, you may receive an email or text message from [employer] with a link to a survey related to your  clinical pharmacist.\"     To schedule another MTM appointment, please call the clinic directly or you may call the MTM scheduling line at [phone] or toll-free at [phone].     My Clinical Pharmacist's contact information:                                                      Please feel free to contact me with any questions or concerns you have.      Joan Lopez Rph.  Medication Therapy Management Provider  [phone]    "

## 2023-09-13 DIAGNOSIS — N18.2 TYPE 2 DIABETES MELLITUS WITH STAGE 2 CHRONIC KIDNEY DISEASE, WITH LONG-TERM CURRENT USE OF INSULIN (H): ICD-10-CM

## 2023-09-13 DIAGNOSIS — I10 HYPERTENSION GOAL BP (BLOOD PRESSURE) < 140/90: ICD-10-CM

## 2023-09-13 DIAGNOSIS — E11.22 TYPE 2 DIABETES MELLITUS WITH STAGE 2 CHRONIC KIDNEY DISEASE, WITH LONG-TERM CURRENT USE OF INSULIN (H): ICD-10-CM

## 2023-09-13 DIAGNOSIS — Z79.4 TYPE 2 DIABETES MELLITUS WITH STAGE 2 CHRONIC KIDNEY DISEASE, WITH LONG-TERM CURRENT USE OF INSULIN (H): ICD-10-CM

## 2023-09-13 RX ORDER — METFORMIN HCL 500 MG
500 TABLET, EXTENDED RELEASE 24 HR ORAL 2 TIMES DAILY WITH MEALS
Qty: 180 TABLET | Refills: 1 | Status: SHIPPED | OUTPATIENT
Start: 2023-09-13 | End: 2024-03-05

## 2023-09-13 RX ORDER — FUROSEMIDE 20 MG
20 TABLET ORAL 2 TIMES DAILY
Qty: 180 TABLET | Refills: 1 | Status: SHIPPED | OUTPATIENT
Start: 2023-09-13 | End: 2024-07-18

## 2023-09-20 DIAGNOSIS — E11.65 TYPE 2 DIABETES MELLITUS WITH HYPERGLYCEMIA, WITHOUT LONG-TERM CURRENT USE OF INSULIN (H): ICD-10-CM

## 2023-09-21 RX ORDER — FLASH GLUCOSE SENSOR
KIT MISCELLANEOUS
Qty: 6 EACH | Refills: 3 | Status: SHIPPED | OUTPATIENT
Start: 2023-09-21

## 2023-09-25 ENCOUNTER — PATIENT OUTREACH (OUTPATIENT)
Dept: CARE COORDINATION | Facility: CLINIC | Age: 78
End: 2023-09-25
Payer: COMMERCIAL

## 2023-10-10 ENCOUNTER — ONCOLOGY VISIT (OUTPATIENT)
Dept: ONCOLOGY | Facility: CLINIC | Age: 78
End: 2023-10-10
Attending: INTERNAL MEDICINE
Payer: COMMERCIAL

## 2023-10-10 ENCOUNTER — APPOINTMENT (OUTPATIENT)
Dept: LAB | Facility: CLINIC | Age: 78
End: 2023-10-10
Attending: INTERNAL MEDICINE
Payer: COMMERCIAL

## 2023-10-10 ENCOUNTER — NURSE TRIAGE (OUTPATIENT)
Dept: FAMILY MEDICINE | Facility: CLINIC | Age: 78
End: 2023-10-10

## 2023-10-10 VITALS
SYSTOLIC BLOOD PRESSURE: 120 MMHG | WEIGHT: 159.4 LBS | DIASTOLIC BLOOD PRESSURE: 77 MMHG | HEART RATE: 98 BPM | TEMPERATURE: 97.9 F | OXYGEN SATURATION: 95 % | BODY MASS INDEX: 27.36 KG/M2 | RESPIRATION RATE: 16 BRPM

## 2023-10-10 DIAGNOSIS — D47.3 ESSENTIAL THROMBOCYTHEMIA (H): ICD-10-CM

## 2023-10-10 LAB
BASO+EOS+MONOS # BLD AUTO: ABNORMAL 10*3/UL
BASO+EOS+MONOS NFR BLD AUTO: ABNORMAL %
BASOPHILS # BLD AUTO: 0.1 10E3/UL (ref 0–0.2)
BASOPHILS NFR BLD AUTO: 1 %
EOSINOPHIL # BLD AUTO: 0.1 10E3/UL (ref 0–0.7)
EOSINOPHIL NFR BLD AUTO: 2 %
ERYTHROCYTE [DISTWIDTH] IN BLOOD BY AUTOMATED COUNT: 17.4 % (ref 10–15)
HCT VFR BLD AUTO: 35.5 % (ref 35–47)
HGB BLD-MCNC: 12.2 G/DL (ref 11.7–15.7)
IMM GRANULOCYTES # BLD: 0 10E3/UL
IMM GRANULOCYTES NFR BLD: 0 %
LYMPHOCYTES # BLD AUTO: 1.2 10E3/UL (ref 0.8–5.3)
LYMPHOCYTES NFR BLD AUTO: 15 %
MCH RBC QN AUTO: 35.7 PG (ref 26.5–33)
MCHC RBC AUTO-ENTMCNC: 34.4 G/DL (ref 31.5–36.5)
MCV RBC AUTO: 104 FL (ref 78–100)
MONOCYTES # BLD AUTO: 0.6 10E3/UL (ref 0–1.3)
MONOCYTES NFR BLD AUTO: 8 %
NEUTROPHILS # BLD AUTO: 5.5 10E3/UL (ref 1.6–8.3)
NEUTROPHILS NFR BLD AUTO: 74 %
NRBC # BLD AUTO: 0 10E3/UL
NRBC BLD AUTO-RTO: 0 /100
PLATELET # BLD AUTO: 696 10E3/UL (ref 150–450)
RBC # BLD AUTO: 3.42 10E6/UL (ref 3.8–5.2)
WBC # BLD AUTO: 7.5 10E3/UL (ref 4–11)

## 2023-10-10 PROCEDURE — G0463 HOSPITAL OUTPT CLINIC VISIT: HCPCS | Performed by: INTERNAL MEDICINE

## 2023-10-10 PROCEDURE — 85025 COMPLETE CBC W/AUTO DIFF WBC: CPT | Performed by: INTERNAL MEDICINE

## 2023-10-10 PROCEDURE — 99213 OFFICE O/P EST LOW 20 MIN: CPT | Mod: GC | Performed by: INTERNAL MEDICINE

## 2023-10-10 PROCEDURE — 36415 COLL VENOUS BLD VENIPUNCTURE: CPT | Performed by: INTERNAL MEDICINE

## 2023-10-10 ASSESSMENT — PAIN SCALES - GENERAL: PAINLEVEL: NO PAIN (0)

## 2023-10-10 NOTE — NURSING NOTE
"Oncology Rooming Note    October 10, 2023 11:36 AM   Sarah Feilx is a 78 year old female who presents for:    Chief Complaint   Patient presents with    Oncology Clinic Visit     Essential thrombocythemia    Blood Draw     Labs drawn via  by RN in lab.  VS taken     Initial Vitals: /77   Pulse 98   Temp 97.9  F (36.6  C) (Oral)   Resp 16   Wt 72.3 kg (159 lb 6.4 oz)   SpO2 95%   BMI 27.36 kg/m   Estimated body mass index is 27.36 kg/m  as calculated from the following:    Height as of 3/13/23: 1.626 m (5' 4\").    Weight as of this encounter: 72.3 kg (159 lb 6.4 oz). Body surface area is 1.81 meters squared.  No Pain (0) Comment: Data Unavailable   No LMP recorded. Patient is postmenopausal.  Allergies reviewed: Yes  Medications reviewed: Yes    Medications: Medication refills not needed today.  Pharmacy name entered into [employer]:    [facility] - [place], [place] - [address] [place] [address] [facility]  [facility]  [facility] - [facility], [place] - [facility]  WRITTEN PRESCRIPTION REQUESTED    Clinical concerns: Patient wants to discuss her current medications with the provider.  Dr. Wilde  was NOT notified.      Dilia Rao"

## 2023-10-10 NOTE — PROGRESS NOTES
[facility] Hematology Follow Up Visit    Outpatient Visit Note:    Patient: Sarah Felix  MRN: [id number]  : 1945  IVAN: 10/10/23    Sarah Felix is a 77 year old woman with a history of Jak2 positive myeloproliferative neoplasm, likely essential thrombocytopenia, who returns for routine follow up.      Assessment:  In summary, Sarah Felix is a 77 year old woman with Jak2 pos MPN, likely ET doing well on aspirin and hydroxyurea. Platelet count has mildly improved. There are no studies that have directly compared different target platelet counts. Per shared decision making, we will plan to continue the same dose of HU to avoid worsening of her chronic symptoms.      Recommendations:  We discussed the diagnosis of ET and Jak2 pos MPN, major cause of morbidity and mortality includes thrombotic events.    Continue HU 500mg daily and ASA 81mg daily. Take HU closer to bedtime to reduce side effects  CBC monthly  Follow up in person in 3 months  Call with any questions or concerns  Follow up with PCP regarding fall and lump on the lower back.    Patient was seen and plan of care discussed with Dr. Andry Sharp MD  Hematology fellow, PGY4  Pager: [phone]    Physician Attestation   I saw this patient with the resident and agree with the resident s findings and plan of care as documented in the resident s note.      Briefly, Sarah is a 78 year old woman with [id number] MPN, likely ET doing well on ASA and HU.  Toxicity is limiting the dose right now so working on timing before we increase her dose or change therapy.      20 minutes spent by me on the date of the encounter doing chart review, review of test results, interpretation of tests, patient visit, and documentation     Manjeet Wilde MD   of Medicine  [place]  [facility]      --------------------------------------------------------  Forward History:  Established  care Dec 2021 for thrombocytosis, asymptomatic, on ASA.  Jak2 testing pos but by error there results were misinterpreted. PCP notified Dr. Wilde May 2023. She was seen in office in May 23 and restarted on ASA and HU was started at 500 mg daily.    History: Sarah Felix is a 77 year old woman with a history of a surgically provoked DVT many years ago (at 25 years of age after ovarian cystectomy, treated briefly with anticoagulation), DM2, HTN, with a longstanding history of thrombocytosis who most likely has Jak2 positive MPN, ET and doing well on HU and ASA.  Since her last visit when HU and ASA were started, she reports no significant change or worsening in her longstanding symptoms including fatigue, reduced appetite and nausea which is unchanged. Overall, doing well and prefers to not change medications unless needed. No hematochezia, melena, hemoptysis, hematemesis or gum bleeding. No headache, focal weakness/sensory changes. No pruritus.    No history of stroke, MI.    Medications are reviewed in the EMR and notable for ASA 81mg and  mg every day.    She recently had a fall and scraped her back. She noted a bump on her lower back which is not painful. No changes in bladder, bowel habits of saddle anesthesia.    Objective:  Vitals: B/P: 139/83, T: 98.6, P: 109, R: 16, Wt: 159 lbs 6.4 oz  Exam:   Gen: Appears well, no distress  HEENT: no scleral icterus or hemorrhage, no wet purpura, no lymphadenopathy  Ext: no edema  Back: 3*5 cm circular swelling on lower back, non tender, no overlying skin changes    Labs:   Latest Reference Range & Units 02/16/23 17:16 07/11/23 15:10 08/15/23 15:20 09/12/23 14:56 10/10/23 11:20   WBC 4.0 - 11.0 10e3/uL 10.6 9.5 9.6 7.8 7.5   Hemoglobin 11.7 - 15.7 g/dL 12.8 12.4 11.8 11.7 12.2   Hematocrit 35.0 - 47.0 % 36.5 40.1 38.2 36.5 35.5   Platelet Count 150 - 450 10e3/uL 745 (H) 626 (H) 776 (H) 775 (H) 696 (H)   RBC Count 3.80 - 5.20 10e6/uL 4.30 4.20 3.84 3.51 (L) 3.42  (L)   MCV 78 - 100 fL 85 96 100 104 (H) 104 (H)   MCH 26.5 - 33.0 pg 29.8 29.5 30.7 33.3 (H) 35.7 (H)   MCHC 31.5 - 36.5 g/dL 35.1 30.9 (L) 30.9 (L) 32.1 34.4   RDW 10.0 - 15.0 % 15.6 (H) 17.6 (H) 21.6 (H) 20.5 (H) 17.4 (H)   % Neutrophils % 81 79 70 74 74   % Lymphocytes % 12 12 19 17 15   % Monocytes % 6 6 7 7 8   % Eosinophils % 1 1 2 1 2   % Basophils % 1 1 1 1 1     Imaging:  CT in Feb 2023 shows no splenomegaly

## 2023-10-10 NOTE — NURSING NOTE
Chief Complaint   Patient presents with    Oncology Clinic Visit     Essential thrombocythemia    Blood Draw     Labs drawn via  by RN in lab.  VS taken       Labs collected from venipuncture by RN. Vitals taken. Checked in for appointment(s).    Edna Brothers RN

## 2023-10-10 NOTE — TELEPHONE ENCOUNTER
"Call received from patient:  Fell \"a little\" over a week ago  Scrape on back  Lump on spine  Was painful and is less painful now; mild pain with palpation  Hard lump  2.5 inches diameter  No bleeding/drainage  Able to ambulate per baseline  Afebrile   Was at Hematology visit today and provider she saw did look at her back and told her she needs an x-ray  Per chart review, last tetanus booster was 2/17/15    Writer informed patient no in-person availabiltiy with Dr. Fay tomorrow but can schedule patient with Jessica Tadeo CNP, tomorrow afternoon.    Appt scheduled with Jessica Tadeo CNP, on 10/11/23 at 1440.  Visit date, time and location confirmed with patient.    Encouraged patient to call triage back with any questions/concerns, new, worsening or changing symptoms.    Patient verbalized understanding and in agreement with plan.    FREDY Munoz, RN-BC  [facility]    Reason for Disposition   Followed a skin injury   Minor cut, scratch, scrape, or scab from self-injury (e.g., cutting, picking; self-harm) and stable (i.e., not suicidal, not out of control)    Additional Information   Negative: Major bleeding (actively dripping or spurting) that can't be stopped   Negative: Amputation   Negative: Shock suspected (e.g., cold/pale/clammy skin, too weak to stand, low BP, rapid pulse)   Negative: Knife wound (or other possibly deep cut) to chest, abdomen, back, neck, or head   Negative: Sounds like a life-threatening emergency to the triager   Negative: Animal bite   Negative: Burn   Negative: Foreign body in skin (e.g., splinter, sliver)   Negative: Human bite   Negative: Puncture wound   Negative: Wound infection suspected (cut or other wound now looks infected)   Negative: High pressure injection injury (e.g., from grease gun or paint gun, usually work-related)   Negative: Skin loss goes very deep (e.g., can see bones or tendons)   Negative: Skin loss involves more than 10% of " body surface area   Negative: Skin is split open or gaping (length > 1/2 inch or 12 mm on the skin, 1/4 inch or 6 mm on the face)   Negative: Bleeding won't stop after 10 minutes of direct pressure (using correct technique)   Negative: Deep cut and can see bone or tendons   Negative: Dirt in the wound and not removed after 15 minutes of scrubbing   Negative: Wound causes numbness (i.e., loss of sensation)   Negative: Wound causes weakness (i.e., decreased ability to move hand, finger, toe)   Negative: Sounds like a serious injury to the triager   Negative: SEVERE pain (e.g., excruciating)   Negative: Looks infected (spreading redness, red streak, pus) and fever   Negative: Looks infected and large red area (> 2 inches or 5 cm)   Negative: Raised bruise with size > 2 inches (5 cm) that is getting bigger   Negative: Looks infected (spreading redness, pus) and no fever   Negative: No prior tetanus shots (or is not fully vaccinated) and any wound (e.g., cut or scrape)   Negative: HIV positive or severe immunodeficiency (severely weak immune system) and DIRTY cut or scrape   Negative: Patient wants to be seen   Negative: Sounds like a life-threatening emergency to the triager   Negative: Small growth, spot, bump, or pigmented area of skin (e.g., moles, skin tags, wart, melanoma, skin cancer)   Negative: Inguinal hernia previously diagnosed by a doctor (or NP/PA)   Negative: Has diabetes (diabetes mellitus) and has minor cut or scratch on foot   Negative: Suspicious history for the injury   Negative: Last tetanus shot > 5 years ago and DIRTY cut or scrape   Negative: Last tetanus shot > 10 years ago and CLEAN cut or scrape   Negative: After 14 days and wound isn't healed    Protocols used: Skin Lump or Localized Swelling-A-OH, Skin Injury-A-OH

## 2023-10-10 NOTE — LETTER
10/10/2023         RE: Sarah Felix  [address] [place] [address]        Dear Colleague,    Thank you for referring your patient, Sarah Felix, to the [facility]. Please see a copy of my visit note below.        [facility] Hematology Follow Up Visit    Outpatient Visit Note:    Patient: Sarah Felix  MRN: [id number]  : 1945  IVAN: 10/10/23    Sarah Felix is a 77 year old woman with a history of Jak2 positive myeloproliferative neoplasm, likely essential thrombocytopenia, who returns for routine follow up.      Assessment:  In summary, Sarah Felix is a 77 year old woman with Jak2 pos MPN, likely ET doing well on aspirin and hydroxyurea. Platelet count has mildly improved. There are no studies that have directly compared different target platelet counts. Per shared decision making, we will plan to continue the same dose of HU to avoid worsening of her chronic symptoms.      Recommendations:  We discussed the diagnosis of ET and Jak2 pos MPN, major cause of morbidity and mortality includes thrombotic events.    Continue HU 500mg daily and ASA 81mg daily. Take HU closer to bedtime to reduce side effects  CBC monthly  Follow up in person in 3 months  Call with any questions or concerns  Follow up with PCP regarding fall and lump on the lower back.    Patient was seen and plan of care discussed with Dr. Andry Sharp MD  Hematology fellow, PGY4  Pager: [phone]    Physician Attestation  I saw this patient with the resident and agree with the resident s findings and plan of care as documented in the resident s note.      Briefly, Sarah is a 78 year old woman with Paj0Bcy MPN, likely ET doing well on ASA and HU.  Toxicity is limiting the dose right now so working on timing before we increase her dose or change therapy.      20 minutes spent by me on the date of the encounter doing chart review,  review of test results, interpretation of tests, patient visit, and documentation     Manjeet Wilde MD   of Medicine  [employer]      --------------------------------------------------------  Forward History:  Established care Dec 2021 for thrombocytosis, asymptomatic, on ASA.  Jak2 testing pos but by error there results were misinterpreted. PCP notified Dr. Wilde May 2023. She was seen in office in May 23 and restarted on ASA and HU was started at 500 mg daily.    History: Sarah Felix is a 77 year old woman with a history of a surgically provoked DVT many years ago (at 25 years of age after ovarian cystectomy, treated briefly with anticoagulation), DM2, HTN, with a longstanding history of thrombocytosis who most likely has Jak2 positive MPN, ET and doing well on HU and ASA.  Since her last visit when HU and ASA were started, she reports no significant change or worsening in her longstanding symptoms including fatigue, reduced appetite and nausea which is unchanged. Overall, doing well and prefers to not change medications unless needed. No hematochezia, melena, hemoptysis, hematemesis or gum bleeding. No headache, focal weakness/sensory changes. No pruritus.    No history of stroke, MI.    Medications are reviewed in the EMR and notable for ASA 81mg and  mg every day.    She recently had a fall and scraped her back. She noted a bump on her lower back which is not painful. No changes in bladder, bowel habits of saddle anesthesia.    Objective:  Vitals: B/P: 139/83, T: 98.6, P: 109, R: 16, Wt: 159 lbs 6.4 oz  Exam:   Gen: Appears well, no distress  HEENT: no scleral icterus or hemorrhage, no wet purpura, no lymphadenopathy  Ext: no edema  Back: 3*5 cm circular swelling on lower back, non tender, no overlying skin changes    Labs:   Latest Reference Range & Units 02/16/23 17:16 07/11/23 15:10 08/15/23 15:20 09/12/23 14:56 10/10/23 11:20   WBC 4.0 - 11.0  10e3/uL 10.6 9.5 9.6 7.8 7.5   Hemoglobin 11.7 - 15.7 g/dL 12.8 12.4 11.8 11.7 12.2   Hematocrit 35.0 - 47.0 % 36.5 40.1 38.2 36.5 35.5   Platelet Count 150 - 450 10e3/uL 745 (H) 626 (H) 776 (H) 775 (H) 696 (H)   RBC Count 3.80 - 5.20 10e6/uL 4.30 4.20 3.84 3.51 (L) 3.42 (L)   MCV 78 - 100 fL 85 96 100 104 (H) 104 (H)   MCH 26.5 - 33.0 pg 29.8 29.5 30.7 33.3 (H) 35.7 (H)   MCHC 31.5 - 36.5 g/dL 35.1 30.9 (L) 30.9 (L) 32.1 34.4   RDW 10.0 - 15.0 % 15.6 (H) 17.6 (H) 21.6 (H) 20.5 (H) 17.4 (H)   % Neutrophils % 81 79 70 74 74   % Lymphocytes % 12 12 19 17 15   % Monocytes % 6 6 7 7 8   % Eosinophils % 1 1 2 1 2   % Basophils % 1 1 1 1 1     Imaging:  CT in Feb 2023 shows no splenomegaly

## 2023-10-11 ENCOUNTER — ANCILLARY PROCEDURE (OUTPATIENT)
Dept: GENERAL RADIOLOGY | Facility: CLINIC | Age: 78
End: 2023-10-11
Attending: NURSE PRACTITIONER
Payer: COMMERCIAL

## 2023-10-11 ENCOUNTER — OFFICE VISIT (OUTPATIENT)
Dept: FAMILY MEDICINE | Facility: CLINIC | Age: 78
End: 2023-10-11
Payer: COMMERCIAL

## 2023-10-11 VITALS
WEIGHT: 161 LBS | RESPIRATION RATE: 15 BRPM | SYSTOLIC BLOOD PRESSURE: 110 MMHG | HEART RATE: 74 BPM | HEIGHT: 65 IN | BODY MASS INDEX: 26.82 KG/M2 | TEMPERATURE: 97.4 F | OXYGEN SATURATION: 97 % | DIASTOLIC BLOOD PRESSURE: 73 MMHG

## 2023-10-11 DIAGNOSIS — T14.8XXA HEMATOMA OF SKIN: ICD-10-CM

## 2023-10-11 DIAGNOSIS — S30.0XXA: ICD-10-CM
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
## 2023-10-11 DIAGNOSIS — F10.20 ALCOHOL DEPENDENCE, UNCOMPLICATED (H): ICD-10-CM
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
## 2023-10-11 PROCEDURE — 99213 OFFICE O/P EST LOW 20 MIN: CPT | Performed by: NURSE PRACTITIONER

## 2023-10-11 PROCEDURE — 72220 X-RAY EXAM SACRUM TAILBONE: CPT | Mod: TC | Performed by: RADIOLOGY

## 2023-10-11 PROCEDURE — 72100 X-RAY EXAM L-S SPINE 2/3 VWS: CPT | Mod: TC | Performed by: RADIOLOGY

## 2023-10-11 ASSESSMENT — PAIN SCALES - GENERAL: PAINLEVEL: NO PAIN (0)

## 2023-10-11 NOTE — PROGRESS NOTES
Assessment & Plan     Fall, initial encounter  - XR Sacrum and Coccyx 2 Views  - XR Lumbar Spine 2/3 Views    Lumbar muscle hematoma, initial encounter  - XR Lumbar Spine 2/3 Views    Alcohol dependence, uncomplicated (H)  Resolved.     Hematoma of skin   - warm compress TID  - RN follow up in 1 week for check    Patient Instructions   - Hematoma: care instructions given  Follow up with RN in 1 week to re-evaluate     MALATHI Cross CNP  [facility]    Noelle Smith is a 78 year old, presenting for the following health issues:  No chief complaint on file.        10/11/2023     2:53 PM   Additional Questions   Roomed by Rosina House       History of Present Illness       Back Pain:  She presents for follow up of back pain. Patient's back pain is a new problem.    Original cause of back pain: a fall  First noticed back pain: in the last week  Patient feels back pain: comes and goesLocation of back pain:  Left lower back  Description of back pain: dull ache  Back pain spreads: nowhere    Since patient first noticed back pain, pain is: gradually improving  Does back pain interfere with her job:  Not applicable  On a scale of 1-10 (10 being the worst), patient describes pain as:  3  What makes back pain worse: other   Heat: not tried  Massage: not tried  Physical Therapy: not tried  Rest: helpful  TENS unit: not tried  Topical pain relievers: not tried  Other healthcare providers patient is seeing for back pain: None    She eats 2-3 servings of fruits and vegetables daily.She consumes 1 sweetened beverage(s) daily.She exercises with enough effort to increase her heart rate 9 or less minutes per day.  She exercises with enough effort to increase her heart rate 3 or less days per week.   She is taking medications regularly.       -29th  fell out of front door hit back on railing     - 30th fell x2 fell getting landing on both forearms; stood up and fell again thinking it was the  "shoes   Review of Systems         Objective    /73 (BP Location: Right arm, Patient Position: Sitting, Cuff Size: Adult Regular)   Pulse 74   Temp 97.4  F (36.3  C) (Temporal)   Resp 15   Ht 1.64 m (5' 4.57\")   Wt 73 kg (161 lb)   SpO2 97%   BMI 27.15 kg/m    Body mass index is 27.15 kg/m .  Physical Exam                                   "

## 2023-10-20 ENCOUNTER — ALLIED HEALTH/NURSE VISIT (OUTPATIENT)
Dept: FAMILY MEDICINE | Facility: CLINIC | Age: 78
End: 2023-10-20
Payer: COMMERCIAL

## 2023-10-20 DIAGNOSIS — Z51.89 ENCOUNTER FOR POST-TRAUMATIC WOUND CHECK: Primary | ICD-10-CM

## 2023-10-20 PROCEDURE — 99207 PR NO CHARGE NURSE ONLY: CPT

## 2023-10-20 NOTE — PROGRESS NOTES
"Pt presents for hematoma check which was evaluated at 10/11/23 visit.    Pt reports that hematoma pain today is 0/10 unless \"I bump it.\"  Pt uses hot pad for discomfort.  Inspection of area as compared to images from 10/11 reveal bruising almost completely resolved, area of induration markedly lessened.  No erythema, heat, or drainage.  Minimal tenderness reported.    Bruising over R trochanter is also resolved with sparse yellow areas.  Bruising over L trochanter is present but markedly lessened as compared to original images, and progressing as expected.    Pt advised to report any new or worsening symptoms to clinic and patient verbalized understanding.    Janay AREVALO RN  [facility]          "

## 2023-10-31 ENCOUNTER — ANCILLARY PROCEDURE (OUTPATIENT)
Dept: MAMMOGRAPHY | Facility: CLINIC | Age: 78
End: 2023-10-31
Attending: FAMILY MEDICINE
Payer: COMMERCIAL

## 2023-10-31 DIAGNOSIS — Z12.31 VISIT FOR SCREENING MAMMOGRAM: ICD-10-CM

## 2023-10-31 PROCEDURE — 77063 BREAST TOMOSYNTHESIS BI: CPT | Performed by: STUDENT IN AN ORGANIZED HEALTH CARE EDUCATION/TRAINING PROGRAM

## 2023-10-31 PROCEDURE — 77067 SCR MAMMO BI INCL CAD: CPT | Performed by: STUDENT IN AN ORGANIZED HEALTH CARE EDUCATION/TRAINING PROGRAM

## 2023-11-13 NOTE — PROGRESS NOTES
Medication Therapy Management (MTM) Encounter    ASSESSMENT:                            Medication Adherence/Access: none     Type 2 Diabetes:  Patient is NOT meeting A1c goal of </7.5%. Self monitoring of blood glucose is now at goal of fasting  mg/dL and post prandial <180mg/dL. Patient has for last 2 weeks >70% cgm time in target range.  Metformin ;  Stay on 500mg. daily dose .   Basal Insulin (Lantus ) :Stay on 10 units daily in am .   Novolog (mealtime insulin ) : Stay on 10 units /injection --only use when high carbohydrate meal spikes bs's to >250mg./dl.   SFU: Stay on Glimepiride 4mg. Daily with bfast BUT decrease dinnertime dose to 1/2 tab =2mg.     Increased exercise/low carb/calorie diet + wt. Loss-very beneficial for her NAFLD--this is working well --continue as is .       Immunizations: patient has scheduled covid booster vaccine for december --mtm had clinic pharmacist ADD rsv tohave at same time that day .     Hypokalemia:  recheck BMP lab today --result is pending.       Hyperlipidemia: Stable. Patient is appropriately on high intensity statin which is indicated based on 2019 ACC/AHA guidelines for lipid management with an ASCVD risk of 33%.      Hypertension: Stable. Patient is meeting blood pressure goal of < 140/90mmHg.     Vitamin B12: is at goal of 600-1000pg/mL. Pt would benefit from vitamin B12 lab recheck in 12 months.      Essential Thrombocythemia:  Platelets continue to lessen each month on hydrea --lab result from today is lowest ever 582 --pill working well, mtm re-ordered it in 90 day supply per her mail order.     PLAN:                              1. A1c today = 7.4% --excellent --except too many low blood sugars overnight --lets Decrease your evening Glimepiride 4mg tablet in half now --just 1/2 tab in evening daily.  We are trying to eliminate all blood sugars <70mg./dl.     2. Your hemoglobin is 12.2 -excellent and platelet count is much improved now at 582.  I re-ordered  your Hydroxyurea 500mg. Pills in a [facility] day supply [facility].    3. When you have your covid booster please have your RSV vaccine at same time.     4. Watch mychart for other lab results/plan from myself or Dr. Fay .     Follow-up with Dr. Fay 12-19-23.         Follow-up: Return in about 16 weeks (around 3/5/2024), or @ 3 PM, for Medication Therapy Management Visit, Blood sugar meter review, A1c lab.    SUBJECTIVE/OBJECTIVE:                          Sarah Felix is a 78 year old female coming into clinic today for a follow-up (9-12-23) visit. She was referred to me from Dr. Collette Fay.     Reason for visit:   Labs today , scheduled to have new covid booster next month .         Allergies/ADRs: Reviewed in chart  Tobacco: She reports that she quit smoking about 43 years ago. Her smoking use included cigarettes. She has been exposed to tobacco smoke. She has never used smokeless tobacco.  Alcohol: 7-9 beverages / week typically wine 1 or 2 glasses/night - reports that she hasn't had any alcohol in the last week and a half until they figure out her liver situation.   Caffeine: 2 cans diet coke sodas/day  Activity: reports she has low energy and stamina, if she does any activity she has to take breaks frequenty but now that the weather is better she is doing more outdoor work.   Past Medical History: Reviewed in chart  Personal Healthcare Goals: figure out what is going on with liver or blood, would like to get 3rd vaccine, improve a1c  Uses [employer] now in 2023 to walk /get around.     Medication Adherence/Access:  None     Type 2 Diabetes:  Currently taking : glimepiride 4 mg twice daily,  Novolog  insulin now (10 units once daily to control daily high sugar (>250) -perhaps now if dessert or juice spiked blood sugar) Metformin 500mg. ER tabs -- 1 tablet /day now early in day.  Afraid of bottoming out blood sugar wise.    Lantus -10 units /day now in AM   Potential side effects :  constipated now -started senna 1 tab /day recently.       SMBG:                Wt Readings from Last 5 Encounters:   11/14/23 161 lb 4.8 oz (73.2 kg)   10/11/23 161 lb (73 kg)   10/10/23 159 lb 6.4 oz (72.3 kg)   09/12/23 157 lb 8 oz (71.4 kg)   08/15/23 161 lb 3.2 oz (73.1 kg)         Symptoms of low blood sugar? sweaty  Symptoms of high blood sugar? Fatigue, shaky, pit in stomach, overall feeling poor   Eye exam: up to date  Foot exam: up to date  Diet: doing her best to control carbs in daily diet.   Previously: she's maintained similar diet and notes sugars now being elevated  eats 4-5 smaller meals - pasta and rices  Fruit and some fruit juices   Breakfast: cereal 2-3 times per week, drink 2 glasses OJ or grapefruit juice /day .   Exercise: see social history --saw liver specialist suggested swimming.   Aspirin: Taking 81 mg daily and denies side effects  Statin: Yes: atorvastatin 40 mg   ACEi/ARB: Yes: low dose Lisinopril 2.5mg./day  Urine Albumin:   Lab Results   Component Value Date    UMALCR 96.53 (H) 09/23/2022     Lab Results   Component Value Date    A1C 7.4 11/14/2023    A1C 9.0 08/15/2023    A1C 9.7 07/19/2023    A1C 7.1 05/23/2023    A1C 8.3 03/13/2023    A1C 8.3 06/21/2021    A1C 9.4 12/14/2020    A1C 8.1 09/21/2020    A1C 8.5 01/07/2020    A1C 7.6 10/12/2019             Wt Readings from Last 5 Encounters:   11/14/23 161 lb 4.8 oz (73.2 kg)   10/11/23 161 lb (73 kg)   10/10/23 159 lb 6.4 oz (72.3 kg)   09/12/23 157 lb 8 oz (71.4 kg)   08/15/23 161 lb 3.2 oz (73.1 kg)           Immunizations: needs covid booster , mtm educated her today on RSV --she is agreeable to have that as well at same time next month at our clinic pharmacy.     Hypokalemia:  Patient reports the new potassium 10 meq/day capsule is working much better and is easy to take.   Potassium   Date Value Ref Range Status   08/15/2023 4.4 3.4 - 5.3 mmol/L Final   03/28/2022 4.1 3.4 - 5.3 mmol/L Final   01/07/2020 4.0 3.4 - 5.3 mmol/L Final        Hyperlipidemia: Current therapy includes : atorvastatin 40mg daily.  Patient reports no significant myalgias or other side effects.  The 10-year ASCVD risk score (Ammy VELEZ, et al., 2019) is: 28.8%    Values used to calculate the score:      Age: 78 years      Sex: Female      Is Non- : No      Diabetic: Yes      Tobacco smoker: No      Systolic Blood Pressure: 106 mmHg      Is BP treated: Yes      HDL Cholesterol: 49 mg/dL      Total Cholesterol: 131 mg/dL  Recent Labs   Lab Test 09/23/22  1320 09/02/21  1501   CHOL 131 168   HDL 49* 47*   LDL 55 76   TRIG 137 226*           Hypertension: Current medications include : pcp stopped Metoprolol previously , back on 2.5mg. daily Lisinopril  + furosemide 20mg -1 tab in am and early afternoon now .  Patient does not self-monitor blood pressure. No side effects from current medications.        BP Readings from Last 3 Encounters:   11/14/23 106/68   10/11/23 110/73   10/10/23 120/77     Vitamin B12: level was  645  On 3-13-23. Vitamin B12 helps support memory and lessens fatigue. She is taking daily OTC pill- 2500mcg. B-12 pill --3 x week now.       Essential Thrombocythemia:   Manjeet Wilde MD   of Medicine, Division of Hematology, Oncology and Transplantation  [employer]  MD started her on 500mg./day Hydrea ([facility] out [facility] stock last 7 days she has been off it ) + 81mg. ASA daily  last month --here today for cbc + platelets recheck .      --------------------------------------------------------------------------------------------------    I spent 45 minutes with this patient today. All changes were made via collaborative practice agreement with Heide Fay MD. A copy of the visit note was provided to the patient's primary care provider.    The patient was given a summary of these recommendations. She admits computer not working at home to view  mariana Coyle mtm helped her reset username /pword on her cell phone --so now she can receive Subtextual messages.     Joan Lopez [facility].  Medication Therapy Management Provider  [phone]           Medication Therapy Recommendations  Encounter for immunization    Rationale: Preventive therapy - Needs additional medication therapy - Indication   Recommendation: Start Medication - RSVPREF3 VAC RECOMB ADJUVANTED IM - have at same time as covid booster.   Status: Accepted per CPA         Type 2 diabetes mellitus without complication, with long-term current use of insulin (H)    Current Medication: glimepiride (AMARYL) 4 MG tablet   Rationale: Dose too high - Dosage too high - Safety   Recommendation: Decrease Dose   Status: Accepted per CPA

## 2023-11-14 ENCOUNTER — OFFICE VISIT (OUTPATIENT)
Dept: PHARMACY | Facility: CLINIC | Age: 78
End: 2023-11-14
Payer: COMMERCIAL

## 2023-11-14 ENCOUNTER — LAB (OUTPATIENT)
Dept: LAB | Facility: CLINIC | Age: 78
End: 2023-11-14
Payer: COMMERCIAL

## 2023-11-14 VITALS
DIASTOLIC BLOOD PRESSURE: 68 MMHG | SYSTOLIC BLOOD PRESSURE: 106 MMHG | OXYGEN SATURATION: 94 % | WEIGHT: 161.3 LBS | HEART RATE: 85 BPM | BODY MASS INDEX: 27.2 KG/M2

## 2023-11-14 DIAGNOSIS — Z79.4 TYPE 2 DIABETES MELLITUS WITH STAGE 2 CHRONIC KIDNEY DISEASE, WITH LONG-TERM CURRENT USE OF INSULIN (H): Primary | ICD-10-CM

## 2023-11-14 DIAGNOSIS — N18.2 TYPE 2 DIABETES MELLITUS WITH STAGE 2 CHRONIC KIDNEY DISEASE, WITH LONG-TERM CURRENT USE OF INSULIN (H): ICD-10-CM

## 2023-11-14 DIAGNOSIS — E78.5 HYPERLIPIDEMIA LDL GOAL <100: ICD-10-CM

## 2023-11-14 DIAGNOSIS — Z79.4 TYPE 2 DIABETES MELLITUS WITH STAGE 2 CHRONIC KIDNEY DISEASE, WITH LONG-TERM CURRENT USE OF INSULIN (H): ICD-10-CM

## 2023-11-14 DIAGNOSIS — E11.22 TYPE 2 DIABETES MELLITUS WITH STAGE 2 CHRONIC KIDNEY DISEASE, WITH LONG-TERM CURRENT USE OF INSULIN (H): ICD-10-CM

## 2023-11-14 DIAGNOSIS — D47.3 ESSENTIAL THROMBOCYTHEMIA (H): ICD-10-CM

## 2023-11-14 DIAGNOSIS — E53.8 VITAMIN B12 DEFICIENCY (NON ANEMIC): ICD-10-CM

## 2023-11-14 DIAGNOSIS — Z23 ENCOUNTER FOR IMMUNIZATION: ICD-10-CM

## 2023-11-14 DIAGNOSIS — E11.22 TYPE 2 DIABETES MELLITUS WITH STAGE 2 CHRONIC KIDNEY DISEASE, WITH LONG-TERM CURRENT USE OF INSULIN (H): Primary | ICD-10-CM

## 2023-11-14 DIAGNOSIS — E87.6 HYPOKALEMIA: ICD-10-CM

## 2023-11-14 DIAGNOSIS — N18.2 TYPE 2 DIABETES MELLITUS WITH STAGE 2 CHRONIC KIDNEY DISEASE, WITH LONG-TERM CURRENT USE OF INSULIN (H): Primary | ICD-10-CM

## 2023-11-14 DIAGNOSIS — I10 HYPERTENSION GOAL BP (BLOOD PRESSURE) < 140/90: ICD-10-CM

## 2023-11-14 LAB
ANION GAP SERPL CALCULATED.3IONS-SCNC: 15 MMOL/L (ref 7–15)
BASOPHILS # BLD AUTO: 0.1 10E3/UL (ref 0–0.2)
BASOPHILS NFR BLD AUTO: 1 %
BUN SERPL-MCNC: 8.9 MG/DL (ref 8–23)
CALCIUM SERPL-MCNC: 9.5 MG/DL (ref 8.8–10.2)
CHLORIDE SERPL-SCNC: 102 MMOL/L (ref 98–107)
CHOLEST SERPL-MCNC: 151 MG/DL
CREAT SERPL-MCNC: 0.89 MG/DL (ref 0.51–0.95)
DEPRECATED HCO3 PLAS-SCNC: 21 MMOL/L (ref 22–29)
EGFRCR SERPLBLD CKD-EPI 2021: 67 ML/MIN/1.73M2
EOSINOPHIL # BLD AUTO: 0.2 10E3/UL (ref 0–0.7)
EOSINOPHIL NFR BLD AUTO: 2 %
ERYTHROCYTE [DISTWIDTH] IN BLOOD BY AUTOMATED COUNT: 15.4 % (ref 10–15)
GLUCOSE SERPL-MCNC: 238 MG/DL (ref 70–99)
HBA1C MFR BLD: 7.4 % (ref 0–5.6)
HCT VFR BLD AUTO: 37.4 % (ref 35–47)
HDLC SERPL-MCNC: 53 MG/DL
HGB BLD-MCNC: 12.5 G/DL (ref 11.7–15.7)
IMM GRANULOCYTES # BLD: 0 10E3/UL
IMM GRANULOCYTES NFR BLD: 0 %
LDLC SERPL CALC-MCNC: 62 MG/DL
LYMPHOCYTES # BLD AUTO: 1.3 10E3/UL (ref 0.8–5.3)
LYMPHOCYTES NFR BLD AUTO: 17 %
MCH RBC QN AUTO: 35.9 PG (ref 26.5–33)
MCHC RBC AUTO-ENTMCNC: 33.4 G/DL (ref 31.5–36.5)
MCV RBC AUTO: 108 FL (ref 78–100)
MONOCYTES # BLD AUTO: 0.6 10E3/UL (ref 0–1.3)
MONOCYTES NFR BLD AUTO: 8 %
NEUTROPHILS # BLD AUTO: 5.5 10E3/UL (ref 1.6–8.3)
NEUTROPHILS NFR BLD AUTO: 72 %
NONHDLC SERPL-MCNC: 98 MG/DL
PLATELET # BLD AUTO: 582 10E3/UL (ref 150–450)
POTASSIUM SERPL-SCNC: 3.8 MMOL/L (ref 3.4–5.3)
RBC # BLD AUTO: 3.48 10E6/UL (ref 3.8–5.2)
SODIUM SERPL-SCNC: 138 MMOL/L (ref 135–145)
TRIGL SERPL-MCNC: 180 MG/DL
WBC # BLD AUTO: 7.7 10E3/UL (ref 4–11)

## 2023-11-14 PROCEDURE — 99606 MTMS BY PHARM EST 15 MIN: CPT | Performed by: PHARMACIST

## 2023-11-14 PROCEDURE — 99607 MTMS BY PHARM ADDL 15 MIN: CPT | Performed by: PHARMACIST

## 2023-11-14 PROCEDURE — 80048 BASIC METABOLIC PNL TOTAL CA: CPT

## 2023-11-14 PROCEDURE — 85025 COMPLETE CBC W/AUTO DIFF WBC: CPT

## 2023-11-14 PROCEDURE — 83036 HEMOGLOBIN GLYCOSYLATED A1C: CPT

## 2023-11-14 PROCEDURE — 36415 COLL VENOUS BLD VENIPUNCTURE: CPT

## 2023-11-14 PROCEDURE — 80061 LIPID PANEL: CPT

## 2023-11-14 RX ORDER — HYDROXYUREA 500 MG/1
500 CAPSULE ORAL DAILY
Qty: 90 CAPSULE | Refills: 3 | Status: SHIPPED | OUTPATIENT
Start: 2023-11-14

## 2023-11-14 RX ORDER — POTASSIUM CHLORIDE 750 MG/1
10 CAPSULE, EXTENDED RELEASE ORAL DAILY
Qty: 90 CAPSULE | Refills: 1 | COMMUNITY
Start: 2023-11-14 | End: 2024-01-16

## 2023-11-14 NOTE — PATIENT INSTRUCTIONS
"Recommendations from today's MTM visit:                                                         1. A1c today = 7.4% --excellent --except too many low blood sugars overnight --lets Decrease your evening Glimepiride 4mg tablet in half now --just 1/2 tab in evening daily.  We are trying to eliminate all blood sugars <70mg./dl.     2. Your hemoglobin is 12.2 -excellent and platelet count is much improved now at 582.  I re-ordered your Hydroxyurea 500mg. Pills in a 90 day supply for [facility].    3. When you have your covid booster please have your RSV vaccine at same time.     4. Watch Inporiat for other lab results/plan from myself or Dr. Fay .     Follow-up with Dr. Fay 12-19-23.         Follow-up: Return in about 16 weeks (around 3/5/2024), or @ 3 PM, for Medication Therapy Management Visit, Blood sugar meter review, A1c lab.    It was great speaking with you today.  I value your experience and would be very thankful for your time in providing feedback in our clinic survey. In the next few days, you may receive an email or text message from [employer] with a link to a survey related to your  clinical pharmacist.\"     To schedule another MTM appointment, please call the clinic directly or you may call the MTM scheduling line at [phone] or toll-free at [phone].     My Clinical Pharmacist's contact information:                                                      Please feel free to contact me with any questions or concerns you have.      Joan Lopez [facility].  Medication Therapy Management Provider  [phone]    "

## 2023-11-14 NOTE — Clinical Note
Dr. Wilde , Dr. Fay --Had nice visit with Sarah today --A1c improved to 7.4% , also platelets now 582--looks like hydrea working well--per patient request I did re-order to her mail order a 90 day supply.  Vaccines will be updated dec-2023.  She will f/up with Dr. Fay 12-.  BMP lab result from today is pending.will review when available.   Joan Jarrell, [facility]. Medication Therapy Management Provider [phone]

## 2023-11-17 ENCOUNTER — TELEPHONE (OUTPATIENT)
Dept: PHARMACY | Facility: CLINIC | Age: 78
End: 2023-11-17
Payer: COMMERCIAL

## 2023-11-17 DIAGNOSIS — E11.22 TYPE 2 DIABETES MELLITUS WITH STAGE 2 CHRONIC KIDNEY DISEASE, WITH LONG-TERM CURRENT USE OF INSULIN (H): ICD-10-CM

## 2023-11-17 DIAGNOSIS — N18.2 TYPE 2 DIABETES MELLITUS WITH STAGE 2 CHRONIC KIDNEY DISEASE, WITH LONG-TERM CURRENT USE OF INSULIN (H): ICD-10-CM

## 2023-11-17 DIAGNOSIS — Z79.4 TYPE 2 DIABETES MELLITUS WITH STAGE 2 CHRONIC KIDNEY DISEASE, WITH LONG-TERM CURRENT USE OF INSULIN (H): ICD-10-CM

## 2023-11-17 RX ORDER — PEN NEEDLE, DIABETIC 32GX 5/32"
NEEDLE, DISPOSABLE MISCELLANEOUS
Qty: 400 EACH | Refills: 11 | Status: SHIPPED | OUTPATIENT
Start: 2023-11-17 | End: 2023-11-17

## 2023-11-17 RX ORDER — PEN NEEDLE, DIABETIC 32GX 5/32"
NEEDLE, DISPOSABLE MISCELLANEOUS
Qty: 400 EACH | Refills: 11 | Status: SHIPPED | OUTPATIENT
Start: 2023-11-17

## 2023-12-19 ENCOUNTER — OFFICE VISIT (OUTPATIENT)
Dept: FAMILY MEDICINE | Facility: CLINIC | Age: 78
End: 2023-12-19
Payer: COMMERCIAL

## 2023-12-19 VITALS
RESPIRATION RATE: 16 BRPM | TEMPERATURE: 97.5 F | OXYGEN SATURATION: 98 % | SYSTOLIC BLOOD PRESSURE: 104 MMHG | WEIGHT: 163.3 LBS | DIASTOLIC BLOOD PRESSURE: 68 MMHG | HEART RATE: 85 BPM | HEIGHT: 64 IN | BODY MASS INDEX: 27.88 KG/M2

## 2023-12-19 DIAGNOSIS — E53.8 FOLATE DEFICIENCY: ICD-10-CM

## 2023-12-19 DIAGNOSIS — Z79.4 TYPE 2 DIABETES MELLITUS WITH STAGE 2 CHRONIC KIDNEY DISEASE, WITH LONG-TERM CURRENT USE OF INSULIN (H): ICD-10-CM

## 2023-12-19 DIAGNOSIS — N18.2 TYPE 2 DIABETES MELLITUS WITH STAGE 2 CHRONIC KIDNEY DISEASE, WITH LONG-TERM CURRENT USE OF INSULIN (H): ICD-10-CM

## 2023-12-19 DIAGNOSIS — E11.22 TYPE 2 DIABETES MELLITUS WITH STAGE 2 CHRONIC KIDNEY DISEASE, WITH LONG-TERM CURRENT USE OF INSULIN (H): ICD-10-CM

## 2023-12-19 DIAGNOSIS — M85.851 OSTEOPENIA OF BOTH HIPS: ICD-10-CM

## 2023-12-19 DIAGNOSIS — Z00.00 ENCOUNTER FOR MEDICARE ANNUAL WELLNESS EXAM: Primary | ICD-10-CM

## 2023-12-19 DIAGNOSIS — M85.852 OSTEOPENIA OF BOTH HIPS: ICD-10-CM

## 2023-12-19 DIAGNOSIS — G62.9 PERIPHERAL POLYNEUROPATHY: ICD-10-CM

## 2023-12-19 DIAGNOSIS — E53.8 VITAMIN B12 DEFICIENCY (NON ANEMIC): ICD-10-CM

## 2023-12-19 LAB
CREAT UR-MCNC: 115 MG/DL
FOLATE SERPL-MCNC: 2.5 NG/ML (ref 4.6–34.8)
MICROALBUMIN UR-MCNC: <12 MG/L
MICROALBUMIN/CREAT UR: NORMAL MG/G{CREAT}
VIT B12 SERPL-MCNC: 1150 PG/ML (ref 232–1245)

## 2023-12-19 PROCEDURE — 99207 PR FOOT EXAM NO CHARGE: CPT | Performed by: FAMILY MEDICINE

## 2023-12-19 PROCEDURE — 82607 VITAMIN B-12: CPT | Performed by: FAMILY MEDICINE

## 2023-12-19 PROCEDURE — 99397 PER PM REEVAL EST PAT 65+ YR: CPT | Performed by: FAMILY MEDICINE

## 2023-12-19 PROCEDURE — 82570 ASSAY OF URINE CREATININE: CPT | Performed by: FAMILY MEDICINE

## 2023-12-19 PROCEDURE — 82746 ASSAY OF FOLIC ACID SERUM: CPT | Performed by: FAMILY MEDICINE

## 2023-12-19 PROCEDURE — 36415 COLL VENOUS BLD VENIPUNCTURE: CPT | Performed by: FAMILY MEDICINE

## 2023-12-19 PROCEDURE — 99214 OFFICE O/P EST MOD 30 MIN: CPT | Mod: 25 | Performed by: FAMILY MEDICINE

## 2023-12-19 PROCEDURE — 82043 UR ALBUMIN QUANTITATIVE: CPT | Performed by: FAMILY MEDICINE

## 2023-12-19 ASSESSMENT — ENCOUNTER SYMPTOMS
NERVOUS/ANXIOUS: 0
ARTHRALGIAS: 0
FEVER: 0
CHILLS: 0
BREAST MASS: 0
HEMATOCHEZIA: 0
JOINT SWELLING: 0
PALPITATIONS: 0
WEAKNESS: 1
FREQUENCY: 1
DIARRHEA: 0
ABDOMINAL PAIN: 0
SORE THROAT: 0
COUGH: 0
PARESTHESIAS: 0
SHORTNESS OF BREATH: 1
MYALGIAS: 0
NAUSEA: 0
HEMATURIA: 0
HEARTBURN: 0
HEADACHES: 0
CONSTIPATION: 0
DIZZINESS: 0
DYSURIA: 0
EYE PAIN: 0

## 2023-12-19 ASSESSMENT — ACTIVITIES OF DAILY LIVING (ADL): CURRENT_FUNCTION: HOUSEWORK REQUIRES ASSISTANCE

## 2023-12-19 NOTE — COMMUNITY RESOURCES LIST (ENGLISH)
12/19/2023   [facility]  N/A  For questions about this resource list or additional care needs, please contact your primary care clinic or care manager.  Phone: [phone]   Email: N/A   Address: [address] [place] [address]   Hours: N/A        Transportation       Free or low-cost transportation  1  [employer] Distance: 1.95 miles      In-Person   [address] [place] [address]  Language: English, Pakistani  Hours: Mon 9:00 AM - 5:00 PM , Tue 9:30 AM - 7:00 PM , Wed 9:00 AM - 5:00 PM , Thu 9:30 AM - 7:00 PM , Fri 9:00 AM - 5:00 PM  Fees: Free   Phone: (990) 520-6151 Email: contactus@[employer] Website: http://www.[employer]     2  [facility] Distance: 4.55 miles      In-Person   [address] [place] [address]  Language: English  Hours: Mon - Fri 8:00 AM - 3:00 PM  Fees: Free   Phone: (672) 291-1052 Ext.14 Email: neighborhood@[facility] Website: http://www.Parents Journey.org     Transportation to medical appointments  3  [facility] Distance: 0.55 miles      In-Person   [address] [place] [address]  Language: English  Hours: Mon - Fri 9:00 AM - 4:00 PM  Fees: Free   Phone: (950) 875-6985 Email: [facility] [email or web address] Website: http://www.CHARGED.fm.org/     4  [facility] - Non-Emergency Medical Transportation Distance: 3.03 miles      In-Person   [address] [place] [address]  Language: English  Hours: Mon - Fri 8:00 AM - 4:00 PM Appt. Only  Fees: Self Pay   Phone: (292) 186-1742 Website: http://www.[employer].org/Medical-Services/Emergency-medical-services/Non-emergency-transportation/          Important Numbers & Websites       Emergency Services   911  City Services   311  Poison Control   (863) 742-9343  Suicide Prevention Lifeline   (481) 979-3156 (TALK)  Child Abuse Hotline   (255) 922-8107 (4-A-Child)  Sexual Assault Hotline    (262) 288-2020 (HOPE)  National Runaway Safeline   (135) 923-5911 (RUNAWAY)  [employer] Talkline   (437) 438-6794  Substance Abuse Referral   (979) 144-3177 (HELP)

## 2023-12-19 NOTE — PATIENT INSTRUCTIONS
Patient Education   Personalized Prevention Plan  You are due for the preventive services outlined below.  Your care team is available to assist you in scheduling these services.  If you have already completed any of these items, please share that information with your care team to update in your medical record.  Health Maintenance Due   Topic Date Due     Osteoporosis Screening  01/03/2021     ANNUAL REVIEW OF HM ORDERS  09/02/2022     Diabetic Foot Exam  06/23/2023     Kidney Microalbumin Urine Test  09/23/2023     Your Health Risk Assessment indicates you feel you are not in good health    A healthy lifestyle helps keep the body fit and the mind alert. It helps protect you from disease, helps you fight disease, and helps prevent chronic disease (disease that doesn't go away) from getting worse. This is important as you get older and begin to notice twinges in muscles and joints and a decline in the strength and stamina you once took for granted. A healthy lifestyle includes good healthcare, good nutrition, weight control, recreation, and regular exercise. Avoid harmful substances and do what you can to keep safe. Another part of a healthy lifestyle is stay mentally active and socially involved.    Good healthcare   Have a wellness visit every year.   If you have new symptoms, let us know right away. Don't wait until the next checkup.   Take medicines exactly as prescribed and keep your medicines in a safe place. Tell us if your medicine causes problems.   Healthy diet and weight control   Eat 3 or 4 small, nutritious, low-fat, high-fiber meals a day. Include a variety of fruits, vegetables, and whole-grain foods.   Make sure you get enough calcium in your diet. Calcium, vitamin D, and exercise help prevent osteoporosis (bone thinning).   If you live alone, try eating with others when you can. That way you get a good meal and have company while you eat it.   Try to keep a healthy weight. If you eat more calories  "than your body uses for energy, it will be stored as fat and you will gain weight.     Recreation   Recreation is not limited to sports and team events. It includes any activity that provides relaxation, interest, enjoyment, and exercise. Recreation provides an outlet for physical, mental, and social energy. It can give a sense of worth and achievement. It can help you stay healthy.    Mental Exercise and Social Involvement  Mental and emotional health is as important as physical health. Keep in touch with friends and family. Stay as active as possible. Continue to learn and challenge yourself.   Things you can do to stay mentally active are:  Learn something new, like a foreign language or musical instrument.   Play SCRABBLE or do crossword puzzles. If you cannot find people to play these games with you at home, you can play them with others on your computer through the Internet.   Join a games club--anything from card games to chess or checkers or lawn bowling.   Start a new hobby.   Go back to school.   Volunteer.   Read.   Keep up with world events.  Learning About Being Physically Active  What is physical activity?     Being physically active means doing any kind of activity that gets your body moving.  The types of physical activity that can help you get fit and stay healthy include:  Aerobic or \"cardio\" activities. These make your heart beat faster and make you breathe harder, such as brisk walking, riding a bike, or running. They strengthen your heart and lungs and build up your endurance.  Strength training activities. These make your muscles work against, or \"resist,\" something. Examples include lifting weights or doing push-ups. These activities help tone and strengthen your muscles and bones.  Stretches. These let you move your joints and muscles through their full range of motion. Stretching helps you be more flexible.  Reaching a balance between these three types of physical activity is important " "because each one contributes to your overall fitness.  What are the benefits of being active?  Being active is one of the best things you can do for your health. It helps you to:  Feel stronger and have more energy to do all the things you like to do.  Focus better at school or work.  Feel, think, and sleep better.  Reach and stay at a healthy weight.  Lose fat and build lean muscle.  Lower your risk for serious health problems, including diabetes, heart attack, high blood pressure, and some cancers.  Keep your heart, lungs, bones, muscles, and joints strong and healthy.  How can you make being active part of your life?  Start slowly. Make it your long-term goal to get at least 30 minutes of exercise on most days of the week. Walking is a good choice. You also may want to do other activities, such as running, swimming, cycling, or playing tennis or team sports.  Pick activities that you like--ones that make your heart beat faster, your muscles stronger, and your muscles and joints more flexible. If you find more than one thing you like doing, do them all. You don't have to do the same thing every day.  Get your heart pumping every day. Any activity that makes your heart beat faster and keeps it at that rate for a while counts.  Here are some great ways to get your heart beating faster:  Go for a brisk walk, run, or hike.  Go for a swim or bike ride.  Take an online exercise class or dance.  Play a game of touch football, basketball, or soccer.  Play tennis, pickleball, or racquetball.  Climb stairs.  Even some household chores can be aerobic. Just do them at a faster pace. Raking or mowing the lawn, sweeping the garage, and vacuuming and cleaning your home all can help get your heart rate up.  Strengthen your muscles during the week. You don't have to lift heavy weights or grow big, bulky muscles to get stronger. Doing a few simple activities that make your muscles work against, or \"resist,\" something can help you " "get stronger. Aim for at least twice a week.  For example, you can:  Do push-ups or sit-ups, which use your own body weight as resistance.  Lift weights or dumbbells or use stretch bands at home or in a gym or community center.  Stretch your muscles often. Stretching will help you as you become more active. It can help you stay flexible and loosen tight muscles. It can also help improve your balance and posture and can be a great way to relax.  Be sure to stretch the muscles you'll be using when you work out. It's best to warm your muscles slightly before you stretch them. Walk or do some other light aerobic activity for a few minutes. Then start stretching.  When you stretch your muscles:  Do it slowly. Stretching is not about going fast or making sudden movements.  Don't push or bounce during a stretch.  Hold each stretch for at least 15 to 30 seconds, if you can. You should feel a stretch in the muscle, but not pain.  Breathe out as you do the stretch. Then breathe in as you hold the stretch. Don't hold your breath.  If you're worried about how more activity might affect your health, have a checkup before you start. Follow any special advice your doctor gives you for getting a smart start.  Where can you learn more?  Go to https://www.WorldDesk.net/patiented  Enter W332 in the search box to learn more about \"Learning About Being Physically Active.\"  Current as of: June 6, 2023               Content Version: 13.8    8812-0680 [employer].   Care instructions adapted under license by your healthcare professional. If you have questions about a medical condition or this instruction, always ask your healthcare professional. [employer] disclaims any warranty or liability for your use of this information.      Activities of Daily Living    Your Health Risk Assessment indicates you have difficulties with activities of daily living such as housework, bathing, preparing meals, taking medication, " etc. Please make a follow up appointment for us to address this issue in more detail.  Hearing Loss: Care Instructions  Overview     Hearing loss is a sudden or slow decrease in how well you hear. It can range from slight to profound. Permanent hearing loss can occur with aging. It also can happen when you are exposed long-term to loud noise. Examples include listening to loud music, riding motorcycles, or being around other loud machines.  Hearing loss can affect your work and home life. It can make you feel lonely or depressed. You may feel that you have lost your independence. But hearing aids and other devices can help you hear better and feel connected to others.  Follow-up care is a key part of your treatment and safety. Be sure to make and go to all appointments, and call your doctor if you are having problems. It's also a good idea to know your test results and keep a list of the medicines you take.  How can you care for yourself at home?  Avoid loud noises whenever possible. This helps keep your hearing from getting worse.  Always wear hearing protection around loud noises.  Wear a hearing aid as directed.  A professional can help you pick a hearing aid that will work best for you.  You can also get hearing aids over the counter for mild to moderate hearing loss.  Have hearing tests as your doctor suggests. They can show whether your hearing has changed. Your hearing aid may need to be adjusted.  Use other devices as needed. These may include:  Telephone amplifiers and hearing aids that can connect to a television, stereo, radio, or microphone.  Devices that use lights or vibrations. These alert you to the doorbell, a ringing telephone, or a baby monitor.  Television closed-captioning. This shows the words at the bottom of the screen. Most new TVs can do this.  TTY (text telephone). This lets you type messages back and forth on the telephone instead of talking or listening. These devices are also called  "TDD. When messages are typed on the keyboard, they are sent over the phone line to a receiving TTY. The message is shown on a monitor.  Use text messaging, social media, and email if it is hard for you to communicate by telephone.  Try to learn a listening technique called speechreading. It is not lipreading. You pay attention to people's gestures, expressions, posture, and tone of voice. These clues can help you understand what a person is saying. Face the person you are talking to, and have them face you. Make sure the lighting is good. You need to see the other person's face clearly.  Think about counseling if you need help to adjust to your hearing loss.  When should you call for help?  Watch closely for changes in your health, and be sure to contact your doctor if:    You think your hearing is getting worse.     You have new symptoms, such as dizziness or nausea.   Where can you learn more?  Go to https://www.[employer].Gamerius/patiented  Enter R798 in the search box to learn more about \"Hearing Loss: Care Instructions.\"  Current as of: February 28, 2023               Content Version: 13.8    9144-3946 [employer].   Care instructions adapted under license by your healthcare professional. If you have questions about a medical condition or this instruction, always ask your healthcare professional. [employer] disclaims any warranty or liability for your use of this information.      Bladder Training: Care Instructions  Your Care Instructions     Bladder training is used to treat urge incontinence and stress incontinence. Urge incontinence means that the need to urinate comes on so fast that you can't get to a toilet in time. Stress incontinence means that you leak urine because of pressure on your bladder. For example, it may happen when you laugh, cough, or lift something heavy.  Bladder training can increase how long you can wait before you have to urinate. It can also help your bladder " hold more urine. And it can give you better control over the urge to urinate.  It is important to remember that bladder training takes a few weeks to a few months to make a difference. You may not see results right away, but don't give up.  Follow-up care is a key part of your treatment and safety. Be sure to make and go to all appointments, and call your doctor if you are having problems. It's also a good idea to know your test results and keep a list of the medicines you take.  How can you care for yourself at home?  Work with your doctor to come up with a bladder training program that is right for you. You may use one or more of the following methods.  Delayed urination  In the beginning, try to keep from urinating for 5 minutes after you first feel the need to go.  While you wait, take deep, slow breaths to relax. Kegel exercises can also help you delay the need to go to the bathroom.  After some practice, when you can easily wait 5 minutes to urinate, try to wait 10 minutes before you urinate.  Slowly increase the waiting period until you are able to control when you have to urinate.  Scheduled urination  Empty your bladder when you first wake up in the morning.  Schedule times throughout the day when you will urinate.  Start by going to the bathroom every hour, even if you don't need to go.  Slowly increase the time between trips to the bathroom.  When you have found a schedule that works well for you, keep doing it.  If you wake up during the night and have to urinate, do it. Apply your schedule to waking hours only.  Kegel exercises  These tighten and strengthen pelvic muscles, which can help you control the flow of urine. (If doing these exercises causes pain, stop doing them and talk with your doctor.) To do Kegel exercises:  Squeeze your muscles as if you were trying not to pass gas. Or squeeze your muscles as if you were stopping the flow of urine. Your belly, legs, and buttocks shouldn't move.  Hold the  "squeeze for 3 seconds, then relax for 5 to 10 seconds.  Start with 3 seconds, then add 1 second each week until you are able to squeeze for 10 seconds.  Repeat the exercise 10 times a session. Do 3 to 8 sessions a day.  When should you call for help?  Watch closely for changes in your health, and be sure to contact your doctor if:    Your incontinence is getting worse.     You do not get better as expected.   Where can you learn more?  Go to https://www.Zaask.net/patiented  Enter V684 in the search box to learn more about \"Bladder Training: Care Instructions.\"  Current as of: February 28, 2023               Content Version: 13.8    8515-6511 [employer].   Care instructions adapted under license by your healthcare professional. If you have questions about a medical condition or this instruction, always ask your healthcare professional. [employer] disclaims any warranty or liability for your use of this information.      Your Health Risk Assessment indicates you feel you are not in good emotional health.    Recreation   Recreation is not limited to sports and team events. It includes any activity that provides relaxation, interest, enjoyment, and exercise. Recreation provides an outlet for physical, mental, and social energy. It can give a sense of worth and achievement. It can help you stay healthy.    Mental Exercise and Social Involvement  Mental and emotional health is as important as physical health. Keep in touch with friends and family. Stay as active as possible. Continue to learn and challenge yourself.   Things you can do to stay mentally active are:  Learn something new, like a foreign language or musical instrument.   Play SCRABBLE or do crossword puzzles. If you cannot find people to play these games with you at home, you can play them with others on your computer through the Internet.   Join a games club--anything from card games to chess or checkers or lawn bowling. "   Start a new hobby.   Go back to school.   Volunteer.   Read.   Keep up with world events.  Preventing Falls: Care Instructions  Injuries and health problems such as trouble walking or poor eyesight can increase your risk of falling. So can some medicines. But there are things you can do to help prevent falls. You can exercise to get stronger. You can also arrange your home to make it safer.    Talk to your doctor about the medicines you take. Ask if any of them increase the risk of falls and whether they can be changed or stopped.   Try to exercise regularly. It can help improve your strength and balance. This can help lower your risk of falling.     Practice fall safety and prevention.    Wear low-heeled shoes that fit well and give your feet good support. Talk to your doctor if you have foot problems that make this hard.  Carry a cellphone or wear a medical alert device that you can use to call for help.  Use stepladders instead of chairs to reach high objects. Don't climb if you're at risk for falls. Ask for help, if needed.  Wear the correct eyeglasses, if you need them.    Make your home safer.    Remove rugs, cords, clutter, and furniture from walkways.  Keep your house well lit. Use night-lights in hallways and bathrooms.  Install and use sturdy handrails on stairways.  Wear nonskid footwear, even inside. Don't walk barefoot or in socks without shoes.    Be safe outside.    Use handrails, curb cuts, and ramps whenever possible.  Keep your hands free by using a shoulder bag or backpack.  Try to walk in well-lit areas. Watch out for uneven ground, changes in pavement, and debris.  Be careful in the winter. Walk on the grass or gravel when sidewalks are slippery. Use de-icer on steps and walkways. Add non-slip devices to shoes.    Put grab bars and nonskid mats in your shower or tub and near the toilet. Try to use a shower chair or bath bench when bathing.   Get into a tub or shower by putting in your weaker  "leg first. Get out with your strong side first. Have a phone or medical alert device in the bathroom with you.   Where can you learn more?  Go to https://www.INgrooves.net/patiented  Enter G117 in the search box to learn more about \"Preventing Falls: Care Instructions.\"  Current as of: July 18, 2023               Content Version: 13.8    5820-7597 [employer].   Care instructions adapted under license by your healthcare professional. If you have questions about a medical condition or this instruction, always ask your healthcare professional. [employer] disclaims any warranty or liability for your use of this information.      How to Get Up Safely After a Fall: Care Instructions  Overview     If you have injuries, health problems, or other reasons that may make it easy for you to fall at home, it is a good idea to learn how to get up safely after a fall. Learning how to get up correctly can help you avoid making an injury worse.  Also, knowing what to do if you cannot get up can help you stay safe until help arrives.  Follow-up care is a key part of your treatment and safety. Be sure to make and go to all appointments, and call your doctor if you are having problems. It's also a good idea to know your test results and keep a list of the medicines you take.  How can you care for yourself after a fall?  If you think you can get up  First lie still for a few minutes and think about how you feel. If your body feels okay and you think you can get up safely, follow the rest of the steps below:  Look for a chair or other piece of furniture that is close to you.  Roll onto your side and rest. Roll by turning your head in the direction you want to roll, move your shoulder and arm, then hip and leg in the same direction.  Lie still for a moment to let your blood pressure adjust.  Slowly push your upper body up, lift your head, and take a moment to rest.  Slowly get up on your hands and knees, and " "crawl to the chair or other stable piece of furniture.  Put your hands on the chair.  Move one foot forward, and place it flat on the floor. Your other leg should be bent with the knee on the floor.  Rise slowly, turn your body, and sit in the chair. Stay seated for a bit and think about how you feel. Call for help. Even if you feel okay, let someone know what happened to you. You might not know that you have a serious injury.  If you cannot get up  If you think you are injured after a fall or you cannot get up, try not to panic.  Call out for help.  If you have a phone within reach or you have an emergency call device, use it to call for help.  If you do not have a phone within reach, try to slide yourself toward it. If you cannot get to the phone, try to slide toward a door or window or a place where you think you can be heard.  [place] or use an object to make noise so someone might hear you.  If you can reach something that you can use for a pillow, place it under your head. Try to stay warm by covering yourself with a blanket or clothing while you wait for help.  When should you call for help?   Call 911 anytime you think you may need emergency care. For example, call if:    You passed out (lost consciousness).     You cannot get up after a fall.     You have severe pain.   Call your doctor now or seek immediate medical care if:    You have new or worse pain.     You are dizzy or lightheaded.     You hit your head.   Watch closely for changes in your health, and be sure to contact your doctor if:    You do not get better as expected.   Where can you learn more?  Go to https://www.[employer]ilvax.net/patiented  Enter G513 in the search box to learn more about \"How to Get Up Safely After a Fall: Care Instructions.\"  Current as of: November 13, 2022               Content Version: 13.8    0288-3566 [employer].   Care instructions adapted under license by your healthcare professional. If you have questions " about a medical condition or this instruction, always ask your healthcare professional. [employer] disclaims any warranty or liability for your use of this information.

## 2023-12-19 NOTE — PROGRESS NOTES
"SUBJECTIVE:   Sarah is a 78 year old, presenting for the following:  Physical        12/19/2023     9:43 AM   Additional Questions   Roomed by Fiorella   Accompanied by self       Are you in the first 12 months of your Medicare coverage?  No    Healthy Habits:     In general, how would you rate your overall health?  Fair    Frequency of exercise:  1 day/week    Duration of exercise:  Less than 15 minutes    Do you usually eat at least 4 servings of fruit and vegetables a day, include whole grains    & fiber and avoid regularly eating high fat or \"junk\" foods?  Yes    Taking medications regularly:  Yes    Ability to successfully perform activities of daily living:  Housework requires assistance    Home Safety:  No safety concerns identified    Hearing Impairment:  Difficulty following a conversation in a noisy restaurant or crowded room    In the past 6 months, have you been bothered by leaking of urine? Yes    In general, how would you rate your overall mental or emotional health?  Fair    Additional concerns today:  No    DM - She has been working with Joan Lopez, [facility] Pharmacist and A1c recently improved.  She notes uncomfortable numbness in both feet from ankle down. This has developed over the past 6 months.      Today's PHQ-2 Score:       12/19/2023     9:30 AM   PHQ-2 ( 1999 Pfizer)   Q1: Little interest or pleasure in doing things 0   Q2: Feeling down, depressed or hopeless 0   PHQ-2 Score 0   Q1: Little interest or pleasure in doing things Not at all   Q2: Feeling down, depressed or hopeless Not at all   PHQ-2 Score 0         Have you ever done Advance Care Planning? (For example, a Health Directive, POLST, or a discussion with a medical provider or your loved ones about your wishes): No, advance care planning information given to patient to review.  Patient plans to discuss their wishes with loved ones or provider.         Fall risk  Fallen 2 or more times in the past year?: Yes  Any fall with injury " in the past year?: Yes    Cognitive Screening   1) Repeat 3 items (Leader, Season, Table)    2) Clock draw: NORMAL  3) 3 item recall: Recalls 3 objects  Results: 3 items recalled: COGNITIVE IMPAIRMENT LESS LIKELY    Mini-CogTM Copyright HUE [employer]. Licensed by the author for use in [facility]; reprinted with permission ([email or web address]). All rights reserved.      Do you have sleep apnea, excessive snoring or daytime drowsiness? : no    Reviewed and updated as needed this visit by clinical staff   Tobacco  Allergies  Meds  Problems             Reviewed and updated as needed this visit by Provider    Allergies  Meds  Problems            Social History     Tobacco Use    Smoking status: Former     Types: Cigarettes     Quit date: 1980     Years since quittin.9     Passive exposure: Past    Smokeless tobacco: Never    Tobacco comments:     smoked from 70-80; smoked about 1ppd   Substance Use Topics    Alcohol use: Yes     Alcohol/week: 0.0 standard drinks of alcohol     Comment: 6-7 drinks per week             2023     9:29 AM   Alcohol Use   Prescreen: >3 drinks/day or >7 drinks/week? No       Do you have a current opioid prescription? No  Do you use any other controlled substances or medications that are not prescribed by a provider? None      Current providers sharing in care for this patient include:   Patient Care Team:  Heide Fay MD as PCP - General (Family Medicine)  Joan Lopez RPH as Pharmacist  Anita Treadwell MD as Referring Physician (OB/Gyn)  Murphy Sargent MD as MD (Urology)  Apolinar Artis MD as MD (Orthopedics)  Janay Amezcua MD as MD (Ophthalmology)  Leventhal, Thomas Michael, MD as MD (Gastroenterology)  Heide Fay MD as MD (Family Medicine)  Manjeet Wilde MD as MD (Hematology)  Heide Fay MD as Assigned PCP  Daniel Murphy MD as MD (Ophthalmology)  Joan Lopez RPH as Assigned [place] Pharmacist  Alberto Pimentel,  MD Lester as Assigned Surgical Provider  Manjeet Wilde MD as Assigned Cancer Care Provider  Mony Restrepo, RN as Specialty Care Coordinator (Hematology & Oncology)    The following health maintenance items are reviewed in Epic and correct as of today:  Health Maintenance   Topic Date Due    DEXA  01/03/2021    ANNUAL REVIEW OF HM ORDERS  09/02/2022    DIABETIC FOOT EXAM  06/23/2023    MICROALBUMIN  09/23/2023    COVID-19 Vaccine (8 - 2023-24 season) 01/22/2024    A1C  02/14/2024    EYE EXAM  03/01/2024    VITAMIN B12  03/13/2024    BMP  05/14/2024    MAMMO SCREENING  10/31/2024    LIPID  11/14/2024    MEDICARE ANNUAL WELLNESS VISIT  12/19/2024    FALL RISK ASSESSMENT  12/19/2024    DTAP/TDAP/TD IMMUNIZATION (2 - Td or Tdap) 02/17/2025    ADVANCE CARE PLANNING  12/19/2028    HEPATITIS C SCREENING  Completed    PHQ-2 (once per calendar year)  Completed    INFLUENZA VACCINE  Completed    Pneumococcal Vaccine: 65+ Years  Completed    URINALYSIS  Completed    ZOSTER IMMUNIZATION  Completed    RSV VACCINE (Pregnancy & 60+)  Completed    IPV IMMUNIZATION  Aged Out    HPV IMMUNIZATION  Aged Out    MENINGITIS IMMUNIZATION  Aged Out    RSV MONOCLONAL ANTIBODY  Aged Out    COLORECTAL CANCER SCREENING  Discontinued       BP Readings from Last 3 Encounters:   12/19/23 104/68   11/14/23 106/68   10/11/23 110/73      Wt Readings from Last 3 Encounters:   12/19/23 74.1 kg (163 lb 4.8 oz)   11/14/23 73.2 kg (161 lb 4.8 oz)   10/11/23 73 kg (161 lb)        Mammogram Screening - Patient over age 75, has elected to continue with screening.  Pertinent mammograms are reviewed under the imaging tab.    Review of Systems   Constitutional:  Negative for chills and fever.   HENT:  Negative for congestion, ear pain, hearing loss and sore throat.    Eyes:  Negative for pain and visual disturbance.   Respiratory:  Positive for shortness of breath. Negative for cough.    Cardiovascular:  Positive for peripheral edema. Negative for chest  "pain and palpitations.   Gastrointestinal:  Negative for abdominal pain, constipation, diarrhea, heartburn, hematochezia and nausea.   Breasts:  Negative for tenderness, breast mass and discharge.   Genitourinary:  Positive for frequency and urgency. Negative for dysuria, genital sores, hematuria, pelvic pain, vaginal bleeding and vaginal discharge.   Musculoskeletal:  Negative for arthralgias, joint swelling and myalgias.   Skin:  Negative for rash.   Neurological:  Positive for weakness. Negative for dizziness, headaches and paresthesias.   Psychiatric/Behavioral:  Negative for mood changes. The patient is not nervous/anxious.      Chronic BABB unchanged - only when she's walking \"quite a ways\" when shopping.  Can't lift as much weight as she used to - like her concrete bird bath or her window AC unit.  Chronic frequency and urgency.    OBJECTIVE:   /68 (BP Location: Right arm, Patient Position: Sitting, Cuff Size: Adult Regular)   Pulse 85   Temp 97.5  F (36.4  C) (Temporal)   Resp 16   Ht 1.614 m (5' 3.54\")   Wt 74.1 kg (163 lb 4.8 oz)   SpO2 98%   BMI 28.44 kg/m   Estimated body mass index is 28.44 kg/m  as calculated from the following:    Height as of this encounter: 1.614 m (5' 3.54\").    Weight as of this encounter: 74.1 kg (163 lb 4.8 oz).  Physical Exam  GENERAL: healthy, alert and no distress, walks with a quad cane  EYES: Eyes grossly normal to inspection, conjunctivae and sclerae normal  HENT: ear canals and TM's normal  NECK: no adenopathy, no asymmetry, masses, or scars and thyroid normal to palpation  RESP: lungs clear to auscultation - no rales, rhonchi or wheezes  CV: regular rate and rhythm, normal S1 S2, no S3 or S4, no murmur, click or rub, trace peripheral edema  ABDOMEN: soft, nontender, no hepatosplenomegaly, no masses  MS: no gross musculoskeletal defects noted, no edema  SKIN: no suspicious lesions or rashes  NEURO: Grossly normal strength and tone, mentation intact and speech " "normal  PSYCH: mentation appears normal, affect normal/bright  DIABETIC FOOT EXAM:  Bilateral feet examined.  No lesions or deformities noted.  Skin is warm and dry.  Pedal pulses are intact.  Sensation is intact to nylon filament exam.     Component      Latest Ref Rng 3/13/2023  8:58 AM   Folate      4.6 - 34.8 ng/mL 3.1 (L)       Component      Latest Ref Rng 9/23/2022  1:20 PM 3/13/2023  8:58 AM   Vitamin B12      232 - 1,245 pg/mL 187 (L)  645       Legend:  (L) Low    ASSESSMENT / PLAN:     Encounter for Medicare annual wellness exam  I placed care coordination referral for assistance finding potential housecleaning services.  - Primary Care - Care Coordination Referral    Osteopenia of both hips  Due for repeat DEXA  - DEXA HIP/PELVIS/SPINE - Future    Type 2 diabetes mellitus with stage 2 chronic kidney disease, with long-term current use of insulin (H)  Improved control  - FOOT EXAM  - Albumin Random Urine Quantitative with Creat Ratio    Folate deficiency  - Folate    Vitamin B12 deficiency (non anemic)  - Vitamin B12    Peripheral polyneuropathy  Most likely diabetic neuropathy but she did have Vitamin B12 deficiency last year which had improved with supplementation.  We'll recheck that.  Discussed with patient that she does have protective sensation but she is bothered by sense of instability from not adequately feeling her feet.          COUNSELING:  Reviewed preventive health counseling, as reflected in patient instructions      BMI:   Estimated body mass index is 28.44 kg/m  as calculated from the following:    Height as of this encounter: 1.614 m (5' 3.54\").    Weight as of this encounter: 74.1 kg (163 lb 4.8 oz).       She reports that she quit smoking about 43 years ago. Her smoking use included cigarettes. She has been exposed to tobacco smoke. She has never used smokeless tobacco.      Appropriate preventive services were discussed with this patient, including applicable screening as appropriate " for fall prevention, nutrition, physical activity, Tobacco-use cessation, weight loss and cognition.  Checklist reviewing preventive services available has been given to the patient.    Reviewed patients plan of care and provided an AVS. The Basic Care Plan (routine screening as documented in Health Maintenance) for Sarah meets the Care Plan requirement. This Care Plan has been established and reviewed with the Patient.    Heide Fay MD  [facility]    Identified Health Risks:  I have reviewed Opioid Use Disorder and Substance Use Disorder risk factors and made any needed referrals.   The patient was provided with suggestions to help her develop a healthy physical lifestyle.  She is at risk for lack of exercise and has been provided with information to increase physical activity for the benefit of her well-being.  The patient reports that she has difficulty with activities of daily living. She needs help with cleaning her house, mowing lawn and shoveling snow.  I have placed a care coordination referral to see if there are any resources for help with these household tasks.  The patient was provided with written information regarding signs of hearing loss.  Information on urinary incontinence and treatment options given to patient.  The patient was provided with suggestions to help her develop a healthy emotional lifestyle.  She is at risk for falling and has been provided with information to reduce the risk of falling at home.

## 2023-12-19 NOTE — COMMUNITY RESOURCES LIST (ENGLISH)
12/19/2023   [facility]  N/A  For questions about this resource list or additional care needs, please contact your primary care clinic or care manager.  Phone: [phone]   Email: N/A   Address: [address] [place] [id number]   Hours: N/A        Transportation       Free or low-cost transportation  1  [employer] Distance: 1.95 miles      In-Person   [address] [place] [address]  Language: English, Japanese  Hours: Mon 9:00 AM - 5:00 PM , Tue 9:30 AM - 7:00 PM , Wed 9:00 AM - 5:00 PM , Thu 9:30 AM - 7:00 PM , Fri 9:00 AM - 5:00 PM  Fees: Free   Phone: (676) 559-2366 Email: contactus@[employer] Website: http://www.[employer]     2  [facility] Distance: 4.55 miles      In-Person   [address] [place] [address]  Language: English  Hours: Mon - Fri 8:00 AM - 3:00 PM  Fees: Free   Phone: (610) 654-2927 Ext.14 Email: neighborhood@[facility] Website: http://www.[employer].org     Transportation to medical appointments  3  [facility] Distance: 0.55 miles      In-Person   [address] [place] [address]  Language: English  Hours: Mon - Fri 9:00 AM - 4:00 PM  Fees: Free   Phone: (337) 770-7026 Email: [email or web address] Website: http://www.[employer].org/     4  [facility] - Non-Emergency Medical Transportation Distance: 3.03 miles      In-Person   [address] [place] [address]  Language: English  Hours: Mon - Fri 8:00 AM - 4:00 PM Appt. Only  Fees: Self Pay   Phone: (711) 481-9331 Website: http://www.[employer].org/Medical-Services/Emergency-medical-services/Non-emergency-transportation/          Important Numbers & Websites       Emergency Services   911  City Services   311  Poison Control   (134) 185-2227  Suicide Prevention Lifeline   (618) 823-4198 (TALK)  Child Abuse Hotline   (687) 157-6785 (4-A-Child)  Sexual Assault Hotline    (712) 170-5128 (HOPE)  National Runaway Safeline   (112) 804-9774 (RUNAWAY)  All-Options Talkline   (423) 140-2489  Substance Abuse Referral   (990) 709-3950 (HELP)

## 2023-12-20 ENCOUNTER — PATIENT OUTREACH (OUTPATIENT)
Dept: CARE COORDINATION | Facility: CLINIC | Age: 78
End: 2023-12-20
Payer: COMMERCIAL

## 2023-12-20 ENCOUNTER — TELEPHONE (OUTPATIENT)
Dept: FAMILY MEDICINE | Facility: CLINIC | Age: 78
End: 2023-12-20
Payer: COMMERCIAL

## 2023-12-20 NOTE — PROGRESS NOTES
Clinic Care Coordination Contact  Community Health Worker Initial Outreach    CHW Initial Information Gathering:  Referral Source: PCP  Preferred Urgent Care: [facility], [phone]  No PCP office visit in Past Year: No  CHW Additional Questions  If ED/Hospital discharge, follow-up appointment scheduled as recommended?: N/A  Medication changes made following ED/Hospital discharge?: N/A  MyChart active?: Yes  Patient sent Social Determinants of Health questionnaire?: Yes    Patient accepts CC: Yes. Patient scheduled for assessment with RN ZULEMA Cantu on 12/28/23 at 2:00pm. Patient noted desire to discuss house cleaning resources.     Indiana Mckenna, CHW, B.A. [facility] Care Coordination  [facility]:   [facility]  [phone]     well developed

## 2023-12-20 NOTE — TELEPHONE ENCOUNTER
Please call.  Her folate level came back even lower than before.  This is puzzling.  Is she taking the folate I have been prescribing?  If not she needs to start.  If she states she has been taking it daily as prescribed please let me know and I'll increase the dose.      Heide Fay MD  [facility]     Component      Latest Ref Rng 3/13/2023  8:58 AM 12/19/2023  10:31 AM   Folate      4.6 - 34.8 ng/mL 3.1 (L)  2.5 (L)       Legend:  (L) Low      Office Visit on 12/19/2023   Component Date Value Ref Range Status    Creatinine Urine mg/dL 12/19/2023 115.0  mg/dL Final    The reference ranges have not been established in urine creatinine. The results should be integrated into the clinical context for interpretation.    Albumin Urine mg/L 12/19/2023 <12.0  mg/L Final    The reference ranges have not been established in urine albumin. The results should be integrated into the clinical context for interpretation.    Albumin Urine mg/g Cr 12/19/2023    Final    Unable to calculate, urine albumin and/or urine creatinine is outside detectable limits.  Microalbuminuria is defined as an albumin:creatinine ratio of 17 to 299 for males and 25 to 299 for females. A ratio of albumin:creatinine of 300 or higher is indicative of overt proteinuria.  Due to biologic variability, positive results should be confirmed by a second, first-morning random or 24-hour timed urine specimen. If there is discrepancy, a third specimen is recommended. When 2 out of 3 results are in the microalbuminuria range, this is evidence for incipient nephropathy and warrants increased efforts at glucose control, blood pressure control, and institution of therapy with an angiotensin-converting-enzyme (ACE) inhibitor (if the patient can tolerate it).      Folic Acid 12/19/2023 2.5 (L)  4.6 - 34.8 ng/mL Final    Vitamin B12 12/19/2023 1,150  232 - 1,245 pg/mL Final

## 2023-12-21 NOTE — RESULT ENCOUNTER NOTE
Ubaldo Smith,  Your folate level is even lower than last year when I added the supplemental folate to your regimen.  I had one of our nurses call you about this and she reports that you haven't been taking the supplemental folate.  Please take the supplemental folate daily as advised.        Your urine albumin (protein) level is still somewhat elevated but stable.  Urine albumin is a test for microscopic proteins in the urine - a sign of early kidney disease from hypertension and/or diabetes. Keeping blood pressure and blood sugars controlled helps keep the kidneys healthy.  I also recommend staying hydrated and avoiding frequent use of over-the-counter NSAID medications (ibuprofen, naproxen, advil, motrin, aleve).   Heide Fay MD

## 2023-12-21 NOTE — TELEPHONE ENCOUNTER
Writer spoke with patient and informed of below. Patient went through her entire medication box on phone and states that Folate is not in her regular medications. She states that she must not be taking Folate. Informed patient to start taking this as soon as possible as prescribed. She is going to  the rx from the pharmacy.     Writer informed patient to call back and ask to speak to a triage nurse if she has further questions/concerns.     Mis Bautista RN  [facility]

## 2023-12-28 NOTE — PROGRESS NOTES
Clinic Care Coordination Contact  Care Team Conversations    CHW was asked by MIRANDA Cantu to reschedule initial assessment.    CHW called patient and rescheduled initial assessment for 1/2/24 at 2:00pm.    CHIQUITA Centeno, B.A. [facility] Care Coordination  [facility]:   [facility]  [phone]

## 2024-01-02 ENCOUNTER — PATIENT OUTREACH (OUTPATIENT)
Dept: NURSING | Facility: CLINIC | Age: 79
End: 2024-01-02
Payer: COMMERCIAL

## 2024-01-02 ASSESSMENT — ACTIVITIES OF DAILY LIVING (ADL): DEPENDENT_IADLS:: CLEANING

## 2024-01-02 NOTE — LETTER
[facility] CARE COORDINATION  [address]  [place] [address]    January 2, 2024    Sarah Felix  [address] [place] [address]      Dear Sarah,    I am a clinic care coordinator who works with Heide Fay MD with the [facility]. I wanted to thank you for spending the time to talk with me.  Below is a description of clinic care coordination and how I can further assist you.       The clinic care coordination team is made up of a registered nurse, , financial resource worker and community health worker who understand the health care system. The goal of clinic care coordination is to help you manage your health and improve access to the health care system. Our team works alongside your provider to assist you in determining your health and social needs. We can help you obtain health care and community resources, providing you with necessary information and education. We can work with you through any barriers and develop a care plan that helps coordinate and strengthen the communication between you and your care team.  Our services are voluntary and are offered without charge to you personally.    Here are some home making services offered in your area:     [employer] (600) 334-1549  https://helpatyourdoor.org/   [employer] is a nonprofit serving seniors and individuals with disabilities across [place]'s [place]ounty [place] area.    Our?Store To Door grocery assistance,?home support?and?transportation services?provide help with in-home tasks and chores.     [employer]? 8-821-605-6621  Order rides, grocery delivery, pharmacy delivery, meals, home chores and more by calling our convenient phone number. We intercept GPS issues,  communication troubles and more to oversee trips from request to fulfillment with 100% reliability.?     Home instead (038) 579-0650  [employer] ([phone] 661-6984  [place]  Anesthesia Volume In Cc: 0 Home care services [phone]  [employer] [phone]  [facility] [phone]  Senior linkage line 1 (782) 229-1940-[facility] resource line for seniors    Please feel free to contact GENNARO Castro at [phone] with any questions or concerns regarding care coordination and what we can offer.      We are focused on providing you with the highest-quality healthcare experience possible.    Sincerely,     MEGAN BarthN, RN, PHN   Care Coordinator-Ambulatory Care Management  [facility]                Repair Type: Graft Graft Type: Skin Substitute Skin Substitute: Revita Skin Substitute Units (Will Override Primary Defect Units If Greater Than 0): 16 Type Of Previous Surgery (Optional- Ie Mohs Surgery): Mohs Date Of Previous Surgery (Optional): 05/24/2022 Include The Following Details In The Note (If No Details Will Only Be Reflected In The Surgical Fax): No Detail Level: Detailed Anesthesia Type: 1% lidocaine with epinephrine Hemostasis: Electrocautery Estimated Blood Loss (Cc): minimal Epidermal Closure: simple interrupted Wound Care: Petrolatum Dressing: dry sterile dressing Puraply Text: The defect edges were debeveled with a #15 scalpel blade.  Given the location of the defect, shape of the defect and the proximity to free margins a skin substitute graft was deemed most appropriate.  The PuraPly was trimmed to fit the size of the defect. The graft was then placed in the primary defect and oriented appropriately. Tarsorrhaphy Text: A tarsorrhaphy was performed using Frost sutures. Consent: The rationale for Repairs was explained to the patient and consent was obtained. The risks, benefits and alternatives to therapy were discussed in detail. Specifically, the risks of infection, scarring, bleeding, prolonged wound healing, incomplete removal, allergy to anesthesia, nerve injury and recurrence were addressed. Prior to the procedure, the treatment site was clearly identified and confirmed by the patient. All components of Universal Protocol/PAUSE Rule completed. Post-Care Instructions: I reviewed with the patient in detail post-care instructions. Patient is not to engage in any heavy lifting, exercise, or swimming for the next 14 days. Should the patient develop any fevers, chills, bleeding, severe pain patient will contact the office immediately.

## 2024-01-02 NOTE — LETTER
[facility] CARE COORDINATION  [address]  [place] [address]    January 2, 2024    Sarah Felix  [address] [place] [address]      Dear Sarah,    I am a clinic care coordinator who works with Heide Fay MD with the [facility]. I wanted to thank you for spending the time to talk with me.  Below is a description of clinic care coordination and how I can further assist you.       The clinic care coordination team is made up of a registered nurse, , financial resource worker and community health worker who understand the health care system. The goal of clinic care coordination is to help you manage your health and improve access to the health care system. Our team works alongside your provider to assist you in determining your health and social needs. We can help you obtain health care and community resources, providing you with necessary information and education. We can work with you through any barriers and develop a care plan that helps coordinate and strengthen the communication between you and your care team.  Our services are voluntary and are offered without charge to you personally.    Here are some home making services offered in your area: I have also sent these in the regular mail per your request.     Help at Your Door (421) 233-3783  https://helpatyourdoor.org/   Help At Your Door is a nonprofit serving seniors and individuals with disabilities across [place].    Our?Store To Door grocery assistance,?home support?and?transportation services?provide help with in-home tasks and chores.     JmoarGoGrandparent? [phone]  Order rides, grocery delivery, pharmacy delivery, meals, home chores and more by calling our convenient phone number. We intercept GPS issues,  communication troubles and more to oversee trips from request to fulfillment with 100% reliability.?     Home  instead [phone]  [facility] [phone]  [facility] [phone]  [facility] [phone]  [facility] [phone]  Senior linkage line 2 [phone]-State run resource line for seniors    Please feel free to contact GENNARO Castro at [phone] with any questions or concerns regarding care coordination and what we can offer.      We are focused on providing you with the highest-quality healthcare experience possible.    Sincerely,     MEGAN BarthN, RN, PHN   Care Coordinator-Ambulatory Care Management  [facility] and [facility]

## 2024-01-02 NOTE — LETTER
[facility]  Patient Centered Plan of Care  About Me:        Patient Name:  Sarah Felix    YOB: 1945  Age:         78 year old   Renny MRN:    [id number] Telephone Information:  Home Phone [phone]   Mobile [phone]       Address:  [facility] [place] [address] Email address:  [email or web address]      Emergency Contact(s)    Name Relationship Lgl Grd Work Phone Home Phone Mobile Phone   1. YURI FELIX Brother No  [phone]   2. YURI FELIX Other   [phone]           Primary language:  English     needed? No   [facility] [employer]:  [phone] op. 1  Other communication barriers:None    Preferred Method of Communication:  Mail  Current living arrangement: I live alone; I live in a private home    Mobility Status/ Medical Equipment: Independent w/Device        Health Maintenance  Health Maintenance Reviewed: Up to date      My Access Plan  Medical Emergency 911   Primary Clinic Line [facility] [phone]   24 Hour Appointment Line [phone] or  [phone]) (toll-free)   24 Hour Nurse Line [phone] (toll-free)   Preferred Urgent Care [facility], [phone]     Preferred Hospital [facility]  [phone]     Preferred Pharmacy [facility] - [place], [place] - [address]     Behavioral Health Crisis Line The National Suicide Prevention Lifeline at 1-791.524.7316 or Text/Call 948           My Care Team Members  Patient Care Team         Relationship Specialty Notifications Start End    Heide Fay MD PCP - General Family Medicine  9/27/21     Phone: [phone] Fax: [phone] [address]  [place] [address]    Joan Lopez RP Pharmacist   11/7/11      [address] [facility] [address]    Anita Treadwell,  MD Referring Physician OB/Gyn  3/30/15     Phone: [phone] Fax: [phone]         [address] [facility] [address]    Murphy Sargent MD MD Urology  8/19/15     Phone: [phone] Fax: [phone]         [address] [facility] [address]    Apolinar Artis MD MD Orthopedics  5/26/17     Phone: [phone] Fax: [phone]         [address] [place] [address]    Janay Amezcua MD MD Ophthalmology  8/12/20     Phone: [phone] Fax: [phone]         [address] [facility] [address]    Leventhal, Thomas Michael, MD MD Gastroenterology  9/20/21     Referred by Heide Hopson for Elevated transaminase level, Fatty Liver and Hepatomegaly    Phone: [phone] Fax: [phone]         [address] [facility] [address]    Heide Fay MD MD Family Medicine  9/20/21     Referred Pt to Hepatology for Elevated transaminase level, fatty liver and Hepatomegaly    Phone: [phone] Fax: [phone]         [address]  [place] [address]    Manjeet Wilde MD MD Hematology All results, Admissions 9/21/21     Phone: [phone] Fax: [phone]         [address] [facility] [address]    Heide Fay MD Assigned PCP   6/18/22     Phone: [phone] Fax: [phone]         [address]  [place] [address]    Daniel Murphy MD MD Ophthalmology  6/27/22     Phone: [phone] Fax: [phone]         [address] [facility] [address]    Joan Lopez [facility] Assigned MTM Pharmacist   9/28/22      [id number]  [id number]    Lester Roach MD Assigned Surgical Provider   3/11/23     Phone: [phone] Fax: [phone] [address] [facility] [address]    Manjeet Wilde MD Assigned Cancer Care Provider   7/1/23     Phone: [phone] Fax: [phone] [address] [facility] [address]    Mony Restrepo, MIRANDA Specialty Care  Coordinator Hematology & Oncology Admissions 12/7/23     Alondra Brunson, RN Lead Care Coordinator  Admissions 12/20/23                 My Care Plans  Self Management and Treatment Plan    Care Plan  Care Plan: Help At Home       Problem: Insufficient In-home support       Goal: Establish adequate home support       Start Date: 1/2/2024    This Visit's Progress: 10%    Priority: Low    Note:     Barriers: Limited mobility; Limited support system; diagnoses of multiple, chronic, complex medical conditions  Strengths: Motivated; agreeable to Care Coordination  Patient expressed understanding of goal: Yes  Action steps to achieve this goal:  1. I will review and research resources provided for assistance with House keeping services.-Sent via Mail per Patient request     Help at Your Door (943) 204-1966  https://helpatyourdoor.org/   Help At Your Door is a nonprofit serving seniors and individuals with disabilities across [place].    Our?Store To Door grocery assistance,?home support?and?transportation services?provide help with in-home tasks and chores.     GoGoGrandparent? [phone]  Order rides, grocery delivery, pharmacy delivery, meals, home chores and more by calling our convenient phone number. We intercept GPS issues,  communication troubles and more to oversee trips from request to fulfillment with 100% reliability.?     Home instead [phone]  [facility] [phone]  [facility] [phone]  [facility] [phone]  [facility] [phone]  Senior linkage line 0 [phone]-State run resource line for seniors  2. I will contact Care Coordinator for additional resources if resources provided do not work for my lifestyle/situation.   3. I will contact my care team with questions, concerns or support needs. I will use the clinic as a resource and I understand I can contact my clinic with 24/7  after hours services available. Care Coordinator will remain available as needed.                               Action Plans on File:                       Advance Care Plans/Directives:   Advanced Care Plan/Directives on file:   No    Discussed with patient/caregiver(s):   Declined Further Information             My Medical and Care Information  Problem List   Patient Active Problem List   Diagnosis    Hepatic cyst    Allergic rhinitis    Diverticulitis of colon    Osteopenia of both hips    Esophageal reflux    Mixed incontinence urge and stress (male)(female)    Cystocele, lateral    Vaginal atrophy    Mixed hyperlipidemia    Heart burn    BABB (dyspnea on exertion)    Sebaceous hyperplasia    Seborrheic keratosis    Fatty liver    Personal history of other malignant neoplasm of skin    Health Care Home    Hypertension goal BP (blood pressure) < 140/90    Elevated serum creatinine    Skin exam, screening for cancer    Essential hypertension, benign (HTN)    CKD (chronic kidney disease) stage 2, GFR 60-89 ml/min    Type 2 diabetes mellitus with hyperglycemia (H)    Type 2 diabetes with stage 2 chronic kidney disease GFR 60-89 (H)    Type 2 diabetes mellitus without complication, with long-term current use of insulin (H)    Intertriginous candidiasis    Slow transit constipation    Basal cell carcinoma of face    Tubular adenoma of colon    Schatzki's ring of distal esophagus    Adenomatous polyp of duodenum    Folate deficiency    Renal cyst    Alcohol dependence, uncomplicated (H)      Current Medications and Allergies:  See printed Medication Report.    Care Coordination Start Date: 12/19/2023   Frequency of Care Coordination: monthly, more frequently as needed     Form Last Updated: 01/03/2024

## 2024-01-03 NOTE — PROGRESS NOTES
Clinic Care Coordination Contact  Clinic Care Coordination Contact  OUTREACH    Referral Information:  Referral Source: PCP    Primary Diagnosis: Diabetes    Chief Complaint   Patient presents with    Clinic Care Coordination - Initial        Universal Utilization: 63.8 % Risk of Admission or ED Visit.     Clinic Utilization  Difficulty keeping appointments:: No  Compliance Concerns: No  No-Show Concerns: No  No PCP office visit in Past Year: No  Utilization      No Show Count (past year)  3             ED Visits  0             Hospital Admissions  0                    Current as of: 1/2/2024 11:21 PM                Clinical Concerns:  Current Medical Concerns:   Dm, multiple chronic medi angella conditions. Need for supportive services for housekeeping.   Current Behavioral Concerns: none    Education Provided to patient: Educated on Care Coordination-enrolled. Educated on home making services. Patient reports her brother is dying and has limited support at home. Declines any other resources at this time.    Pain  Pain (GOAL):: No  Health Maintenance Reviewed: Up to date    Medication Management:  Medication review status: Medications reviewed and no changes reported per patient.          Current Outpatient Medications:     aspirin 81 MG EC tablet, Take 81 mg by mouth daily, Disp: , Rfl:     atorvastatin (LIPITOR) 40 MG tablet, Take 1 tablet (40 mg) by mouth daily, Disp: 90 tablet, Rfl: 3    blood glucose (ONETOUCH ULTRA) test strip, 1 strip by In Vitro route 4 times daily., Disp: 400 strip, Rfl: 3    CALCIUM 500 + D 500-200 MG-IU OR TABS, one tab twice per day, Disp: 100, Rfl: 0    Cholecalciferol (VITAMIN D PO), Pt consuming 3000 units per day, Disp: , Rfl:     Continuous Blood Gluc Sensor (FREESTYLE CARMELA 14 DAY SENSOR) MISC, CHANGE EVERY 14 DAYS, Disp: 6 each, Rfl: 3    folic acid (FOLVITE) 400 MCG tablet, Take 1 tablet (400 mcg) by mouth daily, Disp: 100 tablet, Rfl: 3    furosemide (LASIX) 20 MG tablet, Take 1  "tablet (20 mg) by mouth 2 times daily, Disp: 180 tablet, Rfl: 1    glimepiride (AMARYL) 4 MG tablet, Take 1 tablet (4 mg) by mouth 2 times daily (before meals), Disp: 180 tablet, Rfl: 1    hydroxyurea (HYDREA) 500 MG capsule, Take 1 capsule (500 mg) by mouth daily, Disp: 90 capsule, Rfl: 3    insulin aspart (NOVOLOG PEN) 100 UNIT/ML pen, 15 units with a high carb meal about once daily. , Disp: 45 mL, Rfl: 11    insulin glargine (LANTUS PEN) 100 UNIT/ML pen, Inject 10 Units Subcutaneous every morning, Disp: , Rfl:     insulin pen needle (BD REY U/F) 32G X 4 MM miscellaneous, USE 1 PEN NEEDLE four times daily. (WITH LANTUS AND Novolog), Disp: 400 each, Rfl: 11    insulin syringe-needle U-100 (BD INSULIN SYRINGE ULTRAFINE) 31G X 5/16\" 1 ML miscellaneous, Use 1 syringes twice daily for insulin and victoza and for symptoms of hypoglycemia, Disp: 100 each, Rfl: 3    Lancets (ONETOUCH DELICA PLUS PMHMCA05N) MISC, 1 each 4 times daily, Disp: 400 each, Rfl: 3    latanoprost (XALATAN) 0.005 % ophthalmic solution, Place 1 drop into both eyes At Bedtime, Disp: 7.5 mL, Rfl: 3    lisinopril (ZESTRIL) 2.5 MG tablet, Take 1 tablet (2.5 mg) by mouth daily, Disp: 90 tablet, Rfl: 1    metFORMIN (GLUCOPHAGE XR) 500 MG 24 hr tablet, Take 1 tablet (500 mg) by mouth 2 times daily (with meals), Disp: 180 tablet, Rfl: 1    nystatin (MYCOSTATIN) 779605 UNIT/GM external cream, Apply topically 2 times daily (Patient taking differently: Apply topically daily as needed), Disp: 30 g, Rfl: 3    potassium chloride ER (MICRO-K) 10 MEQ CR capsule, 1 capsule (10 mEq) daily, Disp: 90 capsule, Rfl: 1    triamcinolone (KENALOG) 0.1 % external cream, Apply topically 3 times daily, Disp: 90 g, Rfl: 3    vitamin B-12 (CYANOCOBALAMIN) 2500 MCG sublingual tablet, Take 2500 mcg by mouth three times per week, Disp: , Rfl:      Allergies   Allergen Reactions    Actos [Pioglitazone]      Fatigue - felt crummy     Amlodipine Swelling    Ancef [Cefazolin]     " Levaquin Rash     Itching, rash    Cephalosporins Rash    Clindamycin Rash    Levofloxacin Itching and Rash    Nickel Rash     Contact reaction  Contact reaction          Functional Status:  Dependent ADLs:: Ambulation-walker  Dependent IADLs:: Cleaning  Bed or wheelchair confined:: No  Mobility Status: Independent w/Device  Fallen 2 or more times in the past year?:  (n/a)  Any fall with injury in the past year?:  (n/a)    Living Situation:  Current living arrangement:: I live alone, I live in a private home  Type of residence:: Private home - stairs    Lifestyle & Psychosocial Needs:    Social Determinants of Health     Food Insecurity: Low Risk  (12/19/2023)    Food Insecurity     Within the past 12 months, did you worry that your food would run out before you got money to buy more?: No     Within the past 12 months, did the food you bought just not last and you didn t have money to get more?: No   Depression: Not at risk (12/19/2023)    PHQ-2     PHQ-2 Score: 0   Housing Stability: Low Risk  (12/19/2023)    Housing Stability     Do you have housing? : Yes     Are you worried about losing your housing?: No   Tobacco Use: Medium Risk (12/19/2023)    Patient History     Smoking Tobacco Use: Former     Smokeless Tobacco Use: Never     Passive Exposure: Past   Financial Resource Strain: Low Risk  (12/19/2023)    Financial Resource Strain     Within the past 12 months, have you or your family members you live with been unable to get utilities (heat, electricity) when it was really needed?: No   Alcohol Use: Not on file   Transportation Needs: High Risk (12/19/2023)    Transportation Needs     Within the past 12 months, has lack of transportation kept you from medical appointments, getting your medicines, non-medical meetings or appointments, work, or from getting things that you need?: Yes   Physical Activity: Not on file   Interpersonal Safety: Low Risk  (12/19/2023)    Interpersonal Safety     Do you feel physically  and emotionally safe where you currently live?: Yes     Within the past 12 months, have you been hit, slapped, kicked or otherwise physically hurt by someone?: No     Within the past 12 months, have you been humiliated or emotionally abused in other ways by your partner or ex-partner?: No   Stress: Not on file   Social Connections: Not on file     Diet:: Diabetic diet  Inadequate nutrition (GOAL):: No  Tube Feeding: No  Inadequate activity/exercise (GOAL):: No  Significant changes in sleep pattern (GOAL): No  Transportation means:: Regular car     Judaism or spiritual beliefs that impact treatment::  (n/a)  Mental health DX:: No  Mental health management concern (GOAL):: No  Chemical Dependency Status: No Current Concerns  Informal Support system:: Family        Resources and Interventions:  Current Resources:      Community Resources: None  Supplies Currently Used at Home: None  Equipment Currently Used at Home: walker, rolling  Employment Status: retired         Advance Care Plan/Directive  Advanced Care Plans/Directives on file:: No  Discussed with patient/caregiver:: Declined Further Information    Referrals Placed:  (House cleaning services)     Care Plan:  Care Plan: Help At Home       Problem: Insufficient In-home support       Goal: Establish adequate home support       Start Date: 1/2/2024    This Visit's Progress: 10%    Priority: Low    Note:     Barriers: Limited mobility; Limited support system; diagnoses of multiple, chronic, complex medical conditions  Strengths: Motivated; agreeable to Care Coordination  Patient expressed understanding of goal: Yes  Action steps to achieve this goal:  1. I will review and research resources provided for assistance with House keeping services.-Sent via Mail per Patient request and [facility]    Help at Your Door [phone] [phone]  https://helpatBreezydoor.org/   Help At Your Door is a nonprofit serving seniors and individuals with disabilities across [facility]  [place].    Our?Store To Door grocery assistance,?home support?and?transportation services?provide help with in-home tasks and chores.     Alfie? [phone]  Order rides, grocery delivery, pharmacy delivery, meals, home chores and more by calling our convenient phone number. We intercept GPS issues,  communication troubles and more to oversee trips from request to fulfillment with 100% reliability.?     Home instead [phone]  [facility] [phone]  [facility] [phone]  [facility] [phone]  [facility] [phone]  Senior linkage line 0 [phone]-State run resource line for seniors  2. I will contact Care Coordinator for additional resources if resources provided do not work for my lifestyle/situation.   3. I will contact my care team with questions, concerns or support needs. I will use the clinic as a resource and I understand I can contact my clinic with 24/7 after hours services available. Care Coordinator will remain available as needed.                               Patient/Caregiver understanding: Patient/caregiver verbalized understanding and denies any additional questions or concerns at this time. RNCC engaged in AIDET communications during encounter.       Outreach Frequency: monthly, more frequently as needed  Future Appointments                In 1 week Lester Roach MD [facility], [facility]    In 3 weeks Manjeet Wilde MD [facility]    In 2 months Joan Lopez RPH [facility],             Plan: Patient/caregiver will call RNCC with questions, concerns, support needs. RNCC will be available as needed.   Next outreach Monthly or sooner as needed.     Alondra Brunson, MEGANN, RN, PHN   Care Coordinator-Ambulatory Care Management  [facility],  [facility]  [phone]

## 2024-01-04 DIAGNOSIS — H40.003 GLAUCOMA SUSPECT OF BOTH EYES: Primary | ICD-10-CM

## 2024-01-16 ENCOUNTER — TELEPHONE (OUTPATIENT)
Dept: PHARMACY | Facility: CLINIC | Age: 79
End: 2024-01-16
Payer: COMMERCIAL

## 2024-01-16 DIAGNOSIS — E87.6 HYPOKALEMIA: ICD-10-CM

## 2024-01-16 RX ORDER — POTASSIUM CHLORIDE 750 MG/1
10 CAPSULE, EXTENDED RELEASE ORAL DAILY
Qty: 90 CAPSULE | Refills: 1 | Status: SHIPPED | OUTPATIENT
Start: 2024-01-16 | End: 2024-04-09

## 2024-01-16 NOTE — TELEPHONE ENCOUNTER
1-16-24      Sarah calls today --needs pot chloride caps rx refilled but has new mail order pharmacy --[facility] --[facility].      Mtm sent refill of her pot chl.  to new mail order [facility].    Joan Lopez [facility].  Medication Therapy Management Provider  [phone]

## 2024-01-19 ENCOUNTER — PATIENT OUTREACH (OUTPATIENT)
Dept: CARE COORDINATION | Facility: CLINIC | Age: 79
End: 2024-01-19
Payer: COMMERCIAL

## 2024-01-19 NOTE — PROGRESS NOTES
Clinic Care Coordination Contact  Community Health Worker Follow Up    Care Gaps:     Health Maintenance Due   Topic Date Due    DEXA  01/03/2021    PHQ-2 (once per calendar year)  01/01/2024    COVID-19 Vaccine (8 - 2023-24 season) 01/22/2024       Postponed to next CHW outreach.     Care Plan:   Care Plan: Help At Home       Problem: Insufficient In-home support       Goal: Establish adequate home support       Start Date: 1/2/2024    This Visit's Progress: 10% Recent Progress: 10%    Priority: Low    Note:     Barriers: Limited mobility; Limited support system; diagnoses of multiple, chronic, complex medical conditions  Strengths: Motivated; agreeable to Care Coordination  Patient expressed understanding of goal: Yes  Action steps to achieve this goal:  1. I will review and research resources provided for assistance with House keeping services.-Sent via Mail per Patient request and [employer]    Help at Your Door (651) 274-6231  [email or web address]://[email or web address].org/   Help At Your Door is a nonprofit serving seniors and individuals with disabilities across [place].    Our?Store To Door grocery assistance,?home support?and?transportation services?provide help with in-home tasks and chores.     GoGoGrandparent? [phone]  Order rides, grocery delivery, pharmacy delivery, meals, home chores and more by calling our convenient phone number. We intercept GPS issues,  communication troubles and more to oversee trips from request to fulfillment with 100% reliability.?     Home instead [phone]  [facility] [phone]  [facility] [phone]  [facility] [phone]  [facility] [phone]  Senior linkage line 8 [phone]-State run resource line for seniors  2. I will contact Care Coordinator for additional resources if resources provided do not work for my lifestyle/situation.   3. I will contact my care team  with questions, concerns or support needs. I will use the clinic as a resource and I understand I can contact my clinic with 24/7 after hours services available. Care Coordinator will remain available as needed.                               Intervention and Education during outreach:   Patient states that she is doing good, but hasn't had the chance to call out to the housekeeping resources. Patient states that she will take a look at it when she gets the chance. Patient took note of CHW's contact information. Patient states that she has no questions or concerns for CC at this time.    CHW Plan: CHW will do next CHW outreach in one month.    CHIQUITA Centeno, B.A. [facility] Care Coordination  [facility]:   [facility]  [phone]

## 2024-01-30 ENCOUNTER — ONCOLOGY VISIT (OUTPATIENT)
Dept: ONCOLOGY | Facility: CLINIC | Age: 79
End: 2024-01-30
Attending: INTERNAL MEDICINE
Payer: COMMERCIAL

## 2024-01-30 VITALS
TEMPERATURE: 98 F | WEIGHT: 162.1 LBS | RESPIRATION RATE: 16 BRPM | DIASTOLIC BLOOD PRESSURE: 72 MMHG | OXYGEN SATURATION: 99 % | SYSTOLIC BLOOD PRESSURE: 113 MMHG | HEART RATE: 87 BPM | BODY MASS INDEX: 28.23 KG/M2

## 2024-01-30 DIAGNOSIS — D47.3 ESSENTIAL THROMBOCYTHEMIA (H): Primary | ICD-10-CM

## 2024-01-30 PROCEDURE — G0463 HOSPITAL OUTPT CLINIC VISIT: HCPCS | Performed by: INTERNAL MEDICINE

## 2024-01-30 PROCEDURE — 99213 OFFICE O/P EST LOW 20 MIN: CPT | Performed by: INTERNAL MEDICINE

## 2024-01-30 RX ORDER — METOPROLOL TARTRATE 25 MG/1
TABLET, FILM COATED ORAL
COMMUNITY
Start: 2023-08-01 | End: 2024-02-26

## 2024-01-30 ASSESSMENT — PAIN SCALES - GENERAL: PAINLEVEL: NO PAIN (0)

## 2024-01-30 NOTE — LETTER
2024         RE: Sarah Felix  [address] [place] [address]        Dear Colleague,    Thank you for referring your patient, Sarah Felix, to the [facility]. Please see a copy of my visit note below.        [facility] Hematology Follow Up Visit    Outpatient Visit Note:    Patient: Sarah Felix  MRN: [id number]  : 1945  IVAN: 2024     Sarah Felix is a 78 year old woman with a history of Jak2 positive myeloproliferative neoplasm, likely essential thrombocytopenia, who returns for routine follow up.      Assessment:  In summary, Sarah Felix is a 78 year old woman with Jak2 pos MPN, likely ET doing well on aspirin and hydroxyurea. Platelet count has mildly improved and she has no symptoms nor thrombotic events.    Recommendations:  We discussed the diagnosis of ET and Jak2 pos MPN, major cause of morbidity and mortality includes thrombotic events.    Continue HU 500mg daily and ASA 81mg daily.   CBC every 2 months  Follow up in person in 6 months  Call with any questions or concerns    25 minutes spent by me on the date of the encounter doing chart review, review of test results, interpretation of tests, patient visit, and documentation     Manjeet Wilde MD   of Medicine  [employer]      --------------------------------------------------------  Forward History:  Established care Dec 2021 for thrombocytosis, asymptomatic, on ASA.  Jak2 testing pos but by error there results were misinterpreted. PCP notified Dr. Wilde May 2023. She was seen in office in May 23 and restarted on ASA and HU was started at 500 mg daily.    History: Sarah Felix is a 78 year old woman with a history of a surgically provoked DVT many years ago (at 25 years of age after ovarian cystectomy, treated briefly with anticoagulation), DM2, HTN, with a longstanding history of  thrombocytosis who most likely has Jak2 positive MPN, ET and doing well on HU and ASA.  CT abd/pelvis 2023 showed normal sized spleen    Since her last visit when HU and ASA were started, she reports no significant change or worsening in her longstanding symptoms including fatigue, reduced appetite and nausea which is unchanged. Overall, doing well and prefers to not change medications unless needed. No hematochezia, melena, hemoptysis, hematemesis or gum bleeding. No headache, focal weakness/sensory changes. No pruritus.  No concerns about bleeding from ASA.    No history of stroke, MI.    Medications are reviewed in the EMR and notable for ASA 81mg and  mg every day.    She recently had a fall and scraped her back. She noted a bump on her lower back which is not painful. No changes in bladder, bowel habits of saddle anesthesia.    Objective:  Vitals: B/P: 139/83, T: 98.6, P: 109, R: 16, Wt: 162 lbs 1.6 oz  Exam:   Gen: Appears well, no distress  HEENT: no scleral icterus or hemorrhage, no wet purpura, no lymphadenopathy  Ext: no edema  Neuro: no deficits    Labs:   Latest Reference Range & Units 09/12/23 14:56 10/10/23 11:20 11/14/23 15:14   WBC 4.0 - 11.0 10e3/uL 7.8 7.5 7.7   Hemoglobin 11.7 - 15.7 g/dL 11.7 12.2 12.5   Hematocrit 35.0 - 47.0 % 36.5 35.5 37.4   Platelet Count 150 - 450 10e3/uL 775 (H) 696 (H) 582 (H)   (H): Data is abnormally high    Imaging:  No new imaging        Manjeet Wilde MD

## 2024-01-30 NOTE — NURSING NOTE
"Oncology Rooming Note    January 30, 2024 11:29 AM   Sarah Felix is a 78 year old female who presents for:    Chief Complaint   Patient presents with    Oncology Clinic Visit     Basal cell carcinoma of face      Initial Vitals: /72 (BP Location: Right arm, Patient Position: Sitting, Cuff Size: Adult Regular)   Pulse 87   Temp 98  F (36.7  C) (Oral)   Resp 16   Wt 73.5 kg (162 lb 1.6 oz)   SpO2 99%   BMI 28.23 kg/m   Estimated body mass index is 28.23 kg/m  as calculated from the following:    Height as of 12/19/23: 1.614 m (5' 3.54\").    Weight as of this encounter: 73.5 kg (162 lb 1.6 oz). Body surface area is 1.82 meters squared.  No Pain (0) Comment: Data Unavailable   No LMP recorded. Patient is postmenopausal.  Allergies reviewed: Yes  Medications reviewed: Yes    Medications: Medication refills not needed today.  Pharmacy name entered into [employer]:    [facility] - [place], [place] - [address] [place] [address] [facility]  [facility]  WRITTEN PRESCRIPTION REQUESTED  [id number] [place] [facility] [address]    Frailty Screening:   Is the patient here for a new oncology consult visit in cancer care? 2. No      Clinical concerns: none      Dariela Russell              "

## 2024-01-30 NOTE — PROGRESS NOTES
[facility] Hematology Follow Up Visit    Outpatient Visit Note:    Patient: Sarah Felix  MRN: [id number]  : 1945  IVAN: 2024     Sarah Felix is a 78 year old woman with a history of Jak2 positive myeloproliferative neoplasm, likely essential thrombocytopenia, who returns for routine follow up.      Assessment:  In summary, Sarah Felix is a 78 year old woman with Jak2 pos MPN, likely ET doing well on aspirin and hydroxyurea. Platelet count has mildly improved and she has no symptoms nor thrombotic events.    Recommendations:  We discussed the diagnosis of ET and Jak2 pos MPN, major cause of morbidity and mortality includes thrombotic events.    Continue HU 500mg daily and ASA 81mg daily.   CBC every 2 months  Follow up in person in 6 months  Call with any questions or concerns    25 minutes spent by me on the date of the encounter doing chart review, review of test results, interpretation of tests, patient visit, and documentation     Manjeet Wilde MD   of Medicine  [employer]      --------------------------------------------------------  Forward History:  Established care Dec 2021 for thrombocytosis, asymptomatic, on ASA.  Jak2 testing pos but by error there results were misinterpreted. PCP notified Dr. Wilde May 2023. She was seen in office in May 23 and restarted on ASA and HU was started at 500 mg daily.    History: Sarah Felix is a 78 year old woman with a history of a surgically provoked DVT many years ago (at 25 years of age after ovarian cystectomy, treated briefly with anticoagulation), DM2, HTN, with a longstanding history of thrombocytosis who most likely has Jak2 positive MPN, ET and doing well on HU and ASA.  CT abd/pelvis  showed normal sized spleen    Since her last visit when HU and ASA were started, she reports no significant change or worsening in her longstanding symptoms including  fatigue, reduced appetite and nausea which is unchanged. Overall, doing well and prefers to not change medications unless needed. No hematochezia, melena, hemoptysis, hematemesis or gum bleeding. No headache, focal weakness/sensory changes. No pruritus.  No concerns about bleeding from ASA.    No history of stroke, MI.    Medications are reviewed in the EMR and notable for ASA 81mg and  mg every day.    She recently had a fall and scraped her back. She noted a bump on her lower back which is not painful. No changes in bladder, bowel habits of saddle anesthesia.    Objective:  Vitals: B/P: 139/83, T: 98.6, P: 109, R: 16, Wt: 162 lbs 1.6 oz  Exam:   Gen: Appears well, no distress  HEENT: no scleral icterus or hemorrhage, no wet purpura, no lymphadenopathy  Ext: no edema  Neuro: no deficits    Labs:   Latest Reference Range & Units 09/12/23 14:56 10/10/23 11:20 11/14/23 15:14   WBC 4.0 - 11.0 10e3/uL 7.8 7.5 7.7   Hemoglobin 11.7 - 15.7 g/dL 11.7 12.2 12.5   Hematocrit 35.0 - 47.0 % 36.5 35.5 37.4   Platelet Count 150 - 450 10e3/uL 775 (H) 696 (H) 582 (H)   (H): Data is abnormally high    Imaging:  No new imaging

## 2024-01-30 NOTE — PATIENT INSTRUCTIONS
Your visit the [facility] was to discuss your Essential Thrombocytosis (your bone marrow is making too many platelets).  The purpose of your treatments (aspirin and hydroxyurea) is the reduce the chance of blood clots, which is the most common complication of the disease.  We are doing blood tests to monitor the safety of these medications.      I will plan to see you back in 6 months with lab testing about every 2 months between now and your next visit.  We are making no changes to your medicines.    If you have questions or concerns before your next appointment you can call Dr Wilde's Care Coordinator, Mony Restrepo at: [phone]. You can also reach me through ilab, but please use this non-urgent questions

## 2024-02-07 ENCOUNTER — OFFICE VISIT (OUTPATIENT)
Dept: OPHTHALMOLOGY | Facility: CLINIC | Age: 79
End: 2024-02-07
Attending: OPHTHALMOLOGY
Payer: COMMERCIAL

## 2024-02-07 DIAGNOSIS — H43.393 VITREOUS SYNERESIS OF BOTH EYES: ICD-10-CM

## 2024-02-07 DIAGNOSIS — H52.13 MYOPIA OF BOTH EYES: ICD-10-CM

## 2024-02-07 DIAGNOSIS — H40.003 GLAUCOMA SUSPECT OF BOTH EYES: Primary | ICD-10-CM

## 2024-02-07 DIAGNOSIS — E11.65 TYPE 2 DIABETES MELLITUS WITH HYPERGLYCEMIA, WITH LONG-TERM CURRENT USE OF INSULIN (H): ICD-10-CM

## 2024-02-07 DIAGNOSIS — Z96.1 PSEUDOPHAKIA OF BOTH EYES: ICD-10-CM

## 2024-02-07 DIAGNOSIS — Z79.4 TYPE 2 DIABETES MELLITUS WITH HYPERGLYCEMIA, WITH LONG-TERM CURRENT USE OF INSULIN (H): ICD-10-CM

## 2024-02-07 PROCEDURE — G0463 HOSPITAL OUTPT CLINIC VISIT: HCPCS | Performed by: OPHTHALMOLOGY

## 2024-02-07 PROCEDURE — 92083 EXTENDED VISUAL FIELD XM: CPT | Performed by: OPHTHALMOLOGY

## 2024-02-07 PROCEDURE — 92133 CPTRZD OPH DX IMG PST SGM ON: CPT | Performed by: OPHTHALMOLOGY

## 2024-02-07 PROCEDURE — 99214 OFFICE O/P EST MOD 30 MIN: CPT | Mod: GC | Performed by: OPHTHALMOLOGY

## 2024-02-07 ASSESSMENT — REFRACTION_WEARINGRX
OD_ADD: +2.50
OS_SPHERE: -2.75
OS_CYLINDER: +1.75
OD_AXIS: 011
OS_ADD: +2.50
OD_SPHERE: -2.75
OD_CYLINDER: +1.75
OS_AXIS: 014

## 2024-02-07 ASSESSMENT — TONOMETRY
OD_IOP_MMHG: 13
OS_IOP_MMHG: 14
IOP_METHOD: TONOPEN

## 2024-02-07 ASSESSMENT — EXTERNAL EXAM - RIGHT EYE: OD_EXAM: BROW PTOSIS

## 2024-02-07 ASSESSMENT — VISUAL ACUITY
METHOD: SNELLEN - LINEAR
CORRECTION_TYPE: GLASSES
OD_CC: 20/20
OS_CC+: -1
OS_CC: 20/25

## 2024-02-07 ASSESSMENT — EXTERNAL EXAM - LEFT EYE: OS_EXAM: BROW PTOSIS

## 2024-02-07 ASSESSMENT — CUP TO DISC RATIO
OS_RATIO: 0.35
OD_RATIO: 0.5

## 2024-02-07 ASSESSMENT — PACHYMETRY
OS_CT(UM): 560
OD_CT(UM): 570

## 2024-02-07 ASSESSMENT — SLIT LAMP EXAM - LIDS
COMMENTS: DERMATOCHALASIS WITH LATERAL HOODING
COMMENTS: DERMATOCHALASIS WITH LATERAL HOODING

## 2024-02-07 NOTE — NURSING NOTE
"Chief Complaints and History of Present Illnesses   Patient presents with    Glaucoma Suspect Follow Up     4 month follow up for Glaucoma suspect each eye.     Patient notes vision is stable each eye in the last few months. Denies eye pain. \"I have eye drops I use at night. I'm not sure what they are called.\"      Chief Complaint(s) and History of Present Illness(es)       Glaucoma Suspect Follow Up              Laterality: both eyes    Associated symptoms: Negative for eye pain, flashes and floaters    Pain scale: 0/10    Comments: 4 month follow up for Glaucoma suspect each eye.     Patient notes vision is stable each eye in the last few months. Denies eye pain. \"I have eye drops I use at night. I'm not sure what they are called.\"               Comments    Ocular meds:   - Latanoprost at bedtime each eye     DM2  Lab Results       Component                Value               Date                       A1C                      7.4                 11/14/2023                 A1C                      9.0                 08/15/2023                 A1C                      9.7                 07/19/2023                 A1C                      7.1                 05/23/2023                 A1C                      8.3                 03/13/2023                 A1C                      8.3                 06/21/2021                 A1C                      9.4                 12/14/2020                 A1C                      8.1                 09/21/2020                 A1C                      8.5                 01/07/2020                 A1C                      7.6                 10/12/2019              MAYA Chanel 3:21 PM 02/07/2024                     "

## 2024-02-07 NOTE — PROGRESS NOTES
CC: Glaucoma follow up     INTERVAL:  Patient states that since her last appointment on 09/05/2023 she has not noticed any changes in her vision. Has been using latanoprost at night, misses doses very rarely, has difficulty placing drops. No pain, no flashes floaters.      HPI  Sraah Felxi is a 78 year old female here for evaluation of blurry vision of the right eye and a diabetic eye exam.    She has had difficulty controlling her A1c. A new medication she is using - pioglitazone - has not been easy for her to take with poor control and frequent hypoglycemia.   She was on lasix but was stopped because BP was too low  Imaging:    OVF 02/07/24  Right eye: Superior and inferior arcuate defect vs cloverleaf pattern FP: 0/7 FN: 0/7 MD: -12.2 PSD: 7.2  Left eye: Superior and inferior arcuate defect vs cloverleaf pattern. FP: 0/8 FN: 1/8 MD: -8.8 PSD: 7.4    OCT 02/07/24  Right eye- normal contour with no central fluid  Left eye- normal contour with no central fluid    RNFL 02/07/24  OD-Thinning inferiorly with borderline thinning S,T, & N . General 61 microns  OS-Thinning inferiorly ,superior and temporal. General 62 microns    Assessment & Plan      # Diabetes mellitus, type 2  - diagnosed age 62  Lab Results   Component Value Date    A1C 7.4 11/14/2023    A1C 9.0 08/15/2023     - no retinopathy on exam; last dilated 3/1/23  - discussed the importance of good BP/BS/Chol control    #Pseudophakia OU  # PCO trace left eye   S/p YAG OD 3/1/23    # POAG each eye   each eye large cup with thin and pale rim  Pachymetry normal OS /boderline thick  right eye  IOP 13/14, doing great today  RNFL shows diffuse thinning OD>left eye  OVF 02/07/24: Superior and inferior arcuate defect each eye. Likely not artifact based on number of FP and FN. In addition to VF defect of PSD  - Continue Latanoprost at bedtime each eye and RTC 4-6 months, if continues to have OCT RNFL and VF defects: low tension glaucoma???    RTC: 4-6 months  for RNFL and DFE, optic disc photos     Feng Calhoun MD  PGY-5 Vitreoretina Surgery Fellow  [facility]      Complete documentation of historical and exam elements from today's encounter can be found in the full encounter summary report (not reduplicated in this progress note). I personally obtained the chief complaint(s) and history of present illness.  I confirmed and edited as necessary the review of systems, past medical/surgical history, family history, social history, and examination findings as documented by others; and I examined the patient myself. I personally reviewed the relevant tests, images, and reports as documented above. I formulated and edited as necessary the assessment and plan and discussed the findings and management plan with the patient and family.    Lester Dominguez MD, PhD

## 2024-02-14 ENCOUNTER — PATIENT OUTREACH (OUTPATIENT)
Dept: CARE COORDINATION | Facility: CLINIC | Age: 79
End: 2024-02-14
Payer: COMMERCIAL

## 2024-02-14 NOTE — LETTER
[facility]  Patient Centered Plan of Care  About Me:        Patient Name:  Sarah Felix    YOB: 1945  Age:         78 year old   Renny MRN:    [id number] Telephone Information:  Home Phone [phone]   Mobile [phone]       Address:  [address]  [place] [address] Email address:  [email or web address]      Emergency Contact(s)    Name Relationship Lgl Grd Work Phone Home Phone Mobile Phone   1. YURI FELIX Brother No  [phone]   2. YURI FELIX Other   [phone]   3. HOWARD FELIX* Brother    [phone]           Primary language:  English     needed? No   [facility] [employer]:  [phone] op. 1  Other communication barriers:None    Preferred Method of Communication:  Mail  Current living arrangement: I live alone; I live in a private home    Mobility Status/ Medical Equipment: Independent w/Device        Health Maintenance  Health Maintenance Reviewed: Up to date      My Access Plan  Medical Emergency 911   Primary Clinic Line [facility] [phone]   24 Hour Appointment Line [phone] or  [phone]) (toll-free)   24 Hour Nurse Line [phone] (toll-free)   Preferred Urgent Care [facility], [phone]     Preferred Hospital [facility]  [phone]     Preferred Pharmacy [facility] - [place], [place] - [address]     Behavioral Health Crisis Line The National Suicide Prevention Lifeline at 1-962.231.9682 or Text/Call 178           My Care Team Members  Patient Care Team         Relationship Specialty Notifications Start End    Heide Fay MD PCP - General Family Medicine  9/27/21     Phone: [phone] Fax: [phone] [address]  [place] [address]    Joan Lopez formerly [facility] Pharmacist   11/7/11      [address] [facility] [address]    Anita Treadwell MD Referring Physician OB/Gyn  3/30/15     Phone: [phone] Fax: [phone]         [address] [facility] [address]    Murphy Sargent MD MD Urology  8/19/15     Phone: [phone] Fax: [phone]         [address] [facility] [address]    Apolinar Artis MD MD Orthopedics  5/26/17     Phone: [phone] Fax: [phone]         [address] [facility] [address]    Janay Amezcua MD MD Ophthalmology  8/12/20     Phone: [phone] Fax: [phone]         [address] [facility] [address]    Leventhal, Thomas Michael, MD MD Gastroenterology  9/20/21     Referred by Heide Hopson for Elevated transaminase level, Fatty Liver and Hepatomegaly    Phone: [phone] Fax: [phone]         [address] [facility] [address]    Heide Fay MD MD Family Medicine  9/20/21     Referred Pt to Hepatology for Elevated transaminase level, fatty liver and Hepatomegaly    Phone: [phone] Fax: [phone]         [facility]  [place] [address]    Manjeet Wilde MD MD Hematology All results, Admissions 9/21/21     Phone: [phone] Fax: [phone]         [address] [facility] [address]    Heide Fay MD Assigned PCP   6/18/22     Phone: [phone] Fax: [phone]         [address]  [place] [address]    Daniel Murphy MD MD Ophthalmology  6/27/22     Phone: [phone] Fax: [phone]         [facility] [address]    Joan Lopez, [facility] Assigned MTM Pharmacist   9/28/22      [address] [facility] [address]    Lester Roach MD Assigned Surgical Provider   3/11/23     Phone: [phone] Fax: [phone] [address] [facility] [address]    Manjeet Wilde MD Assigned Cancer Care Provider   7/1/23     Phone: [phone] Fax: [phone] [address] [facility] [address]     Mony Restrepo, RN Specialty Care Coordinator Hematology & Oncology Admissions 12/7/23     Mary Murillo MSW Lead Care Coordinator  Admissions 1/3/24     Indiana Mckenna CHW Community Health Worker Primary Care - CC Admissions 1/3/24     Clemente Hdz MD Physician Endocrinology, Diabetes, and Metabolism  3/15/24     Phone: [phone] Fax: [phone]         [address] [facility] [address]                My Care Plans  Self Management and Treatment Plan    Care Plan  Care Plan: Help At Home       Problem: Insufficient In-home support       Goal: Establish adequate home support       Start Date: 1/2/2024    This Visit's Progress: 50% Recent Progress: 10%    Priority: Low    Note:     Barriers: Limited mobility; Limited support system; diagnoses of multiple, chronic, complex medical conditions  Strengths: Motivated; agreeable to Care Coordination  Patient expressed understanding of goal: Yes  Action steps to achieve this goal:  1. I will review and research resources provided for assistance with House keeping services.-Sent via Mail per Patient request and [employer]    [employer] (286) 650-5119  https://helpatKahunadoor.org/   [employer] is a nonprofit serving seniors and individuals with disabilities across [place].    Our?Store To Door grocery assistance,?home support?and?transportation services?provide help with in-home tasks and chores.     GoGo[employer]parent? 1-302.256.7674  Order rides, grocery delivery, pharmacy delivery, meals, home chores and more by calling our convenient phone number. We intercept GPS issues,  communication troubles and more to oversee trips from request to fulfillment with 100% reliability.?     Home [employer] ([phone] [phone]  [employer] ([phone] [phone]  [facility] ([phone]  [employer] ([phone]  [facility] ([phone]  Senior linkage line 1 800  877-3875-Vqczg run resource line for seniors  2. I will contact Care Coordinator for additional resources if resources provided do not work for my lifestyle/situation.   3. I will contact my care team with questions, concerns or support needs. I will use the clinic as a resource and I understand I can contact my clinic with 24/7 after hours services available. Care Coordinator will remain available as needed.                               Action Plans on File:                       Advance Care Plans/Directives:   Advanced Care Plan/Directives on file:   No    Discussed with patient/caregiver(s):   Declined Further Information             My Medical and Care Information  Problem List   Patient Active Problem List   Diagnosis    Hepatic cyst    Allergic rhinitis    Diverticulitis of colon    Osteopenia of both hips    Esophageal reflux    Mixed incontinence urge and stress (male)(female)    Cystocele, lateral    Vaginal atrophy    Mixed hyperlipidemia    Heart burn    BABB (dyspnea on exertion)    Sebaceous hyperplasia    Seborrheic keratosis    Fatty liver    Personal history of other malignant neoplasm of skin    Health Care Home    Hypertension goal BP (blood pressure) < 140/90    Elevated serum creatinine    Skin exam, screening for cancer    CKD (chronic kidney disease) stage 2, GFR 60-89 ml/min    Type 2 diabetes mellitus with hyperglycemia (H)    Type 2 diabetes with stage 2 chronic kidney disease GFR 60-89 (H)    Type 2 diabetes mellitus without complication, with long-term current use of insulin (H)    Intertriginous candidiasis    Slow transit constipation    Basal cell carcinoma of face    Tubular adenoma of colon    Schatzki's ring of distal esophagus    Adenomatous polyp of duodenum    Folate deficiency    Renal cyst    Diabetic polyneuropathy associated with type 2 diabetes mellitus (H)    Recurrent falls    Numbness and tingling of both feet    Gait instability      Current Medications and Allergies:   See printed Medication Report.    Care Coordination Start Date: 12/19/2023   Frequency of Care Coordination: monthly, more frequently as needed     Form Last Updated: 03/19/2024

## 2024-02-14 NOTE — PROGRESS NOTES
Care Coordination Clinician Chart Review    Situation: Patient chart reviewed by Care Coordinator.       Background: Care Coordination Program started: 12/19/2023. Initial assessment completed and patient-centered care plan(s) were developed with participation from patient. Lead CC handed patient off to CHW for continued outreaches.       Assessment: Per chart review, patient outreach completed by CC CHW on 01/19/2024.  Patient is actively working to accomplish goal(s). Patient's goal(s) appropriate and relevant at this time. Patient is not due for updated Plan of Care.  Assessments will be completed annually or as needed/with change of patient status.      Care Plan: Help At Home       Problem: Insufficient In-home support       Goal: Establish adequate home support       Start Date: 1/2/2024    This Visit's Progress: 10% Recent Progress: 10%    Priority: Low    Note:     Barriers: Limited mobility; Limited support system; diagnoses of multiple, chronic, complex medical conditions  Strengths: Motivated; agreeable to Care Coordination  Patient expressed understanding of goal: Yes  Action steps to achieve this goal:  1. I will review and research resources provided for assistance with House keeping services.-Sent via Mail per Patient request and [employer]    [facility] (533) 253-1890  https://helpatAudioMicrodoor.org/   [facility] is a nonprofit serving seniors and individuals with disabilities across [place]'s [place].    Our?Store To Door grocery assistance,?home support?and?transportation services?provide help with in-home tasks and chores.     [employer]? [phone]  Order rides, grocery delivery, pharmacy delivery, meals, home chores and more by calling our convenient phone number. We intercept GPS issues,  communication troubles and more to oversee trips from request to fulfillment with 100% reliability.?     [facility] [phone]  [facility]  [phone]  [place] [facility] [phone]  [facility] [phone]  [facility] [phone]  Senior linkage line 4 [phone]-State run resource line for seniors  2. I will contact Care Coordinator for additional resources if resources provided do not work for my lifestyle/situation.   3. I will contact my care team with questions, concerns or support needs. I will use the clinic as a resource and I understand I can contact my clinic with 24/7 after hours services available. Care Coordinator will remain available as needed.                                  Plan/Recommendations: The patient will continue working with Care Coordination to achieve goal(s) as above. CHW will continue outreaches at minimum every 30 days and will involve Lead CC as needed or if patient is ready to move to Maintenance. Lead CC will continue to monitor CHW outreaches and patient's progress to goal(s) every 6 weeks.     Plan of Care updated and sent to patient: [facility]ata Murillo [facility]  Social Work Care Cooridinator   [facility]   Phone: [phone]

## 2024-02-16 ENCOUNTER — PATIENT OUTREACH (OUTPATIENT)
Dept: CARE COORDINATION | Facility: CLINIC | Age: 79
End: 2024-02-16
Payer: COMMERCIAL

## 2024-02-16 NOTE — PROGRESS NOTES
Clinic Care Coordination Contact  Community Health Worker Follow Up    Care Gaps:     Health Maintenance Due   Topic Date Due    DEXA  01/03/2021    PHQ-2 (once per calendar year)  01/01/2024    A1C  02/14/2024       Postponed to next CHW outreach.     Care Plan:   Care Plan: Help At Home       Problem: Insufficient In-home support       Goal: Establish adequate home support       Start Date: 1/2/2024    This Visit's Progress: 50% Recent Progress: 10%    Priority: Low    Note:     Barriers: Limited mobility; Limited support system; diagnoses of multiple, chronic, complex medical conditions  Strengths: Motivated; agreeable to Care Coordination  Patient expressed understanding of goal: Yes  Action steps to achieve this goal:  1. I will review and research resources provided for assistance with House keeping services.-Sent via Mail per Patient request and [employer]    [facility] (822) 628-7015  https://Reonomyor.org/   [facility] is a nonprofit serving seniors and individuals with disabilities across [place].    Our?Store To Door grocery assistance,?home support?and?transportation services?provide help with in-home tasks and chores.     Go[employer]? [phone]  Order rides, grocery delivery, pharmacy delivery, meals, home chores and more by calling our convenient phone number. We intercept GPS issues,  communication troubles and more to oversee trips from request to fulfillment with 100% reliability.?     [facility] [phone]  [facility] [phone]  [place] [facility] [phone]  [facility] [phone]  [facility] [phone]  Senior linkage line 7 [phone]-State run resource line for seniors  2. I will contact Care Coordinator for additional resources if resources provided do not work for my lifestyle/situation.   3. I will contact my care team with questions, concerns or  support needs. I will use the clinic as a resource and I understand I can contact my clinic with 24/7 after hours services available. Care Coordinator will remain available as needed.                               Intervention and Education during outreach:   Patient states that she was able to take a look at the housekeeping resources, but has not called out to them. Patient states that her brother in [place] is sick right now so she is focusing on that. Patient states that she would like to be called back in a month.  Patient states that she has no questions or concerns for CC at this time.    CHW Plan: CHW will do next patient outreach in one month.    CHIQUITA Centeno, B.A. [facility] Care Coordination  [facility]:   [facility]  [phone]

## 2024-02-19 NOTE — ANESTHESIA CARE TRANSFER NOTE
Patient: Sarah Felix    Procedure(s):  Upper Endoscopy    Diagnosis: Polyp Of Duodenum   Diagnosis Additional Information: No value filed.    Anesthesia Type:   No value filed.     Note:  Airway :Face Mask  Patient transferred to:PACU  Handoff Report: Identifed the Patient, Identified the Reponsible Provider, Reviewed the pertinent medical history, Discussed the surgical course, Reviewed Intra-OP anesthesia mangement and issues during anesthesia, Set expectations for post-procedure period and Allowed opportunity for questions and acknowledgement of understanding      Vitals: (Last set prior to Anesthesia Care Transfer)    CRNA VITALS  3/14/2019 1427 - 3/14/2019 1457      3/14/2019             NIBP:  133/78    Ht Rate:  85                Electronically Signed By: MALATHI Kellogg CRNA  March 14, 2019  2:57 PM   Detail Level: Detailed

## 2024-02-22 ENCOUNTER — ANCILLARY PROCEDURE (OUTPATIENT)
Dept: BONE DENSITY | Facility: CLINIC | Age: 79
End: 2024-02-22
Attending: FAMILY MEDICINE
Payer: COMMERCIAL

## 2024-02-22 DIAGNOSIS — M85.852 OSTEOPENIA OF BOTH HIPS: ICD-10-CM

## 2024-02-22 DIAGNOSIS — M85.851 OSTEOPENIA OF BOTH HIPS: ICD-10-CM

## 2024-02-22 PROCEDURE — 77080 DXA BONE DENSITY AXIAL: CPT | Performed by: INTERNAL MEDICINE

## 2024-02-26 ENCOUNTER — TELEPHONE (OUTPATIENT)
Dept: PHARMACY | Facility: CLINIC | Age: 79
End: 2024-02-26
Payer: COMMERCIAL

## 2024-02-26 DIAGNOSIS — I10 HYPERTENSION GOAL BP (BLOOD PRESSURE) < 140/90: ICD-10-CM

## 2024-02-26 RX ORDER — METOPROLOL TARTRATE 25 MG/1
TABLET, FILM COATED ORAL
Qty: 90 TABLET | Refills: 1 | Status: SHIPPED | OUTPATIENT
Start: 2024-02-26 | End: 2024-03-05

## 2024-02-26 RX ORDER — LISINOPRIL 2.5 MG/1
2.5 TABLET ORAL DAILY
Qty: 90 TABLET | Refills: 1 | Status: SHIPPED | OUTPATIENT
Start: 2024-02-26 | End: 2024-03-05

## 2024-02-26 NOTE — TELEPHONE ENCOUNTER
2-26-24        Sarah  calls today --needs refills to [facility] mail order for lisinopril and metoprolol.    Mtm will refill both today .    Joan Lopez Rph.  Medication Therapy Management Provider  [phone]

## 2024-03-03 NOTE — RESULT ENCOUNTER NOTE
Ubaldo Smith,  The result of your recent DEXA scan is abnormal.  The density of your bones is thinner than expected. This is called low bone density (osteopenia) when there is mild to moderate thinning and osteoporosis when there  is moderate to severe thinning. This may put you at risk for a fracture.    You have low bone density (osteopenia).    Compared to your previous DEXA scan, these results appear to be unchanged.  However, a direct comparison cannot be made between studies done on different machines.    PLEASE SCHEDULE AN APPOINTMENT with me to further discuss these results, additional testing, and possible treatment options.  This can be done as a virtual visit (a scheduled video or telephone visit).    To help prevent further loss of bone, the following is recommended:    Take in adequate amounts of Calcium and Vitamin D. These are the building blocks for bones. Most women will need 1500mg of Calcium and 1000 international units of Vitamin D daily.  It is best to get your calcium through your diet; if you get 3 servings of dairy products daily you should not need calcium supplements.      Exercise daily to keep your bones strong. Thirty minutes of moderate walking or other aerobic activity is recommended.    If you are a smoker, make an appointment to discuss smoking cessation.    If you have any questions or concerns, please schedule an appointment with me to further discuss these results.   Heide Fay MD

## 2024-03-03 NOTE — PROGRESS NOTES
Medication Therapy Management (MTM) Encounter    ASSESSMENT:                            Medication Adherence/Access: none     Type 2 Diabetes:  Patient is meeting A1c goal of </7.5%(6.6%). Self monitoring of blood glucose is now at goal of fasting  mg/dL and post prandial <180mg/dL. Patient has for last 2 weeks >70% cgm time in target range.  Metformin ;  Stay on 500mg. daily dose .   Basal Insulin (Lantus ) :Stay on 10 units daily in am .   Novolog (mealtime insulin ) : Stay on 10 units /injection --only use when high carbohydrate meal spikes bs's to >250mg./dl.   SFU: Reduce Glimepiride dose to 2mg. Am only now. To avoid nocturnal hypoglycemia.     Increased exercise/low carb/calorie diet + wt. Loss-very beneficial for her NAFLD--this is working well --continue as is .       Immunizations: Up to date.     Hypokalemia:  recheck BMP lab today --result is pending.         Hyperlipidemia: Stable. Patient is appropriately on high intensity statin which is indicated based on 2019 ACC/AHA guidelines for lipid management with an ASCVD risk of 33%.      Hypertension: Stable. Patient is meeting blood pressure goal of < 140/90mmHg.     Vitamin B12: is at goal of 600-1000pg/mL. Pt would benefit from vitamin B12 lab recheck in 12 months.      Essential Thrombocythemia:  Platelets continue to lessen each month on hydrea -lab recheck today --result is pending.     PLAN:                            1. A1c today =6.6 % --but you have a few lows --lets stop the PM or evening dose of 1/2 tab of glimepiride , stay on all other diabetes doses/meds.     2. Watch mychart for cbc with platelets and BMP lab results.         Follow-up: Return in about 18 weeks (around 7/9/2024), or @ 3 PM, for Medication Therapy Management Visit, Lab Work, A1c lab, Blood sugar meter review, BP Recheck.      SUBJECTIVE/OBJECTIVE:                          Sarah Felix is a 78 year old female coming into clinic today for a follow-up (11-14-23)  visit. She was referred to me from Dr. Collette Fay.     Reason for visit:   Labs/bs/meds review.        Allergies/ADRs: Reviewed in chart  Tobacco: She reports that she quit smoking about 44 years ago. Her smoking use included cigarettes. She has been exposed to tobacco smoke. She has never used smokeless tobacco.  Alcohol: 7-9 beverages / week typically wine 1 or 2 glasses/night - reports that she hasn't had any alcohol in the last week and a half until they figure out her liver situation.   Caffeine: 2 cans diet coke sodas/day  Activity: reports she has low energy and stamina, if she does any activity she has to take breaks frequenty but now that the weather is better she is doing more outdoor work.   Past Medical History: Reviewed in chart  Personal Healthcare Goals: figure out what is going on with liver or blood, would like to get 3rd vaccine, improve a1c  Uses 4 prong CANE now in 2023 to walk /get around.   Still falls several x year --unsteady /poor balance.     Medication Adherence/Access:  None     Type 2 Diabetes:  Currently taking : glimepiride 2 mg. In am and 2mg in pm ,  Novolog  insulin very rare now  (10 units once daily to control daily high sugar (>250) -perhaps now if dessert or juice spiked blood sugar) Metformin 500mg. ER tabs -- 1 tablet /day now early  afternoon or dinner daily.  Afraid of bottoming out blood sugar wise.    Lantus -10 units /day now in AM   Potential side effects : constipated now -started senna 1 tab /day recently.       SMBG:                  Wt Readings from Last 5 Encounters:   03/05/24 160 lb 11.2 oz (72.9 kg)   01/30/24 162 lb 1.6 oz (73.5 kg)   12/19/23 163 lb 4.8 oz (74.1 kg)   11/14/23 161 lb 4.8 oz (73.2 kg)   10/11/23 161 lb (73 kg)         Symptoms of low blood sugar? sweaty  Symptoms of high blood sugar? Fatigue, shaky, pit in stomach, overall feeling poor   Eye exam: up to date  Foot exam: up to date  Diet: doing her best to control carbs in daily diet.    Previously: she's maintained similar diet and notes sugars now being elevated  eats 4-5 smaller meals - pasta and rices  Fruit and some fruit juices   Breakfast: cereal 2-3 times per week, drink 2 glasses OJ or grapefruit juice /day .   Exercise: see social history --saw liver specialist suggested swimming.   Aspirin: Taking 81 mg daily and denies side effects  Statin: Yes: atorvastatin 40 mg   ACEi/ARB: Yes: low dose Lisinopril 2.5mg./day  Urine Albumin:   Lab Results   Component Value Date    UMALCR  12/19/2023      Comment:      Unable to calculate, urine albumin and/or urine creatinine is outside detectable limits.  Microalbuminuria is defined as an albumin:creatinine ratio of 17 to 299 for males and 25 to 299 for females. A ratio of albumin:creatinine of 300 or higher is indicative of overt proteinuria.  Due to biologic variability, positive results should be confirmed by a second, first-morning random or 24-hour timed urine specimen. If there is discrepancy, a third specimen is recommended. When 2 out of 3 results are in the microalbuminuria range, this is evidence for incipient nephropathy and warrants increased efforts at glucose control, blood pressure control, and institution of therapy with an angiotensin-converting-enzyme (ACE) inhibitor (if the patient can tolerate it).       Lab Results   Component Value Date    A1C 6.6 03/05/2024    A1C 7.4 11/14/2023    A1C 9.0 08/15/2023    A1C 9.7 07/19/2023    A1C 7.1 05/23/2023    A1C 8.3 06/21/2021    A1C 9.4 12/14/2020    A1C 8.1 09/21/2020    A1C 8.5 01/07/2020    A1C 7.6 10/12/2019         Wt Readings from Last 5 Encounters:   03/05/24 160 lb 11.2 oz (72.9 kg)   01/30/24 162 lb 1.6 oz (73.5 kg)   12/19/23 163 lb 4.8 oz (74.1 kg)   11/14/23 161 lb 4.8 oz (73.2 kg)   10/11/23 161 lb (73 kg)           Immunizations: upto date now.     Hypokalemia:  Patient reports the new potassium 10 meq/day capsule is working much better and is easy to take.   Potassium    Date Value Ref Range Status   11/14/2023 3.8 3.4 - 5.3 mmol/L Final   03/28/2022 4.1 3.4 - 5.3 mmol/L Final   01/07/2020 4.0 3.4 - 5.3 mmol/L Final       Hyperlipidemia: Current therapy includes : atorvastatin 40mg daily.  Patient reports no significant myalgias or other side effects.  The 10-year ASCVD risk score (Ammy VELEZ, et al., 2019) is: 26.4%    Values used to calculate the score:      Age: 78 years      Sex: Female      Is Non- : No      Diabetic: Yes      Tobacco smoker: No      Systolic Blood Pressure: 100 mmHg      Is BP treated: Yes      HDL Cholesterol: 53 mg/dL      Total Cholesterol: 151 mg/dL  Recent Labs   Lab Test 11/14/23  1514 09/23/22  1320   CHOL 151 131   HDL 53 49*   LDL 62 55   TRIG 180* 137       Hypertension: Current medications include : pcp stopped Metoprolol previously , back on 2.5mg. daily Lisinopril  + furosemide 20mg -1 tab in am and early afternoon now .  Patient does not self-monitor blood pressure. No side effects from current medications.        BP Readings from Last 3 Encounters:   03/05/24 100/60   01/30/24 113/72   12/19/23 104/68     Vitamin B12: level was  645  On 3-13-23. Vitamin B12 helps support memory and lessens fatigue. She is taking daily OTC pill- 2500mcg. B-12 pill --3 x week now.       Essential Thrombocythemia:   Manjeet Wilde MD   of Medicine, Division of Hematology, Oncology and Transplantation  [employer] [facility] [employer]  MD started her on 500mg./day Hydrea (states [facility] last 7 days she has been off it ) + 81mg. ASA daily  last month --here today for cbc + platelets recheck .    CBC RESULTS:   Recent Labs   Lab Test 11/14/23  1514   WBC 7.7   RBC 3.48*   HGB 12.5   HCT 37.4   *   MCH 35.9*   MCHC 33.4   RDW 15.4*   *       /60   Pulse 79   Wt 160 lb 11.2 oz (72.9 kg)   SpO2 99%   BMI 27.98 kg/m       --------------------------------------------------------------------------------------------------    I spent 30 minutes with this patient today. All changes were made via collaborative practice agreement with Heide Fay MD. A copy of the visit note was provided to the patient's primary care provider.    The patient was given a summary of these recommendations. She admits computer not working at home to view [employer]  So [facility] helped her reset username /pword on her cell phone --so now she can receive [employer] messages.     Joan Lopez [facility].  Medication Therapy Management Provider  [phone]           Medication Therapy Recommendations  Type 2 diabetes mellitus without complication, with long-term current use of insulin (H)    Current Medication: glimepiride (AMARYL) 4 MG tablet (Discontinued)   Rationale: Dose too high - Dosage too high - Safety   Recommendation: Decrease Dose - glimepiride 2 MG tablet - 1 tab only in am daily.   Status: Accepted per CPA

## 2024-03-05 ENCOUNTER — TELEPHONE (OUTPATIENT)
Dept: FAMILY MEDICINE | Facility: CLINIC | Age: 79
End: 2024-03-05

## 2024-03-05 ENCOUNTER — LAB (OUTPATIENT)
Dept: LAB | Facility: CLINIC | Age: 79
End: 2024-03-05
Payer: COMMERCIAL

## 2024-03-05 ENCOUNTER — OFFICE VISIT (OUTPATIENT)
Dept: PHARMACY | Facility: CLINIC | Age: 79
End: 2024-03-05
Payer: COMMERCIAL

## 2024-03-05 VITALS
WEIGHT: 160.7 LBS | SYSTOLIC BLOOD PRESSURE: 100 MMHG | BODY MASS INDEX: 27.98 KG/M2 | HEART RATE: 79 BPM | OXYGEN SATURATION: 99 % | DIASTOLIC BLOOD PRESSURE: 60 MMHG

## 2024-03-05 DIAGNOSIS — N18.2 TYPE 2 DIABETES MELLITUS WITH STAGE 2 CHRONIC KIDNEY DISEASE, WITH LONG-TERM CURRENT USE OF INSULIN (H): Primary | ICD-10-CM

## 2024-03-05 DIAGNOSIS — E87.6 HYPOKALEMIA: ICD-10-CM

## 2024-03-05 DIAGNOSIS — Z79.4 TYPE 2 DIABETES MELLITUS WITH STAGE 2 CHRONIC KIDNEY DISEASE, WITH LONG-TERM CURRENT USE OF INSULIN (H): Primary | ICD-10-CM

## 2024-03-05 DIAGNOSIS — E53.8 VITAMIN B12 DEFICIENCY (NON ANEMIC): ICD-10-CM

## 2024-03-05 DIAGNOSIS — Z79.4 TYPE 2 DIABETES MELLITUS WITH STAGE 2 CHRONIC KIDNEY DISEASE, WITH LONG-TERM CURRENT USE OF INSULIN (H): ICD-10-CM

## 2024-03-05 DIAGNOSIS — E11.22 TYPE 2 DIABETES MELLITUS WITH STAGE 2 CHRONIC KIDNEY DISEASE, WITH LONG-TERM CURRENT USE OF INSULIN (H): Primary | ICD-10-CM

## 2024-03-05 DIAGNOSIS — E78.5 HYPERLIPIDEMIA LDL GOAL <100: ICD-10-CM

## 2024-03-05 DIAGNOSIS — Z23 ENCOUNTER FOR IMMUNIZATION: ICD-10-CM

## 2024-03-05 DIAGNOSIS — E11.9 TYPE 2 DIABETES, HBA1C GOAL < 7% (H): ICD-10-CM

## 2024-03-05 DIAGNOSIS — D47.3 ESSENTIAL THROMBOCYTHEMIA (H): ICD-10-CM

## 2024-03-05 DIAGNOSIS — N18.2 TYPE 2 DIABETES MELLITUS WITH STAGE 2 CHRONIC KIDNEY DISEASE, WITH LONG-TERM CURRENT USE OF INSULIN (H): ICD-10-CM

## 2024-03-05 DIAGNOSIS — E11.22 TYPE 2 DIABETES MELLITUS WITH STAGE 2 CHRONIC KIDNEY DISEASE, WITH LONG-TERM CURRENT USE OF INSULIN (H): ICD-10-CM

## 2024-03-05 DIAGNOSIS — I10 HYPERTENSION GOAL BP (BLOOD PRESSURE) < 140/90: ICD-10-CM

## 2024-03-05 LAB
BASOPHILS # BLD AUTO: 0.1 10E3/UL (ref 0–0.2)
BASOPHILS NFR BLD AUTO: 1 %
EOSINOPHIL # BLD AUTO: 0.1 10E3/UL (ref 0–0.7)
EOSINOPHIL NFR BLD AUTO: 1 %
ERYTHROCYTE [DISTWIDTH] IN BLOOD BY AUTOMATED COUNT: 15.3 % (ref 10–15)
HBA1C MFR BLD: 6.6 % (ref 0–5.6)
HCT VFR BLD AUTO: 37.1 % (ref 35–47)
HGB BLD-MCNC: 12.1 G/DL (ref 11.7–15.7)
IMM GRANULOCYTES # BLD: 0 10E3/UL
IMM GRANULOCYTES NFR BLD: 0 %
LYMPHOCYTES # BLD AUTO: 1.5 10E3/UL (ref 0.8–5.3)
LYMPHOCYTES NFR BLD AUTO: 22 %
MCH RBC QN AUTO: 38.4 PG (ref 26.5–33)
MCHC RBC AUTO-ENTMCNC: 32.6 G/DL (ref 31.5–36.5)
MCV RBC AUTO: 118 FL (ref 78–100)
MONOCYTES # BLD AUTO: 0.6 10E3/UL (ref 0–1.3)
MONOCYTES NFR BLD AUTO: 9 %
NEUTROPHILS # BLD AUTO: 4.5 10E3/UL (ref 1.6–8.3)
NEUTROPHILS NFR BLD AUTO: 67 %
PLATELET # BLD AUTO: 502 10E3/UL (ref 150–450)
RBC # BLD AUTO: 3.15 10E6/UL (ref 3.8–5.2)
WBC # BLD AUTO: 6.8 10E3/UL (ref 4–11)

## 2024-03-05 PROCEDURE — 85025 COMPLETE CBC W/AUTO DIFF WBC: CPT

## 2024-03-05 PROCEDURE — 99605 MTMS BY PHARM NP 15 MIN: CPT | Performed by: PHARMACIST

## 2024-03-05 PROCEDURE — 99607 MTMS BY PHARM ADDL 15 MIN: CPT | Performed by: PHARMACIST

## 2024-03-05 PROCEDURE — 80048 BASIC METABOLIC PNL TOTAL CA: CPT

## 2024-03-05 PROCEDURE — 36415 COLL VENOUS BLD VENIPUNCTURE: CPT

## 2024-03-05 PROCEDURE — 83036 HEMOGLOBIN GLYCOSYLATED A1C: CPT

## 2024-03-05 RX ORDER — GLIMEPIRIDE 2 MG/1
2 TABLET ORAL
COMMUNITY
Start: 2024-03-05 | End: 2024-04-09

## 2024-03-05 RX ORDER — LISINOPRIL 2.5 MG/1
2.5 TABLET ORAL DAILY
Qty: 90 TABLET | Refills: 3 | Status: SHIPPED | OUTPATIENT
Start: 2024-03-05

## 2024-03-05 RX ORDER — METFORMIN HCL 500 MG
500 TABLET, EXTENDED RELEASE 24 HR ORAL
COMMUNITY
Start: 2024-03-05

## 2024-03-05 RX ORDER — METOPROLOL TARTRATE 25 MG/1
TABLET, FILM COATED ORAL
Qty: 90 TABLET | Refills: 3 | Status: SHIPPED | OUTPATIENT
Start: 2024-03-05

## 2024-03-05 NOTE — PATIENT INSTRUCTIONS
"Recommendations from today's MTM visit:                                                         1. A1c today =6.6 % --but you have a few lows --lets stop the PM or evening dose of 1/2 tab of glimepiride , stay on all other diabetes doses/meds.     2. Watch mychart for cbc with platelets and BMP lab results.         Follow-up: Return in about 18 weeks (around 7/9/2024), or @ 3 PM, for Medication Therapy Management Visit, Lab Work, A1c lab, Blood sugar meter review, BP Recheck.    It was great speaking with you today.  I value your experience and would be very thankful for your time in providing feedback in our clinic survey. In the next few days, you may receive an email or text message from [employer] with a link to a survey related to your  clinical pharmacist.\"     To schedule another MTM appointment, please call the clinic directly or you may call the MTM scheduling line at [phone] or toll-free at [phone].     My Clinical Pharmacist's contact information:                                                      Please feel free to contact me with any questions or concerns you have.      Joan Lopez Rph.  Medication Therapy Management Provider  [phone]    "

## 2024-03-05 NOTE — LETTER
March 5, 2024  Sarah Felix  [address] [place] [address]    Dear Ms. Felix, [facility]     Thank you for talking with me on Mar 5, 2024 about your health and medications. As a follow-up to our conversation, I have included two documents:      Your Recommended To-Do List has steps you should take to get the best results from your medications.  Your Medication List will help you keep track of your medications and how to take them.    If you want to talk about these documents, please call Joan Lopez RPH at phone: [phone], Monday-Friday 8-4:30pm.    I look forward to working with you and your doctors to make sure your medications work well for you.    Sincerely,  Joan Lopez RPH  [facility] Pharmacist, [facility]

## 2024-03-05 NOTE — Clinical Note
Collette--kayce A1c lowest nato r 6.6% --I did cut back her glimep dose. bP low normal. She is falling a bit --unsure why but she states she just feels balance is off --she thinks you wanted to see her soon for something but no appt. Listed --please review and if you need to see her soon have nurse call and schedule appt. With you .  Thanks -Joan

## 2024-03-05 NOTE — TELEPHONE ENCOUNTER
"Patient reported to [facility] pharmacist at visit today that her balance has been off and she has fallen.      Please off her appointment with me for further evaluation.  OK to use \"APPROVAL REQ\" slot.    Heide Fay MD   "

## 2024-03-05 NOTE — LETTER
_  Medication List        Prepared on: 03/05/2024     Bring your Medication List when you go to the doctor, hospital, or   emergency room. And, share it with your family or caregivers.     Note any changes to how you take your medications.  Cross out medications when you no longer use them.    Medication How I take it Why I use it Prescriber   aspirin 81 MG EC tablet Take 81 mg by mouth daily  Prevent stroke /MI Manjeet Wilde MD   atorvastatin (LIPITOR) 40 MG tablet Take 1 tablet (40 mg) by mouth daily Hyperlipidemia LDL Goal <100 Heide Fay MD   blood glucose (ONETOUCH ULTRA) test strip 1 strip by In Vitro route 4 times daily. Type 2 Diabetes, HbA1c Goal < 7% (H) Remington Nallely, DO   CALCIUM 500 + D 500-200 MG-IU OR TABS one tab twice per day Osteopenia Ute Campbell MD   Cholecalciferol (VITAMIN D PO) Pt consuming 3000 units per day  General health  Patient Reported   Continuous Blood Gluc Sensor (Pythagoras SolarSTYLE CARMELA 14 DAY SENSOR) MISC CHANGE EVERY 14 DAYS Type 2 diabetes mellitus with hyperglycemia, without long-term current use of insulin (H) Heide Fay MD   folic acid (FOLVITE) 400 MCG tablet Take 1 tablet (400 mcg) by mouth daily Folate Deficiency Heide Fay MD   furosemide (LASIX) 20 MG tablet Take 1 tablet (20 mg) by mouth 2 times daily Hypertension Goal BP (Blood Pressure) < 140/90 Heide Fay MD   glimepiride (AMARYL) 2 MG tablet Take 2 mg by mouth every morning (before breakfast) Type 2 Diabetes, HbA1c Goal < 7% (H) Heide Fay MD   hydroxyurea (HYDREA) 500 MG capsule Take 1 capsule (500 mg) by mouth daily Essential Thrombocythemia (H) Manjeet Wilde MD   insulin aspart (NOVOLOG PEN) 100 UNIT/ML pen 10 units with a high carb meal about once daily as needed. Type 2 diabetes mellitus with stage 2 chronic kidney disease, with long-term current use of insulin (H) Heide Fay MD   insulin glargine (LANTUS PEN) 100 UNIT/ML pen Inject 10 Units Subcutaneous every  "morning Type 2 Diabetes Patient Reported   insulin pen needle (BD REY U/F) 32G X 4 MM miscellaneous USE 1 PEN NEEDLE four times daily. (WITH LANTUS AND Novolog) Type 2 diabetes mellitus with stage 2 chronic kidney disease, with long-term current use of insulin (H) Heide Fay MD   insulin syringe-needle U-100 (BD INSULIN SYRINGE ULTRAFINE) 31G X 5/16\" 1 ML miscellaneous Use 1 syringes twice daily for insulin and victoza and for symptoms of hypoglycemia Type 2 diabetes mellitus with stage 2 chronic kidney disease, without long-term current use of insulin (H) Joel Daniel Irwin Wegener, MD   Lancets (ONETOUCH DELICA PLUS KPGAHR58C) MISC 1 each 4 times daily Type 2 Diabetes, HbA1c Goal < 7% (H) Remington Browning DO   latanoprost (XALATAN) 0.005 % ophthalmic solution Place 1 drop into both eyes At Bedtime Glaucoma suspect of both eyes Lester Pimentel MD   lisinopril (ZESTRIL) 2.5 MG tablet Take 1 tablet (2.5 mg) by mouth daily Hypertension Goal BP (Blood Pressure) < 140/90 Heide Fay MD   metFORMIN (GLUCOPHAGE XR) 500 MG 24 hr tablet Take 1 tablet (500 mg) by mouth daily (with dinner) Type 2 diabetes mellitus with stage 2 chronic kidney disease, with long-term current use of insulin (H) Heide Fay MD   metoprolol tartrate (LOPRESSOR) 25 MG tablet Take 1/2 tablet or 12.5mg by mouth twice daily. Hypertension Goal BP (Blood Pressure) < 140/90 Heide Fay MD   nystatin (MYCOSTATIN) 306046 UNIT/GM external cream Apply topically 2 times daily Rash Joel Daniel Irwin Wegener, MD   potassium chloride ER (MICRO-K) 10 MEQ CR capsule Take 1 capsule (10 mEq) by mouth daily Hypokalemia Heide Fay MD   triamcinolone (KENALOG) 0.1 % external cream Apply topically 3 times daily Rash Joel Daniel Irwin Wegener, MD   vitamin B-12 (CYANOCOBALAMIN) 2500 MCG sublingual tablet Take 2500 mcg by mouth three times per week Vitamin B12 Deficiency (Non Anemic) Heide Fay MD         Add new medications, " over-the-counter drugs, herbals, vitamins, or  minerals in the blank rows below.    Medication How I take it Why I use it Prescriber                                      Allergies:      actos [pioglitazone]; amlodipine; ancef [cefazolin]; levaquin; cephalosporins; clindamycin; levofloxacin; nickel        Side effects I have had:               Other Information:              My notes and questions:

## 2024-03-05 NOTE — LETTER
"Recommended To-Do List      Prepared on: 03/05/2024       You can get the best results from your medications by completing the items on this \"To-Do List.\"      Bring your To-Do List when you go to your doctor. And, share it with your family or caregivers.    My To-Do List:  What we talked about: What I should do:   Your medication dosage being too high    Decrease your dosage of glimepiride (AMARYL) -stop the evening glimepiride dose and just take 2mg. Every morning.           What we talked about: What I should do:                     "

## 2024-03-06 LAB
ANION GAP SERPL CALCULATED.3IONS-SCNC: 15 MMOL/L (ref 7–15)
BUN SERPL-MCNC: 10.3 MG/DL (ref 8–23)
CALCIUM SERPL-MCNC: 9.4 MG/DL (ref 8.8–10.2)
CHLORIDE SERPL-SCNC: 101 MMOL/L (ref 98–107)
CREAT SERPL-MCNC: 0.96 MG/DL (ref 0.51–0.95)
DEPRECATED HCO3 PLAS-SCNC: 22 MMOL/L (ref 22–29)
EGFRCR SERPLBLD CKD-EPI 2021: 61 ML/MIN/1.73M2
GLUCOSE SERPL-MCNC: 155 MG/DL (ref 70–99)
POTASSIUM SERPL-SCNC: 3.8 MMOL/L (ref 3.4–5.3)
SODIUM SERPL-SCNC: 138 MMOL/L (ref 135–145)

## 2024-03-14 ENCOUNTER — OFFICE VISIT (OUTPATIENT)
Dept: FAMILY MEDICINE | Facility: CLINIC | Age: 79
End: 2024-03-14
Payer: COMMERCIAL

## 2024-03-14 VITALS
RESPIRATION RATE: 20 BRPM | BODY MASS INDEX: 27.49 KG/M2 | HEART RATE: 84 BPM | WEIGHT: 161 LBS | TEMPERATURE: 97.1 F | DIASTOLIC BLOOD PRESSURE: 78 MMHG | OXYGEN SATURATION: 96 % | SYSTOLIC BLOOD PRESSURE: 114 MMHG | HEIGHT: 64 IN

## 2024-03-14 DIAGNOSIS — M85.851 OSTEOPENIA OF BOTH HIPS: ICD-10-CM

## 2024-03-14 DIAGNOSIS — E53.1 VITAMIN B6 DEFICIENCY: ICD-10-CM

## 2024-03-14 DIAGNOSIS — R20.0 NUMBNESS AND TINGLING OF BOTH FEET: Primary | ICD-10-CM

## 2024-03-14 DIAGNOSIS — R20.2 NUMBNESS AND TINGLING OF BOTH FEET: Primary | ICD-10-CM

## 2024-03-14 DIAGNOSIS — R29.6 RECURRENT FALLS: ICD-10-CM

## 2024-03-14 DIAGNOSIS — M85.852 OSTEOPENIA OF BOTH HIPS: ICD-10-CM

## 2024-03-14 DIAGNOSIS — R26.81 GAIT INSTABILITY: ICD-10-CM

## 2024-03-14 DIAGNOSIS — E11.42 DIABETIC POLYNEUROPATHY ASSOCIATED WITH TYPE 2 DIABETES MELLITUS (H): ICD-10-CM

## 2024-03-14 PROCEDURE — 99214 OFFICE O/P EST MOD 30 MIN: CPT | Performed by: FAMILY MEDICINE

## 2024-03-14 PROCEDURE — 84425 ASSAY OF VITAMIN B-1: CPT | Mod: 90 | Performed by: FAMILY MEDICINE

## 2024-03-14 PROCEDURE — 84207 ASSAY OF VITAMIN B-6: CPT | Mod: 90 | Performed by: FAMILY MEDICINE

## 2024-03-14 PROCEDURE — 84166 PROTEIN E-PHORESIS/URINE/CSF: CPT | Performed by: PATHOLOGY

## 2024-03-14 PROCEDURE — 84155 ASSAY OF PROTEIN SERUM: CPT | Performed by: FAMILY MEDICINE

## 2024-03-14 PROCEDURE — 99000 SPECIMEN HANDLING OFFICE-LAB: CPT | Performed by: FAMILY MEDICINE

## 2024-03-14 PROCEDURE — 36415 COLL VENOUS BLD VENIPUNCTURE: CPT | Performed by: FAMILY MEDICINE

## 2024-03-14 PROCEDURE — 84165 PROTEIN E-PHORESIS SERUM: CPT | Performed by: PATHOLOGY

## 2024-03-14 ASSESSMENT — PATIENT HEALTH QUESTIONNAIRE - PHQ9
SUM OF ALL RESPONSES TO PHQ QUESTIONS 1-9: 10
10. IF YOU CHECKED OFF ANY PROBLEMS, HOW DIFFICULT HAVE THESE PROBLEMS MADE IT FOR YOU TO DO YOUR WORK, TAKE CARE OF THINGS AT HOME, OR GET ALONG WITH OTHER PEOPLE: SOMEWHAT DIFFICULT
SUM OF ALL RESPONSES TO PHQ QUESTIONS 1-9: 10

## 2024-03-14 NOTE — PROGRESS NOTES
"  Assessment & Plan     Numbness and tingling of both feet  Discussed that this is most likely diabetic neuropathy.  Her Vit B12 level has been adequate and we'll check additional labs below.  Discussed that we likely cannot reverse the neuropathy but that she can reduce her risk of falling by working with Physical Therapy on strengthening her LE's and gait training.  I also recommended she not wear her Crocs for house shoes; a snug shoe with good support would be better.  I also reviewed that any alcohol use will increase her unsteadiness and risk of falling.  - Vitamin B1 whole blood  - Vitamin B6  - Protein electrophoresis random urine  - Protein electrophoresis  - Physical Therapy  Referral    Gait instability  Recurrent falls  - Physical Therapy  Referral    Osteopenia of both hips  Due to elevated FRAX score (and recurrent falls) I do recommend treatment of her low bone density.  She is having problems with her dental implant and anticipates she may need invasive dental work so I recommended against starting a bisphosphonate at this time.  I have referred her to Endo to discuss alternatives.    - Adult Endocrinology  Referral    Diabetic polyneuropathy associated with type 2 diabtes mellitus (H)  stable/well controlled.  She'll continue to work with Joan Lopez, [facility], MTM Pharmacist on her diabetes management.      Depression Screening Follow Up      3/14/2024     4:31 PM   PHQ   PHQ-9 Total Score 10   Q9: Thoughts of better off dead/self-harm past 2 weeks Not at all   Follow Up Actions Taken  Crisis resource information provided in After Visit Summary  Patient attributes her low mood to her brother's declining health, stating \"my brother is dying.\"      The longitudinal plan of care for the diagnosis(es)/condition(s) as documented were addressed during this visit. Due to the added complexity in care, I will continue to support Sarah in the subsequent management and with ongoing " "continuity of care.     See Patient Instructions        Subjective   Sarah is a 78 year old, presenting for the following health issues:  Fall      3/14/2024     4:31 PM   Additional Questions   Roomed by jacob   Accompanied by self     Fall    History of Present Illness       Reason for visit:  Fall    She eats 0-1 servings of fruits and vegetables daily.She consumes 0 sweetened beverage(s) daily.She exercises with enough effort to increase her heart rate 9 or less minutes per day.  She exercises with enough effort to increase her heart rate 3 or less days per week.   She is taking medications regularly.     Has had problems with falling since she went skiing as a teenager and hurt her left ankle.  She fractured the left ankle 5 years ago, requiring ORIF.  She notes she has been falling more frequently lately.  Her feet are numb and this affects her balance.  Numbness extends up to above the ankle joint bilaterally.  She describes the numbness as \"prickly\" but not painful.  No other body areas with numbness/tingling.    For the past few weeks she's been falling in the house which is new/unusual for her.  No dizziness.  No lightheadedness or presyncope sensation.  No sense of give-way in any lower extremity joints  She states she drinks alcohol once daily - rum and coke.  1-1.5 ounces rum per serving.        2/16/2023     5:07 PM 3/13/2023     7:57 AM 3/14/2024     4:31 PM   PHQ   PHQ-9 Total Score 10 5 10   Q9: Thoughts of better off dead/self-harm past 2 weeks Not at all Not at all Not at all            Objective    /78 (BP Location: Right arm, Patient Position: Sitting, Cuff Size: Adult Regular)   Pulse 84   Temp 97.1  F (36.2  C) (Temporal)   Resp 20   Ht 1.62 m (5' 3.78\")   Wt 73 kg (161 lb)   SpO2 96%   BMI 27.83 kg/m    Body mass index is 27.83 kg/m .  Physical Exam   GEN:  no apparent distress  FOOT EXAM:  Bilateral feet examined.  No lesions or deformities noted.  No swelling.  Skin is warm and " dry.  DP pulses are strong bilaterally.  She notes that light touch sensation is qualitatively different/duller below the distal leg.   NEURO:  No focal deficits noted, No facial asymmetry, Equal movement of all 4 extremities, She uses a cane for ambulation and is unable to walk on toes or heels, Gait is slow and unsteady.        DEXA 2/2024: lowest T = -2 (right fem neck) - new machine.  By FRAX tool, 10-year fracture risk calculates to 20.4% for any pathologic fracture (significant if > 20%) and 5.1% for hip fracture (significant if > 3%)    Lab on 03/05/2024   Component Date Value Ref Range Status    Hemoglobin A1C 03/05/2024 6.6 (H)  0.0 - 5.6 % Final    Normal <5.7%   Prediabetes 5.7-6.4%    Diabetes 6.5% or higher     Note: Adopted from ADA consensus guidelines.    WBC Count 03/05/2024 6.8  4.0 - 11.0 10e3/uL Final    RBC Count 03/05/2024 3.15 (L)  3.80 - 5.20 10e6/uL Final    Hemoglobin 03/05/2024 12.1  11.7 - 15.7 g/dL Final    Hematocrit 03/05/2024 37.1  35.0 - 47.0 % Final    MCV 03/05/2024 118 (H)  78 - 100 fL Final    MCH 03/05/2024 38.4 (H)  26.5 - 33.0 pg Final    MCHC 03/05/2024 32.6  31.5 - 36.5 g/dL Final    RDW 03/05/2024 15.3 (H)  10.0 - 15.0 % Final    Platelet Count 03/05/2024 502 (H)  150 - 450 10e3/uL Final    % Neutrophils 03/05/2024 67  % Final    % Lymphocytes 03/05/2024 22  % Final    % Monocytes 03/05/2024 9  % Final    % Eosinophils 03/05/2024 1  % Final    % Basophils 03/05/2024 1  % Final    % Immature Granulocytes 03/05/2024 0  % Final    Absolute Neutrophils 03/05/2024 4.5  1.6 - 8.3 10e3/uL Final    Absolute Lymphocytes 03/05/2024 1.5  0.8 - 5.3 10e3/uL Final    Absolute Monocytes 03/05/2024 0.6  0.0 - 1.3 10e3/uL Final    Absolute Eosinophils 03/05/2024 0.1  0.0 - 0.7 10e3/uL Final    Absolute Basophils 03/05/2024 0.1  0.0 - 0.2 10e3/uL Final    Absolute Immature Granulocytes 03/05/2024 0.0  <=0.4 10e3/uL Final    Sodium 03/05/2024 138  135 - 145 mmol/L Final    Reference intervals  for this test were updated on 09/26/2023 to more accurately reflect our healthy population. There may be differences in the flagging of prior results with similar values performed with this method. Interpretation of those prior results can be made in the context of the updated reference intervals.     Potassium 03/05/2024 3.8  3.4 - 5.3 mmol/L Final    Chloride 03/05/2024 101  98 - 107 mmol/L Final    Carbon Dioxide (CO2) 03/05/2024 22  22 - 29 mmol/L Final    Anion Gap 03/05/2024 15  7 - 15 mmol/L Final    Urea Nitrogen 03/05/2024 10.3  8.0 - 23.0 mg/dL Final    Creatinine 03/05/2024 0.96 (H)  0.51 - 0.95 mg/dL Final    GFR Estimate 03/05/2024 61  >60 mL/min/1.73m2 Final    Calcium 03/05/2024 9.4  8.8 - 10.2 mg/dL Final    Glucose 03/05/2024 155 (H)  70 - 99 mg/dL Final      Signed Electronically by: Heide Fay MD

## 2024-03-14 NOTE — Clinical Note
Joan, I see we're not getting a BPA recommending SGLT2i.  This is the first time I've seen the new BPA.

## 2024-03-14 NOTE — LETTER
March 21, 2024      Sarah C Elvira  [address] [place] [address]        Dear ,    We are writing to inform you of your test results.    Your Vitamin B6 level is on the low side.  That could contribute to numbness/tingling symptoms.  I recommend that you take a Vitamin B6 supplement and I sent a prescription for 25 mg tablets to your pharmacy.  Take 25 mg daily.      Resulted Orders   Vitamin B1 whole blood   Result Value Ref Range    Vitamin B1 Whole Blood Level 94 70 - 180 nmol/L      Comment:      INTERPRETIVE INFORMATION: Vitamin B1, Whole Blood    This assay measures the concentration of thiamine   diphosphate (TDP), the primary active form of vitamin B1.   Approximately 90 percent of vitamin B1 present in whole   blood is TDP. Thiamine and thiamine monophosphate, which   comprise the remaining 10 percent, are not measured.    This test was developed and its performance characteristics   determined by [facility]. It has not been cleared or   approved by the US Food and Drug Administration. This test   was performed in a CLIA certified laboratory and is   intended for clinical purposes.  Performed By: [facility]  [address] [place] [id number]  : Chaz Ha MD, PhD  CLIA Number: [id number]   Vitamin B6   Result Value Ref Range    Vitamin B6 15.5 (L) 20.0 - 125.0 nmol/L      Comment:      INTERPRETIVE INFORMATION: Vitamin B6 (Pyridoxal 5-Phosphate)    Pyridoxal 5'-phosphate measured in a specimen collected   following an 8-hour or overnight fast accurately indicates   vitamin B6 nutritional status. Non-fasting specimen   concentration reflects recent vitamin intake.    This test was developed and its performance characteristics   determined by [facility]. It has not been cleared or   approved by the US Food and Drug Administration. This test   was performed in a CLIA certified laboratory and is   intended for clinical  purposes.  Performed By: [email or web address]  [address] [place] [id number]  : Chaz Ha MD, PhD  CLIA Number: [id number]   Protein electrophoresis random urine   Result Value Ref Range    ELP Interpretation Urine       Only trace albumin and trace globulins. No obvious monoclonal protein seen. Pathological significance requires clinical correlation.  Deandre White M.D., Ph.D., Pathologist    Signout Location if Remote BTH1    Total Protein, Serum for ELP   Result Value Ref Range    Total Protein Serum for ELP 6.1 (L) 6.4 - 8.3 g/dL   Protein Electrophoresis, Serum   Result Value Ref Range    Albumin 3.8 3.7 - 5.1 g/dL    Alpha 1 0.3 0.2 - 0.4 g/dL    Alpha 2 0.7 0.5 - 0.9 g/dL    Beta Globulin 0.7 0.6 - 1.0 g/dL    Gamma Globulin 0.6 (L) 0.7 - 1.6 g/dL    Monoclonal Peak 0.0 <=0.0 g/dL    ELP Interpretation       Essentially normal electrophoretic pattern. No obvious monoclonal proteins seen. Pathologic significance requires clinical correlation. Deandre White M.D., Ph.D., Pathologist.    Signout Location if Remote BTH1        If you have any questions or concerns, please call the clinic at the number listed above.       Sincerely,      Heide Fay MD

## 2024-03-15 PROBLEM — E11.42 DIABETIC POLYNEUROPATHY ASSOCIATED WITH TYPE 2 DIABETES MELLITUS (H): Status: ACTIVE | Noted: 2024-03-15

## 2024-03-15 PROBLEM — R20.2 NUMBNESS AND TINGLING OF BOTH FEET: Status: ACTIVE | Noted: 2024-03-15

## 2024-03-15 PROBLEM — R29.6 RECURRENT FALLS: Status: ACTIVE | Noted: 2024-03-15

## 2024-03-15 PROBLEM — F10.20 ALCOHOL DEPENDENCE, UNCOMPLICATED (H): Status: RESOLVED | Noted: 2023-10-11 | Resolved: 2024-03-15

## 2024-03-15 PROBLEM — R20.0 NUMBNESS AND TINGLING OF BOTH FEET: Status: ACTIVE | Noted: 2024-03-15

## 2024-03-15 PROBLEM — R26.81 GAIT INSTABILITY: Status: ACTIVE | Noted: 2024-03-15

## 2024-03-16 ENCOUNTER — PATIENT OUTREACH (OUTPATIENT)
Dept: ONCOLOGY | Facility: CLINIC | Age: 79
End: 2024-03-16
Payer: COMMERCIAL

## 2024-03-16 LAB — TOTAL PROTEIN SERUM FOR ELP: 6.1 G/DL (ref 6.4–8.3)

## 2024-03-16 NOTE — PROGRESS NOTES
[facility]: Cancer Care                                                                                          Completed chart audit to update Oncology Care Coordination enrollment status.  Reviewed POC and pt has appropriate follow up scheduled.       Signature:  Mony Restrepo RN

## 2024-03-18 ENCOUNTER — PATIENT OUTREACH (OUTPATIENT)
Dept: CARE COORDINATION | Facility: CLINIC | Age: 79
End: 2024-03-18
Payer: COMMERCIAL

## 2024-03-18 LAB
ALBUMIN SERPL ELPH-MCNC: 3.8 G/DL (ref 3.7–5.1)
ALPHA1 GLOB SERPL ELPH-MCNC: 0.3 G/DL (ref 0.2–0.4)
ALPHA2 GLOB SERPL ELPH-MCNC: 0.7 G/DL (ref 0.5–0.9)
B-GLOBULIN SERPL ELPH-MCNC: 0.7 G/DL (ref 0.6–1)
GAMMA GLOB SERPL ELPH-MCNC: 0.6 G/DL (ref 0.7–1.6)
LOCATION OF TASK: ABNORMAL
M PROTEIN SERPL ELPH-MCNC: 0 G/DL
PROT PATTERN SERPL ELPH-IMP: ABNORMAL
PYRIDOXAL PHOS SERPL-SCNC: 15.5 NMOL/L

## 2024-03-18 NOTE — PROGRESS NOTES
Clinic Care Coordination Contact  Community Health Worker Follow Up    Care Gaps:     There are no preventive care reminders to display for this patient.    Currently there are no Care Gaps.    Care Plan:   Care Plan: Help At Home       Problem: Insufficient In-home support       Goal: Establish adequate home support       Start Date: 1/2/2024    This Visit's Progress: 50% Recent Progress: 10%    Priority: Low    Note:     Barriers: Limited mobility; Limited support system; diagnoses of multiple, chronic, complex medical conditions  Strengths: Motivated; agreeable to Care Coordination  Patient expressed understanding of goal: Yes  Action steps to achieve this goal:  1. I will review and research resources provided for assistance with House keeping services.-Sent via Mail per Patient request and [employer]    [facility] [phone]  [email or web address]   [facility] Your [facility] is a nonprofit serving seniors and individuals with disabilities across [place]'s [place].    Our?Store To Door grocery assistance,?home support?and?transportation services?provide help with in-home tasks and chores.     [employer]? [phone]  Order rides, grocery delivery, pharmacy delivery, meals, home chores and more by calling our convenient phone number. We intercept GPS issues,  communication troubles and more to oversee trips from request to fulfillment with 100% reliability.?     Home instead [phone]  [employer] [phone]  [facility] [phone]  [facility] [phone]  [facility] [phone]  Senior linkage line 3 [phone]-State run resource line for seniors  2. I will contact Care Coordinator for additional resources if resources provided do not work for my lifestyle/situation.   3. I will contact my care team with questions, concerns or support needs. I will use the clinic as a resource and I  understand I can contact my clinic with 24/7 after hours services available. Care Coordinator will remain available as needed.                               Intervention and Education during outreach:   Patient states that she has not had the chance to call  out to the resources, but still plans to do so. Patient states that she would appreciate it if CHW gave her another call in one month.  Patient states that she has no other questions or concerns for CC at this time.    CHW Plan: CHW will do next patient outreach in one month.    CHIQUITA Centeno, B.A. [facility] Care Coordination  [facility]:   [facility]  [phone]

## 2024-03-19 LAB
LOCATION OF TASK: NORMAL
PROT PATTERN UR ELPH-IMP: NORMAL

## 2024-03-21 LAB — VIT B1 PYROPHOSHATE BLD-SCNC: 94 NMOL/L

## 2024-03-21 RX ORDER — PYRIDOXINE HCL (VITAMIN B6) 25 MG
25 TABLET ORAL DAILY
Qty: 90 TABLET | Refills: 0 | Status: SHIPPED | OUTPATIENT
Start: 2024-03-21 | End: 2024-07-18

## 2024-03-21 NOTE — RESULT ENCOUNTER NOTE
Letter done with comments below.  Routed to [facility] SUPPORT STAFF pool to be mailed:    Your Vitamin B6 level is on the low side.  That could contribute to numbness/tingling symptoms.  I recommend that you take a Vitamin B6 supplement and I sent a prescription for 25 mg tablets to your pharmacy.  Take 25 mg daily.    Heide Fay MD

## 2024-03-27 ENCOUNTER — LAB (OUTPATIENT)
Dept: LAB | Facility: CLINIC | Age: 79
End: 2024-03-27
Payer: COMMERCIAL

## 2024-03-27 ENCOUNTER — PATIENT OUTREACH (OUTPATIENT)
Dept: CARE COORDINATION | Facility: CLINIC | Age: 79
End: 2024-03-27

## 2024-03-27 DIAGNOSIS — D47.3 ESSENTIAL THROMBOCYTHEMIA (H): ICD-10-CM

## 2024-03-27 LAB
BASOPHILS # BLD AUTO: 0 10E3/UL (ref 0–0.2)
BASOPHILS NFR BLD AUTO: 1 %
EOSINOPHIL # BLD AUTO: 0.1 10E3/UL (ref 0–0.7)
EOSINOPHIL NFR BLD AUTO: 1 %
ERYTHROCYTE [DISTWIDTH] IN BLOOD BY AUTOMATED COUNT: 14.2 % (ref 10–15)
HCT VFR BLD AUTO: 36.6 % (ref 35–47)
HGB BLD-MCNC: 11.9 G/DL (ref 11.7–15.7)
IMM GRANULOCYTES # BLD: 0 10E3/UL
IMM GRANULOCYTES NFR BLD: 0 %
LYMPHOCYTES # BLD AUTO: 1.3 10E3/UL (ref 0.8–5.3)
LYMPHOCYTES NFR BLD AUTO: 24 %
MCH RBC QN AUTO: 38.4 PG (ref 26.5–33)
MCHC RBC AUTO-ENTMCNC: 32.5 G/DL (ref 31.5–36.5)
MCV RBC AUTO: 118 FL (ref 78–100)
MONOCYTES # BLD AUTO: 0.5 10E3/UL (ref 0–1.3)
MONOCYTES NFR BLD AUTO: 9 %
NEUTROPHILS # BLD AUTO: 3.7 10E3/UL (ref 1.6–8.3)
NEUTROPHILS NFR BLD AUTO: 66 %
PLATELET # BLD AUTO: 535 10E3/UL (ref 150–450)
RBC # BLD AUTO: 3.1 10E6/UL (ref 3.8–5.2)
WBC # BLD AUTO: 5.6 10E3/UL (ref 4–11)

## 2024-03-27 PROCEDURE — 36415 COLL VENOUS BLD VENIPUNCTURE: CPT

## 2024-03-27 PROCEDURE — 85025 COMPLETE CBC W/AUTO DIFF WBC: CPT

## 2024-03-27 NOTE — PROGRESS NOTES
Care Coordination Clinician Chart Review    Situation: Patient chart reviewed by Care Coordinator.       Background: Care Coordination Program started: 12/19/2023. Initial assessment completed and patient-centered care plan(s) were developed with participation from patient. Lead CC handed patient off to CHW for continued outreaches.       Assessment: Per chart review, patient outreach completed by CC CHW on 03/18/2024.  Patient is actively working to accomplish goal(s). Patient's goal(s) appropriate and relevant at this time. Patient is not due for updated Plan of Care.  Assessments will be completed annually or as needed/with change of patient status.      Care Plan: Help At Home       Problem: Insufficient In-home support       Goal: Establish adequate home support       Start Date: 1/2/2024    This Visit's Progress: 50% Recent Progress: 10%    Priority: Low    Note:     Barriers: Limited mobility; Limited support system; diagnoses of multiple, chronic, complex medical conditions  Strengths: Motivated; agreeable to Care Coordination  Patient expressed understanding of goal: Yes  Action steps to achieve this goal:  1. I will review and research resources provided for assistance with House keeping services.-Sent via Mail per Patient request and [employer]    [facility] [phone]  [email or web address]   [facility] Your [facility] is a nonprofit serving seniors and individuals with disabilities across [place].    Our?Store To Door grocery assistance,?home support?and?transportation services?provide help with in-home tasks and chores.     [employer]? [phone]  Order rides, grocery delivery, pharmacy delivery, meals, home chores and more by calling our convenient phone number. We intercept GPS issues,  communication troubles and more to oversee trips from request to fulfillment with 100% reliability.?     Home instead [phone]  [facility]  [phone]  [facility] [phone]  [facility] [phone]  [facility] [phone]  Senior linkage line 9 [phone]-State run resource line for seniors  2. I will contact Care Coordinator for additional resources if resources provided do not work for my lifestyle/situation.   3. I will contact my care team with questions, concerns or support needs. I will use the clinic as a resource and I understand I can contact my clinic with 24/7 after hours services available. Care Coordinator will remain available as needed.                                  Plan/Recommendations: The patient will continue working with Care Coordination to achieve goal(s) as above. CHW will continue outreaches at minimum every 30 days and will involve Lead CC as needed or if patient is ready to move to Maintenance. Lead CC will continue to monitor CHW outreaches and patient's progress to goal(s) every 6 weeks.     Plan of Care updated and sent to patient: [facility]illo [facility]  Social Work Care Cooridinator   [facility]   Phone: [phone]

## 2024-04-09 ENCOUNTER — TELEPHONE (OUTPATIENT)
Dept: PHARMACY | Facility: CLINIC | Age: 79
End: 2024-04-09
Payer: COMMERCIAL

## 2024-04-09 DIAGNOSIS — E11.9 TYPE 2 DIABETES, HBA1C GOAL < 7% (H): ICD-10-CM

## 2024-04-09 DIAGNOSIS — E78.5 HYPERLIPIDEMIA LDL GOAL <100: ICD-10-CM

## 2024-04-09 DIAGNOSIS — E87.6 HYPOKALEMIA: ICD-10-CM

## 2024-04-09 RX ORDER — ATORVASTATIN CALCIUM 40 MG/1
40 TABLET, FILM COATED ORAL DAILY
Qty: 90 TABLET | Refills: 1 | Status: SHIPPED | OUTPATIENT
Start: 2024-04-09

## 2024-04-09 RX ORDER — POTASSIUM CHLORIDE 750 MG/1
10 CAPSULE, EXTENDED RELEASE ORAL DAILY
Qty: 90 CAPSULE | Refills: 1 | Status: SHIPPED | OUTPATIENT
Start: 2024-04-09

## 2024-04-09 RX ORDER — GLIMEPIRIDE 2 MG/1
2 TABLET ORAL
Qty: 90 TABLET | Refills: 1 | Status: SHIPPED | OUTPATIENT
Start: 2024-04-09

## 2024-04-09 NOTE — TELEPHONE ENCOUNTER
4-9-24      Patient LVM for mtm to send rx refilsl to her new mail order pharmacy for atorva 40, kcl 10meq, glimep 4mg .    Mtm will send all 3 refills today .    Joan Lopez Rph.  Medication Therapy Management Provider  [phone]      Lab Results   Component Value Date    A1C 6.6 03/05/2024    A1C 7.4 11/14/2023    A1C 9.0 08/15/2023    A1C 9.7 07/19/2023    A1C 7.1 05/23/2023    A1C 8.3 06/21/2021    A1C 9.4 12/14/2020    A1C 8.1 09/21/2020    A1C 8.5 01/07/2020    A1C 7.6 10/12/2019     BP Readings from Last 3 Encounters:   03/14/24 114/78   03/05/24 100/60   01/30/24 113/72       
Attending with

## 2024-04-18 ENCOUNTER — PATIENT OUTREACH (OUTPATIENT)
Dept: CARE COORDINATION | Facility: CLINIC | Age: 79
End: 2024-04-18
Payer: COMMERCIAL

## 2024-04-18 NOTE — PROGRESS NOTES
Clinic Care Coordination Contact  Community Health Worker Follow Up    Care Gaps:     There are no preventive care reminders to display for this patient.    Currently there are no Care Gaps.    Care Plan:   Care Plan: Help At Home       Problem: Insufficient In-home support       Goal: Establish adequate home support       Start Date: 1/2/2024    This Visit's Progress: 50% Recent Progress: 10%    Priority: Low    Note:     Barriers: Limited mobility; Limited support system; diagnoses of multiple, chronic, complex medical conditions  Strengths: Motivated; agreeable to Care Coordination  Patient expressed understanding of goal: Yes  Action steps to achieve this goal:  1. I will review and research resources provided for assistance with House keeping services.-Sent via Mail per Patient request and [employer]    Help at Your Door (947) 101-5284  https://OncoHoldings.org/   Help At Your Door is a nonprofit serving seniors and individuals with disabilities across [place]'s [place].    Our?Store To Door grocery assistance,?home support?and?transportation services?provide help with in-home tasks and chores.     GoGo[employer]parent? 3-902-692-1870  Order rides, grocery delivery, pharmacy delivery, meals, home chores and more by calling our convenient phone number. We intercept GPS issues,  communication troubles and more to oversee trips from request to fulfillment with 100% reliability.?     Home instead ([phone]  [employer] [phone]  [facility] [phone]  [facility] [phone]  [facility] [phone]  Senior linkage line 7 [phone] 333-1503-State run resource line for seniors  2. I will contact Care Coordinator for additional resources if resources provided do not work for my lifestyle/situation.   3. I will contact my care team with questions, concerns or support needs. I will use the clinic as a resource and I  understand I can contact my clinic with 24/7 after hours services available. Care Coordinator will remain available as needed.                               Intervention and Education during outreach:   Patient states that she has not reached out to resources yet but plans to do so.  Patient states that she has no questions or concerns for CC.    CHW Plan: CHW will route patient to  CC to review and determine if we should keep the patient enrolled.    Indiana Mckenna CHW, B.A. [facility] Care Coordination  [facility]:   [facility]  [phone]

## 2024-04-26 ENCOUNTER — TELEPHONE (OUTPATIENT)
Dept: PHARMACY | Facility: CLINIC | Age: 79
End: 2024-04-26
Payer: COMMERCIAL

## 2024-04-26 NOTE — TELEPHONE ENCOUNTER
Patient calling and would like medication list, labs (A1c and glucose) sent to her dentist at [facility] [address] in [place] phone # [phone].    Elkin FrancoD, [facility]  Medication Therapy Management Provider, [facility]      -------------4-29-24    Mtm printed am mailed copy of her current medications, A1c lab and Glucose from last BMP.    Joan Lopez, [facility].  Medication Therapy Management Provider  [phone]  -------------------------------------------------------------

## 2024-04-26 NOTE — PROGRESS NOTES
Care Coordination Clinician Chart Review     Situation: Patient chart reviewed by care coordinator.?     Background: Initial assessment and enrollment to Care Coordination was 6/27/2022.?? Care plan(s) with patient-centered goal(s) were developed with participation from patient.? Lead CC handed patient off to CHW for continued outreach every 30 days.??     Assessment: Per chart review, patient outreach completed by CC SW on 11/28/2022.? Patient is actively working to accomplish goal(s).? Patient's goal(s) remain(s) appropriate at this time.? Patient is not due for updated Plan of Care.? Annual assessment will be due 6/2023.     Care Plan: Transportation     Problem: Lack of transportation     Goal: Establish reliable transportation     Start Date: 6/27/2022 Expected End Date: 9/27/2022    This Visit's Progress: 10%    Note:     Goal Statement: I want to obtain transportation in order to complete a colonoscopy  Date Goal set: 6/27/2022  Barriers: lack of transportation  Strengths: motivated to complete   Date to Achieve By: 9/27/2022  Patient expressed understanding of goal: Yes  Action steps to achieve this goal:  1. I will review resources provided by [facility]  2. I will set up a colonoscopy appointment  3. I will set up transportation  4. I will keep in touch with care coordination.                          Plan/Recommendations: The patient will continue working with Care Coordination to achieve above goal(s).? CHW will involve Lead CC as needed or if patient is ready to move to maintenance.? Lead CC will continue to monitor CHW s monthly outreaches and progress to goal(s) every 6 weeks.    Plan of Care updated and sent to patient: No    WILMAR Bonilla   [facility] Primary Care - Clinic Care Coordination  [phone]    
If you are a smoker, it is important for your health to stop smoking. Please be aware that second hand smoke is also harmful.

## 2024-04-29 ENCOUNTER — THERAPY VISIT (OUTPATIENT)
Dept: PHYSICAL THERAPY | Facility: REHABILITATION | Age: 79
End: 2024-04-29
Attending: FAMILY MEDICINE
Payer: COMMERCIAL

## 2024-04-29 DIAGNOSIS — R20.0 NUMBNESS AND TINGLING OF BOTH FEET: ICD-10-CM

## 2024-04-29 DIAGNOSIS — R20.2 NUMBNESS AND TINGLING OF BOTH FEET: ICD-10-CM

## 2024-04-29 DIAGNOSIS — M62.81 GENERALIZED MUSCLE WEAKNESS: Primary | ICD-10-CM

## 2024-04-29 DIAGNOSIS — R26.89 IMPAIRMENT OF BALANCE: ICD-10-CM

## 2024-04-29 DIAGNOSIS — R29.6 RECURRENT FALLS: ICD-10-CM

## 2024-04-29 PROCEDURE — 97161 PT EVAL LOW COMPLEX 20 MIN: CPT | Mod: GP

## 2024-04-29 PROCEDURE — 97110 THERAPEUTIC EXERCISES: CPT | Mod: GP

## 2024-04-29 NOTE — PROGRESS NOTES
PHYSICAL THERAPY EVALUATION  Type of Visit: Evaluation    See electronic medical record for Abuse and Falls Screening details.    Subjective       Presenting condition or subjective complaint:    Date of onset: 03/14/24    Relevant medical history: Menopause; High blood pressure   Dates & types of surgery: Broke ankle couple years prior    Prior diagnostic imaging/testing results:       Prior therapy history for the same diagnosis, illness or injury:        Living Environment  Social support:     Type of home: House; 2-story; Basement   Stairs to enter the home: Yes       Ramp: Yes   Stairs inside the home: Yes       Help at home:    Equipment owned: Walker; Walker with wheels; Straight Cane; Bath bench     Employment: No    Hobbies/Interests: garden    Patient goals for therapy:      Pain assessment: Pain denied     Objective     OBSERVATION: n/a  INTEGUMENTARY:   POSTURE:  forward head, neck, downward gaze  PALPATION: TTP L ankle secondary to hx of ankle fx  RANGE OF MOTION: LE ROM WFL  UE ROM WFL  STRENGTH: LE Strength WFL  UE Strength WFL    BED MOBILITY: Independent    TRANSFERS: Independent    WHEELCHAIR MOBILITY: n/a    GAIT:   Level of [place]:  SBQC Damari   Assistive Device(s): Cane (quad)  Gait Deviations: Stride length decreased  Liliya decreased  Shuffling gait   Gait Distance: short distances  Stairs: not assessed     BALANCE:  see below    SPECIAL TESTS  Functional Gait Assessment (FGA)      10 Meter Walk Test (Comfortable)     10 Meter Walk Test (Fast)     6 Minute Walk Test (6MWT)           Brooks Balance Scale (BBS)     5 Times Sit-to-Stand (5TSTS)  20 sec     Dynamic Gait Index (DGI)     Timed Up and Go (TUG) - sec 16.5 sec with SBQC and without    Single Leg Stance Right (sec)    Single Leg Stance Left (sec)    Modified CTSIB Conditions (sec) Cond 1:   Cond 2:   Cond 4:   Cond 5 :    Romberg  (sec) 30''   Sharpened Romberg (sec) 30''   30 Second Sit to Stand (reps/height)    Mini-BESTest               SENSATION:  reports n/t into B LE     REFLEXES:   COORDINATION: n/a  MUSCLE TONE:         Assessment & Plan   CLINICAL IMPRESSIONS  Medical Diagnosis: R20.0, R20.2 (ICD-10-CM) - Numbness and tingling of both feet  R26.81 (ICD-10-CM) - Gait instability  R29.6 (ICD-10-CM) - Recurrent falls    Treatment Diagnosis: Balance impairment, generalized weakness   Impression/Assessment: Patient is a 78 year old female with balance impairment complaints.  The following significant findings have been identified: Pain, Decreased ROM/flexibility, Decreased joint mobility, Decreased strength, Impaired balance, Impaired sensation, Impaired gait, Impaired muscle performance, Decreased activity tolerance, Impaired posture, and Instability. These impairments interfere with their ability to perform self care tasks, work tasks, recreational activities, household chores, driving , household mobility, and community mobility as compared to previous level of function. Pt presents with gait instability, balance deficits and recurrent falls to about 1 time a month. Has been having more challenges with getting up off the floor when on the gound. PMH relevant for L ankle surgery from hx of fall. Upon eval findings for falls risk per TUG score, 5 x STS as influenced by global LE weakness and decreased activity tolerance. Pt would benefit from skilled PT to improve function and safety.     Clinical Decision Making (Complexity):  Clinical Presentation: Stable/Uncomplicated  Clinical Presentation Rationale: based on medical and personal factors listed in PT evaluation  Clinical Decision Making (Complexity): Low complexity    PLAN OF CARE  Treatment Interventions:  Interventions: Gait Training, Manual Therapy, Neuromuscular Re-education, Therapeutic Activity, Therapeutic Exercise, Self-Care/Home Management    Long Term Goals     PT Goal 1  Goal Identifier: HEP  Goal Description: Patient will be independent in self-management of condition and  HEP to reach functional goals.  Target Date: 07/22/24  PT Goal 2  Goal Identifier: DGI  Goal Description: Pt will demonstrate a 4 point improvement on the FGA to demonstrate minimum clinically significant difference in score to demonstrate improved safety with functional mobility and decrease risk of falls.  Rationale: to maximize safety and independence with performance of ADLs and functional tasks;to maximize safety and independence with self cares  Target Date: 07/22/24  PT Goal 3  Goal Identifier: TUG  Goal Description: Pt will improve TUG score by 3 sec or more to demosntrate improved functional strength  Rationale: to maximize safety and independence with performance of ADLs and functional tasks;to maximize safety and independence with self cares  Target Date: 07/22/24  PT Goal 4  Goal Identifier: 5 x STS  Goal Description: Pt will be able to perform 5xSTS in 12 seconds or less to demonstrate appropriate LE strength for community dwelling adults.  Target Date: 07/22/24      Frequency of Treatment: 1x/wk  Duration of Treatment: 12 weeks    Recommended Referrals to Other Professionals:   Education Assessment:   Learner/Method: Patient  Education Comments: Verbalizes understanding    Risks and benefits of evaluation/treatment have been explained.   Patient/Family/caregiver agrees with Plan of Care.     Evaluation Time:     PT Eval, Low Complexity Minutes (26486): 23       Signing Clinician: JOSE GRIMES, PT      [facility]                                                                                   OUTPATIENT PHYSICAL THERAPY      PLAN OF TREATMENT FOR OUTPATIENT REHABILITATION   Patient's Last Name, First Name, Sarah Rubio YOB: 1945   Provider's Name   [facility]   Medical Record No.  [id number]     Onset Date: 03/14/24  Start of Care Date: 04/29/24     Medical Diagnosis:  R20.0, R20.2 (ICD-10-CM) - Numbness and  tingling of both feet  R26.81 (ICD-10-CM) - Gait instability  R29.6 (ICD-10-CM) - Recurrent falls      PT Treatment Diagnosis:  Balance impairment, generalized weakness Plan of Treatment  Frequency/Duration: 1x/wk/ 12 weeks    Certification date from 04/29/24 to 07/22/24         See note for plan of treatment details and functional goals     JOSE GRIMES, PT                         I CERTIFY THE NEED FOR THESE SERVICES FURNISHED UNDER        THIS PLAN OF TREATMENT AND WHILE UNDER MY CARE     (Physician attestation of this document indicates review and certification of the therapy plan).              Referring Provider:  Heide Fay    Initial Assessment  See Epic Evaluation- Start of Care Date: 04/29/24

## 2024-05-06 ENCOUNTER — PATIENT OUTREACH (OUTPATIENT)
Dept: CARE COORDINATION | Facility: CLINIC | Age: 79
End: 2024-05-06

## 2024-05-06 ENCOUNTER — THERAPY VISIT (OUTPATIENT)
Dept: PHYSICAL THERAPY | Facility: REHABILITATION | Age: 79
End: 2024-05-06
Attending: FAMILY MEDICINE
Payer: COMMERCIAL

## 2024-05-06 DIAGNOSIS — R26.81 GAIT INSTABILITY: ICD-10-CM

## 2024-05-06 DIAGNOSIS — R29.6 RECURRENT FALLS: ICD-10-CM

## 2024-05-06 DIAGNOSIS — R20.0 NUMBNESS AND TINGLING OF BOTH FEET: ICD-10-CM

## 2024-05-06 DIAGNOSIS — R20.2 NUMBNESS AND TINGLING OF BOTH FEET: ICD-10-CM

## 2024-05-06 PROCEDURE — 97110 THERAPEUTIC EXERCISES: CPT | Mod: GP | Performed by: PHYSICAL THERAPIST

## 2024-05-06 PROCEDURE — 97112 NEUROMUSCULAR REEDUCATION: CPT | Mod: GP | Performed by: PHYSICAL THERAPIST

## 2024-05-06 NOTE — PROGRESS NOTES
Clinic Care Coordination Contact  Patient has completed all goals with Clinic Care Coordination.  Please review the chart and confirm if maintenance  is approved.    Brandin Murillo [facility]  Social Work Care [facility]   Phone: [phone]

## 2024-05-06 NOTE — LETTER
[facility]  Patient Centered Plan of Care  About Me:        Patient Name:  Sarah Felix    YOB: 1945  Age:         78 year old   Renny MRN:    [id number] Telephone Information:  Home Phone [phone]   Mobile [phone]       Address:  [facility] [place] [address] Email address:  [email or web address]      Emergency Contact(s)    Name Relationship Lgl Grd Work Phone Home Phone Mobile Phone   1. YURI FELIX Brother No  [phone]   2. RAMON FELIX Brother    [phone]           Primary language:  English     needed? No   [facility]:  [phone] op. 1  Other communication barriers:None    Preferred Method of Communication:  Mail  Current living arrangement: I live alone; I live in a private home    Mobility Status/ Medical Equipment: Independent w/Device        Health Maintenance  Health Maintenance Reviewed: Up to date      My Access Plan  Medical Emergency 911   Primary Clinic Line [facility] [phone]   24 Hour Appointment Line [phone] or  [phone]) (toll-free)   24 Hour Nurse Line [phone] (toll-free)   Preferred Urgent Care [facility], [phone]     [facility]  [phone]     Preferred Pharmacy [facility] [place] [place] - [place], [place] - [address]     Behavioral Health Crisis Line The National Suicide Prevention Lifeline at 1-556.215.8988 or Text/Call 028           My Care Team Members  Patient Care Team         Relationship Specialty Notifications Start End    Heide Fay MD PCP - General Family Medicine  3/27/24     Phone: [phone] Fax: [phone] [address]  [place] [address]    Joan Lopez [facility] Pharmacist   11/7/11      [address] [facility] [address]    Anita Treadwell MD  Referring Physician OB/Gyn  3/30/15     Phone: [phone] Fax: [phone]         [address] [facility] [address]    Murphy Sargent MD MD Urology  8/19/15     Phone: [phone] Fax: [phone]         [address] [facility] [address]    Apolinar Artis MD MD Orthopedics  5/26/17     Phone: [phone] Fax: [phone]         [address] [place] [address]    Janay Amezcua MD MD Ophthalmology  8/12/20     Phone: [phone] Fax: [phone]         [address] [facility] [address]    Leventhal, Thomas Michael, MD MD Gastroenterology  9/20/21     Referred by Heide Hopson for Elevated transaminase level, Fatty Liver and Hepatomegaly    Phone: [phone] Fax: [phone]         [address] [facility] [address]    Heide Fay MD MD Family Medicine  9/20/21     Referred Pt to Hepatology for Elevated transaminase level, fatty liver and Hepatomegaly    Phone: [phone] Fax: [phone]         [address]  [place] [address]    Manjeet Wilde MD MD Hematology All results, Admissions 9/21/21     Phone: [phone] Fax: [phone]         [address] [facility] [address]    Heide Fay MD Assigned PCP   6/18/22     Phone: [phone] Fax: [phone] [address]  [place] [address]    Daniel Murphy MD MD Ophthalmology  6/27/22     Phone: [phone] Fax: [phone]         [address] [facility] [address]    Joan Lopez [facility] Assigned MTM Pharmacist   9/28/22      [address] [facility] [address]    AlbertoLester Sinha MD Assigned Surgical Provider   3/11/23     Phone: [phone] Fax: [phone] [address] [facility] [address]    Manjeet Wilde MD Assigned Cancer Care Provider   7/1/23     Phone: [phone] Fax: [phone] [address] [facility] [address]    Mony Restrepo, RN Specialty Care Coordinator  Hematology & Oncology Admissions 12/7/23     Mary Murillo MSW Lead Care Coordinator  Admissions 1/3/24     Indiana Mckenna CHW Community Health Worker Primary Care - CC Admissions 1/3/24     Clemente Hdz MD Physician Endocrinology, Diabetes, and Metabolism  3/15/24     Phone: [phone] Fax: [phone]         [address] [facility] [address]                My Care Plans  Self Management and Treatment Plan    Care Plan  Care Plan: Help At Home       Problem: Insufficient In-home support       Goal: Establish adequate home support       Start Date: 1/2/2024    This Visit's Progress: 100% Recent Progress: 50%    Priority: Low    Note:     Barriers: Limited mobility; Limited support system; diagnoses of multiple, chronic, complex medical conditions  Strengths: Motivated; agreeable to Care Coordination  Patient expressed understanding of goal: Yes  Action steps to achieve this goal:  1. I will review and research resources provided for assistance with House keeping services.-Sent via Mail per Patient request and [employer]    Help at Your Door (295) 771-2504  [email or web address]://[email or web address] Throttle Gamesdoor.org/   Help At Your Door is a nonprofit serving seniors and individuals with disabilities across [place].    Our?Store To Door grocery assistance,?home support?and?transportation services?provide help with in-home tasks and chores.     GoGoGrandparent? [phone]  Order rides, grocery delivery, pharmacy delivery, meals, home chores and more by calling our convenient phone number. We intercept GPS issues,  communication troubles and more to oversee trips from request to fulfillment with 100% reliability.?     Home instead [phone]  [employer] [phone]  [facility] [phone]  [facility] [phone]  [facility] [phone]  Senior linkage line 7 [phone]-State run resource line for seniors  2. I  will contact Care Coordinator for additional resources if resources provided do not work for my lifestyle/situation.   3. I will contact my care team with questions, concerns or support needs. I will use the clinic as a resource and I understand I can contact my clinic with 24/7 after hours services available. Care Coordinator will remain available as needed.                               Action Plans on File:                       Advance Care Plans/Directives:   Advanced Care Plan/Directives on file:   No    Discussed with patient/caregiver(s):   Declined Further Information             My Medical and Care Information  Problem List   Patient Active Problem List   Diagnosis    Hepatic cyst    Allergic rhinitis    Diverticulitis of colon    Osteopenia of both hips    Esophageal reflux    Mixed incontinence urge and stress (male)(female)    Cystocele, lateral    Vaginal atrophy    Mixed hyperlipidemia    Heart burn    BABB (dyspnea on exertion)    Sebaceous hyperplasia    Seborrheic keratosis    Fatty liver    Personal history of other malignant neoplasm of skin    Health Care Home    Hypertension goal BP (blood pressure) < 140/90    Elevated serum creatinine    Skin exam, screening for cancer    CKD (chronic kidney disease) stage 2, GFR 60-89 ml/min    Type 2 diabetes mellitus with hyperglycemia (H)    Type 2 diabetes with stage 2 chronic kidney disease GFR 60-89 (H)    Type 2 diabetes mellitus without complication, with long-term current use of insulin (H)    Intertriginous candidiasis    Slow transit constipation    Basal cell carcinoma of face    Tubular adenoma of colon    Schatzki's ring of distal esophagus    Adenomatous polyp of duodenum    Folate deficiency    Renal cyst    Diabetic polyneuropathy associated with type 2 diabetes mellitus (H)    Recurrent falls    Numbness and tingling of both feet    Gait instability    Generalized muscle weakness    Impairment of balance      Current Medications and  Allergies:  See printed Medication Report.    Care Coordination Start Date: 12/19/2023   Frequency of Care Coordination: monthly, more frequently as needed     Form Last Updated: 06/14/2024

## 2024-05-23 ENCOUNTER — THERAPY VISIT (OUTPATIENT)
Dept: PHYSICAL THERAPY | Facility: REHABILITATION | Age: 79
End: 2024-05-23
Attending: FAMILY MEDICINE
Payer: COMMERCIAL

## 2024-05-23 DIAGNOSIS — R26.81 GAIT INSTABILITY: Primary | ICD-10-CM

## 2024-05-23 DIAGNOSIS — R29.6 RECURRENT FALLS: ICD-10-CM

## 2024-05-23 DIAGNOSIS — M62.81 GENERALIZED MUSCLE WEAKNESS: ICD-10-CM

## 2024-05-23 PROCEDURE — 97750 PHYSICAL PERFORMANCE TEST: CPT | Mod: GP | Performed by: PHYSICAL THERAPIST

## 2024-05-23 PROCEDURE — 97110 THERAPEUTIC EXERCISES: CPT | Mod: GP | Performed by: PHYSICAL THERAPIST

## 2024-05-28 ENCOUNTER — LAB (OUTPATIENT)
Dept: LAB | Facility: CLINIC | Age: 79
End: 2024-05-28
Payer: COMMERCIAL

## 2024-05-28 DIAGNOSIS — D47.3 ESSENTIAL THROMBOCYTHEMIA (H): ICD-10-CM

## 2024-05-28 LAB
BASOPHILS # BLD AUTO: 0.1 10E3/UL (ref 0–0.2)
BASOPHILS NFR BLD AUTO: 1 %
EOSINOPHIL # BLD AUTO: 0.2 10E3/UL (ref 0–0.7)
EOSINOPHIL NFR BLD AUTO: 2 %
ERYTHROCYTE [DISTWIDTH] IN BLOOD BY AUTOMATED COUNT: 13.1 % (ref 10–15)
HCT VFR BLD AUTO: 38.3 % (ref 35–47)
HGB BLD-MCNC: 13.2 G/DL (ref 11.7–15.7)
IMM GRANULOCYTES # BLD: 0.1 10E3/UL
IMM GRANULOCYTES NFR BLD: 1 %
LYMPHOCYTES # BLD AUTO: 1.9 10E3/UL (ref 0.8–5.3)
LYMPHOCYTES NFR BLD AUTO: 20 %
MCH RBC QN AUTO: 37.9 PG (ref 26.5–33)
MCHC RBC AUTO-ENTMCNC: 34.5 G/DL (ref 31.5–36.5)
MCV RBC AUTO: 110 FL (ref 78–100)
MONOCYTES # BLD AUTO: 0.9 10E3/UL (ref 0–1.3)
MONOCYTES NFR BLD AUTO: 9 %
NEUTROPHILS # BLD AUTO: 6.3 10E3/UL (ref 1.6–8.3)
NEUTROPHILS NFR BLD AUTO: 67 %
NRBC # BLD AUTO: 0 10E3/UL
NRBC BLD AUTO-RTO: 0 /100
PLATELET # BLD AUTO: 814 10E3/UL (ref 150–450)
RBC # BLD AUTO: 3.48 10E6/UL (ref 3.8–5.2)
WBC # BLD AUTO: 9.4 10E3/UL (ref 4–11)

## 2024-05-28 PROCEDURE — 36415 COLL VENOUS BLD VENIPUNCTURE: CPT

## 2024-05-28 PROCEDURE — 85025 COMPLETE CBC W/AUTO DIFF WBC: CPT

## 2024-06-10 ENCOUNTER — TELEPHONE (OUTPATIENT)
Dept: PHYSICAL THERAPY | Facility: REHABILITATION | Age: 79
End: 2024-06-10

## 2024-06-10 NOTE — TELEPHONE ENCOUNTER
Sarah Felix was contacted after their no-show (missed visit without notification) on 6/8/24.  They wrote on different calendar and mixed days up. Confirmed will be at next appt.

## 2024-07-01 ENCOUNTER — THERAPY VISIT (OUTPATIENT)
Dept: PHYSICAL THERAPY | Facility: REHABILITATION | Age: 79
End: 2024-07-01
Attending: FAMILY MEDICINE
Payer: COMMERCIAL

## 2024-07-01 DIAGNOSIS — R20.2 NUMBNESS AND TINGLING OF BOTH FEET: ICD-10-CM

## 2024-07-01 DIAGNOSIS — M62.81 GENERALIZED MUSCLE WEAKNESS: ICD-10-CM

## 2024-07-01 DIAGNOSIS — R26.89 IMPAIRMENT OF BALANCE: ICD-10-CM

## 2024-07-01 DIAGNOSIS — R29.6 RECURRENT FALLS: ICD-10-CM

## 2024-07-01 DIAGNOSIS — R20.0 NUMBNESS AND TINGLING OF BOTH FEET: ICD-10-CM

## 2024-07-01 DIAGNOSIS — R26.81 GAIT INSTABILITY: Primary | ICD-10-CM

## 2024-07-01 PROCEDURE — 97110 THERAPEUTIC EXERCISES: CPT | Mod: GP

## 2024-07-01 PROCEDURE — 97112 NEUROMUSCULAR REEDUCATION: CPT | Mod: GP

## 2024-07-03 NOTE — PROGRESS NOTES
Medication Therapy Management (MTM) Encounter    ASSESSMENT:                            Medication Adherence/Access: none     Type 2 Diabetes:  Patient is meeting A1c goal of <8.0%(  7.6% ). Self monitoring of blood glucose is now at goal of fasting  mg/dL and post prandial <180mg/dL. Patient has for last 4 weeks >/=69% cgm time in target range.must scan once every 8 hours to hold 24 hour bs review.   Metformin ;  Stay on 500mg. daily dose .   Basal Insulin (Lantus ) :Stay on 10 units daily in am .   Novolog (mealtime insulin ) : Stay on 10 units /injection --only use when high carbohydrate meal spikes bs's to >250mg./dl. (careful on dosing as you had a few overnight lows perhaps due to too many units).   SFU: keep Glimepiride dose at 2mg. Am only now. To avoid nocturnal hypoglycemia.     Increased exercise/low carb/calorie diet + wt. Loss-very beneficial for her NAFLD--this is working well --continue as is .       Immunizations: Up to date.     Hypokalemia:  Stable      Hyperlipidemia: Stable. Patient is appropriately on high intensity statin which is indicated based on 2019 ACC/AHA guidelines for lipid management with an ASCVD risk of 33%.      Hypertension: Stable. Patient is meeting blood pressure goal of < 140/90mmHg.     Vitamin B12: is at goal of 600-1000pg/mL. Pt would benefit from vitamin B12 lab recheck in 12 months.    Essential Thrombocythemia:  Platelets continue to lessen each month on hydrea -lab recheck today --result is pending.     PLAN:                              1. A1c today = 7.6% today --a little bit up but not too bad -- stay on metformin, lantus, novolog and glimepiride as is .try as best you can to scan blood sugars at least once every 8 hours. Careful on how much novolog you inject when eating something high in sugar/carbs so you donot bottom out overnight .     2. Watch mychart for CBC/Platelets  lab results.         Follow-up: Return in about 17 weeks (around 11/5/2024), or @ 3  PM, for Medication Therapy Management Visit, A1c lab, Blood sugar meter review, BP Recheck.        SUBJECTIVE/OBJECTIVE:                          Sarah Felix is a 78 year old female coming into clinic today for a follow-up (3-5-24) visit. She was referred to me from Dr. Collette Fay.     Reason for visit:   Labs/bs/meds review.    Lack of energy /tired--brother is ill--depressing her.     Allergies/ADRs: Reviewed in chart  Tobacco: She reports that she quit smoking about 44 years ago. Her smoking use included cigarettes. She has been exposed to tobacco smoke. She has never used smokeless tobacco.  Alcohol: 7-9 beverages / week typically wine 1 or 2 glasses/night - reports that she hasn't had any alcohol in the last week and a half until they figure out her liver situation.   Caffeine: 2 cans diet coke sodas/day  Activity: reports she has low energy and stamina, if she does any activity she has to take breaks frequenty but now that the weather is better she is doing more outdoor work.   Past Medical History: Reviewed in chart  Personal Healthcare Goals: figure out what is going on with liver or blood, would like to get 3rd vaccine, improve a1c  Uses 4 prong CANE now in 2023 to walk /get around.   Still falls several x year --unsteady /poor balance.     Medication Adherence/Access:  None     Type 2 Diabetes:  Currently taking : glimepiride 2 mg. In am (stopped pm dose march -24),  Novolog  insulin very rare now  (10 units once daily to control daily high sugar (>250) -perhaps now if dessert or juice spiked blood sugar) Metformin 500mg. ER tabs -- 1 tablet /day now early  afternoon or dinner daily.  Afraid of bottoming out blood sugar wise.    Lantus -10 units /day now in AM   Potential side effects : constipated now -started senna 1 tab /day recently.       SMBG:    Sensor only active 30% of the time?                                Wt Readings from Last 5 Encounters:   07/09/24 162 lb 11.2 oz (73.8 kg)    03/14/24 161 lb (73 kg)   03/05/24 160 lb 11.2 oz (72.9 kg)   01/30/24 162 lb 1.6 oz (73.5 kg)   12/19/23 163 lb 4.8 oz (74.1 kg)         Symptoms of low blood sugar? sweaty  Symptoms of high blood sugar? Fatigue, shaky, pit in stomach, overall feeling poor   Eye exam: up to date  Foot exam: up to date  Diet: doing her best to control carbs in daily diet.   Previously: she's maintained similar diet and notes sugars now being elevated  eats 4-5 smaller meals - pasta and rices  Fruit and some fruit juices   Breakfast: cereal 2-3 times per week, drink 2 glasses OJ or grapefruit juice /day .   Exercise: see social history --saw liver specialist suggested swimming.   Aspirin: Taking 81 mg daily and denies side effects  Statin: Yes: atorvastatin 40 mg   ACEi/ARB: Yes: low dose Lisinopril 2.5mg./day  Urine Albumin:   Lab Results   Component Value Date    UMALCR  12/19/2023      Comment:      Unable to calculate, urine albumin and/or urine creatinine is outside detectable limits.  Microalbuminuria is defined as an albumin:creatinine ratio of 17 to 299 for males and 25 to 299 for females. A ratio of albumin:creatinine of 300 or higher is indicative of overt proteinuria.  Due to biologic variability, positive results should be confirmed by a second, first-morning random or 24-hour timed urine specimen. If there is discrepancy, a third specimen is recommended. When 2 out of 3 results are in the microalbuminuria range, this is evidence for incipient nephropathy and warrants increased efforts at glucose control, blood pressure control, and institution of therapy with an angiotensin-converting-enzyme (ACE) inhibitor (if the patient can tolerate it).       Lab Results   Component Value Date    A1C 7.6 07/09/2024    A1C 6.6 03/05/2024    A1C 7.4 11/14/2023    A1C 9.0 08/15/2023    A1C 9.7 07/19/2023    A1C 8.3 06/21/2021    A1C 9.4 12/14/2020    A1C 8.1 09/21/2020    A1C 8.5 01/07/2020    A1C 7.6 10/12/2019               Wt  Readings from Last 5 Encounters:   07/09/24 162 lb 11.2 oz (73.8 kg)   03/14/24 161 lb (73 kg)   03/05/24 160 lb 11.2 oz (72.9 kg)   01/30/24 162 lb 1.6 oz (73.5 kg)   12/19/23 163 lb 4.8 oz (74.1 kg)           Immunizations: upto date now.     Hypokalemia:  Patient reports the new potassium 10 meq/day capsule is working much better and is easy to take.   Potassium   Date Value Ref Range Status   03/05/2024 3.8 3.4 - 5.3 mmol/L Final   03/28/2022 4.1 3.4 - 5.3 mmol/L Final   01/07/2020 4.0 3.4 - 5.3 mmol/L Final       Hyperlipidemia: Current therapy includes : atorvastatin 40mg daily.  Patient reports no significant myalgias or other side effects.  The 10-year ASCVD risk score (Ammy VELEZ, et al., 2019) is: 36.7%    Values used to calculate the score:      Age: 78 years      Sex: Female      Is Non- : No      Diabetic: Yes      Tobacco smoker: No      Systolic Blood Pressure: 122 mmHg      Is BP treated: Yes      HDL Cholesterol: 53 mg/dL      Total Cholesterol: 151 mg/dL  Recent Labs   Lab Test 11/14/23  1514 09/23/22  1320   CHOL 151 131   HDL 53 49*   LDL 62 55   TRIG 180* 137       Hypertension: Current medications include : pcp stopped Metoprolol previously , back on 2.5mg. daily Lisinopril  + furosemide 20mg -1 tab in am and early afternoon now .  Patient does not self-monitor blood pressure. No side effects from current medications.        BP Readings from Last 3 Encounters:   07/09/24 122/66   03/14/24 114/78   03/05/24 100/60     Vitamin B12: level was  645  On 3-13-23. Vitamin B12 helps support memory and lessens fatigue. She is taking daily OTC pill- 2500mcg. B-12 pill --3 x week now.       Essential Thrombocythemia:   Manjeet Wilde MD   of Medicine, Division of Hematology, Oncology and Transplantation  [employer] [facility] [employer]  MD started her on 500mg./day Hydrea (states [facility] out of stock last 7 days she has  been off it ) + 81mg. ASA daily  last month --here today for cbc + platelets recheck .        /66   Pulse 87   Wt 162 lb 11.2 oz (73.8 kg)   SpO2 94%   BMI 28.12 kg/m      --------------------------------------------------------------------------------------------------    I spent 30 minutes with this patient today. All changes were made via collaborative practice agreement with Heide Fay MD. A copy of the visit note was provided to the patient's primary care provider.    The patient was given a summary of these recommendations. She admits computer not working at home to view [employer]  So [facility] helped her reset username /pword on her cell phone --so now she can receive [employer] messages.     Joan Lopez [facility].  Medication Therapy Management Provider  [phone]           Medication Therapy Recommendations  Type 2 diabetes mellitus without complication, with long-term current use of insulin (H)    Current Medication: Continuous Blood Gluc Sensor (FREESTYLE CARMELA 14 DAY SENSOR) [facility]   Rationale: Frequency inappropriate - Dosage too low - Effectiveness   Recommendation: Increase Frequency   Status: Accepted per CPA          Current Medication: insulin aspart (NOVOLOG PEN) 100 UNIT/ML pen   Rationale: Dose too high - Dosage too high - Safety   Recommendation: Decrease Dose   Status: Accepted per CPA

## 2024-07-04 ENCOUNTER — TRANSFERRED RECORDS (OUTPATIENT)
Dept: MULTI SPECIALTY CLINIC | Facility: CLINIC | Age: 79
End: 2024-07-04

## 2024-07-08 ENCOUNTER — THERAPY VISIT (OUTPATIENT)
Dept: PHYSICAL THERAPY | Facility: REHABILITATION | Age: 79
End: 2024-07-08
Attending: FAMILY MEDICINE
Payer: COMMERCIAL

## 2024-07-08 DIAGNOSIS — R26.89 IMPAIRMENT OF BALANCE: ICD-10-CM

## 2024-07-08 DIAGNOSIS — R29.6 RECURRENT FALLS: ICD-10-CM

## 2024-07-08 DIAGNOSIS — R20.2 NUMBNESS AND TINGLING OF BOTH FEET: ICD-10-CM

## 2024-07-08 DIAGNOSIS — R20.0 NUMBNESS AND TINGLING OF BOTH FEET: ICD-10-CM

## 2024-07-08 DIAGNOSIS — M62.81 GENERALIZED MUSCLE WEAKNESS: ICD-10-CM

## 2024-07-08 DIAGNOSIS — R26.81 GAIT INSTABILITY: Primary | ICD-10-CM

## 2024-07-08 PROCEDURE — 97112 NEUROMUSCULAR REEDUCATION: CPT | Mod: GP

## 2024-07-08 PROCEDURE — 97110 THERAPEUTIC EXERCISES: CPT | Mod: GP

## 2024-07-09 ENCOUNTER — OFFICE VISIT (OUTPATIENT)
Dept: PHARMACY | Facility: CLINIC | Age: 79
End: 2024-07-09
Payer: COMMERCIAL

## 2024-07-09 ENCOUNTER — LAB (OUTPATIENT)
Dept: LAB | Facility: CLINIC | Age: 79
End: 2024-07-09
Payer: COMMERCIAL

## 2024-07-09 VITALS
DIASTOLIC BLOOD PRESSURE: 66 MMHG | BODY MASS INDEX: 28.12 KG/M2 | SYSTOLIC BLOOD PRESSURE: 122 MMHG | HEART RATE: 87 BPM | OXYGEN SATURATION: 94 % | WEIGHT: 162.7 LBS

## 2024-07-09 DIAGNOSIS — E11.9 TYPE 2 DIABETES, HBA1C GOAL < 7% (H): ICD-10-CM

## 2024-07-09 DIAGNOSIS — D69.3 ESSENTIAL THROMBOCYTOPENIA (H): ICD-10-CM

## 2024-07-09 DIAGNOSIS — E11.9 TYPE 2 DIABETES, HBA1C GOAL < 7% (H): Primary | ICD-10-CM

## 2024-07-09 DIAGNOSIS — E53.8 VITAMIN B12 DEFICIENCY (NON ANEMIC): ICD-10-CM

## 2024-07-09 DIAGNOSIS — D47.3 ESSENTIAL THROMBOCYTHEMIA (H): ICD-10-CM

## 2024-07-09 DIAGNOSIS — E87.6 HYPOKALEMIA: ICD-10-CM

## 2024-07-09 DIAGNOSIS — E78.5 HYPERLIPIDEMIA LDL GOAL <100: ICD-10-CM

## 2024-07-09 DIAGNOSIS — I10 HYPERTENSION GOAL BP (BLOOD PRESSURE) < 140/90: ICD-10-CM

## 2024-07-09 LAB
BASOPHILS # BLD AUTO: 0.1 10E3/UL (ref 0–0.2)
BASOPHILS NFR BLD AUTO: 1 %
EOSINOPHIL # BLD AUTO: 0.2 10E3/UL (ref 0–0.7)
EOSINOPHIL NFR BLD AUTO: 2 %
ERYTHROCYTE [DISTWIDTH] IN BLOOD BY AUTOMATED COUNT: 13.5 % (ref 10–15)
HBA1C MFR BLD: 7.6 % (ref 0–5.6)
HCT VFR BLD AUTO: 38.7 % (ref 35–47)
HGB BLD-MCNC: 13.1 G/DL (ref 11.7–15.7)
IMM GRANULOCYTES # BLD: 0.1 10E3/UL
IMM GRANULOCYTES NFR BLD: 1 %
LYMPHOCYTES # BLD AUTO: 1.4 10E3/UL (ref 0.8–5.3)
LYMPHOCYTES NFR BLD AUTO: 17 %
MCH RBC QN AUTO: 35.7 PG (ref 26.5–33)
MCHC RBC AUTO-ENTMCNC: 33.9 G/DL (ref 31.5–36.5)
MCV RBC AUTO: 105 FL (ref 78–100)
MONOCYTES # BLD AUTO: 0.6 10E3/UL (ref 0–1.3)
MONOCYTES NFR BLD AUTO: 7 %
NEUTROPHILS # BLD AUTO: 5.9 10E3/UL (ref 1.6–8.3)
NEUTROPHILS NFR BLD AUTO: 72 %
NRBC # BLD AUTO: 0 10E3/UL
NRBC BLD AUTO-RTO: 0 /100
PLATELET # BLD AUTO: 811 10E3/UL (ref 150–450)
RBC # BLD AUTO: 3.67 10E6/UL (ref 3.8–5.2)
WBC # BLD AUTO: 8.2 10E3/UL (ref 4–11)

## 2024-07-09 PROCEDURE — 99607 MTMS BY PHARM ADDL 15 MIN: CPT | Performed by: PHARMACIST

## 2024-07-09 PROCEDURE — 83036 HEMOGLOBIN GLYCOSYLATED A1C: CPT

## 2024-07-09 PROCEDURE — 85025 COMPLETE CBC W/AUTO DIFF WBC: CPT

## 2024-07-09 PROCEDURE — 36415 COLL VENOUS BLD VENIPUNCTURE: CPT

## 2024-07-09 PROCEDURE — 99606 MTMS BY PHARM EST 15 MIN: CPT | Performed by: PHARMACIST

## 2024-07-09 NOTE — Clinical Note
Collette --A1c up a bit at 7.6% but most low bs's resolving -save for a few times she took too many units novolog.  Dr. Wilde--I did have her draw a cbc+platelets today --she will see you on 7-30-24 for f/up.  Thank you both,Joan Lopez [facility]. Medication Therapy Management Provider [phone]

## 2024-07-09 NOTE — PATIENT INSTRUCTIONS
"Recommendations from today's MTM visit:                                                         1. A1c today = 7.6% today --a little bit up but not too bad -- stay on metformin, lantus, novolog(see note) and glimepiride as is .try as best you can to scan blood sugars at least once every 8 hours.  Careful on how much novolog you inject when eating something high in sugar/carbs so you donot bottom out overnight .     2. Watch mychart for CBC/Platelets  lab results.         Follow-up: Return in about 17 weeks (around 11/5/2024), or @ 3 PM, for Medication Therapy Management Visit, A1c lab, Blood sugar meter review, BP Recheck.    It was great speaking with you today.  I value your experience and would be very thankful for your time in providing feedback in our clinic survey. In the next few days, you may receive an email or text message from [employer] with a link to a survey related to your  clinical pharmacist.\"     To schedule another MTM appointment, please call the clinic directly or you may call the MTM scheduling line at [phone] or toll-free at [phone].     My Clinical Pharmacist's contact information:                                                      Please feel free to contact me with any questions or concerns you have.      Joan Lopez [facility].  Medication Therapy Management Provider  [phone]    "

## 2024-07-15 ENCOUNTER — TELEPHONE (OUTPATIENT)
Dept: PHYSICAL THERAPY | Facility: REHABILITATION | Age: 79
End: 2024-07-15

## 2024-07-15 NOTE — TELEPHONE ENCOUNTER
Spoke with patient and informed her of need to cancel today's Physical Therapy visit d/t therapist out sick. Reminded patient of next appt date/time. JR

## 2024-07-17 ENCOUNTER — PATIENT OUTREACH (OUTPATIENT)
Dept: CARE COORDINATION | Facility: CLINIC | Age: 79
End: 2024-07-17
Payer: COMMERCIAL

## 2024-07-17 NOTE — PROGRESS NOTES
Clinic Care Coordination Contact  Community Health Worker Follow Up    Care Gaps:     Health Maintenance Due   Topic Date Due    COVID-19 Vaccine (8 - 2023-24 season) 01/22/2024         Care Plan:   Care Plan: Help At Home       Problem: Insufficient In-home support       Goal: Establish adequate home support       Start Date: 1/2/2024    This Visit's Progress: 100% Recent Progress: 50%    Priority: Low    Note:     Barriers: Limited mobility; Limited support system; diagnoses of multiple, chronic, complex medical conditions  Strengths: Motivated; agreeable to Care Coordination  Patient expressed understanding of goal: Yes  Action steps to achieve this goal:  1. I will review and research resources provided for assistance with House keeping services.-Sent via Mail per Patient request and [place]    [employer] (096) 676-0930  https://[employer].org/   [employer] is a nonprofit serving seniors and individuals with disabilities across [place]'s [place] area.    Our?Store To Door grocery assistance,?home support?and?transportation services?provide help with in-home tasks and chores.     Go[employer]parent? 1-572.981.5919  Order rides, grocery delivery, pharmacy delivery, meals, home chores and more by calling our convenient phone number. We intercept GPS issues,  communication troubles and more to oversee trips from request to fulfillment with 100% reliability.?     Home instead (317) 179-4323  [employer] (110) 885-7869  [facility] (914) 154-4481  [employer] (716) 787-2596  [facility] (611) 575-1480  Senior linkage line 4 (595) 635-8936-[facility] resource line for seniors  2. I will contact Care Coordinator for additional resources if resources provided do not work for my lifestyle/situation.   3. I will contact my care team with questions, concerns or support needs. I will use the clinic as a resource and I understand I  can contact my clinic with 24/7 after hours services available. Care Coordinator will remain available as needed.                               Intervention and Education during outreach:   Patient states that she still has the resources that  had provided her with, but has not had the chance to call out to any. Patient states that she is focusing on other things such as strengthening her mobility at this time.    CHW Plan: CHW will route patient to  to review.    CHIQUITA Centeno, B.A. [facility] Care Coordination  [facility]:   [facility]  [phone]

## 2024-07-18 ENCOUNTER — PATIENT OUTREACH (OUTPATIENT)
Dept: CARE COORDINATION | Facility: CLINIC | Age: 79
End: 2024-07-18
Payer: COMMERCIAL

## 2024-07-18 ENCOUNTER — TELEPHONE (OUTPATIENT)
Dept: PHARMACY | Facility: CLINIC | Age: 79
End: 2024-07-18
Payer: COMMERCIAL

## 2024-07-18 DIAGNOSIS — I10 HYPERTENSION GOAL BP (BLOOD PRESSURE) < 140/90: ICD-10-CM

## 2024-07-18 DIAGNOSIS — E53.1 VITAMIN B6 DEFICIENCY: ICD-10-CM

## 2024-07-18 RX ORDER — FUROSEMIDE 20 MG
20 TABLET ORAL 2 TIMES DAILY
Qty: 180 TABLET | Refills: 1 | Status: SHIPPED | OUTPATIENT
Start: 2024-07-18

## 2024-07-18 RX ORDER — PYRIDOXINE HCL (VITAMIN B6) 25 MG
25 TABLET ORAL DAILY
Qty: 90 TABLET | Refills: 3 | Status: SHIPPED | OUTPATIENT
Start: 2024-07-18

## 2024-07-18 NOTE — TELEPHONE ENCOUNTER
7-18-24      Sarah calls  today her [facility] armond zamora needs 2 new rx;s for her --refills on furosemide and 25mg. Vitamikn b6.      Mtm will refill today .     Joan Lopez [facility].  Medication Therapy Management Provider  [phone]

## 2024-07-18 NOTE — LETTER
[facility] CARE COORDINATION  [address]  [place] [address]    July 18, 2024    Sarah Felix  [address] [place] [address]    Dear Sarah,  Your Care Team congratulates you on your journey to maintain wellness. This document will help guide you on your journey to maintain a healthy lifestyle.  You can use this to help you overcome any barriers you may encounter.  If you should have any questions or concerns, you can contact the members of your Care Team or contact your Primary Care Clinic for assistance.     Health Maintenance  Health Maintenance Reviewed:      My Access Plan  Medical Emergency 911   Primary Clinic Line [facility] [phone]   24 Hour Appointment Line [phone] or  [phone]) (toll-free)   24 Hour Nurse Line [phone] (toll-free)   Preferred Urgent Care     Preferred Hospital     Preferred Pharmacy [facility] - [place], [place] - [address]     Behavioral Health Crisis Line The National Suicide Prevention Lifeline at 1-510.940.3816 or 911     My Care Team Members  Patient Care Team         Relationship Specialty Notifications Start End    Heide Fay MD PCP - General Family Medicine  3/27/24     Phone: [phone] Fax: [phone]         [address] [place] [address]    Joan Lopez [facility] Pharmacist   11/7/11      [address] [facility] [address]    Anita Treadwell MD Referring Physician OB/Gyn  3/30/15     Phone: [phone] Fax: [phone]         [address] [facility] [address]    Murphy Sargent MD MD Urology  8/19/15     Phone: [phone] Fax: [phone]         [address] [facility] [address]    Apolinar Artis MD MD Orthopedics  5/26/17     Phone: [phone] Fax: [phone]         [address] [facility] [address]    Janay Amezcua MD MD Ophthalmology  8/12/20     Phone:  [phone] Fax: [phone]         [address] [facility] [address]    Leventhal, Thomas Michael, MD MD Gastroenterology  9/20/21     Referred by Heide Hopson for Elevated transaminase level, Fatty Liver and Hepatomegaly    Phone: [phone] Fax: [phone]         [address] [place] [address]    Heide Fay MD MD Family Medicine  9/20/21     Referred Pt to Hepatology for Elevated transaminase level, fatty liver and Hepatomegaly    Phone: [phone] Fax: [phone] [address]  [place] [address]    Manjeet Wilde MD MD Hematology All results, Admissions 9/21/21     Phone: [phone] Fax: [phone]         [address] [place] [address]    Heide Fay MD Assigned PCP   6/18/22     Phone: [phone] Fax: [phone]         [facility]  [place] [address]    Daniel Murphy MD MD Ophthalmology  6/27/22     Phone: [phone] Fax: [phone]         [facility] [facility] [address]    Joan Lopez, [facility] Assigned MTM Pharmacist   9/28/22      [address] [facility] [address]    Lester Roach MD Assigned Surgical Provider   3/11/23     Phone: [phone] Fax: [phone]         [address] [place] [id number]    Manjeet Wilde MD Assigned Cancer Care Provider   7/1/23     Phone: [phone] Fax: [phone]         [address] [place] [address]    Mony Restrepo, RN Specialty Care Coordinator Hematology & Oncology Admissions 12/7/23     Mary Murillo MSW Lead Care Coordinator  Admissions 1/3/24 7/18/24    Indiana Mckenna CHW Community Health Worker Primary Care - CC Admissions 1/3/24 7/18/24    Clemente Hdz MD Physician Endocrinology, Diabetes, and Metabolism  3/15/24     Phone: [phone] Fax: [phone]         [address] [facility] [address]              Advance Care Plans/Directives Type:     It has been your Clinic Care Team's  pleasure to work with you on accomplishing your goals.    Regards,  Your Clinic Care Team

## 2024-07-18 NOTE — PROGRESS NOTES
Clinic Care Coordination Contact    Assessment: Care Coordinator contacted patient for 2 month follow up.  Patient has continued to follow the plan of care and assessment is negative for any new needs or concerns.    Enrollment status: Graduated.      Plan: No further outreaches at this time.  Patient will continue to follow the plan of care.  If new needs arise a new Care Coordination referral may be placed.  FYI to PCP    Brandin Murillo [facility]  Social Work Care [facility]   Phone: [phone]

## 2024-07-22 DIAGNOSIS — H40.003 GLAUCOMA SUSPECT OF BOTH EYES: Primary | ICD-10-CM

## 2024-07-30 ENCOUNTER — APPOINTMENT (OUTPATIENT)
Dept: LAB | Facility: CLINIC | Age: 79
End: 2024-07-30
Attending: INTERNAL MEDICINE
Payer: COMMERCIAL

## 2024-07-30 ENCOUNTER — ONCOLOGY VISIT (OUTPATIENT)
Dept: ONCOLOGY | Facility: CLINIC | Age: 79
End: 2024-07-30
Attending: INTERNAL MEDICINE
Payer: COMMERCIAL

## 2024-07-30 VITALS
OXYGEN SATURATION: 94 % | HEART RATE: 125 BPM | TEMPERATURE: 97.9 F | DIASTOLIC BLOOD PRESSURE: 69 MMHG | RESPIRATION RATE: 16 BRPM | WEIGHT: 159.4 LBS | SYSTOLIC BLOOD PRESSURE: 100 MMHG | BODY MASS INDEX: 27.55 KG/M2

## 2024-07-30 DIAGNOSIS — D47.3 ESSENTIAL THROMBOCYTHEMIA (H): ICD-10-CM

## 2024-07-30 LAB
BASOPHILS # BLD AUTO: 0.1 10E3/UL (ref 0–0.2)
BASOPHILS NFR BLD AUTO: 1 %
EOSINOPHIL # BLD AUTO: 0.2 10E3/UL (ref 0–0.7)
EOSINOPHIL NFR BLD AUTO: 2 %
ERYTHROCYTE [DISTWIDTH] IN BLOOD BY AUTOMATED COUNT: 14.6 % (ref 10–15)
HCT VFR BLD AUTO: 39.2 % (ref 35–47)
HGB BLD-MCNC: 13.5 G/DL (ref 11.7–15.7)
IMM GRANULOCYTES # BLD: 0.1 10E3/UL
IMM GRANULOCYTES NFR BLD: 1 %
LYMPHOCYTES # BLD AUTO: 1.3 10E3/UL (ref 0.8–5.3)
LYMPHOCYTES NFR BLD AUTO: 14 %
MCH RBC QN AUTO: 35.1 PG (ref 26.5–33)
MCHC RBC AUTO-ENTMCNC: 34.4 G/DL (ref 31.5–36.5)
MCV RBC AUTO: 102 FL (ref 78–100)
MONOCYTES # BLD AUTO: 0.6 10E3/UL (ref 0–1.3)
MONOCYTES NFR BLD AUTO: 6 %
NEUTROPHILS # BLD AUTO: 6.9 10E3/UL (ref 1.6–8.3)
NEUTROPHILS NFR BLD AUTO: 76 %
NRBC # BLD AUTO: 0 10E3/UL
NRBC BLD AUTO-RTO: 0 /100
PLATELET # BLD AUTO: 766 10E3/UL (ref 150–450)
RBC # BLD AUTO: 3.85 10E6/UL (ref 3.8–5.2)
WBC # BLD AUTO: 9.2 10E3/UL (ref 4–11)

## 2024-07-30 PROCEDURE — G0463 HOSPITAL OUTPT CLINIC VISIT: HCPCS | Performed by: INTERNAL MEDICINE

## 2024-07-30 PROCEDURE — 85048 AUTOMATED LEUKOCYTE COUNT: CPT | Performed by: INTERNAL MEDICINE

## 2024-07-30 PROCEDURE — 36415 COLL VENOUS BLD VENIPUNCTURE: CPT | Performed by: INTERNAL MEDICINE

## 2024-07-30 PROCEDURE — 99213 OFFICE O/P EST LOW 20 MIN: CPT | Performed by: INTERNAL MEDICINE

## 2024-07-30 ASSESSMENT — PAIN SCALES - GENERAL: PAINLEVEL: NO PAIN (0)

## 2024-07-30 NOTE — PATIENT INSTRUCTIONS
Your visit the [facility] was to discuss your blood disorder called Essential Thrombocythemia.  The goal of our treatment is to prevent blood clots using Aspirin and Hydroxyurea.  We won't make any changes today.  While we would ideally have your blood platelet count return to normal I fear this will cause more side effects and the relative benefit for you will be minimal.    Let's continue your current medicines unchanged  Blood tests every 2-3 months while on these medicines  I will see you back in 6 months!      If you have questions or concerns before your next appointment you can call my nurse, Mony Restrepo at: [phone]     Latest Reference Range & Units 03/27/24 15:59 05/28/24 15:36 07/09/24 15:13 07/30/24 12:05   WBC 4.0 - 11.0 10e3/uL 5.6 9.4 8.2 9.2   Hemoglobin 11.7 - 15.7 g/dL 11.9 13.2 13.1 13.5   Hematocrit 35.0 - 47.0 % 36.6 38.3 38.7 39.2   Platelet Count 150 - 450 10e3/uL 535 (H) 814 (H) 811 (H) 766 (H)   (H): Data is abnormally high

## 2024-07-30 NOTE — LETTER
2024      Sarah Felix  [address] [place] [address]      Dear Colleague,    Thank you for referring your patient, Sarah Felix, to the [facility]. Please see a copy of my visit note below.        [facility] Hematology Consultation  [address] [place] [address]  Phone: [phone]    Outpatient Visit Note:    Patient: Sarah Felix  MRN: [id number]  : 1945  IVAN: 2024     Sarah Felix is a 78 year old woman with a history of Jak2 positive myeloproliferative neoplasm, likely essential thrombocytopenia, who returns for routine follow up.       Assessment:  In summary, Sarah Felix is a 78 year old woman with Jak2 pos MPN, likely ET doing well on aspirin and hydroxyurea. Platelet count has mildly improved and she has no symptoms nor thrombotic events. Patient mentioned that she would not like to increase hydroxyurea at this point in time to 1000 mg. She is doing okay with her current dose of HA and ASA.     Recommendations:  I counseled the patient about my assessment of diagnosis of ET and Jak2 pos MPN, major cause of morbidity and mortality includes thrombotic events   Will continue the patient on 500mg hydroxyurea daily and ASA 81 mg daily.   Will continue with routine CBC every 3 months.   Will plan to see the patient in 6 months in clinic.     Alexx Kennedy MD  Hematology/Oncology/BMT Fellow PGY4  Pager: [phone]    Physician Attestation  I saw this patient with the resident and agree with the resident s findings and plan of care as documented in the resident s note.      Briefly, Sarah is a 78 year old woman with Jak2 Pos MPN, likely ET, doing well on low-dose HU plus ASA.  While PLT count isn't optomized, this likely will not impact her prognosis and certainly will worsen her QOL.  She understands these risks and benefits and would like to continue therapy unchanged.  Repeat her CBC in 3  months and PRN and see her back in 6 months.    25 minutes spent by me on the date of the encounter doing chart review, review of test results, interpretation of tests, patient visit, and documentation     Manjeet Wilde MD   of Medicine  [facility] [employer]   -------------------------------  Forward History:  Established care Dec 2021 for thrombocytosis, asymptomatic, on ASA.  Jak2 testing pos but by error there results were misinterpreted. PCP notified Dr. Wilde May 2023. She was seen in office in May 23 and restarted on ASA and HU was started at 500 mg daily.     History: Sarah Felix is a 78 year old woman with a history of a surgically provoked DVT many years ago (at 25 years of age after ovarian cystectomy, treated briefly with anticoagulation), DM2, HTN, with a longstanding history of thrombocytosis who most likely has Jak2 positive MPN, ET and doing well on HU and ASA. CT abd/pelvis 2023 showed normal sized spleen     Since her last visit when HU and ASA were started, she reports no significant change or worsening in her longstanding symptoms including fatigue, reduced appetite and nausea which is unchanged. Overall, doing well and prefers to not change medications unless needed. No hematochezia, melena, hemoptysis, hematemesis or gum bleeding. No headache, focal weakness/sensory changes. No pruritus.  No concerns about bleeding from ASA.     No history of stroke, MI.     Medications are reviewed in the EMR and notable for ASA 81mg and  mg every day.    Physical Exam:  Vitals: B/P: 100/69, T: 97.9, P: 125, R: 16, Wt: 159 lbs 6.4 oz Body mass index is 27.55 kg/m .   Exam:   Gen: Appears well, no distress  HEENT: no scleral icterus or hemorrhage, no wet purpura, no lymphadenopathy  CV: regular, no murmurs  Pulm: clear  Abd: soft, nontender, no splenomegaly  Ext: no edema  Skin: no ecchymoses or hematomas  Neuro: no focal deficits, affect and cognition are  normal    Labs:   Latest Reference Range & Units 03/27/24 15:59 05/28/24 15:36 07/09/24 15:13 07/30/24 12:05   WBC 4.0 - 11.0 10e3/uL 5.6 9.4 8.2 9.2   Hemoglobin 11.7 - 15.7 g/dL 11.9 13.2 13.1 13.5   Hematocrit 35.0 - 47.0 % 36.6 38.3 38.7 39.2   Platelet Count 150 - 450 10e3/uL 535 (H) 814 (H) 811 (H) 766 (H)   RBC Count 3.80 - 5.20 10e6/uL 3.10 (L) 3.48 (L) 3.67 (L) 3.85   MCV 78 - 100 fL 118 (H) 110 (H) 105 (H) 102 (H)   MCH 26.5 - 33.0 pg 38.4 (H) 37.9 (H) 35.7 (H) 35.1 (H)   MCHC 31.5 - 36.5 g/dL 32.5 34.5 33.9 34.4   RDW 10.0 - 15.0 % 14.2 13.1 13.5 14.6   (H): Data is abnormally high  (L): Data is abnormally low    Again, thank you for allowing me to participate in the care of your patient.        Sincerely,        Manjeet Wilde MD

## 2024-07-30 NOTE — NURSING NOTE
Chief Complaint   Patient presents with    Blood Draw     Vitals, blood draw,  by PHOENIX RN. Pt checked into appt.     Mireille Johnson MA

## 2024-07-30 NOTE — NURSING NOTE
"Oncology Rooming Note    July 30, 2024 12:16 PM   Sarah Felix is a 78 year old female who presents for:    Chief Complaint   Patient presents with    Blood Draw     Vitals, blood draw,  by PHOENIX RN. Pt checked into appt.    Hematology     Essential Thrombocythemia     Initial Vitals: /69   Pulse (!) 125   Temp 97.9  F (36.6  C) (Oral)   Resp 16   Wt 72.3 kg (159 lb 6.4 oz)   SpO2 94%   BMI 27.55 kg/m   Estimated body mass index is 27.55 kg/m  as calculated from the following:    Height as of 3/14/24: 1.62 m (5' 3.78\").    Weight as of this encounter: 72.3 kg (159 lb 6.4 oz). Body surface area is 1.8 meters squared.  No Pain (0) Comment: Data Unavailable   No LMP recorded. Patient is postmenopausal.  Allergies reviewed: Yes  Medications reviewed: Yes    Medications: Medication refills not needed today.  Pharmacy name entered into betNOW:    [facility] - [place], [place] - [address] [place] [address] [facility]  [facility]  WRITTEN PRESCRIPTION REQUESTED  [id number] [place] [facility] [address]    Frailty Screening:   Is the patient here for a new oncology consult visit in cancer care? 2. No      Clinical concerns: None       Alma Delia Nicole LPN  7/30/2024              "

## 2024-07-30 NOTE — PROGRESS NOTES
[facility] Hematology Consultation  [address] [place] [id number]  Phone: [phone]    Outpatient Visit Note:    Patient: Sarah Felix  MRN: [id number]  : 1945  IVAN: 2024     Sarah Felix is a 78 year old woman with a history of Jak2 positive myeloproliferative neoplasm, likely essential thrombocytopenia, who returns for routine follow up.       Assessment:  In summary, Sarah Felix is a 78 year old woman with Jak2 pos MPN, likely ET doing well on aspirin and hydroxyurea. Platelet count has mildly improved and she has no symptoms nor thrombotic events. Patient mentioned that she would not like to increase hydroxyurea at this point in time to 1000 mg. She is doing okay with her current dose of HA and ASA.     Recommendations:  I counseled the patient about my assessment of diagnosis of ET and Jak2 pos MPN, major cause of morbidity and mortality includes thrombotic events   Will continue the patient on 500mg hydroxyurea daily and ASA 81 mg daily.   Will continue with routine CBC every 3 months.   Will plan to see the patient in 6 months in clinic.     Alexx Kennedy MD  Hematology/Oncology/BMT Fellow PGY4  Pager: [phone]    Physician Attestation   I saw this patient with the resident and agree with the resident s findings and plan of care as documented in the resident s note.      Briefly, Sarah is a 78 year old woman with Jak2 Pos MPN, likely ET, doing well on low-dose HU plus ASA.  While PLT count isn't optomized, this likely will not impact her prognosis and certainly will worsen her QOL.  She understands these risks and benefits and would like to continue therapy unchanged.  Repeat her CBC in 3 months and PRN and see her back in 6 months.    25 minutes spent by me on the date of the encounter doing chart review, review of test results, interpretation of tests, patient visit, and documentation     Manjeet Wilde MD   of  Medicine  [facility]   -------------------------------  Forward History:  Established care Dec 2021 for thrombocytosis, asymptomatic, on ASA.  Jak2 testing pos but by error there results were misinterpreted. PCP notified Dr. Wilde May 2023. She was seen in office in May 23 and restarted on ASA and HU was started at 500 mg daily.     History: Sarah Felix is a 78 year old woman with a history of a surgically provoked DVT many years ago (at 25 years of age after ovarian cystectomy, treated briefly with anticoagulation), DM2, HTN, with a longstanding history of thrombocytosis who most likely has Jak2 positive MPN, ET and doing well on HU and ASA. CT abd/pelvis 2023 showed normal sized spleen     Since her last visit when HU and ASA were started, she reports no significant change or worsening in her longstanding symptoms including fatigue, reduced appetite and nausea which is unchanged. Overall, doing well and prefers to not change medications unless needed. No hematochezia, melena, hemoptysis, hematemesis or gum bleeding. No headache, focal weakness/sensory changes. No pruritus.  No concerns about bleeding from ASA.     No history of stroke, MI.     Medications are reviewed in the EMR and notable for ASA 81mg and  mg every day.    Physical Exam:  Vitals: B/P: 100/69, T: 97.9, P: 125, R: 16, Wt: 159 lbs 6.4 oz Body mass index is 27.55 kg/m .   Exam:   Gen: Appears well, no distress  HEENT: no scleral icterus or hemorrhage, no wet purpura, no lymphadenopathy  CV: regular, no murmurs  Pulm: clear  Abd: soft, nontender, no splenomegaly  Ext: no edema  Skin: no ecchymoses or hematomas  Neuro: no focal deficits, affect and cognition are normal    Labs:   Latest Reference Range & Units 03/27/24 15:59 05/28/24 15:36 07/09/24 15:13 07/30/24 12:05   WBC 4.0 - 11.0 10e3/uL 5.6 9.4 8.2 9.2   Hemoglobin 11.7 - 15.7 g/dL 11.9 13.2 13.1 13.5   Hematocrit 35.0 - 47.0 % 36.6 38.3 38.7 39.2    Platelet Count 150 - 450 10e3/uL 535 (H) 814 (H) 811 (H) 766 (H)   RBC Count 3.80 - 5.20 10e6/uL 3.10 (L) 3.48 (L) 3.67 (L) 3.85   MCV 78 - 100 fL 118 (H) 110 (H) 105 (H) 102 (H)   MCH 26.5 - 33.0 pg 38.4 (H) 37.9 (H) 35.7 (H) 35.1 (H)   MCHC 31.5 - 36.5 g/dL 32.5 34.5 33.9 34.4   RDW 10.0 - 15.0 % 14.2 13.1 13.5 14.6   (H): Data is abnormally high  (L): Data is abnormally low

## 2024-08-06 ENCOUNTER — OFFICE VISIT (OUTPATIENT)
Dept: OPHTHALMOLOGY | Facility: CLINIC | Age: 79
End: 2024-08-06
Attending: OPHTHALMOLOGY
Payer: COMMERCIAL

## 2024-08-06 DIAGNOSIS — H40.003 GLAUCOMA SUSPECT OF BOTH EYES: ICD-10-CM

## 2024-08-06 DIAGNOSIS — H52.13 MYOPIA OF BOTH EYES: ICD-10-CM

## 2024-08-06 DIAGNOSIS — E11.65 TYPE 2 DIABETES MELLITUS WITH HYPERGLYCEMIA, WITH LONG-TERM CURRENT USE OF INSULIN (H): ICD-10-CM

## 2024-08-06 DIAGNOSIS — H40.1232 LOW-TENSION GLAUCOMA OF BOTH EYES, MODERATE STAGE: Primary | ICD-10-CM

## 2024-08-06 DIAGNOSIS — Z79.4 TYPE 2 DIABETES MELLITUS WITH HYPERGLYCEMIA, WITH LONG-TERM CURRENT USE OF INSULIN (H): ICD-10-CM

## 2024-08-06 DIAGNOSIS — Z96.1 PSEUDOPHAKIA OF BOTH EYES: ICD-10-CM

## 2024-08-06 PROCEDURE — G0463 HOSPITAL OUTPT CLINIC VISIT: HCPCS | Performed by: OPHTHALMOLOGY

## 2024-08-06 PROCEDURE — 99214 OFFICE O/P EST MOD 30 MIN: CPT | Performed by: OPHTHALMOLOGY

## 2024-08-06 PROCEDURE — 92083 EXTENDED VISUAL FIELD XM: CPT | Performed by: OPHTHALMOLOGY

## 2024-08-06 PROCEDURE — 92133 CPTRZD OPH DX IMG PST SGM ON: CPT | Performed by: OPHTHALMOLOGY

## 2024-08-06 ASSESSMENT — EXTERNAL EXAM - LEFT EYE: OS_EXAM: BROW PTOSIS

## 2024-08-06 ASSESSMENT — REFRACTION_WEARINGRX
OD_CYLINDER: +1.75
OS_AXIS: 014
OS_ADD: +2.50
OD_ADD: +2.50
OS_CYLINDER: +1.75
OS_SPHERE: -2.75
OD_AXIS: 011
OD_SPHERE: -2.75

## 2024-08-06 ASSESSMENT — SLIT LAMP EXAM - LIDS
COMMENTS: DERMATOCHALASIS WITH LATERAL HOODING
COMMENTS: DERMATOCHALASIS WITH LATERAL HOODING

## 2024-08-06 ASSESSMENT — CUP TO DISC RATIO
OS_RATIO: 0.35
OD_RATIO: 0.5

## 2024-08-06 ASSESSMENT — VISUAL ACUITY
OD_CC: 20/25
OS_PH_CC: 20/25
OS_CC+: +1
METHOD: SNELLEN - LINEAR
OS_CC: 20/30
OS_PH_CC+: -1
CORRECTION_TYPE: GLASSES

## 2024-08-06 ASSESSMENT — EXTERNAL EXAM - RIGHT EYE: OD_EXAM: BROW PTOSIS

## 2024-08-06 ASSESSMENT — TONOMETRY
OS_IOP_MMHG: 10
IOP_METHOD: TONOPEN
OD_IOP_MMHG: 8

## 2024-08-06 NOTE — NURSING NOTE
Chief Complaints and History of Present Illnesses   Patient presents with    Primary Open Angle Glaucoma Follow Up     5 month follow up POAG each eye      Chief Complaint(s) and History of Present Illness(es)       Primary Open Angle Glaucoma Follow Up              Associated symptoms: Negative for eye pain, flashes and floaters    Compliance with Treatment: misses drops infrequently    Pain scale: 0/10    Comments: 5 month follow up POAG each eye               Comments    No VA changes since last visit.   No new flashes or floaters lately.   Denies eye pain, redness or irritation.   She remembers most of her nightly latanaprost gtts.   No new concerns    Ocular Meds:  Latanaprost qPM each eye     Joseph Ho 3:00 PM August 6, 2024

## 2024-08-06 NOTE — PROGRESS NOTES
CC: Glaucoma follow up     INTERVAL:  vision stable; compliant with latanoprost at bedtime ou   HPI  Sarah Felix is a 78 year old female here for evaluation of blurry vision of the right eye and a diabetic eye exam.    She has had difficulty controlling her A1c. Last A1c 7.6 7/2024  At some point, she was on lasix but was stopped because BP was too low    Imaging:  OVF 08/06/24  Each eye early sup arcuate left eye>right eye     OCT 02/07/24  Right eye- normal contour with no central fluid  Left eye- normal contour with no central fluid    RNFL 08/06/24  OD-stable IT thinning   OS-stable IT, TS, and temp thinning    Assessment & Plan      # Diabetes mellitus, type 2  - diagnosed age 62  - recent A1c 7.6  - no retinopathy on exam; last dilated 8/6/24  - discussed the importance of good BP/BS/Chol control    #Pseudophakia OU  # PCO trace left eye   S/p YAG OD 3/1/23    # 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 I personally obtained the chief complaint(s) and history of present illness.  I confirmed and edited as necessary the review of systems, past medical/surgical history, family history, social history, and examination findings as documented by others; and I examined the patient myself. I personally reviewed the relevant tests, images, and reports as documented above. I formulated and edited as necessary the assessment and plan  and discussed the findings and management plan with the patient and family.    Lester Dominguez MD, PhD

## 2024-08-16 ENCOUNTER — TELEPHONE (OUTPATIENT)
Dept: PHARMACY | Facility: CLINIC | Age: 79
End: 2024-08-16
Payer: COMMERCIAL

## 2024-08-16 NOTE — TELEPHONE ENCOUNTER
Patient calls Joan Lopez for refills of latanoprost and hydroxyurea. She has active rxs of both on file at [facility]. Called her to inform. She would like at a local pharmacy now. Informed her she can get latanoprost from ophthalmology whom she just saw  and hydroxyurea from Dr. Wilde. She will call them for refills.     Eladia Salcedo, PharmD, BCACP  Medication Therapy Management Provider, [facility]  [phone]

## 2024-09-02 ENCOUNTER — PATIENT OUTREACH (OUTPATIENT)
Dept: ONCOLOGY | Facility: CLINIC | Age: 79
End: 2024-09-02
Payer: COMMERCIAL

## 2024-09-02 NOTE — PROGRESS NOTES
[facility]: Cancer Care                                                                                        Completed chart audit to update Oncology Care Coordination enrollment status.  Reviewed POC and pt has appropriate follow up scheduled.         Signature:  Mony Restrepo RN

## 2024-09-17 ENCOUNTER — TELEPHONE (OUTPATIENT)
Dept: ENDOCRINOLOGY | Facility: CLINIC | Age: 79
End: 2024-09-17
Payer: COMMERCIAL

## 2024-09-17 NOTE — TELEPHONE ENCOUNTER
Patient confirmed scheduled appointment:  Date: 11/25   Time: 2:30   Visit type: new endo  Provider: Kerrie   Location:  [facility]   Testing/imaging:   Additional notes:     10/24 r/s     Kiersten Acosta on 9/17/2024 at 10:48 AM

## 2024-10-09 NOTE — PROGRESS NOTES
"Physical Therapy Initial Evaluation  January 21, 2020     Precautions/Restrictions/MD instructions: PT eval and treat.     Subjective:   Date of Onset: Fall and external fixator placement on 10/12/19, DOS10/28/19  C/C: left ankle pain at lateral, medial, and anterior ankle. Reduced range of motion, impaired gait and balance.  Quality of pain is dull, aching and sharp. Pains are described as constant in nature. Pain is worse: on the go, after periods of rest. Pain is rated 3-7/10.   History of symptoms: Pains began suddenly as the result of stepping wrong when going down the stairs. She reports this caused her to fall. She went to the ER in an ambulance and was then put in an external fixator to stabilize the fracture. She went to a TCU for several weeks in the external fixator before returning for an ORIF of her bimalleolar fracture on 10/28/19. She went back to the TCU again following the procedure and has been getting home care PT since she returned. She graduated from home care PT last week.  Since onset, symptoms are improving gradually.  Previous episodes: initial she injured her ankle following a day of skiing when she was 14. Her cousin later shook her foot, causing additional symptoms. Since this injury, she reports she has \"weak ankles.\"  Worsened by: walking, stairs, standing  Alleviated by: compression, elevation, rest  General health as reported by patient: fair  Pertinent medical/surgical history: osteoporosis, history of tibial fracture 2 years ago without mechanism of injury, diabetes mellitis type II . She denies night pain, significant current illness or recent hospital admission unrelated to her fracture. She denies history of other surgery in the area.  Imaging: x-ray. Current occupational status: retired. Patient's goals are: decrease pain, improve tolerance to walking, stairs, and standing. Return to MD:  1/24/20    Objective:  ANKLE:   ROM L ROM R   Dorsiflexion 5 20   Plantarflexion 30 65 " Patient/mother informed by phone. Agree to plan.  [facility].  Urine culture order loaded.  Lab informed and will process specimen.     Please review as alternatives come up for antibiotic.        Inversion 30 60   Eversion 20 20       Edema:    General: 4.5 cm difference on L compared to right  Figure 8: L: 56 cm ; R: 50 cm    Palpation: Tender at medial and lateral malleolus at site of hardware    Gait: Ambulates with slow moshe with SPC in her right lower extremity. She demonstrates increased left lateral hip translation during stance phase.              Assessment/Plan:    The patient is a 74 year old female with chief complaint of left ankle pain.    The patient has the following significant findings with corresponding treatment plan.  Diagnosis 1:  internal fixation of left ankle bimalleolar fracture on 10/28/19    Pain -  hot/cold therapy, US, manual therapy, splint/taping/bracing/orthotics, education, directional preference exercise and home program  Decreased ROM/flexibility - manual therapy and therapeutic exercise  Decreased strength - therapeutic exercise and therapeutic activities  Impaired balance - neuro re-education and therapeutic activities  Decreased proprioception - neuro re-education and therapeutic activities  Impaired gait - gait training  Impaired muscle performance - neuro re-education  Decreased function - therapeutic activities      Therapy Evaluation Codes:   1) History comprised of:   Personal factors that impact the plan of care:      Age, Past/current experiences and Time since onset of symptoms.    Comorbidity factors that impact the plan of care are:      Diabetes, Overweight, Pain at night/rest, Weakness and osteoporosis.     Medications impacting care: see EPIC.  2) Examination of Body Systems comprised of:   Body structures and functions that impact the plan of care:      Ankle, Hip, Knee and Lumbar spine.   Activity limitations that impact the plan of care are:      Bathing, Bending, Cooking, Driving, Dressing, Lifting, Sitting, Squatting/kneeling, Stairs, Standing, Throwing, Sleeping and Laying down.   Clinical presentation characteristics  are:    Evolving/Changing.  3) Presentation comprised of:   Presentation scored as Moderate complexity with Evolving presentation with changing characteristics..  4) Decision-Making    Moderate complexity using standardized patient assessment instrument and/or measureable assessment of functional outcome.  Cumulative Therapy Evaluation is: Moderate complexity.    Previous and current functional limitations:  (See Goal Flow Sheet for this information)    Short term and Long term goals: (See Goal Flow Sheet for this information)     Communication ability:  Patient appears to be able to clearly communicate and understand verbal and written communication and follow directions correctly.  Treatment Explanation - The following has been discussed with the patient: RX ordered/plan of care, anticipated outcomes, and possible risks and side effects.  This patient would benefit from PT intervention to resume normal activities.   Rehab potential is good.    Frequency:  2 X week, once daily  Duration:  for 1 weeks tapering to 1 X a week over 8 weeks  Discharge Plan: Achieve all LTGs, be independent in home treatment program, and reach maximal therapeutic benefit.    Please refer to the daily flowsheet for treatment today, total treatment time and time spent performing 1:1 timed codes.

## 2024-10-13 ENCOUNTER — HEALTH MAINTENANCE LETTER (OUTPATIENT)
Age: 79
End: 2024-10-13

## 2024-10-14 ENCOUNTER — TELEPHONE (OUTPATIENT)
Dept: PHARMACY | Facility: CLINIC | Age: 79
End: 2024-10-14
Payer: COMMERCIAL

## 2024-10-14 DIAGNOSIS — E11.65 TYPE 2 DIABETES MELLITUS WITH HYPERGLYCEMIA, WITHOUT LONG-TERM CURRENT USE OF INSULIN (H): ICD-10-CM

## 2024-10-14 RX ORDER — FLASH GLUCOSE SENSOR
KIT MISCELLANEOUS
Qty: 6 EACH | Refills: 3 | Status: SHIPPED | OUTPATIENT
Start: 2024-10-14

## 2024-10-15 DIAGNOSIS — E11.65 TYPE 2 DIABETES MELLITUS WITH HYPERGLYCEMIA, WITHOUT LONG-TERM CURRENT USE OF INSULIN (H): ICD-10-CM

## 2024-10-15 RX ORDER — FLASH GLUCOSE SENSOR
KIT MISCELLANEOUS
Qty: 6 EACH | Refills: 0 | OUTPATIENT
Start: 2024-10-15

## 2024-10-15 NOTE — TELEPHONE ENCOUNTER
10-14-24    Patient calls and asks for refill of her valorie- 14 day cgm sensors.    Mtm will refill to her mail order [facility]. Today .    Joan Lopez Rph.  Medication Therapy Management Provider  [phone]

## 2024-10-29 ENCOUNTER — PATIENT OUTREACH (OUTPATIENT)
Dept: CARE COORDINATION | Facility: CLINIC | Age: 79
End: 2024-10-29
Payer: COMMERCIAL

## 2024-11-02 NOTE — PROGRESS NOTES
Medication Therapy Management (MTM) Encounter    ASSESSMENT:                            Medication Adherence/Access: none     Type 2 Diabetes:  Patient is meeting A1c goal of <8.0%(  7.5% ). Self monitoring of blood glucose is now at goal of fasting  mg/dL and post prandial <180mg/dL. Patient has for last 4 weeks >/=68% cgm time in target range.must scan once every 8 hours to hold 24 hour bs review.   Mtm discussed low bs's on valorie--seem to occur if she doesn't eat much that day --she states she never knows how much she can eat or when she won't have an appetite.  She declined to have any DM med adjustments today.  Metformin : stay on 500mg. daily dose  at dinner.   Basal Insulin -off all lantus since sept-2024.    Novolog (mealtime insulin ) : Stay 5 units /injection --only use when high carbohydrate meal spikes bs's to >250mg./dl. (careful on dosing as you had a few overnight lows perhaps due to too many units).   SFU: keep Glimepiride dose at 2mg. Am only now. To avoid nocturnal hypoglycemia.     Increased exercise/low carb/calorie diet + wt. Loss-very beneficial for her NAFLD--this is working well --continue as is .       Immunizations: Up to date.     Hypokalemia:  Stable      Hyperlipidemia: Stable. Patient is appropriately on high intensity statin which is indicated based on 2019 ACC/AHA guidelines for lipid management with an ASCVD risk of 33%.      Hypertension: Stable. Patient is meeting blood pressure goal of < 140/90mmHg.     Vitamin B12: is at goal of 600-1000pg/mL. Pt would benefit from vitamin B12 lab recheck in 12 months.    Essential Thrombocythemia:  Platelets continue to lessen each month on hydrea -lab recheck today --result is pending.     PLAN:                            1. A1c today =7.5% --excellent stay on glimepiride, metformin as is , rare use of novolog if eating high sugar drink or dessert.     Please try to scan your blood sugars once every 8 hours so we can see 24 hour blood  sugar trends.     2. Please make appt. Downstairs at our pharmacy for hi dose flu shot and newest covid vaccines. Ok to get both on same day.     4. Watch mychart for other lab results.     5. Call eye doctor to have your glaucoma eye drops refilled:    Dr. Lester Grimes MD - [facility]  dr grimes eye doctor from [email or web address]  Lester Grimes MD - Retina and Vitreous Diseases - Accepting New Patients - Need help scheduling? Call [phone]    Follow-up: Return in about 20 weeks (around 3/25/2025), or @ 3 Pm, for Medication Therapy Management Visit, A1c lab, Blood sugar meter review, BP Recheck.        SUBJECTIVE/OBJECTIVE:                          Sarah Felix is a 79 year old female coming into clinic today for a follow-up (7-9-24) visit. She was referred to me from Dr. Collette Fay.     Reason for visit:   Labs/bs/meds review.  Fatigues easily bit other than that feels good.     Allergies/ADRs: Reviewed in chart  Tobacco: She reports that she quit smoking about 44 years ago. Her smoking use included cigarettes. She has been exposed to tobacco smoke. She has never used smokeless tobacco.  Alcohol: 7-9 beverages / week typically wine 1 or 2 glasses/night - reports that she hasn't had any alcohol in the last week and a half until they figure out her liver situation.   Caffeine: 2 cans diet coke sodas/day  Activity: reports she has low energy and stamina, if she does any activity she has to take breaks frequenty but now that the weather is better she is doing more outdoor work.   Past Medical History: Reviewed in chart  Personal Healthcare Goals: figure out what is going on with liver or blood, would like to get 3rd vaccine, improve a1c  Uses 4 prong CANE now in 2023 to walk /get around.   Still falls several x year --unsteady /poor balance.     Medication Adherence/Access:  admits not taking metformin 2-3 x week if not eating much.     Type 2 Diabetes:  Currently  taking : glimepiride 2 mg. In am (stopped pm dose march -24),  Novolog  insulin very rare now  (5 units once daily to control daily high sugar (>250) -perhaps now if dessert or juice spiked blood sugar) Metformin 500mg. ER tabs -- 1 tablet /day now at  dinner daily.  Afraid of bottoming out blood sugar wise.    Lantus -stopped 2 months ago due to hypo bs's.   Potential side effects : constipated now -started senna 1 tab /day recently.       SMBG:    Lows bs occur when she doesn't have an appetite -she states feels a little lethargic but otherwise fine.                 Sensor only active 30% of the time?                                Wt Readings from Last 5 Encounters:   11/05/24 159 lb 8 oz (72.3 kg)   07/30/24 159 lb 6.4 oz (72.3 kg)   07/09/24 162 lb 11.2 oz (73.8 kg)   03/14/24 161 lb (73 kg)   03/05/24 160 lb 11.2 oz (72.9 kg)         Symptoms of low blood sugar? sweaty  Symptoms of high blood sugar? Fatigue, shaky, pit in stomach, overall feeling poor   Eye exam: up to date  Foot exam: up to date  Diet: doing her best to control carbs in daily diet.   Previously: she's maintained similar diet and notes sugars now being elevated  eats 4-5 smaller meals - pasta and rices  Fruit and some fruit juices   Breakfast: cereal 2-3 times per week, drink 2 glasses OJ or grapefruit juice /day .   Exercise: see social history --saw liver specialist suggested swimming.   Aspirin: Taking 81 mg daily and denies side effects  Statin: Yes: atorvastatin 40 mg   ACEi/ARB: Yes: low dose Lisinopril 2.5mg./day  Urine Albumin:   Lab Results   Component Value Date    ALCR  12/19/2023      Comment:      Unable to calculate, urine albumin and/or urine creatinine is outside detectable limits.  Microalbuminuria is defined as an albumin:creatinine ratio of 17 to 299 for males and 25 to 299 for females. A ratio of albumin:creatinine of 300 or higher is indicative of overt proteinuria.  Due to biologic variability, positive results should  be confirmed by a second, first-morning random or 24-hour timed urine specimen. If there is discrepancy, a third specimen is recommended. When 2 out of 3 results are in the microalbuminuria range, this is evidence for incipient nephropathy and warrants increased efforts at glucose control, blood pressure control, and institution of therapy with an angiotensin-converting-enzyme (ACE) inhibitor (if the patient can tolerate it).       Lab Results   Component Value Date    A1C 7.5 11/05/2024    A1C 7.6 07/09/2024    A1C 6.6 03/05/2024    A1C 7.4 11/14/2023    A1C 9.0 08/15/2023    A1C 8.3 06/21/2021    A1C 9.4 12/14/2020    A1C 8.1 09/21/2020    A1C 8.5 01/07/2020    A1C 7.6 10/12/2019               Wt Readings from Last 5 Encounters:   11/05/24 159 lb 8 oz (72.3 kg)   07/30/24 159 lb 6.4 oz (72.3 kg)   07/09/24 162 lb 11.2 oz (73.8 kg)   03/14/24 161 lb (73 kg)   03/05/24 160 lb 11.2 oz (72.9 kg)           Immunizations: upto date now.     Hypokalemia:  Patient reports the new potassium 10 meq/day capsule is working much better and is easy to take.   Potassium   Date Value Ref Range Status   03/05/2024 3.8 3.4 - 5.3 mmol/L Final   03/28/2022 4.1 3.4 - 5.3 mmol/L Final   01/07/2020 4.0 3.4 - 5.3 mmol/L Final       Hyperlipidemia: Current therapy includes : atorvastatin 40mg daily.  Patient reports no significant myalgias or other side effects.  The 10-year ASCVD risk score (Ammy VELEZ, et al., 2019) is: 34.3%    Values used to calculate the score:      Age: 79 years      Sex: Female      Is Non- : No      Diabetic: Yes      Tobacco smoker: No      Systolic Blood Pressure: 110 mmHg      Is BP treated: Yes      HDL Cholesterol: 53 mg/dL      Total Cholesterol: 151 mg/dL  Recent Labs   Lab Test 11/14/23  1514 09/23/22  1320   CHOL 151 131   HDL 53 49*   LDL 62 55   TRIG 180* 137       Hypertension: Current medications include : pcp stopped Metoprolol previously , back on 2.5mg. daily Lisinopril  +  furosemide 20mg -1 tab in am and early afternoon now .  Patient does not self-monitor blood pressure. No side effects from current medications.        BP Readings from Last 3 Encounters:   11/05/24 110/64   07/30/24 100/69   07/09/24 122/66     Vitamin B12: level was  645  On 3-13-23. Vitamin B12 helps support memory and lessens fatigue. She is taking daily OTC pill- 2500mcg. B-12 pill --3 x week now.       Essential Thrombocythemia:   Manjeet Wilde MD   of Medicine, Division of Hematology, Oncology and Transplantation  [employer]  MD started her on 500mg./day Hydrea (states [facility] out of stock last 7 days she has been off it ) + 81mg. ASA daily  last month --here today for cbc + platelets recheck .        /64   Pulse 102   Wt 159 lb 8 oz (72.3 kg)   SpO2 93%   BMI 27.57 kg/m      --------------------------------------------------------------------------------------------------    I spent 30 minutes with this patient today. All changes were made via collaborative practice agreement with Heide Fay MD. A copy of the visit note was provided to the patient's primary care provider.    The patient was given a summary of these recommendations. She admits computer not working at home to view [employer]  So [facility] helped her reset username /pword on her cell phone --so now she can receive [employer] messages.     Joan Lopez [facility].  Medication Therapy Management Provider  [phone]           Medication Therapy Recommendations  Type 2 diabetes with stage 2 chronic kidney disease GFR 60-89 (H)   1 Current Medication: Continuous Glucose Sensor (FREESTYLE CARMELA 14 DAY SENSOR) MISC   Current Medication Sig: Change every 14 days.   Rationale: Frequency inappropriate - Dosage too low - Effectiveness   Recommendation: Increase Frequency - scan blood sugars at least once every 8 hours .   Status: Accepted per CPA   Identified Date:  11/5/2024 Completed Date: 11/5/2024

## 2024-11-05 ENCOUNTER — OFFICE VISIT (OUTPATIENT)
Dept: PHARMACY | Facility: CLINIC | Age: 79
End: 2024-11-05
Payer: COMMERCIAL

## 2024-11-05 ENCOUNTER — LAB (OUTPATIENT)
Dept: LAB | Facility: CLINIC | Age: 79
End: 2024-11-05
Payer: COMMERCIAL

## 2024-11-05 VITALS
WEIGHT: 159.5 LBS | OXYGEN SATURATION: 93 % | BODY MASS INDEX: 27.57 KG/M2 | SYSTOLIC BLOOD PRESSURE: 110 MMHG | DIASTOLIC BLOOD PRESSURE: 64 MMHG | HEART RATE: 102 BPM

## 2024-11-05 DIAGNOSIS — E53.1 VITAMIN B6 DEFICIENCY: ICD-10-CM

## 2024-11-05 DIAGNOSIS — E11.65 TYPE 2 DIABETES MELLITUS WITH HYPERGLYCEMIA, WITHOUT LONG-TERM CURRENT USE OF INSULIN (H): Primary | ICD-10-CM

## 2024-11-05 DIAGNOSIS — D47.3 ESSENTIAL THROMBOCYTHEMIA (H): ICD-10-CM

## 2024-11-05 DIAGNOSIS — E78.5 HYPERLIPIDEMIA LDL GOAL <100: ICD-10-CM

## 2024-11-05 DIAGNOSIS — D69.3 ESSENTIAL THROMBOCYTOPENIA (H): ICD-10-CM

## 2024-11-05 DIAGNOSIS — I10 HYPERTENSION GOAL BP (BLOOD PRESSURE) < 140/90: ICD-10-CM

## 2024-11-05 DIAGNOSIS — E11.9 TYPE 2 DIABETES, HBA1C GOAL < 7% (H): ICD-10-CM

## 2024-11-05 DIAGNOSIS — E11.65 TYPE 2 DIABETES MELLITUS WITH HYPERGLYCEMIA, WITHOUT LONG-TERM CURRENT USE OF INSULIN (H): ICD-10-CM

## 2024-11-05 LAB
BASOPHILS # BLD AUTO: 0.2 10E3/UL (ref 0–0.2)
BASOPHILS NFR BLD AUTO: 2 %
EOSINOPHIL # BLD AUTO: 0.3 10E3/UL (ref 0–0.7)
EOSINOPHIL NFR BLD AUTO: 3 %
ERYTHROCYTE [DISTWIDTH] IN BLOOD BY AUTOMATED COUNT: 16.1 % (ref 10–15)
EST. AVERAGE GLUCOSE BLD GHB EST-MCNC: 169 MG/DL
HBA1C MFR BLD: 7.5 % (ref 0–5.6)
HCT VFR BLD AUTO: 38.9 % (ref 35–47)
HGB BLD-MCNC: 13.1 G/DL (ref 11.7–15.7)
IMM GRANULOCYTES # BLD: 0.1 10E3/UL
IMM GRANULOCYTES NFR BLD: 1 %
LYMPHOCYTES # BLD AUTO: 1.4 10E3/UL (ref 0.8–5.3)
LYMPHOCYTES NFR BLD AUTO: 13 %
MCH RBC QN AUTO: 33.8 PG (ref 26.5–33)
MCHC RBC AUTO-ENTMCNC: 33.7 G/DL (ref 31.5–36.5)
MCV RBC AUTO: 100 FL (ref 78–100)
MONOCYTES # BLD AUTO: 0.6 10E3/UL (ref 0–1.3)
MONOCYTES NFR BLD AUTO: 5 %
NEUTROPHILS # BLD AUTO: 8 10E3/UL (ref 1.6–8.3)
NEUTROPHILS NFR BLD AUTO: 76 %
NRBC # BLD AUTO: 0 10E3/UL
NRBC BLD AUTO-RTO: 0 /100
PLATELET # BLD AUTO: 704 10E3/UL (ref 150–450)
RBC # BLD AUTO: 3.88 10E6/UL (ref 3.8–5.2)
WBC # BLD AUTO: 10.5 10E3/UL (ref 4–11)

## 2024-11-05 PROCEDURE — 99606 MTMS BY PHARM EST 15 MIN: CPT | Performed by: PHARMACIST

## 2024-11-05 PROCEDURE — 99607 MTMS BY PHARM ADDL 15 MIN: CPT | Performed by: PHARMACIST

## 2024-11-05 PROCEDURE — 80061 LIPID PANEL: CPT

## 2024-11-05 PROCEDURE — 83036 HEMOGLOBIN GLYCOSYLATED A1C: CPT

## 2024-11-05 PROCEDURE — 85025 COMPLETE CBC W/AUTO DIFF WBC: CPT

## 2024-11-05 PROCEDURE — 36415 COLL VENOUS BLD VENIPUNCTURE: CPT

## 2024-11-05 NOTE — Clinical Note
Collette--saw kayce today -overall she is doing quite well --A1c 7.5%, Blood Pressure and weight stable, other labs pending. She always c/o tiring easily most likely due to being deconditioned ....  -Joan

## 2024-11-05 NOTE — PATIENT INSTRUCTIONS
"Recommendations from today's MTM visit:                                                         1. A1c today =7.5% --excellent stay on glimepiride, metformin as is , rare use of novolog if eating high sugar drink or dessert.     Please try to scan your blood sugars once every 8 hours so we can see 24 hour blood sugar trends.     2. Please make appt. Downstairs at our pharmacy for hi dose flu shot and newest covid vaccines. Ok to get both on same day.     4. Watch mychart for other lab results.     5. Call eye doctor to have your glaucoma eye drops refilled:    Dr. Lester Grimes MD - [facility]  dr grimes eye doctor from [email or web address]  Lester Grimes MD - Retina and Vitreous Diseases - Accepting New Patients - Need help scheduling? Call [phone]    Follow-up: Return in about 20 weeks (around 3/25/2025), or @ 3 Pm, for Medication Therapy Management Visit, A1c lab, Blood sugar meter review, BP Recheck.    It was great speaking with you today.  I value your experience and would be very thankful for your time in providing feedback in our clinic survey. In the next few days, you may receive an email or text message from [employer] with a link to a survey related to your  clinical pharmacist.\"     To schedule another MTM appointment, please call the clinic directly or you may call the MTM scheduling line at [phone] or toll-free at [phone].     My Clinical Pharmacist's contact information:                                                      Please feel free to contact me with any questions or concerns you have.      Joan Lopez [facility].  Medication Therapy Management Provider  [phone]    "

## 2024-11-06 LAB
CHOLEST SERPL-MCNC: 193 MG/DL
FASTING STATUS PATIENT QL REPORTED: NORMAL
HDLC SERPL-MCNC: 81 MG/DL
LDLC SERPL CALC-MCNC: 90 MG/DL
NONHDLC SERPL-MCNC: 112 MG/DL
TRIGL SERPL-MCNC: 110 MG/DL

## 2024-11-06 NOTE — CONFIDENTIAL NOTE
RECORDS RECEIVED FROM: internal    DATE RECEIVED: 11.25.24    NOTES (FOR ALL VISITS) STATUS DETAILS   OFFICE NOTES from referring provider internal    Heide Fay MD      MEDICATION LIST internal     IMAGING      DEXASCAN internal  2.22.24   XR (Chest) internal  2.28.23   CT (HEAD/NECK/CHEST/ABDOMEN) internal  2.28.23   LABS     DIABETES: HBGA1C, CREATININE, FASTING LIPIDS, MICROALBUMIN URINE, POTASSIUM, TSH, T4    THYROID: TSH, T4, CBC, THYRODLONULIN, TOTAL T3, FREE T4, CALCITONIN, CEA internal  Cbc- 11.5.24  Lipid- 11.5.24  HBGA1C- 11.5.24  Bmp- 3.5.24  TSH/T4- 2.16.23

## 2024-11-06 NOTE — RESULT ENCOUNTER NOTE
Ubaldo Smith,  Your A1c, lipid panel, and CBC (blood cell counts) results are all stable.  Please continue with your current treatment plan.    Heide Fay MD

## 2024-11-19 ENCOUNTER — PATIENT OUTREACH (OUTPATIENT)
Dept: CARE COORDINATION | Facility: CLINIC | Age: 79
End: 2024-11-19
Payer: COMMERCIAL

## 2024-11-25 ENCOUNTER — OFFICE VISIT (OUTPATIENT)
Dept: ENDOCRINOLOGY | Facility: CLINIC | Age: 79
End: 2024-11-25
Attending: FAMILY MEDICINE
Payer: COMMERCIAL

## 2024-11-25 ENCOUNTER — PRE VISIT (OUTPATIENT)
Dept: ENDOCRINOLOGY | Facility: CLINIC | Age: 79
End: 2024-11-25

## 2024-11-25 ENCOUNTER — LAB (OUTPATIENT)
Dept: LAB | Facility: CLINIC | Age: 79
End: 2024-11-25
Payer: COMMERCIAL

## 2024-11-25 VITALS
HEIGHT: 65 IN | OXYGEN SATURATION: 96 % | DIASTOLIC BLOOD PRESSURE: 72 MMHG | SYSTOLIC BLOOD PRESSURE: 107 MMHG | WEIGHT: 158 LBS | HEART RATE: 105 BPM | BODY MASS INDEX: 26.33 KG/M2

## 2024-11-25 DIAGNOSIS — M85.851 OSTEOPENIA OF BOTH HIPS: Primary | ICD-10-CM

## 2024-11-25 DIAGNOSIS — M85.852 OSTEOPENIA OF BOTH HIPS: Primary | ICD-10-CM

## 2024-11-25 DIAGNOSIS — M85.852 OSTEOPENIA OF BOTH HIPS: ICD-10-CM

## 2024-11-25 DIAGNOSIS — R29.6 RECURRENT FALLS WHILE WALKING: ICD-10-CM

## 2024-11-25 DIAGNOSIS — M85.851 OSTEOPENIA OF BOTH HIPS: ICD-10-CM

## 2024-11-25 LAB
ALBUMIN SERPL BCG-MCNC: 4.3 G/DL (ref 3.5–5.2)
ANION GAP SERPL CALCULATED.3IONS-SCNC: 14 MMOL/L (ref 7–15)
BUN SERPL-MCNC: 8.5 MG/DL (ref 8–23)
CALCIUM SERPL-MCNC: 9.4 MG/DL (ref 8.8–10.4)
CHLORIDE SERPL-SCNC: 99 MMOL/L (ref 98–107)
CREAT SERPL-MCNC: 0.97 MG/DL (ref 0.51–0.95)
EGFRCR SERPLBLD CKD-EPI 2021: 60 ML/MIN/1.73M2
GLUCOSE SERPL-MCNC: 298 MG/DL (ref 70–99)
HCO3 SERPL-SCNC: 24 MMOL/L (ref 22–29)
PHOSPHATE SERPL-MCNC: 3.8 MG/DL (ref 2.5–4.5)
POTASSIUM SERPL-SCNC: 4.2 MMOL/L (ref 3.4–5.3)
PTH-INTACT SERPL-MCNC: 93 PG/ML (ref 15–65)
SODIUM SERPL-SCNC: 137 MMOL/L (ref 135–145)

## 2024-11-25 PROCEDURE — 36415 COLL VENOUS BLD VENIPUNCTURE: CPT | Performed by: PATHOLOGY

## 2024-11-25 PROCEDURE — 99000 SPECIMEN HANDLING OFFICE-LAB: CPT | Performed by: PATHOLOGY

## 2024-11-25 PROCEDURE — 82306 VITAMIN D 25 HYDROXY: CPT | Performed by: STUDENT IN AN ORGANIZED HEALTH CARE EDUCATION/TRAINING PROGRAM

## 2024-11-25 PROCEDURE — 83970 ASSAY OF PARATHORMONE: CPT | Performed by: PATHOLOGY

## 2024-11-25 PROCEDURE — 80069 RENAL FUNCTION PANEL: CPT | Performed by: PATHOLOGY

## 2024-11-25 ASSESSMENT — PAIN SCALES - GENERAL: PAINLEVEL_OUTOF10: NO PAIN (0)

## 2024-11-25 NOTE — PROGRESS NOTES
Endocrinology Clinic Visit 11/25/2024    NAME:  Sarah Felix  PCP:  Heide Fay  MRN:  [id number]  Reason for Consult: Osteopenia  Requesting Provider:  Heide Fay    Chief Complaint     Chief Complaint   Patient presents with    Osteoporosis       History of Present Illness     Sarah Felix is a 79 year old female who is seen in clinic for assessment for osteopenia.  She has background history of GERD, mixed incontinence, DM type II, CKD stage II, with Schatzki ring of distal esophagus, generalized muscle weakness and impairment of balance today is her first visit to the endocrinology for assessment for osteopenia.    She was diagnosed with osteopenia back in 2005.    DEXA bone scan February 2005:  Lumbar Spine L1-L4:  T-score -0.2                Left Femoral Neck:  T-score -1.6                 Right Femoral Neck:  T-score -2.0     DEXA bone scan June 2007:               Lumbar Spine L1-L4:  T-score 0.2                Left Femoral Neck:  T-score -1.4                 Right Femoral Neck:  T-score -1.9    DEXA bone scan March 2012:  Lumbar spine T-score in region of L1-L4 = 1.2. This correlates with   normal bone mineral density. Percent changed NA%      Left femoral neck T-score = -1.1. This correlates with osteopenia.   Total left hip T-score is -0.4, normal.      Right femoral neck T-score= -1.9. This correlates with osteopenia.   Total right hip T-score is -0.9, normal.     DEXA bone scan January 2018:  Lumbar Spine (L1-L4)      T-score:  1.3        Mild degenerative changes present, likely improving T-score slightly                Left Femoral Neck            T-score:  -1.8               Right Femoral Neck          T-score:  -2.4        Most recent DEXA bone scan on 2/22/2024:  Lumbar Spine  T-score 2 ., BMD is 1.425 g/cm2.         Left femoral neck  T-score -1.5.      Right femoral neck  T-score -2      Left Total hip  T-score -0.8 , BMD is 0.904 g/cm2.     Right total hip  T-score  -1.3, BMD is 0.840 g/cm2.    Fracture risk   The estimated 10-year risk for a major osteoporotic fracture is 20.4% and for a hip fracture is 5.1%. FRAX was calculated based on information provided by the patient on the DXA questionnaire. , RISK FACTORS: previous fracture after age 40y .      Calcium intake:   Dietary: No milk , yogurt 5 servings/week, ice-cream once /week , 2-3 slices of cheese /day   Supplements: takes calcium supplements once /day does not know what type or strength     Vitamin D intake:   Supplements: 3000 units but not 100% sure   Last vitamin D level was 44 on 9/23/2022.    Osteoporosis Risk factors:   Previous fractures: Left tibial plateau fracture 2017 was not aware she had it and does not recazll having trauma at that time , left ankle bimalleolar fracture 2019 s/p ORIF stated she was walking down the basement fell 6 steps down.  Family history of fragility fracture in parent: no   Current smoking: no   Steroid Use: no   Rheumatoid  arthritis: no   Alcohol (3 or more units per day): no   Other medications known to affect BMD: Lasix  Reported loss of height: lost 0.5 inch   Menstrual history: age at menopause: mid 40s  Estrogen use after menopause: no   Tendency for falls: yes, was seen by PT in the past but no improvement, has walker and walking cane    GI history: Malabsorption (IBD, Celiac, gastric bypass ): no  History of kidney stones: no   History of thyroid disease: no   Physical activity: limited due to poor balance   Weight history: Lost 60 Ib  over 6-8 months 2 years ago   History of GERD: Yes but does not take medications  and also has history of Schatzki ring of distal esophagus at the gastroesophageal junction had EGD in 3/14/2019..  Dental history: has frequent with dentists last visit was 6 months ago  has partial tooth on the upper left side planned for removal  History of kidney disease: CKD stage II most recent EGFR on 3/5/2024 was 61.                Problem List      Patient Active Problem List   Diagnosis    Hepatic cyst    Allergic rhinitis    Diverticulitis of colon    Osteopenia of both hips    Esophageal reflux    Mixed incontinence urge and stress (male)(female)    Cystocele, lateral    Vaginal atrophy    Mixed hyperlipidemia    Heart burn    BABB (dyspnea on exertion)    Sebaceous hyperplasia    Seborrheic keratosis    Fatty liver    Personal history of other malignant neoplasm of skin    Hypertension goal BP (blood pressure) < 140/90    Elevated serum creatinine    Skin exam, screening for cancer    CKD (chronic kidney disease) stage 2, GFR 60-89 ml/min    Type 2 diabetes mellitus with hyperglycemia (H)    Type 2 diabetes with stage 2 chronic kidney disease GFR 60-89 (H)    Type 2 diabetes mellitus without complication, with long-term current use of insulin (H)    Intertriginous candidiasis    Slow transit constipation    Basal cell carcinoma of face    Tubular adenoma of colon    Schatzki's ring of distal esophagus    Adenomatous polyp of duodenum    Folate deficiency    Renal cyst    Diabetic polyneuropathy associated with type 2 diabetes mellitus (H)    Recurrent falls    Numbness and tingling of both feet    Gait instability    Generalized muscle weakness    Impairment of balance        Medications     Current Outpatient Medications   Medication Sig Dispense Refill    aspirin 81 MG EC tablet Take 81 mg by mouth daily      atorvastatin (LIPITOR) 40 MG tablet Take 1 tablet (40 mg) by mouth daily 90 tablet 1    blood glucose (ONETOUCH ULTRA) test strip 1 strip by In Vitro route 4 times daily. 400 strip 3    CALCIUM 500 + D 500-200 MG-IU OR TABS one tab twice per day 100 0    Cholecalciferol (VITAMIN D PO) Pt consuming 3000 units per day      Continuous Glucose Sensor (FREESTYLE CARMELA 14 DAY SENSOR) MISC Change every 14 days. 6 each 3    folic acid (FOLVITE) 400 MCG tablet Take 1 tablet (400 mcg) by mouth daily 100 tablet 3    furosemide (LASIX) 20 MG tablet Take 1  "tablet (20 mg) by mouth 2 times daily 180 tablet 1    glimepiride (AMARYL) 2 MG tablet Take 1 tablet (2 mg) by mouth every morning (before breakfast) 90 tablet 1    hydroxyurea (HYDREA) 500 MG capsule Take 1 capsule (500 mg) by mouth daily 90 capsule 3    insulin aspart (NOVOLOG PEN) 100 UNIT/ML pen 5 units with a high carb meal about once daily as needed. 45 mL 11    insulin pen needle (BD REY U/F) 32G X 4 MM miscellaneous USE 1 PEN NEEDLE four times daily. (WITH LANTUS AND Novolog) 400 each 11    insulin syringe-needle U-100 (BD INSULIN SYRINGE ULTRAFINE) 31G X 5/16\" 1 ML miscellaneous Use 1 syringes twice daily for insulin and victoza and for symptoms of hypoglycemia 100 each 3    Lancets (ONETOUCH DELICA PLUS [id number]) MISC 1 each 4 times daily 400 each 3    latanoprost (XALATAN) 0.005 % ophthalmic solution Place 1 drop into both eyes At Bedtime 7.5 mL 3    lisinopril (ZESTRIL) 2.5 MG tablet Take 1 tablet (2.5 mg) by mouth daily 90 tablet 3    metFORMIN (GLUCOPHAGE XR) 500 MG 24 hr tablet Take 1 tablet (500 mg) by mouth daily (with dinner)      metoprolol tartrate (LOPRESSOR) 25 MG tablet Take 1/2 tablet or 12.5mg by mouth twice daily. 90 tablet 3    nystatin (MYCOSTATIN) 292088 UNIT/GM external cream Apply topically 2 times daily (Patient taking differently: Apply topically daily as needed) 30 g 3    potassium chloride ER (MICRO-K) 10 MEQ CR capsule Take 1 capsule (10 mEq) by mouth daily 90 capsule 1    pyridOXINE (VITAMIN B6) 25 MG tablet Take 1 tablet (25 mg) by mouth daily 90 tablet 3    triamcinolone (KENALOG) 0.1 % external cream Apply topically 3 times daily 90 g 3    vitamin B-12 (CYANOCOBALAMIN) 2500 MCG sublingual tablet Take 2500 mcg by mouth three times per week       No current facility-administered medications for this visit.        Allergies     Allergies   Allergen Reactions    Actos [Pioglitazone]      Fatigue - felt crummy     Amlodipine Swelling    Ancef [Cefazolin]     Levaquin Rash     " Itching, rash    Cephalosporins Rash    Clindamycin Rash    Levofloxacin Itching and Rash    Nickel Rash     Contact reaction  Contact reaction       Medical / Surgical History     Past Medical History:   Diagnosis Date    Adenomatous polyp of duodenum 08/13/2018    Removed via EUS 8/13/2018 with f/u EGD 3/14/2019.  No further surveillance needed per GI.    Allergic rhinitis, cause unspecified     Allergic rhinitis    Basal cell carcinoma of face 03/2012    s/p MOHS    CKD (chronic kidney disease) stage 2, GFR 60-89 ml/min 06/06/2014    Closed bimalleolar fracture of left ankle with routine healing 10/11/2019    Added automatically from request for surgery [id number]    Contact dermatitis and other eczema, due to unspecified cause     Cyst of thyroid 1998    Thyroid Nodule/Hurthle Cell Neoplasm/Benign - resected    Cystocele, lateral     Diverticulitis of colon (without mention of hemorrhage)(562.11) 06/2004    Abd CT    Esophageal reflux     Hiatal hernia    Essential hypertension, benign (HTN) 06/06/2014    Fatty liver     Hepatic cyst     8.0 cm x 6.6 cm on CT 3/2023 (stable)    Hiatal hernia     small    Hyperlipidemia     Mixed incontinence urge and stress (male)(female) 06/24/2007    Nontoxic uninodular goiter     Osteopenia 04/21/2005    on fosamax  for > 5 yr.  stop 7/2012    Other chronic otitis externa     Postoperative deep vein thrombosis (DVT) (H) age 22    post ovarian cyst removed    Schatzki's ring of distal esophagus 03/05/2023    Dilated 8/2018    Tubular adenoma of colon 06/23/2022    On colonoscopy 2016, rpt 5 yrs.    Type 2 diabetes mellitus (H)      Past Surgical History:   Procedure Laterality Date    APPENDECTOMY  age 22    APPLY EXTERNAL FIXATOR LOWER EXTREMITY Left 10/12/2019    Procedure: Left ankle external fixator placement;  Surgeon: Leeanne Brown MD;  Location: [facility] OR    CATARACT IOL, RT/LT Right 04/17/2018    [facility]    COLONOSCOPY  04/2006    repeat 10 yr     ENDOSCOPIC ULTRASOUND UPPER GASTROINTESTINAL TRACT (GI) N/A 09/06/2018    Procedure: ENDOSCOPIC ULTRASOUND, ESOPHAGOSCOPY / UPPER GASTROINTESTINAL TRACT (GI);  Endoscopic Ultrasound, Esophagogastroduodenoscopy with endoscopic mucosal resection;  Surgeon: Hood Brower MD;  [facility]    ESOPHAGOSCOPY, GASTROSCOPY, DUODENOSCOPY (EGD), COMBINED N/A 08/13/2018    Procedure: COMBINED ESOPHAGOSCOPY, GASTROSCOPY, DUODENOSCOPY (EGD), BIOPSY SINGLE OR MULTIPLE;  EGD;  Surgeon: Hood Brower MD;  [facility]    ESOPHAGOSCOPY, GASTROSCOPY, DUODENOSCOPY (EGD), COMBINED N/A 03/14/2019    Procedure: Upper Endoscopy;  Surgeon: Hood Brower MD;  [facility]    ESOPHAGOSCOPY, GASTROSCOPY, DUODENOSCOPY (EGD), RESECT MUCOSA, COMBINED N/A 09/06/2018    Procedure: COMBINED ESOPHAGOSCOPY, GASTROSCOPY, DUODENOSCOPY (EGD), RESECT MUCOSA;;  Surgeon: Hood Brower MD;  [facility]    GYN SURGERY  10/22/2008    pelvic support    HEMORRHOIDECTOMY  08/2008    MOHS MICROGRAPHIC PROCEDURE      OPEN REDUCTION INTERNAL FIXATION ANKLE Left 10/28/2019    Procedure: Internal fixation left bimalleolar ankle fracture, removal external fixator;  Surgeon: Jose Roberto Can MD;  [facility]    ORIF ANKLE FRACTURE BIMALLEOLAR Left 10/28/2019    Internal fixation left bimalleolar ankle fracture, removal external fixator    OTHER SURGICAL HISTORY Left 10/12/2019    APPLY EXTERNAL FIXATOR LOWER EXTREMITYProcedure: Left ankle external fixator placement; Surgeon: Leeanne Brown MD; Location: [facility]      OVARIAN CYST REMOVAL Left age 22    L ovarian cyst removal, laparotomy      PHACOEMULSIFICATION CLEAR CORNEA WITH STANDARD INTRAOCULAR LENS IMPLANT Left 09/25/2020    Procedure: LEFT CATARACT REMOVAL WITH INTRAOCULAR LENS IMPLANT;  Surgeon: Janay Amezcua MD;  [facility]    SURGICAL HISTORY OF -   10/2008    Transobturator tape for Urinary Incontinence    THYROIDECTOMY, PARTIAL  01/01/1998       Social History      Social History     Socioeconomic History    Marital status: Single     Spouse name: Not on file    Number of children: 0    Years of education: Not on file    Highest education level: Not on file   Occupational History    Occupation: RN     Employer: [employer]     Comment: peds   Tobacco Use    Smoking status: Former     Current packs/day: 0.00     Types: Cigarettes     Quit date: 1980     Years since quittin.9     Passive exposure: Past    Smokeless tobacco: Never    Tobacco comments:     smoked from 70-80; smoked about 1ppd   Vaping Use    Vaping status: Never Used   Substance and Sexual Activity    Alcohol use: Yes     Alcohol/week: 0.0 standard drinks of alcohol     Comment: 6-7 drinks per week    Drug use: No    Sexual activity: Yes     Partners: Male     Comment: postmenopausal   Other Topics Concern    Parent/sibling w/ CABG, MI or angioplasty before 65F 55M? No   Social History Narrative    Dairy/d 2 servings/d. Not much milk mostly cheese    Caffeine 5 servings/d    Exercise active in yard work     Sunscreen used - Yes when in sun    Seatbelts used - Yes    Working smoke/CO detectors in the home - Yes    Guns stored in the home - No    Self Breast Exams - Yes    Self Testicular Exam - NA    Eye Exam up to date - Yes needs to see opthamologist for diabetes    Dental Exam up to date - Yes    Pap Smear up to date -Yes     Mammogram up to date - Yes 3/10    PSA up to date - NA    Dexa Scan up to date - 07    Flex Sig / Colonoscopy up to date -   yes RTC 10 yrs in epic    Immunizations up to date - Yes Td     Abuse: Current or Past(Physical, Sexual or Emotional)- No    Do you feel safe in your environment - Yes    8/4/10    Liya Ashraf RN                         Social Drivers of Health     Financial Resource Strain: Low Risk  (2023)    Financial Resource Strain     Within the past 12 months, have you or your family members you live with been unable to  "get utilities (heat, electricity) when it was really needed?: No   Food Insecurity: Low Risk  (12/19/2023)    Food Insecurity     Within the past 12 months, did you worry that your food would run out before you got money to buy more?: No     Within the past 12 months, did the food you bought just not last and you didn t have money to get more?: No   Transportation Needs: High Risk (12/19/2023)    Transportation Needs     Within the past 12 months, has lack of transportation kept you from medical appointments, getting your medicines, non-medical meetings or appointments, work, or from getting things that you need?: Yes   Physical Activity: Not on file   Stress: Not on file   Social Connections: Not on file   Interpersonal Safety: Low Risk  (12/19/2023)    Interpersonal Safety     Do you feel physically and emotionally safe where you currently live?: Yes     Within the past 12 months, have you been hit, slapped, kicked or otherwise physically hurt by someone?: No     Within the past 12 months, have you been humiliated or emotionally abused in other ways by your partner or ex-partner?: No   Housing Stability: Low Risk  (12/19/2023)    Housing Stability     Do you have housing? : Yes     Are you worried about losing your housing?: No       Family History     Family History   Problem Relation Age of Onset    Diabetes Mother     Hypertension Mother     Arthritis Mother         rheumatoid    Cancer Father     Cardiovascular Brother         palpitations not on meds.    Glaucoma No family hx of     Macular Degeneration No family hx of     Amblyopia No family hx of     Rheumatoid Arthritis Mother     Other - See Comments Brother         palpitations       ROS     12 ROS completed, pertinent positive and negative in HPI    Physical Exam   /72   Pulse 105   Ht 1.638 m (5' 4.5\")   Wt 71.7 kg (158 lb)   SpO2 96%   BMI 26.70 kg/m       General: Comfortable, no obvious distress, normal body habitus  HENT: Atraumatic, "   CV: normal rate.   Resp:  good effort, no evidence of loud wheezing   Skin: No rashes, lesions, or subcutaneous nodules on exposed skin.   Psych: Alert and oriented x 3. Appropriate affect, good insight  Musculoskeletal: Appropriate muscle bulk   Neuro: Moves all four extremities. No focal deficits on limited exam.     Labs/Imaging     Pertinent Labs were reviewed.  Radiology Results were  reviewed and discussed briefly.    Summary of recent findings:   Lab Results   Component Value Date    A1C 7.5 11/05/2024    A1C 7.6 07/09/2024    A1C 6.6 03/05/2024    A1C 7.4 11/14/2023    A1C 9.0 08/15/2023    A1C 8.3 06/21/2021    A1C 9.4 12/14/2020    A1C 8.1 09/21/2020    A1C 8.5 01/07/2020    A1C 7.6 10/12/2019       TSH   Date Value Ref Range Status   02/16/2023 2.61 0.30 - 4.20 uIU/mL Final   09/23/2022 3.55 0.30 - 4.20 uIU/mL Final   08/19/2019 2.46 0.40 - 4.00 mU/L Final   07/26/2017 2.11 0.40 - 4.00 mU/L Final   06/19/2015 2.27 0.40 - 4.00 mU/L Final   07/09/2013 4.27 0.4 - 5.0 mU/L Final   04/05/2011 4.52 0.4 - 5.0 mU/L Final       Creatinine   Date Value Ref Range Status   03/05/2024 0.96 (H) 0.51 - 0.95 mg/dL Final   01/07/2020 0.80 0.52 - 1.04 mg/dL Final      Latest Ref Rng 9/23/2022  1:20 PM 2/16/2023  5:16 PM 3/13/2023  8:58 AM 5/23/2023  1:57 PM   ENDO CALCIUM LABS[facility]        Vitamin D Deficiency screening 20 - 75 ug/L 44       Albumin 3.4 - 5.0 g/dL       Albumin 3.5 - 5.2 g/dL 4.2  4.4      Alkaline Phosphatase 35 - 104 U/L 94  87      Calcium 8.8 - 10.2 mg/dL 9.2  9.5  8.9  9.3    Urea Nitrogen 7 - 30 mg/dL       Urea Nitrogen 8.0 - 23.0 mg/dL 13.9  15.3  9.0  14.5    Creatinine 0.51 - 0.95 mg/dL 1.06 (H)  1.26 (H)  0.75  0.92    Lactate Dehydrogenase 0 - 250 U/L  323 (H)         Latest Ref Rng 7/11/2023  3:10 PM 7/19/2023  2:41 PM 8/15/2023  3:20 PM 11/14/2023  3:14 PM   ENDO CALCIUM LABS[facility]        Vitamin D Deficiency screening 20 - 75 ug/L       Albumin 3.4 - 5.0 g/dL       Albumin 3.5 - 5.2 g/dL        Alkaline Phosphatase 35 - 104 U/L       Calcium 8.8 - 10.2 mg/dL 9.3  9.0  9.9  9.5    Urea Nitrogen 7 - 30 mg/dL       Urea Nitrogen 8.0 - 23.0 mg/dL 16.0  12.1  17.7  8.9    Creatinine 0.51 - 0.95 mg/dL 0.94  0.94  0.99 (H)  0.89    Lactate Dehydrogenase 0 - 250 U/L  315 (H)         Latest Ref Rng 3/5/2024  3:20 PM   ENDO CALCIUM LABS[facility]     Vitamin D Deficiency screening 20 - 75 ug/L    Albumin 3.4 - 5.0 g/dL    Albumin 3.5 - 5.2 g/dL    Alkaline Phosphatase 35 - 104 U/L    Calcium 8.8 - 10.2 mg/dL 9.4    Urea Nitrogen 7 - 30 mg/dL    Urea Nitrogen 8.0 - 23.0 mg/dL 10.3    Creatinine 0.51 - 0.95 mg/dL 0.96 (H)    Lactate Dehydrogenase 0 - 250 U/L       Legend:  (H) High    MRI left knee 5/31/2017:  1. Diffuse bone marrow edema in the left knee medial tibial plateau  with a linear subchondral region of low T1 signal, consistent with an  insufficiency fracture.  2. Radial tear of the left knee medial meniscus at the junction of the  posterior horn and posterior root ligament.  3. Moderate size left knee joint effusion.  4. Focal full-thickness cartilage loss in the left knee patellofemoral  compartment with mild diffuse cartilage thinning in the medial  femorotibial compartment.  5. The anterior and posterior cruciate ligaments, medial and lateral  supporting structures, and lateral meniscus are intact.    EXAM: XR ANKLE LT G/E 3 VW  12/13/2019 2:48 PM       HISTORY: Closed bimalleolar fracture of left ankle, initial encounter     COMPARISON: 10/12/2019     FINDINGS: Supine AP, oblique, and lateral views of the left ankle.     Interval internal fixation of the left ankle with distal fibular  lateral plate and screws and 2 internal screws through the medial  malleolus. Alignment appears anatomic right calcaneal enthesopathy.  Mild generalized soft tissue prominence.                                                                       IMPRESSION: Ongoing healing of bimalleolar fracture with interval  ORIF  changes. Alignment [facility] - [address] [address]  Phone: [phone]   Fax: [phone]                   Impression  Based on BMD diagnosis is consistent with low bone density with T-score of -2 at the right femur neck 1,2,5.    Fracture risk is increased. Medical treatment might be indicated.               Results   Lumbar Spine  T-score 2 ., BMD is 1.425 g/cm2.         Left femoral neck  T-score -1.5.      Right femoral neck  T-score -2      Left Total hip  T-score -0.8 , BMD is 0.904 g/cm2.     Right total hip  T-score -1.3, BMD is 0.840 g/cm2.     .        Interval change 3  No prior study available for comparison.     Fracture risk 4  The estimated 10-year risk for a major osteoporotic fracture is 20.4% and for a hip fracture is 5.1%. FRAX was calculated based on information provided by the patient on the DXA questionnaire. , RISK FACTORS: previous fracture after age 40y      Repeat  Recommend repeat DXA in 2 years.     Principal result :  Jaye Hills MD, MD CCD   Division of Endocrinology and Diabetes    Department of Medicine    I personally reviewed the patient's outside records from [facility] EMR and Care Everywhere. Summary of pertinent findings in HPI.    Impression / Plan     1.  Osteopenia with high FRAX:  Has known history of osteopenia since 2005.  Most recent DEXA bone scan on 2/22/2024 lowest T-score was -2 at the right femoral neck, with calculated FRAX of 20.4% for major osteoporotic fracture and 5.1% for hip fracture.  She has also history of DM type II.  Has history of traumatic left ankle fracture and nontraumatic tibial plateau fracture.    I discussed with her today regarding considering starting treatment for osteopenia given high FRAX with increased risk of getting fragility fracture.  Given history of GERD and nonobstructive Schatzki ring of distal esophagus would not be a good candidate for oral  bisphosphonate.  We discussed about starting Reclast yearly injection we went over the possible side effects that could be associated with it including hypocalcemia risk of ONJ risk of atypical fracture flulike symptoms after receiving the dose.  She agreed to start on Reclast.    Plan:  -To get the dental workup finished she has partial on the left upper side she sees the dentist the work is in progress.  -To let us know after getting the dental clearance for receiving bisphosphonate.  -Lab work today.  -Referred to PT given history of recurrent falls.  -Follow-up in 6 months.      Test and/or medications prescribed today:  Orders Placed This Encounter   Procedures    Phosphorus    Basic metabolic panel    Albumin level    Parathyroid Hormone Intact    25 Hydroxyvitamin D2 and D3    Physical Therapy  Referral         Follow up: 6 months      SUSANA Yuan  Endocrinology, Diabetes and Metabolism  [facility]    50 minutes spent on the date of the encounter doing chart review, history and exam, documentation and further activities per the note  The longitudinal plan of care for the diagnosis(es)/condition(s) as documented were addressed during this visit. Due to the added complexity in care, I will continue to support Sarah in the subsequent management and with ongoing continuity of care.    Note: Chart documentation done in part with Dragon Voice Recognition software. Although reviewed after completion, some word and grammatical errors may remain.  Please consider this when interpreting information in this chart

## 2024-11-25 NOTE — PATIENT INSTRUCTIONS
- To get the lab test today   - To let your dentist knows that you need to start medication called Reclast for bone health and once they give the permission to tart it we can go ahead.   - To make sure you are taking your supplements and if you need to changes them we will let you know.   - We will see you back in 6 months

## 2024-11-25 NOTE — LETTER
11/25/2024       RE: Sarah Felix  [address] [place] [address]     Dear Colleague,    Thank you for referring your patient, Sarah Felix, to the [facility] ENDOCRINOLOGY [facility] [place] [facility]. Please see a copy of my visit note below.    11/22/24 11:43 AM : Appointment reminder phone call made to patient.Pt verbalized understanding      Endocrinology Clinic Visit 11/25/2024    NAME:  Sarah Felix  PCP:  Heide Fay  MRN:  [id number]  Reason for Consult: Osteopenia  Requesting Provider:  Heide Fay    Chief Complaint     Chief Complaint   Patient presents with     Osteoporosis       History of Present Illness     Sarah Felix is a 79 year old female who is seen in clinic for assessment for osteopenia.  She has background history of GERD, mixed incontinence, DM type II, CKD stage II, with Schatzki ring of distal esophagus, generalized muscle weakness and impairment of balance today is her first visit to the endocrinology for assessment for osteopenia.    She was diagnosed with osteopenia back in 2005.    DEXA bone scan February 2005:  Lumbar Spine L1-L4:  T-score -0.2                Left Femoral Neck:  T-score -1.6                 Right Femoral Neck:  T-score -2.0     DEXA bone scan June 2007:               Lumbar Spine L1-L4:  T-score 0.2                Left Femoral Neck:  T-score -1.4                 Right Femoral Neck:  T-score -1.9    DEXA bone scan March 2012:  Lumbar spine T-score in region of L1-L4 = 1.2. This correlates with   normal bone mineral density. Percent changed NA%      Left femoral neck T-score = -1.1. This correlates with osteopenia.   Total left hip T-score is -0.4, normal.      Right femoral neck T-score= -1.9. This correlates with osteopenia.   Total right hip T-score is -0.9, normal.     DEXA bone scan January 2018:  Lumbar Spine (L1-L4)      T-score:  1.3        Mild degenerative  changes present, likely improving T-score slightly                Left Femoral Neck            T-score:  -1.8               Right Femoral Neck          T-score:  -2.4        Most recent DEXA bone scan on 2/22/2024:  Lumbar Spine  T-score 2 ., BMD is 1.425 g/cm2.         Left femoral neck  T-score -1.5.      Right femoral neck  T-score -2      Left Total hip  T-score -0.8 , BMD is 0.904 g/cm2.     Right total hip  T-score -1.3, BMD is 0.840 g/cm2.    Fracture risk   The estimated 10-year risk for a major osteoporotic fracture is 20.4% and for a hip fracture is 5.1%. FRAX was calculated based on information provided by the patient on the DXA questionnaire. , RISK FACTORS: previous fracture after age 40y .      Calcium intake:   Dietary: No milk , yogurt 5 servings/week, ice-cream once /week , 2-3 slices of cheese /day   Supplements: takes calcium supplements once /day does not know what type or strength     Vitamin D intake:   Supplements: 3000 units but not 100% sure   Last vitamin D level was 44 on 9/23/2022.    Osteoporosis Risk factors:   Previous fractures: Left tibial plateau fracture 2017 was not aware she had it and does not recazll having trauma at that time , left ankle bimalleolar fracture 2019 s/p ORIF stated she was walking down the basement fell 6 steps down.  Family history of fragility fracture in parent: no   Current smoking: no   Steroid Use: no   Rheumatoid  arthritis: no   Alcohol (3 or more units per day): no   Other medications known to affect BMD: Lasix  Reported loss of height: lost 0.5 inch   Menstrual history: age at menopause: mid 40s  Estrogen use after menopause: no   Tendency for falls: yes, was seen by PT in the past but no improvement, has walker and walking cane    GI history: Malabsorption (IBD, Celiac, gastric bypass ): no  History of kidney stones: no   History of thyroid disease: no   Physical activity: limited due to poor balance   Weight history: Lost 60 Ib  over 6-8 months 2  years ago   History of GERD: Yes but does not take medications  and also has history of Schatzki ring of distal esophagus at the gastroesophageal junction had EGD in 3/14/2019..  Dental history: has frequent with dentists last visit was 6 months ago  has partial tooth on the upper left side planned for removal  History of kidney disease: CKD stage II most recent EGFR on 3/5/2024 was 61.                Problem List     Patient Active Problem List   Diagnosis     Hepatic cyst     Allergic rhinitis     Diverticulitis of colon     Osteopenia of both hips     Esophageal reflux     Mixed incontinence urge and stress (male)(female)     Cystocele, lateral     Vaginal atrophy     Mixed hyperlipidemia     Heart burn     BABB (dyspnea on exertion)     Sebaceous hyperplasia     Seborrheic keratosis     Fatty liver     Personal history of other malignant neoplasm of skin     Hypertension goal BP (blood pressure) < 140/90     Elevated serum creatinine     Skin exam, screening for cancer     CKD (chronic kidney disease) stage 2, GFR 60-89 ml/min     Type 2 diabetes mellitus with hyperglycemia (H)     Type 2 diabetes with stage 2 chronic kidney disease GFR 60-89 (H)     Type 2 diabetes mellitus without complication, with long-term current use of insulin (H)     Intertriginous candidiasis     Slow transit constipation     Basal cell carcinoma of face     Tubular adenoma of colon     Schatzki's ring of distal esophagus     Adenomatous polyp of duodenum     Folate deficiency     Renal cyst     Diabetic polyneuropathy associated with type 2 diabetes mellitus (H)     Recurrent falls     Numbness and tingling of both feet     Gait instability     Generalized muscle weakness     Impairment of balance        Medications     Current Outpatient Medications   Medication Sig Dispense Refill     aspirin 81 MG EC tablet Take 81 mg by mouth daily       atorvastatin (LIPITOR) 40 MG tablet Take 1 tablet (40 mg) by mouth daily 90 tablet 1     blood  "glucose (ONETOUCH ULTRA) test strip 1 strip by In Vitro route 4 times daily. 400 strip 3     CALCIUM 500 + D 500-200 MG-IU OR TABS one tab twice per day 100 0     Cholecalciferol (VITAMIN D PO) Pt consuming 3000 units per day       Continuous Glucose Sensor (FREESTYLE CARMELA 14 DAY SENSOR) MISC Change every 14 days. 6 each 3     folic acid (FOLVITE) 400 MCG tablet Take 1 tablet (400 mcg) by mouth daily 100 tablet 3     furosemide (LASIX) 20 MG tablet Take 1 tablet (20 mg) by mouth 2 times daily 180 tablet 1     glimepiride (AMARYL) 2 MG tablet Take 1 tablet (2 mg) by mouth every morning (before breakfast) 90 tablet 1     hydroxyurea (HYDREA) 500 MG capsule Take 1 capsule (500 mg) by mouth daily 90 capsule 3     insulin aspart (NOVOLOG PEN) 100 UNIT/ML pen 5 units with a high carb meal about once daily as needed. 45 mL 11     insulin pen needle (BD REY U/F) 32G X 4 MM miscellaneous USE 1 PEN NEEDLE four times daily. (WITH LANTUS AND Novolog) 400 each 11     insulin syringe-needle U-100 (BD INSULIN SYRINGE ULTRAFINE) 31G X 5/16\" 1 ML miscellaneous Use 1 syringes twice daily for insulin and victoza and for symptoms of hypoglycemia 100 each 3     Lancets (ONETOUCH DELICA PLUS TEVQBR24P) MISC 1 each 4 times daily 400 each 3     latanoprost (XALATAN) 0.005 % ophthalmic solution Place 1 drop into both eyes At Bedtime 7.5 mL 3     lisinopril (ZESTRIL) 2.5 MG tablet Take 1 tablet (2.5 mg) by mouth daily 90 tablet 3     metFORMIN (GLUCOPHAGE XR) 500 MG 24 hr tablet Take 1 tablet (500 mg) by mouth daily (with dinner)       metoprolol tartrate (LOPRESSOR) 25 MG tablet Take 1/2 tablet or 12.5mg by mouth twice daily. 90 tablet 3     nystatin (MYCOSTATIN) 715322 UNIT/GM external cream Apply topically 2 times daily (Patient taking differently: Apply topically daily as needed) 30 g 3     potassium chloride ER (MICRO-K) 10 MEQ CR capsule Take 1 capsule (10 mEq) by mouth daily 90 capsule 1     pyridOXINE (VITAMIN B6) 25 MG tablet Take " 1 tablet (25 mg) by mouth daily 90 tablet 3     triamcinolone (KENALOG) 0.1 % external cream Apply topically 3 times daily 90 g 3     vitamin B-12 (CYANOCOBALAMIN) 2500 MCG sublingual tablet Take 2500 mcg by mouth three times per week       No current facility-administered medications for this visit.        Allergies     Allergies   Allergen Reactions     Actos [Pioglitazone]      Fatigue - felt crummy      Amlodipine Swelling     Ancef [Cefazolin]      Levaquin Rash     Itching, rash     Cephalosporins Rash     Clindamycin Rash     Levofloxacin Itching and Rash     Nickel Rash     Contact reaction  Contact reaction       Medical / Surgical History     Past Medical History:   Diagnosis Date     Adenomatous polyp of duodenum 08/13/2018    Removed via EUS 8/13/2018 with f/u EGD 3/14/2019.  No further surveillance needed per GI.     Allergic rhinitis, cause unspecified     Allergic rhinitis     Basal cell carcinoma of face 03/2012    s/p MOHS     CKD (chronic kidney disease) stage 2, GFR 60-89 ml/min 06/06/2014     Closed bimalleolar fracture of left ankle with routine healing 10/11/2019    Added automatically from request for surgery [id number]     Contact dermatitis and other eczema, due to unspecified cause      Cyst of thyroid 1998    Thyroid Nodule/Hurthle Cell Neoplasm/Benign - resected     Cystocele, lateral      Diverticulitis of colon (without mention of hemorrhage)(562.11) 06/2004    Abd CT     Esophageal reflux     Hiatal hernia     Essential hypertension, benign (HTN) 06/06/2014     Fatty liver      Hepatic cyst     8.0 cm x 6.6 cm on CT 3/2023 (stable)     Hiatal hernia     small     Hyperlipidemia      Mixed incontinence urge and stress (male)(female) 06/24/2007     Nontoxic uninodular goiter      Osteopenia 04/21/2005    on fosamax  for > 5 yr.  stop 7/2012     Other chronic otitis externa      Postoperative deep vein thrombosis (DVT) (H) age 22    post ovarian cyst removed     Schatzki's ring of distal  esophagus 03/05/2023    Dilated 8/2018     Tubular adenoma of colon 06/23/2022    On colonoscopy 2016, rpt 5 yrs.     Type 2 diabetes mellitus (H)      Past Surgical History:   Procedure Laterality Date     APPENDECTOMY  age 22     APPLY EXTERNAL FIXATOR LOWER EXTREMITY Left 10/12/2019    Procedure: Left ankle external fixator placement;  Surgeon: Leeanne Brown MD;  Location: [facility] OR     CATARACT IOL, RT/LT Right 04/17/2018    [facility]     COLONOSCOPY  04/2006    repeat 10 yr     ENDOSCOPIC ULTRASOUND UPPER GASTROINTESTINAL TRACT (GI) N/A 09/06/2018    Procedure: ENDOSCOPIC ULTRASOUND, ESOPHAGOSCOPY / UPPER GASTROINTESTINAL TRACT (GI);  Endoscopic Ultrasound, Esophagogastroduodenoscopy with endoscopic mucosal resection;  Surgeon: Hood Brower MD;  [facility]     ESOPHAGOSCOPY, GASTROSCOPY, DUODENOSCOPY (EGD), COMBINED N/A 08/13/2018    Procedure: COMBINED ESOPHAGOSCOPY, GASTROSCOPY, DUODENOSCOPY (EGD), BIOPSY SINGLE OR MULTIPLE;  EGD;  Surgeon: Hood Brower MD;  [facility]     ESOPHAGOSCOPY, GASTROSCOPY, DUODENOSCOPY (EGD), COMBINED N/A 03/14/2019    Procedure: Upper Endoscopy;  Surgeon: Hood Brower MD;  [facility]     ESOPHAGOSCOPY, GASTROSCOPY, DUODENOSCOPY (EGD), RESECT MUCOSA, COMBINED N/A 09/06/2018    Procedure: COMBINED ESOPHAGOSCOPY, GASTROSCOPY, DUODENOSCOPY (EGD), RESECT MUCOSA;;  Surgeon: Hood Brower MD;  [facility] OR     GYN SURGERY  10/22/2008    pelvic support     HEMORRHOIDECTOMY  08/2008     MOHS MICROGRAPHIC PROCEDURE       OPEN REDUCTION INTERNAL FIXATION ANKLE Left 10/28/2019    Procedure: Internal fixation left bimalleolar ankle fracture, removal external fixator;  Surgeon: Jose Roberto Can MD;  [facility]     ORIF ANKLE FRACTURE BIMALLEOLAR Left 10/28/2019    Internal fixation left bimalleolar ankle fracture, removal external fixator     OTHER SURGICAL HISTORY Left 10/12/2019    APPLY EXTERNAL FIXATOR LOWER EXTREMITYProcedure: Left  ankle external fixator placement; Surgeon: Leeanne Brown MD; Location: [facility] OR       OVARIAN CYST REMOVAL Left age 22    L ovarian cyst removal, laparotomy       PHACOEMULSIFICATION CLEAR CORNEA WITH STANDARD INTRAOCULAR LENS IMPLANT Left 2020    Procedure: LEFT CATARACT REMOVAL WITH INTRAOCULAR LENS IMPLANT;  Surgeon: Janay Amezcua MD;  Location:  OR     SURGICAL HISTORY OF -   10/2008    Transobturator tape for Urinary Incontinence     THYROIDECTOMY, PARTIAL  1998       Social History     Social History     Socioeconomic History     Marital status: Single     Spouse name: Not on file     Number of children: 0     Years of education: Not on file     Highest education level: Not on file   Occupational History     Occupation: RN     Employer: [employer]     Comment: peds   Tobacco Use     Smoking status: Former     Current packs/day: 0.00     Types: Cigarettes     Quit date: 1980     Years since quittin.9     Passive exposure: Past     Smokeless tobacco: Never     Tobacco comments:     smoked from 70-80; smoked about 1ppd   Vaping Use     Vaping status: Never Used   Substance and Sexual Activity     Alcohol use: Yes     Alcohol/week: 0.0 standard drinks of alcohol     Comment: 6-7 drinks per week     Drug use: No     Sexual activity: Yes     Partners: Male     Comment: postmenopausal   Other Topics Concern     Parent/sibling w/ CABG, MI or angioplasty before 65F 55M? No   Social History Narrative    Dairy/d 2 servings/d. Not much milk mostly cheese    Caffeine 5 servings/d    Exercise active in yard work     Sunscreen used - Yes when in sun    Seatbelts used - Yes    Working smoke/CO detectors in the home - Yes    Guns stored in the home - No    Self Breast Exams - Yes    Self Testicular Exam - NA    Eye Exam up to date - Yes needs to see opthamologist for diabetes    Dental Exam up to date - Yes    Pap Smear up to date -Yes     Mammogram up to date - Yes 3/10     PSA up to date - NA    Dexa Scan up to date - 06/5/07    Flex Sig / Colonoscopy up to date -  04/06 yes RTC 10 yrs in epic    Immunizations up to date - Yes Td 06/04    Abuse: Current or Past(Physical, Sexual or Emotional)- No    Do you feel safe in your environment - Yes    8/4/10    Liya Ashraf RN                         Social Drivers of Health     Financial Resource Strain: Low Risk  (12/19/2023)    Financial Resource Strain      Within the past 12 months, have you or your family members you live with been unable to get utilities (heat, electricity) when it was really needed?: No   Food Insecurity: Low Risk  (12/19/2023)    Food Insecurity      Within the past 12 months, did you worry that your food would run out before you got money to buy more?: No      Within the past 12 months, did the food you bought just not last and you didn t have money to get more?: No   Transportation Needs: High Risk (12/19/2023)    Transportation Needs      Within the past 12 months, has lack of transportation kept you from medical appointments, getting your medicines, non-medical meetings or appointments, work, or from getting things that you need?: Yes   Physical Activity: Not on file   Stress: Not on file   Social Connections: Not on file   Interpersonal Safety: Low Risk  (12/19/2023)    Interpersonal Safety      Do you feel physically and emotionally safe where you currently live?: Yes      Within the past 12 months, have you been hit, slapped, kicked or otherwise physically hurt by someone?: No      Within the past 12 months, have you been humiliated or emotionally abused in other ways by your partner or ex-partner?: No   Housing Stability: Low Risk  (12/19/2023)    Housing Stability      Do you have housing? : Yes      Are you worried about losing your housing?: No       Family History     Family History   Problem Relation Age of Onset     Diabetes Mother      Hypertension Mother      Arthritis Mother         rheumatoid  "    Cancer Father      Cardiovascular Brother         palpitations not on meds.     Glaucoma No family hx of      Macular Degeneration No family hx of      Amblyopia No family hx of      Rheumatoid Arthritis Mother      Other - See Comments Brother         palpitations       ROS     12 ROS completed, pertinent positive and negative in HPI    Physical Exam   /72   Pulse 105   Ht 1.638 m (5' 4.5\")   Wt 71.7 kg (158 lb)   SpO2 96%   BMI 26.70 kg/m       General: Comfortable, no obvious distress, normal body habitus  HENT: Atraumatic,   CV: normal rate.   Resp:  good effort, no evidence of loud wheezing   Skin: No rashes, lesions, or subcutaneous nodules on exposed skin.   Psych: Alert and oriented x 3. Appropriate affect, good insight  Musculoskeletal: Appropriate muscle bulk   Neuro: Moves all four extremities. No focal deficits on limited exam.     Labs/Imaging     Pertinent Labs were reviewed.  Radiology Results were  reviewed and discussed briefly.    Summary of recent findings:   Lab Results   Component Value Date    A1C 7.5 11/05/2024    A1C 7.6 07/09/2024    A1C 6.6 03/05/2024    A1C 7.4 11/14/2023    A1C 9.0 08/15/2023    A1C 8.3 06/21/2021    A1C 9.4 12/14/2020    A1C 8.1 09/21/2020    A1C 8.5 01/07/2020    A1C 7.6 10/12/2019       TSH   Date Value Ref Range Status   02/16/2023 2.61 0.30 - 4.20 uIU/mL Final   09/23/2022 3.55 0.30 - 4.20 uIU/mL Final   08/19/2019 2.46 0.40 - 4.00 mU/L Final   07/26/2017 2.11 0.40 - 4.00 mU/L Final   06/19/2015 2.27 0.40 - 4.00 mU/L Final   07/09/2013 4.27 0.4 - 5.0 mU/L Final   04/05/2011 4.52 0.4 - 5.0 mU/L Final       Creatinine   Date Value Ref Range Status   03/05/2024 0.96 (H) 0.51 - 0.95 mg/dL Final   01/07/2020 0.80 0.52 - 1.04 mg/dL Final      Latest Ref Rng 9/23/2022  1:20 PM 2/16/2023  5:16 PM 3/13/2023  8:58 AM 5/23/2023  1:57 PM   ENDO CALCIUM LABS-[facility]        Vitamin D Deficiency screening 20 - 75 ug/L 44       Albumin 3.4 - 5.0 g/dL       Albumin 3.5 - " 5.2 g/dL 4.2  4.4      Alkaline Phosphatase 35 - 104 U/L 94  87      Calcium 8.8 - 10.2 mg/dL 9.2  9.5  8.9  9.3    Urea Nitrogen 7 - 30 mg/dL       Urea Nitrogen 8.0 - 23.0 mg/dL 13.9  15.3  9.0  14.5    Creatinine 0.51 - 0.95 mg/dL 1.06 (H)  1.26 (H)  0.75  0.92    Lactate Dehydrogenase 0 - 250 U/L  323 (H)         Latest Ref Rng 7/11/2023  3:10 PM 7/19/2023  2:41 PM 8/15/2023  3:20 PM 11/14/2023  3:14 PM   ENDO CALCIUM LABS-[facility]        Vitamin D Deficiency screening 20 - 75 ug/L       Albumin 3.4 - 5.0 g/dL       Albumin 3.5 - 5.2 g/dL       Alkaline Phosphatase 35 - 104 U/L       Calcium 8.8 - 10.2 mg/dL 9.3  9.0  9.9  9.5    Urea Nitrogen 7 - 30 mg/dL       Urea Nitrogen 8.0 - 23.0 mg/dL 16.0  12.1  17.7  8.9    Creatinine 0.51 - 0.95 mg/dL 0.94  0.94  0.99 (H)  0.89    Lactate Dehydrogenase 0 - 250 U/L  315 (H)         Latest Ref Rn 3/5/2024  3:20 PM   ENDO CALCIUM LABS-[facility]     Vitamin D Deficiency screening 20 - 75 ug/L    Albumin 3.4 - 5.0 g/dL    Albumin 3.5 - 5.2 g/dL    Alkaline Phosphatase 35 - 104 U/L    Calcium 8.8 - 10.2 mg/dL 9.4    Urea Nitrogen 7 - 30 mg/dL    Urea Nitrogen 8.0 - 23.0 mg/dL 10.3    Creatinine 0.51 - 0.95 mg/dL 0.96 (H)    Lactate Dehydrogenase 0 - 250 U/L       Legend:  (H) High    MRI left knee 5/31/2017:  1. Diffuse bone marrow edema in the left knee medial tibial plateau  with a linear subchondral region of low T1 signal, consistent with an  insufficiency fracture.  2. Radial tear of the left knee medial meniscus at the junction of the  posterior horn and posterior root ligament.  3. Moderate size left knee joint effusion.  4. Focal full-thickness cartilage loss in the left knee patellofemoral  compartment with mild diffuse cartilage thinning in the medial  femorotibial compartment.  5. The anterior and posterior cruciate ligaments, medial and lateral  supporting structures, and lateral meniscus are intact.    EXAM: XR ANKLE LT G/E 3 VW  12/13/2019 2:48 PM       HISTORY: Closed  bimalleolar fracture of left ankle, initial encounter     COMPARISON: 10/12/2019     FINDINGS: Supine AP, oblique, and lateral views of the left ankle.     Interval internal fixation of the left ankle with distal fibular  lateral plate and screws and 2 internal screws through the medial  malleolus. Alignment appears anatomic right calcaneal enthesopathy.  Mild generalized soft tissue prominence.                                                                       IMPRESSION: Ongoing healing of bimalleolar fracture with interval ORIF  changes. Alignment anatomic.  [address] [address]  Phone: [phone]   Fax: [phone]                   Impression  Based on BMD diagnosis is consistent with low bone density with T-score of -2 at the right femur neck 1,2,5.    Fracture risk is increased. Medical treatment might be indicated.               Results   Lumbar Spine  T-score 2 ., BMD is 1.425 g/cm2.         Left femoral neck  T-score -1.5.      Right femoral neck  T-score -2      Left Total hip  T-score -0.8 , BMD is 0.904 g/cm2.     Right total hip  T-score -1.3, BMD is 0.840 g/cm2.     .        Interval change 3  No prior study available for comparison.     Fracture risk 4  The estimated 10-year risk for a major osteoporotic fracture is 20.4% and for a hip fracture is 5.1%. FRAX was calculated based on information provided by the patient on the DXA questionnaire. , RISK FACTORS: previous fracture after age 40y      Repeat  Recommend repeat DXA in 2 years.     Principal result :  Jaye Hills MD, MD [facility]   Division of Endocrinology and Diabetes    Department of Medicine    I personally reviewed the patient's outside records from [facility] and Care Everywhere. Summary of pertinent findings in HPI.    Impression / Plan     1.  Osteopenia with high FRAX:  Has known history of osteopenia since 2005.  Most recent DEXA bone scan on 2/22/2024  lowest T-score was -2 at the right femoral neck, with calculated FRAX of 20.4% for major osteoporotic fracture and 5.1% for hip fracture.  She has also history of DM type II.  Has history of traumatic left ankle fracture and nontraumatic tibial plateau fracture.    I discussed with her today regarding considering starting treatment for osteopenia given high FRAX with increased risk of getting fragility fracture.  Given history of GERD and nonobstructive Schatzki ring of distal esophagus would not be a good candidate for oral bisphosphonate.  We discussed about starting Reclast yearly injection we went over the possible side effects that could be associated with it including hypocalcemia risk of ONJ risk of atypical fracture flulike symptoms after receiving the dose.  She agreed to start on Reclast.    Plan:  -To get the dental workup finished she has partial on the left upper side she sees the dentist the work is in progress.  -To let us know after getting the dental clearance for receiving bisphosphonate.  -Lab work today.  -Referred to PT given history of recurrent falls.  -Follow-up in 6 months.      Test and/or medications prescribed today:  Orders Placed This Encounter   Procedures     Phosphorus     Basic metabolic panel     Albumin level     Parathyroid Hormone Intact     25 Hydroxyvitamin D2 and D3     Physical Therapy  Referral         Follow up: 6 months      SUSANA Yuan  Endocrinology, Diabetes and Metabolism  [facility]    50 minutes spent on the date of the encounter doing chart review, history and exam, documentation and further activities per the note  The longitudinal plan of care for the diagnosis(es)/condition(s) as documented were addressed during this visit. Due to the added complexity in care, I will continue to support Sarah in the subsequent management and with ongoing continuity of care.    Note: Chart documentation done in part with Dragon Voice Recognition  software. Although reviewed after completion, some word and grammatical errors may remain.  Please consider this when interpreting information in this chart        Again, thank you for allowing me to participate in the care of your patient.      Sincerely,    SUSANA Yuan

## 2024-11-25 NOTE — NURSING NOTE
"Chief Complaint   Patient presents with    Osteoporosis     Vital signs:      BP: 107/72 Pulse: 105     SpO2: 96 %     Height: 163.8 cm (5' 4.5\") Weight: 71.7 kg (158 lb)  Estimated body mass index is 26.7 kg/m  as calculated from the following:    Height as of this encounter: 1.638 m (5' 4.5\").    Weight as of this encounter: 71.7 kg (158 lb).        "

## 2024-11-27 LAB
DEPRECATED CALCIDIOL+CALCIFEROL SERPL-MC: <55 UG/L (ref 20–75)
VITAMIN D2 SERPL-MCNC: <5 UG/L
VITAMIN D3 SERPL-MCNC: 50 UG/L

## 2024-12-03 ENCOUNTER — PATIENT OUTREACH (OUTPATIENT)
Dept: CARE COORDINATION | Facility: CLINIC | Age: 79
End: 2024-12-03
Payer: COMMERCIAL

## 2025-01-25 ENCOUNTER — HEALTH MAINTENANCE LETTER (OUTPATIENT)
Age: 80
End: 2025-01-25

## 2025-01-28 ENCOUNTER — ONCOLOGY VISIT (OUTPATIENT)
Dept: ONCOLOGY | Facility: CLINIC | Age: 80
End: 2025-01-28
Attending: INTERNAL MEDICINE
Payer: COMMERCIAL

## 2025-01-28 VITALS
RESPIRATION RATE: 16 BRPM | BODY MASS INDEX: 26.31 KG/M2 | TEMPERATURE: 97.9 F | DIASTOLIC BLOOD PRESSURE: 71 MMHG | OXYGEN SATURATION: 95 % | WEIGHT: 155.7 LBS | HEART RATE: 105 BPM | SYSTOLIC BLOOD PRESSURE: 106 MMHG

## 2025-01-28 DIAGNOSIS — D47.3 ESSENTIAL THROMBOCYTHEMIA (H): ICD-10-CM

## 2025-01-28 DIAGNOSIS — D47.3 ESSENTIAL THROMBOCYTHEMIA (H): Primary | ICD-10-CM

## 2025-01-28 LAB
BASOPHILS # BLD AUTO: 0.1 10E3/UL (ref 0–0.2)
BASOPHILS NFR BLD AUTO: 2 %
EOSINOPHIL # BLD AUTO: 0.2 10E3/UL (ref 0–0.7)
EOSINOPHIL NFR BLD AUTO: 2 %
ERYTHROCYTE [DISTWIDTH] IN BLOOD BY AUTOMATED COUNT: 16.8 % (ref 10–15)
HCT VFR BLD AUTO: 39.4 % (ref 35–47)
HGB BLD-MCNC: 12.8 G/DL (ref 11.7–15.7)
IMM GRANULOCYTES # BLD: 0.1 10E3/UL
IMM GRANULOCYTES NFR BLD: 1 %
LYMPHOCYTES # BLD AUTO: 0.9 10E3/UL (ref 0.8–5.3)
LYMPHOCYTES NFR BLD AUTO: 10 %
MCH RBC QN AUTO: 30.4 PG (ref 26.5–33)
MCHC RBC AUTO-ENTMCNC: 32.5 G/DL (ref 31.5–36.5)
MCV RBC AUTO: 94 FL (ref 78–100)
MONOCYTES # BLD AUTO: 0.4 10E3/UL (ref 0–1.3)
MONOCYTES NFR BLD AUTO: 5 %
NEUTROPHILS # BLD AUTO: 7 10E3/UL (ref 1.6–8.3)
NEUTROPHILS NFR BLD AUTO: 81 %
NRBC # BLD AUTO: 0 10E3/UL
NRBC BLD AUTO-RTO: 0 /100
PLATELET # BLD AUTO: 782 10E3/UL (ref 150–450)
RBC # BLD AUTO: 4.21 10E6/UL (ref 3.8–5.2)
WBC # BLD AUTO: 8.6 10E3/UL (ref 4–11)

## 2025-01-28 PROCEDURE — G0463 HOSPITAL OUTPT CLINIC VISIT: HCPCS | Performed by: INTERNAL MEDICINE

## 2025-01-28 PROCEDURE — 99213 OFFICE O/P EST LOW 20 MIN: CPT | Performed by: INTERNAL MEDICINE

## 2025-01-28 PROCEDURE — 85004 AUTOMATED DIFF WBC COUNT: CPT | Performed by: INTERNAL MEDICINE

## 2025-01-28 PROCEDURE — 85014 HEMATOCRIT: CPT | Performed by: INTERNAL MEDICINE

## 2025-01-28 PROCEDURE — 36415 COLL VENOUS BLD VENIPUNCTURE: CPT | Performed by: INTERNAL MEDICINE

## 2025-01-28 ASSESSMENT — PAIN SCALES - GENERAL: PAINLEVEL_OUTOF10: NO PAIN (0)

## 2025-01-28 NOTE — LETTER
2025      Sarah Felix  [address] [place] [address]      Dear Colleague,    Thank you for referring your patient, Sarah Felix, to the [facility]. Please see a copy of my visit note below.        [facility]  [address] [place] [address]  Phone: [phone]    Outpatient Visit Note:    Patient: Sarah Felix  MRN: [id number]  : 1945  IVAN: 2025    Sarah Felix is a 79 year old woman with a history of Jak2 positive myeloproliferative neoplasm, likely essential thrombocytopenia, who returns for routine follow up.       Assessment:  In summary, Sarah Felix is a 78 year old woman with Jak2 pos MPN, likely ET, doing well on aspirin and hydroxyurea. Platelet count has mildly improved and she has no symptoms nor thrombotic events. She is doing okay with her current dose of HU (mild GI upset) and ASA.     Recommendations:  I counseled the patient about my assessment of diagnosis of ET and Jak2 pos MPN, major cause of morbidity and mortality includes thrombotic events   Will continue the patient on 500mg hydroxyurea daily and ASA 81 mg daily.   Will continue with routine CBC every 3 months.   Will plan to see the patient in 6 months in clinic.     Manjeet Wilde MD  Associate Professor of Medicine, Division of Hematology, Oncology and Transplantation  [employer]     20 minutes spent by me on the date of the encounter doing chart review, review of test results, interpretation of tests, patient visit, and documentation    -------------------------------  Forward History:  Established care Dec 2021 for thrombocytosis, asymptomatic, on ASA.  Jak2 testing pos but by error there results were misinterpreted. PCP notified Dr. Wilde May 2023. She was seen in office in May 23 and restarted on ASA and HU was started at 500 mg daily.     History: Sarah Felix is  a 79 year old woman with a history of a surgically provoked DVT many years ago (at 25 years of age after ovarian cystectomy, treated briefly with anticoagulation), DM2, HTN, with a longstanding history of thrombocytosis who most likely has Jak2 positive ET (known MPN) and doing well on HU and ASA. CT abd/pelvis 2023 showed normal sized spleen     Since her last visit, she reports no significant change or worsening in her longstanding symptoms including fatigue, reduced appetite and nausea which is unchanged. Overall, doing well and prefers to not change medications unless needed. No hematochezia, melena, hemoptysis, hematemesis or gum bleeding. No headache, focal weakness/sensory changes. No pruritus.  No concerns about bleeding from ASA.     No history of stroke, MI.     Medications are reviewed in the EMR and notable for ASA 81mg and  mg every day.    Physical Exam:  Vitals: B/P: 100/69, T: 97.9, P: 125, R: 16, Wt: 0 lbs 0 oz There is no height or weight on file to calculate BMI.   Exam:   Gen: Appears well, no distress  HEENT: no scleral icterus or hemorrhage, no wet purpura, no lymphadenopathy  CV: regular, no murmurs  Pulm: clear  Abd: soft, nontender, no splenomegaly  Ext: no edema  Skin: no ecchymoses or hematomas  Neuro: no focal deficits, affect and cognition are normal    Labs:   Latest Reference Range & Units 07/09/24 15:13 07/30/24 12:05 11/05/24 15:13 01/28/25 12:38   WBC 4.0 - 11.0 10e3/uL 8.2 9.2 10.5 8.6   Hemoglobin 11.7 - 15.7 g/dL 13.1 13.5 13.1 12.8   Hematocrit 35.0 - 47.0 % 38.7 39.2 38.9 39.4   Platelet Count 150 - 450 10e3/uL 811 (H) 766 (H) 704 (H) 782 (H)   (H): Data is abnormally high      Again, thank you for allowing me to participate in the care of your patient.        Sincerely,        Manjeet Wilde MD    Electronically signed

## 2025-01-28 NOTE — NURSING NOTE
"Oncology Rooming Note    January 28, 2025 12:46 PM   Sarah Felix is a 79 year old female who presents for:    Chief Complaint   Patient presents with    Oncology Clinic Visit     Thrombocytosis    Blood Draw     Labs drawn via  by RN in lab.  VS taken     Initial Vitals: /71   Pulse 105   Temp 97.9  F (36.6  C) (Oral)   Resp 16   Wt 70.6 kg (155 lb 11.2 oz)   SpO2 95%   BMI 26.31 kg/m   Estimated body mass index is 26.31 kg/m  as calculated from the following:    Height as of 11/25/24: 1.638 m (5' 4.5\").    Weight as of this encounter: 70.6 kg (155 lb 11.2 oz). Body surface area is 1.79 meters squared.  No Pain (0) Comment: Data Unavailable   No LMP recorded. Patient is postmenopausal.  Allergies reviewed: Yes  Medications reviewed: Yes    Medications: MEDICATION REFILLS NEEDED TODAY. Provider was notified.  Pharmacy name entered into [facility]:    [facility] - [place], [place] - [address] [place] [address] [facility]  [facility]  WRITTEN PRESCRIPTION REQUESTED  [id number] [place] [facility] [address]    Frailty Screening:   Is the patient here for a new oncology consult visit in cancer care? 2. No      Clinical concerns: Pt would like refill on eye drops. Pt reports no new concerns today. Dr. Wilde was notified via message.       Jacqueline Mesa, EMT     "

## 2025-01-28 NOTE — PROGRESS NOTES
[facility]  [address] [place] [address]  Phone: [phone]    Outpatient Visit Note:    Patient: Sarah Felix  MRN: [id number]  : 1945  IVAN: 2025    Sarah Felix is a 79 year old woman with a history of Jak2 positive myeloproliferative neoplasm, likely essential thrombocytopenia, who returns for routine follow up.       Assessment:  In summary, Sarah Felix is a 78 year old woman with Jak2 pos MPN, likely ET, doing well on aspirin and hydroxyurea. Platelet count has mildly improved and she has no symptoms nor thrombotic events. She is doing okay with her current dose of HU (mild GI upset) and ASA.     Recommendations:  I counseled the patient about my assessment of diagnosis of ET and Jak2 pos MPN, major cause of morbidity and mortality includes thrombotic events   Will continue the patient on 500mg hydroxyurea daily and ASA 81 mg daily.   Will continue with routine CBC every 3 months.   Will plan to see the patient in 6 months in clinic.     Manjeet Wilde MD  Associate Professor of Medicine, Division of Hematology, Oncology and Transplantation  [employer]     20 minutes spent by me on the date of the encounter doing chart review, review of test results, interpretation of tests, patient visit, and documentation    -------------------------------  Forward History:  Established care Dec 2021 for thrombocytosis, asymptomatic, on ASA.  Jak2 testing pos but by error there results were misinterpreted. PCP notified Dr. Wilde May 2023. She was seen in office in May 23 and restarted on ASA and HU was started at 500 mg daily.     History: Sarah Felix is a 79 year old woman with a history of a surgically provoked DVT many years ago (at 25 years of age after ovarian cystectomy, treated briefly with anticoagulation), DM2, HTN, with a longstanding history of thrombocytosis who most likely has Jak2  positive ET (known MPN) and doing well on HU and ASA. CT abd/pelvis 2023 showed normal sized spleen     Since her last visit, she reports no significant change or worsening in her longstanding symptoms including fatigue, reduced appetite and nausea which is unchanged. Overall, doing well and prefers to not change medications unless needed. No hematochezia, melena, hemoptysis, hematemesis or gum bleeding. No headache, focal weakness/sensory changes. No pruritus.  No concerns about bleeding from ASA.     No history of stroke, MI.     Medications are reviewed in the EMR and notable for ASA 81mg and  mg every day.    Physical Exam:  Vitals: B/P: 100/69, T: 97.9, P: 125, R: 16, Wt: 0 lbs 0 oz There is no height or weight on file to calculate BMI.   Exam:   Gen: Appears well, no distress  HEENT: no scleral icterus or hemorrhage, no wet purpura, no lymphadenopathy  CV: regular, no murmurs  Pulm: clear  Abd: soft, nontender, no splenomegaly  Ext: no edema  Skin: no ecchymoses or hematomas  Neuro: no focal deficits, affect and cognition are normal    Labs:   Latest Reference Range & Units 07/09/24 15:13 07/30/24 12:05 11/05/24 15:13 01/28/25 12:38   WBC 4.0 - 11.0 10e3/uL 8.2 9.2 10.5 8.6   Hemoglobin 11.7 - 15.7 g/dL 13.1 13.5 13.1 12.8   Hematocrit 35.0 - 47.0 % 38.7 39.2 38.9 39.4   Platelet Count 150 - 450 10e3/uL 811 (H) 766 (H) 704 (H) 782 (H)   (H): Data is abnormally high

## 2025-01-28 NOTE — NURSING NOTE
Chief Complaint   Patient presents with    Oncology Clinic Visit     Thrombocytosis    Blood Draw     Labs drawn via  by RN in lab.  VS taken       Labs collected from venipuncture by RN. Vitals taken. Checked in for appointment(s).    Edna Brothers RN

## 2025-01-29 DIAGNOSIS — H40.003 GLAUCOMA SUSPECT OF BOTH EYES: Primary | ICD-10-CM

## 2025-02-22 ENCOUNTER — HEALTH MAINTENANCE LETTER (OUTPATIENT)
Age: 80
End: 2025-02-22

## 2025-03-10 ENCOUNTER — PATIENT OUTREACH (OUTPATIENT)
Dept: ONCOLOGY | Facility: CLINIC | Age: 80
End: 2025-03-10
Payer: COMMERCIAL

## 2025-03-25 ENCOUNTER — TELEPHONE (OUTPATIENT)
Dept: ENDOCRINOLOGY | Facility: CLINIC | Age: 80
End: 2025-03-25
Payer: COMMERCIAL

## 2025-03-25 NOTE — TELEPHONE ENCOUNTER
Patient confirmed scheduled appointment:  Date: 6/30/25  Time: 2:00 pm  Visit type: RETURN ENDOCRINE  Provider: SUSANA Yuan  Location: [facility]  Testing/imaging: N/A  Additional notes:  Spoke to pt and rescheduled appt on 5/12 due to changes in the providers schedule.    Gilmar Harden on 3/25/2025 at 4:16 PM

## 2025-03-29 ENCOUNTER — HOSPITAL ENCOUNTER (EMERGENCY)
Facility: CLINIC | Age: 80
Discharge: HOME | End: 2025-03-30
Attending: STUDENT IN AN ORGANIZED HEALTH CARE EDUCATION/TRAINING PROGRAM | Admitting: STUDENT IN AN ORGANIZED HEALTH CARE EDUCATION/TRAINING PROGRAM
Payer: COMMERCIAL

## 2025-03-29 DIAGNOSIS — F10.920 ALCOHOLIC INTOXICATION WITHOUT COMPLICATION: ICD-10-CM

## 2025-03-29 DIAGNOSIS — R26.89 IMPAIRMENT OF BALANCE: ICD-10-CM

## 2025-03-29 PROCEDURE — 99284 EMERGENCY DEPT VISIT MOD MDM: CPT | Performed by: STUDENT IN AN ORGANIZED HEALTH CARE EDUCATION/TRAINING PROGRAM

## 2025-03-29 PROCEDURE — 99285 EMERGENCY DEPT VISIT HI MDM: CPT | Mod: 25 | Performed by: STUDENT IN AN ORGANIZED HEALTH CARE EDUCATION/TRAINING PROGRAM

## 2025-03-29 PROCEDURE — 96360 HYDRATION IV INFUSION INIT: CPT | Performed by: STUDENT IN AN ORGANIZED HEALTH CARE EDUCATION/TRAINING PROGRAM

## 2025-03-29 PROCEDURE — 93010 ELECTROCARDIOGRAM REPORT: CPT | Performed by: STUDENT IN AN ORGANIZED HEALTH CARE EDUCATION/TRAINING PROGRAM

## 2025-03-29 PROCEDURE — 93005 ELECTROCARDIOGRAM TRACING: CPT | Performed by: STUDENT IN AN ORGANIZED HEALTH CARE EDUCATION/TRAINING PROGRAM

## 2025-03-30 ENCOUNTER — APPOINTMENT (OUTPATIENT)
Dept: CT IMAGING | Facility: CLINIC | Age: 80
End: 2025-03-30
Attending: STUDENT IN AN ORGANIZED HEALTH CARE EDUCATION/TRAINING PROGRAM
Payer: COMMERCIAL

## 2025-03-30 VITALS
RESPIRATION RATE: 14 BRPM | HEART RATE: 119 BPM | HEIGHT: 64 IN | SYSTOLIC BLOOD PRESSURE: 158 MMHG | DIASTOLIC BLOOD PRESSURE: 78 MMHG | TEMPERATURE: 99 F | BODY MASS INDEX: 25.61 KG/M2 | WEIGHT: 150 LBS | OXYGEN SATURATION: 96 %

## 2025-03-30 LAB
ALBUMIN SERPL BCG-MCNC: 4.1 G/DL (ref 3.5–5.2)
ALBUMIN UR-MCNC: 10 MG/DL
ALP SERPL-CCNC: 105 U/L (ref 40–150)
ALT SERPL W P-5'-P-CCNC: 61 U/L (ref 0–50)
AMMONIA PLAS-SCNC: 19 UMOL/L (ref 11–51)
ANION GAP SERPL CALCULATED.3IONS-SCNC: 21 MMOL/L (ref 7–15)
APPEARANCE UR: CLEAR
AST SERPL W P-5'-P-CCNC: 83 U/L (ref 0–45)
BACTERIA #/AREA URNS HPF: ABNORMAL /HPF
BASE EXCESS BLDV CALC-SCNC: -3.5 MMOL/L (ref -3–3)
BASOPHILS # BLD AUTO: 0.2 10E3/UL (ref 0–0.2)
BASOPHILS NFR BLD AUTO: 2 %
BILIRUB SERPL-MCNC: 0.6 MG/DL
BILIRUB UR QL STRIP: NEGATIVE
BUN SERPL-MCNC: 6.8 MG/DL (ref 8–23)
CALCIUM SERPL-MCNC: 9.4 MG/DL (ref 8.8–10.4)
CHLORIDE SERPL-SCNC: 102 MMOL/L (ref 98–107)
COLOR UR AUTO: ABNORMAL
CREAT SERPL-MCNC: 0.8 MG/DL (ref 0.51–0.95)
EGFRCR SERPLBLD CKD-EPI 2021: 75 ML/MIN/1.73M2
ELLIPTOCYTES BLD QL SMEAR: SLIGHT
EOSINOPHIL # BLD AUTO: 0.3 10E3/UL (ref 0–0.7)
EOSINOPHIL NFR BLD AUTO: 3 %
ERYTHROCYTE [DISTWIDTH] IN BLOOD BY AUTOMATED COUNT: 18.5 % (ref 10–15)
ETHANOL SERPL-MCNC: 0.25 G/DL
FLUAV RNA SPEC QL NAA+PROBE: NEGATIVE
FLUBV RNA RESP QL NAA+PROBE: NEGATIVE
FRAGMENTS BLD QL SMEAR: SLIGHT
GIANT PLATELETS BLD QL SMEAR: SLIGHT
GLUCOSE SERPL-MCNC: 184 MG/DL (ref 70–99)
GLUCOSE UR STRIP-MCNC: 300 MG/DL
GRANULAR CAST: 1 /LPF
HCO3 BLDV-SCNC: 22 MMOL/L (ref 21–28)
HCO3 SERPL-SCNC: 19 MMOL/L (ref 22–29)
HCT VFR BLD AUTO: 41.7 % (ref 35–47)
HGB BLD-MCNC: 13.2 G/DL (ref 11.7–15.7)
HGB UR QL STRIP: NEGATIVE
IMM GRANULOCYTES # BLD: 0.1 10E3/UL
IMM GRANULOCYTES NFR BLD: 1 %
INR PPP: 1.1 (ref 0.85–1.15)
KETONES UR STRIP-MCNC: 40 MG/DL
LACTATE SERPL-SCNC: 2.6 MMOL/L (ref 0.7–2)
LACTATE SERPL-SCNC: 3.3 MMOL/L (ref 0.7–2)
LACTATE SERPL-SCNC: 3.8 MMOL/L (ref 0.7–2)
LACTATE SERPL-SCNC: 5.4 MMOL/L (ref 0.7–2)
LEUKOCYTE ESTERASE UR QL STRIP: NEGATIVE
LYMPHOCYTES # BLD AUTO: 1.2 10E3/UL (ref 0.8–5.3)
LYMPHOCYTES NFR BLD AUTO: 13 %
MCH RBC QN AUTO: 29.1 PG (ref 26.5–33)
MCHC RBC AUTO-ENTMCNC: 31.7 G/DL (ref 31.5–36.5)
MCV RBC AUTO: 92 FL (ref 78–100)
MONOCYTES # BLD AUTO: 0.5 10E3/UL (ref 0–1.3)
MONOCYTES NFR BLD AUTO: 5 %
MUCOUS THREADS #/AREA URNS LPF: PRESENT /LPF
NEUTROPHILS # BLD AUTO: 7.5 10E3/UL (ref 1.6–8.3)
NEUTROPHILS NFR BLD AUTO: 77 %
NITRATE UR QL: NEGATIVE
NRBC # BLD AUTO: 0.1 10E3/UL
NRBC BLD AUTO-RTO: 1 /100
O2/TOTAL GAS SETTING VFR VENT: 21 %
OXYHGB MFR BLDV: 54 % (ref 70–75)
PCO2 BLDV: 41 MM HG (ref 40–50)
PH BLDV: 7.34 [PH] (ref 7.32–7.43)
PH UR STRIP: 5 [PH] (ref 5–7)
PLAT MORPH BLD: ABNORMAL
PLATELET # BLD AUTO: 535 10E3/UL (ref 150–450)
PO2 BLDV: 35 MM HG (ref 25–47)
POTASSIUM SERPL-SCNC: 4 MMOL/L (ref 3.4–5.3)
PROT SERPL-MCNC: 6.5 G/DL (ref 6.4–8.3)
RBC # BLD AUTO: 4.54 10E6/UL (ref 3.8–5.2)
RBC MORPH BLD: ABNORMAL
RBC URINE: 0 /HPF
RSV RNA SPEC NAA+PROBE: NEGATIVE
SAO2 % BLDV: 55.5 % (ref 70–75)
SARS-COV-2 RNA RESP QL NAA+PROBE: NEGATIVE
SODIUM SERPL-SCNC: 142 MMOL/L (ref 135–145)
SP GR UR STRIP: 1.01 (ref 1–1.03)
SQUAMOUS EPITHELIAL: 1 /HPF
UROBILINOGEN UR STRIP-MCNC: NORMAL MG/DL
WBC # BLD AUTO: 9.8 10E3/UL (ref 4–11)
WBC URINE: 1 /HPF

## 2025-03-30 PROCEDURE — 36415 COLL VENOUS BLD VENIPUNCTURE: CPT | Performed by: INTERNAL MEDICINE

## 2025-03-30 PROCEDURE — 87637 SARSCOV2&INF A&B&RSV AMP PRB: CPT | Performed by: INTERNAL MEDICINE

## 2025-03-30 PROCEDURE — 83605 ASSAY OF LACTIC ACID: CPT | Performed by: EMERGENCY MEDICINE

## 2025-03-30 PROCEDURE — 83605 ASSAY OF LACTIC ACID: CPT | Performed by: INTERNAL MEDICINE

## 2025-03-30 PROCEDURE — 82140 ASSAY OF AMMONIA: CPT | Performed by: STUDENT IN AN ORGANIZED HEALTH CARE EDUCATION/TRAINING PROGRAM

## 2025-03-30 PROCEDURE — 70450 CT HEAD/BRAIN W/O DYE: CPT

## 2025-03-30 PROCEDURE — 258N000003 HC RX IP 258 OP 636: Performed by: INTERNAL MEDICINE

## 2025-03-30 PROCEDURE — 36415 COLL VENOUS BLD VENIPUNCTURE: CPT | Performed by: STUDENT IN AN ORGANIZED HEALTH CARE EDUCATION/TRAINING PROGRAM

## 2025-03-30 PROCEDURE — 85610 PROTHROMBIN TIME: CPT | Performed by: STUDENT IN AN ORGANIZED HEALTH CARE EDUCATION/TRAINING PROGRAM

## 2025-03-30 PROCEDURE — 82805 BLOOD GASES W/O2 SATURATION: CPT | Performed by: STUDENT IN AN ORGANIZED HEALTH CARE EDUCATION/TRAINING PROGRAM

## 2025-03-30 PROCEDURE — 36415 COLL VENOUS BLD VENIPUNCTURE: CPT | Performed by: EMERGENCY MEDICINE

## 2025-03-30 PROCEDURE — 70450 CT HEAD/BRAIN W/O DYE: CPT | Mod: 26 | Performed by: RADIOLOGY

## 2025-03-30 PROCEDURE — 258N000003 HC RX IP 258 OP 636: Performed by: STUDENT IN AN ORGANIZED HEALTH CARE EDUCATION/TRAINING PROGRAM

## 2025-03-30 PROCEDURE — 82247 BILIRUBIN TOTAL: CPT | Performed by: STUDENT IN AN ORGANIZED HEALTH CARE EDUCATION/TRAINING PROGRAM

## 2025-03-30 PROCEDURE — 82077 ASSAY SPEC XCP UR&BREATH IA: CPT | Performed by: STUDENT IN AN ORGANIZED HEALTH CARE EDUCATION/TRAINING PROGRAM

## 2025-03-30 PROCEDURE — 81001 URINALYSIS AUTO W/SCOPE: CPT | Performed by: STUDENT IN AN ORGANIZED HEALTH CARE EDUCATION/TRAINING PROGRAM

## 2025-03-30 PROCEDURE — 250N000013 HC RX MED GY IP 250 OP 250 PS 637: Performed by: EMERGENCY MEDICINE

## 2025-03-30 PROCEDURE — 85025 COMPLETE CBC W/AUTO DIFF WBC: CPT | Performed by: STUDENT IN AN ORGANIZED HEALTH CARE EDUCATION/TRAINING PROGRAM

## 2025-03-30 RX ORDER — FUROSEMIDE 20 MG/1
20 TABLET ORAL ONCE
Status: COMPLETED | OUTPATIENT
Start: 2025-03-30 | End: 2025-03-30

## 2025-03-30 RX ORDER — ONDANSETRON 2 MG/ML
4 INJECTION INTRAMUSCULAR; INTRAVENOUS EVERY 30 MIN PRN
Status: DISCONTINUED | OUTPATIENT
Start: 2025-03-30 | End: 2025-03-30 | Stop reason: HOSPADM

## 2025-03-30 RX ORDER — LISINOPRIL 2.5 MG/1
2.5 TABLET ORAL ONCE
Status: COMPLETED | OUTPATIENT
Start: 2025-03-30 | End: 2025-03-30

## 2025-03-30 RX ADMIN — LISINOPRIL 2.5 MG: 2.5 TABLET ORAL at 07:51

## 2025-03-30 RX ADMIN — METOPROLOL TARTRATE 12.5 MG: 25 TABLET, FILM COATED ORAL at 07:51

## 2025-03-30 RX ADMIN — SODIUM CHLORIDE 1000 ML: 9 INJECTION, SOLUTION INTRAVENOUS at 00:32

## 2025-03-30 RX ADMIN — FUROSEMIDE 20 MG: 20 TABLET ORAL at 07:50

## 2025-03-30 RX ADMIN — SODIUM CHLORIDE 1000 ML: 9 INJECTION, SOLUTION INTRAVENOUS at 09:51

## 2025-03-30 ASSESSMENT — ACTIVITIES OF DAILY LIVING (ADL)
ADLS_ACUITY_SCORE: 49

## 2025-03-30 NOTE — ED TRIAGE NOTES
Patient BIBA from home d/t alcohol intoxication. Patient reported she had couple of drinks tonight. She stated she called first responders to help her get out of the bathroom and slid but denied hitting head and LOC. Per EMS, first responders was in her house twice in a matter of 1 hour. EMS stated patient is unable to take care of herself.

## 2025-03-30 NOTE — ED PROVIDER NOTES
"Emergency Department I-PASS Sign-out      Illness Severity: \"Watcher\"    Patient Summary:  79 year old female with pertinent PMH of cancer, type 2 diabetes, alcohol use who presented with alcohol intoxication    ED Course/treatment plan: Labs, CT scan, disposition was clinically sober    Clinical Impression:  (F10.920) Alcoholic intoxication without complication      Edited by: Kirill Bob MD at 3/30/2025 0035    Action List:  -To do:  Pt tolerate po and ambulation with support.  Nothing    Situational Awareness & Contingency Planning:  Code Status (Most recent):  Prior    Disposition:      Synthesis & Events after sign-out:  Lactic acid near normal, tolerating po and ambulate ok with support, comfortable being discharged and follow up with her PMD.        Jessica Boudreaux MD, MD   Emergency Medicine     Jessica Boudreaux MD  03/30/25 4087    "

## 2025-03-30 NOTE — ED NOTES
Bed: ED17  Expected date:   Expected time:   Means of arrival:   Comments:  SPF 14  79f  Etoh  vss

## 2025-03-30 NOTE — ED PROVIDER NOTES
"ED Provider Note  [facility]      History     Chief Complaint   Patient presents with    Alcohol Intoxication     HPI  Sarah Felix is a 79 year old female with a history of type II, CKD stage 2, HTN and HLD, who presents to the emergency department for alcohol intoxication. The patient called EMS twice within 2 hours today as she was unable to get up from the bathroom by herself. It was noted that while EMS was helping her up she slipped but did not fall. She notes that she did have 2 glasses of rum and coke. She did hurt her head but is unsure how.     EMS reports that they brought her to the hospital because they were not able to keep coming back and forth to her house and she did not appear that she was able to take care of herself in the acute setting          Physical Exam   BP: (!) 167/81  Pulse: 111  Temp: 97.8  F (36.6  C)  Resp: 18  Height: 162.6 cm (5' 4\")  Weight: 68 kg (150 lb)  SpO2: 98 %  Physical Exam  Constitutional:       General: She is not in acute distress.     Appearance: Normal appearance. She is not diaphoretic.      Comments: Slurring speech mildly consistent with acute alcohol intoxication   HENT:      Head: Normocephalic and atraumatic.      Mouth/Throat:      Mouth: Mucous membranes are moist.   Eyes:      General: No scleral icterus.     Extraocular Movements: Extraocular movements intact.      Conjunctiva/sclera: Conjunctivae normal.      Pupils: Pupils are equal, round, and reactive to light.   Neck:      Comments: No midline neck tenderness palpation  Cardiovascular:      Rate and Rhythm: Normal rate.      Heart sounds: Normal heart sounds. No murmur heard.  Pulmonary:      Effort: No respiratory distress.      Breath sounds: Normal breath sounds. No stridor. No wheezing, rhonchi or rales.   Chest:      Chest wall: No tenderness.   Abdominal:      General: Abdomen is flat. There is no distension.      Palpations: There is no mass.      Tenderness: " There is no abdominal tenderness. There is no guarding or rebound.      Hernia: No hernia is present.   Musculoskeletal:      Cervical back: Neck supple.   Skin:     General: Skin is warm.      Findings: No rash.   Neurological:      Mental Status: She is alert.           ED Course, Procedures, & Data      Procedures            EKG Interpretation:      Interpreted by Kirill Bob  Time reviewed: 06:49  Symptoms at time of EKG: Alcohol intoxication   Rhythm: Sinus tachycardia  Rate: normal  Axis: Left axis deviation  Ectopy: none  Conduction: normal  ST Segments/ T Waves: No ST-T wave changes  Q Waves: none  Comparison to prior: Similar to prior    Clinical Impression: No acute ischemia            No results found for any visits on 03/29/25.  Medications - No data to display  Labs Ordered and Resulted from Time of ED Arrival to Time of ED Departure - No data to display  No orders to display          Critical care was not performed.     Medical Decision Making  The patient's presentation was of high complexity (an acute health issue posing potential threat to life or bodily function).    The patient's evaluation involved:  review of external note(s) from 3+ sources (see separate area of note for details)  review of 3+ test result(s) ordered prior to this encounter (see separate area of note for details)  ordering and/or review of 3+ test(s) in this encounter (see separate area of note for details)    The patient's management necessitated further care after sign-out to Dr. Mills (see their note for further management).    Assessment & Plan    Patient was sent to the emergency room by EMS after calling for multiple falls today in the context of being acutely intoxicated with alcohol  Her physical exam is reassuring, with no objective signs of trauma  Ordered labs, CT head, IV fluids (as she was slightly tachycardic and appeared clinically dry)  CT head read and interpreted both by myself and radiology is reassuring  with no acute abnormalities  Labs reviewed and interpreted by me are significant only for an elevated lactic acid, likely secondary to alcohol, pending repeat after fluids, and an elevated alcohol level of 0.25  At time of signout to Dr. Mills, patient's pending repeat lactate after fluids as well as clinical sobriety, tolerating p.o., and ambulating with steady gait, and reassessment if not mentally clearing        I have reviewed the nursing notes. I have reviewed the findings, diagnosis, plan and need for follow up with the patient.    New Prescriptions    No medications on file       Final diagnoses:   None   I, Vibha Arias, am serving as a trained medical scribe to document services personally performed by Kirill Bob MD, based on the provider's statements to me.     IKirill MD, was physically present and have reviewed and verified the accuracy of this note documented by Vibha Arias.     Kirill Bob MD  [facility] EMERGENCY DEPARTMENT  3/29/2025     Kirill Bob MD  03/30/25 0156

## 2025-03-30 NOTE — ED PROVIDER NOTES
"Emergency Department I-PASS Sign-out      Illness Severity: \"Watcher\"    Patient Summary:  79 year old female with pertinent PMH of cancer, type 2 diabetes, alcohol use who presented with alcohol intoxication    ED Course/treatment plan: Labs, CT scan, disposition was clinically sober    Clinical Impression:  (F10.920) Alcoholic intoxication without complication      Edited by: Kirill Bob MD at 3/30/2025 0035          Synthesis & Events after sign-out:    At the time of signout, handoff plan is to monitor patient until sobriety and discharge.  Patient's lactate trended down appropriately with fluids.    On reassessment, patient reports being tired and not being a morning person but she feels like she is having difficulty finding her words and feels tongue-tied.  No obvious dysarthria or aphasia.  Patient slightly hypertensive and tachycardic.  Previous provider was not worried that the patient would develop alcohol withdrawal due to her history.  On examination she does not have any tongue fasciculations or tremor.  Will give her her morning blood pressure medications including metoprolol and lisinopril.  Plan to allow the patient to sleep for another hour and reassess her mental status.  Handoff provided to oncoming provider at shift change with disposition pending reassessment.    Regine Mills MD   Emergency Medicine     Regine Mills MD  03/30/25 1227    "

## 2025-03-31 LAB
ATRIAL RATE - MUSE: 110 BPM
DIASTOLIC BLOOD PRESSURE - MUSE: NORMAL MMHG
INTERPRETATION ECG - MUSE: NORMAL
P AXIS - MUSE: 52 DEGREES
PR INTERVAL - MUSE: 180 MS
QRS DURATION - MUSE: 68 MS
QT - MUSE: 340 MS
QTC - MUSE: 460 MS
R AXIS - MUSE: -36 DEGREES
SYSTOLIC BLOOD PRESSURE - MUSE: NORMAL MMHG
T AXIS - MUSE: 53 DEGREES
VENTRICULAR RATE- MUSE: 110 BPM

## 2025-04-09 ENCOUNTER — OFFICE VISIT (OUTPATIENT)
Dept: OPHTHALMOLOGY | Facility: CLINIC | Age: 80
End: 2025-04-09
Attending: OPHTHALMOLOGY
Payer: COMMERCIAL

## 2025-04-09 DIAGNOSIS — Z79.4 TYPE 2 DIABETES MELLITUS WITH HYPERGLYCEMIA, WITH LONG-TERM CURRENT USE OF INSULIN (H): ICD-10-CM

## 2025-04-09 DIAGNOSIS — Z96.1 PSEUDOPHAKIA OF BOTH EYES: ICD-10-CM

## 2025-04-09 DIAGNOSIS — H52.13 MYOPIA OF BOTH EYES: ICD-10-CM

## 2025-04-09 DIAGNOSIS — H40.003 GLAUCOMA SUSPECT OF BOTH EYES: Primary | ICD-10-CM

## 2025-04-09 DIAGNOSIS — E11.65 TYPE 2 DIABETES MELLITUS WITH HYPERGLYCEMIA, WITH LONG-TERM CURRENT USE OF INSULIN (H): ICD-10-CM

## 2025-04-09 DIAGNOSIS — H43.393 VITREOUS SYNERESIS OF BOTH EYES: ICD-10-CM

## 2025-04-09 PROCEDURE — G0463 HOSPITAL OUTPT CLINIC VISIT: HCPCS | Performed by: OPHTHALMOLOGY

## 2025-04-09 PROCEDURE — 92083 EXTENDED VISUAL FIELD XM: CPT | Performed by: OPHTHALMOLOGY

## 2025-04-09 PROCEDURE — 92133 CPTRZD OPH DX IMG PST SGM ON: CPT | Performed by: OPHTHALMOLOGY

## 2025-04-09 RX ORDER — LATANOPROST 50 UG/ML
1 SOLUTION/ DROPS OPHTHALMIC AT BEDTIME
Qty: 7.5 ML | Refills: 5 | Status: SHIPPED | OUTPATIENT
Start: 2025-04-09

## 2025-04-09 ASSESSMENT — EXTERNAL EXAM - RIGHT EYE: OD_EXAM: BROW PTOSIS

## 2025-04-09 ASSESSMENT — SLIT LAMP EXAM - LIDS
COMMENTS: DERMATOCHALASIS WITH LATERAL HOODING
COMMENTS: DERMATOCHALASIS WITH LATERAL HOODING

## 2025-04-09 ASSESSMENT — REFRACTION_WEARINGRX
OD_AXIS: 011
OD_SPHERE: -2.75
OD_ADD: +2.50
OD_CYLINDER: +1.75
OS_AXIS: 014
OS_ADD: +2.50
OS_SPHERE: -2.75
OS_CYLINDER: +1.75

## 2025-04-09 ASSESSMENT — TONOMETRY
OD_IOP_MMHG: 18
OS_IOP_MMHG: 18
IOP_METHOD: TONOPEN

## 2025-04-09 ASSESSMENT — VISUAL ACUITY
OD_CC+: -2
OS_CC+: -3
OD_CC: 20/25
OS_CC: 20/25
CORRECTION_TYPE: GLASSES
METHOD: SNELLEN - LINEAR

## 2025-04-09 ASSESSMENT — CUP TO DISC RATIO
OD_RATIO: 0.5
OS_RATIO: 0.35

## 2025-04-09 ASSESSMENT — EXTERNAL EXAM - LEFT EYE: OS_EXAM: BROW PTOSIS

## 2025-04-09 ASSESSMENT — CONF VISUAL FIELD: COMMENTS: VF TODAY

## 2025-04-09 NOTE — NURSING NOTE
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, moderate stage              Comments    Pt states no change in VA since last visit  No flashes or floater  No eye pain or redness  Ocular Meds:   Latanaprost qPM each eye   LBS:  142  Last A1C: 7.5  Lab Results       Component                Value               Date                       A1C                      7.5                 11/05/2024                 A1C                      7.6                 07/09/2024                 A1C                      6.6                 03/05/2024                 A1C                      7.4                 11/14/2023                 A1C                      9.0                 08/15/2023                 A1C                      8.3                 06/21/2021                 A1C                      9.4                 12/14/2020                 A1C                      8.1                 09/21/2020                 A1C                      8.5                 01/07/2020                 A1C                      7.6                 10/12/2019           Alma Delia Brothers COT 2:05 PM April 9, 2025

## 2025-04-09 NOTE — PROGRESS NOTES
CC: Glaucoma follow up     INTERVAL:  vision stable; compliant with latanoprost at bedtime ou   HPI  Sarah Felix is a 79 year old female here for evaluation of blurry vision of the right eye and a diabetic eye exam.    She has had difficulty controlling her A1c. Last A1c 7.5 11/2024  At some point, she was on lasix but was stopped because BP was too low\      Imaging:  OVF 08/06/24 and 4/9/25  Each eye early sup arcuate left eye>right eye     OCT 02/07/24  Right eye- normal contour with no central fluid  Left eye- normal contour with no central fluid    RNFL 08/06/24 and 4/9/25  OD-stable IT thinning   OS-stable IT, TS, and temp thinning    Assessment & Plan      # Diabetes mellitus, type 2  - diagnosed age 62  - recent A1c 7.6  - no retinopathy on exam; last dilated 8/6/24  - discussed the importance of good BP/BS/Chol control    #Pseudophakia OU  # PCO trace left eye   S/p YAG OD 3/1/23    # 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 I personally obtained the chief complaint(s) and history of present illness.  I confirmed and edited as necessary the review of systems, past medical/surgical history, family history, social history, and examination findings as documented by others; and I examined the patient myself. I personally reviewed the relevant tests, images, and reports as documented above. I formulated and edited as necessary the assessment and  plan and discussed the findings and management plan with the patient and family.    Lester Dominguez MD, PhD

## 2025-04-29 ENCOUNTER — PATIENT OUTREACH (OUTPATIENT)
Dept: CARE COORDINATION | Facility: CLINIC | Age: 80
End: 2025-04-29
Payer: COMMERCIAL

## 2025-05-24 ENCOUNTER — HEALTH MAINTENANCE LETTER (OUTPATIENT)
Age: 80
End: 2025-05-24

## 2025-05-25 ENCOUNTER — APPOINTMENT (OUTPATIENT)
Dept: CT IMAGING | Facility: CLINIC | Age: 80
End: 2025-05-25
Attending: EMERGENCY MEDICINE
Payer: COMMERCIAL

## 2025-05-25 ENCOUNTER — APPOINTMENT (OUTPATIENT)
Dept: CT IMAGING | Facility: CLINIC | Age: 80
DRG: 871 | End: 2025-05-25
Attending: EMERGENCY MEDICINE
Payer: COMMERCIAL

## 2025-05-25 ENCOUNTER — HOSPITAL ENCOUNTER (INPATIENT)
Facility: CLINIC | Age: 80
End: 2025-05-25
Attending: EMERGENCY MEDICINE | Admitting: INTERNAL MEDICINE
Payer: COMMERCIAL

## 2025-05-25 DIAGNOSIS — I12.9 BENIGN HYPERTENSIVE KIDNEY DISEASE WITH CHRONIC KIDNEY DISEASE STAGE I THROUGH STAGE IV, OR UNSPECIFIED(403.10): ICD-10-CM

## 2025-05-25 DIAGNOSIS — E11.65 TYPE 2 DIABETES MELLITUS WITH HYPERGLYCEMIA, WITH LONG-TERM CURRENT USE OF INSULIN (H): ICD-10-CM

## 2025-05-25 DIAGNOSIS — K22.2 SCHATZKI'S RING OF DISTAL ESOPHAGUS: ICD-10-CM

## 2025-05-25 DIAGNOSIS — R60.0 LOCALIZED EDEMA: ICD-10-CM

## 2025-05-25 DIAGNOSIS — Z79.4 TYPE 2 DIABETES MELLITUS WITH HYPERGLYCEMIA, WITH LONG-TERM CURRENT USE OF INSULIN (H): ICD-10-CM
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
## 2025-05-25 DIAGNOSIS — A41.9 SEPSIS WITH ACUTE RENAL FAILURE WITHOUT SEPTIC SHOCK, DUE TO UNSPECIFIED ORGANISM, UNSPECIFIED ACUTE RENAL FAILURE TYPE (H): ICD-10-CM

## 2025-05-25 DIAGNOSIS — I82.0 HEPATIC VEIN THROMBOSIS (H): ICD-10-CM

## 2025-05-25 DIAGNOSIS — N17.9 AKI (ACUTE KIDNEY INJURY): ICD-10-CM

## 2025-05-25 DIAGNOSIS — F10.90 ALCOHOL USE: ICD-10-CM

## 2025-05-25 DIAGNOSIS — N18.9 CHRONIC KIDNEY DISEASE, UNSPECIFIED CKD STAGE: ICD-10-CM

## 2025-05-25 DIAGNOSIS — E87.29 KETOACIDOSIS: ICD-10-CM

## 2025-05-25 DIAGNOSIS — Z79.4 TYPE 2 DIABETES MELLITUS WITH CHRONIC KIDNEY DISEASE, WITH LONG-TERM CURRENT USE OF INSULIN, UNSPECIFIED CKD STAGE (H): ICD-10-CM

## 2025-05-25 DIAGNOSIS — R73.9 HYPERGLYCEMIA: ICD-10-CM

## 2025-05-25 DIAGNOSIS — Z71.89 OTHER SPECIFIED COUNSELING: Chronic | ICD-10-CM

## 2025-05-25 DIAGNOSIS — R53.1 GENERALIZED WEAKNESS: ICD-10-CM

## 2025-05-25 DIAGNOSIS — R12 HEART BURN: ICD-10-CM

## 2025-05-25 DIAGNOSIS — E11.10 DIABETIC KETOACIDOSIS WITHOUT COMA ASSOCIATED WITH TYPE 2 DIABETES MELLITUS (H): Primary | ICD-10-CM

## 2025-05-25 DIAGNOSIS — N17.9 SEPSIS WITH ACUTE RENAL FAILURE WITHOUT SEPTIC SHOCK, DUE TO UNSPECIFIED ORGANISM, UNSPECIFIED ACUTE RENAL FAILURE TYPE (H): ICD-10-CM

## 2025-05-25 DIAGNOSIS — K76.89 HEPATIC CYST: ICD-10-CM

## 2025-05-25 DIAGNOSIS — E11.22 TYPE 2 DIABETES MELLITUS WITH CHRONIC KIDNEY DISEASE, WITH LONG-TERM CURRENT USE OF INSULIN, UNSPECIFIED CKD STAGE (H): ICD-10-CM

## 2025-05-25 DIAGNOSIS — R65.20 SEPSIS WITH ACUTE RENAL FAILURE WITHOUT SEPTIC SHOCK, DUE TO UNSPECIFIED ORGANISM, UNSPECIFIED ACUTE RENAL FAILURE TYPE (H): ICD-10-CM

## 2025-05-25 DIAGNOSIS — R33.9 URINARY RETENTION: ICD-10-CM

## 2025-05-25 DIAGNOSIS — E87.6 HYPOKALEMIA: ICD-10-CM

## 2025-05-25 LAB
ALBUMIN SERPL BCG-MCNC: 3.9 G/DL (ref 3.5–5.2)
ALBUMIN UR-MCNC: 20 MG/DL
ALP SERPL-CCNC: 96 U/L (ref 40–150)
ALT SERPL W P-5'-P-CCNC: 32 U/L (ref 0–50)
AMMONIA PLAS-SCNC: 14 UMOL/L (ref 11–51)
ANION GAP SERPL CALCULATED.3IONS-SCNC: 20 MMOL/L (ref 7–15)
ANION GAP SERPL CALCULATED.3IONS-SCNC: 20 MMOL/L (ref 7–15)
ANION GAP SERPL CALCULATED.3IONS-SCNC: 30 MMOL/L (ref 7–15)
APPEARANCE UR: ABNORMAL
AST SERPL W P-5'-P-CCNC: 31 U/L (ref 0–45)
B-OH-BUTYR SERPL-SCNC: 2.21 MMOL/L
B-OH-BUTYR SERPL-SCNC: 3.14 MMOL/L
B-OH-BUTYR SERPL-SCNC: 5.97 MMOL/L
BACTERIA #/AREA URNS HPF: ABNORMAL /HPF
BACTERIA SPT CULT: NORMAL
BASE EXCESS BLDV CALC-SCNC: -3 MMOL/L (ref -3–3)
BASOPHILS # BLD AUTO: 0.1 10E3/UL (ref 0–0.2)
BASOPHILS NFR BLD AUTO: 1 %
BILIRUB SERPL-MCNC: 0.8 MG/DL
BILIRUB UR QL STRIP: NEGATIVE
BUN SERPL-MCNC: 51.3 MG/DL (ref 8–23)
BUN SERPL-MCNC: 56.8 MG/DL (ref 8–23)
BUN SERPL-MCNC: 66.6 MG/DL (ref 8–23)
CA-I BLD-MCNC: 4.5 MG/DL (ref 4.4–5.2)
CALCIUM SERPL-MCNC: 8.2 MG/DL (ref 8.8–10.4)
CALCIUM SERPL-MCNC: 8.4 MG/DL (ref 8.8–10.4)
CALCIUM SERPL-MCNC: 9.4 MG/DL (ref 8.8–10.4)
CHLORIDE SERPL-SCNC: 84 MMOL/L (ref 98–107)
CHLORIDE SERPL-SCNC: 96 MMOL/L (ref 98–107)
CHLORIDE SERPL-SCNC: 97 MMOL/L (ref 98–107)
COLOR UR AUTO: YELLOW
CPB POCT: NO
CREAT SERPL-MCNC: 0.92 MG/DL (ref 0.51–0.95)
CREAT SERPL-MCNC: 0.96 MG/DL (ref 0.51–0.95)
CREAT SERPL-MCNC: 1.17 MG/DL (ref 0.51–0.95)
EGFRCR SERPLBLD CKD-EPI 2021: 48 ML/MIN/1.73M2
EGFRCR SERPLBLD CKD-EPI 2021: 61 ML/MIN/1.73M2
EGFRCR SERPLBLD CKD-EPI 2021: 64 ML/MIN/1.73M2
EOSINOPHIL # BLD AUTO: 0 10E3/UL (ref 0–0.7)
EOSINOPHIL NFR BLD AUTO: 0 %
ERYTHROCYTE [DISTWIDTH] IN BLOOD BY AUTOMATED COUNT: 16.6 % (ref 10–15)
EST. AVERAGE GLUCOSE BLD GHB EST-MCNC: 183 MG/DL
ETHANOL SERPL-MCNC: <0.01 G/DL
ETHANOL SERPL-MCNC: <0.01 G/DL
GLUCOSE BLD-MCNC: 402 MG/DL (ref 70–99)
GLUCOSE BLDC GLUCOMTR-MCNC: 131 MG/DL (ref 70–99)
GLUCOSE BLDC GLUCOMTR-MCNC: 177 MG/DL (ref 70–99)
GLUCOSE BLDC GLUCOMTR-MCNC: 185 MG/DL (ref 70–99)
GLUCOSE BLDC GLUCOMTR-MCNC: 199 MG/DL (ref 70–99)
GLUCOSE BLDC GLUCOMTR-MCNC: 218 MG/DL (ref 70–99)
GLUCOSE BLDC GLUCOMTR-MCNC: 309 MG/DL (ref 70–99)
GLUCOSE BLDC GLUCOMTR-MCNC: 388 MG/DL (ref 70–99)
GLUCOSE SERPL-MCNC: 177 MG/DL (ref 70–99)
GLUCOSE SERPL-MCNC: 183 MG/DL (ref 70–99)
GLUCOSE SERPL-MCNC: 425 MG/DL (ref 70–99)
GLUCOSE UR STRIP-MCNC: >=1000 MG/DL
GRAM STAIN RESULT: NORMAL
HBA1C MFR BLD: 8 %
HCO3 BLDV-SCNC: 22 MMOL/L (ref 21–28)
HCO3 SERPL-SCNC: 18 MMOL/L (ref 22–29)
HCO3 SERPL-SCNC: 19 MMOL/L (ref 22–29)
HCO3 SERPL-SCNC: 19 MMOL/L (ref 22–29)
HCT VFR BLD AUTO: 37.9 % (ref 35–47)
HCT VFR BLD CALC: 37 % (ref 35–47)
HGB BLD-MCNC: 12.3 G/DL (ref 11.7–15.7)
HGB BLD-MCNC: 12.6 G/DL (ref 11.7–15.7)
HGB UR QL STRIP: NEGATIVE
HYALINE CASTS: 10 /LPF
IMM GRANULOCYTES # BLD: 0.1 10E3/UL
IMM GRANULOCYTES NFR BLD: 1 %
INR PPP: 1.43 (ref 0.85–1.15)
KETONES UR STRIP-MCNC: 60 MG/DL
L PNEUMO1 AG UR QL IA: NEGATIVE
LACTATE SERPL-SCNC: 2.5 MMOL/L (ref 0.7–2)
LACTATE SERPL-SCNC: 3 MMOL/L (ref 0.7–2)
LACTATE SERPL-SCNC: 3.5 MMOL/L (ref 0.7–2)
LACTATE SERPL-SCNC: 5 MMOL/L (ref 0.7–2)
LEUKOCYTE ESTERASE UR QL STRIP: ABNORMAL
LIPASE SERPL-CCNC: 147 U/L (ref 13–60)
LYMPHOCYTES # BLD AUTO: 0.4 10E3/UL (ref 0.8–5.3)
LYMPHOCYTES NFR BLD AUTO: 3 %
MAGNESIUM SERPL-MCNC: 2.1 MG/DL (ref 1.7–2.3)
MCH RBC QN AUTO: 26.9 PG (ref 26.5–33)
MCHC RBC AUTO-ENTMCNC: 32.5 G/DL (ref 31.5–36.5)
MCV RBC AUTO: 83 FL (ref 78–100)
MONOCYTES # BLD AUTO: 0.6 10E3/UL (ref 0–1.3)
MONOCYTES NFR BLD AUTO: 4 %
MRSA DNA SPEC QL NAA+PROBE: NEGATIVE
MUCOUS THREADS #/AREA URNS LPF: PRESENT /LPF
NEUTROPHILS # BLD AUTO: 14.2 10E3/UL (ref 1.6–8.3)
NEUTROPHILS NFR BLD AUTO: 92 %
NITRATE UR QL: NEGATIVE
NRBC # BLD AUTO: 0 10E3/UL
NRBC BLD AUTO-RTO: 0 /100
NT-PROBNP SERPL-MCNC: 511 PG/ML (ref 0–624)
PCO2 BLDV: 36 MM HG (ref 40–50)
PH BLDV: 7.39 [PH] (ref 7.32–7.43)
PH UR STRIP: 5 [PH] (ref 5–7)
PHOSPHATE SERPL-MCNC: 2 MG/DL (ref 2.5–4.5)
PHOSPHATE SERPL-MCNC: 2.1 MG/DL (ref 2.5–4.5)
PHOSPHATE SERPL-MCNC: 4.1 MG/DL (ref 2.5–4.5)
PLATELET # BLD AUTO: 812 10E3/UL (ref 150–450)
PO2 BLDV: 25 MM HG (ref 25–47)
POTASSIUM BLD-SCNC: 2.8 MMOL/L (ref 3.4–5.3)
POTASSIUM SERPL-SCNC: 2.5 MMOL/L (ref 3.4–5.3)
POTASSIUM SERPL-SCNC: 2.9 MMOL/L (ref 3.4–5.3)
POTASSIUM SERPL-SCNC: 2.9 MMOL/L (ref 3.4–5.3)
POTASSIUM SERPL-SCNC: 3.2 MMOL/L (ref 3.4–5.3)
POTASSIUM SERPL-SCNC: 3.4 MMOL/L (ref 3.4–5.3)
PROT SERPL-MCNC: 6.9 G/DL (ref 6.4–8.3)
PROTHROMBIN TIME: 17.3 SECONDS (ref 11.8–14.8)
RADIOLOGIST FLAGS: ABNORMAL
RBC # BLD AUTO: 4.58 10E6/UL (ref 3.8–5.2)
RBC URINE: 1 /HPF
S PNEUM AG SPEC QL: NEGATIVE
SA TARGET DNA: NEGATIVE
SAO2 % BLDV: 44 % (ref 70–75)
SODIUM BLD-SCNC: 132 MMOL/L (ref 135–145)
SODIUM SERPL-SCNC: 133 MMOL/L (ref 135–145)
SODIUM SERPL-SCNC: 134 MMOL/L (ref 135–145)
SODIUM SERPL-SCNC: 136 MMOL/L (ref 135–145)
SP GR UR STRIP: 1.02 (ref 1–1.03)
SPECIMEN TYPE: NORMAL
SQUAMOUS EPITHELIAL: 1 /HPF
TROPONIN T SERPL HS-MCNC: 15 NG/L
TROPONIN T SERPL HS-MCNC: 16 NG/L
TSH SERPL DL<=0.005 MIU/L-ACNC: 1.36 UIU/ML (ref 0.3–4.2)
UROBILINOGEN UR STRIP-MCNC: NORMAL MG/DL
VIT B12 SERPL-MCNC: 2030 PG/ML (ref 232–1245)
WBC # BLD AUTO: 15.4 10E3/UL (ref 4–11)
WBC URINE: 29 /HPF

## 2025-05-25 PROCEDURE — 250N000011 HC RX IP 250 OP 636: Performed by: EMERGENCY MEDICINE

## 2025-05-25 PROCEDURE — 84100 ASSAY OF PHOSPHORUS: CPT | Performed by: EMERGENCY MEDICINE

## 2025-05-25 PROCEDURE — 83605 ASSAY OF LACTIC ACID: CPT

## 2025-05-25 PROCEDURE — 96365 THER/PROPH/DIAG IV INF INIT: CPT | Mod: 59 | Performed by: EMERGENCY MEDICINE

## 2025-05-25 PROCEDURE — 250N000009 HC RX 250: Performed by: EMERGENCY MEDICINE

## 2025-05-25 PROCEDURE — 99207 PR NO BILLABLE SERVICE THIS VISIT: CPT | Performed by: PHYSICIAN ASSISTANT

## 2025-05-25 PROCEDURE — 74177 CT ABD & PELVIS W/CONTRAST: CPT | Mod: 26 | Performed by: RADIOLOGY

## 2025-05-25 PROCEDURE — 99223 1ST HOSP IP/OBS HIGH 75: CPT | Mod: GC | Performed by: INTERNAL MEDICINE

## 2025-05-25 PROCEDURE — 74177 CT ABD & PELVIS W/CONTRAST: CPT

## 2025-05-25 PROCEDURE — 250N000009 HC RX 250

## 2025-05-25 PROCEDURE — 85041 AUTOMATED RBC COUNT: CPT | Performed by: EMERGENCY MEDICINE

## 2025-05-25 PROCEDURE — 82962 GLUCOSE BLOOD TEST: CPT

## 2025-05-25 PROCEDURE — 70450 CT HEAD/BRAIN W/O DYE: CPT | Mod: 26 | Performed by: RADIOLOGY

## 2025-05-25 PROCEDURE — 83036 HEMOGLOBIN GLYCOSYLATED A1C: CPT | Performed by: EMERGENCY MEDICINE

## 2025-05-25 PROCEDURE — 258N000003 HC RX IP 258 OP 636

## 2025-05-25 PROCEDURE — 83880 ASSAY OF NATRIURETIC PEPTIDE: CPT | Performed by: EMERGENCY MEDICINE

## 2025-05-25 PROCEDURE — 85610 PROTHROMBIN TIME: CPT | Performed by: EMERGENCY MEDICINE

## 2025-05-25 PROCEDURE — 83735 ASSAY OF MAGNESIUM: CPT | Performed by: EMERGENCY MEDICINE

## 2025-05-25 PROCEDURE — 82947 ASSAY GLUCOSE BLOOD QUANT: CPT | Performed by: EMERGENCY MEDICINE

## 2025-05-25 PROCEDURE — 93010 ELECTROCARDIOGRAM REPORT: CPT | Performed by: EMERGENCY MEDICINE

## 2025-05-25 PROCEDURE — 82010 KETONE BODYS QUAN: CPT | Performed by: EMERGENCY MEDICINE

## 2025-05-25 PROCEDURE — 71260 CT THORAX DX C+: CPT | Mod: 26 | Performed by: RADIOLOGY

## 2025-05-25 PROCEDURE — 82803 BLOOD GASES ANY COMBINATION: CPT

## 2025-05-25 PROCEDURE — 84132 ASSAY OF SERUM POTASSIUM: CPT

## 2025-05-25 PROCEDURE — 250N000011 HC RX IP 250 OP 636

## 2025-05-25 PROCEDURE — 82607 VITAMIN B-12: CPT

## 2025-05-25 PROCEDURE — 82010 KETONE BODYS QUAN: CPT

## 2025-05-25 PROCEDURE — 87040 BLOOD CULTURE FOR BACTERIA: CPT | Performed by: EMERGENCY MEDICINE

## 2025-05-25 PROCEDURE — 83605 ASSAY OF LACTIC ACID: CPT | Performed by: EMERGENCY MEDICINE

## 2025-05-25 PROCEDURE — 70450 CT HEAD/BRAIN W/O DYE: CPT

## 2025-05-25 PROCEDURE — 36415 COLL VENOUS BLD VENIPUNCTURE: CPT | Performed by: EMERGENCY MEDICINE

## 2025-05-25 PROCEDURE — 84443 ASSAY THYROID STIM HORMONE: CPT | Performed by: EMERGENCY MEDICINE

## 2025-05-25 PROCEDURE — 93005 ELECTROCARDIOGRAM TRACING: CPT | Performed by: EMERGENCY MEDICINE

## 2025-05-25 PROCEDURE — 72125 CT NECK SPINE W/O DYE: CPT | Mod: 26 | Performed by: RADIOLOGY

## 2025-05-25 PROCEDURE — 85004 AUTOMATED DIFF WBC COUNT: CPT | Performed by: EMERGENCY MEDICINE

## 2025-05-25 PROCEDURE — 87070 CULTURE OTHR SPECIMN AEROBIC: CPT | Performed by: INTERNAL MEDICINE

## 2025-05-25 PROCEDURE — 80053 COMPREHEN METABOLIC PANEL: CPT

## 2025-05-25 PROCEDURE — 36415 COLL VENOUS BLD VENIPUNCTURE: CPT

## 2025-05-25 PROCEDURE — 87086 URINE CULTURE/COLONY COUNT: CPT

## 2025-05-25 PROCEDURE — 99291 CRITICAL CARE FIRST HOUR: CPT | Mod: 25 | Performed by: EMERGENCY MEDICINE

## 2025-05-25 PROCEDURE — 258N000003 HC RX IP 258 OP 636: Performed by: EMERGENCY MEDICINE

## 2025-05-25 PROCEDURE — 82077 ASSAY SPEC XCP UR&BREATH IA: CPT

## 2025-05-25 PROCEDURE — 82140 ASSAY OF AMMONIA: CPT | Performed by: EMERGENCY MEDICINE

## 2025-05-25 PROCEDURE — 82077 ASSAY SPEC XCP UR&BREATH IA: CPT | Performed by: EMERGENCY MEDICINE

## 2025-05-25 PROCEDURE — 87899 AGENT NOS ASSAY W/OPTIC: CPT

## 2025-05-25 PROCEDURE — 120N000003 HC R&B IMCU UMMC

## 2025-05-25 PROCEDURE — 84100 ASSAY OF PHOSPHORUS: CPT

## 2025-05-25 PROCEDURE — 87641 MR-STAPH DNA AMP PROBE: CPT

## 2025-05-25 PROCEDURE — 84484 ASSAY OF TROPONIN QUANT: CPT | Performed by: EMERGENCY MEDICINE

## 2025-05-25 PROCEDURE — 81003 URINALYSIS AUTO W/O SCOPE: CPT

## 2025-05-25 PROCEDURE — 72125 CT NECK SPINE W/O DYE: CPT

## 2025-05-25 PROCEDURE — 250N000013 HC RX MED GY IP 250 OP 250 PS 637

## 2025-05-25 PROCEDURE — 83690 ASSAY OF LIPASE: CPT | Performed by: EMERGENCY MEDICINE

## 2025-05-25 RX ORDER — CEFAZOLIN SODIUM 1 G/50ML
1750 SOLUTION INTRAVENOUS ONCE
Status: COMPLETED | OUTPATIENT
Start: 2025-05-25 | End: 2025-05-25

## 2025-05-25 RX ORDER — DEXTROSE MONOHYDRATE 25 G/50ML
25-50 INJECTION, SOLUTION INTRAVENOUS
Status: DISCONTINUED | OUTPATIENT
Start: 2025-05-25 | End: 2025-06-01

## 2025-05-25 RX ORDER — AMOXICILLIN 250 MG
2 CAPSULE ORAL 2 TIMES DAILY PRN
Status: DISCONTINUED | OUTPATIENT
Start: 2025-05-25 | End: 2025-06-12 | Stop reason: HOSPADM

## 2025-05-25 RX ORDER — DEXTROSE MONOHYDRATE 25 G/50ML
25-50 INJECTION, SOLUTION INTRAVENOUS
Status: DISCONTINUED | OUTPATIENT
Start: 2025-05-25 | End: 2025-05-26

## 2025-05-25 RX ORDER — IOPAMIDOL 755 MG/ML
92 INJECTION, SOLUTION INTRAVASCULAR ONCE
Status: COMPLETED | OUTPATIENT
Start: 2025-05-25 | End: 2025-05-25

## 2025-05-25 RX ORDER — FOLIC ACID 0.4 MG
400 TABLET ORAL DAILY
Status: DISCONTINUED | OUTPATIENT
Start: 2025-05-26 | End: 2025-05-28

## 2025-05-25 RX ORDER — ENOXAPARIN SODIUM 100 MG/ML
40 INJECTION SUBCUTANEOUS EVERY 24 HOURS
Status: DISCONTINUED | OUTPATIENT
Start: 2025-05-25 | End: 2025-05-25

## 2025-05-25 RX ORDER — HYDROXYUREA 500 MG/1
500 CAPSULE ORAL DAILY
Status: DISCONTINUED | OUTPATIENT
Start: 2025-05-25 | End: 2025-05-28

## 2025-05-25 RX ORDER — LATANOPROST 50 UG/ML
1 SOLUTION/ DROPS OPHTHALMIC AT BEDTIME
Status: DISCONTINUED | OUTPATIENT
Start: 2025-05-25 | End: 2025-06-12 | Stop reason: HOSPADM

## 2025-05-25 RX ORDER — PIPERACILLIN SODIUM, TAZOBACTAM SODIUM 3; .375 G/15ML; G/15ML
3.38 INJECTION, POWDER, LYOPHILIZED, FOR SOLUTION INTRAVENOUS ONCE
Status: COMPLETED | OUTPATIENT
Start: 2025-05-25 | End: 2025-05-25

## 2025-05-25 RX ORDER — CALCIUM CARBONATE 500 MG/1
1000 TABLET, CHEWABLE ORAL 4 TIMES DAILY PRN
Status: DISCONTINUED | OUTPATIENT
Start: 2025-05-25 | End: 2025-06-12 | Stop reason: HOSPADM

## 2025-05-25 RX ORDER — POTASSIUM CHLORIDE 7.45 MG/ML
10 INJECTION INTRAVENOUS
Status: DISCONTINUED | OUTPATIENT
Start: 2025-05-25 | End: 2025-05-31

## 2025-05-25 RX ORDER — ASPIRIN 81 MG/1
81 TABLET ORAL DAILY
Status: DISCONTINUED | OUTPATIENT
Start: 2025-05-25 | End: 2025-05-27

## 2025-05-25 RX ORDER — PIPERACILLIN SODIUM, TAZOBACTAM SODIUM 3; .375 G/15ML; G/15ML
3.38 INJECTION, POWDER, LYOPHILIZED, FOR SOLUTION INTRAVENOUS EVERY 6 HOURS
Status: DISCONTINUED | OUTPATIENT
Start: 2025-05-25 | End: 2025-05-26

## 2025-05-25 RX ORDER — POTASSIUM CHLORIDE 20MEQ/15ML
40 LIQUID (ML) ORAL ONCE
Status: COMPLETED | OUTPATIENT
Start: 2025-05-25 | End: 2025-05-25

## 2025-05-25 RX ORDER — LIDOCAINE 40 MG/G
CREAM TOPICAL
Status: DISCONTINUED | OUTPATIENT
Start: 2025-05-25 | End: 2025-06-12 | Stop reason: HOSPADM

## 2025-05-25 RX ORDER — DEXTROSE MONOHYDRATE, SODIUM CHLORIDE, AND POTASSIUM CHLORIDE 50; 1.49; 4.5 G/1000ML; G/1000ML; G/1000ML
INJECTION, SOLUTION INTRAVENOUS CONTINUOUS
Status: DISCONTINUED | OUTPATIENT
Start: 2025-05-25 | End: 2025-05-26

## 2025-05-25 RX ORDER — GLIMEPIRIDE 2 MG/1
2 TABLET ORAL
Status: DISCONTINUED | OUTPATIENT
Start: 2025-05-26 | End: 2025-05-25

## 2025-05-25 RX ORDER — ATORVASTATIN CALCIUM 40 MG/1
40 TABLET, FILM COATED ORAL DAILY
Status: DISCONTINUED | OUTPATIENT
Start: 2025-05-26 | End: 2025-05-28

## 2025-05-25 RX ORDER — SODIUM CHLORIDE AND POTASSIUM CHLORIDE 150; 450 MG/100ML; MG/100ML
INJECTION, SOLUTION INTRAVENOUS CONTINUOUS
Status: DISCONTINUED | OUTPATIENT
Start: 2025-05-25 | End: 2025-05-26

## 2025-05-25 RX ORDER — POTASSIUM CHLORIDE 7.45 MG/ML
10 INJECTION INTRAVENOUS ONCE
Status: COMPLETED | OUTPATIENT
Start: 2025-05-25 | End: 2025-05-25

## 2025-05-25 RX ORDER — HEPARIN SODIUM 10000 [USP'U]/100ML
0-5000 INJECTION, SOLUTION INTRAVENOUS CONTINUOUS
Status: DISCONTINUED | OUTPATIENT
Start: 2025-05-25 | End: 2025-05-27

## 2025-05-25 RX ORDER — FUROSEMIDE 20 MG/1
20 TABLET ORAL 2 TIMES DAILY
Status: DISCONTINUED | OUTPATIENT
Start: 2025-05-25 | End: 2025-05-29

## 2025-05-25 RX ORDER — AMOXICILLIN 250 MG
1 CAPSULE ORAL 2 TIMES DAILY PRN
Status: DISCONTINUED | OUTPATIENT
Start: 2025-05-25 | End: 2025-06-12 | Stop reason: HOSPADM

## 2025-05-25 RX ORDER — IOPAMIDOL 755 MG/ML
92 INJECTION, SOLUTION INTRAVASCULAR ONCE
Status: COMPLETED | OUTPATIENT
Start: 2025-05-25 | End: 2025-05-26

## 2025-05-25 RX ORDER — PYRIDOXINE HCL (VITAMIN B6) 25 MG
25 TABLET ORAL DAILY
Status: DISCONTINUED | OUTPATIENT
Start: 2025-05-26 | End: 2025-05-28

## 2025-05-25 RX ORDER — VANCOMYCIN HYDROCHLORIDE 1 G/200ML
1000 INJECTION, SOLUTION INTRAVENOUS EVERY 24 HOURS
Status: DISCONTINUED | OUTPATIENT
Start: 2025-05-26 | End: 2025-05-26

## 2025-05-25 RX ORDER — LISINOPRIL 2.5 MG/1
2.5 TABLET ORAL DAILY
Status: DISCONTINUED | OUTPATIENT
Start: 2025-05-25 | End: 2025-06-12 | Stop reason: HOSPADM

## 2025-05-25 RX ORDER — POTASSIUM CHLORIDE 7.45 MG/ML
10 INJECTION INTRAVENOUS
Status: COMPLETED | OUTPATIENT
Start: 2025-05-25 | End: 2025-05-25

## 2025-05-25 RX ORDER — DEXTROSE MONOHYDRATE AND SODIUM CHLORIDE 5; .45 G/100ML; G/100ML
1000 INJECTION, SOLUTION INTRAVENOUS CONTINUOUS PRN
Status: DISCONTINUED | OUTPATIENT
Start: 2025-05-25 | End: 2025-06-01

## 2025-05-25 RX ORDER — NICOTINE POLACRILEX 4 MG
15-30 LOZENGE BUCCAL
Status: DISCONTINUED | OUTPATIENT
Start: 2025-05-25 | End: 2025-05-26

## 2025-05-25 RX ADMIN — INSULIN HUMAN: 1 INJECTION, SOLUTION INTRAVENOUS at 15:46

## 2025-05-25 RX ADMIN — POTASSIUM CHLORIDE 10 MEQ: 7.46 INJECTION, SOLUTION INTRAVENOUS at 21:02

## 2025-05-25 RX ADMIN — DEXTROSE, SODIUM CHLORIDE, AND POTASSIUM CHLORIDE: 5; .45; .15 INJECTION INTRAVENOUS at 19:50

## 2025-05-25 RX ADMIN — HEPARIN SODIUM 1200 UNITS/HR: 10000 INJECTION, SOLUTION INTRAVENOUS at 19:23

## 2025-05-25 RX ADMIN — PIPERACILLIN AND TAZOBACTAM 3.38 G: 3; .375 INJECTION, POWDER, LYOPHILIZED, FOR SOLUTION INTRAVENOUS at 15:07

## 2025-05-25 RX ADMIN — POTASSIUM CHLORIDE 10 MEQ: 7.46 INJECTION, SOLUTION INTRAVENOUS at 20:03

## 2025-05-25 RX ADMIN — POTASSIUM CHLORIDE 10 MEQ: 7.46 INJECTION, SOLUTION INTRAVENOUS at 21:52

## 2025-05-25 RX ADMIN — SODIUM CHLORIDE, SODIUM LACTATE, POTASSIUM CHLORIDE, AND CALCIUM CHLORIDE 1000 ML: .6; .31; .03; .02 INJECTION, SOLUTION INTRAVENOUS at 21:09

## 2025-05-25 RX ADMIN — THIAMINE HCL TAB 100 MG 100 MG: 100 TAB at 20:24

## 2025-05-25 RX ADMIN — VANCOMYCIN HYDROCHLORIDE 1750 MG: 1 INJECTION, POWDER, LYOPHILIZED, FOR SOLUTION INTRAVENOUS at 17:16

## 2025-05-25 RX ADMIN — POTASSIUM CHLORIDE 10 MEQ: 7.46 INJECTION, SOLUTION INTRAVENOUS at 17:06

## 2025-05-25 RX ADMIN — SODIUM CHLORIDE, PRESERVATIVE FREE 73 ML: 5 INJECTION INTRAVENOUS at 16:11

## 2025-05-25 RX ADMIN — SODIUM CHLORIDE, SODIUM LACTATE, POTASSIUM CHLORIDE, AND CALCIUM CHLORIDE 1000 ML: .6; .31; .03; .02 INJECTION, SOLUTION INTRAVENOUS at 18:19

## 2025-05-25 RX ADMIN — POTASSIUM CHLORIDE 10 MEQ: 7.46 INJECTION, SOLUTION INTRAVENOUS at 15:01

## 2025-05-25 RX ADMIN — POTASSIUM CHLORIDE 40 MEQ: 20 SOLUTION ORAL at 20:23

## 2025-05-25 RX ADMIN — IOPAMIDOL 92 ML: 755 INJECTION, SOLUTION INTRAVENOUS at 16:12

## 2025-05-25 RX ADMIN — PIPERACILLIN AND TAZOBACTAM 3.38 G: 3; .375 INJECTION, POWDER, LYOPHILIZED, FOR SOLUTION INTRAVENOUS at 20:19

## 2025-05-25 RX ADMIN — POTASSIUM CHLORIDE 40 MEQ: 20 SOLUTION ORAL at 19:11

## 2025-05-25 RX ADMIN — LATANOPROST 1 DROP: 50 SOLUTION/ DROPS OPHTHALMIC at 21:52

## 2025-05-25 RX ADMIN — POTASSIUM PHOSPHATE, MONOBASIC POTASSIUM PHOSPHATE, DIBASIC 9 MMOL: 224; 236 INJECTION, SOLUTION, CONCENTRATE INTRAVENOUS at 21:48

## 2025-05-25 ASSESSMENT — COLUMBIA-SUICIDE SEVERITY RATING SCALE - C-SSRS
6. HAVE YOU EVER DONE ANYTHING, STARTED TO DO ANYTHING, OR PREPARED TO DO ANYTHING TO END YOUR LIFE?: NO
1. IN THE PAST MONTH, HAVE YOU WISHED YOU WERE DEAD OR WISHED YOU COULD GO TO SLEEP AND NOT WAKE UP?: NO
2. HAVE YOU ACTUALLY HAD ANY THOUGHTS OF KILLING YOURSELF IN THE PAST MONTH?: NO

## 2025-05-25 ASSESSMENT — ACTIVITIES OF DAILY LIVING (ADL)
ADLS_ACUITY_SCORE: 49

## 2025-05-25 NOTE — ED TRIAGE NOTES
BIBA by ambulance from home. Per EMS pt lives by herself at her apartment and there are concerns that is not able to take care of herself. BG was 428. IV started and receiving saline fluids upon arrival.     Triage assessment: Pt is alert and oriented. GCS 15. VSS. Pt looks somewhat disheveled. Dressed appropriately.      Triage Assessment (Adult)       Row Name 05/25/25 1320          Triage Assessment    Airway WDL WDL        Respiratory WDL    Respiratory WDL WDL        Skin Circulation/Temperature WDL    Skin Circulation/Temperature WDL WDL        Cardiac WDL    Cardiac WDL WDL     Cardiac Rhythm NSR        Peripheral/Neurovascular WDL    Peripheral Neurovascular WDL WDL        Cognitive/Neuro/Behavioral WDL    Cognitive/Neuro/Behavioral WDL WDL

## 2025-05-25 NOTE — ED PROVIDER NOTES
"  History     Chief Complaint   Patient presents with    Blood Sugar Problem     HPI  Sarah Felix is a 79 year old female who has a PMH notable for insulin-dependent DM2 (with diabetic polyneuropathy), HTN, HLD, CKD, prior dyspnea on exertion, fatty liver, Schatzki's ring (distal esophagus), slow transit constipation, mixed urge and stress incontinence, history of generalized weakness, balance issues, gait instability, and recurrent falls, most recent ED visit w/ alcohol intoxication, et al., who presents today by EMS and concern for worsening \"all over\" weakness over the last number of days.       PRIOR HISTORY REVIEW:  In review of EMR, patient last seen in the ED on 3/29/2025 w/ alcohol intoxication.  During that ED visit, they noted that patient had called EMS twice within 2 hours prior to arrival to the ED, she was unable to get up for the bathroom by herself.  EMS reportedly brought her in at that time because they were unable to keep going back and forth to her house and they did not think that she could care for herself in the acute setting at home.  There was question of whether or not she could have hurt her head at that time.  Head CT was performed, and reportedly had no acute abnormalities.  Did think that clinically she looked a bit dehydrated and her lactic acid was found to be elevated though reportedly improved.  Ultimately after fluids, her lactic acid improved, she was able to tolerate p.o. and ambulate again and was ultimately discharged with plan to follow-up with PCP.      CURRENT PRESENTATION:  Patient presents today by EMS, reporting that she just does not feel well.  She reports feeling very weak and describes it as \"all over\" type weakness and no focal type weakness.  It began gradually at least 2 to 3 days ago per her report.  At some point over the last couple of days she said she fell out of a chair just because she sat on it wrong though did not have any LOC and does not have any " "pain or injuries from such per her report.  No headaches, no vision changes, denies any neck or back discomfort.  No new extremity symptoms or discomfort.  She denies any chest discomfort, no shortness of breath or cough.  No abdominal pain, nausea or vomiting.  Maybe not eating as well.  She says that she has not had any alcohol for at least a week.  When asked about the last drink patient reports \"people say I am alcoholic because I wobble when I walk.\".  She thinks that she is been taking all of her medications appropriately though continues to have symptoms of bilateral foot tingling/discomfort that is similar to prior when the setting of her neuropathy related to her diabetes.    Patient reportedly lives alone in her apartment.  There was reportedly concerns that she was unable to care for herself, which is consistent with prior reports from EMS.  Blood glucose on scene from EMS was 428.  They started IV fluid bag (and nurse thinks that the patient got a liter of fluid prior to arrival).    Patient denies any fevers, does acknowledge not feeling well though she cannot describe any particular symptoms or signs for infection that she has noticed. It does not appear that she is on any chronic anticoagulation. She has swelling to both legs, but not new per pt report.    No other new symptoms or concerns reported at this time. Full ROS completed w/o additional findings.       Past Medical History  Past Medical History:   Diagnosis Date    Adenomatous polyp of duodenum 08/13/2018    Removed via EUS 8/13/2018 with f/u EGD 3/14/2019.  No further surveillance needed per GI.    Allergic rhinitis, cause unspecified     Allergic rhinitis    Basal cell carcinoma of face 03/2012    s/p MOHS    CKD (chronic kidney disease) stage 2, GFR 60-89 ml/min 06/06/2014    Closed bimalleolar fracture of left ankle with routine healing 10/11/2019    Added automatically from request for surgery [id number]    Contact dermatitis and other " eczema, due to unspecified cause     Cyst of thyroid 1998    Thyroid Nodule/Hurthle Cell Neoplasm/Benign - resected    Cystocele, lateral     Diverticulitis of colon (without mention of hemorrhage)(562.11) 06/2004    Abd CT    Esophageal reflux     Hiatal hernia    Essential hypertension, benign (HTN) 06/06/2014    Fatty liver     Hepatic cyst     8.0 cm x 6.6 cm on CT 3/2023 (stable)    Hiatal hernia     small    Hyperlipidemia     Mixed incontinence urge and stress (male)(female) 06/24/2007    Nontoxic uninodular goiter     Osteopenia 04/21/2005    on fosamax  for > 5 yr.  stop 7/2012    Other chronic otitis externa     Postoperative deep vein thrombosis (DVT) (H) age 22    post ovarian cyst removed    Schatzki's ring of distal esophagus 03/05/2023    Dilated 8/2018    Tubular adenoma of colon 06/23/2022    On colonoscopy 2016, rpt 5 yrs.    Type 2 diabetes mellitus (H)      Past Surgical History:   Procedure Laterality Date    APPENDECTOMY  age 22    APPLY EXTERNAL FIXATOR LOWER EXTREMITY Left 10/12/2019    Procedure: Left ankle external fixator placement;  Surgeon: Leeanne Brown MD;  Location: [facility] OR    CATARACT IOL, RT/LT Right 04/17/2018    [facility]    COLONOSCOPY  04/2006    repeat 10 yr    ENDOSCOPIC ULTRASOUND UPPER GASTROINTESTINAL TRACT (GI) N/A 09/06/2018    Procedure: ENDOSCOPIC ULTRASOUND, ESOPHAGOSCOPY / UPPER GASTROINTESTINAL TRACT (GI);  Endoscopic Ultrasound, Esophagogastroduodenoscopy with endoscopic mucosal resection;  Surgeon: Hood Brower MD;  [facility]    ESOPHAGOSCOPY, GASTROSCOPY, DUODENOSCOPY (EGD), COMBINED N/A 08/13/2018    Procedure: COMBINED ESOPHAGOSCOPY, GASTROSCOPY, DUODENOSCOPY (EGD), BIOPSY SINGLE OR MULTIPLE;  EGD;  Surgeon: Hood Brower MD;  [facility]    ESOPHAGOSCOPY, GASTROSCOPY, DUODENOSCOPY (EGD), COMBINED N/A 03/14/2019    Procedure: Upper Endoscopy;  Surgeon: Hood Brower MD;  [facility]    ESOPHAGOSCOPY, GASTROSCOPY,  "DUODENOSCOPY (EGD), RESECT MUCOSA, COMBINED N/A 09/06/2018    Procedure: COMBINED ESOPHAGOSCOPY, GASTROSCOPY, DUODENOSCOPY (EGD), RESECT MUCOSA;;  Surgeon: Hood Brower MD;  [facility]    GYN SURGERY  10/22/2008    pelvic support    HEMORRHOIDECTOMY  08/2008    MOHS MICROGRAPHIC PROCEDURE      OPEN REDUCTION INTERNAL FIXATION ANKLE Left 10/28/2019    Procedure: Internal fixation left bimalleolar ankle fracture, removal external fixator;  Surgeon: Jose Roberto Can MD;  [facility]    ORIF ANKLE FRACTURE BIMALLEOLAR Left 10/28/2019    Internal fixation left bimalleolar ankle fracture, removal external fixator    OTHER SURGICAL HISTORY Left 10/12/2019    APPLY EXTERNAL FIXATOR LOWER EXTREMITYProcedure: Left ankle external fixator placement; Surgeon: Leeanne Brown MD; Location: [facility]      OVARIAN CYST REMOVAL Left age 22    L ovarian cyst removal, laparotomy      PHACOEMULSIFICATION CLEAR CORNEA WITH STANDARD INTRAOCULAR LENS IMPLANT Left 09/25/2020    Procedure: LEFT CATARACT REMOVAL WITH INTRAOCULAR LENS IMPLANT;  Surgeon: Janay Amezcua MD;  [facility]    SURGICAL HISTORY OF -   10/2008    Transobturator tape for Urinary Incontinence    THYROIDECTOMY, PARTIAL  01/01/1998     aspirin 81 MG EC tablet  atorvastatin (LIPITOR) 40 MG tablet  blood glucose (ONETOUCH ULTRA) test strip  CALCIUM 500 + D 500-200 MG-IU OR TABS  Cholecalciferol (VITAMIN D PO)  Continuous Glucose Sensor (FREESTYLE CARMELA 14 DAY SENSOR) MISC  folic acid (FOLVITE) 400 MCG tablet  furosemide (LASIX) 20 MG tablet  glimepiride (AMARYL) 2 MG tablet  hydroxyurea (HYDREA) 500 MG capsule  insulin aspart (NOVOLOG PEN) 100 UNIT/ML pen  insulin pen needle (BD REY U/F) 32G X 4 MM miscellaneous  insulin syringe-needle U-100 (BD INSULIN SYRINGE ULTRAFINE) 31G X 5/16\" 1 ML miscellaneous  Lancets (ONETOUCH DELICA PLUS DISQLR89J) MISC  latanoprost (XALATAN) 0.005 % ophthalmic solution  lisinopril (ZESTRIL) 2.5 MG " tablet  metFORMIN (GLUCOPHAGE XR) 500 MG 24 hr tablet  metoprolol tartrate (LOPRESSOR) 25 MG tablet  nystatin (MYCOSTATIN) 247630 UNIT/GM external cream  potassium chloride ER (MICRO-K) 10 MEQ CR capsule  pyridOXINE (VITAMIN B6) 25 MG tablet  triamcinolone (KENALOG) 0.1 % external cream  vitamin B-12 (CYANOCOBALAMIN) 2500 MCG sublingual tablet      Allergies   Allergen Reactions    Actos [Pioglitazone]      Fatigue - felt crummy     Amlodipine Swelling    Ancef [Cefazolin]     Levaquin Rash     Itching, rash    Cephalosporins Rash    Clindamycin Rash    Levofloxacin Itching and Rash    Nickel Rash     Contact reaction  Contact reaction     Family History  Family History   Problem Relation Age of Onset    Diabetes Mother     Hypertension Mother     Arthritis Mother         rheumatoid    Cancer Father     Cardiovascular Brother         palpitations not on meds.    Glaucoma No family hx of     Macular Degeneration No family hx of     Amblyopia No family hx of     Rheumatoid Arthritis Mother     Other - See Comments Brother         palpitations     Social History   Social History     Tobacco Use    Smoking status: Former     Current packs/day: 0.00     Types: Cigarettes     Quit date: 1980     Years since quittin.4     Passive exposure: Past    Smokeless tobacco: Never    Tobacco comments:     smoked from 70-80; smoked about 1ppd   Vaping Use    Vaping status: Never Used   Substance Use Topics    Alcohol use: Yes     Alcohol/week: 0.0 standard drinks of alcohol     Comment: 6-7 drinks per week    Drug use: No              REVIEW OF SYSTEMS  A complete review of systems was performed with pertinent positives and negatives noted in the HPI, and all other systems negative.    Physical Exam   BP: 127/87  Pulse: 93      Physical Exam  CONSTITUTIONAL: Does not appear to be in any acute distress, but does appear like she has some sort of illness going on.  She appears fatigued, slightly disheveled, but is answering  questions appropriately.  No apparent florid withdrawal or obvious clinical intoxication.  HENT:   - Head: Normocephalic and atraumatic.   - Ears: External ear grossly normal.   - Nose: Nose normal. No rhinorrhea. No epistaxis.   - Mouth/Throat: Normal voice.  No asymmetry. MMM  EYES: PERRL.  EOMI without abnormal eye movements.  No photophobia. Conjunctivae and lids are normal. No scleral icterus.   NECK: ROM not assessed initially given well image. No obvious discomfort/injuries appreciated. Phonation normal. Neck supple.  No tracheal deviation, no stridor. No edema or erythema noted.  CARDIOVASCULAR: Normal rate, regular rhythm and no appreciable abnormal heart sounds.  PULMONARY/CHEST: Normal work of breathing. No accessory muscle usage or stridor. No respiratory distress.  No appreciable abnormal breath sounds.  ABDOMEN: Soft, non-distended. No tenderness. No peritoneal findings, no rigidity, rebound or guarding.  No palpable masses or abnormal pulsatility appreciated.  MUSCULOSKELETAL:  Bilateral pitting edema noted in both lower extremities. Extremities warm and seemingly well perfused.   NEUROLOGIC: Awake, alert. Not disoriented. No seizure activity. GCS 15  SKIN: Skin is warm and dry. No diaphoresis. No pallor. No rash/acute appearing skin changes noted.  PSYCHIATRIC: Normal mood and affect. Speech and behavior normal. Thought processes linear. Cognition and memory are normal. No SI/HI reported.    ED Course     ED Course as of 05/25/25 2303   Sun May 25, 2025   1346 Patient not having any chest pain, or CP equivalent sx's currently, but her EKG is showing so much artifact it is difficult to interpret.  The computer reading was saying STEMI, however I do not actually appreciate this and I think this is actually probably just reading the artifact and again she is not having any symptoms for MI currently. Had another ED physician look at ECG as well and they also didn't think there was anythign there to  activate cath lab on.  We are trying our other EKG machine.   1653 A call from Radiology regarding the patient's imaging.  Patient reportedly has a right hepatic vein thrombosis.  They did not find any evidence for thrombosis/clot elsewhere, no other acute findings.  Formal written report to follow.  The IM team/admitting team was updated regarding these findings.         ----------------------------------------------------------------------------------------------------------------------         Multiple ECGs performed, and repeated after the initial showed such significant artifact was unable to be interpreted, repeat EKG at 1401 does show ongoing artifact limiting interpretation but appears to have normal sinus rhythm, rate 85 bpm, no kinjal ST elevation (though again somewhat limited given the degree of artifact) symptoms.  Difficult to directly compare this compared to the previous EKG from 25 November 2019 as there is somewhat artifact, but the general morphology appears to be relatively similar problems.,  A bit comparison limited.  ----------------------------------------------------------------------------------------------------------------------  Patient does have findings for sepsis, though lactic acid elevation could also be in the setting of etoh, poor PO intake, DKA, etc., or any combination of these or others. Cultures drawn. Broad spectrum Abx ordered. Already got a liter of IV fluids. Getting additional fluids but also insulin drip, IV electrolytes, may need additional access.   ----------------------------------------------------------------------------------------------------------------------  Critical Care Addendum  My initial assessment, based on my review of prehospital provider report, review of vital signs, focused history, physical exam, review of cardiac rhythm monitor, 12 lead ECG analysis, and discussion with other ED physician, established a high suspicion that Sarah Felix has  "DKA requiring insulin drip, severe sepsis w/ hypothermia, significant electrolyte abnormalities, et al., which requires immediate intervention, and therefore she is critically ill.     After the initial assessment, the care team initiated multiple lab tests, initiated IV fluid administration, initiated medication therapy with DKA orderset (insulin drip w/ related PRN medications), broad spectrum Abx, IV electrolyte replacement, and consulted with ED pharmacist, other ED physician, IM to provide stabilization care. Due to the critical nature of this patient, I reassessed vital signs, physical exam, review of cardiac rhythm monitor, 12 lead ECG analysis, mental status, respiratory status, and and hemodynamics  multiple times prior to her disposition.     Time also spent performing documentation, reviewing test results, discussion with consultants, and coordination of care.     Critical care time (excluding teaching time and procedures): 60 minutes.       Assessments & Plan (with Medical Decision Making)     IMPRESSION:   79 year old female who has a PMH notable for insulin-dependent DM2 (with diabetic polyneuropathy), HTN, HLD, CKD, prior dyspnea on exertion, fatty liver, Schatzki's ring (distal esophagus), slow transit constipation, mixed urge and stress incontinence, history of generalized weakness, balance issues, gait instability, and recurrent falls, recent ED visit w/ alcohol intoxication, et al., who presents today by EMS and concern for worsening \"all over\" weakness.     Clinically, patient appears unwell but not immediately toxic . Otherwise on examination,  She appears fatigued but there is no focal neurodeficits appreciated and no clear findings for acute intoxication or withdrawal clinically at this time.  No obvious cardiopulmonary findings, abdominal exam is benign.  Does have notable bilateral lower extremity edema.      Ddx includes, but not limited to, symptoms related to general deconditioning, there " is concern for DKA or other diabetic process given the glucose in the 400s at home and the fatigue, while she is not having particular symptoms to point to a potential source of infection, I do worry about that and that could increase risk of getting DKA or other diabetic complications, did slip from a chair at some point, but that was when she was already feeling weak but we will image to ensure no obvious injury or acute abnormality, other electrolyte/metabolic derangement, less likely ACS, but does have lower extremity edema which could be part of heart failure component (though patient not having any chest pain or respiratory symptoms currently), considered low protein, worsening renal disease, no history of liver disease, but did consider that as well, general failure to thrive, amongst others and any combination of those       PLAN:   - Screening ECG, laboratory studies  - Screening head CT and C-spine CT given report of possible fall, CT chest/abdomen/pelvis (for injury/infectious source)  - Risks/benefits of pursuing imaging reviewed and accepted.   - Already got 1 L of IV fluids from EMS, will get laboratory studies, additional fluids as needed, ABX as needed, potential for IV insulin based on laboratory results  - Clinically the patient will need to be admitted for further evaluation and management, and would also recommend regardless of the medical conditions we identify would recommend social work consult to discuss placement and safety evaluation for patient.    RESULTS:  Labs:   - Glucose elevated to 425, anion gap 30, bicarb 19, potassium 2.9 (magnesium 2.1), sodium 133, creatinine 1.17 is up from previous, chloride 84  - Lactate 3.5, WBC 15.4 is up from previous  - Beta hydroxybutyrate quantitative 5.97  - Lipase elevated to 147, but no other obvious LFT elevations  - Hgb 12.3 is slightly below previous back in March,  is up from previous  - Troponin 16, Bnp pending  - TSH, mag, Phos, ammonia  all grossly WNL  - Blood cultures pending  Urine: No sample yet collected  Imaging: Written preliminary reports reviewed:  - CT CHEST/ABDOMEN/PELVIS written results not initially available, but did get call from Radiology re: pt having a hepatic vein thrombus, which was communicated to the admitting IM team (pt had already been admitted at that time. They will manage accordingly)  - CT CERVICAL SPINE W/O CONTRAST:  1. No acute fracture or traumatic subluxation.  2. Degenerative changes, greatest at C6-7 with moderate right and  mild-to-moderate left neural foraminal stenosis. No high-grade spinal  canal stenosis at any level.  - CT HEAD W/O CONTRAST:  1. No CT evidence of acute intracranial pathology.  2. Moderate diffuse parenchymal volume loss and leukoaraiosis.  Results/reports reviewed w/ patient who expresses understanding of findings and F/U recommendations.    INTERVENTIONS:   - Got 1 L of IV fluids by EMS prior to arrival  - IV potassium (10 mEq IV x 1 ordered initially for potassium less than 5 prior to initiating insulin, discussed with pharmacist), conditional IV potassium ordered as part of the order set  - IV insulin as per DKA/HHS type order set  - IV vancomycin, Zosyn    RE-EVALUATION:  - No notable changes in clinical appearance.   -     DISCUSSIONS:  Discussed w/ other ED physician, ED pharmacist, Radiologist, as well as admitting IM team.   - w/ IM: Reviewed patient/presentation, current state of workup/any pending studies. They will admit for further evaluation/management, F/U pending studies as needed, coordinate w/ consulting services as needed. No additional requests/recommendations for workup/management for in the ED at this time.    - w/ Patient: I have reviewed the available findings, plan with the patient. She expressed understanding and agreement with this plan. All questions answered to the best of our ability at this time.     DISPOSITION/PLANNING:  IMPRESSION:   - DKA  - Hypokalemia.    - Severe sepsis  - Hepatic vein thrombus  - Bilateral LE edema  DISPOSITION:  - [place] to [place] [place] w[place] for further evaluation/management  OTHER RECOMMENDATIONS:  - Consider LE DVT US  - [place] level of care as needed    ______________________________________________________________________      This part of the medical record was transcribed by Pennie Brown, Medical Scribe, from a dictation done by Awilda Burnham MD.      Awilda Burnham MD  5/25/2025   [facility] EMERGENCY DEPARTMENT       Awilda Burnham MD  05/26/25 0254

## 2025-05-25 NOTE — PHARMACY-VANCOMYCIN DOSING SERVICE
Pharmacy Vancomycin Initial Note  Date of Service May 25, 2025  Patient's  1945  79 year old, female    Indication: Sepsis    Current estimated CrCl = Estimated Creatinine Clearance: 38.1 mL/min (A) (based on SCr of 1.17 mg/dL (H)).    Creatinine for last 3 days  2025:  1:34 PM Creatinine 1.17 mg/dL    Recent Vancomycin Level(s) for last 3 days  No results found for requested labs within last 3 days.      Vancomycin IV Administrations (past 72 hours)        No vancomycin orders with administrations in past 72 hours.                    Nephrotoxins and other renal medications (From now, onward)      Start     Dose/Rate Route Frequency Ordered Stop    25 1500  vancomycin (VANCOCIN) 1,000 mg in 200 mL dextrose intermittent infusion         1,000 mg  200 mL/hr over 1 Hours Intravenous EVERY 24 HOURS 25 1437      25 1500  vancomycin (VANCOCIN) 1,750 mg in sodium chloride 0.9 % 567.5 mL intermittent infusion         1,750 mg  over 2 Hours Intravenous ONCE 25 1434      25 1435  piperacillin-tazobactam (ZOSYN) 3.375 g vial to attach to  mL bag         3.375 g  over 30 Minutes Intravenous ONCE 25 1432              Contrast Orders - past 72 hours (72h ago, onward)      None            [employer] Prediction of Planned Initial Vancomycin Regimen  Loading dose: 1750 mg at 15:00 2025.  Regimen: 1000 mg IV every 24 hours.  Start time: 15:00 on 2025  Exposure target: AUC24 (range) 400-600 mg/L.hr   AUC24,ss: 493 mg/L.hr  Probability of AUC24 > 400: 73 %  Ctrough,ss: 15.3 mg/L  Probability of Ctrough,ss > 20: 26 %  Probability of nephrotoxicity (Lodise MITRA ): 11 %          Plan:  Give vancomycin 1750mg IV x1 now, then start vancomycin 1000mg IV q24 hours  Vancomycin monitoring method: AUC  Vancomycin therapeutic monitoring goal: 400-600 mg*h/L  Pharmacy will check vancomycin levels as appropriate in 1-3 Days.    Serum creatinine levels will be ordered daily for the  first week of therapy and at least twice weekly for subsequent weeks.      Deshaun Glasgow, PharmD, BCPS  \

## 2025-05-25 NOTE — PROGRESS NOTES
RN made multiple attempts to obtain temperature via oral and axillary with multiple thermometers. Unable to obtain temperature. Pt does feel cool to touch. Pt given multiple warm blankets.

## 2025-05-25 NOTE — H&P
"[facility]    History and Physical - Medicine Service, JOSE EDUARDO TEAM 2       Date of Admission:  5/25/2025    Assessment & Plan      Sarah Felix is a 79 year old female with history of  insulin-dependent DM2, HTN, fatty liver, possible alcohol use disorder, gait instability who presented 5/25 with generalized weakness and was admitted with DKA and sepsis, 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     Diabetic ketoacidosis  Pseudohyponatremia  Hypokalemia  T2DM with A1c 8.0%. Suspect triggered by sepsis. Hypokalemic to 2.9 when protocol started.  - DKA protocol   - Insulin gtt   - mIVF   - Aggressive K replacement   - Q4H labs  - Hold PTA metformin, glipizide  - Endocrinology consult  - Diabetes ed consult    Hepatic vein thrombus  No prior imaging to compare. Per chart review, history of \"thromboembolism\" after ovarian cyst removal age 22. Liver panel normal, no significant RUQ pain  - IR consulted; no indication for lytics  - Heme consulted   - Will staff in AM   - Heparin gtt    Sepsis - hypothermia + leukocytosis  Lactic acidosis  S/p 1L IVF in ambulance.  - Additional 1L LR  - Diagnostics:  - Blood cultures 5/25 pending   - UA 5/25 pending   - MRSA nares pending   - Urine legionella/strep pneumo pending   - CT C/A/P  - Abx:   - Zosyn (5/25-*)   - Vancomycin (5/25-*)    Fall at home  - CTH, C spine    Elevated INR  Unclear etiology, no known liver disease. Will hold off on Vit K given new thrombus.    Lower extremity edema  Reports to be at baseline. Symmetric, low concern for DVT. No recent echos. On room air, no need for TTE currently.  - Hold PTA lasix 20mg daily given sepsis, lactic acidosis    Elevated troponin  Likely type 2 NSTEMI in setting of DKA/sepsis. Troponin 16->15. No chest pain. Initial EKG reading STEMI, repeat without evidence of ACS per ED provider report.    Elevated lipase  2x ULN but no abdominal pain, no pancreatitis on CT.    Possible " alcohol use disorder?  Reports 2 drinks/day. Did have an ED visit 03/2025 for alcohol intoxication. Reports last drink 5 days PTA, low concern for withdrawal.  - Can revisit, consider addiction medicine consult  - PTA folate  - Start thiamine    Generalized weakness, failure to thrive  TSH wnl.   - PT/OT consults  - Ongoing evaluation for safe discharge plan    HTN  Hold PTA metoprolol, lisinopril given sepsis and normotension    Essential thrombocytosis: PTA aspirin, hydroxyurea    Chronic/stable:    Glaucoma: PTA latanoprost  CKD2  Diabetic neuropathy  Schatzki's ring  Slow transit constipation  Mixed urge and stress incontinence  Gait instability        Diet: NPO for Medical/Clinical Reasons Except for: Meds, Ice Chips  DVT Prophylaxis: Heparin SQ  Silver Catheter: Not present  Fluids: none  Lines: None     Cardiac Monitoring: ACTIVE order. Indication: DKA  Code Status: Full Code    Clinically Significant Risk Factors Present on Admission        # Hypokalemia: Lowest K = 2.8 mmol/L in last 2 days, will replace as needed  # Hyponatremia: Lowest Na = 132 mmol/L in last 2 days, will monitor as appropriate  # Hypochloremia: Lowest Cl = 84 mmol/L in last 2 days, will monitor as appropriate     # Anion Gap Metabolic Acidosis: Highest Anion Gap = 30 mmol/L in last 2 days, will monitor and treat as appropriate     # Coagulation Defect: INR = 1.43 (Ref range: 0.85 - 1.15) and/or PTT = N/A, will monitor for bleeding  # Drug Induced Platelet Defect: home medication list includes an antiplatelet medication   # Hypertension: Noted on problem list          # DMII: A1C = 8.0 % (Ref range: <5.7 %) within past 6 months               Disposition Plan      Expected Discharge Date: 05/27/2025                The patient's care was discussed with the Attending Physician, Dr. Yuan.      July Bojorquez MD  Medicine Service, [facility] TEAM 2  [facility]  Securely message with [employer] (more  "info)  Text page via [facility] Paging/Directory   See signed in provider for up to date coverage information  ______________________________________________________________________    Chief Complaint   Fall off of chair at home, weakness    History is obtained from the patient    History of Present Illness   Sarah Felix is a 79 year old female with history of  insulin-dependent DM2, HTN, fatty liver, possible alcohol use disorder, gait instability who presented after falling off her chair at home.    For the last few days has been feeling generally weak. No fevers. Has a chronic cough, maybe more productive recently. No chest pain or dyspnea. No abdominal pain, nausea, vomiting, diarrhea, dysuria. No rashes. Has chronic leg swelling which is the same as it always is. Has not been taking her meds or eating very much over the last few days due to not feeling well. She takes insulin \"some days, when she eats\" but states she only does 1 unit one time a day.    She states we could call her brothers in case of an emergency but does not have any major support people she relies on. She lives on her own      Past Medical History    Past Medical History:   Diagnosis Date    Adenomatous polyp of duodenum 08/13/2018    Removed via EUS 8/13/2018 with f/u EGD 3/14/2019.  No further surveillance needed per GI.    Allergic rhinitis, cause unspecified     Allergic rhinitis    Basal cell carcinoma of face 03/2012    s/p MOHS    CKD (chronic kidney disease) stage 2, GFR 60-89 ml/min 06/06/2014    Closed bimalleolar fracture of left ankle with routine healing 10/11/2019    Added automatically from request for surgery [id number]    Contact dermatitis and other eczema, due to unspecified cause     Cyst of thyroid 1998    Thyroid Nodule/Hurthle Cell Neoplasm/Benign - resected    Cystocele, lateral     Diverticulitis of colon (without mention of hemorrhage)(562.11) 06/2004    Abd CT    Esophageal reflux     Hiatal hernia    Essential " hypertension, benign (HTN) 06/06/2014    Fatty liver     Hepatic cyst     8.0 cm x 6.6 cm on CT 3/2023 (stable)    Hiatal hernia     small    Hyperlipidemia     Mixed incontinence urge and stress (male)(female) 06/24/2007    Nontoxic uninodular goiter     Osteopenia 04/21/2005    on fosamax  for > 5 yr.  stop 7/2012    Other chronic otitis externa     Postoperative deep vein thrombosis (DVT) (H) age 22    post ovarian cyst removed    Schatzki's ring of distal esophagus 03/05/2023    Dilated 8/2018    Tubular adenoma of colon 06/23/2022    On colonoscopy 2016, rpt 5 yrs.    Type 2 diabetes mellitus (H)        Past Surgical History   Past Surgical History:   Procedure Laterality Date    APPENDECTOMY  age 22    APPLY EXTERNAL FIXATOR LOWER EXTREMITY Left 10/12/2019    Procedure: Left ankle external fixator placement;  Surgeon: Leeanne Brown MD;  Location: [facility] OR    CATARACT IOL, RT/LT Right 04/17/2018    [facility]    COLONOSCOPY  04/2006    repeat 10 yr    ENDOSCOPIC ULTRASOUND UPPER GASTROINTESTINAL TRACT (GI) N/A 09/06/2018    Procedure: ENDOSCOPIC ULTRASOUND, ESOPHAGOSCOPY / UPPER GASTROINTESTINAL TRACT (GI);  Endoscopic Ultrasound, Esophagogastroduodenoscopy with endoscopic mucosal resection;  Surgeon: Hood Brower MD;  [facility]    ESOPHAGOSCOPY, GASTROSCOPY, DUODENOSCOPY (EGD), COMBINED N/A 08/13/2018    Procedure: COMBINED ESOPHAGOSCOPY, GASTROSCOPY, DUODENOSCOPY (EGD), BIOPSY SINGLE OR MULTIPLE;  EGD;  Surgeon: Hood Brower MD;  [facility]    ESOPHAGOSCOPY, GASTROSCOPY, DUODENOSCOPY (EGD), COMBINED N/A 03/14/2019    Procedure: Upper Endoscopy;  Surgeon: Hood Brower MD;  [facility]    ESOPHAGOSCOPY, GASTROSCOPY, DUODENOSCOPY (EGD), RESECT MUCOSA, COMBINED N/A 09/06/2018    Procedure: COMBINED ESOPHAGOSCOPY, GASTROSCOPY, DUODENOSCOPY (EGD), RESECT MUCOSA;;  Surgeon: Hood Brower MD;  [facility] OR    GYN SURGERY  10/22/2008    pelvic support    HEMORRHOIDECTOMY   2008    MOHS MICROGRAPHIC PROCEDURE      OPEN REDUCTION INTERNAL FIXATION ANKLE Left 10/28/2019    Procedure: Internal fixation left bimalleolar ankle fracture, removal external fixator;  Surgeon: Jose Roberto Can MD;  [facility]    ORIF ANKLE FRACTURE BIMALLEOLAR Left 10/28/2019    Internal fixation left bimalleolar ankle fracture, removal external fixator    OTHER SURGICAL HISTORY Left 10/12/2019    APPLY EXTERNAL FIXATOR LOWER EXTREMITYProcedure: Left ankle external fixator placement; Surgeon: Leeanne Brown MD; Location: [facility] OR      OVARIAN CYST REMOVAL Left age 22    L ovarian cyst removal, laparotomy      PHACOEMULSIFICATION CLEAR CORNEA WITH STANDARD INTRAOCULAR LENS IMPLANT Left 2020    Procedure: LEFT CATARACT REMOVAL WITH INTRAOCULAR LENS IMPLANT;  Surgeon: Janay Amezcua MD;  [facility]    SURGICAL HISTORY OF -   10/2008    Transobturator tape for Urinary Incontinence    THYROIDECTOMY, PARTIAL  1998       Prior to Admission Medications   Prior to Admission Medications   Prescriptions Last Dose Informant Patient Reported? Taking?   CALCIUM 500 + D 500-200 MG-IU OR TABS 2025  No Yes   Sig: one tab twice per day   Cholecalciferol (VITAMIN D PO) 2025  Yes Yes   Sig: Pt consuming 3000 units per day   Continuous Glucose Sensor (FREESTYLE CARMELA 14 DAY SENSOR) MISC   No Yes   Sig: Change every 14 days.   Lancets (ONETOUCH DELICA PLUS [id number]) MISC   No Yes   Si each 4 times daily   aspirin 81 MG EC tablet Past Week  Yes Yes   Sig: Take 81 mg by mouth daily   atorvastatin (LIPITOR) 40 MG tablet 2025  No Yes   Sig: Take 1 tablet (40 mg) by mouth daily   blood glucose (ONETOUCH ULTRA) test strip   No Yes   Si strip by In Vitro route 4 times daily.   folic acid (FOLVITE) 400 MCG tablet Unknown  No Yes   Sig: Take 1 tablet (400 mcg) by mouth daily   furosemide (LASIX) 20 MG tablet 2025  No Yes   Sig: Take 1 tablet (20 mg) by mouth 2 times daily  "  Patient taking differently: Take 20 mg by mouth daily.   glimepiride (AMARYL) 2 MG tablet 2025  No Yes   Sig: Take 1 tablet (2 mg) by mouth every morning (before breakfast)   Patient taking differently: Take 1 mg by mouth every morning (before breakfast).   hydroxyurea (HYDREA) 500 MG capsule 2025  No Yes   Sig: Take 1 capsule (500 mg) by mouth daily   insulin aspart (NOVOLOG PEN) 100 UNIT/ML pen 2025  Yes Yes   Si units with a high carb meal about once daily as needed.   insulin pen needle (BD REY U/F) 32G X 4 MM miscellaneous 2025  No Yes   Sig: USE 1 PEN NEEDLE four times daily. (WITH LANTUS AND Novolog)   insulin syringe-needle U-100 (BD INSULIN SYRINGE ULTRAFINE) 31G X \" 1 ML miscellaneous   No No   Sig: Use 1 syringes twice daily for insulin and victoza and for symptoms of hypoglycemia   latanoprost (XALATAN) 0.005 % ophthalmic solution 2025 Bedtime  No Yes   Sig: Place 1 drop into both eyes at bedtime.   lisinopril (ZESTRIL) 2.5 MG tablet 2025  No Yes   Sig: Take 1 tablet (2.5 mg) by mouth daily   metFORMIN (GLUCOPHAGE XR) 500 MG 24 hr tablet 2025  Yes Yes   Sig: Take 1 tablet (500 mg) by mouth daily (with dinner)   metoprolol tartrate (LOPRESSOR) 25 MG tablet 2025  No Yes   Sig: Take 1/2 tablet or 12.5mg by mouth twice daily.   nystatin (MYCOSTATIN) 065935 UNIT/GM external cream   No No   Sig: Apply topically 2 times daily   Patient taking differently: Apply topically daily as needed.   potassium chloride ER (MICRO-K) 10 MEQ CR capsule 2025  No Yes   Sig: Take 1 capsule (10 mEq) by mouth daily   pyridOXINE (VITAMIN B6) 25 MG tablet 2025  No Yes   Sig: Take 1 tablet (25 mg) by mouth daily   triamcinolone (KENALOG) 0.1 % external cream   No No   Sig: Apply topically 3 times daily   vitamin B-12 (CYANOCOBALAMIN) 2500 MCG sublingual tablet 2025  No Yes   Sig: Take 2500 mcg by mouth three times per week      Facility-Administered Medications: None "           Physical Exam   Vital Signs: Temp: (!) 94.7  F (34.8  C) Temp src: Rectal BP: 128/76 Pulse: 93     SpO2: 100 %      Weight: 0 lbs 0 oz    Constitutional: awake but fatigued appearing  Respiratory: clear bilaterally. Coughs up clear sputum  Cardiovascular: rrr, no murmur  GI: No upper abdominal pain  Genitounirinary: + suprapubic tenderness  Skin: No rash  Musculoskeletal: 2+ lower extremity edema up to mid shin. No redness or tenderness  Neurologic: Awake, fatigued. Oriented to hospital, 2025. States month is November. Moving all extremities. Stuttered for about 30 seconds over one word but otherwise speech fluent    Medical Decision Making       Please see A&P for additional details of medical decision making.      Data     I have personally reviewed the following data over the past 24 hrs:    15.4 (H)  \   12.6   / 812 (H)     132 (L) 84 (L) 66.6 (H) /  309 (H)   2.8 (L) 19 (L) 1.17 (H) \     ALT: 32 AST: 31 AP: 96 TBILI: 0.8   ALB: 3.9 TOT PROTEIN: 6.9 LIPASE: 147 (H)     Trop: 15 (H) BNP: 511     TSH: 1.36 T4: N/A A1C: 8.0 (H)     Procal: N/A CRP: N/A Lactic Acid: 3.0 (H)       INR:  1.43 (H) PTT:  N/A   D-dimer:  N/A Fibrinogen:  N/A

## 2025-05-25 NOTE — MEDICATION SCRIBE - ADMISSION MEDICATION HISTORY
Medication Scribe Admission Medication History    Admission medication history is complete. The information provided in this note is only as accurate as the sources available at the time of the update.    Information Source(s): Patient via in-person    Pertinent Information: Sarah reports taking furosemide 20 MG daily instead of twice daily, and takes a half tablet of the glimepiride 2 MG. Unable to find clinical notes indicating the change in dosage and frequency of the above medications. Patient denies taking any other medications. Dispense report and outside medication reconciliation list have been reviewed.     Changes made to PTA medication list:  Added: None  Deleted: None  Changed:   furosemide (LASIX) 20 MG tablet. Take 1 tablet (20 mg) by mouth 2 times daily. ---> furosemide (LASIX) 20 MG tablet.Take 20 mg by mouth daily.  glimepiride (AMARYL) 2 MG tablet. glimepiride (AMARYL) 2 MG tablet ---> glimepiride (AMARYL) 2 MG tablet.  Take 1 mg by mouth every morning (before breakfast).    Allergies reviewed with patient and updates made in EHR: yes    Medication History Completed By: Kade Grajeda 5/25/2025 6:02 PM    PTA Med List   Medication Sig Last Dose/Taking    aspirin 81 MG EC tablet Take 81 mg by mouth daily Past Week    atorvastatin (LIPITOR) 40 MG tablet Take 1 tablet (40 mg) by mouth daily 5/24/2025    blood glucose (ONETOUCH ULTRA) test strip 1 strip by In Vitro route 4 times daily. Taking    CALCIUM 500 + D 500-200 MG-IU OR TABS one tab twice per day 5/24/2025    Cholecalciferol (VITAMIN D PO) Pt consuming 3000 units per day 5/24/2025    Continuous Glucose Sensor (FREESTYLE CARMELA 14 DAY SENSOR) MISC Change every 14 days. Taking    folic acid (FOLVITE) 400 MCG tablet Take 1 tablet (400 mcg) by mouth daily Unknown    furosemide (LASIX) 20 MG tablet Take 1 tablet (20 mg) by mouth 2 times daily (Patient taking differently: Take 20 mg by mouth daily.) 5/24/2025    glimepiride (AMARYL) 2 MG tablet Take  1 tablet (2 mg) by mouth every morning (before breakfast) (Patient taking differently: Take 1 mg by mouth every morning (before breakfast).) 5/24/2025    hydroxyurea (HYDREA) 500 MG capsule Take 1 capsule (500 mg) by mouth daily 5/24/2025    insulin aspart (NOVOLOG PEN) 100 UNIT/ML pen 5 units with a high carb meal about once daily as needed. 5/22/2025    insulin pen needle (BD REY U/F) 32G X 4 MM miscellaneous USE 1 PEN NEEDLE four times daily. (WITH LANTUS AND Novolog) 5/22/2025    Lancets (ONETOUCH DELICA PLUS [id number]) MISC 1 each 4 times daily Taking    latanoprost (XALATAN) 0.005 % ophthalmic solution Place 1 drop into both eyes at bedtime. 5/24/2025 Bedtime    lisinopril (ZESTRIL) 2.5 MG tablet Take 1 tablet (2.5 mg) by mouth daily 5/24/2025    metFORMIN (GLUCOPHAGE XR) 500 MG 24 hr tablet Take 1 tablet (500 mg) by mouth daily (with dinner) 5/24/2025    metoprolol tartrate (LOPRESSOR) 25 MG tablet Take 1/2 tablet or 12.5mg by mouth twice daily. 5/24/2025    potassium chloride ER (MICRO-K) 10 MEQ CR capsule Take 1 capsule (10 mEq) by mouth daily 5/24/2025    pyridOXINE (VITAMIN B6) 25 MG tablet Take 1 tablet (25 mg) by mouth daily 5/24/2025    vitamin B-12 (CYANOCOBALAMIN) 2500 MCG sublingual tablet Take 2500 mcg by mouth three times per week 5/24/2025

## 2025-05-26 ENCOUNTER — APPOINTMENT (OUTPATIENT)
Dept: SPEECH THERAPY | Facility: CLINIC | Age: 80
DRG: 871 | End: 2025-05-26
Payer: COMMERCIAL

## 2025-05-26 ENCOUNTER — APPOINTMENT (OUTPATIENT)
Dept: ULTRASOUND IMAGING | Facility: CLINIC | Age: 80
End: 2025-05-26
Payer: COMMERCIAL

## 2025-05-26 ENCOUNTER — APPOINTMENT (OUTPATIENT)
Dept: GENERAL RADIOLOGY | Facility: CLINIC | Age: 80
DRG: 871 | End: 2025-05-26
Payer: COMMERCIAL

## 2025-05-26 LAB
ANION GAP SERPL CALCULATED.3IONS-SCNC: 10 MMOL/L (ref 7–15)
ANION GAP SERPL CALCULATED.3IONS-SCNC: 16 MMOL/L (ref 7–15)
ANION GAP SERPL CALCULATED.3IONS-SCNC: 17 MMOL/L (ref 7–15)
ATRIAL RATE - MUSE: 82 BPM
B-OH-BUTYR SERPL-SCNC: 0.2 MMOL/L
B-OH-BUTYR SERPL-SCNC: 0.22 MMOL/L
B-OH-BUTYR SERPL-SCNC: 1.46 MMOL/L
B-OH-BUTYR SERPL-SCNC: 2.07 MMOL/L
BACTERIA UR CULT: NORMAL
BUN SERPL-MCNC: 27.9 MG/DL (ref 8–23)
BUN SERPL-MCNC: 34.3 MG/DL (ref 8–23)
BUN SERPL-MCNC: 44.7 MG/DL (ref 8–23)
CALCIUM SERPL-MCNC: 7.6 MG/DL (ref 8.8–10.4)
CALCIUM SERPL-MCNC: 7.7 MG/DL (ref 8.8–10.4)
CALCIUM SERPL-MCNC: 8.3 MG/DL (ref 8.8–10.4)
CHLORIDE SERPL-SCNC: 101 MMOL/L (ref 98–107)
CHLORIDE SERPL-SCNC: 104 MMOL/L (ref 98–107)
CHLORIDE SERPL-SCNC: 99 MMOL/L (ref 98–107)
CREAT SERPL-MCNC: 0.72 MG/DL (ref 0.51–0.95)
CREAT SERPL-MCNC: 0.78 MG/DL (ref 0.51–0.95)
CREAT SERPL-MCNC: 0.89 MG/DL (ref 0.51–0.95)
CRP SERPL-MCNC: 32.8 MG/L
DIASTOLIC BLOOD PRESSURE - MUSE: NORMAL MMHG
EGFRCR SERPLBLD CKD-EPI 2021: 66 ML/MIN/1.73M2
EGFRCR SERPLBLD CKD-EPI 2021: 78 ML/MIN/1.73M2
EGFRCR SERPLBLD CKD-EPI 2021: 86 ML/MIN/1.73M2
ERYTHROCYTE [DISTWIDTH] IN BLOOD BY AUTOMATED COUNT: 16.3 % (ref 10–15)
ERYTHROCYTE [SEDIMENTATION RATE] IN BLOOD BY WESTERGREN METHOD: 15 MM/HR (ref 0–30)
FERRITIN SERPL-MCNC: 632 NG/ML (ref 11–328)
GLUCOSE BLDC GLUCOMTR-MCNC: 106 MG/DL (ref 70–99)
GLUCOSE BLDC GLUCOMTR-MCNC: 108 MG/DL (ref 70–99)
GLUCOSE BLDC GLUCOMTR-MCNC: 123 MG/DL (ref 70–99)
GLUCOSE BLDC GLUCOMTR-MCNC: 124 MG/DL (ref 70–99)
GLUCOSE BLDC GLUCOMTR-MCNC: 125 MG/DL (ref 70–99)
GLUCOSE BLDC GLUCOMTR-MCNC: 125 MG/DL (ref 70–99)
GLUCOSE BLDC GLUCOMTR-MCNC: 129 MG/DL (ref 70–99)
GLUCOSE BLDC GLUCOMTR-MCNC: 132 MG/DL (ref 70–99)
GLUCOSE BLDC GLUCOMTR-MCNC: 143 MG/DL (ref 70–99)
GLUCOSE BLDC GLUCOMTR-MCNC: 145 MG/DL (ref 70–99)
GLUCOSE BLDC GLUCOMTR-MCNC: 159 MG/DL (ref 70–99)
GLUCOSE BLDC GLUCOMTR-MCNC: 162 MG/DL (ref 70–99)
GLUCOSE BLDC GLUCOMTR-MCNC: 165 MG/DL (ref 70–99)
GLUCOSE BLDC GLUCOMTR-MCNC: 177 MG/DL (ref 70–99)
GLUCOSE BLDC GLUCOMTR-MCNC: 193 MG/DL (ref 70–99)
GLUCOSE BLDC GLUCOMTR-MCNC: 70 MG/DL (ref 70–99)
GLUCOSE BLDC GLUCOMTR-MCNC: 83 MG/DL (ref 70–99)
GLUCOSE BLDC GLUCOMTR-MCNC: 92 MG/DL (ref 70–99)
GLUCOSE SERPL-MCNC: 120 MG/DL (ref 70–99)
GLUCOSE SERPL-MCNC: 147 MG/DL (ref 70–99)
GLUCOSE SERPL-MCNC: 166 MG/DL (ref 70–99)
HCO3 SERPL-SCNC: 19 MMOL/L (ref 22–29)
HCO3 SERPL-SCNC: 21 MMOL/L (ref 22–29)
HCO3 SERPL-SCNC: 22 MMOL/L (ref 22–29)
HCT VFR BLD AUTO: 30.5 % (ref 35–47)
HGB BLD-MCNC: 10.2 G/DL (ref 11.7–15.7)
INTERPRETATION ECG - MUSE: NORMAL
IRON BINDING CAPACITY (ROCHE): ABNORMAL
IRON SATN MFR SERPL: ABNORMAL %
IRON SERPL-MCNC: 153 UG/DL (ref 37–145)
LACTATE SERPL-SCNC: 2.6 MMOL/L (ref 0.7–2)
MAGNESIUM SERPL-MCNC: 3.5 MG/DL (ref 1.7–2.3)
MCH RBC QN AUTO: 27.3 PG (ref 26.5–33)
MCHC RBC AUTO-ENTMCNC: 33.4 G/DL (ref 31.5–36.5)
MCV RBC AUTO: 82 FL (ref 78–100)
P AXIS - MUSE: 112 DEGREES
PHOSPHATE SERPL-MCNC: 1.7 MG/DL (ref 2.5–4.5)
PHOSPHATE SERPL-MCNC: 2.6 MG/DL (ref 2.5–4.5)
PHOSPHATE SERPL-MCNC: 2.7 MG/DL (ref 2.5–4.5)
PLATELET # BLD AUTO: 565 10E3/UL (ref 150–450)
POTASSIUM SERPL-SCNC: 3 MMOL/L (ref 3.4–5.3)
POTASSIUM SERPL-SCNC: 3.3 MMOL/L (ref 3.4–5.3)
POTASSIUM SERPL-SCNC: 4 MMOL/L (ref 3.4–5.3)
POTASSIUM SERPL-SCNC: 4.2 MMOL/L (ref 3.4–5.3)
PR INTERVAL - MUSE: 136 MS
QRS DURATION - MUSE: 88 MS
QT - MUSE: 578 MS
QTC - MUSE: 778 MS
R AXIS - MUSE: 36 DEGREES
RBC # BLD AUTO: 3.74 10E6/UL (ref 3.8–5.2)
SODIUM SERPL-SCNC: 135 MMOL/L (ref 135–145)
SODIUM SERPL-SCNC: 136 MMOL/L (ref 135–145)
SODIUM SERPL-SCNC: 138 MMOL/L (ref 135–145)
SYSTOLIC BLOOD PRESSURE - MUSE: NORMAL MMHG
T AXIS - MUSE: 115 DEGREES
UFH PPP CHRO-ACNC: 0.6 IU/ML (ref ?–1.1)
UFH PPP CHRO-ACNC: 0.85 IU/ML (ref ?–1.1)
UFH PPP CHRO-ACNC: >1.1 IU/ML (ref ?–1.1)
VENTRICULAR RATE- MUSE: 109 BPM
WBC # BLD AUTO: 15.1 10E3/UL (ref 4–11)

## 2025-05-26 PROCEDURE — 258N000003 HC RX IP 258 OP 636

## 2025-05-26 PROCEDURE — 82728 ASSAY OF FERRITIN: CPT

## 2025-05-26 PROCEDURE — 71045 X-RAY EXAM CHEST 1 VIEW: CPT | Mod: 26 | Performed by: RADIOLOGY

## 2025-05-26 PROCEDURE — 36415 COLL VENOUS BLD VENIPUNCTURE: CPT

## 2025-05-26 PROCEDURE — 250N000011 HC RX IP 250 OP 636

## 2025-05-26 PROCEDURE — 83605 ASSAY OF LACTIC ACID: CPT

## 2025-05-26 PROCEDURE — 85018 HEMOGLOBIN: CPT

## 2025-05-26 PROCEDURE — 250N000013 HC RX MED GY IP 250 OP 250 PS 637

## 2025-05-26 PROCEDURE — 82962 GLUCOSE BLOOD TEST: CPT

## 2025-05-26 PROCEDURE — 82010 KETONE BODYS QUAN: CPT

## 2025-05-26 PROCEDURE — 76705 ECHO EXAM OF ABDOMEN: CPT | Mod: 26 | Performed by: RADIOLOGY

## 2025-05-26 PROCEDURE — 85520 HEPARIN ASSAY: CPT

## 2025-05-26 PROCEDURE — 82310 ASSAY OF CALCIUM: CPT

## 2025-05-26 PROCEDURE — 86140 C-REACTIVE PROTEIN: CPT

## 2025-05-26 PROCEDURE — 250N000009 HC RX 250

## 2025-05-26 PROCEDURE — 82010 KETONE BODYS QUAN: CPT | Performed by: PHYSICIAN ASSISTANT

## 2025-05-26 PROCEDURE — 82010 KETONE BODYS QUAN: CPT | Performed by: INTERNAL MEDICINE

## 2025-05-26 PROCEDURE — 83550 IRON BINDING TEST: CPT

## 2025-05-26 PROCEDURE — 93976 VASCULAR STUDY: CPT

## 2025-05-26 PROCEDURE — 999N000203 HC STATISTICAL VASC ACCESS NURSE TIME, 16-31 MINUTES

## 2025-05-26 PROCEDURE — 36569 INSJ PICC 5 YR+ W/O IMAGING: CPT

## 2025-05-26 PROCEDURE — 120N000003 HC R&B IMCU UMMC

## 2025-05-26 PROCEDURE — 999N000065 XR CHEST PORT 1 VIEW

## 2025-05-26 PROCEDURE — 83735 ASSAY OF MAGNESIUM: CPT

## 2025-05-26 PROCEDURE — 250N000012 HC RX MED GY IP 250 OP 636 PS 637

## 2025-05-26 PROCEDURE — 85652 RBC SED RATE AUTOMATED: CPT

## 2025-05-26 PROCEDURE — 76705 ECHO EXAM OF ABDOMEN: CPT

## 2025-05-26 PROCEDURE — 92610 EVALUATE SWALLOWING FUNCTION: CPT | Mod: GN

## 2025-05-26 PROCEDURE — 84132 ASSAY OF SERUM POTASSIUM: CPT

## 2025-05-26 PROCEDURE — 99223 1ST HOSP IP/OBS HIGH 75: CPT | Mod: GC

## 2025-05-26 PROCEDURE — 92526 ORAL FUNCTION THERAPY: CPT | Mod: GN

## 2025-05-26 PROCEDURE — 84100 ASSAY OF PHOSPHORUS: CPT

## 2025-05-26 PROCEDURE — 272N000452 HC KIT SHRLOCK 5FR POWER PICC TRIPLE LUMEN

## 2025-05-26 PROCEDURE — 36592 COLLECT BLOOD FROM PICC: CPT

## 2025-05-26 PROCEDURE — 99223 1ST HOSP IP/OBS HIGH 75: CPT | Mod: GC | Performed by: INTERNAL MEDICINE

## 2025-05-26 PROCEDURE — 80048 BASIC METABOLIC PNL TOTAL CA: CPT

## 2025-05-26 PROCEDURE — 250N000011 HC RX IP 250 OP 636: Performed by: INTERNAL MEDICINE

## 2025-05-26 PROCEDURE — 93976 VASCULAR STUDY: CPT | Mod: 26 | Performed by: RADIOLOGY

## 2025-05-26 PROCEDURE — 999N000044 HC STATISTIC CVC DRESSING CHANGE

## 2025-05-26 PROCEDURE — 99233 SBSQ HOSP IP/OBS HIGH 50: CPT | Mod: GC | Performed by: INTERNAL MEDICINE

## 2025-05-26 PROCEDURE — 36592 COLLECT BLOOD FROM PICC: CPT | Performed by: INTERNAL MEDICINE

## 2025-05-26 PROCEDURE — 99221 1ST HOSP IP/OBS SF/LOW 40: CPT | Performed by: STUDENT IN AN ORGANIZED HEALTH CARE EDUCATION/TRAINING PROGRAM

## 2025-05-26 RX ORDER — NICOTINE POLACRILEX 4 MG
15-30 LOZENGE BUCCAL
Status: DISCONTINUED | OUTPATIENT
Start: 2025-05-26 | End: 2025-06-12 | Stop reason: HOSPADM

## 2025-05-26 RX ORDER — POTASSIUM CHLORIDE 20MEQ/15ML
40 LIQUID (ML) ORAL ONCE
Status: DISCONTINUED | OUTPATIENT
Start: 2025-05-26 | End: 2025-05-26

## 2025-05-26 RX ORDER — ACETAMINOPHEN 325 MG/1
325 TABLET ORAL EVERY 4 HOURS PRN
Status: DISCONTINUED | OUTPATIENT
Start: 2025-05-26 | End: 2025-06-12 | Stop reason: HOSPADM

## 2025-05-26 RX ORDER — POTASSIUM CHLORIDE 7.45 MG/ML
10 INJECTION INTRAVENOUS
Status: COMPLETED | OUTPATIENT
Start: 2025-05-26 | End: 2025-05-26

## 2025-05-26 RX ORDER — LIDOCAINE 40 MG/G
CREAM TOPICAL
Status: ACTIVE | OUTPATIENT
Start: 2025-05-26 | End: 2025-05-29

## 2025-05-26 RX ORDER — POTASSIUM CHLORIDE 750 MG/1
40 TABLET, EXTENDED RELEASE ORAL ONCE
Status: COMPLETED | OUTPATIENT
Start: 2025-05-26 | End: 2025-05-26

## 2025-05-26 RX ORDER — POTASSIUM CHLORIDE 7.45 MG/ML
10 INJECTION INTRAVENOUS
Status: DISCONTINUED | OUTPATIENT
Start: 2025-05-26 | End: 2025-05-26

## 2025-05-26 RX ORDER — DEXTROSE MONOHYDRATE 25 G/50ML
25-50 INJECTION, SOLUTION INTRAVENOUS
Status: DISCONTINUED | OUTPATIENT
Start: 2025-05-26 | End: 2025-06-12 | Stop reason: HOSPADM

## 2025-05-26 RX ORDER — PIPERACILLIN SODIUM, TAZOBACTAM SODIUM 4; .5 G/20ML; G/20ML
4.5 INJECTION, POWDER, LYOPHILIZED, FOR SOLUTION INTRAVENOUS EVERY 6 HOURS
Status: DISCONTINUED | OUTPATIENT
Start: 2025-05-26 | End: 2025-05-27

## 2025-05-26 RX ADMIN — LATANOPROST 1 DROP: 50 SOLUTION/ DROPS OPHTHALMIC at 21:37

## 2025-05-26 RX ADMIN — POTASSIUM CHLORIDE 10 MEQ: 7.46 INJECTION, SOLUTION INTRAVENOUS at 11:01

## 2025-05-26 RX ADMIN — POTASSIUM PHOSPHATE, MONOBASIC POTASSIUM PHOSPHATE, DIBASIC 9 MMOL: 224; 236 INJECTION, SOLUTION, CONCENTRATE INTRAVENOUS at 18:05

## 2025-05-26 RX ADMIN — PIPERACILLIN AND TAZOBACTAM 4.5 G: 4; .5 INJECTION, POWDER, LYOPHILIZED, FOR SOLUTION INTRAVENOUS at 15:17

## 2025-05-26 RX ADMIN — Medication 400 MCG: at 08:41

## 2025-05-26 RX ADMIN — HYDROXYUREA 500 MG: 500 CAPSULE ORAL at 08:41

## 2025-05-26 RX ADMIN — HEPARIN SODIUM 800 UNITS/HR: 10000 INJECTION, SOLUTION INTRAVENOUS at 16:19

## 2025-05-26 RX ADMIN — Medication 25 MG: at 08:41

## 2025-05-26 RX ADMIN — POTASSIUM CHLORIDE 10 MEQ: 7.46 INJECTION, SOLUTION INTRAVENOUS at 03:42

## 2025-05-26 RX ADMIN — POTASSIUM CHLORIDE 10 MEQ: 7.46 INJECTION, SOLUTION INTRAVENOUS at 13:06

## 2025-05-26 RX ADMIN — DEXTROSE, SODIUM CHLORIDE, AND POTASSIUM CHLORIDE: 5; .45; .15 INJECTION INTRAVENOUS at 09:02

## 2025-05-26 RX ADMIN — SODIUM PHOSPHATE, MONOBASIC, MONOHYDRATE AND SODIUM PHOSPHATE, DIBASIC ANHYDROUS 15 MMOL: 142; 276 INJECTION, SOLUTION INTRAVENOUS at 03:51

## 2025-05-26 RX ADMIN — POTASSIUM CHLORIDE 10 MEQ: 7.46 INJECTION, SOLUTION INTRAVENOUS at 08:44

## 2025-05-26 RX ADMIN — POTASSIUM CHLORIDE 10 MEQ: 7.46 INJECTION, SOLUTION INTRAVENOUS at 04:59

## 2025-05-26 RX ADMIN — SODIUM PHOSPHATE, MONOBASIC, MONOHYDRATE AND SODIUM PHOSPHATE, DIBASIC ANHYDROUS 9 MMOL: 142; 276 INJECTION, SOLUTION INTRAVENOUS at 11:02

## 2025-05-26 RX ADMIN — PIPERACILLIN AND TAZOBACTAM 4.5 G: 4; .5 INJECTION, POWDER, LYOPHILIZED, FOR SOLUTION INTRAVENOUS at 21:37

## 2025-05-26 RX ADMIN — ASPIRIN 81 MG: 81 TABLET ORAL at 08:41

## 2025-05-26 RX ADMIN — POTASSIUM CHLORIDE 10 MEQ: 7.46 INJECTION, SOLUTION INTRAVENOUS at 12:06

## 2025-05-26 RX ADMIN — POTASSIUM CHLORIDE 40 MEQ: 750 TABLET, EXTENDED RELEASE ORAL at 12:09

## 2025-05-26 RX ADMIN — INSULIN GLARGINE 12 UNITS: 100 INJECTION, SOLUTION SUBCUTANEOUS at 12:09

## 2025-05-26 RX ADMIN — POTASSIUM CHLORIDE 10 MEQ: 7.46 INJECTION, SOLUTION INTRAVENOUS at 09:57

## 2025-05-26 RX ADMIN — LIDOCAINE HYDROCHLORIDE ANHYDROUS 3.5 ML: 10 INJECTION, SOLUTION INFILTRATION at 15:10

## 2025-05-26 RX ADMIN — PIPERACILLIN AND TAZOBACTAM 3.38 G: 3; .375 INJECTION, POWDER, LYOPHILIZED, FOR SOLUTION INTRAVENOUS at 03:10

## 2025-05-26 RX ADMIN — PIPERACILLIN AND TAZOBACTAM 4.5 G: 4; .5 INJECTION, POWDER, LYOPHILIZED, FOR SOLUTION INTRAVENOUS at 08:43

## 2025-05-26 RX ADMIN — ATORVASTATIN CALCIUM 40 MG: 40 TABLET, FILM COATED ORAL at 08:41

## 2025-05-26 RX ADMIN — THIAMINE HCL TAB 100 MG 100 MG: 100 TAB at 08:41

## 2025-05-26 ASSESSMENT — ACTIVITIES OF DAILY LIVING (ADL)
ADLS_ACUITY_SCORE: 51
ADLS_ACUITY_SCORE: 62
ADLS_ACUITY_SCORE: 51
ADLS_ACUITY_SCORE: 49
ADLS_ACUITY_SCORE: 62
ADLS_ACUITY_SCORE: 49
ADLS_ACUITY_SCORE: 51

## 2025-05-26 NOTE — CODE/RAPID RESPONSE
05/25/25 2100   Call Information   Date of Call 05/25/25   Time of Call 2107   Name of person requesting the team Brandi SMILEY   Title of person requesting team RN   RRT Arrival time 2112   Time RRT ended 2135   Reason for call   Type of RRT Adult   Primary reason for call Sepsis suspected   Sepsis Suspected Elevated Lactate level   Was patient transferred from the ED, ICU, or PACU within last 24 hours prior to RRT call? Yes   SBAR   Situation Lactic Acid 5.0   Background Per provider note: History of  insulin-dependent DM2, HTN, fatty liver, possible alcohol use disorder, gait instability who presented 5/25 with generalized weakness and was admitted with DKA and sepsis, 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   Notable History/Conditions Diabetes;Hypertension   Assessment A&O, breathing comfortably on room air. No complaints of new pain. VSS. . Voiding   Interventions Blood glucose;Fluid bolus;Meds;Labs   Patient Outcome   Patient Outcome Stabilized on unit   RRT Team   Attending/Primary/Covering Physician Adan Panda 2   Date Attending Physician notified 05/25/25   Physician(s) Holly Galicia PA-C   Lead RN Kai SMILEY   Post RRT Intervention Assessment   Post RRT Assessment Stable/Improved   Date Follow Up Done 05/26/25   Time Follow Up Done 0040   Comments Lactic Acid recheck 2.5, VSS

## 2025-05-26 NOTE — PROCEDURES
[facility]    Triple Lumen PICC Placement    Date/Time: 5/26/2025 3:23 PM    Performed by: Christine Ugalde RN  Authorized by: Johnny Boyd MD  Indications: vascular access      UNIVERSAL PROTOCOL   Site Marked: Yes  Prior Images Obtained and Reviewed:  Yes  Required items: Required blood products, implants, devices and special equipment available    Patient identity confirmed:  Verbally with patient, arm band, provided demographic data and hospital-assigned identification number  Patient was reevaluated immediately before administering moderate or deep sedation or anesthesia  Confirmation Checklist:  Patient's identity using two indicators, relevant allergies, procedure was appropriate and matched the consent or emergent situation and correct equipment/implants were available  Time out: Immediately prior to the procedure a time out was called    Universal Protocol: the Joint Commission Universal Protocol was followed    Preparation: Patient was prepped and draped in usual sterile fashion       ANESTHESIA    Anesthesia:  See MAR for details  Local Anesthetic:  Lidocaine 1% without epinephrine  Anesthetic Total (mL):  3.5      SEDATION    Patient Sedated: No        Preparation: skin prepped with ChloraPrep  Skin prep agent: skin prep agent completely dried prior to procedure  Sterile barriers: maximum sterile barriers were used: cap, mask, sterile gown, sterile gloves, and large sterile sheet  Hand hygiene: hand hygiene performed prior to central venous catheter insertion  Type of line used: PICC  Catheter type: triple lumen  Lumen type: non-valved and power PICC  Lumen Identification: Red, Gray and White  Catheter size: 5 Fr  Brand: Bard  Lot number: [id number]  Placement method: venipuncture, MST, ultrasound and tip navigation system  Number of attempts: 1  Difficulty threading catheter: no  Successful placement: yes  Orientation: right  Catheter to Vein (%):  28  Location: basilic vein (0.64 cm vein diameter.)  Tip Location: SVC/RA Junction  Site rationale: CVR  Arm circumference: adults 10 cm  Extremity circumference: 29  Visible catheter length: 4  Total catheter length: 39  Dressing and securement: adhesive securement device, dressing applied, chlorhexidine disc applied, blood cleaned with CHG, alcohol impregnated caps, gloves changed prior to final dressing, statlock, sterile dressing applied, site cleansed and transparent dressing  Post procedure assessment: blood return through all ports, placement verified by 3CG technology and free fluid flow  PROCEDURE Describe Procedure: PICC was verified, ready to use.  Disposal: sharps and needle count correct at the end of procedure, needles and guidewire disposed in sharps container  Patient Tolerance:  Patient tolerated the procedure well with no immediate complications         Media Information      Document Information    Other: Photograph   PICC 3Cg tip confirmation   05/26/2025 3:16 PM   Attached To:   Hospital Encounter on 5/25/25   Source Information    Christine Ugalde, RN  [facility]   Document History

## 2025-05-26 NOTE — PROGRESS NOTES
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 Clinical swallow eval completed per MD order.   General Observations Upon SLP arrival, pt positioned upright in bed, fatigued, but willing to participate.   Type of Evaluation   Type of Evaluation Swallow Evaluation   Oral Motor   Oral Musculature generally intact   Structural Abnormalities none present   Mucosal Quality dry   Dentition (Oral Motor)   Dentition (Oral Motor) adequate dentition   Facial Symmetry (Oral Motor)   Facial Symmetry (Oral Motor) WNL   Lip Function (Oral Motor)   Lip Range of Motion (Oral Motor) WNL   Tongue Function (Oral Motor)   Tongue ROM (Oral Motor) WNL   Jaw Function (Oral Motor)   Jaw Function (Oral Motor) WNL   Cough/Swallow/Gag Reflex (Oral Motor)   Volitional Throat Clear/Cough (Oral Motor) reduced strength   Vocal Quality/Secretion Management (Oral Motor)   Vocal Quality (Oral Motor) WFL   Secretion Management (Oral Motor) WNL   General Swallowing Observations   Current Diet/Method of Nutritional Intake (General Swallowing Observations, NIS) NPO   Respiratory Support room air   Past History of Dysphagia No hx of dysphagia.   Swallowing Evaluation Clinical swallow evaluation   Clinical Swallow Evaluation   Feeding Assistance other (see comments)   Clinical Swallow Evaluation Textures Trialed thin liquids;pureed;soft & bite-sized   Clinical Swallow Eval: Thin Liquid Texture Trial   Mode of Presentation, Thin  Liquids cup;straw;self-fed;fed by clinician   Volume of Liquid or Food Presented 2 oz   Oral Phase of Swallow WFL   Pharyngeal Phase of Swallow intact   Diagnostic Statement No overt s/sx of aspiration   Clinical Swallow Evaluation: Puree Solid Texture Trial   Mode of Presentation, Puree spoon;fed by clinician   Volume of Puree Presented 4 tbsp   Oral Phase, Puree WFL   Pharyngeal Phase, Puree intact   Diagnostic Statement No overt s/sx of aspiration   Clinical Swallow Eval: Soft & Bite Sized   Mode of Presentation spoon;fed by clinician   Volume Presented 2 tbsp   Oral Phase   (Prolonged mastication)   Pharyngeal Phase intact   Diagnostic Statement No overt s/sx of aspiration   Esophageal Phase of Swallow   Patient reports or presents with symptoms of esophageal dysphagia No   Swallowing Recommendations   Diet Consistency Recommendations full liquid diet   Supervision Level for Intake close supervision needed   Mode of Delivery Recommendations bolus size, small;slow rate of intake   Swallowing Maneuver Recommendations alternate food and liquid intake   Monitoring/Assistance Required (Eating/Swallowing) stop eating activities when fatigue is present;monitor for cough or change in vocal quality with intake   Medication Administration Recommendations, Swallowing (SLP) As tolerated   Instrumental Assessment Recommendations instrumental evaluation not recommended at this time   General Therapy Interventions   Planned Therapy Interventions Dysphagia Treatment   Dysphagia treatment Oropharyngeal exercise training;Modified diet education;Instruction of safe swallow strategies;Compensatory strategies for swallowing   Clinical Impression   Criteria for Skilled Therapeutic Interventions Met (SLP Eval) Yes, treatment indicated   SLP Diagnosis elevated risk for aspiration 2/2 poor mentation   Risks & Benefits of therapy have been explained evaluation/treatment results reviewed;care plan/treatment goals reviewed;risks/benefits  reviewed;current/potential barriers reviewed;participants voiced agreement with care plan;patient;participants included   Clinical Impression Comments Clinical swallow eval completed per MD order. Pt presents at an elevated risk for aspiration 2/2 poor mentation and fatigue. Oral mech exam remarkable for reduced cough strength and dry oral mucosa. Pt assessed w/ thin liquid, puree, and semisolids. Oral phase c/b prolonged mastication. Pharyngeal phase unremarkable, no overt s/sx of aspiration. Recommend full liquid diet (thin consistency) w/ close supervision. Meds OK crushed in puree. Ensure pt is fully upright and alert, taking small sips/nites at a slow rate. SLP to follow.   SLP Total Evaluation Time   Eval: oral/pharyngeal swallow function, clinical swallow Minutes (71205) 12   SLP Discharge Planning   SLP Plan Diet tolerance/upgrade   SLP Discharge Recommendation Transitional Care Facility   SLP Rationale for DC Rec dysphagia   SLP Brief overview of current status  Recommend full liquid diet (thin consistency) w/ close supervision. Meds OK crushed in puree. Ensure pt is fully upright and alert, taking small sips/nites at a slow rate. SLP to follow.   SLP Time and Intention   Total Session Time (sum of timed and untimed services) 16

## 2025-05-26 NOTE — PLAN OF CARE
Goal Outcome Evaluation:      Plan of Care Reviewed With: patient    Overall Patient Progress: decliningOverall Patient Progress: declining    Neuro: A&Ox1. Forgetful. Hallucinations - White and purple bugs in room. Baseline neuropathy in feet  Cardiac: SR. VSS. +3 LE edema  Respiratory: Sating upper 90's on RA.  GI/: Pt did not void all day - bladder scan for 480 and straight cath for 550 - q6 scans if no output  Diet/appetite: Tolerating full liquid diet. No intake. Must be completely alert before intake.   Activity:  Pt not OOB. Q2 turns   Pain: Pt denies pain during shift  Skin: Sacral redness/rawness noted - Mepi applied  LDA's: RPICC x3 - Infusing D5 1/2NS K @ 75ml/hr, TKO for antibx, SL for labs. RPIV - SL, LPIV upper - infusing TKO and insulin gtt, LPIV lower infusing Heparin @ 800 units/hr    Shift events: Pt increasingly confused this afternoon - CIWA without meds and monitor. Phos replaced x2. K replaced.     Plan: Continue with POC. Notify primary team with changes.    /84 (Cuff Size: Adult Regular)   Pulse 92   Temp 97.7  F (36.5  C) (Oral)   Resp 16   Wt 64.4 kg (142 lb)   SpO2 98%   BMI 24.37 kg/m

## 2025-05-26 NOTE — PROGRESS NOTES
"Resident/Fellow Attestation   I, July Bojorquez MD, was present with the medical/MIKE student who participated in the service and in the documentation of the note.  I have verified the history and personally performed the physical exam and medical decision making.  I agree with the assessment and plan of care as documented in the note.      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     Today's updates:  - Continue insulin gtt, K replacement, IVF, and q4h labs until ketones negative and gap closed  - PICC given need for many IVs and frequent/difficult blood draws; no growth on blood cultures  - RUQ US w/ Doppler per heme + IR  - Continue Heparin gtt, hold ASA per heme  - APLS panel  - SLP consult for dysphagia; continue NPO for now  - Brother Cruz called for update    Diabetic ketoacidosis  Pseudohyponatremia  Hypokalemia  T2DM with A1c 8.0%. Suspect triggered by sepsis. Ongoing hypokalemia.  - DKA protocol   - Insulin gtt   - mIVF   - Aggressive K replacement   - Q4H labs  - Hold PTA metformin, glipizide  - Endocrinology consult  - Diabetes ed consult    Hepatic vein thrombus  No prior imaging to compare. Per chart review, history of \"thromboembolism\" after ovarian cyst removal age 22. Liver panel normal, no significant RUQ pain  - IR consulted; no indication for lytics  - Heme consulted   - Rec RUQ US w/ Doppler for further workup of R. Hepatic vein thrombosis found on CT C/A/P 5/25  - Heparin gtt    Sepsis possibly secondary to urinary " source  Lactic acidosis  S/p 3L IVF as well as mIVF for DKA protocol  - Diagnostics:  - UA 5/25 + protein, leukocyte esterase, WBC, bacteria; consistent with urinary source of infection  - Urine cx pending  - Blood cultures negative   - MRSA nares negative   - Urine legionella/strep pneumo negative   - CT C/A/P showed no signs of pneumonia or lung pathology, thrombosis of R. Hepatic vein, and slightly enlarging cystic lesion of the hepatic segment  - Abx:   - Zosyn (5/25-*)   - Vancomycin (5/25-5/26)    Toxic metabolic encephalopathy  Not oriented to month or year. Likely due to sepsis, DKA as above.    Possible alcohol use disorder?  Reports 2 drinks/day. Did have an ED visit 03/2025 for alcohol intoxication. Reports last drink 5 days PTA, low concern for withdrawal. Her brother, Cruz, has concerns that she is drinking excessively and encourages addiction medicine involvement.  - Consider addiction medicine consult when encephalopathy improves  - PTA folate  - Start thiamine    Dysphagia  Concern for aspiration from bedside RN  - SLP consult    Fall at home  CTH, C spine negative for acute process/fracture    Elevated INR  Unclear etiology, no known liver disease. Will hold off on Vit K given new thrombus.    Lower extremity edema  Reports to be at baseline. Symmetric, low concern for DVT. No recent echos. On room air, no need for TTE currently.  - Hold PTA lasix 20mg daily given sepsis, lactic acidosis    Elevated troponin  Likely type 2 NSTEMI in setting of DKA/sepsis. Troponin 16->15. No chest pain. Initial EKG reading STEMI, repeat without evidence of ACS per ED provider report.    Elevated lipase  2x ULN but no abdominal pain, no pancreatitis on CT.    Generalized weakness, failure to thrive  TSH wnl.   - PT/OT consults  - Ongoing evaluation for safe discharge plan    HTN  Hold PTA metoprolol, lisinopril given sepsis and normotension    Essential thrombocytosis: PTA aspirin,  hydroxyurea    Chronic/stable:    Glaucoma: PTA latanoprost  CKD2  Diabetic neuropathy  Schatzki's ring  Slow transit constipation  Mixed urge and stress incontinence  Gait instability         Diet: NPO for Medical/Clinical Reasons Except for: Meds, Ice Chips    DVT Prophylaxis: Heparin SQ  Silver Catheter: Not present  Fluids: IV  Lines: None     Cardiac Monitoring: ACTIVE order. Indication: DKA  Code Status: Full Code      Clinically Significant Risk Factors Present on Admission        # Hypokalemia: Lowest K = 2.5 mmol/L in last 2 days, will replace as needed  # Hyponatremia: Lowest Na = 132 mmol/L in last 2 days, will monitor as appropriate  # Hypochloremia: Lowest Cl = 84 mmol/L in last 2 days, will monitor as appropriate  # Hypocalcemia: Lowest Ca = 7.7 mg/dL in last 2 days, will monitor and replace as appropriate    # Anion Gap Metabolic Acidosis: Highest Anion Gap = 30 mmol/L in last 2 days, will monitor and treat as appropriate   # Coagulation Defect: INR = 1.43 (Ref range: 0.85 - 1.15) and/or PTT = N/A, will monitor for bleeding  # Drug Induced Platelet Defect: home medication list includes an antiplatelet medication   # Hypertension: Noted on problem list      # Anemia: based on hgb <11      # DMII: A1C = 8.0 % (Ref range: <5.7 %) within past 6 months               Disposition Plan     Medically Ready for Discharge: Anticipated in 2-4 Days       The patient's care was discussed with the Attending Physician, Dr. Fernanda Yuan.    Stefanie Kim  Medical Student  Medicine Service, [address]  Securely message with [email or web address] (more info)  Text page via [facility] Paging/Directory   See signed in provider for up to date coverage information  ______________________________________________________________________    Interval History   Sarah was quite confused during pre-rounds, only A&Ox1. When the team returned to round, she was a bit less confused, A&Ox2. She  reports getting dizzy while laying down, especially when people are talking to her or multiple people are talking at once. Otherwise, there were no acute overnight events.     Physical Exam   Vital Signs: Temp: 97.6  F (36.4  C) Temp src: Oral BP: 105/68 Pulse: 80   Resp: 16 SpO2: 100 % O2 Device: None (Room air)    Weight: 142 lbs 0 oz    Constitutional: awake, cooperative, and no apparent distress  Respiratory: No increased work of breathing, good air exchange, clear to auscultation bilaterally, no crackles or wheezing  Cardiovascular: Normal apical impulse, regular rate and rhythm, normal S1 and S2, no S3 or S4, and no murmur noted  GI: normal bowel sounds, non-distended, non-tender, and no masses palpated  Genitounirinary: No suprapubic tenderness noted  Musculoskeletal: Bilateral 3+ pitting edema in lower extremities up to calf. Feet are pale.  Neurologic: Awake and oriented to person and place only.     Medical Decision Making       Please see A&P for additional details of medical decision making.      Data     I have personally reviewed the following data over the past 24 hrs:    15.1 (H)  \   10.2 (L)   / 565 (H)     138 101 34.3 (H) /  145 (H)   3.3 (L) 21 (L) 0.78 \     ALT: 32 AST: 31 AP: 96 TBILI: 0.8   ALB: 3.9 TOT PROTEIN: 6.9 LIPASE: 147 (H)     Trop: 15 (H) BNP: 511     TSH: 1.36 T4: N/A A1C: 8.0 (H)     Procal: N/A CRP: N/A Lactic Acid: 2.6 (H)       INR:  1.43 (H) PTT:  N/A   D-dimer:  N/A Fibrinogen:  N/A       Imaging results reviewed over the past 24 hrs:   Recent Results (from the past 24 hours)   Head CT w/o contrast    Narrative    EXAM: CT HEAD W/O CONTRAST  5/25/2025 4:30 PM     HISTORY: generalized weakness, fall, anticoagulated, ? ICH or other  abnormality       COMPARISON: 3/30/2025    TECHNIQUE: Using multidetector thin collimation helical acquisition  technique, axial, coronal and sagittal CT images from the skull base  to the vertex were obtained without intravenous contrast.    (topogram) image(s) also obtained and reviewed.    FINDINGS:  No acute intracranial hemorrhage, mass effect, or midline shift. No  acute loss of gray-white matter differentiation in the cerebral  hemispheres. Ventricles are proportionate to the cerebral sulci. Clear  basal cisterns. Moderate diffuse parenchymal volume loss and  leukoaraiosis.    No suspicious osseous lesions. Hyperostosis frontalis interna. The  left mastoid air cells are largely opacified, unchanged. The  visualized portions of the paranasal sinuses and right mastoid air  cells are clear. Grossly normal orbits.       Impression    IMPRESSION:   1. No CT evidence of acute intracranial pathology.  2. Moderate diffuse parenchymal volume loss and leukoaraiosis.    I have personally reviewed the examination and initial interpretation  and I agree with the findings.    MATT BROWN MD         SYSTEM ID:  [id number]   CT Cervical Spine w/o Contrast    Narrative    EXAM: CT CERVICAL SPINE W/O CONTRAST  5/25/2025 4:30 PM     HISTORY: fall, ? injury       COMPARISON: None    TECHNIQUE: Using multidetector thin collimation helical acquisition  technique, axial, coronal and sagittal CT images through the cervical  spine were obtained without intravenous contrast.    FINDINGS:  Normal vertebral body alignment. No acute fracture or traumatic  subluxation. Mild loss of intervertebral disc height. No prevertebral  edema.    Degenerative changes of the cervical spine with facet arthropathy and  uncovertebral osteophytes with moderate right and mild-to-moderate  left neural foraminal stenosis at C6-7. No significant spinal canal  stenosis at any level.     No abnormality of the paraspinous soft tissues. Status post left  hemithyroidectomy.      Impression    IMPRESSION:  1. No acute fracture or traumatic subluxation.  2. Degenerative changes, greatest at C6-7 with moderate right and  mild-to-moderate left neural foraminal stenosis. No high-grade spinal  canal  stenosis at any level.    I have personally reviewed the examination and initial interpretation  and I agree with the findings.    MATT BROWN MD         SYSTEM ID:  [id number]   CT Chest/Abdomen/Pelvis w Contrast   Result Value    Radiologist flags Right hepatic venous thrombosis (Urgent)    Narrative    EXAMINATION: CT CHEST/ABDOMEN/PELVIS W CONTRAST, 5/25/2025 4:30 PM    TECHNIQUE:  Helical CT images from the thoracic inlet through the  symphysis pubis were obtained  with contrast. Contrast dose: Isovue  370    COMPARISON: 2/28/2023    HISTORY: Sepsis, generalized weakness, fall, ? source of infection,  injury, et al.    FINDINGS:    Chest: Status post left hemithyroidectomy. No lower cervical or  axillary lymphadenopathy. Normal heart size. No pericardial effusion.  Unremarkable major thoracic vessels. No mediastinal lymphadenopathy.  Small hiatal hernia, otherwise unremarkable esophagus.    No pneumothorax or pleural effusion. No acute pulmonary opacities. No  suspicious pulmonary nodules or masses.    Abdomen and pelvis: Hepatic steatosis. Cystic lesion of the hepatic  segment 2 measuring 7.3 x 8.9 cm, slightly increased in size from  prior 2/28/2023 when it measured 6.8 x 8.1 cm (5/295) with associated  mild wall thickening, areas of peripheral and septal calcifications  without large enhancing mass component. Additional small cystic lesion  of the left hepatic lobe is similar to prior. Fatty infiltration along  the falciform ligament. Partially occlusive thrombus of the right  hepatic vein. No portal venous thrombosis. Cholelithiasis/sludge  without acute cholecystitis. Unremarkable spleen and adrenal glands.  Small cystic lesion in the pancreatic body/tail measuring up to 11 mm  (5/273). Areas of fatty interdigitation in the pancreas. 2 small to  characterize low-density lesions in the kidneys likely cysts. There is  an intermediate density (72 Hounsfield units) partially exophytic 2.9  cm lesion in the  "posterior mid left kidney, similar to prior study,  most likely hemorrhagic/proteinaceous cyst. No hydronephrosis. Colonic  diverticulosis without evidence of acute diverticulitis. No free air  or fluid in the abdomen. Mildly distended urinary bladder. Fibroid  uterus noted with a 1.5 cm enhancing focus in the right fundus.  Unremarkable adnexa. No distinct lymphadenopathy.    Bones and soft tissues: Fat-containing bilateral inguinal hernias.  Bowel at the superior opening on the left, decreased from prior study  where this enters the inguinal canal. No acute or suspicious osseous  abnormalities.      Impression    IMPRESSION:   1. Thrombosis of the right hepatic vein.  2. Slightly enlarging cystic lesion in the hepatic segment 2 of most  likely benign etiology. Additional cyst in the liver stable.  3. 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 Follow up as per guidelines below.  4. Large hemorrhagic/proteinaceous cyst in the left kidney is stable  in size.  5. Left inguinal hernia and no longer contains colon.  6. Other chronic ancillary findings as discussed above.    International evidence-based [place] guidelines for the management of  intraductal papillary mucinous neoplasm of the pancreas:   Surveillance is recommended if no high risk stigmata or worrisome  features are present:  Primary imaging modalities recommended are MRI/MRCP and MDCT  Size of largest cyst:   *  Less than 20 mm: Follow-up imaging in 6 months once, then every 18  months if no change. Stop surveillance if stable for 5 years.  *  More than 20 mm but less than 30 mm: Follow-up imaging at 6 months,  12 months, then yearly if no change.   *  More than 30 mm- follow up imaging every 6 months.    GI consultation for surgery/endoscopic ultrasound is recommended for  cysts with \"high-risk stigmata\" of high grade dysplasia or invasive  carcinoma such as:  1. Obstructive jaundice in a patient with cystic lesion of the head " of  the pancreas  2. Enhancing mural nodule > 5mm or solid component  3. Main pancreatic duct >10mm  4. Suspicious or positive results of cytology    If worrisome features are present, GI consultation is recommended.  Worrisome features on imagin. Cyst more than or equal to 30 mm  2. Thickened or enhancing cyst walls  3. Main pancreatic duct > 5mm and < 10mm.  4. Abrupt change in caliber of pancreatic duct with distal atrophy  5. Lymphadenopathy  6. Cyst growth rate > 2.5mm/year    *Reference: International evidence-based [place] guidelines for the  management of  intraductal papillary mucinous neoplasm of the pancreas Pancreatology:  24:(); 255-270.  https://doi.org/10.1016/j.barnes.2023.12.009    [Urgent Result: Right hepatic venous thrombosis]    Finding was identified on 2025 4:38 PM.     Dr. Burnham was contacted by Dr. Aby Mcfadden at 2025 4:44 PM  and verbalized understanding of the urgent finding.     I have personally reviewed the examination and initial interpretation  and I agree with the findings.    FELICIANO SAUCEDO MD         SYSTEM ID:  [id number]

## 2025-05-26 NOTE — ED NOTES
Pt came in with DKA and sepsis. BG 400s on arrival. Initial K+ 2.9. RN previous to this writer started insulin drip @1546, despite K+ <3.3. 0.45% sodium chloride KCl 20 mEq/L infusion not started with insulin drip. This was caught by this writer at 1930 shift change. dextrose 5% and 0.45% NaCl KCl 20 mEq/L infusion was started @1950 when BG was rechecked and was found to be below 200. Repeat K+ resulted at 2.5. MD called to pause insulin drip. IV and oral K+ was ordered to correct. Insulin drip restarted @2226 after K+ came back at 3.4 by this writer.

## 2025-05-26 NOTE — CONSULTS
Hematology Consult Note   Date of Service: 05/26/2025    Patient: Sarah Felix  MRN: [id number]  Admission Date: 5/25/2025  Hospital Day # Hospital Day: 2  Primary Outpatient Hematologist: Dr. 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  She required she reportedly has a provoked DVT in the setting of ovarian surgery 2003 treated with anticoagulation     Currently presents with feelings of weakness and found to have new right hepatic vein thrombus. LFT's on presentation are within normal limits, low suspicion for Budd Chiari at this time. With this new presentation of SVT, she will require systemic anticoagulation so we will send testing for APLS as this would affect management in terms of choice of AC.     MCV is wnl, counts prior to this still elevated, query medications non adherence. Just on 500 mg hydrea, has room to go up.       Recommendations:   - APLS panel ordered for you  - US of liver with doppler   - Cont heparin gtt  - continue hydrea 500 mg daily  - Hold PTA aspirin     Patient was seen and plan of care was discussed with attending physician Dr. Dr. Chloe Becker.    We will continue to follow this patient. Please don't hesitate to contact the Fellow On-Call with questions.      Lul Caldwell M.D (Abdul)  PGY 4 Fellow  Hematology, Oncology and Transplant.   [facility].        History of Present Illness:    Sarah Felix is a 79 year old female currently presents to the emergency department due to not feeling well and generalized weakness.  Patient has been having the symptoms for the past 2 days and symptoms that are gradually but began to worsen on days where  she came to the ED.    In the ED, her labs notable for a BMP with sodium of 133, potassium of 2.9, bicarb of 19, lactate of 3.5 and elevated ketones of 8.  CBC was only notable for mild leukocytosis of 15.4 and a platelet count of 812.  She had imaging done of the CT chest abdomen and pelvis thrombosis in right hepatic vein, enlarging cystic lesion of hepatic segment 2 likely benign etiology, 11 mm cystic lesion of the pancreas most likely sidebranch of IPMN.    She was admitted and started on DKA protocol and heparin drip.  Hematology consulted for hepatic vein thrombosis.    Patient denies fevers, chills, shortness of breath.  She does have abdominal pain is generalized and started about 5 days prior to coming in.  She has itching, no night sweats.  No constipation, no blood in the stool no hematemesis.  She endorses hematuria but ua argues otherwise.       Hematologic History:  Sarah Felix is a 79 year old woman with a history of a surgically provoked DVT many years ago (at 25 years of age after ovarian cystectomy, treated briefly with anticoagulation), DM2, HTN, with a longstanding history of thrombocytosis who most likely has Jak2 positive ET (known MPN) and doing well on HU and ASA. CT abd/pelvis 2023 showed normal sized spleen       5/26/25 - hepatic vein thrombus.     Review of Systems: Pertinent positive and negative systems described in HPI; the remainder of the 14 systems are negative    Past Medical History:  Past Medical History:   Diagnosis Date    Adenomatous polyp of duodenum 08/13/2018    Removed via EUS 8/13/2018 with f/u EGD 3/14/2019.  No further surveillance needed per GI.    Allergic rhinitis, cause unspecified     Allergic rhinitis    Basal cell carcinoma of face 03/2012    s/p MOHS    CKD (chronic kidney disease) stage 2, GFR 60-89 ml/min 06/06/2014    Closed bimalleolar fracture of left ankle with routine healing 10/11/2019    Added automatically from request for surgery [id number]     Contact dermatitis and other eczema, due to unspecified cause     Cyst of thyroid 1998    Thyroid Nodule/Hurthle Cell Neoplasm/Benign - resected    Cystocele, lateral     Diverticulitis of colon (without mention of hemorrhage)(562.11) 06/2004    Abd CT    Esophageal reflux     Hiatal hernia    Essential hypertension, benign (HTN) 06/06/2014    Fatty liver     Hepatic cyst     8.0 cm x 6.6 cm on CT 3/2023 (stable)    Hiatal hernia     small    Hyperlipidemia     Mixed incontinence urge and stress (male)(female) 06/24/2007    Nontoxic uninodular goiter     Osteopenia 04/21/2005    on fosamax  for > 5 yr.  stop 7/2012    Other chronic otitis externa     Postoperative deep vein thrombosis (DVT) (H) age 22    post ovarian cyst removed    Schatzki's ring of distal esophagus 03/05/2023    Dilated 8/2018    Tubular adenoma of colon 06/23/2022    On colonoscopy 2016, rpt 5 yrs.    Type 2 diabetes mellitus (H)        Past Surgical History:  Past Surgical History:   Procedure Laterality Date    APPENDECTOMY  age 22    APPLY EXTERNAL FIXATOR LOWER EXTREMITY Left 10/12/2019    Procedure: Left ankle external fixator placement;  Surgeon: Leeanne Brown MD;  Location: [facility] OR    CATARACT IOL, RT/LT Right 04/17/2018    [facility]    COLONOSCOPY  04/2006    repeat 10 yr    ENDOSCOPIC ULTRASOUND UPPER GASTROINTESTINAL TRACT (GI) N/A 09/06/2018    Procedure: ENDOSCOPIC ULTRASOUND, ESOPHAGOSCOPY / UPPER GASTROINTESTINAL TRACT (GI);  Endoscopic Ultrasound, Esophagogastroduodenoscopy with endoscopic mucosal resection;  Surgeon: Hood Brower MD;  [facility]    ESOPHAGOSCOPY, GASTROSCOPY, DUODENOSCOPY (EGD), COMBINED N/A 08/13/2018    Procedure: COMBINED ESOPHAGOSCOPY, GASTROSCOPY, DUODENOSCOPY (EGD), BIOPSY SINGLE OR MULTIPLE;  EGD;  Surgeon: Hood Brower MD;  [facility]    ESOPHAGOSCOPY, GASTROSCOPY, DUODENOSCOPY (EGD), COMBINED N/A 03/14/2019    Procedure: Upper Endoscopy;  Surgeon: Hood Brower MD;   [facility]    ESOPHAGOSCOPY, GASTROSCOPY, DUODENOSCOPY (EGD), RESECT MUCOSA, COMBINED N/A 2018    Procedure: COMBINED ESOPHAGOSCOPY, GASTROSCOPY, DUODENOSCOPY (EGD), RESECT MUCOSA;;  Surgeon: Hood Brower MD;  [facility]    GYN SURGERY  10/22/2008    pelvic support    HEMORRHOIDECTOMY  2008    MOHS MICROGRAPHIC PROCEDURE      OPEN REDUCTION INTERNAL FIXATION ANKLE Left 10/28/2019    Procedure: Internal fixation left bimalleolar ankle fracture, removal external fixator;  Surgeon: Jose Roberto Can MD;  [facility]    ORIF ANKLE FRACTURE BIMALLEOLAR Left 10/28/2019    Internal fixation left bimalleolar ankle fracture, removal external fixator    OTHER SURGICAL HISTORY Left 10/12/2019    APPLY EXTERNAL FIXATOR LOWER EXTREMITYProcedure: Left ankle external fixator placement; Surgeon: Leeanne Brown MD; Location: [facility]      OVARIAN CYST REMOVAL Left age 22    L ovarian cyst removal, laparotomy      PHACOEMULSIFICATION CLEAR CORNEA WITH STANDARD INTRAOCULAR LENS IMPLANT Left 2020    Procedure: LEFT CATARACT REMOVAL WITH INTRAOCULAR LENS IMPLANT;  Surgeon: Janay Amezcua MD;  [facility]    SURGICAL HISTORY OF -   10/2008    Transobturator tape for Urinary Incontinence    THYROIDECTOMY, PARTIAL  1998       Social History:  Social History     Socioeconomic History    Marital status: Single    Number of children: 0   Occupational History    Occupation: RN     Employer: [employer]     Comment: peds   Tobacco Use    Smoking status: Former     Current packs/day: 0.00     Types: Cigarettes     Quit date: 1980     Years since quittin.4     Passive exposure: Past    Smokeless tobacco: Never    Tobacco comments:     smoked from 70-80; smoked about 1ppd   Vaping Use    Vaping status: Never Used   Substance and Sexual Activity    Alcohol use: Yes     Alcohol/week: 0.0 standard drinks of alcohol     Comment: 6-7 drinks per week    Drug use: No     Sexual activity: Yes     Partners: Male     Comment: postmenopausal   Other Topics Concern    Parent/sibling w/ CABG, MI or angioplasty before 65F 55M? No   Social History Narrative    Dairy/d 2 servings/d. Not much milk mostly cheese    Caffeine 5 servings/d    Exercise active in yard work     Sunscreen used - Yes when in sun    Seatbelts used - Yes    Working smoke/CO detectors in the home - Yes    Guns stored in the home - No    Self Breast Exams - Yes    Self Testicular Exam - NA    Eye Exam up to date - Yes needs to see opthamologist for diabetes    Dental Exam up to date - Yes    Pap Smear up to date -Yes     Mammogram up to date - Yes 3/10    PSA up to date - NA    Dexa Scan up to date - 06/5/07    Flex Sig / Colonoscopy up to date -  04/06 yes RTC 10 yrs in epic    Immunizations up to date - Yes Td 06/04    Abuse: Current or Past(Physical, Sexual or Emotional)- No    Do you feel safe in your environment - Yes    8/4/10    Liya Ashraf RN                         Social Drivers of Health     Financial Resource Strain: Low Risk  (12/19/2023)    Financial Resource Strain     Within the past 12 months, have you or your family members you live with been unable to get utilities (heat, electricity) when it was really needed?: No   Food Insecurity: Low Risk  (12/19/2023)    Food Insecurity     Within the past 12 months, did you worry that your food would run out before you got money to buy more?: No     Within the past 12 months, did the food you bought just not last and you didn t have money to get more?: No   Transportation Needs: High Risk (12/19/2023)    Transportation Needs     Within the past 12 months, has lack of transportation kept you from medical appointments, getting your medicines, non-medical meetings or appointments, work, or from getting things that you need?: Yes   Interpersonal Safety: Low Risk  (12/19/2023)    Interpersonal Safety     Do you feel physically and emotionally safe where you  currently live?: Yes     Within the past 12 months, have you been hit, slapped, kicked or otherwise physically hurt by someone?: No     Within the past 12 months, have you been humiliated or emotionally abused in other ways by your partner or ex-partner?: No   Housing Stability: Low Risk  (12/19/2023)    Housing Stability     Do you have housing? : Yes     Are you worried about losing your housing?: No        Family History  Family History   Problem Relation Age of Onset    Diabetes Mother     Hypertension Mother     Arthritis Mother         rheumatoid    Cancer Father     Cardiovascular Brother         palpitations not on meds.    Glaucoma No family hx of     Macular Degeneration No family hx of     Amblyopia No family hx of     Rheumatoid Arthritis Mother     Other - See Comments Brother         palpitations       Outpatient Medications:  Current Facility-Administered Medications   Medication Dose Route Frequency Provider Last Rate Last Admin    0.45% sodium chloride + KCl 20 mEq/L infusion   Intravenous Continuous July Bojorquez MD        aspirin EC tablet 81 mg  81 mg Oral Daily July Bojorquez MD        atorvastatin (LIPITOR) tablet 40 mg  40 mg Oral Daily July Bojorquez MD        calcium carbonate (TUMS) chewable tablet 1,000 mg  1,000 mg Oral 4x Daily PRN July Bojorquez MD        dextrose 5% and 0.45% NaCl + KCl 20 mEq/L infusion   Intravenous Continuous July Bojorquez MD 75 mL/hr at 05/26/25 0500 Rate Verify at 05/26/25 0500    dextrose 5% and 0.45% NaCl infusion  1,000 mL Intravenous Continuous PRN July Bojorquez MD        dextrose 50 % injection 25-50 mL  25-50 mL Intravenous Q15 Min PRN July Bojorquez MD        glucose gel 15-30 g  15-30 g Oral Q15 Min PRN July Bojorquez MD        Or    dextrose 50 % injection 25-50 mL  25-50 mL Intravenous Q15 Min PRN July Bojorquez MD        Or    glucagon injection 1 mg  1 mg Subcutaneous Q15 Min PRN July Bojorquez MD        folic acid (FOLVITE) tablet 400 mcg  400 mcg  Oral Daily July Bojorquez MD        [Held by provider] furosemide (LASIX) tablet 20 mg  20 mg Oral BID July Bojorquez MD        heparin 25,000 units in 0.45% NaCl 250 mL ANTICOAGULANT infusion  0-5,000 Units/hr Intravenous Continuous July Bojorquez MD 9 mL/hr at 05/26/25 0605 900 Units/hr at 05/26/25 0605    hydroxyurea (HYDREA) capsule 500 mg  500 mg Oral Daily July Bojorquez MD        insulin regular (MYXREDLIN) 1 unit/mL infusion   Intravenous Continuous July Bojorquez MD 0.5 mL/hr at 05/26/25 0615 Rate Verify at 05/26/25 0615    iopamidol (ISOVUE-370) solution 92 mL  92 mL Intravenous Once Awilda Burnham MD        latanoprost (XALATAN) 0.005 % ophthalmic solution 1 drop  1 drop Both Eyes At Bedtime July Bojorquez MD   1 drop at 05/25/25 2152    lidocaine (LMX4) cream   Topical Q1H PRN July Bojorquez MD        lidocaine 1 % 0.1-1 mL  0.1-1 mL Other Q1H PRN July Bojorquez MD        [Held by provider] lisinopril (ZESTRIL) tablet 2.5 mg  2.5 mg Oral Daily July Bojorquez MD        [Held by provider] metoprolol tartrate (LOPRESSOR) half-tab 12.5 mg  12.5 mg Oral BID July Bojorquez MD        Patient is already receiving anticoagulation with heparin, enoxaparin (LOVENOX), warfarin (COUMADIN)  or other anticoagulant medication   Does not apply Continuous PRN July Bojorquez MD        piperacillin-tazobactam (ZOSYN) 3.375 g vial to attach to  mL bag  3.375 g Intravenous Q6H July Bojorquez MD 0 mL/hr at 05/25/25 2220 3.375 g at 05/26/25 0310    potassium chloride 10 mEq in 100 mL sterile water infusion  10 mEq Intravenous Q1H PRN July Bojorquez MD   Stopped at 05/25/25 1810    pyridOXINE (VITAMIN B6) tablet 25 mg  25 mg Oral Daily July Bojorquez MD        senna-docusate (SENOKOT-S/PERICOLACE) 8.6-50 MG per tablet 1 tablet  1 tablet Oral BID PRN July Bojorquez MD        Or    senna-docusate (SENOKOT-S/PERICOLACE) 8.6-50 MG per tablet 2 tablet  2 tablet Oral BID PRN July Bojorquez MD        sodium chloride (PF) 0.9%  PF flush 3 mL  3 mL Intracatheter Q8H [facility] July Bojorquez MD        sodium chloride (PF) 0.9% PF flush 3 mL  3 mL Intracatheter q1 min prn July Bojorquez MD        thiamine (B-1) tablet 100 mg  100 mg Oral Daily July Bojorquez MD   100 mg at 05/25/25 2024    vancomycin (VANCOCIN) 1,000 mg in 200 mL dextrose intermittent infusion  1,000 mg Intravenous Q24H July Bojorquez MD         Current Outpatient Medications   Medication Sig Dispense Refill    aspirin 81 MG EC tablet Take 81 mg by mouth daily      atorvastatin (LIPITOR) 40 MG tablet Take 1 tablet (40 mg) by mouth daily 90 tablet 1    blood glucose (ONETOUCH ULTRA) test strip 1 strip by In Vitro route 4 times daily. 400 strip 3    CALCIUM 500 + D 500-200 MG-IU OR TABS one tab twice per day 100 0    Cholecalciferol (VITAMIN D PO) Pt consuming 3000 units per day      Continuous Glucose Sensor (FREESTYLE CARMELA 14 DAY SENSOR) MISC Change every 14 days. 6 each 3    folic acid (FOLVITE) 400 MCG tablet Take 1 tablet (400 mcg) by mouth daily 100 tablet 3    furosemide (LASIX) 20 MG tablet Take 1 tablet (20 mg) by mouth 2 times daily (Patient taking differently: Take 20 mg by mouth daily.) 180 tablet 1    glimepiride (AMARYL) 2 MG tablet Take 1 tablet (2 mg) by mouth every morning (before breakfast) (Patient taking differently: Take 1 mg by mouth every morning (before breakfast).) 90 tablet 1    hydroxyurea (HYDREA) 500 MG capsule Take 1 capsule (500 mg) by mouth daily 90 capsule 3    insulin aspart (NOVOLOG PEN) 100 UNIT/ML pen 5 units with a high carb meal about once daily as needed. 45 mL 11    insulin pen needle (BD REY U/F) 32G X 4 MM miscellaneous USE 1 PEN NEEDLE four times daily. (WITH LANTUS AND Novolog) 400 each 11    Lancets (ONETOUCH DELICA PLUS [id number]) MISC 1 each 4 times daily 400 each 3    latanoprost (XALATAN) 0.005 % ophthalmic solution Place 1 drop into both eyes at bedtime. 7.5 mL 5    lisinopril (ZESTRIL) 2.5 MG tablet Take 1 tablet (2.5 mg) by mouth  "daily 90 tablet 3    metFORMIN (GLUCOPHAGE XR) 500 MG 24 hr tablet Take 1 tablet (500 mg) by mouth daily (with dinner)      metoprolol tartrate (LOPRESSOR) 25 MG tablet Take 1/2 tablet or 12.5mg by mouth twice daily. 90 tablet 3    potassium chloride ER (MICRO-K) 10 MEQ CR capsule Take 1 capsule (10 mEq) by mouth daily 90 capsule 1    pyridOXINE (VITAMIN B6) 25 MG tablet Take 1 tablet (25 mg) by mouth daily 90 tablet 3    vitamin B-12 (CYANOCOBALAMIN) 2500 MCG sublingual tablet Take 2500 mcg by mouth three times per week      insulin syringe-needle U-100 (BD INSULIN SYRINGE ULTRAFINE) 31G X 5/16\" 1 ML miscellaneous Use 1 syringes twice daily for insulin and victoza and for symptoms of hypoglycemia 100 each 3    nystatin (MYCOSTATIN) 421536 UNIT/GM external cream Apply topically 2 times daily (Patient taking differently: Apply topically daily as needed.) 30 g 3    triamcinolone (KENALOG) 0.1 % external cream Apply topically 3 times daily 90 g 3        Physical Exam:    /67   Pulse 82   Temp 98  F (36.7  C) (Oral)   Resp 16   Wt 64.4 kg (142 lb)   SpO2 99%   BMI 24.37 kg/m    Gen: Well appearing, in NAD  Pulm: normal work of breathing  Abd: Soft, generalized discomfort  Ext: + 2 bl lower extremity edema      Labs & Studies: I personally reviewed the following studies:  ROUTINE LABS (Last four results):  CMP  Recent Labs   Lab 05/26/25  0613 05/26/25  0508 05/26/25  0358 05/26/25  0323 05/26/25  0208 05/25/25  2156 05/25/25  2146 05/25/25  2013 05/25/25  1948 05/25/25  1842 05/25/25  1542 05/25/25  1355 05/25/25  1334   NA  --   --   --   --  135  --  136  --   --  134*  --  132* 133*   POTASSIUM  --   --   --   --  3.0*  --  3.4 3.2*  --  2.5*   < > 2.8* 2.9*   CHLORIDE  --   --   --   --  99  --  97*  --   --  96*  --   --  84*   CO2  --   --   --   --  19*  --  19*  --   --  18*  --   --  19*   ANIONGAP  --   --   --   --  17*  --  20*  --   --  20*  --   --  30*   * 124* 129* 132* 166*   < > 177* "  --    < > 183*   < > 402* 425*   BUN  --   --   --   --  44.7*  --  51.3*  --   --  56.8*  --   --  66.6*   CR  --   --   --   --  0.89  --  0.92  --   --  0.96*  --   --  1.17*   GFRESTIMATED  --   --   --   --  66  --  64  --   --  61  --   --  48*   CECY  --   --   --   --  8.3*  --  8.4*  --   --  8.2*  --   --  9.4   MAG  --   --   --   --   --   --   --   --   --   --   --   --  2.1   PHOS  --   --   --   --  1.7*  --  2.0*  --   --  2.1*  --   --  4.1   PROTTOTAL  --   --   --   --   --   --   --   --   --   --   --   --  6.9   ALBUMIN  --   --   --   --   --   --   --   --   --   --   --   --  3.9   BILITOTAL  --   --   --   --   --   --   --   --   --   --   --   --  0.8   ALKPHOS  --   --   --   --   --   --   --   --   --   --   --   --  96   AST  --   --   --   --   --   --   --   --   --   --   --   --  31   ALT  --   --   --   --   --   --   --   --   --   --   --   --  32    < > = values in this interval not displayed.     CBC  Recent Labs   Lab 05/25/25  1355 05/25/25  1334   WBC  --  15.4*   RBC  --  4.58   HGB 12.6 12.3   HCT 37 37.9   MCV  --  83   MCH  --  26.9   MCHC  --  32.5   RDW  --  16.6*   PLT  --  812*     INR  Recent Labs   Lab 05/25/25  1334   INR 1.43*

## 2025-05-26 NOTE — PROGRESS NOTES
Shift: 1680-5498    /61   Pulse 91   Temp 97.7  F (36.5  C) (Oral)   Resp 18   Wt 64.4 kg (142 lb)   SpO2 100%   BMI 24.37 kg/m      Neuro: A&Ox4.   Cardiac: SR. VSS.    Respiratory: Sating upper 90's RA  GI/: Purewick in place, no BM this shift  Diet/appetite: NPO  Activity: Assist of 2 w/ cares  Pain: Denies  Skin: No new deficits noted.  LDA's: L PIV x2, R PIV x2    Insulin gtt @ 0.5 units/hr  Heparin gtt @ 900 units/hr  D5+1/2NS+KCL @ 75mL/hr    Plan: Continue DKA protocol, currently on algorithm 1. BG checks q2hr. Potassium replaced this shift for K 3.0. Confirmed with provider okay to continue insulin gtt. Phos replaced. Recheck in AM. Next anti Xa level scheduled for 1150. Continue with POC. Notify primary team (JOSE EDUARDO An) with changes.

## 2025-05-26 NOTE — CONSULTS
"INTERVENTIONAL RADIOLOGY CONSULT NOTE    Reason for referral:   \"Presents with sepsis and DKA, found to have hepatic vein thrombus, no known prior hx but does have essential thrombocytosis\"    History:   Sarah Felix is a 79 year old female with history of  insulin-dependent DM2, HTN, fatty liver who was was admitted yesterday with DKA and sepsis. She was incidentally found to have hepatic vein thrombus in her right hepatic vein. There is no clear etiology for the right hepatic vein thrombus. On admission her LFTs were unremarkable. She did have a mildly elevated INR of unclear etiology. IR was consulted to weigh in on the role of thrombectomy or thrombolysis of her hepatic vein thrombus.    Imaging:   CT scan 5/25/25 was reviewed by me. Pertinent findings related to the consult as follows - there is nonocclusive thrombus in the central right hepatic vein which is subsegmental occlusive in segment 5. The remainder of hepatic veins are patent. The portal vein is patent. There is no ascites. Mild hyperemia along gallbladder fossa.    EXAM:  Abdomen: mildly tender in right upper quadrant.    Assessment:   78 yo admitted with sepsis and DKA noted to have idiopathic right hepatic vein thrombus. All the other hepatic veins patent. The central component of the right hepatic vein thrombus in nonocclusive and the clot is only occlusive in subsegmental veins in segment 5. Her LFTs are unremarkable and there is no ascites. IR intervention for hepatic vein thrombus is generally only indicated in the setting of Budd-Chiari. There is no evidence of Budd-Chiari in this patient.  No role for IR intervention.    Plan:   - No role for IR intervention  - Recommend anticoagulation.  - Agree with heme/onc consult.  - Consider gallbladder ultrasound. She was mildly tender in RUQ on exam and there is some subtle hyperemia.        Labs:  CBC RESULTS:   Recent Labs   Lab Test 05/25/25  1355 05/25/25  1334   WBC  --  15.4*   RBC  --  " 4.58   HGB 12.6 12.3   HCT 37 37.9   MCV  --  83   MCH  --  26.9   MCHC  --  32.5   RDW  --  16.6*   PLT  --  812*     INR   Date Value Ref Range Status   05/25/2025 1.43 (H) 0.85 - 1.15 Final   10/12/2019 1.09 0.86 - 1.14 Final      Liver Function Studies -   Recent Labs   Lab Test 05/25/25  1334   PROTTOTAL 6.9   ALBUMIN 3.9   BILITOTAL 0.8   ALKPHOS 96   AST 31   ALT 32         Discussed with [facility].  Chencho Bose M.D.  Interventional Radiology PGY-6  IR pass pager: [phone]

## 2025-05-26 NOTE — CONSULTS
"  Inpatient Diabetes Management Service : New Consult Note     Patient: Sarah Felix   YOB: 1945    MRN: [id number]     Date of Consult : 05/26/2025   Date of Admission: 5/25/2025     Reason for Consult: \"dka\"   Consult Requestor: July Bojorquez MD           History of Present Illness:   Sarah Felix is a 79 year old female with history of  insulin-dependent DM2, HTN, fatty liver, possible alcohol use disorder, gait instability who presented 5/25 with generalized weakness and was admitted with DKA and sepsis, 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 Inpatient Diabetes Service consulted for DKA management.    Obtaining history of present illness was challenging since the patient appeared very weak and tired to talk, and was also giving contradicting information for the same questions. Per admission HPI, for the last few days has been feeling generally weak. No fevers. Has a chronic cough, maybe more productive recently. No chest pain or dyspnea. While she reported no nausea/vomiting to the primary team, she informed us that she has had several episodes of vomiting at home. Has chronic leg swelling which is the same as it always is. Has not been taking her meds or eating very much over the last few days due to not feeling well.    She reported that she was diagnosed with DM 12 years ago (16 years ago per chart review). Reported that she was previously on Lantus, but has only been taking short-acting Novolog 1-2x/day. PTA she was also taking metformin and glimepiride. She has [place] CGM at home and stated that her glucose was \"130 on average, and rarely has <70 or >200\", but that her sensor stopped working 3 days ago and she has been checking with glucometer, and that at home it was ranging 130-140. However, she reported that the last time she gave herself insulin was 6 days ago. She sees a physician in [place] (Charanjit psychologist) who has been prescribing her insulin. She reports that she " "was never diagnosed with HHS/DKA, and that this is the first time that she has had such high blood sugar.    Upon admission, her labs were concerning for DKA and she was started on insulin drip. She met the sepsis criteria and has been started on broad-spectrum antibiotics. She was also found to have hepatic vein thrombosis and IR and hem/onc wee consulted for further management.     History obtained via the patient and chart review.    Patient is not known to the Inpatient Diabetes Service from past admission(s).    Last dose insulin PTA: 6 days ago, Novolog, unsure of the dose   Current inpatient regimen:  Insulin drip with D5W  BG at time of consult: 145  Planned Procedures/Surgeries: None    Relevant Labs on Admission:  if contributory, otherwise see labs below  Renal function: Creatinine (1.17), eGFR (48)    BMP: Na (133), K (2.9) Bicarb (19), Anion Gap (30), Glucose (425)  BhB: 5.97   Lactic Acid: 5   Infectious Disease: COVID (-), Influenza (-), Blood Cultures (-)       Inpatient Glucose Trends:           Diabetes History:   Diabetes Type and Duration: DM2, 7/2009  Zinc transporter 8 antibody, islet antibody, and insulin antibody not available on epic search     GAD65 antibody <5 (9/11/2012)   C peptide 7.6 (9/11/2012)    PTA Medication Regimen: Glimepiride 1 mg daily, Novolog 5 units with high carb meal daily as needed, metformin 500 mg daily  Missing doses?: uncertain  Historical Diabetes Medications: N/A    Glucose monitoring device/frequency/trends: Fortino CGM, reports 130 on average  Hypoglycemia PTA:   - Frequency: \"rarely\"  - Severity:  no history of severe unconscious lows   - Awareness:  intact    - Treatment: \"eats food\"     Outpatient Diabetes Provider: Unknown  Formal Diabetes Education/Educator: Erik    ------------------------  Complications:  + peripheral neuropathy, no retinopathy (last eye exam: 8/6/24), no nephropathy, no gastroparesis    Most recent A1c: 8% 5/25/25    Hemoglobin at time of " most recent A1c: 12.3  RBC transfusion 3 months prior to most recent A1c: none      History of DKA: no      Safety Plan:   - Glucagon: None   - Ketone Strips: None  Medic Alert if Type 1: N/A    Diet/Lifestyle/Living Situation: Unknown    Ability to [place] Prescribed Regimen:  Unknown     Food/Housing Insecurity: Unknown            Medications Impacting Glycemia:    Steroids: None  D5W containing solutions/medications: currently on D5W with 0.45% NaCl at 75 mL/h  Other medications impacting glucose: None         Diet/Nutrition:    Orders Placed This Encounter      NPO for Medical/Clinical Reasons Except for: Meds, Ice Chips     Supplements:  None  TF: None  TPN: None          Review of Systems:    CC: appears very tired, confused at times  Constitutional: no fever and chills, +recent weight loss.    HEENT: no headache, vision changes, hearing changes, sinus congestion, and swollen glands.   Cardiac: no chest pain, palpitations, or racing heart.    Respiratory: no dyspnea on exertion and at rest. no wheezing and cough,  no active sputum production.    GI: no abdominal pain, tenderness and bloating. + nausea and vomiting. no changes in stool pattern, constipation and diarrhea.    : no difficulty urinating, dysuria, and incontinence.    MSK/Integumentary: + swelling/edema. + weakness. no new rashes, wounds, and bruising.    Endocrine: no polyuria, polydipsia           Past Medical History:       Past Medical History:   Diagnosis Date    Adenomatous polyp of duodenum 08/13/2018    Removed via EUS 8/13/2018 with f/u EGD 3/14/2019.  No further surveillance needed per GI.    Allergic rhinitis, cause unspecified     Allergic rhinitis    Basal cell carcinoma of face 03/2012    s/p MOHS    CKD (chronic kidney disease) stage 2, GFR 60-89 ml/min 06/06/2014    Closed bimalleolar fracture of left ankle with routine healing 10/11/2019    Added automatically from request for surgery [id number]    Contact dermatitis and other  eczema, due to unspecified cause     Cyst of thyroid 1998    Thyroid Nodule/Hurthle Cell Neoplasm/Benign - resected    Cystocele, lateral     Diverticulitis of colon (without mention of hemorrhage)(562.11) 06/2004    Abd CT    Esophageal reflux     Hiatal hernia    Essential hypertension, benign (HTN) 06/06/2014    Fatty liver     Hepatic cyst     8.0 cm x 6.6 cm on CT 3/2023 (stable)    Hiatal hernia     small    Hyperlipidemia     Mixed incontinence urge and stress (male)(female) 06/24/2007    Nontoxic uninodular goiter     Osteopenia 04/21/2005    on fosamax  for > 5 yr.  stop 7/2012    Other chronic otitis externa     Postoperative deep vein thrombosis (DVT) (H) age 22    post ovarian cyst removed    Schatzki's ring of distal esophagus 03/05/2023    Dilated 8/2018    Tubular adenoma of colon 06/23/2022    On colonoscopy 2016, rpt 5 yrs.    Type 2 diabetes mellitus (H)              Past Surgical History:      Past Surgical History:   Procedure Laterality Date    APPENDECTOMY  age 22    APPLY EXTERNAL FIXATOR LOWER EXTREMITY Left 10/12/2019    Procedure: Left ankle external fixator placement;  Surgeon: Leeanne Brown MD;  Location: [facility] OR    CATARACT IOL, RT/LT Right 04/17/2018    [facility]    COLONOSCOPY  04/2006    repeat 10 yr    ENDOSCOPIC ULTRASOUND UPPER GASTROINTESTINAL TRACT (GI) N/A 09/06/2018    Procedure: ENDOSCOPIC ULTRASOUND, ESOPHAGOSCOPY / UPPER GASTROINTESTINAL TRACT (GI);  Endoscopic Ultrasound, Esophagogastroduodenoscopy with endoscopic mucosal resection;  Surgeon: Hood Brower MD;  [facility] OR    ESOPHAGOSCOPY, GASTROSCOPY, DUODENOSCOPY (EGD), COMBINED N/A 08/13/2018    Procedure: COMBINED ESOPHAGOSCOPY, GASTROSCOPY, DUODENOSCOPY (EGD), BIOPSY SINGLE OR MULTIPLE;  EGD;  Surgeon: Hood Brower MD;  [facility]    ESOPHAGOSCOPY, GASTROSCOPY, DUODENOSCOPY (EGD), COMBINED N/A 03/14/2019    Procedure: Upper Endoscopy;  Surgeon: Hood Brower MD;  [facility]     ESOPHAGOSCOPY, GASTROSCOPY, DUODENOSCOPY (EGD), RESECT MUCOSA, COMBINED N/A 2018    Procedure: COMBINED ESOPHAGOSCOPY, GASTROSCOPY, DUODENOSCOPY (EGD), RESECT MUCOSA;;  Surgeon: Hood Brower MD;  [facility]    GYN SURGERY  10/22/2008    pelvic support    HEMORRHOIDECTOMY  2008    MOHS MICROGRAPHIC PROCEDURE      OPEN REDUCTION INTERNAL FIXATION ANKLE Left 10/28/2019    Procedure: Internal fixation left bimalleolar ankle fracture, removal external fixator;  Surgeon: Jose Roberto Can MD;  [facility]    ORIF ANKLE FRACTURE BIMALLEOLAR Left 10/28/2019    Internal fixation left bimalleolar ankle fracture, removal external fixator    OTHER SURGICAL HISTORY Left 10/12/2019    APPLY EXTERNAL FIXATOR LOWER EXTREMITYProcedure: Left ankle external fixator placement; Surgeon: Leeanne Brown MD; Location: [facility] OR      OVARIAN CYST REMOVAL Left age 22    L ovarian cyst removal, laparotomy      PHACOEMULSIFICATION CLEAR CORNEA WITH STANDARD INTRAOCULAR LENS IMPLANT Left 2020    Procedure: LEFT CATARACT REMOVAL WITH INTRAOCULAR LENS IMPLANT;  Surgeon: Janay Amezcua MD;  Location:  OR    SURGICAL HISTORY OF -   10/2008    Transobturator tape for Urinary Incontinence    THYROIDECTOMY, PARTIAL  1998             Social History:      Social History     Tobacco Use    Smoking status: Former     Current packs/day: 0.00     Types: Cigarettes     Quit date: 1980     Years since quittin.4     Passive exposure: Past    Smokeless tobacco: Never    Tobacco comments:     smoked from 70-80; smoked about 1ppd   Substance Use Topics    Alcohol use: Yes     Alcohol/week: 0.0 standard drinks of alcohol     Comment: 6-7 drinks per week        History   Sexual Activity    Sexual activity: Yes    Partners: Male     Comment: postmenopausal             Family History:    Family History of Diabetes: mother    Family History   Problem Relation Age of Onset    Diabetes Mother     Hypertension  Mother     Arthritis Mother         rheumatoid    Cancer Father     Cardiovascular Brother         palpitations not on meds.    Glaucoma No family hx of     Macular Degeneration No family hx of     Amblyopia No family hx of     Rheumatoid Arthritis Mother     Other - See Comments Brother         palpitations             Physical Exam:    BP 92/57   Pulse 85   Temp 97.6  F (36.4  C) (Oral)   Resp 16   Wt 64.4 kg (142 lb)   SpO2 100%   BMI 24.37 kg/m     General:  tired and weak appearing  HEENT:  NC/AT. MMM, sclera not injected  Lungs:  unremarkable, no new cough, no SOB  ABD:  rounded, soft, non-tender  Skin:  warm and dry, no obvious lesions  MSK:   moving all extremities  Lymp:   +LE edema  Mental Status:  fluctuating mentation  Psych: flat affect         Laboratory Data:      Lab Results   Component Value Date    A1C 8.0 (H) 05/25/2025    A1C 7.5 (H) 11/05/2024    A1C 7.6 (H) 07/09/2024    A1C 6.6 (H) 03/05/2024    A1C 7.4 (H) 11/14/2023    A1C 8.3 (H) 06/21/2021    A1C 9.4 (H) 12/14/2020    A1C 8.1 (H) 09/21/2020    A1C 8.5 (H) 01/07/2020    A1C 7.6 (H) 10/12/2019     Recent Labs   Lab Test 05/26/25  0945   WBC 15.1*   RBC 3.74*   HGB 10.2*   HCT 30.5*   MCV 82   MCH 27.3   MCHC 33.4   RDW 16.3*   *     Recent Labs   Lab Test 05/26/25  1212 05/26/25  0959 05/26/25  0945 05/26/25  0323 05/26/25  0208   NA  --   --  138  --  135   POTASSIUM  --   --  3.3*  --  3.0*   CHLORIDE  --   --  101  --  99   CO2  --   --  21*  --  19*   ANIONGAP  --   --  16*  --  17*   * 145* 147*   < > 166*   BUN  --   --  34.3*  --  44.7*   CR  --   --  0.78  --  0.89   CECY  --   --  7.7*  --  8.3*    < > = values in this interval not displayed.     Recent Labs   Lab Test 05/25/25  1334   PROTTOTAL 6.9   ALBUMIN 3.9   BILITOTAL 0.8   ALKPHOS 96   AST 31   ALT 32            Assessment and Recommendations:       Assessment:   Type 2 Diabetes Mellitus, complicated by neuropathy.  (A1c 8 %, Hgb: 12.3; 5/25/25)  DKA on  insulin drip  Lactic acidosis  Sepsis, unknown source  Hepatic vein thrombosis    Plan/Recommendations:    - Lantus 12 units q 24 hrs at 11:00 (ordered for you)  - Novolog Meal Coverage: 1 units per 25 g CHO, TID AC and PRN with snacks/supplements (ordered for you)  - Continue trending BMP with ketone bodies every 4 hours - stop insulin drip once ketones <0.6  - Hold Novolog Correction Scale while on insulin drip; please restart at medium-dose sliding scale once off drip  - BG monitoring: Every 1-2 hours on insulin drip; switch to TID AC and bedtime once off drip  - Hypoglycemia protocol  - Carb counting protocol    Discussion:  The patient is a pleasant 79-year-old female with DM2 on insulin, glimepiride, and metformin who presented to the ED with complaints of generalized weakness and was found to be in DKA. She was not overtly acidotic on presentation, which could possibly be from mixed acid-base disorder, although she did have significantly elevated ketones at almost 6. She was also found to have lactic acidosis, so ketoacidosis is likely multifactorial in nature, although glucose of 425 is more indicative of DKA, albeit mild. She was started on insulin drip and is currently requiring minimal insulin (0.5 unit(s)/h). Will give Lantus 12 units (0.2 units/kg) to facilitate her off the drip, although the drip should only be stopped once ketones <0.6. Ordered carb coverage once appropriate to eat.    Discharge Planning: (tentative)    Medications: TBD    Test Claims:  none needed.   Education:  Needs to be assessed closer to discharge.    Outpatient Follow-up:  recommend [facility] Endocrinology vs PCP     Thank you for this consult. IDS will continue to follow.      Please notify Inpatient Diabetes Service if changes are planned to steroids, nutrition, TPN/TF and anticipated procedures requiring prolonged NPO status.     Discussed with attending, Dr. Bermudez.    Carin Palma MD  Endocrinology Fellow,  PGY-4    To contact Inpatient Diabetes Service:     7 AM - 5 PM: Page the IDS MIKE following the patient that day (see filed or incomplete progress notes/consult notes under Endocrinology)    OR if uncertain of provider assignment: page job code [phone]    5 PM - 7 AM: First call after hours is to primary service.    For urgent after-hours questions, page job code for on call fellow: [phone]

## 2025-05-26 NOTE — CODE/RAPID RESPONSE
Rapid Response Team Note    Assessment   A rapid response was called on Sarah Felix due to lactic acidosis. This presentation is likely due to DKA and sepsis. Patient noted to have softer BPs with SBP 90s. BG 130s, no complaints from patient. She is fatigued, though oriented x3.      Plan   -  Continue current POC  - IVF bolus  -  The Internal Medicine primary team was able to be reached and they are in agreement with the above plan.  -  Disposition: The patient will remain on the current unit. We will continue to monitor this patient closely.  -  Reassessment and plan follow-up will be performed by the primary team    Holly Galicia PA-C  Rapid Response Team MIKE  Securely message with [employer]     Medical Decision Making       50 MINUTES SPENT BY ME on the date of service doing chart review, history, exam, documentation & further activities per the note.          Hospital Course   Brief Summary of events leading to rapid response:   RRT called for LA 5.0    Physical Exam   Vital Signs: Temp: 97.2  F (36.2  C) Temp src: Axillary BP: 99/55 Pulse: 105   Resp: 17 SpO2: 98 % O2 Device: None (Room air)    Weight: 142 lbs 0 oz      Exam:     Physical Exam   Constitutional:   Well nourished, well developed, resting comfortably   HEENT:   Head: Normocephalic and atraumatic.   Eyes: Conjunctivae are normal. Pupils are equal, round, and reactive to light.  Pharynx has no erythema or exudate, mucous membranes are moist  Neck:   No adenopathy, no bony tenderness  Cardiovascular: Regular rate and rhythm without murmurs or gallops  Pulmonary/Chest: Clear to auscultation bilaterally, with no wheezes or retractions. No respiratory distress.  GI: Soft with good bowel sounds.  Non-tender, non-distended, with no guarding, no rebound, no peritoneal signs.   Back:  No bony or CVA tenderness   Musculoskeletal:  No edema or clubbing   Skin: Skin is warm and dry. No rash noted.   Neurological: oriented to person, place, and  time. Nonfocal exam  Psychiatric:  Fatigued

## 2025-05-27 ENCOUNTER — APPOINTMENT (OUTPATIENT)
Dept: GENERAL RADIOLOGY | Facility: CLINIC | Age: 80
End: 2025-05-27
Payer: COMMERCIAL

## 2025-05-27 LAB
ANION GAP SERPL CALCULATED.3IONS-SCNC: 13 MMOL/L (ref 7–15)
ANION GAP SERPL CALCULATED.3IONS-SCNC: 14 MMOL/L (ref 7–15)
ANION GAP SERPL CALCULATED.3IONS-SCNC: 14 MMOL/L (ref 7–15)
ATRIAL RATE - MUSE: 86 BPM
B-OH-BUTYR SERPL-SCNC: 0.23 MMOL/L
B-OH-BUTYR SERPL-SCNC: 0.29 MMOL/L
B-OH-BUTYR SERPL-SCNC: 1.03 MMOL/L
B-OH-BUTYR SERPL-SCNC: 1.41 MMOL/L
B-OH-BUTYR SERPL-SCNC: 2.02 MMOL/L
BASE EXCESS BLDV CALC-SCNC: -0.1 MMOL/L (ref -3–3)
BUN SERPL-MCNC: 18.8 MG/DL (ref 8–23)
BUN SERPL-MCNC: 18.9 MG/DL (ref 8–23)
BUN SERPL-MCNC: 20.3 MG/DL (ref 8–23)
CALCIUM SERPL-MCNC: 7.7 MG/DL (ref 8.8–10.4)
CALCIUM SERPL-MCNC: 7.7 MG/DL (ref 8.8–10.4)
CALCIUM SERPL-MCNC: 8.1 MG/DL (ref 8.8–10.4)
CHLORIDE SERPL-SCNC: 104 MMOL/L (ref 98–107)
CHLORIDE SERPL-SCNC: 106 MMOL/L (ref 98–107)
CHLORIDE SERPL-SCNC: 106 MMOL/L (ref 98–107)
CREAT SERPL-MCNC: 0.72 MG/DL (ref 0.51–0.95)
CREAT SERPL-MCNC: 0.74 MG/DL (ref 0.51–0.95)
CREAT SERPL-MCNC: 0.77 MG/DL (ref 0.51–0.95)
DIASTOLIC BLOOD PRESSURE - MUSE: NORMAL MMHG
EGFRCR SERPLBLD CKD-EPI 2021: 79 ML/MIN/1.73M2
EGFRCR SERPLBLD CKD-EPI 2021: 83 ML/MIN/1.73M2
EGFRCR SERPLBLD CKD-EPI 2021: 86 ML/MIN/1.73M2
ERYTHROCYTE [DISTWIDTH] IN BLOOD BY AUTOMATED COUNT: 16.9 % (ref 10–15)
GLUCOSE BLDC GLUCOMTR-MCNC: 115 MG/DL (ref 70–99)
GLUCOSE BLDC GLUCOMTR-MCNC: 134 MG/DL (ref 70–99)
GLUCOSE BLDC GLUCOMTR-MCNC: 145 MG/DL (ref 70–99)
GLUCOSE BLDC GLUCOMTR-MCNC: 148 MG/DL (ref 70–99)
GLUCOSE BLDC GLUCOMTR-MCNC: 148 MG/DL (ref 70–99)
GLUCOSE BLDC GLUCOMTR-MCNC: 150 MG/DL (ref 70–99)
GLUCOSE BLDC GLUCOMTR-MCNC: 150 MG/DL (ref 70–99)
GLUCOSE BLDC GLUCOMTR-MCNC: 156 MG/DL (ref 70–99)
GLUCOSE BLDC GLUCOMTR-MCNC: 156 MG/DL (ref 70–99)
GLUCOSE BLDC GLUCOMTR-MCNC: 160 MG/DL (ref 70–99)
GLUCOSE BLDC GLUCOMTR-MCNC: 162 MG/DL (ref 70–99)
GLUCOSE BLDC GLUCOMTR-MCNC: 172 MG/DL (ref 70–99)
GLUCOSE BLDC GLUCOMTR-MCNC: 176 MG/DL (ref 70–99)
GLUCOSE BLDC GLUCOMTR-MCNC: 185 MG/DL (ref 70–99)
GLUCOSE BLDC GLUCOMTR-MCNC: 196 MG/DL (ref 70–99)
GLUCOSE BLDC GLUCOMTR-MCNC: 198 MG/DL (ref 70–99)
GLUCOSE BLDC GLUCOMTR-MCNC: 199 MG/DL (ref 70–99)
GLUCOSE SERPL-MCNC: 191 MG/DL (ref 70–99)
GLUCOSE SERPL-MCNC: 202 MG/DL (ref 70–99)
GLUCOSE SERPL-MCNC: 233 MG/DL (ref 70–99)
HCO3 BLDV-SCNC: 24 MMOL/L (ref 21–28)
HCO3 SERPL-SCNC: 20 MMOL/L (ref 22–29)
HCO3 SERPL-SCNC: 21 MMOL/L (ref 22–29)
HCO3 SERPL-SCNC: 22 MMOL/L (ref 22–29)
HCT VFR BLD AUTO: 34.1 % (ref 35–47)
HGB BLD-MCNC: 11 G/DL (ref 11.7–15.7)
INTERPRETATION ECG - MUSE: NORMAL
LA PPP-IMP: ABNORMAL
LABORATORY COMMENT REPORT: ABNORMAL
MAGNESIUM SERPL-MCNC: 1.4 MG/DL (ref 1.7–2.3)
MAGNESIUM SERPL-MCNC: 3.1 MG/DL (ref 1.7–2.3)
MCH RBC QN AUTO: 26.4 PG (ref 26.5–33)
MCHC RBC AUTO-ENTMCNC: 32.3 G/DL (ref 31.5–36.5)
MCV RBC AUTO: 82 FL (ref 78–100)
O2/TOTAL GAS SETTING VFR VENT: 21 %
OSMOLALITY SERPL: 296 MMOL/KG (ref 280–301)
OXYHGB MFR BLDV: 52 % (ref 70–75)
P AXIS - MUSE: 60 DEGREES
PCO2 BLDV: 38 MM HG (ref 40–50)
PH BLDV: 7.42 [PH] (ref 7.32–7.43)
PHOSPHATE SERPL-MCNC: 3.5 MG/DL (ref 2.5–4.5)
PLATELET # BLD AUTO: 688 10E3/UL (ref 150–450)
PO2 BLDV: 31 MM HG (ref 25–47)
POTASSIUM SERPL-SCNC: 3.8 MMOL/L (ref 3.4–5.3)
POTASSIUM SERPL-SCNC: 4 MMOL/L (ref 3.4–5.3)
POTASSIUM SERPL-SCNC: 4 MMOL/L (ref 3.4–5.3)
POTASSIUM SERPL-SCNC: 4.1 MMOL/L (ref 3.4–5.3)
POTASSIUM SERPL-SCNC: 4.3 MMOL/L (ref 3.4–5.3)
PR INTERVAL - MUSE: 156 MS
QRS DURATION - MUSE: 62 MS
QT - MUSE: 348 MS
QTC - MUSE: 416 MS
R AXIS - MUSE: 8 DEGREES
RBC # BLD AUTO: 4.16 10E6/UL (ref 3.8–5.2)
SAO2 % BLDV: 53.2 % (ref 70–75)
SODIUM SERPL-SCNC: 139 MMOL/L (ref 135–145)
SODIUM SERPL-SCNC: 140 MMOL/L (ref 135–145)
SODIUM SERPL-SCNC: 141 MMOL/L (ref 135–145)
SYSTOLIC BLOOD PRESSURE - MUSE: NORMAL MMHG
T AXIS - MUSE: 39 DEGREES
UFH PPP CHRO-ACNC: 1.04 IU/ML (ref ?–1.1)
VENTRICULAR RATE- MUSE: 86 BPM
WBC # BLD AUTO: 17.2 10E3/UL (ref 4–11)

## 2025-05-27 PROCEDURE — 99232 SBSQ HOSP IP/OBS MODERATE 35: CPT | Performed by: NURSE PRACTITIONER

## 2025-05-27 PROCEDURE — 36415 COLL VENOUS BLD VENIPUNCTURE: CPT | Performed by: INTERNAL MEDICINE

## 2025-05-27 PROCEDURE — 99232 SBSQ HOSP IP/OBS MODERATE 35: CPT | Mod: GC

## 2025-05-27 PROCEDURE — 82010 KETONE BODYS QUAN: CPT | Performed by: NURSE PRACTITIONER

## 2025-05-27 PROCEDURE — 250N000013 HC RX MED GY IP 250 OP 250 PS 637

## 2025-05-27 PROCEDURE — 83735 ASSAY OF MAGNESIUM: CPT

## 2025-05-27 PROCEDURE — 36592 COLLECT BLOOD FROM PICC: CPT | Performed by: NURSE PRACTITIONER

## 2025-05-27 PROCEDURE — 84132 ASSAY OF SERUM POTASSIUM: CPT | Performed by: NURSE PRACTITIONER

## 2025-05-27 PROCEDURE — 250N000011 HC RX IP 250 OP 636: Performed by: INTERNAL MEDICINE

## 2025-05-27 PROCEDURE — 83930 ASSAY OF BLOOD OSMOLALITY: CPT

## 2025-05-27 PROCEDURE — 85520 HEPARIN ASSAY: CPT

## 2025-05-27 PROCEDURE — 87205 SMEAR GRAM STAIN: CPT

## 2025-05-27 PROCEDURE — 85027 COMPLETE CBC AUTOMATED: CPT

## 2025-05-27 PROCEDURE — 71045 X-RAY EXAM CHEST 1 VIEW: CPT | Mod: 26 | Performed by: RADIOLOGY

## 2025-05-27 PROCEDURE — 250N000009 HC RX 250

## 2025-05-27 PROCEDURE — 82962 GLUCOSE BLOOD TEST: CPT

## 2025-05-27 PROCEDURE — 36592 COLLECT BLOOD FROM PICC: CPT | Performed by: INTERNAL MEDICINE

## 2025-05-27 PROCEDURE — 36592 COLLECT BLOOD FROM PICC: CPT

## 2025-05-27 PROCEDURE — 80048 BASIC METABOLIC PNL TOTAL CA: CPT | Performed by: NURSE PRACTITIONER

## 2025-05-27 PROCEDURE — 71045 X-RAY EXAM CHEST 1 VIEW: CPT

## 2025-05-27 PROCEDURE — 82010 KETONE BODYS QUAN: CPT | Performed by: PHYSICIAN ASSISTANT

## 2025-05-27 PROCEDURE — 99233 SBSQ HOSP IP/OBS HIGH 50: CPT | Performed by: INTERNAL MEDICINE

## 2025-05-27 PROCEDURE — 258N000003 HC RX IP 258 OP 636

## 2025-05-27 PROCEDURE — 99207 PR NO BILLABLE SERVICE THIS VISIT: CPT | Performed by: PHYSICIAN ASSISTANT

## 2025-05-27 PROCEDURE — 84132 ASSAY OF SERUM POTASSIUM: CPT

## 2025-05-27 PROCEDURE — 83735 ASSAY OF MAGNESIUM: CPT | Performed by: PHYSICIAN ASSISTANT

## 2025-05-27 PROCEDURE — 82805 BLOOD GASES W/O2 SATURATION: CPT | Performed by: PHYSICIAN ASSISTANT

## 2025-05-27 PROCEDURE — 87040 BLOOD CULTURE FOR BACTERIA: CPT | Performed by: INTERNAL MEDICINE

## 2025-05-27 PROCEDURE — 36415 COLL VENOUS BLD VENIPUNCTURE: CPT | Performed by: PHYSICIAN ASSISTANT

## 2025-05-27 PROCEDURE — G0463 HOSPITAL OUTPT CLINIC VISIT: HCPCS | Mod: 25

## 2025-05-27 PROCEDURE — 250N000011 HC RX IP 250 OP 636

## 2025-05-27 PROCEDURE — 80048 BASIC METABOLIC PNL TOTAL CA: CPT

## 2025-05-27 PROCEDURE — 120N000003 HC R&B IMCU UMMC

## 2025-05-27 PROCEDURE — 258N000003 HC RX IP 258 OP 636: Performed by: INTERNAL MEDICINE

## 2025-05-27 PROCEDURE — 84100 ASSAY OF PHOSPHORUS: CPT

## 2025-05-27 PROCEDURE — 87070 CULTURE OTHR SPECIMN AEROBIC: CPT

## 2025-05-27 PROCEDURE — 82010 KETONE BODYS QUAN: CPT | Performed by: INTERNAL MEDICINE

## 2025-05-27 PROCEDURE — 84132 ASSAY OF SERUM POTASSIUM: CPT | Performed by: PHYSICIAN ASSISTANT

## 2025-05-27 PROCEDURE — 87040 BLOOD CULTURE FOR BACTERIA: CPT

## 2025-05-27 PROCEDURE — 82010 KETONE BODYS QUAN: CPT

## 2025-05-27 RX ORDER — DEXTROSE MONOHYDRATE, SODIUM CHLORIDE, AND POTASSIUM CHLORIDE 50; 1.49; 4.5 G/1000ML; G/1000ML; G/1000ML
INJECTION, SOLUTION INTRAVENOUS CONTINUOUS
Status: DISCONTINUED | OUTPATIENT
Start: 2025-05-27 | End: 2025-05-27

## 2025-05-27 RX ORDER — SODIUM CHLORIDE AND POTASSIUM CHLORIDE 150; 450 MG/100ML; MG/100ML
INJECTION, SOLUTION INTRAVENOUS CONTINUOUS
Status: DISCONTINUED | OUTPATIENT
Start: 2025-05-27 | End: 2025-05-27

## 2025-05-27 RX ORDER — DEXTROSE MONOHYDRATE, SODIUM CHLORIDE, AND POTASSIUM CHLORIDE 50; 1.49; 4.5 G/1000ML; G/1000ML; G/1000ML
INJECTION, SOLUTION INTRAVENOUS CONTINUOUS
Status: DISCONTINUED | OUTPATIENT
Start: 2025-05-27 | End: 2025-05-28

## 2025-05-27 RX ORDER — NICOTINE POLACRILEX 4 MG
15-30 LOZENGE BUCCAL
Status: DISCONTINUED | OUTPATIENT
Start: 2025-05-27 | End: 2025-05-27

## 2025-05-27 RX ORDER — POTASSIUM CHLORIDE 29.8 MG/ML
20 INJECTION INTRAVENOUS ONCE
Status: COMPLETED | OUTPATIENT
Start: 2025-05-27 | End: 2025-05-27

## 2025-05-27 RX ORDER — AMPICILLIN AND SULBACTAM 2; 1 G/1; G/1
3 INJECTION, POWDER, FOR SOLUTION INTRAMUSCULAR; INTRAVENOUS EVERY 6 HOURS
Status: DISCONTINUED | OUTPATIENT
Start: 2025-05-27 | End: 2025-05-29

## 2025-05-27 RX ORDER — ONDANSETRON 2 MG/ML
4 INJECTION INTRAMUSCULAR; INTRAVENOUS EVERY 6 HOURS PRN
Status: DISCONTINUED | OUTPATIENT
Start: 2025-05-27 | End: 2025-05-27

## 2025-05-27 RX ORDER — DEXTROSE MONOHYDRATE 25 G/50ML
25-50 INJECTION, SOLUTION INTRAVENOUS
Status: DISCONTINUED | OUTPATIENT
Start: 2025-05-27 | End: 2025-05-27

## 2025-05-27 RX ORDER — ONDANSETRON 2 MG/ML
4 INJECTION INTRAMUSCULAR; INTRAVENOUS EVERY 6 HOURS
Status: COMPLETED | OUTPATIENT
Start: 2025-05-27 | End: 2025-05-28

## 2025-05-27 RX ORDER — ENOXAPARIN SODIUM 100 MG/ML
1 INJECTION SUBCUTANEOUS EVERY 12 HOURS
Status: DISCONTINUED | OUTPATIENT
Start: 2025-05-27 | End: 2025-05-31

## 2025-05-27 RX ADMIN — HEPARIN SODIUM 600 UNITS/HR: 10000 INJECTION, SOLUTION INTRAVENOUS at 08:28

## 2025-05-27 RX ADMIN — PROCHLORPERAZINE EDISYLATE 5 MG: 5 INJECTION INTRAMUSCULAR; INTRAVENOUS at 17:37

## 2025-05-27 RX ADMIN — AMPICILLIN SODIUM AND SULBACTAM SODIUM 3 G: 2; 1 INJECTION, POWDER, FOR SOLUTION INTRAMUSCULAR; INTRAVENOUS at 22:04

## 2025-05-27 RX ADMIN — ONDANSETRON 4 MG: 2 INJECTION, SOLUTION INTRAMUSCULAR; INTRAVENOUS at 16:16

## 2025-05-27 RX ADMIN — DEXTROSE, SODIUM CHLORIDE, AND POTASSIUM CHLORIDE: 5; .45; .15 INJECTION INTRAVENOUS at 11:16

## 2025-05-27 RX ADMIN — AMPICILLIN SODIUM AND SULBACTAM SODIUM 3 G: 2; 1 INJECTION, POWDER, FOR SOLUTION INTRAMUSCULAR; INTRAVENOUS at 15:29

## 2025-05-27 RX ADMIN — PROCHLORPERAZINE EDISYLATE 5 MG: 5 INJECTION INTRAMUSCULAR; INTRAVENOUS at 11:02

## 2025-05-27 RX ADMIN — PIPERACILLIN AND TAZOBACTAM 4.5 G: 4; .5 INJECTION, POWDER, LYOPHILIZED, FOR SOLUTION INTRAVENOUS at 09:29

## 2025-05-27 RX ADMIN — SODIUM CHLORIDE 500 ML: 0.9 INJECTION, SOLUTION INTRAVENOUS at 09:35

## 2025-05-27 RX ADMIN — INSULIN HUMAN: 1 INJECTION, SOLUTION INTRAVENOUS at 11:15

## 2025-05-27 RX ADMIN — POTASSIUM CHLORIDE 20 MEQ: 29.8 INJECTION, SOLUTION INTRAVENOUS at 15:29

## 2025-05-27 RX ADMIN — PIPERACILLIN AND TAZOBACTAM 4.5 G: 4; .5 INJECTION, POWDER, LYOPHILIZED, FOR SOLUTION INTRAVENOUS at 03:51

## 2025-05-27 RX ADMIN — LATANOPROST 1 DROP: 50 SOLUTION/ DROPS OPHTHALMIC at 22:27

## 2025-05-27 RX ADMIN — ENOXAPARIN SODIUM 60 MG: 60 INJECTION SUBCUTANEOUS at 13:47

## 2025-05-27 RX ADMIN — ONDANSETRON 4 MG: 2 INJECTION INTRAMUSCULAR; INTRAVENOUS at 22:27

## 2025-05-27 RX ADMIN — ONDANSETRON 4 MG: 2 INJECTION, SOLUTION INTRAMUSCULAR; INTRAVENOUS at 09:23

## 2025-05-27 RX ADMIN — DEXTROSE, SODIUM CHLORIDE, AND POTASSIUM CHLORIDE: 5; .45; .15 INJECTION INTRAVENOUS at 11:54

## 2025-05-27 ASSESSMENT — ACTIVITIES OF DAILY LIVING (ADL)
ADLS_ACUITY_SCORE: 64
ADLS_ACUITY_SCORE: 62
ADLS_ACUITY_SCORE: 73
ADLS_ACUITY_SCORE: 64
ADLS_ACUITY_SCORE: 73
ADLS_ACUITY_SCORE: 73
ADLS_ACUITY_SCORE: 62
ADLS_ACUITY_SCORE: 69
ADLS_ACUITY_SCORE: 62
ADLS_ACUITY_SCORE: 62
ADLS_ACUITY_SCORE: 73
ADLS_ACUITY_SCORE: 62
ADLS_ACUITY_SCORE: 73
ADLS_ACUITY_SCORE: 62
ADLS_ACUITY_SCORE: 73
ADLS_ACUITY_SCORE: 73

## 2025-05-27 NOTE — PLAN OF CARE
Goal Outcome Evaluation:      Plan of Care Reviewed With: patient    Overall Patient Progress: decliningOverall Patient Progress: declining    Outcome Evaluation: Pt more lethargic, continuous nausea/emesis, Insulin drip restarted    Neuro: A&Ox1-2. Pt increasingly lethargic throughout day - currently arousable to loud voices and gentle touch. Sometimes follows commands.   Cardiac: ST in 110's. VSS. +3 edema in LE   Respiratory: Sating mid 90's on RA. Coarse upper lung sounds. Suspected aspiration.   GI/: No urine output - bladder scanned at 1600 for 358 and straight cath at 1730 for 475 ml. 1 incontinent BM this AM. Pt having uncontrolled N/V all day with mild to no relief using zofran and compazine - frequent small emesis  Diet/appetite: Tolerating full liquid diet - Pt fully awake/alert before PO intake. No oral intake today.   Activity:  Pt not OOB. Q2 turns. Aspiration precautions  Pain: Pt denies pain during shift  Skin: Moisture rash in gluteal cleft - pt needs dmitry spray/soft wipes/barrier cream  LDA's: LPIV - SL, RPIV infusing D5 1/2 NS K @ 75 ml/hr. PICC x3 - Red - SL for labs, White - insulin and carrier, Dang - TKO for antibx    Shift events: Pt had aspiration event this morning with emesis - Desat into low 80's but recovered after oral suctioning and about 15 min on 2L O2. Rapid response called at 1300 for decreased LOC and concerns for airway protection due to frequent emesis.     Plan: Monitor closely overnight for aspiration risk. Continue bladder scan regimen - maybe costa tomorrow. Continue with POC. Notify primary team with changes.    /76   Pulse 109   Temp 98.3  F (36.8  C) (Oral)   Resp 16   Wt 64.4 kg (142 lb)   SpO2 94%   BMI 24.37 kg/m

## 2025-05-27 NOTE — CONSULTS
[facility]  WO Nurse Inpatient Assessment     Consulted for: sacrum    WO nurse follow-up plan: weekly    Patient History (according to Medicine provider note(s) 5/27/25:      Sarah Felix is a 79 year old female with history of  insulin-dependent DM2, HTN, fatty liver, possible alcohol use disorder, gait instability who presented 5/25 with generalized weakness and was admitted with DKA and sepsis, 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        Assessment:      Areas visualized during today's visit: Sacrum/coccyx    Wound location: gluteal cleft and buttocks    Last photo: 5/27/25  Wound due to: Incontinence Associated Dermatitis (IAD)  Wound history/plan of care: Present on admission. Injury at base of gluteal cleft most consistent with moisture related skin injury. Erythema blanchable. Mepilex present at start of [facility] assessment but removed once etiology confirmed  Wound base: 50 % Dermis, 50 % Blanchable  and Erythema     Palpation of the wound bed: normal      Drainage: small     Description of drainage: serosanguinous and bloody     Measurements (length x width x depth, in cm): largest open area 2.5  x 0.8  x  0.1 cm      Tunneling: N/A     Undermining: N/A  Periwound skin: Intact, Ecchymosis, and Erythema- blanchable      Color: red      Temperature: normal   Odor: none  Pain: mild and during dressing change, tender  Pain interventions prior to dressing change: slow and gentle cares   Treatment goal: Maintain (prevention of deterioration) and Protection  STATUS: initial assessment  Supplies ordered: ordered dinorah spray and dry wipes, discussed with RN, and discussed with patient        Treatment Plan:     Gluteal cleft wound(s): BID and PRN with incontinence episodes  Cleanse the area with Dinorah cleanse and protect, very gently with soft cloth.  Apply thin layer of Barrier paste (ex: Critic aid) on open and red areas  With repeat application, do not scrub  the paste, only remove soiled paste and reapply.  If complete removal of paste is necessary use baby oil/mineral oil (#[id number]) and soft wash cloth.  Ensure pt has chair cushion (#[id number]) while sitting up in the chair.  Use only one blue pad in between mattress and pt. No brief in bed.     Orders: Written    RECOMMEND PRIMARY TEAM ORDER: None, at this time  Education provided: plan of care and wound progress  Discussed plan of care with: Patient and Nurse  Notify [facility] if wound(s) deteriorate.  Nursing to notify the Provider(s) and re-consult the [facility] Nurse if new skin concern.    DATA:     Current support surface: Standard  Standard gel mattress (Isoflex)  Containment of urine/stool: Incontinent pad in bed  BMI: Body mass index is 24.37 kg/m .   Active diet order: Orders Placed This Encounter      Full Liquid Diet     Output: I/O last 3 completed shifts:  In: 2684.16 [P.O.:70; I.V.:2164.16; IV Piggyback:450]  Out: 1100 [Urine:1100]     Labs:   Recent Labs   Lab 05/27/25  0627 05/25/25  1355 05/25/25  1334   ALBUMIN  --   --  3.9   HGB 11.0*   < > 12.3   INR  --   --  1.43*   WBC 17.2*   < > 15.4*   A1C  --   --  8.0*    < > = values in this interval not displayed.     Pressure injury risk assessment:   Sensory Perception: 3-->slightly limited  Moisture: 3-->occasionally moist  Activity: 1-->bedfast  Mobility: 2-->very limited  Nutrition: 2-->probably inadequate  Friction and Shear: 2-->potential problem  Kingsley Score: 13    Xochitl Willoughby RN CWOCN  Pager no longer is use, please contact through [employer] group: [facility] Nurse [place]  Dept. Office Number: *[phone]

## 2025-05-27 NOTE — PROGRESS NOTES
ED 26. Sarah Felix - Pt is coughing out sputum and somewhat intermittently at this time. Just wondering if pt will benefit from CX-ray to rule out aspiration PNA. And a repeat of speech evaluation may be necessary as well. Please advise.Thank you  MIRANDA Roque  Paged: Dr Clemente Ron @[phone]  Response: Pt had a recent CX-ray and Speech saw her yesterday. Once there is no hypoxia, no fever, and the sputum color is colorless, then we can defer intervention till AM and MD will pass it on to the day Team

## 2025-05-27 NOTE — PROGRESS NOTES
Brief Endocrine Note    I was paged about patient's blood sugar being 70. Nurse to give pudding and recheck glucose in 1 hour. If blood glucose still < mg/dL, recommend D5W at 75 mL. I will place an order for POC glucose checks every 2 hours x3 times at midnight. If blood glucose < mg/dL, please start D5W at 75 mL/h. Please reach out to on call primary team first for D5W orders. Patient's nurse informed about the plan above.    Carin Palma MD  Endocrinology Fellow, PGY-4

## 2025-05-27 NOTE — PROGRESS NOTES
Hematology / Oncology  Daily Progress Note   Date of Service: 05/27/2025  Patient: Sarah Felix  MRN: [id number]  Admission Date: 5/25/2025  Hospital Day # 2  Cancer Diagnosis: ET  Primary Outpatient hematologist: Dr. 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  She required she reportedly has a provoked DVT in the setting of ovarian surgery 2003 treated with anticoagulation      Currently presents with feelings of weakness and found to have new right hepatic vein thrombus. LFT's on presentation are within normal limits, low suspicion for Budd Chiari at this time. With this new presentation of SVT, she will require systemic anticoagulation so we will send testing for APLS as this would affect management in terms of choice of AC. She was initially managed with heparin.      MCV is wnl, counts prior to this still elevated, query medications non adherence. Just on 500 mg hydrea, has room to go up.     Given she will be on systemic anticoagulation, no need for aspirin so this can be discontinued.         Recommendations:   - APLS panel to be done outpatient, as this test was cancelled inpatient due to being on heparin  - Stop heparin and switch to therapeutic Lovenox if no procedures are planned  - At discharge transition to apixaban 10 mg BID x 7 days and then 5 mg BID   - continue hydrea 500 mg daily  - Follow up with Dr. Wilde outpatient--- scheduled for  7/29/25  - Discontinue PTA aspirin     We will sign off, page with questions.   Patient was seen and plan of care was discussed with attending physician Dr. Reeder.    Lul Caldwell M.D (Abdul)  PGY 4 Fellow  Hematology, Oncology and Transplant.   [facility].           Physician Attestation   I saw this patient with the resident and agree with the resident/fellow's findings and plan of care as documented in the note.      [place] findings: Ms. Felix continues to have issus with ketones and blood sugars. No symptoms of portal hypertension and spleen is not enlarged. Overall, likely provoking factor was her underlying MPN. Will need full dose anticoagulation for at least 3 months and then will need to follow up with her outpatient hematologist to discern if indefinate anticoagulation is needed. She should remain on cytoreduction. Will not adjust dose up at present given acute illness. Discussion of other options (ruxolitnib, etc) again to be determined as an outpatient.     Please see A&P for additional details of medical decision making.  40 MINUTES SPENT BY ME on the date of service doing chart review, history, exam, documentation & further activities per the note.        Chloe Reeder MD/PhD  Date of Service (when I saw the patient): 05/27/25    ___________________________________________________________________    Subjective & Interval History:    No acute events noted overnight. Sleepy and having some discomfort. Not answering      Physical Exam:    Blood pressure (!) 138/98, pulse 106, temperature 97.3  F (36.3  C), temperature source Oral, resp. rate 16, weight 64.4 kg (142 lb), SpO2 95%, not currently breastfeeding.    Gen: chronically ill appearing women, sleepy but arousible  Pulm: normal work of breathing  Abd: Soft, generalized discomfort  Ext: + 2 bl lower extremity edema    Labs & Studies: I personally reviewed the following studies:  ROUTINE LABS (Last four results):  CMP  Recent Labs   Lab 05/27/25  1112 05/27/25  0901 05/27/25  0824 05/27/25  0627 05/27/25  0208 05/26/25  1614 05/26/25  1525 05/26/25  1514 05/26/25  1414 05/26/25  0959 05/26/25  0945 05/26/25  0323 05/26/25  0208 05/25/25  1355 05/25/25  1334   NA  --  139  --   --   --   --  136  --   --    --  138  --  135   < > 133*   POTASSIUM  --  4.3  --  4.0  --   --  4.0  --  4.2  --  3.3*  --  3.0*   < > 2.9*   CHLORIDE  --  104  --   --   --   --  104  --   --   --  101  --  99   < > 84*   CO2  --  21*  --   --   --   --  22  --   --   --  21*  --  19*   < > 19*   ANIONGAP  --  14  --   --   --   --  10  --   --   --  16*  --  17*   < > 30*   * 233* 198*  --  134*   < > 120*   < >  --    < > 147*   < > 166*   < > 425*   BUN  --  20.3  --   --   --   --  27.9*  --   --   --  34.3*  --  44.7*   < > 66.6*   CR  --  0.77  --   --   --   --  0.72  --   --   --  0.78  --  0.89   < > 1.17*   GFRESTIMATED  --  79  --   --   --   --  86  --   --   --  78  --  66   < > 48*   CECY  --  8.1*  --   --   --   --  7.6*  --   --   --  7.7*  --  8.3*   < > 9.4   MAG  --   --   --  3.1*  --   --   --   --   --   --  3.5*  --   --   --  2.1   PHOS  --   --   --  3.5  --   --  2.7  --   --   --  2.6  --  1.7*   < > 4.1   PROTTOTAL  --   --   --   --   --   --   --   --   --   --   --   --   --   --  6.9   ALBUMIN  --   --   --   --   --   --   --   --   --   --   --   --   --   --  3.9   BILITOTAL  --   --   --   --   --   --   --   --   --   --   --   --   --   --  0.8   ALKPHOS  --   --   --   --   --   --   --   --   --   --   --   --   --   --  96   AST  --   --   --   --   --   --   --   --   --   --   --   --   --   --  31   ALT  --   --   --   --   --   --   --   --   --   --   --   --   --   --  32    < > = values in this interval not displayed.     CBC  Recent Labs   Lab 05/27/25  0627 05/26/25  0945 05/25/25  1355 05/25/25  1334   WBC 17.2* 15.1*  --  15.4*   RBC 4.16 3.74*  --  4.58   HGB 11.0* 10.2* 12.6 12.3   HCT 34.1* 30.5* 37 37.9   MCV 82 82  --  83   MCH 26.4* 27.3  --  26.9   MCHC 32.3 33.4  --  32.5   RDW 16.9* 16.3*  --  16.6*   * 565*  --  812*     INR  Recent Labs   Lab 05/25/25  1334   INR 1.43*       Medications list for reference:  Current Facility-Administered Medications   Medication  Dose Route Frequency Provider Last Rate Last Admin    acetaminophen (TYLENOL) tablet 325 mg  325 mg Oral Q4H PRN Johnny Boyd MD        [Held by provider] aspirin EC tablet 81 mg  81 mg Oral Daily July Bojorquez MD   81 mg at 05/26/25 0841    atorvastatin (LIPITOR) tablet 40 mg  40 mg Oral Daily July Bojorquez MD   40 mg at 05/26/25 0841    calcium carbonate (TUMS) chewable tablet 1,000 mg  1,000 mg Oral 4x Daily PRN July Bojorquez MD        dextrose 5% and 0.45% NaCl infusion  1,000 mL Intravenous Continuous PRN July Bojorquez MD        glucose gel 15-30 g  15-30 g Oral Q15 Min PRN Clemente Ron MD        Or    dextrose 50 % injection 25-50 mL  25-50 mL Intravenous Q15 Min PRN Clemente Ron MD        Or    glucagon injection 1 mg  1 mg Subcutaneous Q15 Min PRN Clemente Ron MD        dextrose 50 % injection 25-50 mL  25-50 mL Intravenous Q15 Min PRN July Bojorquez MD        folic acid (FOLVITE) tablet 400 mcg  400 mcg Oral Daily July Bojorquez MD   400 mcg at 05/26/25 0841    [Held by provider] furosemide (LASIX) tablet 20 mg  20 mg Oral BID July Bojorquez MD        heparin 25,000 units in 0.45% NaCl 250 mL ANTICOAGULANT infusion  0-5,000 Units/hr Intravenous Continuous July Bojorquez MD 6 mL/hr at 05/27/25 0828 600 Units/hr at 05/27/25 0828    hydroxyurea (HYDREA) capsule 500 mg  500 mg Oral Daily July Bojorquez MD   500 mg at 05/26/25 0841    [Held by provider] insulin aspart (NovoLOG) injection (RAPID ACTING)  1-7 Units Subcutaneous TID AC Awilda Dupree APRN CNP   2 Units at 05/27/25 0925    [Held by provider] insulin aspart (NovoLOG) injection (RAPID ACTING)  1-5 Units Subcutaneous 2 times per day Awilda Dupree APRN CNP        insulin aspart (NovoLOG) injection (RAPID ACTING)   Subcutaneous TID w/meals Belshan, Awilad R, APRN CNP        insulin aspart (NovoLOG) injection (RAPID ACTING)   Subcutaneous With Snacks or Supplements Awilda Dupree APRN CNP        [Held by provider] insulin  glargine (LANTUS PEN) injection 10 Units  10 Units Subcutaneous Daily Awilda Dupree, APRN CNP        insulin regular (MYXREDLIN) 1 unit/mL infusion   Intravenous Continuous Johnny Boyd MD 1.5 mL/hr at 05/27/25 1115 New Bag at 05/27/25 1115    latanoprost (XALATAN) 0.005 % ophthalmic solution 1 drop  1 drop Both Eyes At Bedtime July Bojorquez MD   1 drop at 05/26/25 2137    lidocaine (LMX4) cream   Topical Q1H PRN Johnny Boyd MD        lidocaine (LMX4) cream   Topical Q1H PRN July Bojorquez MD        lidocaine 1 % 0.1-1 mL  0.1-1 mL Other Q1H PRN July Bojorquez MD        lidocaine 1 % 0.1-5 mL  0.1-5 mL Other Q1H PRN Johnny Boyd MD   3.5 mL at 05/26/25 1510    [Held by provider] lisinopril (ZESTRIL) tablet 2.5 mg  2.5 mg Oral Daily July Bojorquez MD        [Held by provider] metoprolol tartrate (LOPRESSOR) half-tab 12.5 mg  12.5 mg Oral BID July Bojorquez MD        ondansetron (ZOFRAN) injection 4 mg  4 mg Intravenous Q6H PRN Johnny Boyd MD   4 mg at 05/27/25 0923    Patient is already receiving anticoagulation with heparin, enoxaparin (LOVENOX), warfarin (COUMADIN)  or other anticoagulant medication   Does not apply Continuous PRN Johnny Boyd MD        Patient is already receiving anticoagulation with heparin, enoxaparin (LOVENOX), warfarin (COUMADIN)  or other anticoagulant medication   Does not apply Continuous PRN July Bojorquez MD        piperacillin-tazobactam (ZOSYN) 4.5 g vial to attach to  mL bag  4.5 g Intravenous Q6H Fernanda Yuan MD 0 mL/hr at 05/27/25 0600 4.5 g at 05/27/25 0929    potassium chloride 10 mEq in 100 mL sterile water infusion  10 mEq Intravenous Q1H PRN July Bojorquez MD   Stopped at 05/25/25 1810    prochlorperazine (COMPAZINE) injection 5 mg  5 mg Intravenous Q4H PRN Johnny Boyd MD   5 mg at 05/27/25 1102    pyridOXINE (VITAMIN B6) tablet 25 mg  25 mg Oral Daily July Bojorquez MD   25 mg at 05/26/25 0841    senna-docusate (SENOKOT-S/PERICOLACE)  8.6-50 MG per tablet 1 tablet  1 tablet Oral BID PRN July Bojorquez MD        Or    senna-docusate (SENOKOT-S/PERICOLACE) 8.6-50 MG per tablet 2 tablet  2 tablet Oral BID PRN July Bojorquez MD        sodium chloride (PF) 0.9% PF flush 3 mL  3 mL Intracatheter Q8H YOGI July Bojorquez MD   3 mL at 05/27/25 0621    sodium chloride (PF) 0.9% PF flush 3 mL  3 mL Intracatheter q1 min prn July Bojorquez MD        thiamine (B-1) tablet 100 mg  100 mg Oral Daily July Bojorquez MD   100 mg at 05/26/25 0841     Current Outpatient Medications   Medication Sig Dispense Refill    aspirin 81 MG EC tablet Take 81 mg by mouth daily      atorvastatin (LIPITOR) 40 MG tablet Take 1 tablet (40 mg) by mouth daily 90 tablet 1    blood glucose (ONETOUCH ULTRA) test strip 1 strip by In Vitro route 4 times daily. 400 strip 3    CALCIUM 500 + D 500-200 MG-IU OR TABS one tab twice per day 100 0    Cholecalciferol (VITAMIN D PO) Pt consuming 3000 units per day      Continuous Glucose Sensor (FREESTYLE CARMELA 14 DAY SENSOR) MISC Change every 14 days. 6 each 3    folic acid (FOLVITE) 400 MCG tablet Take 1 tablet (400 mcg) by mouth daily 100 tablet 3    furosemide (LASIX) 20 MG tablet Take 1 tablet (20 mg) by mouth 2 times daily (Patient taking differently: Take 20 mg by mouth daily.) 180 tablet 1    glimepiride (AMARYL) 2 MG tablet Take 1 tablet (2 mg) by mouth every morning (before breakfast) (Patient taking differently: Take 1 mg by mouth every morning (before breakfast).) 90 tablet 1    hydroxyurea (HYDREA) 500 MG capsule Take 1 capsule (500 mg) by mouth daily 90 capsule 3    insulin aspart (NOVOLOG PEN) 100 UNIT/ML pen 5 units with a high carb meal about once daily as needed. 45 mL 11    insulin pen needle (BD REY U/F) 32G X 4 MM miscellaneous USE 1 PEN NEEDLE four times daily. (WITH LANTUS AND Novolog) 400 each 11    Lancets (ONETOUCH DELICA PLUS [id number]) MISC 1 each 4 times daily 400 each 3    latanoprost (XALATAN) 0.005 % ophthalmic  "solution Place 1 drop into both eyes at bedtime. 7.5 mL 5    lisinopril (ZESTRIL) 2.5 MG tablet Take 1 tablet (2.5 mg) by mouth daily 90 tablet 3    metFORMIN (GLUCOPHAGE XR) 500 MG 24 hr tablet Take 1 tablet (500 mg) by mouth daily (with dinner)      metoprolol tartrate (LOPRESSOR) 25 MG tablet Take 1/2 tablet or 12.5mg by mouth twice daily. 90 tablet 3    potassium chloride ER (MICRO-K) 10 MEQ CR capsule Take 1 capsule (10 mEq) by mouth daily 90 capsule 1    pyridOXINE (VITAMIN B6) 25 MG tablet Take 1 tablet (25 mg) by mouth daily 90 tablet 3    vitamin B-12 (CYANOCOBALAMIN) 2500 MCG sublingual tablet Take 2500 mcg by mouth three times per week      insulin syringe-needle U-100 (BD INSULIN SYRINGE ULTRAFINE) 31G X 5/16\" 1 ML miscellaneous Use 1 syringes twice daily for insulin and victoza and for symptoms of hypoglycemia 100 each 3    nystatin (MYCOSTATIN) 289030 UNIT/GM external cream Apply topically 2 times daily (Patient taking differently: Apply topically daily as needed.) 30 g 3    triamcinolone (KENALOG) 0.1 % external cream Apply topically 3 times daily 90 g 3        "

## 2025-05-27 NOTE — PROGRESS NOTES
Inpatient Diabetes Management Service: Daily Progress Note     HPI: Sarah Felix is a 79 year old female with history of  insulin-dependent DM2, HTN, fatty liver, possible alcohol use disorder, gait instability who presented 5/25 with generalized weakness and was admitted with DKA and sepsis, 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 Inpatient Diabetes Service consulted for DKA management.            Assessment/Plan:     Assessment:   Type 2 Diabetes Mellitus, complicated by neuropathy.  (A1c 8 %, Hgb: 12.3; 5/25/25)  DKA on admission.  Lactic acidosis on admission  Sepsis, unknown source  Hepatic vein thrombosis  Dysphagia on full liquid diet   Toxic metabolic encephalopathy     Plan/Recommendations:    -Restart DKA insulin protocol  Once metabolic derangements resolved, switch to NON-DKA IV insulin protocol.  When appropriate, PO to advance and dextrose fluids to reduce then be discontinued.  Once off dextrose fluids and tolerating PO, will be appropriate for transition to MDI    -  Once eating, Novolog meal coverage 1 unit(s) per 25 g cho AC meals/snacks.    -  BG monitoring q1-2 hr per IV insulin drip protocol  - Hypoglycemia protocol  - Carb counting protocol     Discussion:  Yesterday, was hypoglycemic when discontinued off the drip. Had been running D5 at 75/hr on the drip.  On call endocrinologist recommended to run D5 at 75 /hr. If BG running between . Patient with no intake yesterday Was able to tolerate pudding and BGs improved. BG up to 198 this morning without oral intake. Was actively vomiting. As patient has poor PO intake and ketones are increasing, will restart DKA insulin drip protocol and discontinue Lantus. Labs:  Ketones 1.41 this am and repeat ketones 2.02. Anion gap 14 Bicarb 21.  Osmolality 296.    [id number] : Continues to have no oral intake today, ketones still present. Will continue on insulin drip.   RRT called due to acute encephalopahy. She is at  high risk of aspiration with her concurrent emesis and encephalopathy, respiratory status is currently stable, monitor closely. RN holding off on any PO intake until mentation improves.      Discharge Planning: (tentative)    Medications: Basal bolus (discharge on insulin only)   Test Claims:  none needed.   Education:  Needs to be assessed closer to discharge.    Outpatient Follow-up:  recommend [facility] Endocrinology vs PCP      Thank you for this consult. IDS will continue to follow.      Please notify Inpatient Diabetes Service if changes are planned to steroids, nutrition, TPN/TF and anticipated procedures requiring prolonged NPO status.         Interval History/Review of Systems :   The last 24 hours progress and nursing notes reviewed.  Low BG at 2100.  D5 was not started, received pudding and BG improved.  Patient unable to tolerated thin liquids , coughing.    RRT overnight. Sepsis.  UC negative.  Not oriented to month or year.   Planned Procedures/Surgeries: none    Inpatient Glucose Control:       Recent Labs   Lab 05/27/25  0208 05/27/25  0021 05/26/25  2225 05/26/25  2108 05/26/25  1903 05/26/25  1702   * 115* 92 70 83 106*             Medications Impacting Glycemia:   Steroids: None  D5W containing solutions/medications: currently on D5W at 75 mL/h  Other medications impacting glucose: None        Nutrition:   Orders Placed This Encounter      Full Liquid Diet    Supplements:  None  TF: None  TPN: None         Diabetes History: see full consult note for complete diabetes history   Diabetes Type and Duration: DM2, 7/2009  Zinc transporter 8 antibody, islet antibody, and insulin antibody not available on epic search      GAD65 antibody <5 (9/11/2012)   C peptide 7.6 (9/11/2012)     PTA Medication Regimen: Glimepiride 1 mg daily, Novolog 5 units with high carb meal daily as needed, metformin 500 mg daily  Missing doses?: uncertain  Historical Diabetes Medications: N/A     Glucose monitoring  "device/frequency/trends: [place] CGM, reports 130 on average  Hypoglycemia PTA:   - Frequency: \"rarely\"  - Severity:  no history of severe unconscious lows   - Awareness:  intact    - Treatment: \"eats food\"      Outpatient Diabetes Provider: Unknown  Formal Diabetes Education/Educator: Erik        Physical Exam:   BP (!) 145/84 (BP Location: Right arm, Cuff Size: Adult Regular)   Pulse 98   Temp 97.8  F (36.6  C) (Oral)   Resp 16   Wt 64.4 kg (142 lb)   SpO2 95%   BMI 24.37 kg/m    General:  Appears fatigued.   ABD:  rounded  Skin:  warm and dry  Mental Status:  fluctuating mentation  Psych: flat affect           Data:     Lab Results   Component Value Date    A1C 8.0 (H) 05/25/2025    A1C 7.5 (H) 11/05/2024    A1C 7.6 (H) 07/09/2024    A1C 6.6 (H) 03/05/2024    A1C 7.4 (H) 11/14/2023    A1C 8.3 (H) 06/21/2021    A1C 9.4 (H) 12/14/2020    A1C 8.1 (H) 09/21/2020    A1C 8.5 (H) 01/07/2020    A1C 7.6 (H) 10/12/2019       ROUTINE IP LABS (Last four results)  BMP  Recent Labs   Lab 05/27/25  0208 05/27/25  0021 05/26/25  2225 05/26/25  2108 05/26/25  1614 05/26/25  1525 05/26/25  1514 05/26/25  1414 05/26/25  0959 05/26/25  0945 05/26/25  0323 05/26/25  0208 05/25/25  2156 05/25/25  2146   NA  --   --   --   --   --  136  --   --   --  138  --  135  --  136   POTASSIUM  --   --   --   --   --  4.0  --  4.2  --  3.3*  --  3.0*  --  3.4   CHLORIDE  --   --   --   --   --  104  --   --   --  101  --  99  --  97*   CECY  --   --   --   --   --  7.6*  --   --   --  7.7*  --  8.3*  --  8.4*   CO2  --   --   --   --   --  22  --   --   --  21*  --  19*  --  19*   BUN  --   --   --   --   --  27.9*  --   --   --  34.3*  --  44.7*  --  51.3*   CR  --   --   --   --   --  0.72  --   --   --  0.78  --  0.89  --  0.92   * 115* 92 70   < > 120*   < >  --    < > 147*   < > 166*   < > 177*    < > = values in this interval not displayed.     CBC  Recent Labs   Lab 05/27/25  0627 05/26/25  0945 05/25/25  1355 05/25/25  1334 "   WBC 17.2* 15.1*  --  15.4*   RBC 4.16 3.74*  --  4.58   HGB 11.0* 10.2* 12.6 12.3   HCT 34.1* 30.5* 37 37.9   MCV 82 82  --  83   MCH 26.4* 27.3  --  26.9   MCHC 32.3 33.4  --  32.5   RDW 16.9* 16.3*  --  16.6*   * 565*  --  812*     INR  Recent Labs   Lab 05/25/25  1334   INR 1.43*       Inpatient Diabetes Service will continue to follow, please don't hesitate to contact the team with any questions or concerns.     MALATHI Falcon CNP    Plan discussed with patient, bedside RN, and primary team via this note.    To contact Inpatient Diabetes Service:     7 AM - 5 PM: Page the [employer] following the patient that day (see filed or incomplete progress notes/consult notes under Endocrinology)    OR if uncertain of provider assignment: page job code [phone]    5 PM - 7 AM: First call after hours is to primary service.    For urgent after-hours questions, page job code for on call fellow: [phone]     I spent a total of 45 minutes on the date of the encounter doing prep/post-work, chart review, history and exam, documentation and further activities per the note including lab review, multidisciplinary communication, counseling the patient and/or coordinating care regarding acute hyper/hypoglycemic management, as well as discharge management and planning/communication.

## 2025-05-27 NOTE — PROVIDER NOTIFICATION
"   05/27/25 1400   Call Information   Date of Call 05/27/25   Time of Call 1312   Name of person requesting the team Richar Ramirez   Title of person requesting team RN   RRT Arrival time 1316   Time RRT ended 1341   Reason for call   Type of RRT Adult   Primary reason for call Neurological   Neurological Lethargic   Was patient transferred from the ED, ICU, or PACU within last 24 hours prior to RRT call? No  (Current ED boarder)   SBAR   Situation Increasing lethargy throughout last 2hours   Background Per medicine note: \"Sarah Felix is a 79 year old female with history of  insulin-dependent DM2, HTN, fatty liver, possible alcohol use disorder, gait instability who presented 5/25 with generalized weakness and was admitted with DKA and sepsis, 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\"   Notable History/Conditions Diabetes;Hypertension   Assessment Patient with RASS -2. Patient has equal strength bilaterally, EOM intact, PERRLA. VSS on room air. BG normal. Had been given IV compazine 2 hours prior for vomiting. Has had several small emesis episodes.   Interventions Blood glucose;Labs   Patient Outcome   Patient Outcome Stabilized on unit   RRT Team   Attending/Primary/Covering Physician Dr. Joseph Boyd   Date Attending Physician notified 05/27/25   Time Attending Physician notified 1312  (At bedside)   Physician(s) Abby Grayson, PAC   Lead RN Den Gutierrez, MIRANDA Ramirez, MIRANDA   RT NA   Post RRT Intervention Assessment   Post RRT Assessment Stable/Improved   Date Follow Up Done 05/27/25   Time Follow Up Done 1650   Comments Neurologically unchanged. VSS. Coughing up small amount of bile appearing sputum       "

## 2025-05-27 NOTE — CODE/RAPID RESPONSE
Rapid Response Team Note    Assessment   A rapid response was called on Sarah Felix due to acute encephalopathy - likely multifactorial secondary to Compazine and acute illness. Lower suspicion for acute neurologic event.    Plan   -  STAT labs in process: BMP, VBG, ketones  -  Consider head CT if mentation fails to improve and no clear etiology identified   - She is at high risk of aspiration with her concurrent emesis and encephalopathy, respiratory status is currently stable, monitor closely. RN holding off on any PO intake until mentation improves.  -  The Internal Medicine primary team was at bedside  -  Disposition: The patient will remain on the current unit. We will continue to monitor this patient closely.  -  Reassessment and plan follow-up will be performed by the primary team    Ivett Grayson PA-C  Rapid Response Team MIKE  Securely message with [employer]     Medical Decision Making       25 MINUTES SPENT BY ME on the date of service doing chart review, history, exam, documentation & further activities per the note.          Hospital Course   Brief Summary of events leading to rapid response:   RRT called for worsening encephalopathy. Patient became more drowsy this afternoon. Also has been vomiting today. Had possible aspiration event with emesis. No respiratory distress or desaturations noted. .     Upon my arrival patient found sitting up in bed, sleeping, responsive to loud voice and following one step commands. Denies any headache. Endorsing abdominal pain. BP stable, HR in 110s, SpO2 in mid 90s on room air.     Physical Exam   Vital Signs: Temp: 97.7  F (36.5  C) Temp src: Oral BP: 136/84 Pulse: 104   Resp: 16 SpO2: 97 % O2 Device: None (Room air)    Weight: 142 lbs 0 oz      Exam:   Sleeping, arouses to voice. Follows one step commands. PERRL. Facies symmetric. Moving all extremities. No tremors or abnormal movements noted. Breathing non-labored.     Significant Results and  Procedures   ROUTINE IP LABS (Last four results)  Recent Labs   Lab 05/27/25  1303 05/27/25  1202 05/27/25  1136 05/27/25  1112 05/27/25  0901 05/27/25  0824 05/27/25  0627 05/26/25  1614 05/26/25  1525 05/26/25  0959 05/26/25  0945 05/26/25  0323 05/26/25  0208 05/25/25  1355 05/25/25  1334   NA  --   --  140  --  139  --   --   --  136  --  138  --  135   < > 133*   POTASSIUM  --   --  4.0  --  4.3  --  4.0  --  4.0   < > 3.3*  --  3.0*   < > 2.9*   CHLORIDE  --   --  106  --  104  --   --   --  104  --  101  --  99   < > 84*   CO2  --   --  20*  --  21*  --   --   --  22  --  21*  --  19*   < > 19*   ANIONGAP  --   --  14  --  14  --   --   --  10  --  16*  --  17*   < > 30*   * 199* 202* 196* 233*   < >  --    < > 120*   < > 147*   < > 166*   < > 425*   BUN  --   --  18.9  --  20.3  --   --   --  27.9*  --  34.3*  --  44.7*   < > 66.6*   CR  --   --  0.72  --  0.77  --   --   --  0.72  --  0.78  --  0.89   < > 1.17*   CECY  --   --  7.7*  --  8.1*  --   --   --  7.6*  --  7.7*  --  8.3*   < > 9.4   MAG  --   --  1.4*  --   --   --  3.1*  --   --   --  3.5*  --   --   --  2.1   PHOS  --   --   --   --   --   --  3.5  --  2.7  --  2.6  --  1.7*   < > 4.1   PROTTOTAL  --   --   --   --   --   --   --   --   --   --   --   --   --   --  6.9   ALBUMIN  --   --   --   --   --   --   --   --   --   --   --   --   --   --  3.9   BILITOTAL  --   --   --   --   --   --   --   --   --   --   --   --   --   --  0.8   ALKPHOS  --   --   --   --   --   --   --   --   --   --   --   --   --   --  96   AST  --   --   --   --   --   --   --   --   --   --   --   --   --   --  31   ALT  --   --   --   --   --   --   --   --   --   --   --   --   --   --  32    < > = values in this interval not displayed.     Recent Labs   Lab 05/27/25  0627 05/26/25  0945 05/25/25  1355 05/25/25  1334   WBC 17.2* 15.1*  --  15.4*   RBC 4.16 3.74*  --  4.58   HGB 11.0* 10.2* 12.6 12.3   HCT 34.1* 30.5* 37 37.9   MCV 82 82  --  83   MCH 26.4*  27.3  --  26.9   MCHC 32.3 33.4  --  32.5   RDW 16.9* 16.3*  --  16.6*   * 565*  --  812*     Recent Labs   Lab 05/25/25  1334   INR 1.43*

## 2025-05-27 NOTE — PROGRESS NOTES
Physician Attestation   I, Ivan Knight MD, was present with the medical/MIKE student who participated in the service and in the documentation of the note.  I have verified the history and personally performed the physical exam and medical decision making.  I agree with the assessment and plan of care as documented in the note.      Key findings: Pt having nausea and emesis this AM, some abdominal tenderness and rising ketones. She was switched from IV insulin to subcutaneous last night. We will give a fluid bolus, touch base with Endocrine regarding restarting IV insulin although her BS is less than 200 and AG and bicarb reasonable. Infectious workup thus far negative, leukocytosis worse despite broad spectrum abx. We will narrow to Unasyn and likely stop abx in the coming days. She is also being switched from heparin gtt to Lovenox, stop ASA per Heme rec's. Will also need PT/OT and nutrition to see patient.     /84   Pulse 104   Temp 97.7  F (36.5  C) (Oral)   Resp 16   Wt 64.4 kg (142 lb)   SpO2 97%   BMI 24.37 kg/m      Constitutional: Awake, vomiting  Respiratory: Clear to auscultation bilaterally  Cardiovascular: tachyc   GI: firm, mild TTP  Skin/Integumen: No rashes, no cyanosis      60 MINUTES SPENT BY ME on the date of service doing chart review, history, exam, documentation & further activities per the note.    I have personally reviewed the following data over the past 24 hrs:    17.2 (H)  \   11.0 (L)   / 688 (H)     139 104 20.3 /  196 (H)   4.3 21 (L) 0.77 \     Procal: N/A CRP: N/A Lactic Acid: N/A       Ferritin:  N/A % Retic:  N/A LDH:  N/A         Ivan Knight MD  Date of Service (when I saw the patient): 05/27/25    [facility]    Progress Note - Medicine Service, [facility] TEAM 2       Date of Admission:  5/25/2025    Assessment & Plan   Sarah Felix is a 79 year old female with history of  insulin-dependent DM2, HTN, fatty  "liver, possible alcohol use disorder, gait instability who presented 5/25 with generalized weakness and was admitted with DKA and sepsis, 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     Today's updates:  - Start basal insulin (CTM sugars), continue K replacement, IVF, and q4h labs until ketones negative and gap closed  - Switch Zosyn to Ceftriaxone  - Continue Heparin gtt, hold ASA per heme  - APLS panel pending  - SLP consulted for dysphagia; cleared for full liquid diet (thin consistency) w/ close supervision  - Call brother Cruz for updates    Diabetic ketoacidosis  Pseudohyponatremia  Hypokalemia  T2DM with A1c 8.0%. Suspect triggered by sepsis. Resolving hypokalemia.  - DKA protocol   - Switch to basal insulin glargine   - mIVF   - Aggressive K replacement   - Q4H labs (w/ ketones)  - Hold PTA metformin, glipizide  - Endocrinology rec transitioning to MDI when DKA resolves  - Diabetes ed consult    Possible sepsis ?secondary to urinary source  Lactic acidosis  S/p 3L IVF as well as mIVF for DKA protocol  - Diagnostics:  - UA 5/25 + protein, leukocyte esterase, WBC, bacteria; consistent with probable urinary source of infection  - Urine cx negative  - Blood cultures negative   - MRSA nares negative   - Urine legionella/strep pneumo negative   - CT C/A/P showed no signs of pneumonia or lung pathology, thrombosis of R. Hepatic vein, and slightly enlarging cystic lesion of the hepatic segment  - Abx:   - Ceftriaxone (5/27-*)  - Zosyn (5/25-5/27)   - Vancomycin (5/25-5/26)    Hepatic vein thrombus  No prior imaging to compare. Per chart review, history of \"thromboembolism\" after ovarian cyst removal age 22. Liver panel normal, no significant RUQ pain  - IR consulted; no indication for lytics  - Heme consulted   - RUQ US w/ Doppler 5/26 showed stable R. Hepatic vein thrombosis  - Heparin gtt    Toxic metabolic encephalopathy  Not oriented to month or year. Likely due to sepsis, DKA as above.    Possible alcohol use " "disorder?  Reports 2 drinks/day. Did have an ED visit 03/2025 for alcohol intoxication. Reports last drink 5 days PTA, low concern for withdrawal. Her brother, rCuz, has concerns that she is drinking excessively and encourages addiction medicine involvement.  - Consider addiction medicine consult when encephalopathy improves  - PTA folate  - Start thiamine    Dysphagia  Concern for aspiration from bedside RN  - SLP consult    Sacral wound  \"Raw\" area on her sacrum. Bedside RN says likely not c/w decubitus ulcer.  - WONC consult    Fall at home  CTH, C spine negative for acute process/fracture    Elevated INR  Unclear etiology, no known liver disease. Will hold off on Vit K given new thrombus.    Lower extremity edema  Reports to be at baseline. Symmetric, low concern for DVT. No recent echos. On room air, no need for TTE currently.  - Hold PTA lasix 20mg daily given sepsis, lactic acidosis    Elevated troponin  Likely type 2 NSTEMI in setting of DKA/sepsis. Troponin 16->15. No chest pain. Initial EKG reading STEMI, repeat without evidence of ACS per ED provider report.    Elevated lipase  2x ULN but no abdominal pain, no pancreatitis on CT.    Generalized weakness, failure to thrive  TSH wnl.   - PT/OT consults  - Ongoing evaluation for safe discharge plan    HTN  Hold PTA metoprolol, lisinopril given sepsis and normotension    Essential thrombocytosis: PTA aspirin, hydroxyurea    Chronic/stable:    Glaucoma: PTA latanoprost  CKD2  Diabetic neuropathy  Schatzki's ring  Slow transit constipation  Mixed urge and stress incontinence  Gait instability          Diet: Full Liquid Diet    DVT Prophylaxis: Enoxaparin (Lovenox) SQ  Silver Catheter: Not present  Fluids: IV  Lines: PRESENT      PICC 05/26/25 Triple Lumen Right Basilic Access. PICC was ready to use, no immediate concern.-Site Assessment: WDL      Cardiac Monitoring: ACTIVE order. Indication: DKA  Code Status: Full Code      Clinically Significant Risk Factors    "        Disposition Plan     Medically Ready for Discharge: Anticipated in 2-4 Days         The patient's care was discussed with the Attending Physician, Dr. Ivan Knight.    Stefanie Kim  Medical Student  Medicine Service, [facility] TEAM 2  [facility]  Securely message with [employer] (more info)  Text page via [facility] Paging/Directory   See signed in provider for up to date coverage information  ______________________________________________________________________    Interval History   Sarah was somnolent when the team visited her this morning on rounds and remained confused. She was coughing up saliva and sputum. Her nurse reported that she vomited three times throughout the morning and potentially aspirated last night. She has mainly been straight catheterized to relieve urine.      Physical Exam   Vital Signs: Temp: 97.3  F (36.3  C) Temp src: Oral BP: (!) 138/98 Pulse: 106   Resp: 16 SpO2: 95 % O2 Device: None (Room air)    Weight: 142 lbs 0 oz    Constitutional: Somnolent, awakens to questioning   GI: Tenderness to palpation on upper quadrants. Mildly tense on palpation.   Neurologic: Oriented to self, but not person or place.         Data     I have personally reviewed the following data over the past 24 hrs:    17.2 (H)  \   11.0 (L)   / 688 (H)     139 104 20.3 /  233 (H)   4.3 21 (L) 0.77 \     Procal: N/A CRP: N/A Lactic Acid: N/A       Ferritin:  N/A % Retic:  N/A LDH:  N/A       Imaging results reviewed over the past 24 hrs:   Recent Results (from the past 24 hours)   XR Chest Port 1 View    Narrative    EXAM: XR CHEST PORT 1 VIEW 5/26/2025 4:10 PM    DEMOGRAPHICS: 79 years Female    INDICATION: PICC placement    COMPARISON: CT 5/25/2025    TECHNIQUE: Single portable AP view of the chest.    FINDINGS:   Right-sided PICC tip near the superior cavoatrial junction.    Trachea is approximately midline. The cardiac silhouette is within  normal limits. No pleural  effusion or pneumothorax. No new focal  airspace opacities. The visualized upper abdomen is unremarkable.  Degenerative changes of the shoulders, no acute osseous abnormality.      Impression    IMPRESSION:   Right-sided PICC tip near the superior cavoatrial junction.    I have personally reviewed the examination and initial interpretation  and I agree with the findings.    XAVI ALMARAZ MD         SYSTEM ID:  [id number]   US Abdomen Limited w Abdomen Doppler Limited    Narrative    EXAM: US ABDOMEN LIMITED W ABDOMEN DOPPLER LIMITED  LOCATION: [facility]  DATE: 5/26/2025    INDICATION: RUQ; assessing hepatic vein blood flow for workup of potential Budd Chiari syndrome  COMPARISON: CT 5/25/2025, ultrasound 9/16/2021  TECHNIQUE: Limited abdominal ultrasound. Color flow with spectral Doppler and waveform analysis performed.     FINDINGS:    GALLBLADDER: The gallbladder is distended with a thin wall. No shadowing gallstone. Gallbladder sludge. No pericholecystic fluid. The sonographic Hammer sign was not performed.     BILE DUCTS: No biliary dilatation. The common duct measures 4 mm.    LIVER: Increased echogenicity from diffuse fatty infiltration. A stable 7.9 cm cyst in the left lobe. No suspicious mass.     RIGHT KIDNEY: No hydronephrosis.     PANCREAS: The visualized portions are normal.    No ascites.    ABDOMINAL DUPLEX: A stable nonocclusive thrombus of the right hepatic vein. The middle and left hepatic vein are patent with antegrade flow. The portal and splenic veins are patent with antegrade flow. The hepatic artery is normal.      Impression    IMPRESSION:  1.  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  2.  Hepatic steatosis.  3.  Gallbladder sludge.

## 2025-05-27 NOTE — PLAN OF CARE
D: Ketoacidosis  Hyperglycemia  Hypokalemia  DUNG (acute kidney injury)  Generalized weakness  Fall, initial encounter  I: Monitored vitals and assessed pt status.   Changed: Unchanged  Running: Heparin running at 800u/h, therapeutic x 1, next  PRN: None  Neuro: A&O to self, disoriented to place, time, and situation, but able to make needs known. Patient was coughing intermittently, no hypoxia, and afebrile. MD notified but defer intervention till AM team  Cardiac: SR, Afebrile, VSS.   Respiratory: Lungs sound clear but diminished, intermittent cough, and Sat>95% on RA  GI/:Active bowel sound, due to void, has been straight cath x 1, last bladder scanned was 145 ml this morning . Had medium soft stool x 1 BM this shift.  Diet/appetite: Tolerating * diet. Denies nausea.  Activity: Up with assist x 2, turned and repositioned    Pain: Denies   Skin: No new deficits noted.  Lines: Piv x 1 with heparin running at 800u/h, next Xa    I/O this shift:  In: 257.73 [I.V.:57.73; IV Piggyback:200]  Out: 550 [Urine:550]    Temp:  [97.3  F (36.3  C)-98  F (36.7  C)] 97.8  F (36.6  C)  Pulse:  [80-98] 98  Resp:  [16-17] 16  BP: ()/(57-90) 145/84  SpO2:  [95 %-100 %] 95 %      Problem: Adult Inpatient Plan of Care  Goal: Plan of Care Review  Description: The Plan of Care Review/Shift note should be completed every shift.  The Outcome Evaluation is a brief statement about your assessment that the patient is improving, declining, or no change.  This information will be displayed automatically on your shift  note.  Outcome: Progressing  Flowsheets (Taken 5/27/2025 0601)  Outcome Evaluation: Patient remains alert and oriented to self only. Insulin drip stopped by MD as labs improved. BG checked at 0200 was 135  Plan of Care Reviewed With: patient  Overall Patient Progress: no change    P: Continue to monitor Pt status and report changes to treatment team.

## 2025-05-27 NOTE — CONSULTS
Discharge Pharmacy Test Claim    Patient's [employer] Medicare Advantage plan covers Lantus Solostar, Novolog Flexpen, OneTouch Verio test strips, OneTouch Verio Flex meter, and OneTouch Delica Plus lancets. Expected monthly copay for each is listed below.    Test Claim Copay   Lantus Solostar 35.00   Novolog Flexpen 35.00   OneTouch Verio test strips 0.00   Onetouch Verio Flex meter 0.00   OneTouch Delica Plus lancets 0.00       Jaimie Perea  [facility] Pharmacy Liaison (A - L)  Phone: [phone] Fax: [phone]  Available on Teams & Vocera  Disclaimer: Pharmacy test claims are extimates and may not reflect final costs. Suggested alternatives aim to be cost-effective but may not be therapeutically equivalent as this consult is informational and does not constitute medical advice. Clinical decisions should be made by qualified healthcare providers.

## 2025-05-28 ENCOUNTER — APPOINTMENT (OUTPATIENT)
Dept: CT IMAGING | Facility: CLINIC | Age: 80
End: 2025-05-28
Payer: COMMERCIAL

## 2025-05-28 ENCOUNTER — APPOINTMENT (OUTPATIENT)
Dept: GENERAL RADIOLOGY | Facility: CLINIC | Age: 80
DRG: 871 | End: 2025-05-28
Payer: COMMERCIAL

## 2025-05-28 LAB
ADV 40+41 DNA STL QL NAA+NON-PROBE: NEGATIVE
ALBUMIN SERPL BCG-MCNC: 2.8 G/DL (ref 3.5–5.2)
ALP SERPL-CCNC: 64 U/L (ref 40–150)
ALT SERPL W P-5'-P-CCNC: 15 U/L (ref 0–50)
ANION GAP SERPL CALCULATED.3IONS-SCNC: 11 MMOL/L (ref 7–15)
AST SERPL W P-5'-P-CCNC: 18 U/L (ref 0–45)
ASTRO TYP 1-8 RNA STL QL NAA+NON-PROBE: NEGATIVE
B-OH-BUTYR SERPL-SCNC: 0.66 MMOL/L
BILIRUB SERPL-MCNC: 0.5 MG/DL
BILIRUBIN DIRECT (ROCHE PRO & PURE): 0.26 MG/DL (ref 0–0.45)
BUN SERPL-MCNC: 14 MG/DL (ref 8–23)
C CAYETANENSIS DNA STL QL NAA+NON-PROBE: NEGATIVE
C DIFF GDH STL QL IA: POSITIVE
C DIFF TOX A+B STL QL IA: NEGATIVE
C DIFF TOX B STL QL: POSITIVE
CALCIUM SERPL-MCNC: 7.7 MG/DL (ref 8.8–10.4)
CAMPYLOBACTER DNA SPEC NAA+PROBE: NEGATIVE
CHLORIDE SERPL-SCNC: 109 MMOL/L (ref 98–107)
CREAT SERPL-MCNC: 0.72 MG/DL (ref 0.51–0.95)
CRYPTOSP DNA STL QL NAA+NON-PROBE: NEGATIVE
E COLI O157 DNA STL QL NAA+NON-PROBE: NORMAL
E HISTOLYT DNA STL QL NAA+NON-PROBE: NEGATIVE
EAEC ASTA GENE ISLT QL NAA+PROBE: NEGATIVE
EC STX1+STX2 GENES STL QL NAA+NON-PROBE: NEGATIVE
EGFRCR SERPLBLD CKD-EPI 2021: 86 ML/MIN/1.73M2
EPEC EAE GENE STL QL NAA+NON-PROBE: NEGATIVE
ERYTHROCYTE [DISTWIDTH] IN BLOOD BY AUTOMATED COUNT: 17 % (ref 10–15)
ETEC LTA+ST1A+ST1B TOX ST NAA+NON-PROBE: NEGATIVE
FLUAV RNA SPEC QL NAA+PROBE: NEGATIVE
FLUBV RNA RESP QL NAA+PROBE: NEGATIVE
G LAMBLIA DNA STL QL NAA+NON-PROBE: NEGATIVE
GLUCOSE BLDC GLUCOMTR-MCNC: 108 MG/DL (ref 70–99)
GLUCOSE BLDC GLUCOMTR-MCNC: 108 MG/DL (ref 70–99)
GLUCOSE BLDC GLUCOMTR-MCNC: 111 MG/DL (ref 70–99)
GLUCOSE BLDC GLUCOMTR-MCNC: 120 MG/DL (ref 70–99)
GLUCOSE BLDC GLUCOMTR-MCNC: 129 MG/DL (ref 70–99)
GLUCOSE BLDC GLUCOMTR-MCNC: 130 MG/DL (ref 70–99)
GLUCOSE BLDC GLUCOMTR-MCNC: 132 MG/DL (ref 70–99)
GLUCOSE BLDC GLUCOMTR-MCNC: 135 MG/DL (ref 70–99)
GLUCOSE BLDC GLUCOMTR-MCNC: 139 MG/DL (ref 70–99)
GLUCOSE BLDC GLUCOMTR-MCNC: 139 MG/DL (ref 70–99)
GLUCOSE BLDC GLUCOMTR-MCNC: 142 MG/DL (ref 70–99)
GLUCOSE BLDC GLUCOMTR-MCNC: 144 MG/DL (ref 70–99)
GLUCOSE BLDC GLUCOMTR-MCNC: 148 MG/DL (ref 70–99)
GLUCOSE BLDC GLUCOMTR-MCNC: 150 MG/DL (ref 70–99)
GLUCOSE BLDC GLUCOMTR-MCNC: 95 MG/DL (ref 70–99)
GLUCOSE BLDC GLUCOMTR-MCNC: 98 MG/DL (ref 70–99)
GLUCOSE SERPL-MCNC: 112 MG/DL (ref 70–99)
HCO3 SERPL-SCNC: 23 MMOL/L (ref 22–29)
HCT VFR BLD AUTO: 30.3 % (ref 35–47)
HGB BLD-MCNC: 9.8 G/DL (ref 11.7–15.7)
LABORATORY COMMENT REPORT: ABNORMAL
LIPASE SERPL-CCNC: 14 U/L (ref 13–60)
MAGNESIUM SERPL-MCNC: 3.4 MG/DL (ref 1.7–2.3)
MCH RBC QN AUTO: 26.5 PG (ref 26.5–33)
MCHC RBC AUTO-ENTMCNC: 32.3 G/DL (ref 31.5–36.5)
MCV RBC AUTO: 82 FL (ref 78–100)
NOROVIRUS GI+II RNA STL QL NAA+NON-PROBE: NEGATIVE
P SHIGELLOIDES DNA STL QL NAA+NON-PROBE: NEGATIVE
PHOSPHATE SERPL-MCNC: 2 MG/DL (ref 2.5–4.5)
PLATELET # BLD AUTO: 621 10E3/UL (ref 150–450)
POTASSIUM SERPL-SCNC: 3.8 MMOL/L (ref 3.4–5.3)
PROT SERPL-MCNC: 4.7 G/DL (ref 6.4–8.3)
RBC # BLD AUTO: 3.7 10E6/UL (ref 3.8–5.2)
RSV RNA SPEC NAA+PROBE: NEGATIVE
RVA RNA STL QL NAA+NON-PROBE: NEGATIVE
SALMONELLA SP RPOD STL QL NAA+PROBE: NEGATIVE
SAPO I+II+IV+V RNA STL QL NAA+NON-PROBE: NEGATIVE
SARS-COV-2 RNA RESP QL NAA+PROBE: NEGATIVE
SHIGELLA SP+EIEC IPAH ST NAA+NON-PROBE: NEGATIVE
SODIUM SERPL-SCNC: 143 MMOL/L (ref 135–145)
V CHOLERAE DNA SPEC QL NAA+PROBE: NEGATIVE
VIBRIO DNA SPEC NAA+PROBE: NEGATIVE
WBC # BLD AUTO: 19.1 10E3/UL (ref 4–11)
Y ENTEROCOL DNA STL QL NAA+PROBE: NEGATIVE

## 2025-05-28 PROCEDURE — 250N000009 HC RX 250: Performed by: INTERNAL MEDICINE

## 2025-05-28 PROCEDURE — 74177 CT ABD & PELVIS W/CONTRAST: CPT | Mod: 26 | Performed by: STUDENT IN AN ORGANIZED HEALTH CARE EDUCATION/TRAINING PROGRAM

## 2025-05-28 PROCEDURE — 87493 C DIFF AMPLIFIED PROBE: CPT

## 2025-05-28 PROCEDURE — 99233 SBSQ HOSP IP/OBS HIGH 50: CPT | Mod: GC | Performed by: INTERNAL MEDICINE

## 2025-05-28 PROCEDURE — 250N000011 HC RX IP 250 OP 636

## 2025-05-28 PROCEDURE — 71260 CT THORAX DX C+: CPT | Mod: 26 | Performed by: STUDENT IN AN ORGANIZED HEALTH CARE EDUCATION/TRAINING PROGRAM

## 2025-05-28 PROCEDURE — 85018 HEMOGLOBIN: CPT

## 2025-05-28 PROCEDURE — 83735 ASSAY OF MAGNESIUM: CPT

## 2025-05-28 PROCEDURE — 250N000011 HC RX IP 250 OP 636: Performed by: INTERNAL MEDICINE

## 2025-05-28 PROCEDURE — 87637 SARSCOV2&INF A&B&RSV AMP PRB: CPT

## 2025-05-28 PROCEDURE — B4185 PARENTERAL SOL 10 GM LIPIDS: HCPCS | Performed by: INTERNAL MEDICINE

## 2025-05-28 PROCEDURE — 74018 RADEX ABDOMEN 1 VIEW: CPT | Mod: 26 | Performed by: RADIOLOGY

## 2025-05-28 PROCEDURE — 87324 CLOSTRIDIUM AG IA: CPT

## 2025-05-28 PROCEDURE — 87507 IADNA-DNA/RNA PROBE TQ 12-25: CPT

## 2025-05-28 PROCEDURE — 80048 BASIC METABOLIC PNL TOTAL CA: CPT

## 2025-05-28 PROCEDURE — 71260 CT THORAX DX C+: CPT

## 2025-05-28 PROCEDURE — 3E0436Z INTRODUCTION OF NUTRITIONAL SUBSTANCE INTO CENTRAL VEIN, PERCUTANEOUS APPROACH: ICD-10-PCS | Performed by: INTERNAL MEDICINE

## 2025-05-28 PROCEDURE — 82010 KETONE BODYS QUAN: CPT | Performed by: NURSE PRACTITIONER

## 2025-05-28 PROCEDURE — 120N000003 HC R&B IMCU UMMC

## 2025-05-28 PROCEDURE — 99232 SBSQ HOSP IP/OBS MODERATE 35: CPT | Performed by: NURSE PRACTITIONER

## 2025-05-28 PROCEDURE — 999N000065 XR ABDOMEN PORT 1 VIEW

## 2025-05-28 PROCEDURE — 82248 BILIRUBIN DIRECT: CPT

## 2025-05-28 PROCEDURE — 70450 CT HEAD/BRAIN W/O DYE: CPT

## 2025-05-28 PROCEDURE — 74177 CT ABD & PELVIS W/CONTRAST: CPT

## 2025-05-28 PROCEDURE — 83690 ASSAY OF LIPASE: CPT

## 2025-05-28 PROCEDURE — 84100 ASSAY OF PHOSPHORUS: CPT

## 2025-05-28 PROCEDURE — 258N000003 HC RX IP 258 OP 636: Performed by: INTERNAL MEDICINE

## 2025-05-28 PROCEDURE — 70450 CT HEAD/BRAIN W/O DYE: CPT | Mod: 26 | Performed by: RADIOLOGY

## 2025-05-28 PROCEDURE — 250N000013 HC RX MED GY IP 250 OP 250 PS 637

## 2025-05-28 RX ORDER — THIAMINE HYDROCHLORIDE 100 MG/ML
250 INJECTION, SOLUTION INTRAMUSCULAR; INTRAVENOUS DAILY
Status: COMPLETED | OUTPATIENT
Start: 2025-05-30 | End: 2025-06-03

## 2025-05-28 RX ORDER — THIAMINE HYDROCHLORIDE 100 MG/ML
500 INJECTION, SOLUTION INTRAMUSCULAR; INTRAVENOUS 3 TIMES DAILY
Status: COMPLETED | OUTPATIENT
Start: 2025-05-28 | End: 2025-05-29

## 2025-05-28 RX ORDER — VANCOMYCIN HYDROCHLORIDE 50 MG/ML
125 KIT ORAL 4 TIMES DAILY
Status: DISCONTINUED | OUTPATIENT
Start: 2025-05-28 | End: 2025-06-03

## 2025-05-28 RX ORDER — IOPAMIDOL 755 MG/ML
86 INJECTION, SOLUTION INTRAVASCULAR ONCE
Status: COMPLETED | OUTPATIENT
Start: 2025-05-28 | End: 2025-05-28

## 2025-05-28 RX ORDER — PYRIDOXINE HCL (VITAMIN B6) 25 MG
25 TABLET ORAL DAILY
Status: DISCONTINUED | OUTPATIENT
Start: 2025-05-29 | End: 2025-06-12 | Stop reason: HOSPADM

## 2025-05-28 RX ORDER — DEXTROSE MONOHYDRATE 100 MG/ML
INJECTION, SOLUTION INTRAVENOUS CONTINUOUS PRN
Status: DISCONTINUED | OUTPATIENT
Start: 2025-05-28 | End: 2025-06-06

## 2025-05-28 RX ORDER — LIDOCAINE HYDROCHLORIDE 20 MG/ML
5 SOLUTION OROPHARYNGEAL ONCE
Status: DISCONTINUED | OUTPATIENT
Start: 2025-05-28 | End: 2025-05-29

## 2025-05-28 RX ORDER — POTASSIUM CHLORIDE 29.8 MG/ML
20 INJECTION INTRAVENOUS ONCE
Status: COMPLETED | OUTPATIENT
Start: 2025-05-28 | End: 2025-05-28

## 2025-05-28 RX ORDER — DEXTROSE MONOHYDRATE 100 MG/ML
INJECTION, SOLUTION INTRAVENOUS CONTINUOUS PRN
Status: DISCONTINUED | OUTPATIENT
Start: 2025-05-28 | End: 2025-05-28

## 2025-05-28 RX ORDER — ATORVASTATIN CALCIUM 40 MG/1
40 TABLET, FILM COATED ORAL DAILY
Status: DISCONTINUED | OUTPATIENT
Start: 2025-05-29 | End: 2025-06-12 | Stop reason: HOSPADM

## 2025-05-28 RX ORDER — DEXTROSE MONOHYDRATE 100 MG/ML
INJECTION, SOLUTION INTRAVENOUS CONTINUOUS PRN
Status: DISCONTINUED | OUTPATIENT
Start: 2025-05-28 | End: 2025-06-12 | Stop reason: HOSPADM

## 2025-05-28 RX ORDER — FOLIC ACID 0.4 MG
400 TABLET ORAL DAILY
Status: DISCONTINUED | OUTPATIENT
Start: 2025-05-29 | End: 2025-06-12 | Stop reason: HOSPADM

## 2025-05-28 RX ADMIN — THIAMINE HYDROCHLORIDE 500 MG: 100 INJECTION, SOLUTION INTRAMUSCULAR; INTRAVENOUS at 09:54

## 2025-05-28 RX ADMIN — Medication 25 MG: at 08:42

## 2025-05-28 RX ADMIN — VANCOMYCIN HYDROCHLORIDE 125 MG: KIT at 16:41

## 2025-05-28 RX ADMIN — AMPICILLIN SODIUM AND SULBACTAM SODIUM 3 G: 2; 1 INJECTION, POWDER, FOR SOLUTION INTRAMUSCULAR; INTRAVENOUS at 08:50

## 2025-05-28 RX ADMIN — IOPAMIDOL 86 ML: 755 INJECTION, SOLUTION INTRAVENOUS at 15:20

## 2025-05-28 RX ADMIN — AMPICILLIN SODIUM AND SULBACTAM SODIUM 3 G: 2; 1 INJECTION, POWDER, FOR SOLUTION INTRAMUSCULAR; INTRAVENOUS at 21:44

## 2025-05-28 RX ADMIN — Medication 500 MG: at 16:41

## 2025-05-28 RX ADMIN — ONDANSETRON 4 MG: 2 INJECTION INTRAMUSCULAR; INTRAVENOUS at 04:09

## 2025-05-28 RX ADMIN — ATORVASTATIN CALCIUM 40 MG: 40 TABLET, FILM COATED ORAL at 08:42

## 2025-05-28 RX ADMIN — CALCIUM GLUCONATE: 98 INJECTION, SOLUTION INTRAVENOUS at 20:36

## 2025-05-28 RX ADMIN — THIAMINE HYDROCHLORIDE 500 MG: 100 INJECTION, SOLUTION INTRAMUSCULAR; INTRAVENOUS at 14:12

## 2025-05-28 RX ADMIN — ENOXAPARIN SODIUM 60 MG: 60 INJECTION SUBCUTANEOUS at 01:47

## 2025-05-28 RX ADMIN — VANCOMYCIN HYDROCHLORIDE 125 MG: KIT at 20:41

## 2025-05-28 RX ADMIN — ONDANSETRON 4 MG: 2 INJECTION INTRAMUSCULAR; INTRAVENOUS at 15:35

## 2025-05-28 RX ADMIN — OLIVE OIL AND SOYBEAN OIL 250 ML: 16; 4 INJECTION, EMULSION INTRAVENOUS at 20:36

## 2025-05-28 RX ADMIN — ENOXAPARIN SODIUM 60 MG: 60 INJECTION SUBCUTANEOUS at 14:12

## 2025-05-28 RX ADMIN — POTASSIUM CHLORIDE 20 MEQ: 29.8 INJECTION, SOLUTION INTRAVENOUS at 08:49

## 2025-05-28 RX ADMIN — AMPICILLIN SODIUM AND SULBACTAM SODIUM 3 G: 2; 1 INJECTION, POWDER, FOR SOLUTION INTRAMUSCULAR; INTRAVENOUS at 03:39

## 2025-05-28 RX ADMIN — PROCHLORPERAZINE EDISYLATE 5 MG: 5 INJECTION INTRAMUSCULAR; INTRAVENOUS at 00:06

## 2025-05-28 RX ADMIN — Medication 400 MCG: at 08:42

## 2025-05-28 RX ADMIN — AMPICILLIN SODIUM AND SULBACTAM SODIUM 3 G: 2; 1 INJECTION, POWDER, FOR SOLUTION INTRAMUSCULAR; INTRAVENOUS at 15:34

## 2025-05-28 RX ADMIN — ONDANSETRON 4 MG: 2 INJECTION INTRAMUSCULAR; INTRAVENOUS at 09:54

## 2025-05-28 RX ADMIN — THIAMINE HYDROCHLORIDE 500 MG: 100 INJECTION, SOLUTION INTRAMUSCULAR; INTRAVENOUS at 20:46

## 2025-05-28 RX ADMIN — LATANOPROST 1 DROP: 50 SOLUTION/ DROPS OPHTHALMIC at 21:45

## 2025-05-28 RX ADMIN — POTASSIUM PHOSPHATE, MONOBASIC POTASSIUM PHOSPHATE, DIBASIC 9 MMOL: 224; 236 INJECTION, SOLUTION, CONCENTRATE INTRAVENOUS at 09:53

## 2025-05-28 RX ADMIN — THIAMINE HCL TAB 100 MG 100 MG: 100 TAB at 08:42

## 2025-05-28 ASSESSMENT — ACTIVITIES OF DAILY LIVING (ADL)
ADLS_ACUITY_SCORE: 58
ADLS_ACUITY_SCORE: 58
ADLS_ACUITY_SCORE: 56
ADLS_ACUITY_SCORE: 56
ADLS_ACUITY_SCORE: 69
DEPENDENT_IADLS:: INDEPENDENT
ADLS_ACUITY_SCORE: 69
ADLS_ACUITY_SCORE: 69
ADLS_ACUITY_SCORE: 58
ADLS_ACUITY_SCORE: 62
ADLS_ACUITY_SCORE: 58
ADLS_ACUITY_SCORE: 67
ADLS_ACUITY_SCORE: 69
ADLS_ACUITY_SCORE: 69
ADLS_ACUITY_SCORE: 56
ADLS_ACUITY_SCORE: 69
ADLS_ACUITY_SCORE: 56
ADLS_ACUITY_SCORE: 69
ADLS_ACUITY_SCORE: 69
ADLS_ACUITY_SCORE: 56
ADLS_ACUITY_SCORE: 69
ADLS_ACUITY_SCORE: 62

## 2025-05-28 NOTE — CONSULTS
Care Management Initial Consult    General Information  Assessment completed with: Family, Brother - Cruz  Type of CM/SW Visit: Initial Assessment    Primary Care Provider verified and updated as needed: Yes   Readmission within the last 30 days: no previous admission in last 30 days      Reason for Consult: discharge planning  Advance Care Planning: Advance Care Planning Reviewed: questions answered          Communication Assessment  Patient's communication style: spoken language (English or Bilingual)    Hearing Difficulty or Deaf: no   Wear Glasses or Blind: yes    Cognitive  Cognitive/Neuro/Behavioral: .WDL except, arousability, level of consciousness, orientation  Level of Consciousness: confused, lethargic  Arousal Level: arouses to touch/gentle shaking  Orientation: disoriented to, place, time, situation  Mood/Behavior: calm, cooperative  Best Language: 0 - No aphasia  Speech: clear    Living Environment:   People in home: alone     Current living Arrangements: house      Able to return to prior arrangements: yes       Family/Social Support:  Care provided by: self  Provides care for: no one     Support system: Sibling(s)          Description of Support System: Supportive    Support Assessment: Adequate family and caregiver support    Current Resources:   Patient receiving home care services: No        Community Resources: None  Equipment currently used at home: none  Supplies currently used at home: Diabetic Supplies    Employment/Financial:  Employment Status: retired        Financial Concerns: none           Does the patient's insurance plan have a 3 day qualifying hospital stay waiver?  Yes     Which insurance plan 3 day waiver is available? Alternative insurance waiver    Will the waiver be used for post-acute placement? Undetermined at this time    Lifestyle & Psychosocial Needs:  Social Drivers of Health     Food Insecurity: Unknown (5/28/2025)    Food Insecurity     Within the past 12 months, did you  worry that your food would run out before you got money to buy more?: Patient unable to answer     Within the past 12 months, did the food you bought just not last and you didn t have money to get more?: Patient unable to answer   Depression: Not at risk (4/9/2025)    PHQ-2     PHQ-2 Score: 1   Housing Stability: Unknown (5/28/2025)    Housing Stability     Do you have housing? : Patient unable to answer     Are you worried about losing your housing?: Patient unable to answer   Tobacco Use: Medium Risk (4/9/2025)    Patient History     Smoking Tobacco Use: Former     Smokeless Tobacco Use: Never     Passive Exposure: Past   Financial Resource Strain: Unknown (5/28/2025)    Financial Resource Strain     Within the past 12 months, have you or your family members you live with been unable to get utilities (heat, electricity) when it was really needed?: Patient unable to answer   Alcohol Use: Not on file   Transportation Needs: Unknown (5/28/2025)    Transportation Needs     Within the past 12 months, has lack of transportation kept you from medical appointments, getting your medicines, non-medical meetings or appointments, work, or from getting things that you need?: Patient unable to answer   Physical Activity: Not on file   Interpersonal Safety: Unknown (5/28/2025)    Interpersonal Safety     Do you feel physically and emotionally safe where you currently live?: Patient unable to answer     Within the past 12 months, have you been hit, slapped, kicked or otherwise physically hurt by someone?: Patient unable to answer     Within the past 12 months, have you been humiliated or emotionally abused in other ways by your partner or ex-partner?: Patient unable to answer   Stress: Not on file   Social Connections: Not on file   Health Literacy: Not on file       Functional Status:  Prior to admission patient needed assistance:   Dependent ADLs:: Ambulation-walker  Dependent IADLs:: Independent       Mental Health  "Status:  Mental Health Status: Other (see comment)  Mental Health Management:  (Brother is concerned)    Chemical Dependency Status:  Chemical Dependency Status: Other (see comment)  Chemical Dependency Management:  (brother is concerned)          Values/Beliefs:  Spiritual, Cultural Beliefs, Sabianism Practices, Values that affect care:                 Discussed  Partnership in Safe Discharge Planning  document with patient/family: No    Additional Information:  Assessment completed over the telephone with her brother Cruz as she is too altered at this time.  Verified her PCP is current.  She does not have an advanced health care directive on file, brother did not have any questions at this time.    Sarah lives alone in a house on [address].  She is independent at baseline, but Cruz is concerned that she is not able to take care of herself and that she does need some help.  She has a walker but he thinks it's \"broken\".  She does get meals delivered and \"recently has been going out to get food\".  When asked how she gets her food he stated that she has a car and drives.  He has concerns about her depression and he feels that she drinks \"too much\".  He would like to see her \"go to a dry out place\" after discharge to help with her alcohol use.     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      Care management will continue to follow for any potential discharge needs.    Radha Taylor,  Nurse Coordinator, BSN  Phone: [phone]  Vocera: 6B C RNCC     Social Work and Care Management Department      SEARCHABLE in [facility] - search CARE COORDINATOR      [place] ([phone]) Saturday & Sunday; ([phone]) [facility] Recognized Holidays      Units: 5A Onc Vocera & 5C Vocera      Units: 6B Vocera & 6C Vocera       Units: 7A SOT RNCC Vocera, 7B Med Surg Vocera, & 7C Med Surg Vocera       Units: 6A Vocera & 4A CVICU Vocera, 4C MICU " Vocera, and 4E SICU Vocera       Units: 5 Ortho Vocera & 5 Med Surg Vocera       Units: 6 Med Surg Vocera & 8 Med Surg Vocera       Next Steps: Continue to follow for discharge needs.  Needs IMM.  IHO has been completed.    Pennie Taylor RN

## 2025-05-28 NOTE — PLAN OF CARE
Problem: Adult Inpatient Plan of Care  Goal: Plan of Care Review  Description: The Plan of Care Review/Shift note should be completed every shift.  The Outcome Evaluation is a brief statement about your assessment that the patient is improving, declining, or no change.  This information will be displayed automatically on your shift  note.  Flowsheets (Taken 5/28/2025 6192)  Outcome Evaluation: CMA completed via telephone with her brother Cruz. Will continue to follow for any discharge needs.

## 2025-05-28 NOTE — PROGRESS NOTES
Inpatient Diabetes Management Service: Daily Progress Note     HPI: Sarah Felix is a 79 year old female with history of insulin-dependent DM2, HTN, fatty liver, possible alcohol use disorder, gait instability who presented 5/25 with generalized weakness and was admitted with DKA and sepsis, 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 Inpatient Diabetes Service consulted for DKA management.          Assessment/Plan:     Assessment:   Type 2 Diabetes Mellitus, complicated by neuropathy.  (A1c 8 %, Hgb: 12.3; 5/25/25)  DKA on admission.  Lactic acidosis on admission  Sepsis, unknown source  Hepatic vein thrombosis  Dysphagia on full liquid diet   Toxic metabolic encephalopathy  Stress induced hyperglycemia  TPN induced hyperglycemia     Plan/Recommendations:    -Start Regular insulin in TPN 6 units with Dextrose 120 g  (0.05 units per gram dextrose)    -Give lantus 8 units, 2 hour following administration, STOP/transition off IV insulin   - If Blood glucose is 80 or less, start D10 at 10 ml and titrate up to get  or more.  Stop D10 if blood glucose is 125 or more   - Once IV insulin discontinued, start medium correction scale. (ISF 50) q 4 hours while NPO   -  BG monitoring q1-2 hr per IV insulin drip protocol then q 4 hours while NPO.   - Hypoglycemia protocol  - Carb counting protocol     Discussion: Continues to be lethargic and currently NPO due to lethargy, emesis and risk for aspiration. DKA labs. Ketones improving. Still present most likely starvation ketosis. Anion gap 11 Bicarb 23. Will switch to low dose of Lantus and correction q 4 hours. Per primary team, adding TPN. Adding insulin to TPN today. Will add prn d10 order if hypoglycemic with Lantus dose and NPO status. .        Discharge Planning: (tentative)    Medications: Basal bolus (discharge on insulin only)   Test Claims:  none needed.   Education:  Needs to be assessed closer to discharge.    Outpatient Follow-up:   "recommend [facility] Endocrinology vs PCP      Thank you for this consult. IDS will continue to follow.      Please notify Inpatient Diabetes Service if changes are planned to steroids, nutrition, TPN/TF and anticipated procedures requiring prolonged NPO status.         Interval History/Review of Systems :   The last 24 hours progress and nursing notes reviewed.  Pt continues to be lethargic. Frequent emesis. Sitter at bedside due to emesis and inability to clear vomit.  NPO   Blood cultures drawn from PICC and peripheral.  Infectious workup ensues.       Planned Procedures/Surgeries: none    Inpatient Glucose Control:       Recent Labs   Lab 05/28/25  0558 05/28/25  0451 05/28/25  0400 05/28/25  0300 05/28/25  0203 05/28/25  0059   * 111* 132* 98 108* 129*             Medications Impacting Glycemia:   Steroids: None  D5W containing solutions/medications: currently on D5W at 75 mL/h  Other medications impacting glucose: None        Nutrition:   Orders Placed This Encounter      Full Liquid Diet    Supplements:  None  TF: None  TPN:   5/28: Regular insulin in TPN 6 units with Dextrose 120 g  (6 units per gram dextrose)          Diabetes History: see full consult note for complete diabetes history   Diabetes Type and Duration: DM2, 7/2009  Zinc transporter 8 antibody, islet antibody, and insulin antibody not available on epic search      GAD65 antibody <5 (9/11/2012)   C peptide 7.6 (9/11/2012)     PTA Medication Regimen: Glimepiride 1 mg daily, Novolog 5 units with high carb meal daily as needed, metformin 500 mg daily  Missing doses?: uncertain  Historical Diabetes Medications: N/A     Glucose monitoring device/frequency/trends: Fortino CGM, reports 130 on average  Hypoglycemia PTA:   - Frequency: \"rarely\"  - Severity:  no history of severe unconscious lows   - Awareness:  intact    - Treatment: \"eats food\"      Outpatient Diabetes Provider: Unknown  Formal Diabetes Education/Educator: Erik        Physical " Exam:   /69 (BP Location: Left arm)   Pulse 103   Temp 97.3  F (36.3  C) (Oral)   Resp 15   Wt 64.4 kg (142 lb)   SpO2 95%   BMI 24.37 kg/m    General:  resting in bed arouses to voice.          Data:     Lab Results   Component Value Date    A1C 8.0 (H) 05/25/2025    A1C 7.5 (H) 11/05/2024    A1C 7.6 (H) 07/09/2024    A1C 6.6 (H) 03/05/2024    A1C 7.4 (H) 11/14/2023    A1C 8.3 (H) 06/21/2021    A1C 9.4 (H) 12/14/2020    A1C 8.1 (H) 09/21/2020    A1C 8.5 (H) 01/07/2020    A1C 7.6 (H) 10/12/2019       ROUTINE IP LABS (Last four results)  BMP  Recent Labs   Lab 05/28/25  0558 05/28/25  0451 05/28/25  0400 05/28/25  0300 05/27/25  1901 05/27/25  1856 05/27/25  1403 05/27/25  1327 05/27/25  1202 05/27/25  1136 05/27/25  1112 05/27/25  0901 05/26/25  1614 05/26/25  1525   NA  --   --   --   --   --   --   --  141  --  140  --  139  --  136   POTASSIUM  --   --   --   --   --  4.1  --  3.8  --  4.0  --  4.3   < > 4.0   CHLORIDE  --   --   --   --   --   --   --  106  --  106  --  104  --  104   CECY  --   --   --   --   --   --   --  7.7*  --  7.7*  --  8.1*  --  7.6*   CO2  --   --   --   --   --   --   --  22  --  20*  --  21*  --  22   BUN  --   --   --   --   --   --   --  18.8  --  18.9  --  20.3  --  27.9*   CR  --   --   --   --   --   --   --  0.74  --  0.72  --  0.77  --  0.72   * 111* 132* 98   < >  --    < > 191*   < > 202*   < > 233*   < > 120*    < > = values in this interval not displayed.     CBC  Recent Labs   Lab 05/27/25  0627 05/26/25  0945 05/25/25  1355 05/25/25  1334   WBC 17.2* 15.1*  --  15.4*   RBC 4.16 3.74*  --  4.58   HGB 11.0* 10.2* 12.6 12.3   HCT 34.1* 30.5* 37 37.9   MCV 82 82  --  83   MCH 26.4* 27.3  --  26.9   MCHC 32.3 33.4  --  32.5   RDW 16.9* 16.3*  --  16.6*   * 565*  --  812*     INR  Recent Labs   Lab 05/25/25  1334   INR 1.43*       Inpatient Diabetes Service will continue to follow, please don't hesitate to contact the team with any questions or  concerns.     MALATHI Falcon CNP    Plan discussed with patient, bedside RN, and primary team via this note.    To contact Inpatient Diabetes Service:     7 AM - 5 PM: Page the IDS MIKE following the patient that day (see filed or incomplete progress notes/consult notes under Endocrinology)    OR if uncertain of provider assignment: page job code [phone]    5 PM - 7 AM: First call after hours is to primary service.    For urgent after-hours questions, page job code for on call fellow: [phone]     I spent a total of 45 minutes on the date of the encounter doing prep/post-work, chart review, history and exam, documentation and further activities per the note including lab review, multidisciplinary communication, counseling the patient and/or coordinating care regarding acute hyper/hypoglycemic management, as well as discharge management and planning/communication.

## 2025-05-28 NOTE — PROGRESS NOTES
Resident/Fellow Attestation   I, Johnny Boyd MD, was present with the medical/MIKE student who participated in the service and in the documentation of the note.  I have verified the history and personally performed the physical exam and medical decision making.  I agree with the assessment and plan of care as documented in the note.      Johnny Boyd MD  PGY1  Date of Service (when I saw the patient): 05/28/25    [facility]    Progress Note - Medicine Service, [facility] TEAM 2       Date of Admission:  5/25/2025    Assessment & Plan   Sarah Felix is a 79 year old female with history of  insulin-dependent DM2, HTN, fatty liver, possible alcohol use disorder, gait instability who presented 5/25 with generalized weakness and was admitted with DKA and ?sepsis, 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 Course c/b worsening encephalopathy and uncontrolled nausea/vomiting.     Today's updates:  - Discontinue insulin ggt, start 8U lantus per Endocrinology  - Order C. Diff testing, COVID/RSV/flu viral panel  - Increase thiamine dose to 500mg TID for 6 doses  - Continue Unasyn  - Initiate TPN  - Silver placement given urinary retention  - Place NGT for suctioning given recurrent emesis  - CT chest/abd/pelvis to evaluate for possible infectious source or colitis     Diabetic ketoacidosis, improved  Pseudohyponatremia  Hypokalemia  T2DM with A1c 8.0%. Resolving hypokalemia. Ketones fluctuating but generally downtrending; will CTM DKA based on anion gap and stop trending ketones. Likely fluctuating ketones due to starvation ketosis; will stop insulin drip, manage nutrition with TPN.  - DKA protocol              - Discontinue insulin ggt  - Start 8U lantus, medium dose SSI              - mIVF              - Q4H labs   - Hold PTA metformin, glipizide  - Diabetes ed consult     C/f sepsis of unknown source  Lactic acidosis  S/p 3L IVF as well as mIVF for DKA protocol. No obvious  "infectious source per current imaging and cultures. Patient has worsening encephalopathy and leukocytosis despite several days of antibiotics. Has been afebrile during this time. Has been having diarrhea, so will test for C.diff/enteric panel 5/28. Will also pan-scan to eval for colitis or other obvious infectious source given the lack of improvement.  - Diagnostics:  - CT C/A/P 5/25 showed no signs of pneumonia or lung pathology, thrombosis of R. Hepatic vein, and slightly enlarging cystic lesion of the hepatic segment. Getting repeat CT 5/28 given concern for colitis with abdominal tenderness and refractory increasing WBC.   - C. Diff. Testing 5/28  - UA 5/25 + protein, leukocyte esterase, WBC, bacteria; but urine cx negative  - Blood cultures negative              - MRSA nares negative              - Urine legionella/strep pneumo negative  - Abx:              - Unasyn (5/27-*)  - Zosyn (5/25-5/27)              - Vancomycin (5/25-5/26)  - CT chest/abd/pelvis to evaluate for possible infectious source or colitis     Hepatic vein thrombus  No prior imaging to compare. Per chart review, history of \"thromboembolism\" after ovarian cyst removal age 22. Liver panel normal, upper quadrant tenderness present on exam.  - IR consulted; no indication for lytics  - Heme consulted              - RUQ US w/ Doppler 5/26 showed stable R. Hepatic vein thrombosis  - Heparin gtt 5/25-5/27  - Lovenox 5/27-*     Toxic metabolic encephalopathy  Not oriented to month or year. May be related to sepsis, although there is no obvious infectious source currently. Increased thiamine dose in case of Wernicke encephalopathy. No acute intracranial findings on CT head. Hospital acquired delerium is also possible giving patients waxing and waning presentation. Will continue to treat sepsis empirically and continue to monitor.     Possible alcohol use disorder  Reports 2 drinks/day. Did have an ED visit 03/2025 for alcohol intoxication. Reports last " drink 5 days PTA, low concern for withdrawal. Her brother, Cruz, has concerns that she is drinking excessively and encourages addiction medicine involvement.  - Consider addiction medicine consult when encephalopathy improves  - PTA folate  - Continue thiamine, Wernicke dosing     Moderate malnutrition in the context of chronic illness   Continued elevated ketones despite insulin ggt. Elevation likely due to poor oral intake due to encephalopathy and starvation ketosis. Will initiate TPN.  - TPN consult and initiation    Dysphagia  Recurrent vomiting  Concern for aspiration from bedside RN.   - SLP consult:   - Increased risk for aspiration 2/2 poor mentation  - Full liquid diet with close supervision  - Given recurrent vomiting and aspiration risk, NG suctioning initiated     Sacrum/coccyx moisture wound  Erythematous, blanchable area on her sacrum. Bedside RN says likely from excessive moisture 2/2 fecal incontinence. [facility] nurse resports wound c/w incontinence associated dermatitis.  - [facility] nurse rec:    - Dinorah cleanse and protection with soft cloth   - Apply thin layer of Barrier paste to open and red areas     Fall at home  CTH, C spine negative for acute process/fracture     Elevated INR  Unclear etiology, no known liver disease. Will hold off on Vit K given new thrombus.     Lower extremity edema  Reports to be at baseline. Symmetric, low concern for DVT. No recent echos. On room air, no need for TTE currently.  - Hold PTA lasix 20mg daily given sepsis, lactic acidosis     Elevated troponin  Likely type 2 NSTEMI in setting of DKA/sepsis. Troponin 16->15. No chest pain. Initial EKG reading STEMI, repeat without evidence of ACS per ED provider report.     Elevated lipase  2x ULN but no abdominal pain, no pancreatitis on CT.     Generalized weakness, failure to thrive  TSH wnl.   - PT/OT consults  - Ongoing evaluation for safe discharge plan     HTN  Hold PTA metoprolol, lisinopril given sepsis and normotension      Essential thrombocytosis: PTA aspirin, hydroxyurea     Chronic/stable:     Glaucoma: PTA latanoprost  CKD2  Diabetic neuropathy  Schatzki's ring  Slow transit constipation  Mixed urge and stress incontinence  Gait instability       Medically Ready for Discharge: Anticipated in 2-4 Days       The patient's care was discussed with the Attending Physician, Dr. Ivan Knight.    Stefanie Kim  Medical Student  Medicine Service, [address]  Securely message with [employer] (more info)  Text page via [facility] Paging/Directory   See signed in provider for up to date coverage information    Joseph Boyd MD  PGY-1  ______________________________________________________________________    Interval History   Sarah was somnolent this morning, having difficulty staying awake while being asked questions. She has been having uncontrolled nausea, vomiting, and coughing up murky green liquid and requires constant suctioning by sitter and RN. She hasn't been straight cath'd yet today, but medicine team gave approval to put in a Silver catheter should she need to be cath'd again for urinary retention.     Physical Exam   Vital Signs: Temp: 98.1  F (36.7  C) Temp src: Oral BP: 114/89 Pulse: (!) 121   Resp: 16 SpO2: 94 % O2 Device: None (Room air)    Weight: 142 lbs 0 oz    Constitutional: fatigued and somnolent  Cardiovascular: Regular rate and rhythm, normal S1 and S2, no S3 or S4, and no murmur noted  GI: tenderness to palpation in upper quadrants  Musculoskeletal: 3+ pitting edema in feet  Neurologic: Oriented to self only.    Data     I have personally reviewed the following data over the past 24 hrs:    19.1 (H)  \   9.8 (L)   / 621 (H)     143 109 (H) 14.0 /  112 (H)   3.8 23 0.72 \     ALT: 15 AST: 18 AP: 64 TBILI: 0.5   ALB: 2.8 (L) TOT PROTEIN: 4.7 (L) LIPASE: 14       Imaging results reviewed over the past 24 hrs:   Recent Results (from the past 24 hours)   XR Chest Port 1  View    Narrative    EXAM: XR CHEST PORT 1 VIEW  5/27/2025 2:54 PM     HISTORY:  Admitted for DKA, possible aspiration event       COMPARISON:    5/26/2025     FINDINGS:     Stable position of right upper cavity PICC with tip in the mid SVC.  Trachea is midline. Cardiomediastinal silhouette and pulmonary  vasculature are within normal limits. Atherosclerotic calcification at  the aortic knob. Left basilar streaky/hazy pulmonary opacities. No  significant right pleural effusion. No discernible pneumothorax.    No acute osseous abnormality. Visualized upper abdomen is  unremarkable.        Impression    IMPRESSION:  Retrocardiac patchy opacities with subtle blunting of left  costophrenic angle, may represent atelectasis, trace left pleural  effusion, and/or aspiration. Recommend follow-up to clearing.    I have personally reviewed the examination and initial interpretation  and I agree with the findings.    YOSEF VANN MD         SYSTEM ID:  [id number]   CT Head w/o Contrast    Narrative    EXAM: CT HEAD W/O CONTRAST  LOCATION: [facility]  DATE: 5/28/2025    INDICATION: encephalopathic, worsening  COMPARISON: 05/25/2025  TECHNIQUE: Routine CT Head without IV contrast. Multiplanar reformats. Dose reduction techniques were used.    FINDINGS:  Motion degraded examination.    INTRACRANIAL CONTENTS: No intracranial hemorrhage, extraaxial collection, or mass effect.  No CT evidence of acute infarct. Mild presumed chronic small vessel ischemic changes. Mild generalized volume loss. No hydrocephalus. Mild intracranial   atherosclerotic disease.    VISUALIZED ORBITS/SINUSES/MASTOIDS: Prior bilateral cataract surgery. Visualized portions of the orbits are otherwise unremarkable. Tiny air-fluid levels in both sphenoid sinuses. Otherwise paranasal sinuses clear. Scattered fluid/membrane thickening in   the mastoid air cells bilaterally.    BONES/SOFT TISSUES: No acute  abnormality. Mild hyperostosis frontalis interna.      Impression    IMPRESSION:  1.  No CT evidence for acute intracranial process.  2.  Brain atrophy and presumed chronic microvascular ischemic changes as above.  3.  Tiny air-fluid levels in both sphenoid sinuses and scattered fluid/membrane thickening in the mastoid air cells bilaterally.

## 2025-05-28 NOTE — PLAN OF CARE
Neuro: Patient only oriented x1, lethargic and only waking up to touch this morning. This evening patient oriented x2. Slow to respond but answers some yes and no questions. Intermittently following commands. Scoring 4 on CIWA.   Cardiac: SR-ST 90's-100's. BP's stable. Afebrile.    Respiratory: Sating >92% on RA. Productive cough present. Course lung sounds bilaterally.   GI/: Adequate urine output. Silver placed for retention. 3 incontinent BM's this shift. C-diff sample collected, pt c-diff +.   Diet/appetite: Multiple small emesis this morning. NG placed for decompression and medications. On full liquid diet but no appetite. Speech following patient.   Activity: Assist of 2 for turns in bed. Not OOB this shift. PT/ OT ordered.   Pain: At acceptable level on current regimen.   Skin: No new deficits noted. Chetna area/coccyx orders placed by WOC. Wound changed PRN and BID.   LDA's: Right TL PICC, L PIV, NG to LIS and clamping with meds.   Labs: Potassium and phos replaced. On mag, K, phos protocols.     Plan: Chest/abd/pelvis CT completed. Plans to start patient on TPN tonight. Continue with POC.       Goal Outcome Evaluation:  Plan of Care Reviewed With: patient  Overall Patient Progress: improving  Outcome Evaluation: Chest/abd/pelvis CT completed. NG placed. Silver placed. Insulin gtt DC'ed.

## 2025-05-28 NOTE — PLAN OF CARE
NURSING SHIFT SUMMARY    Goal Outcome Evaluation:    Plan of Care Reviewed With: patient    Overall Patient Progress: declining    Outcome Evaluation: Pt continues to be lethargic. Frequent emesis. Sitter at bedside due to emesis and inability to clear vomit. Blood cultures drawn from PICC and peripheral. Swabbed outside of PICC due to drainage. Q1H BG, algorithm 1. Report given to 6B RN

## 2025-05-28 NOTE — PLAN OF CARE
Admission          5/25/2025  1:18 PM  -----------------------------------------------------------  Reason for admission: DKA  Primary team notified of pt arrival. Yes  Admitted from: [facility]  Via: stretcher  Accompanied by: Staff  Belongings: Placed in closet  Admission Profile: Unable to complete  Teaching: Unable to complete. LOC impaired  Access: PIV/TLPICC  Telemetry:Placed on pt  Ht./Wt.: complete  Code Status verified on armband: yes  2 RN Skin Assessment Completed By: Ivan/Dagoberto Arellano Rec completed: no  Suction/Ambu bag/Flowmeter at bedside: yes  Is patient having diarrhea upon admission- if YES fill out testing algorithm : no    C. Diff Testing Algorithm (MUST be marked YES)   3 or more loose stools in 24 hrs. [] Yes [] No       Additional symptoms:(At least ONE must be marked yes)   Abdominal pain/discomfort [] Yes [] No   Fever at least 38C (100.4 F) [] Yes [] No   Elevated WBC(>11,000) [] Yes [] No       Exclusion Criteria:  (MUST be marked YES)   Off laxatives for at least 48 hrs. [] Yes [] No       Pt status:    Temp:  [97.3  F (36.3  C)-98.4  F (36.9  C)] 97.3  F (36.3  C)  Pulse:  [] 103  Resp:  [15-18] 15  BP: ()/(48-98) 116/69  SpO2:  [94 %-98 %] 95 %

## 2025-05-28 NOTE — PROGRESS NOTES
CLINICAL NUTRITION SERVICES - ASSESSMENT NOTE    RECOMMENDATIONS FOR MDs/PROVIDERS TO ORDER:  Continue to closely monitor K/Mg/Phos and replete per protocol, refeeding risk and losses anticipated w/ NGT for decompression.     Malnutrition Status:    Moderate malnutrition in the context of chronic illness (may change, pending NFPE)    Registered Dietitian Interventions:  TPN initiation per consult; hang time at 2000 per hospital protocol; TPN change order sent to PharmD as below:   Dosing weight:  64.4 kg  Access: PICC     Initial parameters (per day)  Volume:  2400 mL  Dextrose: 120 g  AA: 95 g  Lipids: 250 mL 20%, 6 days per week (M-Sat)    Dextrose titration:   Monitor lytes and if within acceptable parameters (Mg++ > or = 1.5, K+ is > or = 3, and PO4 > or = 1.9), increase dextrose by 40 g/day to goal of 240 g dextrose.    Additives: 10 mL Infuvite, 1 mL trace minerals     Goal PN provides 240 g dextrose, 95 g AA, and 250 mL 20% lipids 6 days per week for total provision of 1624 Kcals (25 Kcals/kg), 1.5 g/kg protein, GIR 2.6 mg/kg/minute, and 26% fat kcals on average daily.     Future/Additional Recommendations:  Monitor GI status, NG output, fluid status/hydration, BG levels, lytes     REASON FOR ASSESSMENT  Pharmacy/nutrition to start and manage PN    INFORMATION OBTAINED  Assessed patient in room.     NUTRITION HISTORY  Unable to obtain nutrition hx due to pt mentation.   No family present during visit.     CURRENT NUTRITION ORDERS  Diet: NPO  Snacks/Supplements: N/A       CURRENT INTAKE/TOLERANCE  N/A, NPO and NGT placed for suction d/t N/V, bilious output.     LABS  Nutrition-relevant labs: Reviewed; K+ WNL; Mg slightly elevated; PO4 levels LOW (2 mg/dL); is on RN managed lyte replacement protocols.  >>  Closely monitor lytes (K/Mg/Phos) with POC for nutrition support initiation. At risk for refeeding based on presentation if Etoh use PTA, potential for lyte depletion w/ gastric loss w/ pt emesis and/or  "decompression, and if inadequate intake PTA.    MEDICATIONS  Nutrition-relevant medications: Reviewed; Unasyn, Folvite, Lasix (held), Lantus, Insulin drip, Zofran, K/Phos IV replacement, B6 25 mg daily, 500 mg B1/Thiamine daily, D5W in 1/2 HS at 100 mL/hr (120 gm dex, 408 Kcals/day)     ANTHROPOMETRICS  Height: Height: 1.626 m (5' 4\")    Admission Weight: 64.4 kg (142 lb) (05/25/25 1932)   Most Recent Weight: 64.4 kg (142 lb)  IBW: 54.5 kg  % IBW: 118%  BMI (kg/m ): Normal BMI  Weight History:   - Down 5.3% over past ~2 months; down 8.8% over past ~4 months.   Wt Readings from Last 10 Encounters:   05/25/25 64.4 kg (142 lb)   03/29/25 68 kg (150 lb)   01/28/25 70.6 kg (155 lb 11.2 oz)   11/25/24 71.7 kg (158 lb)   11/05/24 72.3 kg (159 lb 8 oz)   07/30/24 72.3 kg (159 lb 6.4 oz)   07/09/24 73.8 kg (162 lb 11.2 oz)   03/14/24 73 kg (161 lb)   03/05/24 72.9 kg (160 lb 11.2 oz)   01/30/24 73.5 kg (162 lb 1.6 oz)       Dosing Weight: 64.4 kg, based on actual wt    ASSESSED NUTRITION NEEDS  Estimated Energy Needs: 1610 - 1930 kcals/day (25 - 30 kcals/kg)  Justification: Maintenance (aim for low end of range w/ TPN)  Estimated Protein Needs: ~80 - 95 grams protein/day (1.2 - 1.5 grams of pro/kg)  Justification: Increased needs/lean body mass preservation   Estimated Fluid Needs:  (1 mL/kcal)  Justification: Maintenance or per provider pending fluid status     SYSTEM FINDINGS    Skin/wounds: WOCN note 5/27 - see note for further detail if needed.   Gluteal cleft, and buttocks - incontinence associated dermatitis - initial assessment   GI symptoms: N/V, 200 mL emesis documented per I/O today (total). Possible NGT placement per [facility] morning huddle.     MALNUTRITION  % Intake: Unable to assess  % Weight Loss: Weight loss does not meet criteria but need to closely monitor (on cusp)  Subcutaneous Fat Loss: Unable to assess - attempted NFPE but newly placed NG without suction cannister and having output leaking, RN attending, " NFPE deferred today.   Muscle Loss: Temples (temporalis muscle): Mild visually   Fluid Accumulation/Edema: Moderate to severe, 2-4+  Malnutrition Diagnosis: Moderate malnutrition in the context of chronic illness (may change, pending NFPE)  Malnutrition Present on Admission: Unable to assess    NUTRITION DIAGNOSIS  Inadequate oral intake related to N/V as evidenced by NG for decompression, TPN initiation     INTERVENTIONS  Collaboration by nutrition professional with other providers  Parenteral nutrition/IV fluid management    Goals  Total avg nutritional intake to meet a minimum of 25 kcal/kg and 1.2-1.5 g PRO/kg daily (per dosing wt 64.4 kg).     Monitoring/Evaluation  Progress toward goals will be monitored and evaluated per policy.    Gianfracno Aguirre, MS, RDN, LD, CNSC  Available by HBCS or phone   [place]: M-F (7:00-3:30)  6B Clinical Dietitian   Weekend/Holiday (7:00-3:30) - Weekend Clinical Dietitian   6B RD desk: [phone]       **Clinical Dietitians are no longer available by pager.

## 2025-05-29 ENCOUNTER — APPOINTMENT (OUTPATIENT)
Dept: CARDIOLOGY | Facility: CLINIC | Age: 80
End: 2025-05-29
Payer: COMMERCIAL

## 2025-05-29 ENCOUNTER — APPOINTMENT (OUTPATIENT)
Dept: PHYSICAL THERAPY | Facility: CLINIC | Age: 80
DRG: 871 | End: 2025-05-29
Payer: COMMERCIAL

## 2025-05-29 VITALS
RESPIRATION RATE: 18 BRPM | TEMPERATURE: 97.7 F | SYSTOLIC BLOOD PRESSURE: 115 MMHG | BODY MASS INDEX: 24.86 KG/M2 | DIASTOLIC BLOOD PRESSURE: 65 MMHG | WEIGHT: 144.84 LBS | HEART RATE: 102 BPM | OXYGEN SATURATION: 96 %

## 2025-05-29 LAB
ALBUMIN SERPL BCG-MCNC: 2.5 G/DL (ref 3.5–5.2)
ALP SERPL-CCNC: 60 U/L (ref 40–150)
ALT SERPL W P-5'-P-CCNC: 13 U/L (ref 0–50)
ANION GAP SERPL CALCULATED.3IONS-SCNC: 7 MMOL/L (ref 7–15)
ANION GAP SERPL CALCULATED.3IONS-SCNC: 7 MMOL/L (ref 7–15)
AST SERPL W P-5'-P-CCNC: 15 U/L (ref 0–45)
BACTERIA SKIN AEROBE CULT: NO GROWTH
BACTERIA SPEC CULT: NORMAL
BILIRUB SERPL-MCNC: 0.5 MG/DL
BILIRUBIN DIRECT (ROCHE PRO & PURE): 0.25 MG/DL (ref 0–0.45)
BUN SERPL-MCNC: 12.4 MG/DL (ref 8–23)
BUN SERPL-MCNC: 15.1 MG/DL (ref 8–23)
CALCIUM SERPL-MCNC: 7.4 MG/DL (ref 8.8–10.4)
CALCIUM SERPL-MCNC: 7.6 MG/DL (ref 8.8–10.4)
CHLORIDE SERPL-SCNC: 104 MMOL/L (ref 98–107)
CHLORIDE SERPL-SCNC: 106 MMOL/L (ref 98–107)
CREAT SERPL-MCNC: 0.72 MG/DL (ref 0.51–0.95)
CREAT SERPL-MCNC: 0.76 MG/DL (ref 0.51–0.95)
EGFRCR SERPLBLD CKD-EPI 2021: 80 ML/MIN/1.73M2
EGFRCR SERPLBLD CKD-EPI 2021: 86 ML/MIN/1.73M2
ERYTHROCYTE [DISTWIDTH] IN BLOOD BY AUTOMATED COUNT: 17.2 % (ref 10–15)
GLUCOSE BLDC GLUCOMTR-MCNC: 178 MG/DL (ref 70–99)
GLUCOSE BLDC GLUCOMTR-MCNC: 187 MG/DL (ref 70–99)
GLUCOSE BLDC GLUCOMTR-MCNC: 219 MG/DL (ref 70–99)
GLUCOSE BLDC GLUCOMTR-MCNC: 222 MG/DL (ref 70–99)
GLUCOSE BLDC GLUCOMTR-MCNC: 226 MG/DL (ref 70–99)
GLUCOSE BLDC GLUCOMTR-MCNC: 228 MG/DL (ref 70–99)
GLUCOSE SERPL-MCNC: 184 MG/DL (ref 70–99)
GLUCOSE SERPL-MCNC: 234 MG/DL (ref 70–99)
GRAM STAIN RESULT: NORMAL
GRAM STAIN RESULT: NORMAL
HCO3 SERPL-SCNC: 26 MMOL/L (ref 22–29)
HCO3 SERPL-SCNC: 27 MMOL/L (ref 22–29)
HCT VFR BLD AUTO: 26.4 % (ref 35–47)
HGB BLD-MCNC: 8.4 G/DL (ref 11.7–15.7)
INR PPP: 1.58 (ref 0.85–1.15)
LVEF ECHO: NORMAL
MAGNESIUM SERPL-MCNC: 1.1 MG/DL (ref 1.7–2.3)
MAGNESIUM SERPL-MCNC: 2.2 MG/DL (ref 1.7–2.3)
MCH RBC QN AUTO: 27 PG (ref 26.5–33)
MCHC RBC AUTO-ENTMCNC: 31.8 G/DL (ref 31.5–36.5)
MCV RBC AUTO: 85 FL (ref 78–100)
PHOSPHATE SERPL-MCNC: 1.8 MG/DL (ref 2.5–4.5)
PHOSPHATE SERPL-MCNC: 2 MG/DL (ref 2.5–4.5)
PLATELET # BLD AUTO: 557 10E3/UL (ref 150–450)
POTASSIUM SERPL-SCNC: 3.8 MMOL/L (ref 3.4–5.3)
POTASSIUM SERPL-SCNC: 4.1 MMOL/L (ref 3.4–5.3)
PREALB SERPL-MCNC: 5.8 MG/DL (ref 20–40)
PROT SERPL-MCNC: 4.2 G/DL (ref 6.4–8.3)
PROTHROMBIN TIME: 19.1 SECONDS (ref 11.8–14.8)
RBC # BLD AUTO: 3.11 10E6/UL (ref 3.8–5.2)
SODIUM SERPL-SCNC: 138 MMOL/L (ref 135–145)
SODIUM SERPL-SCNC: 139 MMOL/L (ref 135–145)
WBC # BLD AUTO: 15.1 10E3/UL (ref 4–11)

## 2025-05-29 PROCEDURE — 84100 ASSAY OF PHOSPHORUS: CPT

## 2025-05-29 PROCEDURE — 85018 HEMOGLOBIN: CPT

## 2025-05-29 PROCEDURE — 85610 PROTHROMBIN TIME: CPT

## 2025-05-29 PROCEDURE — 97530 THERAPEUTIC ACTIVITIES: CPT | Mod: GP

## 2025-05-29 PROCEDURE — 250N000011 HC RX IP 250 OP 636

## 2025-05-29 PROCEDURE — 82310 ASSAY OF CALCIUM: CPT

## 2025-05-29 PROCEDURE — 99233 SBSQ HOSP IP/OBS HIGH 50: CPT | Mod: GC | Performed by: INTERNAL MEDICINE

## 2025-05-29 PROCEDURE — 250N000013 HC RX MED GY IP 250 OP 250 PS 637: Performed by: INTERNAL MEDICINE

## 2025-05-29 PROCEDURE — 93306 TTE W/DOPPLER COMPLETE: CPT | Mod: 26 | Performed by: INTERNAL MEDICINE

## 2025-05-29 PROCEDURE — 99232 SBSQ HOSP IP/OBS MODERATE 35: CPT | Performed by: STUDENT IN AN ORGANIZED HEALTH CARE EDUCATION/TRAINING PROGRAM

## 2025-05-29 PROCEDURE — 120N000003 HC R&B IMCU UMMC

## 2025-05-29 PROCEDURE — 83735 ASSAY OF MAGNESIUM: CPT

## 2025-05-29 PROCEDURE — 84100 ASSAY OF PHOSPHORUS: CPT | Performed by: INTERNAL MEDICINE

## 2025-05-29 PROCEDURE — 250N000009 HC RX 250: Performed by: INTERNAL MEDICINE

## 2025-05-29 PROCEDURE — 97162 PT EVAL MOD COMPLEX 30 MIN: CPT | Mod: GP

## 2025-05-29 PROCEDURE — 85048 AUTOMATED LEUKOCYTE COUNT: CPT

## 2025-05-29 PROCEDURE — 93306 TTE W/DOPPLER COMPLETE: CPT

## 2025-05-29 PROCEDURE — 84134 ASSAY OF PREALBUMIN: CPT

## 2025-05-29 PROCEDURE — 83735 ASSAY OF MAGNESIUM: CPT | Performed by: INTERNAL MEDICINE

## 2025-05-29 PROCEDURE — 250N000011 HC RX IP 250 OP 636: Performed by: INTERNAL MEDICINE

## 2025-05-29 PROCEDURE — 82248 BILIRUBIN DIRECT: CPT

## 2025-05-29 PROCEDURE — B4185 PARENTERAL SOL 10 GM LIPIDS: HCPCS | Performed by: INTERNAL MEDICINE

## 2025-05-29 PROCEDURE — 80053 COMPREHEN METABOLIC PANEL: CPT

## 2025-05-29 PROCEDURE — 80048 BASIC METABOLIC PNL TOTAL CA: CPT

## 2025-05-29 PROCEDURE — 250N000013 HC RX MED GY IP 250 OP 250 PS 637

## 2025-05-29 RX ORDER — ONDANSETRON 2 MG/ML
4 INJECTION INTRAMUSCULAR; INTRAVENOUS EVERY 6 HOURS PRN
Status: DISCONTINUED | OUTPATIENT
Start: 2025-05-29 | End: 2025-06-12 | Stop reason: HOSPADM

## 2025-05-29 RX ORDER — MAGNESIUM SULFATE HEPTAHYDRATE 40 MG/ML
4 INJECTION, SOLUTION INTRAVENOUS ONCE
Status: COMPLETED | OUTPATIENT
Start: 2025-05-29 | End: 2025-05-29

## 2025-05-29 RX ORDER — POTASSIUM CHLORIDE 20MEQ/15ML
20 LIQUID (ML) ORAL ONCE
Status: COMPLETED | OUTPATIENT
Start: 2025-05-29 | End: 2025-05-29

## 2025-05-29 RX ORDER — FUROSEMIDE 10 MG/ML
20 INJECTION INTRAMUSCULAR; INTRAVENOUS
Status: DISCONTINUED | OUTPATIENT
Start: 2025-05-29 | End: 2025-05-31

## 2025-05-29 RX ORDER — MAGNESIUM SULFATE HEPTAHYDRATE 40 MG/ML
4 INJECTION, SOLUTION INTRAVENOUS ONCE
Status: CANCELLED | OUTPATIENT
Start: 2025-05-29 | End: 2025-05-29

## 2025-05-29 RX ORDER — POTASSIUM CHLORIDE 750 MG/1
20 TABLET, EXTENDED RELEASE ORAL ONCE
Status: CANCELLED | OUTPATIENT
Start: 2025-05-29

## 2025-05-29 RX ADMIN — Medication 25 MG: at 08:23

## 2025-05-29 RX ADMIN — OLIVE OIL AND SOYBEAN OIL 250 ML: 16; 4 INJECTION, EMULSION INTRAVENOUS at 20:13

## 2025-05-29 RX ADMIN — AMPICILLIN SODIUM AND SULBACTAM SODIUM 3 G: 2; 1 INJECTION, POWDER, FOR SOLUTION INTRAMUSCULAR; INTRAVENOUS at 04:23

## 2025-05-29 RX ADMIN — Medication 3 MG: at 23:07

## 2025-05-29 RX ADMIN — THIAMINE HYDROCHLORIDE 500 MG: 100 INJECTION, SOLUTION INTRAMUSCULAR; INTRAVENOUS at 10:12

## 2025-05-29 RX ADMIN — VANCOMYCIN HYDROCHLORIDE 125 MG: KIT at 08:23

## 2025-05-29 RX ADMIN — VANCOMYCIN HYDROCHLORIDE 125 MG: KIT at 16:29

## 2025-05-29 RX ADMIN — Medication 500 MG: at 08:23

## 2025-05-29 RX ADMIN — ENOXAPARIN SODIUM 60 MG: 60 INJECTION SUBCUTANEOUS at 16:29

## 2025-05-29 RX ADMIN — VANCOMYCIN HYDROCHLORIDE 125 MG: KIT at 20:30

## 2025-05-29 RX ADMIN — MAGNESIUM SULFATE HEPTAHYDRATE 4 G: 40 INJECTION, SOLUTION INTRAVENOUS at 08:20

## 2025-05-29 RX ADMIN — POTASSIUM & SODIUM PHOSPHATES POWDER PACK 280-160-250 MG 1 PACKET: 280-160-250 PACK at 11:48

## 2025-05-29 RX ADMIN — VANCOMYCIN HYDROCHLORIDE 125 MG: KIT at 11:48

## 2025-05-29 RX ADMIN — FUROSEMIDE 20 MG: 10 INJECTION, SOLUTION INTRAMUSCULAR; INTRAVENOUS at 10:12

## 2025-05-29 RX ADMIN — POTASSIUM CHLORIDE 20 MEQ: 20 SOLUTION ORAL at 08:23

## 2025-05-29 RX ADMIN — ENOXAPARIN SODIUM 60 MG: 60 INJECTION SUBCUTANEOUS at 02:36

## 2025-05-29 RX ADMIN — INSULIN GLARGINE 6 UNITS: 100 INJECTION, SOLUTION SUBCUTANEOUS at 11:48

## 2025-05-29 RX ADMIN — THIAMINE HYDROCHLORIDE 500 MG: 100 INJECTION, SOLUTION INTRAMUSCULAR; INTRAVENOUS at 16:19

## 2025-05-29 RX ADMIN — Medication 400 MCG: at 08:23

## 2025-05-29 RX ADMIN — THIAMINE HYDROCHLORIDE 500 MG: 100 INJECTION, SOLUTION INTRAMUSCULAR; INTRAVENOUS at 20:25

## 2025-05-29 RX ADMIN — POTASSIUM & SODIUM PHOSPHATES POWDER PACK 280-160-250 MG 1 PACKET: 280-160-250 PACK at 16:29

## 2025-05-29 RX ADMIN — FUROSEMIDE 20 MG: 10 INJECTION, SOLUTION INTRAMUSCULAR; INTRAVENOUS at 16:19

## 2025-05-29 RX ADMIN — MAGNESIUM SULFATE HEPTAHYDRATE: 500 INJECTION, SOLUTION INTRAMUSCULAR; INTRAVENOUS at 20:13

## 2025-05-29 RX ADMIN — POTASSIUM & SODIUM PHOSPHATES POWDER PACK 280-160-250 MG 1 PACKET: 280-160-250 PACK at 08:23

## 2025-05-29 RX ADMIN — ATORVASTATIN CALCIUM 40 MG: 40 TABLET, FILM COATED ORAL at 08:27

## 2025-05-29 ASSESSMENT — ACTIVITIES OF DAILY LIVING (ADL)
ADLS_ACUITY_SCORE: 66
ADLS_ACUITY_SCORE: 62
ADLS_ACUITY_SCORE: 66
ADLS_ACUITY_SCORE: 62
ADLS_ACUITY_SCORE: 62
ADLS_ACUITY_SCORE: 66
ADLS_ACUITY_SCORE: 62
ADLS_ACUITY_SCORE: 66

## 2025-05-29 NOTE — PLAN OF CARE
Goal Outcome Evaluation:      Plan of Care Reviewed With: patient    Overall Patient Progress: improvingOverall Patient Progress: improving    Outcome Evaluation: Pt oriented x2.  NG to LIS.  Intermittently sleeping btwn cares, lights & television on to help with day/sleep cycles.      Neuro: A&Ox1-2, drowsy at times.  Follows most commands.    Cardiac: No tele orders, HR 90s-110s.  Bps 100s-120s/60s-70s.  Afebrile.  Respiratory: Sating >92% on RA.  GI/: Adequate urine output via Silver.  BM X5, soft & incontinent.  Diet/appetite: Full liquid diet although pt not taking PO intake today d/t NG to LIS & drowsiness.  Attempted to have pt swallow water when up in chair & alert, pt coughed immediately after.  [facility] team notified & NPO order placed.  Activity:  Assist of 2 w/ lift, up to chair this afternoon.  Turned Q2-3hrs.  Pain: At acceptable level on current regimen.   Skin: Buttocks/coccyx/perineum excoriated w/ blanchable redness, barrier cream applied.  Scattered brusing & scabs.  LDA's: R TL PICC.  L PIV SL.  NG to LIS.  Silver.    Plan: Continue with POC. Notify primary team with changes.

## 2025-05-29 NOTE — PLAN OF CARE
Neuro: A&Ox2. Pt lethargic but arouses to voice/light touch. At beginning of shift I had to repeatedly encourage her to keep her eyes open during my assessment as she would become drowsy very quickly. Towards end of shift, she became alert & didn't sleep well.   Cardiac: ST w/ PVCs. VSS.   Respiratory: Sating >92% on RA.  GI/: Adequate urine output via Silver catheter. BM X2  Diet/appetite: full liquid diet. TPN & Lipids started.   Activity:  Assist of 2, up to chair and in halls.  Pain: Denies  Skin: No new deficits noted.  LDA's: R TPL PICC infusing TPN and TKO w/ Unasyn antibiotics.     Plan: Continue with POC. Notify primary team with changes.     Overall Patient Progress: no changeOverall Patient Progress: no change    Outcome Evaluation: Pt oriented x2. Lethargic at beginning of shift = unable to do some assessments (seen in charting). NG to LIS. Silver in place. BG monitored. PT reported poor sleep overnight.

## 2025-05-29 NOTE — PROGRESS NOTES
"   05/29/25 1500   Appointment Info   Signing Clinician's Name / Credentials (PT) Shelia Whitehead PT, DPT   Living Environment   People in Home alone   Current Living Arrangements house   Home Accessibility stairs to enter home;stairs within home   Number of Stairs, Main Entrance 4   Stair Railings, Main Entrance none   Number of Stairs, Within Home, Primary other (see comments)  (\"many stairs\")   Stair Railings, Within Home, Primary railings safe and in good condition   Transportation Anticipated car, drives self   Living Environment Comments Per pt report, lives in house alone with \"a few stairs\" to enter with no railings, reports having \"many stairs\" inside home. Per chart, pt was driving herself prior to admission. Has tub shower   Self-Care   Usual Activity Tolerance good   Current Activity Tolerance poor   Equipment Currently Used at Home none   Fall history within last six months yes   Number of times patient has fallen within last six months 3  (\"a few\", when asked if pt knew nature of falls, she responded with \"because I couldn't walk\")   Activity/Exercise/Self-Care Comment Per chart review, pt was IND with all mobility and ADLs. Per pt report, was using no AD and owns no DME, reports \"a few\" falls   General Information   Onset of Illness/Injury or Date of Surgery 05/25/25   Referring Physician July Bojorquez MD   Patient/Family Therapy Goals Statement (PT) unable to participate during session   Pertinent History of Current Problem (include personal factors and/or comorbidities that impact the POC) Per pt's chart, \"Sarah Felix is a 79 year old female with history of  insulin-dependent DM2, HTN, fatty liver, possible alcohol use disorder, gait instability who presented 5/25 with generalized weakness and was admitted with DKA and sepsis, 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 Course c/b C diff enteritis.\"   Existing Precautions/Restrictions fall   General Observations up with assist   Cognition "   Orientation Status (Cognition) oriented to;person;place   Follows Commands (Cognition) follows one-step commands;50-74% accuracy   Pain Assessment   Patient Currently in Pain No   Integumentary/Edema   Integumentary/Edema Comments slight BLE edema noted   Posture    Posture Forward head position;Protracted shoulders   Range of Motion (ROM)   Range of Motion ROM is WFL;ROM deficits secondary to weakness   Strength (Manual Muscle Testing)   Strength (Manual Muscle Testing) Deficits observed during functional mobility   Strength Comments able to complete B SLR with assist, BLE grossly deconditioned   Bed Mobility   Comment, (Bed Mobility) B rolls maxAx2   Transfers   Comment, (Transfers) OH lift   Balance   Balance Comments unable to assess on this date   Sensory Examination   Sensory Perception patient reports no sensory changes   Sensory Perception Comments baseline pins/needles in B feet   Clinical Impression   Criteria for Skilled Therapeutic Intervention Yes, treatment indicated   PT Diagnosis (PT) impaired functional mobility   Influenced by the following impairments decreased strength, decreased activity tolerance, cognition, impaired balance   Functional limitations due to impairments bed mobility, transfers, gait, stairs   Clinical Presentation (PT Evaluation Complexity) stable   Clinical Presentation Rationale clinical reasoning   Clinical Decision Making (Complexity) moderate complexity   Planned Therapy Interventions (PT) balance training;bed mobility training;gait training;home exercise program;motor coordination training;neuromuscular re-education;patient/family education;postural re-education;ROM (range of motion);stair training;strengthening;stretching;transfer training;progressive activity/exercise;risk factor education;home program guidelines   Risk & Benefits of therapy have been explained evaluation/treatment results reviewed;care plan/treatment goals reviewed;risks/benefits  reviewed;current/potential barriers reviewed;participants voiced agreement with care plan;participants included;patient   Clinical Impression Comments Pt is greatly below functional mobility baseline, limited by decreased strength and activity tolerance, cognition, and impaired balance. Will benefit from skilled PT during LOS to improve impairments and progress back to PLOF.   PT Total Evaluation Time   PT Eval, Moderate Complexity Minutes (88563) 8   Physical Therapy Goals   PT Frequency 5x/week   PT Predicted Duration/Target Date for Goal Attainment 06/19/25   PT Goals Bed Mobility;Transfers;Gait;Stairs;PT Goal 1   PT: Bed Mobility Independent;Supine to/from sit   PT: Transfers Modified independent;Sit to/from stand;Bed to/from chair;Assistive device   PT: Gait Modified independent;Assistive device;150 feet   PT: Stairs Modified independent;Greater than 10 stairs;Rail on right   PT: Goal 1 Pt will demo IND with BLE strengthening HEP.   PT Discharge Planning   PT Plan EOB balance/tolerance, unsupported sitting, STS as able   PT Discharge Recommendation (DC Rec) Transitional Care Facility   PT Rationale for DC Rec Pt is greatly below IND mobility baseline, limited by decreased strength and activity tolerane as well as cognition and impaired balance. Is dependent with mobility at this time, recommend TCU to improve overall functional mobility status.   PT Brief overview of current status OH lift   PT Total Distance Amb During Session (feet) 0

## 2025-05-29 NOTE — PROGRESS NOTES
Resident/Fellow Attestation   I, July Bojorquez MD, was present with the medical/MIKE student who participated in the service and in the documentation of the note.  I have verified the history and personally performed the physical exam and medical decision making.  I agree with the assessment and plan of care as documented in the note.      July Bojorquez MD  PGY3  Date of Service (when I saw the patient): 05/29/25    [facility]    Progress Note - Medicine Service, [facility] TEAM 2       Date of Admission:  5/25/2025    Assessment & Plan   Sarah Felix is a 79 year old female with history of  insulin-dependent DM2, HTN, fatty liver, possible alcohol use disorder, gait instability who presented 5/25 with generalized weakness and was admitted with DKA and sepsis, 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 Course c/b C diff enteritis.     Today's updates:  - Ongoing encephalopathy, mildly improved- not yet appropriate for addiction medicine consult  - Decrease lantus to 6 units q24h  - Increase regular insulin in TPN to 10 units with Dextrose 120g  - Continue PO vancomycin, discontinue Unasyn given no other clear evidence of infection  - Continue TPN; BMP q8h for now and replete lytes PRN  - Anasarcic: start lasix 20mg IV BID  - Epicardial fat pad on TTE per cardiology  - TOV 5/30 if mental status is improved     Enteritis likely 2/2 C. Diff  Ileus   Initially presented septic but with negative workup including urine culture, blood cultures, CT C/A/P. Had ongoing worsening encephalopathy and leukocytosis on vanc/zosyn -> unasyn. Developed diarrhea and abdominal pain, stool with C diff PCR + but toxin negative. Given clinical worsening, decided to empirically treat - now with improvement. Nausea/vomiting managed with NG to LIS, clamping after med delivery.  - NG to LIS, consider removal in upcoming days  - Abx:              - PO vancomycin (5/28-6/6) - 10 day course  -  "Unasyn (5/27-5/29)  - Zosyn (5/25-5/27)              - Vancomycin (5/25-5/26)    Urinary retention  TOV 5/30 if mental status is improved.    Concern for refeeding syndrome  Decrease in magnesium and phosphorus after beginning TPN, possible refeeding syndrome.  - Check electrolytes TID until stable  - RN replacement protocols    Pleural effusions  Pulmonary edema  Anasarca  Seen on 5/28 CT C/A/P. TTE with LVEF 60-65%, small IVC.   - Start lasix 20mg BID (PTA dose is 20mg PO daily)  - May need to consider albumin given small IVC but anasarca    \"Prominent pericardial contents\"  Cardiology reviewed- this is an epicardial fat pad. No further workup or consult needed.    Diabetic ketoacidosis, resolved  T2DM with A1c 8.0%. Likely triggered by sepsis. Now off insulin gtt.  - Endocrine consulted, managing glargine + regular insulin in TPN + novolog  - Hold PTA metformin, glipizide  - Diabetes ed consult     Hepatic vein thrombus  No prior imaging to compare. Per chart review, history of \"thromboembolism\" after ovarian cyst removal age 22. Liver panel normal.  - IR consulted; no indication for lytics  - Heme consulted  - Heparin gtt 5/25-5/27  - Lovenox 5/27-*  - At discharge transition to apixaban 10mg BID x 7 days then 5 mg BID  - Follow up with Dr. Wilde 7/29/25     Toxic metabolic encephalopathy  Not oriented to month or year. Likely related to sepsis, hospital acquired delirium. No acute intracranial findings on CT head.   - Wernicke dose thiamine  - Delirium precautions     Possible alcohol use disorder  Reports 2 drinks/day. Did have an ED visit 03/2025 for alcohol intoxication. Reports last drink 5 days PTA, low concern for withdrawal. Her brother, Cruz, has concerns that she is drinking excessively and encourages addiction medicine involvement.  - Consider addiction medicine consult when encephalopathy improves  - PTA folate  - Continue thiamine, Wernicke dosing     Moderate malnutrition in the context of chronic " illness   Continued elevated ketones despite insulin ggt. Elevation likely due to poor oral intake due to encephalopathy and starvation ketosis. Will initiate TPN.  - TPN consult and initiation    Dysphagia  Recurrent vomiting  Concern for aspiration from bedside RN.   - SLP consult:   - Increased risk for aspiration 2/2 poor mentation  - Full liquid diet with close supervision  - Given recurrent vomiting and aspiration risk, NG suctioning initiated     Sacrum/coccyx moisture wound  Erythematous, blanchable area on her sacrum. Bedside RN says likely from excessive moisture 2/2 fecal incontinence. [employer] nurse resports wound c/w incontinence associated dermatitis.  - [employer] nurse rec:    - Dinorah cleanse and protection with soft cloth   - Apply thin layer of Barrier paste to open and red areas     Generalized weakness, failure to thrive  TSH wnl.   - PT/OT consults  - Ongoing evaluation for safe discharge plan     HTN: Hold PTA metoprolol, lisinopril given sepsis and normotension     Fall at home: CTH, C spine negative for acute process/fracture     Elevated INR: Unclear etiology, no known liver disease. Will hold off on Vit K given new thrombus.    Essential thrombocytosis: Stop PTA aspirin per heme, continue PTA hydroxyurea     Chronic/stable:     Glaucoma: PTA latanoprost  CKD2  Diabetic neuropathy  Schatzki's ring  Slow transit constipation  Mixed urge and stress incontinence  Gait instability       Medically Ready for Discharge: Anticipated in 2-4 Days         Diet: Full Liquid Diet  parenteral nutrition - ADULT compounded formula    DVT Prophylaxis: Enoxaparin (Lovenox) SQ  Silver Catheter: PRESENT, indication: Acute retention or obstruction  Fluids: IV  Lines: PRESENT      PICC 05/26/25 Triple Lumen Right Basilic Access. PICC was ready to use, no immediate concern.-Site Assessment: WDL      Cardiac Monitoring: None  Code Status: Full Code           Disposition Plan     Medically Ready for Discharge: Anticipated in 2-4  "Days         The patient's care was discussed with the Attending Physician, Dr. Ivan Knight.    Stefanie Kim  Medical Student  Medicine Service, [facility] TEAM 2  [facility]  Securely message with H5 (more info)  Text page via [employer] Paging/Directory   See signed in provider for up to date coverage information  ______________________________________________________________________    Interval History   Sarah did not sleep well overnight, stating that she was \"freezing all night.\" She denies shivering and sweating. She had two bowel movements overnight, unsure if incontinence with them. She states that she feels like she can control her BM and that she knows when they are coming. She seems more oriented and less somnolent this morning, and she was able to answer questions with occasional dozing off. Overall she seems to be clinically improving.     Physical Exam   Vital Signs: Temp: 97.5  F (36.4  C) Temp src: Oral BP: 120/70 Pulse: 102   Resp: 20 SpO2: 96 % O2 Device: Nasal cannula Oxygen Delivery: 1 LPM  Weight: 144 lbs 13.48 oz    Constitutional: sleepy, arouses to loud voices, can sometimes follow commands, no apparent distress  Respiratory: no increased work of breathing, occasional coughing during deep inhalation, bibasilar crackles present  Cardiovascular: Normal apical impulse, regular rate and rhythm, normal S1 and S2, no S3 or S4, and no murmur noted  GI: normal bowel sounds and no tenderness to palpation  Musculoskeletal: 3+ pitting edema in feet bilaterally  Neurologic: Oriented to self and place only.    Medical Decision Making       Please see A&P for additional details of medical decision making.      Data     I have personally reviewed the following data over the past 24 hrs:    15.1 (H)  \   8.4 (L)   / 557 (H)     139 106 12.4 /  226 (H)   3.8 26 0.72 \     ALT: 13 AST: 15 AP: 60 TBILI: 0.5   ALB: 2.5 (L) TOT PROTEIN: 4.2 (L) LIPASE: N/A     INR:  1.58 " (H) PTT:  N/A   D-dimer:  N/A Fibrinogen:  N/A       Imaging results reviewed over the past 24 hrs:   Recent Results (from the past 24 hours)   CT Chest/Abdomen/Pelvis w Contrast    Narrative    EXAMINATION: CT CHEST/ABDOMEN/PELVIS W CONTRAST, 5/28/2025 3:54 PM    INDICATION: C/f sepsis without identified infection, diarrhea, concern  for colitis or other infectious source    COMPARISON STUDY: 5/25/2025    TECHNIQUE: CT scan of the chest, abdomen and pelvis was performed on  multidetector CT scanner using volumetric acquisition technique and  images were reconstructed in multiple planes with variable thickness  and reviewed on dedicated workstations.     CONTRAST: iopamidol (ISOVUE-370) solution 86mL injected IV without  oral contrast    CT scan radiation dose is optimized to minimum requisite dose using  automated dose modulation techniques.    FINDINGS:    Lungs/pleura: Small bilateral pleural effusions with associated  atelectasis. Mild peribronchial and interlobular septal thickening. No  pneumothorax. No focal consolidations.    Mediastinum: Status post left thyroidectomy, otherwise unremarkable  thyroid. Normal cardiac size. Physiologic pericardial fluid. Moderate  coronary calcification. Unremarkable major thoracic vessels. No  distinct lymphadenopathy. Normal esophagus. Right PICC in the SVC.    Liver: Unchanged thick-walled cyst in the left hepatic lobe with wall  and septal calcifications. Additional small hypoattenuating  observations are unchanged from prior. No intrahepatic biliary ductal  dilation. Unchanged right hepatic vein thrombosis.    Biliary System: Cholelithiasis without cholecystitis.    Pancreas: No mass or pancreatic ductal dilation. Mild pancreatic  atrophy.    Adrenal glands: No mass or nodules    Spleen: Normal.    Kidneys: No suspicious mass, obstructing calculus or hydronephrosis.   Simple renal cysts.    Gastrointestinal tract: Diffusely dilated loops of small bowel. There  is some  prominence of the vasa recta with mild surrounding fat  stranding. Gastric tube in the stomach.    Mesentery/peritoneum/retroperitoneum: No mass. No free fluid or air.    Lymph nodes: No significant lymphadenopathy.    Vasculature: Patent major abdominal vasculature.    Pelvis: Silver catheter in the urinary bladder. Uterus and adnexa  within normal limits.    Osseous structures: No aggressive or acute osseous lesion.      Soft tissues: Left inguinal hernia containing part of the distal  descending/proximal sigmoid colon. Anasarca.      Impression    IMPRESSION:   1. Diffusely dilated loops of small bowel with prominence of the vasa  recta and mild surrounding fat stranding. This could be secondary to  hypodynamic ileus secondary to infectious or inflammatory enteritis.  2. Unchanged right hepatic venous thrombosis.  3. Small bilateral pleural effusions and findings of mild pulmonary  edema as well as anasarca, suggestive of fluid overload.  4. Interval placement of a right PICC and Silver catheter.  5. Additional incidental findings are unchanged from prior and as  described in the body of the report.    I have personally reviewed the examination and initial interpretation  and I agree with the findings.    DOROTHEA YANEZ DO         SYSTEM ID:  [id number]   Echo Complete   Result Value    LVEF  60-65%    Narrative    [id number]  [id number]  [id number]  [id number]^GIRMA^ZUNILDA     [facility],[place]  Echocardiography Laboratory  [address] [place] [id number]     Name: CHATA LAURENT  MRN: [id number]  : 1945  Study Date: 2025 09:16 AM  Age: 79 yrs  Gender: Female  Patient Location: [facility]  Reason For Study: Edema  Ordering Physician: ZUNILDA RAYO  Performed By: Leatha Block     BSA: 1.7 m2  Height: 64 in  Weight: 144 lb  BP: 124/78 mmHg  ______________________________________________________________________________  Procedure  Echocardiogram with two-dimensional, color  and spectral Doppler. Technically  difficult study.  ______________________________________________________________________________  Interpretation Summary  Left ventricular size, wall motion and function are normal. The ejection  fraction is 60-65%.  Right ventricular function, chamber size, wall motion, and thickness are  normal.  No significant valvular abnormalities present.  Prominent Pericardial contents present, unsure if pericardial fat, prior  hematoma, inflammation, or other.  A left pleural effusion is present.  ______________________________________________________________________________  Left Ventricle  Left ventricular size, wall motion and function are normal. The ejection  fraction is 60-65%. Left ventricular wall thickness is normal. Diastolic  function not assessed due to tachycardia.     Right Ventricle  Right ventricular function, chamber size, wall motion, and thickness are  normal.     Atria  Both atria appear normal. The atrial septum is intact as assessed by color  Doppler .     Mitral Valve  The mitral valve is normal. Trace mitral insufficiency is present.     Aortic Valve  Aortic valve is normal in structure and function. On Doppler interrogation,  there is no significant stenosis or regurgitation.     Tricuspid Valve  The tricuspid valve is normal. Trace tricuspid insufficiency is present. The  peak velocity of the tricuspid regurgitant jet is not obtainable.     Pulmonic Valve  The pulmonic valve is normal. On Doppler interrogation, there is no  significant stenosis or regurgitation.     Vessels  The aorta root is normal. The thoracic aorta is normal. Small inferior vena  cava size consistent with hypovolemia.     Pericardium  Trivial pericardial effusion is present. Prominent Pericardial contents  present, unsure if pericardial fat, prior hematoma, inflammation, or other.     Miscellaneous  A left pleural effusion is present. No significant valvular abnormalities  present.     Compared  to Previous Study  There is no prior study for direct comparison.  ______________________________________________________________________________  MMode/2D Measurements & Calculations  IVSd: 0.79 cm  LVIDd: 4.0 cm  LVIDs: 2.9 cm  LVPWd: 0.76 cm  FS: 29.2 %  LV mass(C)d: 91.8 grams  LV mass(C)dI: 53.9 grams/m2  Ao root diam: 2.8 cm  asc Aorta Diam: 2.6 cm  LVOT diam: 2.0 cm  LVOT area: 3.1 cm2  Ao root diam index Ht(cm/m): 1.7  Ao root diam index BSA (cm/m2): 1.6  Asc Ao diam index BSA (cm/m2): 1.5  Asc Ao diam index Ht(cm/m): 1.6  EF Biplane: 56.5 %     LA Volume (BP): 22.4 ml  LA Volume Index (BP): 13.2 ml/m2  RWT: 0.38     Doppler Measurements & Calculations  PA acc time: 0.10 sec     ______________________________________________________________________________  Report approved by: [address] on 05/29/2025 10:47 AM

## 2025-05-29 NOTE — PROGRESS NOTES
Inpatient Diabetes Management Service: Daily Progress Note     HPI: Sarah Felix is a 79 year old female with history of insulin-dependent DM2, HTN, fatty liver, possible alcohol use disorder, gait instability who presented 5/25 with generalized weakness and was admitted with DKA and sepsis, 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 Inpatient Diabetes Service consulted for DKA management.          Assessment/Plan:     Assessment:   Type 2 Diabetes Mellitus, complicated by neuropathy.  (A1c 8 %, Hgb: 12.3; 5/25/25)  DKA on admission.  Lactic acidosis on admission  Sepsis, unknown source  Hepatic vein thrombosis  Dysphagia on full liquid diet   Toxic metabolic encephalopathy  Stress induced hyperglycemia  TPN induced hyperglycemia  C Diff + 5/28     Plan/Recommendations:    - decrease Lantus 8 --> 6 units q 24 hrs at 1200  - increase Regular insulin in TPN 6 --> 10 units with Dextrose 120 g  (0.085 units per gram dextrose)    - start Novolog carbohydrate coverage: 1 unit per 25 g cho TID AC and prn snacks/supplements  - Novolog correction scale: 1/50 >140 q4 hrs (ISF 50) q 4 hours while minimal PO intake  -  BG monitoring q 4 hours while minimal PO intake   - Hypoglycemia protocol  - Carb counting protocol     Discussion: BG trended to 90s prior to TPN starting last night, reduce Lantus today. BG trending higher since TPN started at 2000 last night. Per pharmacist, dextrose in TPN will not change tonight. Increase regular insulin in dextrose. Patient has minimal/no PO intake yet but will start Novolog ICR for when patient starts to eat.      ADDENDUM 1820: noticed TPN insulin not increased for bag tonight as ordered. Called and spoke to pharmacist, the bag is ordered for tonight already so unable to change. Will add dose of Lantus 4 units to be given at 2000 tonight to cover TPN bag for the next 24 hrs.      Discharge Planning: (tentative)    Medications: Basal bolus (discharge on insulin  "only)   Test Claims:  none needed.   Education:  Needs to be assessed closer to discharge.    Outpatient Follow-up:  recommend [facility] Endocrinology vs PCP      Thank you for this consult. IDS will continue to follow.      Please notify Inpatient Diabetes Service if changes are planned to steroids, nutrition, TPN/TF and anticipated procedures requiring prolonged NPO status.         Interval History/Review of Systems :   - The last 24 hours progress and nursing notes reviewed.  - Feeling much better today per patient. No longer has sitter.   - Denies PO intake today, no nausea or emesis. Having diarrhea (c-diff positive 5/28)    Planned Procedures/Surgeries: none    Inpatient Glucose Control:       Recent Labs   Lab 05/29/25  0427 05/29/25  0352 05/28/25  2314 05/28/25  2158 05/28/25  1948 05/28/25  1537   * 178* 142* 139* 95 135*             Medications Impacting Glycemia:   Steroids: None  D5W containing solutions/medications: was on D5W at 100 mL/hr, stopped 1400 5/28  Other medications impacting glucose: None        Nutrition:   Orders Placed This Encounter      Full Liquid Diet  Supplements:  None  TF: None  TPN:   Started 5/28: Regular insulin in TPN 6 units with Dextrose 120 g  (6 units per gram dextrose)    5/29: continuous TPN with 120 g dextrose and 10 units of reg insulin         Diabetes History: see full consult note for complete diabetes history   Diabetes Type and Duration: DM2, 7/2009  Zinc transporter 8 antibody, islet antibody, and insulin antibody not available on epic search      GAD65 antibody <5 (9/11/2012)   C peptide 7.6 (9/11/2012)     PTA Medication Regimen: Glimepiride 1 mg daily, Novolog 5 units with high carb meal daily as needed, metformin 500 mg daily  Missing doses?: uncertain  Historical Diabetes Medications: N/A     Glucose monitoring device/frequency/trends: Fortino CGM, reports 130 on average  Hypoglycemia PTA:   - Frequency: \"rarely\"  - Severity:  no history of severe " "unconscious lows   - Awareness:  intact    - Treatment: \"eats food\"      Outpatient Diabetes Provider: Unknown  Formal Diabetes Education/Educator: Erik        Physical Exam:   /74 (BP Location: Left arm, Cuff Size: Adult Regular)   Pulse 113   Temp 97.9  F (36.6  C) (Oral)   Resp 16   Wt 65.7 kg (144 lb 13.5 oz)   SpO2 96%   BMI 24.86 kg/m    General:  ill and fatigued appearing, NAD, falling asleep during visit  Lungs: unlabored breathing on RA.  Mental Status:  not assessed            Data:     Lab Results   Component Value Date    A1C 8.0 (H) 05/25/2025    A1C 7.5 (H) 11/05/2024    A1C 7.6 (H) 07/09/2024    A1C 6.6 (H) 03/05/2024    A1C 7.4 (H) 11/14/2023    A1C 8.3 (H) 06/21/2021    A1C 9.4 (H) 12/14/2020    A1C 8.1 (H) 09/21/2020    A1C 8.5 (H) 01/07/2020    A1C 7.6 (H) 10/12/2019       ROUTINE IP LABS (Last four results)  BMP  Recent Labs   Lab 05/29/25  0427 05/29/25  0352 05/28/25  2314 05/28/25  2158 05/28/25  0700 05/28/25  0607 05/27/25  1901 05/27/25  1856 05/27/25  1403 05/27/25  1327 05/27/25  1202 05/27/25  1136     --   --   --   --  143  --   --   --  141  --  140   POTASSIUM 3.8  --   --   --   --  3.8  --  4.1  --  3.8  --  4.0   CHLORIDE 106  --   --   --   --  109*  --   --   --  106  --  106   CECY 7.6*  --   --   --   --  7.7*  --   --   --  7.7*  --  7.7*   CO2 26  --   --   --   --  23  --   --   --  22  --  20*   BUN 12.4  --   --   --   --  14.0  --   --   --  18.8  --  18.9   CR 0.72  --   --   --   --  0.72  --   --   --  0.74  --  0.72   * 178* 142* 139*   < > 112*   < >  --    < > 191*   < > 202*    < > = values in this interval not displayed.     CBC  Recent Labs   Lab 05/29/25  0427 05/28/25  0607 05/27/25  0627 05/26/25  0945   WBC 15.1* 19.1* 17.2* 15.1*   RBC 3.11* 3.70* 4.16 3.74*   HGB 8.4* 9.8* 11.0* 10.2*   HCT 26.4* 30.3* 34.1* 30.5*   MCV 85 82 82 82   MCH 27.0 26.5 26.4* 27.3   MCHC 31.8 32.3 32.3 33.4   RDW 17.2* 17.0* 16.9* 16.3*   * 621* " 688* 565*     Inpatient Diabetes Service will continue to follow, please don't hesitate to contact the team with any questions or concerns.     Clair Chung PA-C  Inpatient Diabetes Service  Pager: [phone]  Available on vocera    Plan discussed with patient, bedside RN, and primary team via this note.    To contact Inpatient Diabetes Service:     7 AM - 5 PM: Page the IDS MIKE following the patient that day (see filed or incomplete progress notes/consult notes under Endocrinology)    OR if uncertain of provider assignment: page job code [phone]    5 PM - 7 AM: First call after hours is to primary service.    For urgent after-hours questions, page job code for on call fellow: [phone]     I spent a total of 40 minutes (+ 5 minutes for addendum above) on the date of the encounter doing prep/post-work, chart review, history and exam, documentation and further activities per the note including lab review, multidisciplinary communication, counseling the patient and/or coordinating care regarding acute hyper/hypoglycemic management, as well as discharge management and planning/communication.

## 2025-05-29 NOTE — PROGRESS NOTES
Brief Cardiology Note    Team asked us to review TTE images concerning for undifferentiated pericardial mass. Upon review with Dr. Gonzalez, pericardial lesion is epicardial fat without evidence of hemodynamic compromise. No further workup required.     Daniel Banuelos MD  Cardiology Fellow

## 2025-05-30 ENCOUNTER — APPOINTMENT (OUTPATIENT)
Dept: PHYSICAL THERAPY | Facility: CLINIC | Age: 80
DRG: 871 | End: 2025-05-30
Payer: COMMERCIAL

## 2025-05-30 ENCOUNTER — APPOINTMENT (OUTPATIENT)
Dept: SPEECH THERAPY | Facility: CLINIC | Age: 80
DRG: 871 | End: 2025-05-30
Payer: COMMERCIAL

## 2025-05-30 ENCOUNTER — APPOINTMENT (OUTPATIENT)
Dept: GENERAL RADIOLOGY | Facility: CLINIC | Age: 80
End: 2025-05-30
Attending: STUDENT IN AN ORGANIZED HEALTH CARE EDUCATION/TRAINING PROGRAM
Payer: COMMERCIAL

## 2025-05-30 ENCOUNTER — APPOINTMENT (OUTPATIENT)
Dept: OCCUPATIONAL THERAPY | Facility: CLINIC | Age: 80
DRG: 871 | End: 2025-05-30
Payer: COMMERCIAL

## 2025-05-30 LAB
ANION GAP SERPL CALCULATED.3IONS-SCNC: 6 MMOL/L (ref 7–15)
ANION GAP SERPL CALCULATED.3IONS-SCNC: 9 MMOL/L (ref 7–15)
ANION GAP SERPL CALCULATED.3IONS-SCNC: 9 MMOL/L (ref 7–15)
BACTERIA SPEC CULT: NO GROWTH
BACTERIA SPEC CULT: NO GROWTH
BUN SERPL-MCNC: 17.3 MG/DL (ref 8–23)
BUN SERPL-MCNC: 17.8 MG/DL (ref 8–23)
BUN SERPL-MCNC: 18.8 MG/DL (ref 8–23)
C DIFF GDH STL QL IA: POSITIVE
C DIFF TOX A+B STL QL IA: NEGATIVE
C DIFF TOX B STL QL: POSITIVE
CALCIUM SERPL-MCNC: 7.4 MG/DL (ref 8.8–10.4)
CALCIUM SERPL-MCNC: 7.5 MG/DL (ref 8.8–10.4)
CALCIUM SERPL-MCNC: 7.5 MG/DL (ref 8.8–10.4)
CHLORIDE SERPL-SCNC: 101 MMOL/L (ref 98–107)
CREAT SERPL-MCNC: 0.72 MG/DL (ref 0.51–0.95)
CREAT SERPL-MCNC: 0.74 MG/DL (ref 0.51–0.95)
CREAT SERPL-MCNC: 0.74 MG/DL (ref 0.51–0.95)
CRP SERPL-MCNC: 32 MG/L
EGFRCR SERPLBLD CKD-EPI 2021: 83 ML/MIN/1.73M2
EGFRCR SERPLBLD CKD-EPI 2021: 83 ML/MIN/1.73M2
EGFRCR SERPLBLD CKD-EPI 2021: 86 ML/MIN/1.73M2
ERYTHROCYTE [DISTWIDTH] IN BLOOD BY AUTOMATED COUNT: 17.3 % (ref 10–15)
GLUCOSE BLDC GLUCOMTR-MCNC: 168 MG/DL (ref 70–99)
GLUCOSE BLDC GLUCOMTR-MCNC: 175 MG/DL (ref 70–99)
GLUCOSE BLDC GLUCOMTR-MCNC: 188 MG/DL (ref 70–99)
GLUCOSE BLDC GLUCOMTR-MCNC: 202 MG/DL (ref 70–99)
GLUCOSE BLDC GLUCOMTR-MCNC: 206 MG/DL (ref 70–99)
GLUCOSE BLDC GLUCOMTR-MCNC: 235 MG/DL (ref 70–99)
GLUCOSE SERPL-MCNC: 191 MG/DL (ref 70–99)
GLUCOSE SERPL-MCNC: 217 MG/DL (ref 70–99)
GLUCOSE SERPL-MCNC: 224 MG/DL (ref 70–99)
HCO3 SERPL-SCNC: 25 MMOL/L (ref 22–29)
HCO3 SERPL-SCNC: 26 MMOL/L (ref 22–29)
HCO3 SERPL-SCNC: 28 MMOL/L (ref 22–29)
HCT VFR BLD AUTO: 26.1 % (ref 35–47)
HGB BLD-MCNC: 8.4 G/DL (ref 11.7–15.7)
LABORATORY COMMENT REPORT: ABNORMAL
MAGNESIUM SERPL-MCNC: 2 MG/DL (ref 1.7–2.3)
MAGNESIUM SERPL-MCNC: 2 MG/DL (ref 1.7–2.3)
MAGNESIUM SERPL-MCNC: 2.1 MG/DL (ref 1.7–2.3)
MAGNESIUM SERPL-MCNC: 2.3 MG/DL (ref 1.7–2.3)
MCH RBC QN AUTO: 26.8 PG (ref 26.5–33)
MCHC RBC AUTO-ENTMCNC: 32.2 G/DL (ref 31.5–36.5)
MCV RBC AUTO: 83 FL (ref 78–100)
PHOSPHATE SERPL-MCNC: 2.4 MG/DL (ref 2.5–4.5)
PHOSPHATE SERPL-MCNC: 2.5 MG/DL (ref 2.5–4.5)
PHOSPHATE SERPL-MCNC: 3.2 MG/DL (ref 2.5–4.5)
PHOSPHATE SERPL-MCNC: 3.5 MG/DL (ref 2.5–4.5)
PLATELET # BLD AUTO: 591 10E3/UL (ref 150–450)
POTASSIUM SERPL-SCNC: 3.8 MMOL/L (ref 3.4–5.3)
POTASSIUM SERPL-SCNC: 4 MMOL/L (ref 3.4–5.3)
POTASSIUM SERPL-SCNC: 4.1 MMOL/L (ref 3.4–5.3)
RBC # BLD AUTO: 3.13 10E6/UL (ref 3.8–5.2)
SODIUM SERPL-SCNC: 135 MMOL/L (ref 135–145)
SODIUM SERPL-SCNC: 135 MMOL/L (ref 135–145)
SODIUM SERPL-SCNC: 136 MMOL/L (ref 135–145)
WBC # BLD AUTO: 13.5 10E3/UL (ref 4–11)

## 2025-05-30 PROCEDURE — 99233 SBSQ HOSP IP/OBS HIGH 50: CPT | Mod: GC | Performed by: INTERNAL MEDICINE

## 2025-05-30 PROCEDURE — 120N000002 HC R&B MED SURG/OB UMMC

## 2025-05-30 PROCEDURE — 250N000011 HC RX IP 250 OP 636

## 2025-05-30 PROCEDURE — 74018 RADEX ABDOMEN 1 VIEW: CPT

## 2025-05-30 PROCEDURE — 84100 ASSAY OF PHOSPHORUS: CPT

## 2025-05-30 PROCEDURE — 83735 ASSAY OF MAGNESIUM: CPT

## 2025-05-30 PROCEDURE — 97530 THERAPEUTIC ACTIVITIES: CPT | Mod: GP

## 2025-05-30 PROCEDURE — B4185 PARENTERAL SOL 10 GM LIPIDS: HCPCS | Performed by: INTERNAL MEDICINE

## 2025-05-30 PROCEDURE — 87324 CLOSTRIDIUM AG IA: CPT

## 2025-05-30 PROCEDURE — 85014 HEMATOCRIT: CPT

## 2025-05-30 PROCEDURE — 99232 SBSQ HOSP IP/OBS MODERATE 35: CPT | Performed by: STUDENT IN AN ORGANIZED HEALTH CARE EDUCATION/TRAINING PROGRAM

## 2025-05-30 PROCEDURE — 36592 COLLECT BLOOD FROM PICC: CPT

## 2025-05-30 PROCEDURE — 250N000009 HC RX 250: Performed by: INTERNAL MEDICINE

## 2025-05-30 PROCEDURE — 80048 BASIC METABOLIC PNL TOTAL CA: CPT

## 2025-05-30 PROCEDURE — 92526 ORAL FUNCTION THERAPY: CPT | Mod: GN | Performed by: REHABILITATION PRACTITIONER

## 2025-05-30 PROCEDURE — 86140 C-REACTIVE PROTEIN: CPT | Performed by: STUDENT IN AN ORGANIZED HEALTH CARE EDUCATION/TRAINING PROGRAM

## 2025-05-30 PROCEDURE — 82374 ASSAY BLOOD CARBON DIOXIDE: CPT

## 2025-05-30 PROCEDURE — 97165 OT EVAL LOW COMPLEX 30 MIN: CPT | Mod: GO

## 2025-05-30 PROCEDURE — 87493 C DIFF AMPLIFIED PROBE: CPT

## 2025-05-30 PROCEDURE — 250N000013 HC RX MED GY IP 250 OP 250 PS 637

## 2025-05-30 PROCEDURE — 74018 RADEX ABDOMEN 1 VIEW: CPT | Mod: 26 | Performed by: RADIOLOGY

## 2025-05-30 PROCEDURE — 97530 THERAPEUTIC ACTIVITIES: CPT | Mod: GO

## 2025-05-30 PROCEDURE — 258N000003 HC RX IP 258 OP 636: Performed by: INTERNAL MEDICINE

## 2025-05-30 PROCEDURE — 250N000011 HC RX IP 250 OP 636: Performed by: INTERNAL MEDICINE

## 2025-05-30 PROCEDURE — 99222 1ST HOSP IP/OBS MODERATE 55: CPT | Mod: GC | Performed by: INTERNAL MEDICINE

## 2025-05-30 RX ORDER — MAGNESIUM SULFATE HEPTAHYDRATE 40 MG/ML
2 INJECTION, SOLUTION INTRAVENOUS ONCE
Status: COMPLETED | OUTPATIENT
Start: 2025-05-30 | End: 2025-05-30

## 2025-05-30 RX ADMIN — OLIVE OIL AND SOYBEAN OIL 250 ML: 16; 4 INJECTION, EMULSION INTRAVENOUS at 20:27

## 2025-05-30 RX ADMIN — Medication 500 MG: at 08:02

## 2025-05-30 RX ADMIN — VANCOMYCIN HYDROCHLORIDE 125 MG: KIT at 20:28

## 2025-05-30 RX ADMIN — VANCOMYCIN HYDROCHLORIDE 125 MG: KIT at 11:57

## 2025-05-30 RX ADMIN — INSULIN GLARGINE 6 UNITS: 100 INJECTION, SOLUTION SUBCUTANEOUS at 11:59

## 2025-05-30 RX ADMIN — THIAMINE HYDROCHLORIDE 250 MG: 100 INJECTION, SOLUTION INTRAMUSCULAR; INTRAVENOUS at 08:00

## 2025-05-30 RX ADMIN — Medication 400 MCG: at 07:47

## 2025-05-30 RX ADMIN — MAGNESIUM SULFATE HEPTAHYDRATE 2 G: 2 INJECTION, SOLUTION INTRAVENOUS at 07:49

## 2025-05-30 RX ADMIN — Medication 3 MG: at 20:28

## 2025-05-30 RX ADMIN — FUROSEMIDE 20 MG: 10 INJECTION, SOLUTION INTRAMUSCULAR; INTRAVENOUS at 16:18

## 2025-05-30 RX ADMIN — FUROSEMIDE 20 MG: 10 INJECTION, SOLUTION INTRAMUSCULAR; INTRAVENOUS at 07:47

## 2025-05-30 RX ADMIN — PANTOPRAZOLE SODIUM 40 MG: 40 INJECTION, POWDER, FOR SOLUTION INTRAVENOUS at 09:13

## 2025-05-30 RX ADMIN — VANCOMYCIN HYDROCHLORIDE 125 MG: KIT at 17:26

## 2025-05-30 RX ADMIN — LATANOPROST 1 DROP: 50 SOLUTION/ DROPS OPHTHALMIC at 23:33

## 2025-05-30 RX ADMIN — MAGNESIUM SULFATE HEPTAHYDRATE: 500 INJECTION, SOLUTION INTRAMUSCULAR; INTRAVENOUS at 20:22

## 2025-05-30 RX ADMIN — VANCOMYCIN HYDROCHLORIDE 125 MG: KIT at 07:47

## 2025-05-30 RX ADMIN — ENOXAPARIN SODIUM 60 MG: 60 INJECTION SUBCUTANEOUS at 01:31

## 2025-05-30 RX ADMIN — ATORVASTATIN CALCIUM 40 MG: 40 TABLET, FILM COATED ORAL at 07:47

## 2025-05-30 RX ADMIN — Medication 25 MG: at 07:47

## 2025-05-30 RX ADMIN — ENOXAPARIN SODIUM 60 MG: 60 INJECTION SUBCUTANEOUS at 13:16

## 2025-05-30 RX ADMIN — SODIUM PHOSPHATE, MONOBASIC, MONOHYDRATE AND SODIUM PHOSPHATE, DIBASIC ANHYDROUS 9 MMOL: 142; 276 INJECTION, SOLUTION INTRAVENOUS at 08:11

## 2025-05-30 ASSESSMENT — ACTIVITIES OF DAILY LIVING (ADL)
ADLS_ACUITY_SCORE: 66
ADLS_ACUITY_SCORE: 68
ADLS_ACUITY_SCORE: 68
ADLS_ACUITY_SCORE: 66
ADLS_ACUITY_SCORE: 68
ADLS_ACUITY_SCORE: 68
ADLS_ACUITY_SCORE: 66
ADLS_ACUITY_SCORE: 68
ADLS_ACUITY_SCORE: 66
ADLS_ACUITY_SCORE: 68
ADLS_ACUITY_SCORE: 68
ADLS_ACUITY_SCORE: 66
ADLS_ACUITY_SCORE: 68
ADLS_ACUITY_SCORE: 66
ADLS_ACUITY_SCORE: 66
ADLS_ACUITY_SCORE: 68
ADLS_ACUITY_SCORE: 66
ADLS_ACUITY_SCORE: 68
ADLS_ACUITY_SCORE: 66
ADLS_ACUITY_SCORE: 68
ADLS_ACUITY_SCORE: 68

## 2025-05-30 NOTE — PLAN OF CARE
/67 (BP Location: Left arm, Cuff Size: Adult Regular)   Pulse 90   Temp 97.7  F (36.5  C) (Oral)   Resp 18   Wt 65.9 kg (145 lb 4.5 oz)   SpO2 96%   BMI 24.94 kg/m      Care from: 2036-1775    VS & Pain: VSS, no pain reported  Neuro: A+Ox2-4, changes intermittently  Respiratory: RA; producitve cough  Cardiac: WDL  Peripheral neurovascular: generalized weakness  GI/: inc of bowel and bladder  Nutrition: NPO; TPN continuous  Skin: red sacrum and perineum - barrier cream; q2 turns  Lines: R PICC: TPN 100ml/hr; L PIV:SL; NG: LIS and meds  Activity: Ax2  Events this shift: transferred to  at 1820; using yankaur for productive cough; refused skin assessment; 6B RN notified that patient reported a missing purse before transferring to the unit- 6B RN tried to call pt's son to see if they took it home but was unable to get ahold of him    Plan:  continue POC; PT/OT recommending TCU for discharge

## 2025-05-30 NOTE — PROGRESS NOTES
Inpatient Diabetes Management Service: Daily Progress Note     HPI: Sarah Felix is a 79 year old female with history of insulin-dependent DM2, HTN, fatty liver, possible alcohol use disorder, gait instability who presented 5/25 with generalized weakness and was admitted with DKA and sepsis, 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 Inpatient Diabetes Service consulted for DKA management.          Assessment/Plan:     Assessment:   Type 2 Diabetes Mellitus, complicated by neuropathy.  (A1c 8 %, Hgb: 12.3; 5/25/25)  DKA on admission.  Lactic acidosis on admission  Sepsis, unknown source  Hepatic vein thrombosis  Dysphagia on full liquid diet   Toxic metabolic encephalopathy  Stress induced hyperglycemia  TPN induced hyperglycemia  C Diff + 5/28     Plan/Recommendations:    - Lantus 6 units q 24 hrs at 1200  - increase Regular insulin in TPN 6 --> 14 units with Dextrose increasing to 160 g  (0.09 units per gram dextrose)    - HOLD while NPO: Novolog carbohydrate coverage: 1 unit per 25 g cho TID AC and prn snacks/supplements  - Novolog correction scale: 1/50 >140 q4 hrs while NPO  -  BG monitoring q 4 hours while NPO  - Hypoglycemia protocol  - Carb counting protocol     Discussion:  NPO since last night, on continuous TPN. TPN insulin not increased in bag last night as ordered, received 1 time dose of Lantus 4 units to cover TPN.  BG started to trend better overnight, on upper end of target range. Hold Novolog ICR while NPO. Per pharmacist, dextrose in TPN increasing from 120 to 160 g tonight. Increase regular insulin in TPN starting tonight.      Discharge Planning: (tentative)    Medications: Basal bolus (discharge on insulin only)   Test Claims:  none needed.   Education:  Needs to be assessed closer to discharge.    Outpatient Follow-up:  recommend [facility] Endocrinology vs PCP      Thank you for this consult. IDS will continue to follow.      Please notify Inpatient Diabetes Service  "if changes are planned to steroids, nutrition, TPN/TF and anticipated procedures requiring prolonged NPO status.         Interval History/Review of Systems :   - The last 24 hours progress and nursing notes reviewed.  - Feeling tired. NPO, no nausea, emesis, or abdominal pain. Having diarrhea (c-diff positive 5/28)    Planned Procedures/Surgeries: none    Inpatient Glucose Control:       Recent Labs   Lab 05/30/25  0724 05/30/25  0408 05/30/25  0402 05/29/25  2316 05/29/25  2314 05/29/25  2021   * 168* 191* 222* 217* 228*             Medications Impacting Glycemia:   Steroids: None  D5W containing solutions/medications: was on D5W at 100 mL/hr, stopped 1400 5/28  Other medications impacting glucose: None        Nutrition:   Orders Placed This Encounter      NPO for Medical/Clinical Reasons Except for: Ice Chips, Meds  Supplements:  None  TF: None  TPN:   Started 5/28 - 5/29: Regular insulin in TPN 6 units with Dextrose 120 g  (6 units per gram dextrose)    5/30: continuous TPN with 160 g dextrose and 14 units of reg insulin         Diabetes History: see full consult note for complete diabetes history   Diabetes Type and Duration: DM2, 7/2009  Zinc transporter 8 antibody, islet antibody, and insulin antibody not available on epic search      GAD65 antibody <5 (9/11/2012)   C peptide 7.6 (9/11/2012)     PTA Medication Regimen: Glimepiride 1 mg daily, Novolog 5 units with high carb meal daily as needed, metformin 500 mg daily  Missing doses?: uncertain  Historical Diabetes Medications: N/A     Glucose monitoring device/frequency/trends: Fortino CGM, reports 130 on average  Hypoglycemia PTA:   - Frequency: \"rarely\"  - Severity:  no history of severe unconscious lows   - Awareness:  intact    - Treatment: \"eats food\"      Outpatient Diabetes Provider: Unknown  Formal Diabetes Education/Educator: Erik        Physical Exam:   /64 (BP Location: Left arm, Cuff Size: Adult Regular)   Pulse 101   Temp 97.9  F " (36.6  C) (Oral)   Resp 18   Wt 65.9 kg (145 lb 4.5 oz)   SpO2 97%   BMI 24.94 kg/m    General:  ill and fatigued appearing, NAD, answers questions appropriately  Lungs: unlabored breathing on RA.  Mental Status:  not assessed            Data:     Lab Results   Component Value Date    A1C 8.0 (H) 05/25/2025    A1C 7.5 (H) 11/05/2024    A1C 7.6 (H) 07/09/2024    A1C 6.6 (H) 03/05/2024    A1C 7.4 (H) 11/14/2023    A1C 8.3 (H) 06/21/2021    A1C 9.4 (H) 12/14/2020    A1C 8.1 (H) 09/21/2020    A1C 8.5 (H) 01/07/2020    A1C 7.6 (H) 10/12/2019       ROUTINE IP LABS (Last four results)  BMP  Recent Labs   Lab 05/30/25  0724 05/30/25  0408 05/30/25  0402 05/29/25  2316 05/29/25  2314 05/29/25  1634 05/29/25  1401 05/29/25  0722 05/29/25  0427   NA  --   --  135  --  136  --  138  --  139   POTASSIUM  --   --  4.1  --  3.8  --  4.1  --  3.8   CHLORIDE  --   --  101  --  101  --  104  --  106   CECY  --   --  7.5*  --  7.5*  --  7.4*  --  7.6*   CO2  --   --  28  --  26  --  27  --  26   BUN  --   --  17.8  --  17.3  --  15.1  --  12.4   CR  --   --  0.74  --  0.74  --  0.76  --  0.72   * 168* 191* 222* 217*   < > 234*   < > 184*    < > = values in this interval not displayed.     CBC  Recent Labs   Lab 05/30/25  0402 05/29/25  0427 05/28/25  0607 05/27/25  0627   WBC 13.5* 15.1* 19.1* 17.2*   RBC 3.13* 3.11* 3.70* 4.16   HGB 8.4* 8.4* 9.8* 11.0*   HCT 26.1* 26.4* 30.3* 34.1*   MCV 83 85 82 82   MCH 26.8 27.0 26.5 26.4*   MCHC 32.2 31.8 32.3 32.3   RDW 17.3* 17.2* 17.0* 16.9*   * 557* 621* 688*     Inpatient Diabetes Service will continue to follow, please don't hesitate to contact the team with any questions or concerns.     Clair Chung PA-C  Inpatient Diabetes Service  Pager: [phone]  Available on vocera    Plan discussed with patient, bedside RN, and primary team via this note.    To contact Inpatient Diabetes Service:     7 AM - 5 PM: Page the IDS MIKE following the patient that day (see filed or  incomplete progress notes/consult notes under Endocrinology)    OR if uncertain of provider assignment: page job code [phone]    5 PM - 7 AM: First call after hours is to primary service.    For urgent after-hours questions, page job code for on call fellow: [phone]     I spent a total of 35 minutes on the date of the encounter doing prep/post-work, chart review, history and exam, documentation and further activities per the note including lab review, multidisciplinary communication, counseling the patient and/or coordinating care regarding acute hyper/hypoglycemic management, as well as discharge management and planning/communication.

## 2025-05-30 NOTE — PROGRESS NOTES
"   05/30/25 1400   Appointment Info   Signing Clinician's Name / Credentials (OT) CURTIS De La Torre   Living Environment   People in Home alone   Current Living Arrangements house   Home Accessibility stairs to enter home;stairs within home   Number of Stairs, Main Entrance 4   Stair Railings, Main Entrance none   Number of Stairs, Within Home, Primary other (see comments)  (\"many stairs\")   Stair Railings, Within Home, Primary railings safe and in good condition   Transportation Anticipated car, drives self   Living Environment Comments Pt reports tub shower, previously IND with ADLs and IADLs. Pt has meals delivered per  note.   Self-Care   Usual Activity Tolerance good   Current Activity Tolerance poor   Equipment Currently Used at Home none   Fall history within last six months yes   Number of times patient has fallen within last six months 3  (\"a few\", when asked if pt knew nature of falls, she responded with \"because I couldn't walk\")   General Information   Onset of Illness/Injury or Date of Surgery 05/25/24   Referring Physician July Bojorquez MD   Patient/Family Therapy Goal Statement (OT) did not state   Additional Occupational Profile Info/Pertinent History of Current Problem per chart Sarah Felix is a 79 year old female with history of  insulin-dependent DM2, HTN, fatty liver, possible alcohol use disorder, gait instability who presented 5/25 with generalized weakness and was admitted with DKA and sepsis, 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 Course c/b C diff enteritis.   Existing Precautions/Restrictions fall   Cognitive Status Examination   Orientation Status person;place   Cognitive Status Comments pt follows one step commands consistently with mild delay   Sensory   Sensory Quick Adds sensation intact   Posture   Posture forward head position;protracted shoulders   Range of Motion Comprehensive   Comment, General Range of Motion BUE WFL   Strength Comprehensive (MMT)   Comment, " General Manual Muscle Testing (MMT) Assessment generalized weakness, BUE at least 3/5   Coordination   Coordination Comments Gross motor movements slightly sluggish   Bed Mobility   Comment (Bed Mobility) rolls with min-mod A x2   Transfers   Transfers sit-stand transfer;toilet transfer;shower transfer   Sit-Stand Transfer   Sit-Stand [place] (Transfers) minimum assist (75% patient effort);moderate assist (50% patient effort);2 person assist   Shower Transfer   Type (Shower Transfer) lateral   [place] Level (Shower Transfer) moderate assist (50% patient effort);2 person assist   Shower Transfer Comments per clinical judgement   Toilet Transfer   Type (Toilet Transfer) sit-stand   [place] Level (Toilet Transfer) moderate assist (50% patient effort);2 person assist   Toilet Transfer Comments per clinical judgement   Activities of Daily Living   BADL Assessment/Intervention bathing;lower body dressing;toileting   Bathing Assessment/Intervention   [place] Level (Bathing) moderate assist (50% patient effort)   Comment, (Bathing) per clinical judgement   Lower Body Dressing Assessment/Training   [place] Level (Lower Body Dressing) moderate assist (50% patient effort)   Comment, (Lower Body Dressing) per clinical judgement   Toileting   [place] Level (Toileting) maximum assist (25% patient effort)   Clinical Impression   Criteria for Skilled Therapeutic Interventions Met (OT) Yes, treatment indicated   OT Diagnosis pt is below baseline for ADLs   OT Problem List-Impairments impacting ADL problems related to;activity tolerance impaired;cognition;balance;strength;pain;post-surgical precautions;mobility   Assessment of Occupational Performance 3-5 Performance Deficits   Identified Performance Deficits dressing, bathing, toileting,   Planned Therapy Interventions (OT) ADL retraining;IADL retraining;strengthening;transfer training;visual perception;home program guidelines;progressive  activity/exercise   Clinical Decision Making Complexity (OT) problem focused assessment/low complexity   Risk & Benefits of therapy have been explained evaluation/treatment results reviewed;care plan/treatment goals reviewed;risks/benefits reviewed;current/potential barriers reviewed;participants voiced agreement with care plan;participants included;patient   Clinical Impression Comments pt is below baseline for ADLs, limited by denconditioning and cognition. pt will benefit from skilled OT to progress pt IND with ADLs.   OT Total Evaluation Time   OT Eval, Low Complexity Minutes (42916) 6   OT Goals   Therapy Frequency (OT) 5 times/week   OT Predicted Duration/Target Date for Goal Attainment 06/20/25   OT Goals Hygiene/Grooming;Lower Body Dressing;Lower Body Bathing;Toilet Transfer/Toileting   OT: Hygiene/Grooming independent;while standing   OT: Lower Body Dressing Modified independent;using adaptive equipment   OT: Lower Body Bathing Modified independent;with precautions   OT: Toilet Transfer/Toileting Independent;toilet transfer;cleaning and garment management   OT Discharge Planning   OT Plan standing tolerance, seated ADLs   OT Discharge Recommendation (DC Rec) Transitional Care Facility   OT Rationale for DC Rec Anticipate pt will require rehab stay at d/c, currently require Ax2 for STS   OT Brief overview of current status OH lift, Ax2 STS   Total Session Time   Total Session Time (sum of timed and untimed services) 6      Right LE

## 2025-05-30 NOTE — PLAN OF CARE
Neuro: A&Ox3-4. Intermittently forgets location. Generalized weakness.   Cardiac: No tele orders, VSS.   Respiratory: Sating >92% on RA.  GI/: Adequate urine output, costa removed at 1330- Straight cath x 1 for 550. BM X4. C.Diff test collected and sent.   Diet/appetite: Tolerating TPN. K, Mg, Phos replaced. Recheck- WDL.   Activity:  Assist of 2 with lift, up to chair.   Pain: Denies pain.   Skin: Perineal excoriation from frequent stools.   LDA's: PICC x3. NG to LIS.     Plan: Continue with POC. Notify primary team with changes.    Problem: Sleep Disturbance  Goal: Adequate Sleep/Rest  Outcome: Not Progressing      Transfer  Transferred to:  Via:bed  Reason for transfer:Pt no longer appropriate for 6B- improved patient condition  Family: Aware of transfer  Belongings: Packed and sent with pt  Chart: Delivered with pt to next unit  Medications: Meds sent to new unit via tube  Report given to: Rivka  Pt status: Stable

## 2025-05-30 NOTE — PLAN OF CARE
Neuro: A&Ox2. Pt intermittently answers questions. Had very poor sleep overnight despite medications given.   Cardiac: HR 80-90s.Pulse ox.      Respiratory: Sating >92% on RA.  GI/: Adequate urine output via Silver catheter. BM X3 incontinant  Diet/appetite: NPO. TPN & Lipids   Activity:  Assist of 2, up to chair and in halls.  Pain: Denies  Skin: No new deficits noted. Following plan of care for sacral excoriation/blanchable redness  LDA's: R TPL PICC infusing TPN & lipids. NG to LIS       Plan: Continue with POC. Notify primary team with changes.     Outcome Evaluation: Pt oriented x2. NG to LIS. Very poor sleep overnight, despite medications, promoting a quiet environment, minimal disturbances, and dimming the lights.

## 2025-05-30 NOTE — CONSULTS
Gastroenterology Consultation  GI Luminal Service    Date of Admission:  5/25/2025  Date of Consult  5/30/2025   Reason for consult:     Patient with enteritis, found to have c diff toxin b positive, but not A. Wondering if toxin b is sufficient to cause c diff enteritis or if there is another cause.      Requesting Physician:  Ivan Knight MD  PATIENT:  Sarah Felix  MRN:         [id number]          ASSESSMENT AND RECOMMENDATIONS:   Assessment:  Sarah Felix is a 79 year old female who was admitted on 5/25/2025 for encephalopathy, sepsis of uknown source, DKA (previously not on insulin, T2DM) and was found to have non-occlusive R hepatic vein thrombus and has been started on anticoagulation (duplex US showed normal flow in other vasculature).  PMH significant for JAK2 myeloproliferative neoplasm with essential thrombocythemia on hydroxyurea.  GI was consulted on 5/30/2025 for about a week of loose stools 2-3 times a day and + C diff PCR but not toxin (enteric panel neg) came back on 5/28/25 after a couple of days of systemic abx exposure.  No source of patient's sepsis / DKA trigger has been found besides the loose stools.  CT showed diffusely dilated loops of small bowel possibly ileus.  Patient did have significant vomiting on admission, and she has been on  NGT to LIS and TPN since then.       # Watery/loose stools  # Diffusely dilated loops of small bowel -- possibly ileus  # C diff. PCR +, toxin negative  - About a week of diarrhea, some abdominal distension but no clear pain.  Patient was encephalopathic and septic.  There is some concern for this being a true toxinogenic C diff, rather than colonization, and would recommend re-testing C. Diff.  Currently on oral vanco 125mg QID.  CT with some small bowel wall thickening but no colonic dilation.  She is having > 3 BM per day.      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    # New non-occlusive R HVT -- on  enox  # Large hepatic cyst  - In 2023 this was measured at 8.1x6.6cm.  In 5/25/2025 measures 7.3x8.9 cm and has mild wall thickening, and septal calicifcations. Normal LFTs.    # JAK2 + Myeloproliferative neoplasm, essential thrombocythemia     Recommendations:  - Re-test C diff  - Continue oral vanco 125 QID  - Daily CRP and AXR for the next few days  - Recommend clamping trial of NG-tube and removal if she passes 4 hour of clamping trial and starting CLD and ADAT, as long as cleared by bedside nursing or SLP.  - Outpatient MRI/MRCP to evaluate the pancreatic and hepatic cysts.    Thank you for involving us in this patient's care. Please do not hesitate to contact the GI service with any questions or concerns.  The patient was discussed and plan agreed upon with Dr. Diop.    Kiara Cain ([facility]), , MS  Gastroenterology Fellow  [facility]  Text Page          History of Present Illness:   Patient seen and examined at 12pm. History is obtained from chart review, patient, and primary team.    Sarah Felix is a 79 year old female who was admitted on 5/25/2025 for sepsis, DKA (previously not on insulin, T2DM) and was found to have non-occlusive R hepatic vein thrombus and has been started on anticoagulation (duplex US showed normal flow in other vasculature).  PMH significant for JAK2 myeloproliferative neoplasm with essential thrombocythemia on hydroxyurea.  GI was consulted on 5/30/2025 for about a week of loose stools 2-3 times a day and + C diff PCR but not toxin (enteric panel neg) came back on 5/28/25 after a couple of days of systemic abx exposure.  No source of patient's sepsis / DKA trigger has been found besides the loose stools.  CT showed diffusely dilated loops of small bowel possibly ileus.  Patient did have significant vomiting on admission, and she has been on  NGT to LIS and TPN since then.       She denies abdominal pain but still has some diarrhea.  She  is somewhat confused still.           Past Medical History:   Reviewed            Past Surgical History:   Reviewed            Social History:   Reviewed           Family History:   Patient's family history is reviewed today and is non-contributory           Allergies:   Reviewed         Medications:   Reviewed         Review of Systems:     A review of systems was performed and is negative except as noted in the HPI           Physical Exam:   Temp: 97.5  F (36.4  C) Temp src: Oral BP: 114/68 Pulse: 101   Resp: 18 SpO2: 96 % O2 Device: None (Room air)      General Appearance: NAD, pleasant  HEENT: EOMI  + NG tube  Respiratory: Breathing comfortably on RA  Cardiovascular: Not on pressors  GI: soft, NTND, no peritoneal sign  Extremities: No LE edema noted   Neuro: Moving all extremities   Skin: No jaundice rash on exposed areas   Psych: Alert and but oriented to place but not time, coopertive    _______________________________________________________________  Data:  Labs and imaging for last 24 hours were independently reviewed and interpreted

## 2025-05-30 NOTE — SUMMARY OF CARE
(Change note type to summary of care)  Transferred from: 6B  Report received from: Yoly RN         2 RN skin assessment completed by: pt refused; was told by previous nurse that sacrum and perineum have significant skin breakdown      - Findings (add LDA if needed):   Care plan (primary problem) and education initiated if not already done: yes  MDRO education done if applicable: yes  Pt informed about policy regarding no IV pumps off unit: yes  Suction set up in room? yes  Flu shot ordered? (October-April only): n/a  Detailed Belongings: see NST note

## 2025-05-30 NOTE — PROGRESS NOTES
Resident/Fellow Attestation   I, Johnny Boyd MD, was present with the medical/MIKE student who participated in the service and in the documentation of the note.  I have verified the history and personally performed the physical exam and medical decision making.  I agree with the assessment and plan of care as documented in the note.      Johnny Boyd MD  PGY1  Date of Service (when I saw the patient): 05/30/25    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 Course c/b C diff enteritis; clinically improving with PO vancomycin.      Today's updates:  - Improving encephalopathy- not yet appropriate for addiction medicine consult  - Continue PO vancomycin  - Continue lasix 20mg IV BID  - PT/OT appointments  - GI consult given uncommon presentation of C. Diff  - Start IV pantoprazole daily  - SLP to reevalutate swallowing today     Enteritis possibly 2/2 C. Diff  Ileus   Initially presented septic but with negative workup including urine culture, blood cultures, CT C/A/P. Had ongoing worsening encephalopathy and leukocytosis on vanc/zosyn -> unasyn. Developed diarrhea and abdominal pain, stool with C diff PCR + but A toxin negative. C diff B toxin positive, will consult GI for input as to whether enteritis can be associated with this C. Diff presentation. Given clinical worsening, decided to empirically treat - now with improvement. Nausea/vomiting managed with NG to LIS, clamping after med delivery.  - NG to LIS, consider removal in upcoming days  - Abx:              - PO vancomycin (5/28-6/6) - 10 day course  - Unasyn  "(5/27-5/29)  - Zosyn (5/25-5/27)              - Vancomycin (5/25-5/26)  - GI consult for potential workup of enteritis given uncommon presentation of C. Diff  - Start IV pantoprazole daily    Urinary retention  - Remove costa    Concern for refeeding syndrome  Decrease in magnesium and phosphorus after beginning TPN, possible refeeding syndrome.  - Check electrolytes TID until stable  - RN replacement protocols    Pleural effusions  Pulmonary edema  Anasarca  Seen on 5/28 CT C/A/P. TTE with LVEF 60-65%, small IVC.   - Continue lasix 20mg BID (PTA dose is 20mg PO daily)    Diabetic ketoacidosis, resolved  T2DM with A1c 8.0%. Likely triggered by sepsis. Now off insulin gtt.  - Endocrine consulted, managing glargine + regular insulin in TPN + novolog  - Hold PTA metformin, glipizide  - Diabetes ed consult     Hepatic vein thrombus  No prior imaging to compare. Per chart review, history of \"thromboembolism\" after ovarian cyst removal age 22. Liver panel normal.  - IR consulted; no indication for lytics  - Heme consulted  - Heparin gtt 5/25-5/27  - Lovenox 5/27-*  - At discharge transition to apixaban 10mg BID x 7 days then 5 mg BID  - Follow up with Dr. Wilde 7/29/25     Toxic metabolic encephalopathy  Not oriented to month or year. Likely related to sepsis vs hospital acquired delirium. No acute intracranial findings on CT head.   - Wernicke dose thiamine  - Delirium precautions     Possible alcohol use disorder  Reports 2 drinks/day. Did have an ED visit 03/2025 for alcohol intoxication. Reports last drink 5 days PTA, low concern for withdrawal. Her brother, Cruz, has concerns that she is drinking excessively and encourages addiction medicine involvement.  - Consider addiction medicine consult when encephalopathy improves  - PTA folate  - Continue thiamine, Wernicke dosing     Moderate malnutrition in the context of chronic illness   Continued elevated ketones despite insulin ggt. Elevation likely due to poor oral " "intake due to encephalopathy and starvation ketosis. Will continue TPN until PO intake tolerated.  - Continue TPN   - SLP involvement to evaluate swallowing abilities   - Appointment 5/31    Dysphagia  Recurrent vomiting  Concern for aspiration from bedside RN.   - SLP consult:   - Increased risk for aspiration 2/2 poor mentation  - Full liquid diet with close supervision  - Upcoming appointment 5/31  - Given recurrent vomiting and aspiration risk, NG suctioning initiated     Sacrum/coccyx moisture wound  Erythematous, blanchable area on her sacrum. Bedside RN says likely from excessive moisture 2/2 fecal incontinence. [facility] nurse resports wound c/w incontinence associated dermatitis.  - [facility] nurse rec:    - Dinorah cleanse and protection with soft cloth   - Apply thin layer of Barrier paste to open and red areas    \"Prominent pericardial contents\"  Cardiology reviewed- this is an epicardial fat pad. No further workup or consult needed.     Generalized weakness, failure to thrive  TSH wnl.   - Ongoing PT/OT consults   - Ongoing evaluation for safe discharge plan     HTN: Hold PTA metoprolol, lisinopril given sepsis and normotension     Fall at home: CTH, C spine negative for acute process/fracture     Elevated INR: Unclear etiology, no known liver disease. Will hold off on Vit K given new thrombus.    Essential thrombocytosis: Stop PTA aspirin per heme, continue PTA hydroxyurea     Chronic/stable:     Glaucoma: PTA latanoprost  CKD2  Diabetic neuropathy  Schatzki's ring  Slow transit constipation  Mixed urge and stress incontinence  Gait instability       Medically Ready for Discharge: Anticipated in 2-4 Days          Diet: parenteral nutrition - ADULT compounded formula  NPO for Medical/Clinical Reasons Except for: Ice Chips, Meds    DVT Prophylaxis: Enoxaparin (Lovenox) SQ  Silver Catheter: PRESENT, indication: Acute retention or obstruction  Fluids: IV  Lines: PRESENT      PICC 05/26/25 Triple Lumen Right Basilic " Access. PICC was ready to use, no immediate concern.-Site Assessment: WDL      Cardiac Monitoring: None  Code Status: Full Code      Medically Ready for Discharge: Anticipated in 2-4 Days    The patient's care was discussed with the Attending Physician, Dr. Ivan Knight.    Stefanie Kim  Medical Student  Medicine Service, [facility] TEAM 2  [facility]  Securely message with ZingCheckout (more info)  Text page via Strut Paging/Directory   See signed in provider for up to date coverage information  ______________________________________________________________________    Interval History   Sarah reports that she had a good night of sleep. Says the dim lights helped and she wasn't cold like the night before. She seems more oriented and engaged in her care, hoping to use the bathroom and walk around. She is still coughing up clear mucous, but is mostly able to suction it out herself. She wants to try swallowing liquids again today.     Physical Exam   Vital Signs: Temp: 97.9  F (36.6  C) Temp src: Oral BP: 125/64 Pulse: 101   Resp: 18 SpO2: 97 % O2 Device: None (Room air) Oxygen Delivery: 1 LPM  Weight: 145 lbs 4.53 oz    Constitutional: awake, cooperative, no apparent distress  Respiratory: no increased work of breathing and bibasilar crackles heard on auscultation  Cardiovascular: Normal apical impulse, regular rate and rhythm, normal S1 and S2, no S3 or S4, and no murmur noted  GI: normal bowel sounds and tenderness to palpation in lower right and left quadrants  Musculoskeletal: 3+ pitting edema in lower extremities  Neurologic: Oriented to self and place only    Data     I have personally reviewed the following data over the past 24 hrs:    13.5 (H)  \   8.4 (L)   / 591 (H)     135 101 17.8 /  175 (H)   4.1 28 0.74 \       Imaging results reviewed over the past 24 hrs:   No results found for this or any previous visit (from the past 24 hours).

## 2025-05-31 ENCOUNTER — APPOINTMENT (OUTPATIENT)
Dept: CT IMAGING | Facility: CLINIC | Age: 80
DRG: 871 | End: 2025-05-31
Attending: NURSE PRACTITIONER
Payer: COMMERCIAL

## 2025-05-31 ENCOUNTER — APPOINTMENT (OUTPATIENT)
Dept: GENERAL RADIOLOGY | Facility: CLINIC | Age: 80
End: 2025-05-31
Payer: COMMERCIAL

## 2025-05-31 LAB
ANION GAP SERPL CALCULATED.3IONS-SCNC: 7 MMOL/L (ref 7–15)
BUN SERPL-MCNC: 18.2 MG/DL (ref 8–23)
CALCIUM SERPL-MCNC: 7.8 MG/DL (ref 8.8–10.4)
CHLORIDE SERPL-SCNC: 101 MMOL/L (ref 98–107)
CREAT SERPL-MCNC: 0.67 MG/DL (ref 0.51–0.95)
CRP SERPL-MCNC: 16.3 MG/L
EGFRCR SERPLBLD CKD-EPI 2021: 90 ML/MIN/1.73M2
ERYTHROCYTE [DISTWIDTH] IN BLOOD BY AUTOMATED COUNT: 17.5 % (ref 10–15)
GLUCOSE BLDC GLUCOMTR-MCNC: 169 MG/DL (ref 70–99)
GLUCOSE BLDC GLUCOMTR-MCNC: 177 MG/DL (ref 70–99)
GLUCOSE BLDC GLUCOMTR-MCNC: 186 MG/DL (ref 70–99)
GLUCOSE BLDC GLUCOMTR-MCNC: 195 MG/DL (ref 70–99)
GLUCOSE BLDC GLUCOMTR-MCNC: 197 MG/DL (ref 70–99)
GLUCOSE BLDC GLUCOMTR-MCNC: 203 MG/DL (ref 70–99)
GLUCOSE BLDC GLUCOMTR-MCNC: 225 MG/DL (ref 70–99)
GLUCOSE SERPL-MCNC: 226 MG/DL (ref 70–99)
HCO3 SERPL-SCNC: 25 MMOL/L (ref 22–29)
HCT VFR BLD AUTO: 23.8 % (ref 35–47)
HGB BLD-MCNC: 8 G/DL (ref 11.7–15.7)
MAGNESIUM SERPL-MCNC: 2 MG/DL (ref 1.7–2.3)
MAGNESIUM SERPL-MCNC: 2.3 MG/DL (ref 1.7–2.3)
MCH RBC QN AUTO: 29.1 PG (ref 26.5–33)
MCHC RBC AUTO-ENTMCNC: 33.6 G/DL (ref 31.5–36.5)
MCV RBC AUTO: 87 FL (ref 78–100)
PHOSPHATE SERPL-MCNC: 2.9 MG/DL (ref 2.5–4.5)
PHOSPHATE SERPL-MCNC: 4.8 MG/DL (ref 2.5–4.5)
PLATELET # BLD AUTO: 563 10E3/UL (ref 150–450)
POTASSIUM SERPL-SCNC: 4.2 MMOL/L (ref 3.4–5.3)
RBC # BLD AUTO: 2.75 10E6/UL (ref 3.8–5.2)
SODIUM SERPL-SCNC: 133 MMOL/L (ref 135–145)
WBC # BLD AUTO: 9.9 10E3/UL (ref 4–11)

## 2025-05-31 PROCEDURE — 70498 CT ANGIOGRAPHY NECK: CPT | Mod: 26 | Performed by: RADIOLOGY

## 2025-05-31 PROCEDURE — 120N000002 HC R&B MED SURG/OB UMMC

## 2025-05-31 PROCEDURE — 99418 PROLNG IP/OBS E/M EA 15 MIN: CPT | Mod: GC | Performed by: INTERNAL MEDICINE

## 2025-05-31 PROCEDURE — 250N000012 HC RX MED GY IP 250 OP 636 PS 637: Performed by: STUDENT IN AN ORGANIZED HEALTH CARE EDUCATION/TRAINING PROGRAM

## 2025-05-31 PROCEDURE — 82310 ASSAY OF CALCIUM: CPT

## 2025-05-31 PROCEDURE — 250N000013 HC RX MED GY IP 250 OP 250 PS 637

## 2025-05-31 PROCEDURE — 83735 ASSAY OF MAGNESIUM: CPT

## 2025-05-31 PROCEDURE — 99233 SBSQ HOSP IP/OBS HIGH 50: CPT | Mod: GC | Performed by: INTERNAL MEDICINE

## 2025-05-31 PROCEDURE — 250N000011 HC RX IP 250 OP 636

## 2025-05-31 PROCEDURE — 85018 HEMOGLOBIN: CPT

## 2025-05-31 PROCEDURE — 70496 CT ANGIOGRAPHY HEAD: CPT

## 2025-05-31 PROCEDURE — 84100 ASSAY OF PHOSPHORUS: CPT

## 2025-05-31 PROCEDURE — 250N000009 HC RX 250: Performed by: INTERNAL MEDICINE

## 2025-05-31 PROCEDURE — 36592 COLLECT BLOOD FROM PICC: CPT

## 2025-05-31 PROCEDURE — 250N000013 HC RX MED GY IP 250 OP 250 PS 637: Performed by: STUDENT IN AN ORGANIZED HEALTH CARE EDUCATION/TRAINING PROGRAM

## 2025-05-31 PROCEDURE — 86140 C-REACTIVE PROTEIN: CPT

## 2025-05-31 PROCEDURE — 74018 RADEX ABDOMEN 1 VIEW: CPT

## 2025-05-31 PROCEDURE — 250N000011 HC RX IP 250 OP 636: Performed by: INTERNAL MEDICINE

## 2025-05-31 PROCEDURE — 70450 CT HEAD/BRAIN W/O DYE: CPT

## 2025-05-31 PROCEDURE — 74018 RADEX ABDOMEN 1 VIEW: CPT | Mod: 26 | Performed by: STUDENT IN AN ORGANIZED HEALTH CARE EDUCATION/TRAINING PROGRAM

## 2025-05-31 PROCEDURE — 70496 CT ANGIOGRAPHY HEAD: CPT | Mod: 26 | Performed by: RADIOLOGY

## 2025-05-31 PROCEDURE — 250N000011 HC RX IP 250 OP 636: Performed by: NURSE PRACTITIONER

## 2025-05-31 PROCEDURE — B4185 PARENTERAL SOL 10 GM LIPIDS: HCPCS | Performed by: INTERNAL MEDICINE

## 2025-05-31 PROCEDURE — 99207 PR NO BILLABLE SERVICE THIS VISIT: CPT | Performed by: NURSE PRACTITIONER

## 2025-05-31 PROCEDURE — 99232 SBSQ HOSP IP/OBS MODERATE 35: CPT | Performed by: STUDENT IN AN ORGANIZED HEALTH CARE EDUCATION/TRAINING PROGRAM

## 2025-05-31 RX ORDER — TAMSULOSIN HYDROCHLORIDE 0.4 MG/1
0.4 CAPSULE ORAL DAILY
Status: DISCONTINUED | OUTPATIENT
Start: 2025-05-31 | End: 2025-06-12 | Stop reason: HOSPADM

## 2025-05-31 RX ORDER — PANTOPRAZOLE SODIUM 40 MG/1
40 TABLET, DELAYED RELEASE ORAL
Status: DISCONTINUED | OUTPATIENT
Start: 2025-06-01 | End: 2025-06-12 | Stop reason: HOSPADM

## 2025-05-31 RX ORDER — FUROSEMIDE 20 MG/1
20 TABLET ORAL
Status: DISCONTINUED | OUTPATIENT
Start: 2025-05-31 | End: 2025-06-12 | Stop reason: HOSPADM

## 2025-05-31 RX ORDER — BENZONATATE 100 MG/1
100 CAPSULE ORAL 3 TIMES DAILY PRN
Status: DISCONTINUED | OUTPATIENT
Start: 2025-05-31 | End: 2025-05-31

## 2025-05-31 RX ORDER — GUAIFENESIN/DEXTROMETHORPHAN 100-10MG/5
10 SYRUP ORAL EVERY 4 HOURS PRN
Status: DISCONTINUED | OUTPATIENT
Start: 2025-05-31 | End: 2025-06-12 | Stop reason: HOSPADM

## 2025-05-31 RX ORDER — IOPAMIDOL 755 MG/ML
67 INJECTION, SOLUTION INTRAVASCULAR ONCE
Status: COMPLETED | OUTPATIENT
Start: 2025-05-31 | End: 2025-05-31

## 2025-05-31 RX ORDER — ENOXAPARIN SODIUM 100 MG/ML
1 INJECTION SUBCUTANEOUS 2 TIMES DAILY
Status: DISCONTINUED | OUTPATIENT
Start: 2025-05-31 | End: 2025-06-01

## 2025-05-31 RX ADMIN — VANCOMYCIN HYDROCHLORIDE 125 MG: KIT at 08:26

## 2025-05-31 RX ADMIN — GUAIFENESIN AND DEXTROMETHORPHAN 10 ML: 100; 10 SYRUP ORAL at 04:49

## 2025-05-31 RX ADMIN — TAMSULOSIN HYDROCHLORIDE 0.4 MG: 0.4 CAPSULE ORAL at 12:25

## 2025-05-31 RX ADMIN — INSULIN ASPART 4 UNITS: 100 INJECTION, SOLUTION INTRAVENOUS; SUBCUTANEOUS at 16:31

## 2025-05-31 RX ADMIN — THIAMINE HYDROCHLORIDE 250 MG: 100 INJECTION, SOLUTION INTRAMUSCULAR; INTRAVENOUS at 08:27

## 2025-05-31 RX ADMIN — INSULIN ASPART 3 UNITS: 100 INJECTION, SOLUTION INTRAVENOUS; SUBCUTANEOUS at 12:25

## 2025-05-31 RX ADMIN — Medication 400 MCG: at 08:27

## 2025-05-31 RX ADMIN — INSULIN ASPART 2 UNITS: 100 INJECTION, SOLUTION INTRAVENOUS; SUBCUTANEOUS at 21:13

## 2025-05-31 RX ADMIN — VANCOMYCIN HYDROCHLORIDE 125 MG: KIT at 16:55

## 2025-05-31 RX ADMIN — ENOXAPARIN SODIUM 60 MG: 60 INJECTION SUBCUTANEOUS at 01:24

## 2025-05-31 RX ADMIN — FUROSEMIDE 20 MG: 10 INJECTION, SOLUTION INTRAMUSCULAR; INTRAVENOUS at 08:26

## 2025-05-31 RX ADMIN — LATANOPROST 1 DROP: 50 SOLUTION/ DROPS OPHTHALMIC at 21:55

## 2025-05-31 RX ADMIN — PANTOPRAZOLE SODIUM 40 MG: 40 INJECTION, POWDER, FOR SOLUTION INTRAVENOUS at 08:26

## 2025-05-31 RX ADMIN — Medication 25 MG: at 08:26

## 2025-05-31 RX ADMIN — INSULIN GLARGINE 6 UNITS: 100 INJECTION, SOLUTION SUBCUTANEOUS at 14:10

## 2025-05-31 RX ADMIN — OLIVE OIL AND SOYBEAN OIL 250 ML: 16; 4 INJECTION, EMULSION INTRAVENOUS at 21:12

## 2025-05-31 RX ADMIN — MAGNESIUM SULFATE HEPTAHYDRATE: 500 INJECTION, SOLUTION INTRAMUSCULAR; INTRAVENOUS at 21:06

## 2025-05-31 RX ADMIN — FUROSEMIDE 20 MG: 20 TABLET ORAL at 16:55

## 2025-05-31 RX ADMIN — Medication 500 MG: at 08:26

## 2025-05-31 RX ADMIN — ENOXAPARIN SODIUM 60 MG: 60 INJECTION SUBCUTANEOUS at 12:25

## 2025-05-31 RX ADMIN — VANCOMYCIN HYDROCHLORIDE 125 MG: KIT at 21:21

## 2025-05-31 RX ADMIN — INSULIN ASPART 3 UNITS: 100 INJECTION, SOLUTION INTRAVENOUS; SUBCUTANEOUS at 09:04

## 2025-05-31 RX ADMIN — VANCOMYCIN HYDROCHLORIDE 125 MG: KIT at 12:26

## 2025-05-31 RX ADMIN — IOPAMIDOL 67 ML: 755 INJECTION, SOLUTION INTRAVENOUS at 20:40

## 2025-05-31 RX ADMIN — ENOXAPARIN SODIUM 60 MG: 60 INJECTION SUBCUTANEOUS at 21:14

## 2025-05-31 RX ADMIN — Medication 3 MG: at 21:14

## 2025-05-31 RX ADMIN — ATORVASTATIN CALCIUM 40 MG: 40 TABLET, FILM COATED ORAL at 08:26

## 2025-05-31 ASSESSMENT — ACTIVITIES OF DAILY LIVING (ADL)
ADLS_ACUITY_SCORE: 65
ADLS_ACUITY_SCORE: 62
ADLS_ACUITY_SCORE: 66
ADLS_ACUITY_SCORE: 62
ADLS_ACUITY_SCORE: 66
ADLS_ACUITY_SCORE: 65
ADLS_ACUITY_SCORE: 62
ADLS_ACUITY_SCORE: 66
ADLS_ACUITY_SCORE: 62
ADLS_ACUITY_SCORE: 66
ADLS_ACUITY_SCORE: 65
ADLS_ACUITY_SCORE: 66
ADLS_ACUITY_SCORE: 62
ADLS_ACUITY_SCORE: 65
ADLS_ACUITY_SCORE: 65
ADLS_ACUITY_SCORE: 62
ADLS_ACUITY_SCORE: 68

## 2025-05-31 NOTE — PLAN OF CARE
Shift Hours: 1900 - 0700    Assessment:  Body systems that were not at patient's baseline Skin. Focused body system assessments documented in flowsheets.        Activity     Fall Risk Score: 75   Bed alarm on? Yes     Activity Assistance Provided: assistance, 2 people      Assistive Device Utilized: lift device    Pain: Denied    Labs/RN Managed Protocols: Bg check, K+, mag and phos    Lines/Drains: Right PICC with three lumen    Nutrition: NPO, Continuous TPN and lipid    Goal Outcome Evaluation  Plan of Care Reviewed With: patient  Overall Patient Progress: no change  Outcome Evaluation: A/o x 3. Slept well. Pt continue to retaining urine post catheter removal. bladder scan between 450 ml-800 ml and straight cath as needed respectively. Continue on continuous TPN with lipid . Turn/reposition every two hours.    Barriers to Discharge:     Ongoing treatment

## 2025-05-31 NOTE — PROGRESS NOTES
Brief GI note:    Primary team performing clamp trial.  Agree with NG removal if she tolerates clamping.  Okay from GI perspective to advance diet as she tolerates.  Will treat her C. difficile with 14-day course of vancomycin.  GI will follow peripherally for the remainder of the weekend and follow-up on Monday.    Ryan Anthony  Gastroenterology Fellow, PGY4

## 2025-05-31 NOTE — PROGRESS NOTES
[facility]    Progress Note - Medicine Service, [facility] TEAM 2       Date of Admission:  5/25/2025    Assessment & Plan   Sarah Felix is a 79 year old female with history of  insulin-dependent DM2, HTN, fatty liver, possible alcohol use disorder, gait instability who presented 5/25 with generalized weakness and was admitted with DKA and sepsis, 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 Course c/b C diff enteritis; clinically improving with PO vancomycin.      Today's updates:  - Clamp NG tube for 4 hours and observe, may be able to have tube removed if managing vomiting/regurgitation  - KUB daily to assess enteritis improvement  - CRP daily  - Will need outpatient MRI/MRCP to evaluate the pancreatic and hepatic cysts   - Improving encephalopathy- not yet appropriate for addiction medicine consult  - Continue PO vancomycin  - Change lasix to PO 20mg BID  - Start tamsulosin daily given urinary retention  - Transition IV protonix to PO     Enteritis possibly 2/2 C. Diff  Ileus   Initially presented septic but with negative workup including urine culture, blood cultures, CT C/A/P. Had ongoing worsening encephalopathy and leukocytosis on vanc/zosyn -> unasyn. Developed diarrhea and abdominal pain, stool with C diff PCR + but A toxin negative. C diff B toxin positive, will consult GI for input as to whether enteritis can be associated with this C. Diff presentation. Given clinical worsening, decided to empirically treat - now with improvement. Nausea/vomiting managed with NG to LIS, clamping after med delivery.  - NG to LIS, consider removal in upcoming days  - Abx:              - PO vancomycin (5/28-6/6) - 10 day course  - Unasyn (5/27-5/29)  - Zosyn (5/25-5/27)              - Vancomycin (5/25-5/26)  - GI consult for potential workup of enteritis given uncommon presentation of C. Diff   - C. Diff retested and continues to show positive antigen and B toxin, but  "negative A toxin   - Recommend removing NG-tube if able   - Outpatient MRI/MRCP to evaluate the pancreatic and hepatic cysts   - Daily CRP and KUB to trend enteritis improvement  - PO pantoprazole daily    Dysphagia  Recurrent vomiting  Concern for aspiration from bedside RN.   - Clamp NG tube for 4 hours and observe, may be able to have tube removed if managing vomiting/regurgitation    Urinary retention  Continues to have urinary retention after removing the costa. Will start tamsulosin and continue to monitor.  - Tamsulosin daily    Concern for refeeding syndrome  Decrease in magnesium and phosphorus after beginning TPN, possible refeeding syndrome.  - BMP daily  - RN replacement protocols    Pleural effusions  Pulmonary edema  Anasarca  Seen on 5/28 CT C/A/P. TTE with LVEF 60-65%, small IVC.   - PTA 20mg Lasix daily    Diabetic ketoacidosis, resolved  T2DM with A1c 8.0%. Likely triggered by sepsis. Now off insulin gtt.  - Endocrine consulted, managing glargine + regular insulin in TPN + novolog  - Hold PTA metformin, glipizide  - Diabetes ed consult     Hepatic vein thrombus  No prior imaging to compare. Per chart review, history of \"thromboembolism\" after ovarian cyst removal age 22. Liver panel normal.  - IR consulted; no indication for lytics  - Heme consulted  - Heparin gtt 5/25-5/27  - Lovenox 5/27-*  - At discharge transition to apixaban 10mg BID x 7 days then 5 mg BID  - Follow up with Dr. Wilde 7/29/25     Toxic metabolic encephalopathy  Not oriented to month or year. Likely related to sepsis vs hospital acquired delirium. No acute intracranial findings on CT head.   - Wernicke dose thiamine  - Delirium precautions     Possible alcohol use disorder  Reports 2 drinks/day. Did have an ED visit 03/2025 for alcohol intoxication. Reports last drink 5 days PTA, low concern for withdrawal. Her brother, Cruz, has concerns that she is drinking excessively and encourages addiction medicine involvement.  - " "Consider addiction medicine consult when encephalopathy improves  - PTA folate  - Continue thiamine, Wernicke dosing     Moderate malnutrition in the context of chronic illness   Continued elevated ketones despite insulin ggt. Elevation likely due to poor oral intake due to encephalopathy and starvation ketosis. Will continue TPN until PO intake tolerated.  - Continue TPN   - SLP recommends NPO 5/30    Sacrum/coccyx moisture wound  Erythematous, blanchable area on her sacrum. Bedside RN says likely from excessive moisture 2/2 fecal incontinence. [facility] nurse resports wound c/w incontinence associated dermatitis.  - [facility] nurse rec:    - Dinorah cleanse and protection with soft cloth   - Apply thin layer of Barrier paste to open and red areas    \"Prominent pericardial contents\"  Cardiology reviewed- this is an epicardial fat pad. No further workup or consult needed.     Generalized weakness, failure to thrive  TSH wnl.   - Ongoing PT/OT consults   - Ongoing evaluation for safe discharge plan     HTN: Hold PTA metoprolol, lisinopril given sepsis and normotension     Fall at home: CTH, C spine negative for acute process/fracture     Elevated INR: Unclear etiology, no known liver disease. Will hold off on Vit K given new thrombus.    Essential thrombocytosis: Stop PTA aspirin per heme, continue PTA hydroxyurea     Chronic/stable:     Glaucoma: PTA latanoprost  CKD2  Diabetic neuropathy  Schatzki's ring  Slow transit constipation  Mixed urge and stress incontinence  Gait instability       Diet: NPO for Medical/Clinical Reasons Except for: Ice Chips, Meds  parenteral nutrition - ADULT compounded formula    DVT Prophylaxis: Enoxaparin (Lovenox) SQ  Lines: PRESENT      Cardiac Monitoring: None  Code Status: Full Code      Medically Ready for Discharge: Anticipated in 2-4 Days    The patient's care was discussed with the Attending Physician, Dr. Knight.    Johnny Boyd MD  Medicine Service, [address] " [facility] acute hospital  Securely message with IPWireless (more info)  Text page via High[employer] Paging/Directory   See signed in provider for up to date coverage information  ______________________________________________________________________    Interval History   No acute events overnight. Patient has ongoing coughing episodes. Oriented to person and place. Still requiring straight cath after costa removed. Had 8x loose stools yesterday. Afebrile and improvement in leukocytosis and CRP.    Physical Exam   Vital Signs: Temp: 97.6  F (36.4  C) Temp src: Oral BP: 127/66 Pulse: 87   Resp: 18 SpO2: 100 % O2 Device: None (Room air)    Weight: 145 lbs 4.53 oz  Constitutional: awake, cooperative, no apparent distress  Respiratory: no increased work of breathing and bibasilar crackles heard on auscultation  Cardiovascular: Normal apical impulse, regular rate and rhythm, normal S1 and S2, no S3 or S4, and no murmur noted  GI: normal bowel sounds and tenderness to palpation in lower right and left quadrants  Musculoskeletal: 3+ pitting edema in lower extremities  Neurologic: Oriented to self and place

## 2025-05-31 NOTE — PLAN OF CARE
/71 (BP Location: Left arm)   Pulse 101   Temp 98  F (36.7  C) (Oral)   Resp 18   Wt 71.2 kg (156 lb 15.5 oz)   SpO2 97%   BMI 26.94 kg/m      Care from: [id number]    VS & Pain: VSS, no pain reported  Neuro: A+Ox3, changes intermittently  Respiratory: RA; productive cough  Cardiac: WDL  Peripheral neurovascular: generalized weakness  GI/: inc of bowel; unable to void without straight cath  Nutrition: NPO; TPN continuous  Skin: red sacrum and perineum - barrier cream; q2 turns  Lines: R PICC: TPN 100ml/hr;  NG: Clamped and meds  Activity: Ax2  Events this shift:  using yankaur for productive cough; family at bedside today creating HCD- needs notary tomorrow; straight cathed q6 today for 800-900 each time;    Plan:  continue POC; PT/OT recommending TCU for discharge

## 2025-05-31 NOTE — PROGRESS NOTES
Inpatient Diabetes Management Service: Daily Progress Note     HPI: Sarah Felix is a 79 year old female with history of insulin-dependent DM2, HTN, fatty liver, possible alcohol use disorder, gait instability who presented 5/25 with generalized weakness and was admitted with DKA and sepsis, 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 Inpatient Diabetes Service consulted for DKA management.          Assessment/Plan:     Assessment:   Type 2 Diabetes Mellitus, complicated by neuropathy.  (A1c 8 %, Hgb: 12.3; 5/25/25)  DKA on admission.  Lactic acidosis on admission  Sepsis, unknown source  Hepatic vein thrombosis  Dysphagia on full liquid diet   Toxic metabolic encephalopathy  Stress induced hyperglycemia  TPN induced hyperglycemia  C Diff + 5/28     Plan/Recommendations:    - Lantus 6 units q 24 hrs at 1200  - increase Regular insulin in TPN 14 --> 22 units with Dextrose increasing to 200 g  (0.11 units per gram dextrose)    - HOLD while NPO: Novolog carbohydrate coverage: 1 unit per 25 g cho TID AC and prn snacks/supplements  - increase Novolog correction scale: 1/25 >140 q4 hrs while NPO  -  BG monitoring q 4 hours while NPO  - Hypoglycemia protocol  - Carb counting protocol     Discussion:  Remains NPO on continuous TPN. Having global hyperglycemia. Increase Novolog correction scale today and will further increase regular insulin in TPN tonight. Per pharmacist, dextrose in TPN increasing from 160 to 200 g tonight.      Discharge Planning: (tentative)    Medications: Basal bolus (discharge on insulin only)   Test Claims:  none needed.   Education:  Needs to be assessed closer to discharge.    Outpatient Follow-up:  recommend [facility] vs PCP      Thank you for this consult. IDS will continue to follow.      Please notify Inpatient Diabetes Service if changes are planned to steroids, nutrition, TPN/TF and anticipated procedures requiring prolonged NPO status.          "Interval History/Review of Systems :   - The last 24 hours progress and nursing notes reviewed.  - More awake today, NPO, some nausea, no emesis. Ongoing diarrhea (c-diff positive 5/28)    Planned Procedures/Surgeries: none    Inpatient Glucose Control:       Recent Labs   Lab 05/31/25  0558 05/31/25  0357 05/31/25  0001 05/30/25  2330 05/30/25 2013 05/30/25  1522   * 203* 186* 188* 202* 206*             Medications Impacting Glycemia:   Steroids: None  D5W containing solutions/medications: none  Other medications impacting glucose: None        Nutrition:   Orders Placed This Encounter      NPO for Medical/Clinical Reasons Except for: Ice Chips, Meds  Supplements:  None  TF: None  TPN:   Started 5/28 - 5/29: Regular insulin in continuous TPN 6 units with Dextrose 120 g   5/30: continuous TPN with 160 g dextrose and 14 units of reg insulin   5/31:  continuous TPN with 200 g dextrose and 22 units of reg insulin         Diabetes History: see full consult note for complete diabetes history   Diabetes Type and Duration: DM2, 7/2009  Zinc transporter 8 antibody, islet antibody, and insulin antibody not available on epic search      GAD65 antibody <5 (9/11/2012)   C peptide 7.6 (9/11/2012)     PTA Medication Regimen: Glimepiride 1 mg daily, Novolog 5 units with high carb meal daily as needed, metformin 500 mg daily  Missing doses?: uncertain  Historical Diabetes Medications: N/A     Glucose monitoring device/frequency/trends: Fortino CGM, reports 130 on average  Hypoglycemia PTA:   - Frequency: \"rarely\"  - Severity:  no history of severe unconscious lows   - Awareness:  intact    - Treatment: \"eats food\"      Outpatient Diabetes Provider: Unknown  Formal Diabetes Education/Educator: Erik        Physical Exam:   /66 (BP Location: Left arm)   Pulse 87   Temp 97.6  F (36.4  C) (Oral)   Resp 18   Wt 65.9 kg (145 lb 4.5 oz)   SpO2 100%   BMI 24.94 kg/m    General:  well appearing, NAD, in bed, answers " questions appropriately  Lungs: unlabored breathing on RA.  Mental Status:  not assessed            Data:     Lab Results   Component Value Date    A1C 8.0 (H) 05/25/2025    A1C 7.5 (H) 11/05/2024    A1C 7.6 (H) 07/09/2024    A1C 6.6 (H) 03/05/2024    A1C 7.4 (H) 11/14/2023    A1C 8.3 (H) 06/21/2021    A1C 9.4 (H) 12/14/2020    A1C 8.1 (H) 09/21/2020    A1C 8.5 (H) 01/07/2020    A1C 7.6 (H) 10/12/2019       ROUTINE IP LABS (Last four results)  BMP  Recent Labs   Lab 05/31/25  0558 05/31/25  0357 05/31/25  0001 05/30/25  2330 05/30/25  1522 05/30/25  1320 05/30/25  0408 05/30/25  0402 05/29/25  2316 05/29/25  2314   *  --   --   --   --  135  --  135  --  136   POTASSIUM 4.2  --   --   --   --  4.0  --  4.1  --  3.8   CHLORIDE 101  --   --   --   --  101  --  101  --  101   CECY 7.8*  --   --   --   --  7.4*  --  7.5*  --  7.5*   CO2 25  --   --   --   --  25  --  28  --  26   BUN 18.2  --   --   --   --  18.8  --  17.8  --  17.3   CR 0.67  --   --   --   --  0.72  --  0.74  --  0.74   * 203* 186* 188*   < > 224*   < > 191*   < > 217*    < > = values in this interval not displayed.     CBC  Recent Labs   Lab 05/31/25  0409 05/30/25  0402 05/29/25  0427 05/28/25  0607   WBC 9.9 13.5* 15.1* 19.1*   RBC 2.75* 3.13* 3.11* 3.70*   HGB 8.0* 8.4* 8.4* 9.8*   HCT 23.8* 26.1* 26.4* 30.3*   MCV 87 83 85 82   MCH 29.1 26.8 27.0 26.5   MCHC 33.6 32.2 31.8 32.3   RDW 17.5* 17.3* 17.2* 17.0*   * 591* 557* 621*     Inpatient Diabetes Service will continue to follow, please don't hesitate to contact the team with any questions or concerns.     Clair Chung PA-C  Inpatient Diabetes Service  Pager: [phone]  Available on vocera    Plan discussed with patient, bedside RN, and primary team via this note.    To contact Inpatient Diabetes Service:     7 AM - 5 PM: Page the IDS MIKE following the patient that day (see filed or incomplete progress notes/consult notes under Endocrinology)    OR if uncertain of provider  assignment: page job code [phone]    5 PM - 7 AM: First call after hours is to primary service.    For urgent after-hours questions, page job code for on call fellow: [phone]     I spent a total of 35 minutes on the date of the encounter doing prep/post-work, chart review, history and exam, documentation and further activities per the note including lab review, multidisciplinary communication, counseling the patient and/or coordinating care regarding acute hyper/hypoglycemic management, as well as discharge management and planning/communication.

## 2025-06-01 ENCOUNTER — APPOINTMENT (OUTPATIENT)
Dept: MRI IMAGING | Facility: CLINIC | Age: 80
DRG: 871 | End: 2025-06-01
Payer: COMMERCIAL

## 2025-06-01 ENCOUNTER — APPOINTMENT (OUTPATIENT)
Dept: GENERAL RADIOLOGY | Facility: CLINIC | Age: 80
End: 2025-06-01
Payer: COMMERCIAL

## 2025-06-01 ENCOUNTER — APPOINTMENT (OUTPATIENT)
Dept: SPEECH THERAPY | Facility: CLINIC | Age: 80
DRG: 871 | End: 2025-06-01
Payer: COMMERCIAL

## 2025-06-01 LAB
ALBUMIN UR-MCNC: 50 MG/DL
APPEARANCE UR: ABNORMAL
BACTERIA #/AREA URNS HPF: ABNORMAL /HPF
BACTERIA SPEC CULT: NO GROWTH
BASE EXCESS BLDV CALC-SCNC: 1.1 MMOL/L (ref -3–3)
BILIRUB UR QL STRIP: NEGATIVE
COLOR UR AUTO: ABNORMAL
CREAT SERPL-MCNC: 0.66 MG/DL (ref 0.51–0.95)
CRP SERPL-MCNC: 12 MG/L
EGFRCR SERPLBLD CKD-EPI 2021: 90 ML/MIN/1.73M2
ERYTHROCYTE [DISTWIDTH] IN BLOOD BY AUTOMATED COUNT: 17.2 % (ref 10–15)
GLUCOSE BLDC GLUCOMTR-MCNC: 133 MG/DL (ref 70–99)
GLUCOSE BLDC GLUCOMTR-MCNC: 154 MG/DL (ref 70–99)
GLUCOSE BLDC GLUCOMTR-MCNC: 156 MG/DL (ref 70–99)
GLUCOSE BLDC GLUCOMTR-MCNC: 169 MG/DL (ref 70–99)
GLUCOSE BLDC GLUCOMTR-MCNC: 172 MG/DL (ref 70–99)
GLUCOSE BLDC GLUCOMTR-MCNC: 176 MG/DL (ref 70–99)
GLUCOSE BLDC GLUCOMTR-MCNC: 193 MG/DL (ref 70–99)
GLUCOSE UR STRIP-MCNC: >=1000 MG/DL
HCO3 BLDV-SCNC: 25 MMOL/L (ref 21–28)
HCT VFR BLD AUTO: 23.6 % (ref 35–47)
HGB BLD-MCNC: 7.6 G/DL (ref 11.7–15.7)
HGB UR QL STRIP: ABNORMAL
KETONES UR STRIP-MCNC: NEGATIVE MG/DL
LACTATE SERPL-SCNC: 1 MMOL/L (ref 0.7–2)
LEUKOCYTE ESTERASE UR QL STRIP: ABNORMAL
LMWH PPP CHRO-ACNC: 1.43 IU/ML (ref ?–2)
MAGNESIUM SERPL-MCNC: 1.8 MG/DL (ref 1.7–2.3)
MCH RBC QN AUTO: 27.1 PG (ref 26.5–33)
MCHC RBC AUTO-ENTMCNC: 32.2 G/DL (ref 31.5–36.5)
MCV RBC AUTO: 84 FL (ref 78–100)
NITRATE UR QL: NEGATIVE
O2/TOTAL GAS SETTING VFR VENT: 21 %
OXYHGB MFR BLDV: 56 % (ref 70–75)
PCO2 BLDV: 37 MM HG (ref 40–50)
PH BLDV: 7.45 [PH] (ref 7.32–7.43)
PH UR STRIP: 7 [PH] (ref 5–7)
PHOSPHATE SERPL-MCNC: 1.9 MG/DL (ref 2.5–4.5)
PLATELET # BLD AUTO: 524 10E3/UL (ref 150–450)
PO2 BLDV: 32 MM HG (ref 25–47)
POTASSIUM SERPL-SCNC: 4.3 MMOL/L (ref 3.4–5.3)
RBC # BLD AUTO: 2.8 10E6/UL (ref 3.8–5.2)
RBC URINE: 20 /HPF
SAO2 % BLDV: 57.1 % (ref 70–75)
SP GR UR STRIP: 1.02 (ref 1–1.03)
UROBILINOGEN UR STRIP-MCNC: NORMAL MG/DL
WBC # BLD AUTO: 11.7 10E3/UL (ref 4–11)
WBC CLUMPS #/AREA URNS HPF: PRESENT /HPF
WBC URINE: >182 /HPF

## 2025-06-01 PROCEDURE — 83605 ASSAY OF LACTIC ACID: CPT

## 2025-06-01 PROCEDURE — 250N000011 HC RX IP 250 OP 636: Performed by: INTERNAL MEDICINE

## 2025-06-01 PROCEDURE — 250N000013 HC RX MED GY IP 250 OP 250 PS 637

## 2025-06-01 PROCEDURE — 99231 SBSQ HOSP IP/OBS SF/LOW 25: CPT | Performed by: STUDENT IN AN ORGANIZED HEALTH CARE EDUCATION/TRAINING PROGRAM

## 2025-06-01 PROCEDURE — 255N000002 HC RX 255 OP 636

## 2025-06-01 PROCEDURE — 83735 ASSAY OF MAGNESIUM: CPT

## 2025-06-01 PROCEDURE — 85520 HEPARIN ASSAY: CPT | Performed by: INTERNAL MEDICINE

## 2025-06-01 PROCEDURE — A9585 GADOBUTROL INJECTION: HCPCS

## 2025-06-01 PROCEDURE — 70553 MRI BRAIN STEM W/O & W/DYE: CPT | Mod: 26 | Performed by: RADIOLOGY

## 2025-06-01 PROCEDURE — 74018 RADEX ABDOMEN 1 VIEW: CPT

## 2025-06-01 PROCEDURE — 82805 BLOOD GASES W/O2 SATURATION: CPT

## 2025-06-01 PROCEDURE — 120N000002 HC R&B MED SURG/OB UMMC

## 2025-06-01 PROCEDURE — 87186 SC STD MICRODIL/AGAR DIL: CPT

## 2025-06-01 PROCEDURE — 70553 MRI BRAIN STEM W/O & W/DYE: CPT

## 2025-06-01 PROCEDURE — 92526 ORAL FUNCTION THERAPY: CPT | Mod: GN

## 2025-06-01 PROCEDURE — 36415 COLL VENOUS BLD VENIPUNCTURE: CPT

## 2025-06-01 PROCEDURE — 71045 X-RAY EXAM CHEST 1 VIEW: CPT

## 2025-06-01 PROCEDURE — 84100 ASSAY OF PHOSPHORUS: CPT

## 2025-06-01 PROCEDURE — 250N000011 HC RX IP 250 OP 636

## 2025-06-01 PROCEDURE — 84132 ASSAY OF SERUM POTASSIUM: CPT

## 2025-06-01 PROCEDURE — 250N000009 HC RX 250: Performed by: INTERNAL MEDICINE

## 2025-06-01 PROCEDURE — 71045 X-RAY EXAM CHEST 1 VIEW: CPT | Mod: 26 | Performed by: RADIOLOGY

## 2025-06-01 PROCEDURE — 74018 RADEX ABDOMEN 1 VIEW: CPT | Mod: 26 | Performed by: STUDENT IN AN ORGANIZED HEALTH CARE EDUCATION/TRAINING PROGRAM

## 2025-06-01 PROCEDURE — 85018 HEMOGLOBIN: CPT

## 2025-06-01 PROCEDURE — 99221 1ST HOSP IP/OBS SF/LOW 40: CPT | Mod: GC | Performed by: STUDENT IN AN ORGANIZED HEALTH CARE EDUCATION/TRAINING PROGRAM

## 2025-06-01 PROCEDURE — 99233 SBSQ HOSP IP/OBS HIGH 50: CPT | Performed by: INTERNAL MEDICINE

## 2025-06-01 PROCEDURE — 36592 COLLECT BLOOD FROM PICC: CPT

## 2025-06-01 PROCEDURE — 86140 C-REACTIVE PROTEIN: CPT

## 2025-06-01 PROCEDURE — 81001 URINALYSIS AUTO W/SCOPE: CPT

## 2025-06-01 PROCEDURE — 82565 ASSAY OF CREATININE: CPT

## 2025-06-01 RX ORDER — ENOXAPARIN SODIUM 100 MG/ML
50 INJECTION SUBCUTANEOUS 2 TIMES DAILY
Status: DISCONTINUED | OUTPATIENT
Start: 2025-06-01 | End: 2025-06-12 | Stop reason: HOSPADM

## 2025-06-01 RX ORDER — GADOBUTROL 604.72 MG/ML
0.1 INJECTION INTRAVENOUS ONCE
Status: COMPLETED | OUTPATIENT
Start: 2025-06-01 | End: 2025-06-01

## 2025-06-01 RX ADMIN — Medication 400 MCG: at 08:30

## 2025-06-01 RX ADMIN — VANCOMYCIN HYDROCHLORIDE 125 MG: KIT at 12:10

## 2025-06-01 RX ADMIN — INSULIN GLARGINE 6 UNITS: 100 INJECTION, SOLUTION SUBCUTANEOUS at 12:10

## 2025-06-01 RX ADMIN — VANCOMYCIN HYDROCHLORIDE 125 MG: KIT at 17:16

## 2025-06-01 RX ADMIN — LATANOPROST 1 DROP: 50 SOLUTION/ DROPS OPHTHALMIC at 21:21

## 2025-06-01 RX ADMIN — INSULIN ASPART 2 UNITS: 100 INJECTION, SOLUTION INTRAVENOUS; SUBCUTANEOUS at 12:11

## 2025-06-01 RX ADMIN — INSULIN ASPART 2 UNITS: 100 INJECTION, SOLUTION INTRAVENOUS; SUBCUTANEOUS at 20:07

## 2025-06-01 RX ADMIN — VANCOMYCIN HYDROCHLORIDE 125 MG: KIT at 20:06

## 2025-06-01 RX ADMIN — TAMSULOSIN HYDROCHLORIDE 0.4 MG: 0.4 CAPSULE ORAL at 08:30

## 2025-06-01 RX ADMIN — Medication 500 MG: at 08:11

## 2025-06-01 RX ADMIN — ENOXAPARIN SODIUM 60 MG: 60 INJECTION SUBCUTANEOUS at 08:30

## 2025-06-01 RX ADMIN — VANCOMYCIN HYDROCHLORIDE 125 MG: KIT at 08:29

## 2025-06-01 RX ADMIN — ENOXAPARIN SODIUM 50 MG: 60 INJECTION SUBCUTANEOUS at 20:07

## 2025-06-01 RX ADMIN — FUROSEMIDE 20 MG: 20 TABLET ORAL at 08:30

## 2025-06-01 RX ADMIN — INSULIN ASPART 1 UNITS: 100 INJECTION, SOLUTION INTRAVENOUS; SUBCUTANEOUS at 08:38

## 2025-06-01 RX ADMIN — MAGNESIUM SULFATE HEPTAHYDRATE: 500 INJECTION, SOLUTION INTRAMUSCULAR; INTRAVENOUS at 20:06

## 2025-06-01 RX ADMIN — THIAMINE HYDROCHLORIDE 250 MG: 100 INJECTION, SOLUTION INTRAMUSCULAR; INTRAVENOUS at 08:30

## 2025-06-01 RX ADMIN — FUROSEMIDE 20 MG: 20 TABLET ORAL at 17:16

## 2025-06-01 RX ADMIN — AMOXICILLIN AND CLAVULANATE POTASSIUM 1 TABLET: 875; 125 TABLET, FILM COATED ORAL at 20:07

## 2025-06-01 RX ADMIN — INSULIN ASPART 3 UNITS: 100 INJECTION, SOLUTION INTRAVENOUS; SUBCUTANEOUS at 04:20

## 2025-06-01 RX ADMIN — Medication 3 MG: at 20:07

## 2025-06-01 RX ADMIN — GADOBUTROL 7 ML: 604.72 INJECTION INTRAVENOUS at 17:04

## 2025-06-01 RX ADMIN — ATORVASTATIN CALCIUM 40 MG: 40 TABLET, FILM COATED ORAL at 08:30

## 2025-06-01 RX ADMIN — INSULIN ASPART 1 UNITS: 100 INJECTION, SOLUTION INTRAVENOUS; SUBCUTANEOUS at 17:17

## 2025-06-01 RX ADMIN — Medication 25 MG: at 08:30

## 2025-06-01 ASSESSMENT — ACTIVITIES OF DAILY LIVING (ADL)
ADLS_ACUITY_SCORE: 63
ADLS_ACUITY_SCORE: 72
ADLS_ACUITY_SCORE: 63
ADLS_ACUITY_SCORE: 72
ADLS_ACUITY_SCORE: 63
ADLS_ACUITY_SCORE: 63
ADLS_ACUITY_SCORE: 72
ADLS_ACUITY_SCORE: 63
ADLS_ACUITY_SCORE: 63
ADLS_ACUITY_SCORE: 72
ADLS_ACUITY_SCORE: 63
ADLS_ACUITY_SCORE: 72
ADLS_ACUITY_SCORE: 76
ADLS_ACUITY_SCORE: 63
ADLS_ACUITY_SCORE: 72
ADLS_ACUITY_SCORE: 76
ADLS_ACUITY_SCORE: 72
ADLS_ACUITY_SCORE: 76
ADLS_ACUITY_SCORE: 63
ADLS_ACUITY_SCORE: 63
ADLS_ACUITY_SCORE: 72

## 2025-06-01 NOTE — PROVIDER NOTIFICATION
Provider notified (name): Nona Garza  Reason for notification: FYI- increased R side weakness in both arm and leg, knees slightly mottled and cold, increased edema in ankles, poor BLE perfusion, still having word finding difficulty    Response from provider: aware

## 2025-06-01 NOTE — PLAN OF CARE
Goal Outcome Evaluation:      Plan of Care Reviewed With: patient    Overall Patient Progress: no changeOverall Patient Progress: no change    Outcome Evaluation: At around 8 30pm, RRT called due to pt change in mental status, was having severe increase confusion, unable to follow direction and identified self, place,and time which was later changed to stroke code with pt having difficulty words finding, however able to race  bilateral upper and lower extremities without difficulty. RRT/Stroke code called, CT completed and pt is back in the room. Pt is currently a/o x 1-2 post stroke code. Will continue to monitor

## 2025-06-01 NOTE — PROGRESS NOTES
[facility]    Progress Note - Medicine Service, [facility] TEAM 2       Date of Admission:  5/25/2025    Assessment & Plan   Sarah Felix is a 79 year old female with history of  insulin-dependent DM2, HTN, fatty liver, possible alcohol use disorder, gait instability who presented 5/25 with generalized weakness and was admitted with DKA and sepsis, 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 Course c/b C diff enteritis; clinically improving with PO vancomycin.      Today's updates:  - Remove NGT  - Stop trending daily CRP, KUB  - Will need outpatient MRI/MRCP to evaluate the pancreatic and hepatic cysts   - Consider addiction medicine consult in coming days  - Continue PO vancomycin  - SLP: full liquid diet with 1:1 assistance  - Continue straight cath, may need costa placed if continuing to retain     Enteritis possibly 2/2 C. Diff  Ileus   Initially presented septic but with negative workup including urine culture, blood cultures, CT C/A/P. Had ongoing worsening encephalopathy and leukocytosis on vanc/zosyn -> unasyn. Developed diarrhea and abdominal pain, stool with C diff PCR + but A toxin negative. C diff B toxin positive, will consult GI for input as to whether enteritis can be associated with this C. Diff presentation. Given clinical worsening, decided to empirically treat - now with improvement. Nausea/vomiting managed with NG decompression. Tube now removed.  - Remove NGT 6/1  - Abx:              - PO vancomycin (5/28-6/6) - 10 day course  - Unasyn (5/27-5/29)  - Zosyn (5/25-5/27)              - Vancomycin (5/25-5/26)  - GI consult for potential workup of enteritis given uncommon presentation of C. Diff   - C. Diff retested and continues to show positive antigen and B toxin, but negative A toxin   - Outpatient MRI/MRCP to evaluate the pancreatic and hepatic cysts  - PO pantoprazole daily    Dysphagia  Recurrent vomiting  Concern for aspiration from bedside  "RN. Feeding tube may have been preventing proper swallowing, so this was removed.  - NGT removed 6/1  - SLP: full liquid diet with 1:1 assistance    Urinary retention  Continues to have urinary retention after removing the costa. Will start tamsulosin and continue to monitor.  - Continue straight cath if bladder scan >600cc, may need costa placed if continuing to retain  - Tamsulosin daily    Concern for refeeding syndrome  Decrease in magnesium and phosphorus after beginning TPN, possible refeeding syndrome.  - BMP daily  - RN replacement protocols    Pleural effusions  Pulmonary edema  Anasarca  Seen on 5/28 CT C/A/P. TTE with LVEF 60-65%, small IVC.   - PTA 20mg Lasix daily    Diabetic ketoacidosis, resolved  T2DM with A1c 8.0%. Likely triggered by sepsis. Now off insulin gtt.  - Endocrine consulted, managing glargine + regular insulin in TPN + novolog  - Hold PTA metformin, glipizide  - Diabetes ed consult     Hepatic vein thrombus  No prior imaging to compare. Per chart review, history of \"thromboembolism\" after ovarian cyst removal age 22. Liver panel normal.  - IR consulted; no indication for lytics  - Heme consulted  - Heparin gtt 5/25-5/27  - Lovenox 5/27-*  - At discharge transition to apixaban 10mg BID x 7 days then 5 mg BID  - Follow up with Dr. Wilde 7/29/25     Toxic metabolic encephalopathy  Not oriented to month or year. Likely related to sepsis vs hospital acquired delirium. No acute intracranial findings on CT head.   - Wernicke dose thiamine  - Delirium precautions     Possible alcohol use disorder  Reports 2 drinks/day. Did have an ED visit 03/2025 for alcohol intoxication. Reports last drink 5 days PTA, low concern for withdrawal. Her brother, Cruz, has concerns that she is drinking excessively and encourages addiction medicine involvement.  - Consider addiction medicine consult in coming days  - PTA folate  - Continue thiamine, Wernicke dosing     Moderate malnutrition in the context of " "chronic illness   Continued elevated ketones despite insulin ggt. Elevation likely due to poor oral intake due to encephalopathy and starvation ketosis. Will continue TPN until PO intake tolerated.  - Continue TPN   - SLP: full liquid diet with 1:1 assistance    Sacrum/coccyx moisture wound  Erythematous, blanchable area on her sacrum. Bedside RN says likely from excessive moisture 2/2 fecal incontinence. [facility] nurse resports wound c/w incontinence associated dermatitis.  - [facility] nurse rec:    - Dinorah cleanse and protection with soft cloth   - Apply thin layer of Barrier paste to open and red areas    \"Prominent pericardial contents\"  Cardiology reviewed- this is an epicardial fat pad. No further workup or consult needed.     Generalized weakness, failure to thrive  TSH wnl.   - Ongoing PT/OT consults   - Ongoing evaluation for safe discharge plan     HTN: Hold PTA metoprolol, lisinopril given sepsis and normotension     Fall at home: CTH, C spine negative for acute process/fracture     Elevated INR: Unclear etiology, no known liver disease. Will hold off on Vit K given new thrombus.    Essential thrombocytosis: Stop PTA aspirin per heme, continue PTA hydroxyurea     Chronic/stable:     Glaucoma: PTA latanoprost  CKD2  Diabetic neuropathy  Schatzki's ring  Slow transit constipation  Mixed urge and stress incontinence  Gait instability       Diet: NPO for Medical/Clinical Reasons Except for: Ice Chips, Meds  parenteral nutrition - ADULT compounded formula    DVT Prophylaxis: Enoxaparin (Lovenox) SQ  Lines: PRESENT      Cardiac Monitoring: None  Code Status: Full Code      Medically Ready for Discharge: Anticipated in 2-4 Days    The patient's care was discussed with the Attending Physician, Dr. Knight.    Johnny Boyd MD  Medicine Service, [facility] TEAM [address] [place] [address]  Securely message with [employer] (more info)  Text page via [email or web address] Paging/Directory   See signed in provider for up " to date coverage information  ______________________________________________________________________    Interval History   Overnight, code stroke was called on patient due to word finding difficulty, no other focal neurological signs. CT head without acute neurological findings. Neurology had low concern for a stroke. This morning, patient has minimal abdominal pain, nausea. Still having significant coughing; concern that she may have aspirated during dysphagia eval. Denies fever, chills, SOB. She answers questions appropriately.    Physical Exam   Vital Signs: Temp: 99  F (37.2  C) Temp src: Oral BP: 130/59 Pulse: 110   Resp: 16 SpO2: 100 % O2 Device: None (Room air)    Weight: 156 lbs 15.48 oz  Constitutional: awake, cooperative, no apparent distress  Respiratory: no increased work of breathing and bibasilar crackles heard on auscultation  Cardiovascular: Normal apical impulse, regular rate and rhythm, normal S1 and S2, no S3 or S4, and no murmur noted  GI: normal bowel sounds and tenderness to palpation in lower right and left quadrants  Musculoskeletal: 1+ pitting edema in lower extremities  Neurologic: Oriented to self and place

## 2025-06-01 NOTE — CODE/RAPID RESPONSE
"Rapid Response/stroke code Team Note    Assessment   A rapid response and stroke code called for word finding difficulty. Patient usually alert and oriented 2-4.  Patient exhibiting expressive aphasia, no other focal neurological deficits      Plan   -  CT/CTA    Stroke code provider recommending MRI    On follow-up at 2145, she is awake, oriented to person and place and with normal speech.    Primary mason team at bedside    -  Disposition: 7C  -  Reassessment and plan follow-up will be performed by the primary team    MALATHI Manning CNP  Rapid Response Team [facility]  Securely message with [employer]     Medical Decision Making     25 MINUTES SPENT BY ME on the date of service doing chart review, history, exam, documentation & further activities per the note.        Hospital Course   Brief Summary of events leading to rapid response:   Admitted with DKA/sepsis, waxing and waning mentation. RRT for word finding difficulty     Physical Exam   Vital Signs: Temp: 98  F (36.7  C) Temp src: Oral BP: 131/78 Pulse: 105   Resp: 18 SpO2: 97 % O2 Device: None (Room air)    Weight: 156 lbs 15.48 oz      Exam:   Respiratory: non labored  Cardiovascular: Regular rate and rhythm   Neurologic: expressive aphasia, improved. No other focal deficits    Significant Results and Procedures   Lab Results   Component Value Date    WBC 9.9 05/31/2025    WBC 10.0 11/25/2019     Lab Results   Component Value Date    RBC 2.75 05/31/2025    RBC 4.29 11/25/2019     Lab Results   Component Value Date    HGB 8.0 05/31/2025    HGB 13.1 11/25/2019     Lab Results   Component Value Date    HCT 23.8 05/31/2025    HCT 40.7 11/25/2019     No components found for: \"MCT\"  Lab Results   Component Value Date    MCV 87 05/31/2025    MCV 95 11/25/2019     Lab Results   Component Value Date    MCH 29.1 05/31/2025    MCH 30.5 11/25/2019     Lab Results   Component Value Date    MCHC 33.6 05/31/2025    MCHC 32.2 11/25/2019     Lab Results   Component " Value Date    RDW 17.5 05/31/2025    RDW 13.9 11/25/2019     Lab Results   Component Value Date     05/31/2025     11/25/2019

## 2025-06-01 NOTE — PROGRESS NOTES
Inpatient Diabetes Management Service: Daily Progress Note     HPI: Sarah Felix is a 79 year old female with history of insulin-dependent DM2, HTN, fatty liver, possible alcohol use disorder, gait instability who presented 5/25 with generalized weakness and was admitted with DKA and sepsis, 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 Course c/b C diff enteritis. Inpatient Diabetes Service consulted for DKA management.          Assessment/Plan:     Assessment:   Type 2 Diabetes Mellitus, complicated by neuropathy.  (A1c 8 %, Hgb: 12.3; 5/25/25)  DKA on admission.  Lactic acidosis on admission  Sepsis, unknown source  Hepatic vein thrombosis  Dysphagia on full liquid diet   Toxic metabolic encephalopathy  Stress induced hyperglycemia  TPN induced hyperglycemia  C Diff + 5/28     Plan/Recommendations:    - Lantus 6 units q 24 hrs at 1200  - Regular insulin in TPN 22 units with Dextrose 200 g  (0.11 units per gram dextrose)    - HOLD while NPO: Novolog carbohydrate coverage: 1 unit per 25 g cho TID AC and prn snacks/supplements  - Novolog correction scale: 1/25 >140 q4 hrs while NPO on continuous TPN   -  BG monitoring q 4 hours while NPO  - Hold PTA metformin and glimepiride   - Hypoglycemia protocol  - Carb counting protocol     Discussion:  Remains NPO on continuous TPN. BG overall trending well. Per pharmacist, no change to dextrose in TPN tonight. Continue current diabetes regimen.     Discharge Planning: (tentative)    Medications: Basal bolus (discharge on insulin only)   Test Claims:  none needed.   Education:  Needs to be assessed closer to discharge.    Outpatient Follow-up:  recommend [facility] Endocrinology vs PCP      Thank you for this consult. IDS will continue to follow.      Please notify Inpatient Diabetes Service if changes are planned to steroids, nutrition, TPN/TF and anticipated procedures requiring prolonged NPO status.         Interval History/Review of Systems :   -  "The last 24 hours progress and nursing notes reviewed.  - Stroke code called overnight,  mg/dL at the time. On delerium precautions.   - Sleepy today, NPO, denies nausea or emesis. Reports sore throat.      Planned Procedures/Surgeries: none    Inpatient Glucose Control:       Recent Labs   Lab 06/01/25  0400 06/01/25  0029 05/31/25  2025 05/31/25  2013 05/31/25  1608 05/31/25  1219   * 133* 169* 177* 225* 197*             Medications Impacting Glycemia:   Steroids: None  D5W containing solutions/medications: none  Other medications impacting glucose: None        Nutrition:   Orders Placed This Encounter      NPO for Medical/Clinical Reasons Except for: Ice Chips, Meds  Supplements:  None  TF: None  TPN:   Started 5/28 - 5/29: Regular insulin in continuous TPN 6 units with Dextrose 120 g   5/30: continuous TPN with 160 g dextrose and 14 units of reg insulin   5/31 - 6/1:  continuous TPN with 200 g dextrose and 22 units of reg insulin         Diabetes History: see full consult note for complete diabetes history   Diabetes Type and Duration: DM2, 7/2009  Zinc transporter 8 antibody, islet antibody, and insulin antibody not available on epic search      GAD65 antibody <5 (9/11/2012)   C peptide 7.6 (9/11/2012)     PTA Medication Regimen: Glimepiride 1 mg daily, Novolog 5 units with high carb meal daily as needed, metformin 500 mg daily  Missing doses?: uncertain  Historical Diabetes Medications: N/A     Glucose monitoring device/frequency/trends: Fortino CGM, reports 130 on average  Hypoglycemia PTA:   - Frequency: \"rarely\"  - Severity:  no history of severe unconscious lows   - Awareness:  intact    - Treatment: \"eats food\"      Outpatient Diabetes Provider: Unknown  Formal Diabetes Education/Educator: Erik        Physical Exam:   /59 (BP Location: Left arm)   Pulse 110   Temp 99  F (37.2  C) (Oral)   Resp 16   Wt 71.2 kg (156 lb 15.5 oz)   SpO2 100%   BMI 26.94 kg/m    General:  fatigued " appearing, NAD, in bed, falling asleep  Lungs: unlabored breathing on RA.  Mental Status: A&Ox1           Data:     Lab Results   Component Value Date    A1C 8.0 (H) 05/25/2025    A1C 7.5 (H) 11/05/2024    A1C 7.6 (H) 07/09/2024    A1C 6.6 (H) 03/05/2024    A1C 7.4 (H) 11/14/2023    A1C 8.3 (H) 06/21/2021    A1C 9.4 (H) 12/14/2020    A1C 8.1 (H) 09/21/2020    A1C 8.5 (H) 01/07/2020    A1C 7.6 (H) 10/12/2019       ROUTINE IP LABS (Last four results)  BMP  Recent Labs   Lab 06/01/25 0634 06/01/25 0400 06/01/25  0029 05/31/25 2025 05/31/25  2013 05/31/25  0815 05/31/25  0558 05/30/25  1522 05/30/25  1320 05/30/25  0408 05/30/25  0402 05/29/25  2316 05/29/25  2314   NA  --   --   --   --   --   --  133*  --  135  --  135  --  136   POTASSIUM 4.3  --   --   --   --   --  4.2  --  4.0  --  4.1  --  3.8   CHLORIDE  --   --   --   --   --   --  101  --  101  --  101  --  101   CECY  --   --   --   --   --   --  7.8*  --  7.4*  --  7.5*  --  7.5*   CO2  --   --   --   --   --   --  25  --  25  --  28  --  26   BUN  --   --   --   --   --   --  18.2  --  18.8  --  17.8  --  17.3   CR  --   --   --   --   --   --  0.67  --  0.72  --  0.74  --  0.74   GLC  --  193* 133* 169* 177*   < > 226*   < > 224*   < > 191*   < > 217*    < > = values in this interval not displayed.     CBC  Recent Labs   Lab 06/01/25 0634 05/31/25  0409 05/30/25  0402 05/29/25  0427   WBC 11.7* 9.9 13.5* 15.1*   RBC 2.80* 2.75* 3.13* 3.11*   HGB 7.6* 8.0* 8.4* 8.4*   HCT 23.6* 23.8* 26.1* 26.4*   MCV 84 87 83 85   MCH 27.1 29.1 26.8 27.0   MCHC 32.2 33.6 32.2 31.8   RDW 17.2* 17.5* 17.3* 17.2*   * 563* 591* 557*     Inpatient Diabetes Service will continue to follow, please don't hesitate to contact the team with any questions or concerns.     Clair Chung PA-C  Inpatient Diabetes Service  Pager: [phone]  Available on vocera    Plan discussed with patient, bedside RN, and primary team via this note.    To contact Inpatient Diabetes Service:      7 AM - 5 PM: Page the IDS [facility]KE following the patient that day (see filed or incomplete progress notes/consult notes under Endocrinology)    OR if uncertain of provider assignment: page job code [phone]    5 PM - 7 AM: First call after hours is to primary service.    For urgent after-hours questions, page job code for on call fellow: [phone]     I spent a total of 30 minutes on the date of the encounter doing prep/post-work, chart review, history and exam, documentation and further activities per the note including lab review, multidisciplinary communication, counseling the patient and/or coordinating care regarding acute hyper/hypoglycemic management, as well as discharge management and planning/communication.

## 2025-06-01 NOTE — PLAN OF CARE
Shift Hours: 1900 - 0700    Assessment:  Body systems that were not at patient's baseline Skin. Focused body system assessments documented in flowsheets.        Activity     Fall Risk Score: 75   Bed alarm on? Yes     Activity Assistance Provided: assistance, 1 person      Assistive Device Utilized: lift device    Pain: Denied    Labs/RN Managed Protocols: Mag, K+ and phos.     Lines/Drains: Right PICC with three lumen    Nutrition: NPO, Continuous TPN with lipid.     Goal Outcome Evaluation  Plan of Care Reviewed With: patient  Overall Patient Progress: no change  Outcome Evaluation: Pt continued to have difficuly with urination, requred bladder scan/straight catheterization. Vitally stable.  A/0 1-2 this shift. MRI order in place.    Barriers to Discharge:   Ongoing current care plan

## 2025-06-01 NOTE — CONSULTS
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 Course c/b C diff enteritis; clinically improving with PO vancomycin.     Last known well was 8:10pm when patient was alert and oriented x 2. Soon after, patient started having garbled speech, was not communicating or understanding our commands and hence stroke code was called.    Blood sugar, 169 and blood pressure was 136/70. She is on therapeutic lovenox for hepatic vein thrombosis.    No other focal deficits .          Past Medical History     Stroke risk factors: T2DM, HTN    Preadmission antithrombotic regimen: lovenox    Modified Martha Score (Pre-morbid)  1-No significant disability despite symptoms                   Assessment and Plan       1.  Acute encephalopathy, word finding difficulty    This is a 79-year-old female with above-mentioned PMHx, notably including hepatic vein thrombus on therapeutic Lovenox, with hospital course complicated by waxing and waning mental status (delirium).  Stroke code was called this evening for garbled speech, and not following commands.  NIHSS of 6 for nonsensical speech and inability to follow commands. CTH without any bleed, and the angiogram did not show any large vessel occlusion. She s not a candidate for tenecteplase given our suspicion of stroke is lower (more likely delirium related), and she is also on therapeutic lovenox, no LVO for EVT. Suspect symptoms are related to her waxing and waning delirium but will get an MRI brain to rule out a Stroke.        Intravenous Thrombolysis  Not given due to:   -  minor/isolated/quickly resolving symptoms  - DOAC dose within 48 hours or INR > 1.7     Endovascular Treatment  Not initiated due to absence of proximal vessel occlusion     Recommendations:  Would recommend MRI brain with and without contrast  Delirium recommendations:  - Treat underlying infection, correct metabolic derangements as able.  - Avoid CNS acting drugs (including opiates, benzodiazepines, anti-  cholinergics); consider local measures or scheduled pain regimens that minimize opioids for pain  - Supportive medical cares, including correction of any electrolyte  abnormalities  - Consider TSH, ammonia, thiamine  - Delirium precautions (frequent reorientation, normal day night cycles (blinds up and awake during day, sleeping at night), minimize frequent interruptions, clutter and loud noises, encourage family visitations, consider melatonin at 1900 nightly)  - Avoid sensory deprivation (provide patient with eyeglasses or hearing aids if using)          ___________________________________________________________________    The patient was discussed with Stroke Fellow, Dr. Hernandez.  The Stroke Staff is Dr. Grier.    Katy Pierson MD  Neurology Resident  ____________________________________________________        Imaging/Labs   (personally reviewed )    CT head: no bleed  CTA head/neck: no LVO           Physical Examination     BP: 112/71   Pulse: 101   Resp: 18   Temp: 98  F (36.7  C)   Temp src: Oral   SpO2: 97 %   O2 Device: None (Room air)   Weight: 71.2 kg (156 lb 15.5 oz)    Wt Readings from Last 2 Encounters:   05/31/25 71.2 kg (156 lb 15.5 oz)   03/29/25 68 kg (150 lb)       General: No acute distress.  HEENT: Normocephalic, atraumatic. Sclera anicteric. No nasal drainage or epistaxis.  CV: Extremities appear to be appropriately perfused.  Pulmonary: Breathing comfortably on room air. No accessory muscle use.  GI/: Soft, non-distended.  MSK: No LE edema.  Integument: Warm, dry. No jaundice.     Neuro  Exam  MS: Eyes are open spontaneously and she tracks my face in both direction. She is not able to follow even simple commands (though could mimic commands to lift hands and legs up later), and her speech is comprised of nonsensical phrases. Unable to name and repeat.   cranial nerves: both pupils 3mm and reactive. Blinks to threat bilaterally, symmetric Smile, tongue midline  Motor: antigravity without drift in all four extremities with good  strength, ankle PF and DF.  Sensory: withdraws to noxious stimulus in all four extremities  Reflexes: toes are downgoing         Stroke Scales       NIHSS  1a. Level of Consciousness 0-->Alert, keenly responsive   1b. LOC Questions 2-->Answers neither question correctly   1c. LOC Commands 2-->Performs neither task correctly   2.   Best Gaze 0-->Normal   3.   Visual 0-->No visual loss   4.   Facial Palsy 0-->Normal symmetrical movements   5a. Motor Arm, Left 0-->No drift, limb holds 90 (or 45) degrees for full 10 secs   5b. Motor Arm, Right 0-->No drift, limb holds 90 (or 45) degrees for full 10 secs   6a. Motor Leg, Left 0-->No drift, leg holds 30 degree position for full 5 secs   6b. Motor Leg, right 0-->No drift, leg holds 30 degree position for full 5 secs   7.   Limb Ataxia 0-->Absent   8.   Sensory 0-->Normal, no sensory loss   9.   Best Language 2-->Severe aphasia, all communication is through fragmentary expression, great need for inference, questioning, and guessing by the listener. Range of information that can be exchanged is limited, listener carries burden of. . . (see row details)   10. Dysarthria 0-->Normal   11. Extinction and Inattention  0-->No abnormality   Total 6 (05/31/25 2042)            Labs     CBC  Lab Results   Component Value Date    HGB 8.0 (L) 05/31/2025    HCT 23.8 (L) 05/31/2025    WBC 9.9 05/31/2025     (H) 05/31/2025       BMP  Lab Results   Component Value Date     (L) 05/31/2025    POTASSIUM 4.2 05/31/2025    CHLORIDE 101  05/31/2025    CO2 25 05/31/2025    BUN 18.2 05/31/2025    CR 0.67 05/31/2025     (H) 05/31/2025    CECY 7.8 (L) 05/31/2025       INR  INR   Date Value Ref Range Status   05/29/2025 1.58 (H) 0.85 - 1.15 Final   05/25/2025 1.43 (H) 0.85 - 1.15 Final   03/30/2025 1.10 0.85 - 1.15 Final       Data   Stroke Code Data  (for stroke code without tele)  Stroke code activated 05/31/25 2024   First stroke provider response 05/31/25 2028   Last known normal 05/31/25 2000   Time of discovery (or onset of symptoms)        Head CT read by Stroke Neuro Provider 05/31/25 2044   Was stroke code de-escalated? Yes  05/31/25 2059        Clinically Significant Risk Factors         # Hyponatremia: Lowest Na = 133 mmol/L in last 2 days, will monitor as appropriate       # Hypoalbuminemia: Lowest albumin = 2.5 g/dL at 5/29/2025  4:27 AM, will monitor as appropriate    # Coagulation Defect: INR = 1.58 (Ref range: 0.85 - 1.15) and/or PTT = N/A, will monitor for bleeding    # Hypertension: Noted on problem list           # DMII: A1C = 8.0 % (Ref range: <5.7 %) within past 6 months    # Moderate Malnutrition: based on nutrition assessment and treatment provided per dietitian's recommendations.      # Financial/Environmental Concerns: none          Time Spent on this Encounter   Billing:

## 2025-06-01 NOTE — CODE/RAPID RESPONSE
Stroke Code Nurse-Responder Note    Arrival Time to Stroke Code: 2025    Stroke Code Team interventions:   - De-escalated at 2058 by Katy Lopes MD     ED/Bedside Nurse providing handoff: Karlos Crawford     Time left for CT: 2035    Time arrived to next location (ED/Unit/IR): Unit @ 2047    ED/Bedside Nurse given handoff (name/time): Karlos Crawford 2100    Leena Dee RN

## 2025-06-01 NOTE — PROVIDER NOTIFICATION
"Provider notified (name): Johnny Boyd  Reason for notification: \"Since last night at 2300 she ha only had 75ml on bladder scan when she was previously making 800-900ml urine q6\"    Response from provider: recheck creatinine, continue to monitor  "

## 2025-06-01 NOTE — PHARMACY-ANTICOAGULATION SERVICE
Clinical Pharmacy Note- Enoxaparin Anti-Xa Evaluation for ADULT Patients    Date of Service 2025  Patient's  1945   Patient's age:  79 year old  Patient's Weight used for enoxaparin dosin.2 kg (156 lb 15.5 oz)  Patient's BMI: Body mass index is 26.94 kg/m .   Enoxaparin Indication: DVT/PE treatment  Goal LMWH anti-Xa level for ADULT patients: 0.5-1 IU/mL (1 mg/kg or 0.75 mg/kg BID dose)  Current Enoxaparin Regimen: 1 mg/kg subcutaneous Q 12 hours     Creatinine for last 3 days:   Creatinine   Date Value Ref Range Status   2025 0.66 0.51 - 0.95 mg/dL Final   2025 0.67 0.51 - 0.95 mg/dL Final   2025 0.72 0.51 - 0.95 mg/dL Final   2020 0.80 0.52 - 1.04 mg/dL Final   2019 0.84 0.52 - 1.04 mg/dL Final   10/17/2019 0.63 0.52 - 1.04 mg/dL Final      Current estimated CrCL:  Creatinine clearance cannot be calculated (Unknown ideal weight.)     Platelet count for last 3 days:   Platelet Count   Date Value Ref Range Status   2025 524 (H) 150 - 450 10e3/uL Final   2025 563 (H) 150 - 450 10e3/uL Final   2025 591 (H) 150 - 450 10e3/uL Final   2019 588 (H) 150 - 450 10e9/L Final   10/18/2019 698 (H) 150 - 450 10e9/L Final   10/17/2019 683 (H) 150 - 450 10e9/L Final      Recent Enoxaparin anti-Xa Levels (past 3 days):   Recent Labs     25  1351   ALMWH 1.43   ]    ASSESSMENT OF LEVELS AND CURRENT REGIMEN:   1) Enoxaparin anti-XA level was drawn 5.5 hours post 11th dose at steady state.    2) Current LMWH anti-Xa peak level is: Supra-therapeutic (HIGH)    3) Renal Function:  Scr changed > 50% in last 72 hours? No  Urine Output: Unable to determine    4) Platelet Counts have been assessed and are: Decreasing    RECOMMENDATIONS:      Recommended Enoxaparin Anti-Xa Dose Adjustments  VTE Treatment (1 mg/kg or 0.75 mg/kg SQ Q12h dosing)   4h peak Anti-Xa (IU/ml) Hold next dose? Dose change Repeat Anti-Xa levels   <0.35 No INCREASE   25% 4 hours after the  dose once at steady state   0.35-0.49 No INCREASE    10-15%    0.5-1 No No change Repeat only if necessary   1.01-1.59 No DECREASE   20% 4 hours after the dose once at steady state   1.6-2 x 3 h DECREASE   30%    >2 All further doses should be held and anti-Xa levels   measured every 12 hours until it is <0.5 units/ml.    Decrease previous dose by 40% when restarted.   Non-Standard Treatment (1.5 mg/kg SQ Q24h) AND VTE Prophylaxis Dosing   If LMWH anti-Xa levels are outside goal range, adjust dose up/down proportionally by percentage as pharmacokinetics are linear     1) Enoxaparin Regimen/Dose Plan: Decrease dose to 50 mg Q12 hrs  2) Recheck Enoxaparin anti-Xa levels: No recheck necessary at this time.      The pharmacist will continue to monitor and follow enoxaparin LMWH anti-Xa levels as needed.      Please contact pharmacy if enoxaparin needs to be held for a procedure or if goal levels change.    Leonidas Pichardo RPH, Pharm.D

## 2025-06-01 NOTE — PLAN OF CARE
/59 (BP Location: Left arm)   Pulse 110   Temp 99  F (37.2  C) (Oral)   Resp 16   Wt 71.2 kg (156 lb 15.5 oz)   SpO2 100%   BMI 26.94 kg/m      Care from: 0113-4455    VS & Pain: VSS, no pain reported  Neuro: A+Ox1, changes intermittently; word finding difficulty  Respiratory: RA; productive cough  Cardiac: WDL  Peripheral neurovascular: generalized weakness  GI/: inc of bowel; unable to void without straight cath- not making urine, only got 100ml out for UA at 1400  Nutrition: FLD; TPN continuous  Skin: red sacrum and perineum - barrier cream; q2 turns; +2/3 BLE edema  Lines: R PICC: TPN 100ml/hr  Activity: Ax2  Events this shift:  using yankaur for productive cough; no longer making urine- MD notified and UA sent; increased confusion today with increased edema, poor perfusion, and increased R sided weakness- MD paged and came to bedside to assess; MRI done; CXR done; lactic drawn; VBG done; multiple loose bowel movements today; NG pulled; speech saw and advanced to FLD; UA came back with UTI- started Augmentin     Plan:  continue POC; PT/OT recommending TCU for discharge

## 2025-06-01 NOTE — CODE/RAPID RESPONSE
"   05/31/25 2000   Call Information   Date of Call 05/31/25   Time of Call 2020   Name of person requesting the team Karlos DUFFY   Title of person requesting team RN   RRT Arrival time 2022   Time RRT ended 2024  (Changed to Stroke code at 2024)   Reason for call   Type of RRT Adult   Primary reason for call Neurological   Neurological Confused;Lethargic;Speech loss/slurred   Was patient transferred from the ED, ICU, or PACU within last 24 hours prior to RRT call? 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

## 2025-06-02 ENCOUNTER — APPOINTMENT (OUTPATIENT)
Dept: PHYSICAL THERAPY | Facility: CLINIC | Age: 80
DRG: 871 | End: 2025-06-02
Payer: COMMERCIAL

## 2025-06-02 ENCOUNTER — APPOINTMENT (OUTPATIENT)
Dept: GENERAL RADIOLOGY | Facility: CLINIC | Age: 80
End: 2025-06-02
Payer: COMMERCIAL

## 2025-06-02 ENCOUNTER — APPOINTMENT (OUTPATIENT)
Dept: SPEECH THERAPY | Facility: CLINIC | Age: 80
DRG: 871 | End: 2025-06-02
Payer: COMMERCIAL

## 2025-06-02 ENCOUNTER — APPOINTMENT (OUTPATIENT)
Dept: OCCUPATIONAL THERAPY | Facility: CLINIC | Age: 80
DRG: 871 | End: 2025-06-02
Payer: COMMERCIAL

## 2025-06-02 LAB
ALBUMIN SERPL BCG-MCNC: 2.3 G/DL (ref 3.5–5.2)
ALBUMIN UR-MCNC: NEGATIVE MG/DL
ALP SERPL-CCNC: 55 U/L (ref 40–150)
ALT SERPL W P-5'-P-CCNC: 16 U/L (ref 0–50)
ANION GAP SERPL CALCULATED.3IONS-SCNC: 7 MMOL/L (ref 7–15)
APPEARANCE UR: CLEAR
AST SERPL W P-5'-P-CCNC: 22 U/L (ref 0–45)
BACTERIA #/AREA URNS HPF: ABNORMAL /HPF
BILIRUB SERPL-MCNC: 0.3 MG/DL
BILIRUB UR QL STRIP: NEGATIVE
BUN SERPL-MCNC: 20.7 MG/DL (ref 8–23)
CALCIUM SERPL-MCNC: 7.8 MG/DL (ref 8.8–10.4)
CHLORIDE SERPL-SCNC: 106 MMOL/L (ref 98–107)
COLOR UR AUTO: ABNORMAL
CREAT SERPL-MCNC: 0.6 MG/DL (ref 0.51–0.95)
CRP SERPL-MCNC: 21.5 MG/L
EGFRCR SERPLBLD CKD-EPI 2021: >90 ML/MIN/1.73M2
ERYTHROCYTE [DISTWIDTH] IN BLOOD BY AUTOMATED COUNT: 17.5 % (ref 10–15)
FERRITIN SERPL-MCNC: 303 NG/ML (ref 11–328)
FOLATE SERPL-MCNC: 6.6 NG/ML (ref 4.6–34.8)
GLUCOSE BLDC GLUCOMTR-MCNC: 139 MG/DL (ref 70–99)
GLUCOSE BLDC GLUCOMTR-MCNC: 167 MG/DL (ref 70–99)
GLUCOSE BLDC GLUCOMTR-MCNC: 173 MG/DL (ref 70–99)
GLUCOSE BLDC GLUCOMTR-MCNC: 194 MG/DL (ref 70–99)
GLUCOSE BLDC GLUCOMTR-MCNC: 204 MG/DL (ref 70–99)
GLUCOSE BLDC GLUCOMTR-MCNC: 207 MG/DL (ref 70–99)
GLUCOSE SERPL-MCNC: 165 MG/DL (ref 70–99)
GLUCOSE UR STRIP-MCNC: 500 MG/DL
HAPTOGLOB SERPL-MCNC: 133 MG/DL (ref 30–200)
HCO3 SERPL-SCNC: 22 MMOL/L (ref 22–29)
HCT VFR BLD AUTO: 22 % (ref 35–47)
HGB BLD-MCNC: 7 G/DL (ref 11.7–15.7)
HGB UR QL STRIP: NEGATIVE
INR PPP: 1.07 (ref 0.85–1.15)
IRON BINDING CAPACITY (ROCHE): 167 UG/DL (ref 240–430)
IRON SATN MFR SERPL: 19 % (ref 15–46)
IRON SERPL-MCNC: 32 UG/DL (ref 37–145)
KETONES UR STRIP-MCNC: NEGATIVE MG/DL
LDH SERPL L TO P-CCNC: 279 U/L (ref 0–250)
LEUKOCYTE ESTERASE UR QL STRIP: ABNORMAL
MAGNESIUM SERPL-MCNC: 2 MG/DL (ref 1.7–2.3)
MCH RBC QN AUTO: 27 PG (ref 26.5–33)
MCHC RBC AUTO-ENTMCNC: 31.8 G/DL (ref 31.5–36.5)
MCV RBC AUTO: 85 FL (ref 78–100)
NITRATE UR QL: NEGATIVE
PH UR STRIP: 7 [PH] (ref 5–7)
PHOSPHATE SERPL-MCNC: 2.7 MG/DL (ref 2.5–4.5)
PLATELET # BLD AUTO: 479 10E3/UL (ref 150–450)
POTASSIUM SERPL-SCNC: 4 MMOL/L (ref 3.4–5.3)
PREALB SERPL-MCNC: 8 MG/DL (ref 20–40)
PROT SERPL-MCNC: 4.1 G/DL (ref 6.4–8.3)
PROTHROMBIN TIME: 13.9 SECONDS (ref 11.8–14.8)
RBC # BLD AUTO: 2.59 10E6/UL (ref 3.8–5.2)
RBC URINE: 1 /HPF
SODIUM SERPL-SCNC: 135 MMOL/L (ref 135–145)
SP GR UR STRIP: 1.01 (ref 1–1.03)
TRANSFERRIN SERPL-MCNC: 136 MG/DL (ref 200–360)
UROBILINOGEN UR STRIP-MCNC: NORMAL MG/DL
VIT B12 SERPL-MCNC: 985 PG/ML (ref 232–1245)
WBC # BLD AUTO: 11.7 10E3/UL (ref 4–11)
WBC URINE: 95 /HPF

## 2025-06-02 PROCEDURE — 85610 PROTHROMBIN TIME: CPT | Performed by: INTERNAL MEDICINE

## 2025-06-02 PROCEDURE — 83735 ASSAY OF MAGNESIUM: CPT

## 2025-06-02 PROCEDURE — 86140 C-REACTIVE PROTEIN: CPT

## 2025-06-02 PROCEDURE — 250N000013 HC RX MED GY IP 250 OP 250 PS 637: Performed by: STUDENT IN AN ORGANIZED HEALTH CARE EDUCATION/TRAINING PROGRAM

## 2025-06-02 PROCEDURE — 97116 GAIT TRAINING THERAPY: CPT | Mod: GP

## 2025-06-02 PROCEDURE — 250N000011 HC RX IP 250 OP 636

## 2025-06-02 PROCEDURE — 82746 ASSAY OF FOLIC ACID SERUM: CPT

## 2025-06-02 PROCEDURE — 84100 ASSAY OF PHOSPHORUS: CPT

## 2025-06-02 PROCEDURE — 250N000009 HC RX 250

## 2025-06-02 PROCEDURE — 80053 COMPREHEN METABOLIC PANEL: CPT

## 2025-06-02 PROCEDURE — 258N000003 HC RX IP 258 OP 636

## 2025-06-02 PROCEDURE — 36415 COLL VENOUS BLD VENIPUNCTURE: CPT

## 2025-06-02 PROCEDURE — 99233 SBSQ HOSP IP/OBS HIGH 50: CPT

## 2025-06-02 PROCEDURE — 83010 ASSAY OF HAPTOGLOBIN QUANT: CPT

## 2025-06-02 PROCEDURE — 85014 HEMATOCRIT: CPT

## 2025-06-02 PROCEDURE — 250N000013 HC RX MED GY IP 250 OP 250 PS 637

## 2025-06-02 PROCEDURE — 82728 ASSAY OF FERRITIN: CPT

## 2025-06-02 PROCEDURE — 81001 URINALYSIS AUTO W/SCOPE: CPT

## 2025-06-02 PROCEDURE — 84466 ASSAY OF TRANSFERRIN: CPT

## 2025-06-02 PROCEDURE — 999N000044 HC STATISTIC CVC DRESSING CHANGE

## 2025-06-02 PROCEDURE — 97530 THERAPEUTIC ACTIVITIES: CPT | Mod: GO

## 2025-06-02 PROCEDURE — 83540 ASSAY OF IRON: CPT

## 2025-06-02 PROCEDURE — 250N000011 HC RX IP 250 OP 636: Performed by: INTERNAL MEDICINE

## 2025-06-02 PROCEDURE — 97535 SELF CARE MNGMENT TRAINING: CPT | Mod: GO

## 2025-06-02 PROCEDURE — 82607 VITAMIN B-12: CPT

## 2025-06-02 PROCEDURE — 83615 LACTATE (LD) (LDH) ENZYME: CPT

## 2025-06-02 PROCEDURE — 92526 ORAL FUNCTION THERAPY: CPT | Mod: GN

## 2025-06-02 PROCEDURE — 250N000009 HC RX 250: Performed by: INTERNAL MEDICINE

## 2025-06-02 PROCEDURE — 999N000203 HC STATISTICAL VASC ACCESS NURSE TIME, 16-31 MINUTES

## 2025-06-02 PROCEDURE — 71045 X-RAY EXAM CHEST 1 VIEW: CPT | Mod: 26 | Performed by: RADIOLOGY

## 2025-06-02 PROCEDURE — 99232 SBSQ HOSP IP/OBS MODERATE 35: CPT | Performed by: STUDENT IN AN ORGANIZED HEALTH CARE EDUCATION/TRAINING PROGRAM

## 2025-06-02 PROCEDURE — 99233 SBSQ HOSP IP/OBS HIGH 50: CPT | Performed by: INTERNAL MEDICINE

## 2025-06-02 PROCEDURE — 36415 COLL VENOUS BLD VENIPUNCTURE: CPT | Performed by: INTERNAL MEDICINE

## 2025-06-02 PROCEDURE — 83550 IRON BINDING TEST: CPT

## 2025-06-02 PROCEDURE — 120N000002 HC R&B MED SURG/OB UMMC

## 2025-06-02 PROCEDURE — 97530 THERAPEUTIC ACTIVITIES: CPT | Mod: GP

## 2025-06-02 PROCEDURE — B4185 PARENTERAL SOL 10 GM LIPIDS: HCPCS | Performed by: INTERNAL MEDICINE

## 2025-06-02 PROCEDURE — 84134 ASSAY OF PREALBUMIN: CPT | Performed by: INTERNAL MEDICINE

## 2025-06-02 PROCEDURE — 71045 X-RAY EXAM CHEST 1 VIEW: CPT

## 2025-06-02 RX ORDER — MAGNESIUM SULFATE HEPTAHYDRATE 40 MG/ML
2 INJECTION, SOLUTION INTRAVENOUS ONCE
Status: COMPLETED | OUTPATIENT
Start: 2025-06-02 | End: 2025-06-02

## 2025-06-02 RX ADMIN — FUROSEMIDE 20 MG: 20 TABLET ORAL at 16:38

## 2025-06-02 RX ADMIN — MAGNESIUM SULFATE HEPTAHYDRATE: 500 INJECTION, SOLUTION INTRAMUSCULAR; INTRAVENOUS at 20:02

## 2025-06-02 RX ADMIN — INSULIN ASPART 3 UNITS: 100 INJECTION, SOLUTION INTRAVENOUS; SUBCUTANEOUS at 11:52

## 2025-06-02 RX ADMIN — THIAMINE HYDROCHLORIDE 250 MG: 100 INJECTION, SOLUTION INTRAMUSCULAR; INTRAVENOUS at 08:30

## 2025-06-02 RX ADMIN — Medication 25 MG: at 08:31

## 2025-06-02 RX ADMIN — ENOXAPARIN SODIUM 50 MG: 60 INJECTION SUBCUTANEOUS at 08:30

## 2025-06-02 RX ADMIN — MAGNESIUM SULFATE HEPTAHYDRATE 2 G: 2 INJECTION, SOLUTION INTRAVENOUS at 08:28

## 2025-06-02 RX ADMIN — LATANOPROST 1 DROP: 50 SOLUTION/ DROPS OPHTHALMIC at 21:32

## 2025-06-02 RX ADMIN — AMOXICILLIN AND CLAVULANATE POTASSIUM 1 TABLET: 875; 125 TABLET, FILM COATED ORAL at 08:31

## 2025-06-02 RX ADMIN — Medication 400 MCG: at 08:31

## 2025-06-02 RX ADMIN — INSULIN ASPART 3 UNITS: 100 INJECTION, SOLUTION INTRAVENOUS; SUBCUTANEOUS at 19:54

## 2025-06-02 RX ADMIN — INSULIN ASPART 2 UNITS: 100 INJECTION, SOLUTION INTRAVENOUS; SUBCUTANEOUS at 08:31

## 2025-06-02 RX ADMIN — GUAIFENESIN AND DEXTROMETHORPHAN 10 ML: 100; 10 SYRUP ORAL at 02:11

## 2025-06-02 RX ADMIN — VANCOMYCIN HYDROCHLORIDE 125 MG: KIT at 08:29

## 2025-06-02 RX ADMIN — Medication 3 MG: at 19:56

## 2025-06-02 RX ADMIN — Medication 500 MG: at 08:35

## 2025-06-02 RX ADMIN — OLIVE OIL AND SOYBEAN OIL 250 ML: 16; 4 INJECTION, EMULSION INTRAVENOUS at 20:02

## 2025-06-02 RX ADMIN — TAMSULOSIN HYDROCHLORIDE 0.4 MG: 0.4 CAPSULE ORAL at 08:31

## 2025-06-02 RX ADMIN — VANCOMYCIN HYDROCHLORIDE 125 MG: KIT at 11:52

## 2025-06-02 RX ADMIN — INSULIN ASPART 3 UNITS: 100 INJECTION, SOLUTION INTRAVENOUS; SUBCUTANEOUS at 16:39

## 2025-06-02 RX ADMIN — PANTOPRAZOLE SODIUM 40 MG: 40 TABLET, DELAYED RELEASE ORAL at 08:31

## 2025-06-02 RX ADMIN — ATORVASTATIN CALCIUM 40 MG: 40 TABLET, FILM COATED ORAL at 08:31

## 2025-06-02 RX ADMIN — VANCOMYCIN HYDROCHLORIDE 125 MG: KIT at 16:38

## 2025-06-02 RX ADMIN — FUROSEMIDE 20 MG: 20 TABLET ORAL at 08:31

## 2025-06-02 RX ADMIN — VANCOMYCIN HYDROCHLORIDE 125 MG: KIT at 19:56

## 2025-06-02 RX ADMIN — INSULIN GLARGINE 6 UNITS: 100 INJECTION, SOLUTION SUBCUTANEOUS at 11:52

## 2025-06-02 RX ADMIN — POTASSIUM PHOSPHATE, MONOBASIC POTASSIUM PHOSPHATE, DIBASIC 9 MMOL: 224; 236 INJECTION, SOLUTION, CONCENTRATE INTRAVENOUS at 09:11

## 2025-06-02 RX ADMIN — INSULIN ASPART 2 UNITS: 100 INJECTION, SOLUTION INTRAVENOUS; SUBCUTANEOUS at 00:11

## 2025-06-02 RX ADMIN — ENOXAPARIN SODIUM 50 MG: 60 INJECTION SUBCUTANEOUS at 20:09

## 2025-06-02 ASSESSMENT — ACTIVITIES OF DAILY LIVING (ADL)
ADLS_ACUITY_SCORE: 76

## 2025-06-02 NOTE — PLAN OF CARE
Shift Hours: 1900 - 0700    Assessment:  Body systems that were not at patient's baseline Skin. Focused body system assessments documented in flowsheets.        Activity     Fall Risk Score: 75   Bed alarm on? Yes     Activity Assistance Provided: assistance, 2 people      Assistive Device Utilized: lift device    Pain: Denied    Labs/RN Managed Protocols: Mag, potassium, phos and BG check    Lines/Drains: Right PICC with three lumen.    Nutrition: Full liquid with thin liquid; TPN    Goal Outcome Evaluation  Plan of Care Reviewed With: patient  Overall Patient Progress: no change  Outcome Evaluation: Ao x 2, denied pain/discomfort. Pt is progressively making lots of urine, incontinent of urine x 3 and primoft applied with total amount of 400 ml this shift with post void residual under 100 ml. Continue to cough and received PRN of robittusin x 1. Vitally stable.    Barriers to Discharge:   Continue current care plan until medically ready

## 2025-06-02 NOTE — PROGRESS NOTES
GASTROENTEROLOGY PROGRESS and Sign-Off  NOTE  GI Luminal  Service    Date of Admission: 5/25/2025  Reason for Admission: DKA, Sepsis of Unknown Source      ASSESSMENT:  Sarah Felix is a 79-year-old female with past medical history significant for JAK2 myeloproliferative neoplasm with essential thrombocytosis on hydroxyurea, insulin-dependent type 2 diabetes, hypertension, fatty liver, possible alcohol use disorder, gait instability, who presented to the emergency department on 5/25/2025 with generalized weakness and encephalopathy, admitted with diabetic ketoacidosis and sepsis, found to have non-occlusive right hepatic vein thrombosis started on anticoagulation with hospital course complicated by concern for C. difficile infection which is clinically improving with oral vancomycin.      # Loose Stools, non-bloody  # Abdominal Distension  # C. Difficile Colonization, Discordant C. Difficile Stool Test  Reports 1 week of loose stools and abdominal distension, admitted with encephalopathy and sepsis of unknown source, with discordant C. difficile testing.      C. Diff Testing:  - DISCORDANT 5/28/2025: C. Diff Toxin B PCR POSITIVE / C Difficile GDH Antigen POSITIVE / C. Difficile Toxin NEGATIVE.   - Repeat remains DISCORDANT 5/30/2025: C. Diff Toxin B PCR POSITIVE / C Difficile GDH Antigen POSITIVE / C. Difficile Toxin NEGATIVE.     Imaging:   - 5/28/2025: CT Abdomen/Pelvis with Contrast reports diffusely dilated loops of small bowel, some prominence of the vasa recta with mild surrounding fat stranding (nonspecific).    Labs:   - WBC remains elevated: 11.7 10e3/uL (improved from 19.1 10e3/uL on 5/28).   - Creatinine remains within normal limits.   - Albumin remains low: 2.3 g/dL.   - CRP trend: 32.8 mg/L (5/26) --> 32 mg/L (5/30) --> 16.3 mg/L (5/31) --> 12 mg/L (6/1) --> 21.5 mg/L (6/2).     Systemic Antibiotics:   - IV Vancomycin 5/25.   - IV Zosyn 5/25-5/27.   - IV Unasyn 5/27-5/29.  - PO Augmentin  6/1-present for Urinary tract infection, urine culture pending.      Anti-C. Difficile Antibiotics:   - Vancomycin 125 mg PO QID 5/28-present.       Patient's source of previous sepsis is not clear.  With the absence of another source of sepsis, the patient was of C. difficile infection with oral vancomycin 5/28/2025 until present. Per I/O Flowsheet, rectal output frequency appears to be improving.     Discordant C. difficile testing in the absence of colonic thickening on CT Scan 5/28/2025 is consistent with C. difficile colonization, not active infection. Patient is certainly at risk for converting from C. Difficile colonization to active C. Difficile infection in the setting of systemic antibiotics, recurrent hospitalizations, and concern for alcohol use disorder, all of which disrupt the gut microbiota.  Patient has multiple other reasons to have loose stools including medication adverse effect, DKA, loose stools related to alcohol sugars in the setting of concern for alcohol use disorder, potentially lack of dietary fiber, versus some sort of infectious etiology not covered in the infectious panel.      Given patient is already on oral vancomycin with risk of converting from C. Difficile colonization to active C. difficile infection on systemic antibiotics, favor decreasing vancomycin to suppressive dosing of vancomycin 125 by mouth daily for the duration of systemic antibiotics.      RECOMMENDATIONS:  -- De-escalate to suppressive Vancomycin 125 mg PO daily for the duration of systemic antibiotics in the setting of C. Difficile colonization.   -- Continue supportive cares and conservative management of loose stools and non-specific enteritis seen on CT.   -- Strict detailed documentation of rectal output in the I/O Flowsheet, including stool appearance: color, consistency, frequency and amount.   - Consider smearing stool thinly on white paper towel to distinguish color.   - If abnormal appearing stool,  consider uploading a picture to the media tab in the patient's chart and notify the Primary team.   -- Addiction Medicine consult with concern for alcohol use disorder.   -- Appreciate RD involvement for nutritional optimization.   -- Analgesia/Antiemetics per primary team  -- If the patient experiences overt GI bleeding with hemodynamic instability, please page the GI Luminal Service (listed on [facility]).       OUTPATIENT:   -- No outpatient GI follow-up necessary.       COVID status: negative 5/28/2025.     Discussed with resident Dr. Joseph Boyd - [facility].     The inpatient gastroenterology service will sign off at this time. Thank you for allowing us to participate in the care of this patient. Please do not hesitate to page the GI service with any questions or concerns.     The patient was discussed and plan agreed upon with Dr. Hayder Diop, GI Luminal Service staff physician.  Discussed with Dr. Jose A Yung, GI Luminal Service staff physician and C. Difficile specialist.     Overall time spent on the date of this encounter preparing to see the patient (including chart review of available notes, clinical status events, imaging and labs); discussing, ordering, coordinating recommended medications, tests or procedures; communicating with other health care professionals; and documenting the above clinical information in the electronic medical record was 60 minutes.    Alissa Bagley PA-C  Inpatient Gastroenterology Service  [facility]  Vocera   _______________________________________________________________      Subjective: Nursing notes and 24hr events reviewed.     Patient seen and examined at 1130.     Patient reports no nausea, heartburn, abdominal pain.    Thinks 3-4 bowel movements yesterday, same today.    Overall feels improved since admission.       ROS:   4 pt ROS negative unless noted in subjective.     Objective:  Blood pressure 108/55, pulse 84, temperature 98.1   F (36.7  C), temperature source Oral, resp. rate 16, weight 71.3 kg (157 lb 3 oz), SpO2 100%, not currently breastfeeding.    General: 79 year old female sitting up anne recliner chair next to the hospital bed in NAD. Appears comfortable.  Answers appropriately.    HEENT: Head is AT/NC. Sclera anicteric.   Lungs: No increased work of breathing, speaking in full sentences, equal chest rise. On Room Air.   Heart: Regular rate. Peripheral perfusion intact.  Abdomen: Soft, non-tender, non-distended.  No peritoneal signs.  Extremities: WWP.  Musculoskeletal: No gross deformity.  Skin: No jaundice or rash on exposed skin.  Neurologic: Grossly non-focal.  CN 2-12 grossly intact.   Mental status/Psych: A&O. Asks/answers questions appropriately. +flat affect. +interactive.      Previous GI Endoscopic PROCEDURES:    3/14/2019 - EGD - Dr. Hood Brower  Indications:         Surveillance procedure, h/o duodenal adenoma removed                        with piecemeal EMR on 9/2018; Plan for EGD to ensure                        complete removal.   Impression:          - Non-obstructing Schatzki ring.                        - A few gastric polyps consistent with benign fundic                        gland polyps.                        - Duodenal scar from likely prior polypectomy. No                        residual polypoid tissue seen. Biopsied to confirm no                        residual adenoma.   Recommendation:      - Discharge patient to home (ambulatory).                        - Await pathology results.                        - Would not recommend further surveillance endoscopy for                        sporadic adenoma of the duodenum as the efficacy of                        further surveillance is unclear. No follow up needed.     FINAL DIAGNOSIS:   Duodenum polypectomy scar, biopsy:   - Minute residual duodenal adenoma   - Negative for high-grade dysplasia or malignancy       9/6/2018 - Upper EUS - Dr. Morataya  [facility]  Indications:         For therapy of polyps in the duodenum; pt underwent EGD                        for dysphagia on 8/13/18 and was incidentally found to                        have a 15 mm duodenal polyp in D2 and a smaller polyp in                        D3. 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 Plan                        for EUS to assess resectability followed by EMR.   Impression:          - EUS: 15 polyp in the second portion of the duodenum                        arising from the mucosa without evidence of invasion                        into the muscularis propria.                        - Two 2-6 mm duodenal polyps in third portion.                        Endoscopically more consistent to be lymphangiectasias                        then adenomas. Resected and retrieved. Clips (MR                        conditional) were placed at the 6 mm polypectomy defect.                        - Piecemeal cold snare EMR performed of the 15 mm                        duodenal polyp. Resected and retrieved. Clips (MR                        conditional) were placed to close the defect.                        - Cholelithiasis incidentally noted in gallbladder fossa.                        - A few benign gastric fundic gland polyps.                        - Previously seen and treated benign-appearing                        esophageal stenosis at the GE junction that was                        traversable with a radial echoendoscope. Heaped up                        margins biopsied.   Recommendation:      - Discharge patient to home (ambulatory).                        - Await path results.                        - Repeat an upper GI endoscopy in the OR in 3-6 months                        after piecemeal resection of duodenal polyp.                        - Patient still notes reflux/regurgitation symptoms                        along with occasional solid food dysphagia despite PPI                         therapy. I suspect this is due to ongoing reflux from                        her large hiatal hernia also causing reflux related                        dysmotility rather than from the mild stenosis at the GE                        junction that was previously dilated to 18 mm. The next                        step in management would be to consider surgical                        treatment with a pH/Impedance and manometry study prior                        to, which was discussed with the patient but at this                        time she feels that her symptoms are not that bad and                        she has found behavioral modifications that are                        compensating for this. I recommended that if symptoms                        worsen, then we can reassess in clinic.     FINAL DIAGNOSIS:   A. DUODENAL POLYPS, 3RD PORTION, POLYPECTOMY:   - Duodenal adenoma   - Negative for high grade dysplasia   - Focal lymphangiectasia     B. DUODENAL POLYPS, 2ND PORTION, POLYPECTOMY:   - Duodenal adenoma, fragmented   - Negative for high-grade dysplasia     C. GASTROESOPHAGEAL JUNCTION NODULARITY, BIOPSY:   - Thickened esophageal squamous mucosa with focal parakeratosis   - Gastric mucosa with nonspecific chronic inflammation   - Negative for intestinal metaplasia and dysplasia   - No fungal organisms detected on routine stain     8/13/2018 - EGD - Dr. Hood Brower  Indications:         Dysphagia   Impression:          - A 15 mm duodenal polyp along the lateral wall of the                        second portion of the duodenum seen. Biopsied.                        - A single 2 mm duodenal polyp seen in the third portion                        of the duodenum.                        - A few gastric polyps consistent with fundic gland                        polyps.                        - 5 cm hiatal hernia.                        - Schatzki's ring at the GE junction. Dilated to 18 mm.                         - Esophagogastric landmarks identified.   Recommendation:      - Discharge patient to home (ambulatory).                        - Await pathology results.                        - If duodenal polyp returns as noninvasive adenoma,                        would then schedule for EUS/push enteroscopy and EMR of                        duodenal polyps in the OR with Dr. Brower.                        - Start pantoprazole 40 mg twice daily. Prescription                        sent to discharge pharmacy.                        - If dysphagia symptoms persist along with reflux                        symptoms despite BID PPI, will likely need to consider                        surgical correction of hiatal hernia. Suspect dysphagia                        more a result of reflux disease then truly obstructive                        Schatzki's ring given patient's history. Will need 24 hr                        pH study and manometry prior to consideration for                        surgery.     FINAL DIAGNOSIS:   DUODENUM, POLYP, BIOPSY:   - Tubular adenoma       9/16/2016 - Colonoscopy - Dr. Jose A Yung        FINAL DIAGNOSIS:   LARGE INTESTINE, ASCENDING COLON POLYP, POLYPECTOMY:   - Tubular adenoma   - Negative for high grade dysplasia or malignancy       4/20/2006 - Colonoscopy  - Dr. Cruz Arreguin        LABS:  [facility]  Recent Labs   Lab 06/02/25  0752 06/02/25  0623 06/02/25  0400 06/01/25  2355 06/01/25  0829 06/01/25  0634 05/31/25  0815 05/31/25  0558 05/30/25  1522 05/30/25  1320 05/30/25  0408 05/30/25  0402   NA  --  135  --   --   --   --   --  133*  --  135  --  135   POTASSIUM  --  4.0  --   --   --  4.3  --  4.2  --  4.0  --  4.1   CHLORIDE  --  106  --   --   --   --   --  101  --  101  --  101   CECY  --  7.8*  --   --   --   --   --  7.8*  --  7.4*  --  7.5*   CO2  --  22  --   --   --   --   --  25  --  25  --  28   BUN  --  20.7  --   --   --   --   --  18.2  --  18.8  --  17.8   CR  --  0.60  --   --   --  0.66   --  0.67  --  0.72  --  0.74   * 165* 139* 173*   < >  --    < > 226*   < > 224*   < > 191*    < > = values in this interval not displayed.     CBC  Recent Labs   Lab 06/02/25  0623 06/01/25  0634 05/31/25  0409 05/30/25  0402   WBC 11.7* 11.7* 9.9 13.5*   RBC 2.59* 2.80* 2.75* 3.13*   HGB 7.0* 7.6* 8.0* 8.4*   HCT 22.0* 23.6* 23.8* 26.1*   MCV 85 84 87 83   MCH 27.0 27.1 29.1 26.8   MCHC 31.8 32.2 33.6 32.2   RDW 17.5* 17.2* 17.5* 17.3*   * 524* 563* 591*     INR  Recent Labs   Lab 06/02/25  0623 05/29/25  0427   INR 1.07 1.58*     LFTs  Recent Labs   Lab 06/02/25  0623 05/29/25  0427 05/28/25  0607   ALKPHOS 55 60 64   AST 22 15 18   ALT 16 13 15   BILITOTAL 0.3 0.5 0.5   PROTTOTAL 4.1* 4.2* 4.7*   ALBUMIN 2.3* 2.5* 2.8*      PANC  Recent Labs   Lab 05/28/25  0607   LIPASE 14         Abdominal IMAGING:    XR ABDOMEN PORT 1 VIEW  6/1/2025 9:07 AM   HISTORY:  Trend bowel distension, enteritis      COMPARISON:  5/31/2025.     TECHNIQUE: Single supine view of the abdomen.      FINDINGS:   Enteric tube tip project over the stomach with sidehole at the GE  junction.     Nonobstructive bowel gas pattern with borderline distention of small  bowel loops measuring up to 2.9 cm. No pneumatosis or portal venous  gas. No acute or suspicious osseous abnormalities. No significant  colonic stool burden. Small rounded subcentimeter calcific aeration of  right upper quadrant may represent a costal cartilaginous  calcification versus a gallstone.                                                                   IMPRESSION:   Borderline dilation of small bowel loops suggestive of ongoing ileus.  Enteric tube sidehole at the GE junction. Recommend advancement.      XR abdomen port 1 view 5/31/2025 8:18 AM.  COMPARISON: Radiograph 5/30/2025, CT 5/28/2025.     HISTORY:  Found to have enteritis on CT in setting of C. diff,  trending bowel distention      FINDINGS: Enteric tube tip and sidehole project over the  stomach.  Nondistended colon. The majority of the small bowel is not well  visualized, likely fluid filled as seen on recent CT. No pneumatosis  or portal venous gas. No acute osseous abnormality.                                                                   IMPRESSION: Nondistended colon. Majority of the small bowel is not  well visualized, likely fluid-filled similar to recent CT.      XR ABDOMEN PORT 1 VIEW  5/30/2025 11:31 AM   HISTORY:  C diff, assess for colonic dilation      COMPARISON:  CT chest abdomen pelvis 5/28/2025     TECHNIQUE: Upright  abdominal radiograph(s).     FINDINGS:   Gastric tube tip and sidehole project over the stomach.      The small bowel is not well-visualized and likely fluid-filled. Normal  caliber of the colon. No pneumatosis or portal venous gas. No abnormal  calcifications. Trace effusions.                                                                   IMPRESSION: Nondilated colon.  The small bowel is not well seen and  likely fluid filled similar to prior CT chest abdomen pelvis.       CT CHEST/ABDOMEN/PELVIS W CONTRAST, 5/28/2025 3:54 PM  INDICATION: C/f sepsis without identified infection, diarrhea, concern  for colitis or other infectious source     COMPARISON STUDY: 5/25/2025     TECHNIQUE: CT scan of the chest, abdomen and pelvis was performed on  multidetector CT scanner using volumetric acquisition technique and  images were reconstructed in multiple planes with variable thickness  and reviewed on dedicated workstations.      CONTRAST: iopamidol (ISOVUE-370) solution 86mL injected IV without  oral contrast     CT scan radiation dose is optimized to minimum requisite dose using  automated dose modulation techniques.     FINDINGS:     Lungs/pleura: Small bilateral pleural effusions with associated  atelectasis. Mild peribronchial and interlobular septal thickening. No  pneumothorax. No focal consolidations.     Mediastinum: Status post left thyroidectomy, otherwise  unremarkable  thyroid. Normal cardiac size. Physiologic pericardial fluid. Moderate  coronary calcification. Unremarkable major thoracic vessels. No  distinct lymphadenopathy. Normal esophagus. Right PICC in the SVC.     Liver: Unchanged thick-walled cyst in the left hepatic lobe with wall  and septal calcifications. Additional small hypoattenuating  observations are unchanged from prior. No intrahepatic biliary ductal  dilation. Unchanged right hepatic vein thrombosis.     Biliary System: Cholelithiasis without cholecystitis.     Pancreas: No mass or pancreatic ductal dilation. Mild pancreatic  atrophy.     Adrenal glands: No mass or nodules     Spleen: Normal.     Kidneys: No suspicious mass, obstructing calculus or hydronephrosis.   Simple renal cysts.     Gastrointestinal tract: Diffusely dilated loops of small bowel. There  is some prominence of the vasa recta with mild surrounding fat  stranding. Gastric tube in the stomach.     Mesentery/peritoneum/retroperitoneum: No mass. No free fluid or air.     Lymph nodes: No significant lymphadenopathy.     Vasculature: Patent major abdominal vasculature.     Pelvis: Silver catheter in the urinary bladder. Uterus and adnexa  within normal limits.     Osseous structures: No aggressive or acute osseous lesion.      Soft tissues: Left inguinal hernia containing part of the distal  descending/proximal sigmoid colon. Anasarca.                                                                      IMPRESSION:   1. Diffusely dilated loops of small bowel with prominence of the vasa  recta and mild surrounding fat stranding. This could be secondary to  hypodynamic ileus secondary to infectious or inflammatory enteritis.  2. Unchanged right hepatic venous thrombosis.  3. Small bilateral pleural effusions and findings of mild pulmonary  edema as well as anasarca, suggestive of fluid overload.  4. Interval placement of a right PICC and Silver catheter.  5. Additional incidental  findings are unchanged from prior and as  described in the body of the report.

## 2025-06-02 NOTE — PROGRESS NOTES
PICC dressing change done. Noticed external length 2cm more, now total length is 6 cm. Advised RN to get a chest x-ray order to check the placement.  Addendum:  PICC is still centrally located. Okay to use.

## 2025-06-02 NOTE — PROGRESS NOTES
Inpatient Diabetes Management Service: Daily Progress Note     HPI: Sarah Felix is a 79 year old female with history of insulin-dependent DM2, HTN, fatty liver, possible alcohol use disorder, gait instability who presented 5/25 with generalized weakness and was admitted with DKA and sepsis, 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 Course c/b C diff enteritis. Inpatient Diabetes Service consulted for DKA management.          Assessment/Plan:     Assessment:   Type 2 Diabetes Mellitus, complicated by neuropathy.  (A1c 8 %, Hgb: 12.3; 5/25/25)  DKA on admission.  Lactic acidosis on admission  Sepsis, unknown source  Hepatic vein thrombosis  Dysphagia on full liquid diet   Toxic metabolic encephalopathy  Stress induced hyperglycemia  TPN induced hyperglycemia  C Diff + 5/28     Plan/Recommendations:    - Lantus 6 units q 24 hrs at 1200  - increase Regular insulin in TPN 22 --> 29 units with Dextrose increasing to 240 g  (0.12 units per gram dextrose)    - resume Novolog carbohydrate coverage: 1 unit per 25 g cho TID AC and prn snacks/supplements  - Novolog correction scale: 1/25 >140 q4 hrs while on continuous TPN with minimal PO intake  -  BG monitoring q 4 hours while minimal PO intake   - Hold PTA metformin and glimepiride   - Hypoglycemia protocol  - Carb counting protocol     Discussion: BG overall trending well, a little on the upper end of target range requiring some correction. Remains on continuous TPN. Started clear liquid diet yesterday, will unhold carb coverage. Per pharmacist, dextrose in TPN increasing from 200 to 240 g tonight. Will increase regular insulin in TPN. Will continue q4hr BG checks and correction scale until patient is eating more consistently.     Discharge Planning: (tentative)    Medications: Basal bolus (discharge on insulin only)   Test Claims:  none needed.   Education:  Needs to be assessed closer to discharge.    Outpatient Follow-up:  recommend [facility] vs PCP      Thank you for this consult. IDS will continue to follow.      Please notify Inpatient Diabetes Service if changes are planned to steroids, nutrition, TPN/TF and anticipated procedures requiring prolonged NPO status.         Interval History/Review of Systems :   - The last 24 hours progress and nursing notes reviewed.  - Cleared for diet yesterday, per patient she hasn't eaten and wasn't aware that full liquid diet started.   - No nausea, emesis, or abdominal pain.     Planned Procedures/Surgeries: none    Inpatient Glucose Control:       Recent Labs   Lab 06/02/25  0752 06/02/25  0623 06/02/25  0400 06/01/25  2355 06/01/25  1951 06/01/25  1617   * 165* 139* 173* 169* 156*             Medications Impacting Glycemia:   Steroids: none  D5W containing solutions/medications: none  Other medications impacting glucose: none        Nutrition:   Orders Placed This Encounter      Full Liquid Diet Thin Liquids (level 0)  Supplements:  None  TF: None  TPN:   Started 5/28 - 5/29: Regular insulin in continuous TPN 6 units with Dextrose 120 g   5/30: continuous TPN with 160 g dextrose and 14 units of reg insulin   5/31 - 6/1:  continuous TPN with 200 g dextrose and 22 units of reg insulin   6/2: continuous TPN with 240 g dextrose and 29 units of reg insulin         Diabetes History: see full consult note for complete diabetes history   Diabetes Type and Duration: DM2, 7/2009  Zinc transporter 8 antibody, islet antibody, and insulin antibody not available on epic search      GAD65 antibody <5 (9/11/2012)   C peptide 7.6 (9/11/2012)     PTA Medication Regimen: Glimepiride 1 mg daily, Novolog 5 units with high carb meal daily as needed, metformin 500 mg daily  Missing doses?: uncertain  Historical Diabetes Medications: N/A     Glucose monitoring device/frequency/trends: Fort[place] CGM, reports 130 on average  Hypoglycemia PTA:   - Frequency: \"rarely\"  - Severity:  no history of severe unconscious " "lows   - Awareness:  intact    - Treatment: \"eats food\"      Outpatient Diabetes Provider: Unknown  Formal Diabetes Education/Educator: Erik        Physical Exam:   /55 (BP Location: Right arm, Patient Position: Left side, Cuff Size: Adult Regular)   Pulse 84   Temp 98.1  F (36.7  C) (Oral)   Resp 16   Wt 71.3 kg (157 lb 3 oz)   SpO2 100%   BMI 26.98 kg/m    General:  fatigued appearing, NAD, in bed, more awake and alert today  Lungs: unlabored breathing on RA.  Mental Status: A&O x 3           Data:     Lab Results   Component Value Date    A1C 8.0 (H) 05/25/2025    A1C 7.5 (H) 11/05/2024    A1C 7.6 (H) 07/09/2024    A1C 6.6 (H) 03/05/2024    A1C 7.4 (H) 11/14/2023    A1C 8.3 (H) 06/21/2021    A1C 9.4 (H) 12/14/2020    A1C 8.1 (H) 09/21/2020    A1C 8.5 (H) 01/07/2020    A1C 7.6 (H) 10/12/2019       ROUTINE IP LABS (Last four results)  BMP  Recent Labs   Lab 06/02/25  0752 06/02/25  0623 06/02/25  0400 06/01/25  2355 06/01/25  0829 06/01/25  0634 05/31/25  0815 05/31/25  0558 05/30/25  1522 05/30/25  1320 05/30/25  0408 05/30/25  0402   NA  --  135  --   --   --   --   --  133*  --  135  --  135   POTASSIUM  --  4.0  --   --   --  4.3  --  4.2  --  4.0  --  4.1   CHLORIDE  --  106  --   --   --   --   --  101  --  101  --  101   CECY  --  7.8*  --   --   --   --   --  7.8*  --  7.4*  --  7.5*   CO2  --  22  --   --   --   --   --  25  --  25  --  28   BUN  --  20.7  --   --   --   --   --  18.2  --  18.8  --  17.8   CR  --  0.60  --   --   --  0.66  --  0.67  --  0.72  --  0.74   * 165* 139* 173*   < >  --    < > 226*   < > 224*   < > 191*    < > = values in this interval not displayed.     CBC  Recent Labs   Lab 06/02/25  0623 06/01/25  0634 05/31/25  0409 05/30/25  0402   WBC 11.7* 11.7* 9.9 13.5*   RBC 2.59* 2.80* 2.75* 3.13*   HGB 7.0* 7.6* 8.0* 8.4*   HCT 22.0* 23.6* 23.8* 26.1*   MCV 85 84 87 83   MCH 27.0 27.1 29.1 26.8   MCHC 31.8 32.2 33.6 32.2   RDW 17.5* 17.2* 17.5* 17.3*   * " 524* 563* 591*     Inpatient Diabetes Service will continue to follow, please don't hesitate to contact the team with any questions or concerns.     Clair Chung PA-C  Inpatient Diabetes Service  Pager: [phone]  Available on vocera    Plan discussed with patient, bedside RN, and primary team via this note.    To contact Inpatient Diabetes Service:     7 AM - 5 PM: Page the IDS MIKE following the patient that day (see filed or incomplete progress notes/consult notes under Endocrinology)    OR if uncertain of provider assignment: page job code [phone]    5 PM - 7 AM: First call after hours is to primary service.    For urgent after-hours questions, page job code for on call fellow: [phone]     I spent a total of 35 minutes on the date of the encounter doing prep/post-work, chart review, history and exam, documentation and further activities per the note including lab review, multidisciplinary communication, counseling the patient and/or coordinating care regarding acute hyper/hypoglycemic management, as well as discharge management and planning/communication.

## 2025-06-02 NOTE — PROGRESS NOTES
CLINICAL NUTRITION SERVICES - REASSESSMENT NOTE     RECOMMENDATIONS FOR MDs/PROVIDERS TO ORDER:  ***    Registered Dietitian Interventions:  ***    Future/Additional Recommendations:  ***     INFORMATION OBTAINED  {RDNSubjectiveinformation:500373}  : Pt had code stroke called overnight due to word finding difficulty. CT and CTA negative. This AM she is at her baseline, tired due to lack of sleep but able to sustain conversation. We will remove NGT and continue to assess swallowing       CURRENT NUTRITION ORDERS  Diet: {RDNDietorder:829333}  Snacks/Supplements: {KK Oral Supp:532421}        Nutrition Support: ON tpn   Not cycled   ?? Need to cycle         Dysphagia  Recurrent vomiting  Concern for aspiration from bedside RN. Feeding tube may have been preventing proper swallowing, so this was removed.  - NGT removed 6/1  - SLP: full liquid diet with 1:1 assistance        CURRENT INTAKE/TOLERANCE  Remove NGT  - Stop trending daily CRP, KUB  - Will need outpatient MRI/MRCP to evaluate the pancreatic and hepatic cysts      NEW FINDINGS  GI symptoms: Enteritis possibly 2/2 C. Diff  Ileus     Skin/wounds: {RDNNewfindingsgisymptomsskinwounds:923834}    Nutrition-relevant labs: {RDNNewfindingsrelevantlabsmeds:330419}  Nutrition-relevant medications: {RDNNewfindingsrelevantlabsmeds:185880}  Weight: ***    ASSESSED NUTRITION NEEDS  Dosing Weight: *** kg, based on {RDNDosingweightjustification:556145}  Estimated Energy Needs: *** kcals/day ({RDNEstimatedenergyneeds:456679})  Justification: {RDNEstimatedenergyneedsjustification:140483}  Estimated Protein Needs: *** grams protein/day ({RDNEstimatedproteinneeds:619677})  Justification: {RDNEstimatedproteinneedsjustification:830174}  Estimated Fluid Needs: *** mL/day ({RDNEstimatedfluidneeds:596908})  Justification: {RDNEstimatedfluidneedsjustification:225999}    MALNUTRITION  % Intake: {RDNMalnutrition%intake:845508}  % Weight Loss:  {RDNMalnutrition%weightloss:783283}  Subcutaneous Fat Loss: {RDNMalnutritionsubcutaneousfatloss:029215}  Muscle Loss: {RDNMalnutritionmuscleloss:187701}  Fluid Accumulation/Edema: {RDNMalnutritionfluidaccumulationedema:739181}  Malnutrition Diagnosis: {RDNMalnutritiondiagnosis:682472}  Malnutrition Present on Admission: {RDNMalnutritionpresentonadmit:944526}    EVALUATION OF THE PROGRESS TOWARD GOALS   Previous Goals  ***  Evaluation: {RDNpreviousgoalevaluation:832096}    ***  Evaluation: {RDNpreviousgoalevaluation:829306}    Previous Nutrition Diagnosis  ***  Evaluation: {RDNpreviousnutritiondiagnosisevaluation:602986}    NUTRITION DIAGNOSIS  {RDNNutritiondiagnosis:145946} related to *** as evidenced by ***    INTERVENTIONS  {RDNInterventions:087909}    GOALS  {RDNGoals:990502}     MONITORING/EVALUATION  Progress toward goals will be monitored and evaluated per policy.

## 2025-06-02 NOTE — PROGRESS NOTES
[facility]    Progress Note - Medicine Service, JOSE EDUARDO TEAM 2       Date of Admission:  5/25/2025    Assessment & Plan   Sarah Felix is a 79 year old female with history of insulin-dependent DM2, HTN, fatty liver, possible alcohol use disorder, gait instability who presented 5/25 with generalized weakness and was admitted with DKA and sepsis, 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 Course c/b C diff enteritis; clinically improving with PO vancomycin.      Today's updates:  - Discontinued Augmentin (no concern for UTI)  - Continue PO vancomycin  - SLP: full liquid diet with 1:1 assistance  - Continue straight cath, may need costa placed if continuing to retain  - Consider addiction medicine consult in coming days  - Anemia w/up - showed anemia of chronic disease      Enteritis possibly 2/2 C. Diff  Ileus   Initially presented septic but with negative workup including urine culture, blood cultures, CT C/A/P. Had ongoing worsening encephalopathy and leukocytosis on vanc/zosyn -> unasyn. Developed diarrhea and abdominal pain, stool with C diff PCR + but A toxin negative. C diff B toxin positive, will consult GI for input as to whether enteritis can be associated with this C. Diff presentation. Given clinical worsening, decided to empirically treat - now with improvement. Nausea/vomiting managed with NG decompression. Tube now removed.  - Remove NGT 6/1  - Abx:              - PO vancomycin (5/28-6/6) - 10 day course  - Unasyn (5/27-5/29)  - Zosyn (5/25-5/27)              - Vancomycin (5/25-5/26)  - GI consult for potential workup of enteritis given uncommon presentation of C. Diff   - C. Diff retested and continues to show positive antigen and B toxin, but negative A toxin   - Outpatient MRI/MRCP to evaluate the pancreatic and hepatic cysts   -Daily CRP, CBC w/ diff, Creat, alb to track infectious course   - PO pantoprazole daily    Acute anemia in the setting of  ACD.  Her PTA hemoglobin levels were within normal limits. Since admission, they have been steadily decreasing. 6/2 Hb was 7.0. Iron panel and B12 were ordered 6/2 and are c/w anemia of chronic disease. Initial concern for bleeding in urine, but repeat UA negative for blood & RBCs. No obvious source of hemorrhage. T bili and haptoglobin normal, pointing away from hemolysis.   - Continue trending Hb daily  - Consider transfusion if 6/3 AM Hb <7    Dysphagia  Recurrent vomiting  Concern for aspiration from bedside RN. Feeding tube may have been preventing proper swallowing, so this was removed.  - NGT removed 6/1  - SLP: full liquid diet with 1:1 assistance    Urinary retention  Continues to have urinary retention after removing the costa. Will start tamsulosin and continue to monitor.  - Continue straight cath if bladder scan >600cc, may need costa placed if continuing to retain  - Tamsulosin daily    Abnormal UA  Repeat UA on 6/1 showed cloudy urine, proteinuria, hematuria, and leukocyte esterase. She was started on Augmentin given these findings. These UA findings are mostly consistent with her UA from 5/25. This is likely not due to a UTI given that a UTI would have been resolved with her Zosyn and Unasyn abx course, thus Augmentin was discontinued. The hematuria is likely 2/2 repeated catheter insertions. Repeat UA non-concerning for infection or hematuria.   - Discontinue Augmentin    Concern for refeeding syndrome  Decrease in magnesium and phosphorus after beginning TPN, possible refeeding syndrome.  - BMP daily  - RN replacement protocols    Pleural effusions  Pulmonary edema  Anasarca  Seen on 5/28 CT C/A/P. TTE with LVEF 60-65%, small IVC.   - PTA 20mg Lasix daily    Diabetic ketoacidosis, resolved  T2DM with A1c 8.0%. Likely triggered by sepsis. Now off insulin gtt.  - Endocrine consulted, managing glargine + regular insulin in TPN + novolog  - Hold PTA metformin, glipizide  - Diabetes ed consult    "  Hepatic vein thrombus  Essential thrombocythemia (JAK2 positive), high risk disease   No prior imaging to compare. Per chart review, history of \"thromboembolism\" after ovarian cyst removal age 22. Liver panel normal.  - IR consulted; no indication for lytics  - Heme consulted  - Heparin gtt 5/25-5/27  - Lovenox 5/27-*  - At discharge transition to apixaban 10mg BID x 7 days then 5 mg BID  - Follow up with Dr. Wilde 7/29/25  - Continue hydroxyurea  - discontinue PTA ASA      Toxic metabolic encephalopathy  Not oriented to month or year. Likely related to sepsis vs hospital acquired delirium. No acute intracranial findings on CT head.   - Wernicke dose thiamine  - Delirium precautions     Possible alcohol use disorder  Reports 2 drinks/day. Did have an ED visit 03/2025 for alcohol intoxication. Reports last drink 5 days PTA, low concern for withdrawal. Her brother, Cruz, has concerns that she is drinking excessively and encourages addiction medicine involvement.  - Consider addiction medicine consult in coming days  - PTA folate  - Continue thiamine, Wernicke dosing     Moderate malnutrition in the context of chronic illness   Continued elevated ketones despite insulin ggt. Elevation likely due to poor oral intake due to encephalopathy and starvation ketosis. Will continue TPN until PO intake tolerated.  - Continue TPN   - SLP: full liquid diet with 1:1 assistance    Sacrum/coccyx moisture wound  Erythematous, blanchable area on her sacrum. Bedside RN says likely from excessive moisture 2/2 fecal incontinence. [facility] nurse resports wound c/w incontinence associated dermatitis.  - [facility] nurse rec:    - Dinorah cleanse and protection with soft cloth   - Apply thin layer of Barrier paste to open and red areas    \"Prominent pericardial contents\"  Cardiology reviewed- this is an epicardial fat pad. No further workup or consult needed.     Generalized weakness, failure to thrive  TSH wnl.   - Ongoing PT/OT consults   - Ongoing " evaluation for safe discharge plan     HTN: Hold PTA metoprolol, lisinopril given sepsis and normotension     Fall at home: CTH, C spine negative for acute process/fracture     Elevated INR: Unclear etiology, no known liver disease. Will hold off on Vit K given new thrombus.    Essential thrombocytosis: Stop PTA aspirin per heme, continue PTA hydroxyurea     Chronic/stable:     Glaucoma: PTA latanoprost  CKD2  Diabetic neuropathy  Schatzki's ring  Slow transit constipation  Mixed urge and stress incontinence  Gait instability          Diet: Full Liquid Diet Thin Liquids (level 0)  parenteral nutrition - ADULT compounded formula  parenteral nutrition - ADULT compounded formula  Room Service    DVT Prophylaxis: Enoxaparin (Lovenox) SQ  Silver Catheter: Not present  Fluids: PO  Lines: PRESENT      PICC 05/26/25 Triple Lumen Right Basilic Access. PICC was ready to use, no immediate concern.-Site Assessment: WDL      Cardiac Monitoring: None  Code Status: Full Code         Disposition Plan     Medically Ready for Discharge: Anticipated in 2-4 Days         The patient's care was discussed with the Attending Physician, Dr. Ivan Knight.    Stefanie Kim  Medical Student  Medicine Service, [address]  Securely message with [employer] (more info)  Text page via [employer] Paging/Directory   See signed in provider for up to date coverage information      I have seen this patient in conjunction with the medical student. The note above reflects my changes.     Dayan Falk MD  PGY2, Internal Medicine-Pediatrics  ______________________________________________________________________    Interval History   Sarah was awake and engaged in conversation upon interview this morning. She seems less confused, but is unable to answer most questions about her recent care here in the hospital. She reports that her previous right-sided weakness has resolved. She also says that she's very  hungry and wants to be able to eat more food. She has not had any nausea or vomiting.     Physical Exam   Vital Signs: Temp: 98.1  F (36.7  C) Temp src: Oral BP: 108/55 Pulse: 84   Resp: 16 SpO2: 100 % O2 Device: None (Room air)    Weight: 157 lbs 3.01 oz    Respiratory: No increased work of breathing, good air exchange, clear to auscultation bilaterally, no crackles or wheezing  Cardiovascular: Normal apical impulse, regular rate and rhythm, normal S1 and S2, no S3 or S4, and no murmur noted  GI: normal bowel sounds, non-distended, and tenderness noted in the epigastric region and in the right upper quadrant  Skin: no rashes  Musculoskeletal: 1+ pitting edema in her feet bilaterally  Neurologic: Mental Status Exam:  A&Ox3  Cranial Nerves:  III,IV,VI: Extra Ocular Movements: intact  V: Facial sensation:  intact  VII: Facial strength: intact  XI: Shoulder shrug:  intact  XII: Tongue movement:  normal  Motor Exam:  moves all extremities well and symmetrically  Sensory:  Sensory intact    Medical Decision Making       Please see A&P for additional details of medical decision making.      Data     I have personally reviewed the following data over the past 24 hrs:    11.7 (H)  \   7.0 (L)   / 479 (H)     135 106 20.7 /  167 (H)   4.0 22 0.60 \     ALT: 16 AST: 22 AP: 55 TBILI: 0.3   ALB: 2.3 (L) TOT PROTEIN: 4.1 (L) LIPASE: N/A     Procal: N/A CRP: 21.50 (H) Lactic Acid: 1.0       INR:  1.07 PTT:  N/A   D-dimer:  N/A Fibrinogen:  N/A     Ferritin:  N/A % Retic:  N/A LDH:  279 (H)       Imaging results reviewed over the past 24 hrs:   Recent Results (from the past 24 hours)   XR Chest Port 1 View    Narrative    EXAM: XR CHEST PORT 1 VIEW 6/1/2025 2:35 PM    INDICATION: productive cough    COMPARISON: CT 5/28/2025, chest radiograph 5/27/2025    TECHNIQUE: Upright frontal view of the chest.    FINDINGS:   Right upper cavity PICC tip in the low SVC. Interval removal of  enteric tube compared to prior CT. Left greater than  right  costophrenic angle blunting with mild left hemidiaphragm silhouetting  and retrocardiac opacities. No pneumothorax. No focal consolidation.  Atherosclerotic calcification of the aortic knob. Trachea is midline  with normal cardiac size. Upper abdomen unremarkable.      Impression    IMPRESSION:   1. Trace left pleural effusion versus atelectasis.  2. Removal of enteric tube with stable position of right upper  extremity PICC tip in the low SVC.    I have personally reviewed the examination and initial interpretation  and I agree with the findings.    YOSEF VANN MD         SYSTEM ID:  [id number]   MR Brain w/o & w Contrast    Narrative    EXAM: MR BRAIN W/O & W CONTRAST  6/1/2025 5:02 PM     HISTORY: word fnding difficulty, new R sided weakness.      COMPARISON: CT 5/31/2025, 3/30/2025    TECHNIQUE: Multiplanar, multisequence MR imaging of the head obtained  prior to, and after, intravenous contrast administration    CONTRAST: 7mL Gadavist.    FINDINGS:  Moderate generalized parenchymal volume loss. No mass effect, midline  shift, or intracranial hemorrhage. No acute infarct or hydrocephalus.  Preserved intravascular flow voids.    Normal skull marrow signal. No substantial paranasal sinus mucosal  disease. Bilateral mastoid effusion. Nonfocal pituitary gland, sella,  skull base and upper cervical spinal structures. The orbits are  normal.      Impression    IMPRESSION: No acute intracranial pathology.    I have personally reviewed the examination and initial interpretation  and I agree with the findings.    JESSICA ROJO MD         SYSTEM ID:  [id number]

## 2025-06-02 NOTE — PLAN OF CARE
Shift Hours: 0700 - 1900    Assessment:  Body systems assessments were at patient's baseline.        Activity     Fall Risk Score: 75   Bed alarm on? Yes     Activity Assistance Provided: assistance, 2 people. Up with A2 walker and gaitbelt         Pain: denies pain    Labs/RN Managed Protocols: mag, potassium and phos protocol. Replace mag and phos per order with redraws tomorrow a.m.    Lines/Drains: PICC right arm infusing TPN    Nutrition: bite size diet with thin liquids, speech saw and cleared for this diet again today, very poor po intake with 1:1 feed, room service ordered to send trays automatically    Goal Outcome Evaluation   Pt is alert, orientated to self and place, intermittently aware of situation and date, straight cathed for 600 ml and for UA/UC sent to lab          Barriers to Discharge:

## 2025-06-03 ENCOUNTER — APPOINTMENT (OUTPATIENT)
Dept: OCCUPATIONAL THERAPY | Facility: CLINIC | Age: 80
DRG: 871 | End: 2025-06-03
Payer: COMMERCIAL

## 2025-06-03 ENCOUNTER — APPOINTMENT (OUTPATIENT)
Dept: SPEECH THERAPY | Facility: CLINIC | Age: 80
DRG: 871 | End: 2025-06-03
Payer: COMMERCIAL

## 2025-06-03 LAB
ALBUMIN SERPL BCG-MCNC: 2.4 G/DL (ref 3.5–5.2)
BACTERIA UR CULT: ABNORMAL
BACTERIA UR CULT: ABNORMAL
CRP SERPL-MCNC: 12.7 MG/L
ERYTHROCYTE [DISTWIDTH] IN BLOOD BY AUTOMATED COUNT: 18.5 % (ref 10–15)
GLUCOSE BLDC GLUCOMTR-MCNC: 152 MG/DL (ref 70–99)
GLUCOSE BLDC GLUCOMTR-MCNC: 173 MG/DL (ref 70–99)
GLUCOSE BLDC GLUCOMTR-MCNC: 179 MG/DL (ref 70–99)
GLUCOSE BLDC GLUCOMTR-MCNC: 180 MG/DL (ref 70–99)
GLUCOSE BLDC GLUCOMTR-MCNC: 207 MG/DL (ref 70–99)
GLUCOSE BLDC GLUCOMTR-MCNC: 217 MG/DL (ref 70–99)
HCT VFR BLD AUTO: 21.7 % (ref 35–47)
HGB BLD-MCNC: 6.9 G/DL (ref 11.7–15.7)
HGB BLD-MCNC: 7.5 G/DL (ref 11.7–15.7)
MAGNESIUM SERPL-MCNC: 2.2 MG/DL (ref 1.7–2.3)
MCH RBC QN AUTO: 27.4 PG (ref 26.5–33)
MCHC RBC AUTO-ENTMCNC: 31.8 G/DL (ref 31.5–36.5)
MCV RBC AUTO: 86 FL (ref 78–100)
MCV RBC AUTO: 87 FL (ref 78–100)
PHOSPHATE SERPL-MCNC: 2.7 MG/DL (ref 2.5–4.5)
PLATELET # BLD AUTO: 479 10E3/UL (ref 150–450)
POTASSIUM SERPL-SCNC: 4.3 MMOL/L (ref 3.4–5.3)
PROT SERPL-MCNC: 4.3 G/DL (ref 6.4–8.3)
RBC # BLD AUTO: 2.52 10E6/UL (ref 3.8–5.2)
WBC # BLD AUTO: 8.2 10E3/UL (ref 4–11)

## 2025-06-03 PROCEDURE — 99207 PR NO BILLABLE SERVICE THIS VISIT: CPT | Performed by: STUDENT IN AN ORGANIZED HEALTH CARE EDUCATION/TRAINING PROGRAM

## 2025-06-03 PROCEDURE — 250N000013 HC RX MED GY IP 250 OP 250 PS 637

## 2025-06-03 PROCEDURE — 97535 SELF CARE MNGMENT TRAINING: CPT | Mod: GO

## 2025-06-03 PROCEDURE — 36415 COLL VENOUS BLD VENIPUNCTURE: CPT

## 2025-06-03 PROCEDURE — 85018 HEMOGLOBIN: CPT

## 2025-06-03 PROCEDURE — 85014 HEMATOCRIT: CPT

## 2025-06-03 PROCEDURE — 258N000003 HC RX IP 258 OP 636: Performed by: STUDENT IN AN ORGANIZED HEALTH CARE EDUCATION/TRAINING PROGRAM

## 2025-06-03 PROCEDURE — 99232 SBSQ HOSP IP/OBS MODERATE 35: CPT | Performed by: PHYSICIAN ASSISTANT

## 2025-06-03 PROCEDURE — 250N000009 HC RX 250: Performed by: INTERNAL MEDICINE

## 2025-06-03 PROCEDURE — 99232 SBSQ HOSP IP/OBS MODERATE 35: CPT | Mod: GC | Performed by: STUDENT IN AN ORGANIZED HEALTH CARE EDUCATION/TRAINING PROGRAM

## 2025-06-03 PROCEDURE — 36592 COLLECT BLOOD FROM PICC: CPT

## 2025-06-03 PROCEDURE — 86140 C-REACTIVE PROTEIN: CPT

## 2025-06-03 PROCEDURE — 82040 ASSAY OF SERUM ALBUMIN: CPT

## 2025-06-03 PROCEDURE — 84155 ASSAY OF PROTEIN SERUM: CPT

## 2025-06-03 PROCEDURE — 120N000002 HC R&B MED SURG/OB UMMC

## 2025-06-03 PROCEDURE — 250N000011 HC RX IP 250 OP 636: Performed by: INTERNAL MEDICINE

## 2025-06-03 PROCEDURE — G0463 HOSPITAL OUTPT CLINIC VISIT: HCPCS

## 2025-06-03 PROCEDURE — 250N000009 HC RX 250: Performed by: STUDENT IN AN ORGANIZED HEALTH CARE EDUCATION/TRAINING PROGRAM

## 2025-06-03 PROCEDURE — 84100 ASSAY OF PHOSPHORUS: CPT

## 2025-06-03 PROCEDURE — 250N000011 HC RX IP 250 OP 636

## 2025-06-03 PROCEDURE — 84132 ASSAY OF SERUM POTASSIUM: CPT

## 2025-06-03 PROCEDURE — 92526 ORAL FUNCTION THERAPY: CPT | Mod: GN

## 2025-06-03 PROCEDURE — 97530 THERAPEUTIC ACTIVITIES: CPT | Mod: GO

## 2025-06-03 PROCEDURE — 85049 AUTOMATED PLATELET COUNT: CPT

## 2025-06-03 PROCEDURE — 83735 ASSAY OF MAGNESIUM: CPT

## 2025-06-03 PROCEDURE — B4185 PARENTERAL SOL 10 GM LIPIDS: HCPCS | Performed by: INTERNAL MEDICINE

## 2025-06-03 RX ORDER — VANCOMYCIN HYDROCHLORIDE 50 MG/ML
125 KIT ORAL DAILY
Status: COMPLETED | OUTPATIENT
Start: 2025-06-04 | End: 2025-06-05

## 2025-06-03 RX ADMIN — INSULIN ASPART 2 UNITS: 100 INJECTION, SOLUTION INTRAVENOUS; SUBCUTANEOUS at 00:06

## 2025-06-03 RX ADMIN — ENOXAPARIN SODIUM 50 MG: 60 INJECTION SUBCUTANEOUS at 20:06

## 2025-06-03 RX ADMIN — Medication 400 MCG: at 08:28

## 2025-06-03 RX ADMIN — ATORVASTATIN CALCIUM 40 MG: 40 TABLET, FILM COATED ORAL at 08:28

## 2025-06-03 RX ADMIN — OLIVE OIL AND SOYBEAN OIL 250 ML: 16; 4 INJECTION, EMULSION INTRAVENOUS at 20:06

## 2025-06-03 RX ADMIN — INSULIN ASPART 1 UNITS: 100 INJECTION, SOLUTION INTRAVENOUS; SUBCUTANEOUS at 08:26

## 2025-06-03 RX ADMIN — MAGNESIUM SULFATE HEPTAHYDRATE: 500 INJECTION, SOLUTION INTRAMUSCULAR; INTRAVENOUS at 20:04

## 2025-06-03 RX ADMIN — VANCOMYCIN HYDROCHLORIDE 125 MG: KIT at 08:25

## 2025-06-03 RX ADMIN — LATANOPROST 1 DROP: 50 SOLUTION/ DROPS OPHTHALMIC at 21:44

## 2025-06-03 RX ADMIN — Medication 500 MG: at 08:27

## 2025-06-03 RX ADMIN — INSULIN ASPART 2 UNITS: 100 INJECTION, SOLUTION INTRAVENOUS; SUBCUTANEOUS at 12:29

## 2025-06-03 RX ADMIN — ENOXAPARIN SODIUM 50 MG: 60 INJECTION SUBCUTANEOUS at 08:25

## 2025-06-03 RX ADMIN — Medication 25 MG: at 08:28

## 2025-06-03 RX ADMIN — INSULIN GLARGINE 6 UNITS: 100 INJECTION, SOLUTION SUBCUTANEOUS at 12:29

## 2025-06-03 RX ADMIN — THIAMINE HYDROCHLORIDE 250 MG: 100 INJECTION, SOLUTION INTRAMUSCULAR; INTRAVENOUS at 08:28

## 2025-06-03 RX ADMIN — FUROSEMIDE 20 MG: 20 TABLET ORAL at 08:28

## 2025-06-03 RX ADMIN — FUROSEMIDE 20 MG: 20 TABLET ORAL at 16:03

## 2025-06-03 RX ADMIN — POTASSIUM PHOSPHATE, MONOBASIC POTASSIUM PHOSPHATE, DIBASIC 9 MMOL: 224; 236 INJECTION, SOLUTION, CONCENTRATE INTRAVENOUS at 11:07

## 2025-06-03 RX ADMIN — TAMSULOSIN HYDROCHLORIDE 0.4 MG: 0.4 CAPSULE ORAL at 08:28

## 2025-06-03 RX ADMIN — PANTOPRAZOLE SODIUM 40 MG: 40 TABLET, DELAYED RELEASE ORAL at 08:28

## 2025-06-03 RX ADMIN — INSULIN ASPART 3 UNITS: 100 INJECTION, SOLUTION INTRAVENOUS; SUBCUTANEOUS at 16:35

## 2025-06-03 RX ADMIN — INSULIN ASPART 2 UNITS: 100 INJECTION, SOLUTION INTRAVENOUS; SUBCUTANEOUS at 04:22

## 2025-06-03 RX ADMIN — INSULIN ASPART 4 UNITS: 100 INJECTION, SOLUTION INTRAVENOUS; SUBCUTANEOUS at 20:10

## 2025-06-03 RX ADMIN — Medication 3 MG: at 20:08

## 2025-06-03 ASSESSMENT — ACTIVITIES OF DAILY LIVING (ADL)
ADLS_ACUITY_SCORE: 76

## 2025-06-03 NOTE — PROGRESS NOTES
Inpatient Diabetes Management Service: Daily Progress Note     HPI: Sarah Felix is a 79 year old female with history of insulin-dependent DM2, HTN, fatty liver, possible alcohol use disorder, gait instability who presented 5/25/25 with generalized weakness and was admitted with DKA and sepsis, 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 Course complicated by C diff enteritis. Inpatient Diabetes Service consulted for DKA management.          Assessment/Plan:     Assessment:   Type 2 Diabetes Mellitus, complicated by neuropathy.  (A1c 8 %, Hgb: 12.3; 5/25/25)  DKA on admission, resolved, possibly triggered by sepsis/hepatic vein thrombosis .  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  Dysphagia on regular diet, thin liquids   Toxic metabolic encephalopathy  Stress induced hyperglycemia  TPN induced hyperglycemia  C Diff Toxin PCR positive and C diff toxin negative, enteririts     Plan/Recommendations:    - Lantus 6 units q 24 hrs at 1200  - increase Regular insulin in TPN 29 --> 32 units with same Dextrose 240 g  (0.135 units per gram dextrose)  at goal of  240 g dextrose   -  Novolog carbohydrate coverage: 1 unit per 25 g cho TID AC and prn snacks/supplements  - Novolog correction scale: 1/25 >140 q4 hrs while on continuous TPN with minimal PO intake  -  BG monitoring q 4 hours while minimal PO intake   - Hold PTA metformin and glimepiride   - Hypoglycemia protocol  - Carb counting protocol     Discussion:  Per pharmacist, dextrose in TPN remains at 240 g, at goal per RD. Will increase regular insulin in TPN. Will continue q4hr BG checks and correction scale until patient is eating more consistently.  She may need a little more carb coverage.     Discharge Planning: (tentative)    Medications: Basal bolus (discharge on insulin only)   Test Claims:  none needed.   Education:  Needs to be assessed closer  to discharge.    Outpatient Follow-up:  recommend [facility] vs PCP      Thank you for this consult. IDS will continue to follow.      Please notify Inpatient Diabetes Service if changes are planned to steroids, nutrition, TPN/TF and anticipated procedures requiring prolonged NPO status.         Interval History/Review of Systems :   - The last 24 hours progress and nursing notes reviewed.  - Gets full fast.  Eat a little and gets full.   - No nausea, emesis, or abdominal pain.   Still has diarrhea but less frequent.  Planned Procedures/Surgeries: none    Inpatient Glucose Control:       Recent Labs   Lab 06/03/25  0412 06/03/25  0005 06/02/25  1953 06/02/25  1639 06/02/25  1147 06/02/25  0752   * 173* 194* 204* 207* 167*             Medications Impacting Glycemia:   Steroids: none  D5W containing solutions/medications: none  Other medications impacting glucose: none        Nutrition:   Orders Placed This Encounter      Soft & Bite Sized Diet (level 6) Thin Liquids (level 0)  Supplements:  Glucerna strawberry with bkf trays and lunch trays  Ensure max with dinner trays   TF: None  TPN:   Started 5/28 - 5/29: Regular insulin in continuous TPN 6 units with Dextrose 120 g   5/30: continuous TPN with 160 g dextrose and 14 units of reg insulin   5/31 - 6/1:  continuous TPN with 200 g dextrose and 22 units of reg insulin   6/2: continuous TPN with 240 g dextrose and 29 units of reg insulin   6/3: continuous TPN with 240 g dextrose and 32 units of reg insulin      Diabetes History: see full consult note for complete diabetes history   Diabetes Type and Duration: DM2, 7/2009  Zinc transporter 8 antibody, islet antibody, and insulin antibody not available on epic search      GAD65 antibody <5 (9/11/2012)   C peptide 7.6 (9/11/2012)     PTA Medication Regimen: Glimepiride 1 mg daily, Novolog 5 units with high carb meal daily as needed, metformin 500 mg daily  Missing doses?: uncertain  Historical Diabetes  "Medications: N/A     Glucose monitoring device/frequency/trends: Fortino CGM, reports 130 on average  Hypoglycemia PTA:   - Frequency: \"rarely\"  - Severity:  no history of severe unconscious lows   - Awareness:  intact    - Treatment: \"eats food\"      Outpatient Diabetes Provider: Unknown  Formal Diabetes Education/Educator: Erik        Physical Exam:   /69 (BP Location: Right arm)   Pulse 89   Temp 97.6  F (36.4  C)   Resp 17   Wt 71.3 kg (157 lb 3 oz)   SpO2 100%   BMI 26.98 kg/m    General:  fatigued appearing, NAD, in bed  Lungs: unlabored breathing on RA.  Mental Status: A&O x 3           Data:     Lab Results   Component Value Date    A1C 8.0 (H) 05/25/2025    A1C 7.5 (H) 11/05/2024    A1C 7.6 (H) 07/09/2024    A1C 6.6 (H) 03/05/2024    A1C 7.4 (H) 11/14/2023    A1C 8.3 (H) 06/21/2021    A1C 9.4 (H) 12/14/2020    A1C 8.1 (H) 09/21/2020    A1C 8.5 (H) 01/07/2020    A1C 7.6 (H) 10/12/2019       ROUTINE IP LABS (Last four results)  BMP  Recent Labs   Lab 06/03/25  0612 06/03/25  0412 06/03/25  0005 06/02/25  1953 06/02/25  1639 06/02/25  0752 06/02/25  0623 06/01/25  0829 06/01/25  0634 05/31/25  0815 05/31/25  0558 05/30/25  1522 05/30/25  1320 05/30/25  0408 05/30/25  0402   NA  --   --   --   --   --   --  135  --   --   --  133*  --  135  --  135   POTASSIUM 4.3  --   --   --   --   --  4.0  --  4.3  --  4.2  --  4.0  --  4.1   CHLORIDE  --   --   --   --   --   --  106  --   --   --  101  --  101  --  101   CECY  --   --   --   --   --   --  7.8*  --   --   --  7.8*  --  7.4*  --  7.5*   CO2  --   --   --   --   --   --  22  --   --   --  25  --  25  --  28   BUN  --   --   --   --   --   --  20.7  --   --   --  18.2  --  18.8  --  17.8   CR  --   --   --   --   --   --  0.60  --  0.66  --  0.67  --  0.72  --  0.74   GLC  --  180* 173* 194* 204*   < > 165*   < >  --    < > 226*   < > 224*   < > 191*    < > = values in this interval not displayed.     CBC  Recent Labs   Lab 06/03/25  0612 " 06/02/25  0623 06/01/25  0634 05/31/25  0409   WBC 8.2 11.7* 11.7* 9.9   RBC 2.52* 2.59* 2.80* 2.75*   HGB 6.9* 7.0* 7.6* 8.0*   HCT 21.7* 22.0* 23.6* 23.8*   MCV 86 85 84 87   MCH 27.4 27.0 27.1 29.1   MCHC 31.8 31.8 32.2 33.6   RDW 18.5* 17.5* 17.2* 17.5*   * 479* 524* 563*     Inpatient Diabetes Service will continue to follow, please don't hesitate to contact the team with any questions or concerns.     Keila Kincaid PA-C  Inpatient Diabetes Service  [phone]  Available by [employer]  Date of Service: 6/3/2025      Plan discussed with patient, bedside RN, and primary team via this note.    To contact Inpatient Diabetes Service:     7 AM - 5 PM: Page the [employer] MIKE following the patient that day (see filed or incomplete progress notes/consult notes under Endocrinology)    OR if uncertain of provider assignment: page job code [phone]    5 PM - 7 AM: First call after hours is to primary service.    For urgent after-hours questions, page job code for on call fellow: [phone]     I spent a total of 45 minutes on the date of the encounter doing prep/post-work, chart review, history and exam, documentation and further activities per the note including lab review, multidisciplinary communication, counseling the patient and/or coordinating care regarding acute hyper/hypoglycemic management, as well as discharge management and planning/communication.

## 2025-06-03 NOTE — PROGRESS NOTES
[facility]    Progress Note - Medicine Service, [facility] TEAM 2       Date of Admission:  5/25/2025    Assessment & Plan   Sarah Felix is a 79 year old female with history of insulin-dependent DM2, HTN, fatty liver, possible alcohol use disorder, gait instability who presented 5/25 with generalized weakness and was admitted with DKA and sepsis, 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 Course c/b C diff enteritis; clinically improving with PO vancomycin.      Today's updates:  - Continue PO vancomycin through 6/5/25  - SLP: Advance to regular diet with thin liquids with supplements and TPN  - Continue straight cath, may need costa placed if continuing to retain  - Addiction medicine consulted     Enteritis possibly 2/2 C. Diff  Ileus   Initially presented septic but with negative workup including urine culture, blood cultures, CT C/A/P. Had ongoing worsening encephalopathy and leukocytosis on vanc/zosyn -> unasyn. Developed diarrhea and abdominal pain, stool with C diff PCR + but A toxin negative. C diff B toxin positive, will consult GI for input as to whether enteritis can be associated with this C. Diff presentation. Given clinical worsening, decided to empirically treat - now with improvement. Nausea/vomiting managed with NG decompression. Tube removed 6/1.  - Abx:  - PO vancomycin 125mg daily (5/28-6/5) - 7 days after stoppage of systemic antibiotics  - Unasyn (5/27-5/29)  - Zosyn (5/25-5/27)              - Vancomycin (5/25-5/26)  - GI consult for potential workup of enteritis given uncommon presentation of C. Diff   - C. Diff retested and continues to show positive antigen and B toxin, but negative A toxin   - Outpatient MRI/MRCP to evaluate the pancreatic and hepatic cysts   -Daily CRP, CBC w/ diff, Creat, alb to track infectious course   - PO pantoprazole daily    Acute anemia in the setting of ACD.  Her PTA hemoglobin levels were within normal limits.  Since admission, they have been steadily decreasing. 6/2 Hb was 7.0. Iron panel and B12 were ordered 6/2 and are c/w anemia of chronic disease. Initial concern for bleeding in urine, but repeat UA negative for blood & RBCs. No obvious source of hemorrhage. T bili and haptoglobin normal, pointing away from hemolysis.   - Continue trending Hb daily  - Consider transfusion if Hb <7    Dysphagia  Recurrent vomiting  Concern for aspiration from bedside RN. Feeding tube may have been preventing proper swallowing, so this was removed.  - NGT removed 6/1  - SLP: Regular diet with thin liquids    Urinary retention  Continues to have urinary retention after removing the costa. Will start tamsulosin and continue to monitor.  - Continue straight cath if bladder scan >600cc, may need costa placed if continuing to retain  - Tamsulosin daily    Abnormal UA  Repeat UA on 6/1 showed cloudy urine, proteinuria, hematuria, and leukocyte esterase. She was started on Augmentin given these findings. These UA findings are mostly consistent with her UA from 5/25. This is likely not due to a UTI given that a UTI would have been resolved with her Zosyn and Unasyn abx course, thus Augmentin was discontinued. The hematuria is likely 2/2 repeated catheter insertions. Repeat UA non-concerning for infection or hematuria. Urine culture growing >100,000CFU/mL Raoultella ornithinolytica and Psuedomonas aeruginosa.   - Consider treatment if symptomatic; patient currently asymptomatic    Concern for refeeding syndrome  Decrease in magnesium and phosphorus after beginning TPN, possible refeeding syndrome.  - BMP daily  - RN replacement protocols    Pleural effusions  Pulmonary edema  Anasarca  Seen on 5/28 CT C/A/P. TTE with LVEF 60-65%, small IVC.   - PTA 20mg Lasix daily    Diabetic ketoacidosis, resolved  T2DM with A1c 8.0%. Likely triggered by sepsis. Now off insulin gtt.  - Endocrine consulted, managing glargine + regular insulin in TPN +  "novolog  - Hold PTA metformin, glipizide  - Diabetes ed consult     Hepatic vein thrombus  Essential thrombocythemia (JAK2 positive), high risk disease   No prior imaging to compare. Per chart review, history of \"thromboembolism\" after ovarian cyst removal age 22. Liver panel normal.  - IR consulted; no indication for lytics  - Heme consulted  - Heparin gtt 5/25-5/27  - Lovenox 5/27-*  - At discharge transition to apixaban 10mg BID x 7 days then 5 mg BID  - Follow up with Dr. Wilde 7/29/25  - Continue hydroxyurea  - discontinue PTA ASA      Toxic metabolic encephalopathy  Not oriented to month or year. Likely related to sepsis vs hospital acquired delirium. No acute intracranial findings on CT head.   - Wernicke dose thiamine  - Delirium precautions     Possible alcohol use disorder  Reports 2 drinks/day. Did have an ED visit 03/2025 for alcohol intoxication. Reports last drink 5 days PTA, low concern for withdrawal. Her brother, Cruz, has concerns that she is drinking excessively and encourages addiction medicine involvement.  - Addiction medicine consulted, appreciate recommendations  - PTA folate  - Continue thiamine, Wernicke dosing     Moderate malnutrition in the context of chronic illness   Continued elevated ketones despite insulin ggt. Elevation likely due to poor oral intake due to encephalopathy and starvation ketosis. Will continue TPN until PO intake tolerated.  - Continue TPN   - SLP: regular diet with thin liquids    Sacrum/coccyx moisture wound  Erythematous, blanchable area on her sacrum. Bedside RN says likely from excessive moisture 2/2 fecal incontinence. 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  - WOC following, appreciate recommendations    \"Prominent pericardial contents\"  Cardiology reviewed- this is an epicardial fat pad. No further workup or consult needed.     Generalized weakness, failure to thrive  TSH wnl.   - Ongoing PT/OT consults   - Ongoing evaluation for " safe discharge plan     HTN: Hold PTA metoprolol, lisinopril given sepsis and normotension     Fall at home: CTH, C spine negative for acute process/fracture     Elevated INR: Unclear etiology, no known liver disease. Will hold off on Vit K given new thrombus.    Essential thrombocytosis: Stop PTA aspirin per heme, continue PTA hydroxyurea     Chronic/stable:     Glaucoma: PTA latanoprost  CKD2  Diabetic neuropathy  Schatzki's ring  Slow transit constipation  Mixed urge and stress incontinence  Gait instability          Diet: parenteral nutrition - ADULT compounded formula  parenteral nutrition - ADULT compounded formula  Room Service  Snacks/Supplements Adult: Other; Strawberry Glucerna with bkf, lunch and dinner trays daily; With Meals  Calorie Counts  Regular Diet Adult Thin Liquids (level 0)    DVT Prophylaxis: Enoxaparin (Lovenox) SQ  Silver Catheter: Not present  Fluids: PO  Lines: PRESENT      PICC 05/26/25 Triple Lumen Right Basilic Access. PICC was ready to use, no immediate concern.-Site Assessment: WDL      Cardiac Monitoring: None  Code Status: Full Code         Disposition Plan     Medically Ready for Discharge: Anticipated in 2-4 Days         The patient's care was discussed with the Attending Physician, Dr. Appiah.    Miguel Solano MD  Internal Medicine PGY-1    Medicine Service, [facility] TEAM 2  [facility]  Securely message with [employer] (more info)  Text page via [employer] Paging/Directory   See signed in provider for up to date coverage information  ____________________________________________________________    Interval History   No acute events overnight. The patient reports feeling well this morning. She denies any fevers, chills, chest pain, shortness of breath, abdominal pain, nausea, vomiting, or leg swelling.     Physical Exam   Vital Signs: Temp: 97.6  F (36.4  C) Temp src: Oral BP: 113/52 Pulse: 89   Resp: 18 SpO2: 100 % O2 Device: None (Room  air)    Weight: 146 lbs 4.8 oz    General: Awake, alert, in no acute distress. Sitting up in chair.   HEENT: Mucus membranes moist.  Respiratory: No increased work of breathing, good air exchange, clear to auscultation bilaterally, no crackles or wheezing  Cardiovascular: Regular rate and rhythm and no murmur noted  GI: normal bowel sounds, non-distended,and nontender  Skin: no rashes  Musculoskeletal: 1+ pitting edema in her feet bilaterally  Neurologic: Mental Status Exam:  A&Ox3. Face symmetric. Moving all 4 extremities.    Labs reviewed in the chart.    No new imaging today. Please see chart for prior imaging.

## 2025-06-03 NOTE — PROGRESS NOTES
CLINICAL NUTRITION SERVICES - REASSESSMENT NOTE     RECOMMENDATIONS FOR MDs/PROVIDERS TO ORDER:  None today     Registered Dietitian Interventions:  Glucerna strawberry with bkf trays and lunch trays  Ensure max with dinner trays   Calorie counts to quantify intake, ability to transition off TPN  Room service with assist     Sent pharmacy TPN change order for the following to start tomorrow 6/4  Dosing wt: 64.4 kg   Volume: Decrease to 1200 ml daily, start 18 hour cycle regimen  Dextrose: Decrease to 150 grams per day ( need to notify endocrine)  AA: Keep the same: 95 gram (1.5 gram/kg)  Lipids: Change to SMOF, 250 ml x 6 days per week (M-Sat)    Regimen to provide: 1319 kcal/day (20 kcal/kg), 95 gram protein/day (1.5 gram/kg), 150 gram carb per day (GIR: 2.42 mg/kg/min peak infusion) with 32.5% fat calories from SMOF     Future/Additional Recommendations:  Pending ability to improve po       INFORMATION OBTAINED  Assessed patient in room.    Chart reviewed:   History of insulin-dependent DM2, HTN, fatty liver, possible alcohol use disorder, gait instability    Presented 5/25/25 with generalized weakness and was admitted with DKA and sepsis, 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       CURRENT NUTRITION ORDERS  Diet: Level 6: Soft & Bite-Sized Dysphagia Diet with thin liquids  -> passes swallow eval and now on solid diet since 6/2      Nutrition Support: On TPN since 5/28   Dosing wt: 64.4 kg   PN volume: Volume: 2400 mL, 100 ml/hr, continuous rate ( previously with high NGT output and PN fluids were matched with IVF at that time)    Dextrose: 240 gram (insulin with PN bag, 32 units)   AA: 95 gram (1.5 gram/kg)  Lipids: Clinolipids, 250 ml x 6 days per week (M-Sat)    Goal PN provides 240 g dextrose, 95 g AA, and 250 mL 20% lipids 6 days per week for total provision of 1624 Kcals (25 Kcals/kg), 1.5 g/kg protein, GIR 2.6 mg/kg/minute, and 26% fat kcals on average daily.        CURRENT INTAKE/TOLERANCE  Visited  with patient today. Patient reports tolerating full liquid diet. Reports not having any appetite. Not feeling hungry. Patient wanting to have solid meal trays. Received a full liquid tray.     Noted patient passed swallow eval on  and now on dysphagia diet. Patient in agreement with room service with assist to improve meal order abilities, also in agreement with a trial of Glucerna strawberry flavor with meals + ensure max to maximize intake and ability to transition off TPN support.       NEW FINDINGS  GI symptoms:   Having frequent daily loose stool 3-8 daily over the past week  C.diff +  NG tube removed, No output       Skin/wounds:   WOC RN note from 6/3 reviewed:  Gluteal cleft and buttocks: Wound due to: Incontinence Associated Dermatitis (IAD) . STATUS: improving   WOC RN singed off       Nutrition-relevant labs:   Lytes normal range  BUN: 20.7, Cr: 0.60, GFR: >90  LFT's: ALP:55, ALT:16, AST:22, total bili: 0.3  TGs: N/A  CRP: trending down, now 12.70  B ( endo following)      Nutrition-relevant medications:   Folic acid  Insulin with meals +glargine 6 units every 24 hours at noon  Pyridoxine 25 mg daily  B1 100 mg daily      Weight:   Admit wt: 64.4 kg (142 lb) on   Most recent wt: 66.4 kg (146 lb 4.8 oz) on 6/3 standing wt     70.6 kg (155 lb 11.2 oz) on 25  8.8% wt loss over the past 4 month      MALNUTRITION  % Intake: Decreased intake does not meet criteria, On TPN  % Weight Loss: > 7.5% in 3 months (severe)   Subcutaneous Fat Loss: None observed  Muscle Loss: Temples (temporalis muscle): Moderate and Clavicles (pectoralis and deltoids): Moderate  Fluid Accumulation/Edema: Moderate to severe, 2-4+  Malnutrition Diagnosis: Severe malnutrition in the context of acute illness or injury  Malnutrition Present on Admission: Yes        EVALUATION OF THE PROGRESS TOWARD GOALS   Previous Goals  Total avg nutritional intake to meet a minimum of 25 kcal/kg and 1.2-1.5 g PRO/kg daily (per dosing wt  64.4 kg).   Evaluation: Progressing      Previous Nutrition Diagnosis  Inadequate oral intake related to N/V as evidenced by NG for decompression, TPN initiation   Evaluation: Improving    NUTRITION DIAGNOSIS  Inadequate oral intake related to lack of appetite as evidenced by dysphagia diet and continues on TPN  support     INTERVENTIONS  Parenteral nutrition/IV fluid management    GOALS  Total avg nutritional intake to meet a minimum of 25 kcal/kg and 1.2 g PRO/kg daily (per dosing wt 64.4 kg).     MONITORING/EVALUATION  Progress toward goals will be monitored and evaluated per policy.    Brittany Walker RD/LD  Unit 7C ([id number]) and ([id number]),  Monday-Friday: Vocera -> (7C clinical Dietitian),   Weekend/Holiday: Vocera -> (Weekend Clinical Dietitian)

## 2025-06-03 NOTE — PLAN OF CARE
Shift Hours: 0700 - 1900     Assessment:  Body systems assessments were at patient's baseline.        Activity              Fall Risk Score: 75              Bed alarm on? Yes     Activity Assistance Provided: assistance, 2 people. Up with A2 walker and gaitbelt         Pain: denies pain    Labs/RN Managed Protocols: mag, potassium and phos protocol. phos per order with redraw tomorrow a.m.    Lines/Drains: PICC right arm infusing TPN    Nutrition: advanced to regular diet per speech, fair po intake with 1:1 feed, room service ordered to send trays automatically     Goal Outcome Evaluation   Pt is alert, orientated to self and place, intermittently aware of situation and date, 's and no need for straight cath today, BSC to void and incontinent at times, 1 loose continent bm today on BSC     Barriers to Discharge:

## 2025-06-03 NOTE — PLAN OF CARE
Shift Hours: 1900 - 0700    Assessment:  Body systems assessments were at patient's baseline.        Activity     Fall Risk Score: 75   Bed alarm on? Yes     Activity Assistance Provided: assistance, 2 people      Assistive Device Utilized: lift device    Pain: denies    Labs/RN Managed Protocols: xxx    Lines/Drains: PICC Right with TPN continuous rate of 100/hr and lipid.    Nutrition:Soft bite size thin liquid     Goal Outcome Evaluation  Pt alert and oriented x4 forgetful, denies any pain . Pt was able to void at 10:30 with 150 output bladder scan done 327. No complains of pain in the area . Bladder scan was done again at 1 am 380 . Pt voided 350 after. BM x3 with watery stool . Pt on1 :1 feed .  Blood sugar taken q4  . Assist of 1-2 with walker to commode. Continue plan of care    Barriers to Discharge:   Continue antibiotics

## 2025-06-03 NOTE — PROGRESS NOTES
[facility]  WO Nurse Inpatient Assessment     Consulted for: sacrum    WO nurse follow-up plan: signing off    Patient History (according to Medicine provider note(s) 5/27/25:      Sarah Felix is a 79 year old female with history of  insulin-dependent DM2, HTN, fatty liver, possible alcohol use disorder, gait instability who presented 5/25 with generalized weakness and was admitted with DKA and sepsis, 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        Assessment:      Areas visualized during today's visit: Sacrum/coccyx    Wound location: gluteal cleft and buttocks    Last photo: 5/27/25  Wound due to: Incontinence Associated Dermatitis (IAD)  Wound history/plan of care: Present on admission. Erythema is now faint pink, blanchable. No denudement.         Palpation of the wound bed: normal      Drainage: none     Description of drainage: serosanguinous and bloody       Periwound skin: Intact, Ecchymosis, and Erythema- blanchable      Color: red      Temperature: normal   Odor: none  Pain: mild and during dressing change, tender  Pain interventions prior to dressing change: slow and gentle cares   Treatment goal: Protection  STATUS: improving  Supplies at bedside: discussed with patient        Treatment Plan:     Gluteal cleft wound(s): BID and PRN with incontinence episodes  Cleanse the area with Dinorah cleanse and protect, very gently with soft cloth.  Apply thin layer of Barrier paste (ex: Critic aid) on open and red areas  With repeat application, do not scrub the paste, only remove soiled paste and reapply.  If complete removal of paste is necessary use baby oil/mineral oil (#[id number]) and soft wash cloth.  Ensure pt has chair cushion (#[id number]) while sitting up in the chair.  Use only one blue pad in between mattress and pt. No brief in bed.     Orders: Written    RECOMMEND PRIMARY TEAM ORDER: None, at this time  Education provided: plan of care and wound  progress  Discussed plan of care with: Patient and Nurse  Notify [facility] if wound(s) deteriorate.  Nursing to notify the Provider(s) and re-consult the [facility] Nurse if new skin concern.    DATA:     Current support surface: Standard  Standard gel mattress (Isoflex)  Containment of urine/stool: Incontinent pad in bed  BMI: Body mass index is 25.11 kg/m .   Active diet order: Orders Placed This Encounter      Soft & Bite Sized Diet (level 6) Thin Liquids (level 0)     Output: I/O last 3 completed shifts:  In: 10 [I.V.:10]  Out: 1800 [Urine:1800]     Labs:   Recent Labs   Lab 06/03/25  0853 06/03/25  0612 06/02/25  0623   ALBUMIN  --  2.4* 2.3*   PREALB  --   --  8.0*   HGB 7.5* 6.9* 7.0*   INR  --   --  1.07   WBC  --  8.2 11.7*     Pressure injury risk assessment:   Sensory Perception: 3-->slightly limited  Moisture: 2-->very moist  Activity: 2-->chairfast  Mobility: 2-->very limited  Nutrition: 3-->adequate  Friction and Shear: 1-->problem  Kingsley Score: 13      Pager no longer is use, please contact through [employer]ann[employer] group: [facility] Nurse Gwynn  Dept. Office Number: *[phone]

## 2025-06-03 NOTE — CONSULTS
[facility]   Consult Note - Addiction Service     Date of Admission:  5/25/2025    Consult Requested by: Dayan Falk MD   Reason for Consult: concern for alcohol use disorder    Assessment & Plan   Sarah Felix is a 79 year old female with history of  insulin-dependent DM2, HTN, fatty liver, and gait instability who presented after falling off her chair at home, found to be in DKA with sepsis, and also found to have a hepatic thrombus. Addiction medicine was consulted due to concern for alcohol use disorder.    # Alcohol Use  # Hx of alcohol intoxication episode 3/2025  States that she drinks 4 ounces every 8 nights, however, per Internal Medicine note, stated she drinks 2 drinks/daily.  Does not find that she has cravings for alcohol, denies any withdrawal, denies any tolerance (states has used the same amount of alcohol, with maybe a brief period of drinking too much in 3/2025 w/o clear precipitant event), and no progressive neglect of other activities. She has persisted to use alcohol despite a negative consequence (E.D. presentation in 3/2025), however, denies alcohol being involved in her current presentation and persistent consequences otherwise.  -Unclear whether she actually meets criteria for an alcohol use disorder, but did discuss medication options to manage cravings, including acamprosate and naltrexone and discussed services we can provide, should she ever be interested.   -Should be out of withdrawal window at this time.     I spent 120 minutes on the unit/floor managing the care of Sarah Felix. Over 50% of my time was spent on the following:   Significant education and counseling spent on: substance use disorders, the pharmacology of medical treatments including acamprosate and naltrexone were discussed today.    Shay Ledezma MD  [facility]   Contact information available via [facility]  Paging/Directory  Please see sign in/sign out for up to date coverage information    ChAT team (Addiction Consult Team): Coverage Monday-Friday 8-4pm    Provider (Pager)  (Pager)   Chantel Coburn [phone]   Tues Dr. Arley Coburn [phone]   Wed Dr. Arley Coburn [phone] None   Thurs Dr. David Garcia [phone]   Fri Dr. Robbin Phillips [phone]   [facility] Psych Team- Refer to [facility].  For urgent needs, please place a  consult for psychiatry. None     ______________________________________________________________________    Chief Complaint   Alcohol use    History is obtained from the patient    History of Present Illness     States that she drinks 4 ounces approximately every 8 days.  Does not feel like she has a problem with alcohol.  Felt like she drink too much in March 2025 when she presented to the emergency department.  She feels like assumptions were made about her and her drinking at that time and that this may have also influenced her brother's perspective on her drinking.  She does feel like she is unsteady on her feet and she has neuropathy, so it may appear that she is drunk when she is not.    She drinks only in the evening time.  She does not find herself craving alcohol.  She has never gone through withdrawal.  She has never have an alcohol associated seizure.  She has never had alcohol associated tremors.    She feels frustrated that people know about her drinking and that she feels like they never should have been told.    She denies any other substance use, including heroin, cocaine, fentanyl    She lives independently at home.  She does have family nearby.        Past Medical History:   Diagnosis Date    Adenomatous polyp of duodenum 08/13/2018    Removed via EUS 8/13/2018 with f/u EGD 3/14/2019.  No further surveillance needed per GI.    Allergic rhinitis, cause unspecified     Allergic rhinitis    Basal cell carcinoma of face 03/2012    s/p MOHS     CKD (chronic kidney disease) stage 2, GFR 60-89 ml/min 06/06/2014    Closed bimalleolar fracture of left ankle with routine healing 10/11/2019    Added automatically from request for surgery [id number]    Contact dermatitis and other eczema, due to unspecified cause     Cyst of thyroid 1998    Thyroid Nodule/Hurthle Cell Neoplasm/Benign - resected    Cystocele, lateral     Diverticulitis of colon (without mention of hemorrhage)(562.11) 06/2004    Abd CT    Esophageal reflux     Hiatal hernia    Essential hypertension, benign (HTN) 06/06/2014    Fatty liver     Hepatic cyst     8.0 cm x 6.6 cm on CT 3/2023 (stable)    Hiatal hernia     small    Hyperlipidemia     Mixed incontinence urge and stress (male)(female) 06/24/2007    Nontoxic uninodular goiter     Osteopenia 04/21/2005    on fosamax  for > 5 yr.  stop 7/2012    Other chronic otitis externa     Postoperative deep vein thrombosis (DVT) (H) age 22    post ovarian cyst removed    Schatzki's ring of distal esophagus 03/05/2023    Dilated 8/2018    Tubular adenoma of colon 06/23/2022    On colonoscopy 2016, rpt 5 yrs.    Type 2 diabetes mellitus (H)        Past Surgical History:   Procedure Laterality Date    APPENDECTOMY  age 22    APPLY EXTERNAL FIXATOR LOWER EXTREMITY Left 10/12/2019    Procedure: Left ankle external fixator placement;  Surgeon: Leeanne Brown MD;  Location: [facility] OR    CATARACT IOL, RT/LT Right 04/17/2018    [facility]    COLONOSCOPY  04/2006    repeat 10 yr    ENDOSCOPIC ULTRASOUND UPPER GASTROINTESTINAL TRACT (GI) N/A 09/06/2018    Procedure: ENDOSCOPIC ULTRASOUND, ESOPHAGOSCOPY / UPPER GASTROINTESTINAL TRACT (GI);  Endoscopic Ultrasound, Esophagogastroduodenoscopy with endoscopic mucosal resection;  Surgeon: Hood Brower MD;  [facility]    ESOPHAGOSCOPY, GASTROSCOPY, DUODENOSCOPY (EGD), COMBINED N/A 08/13/2018    Procedure: COMBINED ESOPHAGOSCOPY, GASTROSCOPY, DUODENOSCOPY (EGD), BIOPSY SINGLE OR MULTIPLE;  EGD;   Surgeon: Hood Brower MD;  [facility]    ESOPHAGOSCOPY, GASTROSCOPY, DUODENOSCOPY (EGD), COMBINED N/A 03/14/2019    Procedure: Upper Endoscopy;  Surgeon: Hood Brower MD;  [facility]    ESOPHAGOSCOPY, GASTROSCOPY, DUODENOSCOPY (EGD), RESECT MUCOSA, COMBINED N/A 09/06/2018    Procedure: COMBINED ESOPHAGOSCOPY, GASTROSCOPY, DUODENOSCOPY (EGD), RESECT MUCOSA;;  Surgeon: Hood Brower MD;  [facility]    GYN SURGERY  10/22/2008    pelvic support    HEMORRHOIDECTOMY  08/2008    MOHS MICROGRAPHIC PROCEDURE      OPEN REDUCTION INTERNAL FIXATION ANKLE Left 10/28/2019    Procedure: Internal fixation left bimalleolar ankle fracture, removal external fixator;  Surgeon: Jose Roberto Can MD;  [facility]    ORIF ANKLE FRACTURE BIMALLEOLAR Left 10/28/2019    Internal fixation left bimalleolar ankle fracture, removal external fixator    OTHER SURGICAL HISTORY Left 10/12/2019    APPLY EXTERNAL FIXATOR LOWER EXTREMITYProcedure: Left ankle external fixator placement; Surgeon: Leeanne Brown MD; [facility]      OVARIAN CYST REMOVAL Left age 22    L ovarian cyst removal, laparotomy      PHACOEMULSIFICATION CLEAR CORNEA WITH STANDARD INTRAOCULAR LENS IMPLANT Left 09/25/2020    Procedure: LEFT CATARACT REMOVAL WITH INTRAOCULAR LENS IMPLANT;  Surgeon: Janay Amezcua MD;  [facility]    PICC TRIPLE LUMEN PLACEMENT Right 05/26/2025    Basilic, 39 cm, 4 cm external length    SURGICAL HISTORY OF -   10/2008    Transobturator tape for Urinary Incontinence    THYROIDECTOMY, PARTIAL  01/01/1998       Social History   Social History     Socioeconomic History    Marital status: Single     Spouse name: Not on file    Number of children: 0    Years of education: Not on file    Highest education level: Not on file   Occupational History    Occupation: RN     Employer: [employer]     Comment: peds   Tobacco Use    Smoking status: Former     Current packs/day: 0.00     Types:  Cigarettes     Quit date: 1980     Years since quittin.4     Passive exposure: Past    Smokeless tobacco: Never    Tobacco comments:     smoked from 70-80; smoked about 1ppd   Vaping Use    Vaping status: Never Used   Substance and Sexual Activity    Alcohol use: Yes     Alcohol/week: 0.0 standard drinks of alcohol     Comment: 6-7 drinks per week    Drug use: No    Sexual activity: Yes     Partners: Male     Comment: postmenopausal   Other Topics Concern    Parent/sibling w/ CABG, MI or angioplasty before 65F 55M? No   Social History Narrative    Dairy/d 2 servings/d. Not much milk mostly cheese    Caffeine 5 servings/d    Exercise active in yard work     Sunscreen used - Yes when in sun    Seatbelts used - Yes    Working smoke/CO detectors in the home - Yes    Guns stored in the home - No    Self Breast Exams - Yes    Self Testicular Exam - NA    Eye Exam up to date - Yes needs to see opthamologist for diabetes    Dental Exam up to date - Yes    Pap Smear up to date -Yes     Mammogram up to date - Yes 3/10    PSA up to date - NA    Dexa Scan up to date - 07    Flex Sig / Colonoscopy up to date -   yes RTC 10 yrs in epic    Immunizations up to date - Yes Td     Abuse: Current or Past(Physical, Sexual or Emotional)- No    Do you feel safe in your environment - Yes    8/4/10    Liya Ashraf RN                         Social Drivers of Health     Financial Resource Strain: Unknown (2025)    Financial Resource Strain     Within the past 12 months, have you or your family members you live with been unable to get utilities (heat, electricity) when it was really needed?: Patient unable to answer   Food Insecurity: Unknown (2025)    Food Insecurity     Within the past 12 months, did you worry that your food would run out before you got money to buy more?: Patient unable to answer     Within the past 12 months, did the food you bought just not last and you didn t have money to get  more?: Patient unable to answer   Transportation Needs: Unknown (5/28/2025)    Transportation Needs     Within the past 12 months, has lack of transportation kept you from medical appointments, getting your medicines, non-medical meetings or appointments, work, or from getting things that you need?: Patient unable to answer   Physical Activity: Not on file   Stress: Not on file   Social Connections: Not on file   Interpersonal Safety: Unknown (5/28/2025)    Interpersonal Safety     Do you feel physically and emotionally safe where you currently live?: Patient unable to answer     Within the past 12 months, have you been hit, slapped, kicked or otherwise physically hurt by someone?: Patient unable to answer     Within the past 12 months, have you been humiliated or emotionally abused in other ways by your partner or ex-partner?: Patient unable to answer   Housing Stability: Unknown (5/28/2025)    Housing Stability     Do you have housing? : Patient unable to answer     Are you worried about losing your housing?: Patient unable to answer       Family History   I have reviewed this patient's family history and updated it with pertinent information if needed.  Family History   Problem Relation Age of Onset    Diabetes Mother     Hypertension Mother     Arthritis Mother         rheumatoid    Cancer Father     Cardiovascular Brother         palpitations not on meds.    Glaucoma No family hx of     Macular Degeneration No family hx of     Amblyopia No family hx of     Rheumatoid Arthritis Mother     Other - See Comments Brother         palpitations         Medications   Medications Prior to Admission   Medication Sig Dispense Refill Last Dose/Taking    aspirin 81 MG EC tablet Take 81 mg by mouth daily   Past Week    atorvastatin (LIPITOR) 40 MG tablet Take 1 tablet (40 mg) by mouth daily 90 tablet 1 5/24/2025    blood glucose (ONETOUCH ULTRA) test strip 1 strip by In Vitro route 4 times daily. 400 strip 3 Taking    CALCIUM  500 + D 500-200 MG-IU OR TABS one tab twice per day 100 0 5/24/2025    Cholecalciferol (VITAMIN D PO) Pt consuming 3000 units per day   5/24/2025    Continuous Glucose Sensor (FREESTYLE CARMELA 14 DAY SENSOR) MISC Change every 14 days. 6 each 3 Taking    folic acid (FOLVITE) 400 MCG tablet Take 1 tablet (400 mcg) by mouth daily 100 tablet 3 Unknown    furosemide (LASIX) 20 MG tablet Take 1 tablet (20 mg) by mouth 2 times daily (Patient taking differently: Take 20 mg by mouth daily.) 180 tablet 1 5/24/2025    glimepiride (AMARYL) 2 MG tablet Take 1 tablet (2 mg) by mouth every morning (before breakfast) (Patient taking differently: Take 1 mg by mouth every morning (before breakfast).) 90 tablet 1 5/24/2025    hydroxyurea (HYDREA) 500 MG capsule Take 1 capsule (500 mg) by mouth daily 90 capsule 3 5/24/2025    insulin aspart (NOVOLOG PEN) 100 UNIT/ML pen 5 units with a high carb meal about once daily as needed. 45 mL 11 5/22/2025    insulin pen needle (BD REY U/F) 32G X 4 MM miscellaneous USE 1 PEN NEEDLE four times daily. (WITH LANTUS AND Novolog) 400 each 11 5/22/2025    Lancets (ONETOUCH DELICA PLUS [id number]) MISC 1 each 4 times daily 400 each 3 Taking    latanoprost (XALATAN) 0.005 % ophthalmic solution Place 1 drop into both eyes at bedtime. 7.5 mL 5 5/24/2025 Bedtime    lisinopril (ZESTRIL) 2.5 MG tablet Take 1 tablet (2.5 mg) by mouth daily 90 tablet 3 5/24/2025    metFORMIN (GLUCOPHAGE XR) 500 MG 24 hr tablet Take 1 tablet (500 mg) by mouth daily (with dinner)   5/24/2025    metoprolol tartrate (LOPRESSOR) 25 MG tablet Take 1/2 tablet or 12.5mg by mouth twice daily. 90 tablet 3 5/24/2025    potassium chloride ER (MICRO-K) 10 MEQ CR capsule Take 1 capsule (10 mEq) by mouth daily 90 capsule 1 5/24/2025    pyridOXINE (VITAMIN B6) 25 MG tablet Take 1 tablet (25 mg) by mouth daily 90 tablet 3 5/24/2025    vitamin B-12 (CYANOCOBALAMIN) 2500 MCG sublingual tablet Take 2500 mcg by mouth three times per week   5/24/2025  "   insulin syringe-needle U-100 (BD INSULIN SYRINGE ULTRAFINE) 31G X 5/16\" 1 ML miscellaneous Use 1 syringes twice daily for insulin and victoza and for symptoms of hypoglycemia 100 each 3     nystatin (MYCOSTATIN) 256858 UNIT/GM external cream Apply topically 2 times daily (Patient taking differently: Apply topically daily as needed.) 30 g 3     triamcinolone (KENALOG) 0.1 % external cream Apply topically 3 times daily 90 g 3      Current Facility-Administered Medications   Medication Dose Route Frequency Provider Last Rate Last Admin    acetaminophen (TYLENOL) tablet 325 mg  325 mg Oral Q4H PRN Johnny Boyd MD        atorvastatin (LIPITOR) tablet 40 mg  40 mg Oral or Feeding Tube Daily July Bojorquez MD   40 mg at 06/03/25 0828    calcium carbonate (TUMS) chewable tablet 1,000 mg  1,000 mg Oral 4x Daily PRN July Bojorquez MD        dextrose 10% infusion   Intravenous Continuous PRN Johnny Boyd MD        dextrose 10% infusion   Intravenous Continuous PRN Awilda Dupree APRN CNP        glucose gel 15-30 g  15-30 g Oral Q15 Min PRN Clemente Ron MD        Or    dextrose 50 % injection 25-50 mL  25-50 mL Intravenous Q15 Min PRN Clemente Ron MD        Or    glucagon injection 1 mg  1 mg Subcutaneous Q15 Min PRN Clemente Ron MD        enoxaparin ANTICOAGULANT (LOVENOX) injection 50 mg  50 mg Subcutaneous BID Ivan Knight MD   50 mg at 06/03/25 0825    folic acid (FOLVITE) tablet 400 mcg  400 mcg Oral or Feeding Tube Daily July Bojorquez MD   400 mcg at 06/03/25 0828    furosemide (LASIX) tablet 20 mg  20 mg Oral BID Johnny Boyd MD   20 mg at 06/03/25 0828    guaiFENesin-dextromethorphan (ROBITUSSIN DM) 100-10 MG/5ML syrup 10 mL  10 mL Oral Q4H PRN Nolberto Lord MD   10 mL at 06/02/25 0211    hydroxyurea (HYDREA/DROXIA) suspension 500 mg  500 mg Oral or Feeding Tube Daily July Bojorquez MD   500 mg at 06/03/25 0827    insulin aspart (NovoLOG) injection (RAPID ACTING)   Subcutaneous TID " w/meals Clair Chung PA-C        insulin aspart (NovoLOG) injection (RAPID ACTING)  1-12 Units Subcutaneous Q4H Clair Chung PA-C   1 Units at 06/03/25 0826    insulin aspart (NovoLOG) injection (RAPID ACTING)   Subcutaneous With Snacks or Supplements Clair Chung PA-C        insulin glargine (LANTUS PEN) injection 6 Units  6 Units Subcutaneous Q24H Clair Chung PA-C   6 Units at 06/02/25 1152    latanoprost (XALATAN) 0.005 % ophthalmic solution 1 drop  1 drop Both Eyes At Bedtime July Bojorquez MD   1 drop at 06/02/25 2132    lidocaine (LMX4) cream   Topical Q1H PRN July Bojorquez MD        lidocaine 1 % 0.1-1 mL  0.1-1 mL Other Q1H PRN July Bojorquez MD        lipids plant base (CLINOLIPID) 20 % infusion 250 mL  250 mL Intravenous Once per day on Monday Tuesday Wednesday Thursday Friday Saturday Ivan Knight MD 20.8 mL/hr at 06/02/25 2002 250 mL at 06/02/25 2002    [Held by provider] lisinopril (ZESTRIL) tablet 2.5 mg  2.5 mg Oral Daily July Bojorquez MD        melatonin liquid 3 mg  3 mg Per Feeding Tube QPM July Bojorquez MD   3 mg at 06/02/25 1956    [Held by provider] metoprolol tartrate (LOPRESSOR) half-tab 12.5 mg  12.5 mg Oral BID July Bojorquez MD        ondansetron (ZOFRAN) injection 4 mg  4 mg Intravenous Q6H PRN July Bojorquez MD        pantoprazole (PROTONIX) EC tablet 40 mg  40 mg Oral QAM AC Johnny Boyd MD   40 mg at 06/03/25 0828    parenteral nutrition - ADULT compounded formula   CENTRAL LINE IV TPN CONTINUOUS Ivan Knight MD        parenteral nutrition - ADULT compounded formula   CENTRAL LINE IV TPN CONTINUOUS Ivan Knight  mL/hr at 06/02/25 2002 New Bag at 06/02/25 2002    Patient is already receiving anticoagulation with heparin, enoxaparin (LOVENOX), warfarin (COUMADIN)  or other anticoagulant medication   Does not apply Continuous PRN Johnny Boyd MD        potassium phosphate 9 mmol in 250 mL NS intermittent infusion  9 mmol Intravenous Once Lorin Costello MD    9 mmol at 06/03/25 1107    prochlorperazine (COMPAZINE) injection 5 mg  5 mg Intravenous Q4H PRN Johnny Boyd MD   5 mg at 05/28/25 0006    pyridOXINE (VITAMIN B6) tablet 25 mg  25 mg Oral or Feeding Tube Daily July Bojorquez MD   25 mg at 06/03/25 0828    senna-docusate (SENOKOT-S/PERICOLACE) 8.6-50 MG per tablet 1 tablet  1 tablet Oral BID PRN July Bojorquez MD        Or    senna-docusate (SENOKOT-S/PERICOLACE) 8.6-50 MG per tablet 2 tablet  2 tablet Oral BID PRN July Bojorquez MD        sodium chloride (PF) 0.9% PF flush 3 mL  3 mL Intracatheter Q8H YOGI July Bojorquez MD   3 mL at 06/03/25 0006    sodium chloride (PF) 0.9% PF flush 3 mL  3 mL Intracatheter q1 min prn July Bojorquez MD   10 mL at 06/03/25 0848    tamsulosin (FLOMAX) capsule 0.4 mg  0.4 mg Oral Daily Johnny Boyd MD   0.4 mg at 06/03/25 0828    [START ON 6/4/2025] thiamine (B-1) tablet 100 mg  100 mg Oral Daily Johnny Boyd MD        [START ON 6/4/2025] vancomycin (FIRVANQ) oral solution 125 mg  125 mg Per Feeding Tube Daily Miguel Solano MD           Allergies   No Known Allergies    Physical Exam   Temp: 97.6  F (36.4  C) Temp src: Oral BP: 134/69 Pulse: 89   Resp: 17 SpO2: 100 % O2 Device: None (Room air)      Gen: no acute distress, well nourished, well developed  HEENT: NC/AT, MMM, EOMI, ptosis  CV: Extremities WWP, pulses assumed   Resp: breathing comfortably on RA  Abd: non-distended  Skin: No erythema, no lesions or rashes.   Neuro: No focal neurologic deficit  Psych: Flat affect.    Due to regulation of Title 42 of the Code of Federal Regulations (CFR) Part 2: Confidentiality laws apply to this note and the information wherein.  Thus, this note cannot be copy and pasted into any other health care staff's note nor can it be included in general medical records sent to ANY outside agency without the patient's written consent.

## 2025-06-04 ENCOUNTER — APPOINTMENT (OUTPATIENT)
Dept: SPEECH THERAPY | Facility: CLINIC | Age: 80
DRG: 871 | End: 2025-06-04
Payer: COMMERCIAL

## 2025-06-04 LAB
ANION GAP SERPL CALCULATED.3IONS-SCNC: 6 MMOL/L (ref 7–15)
BUN SERPL-MCNC: 18.9 MG/DL (ref 8–23)
CALCIUM SERPL-MCNC: 7.8 MG/DL (ref 8.8–10.4)
CHLORIDE SERPL-SCNC: 107 MMOL/L (ref 98–107)
CREAT SERPL-MCNC: 0.59 MG/DL (ref 0.51–0.95)
EGFRCR SERPLBLD CKD-EPI 2021: >90 ML/MIN/1.73M2
ERYTHROCYTE [DISTWIDTH] IN BLOOD BY AUTOMATED COUNT: 19.8 % (ref 10–15)
GLUCOSE BLDC GLUCOMTR-MCNC: 150 MG/DL (ref 70–99)
GLUCOSE BLDC GLUCOMTR-MCNC: 169 MG/DL (ref 70–99)
GLUCOSE BLDC GLUCOMTR-MCNC: 176 MG/DL (ref 70–99)
GLUCOSE BLDC GLUCOMTR-MCNC: 188 MG/DL (ref 70–99)
GLUCOSE BLDC GLUCOMTR-MCNC: 190 MG/DL (ref 70–99)
GLUCOSE BLDC GLUCOMTR-MCNC: 209 MG/DL (ref 70–99)
GLUCOSE SERPL-MCNC: 159 MG/DL (ref 70–99)
HCO3 SERPL-SCNC: 23 MMOL/L (ref 22–29)
HCT VFR BLD AUTO: 21.7 % (ref 35–47)
HGB BLD-MCNC: 6.9 G/DL (ref 11.7–15.7)
HGB BLD-MCNC: 8.6 G/DL (ref 11.7–15.7)
MAGNESIUM SERPL-MCNC: 2.7 MG/DL (ref 1.7–2.3)
MCH RBC QN AUTO: 27.6 PG (ref 26.5–33)
MCHC RBC AUTO-ENTMCNC: 31.8 G/DL (ref 31.5–36.5)
MCV RBC AUTO: 87 FL (ref 78–100)
MCV RBC AUTO: 88 FL (ref 78–100)
PHOSPHATE SERPL-MCNC: 2.9 MG/DL (ref 2.5–4.5)
PLATELET # BLD AUTO: 445 10E3/UL (ref 150–450)
POTASSIUM SERPL-SCNC: 4.4 MMOL/L (ref 3.4–5.3)
RBC # BLD AUTO: 2.5 10E6/UL (ref 3.8–5.2)
SODIUM SERPL-SCNC: 136 MMOL/L (ref 135–145)
WBC # BLD AUTO: 8.2 10E3/UL (ref 4–11)

## 2025-06-04 PROCEDURE — 99232 SBSQ HOSP IP/OBS MODERATE 35: CPT | Performed by: STUDENT IN AN ORGANIZED HEALTH CARE EDUCATION/TRAINING PROGRAM

## 2025-06-04 PROCEDURE — 250N000013 HC RX MED GY IP 250 OP 250 PS 637

## 2025-06-04 PROCEDURE — 250N000013 HC RX MED GY IP 250 OP 250 PS 637: Performed by: STUDENT IN AN ORGANIZED HEALTH CARE EDUCATION/TRAINING PROGRAM

## 2025-06-04 PROCEDURE — 250N000009 HC RX 250: Performed by: STUDENT IN AN ORGANIZED HEALTH CARE EDUCATION/TRAINING PROGRAM

## 2025-06-04 PROCEDURE — 250N000011 HC RX IP 250 OP 636

## 2025-06-04 PROCEDURE — 120N000002 HC R&B MED SURG/OB UMMC

## 2025-06-04 PROCEDURE — 80048 BASIC METABOLIC PNL TOTAL CA: CPT | Performed by: INTERNAL MEDICINE

## 2025-06-04 PROCEDURE — 250N000011 HC RX IP 250 OP 636: Performed by: INTERNAL MEDICINE

## 2025-06-04 PROCEDURE — 85018 HEMOGLOBIN: CPT

## 2025-06-04 PROCEDURE — 83735 ASSAY OF MAGNESIUM: CPT

## 2025-06-04 PROCEDURE — 84100 ASSAY OF PHOSPHORUS: CPT

## 2025-06-04 PROCEDURE — 85027 COMPLETE CBC AUTOMATED: CPT

## 2025-06-04 PROCEDURE — B4185 PARENTERAL SOL 10 GM LIPIDS: HCPCS | Performed by: STUDENT IN AN ORGANIZED HEALTH CARE EDUCATION/TRAINING PROGRAM

## 2025-06-04 PROCEDURE — 92526 ORAL FUNCTION THERAPY: CPT | Mod: GN

## 2025-06-04 PROCEDURE — 82310 ASSAY OF CALCIUM: CPT | Performed by: INTERNAL MEDICINE

## 2025-06-04 RX ORDER — CALCIUM GLUCONATE 20 MG/ML
1 INJECTION, SOLUTION INTRAVENOUS ONCE
Status: COMPLETED | OUTPATIENT
Start: 2025-06-04 | End: 2025-06-04

## 2025-06-04 RX ADMIN — VANCOMYCIN HYDROCHLORIDE 125 MG: KIT at 08:48

## 2025-06-04 RX ADMIN — TAMSULOSIN HYDROCHLORIDE 0.4 MG: 0.4 CAPSULE ORAL at 08:48

## 2025-06-04 RX ADMIN — CALCIUM GLUCONATE 1 G: 20 INJECTION, SOLUTION INTRAVENOUS at 09:04

## 2025-06-04 RX ADMIN — INSULIN GLARGINE 6 UNITS: 100 INJECTION, SOLUTION SUBCUTANEOUS at 12:08

## 2025-06-04 RX ADMIN — PANTOPRAZOLE SODIUM 40 MG: 40 TABLET, DELAYED RELEASE ORAL at 08:47

## 2025-06-04 RX ADMIN — FUROSEMIDE 20 MG: 20 TABLET ORAL at 15:57

## 2025-06-04 RX ADMIN — INSULIN ASPART 3 UNITS: 100 INJECTION, SOLUTION INTRAVENOUS; SUBCUTANEOUS at 15:57

## 2025-06-04 RX ADMIN — INSULIN ASPART 3 UNITS: 100 INJECTION, SOLUTION INTRAVENOUS; SUBCUTANEOUS at 00:00

## 2025-06-04 RX ADMIN — Medication 3 MG: at 20:58

## 2025-06-04 RX ADMIN — ENOXAPARIN SODIUM 50 MG: 60 INJECTION SUBCUTANEOUS at 08:56

## 2025-06-04 RX ADMIN — SMOFLIPID 250 ML: 6; 6; 5; 3 INJECTION, EMULSION INTRAVENOUS at 20:44

## 2025-06-04 RX ADMIN — FUROSEMIDE 20 MG: 20 TABLET ORAL at 08:47

## 2025-06-04 RX ADMIN — Medication 500 MG: at 08:48

## 2025-06-04 RX ADMIN — Medication 400 MCG: at 08:48

## 2025-06-04 RX ADMIN — MAGNESIUM SULFATE HEPTAHYDRATE: 500 INJECTION, SOLUTION INTRAMUSCULAR; INTRAVENOUS at 20:43

## 2025-06-04 RX ADMIN — INSULIN ASPART 2 UNITS: 100 INJECTION, SOLUTION INTRAVENOUS; SUBCUTANEOUS at 21:02

## 2025-06-04 RX ADMIN — THIAMINE HCL TAB 100 MG 100 MG: 100 TAB at 08:47

## 2025-06-04 RX ADMIN — ENOXAPARIN SODIUM 50 MG: 60 INJECTION SUBCUTANEOUS at 20:58

## 2025-06-04 RX ADMIN — ATORVASTATIN CALCIUM 40 MG: 40 TABLET, FILM COATED ORAL at 08:47

## 2025-06-04 RX ADMIN — INSULIN ASPART 1 UNITS: 100 INJECTION, SOLUTION INTRAVENOUS; SUBCUTANEOUS at 04:07

## 2025-06-04 RX ADMIN — INSULIN ASPART 2 UNITS: 100 INJECTION, SOLUTION INTRAVENOUS; SUBCUTANEOUS at 08:24

## 2025-06-04 RX ADMIN — Medication 25 MG: at 08:47

## 2025-06-04 RX ADMIN — INSULIN ASPART 2 UNITS: 100 INJECTION, SOLUTION INTRAVENOUS; SUBCUTANEOUS at 12:08

## 2025-06-04 ASSESSMENT — ACTIVITIES OF DAILY LIVING (ADL)
ADLS_ACUITY_SCORE: 73
ADLS_ACUITY_SCORE: 63
ADLS_ACUITY_SCORE: 64
ADLS_ACUITY_SCORE: 73
ADLS_ACUITY_SCORE: 63
ADLS_ACUITY_SCORE: 65
ADLS_ACUITY_SCORE: 73
ADLS_ACUITY_SCORE: 73
ADLS_ACUITY_SCORE: 64
ADLS_ACUITY_SCORE: 63
ADLS_ACUITY_SCORE: 63
ADLS_ACUITY_SCORE: 76
ADLS_ACUITY_SCORE: 63
ADLS_ACUITY_SCORE: 64
ADLS_ACUITY_SCORE: 73
ADLS_ACUITY_SCORE: 73
ADLS_ACUITY_SCORE: 64
ADLS_ACUITY_SCORE: 63
ADLS_ACUITY_SCORE: 63

## 2025-06-04 NOTE — PLAN OF CARE
Shift Hours: 1900 - 0700    Assessment:  Body systems assessments were at patient's baseline.        Activity     Fall Risk Score: 75   Bed alarm on? Yes     Activity Assistance Provided: assistance, 2 people      Assistive Device Utilized: lift device    Pain: denies    Labs/RN Managed Protocols: xxx    Lines/Drains: PICC with TPN on continuous at 100ml hr and lipid.    Nutrition: Regular thin liquid and on TPN .     Goal Outcome Evaluation  Pt alert and oriented x4 forgetful, denies any pain , no nausea . BS q 4 , Bed alarm on . Hgb 6.9  informed . Pt didn't complain of any abdominal pain.   Barriers to Discharge:   Continue Antibiotics and calorie count diet today.

## 2025-06-04 NOTE — PLAN OF CARE
Shift Hours: 0700 - 1900    Assessment:  Body systems assessments were at patient's baseline.        Activity     Fall Risk Score: 60   Bed alarm on? Yes     Activity Assistance Provided: assistance, 1 person, assistance, 2 people      Assistive Device Utilized: gait belt    Pain: Denies    Labs/RN Managed Protocols: K, mag, phos; q4h BG    Lines/Drains: TL PICC    Nutrition: Regular, on angella counts 6/4-6/6    Goal Outcome Evaluation  Oriented to self and place. VSS on RA. 1x dose IV calcium given. Hgb 6.9 this morning, redraw 8.6. Pt continent of B&B this shift, up to BSC w/ Ax2/pivot. Sacral mepi placed for blanchable redness. Pt refused getting up to the recliner this shift. Pt tolerating regular diet and able to feed self. On carb coverage.     Barriers to Discharge:   Not yet medically ready.

## 2025-06-04 NOTE — PROGRESS NOTES
[facility]    Progress Note - Medicine Service, MAROON TEAM 2       Date of Admission:  5/25/2025    Assessment & Plan   Sarah Felix is a 79 year old female with history of insulin-dependent DM2, HTN, fatty liver, possible alcohol use disorder, gait instability who presented 5/25 with generalized weakness and was admitted with DKA and sepsis, 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 Course c/b C diff enteritis; clinically improving with PO vancomycin.      Today's updates:  - Continue PO vancomycin through 6/5/25  - Nutrition following     Enteritis possibly 2/2 C. Diff, resolving  Ileus, resolved  Initially presented septic but with negative workup including urine culture, blood cultures, CT C/A/P. Had ongoing worsening encephalopathy and leukocytosis on vanc/zosyn -> unasyn. Developed diarrhea and abdominal pain, stool with C diff PCR + but A toxin negative. C diff B toxin positive, will consult GI for input as to whether enteritis can be associated with this C. Diff presentation. Given clinical worsening, decided to empirically treat - now with improvement. Nausea/vomiting managed with NG decompression. Tube removed 6/1.  - Abx:  - PO vancomycin 125mg daily (5/28-6/5) - 7 days after stoppage of systemic antibiotics  - Unasyn (5/27-5/29)  - Zosyn (5/25-5/27)              - Vancomycin (5/25-5/26)  - GI consult for potential workup of enteritis given uncommon presentation of C. Diff              - C. Diff retested and continues to show positive antigen and B toxin, but negative A toxin              - Outpatient MRI/MRCP to evaluate the pancreatic and hepatic cysts              -Daily CRP, CBC w/ diff, Creat, alb to track infectious course   - PO pantoprazole daily     Acute anemia in the setting of ACD  Her PTA hemoglobin levels were within normal limits. Since admission, they have been steadily decreasing. 6/2 Hb was 7.0. Iron panel and B12 were ordered 6/2  and are c/w anemia of chronic disease. Initial concern for bleeding in urine, but repeat UA negative for blood & RBCs. No obvious source of hemorrhage. T bili and haptoglobin normal, pointing away from hemolysis.   - Continue trending Hb daily  - Consider transfusion if Hb <7     Dysphagia, resolved  Recurrent vomiting, resolved  Concern for aspiration from bedside RN. Feeding tube may have been preventing proper swallowing, so this was removed. On 6/4/25, SLP updated diet order back to regular diet with thin liquids.   - NGT removed 6/1  - SLP: Regular diet with thin liquids     Urinary retention  Continues to have urinary retention after removing the costa. Will start tamsulosin and continue to monitor.  - Continue straight cath if bladder scan >600cc, may need costa placed if continuing to retain  - Tamsulosin daily     Abnormal UA  Repeat UA on 6/1 showed cloudy urine, proteinuria, hematuria, and leukocyte esterase. She was started on Augmentin given these findings. These UA findings are mostly consistent with her UA from 5/25. This is likely not due to a UTI given that a UTI would have been resolved with her Zosyn and Unasyn abx course, thus Augmentin was discontinued. The hematuria is likely 2/2 repeated catheter insertions. Repeat UA non-concerning for infection or hematuria. Urine culture growing >100,000CFU/mL Raoultella ornithinolytica and Psuedomonas aeruginosa.   - Consider treatment if symptomatic; patient currently asymptomatic     Concern for refeeding syndrome, resolved  Decrease in magnesium and phosphorus after beginning TPN, possible refeeding syndrome.  - BMP daily  - RN replacement protocols     Pleural effusions  Pulmonary edema  Anasarca  Seen on 5/28 CT C/A/P. TTE with LVEF 60-65%, small IVC.   - PTA 20mg Lasix daily     Diabetic ketoacidosis, resolved  T2DM with A1c 8.0%. Likely triggered by sepsis. Now off insulin gtt.  - Endocrine consulted, managing glargine + regular insulin in TPN +  "novolog  - Hold PTA metformin, glipizide  - Diabetes ed consult     Hepatic vein thrombus  Essential thrombocythemia (JAK2 positive), high risk disease   No prior imaging to compare. Per chart review, history of \"thromboembolism\" after ovarian cyst removal age 22. Liver panel normal.  - IR consulted; no indication for lytics  - Heme consulted  - Heparin gtt 5/25-5/27  - Lovenox 5/27-present  - At discharge transition to apixaban 10mg BID x 7 days then 5 mg BID  - Follow up with Dr. Wilde 7/29/25  - Continue hydroxyurea  - discontinue PTA ASA      Toxic metabolic encephalopathy, resolving   Not oriented to month or year. Likely related to sepsis vs hospital acquired delirium. No acute intracranial findings on CT head.   - Wernicke dose thiamine  - Delirium precautions     Possible alcohol use disorder  Reports 2 drinks/day. Did have an ED visit 03/2025 for alcohol intoxication. Reports last drink 5 days PTA, low concern for withdrawal. Her brother, Cruz, has concerns that she is drinking excessively and encourages addiction medicine involvement.  - Addiction medicine consulted 6/3/25   - Patient denied any problems with alcohol use  - PTA folate  - Continue thiamine, Wernicke dosing     Moderate malnutrition in the context of chronic illness   Continued elevated ketones despite insulin ggt. Elevation likely due to poor oral intake due to encephalopathy and starvation ketosis. Will continue TPN until PO intake tolerated.  - Continue TPN   - SLP: regular diet with thin liquids     Sacrum/coccyx moisture wound  Erythematous, blanchable area on her sacrum. Bedside RN says likely from excessive moisture 2/2 fecal incontinence. 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  - WOC following, appreciate recommendations     \"Prominent pericardial contents\"  Cardiology reviewed- this is an epicardial fat pad. No further workup or consult needed.     Generalized weakness, failure to thrive  TSH wnl.   - " Ongoing PT/OT consults   - Ongoing evaluation for safe discharge plan     HTN: Hold PTA metoprolol, lisinopril given sepsis and normotension     Fall at home: CTH, C spine negative for acute process/fracture     Elevated INR: Unclear etiology, no known liver disease. Will hold off on Vit K given new thrombus.     Essential thrombocytosis: Stop PTA aspirin per heme, continue PTA hydroxyurea     Chronic/stable:     Glaucoma: PTA latanoprost  CKD2  Diabetic neuropathy  Schatzki's ring  Slow transit constipation  Mixed urge and stress incontinence  Gait instability          Diet: parenteral nutrition - ADULT compounded formula  Room Service  Snacks/Supplements Adult: Other; Strawberry Glucerna with bkf, lunch and dinner trays daily; With Meals  Calorie Counts  Regular Diet Adult Thin Liquids (level 0)  parenteral nutrition - ADULT compounded formula CYCLE    DVT Prophylaxis: Enoxaparin (Lovenox) SQ  Silver Catheter: Not present  Fluids: None  Lines: PRESENT      PICC 05/26/25 Triple Lumen Right Basilic Access. PICC was ready to use, no immediate concern.-Site Assessment: WDL      Cardiac Monitoring: None  Code Status: Full Code      Clinically Significant Risk Factors               # Hypoalbuminemia: Lowest albumin = 2.3 g/dL at 6/2/2025  6:23 AM, will monitor as appropriate     # Hypertension: Noted on problem list           # DMII: A1C = 8.0 % (Ref range: <5.7 %) within past 6 months    # Severe Malnutrition: based on nutrition assessment and treatment provided per dietitian's recommendations.    # Financial/Environmental Concerns: none         Social Drivers of Health   Food Insecurity: Unknown (5/28/2025)    Food Insecurity     Within the past 12 months, did you worry that your food would run out before you got money to buy more?: Patient unable to answer     Within the past 12 months, did the food you bought just not last and you didn t have money to get more?: Patient unable to answer   Housing Stability: Unknown  (5/28/2025)    Housing Stability     Do you have housing? : Patient unable to answer     Are you worried about losing your housing?: Patient unable to answer   Tobacco Use: Medium Risk (4/9/2025)    Patient History     Smoking Tobacco Use: Former     Smokeless Tobacco Use: Never     Passive Exposure: Past   Financial Resource Strain: Unknown (5/28/2025)    Financial Resource Strain     Within the past 12 months, have you or your family members you live with been unable to get utilities (heat, electricity) when it was really needed?: Patient unable to answer   Transportation Needs: Unknown (5/28/2025)    Transportation Needs     Within the past 12 months, has lack of transportation kept you from medical appointments, getting your medicines, non-medical meetings or appointments, work, or from getting things that you need?: Patient unable to answer   Interpersonal Safety: Unknown (5/28/2025)    Interpersonal Safety     Do you feel physically and emotionally safe where you currently live?: Patient unable to answer     Within the past 12 months, have you been hit, slapped, kicked or otherwise physically hurt by someone?: Patient unable to answer     Within the past 12 months, have you been humiliated or emotionally abused in other ways by your partner or ex-partner?: Patient unable to answer         Disposition Plan     Medically Ready for Discharge: Anticipated in 2-4 Days         The patient's care was discussed with the Attending Physician, Dr. Appiah.    Miguel Solano MD  Medicine Service, [address]  Securely message with TipTap (more info)  Text page via [facility] Paging/Directory   See signed in provider for up to date coverage information  ______________________________________________________________________    Interval History   No acute events overnight. The patient denies any fevers, chills, chest pain, shortness of breath, dysuria, urinary  urgency, urinary frequency, or other symptoms that would make her feel like she has a urinary tract infection. Overall feeling well and eating better.     Physical Exam   Vital Signs: Temp: 97.9  F (36.6  C) Temp src: Oral BP: 133/69 Pulse: 108   Resp: 18 SpO2: 100 % O2 Device: None (Room air)    Weight: 151 lbs 14.35 oz    General: Awake, alert, in no acute distress. Sitting up in chair.   HEENT: Mucus membranes moist.  Respiratory: No increased work of breathing, good air exchange, clear to auscultation bilaterally, no crackles or wheezing  Cardiovascular: Regular rate and rhythm and no murmur noted  GI: normal bowel sounds, non-distended,and nontender  Skin: no rashes  Musculoskeletal: 1+ pitting edema in her feet bilaterally  Neurologic: Mental Status Exam:  A&Ox3. Face symmetric. Moving all 4 extremities.    Medical Decision Making       Please see A&P for additional details of medical decision making.      Data     I have personally reviewed the following data over the past 24 hrs:    8.2  \   8.6 (L)   / 445     136 107 18.9 /  209 (H)   4.4 23 0.59 \       Imaging results reviewed over the past 24 hrs:   No results found for this or any previous visit (from the past 24 hours).

## 2025-06-04 NOTE — PROGRESS NOTES
Speech Language Therapy Discharge Summary    Reason for therapy discharge:    All goals and outcomes met, no further needs identified.    Progress towards therapy goal(s). See goals on Care Plan in [facility] electronic health record for goal details.  Goals met    Therapy recommendation(s):    No further therapy is recommended. Recommend pt continues regular diet and thin liquids with check in supervision. Patient should be sitting fully upright and alert, taking smal bites/sips at a slow rate.

## 2025-06-04 NOTE — PROGRESS NOTES
Inpatient Diabetes Management Service: Daily Progress Note     HPI: Sarah Felix is a 79 year old female with history of insulin-dependent DM2, HTN, fatty liver, possible alcohol use disorder, gait instability who presented 5/25/25 with generalized weakness and was admitted with DKA and sepsis, 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 Course complicated by C diff enteritis. Inpatient Diabetes Service consulted for DKA management.          Assessment/Plan:     Assessment:   Type 2 Diabetes Mellitus, complicated by neuropathy.  (A1c 8 %, Hgb: 12.3; 5/25/25)  DKA on admission, resolved, possibly triggered by sepsis/hepatic vein thrombosis .  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  Dysphagia on regular diet, thin liquids   Toxic metabolic encephalopathy  Stress induced hyperglycemia  TPN induced hyperglycemia  C Diff Toxin PCR positive and C diff toxin negative, enteririts     Plan/Recommendations:     - Lantus 6 units q 24 hrs at 1200  - decrease Regular insulin in TPN starting to cycle 18 hrs: 32 -->20 units with Dextrose decreasing from 240 g to 150 g (0.133 units per gram dextrose)  - increase Novolog carbohydrate coverage: 1 unit per 25 --> 15 g cho TID AC and prn snacks/supplements  - Novolog correction scale: 1/25 >140 q4 hrs while on TPN with minimal PO intake  - BG monitoring q 4 hours while minimal PO intake   - Hold PTA metformin and glimepiride   - Hypoglycemia protocol  - Carb counting protocol     Discussion:  Starting to eat more, remains on continuous TPN at goal dextrose. Received 1 unit of insulin with lunch yesterday using 1:25 g and patient went hyperglycemic, increase Novolog carb coverage for today. BG trending ok overnight while on continuous TPN, TPN starting to cycle tonight to 18 hrs and dextrose decreasing from 240 g to 140 g. Will continue q4hr BG checks and correction  scale until patient is eating more consistently.       Discharge Planning: (tentative)    Medications: Basal bolus (discharge on insulin only)   Test Claims:  none needed.   Education:  Needs to be assessed closer to discharge.    Outpatient Follow-up:  recommend [facility] Endocrinology vs PCP      Thank you for this consult. IDS will continue to follow.      Please notify Inpatient Diabetes Service if changes are planned to steroids, nutrition, TPN/TF and anticipated procedures requiring prolonged NPO status.         Interval History/Review of Systems :   - The last 24 hours progress and nursing notes reviewed.  - RD started calorie counts today for the next 3 days.   - Patient feels her appetite is pretty good. Denies nausea or emesis. Having some diarrhea.      Planned Procedures/Surgeries: none    Inpatient Glucose Control:       Recent Labs   Lab 06/04/25  0449 06/04/25  0405 06/04/25  0001 06/03/25 2010 06/03/25  1544 06/03/25  1214   * 150* 190* 217* 207* 179*             Medications Impacting Glycemia:   Steroids: none  D5W containing solutions/medications: none  Other medications impacting glucose: none        Nutrition:   Orders Placed This Encounter      Regular Diet Adult Thin Liquids (level 0)  Supplements:  Glucerna strawberry TID AC   TF: None  TPN:   Started 5/28 - 5/29: Regular insulin in continuous TPN 6 units with Dextrose 120 g   5/30: continuous TPN with 160 g dextrose and 14 units of reg insulin   5/31 - 6/1:  continuous TPN with 200 g dextrose and 22 units of reg insulin   6/2: continuous TPN with 240 g dextrose and 29 units of reg insulin   6/3: continuous TPN with 240 g dextrose and 32 units of reg insulin  6/4: 18 hr cyclic TPN with 150 g dextrose and 20 units of reg insulin         Diabetes History: see full consult note for complete diabetes history   Diabetes Type and Duration: DM2, 7/2009  Zinc transporter 8 antibody, islet antibody, and insulin antibody not available on epic  "search      GAD65 antibody <5 (9/11/2012)   C peptide 7.6 (9/11/2012)     PTA Medication Regimen: Glimepiride 1 mg daily, Novolog 5 units with high carb meal daily as needed, metformin 500 mg daily    Missing doses?: uncertain  Historical Diabetes Medications: N/A     Glucose monitoring device/frequency/trends: Fortino CGM, reports 130 on average  Hypoglycemia PTA:   - Frequency: \"rarely\"  - Severity:  no history of severe unconscious lows   - Awareness:  intact    - Treatment: \"eats food\"      Outpatient Diabetes Provider: Unknown  Formal Diabetes Education/Educator: Erik        Physical Exam:   /69 (BP Location: Left arm)   Pulse 89   Temp 97.8  F (36.6  C) (Oral)   Resp 18   Wt 66.4 kg (146 lb 4.8 oz)   SpO2 99%   BMI 25.11 kg/m    General:  fatigued appearing, NAD, in bed, more awake and alert today   Lungs: unlabored breathing on RA.  Mental Status: A&O x 3         Data:     Lab Results   Component Value Date    A1C 8.0 (H) 05/25/2025    A1C 7.5 (H) 11/05/2024    A1C 7.6 (H) 07/09/2024    A1C 6.6 (H) 03/05/2024    A1C 7.4 (H) 11/14/2023    A1C 8.3 (H) 06/21/2021    A1C 9.4 (H) 12/14/2020    A1C 8.1 (H) 09/21/2020    A1C 8.5 (H) 01/07/2020    A1C 7.6 (H) 10/12/2019       ROUTINE IP LABS (Last four results)  BMP  Recent Labs   Lab 06/04/25  0449 06/04/25  0405 06/04/25  0001 06/03/25 2010 06/03/25  0811 06/03/25  0612 06/02/25  0752 06/02/25  0623 06/01/25  0829 06/01/25  0634 05/31/25  0815 05/31/25  0558 05/30/25  1522 05/30/25  1320     --   --   --   --   --   --  135  --   --   --  133*  --  135   POTASSIUM 4.4  --   --   --   --  4.3  --  4.0  --  4.3  --  4.2  --  4.0   CHLORIDE 107  --   --   --   --   --   --  106  --   --   --  101  --  101   CECY 7.8*  --   --   --   --   --   --  7.8*  --   --   --  7.8*  --  7.4*   CO2 23  --   --   --   --   --   --  22  --   --   --  25  --  25   BUN 18.9  --   --   --   --   --   --  20.7  --   --   --  18.2  --  18.8   CR 0.59  --   --   --   -- "   --   --  0.60  --  0.66  --  0.67  --  0.72   * 150* 190* 217*   < >  --    < > 165*   < >  --    < > 226*   < > 224*    < > = values in this interval not displayed.     CBC  Recent Labs   Lab 06/04/25  0449 06/03/25  0853 06/03/25  0612 06/02/25  0623 06/01/25  0634   WBC 8.2  --  8.2 11.7* 11.7*   RBC 2.50*  --  2.52* 2.59* 2.80*   HGB 6.9* 7.5* 6.9* 7.0* 7.6*   HCT 21.7*  --  21.7* 22.0* 23.6*   MCV 87 87 86 85 84   MCH 27.6  --  27.4 27.0 27.1   MCHC 31.8  --  31.8 31.8 32.2   RDW 19.8*  --  18.5* 17.5* 17.2*     --  479* 479* 524*     Inpatient Diabetes Service will continue to follow, please don't hesitate to contact the team with any questions or concerns.     Clair Chung PA-C  Inpatient Diabetes Service  Pager: [phone]  Available on vocera    Plan discussed with patient, bedside RN, and primary team via this note.    To contact Inpatient Diabetes Service:     7 AM - 5 PM: Page the IDS MIKE following the patient that day (see filed or incomplete progress notes/consult notes under Endocrinology)    OR if uncertain of provider assignment: page job code [phone]    5 PM - 7 AM: First call after hours is to primary service.    For urgent after-hours questions, page job code for on call fellow: [phone]     I spent a total of 40 minutes on the date of the encounter doing prep/post-work, chart review, history and exam, documentation and further activities per the note including lab review, multidisciplinary communication, counseling the patient and/or coordinating care regarding acute hyper/hypoglycemic management, as well as discharge management and planning/communication.

## 2025-06-05 VITALS
WEIGHT: 149.91 LBS | TEMPERATURE: 98.1 F | RESPIRATION RATE: 18 BRPM | BODY MASS INDEX: 25.73 KG/M2 | HEART RATE: 101 BPM | OXYGEN SATURATION: 93 % | DIASTOLIC BLOOD PRESSURE: 62 MMHG | SYSTOLIC BLOOD PRESSURE: 115 MMHG

## 2025-06-05 LAB
BILIRUBIN DIRECT (ROCHE PRO & PURE): 0.13 MG/DL (ref 0–0.45)
ERYTHROCYTE [DISTWIDTH] IN BLOOD BY AUTOMATED COUNT: 20.7 % (ref 10–15)
GLUCOSE BLDC GLUCOMTR-MCNC: 131 MG/DL (ref 70–99)
GLUCOSE BLDC GLUCOMTR-MCNC: 169 MG/DL (ref 70–99)
GLUCOSE BLDC GLUCOMTR-MCNC: 170 MG/DL (ref 70–99)
GLUCOSE BLDC GLUCOMTR-MCNC: 172 MG/DL (ref 70–99)
GLUCOSE BLDC GLUCOMTR-MCNC: 177 MG/DL (ref 70–99)
GLUCOSE BLDC GLUCOMTR-MCNC: 179 MG/DL (ref 70–99)
GLUCOSE BLDC GLUCOMTR-MCNC: 187 MG/DL (ref 70–99)
HCT VFR BLD AUTO: 22.1 % (ref 35–47)
HGB BLD-MCNC: 6.9 G/DL (ref 11.7–15.7)
MAGNESIUM SERPL-MCNC: 1.8 MG/DL (ref 1.7–2.3)
MCH RBC QN AUTO: 27.9 PG (ref 26.5–33)
MCHC RBC AUTO-ENTMCNC: 31.2 G/DL (ref 31.5–36.5)
MCV RBC AUTO: 90 FL (ref 78–100)
PHOSPHATE SERPL-MCNC: 3.3 MG/DL (ref 2.5–4.5)
PLATELET # BLD AUTO: 424 10E3/UL (ref 150–450)
POTASSIUM SERPL-SCNC: 4.3 MMOL/L (ref 3.4–5.3)
RBC # BLD AUTO: 2.47 10E6/UL (ref 3.8–5.2)
WBC # BLD AUTO: 9.1 10E3/UL (ref 4–11)

## 2025-06-05 PROCEDURE — 85027 COMPLETE CBC AUTOMATED: CPT

## 2025-06-05 PROCEDURE — 82248 BILIRUBIN DIRECT: CPT | Performed by: STUDENT IN AN ORGANIZED HEALTH CARE EDUCATION/TRAINING PROGRAM

## 2025-06-05 PROCEDURE — 250N000011 HC RX IP 250 OP 636: Performed by: INTERNAL MEDICINE

## 2025-06-05 PROCEDURE — 250N000009 HC RX 250: Performed by: STUDENT IN AN ORGANIZED HEALTH CARE EDUCATION/TRAINING PROGRAM

## 2025-06-05 PROCEDURE — 250N000013 HC RX MED GY IP 250 OP 250 PS 637: Performed by: STUDENT IN AN ORGANIZED HEALTH CARE EDUCATION/TRAINING PROGRAM

## 2025-06-05 PROCEDURE — B4185 PARENTERAL SOL 10 GM LIPIDS: HCPCS | Performed by: STUDENT IN AN ORGANIZED HEALTH CARE EDUCATION/TRAINING PROGRAM

## 2025-06-05 PROCEDURE — 84132 ASSAY OF SERUM POTASSIUM: CPT | Performed by: INTERNAL MEDICINE

## 2025-06-05 PROCEDURE — 99232 SBSQ HOSP IP/OBS MODERATE 35: CPT | Performed by: STUDENT IN AN ORGANIZED HEALTH CARE EDUCATION/TRAINING PROGRAM

## 2025-06-05 PROCEDURE — 120N000002 HC R&B MED SURG/OB UMMC

## 2025-06-05 PROCEDURE — 250N000013 HC RX MED GY IP 250 OP 250 PS 637

## 2025-06-05 PROCEDURE — 84100 ASSAY OF PHOSPHORUS: CPT | Performed by: INTERNAL MEDICINE

## 2025-06-05 PROCEDURE — 250N000011 HC RX IP 250 OP 636

## 2025-06-05 PROCEDURE — 83735 ASSAY OF MAGNESIUM: CPT | Performed by: INTERNAL MEDICINE

## 2025-06-05 PROCEDURE — 99232 SBSQ HOSP IP/OBS MODERATE 35: CPT

## 2025-06-05 RX ORDER — MAGNESIUM SULFATE HEPTAHYDRATE 40 MG/ML
2 INJECTION, SOLUTION INTRAVENOUS ONCE
Status: COMPLETED | OUTPATIENT
Start: 2025-06-05 | End: 2025-06-05

## 2025-06-05 RX ORDER — BENZOCAINE/MENTHOL 6 MG-10 MG
LOZENGE MUCOUS MEMBRANE 2 TIMES DAILY
Status: DISCONTINUED | OUTPATIENT
Start: 2025-06-05 | End: 2025-06-12 | Stop reason: HOSPADM

## 2025-06-05 RX ADMIN — SMOFLIPID 250 ML: 6; 6; 5; 3 INJECTION, EMULSION INTRAVENOUS at 21:59

## 2025-06-05 RX ADMIN — ENOXAPARIN SODIUM 50 MG: 60 INJECTION SUBCUTANEOUS at 09:23

## 2025-06-05 RX ADMIN — THIAMINE HCL TAB 100 MG 100 MG: 100 TAB at 09:23

## 2025-06-05 RX ADMIN — INSULIN ASPART 2 UNITS: 100 INJECTION, SOLUTION INTRAVENOUS; SUBCUTANEOUS at 12:45

## 2025-06-05 RX ADMIN — Medication 25 MG: at 09:23

## 2025-06-05 RX ADMIN — HYDROCORTISONE: 1 CREAM TOPICAL at 22:00

## 2025-06-05 RX ADMIN — LATANOPROST 1 DROP: 50 SOLUTION/ DROPS OPHTHALMIC at 22:04

## 2025-06-05 RX ADMIN — ENOXAPARIN SODIUM 50 MG: 60 INJECTION SUBCUTANEOUS at 21:59

## 2025-06-05 RX ADMIN — FUROSEMIDE 20 MG: 20 TABLET ORAL at 16:15

## 2025-06-05 RX ADMIN — Medication 400 MCG: at 09:23

## 2025-06-05 RX ADMIN — HYDROCORTISONE: 1 CREAM TOPICAL at 12:45

## 2025-06-05 RX ADMIN — INSULIN ASPART 2 UNITS: 100 INJECTION, SOLUTION INTRAVENOUS; SUBCUTANEOUS at 04:31

## 2025-06-05 RX ADMIN — HYDROXYUREA 500 MG: 15 SOLUTION ORAL at 09:24

## 2025-06-05 RX ADMIN — MAGNESIUM SULFATE HEPTAHYDRATE 2 G: 2 INJECTION, SOLUTION INTRAVENOUS at 08:26

## 2025-06-05 RX ADMIN — INSULIN ASPART 2 UNITS: 100 INJECTION, SOLUTION INTRAVENOUS; SUBCUTANEOUS at 08:27

## 2025-06-05 RX ADMIN — INSULIN ASPART 2 UNITS: 100 INJECTION, SOLUTION INTRAVENOUS; SUBCUTANEOUS at 00:54

## 2025-06-05 RX ADMIN — MAGNESIUM SULFATE HEPTAHYDRATE: 500 INJECTION, SOLUTION INTRAMUSCULAR; INTRAVENOUS at 21:58

## 2025-06-05 RX ADMIN — PANTOPRAZOLE SODIUM 40 MG: 40 TABLET, DELAYED RELEASE ORAL at 08:25

## 2025-06-05 RX ADMIN — ATORVASTATIN CALCIUM 40 MG: 40 TABLET, FILM COATED ORAL at 09:23

## 2025-06-05 RX ADMIN — INSULIN ASPART 2 UNITS: 100 INJECTION, SOLUTION INTRAVENOUS; SUBCUTANEOUS at 22:00

## 2025-06-05 RX ADMIN — TAMSULOSIN HYDROCHLORIDE 0.4 MG: 0.4 CAPSULE ORAL at 09:23

## 2025-06-05 RX ADMIN — VANCOMYCIN HYDROCHLORIDE 125 MG: KIT at 09:24

## 2025-06-05 RX ADMIN — Medication 3 MG: at 22:00

## 2025-06-05 RX ADMIN — INSULIN GLARGINE 6 UNITS: 100 INJECTION, SOLUTION SUBCUTANEOUS at 12:46

## 2025-06-05 RX ADMIN — FUROSEMIDE 20 MG: 20 TABLET ORAL at 09:23

## 2025-06-05 ASSESSMENT — ACTIVITIES OF DAILY LIVING (ADL)
ADLS_ACUITY_SCORE: 71
ADLS_ACUITY_SCORE: 67
ADLS_ACUITY_SCORE: 71
ADLS_ACUITY_SCORE: 71
ADLS_ACUITY_SCORE: 67
ADLS_ACUITY_SCORE: 68
ADLS_ACUITY_SCORE: 71
ADLS_ACUITY_SCORE: 67
ADLS_ACUITY_SCORE: 64
ADLS_ACUITY_SCORE: 68
ADLS_ACUITY_SCORE: 71
ADLS_ACUITY_SCORE: 67
ADLS_ACUITY_SCORE: 71
ADLS_ACUITY_SCORE: 68
ADLS_ACUITY_SCORE: 67
ADLS_ACUITY_SCORE: 64
ADLS_ACUITY_SCORE: 71
ADLS_ACUITY_SCORE: 72
ADLS_ACUITY_SCORE: 71
ADLS_ACUITY_SCORE: 64
ADLS_ACUITY_SCORE: 71

## 2025-06-05 NOTE — PLAN OF CARE
Shift Hours: 0700 - 1900    Assessment:  Body systems assessments were at patient's baseline.        Activity     Fall Risk Score: 95   Bed alarm on? Yes     Activity Assistance Provided: assistance, 2 people      Assistive Device Utilized: gait belt    Pain: Denies    Labs/RN Managed Protocols: K, mag, phos, q4h BG    Lines/Drains: TL PICC SL    Nutrition: Regular, meds whole w/ applesauce    Goal Outcome Evaluation  Oriented to self and place. IV mag replaced. Electrolyte rechecks tm. Hgb 6.9 this morning, no orders to replace or recheck. Pt up in recliner chair for breakfast. Mepilex in place on bottom. Pt on carb coverage. Up to BSC multiple times throughout the shift. Continent of B&B.     Barriers to Discharge:   Not yet medically ready.

## 2025-06-05 NOTE — PROGRESS NOTES
Inpatient Diabetes Management Service: Daily Progress Note     HPI: Sarah Felix is a 79 year old female with history of insulin-dependent DM2, HTN, fatty liver, possible alcohol use disorder, gait instability who presented 5/25/25 with generalized weakness and was admitted with DKA and sepsis, 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 Course complicated by C diff enteritis. Inpatient Diabetes Service consulted for DKA management.          Assessment/Plan:     Assessment:   Type 2 Diabetes Mellitus, complicated by neuropathy.  (A1c 8 %, Hgb: 12.3; 5/25/25)  DKA on admission, resolved, possibly triggered by sepsis/hepatic vein thrombosis .  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  Dysphagia on regular diet, thin liquids   Toxic metabolic encephalopathy  Stress induced hyperglycemia  TPN induced hyperglycemia  C Diff Toxin PCR positive and C diff toxin negative, enteririts     Plan/Recommendations:     - Lantus 6 units q 24 hrs at 1200  - Increase Regular insulin in TPN cycled 18 hrs: 20 --> 24 units with Dextrose 150 g (0.133 --> 0.16 units per gram dextrose)  -  Novolog carbohydrate coverage: 1 unit per 15 --> 12 g cho TID AC and prn snacks/supplements  - Novolog correction scale: 1/25 >140 q4 hrs while on TPN with minimal PO intake  - BG monitoring q 4 hours while minimal PO intake   - Hold PTA metformin and glimepiride   - Hypoglycemia protocol  - Carb counting protocol     Discussion:    Mildly elevated BG yesterday. Received carb coverage x2 with mild elevation in BG after each. Will increase ICR slightly. RN notes covering all cho intake is challenging as pt will take some time to eat and saves portions of her tray. Pt notes that this is in part due to increased fatigue and fullness. Appreciate RN covering as able.      Discharge Planning: (tentative)    Medications: Basal bolus (discharge on  insulin only)   Test Claims:  none needed.   Education:  Needs to be assessed closer to discharge.    Outpatient Follow-up:  recommend [facility] vs PCP      Thank you for this consult. IDS will continue to follow.      Please notify Inpatient Diabetes Service if changes are planned to steroids, nutrition, TPN/TF and anticipated procedures requiring prolonged NPO status.         Interval History/Review of Systems :   - The last 24 hours progress and nursing notes reviewed.  - RD started calorie counts for 6/5-6/7.   - Notes pain/irritation at PICC line site  - Denies nausea, vomiting, or diarrhea  - Endorses fatigue    Planned Procedures/Surgeries: none    Inpatient Glucose Control:       Recent Labs   Lab 06/05/25  0811 06/05/25  0511 06/05/25  0431 06/05/25  0054 06/04/25 2010 06/04/25  1556   * 169* 177* 172* 176* 209*             Medications Impacting Glycemia:   Steroids: none  D5W containing solutions/medications: none  Other medications impacting glucose: none        Nutrition:   Orders Placed This Encounter      Regular Diet Adult Thin Liquids (level 0)  Supplements:  Glucerna strawberry TID AC   TF: None  TPN:   Started 5/28 - 5/29: Regular insulin in continuous TPN 6 units with Dextrose 120 g   5/30: continuous TPN with 160 g dextrose and 14 units of reg insulin   5/31 - 6/1:  continuous TPN with 200 g dextrose and 22 units of reg insulin   6/2: continuous TPN with 240 g dextrose and 29 units of reg insulin   6/3: continuous TPN with 240 g dextrose and 32 units of reg insulin  6/4: 18 hr cyclic (1672-8015): TPN with 150 g dextrose and 20 units of reg insulin        Diabetes History: see full consult note for complete diabetes history   Diabetes Type and Duration: DM2, 7/2009  Zinc transporter 8 antibody, islet antibody, and insulin antibody not available on epic search      GAD65 antibody <5 (9/11/2012)   C peptide 7.6 (9/11/2012)     PTA Medication Regimen: Glimepiride 1 mg daily,  "Novolog 5 units with high carb meal daily as needed, metformin 500 mg daily    Missing doses?: uncertain  Historical Diabetes Medications: N/A     Glucose monitoring device/frequency/trends: Fortino CGM, reports 130 on average  Hypoglycemia PTA:   - Frequency: \"rarely\"  - Severity:  no history of severe unconscious lows   - Awareness:  intact    - Treatment: \"eats food\"      Outpatient Diabetes Provider: Unknown  Formal Diabetes Education/Educator: Erik        Physical Exam:   /69 (BP Location: Left arm)   Pulse 101   Temp 97.9  F (36.6  C) (Oral)   Resp 16   Wt 68.9 kg (151 lb 14.4 oz)   SpO2 97%   BMI 26.07 kg/m    General:  fatigued appearing, NAD, up in chair   Lungs: unlabored breathing on RA.  Mental Status: A&O x 3         Data:     Lab Results   Component Value Date    A1C 8.0 (H) 05/25/2025    A1C 7.5 (H) 11/05/2024    A1C 7.6 (H) 07/09/2024    A1C 6.6 (H) 03/05/2024    A1C 7.4 (H) 11/14/2023    A1C 8.3 (H) 06/21/2021    A1C 9.4 (H) 12/14/2020    A1C 8.1 (H) 09/21/2020    A1C 8.5 (H) 01/07/2020    A1C 7.6 (H) 10/12/2019       ROUTINE IP LABS (Last four results)  BMP  Recent Labs   Lab 06/05/25  0811 06/05/25  0547 06/05/25  0511 06/05/25  0431 06/05/25  0054 06/04/25  0803 06/04/25  0449 06/03/25  0811 06/03/25  0612 06/02/25  0752 06/02/25  0623 06/01/25  0829 06/01/25  0634 05/31/25  0815 05/31/25  0558 05/30/25  1522 05/30/25  1320   NA  --   --   --   --   --   --  136  --   --   --  135  --   --   --  133*  --  135   POTASSIUM  --  4.3  --   --   --   --  4.4  --  4.3  --  4.0  --  4.3  --  4.2  --  4.0   CHLORIDE  --   --   --   --   --   --  107  --   --   --  106  --   --   --  101  --  101   CECY  --   --   --   --   --   --  7.8*  --   --   --  7.8*  --   --   --  7.8*  --  7.4*   CO2  --   --   --   --   --   --  23  --   --   --  22  --   --   --  25  --  25   BUN  --   --   --   --   --   --  18.9  --   --   --  20.7  --   --   --  18.2  --  18.8   CR  --   --   --   --   --   --  " 0.59  --   --   --  0.60  --  0.66  --  0.67  --  0.72   *  --  169* 177* 172*   < > 159*   < >  --    < > 165*   < >  --    < > 226*   < > 224*    < > = values in this interval not displayed.     CBC  Recent Labs   Lab 06/05/25  0547 06/04/25  0823 06/04/25  0449 06/03/25  0853 06/03/25  0612 06/02/25  0623   WBC 9.1  --  8.2  --  8.2 11.7*   RBC 2.47*  --  2.50*  --  2.52* 2.59*   HGB 6.9* 8.6* 6.9* 7.5* 6.9* 7.0*   HCT 22.1*  --  21.7*  --  21.7* 22.0*   MCV 90 88 87 87 86 85   MCH 27.9  --  27.6  --  27.4 27.0   MCHC 31.2*  --  31.8  --  31.8 31.8   RDW 20.7*  --  19.8*  --  18.5* 17.5*     --  445  --  479* 479*     Inpatient Diabetes Service will continue to follow, please don't hesitate to contact the team with any questions or concerns.     Anabel Bernardo PA-C  Inpatient Diabetes Service  Available on [employer] or Secure Chat     Plan discussed with patient, bedside RN, and primary team via this note.    To contact Inpatient Diabetes Service:     7 AM - 5 PM: Page the WiN MS MIKE following the patient that day (see filed or incomplete progress notes/consult notes under Endocrinology)    OR if uncertain of provider assignment: page job code [phone]    5 PM - 7 AM: First call after hours is to primary service.    For urgent after-hours questions, page job code for on call fellow: [phone]     I spent a total of 40 minutes on the date of the encounter doing prep/post-work, chart review, history and exam, documentation and further activities per the note including lab review, multidisciplinary communication, counseling the patient and/or coordinating care regarding acute hyper/hypoglycemic management, as well as discharge management and planning/communication.

## 2025-06-05 NOTE — PROGRESS NOTES
Calorie Count  Intake recorded for: 6/4  Total Kcals: 805 Total Protein: 15g  Kcals from Hospital Food: 805  Protein: 15g  Kcals from Outside Food (average):0 Protein: 0g  # Meals Ordered from Kitchen: 2 meals   # Meals Recorded: 2 meals (First - 100% hot black tea w/ lemon, 50% malik-lettuce-tomato sandwich, anuel food cake w/ strawberries & whipped topping, orange)       (Second - 100% fruit cup, hot black tea, 25% pancake w/ butter & syrup, malik strip, fired potatoes)   # Supplements Recorded: 0

## 2025-06-05 NOTE — PLAN OF CARE
Goal Outcome Evaluation:    Plan of Care Reviewed With: patient    Overall Patient Progress: no change  Overall Patient Progress: no change    Outcome Evaluation: Assumed care from [id number]. Oriented to self and place, disoriented to time and sitation. Vitally stable on RA. PICC intact, TPN/Lipids infusing as ordered. BG monitored Q4 (172, 177, 169), sliding scale administered as ordered. Up to commode, voiding spontaneously. Mepilex changed x2, intact on sacrum. Able to shift weight in bed on own. Denies pain, nausea. Critical hgb this AM - 6.9, provider notified, awaiting response. Continue with plan of care.     Itzel Engel RN

## 2025-06-05 NOTE — PROGRESS NOTES
[facility]    Progress Note - Medicine Service, [facility] TEAM 2       Date of Admission:  5/25/2025    Assessment & Plan   Sarah Felix is a 79 year old female with history of insulin-dependent DM2, HTN, fatty liver, possible alcohol use disorder, gait instability who presented 5/25 with generalized weakness and was admitted with DKA and sepsis, 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 Course c/b C diff enteritis; clinically improving with PO vancomycin.      Today's updates:  - Last day of oral vanc  - Hb has been trending down since admission, pt is on therapeutic Lovenox, has not required transfusions this admission, no report of bleeding, has stayed HDS, could be exacerbation of ACD but will curbside hematology  - Nutrition following, has improving calorie counts  - Added hydrocortisone cream for itching around the PICC site    Moderate malnutrition in the context of chronic illness   Continued elevated ketones despite insulin ggt. Elevation likely due to poor oral intake due to encephalopathy and starvation ketosis. Pt was started on TPN this admission, she has been on calorie count and has improved on her oral intake, expect dc'ing TPN next few days.   - Continue TPN   - SLP: regular diet with thin liquids    Acute anemia in the setting of ACD  Her PTA hemoglobin levels were within normal limits. Since admission, they have been steadily decreasing. 6/2 Hb was 7.0. Iron panel and B12 were ordered 6/2 and are c/w anemia of chronic disease. Initial concern for bleeding in urine, but repeat UA negative for blood & RBCs. No obvious source of hemorrhage. T bili and haptoglobin normal, pointing away from hemolysis.   - Continue trending Hb daily  - Consider transfusion if Hb <7  - Pt on therapeutic Lovenox dose, has not required blood transfusion this admission, no report of bleeding, stayed HDS, will curbside hematology for next steps    Hepatic vein  "thrombus  Essential thrombocythemia (JAK2 positive), high risk disease   No prior imaging to compare. Per chart review, history of \"thromboembolism\" after ovarian cyst removal age 22. Liver panel normal.  - IR consulted; no indication for lytics  - Heme consulted  - Heparin gtt 5/25-5/27  - Lovenox 5/27-present  - At discharge transition to apixaban 10mg BID x 7 days then 5 mg BID  - Follow up with Dr. Wilde 7/29/25  - Continue hydroxyurea  - discontinue'ed PTA ASA      Enteritis possibly 2/2 C. Diff, resolving  Ileus, resolved  Initially presented septic but with negative workup including urine culture, blood cultures, CT C/A/P. Had ongoing worsening encephalopathy and leukocytosis on vanc/zosyn -> unasyn. Developed diarrhea and abdominal pain, stool with C diff PCR + but A toxin negative. C diff B toxin positive, will consult GI for input as to whether enteritis can be associated with this C. Diff presentation. Given clinical worsening, decided to empirically treat - now with improvement. Nausea/vomiting managed with NG decompression. Tube removed 6/1.  - Abx:  - PO vancomycin 125mg daily (5/28-6/5)   - Unasyn (5/27-5/29)  - Zosyn (5/25-5/27)              - Vancomycin (5/25-5/26)  - GI consult for potential workup of enteritis given uncommon presentation of C. Diff              - C. Diff retested and continues to show positive antigen and B toxin, but negative A toxin              - Outpatient MRI/MRCP to evaluate the pancreatic and hepatic cysts  - PO pantoprazole daily     Diabetic ketoacidosis on admission, resolved  Type 2 DM complicated by neuropathy- A1c 8%  T2DM with A1c 8.0%. Likely triggered by sepsis. Now off insulin gtt.  - Endocrine consulted, managing glargine + regular insulin in TPN + novolog  - Hold PTA metformin, glipizide, possible discharge only on insulin  - Diabetes ed consult    Stable/chronic/resolved conditions:  Dysphagia, resolved  Recurrent vomiting, resolved  Concern for aspiration " from bedside RN. Feeding tube may have been preventing proper swallowing, so this was removed. On 6/4/25, SLP updated diet order back to regular diet with thin liquids.   - NGT removed 6/1  - SLP: Regular diet with thin liquids     Urinary retention  Continues to have urinary retention after removing the costa. Will start tamsulosin and continue to monitor.  - Continue straight cath if bladder scan >600cc, may need costa placed if continuing to retain  - Tamsulosin daily     Abnormal UA  Repeat UA on 6/1 showed cloudy urine, proteinuria, hematuria, and leukocyte esterase. She was started on Augmentin given these findings. These UA findings are mostly consistent with her UA from 5/25. This is likely not due to a UTI given that a UTI would have been resolved with her Zosyn and Unasyn abx course, thus Augmentin was discontinued. The hematuria is likely 2/2 repeated catheter insertions. Repeat UA non-concerning for infection or hematuria. Urine culture growing >100,000CFU/mL Raoultella ornithinolytica and Psuedomonas aeruginosa.   - Consider treatment if symptomatic; patient currently asymptomatic     Concern for refeeding syndrome, resolved  Decrease in magnesium and phosphorus after beginning TPN, possible refeeding syndrome.  - BMP daily  - RN replacement protocols     Pleural effusions  Pulmonary edema  Anasarca  Seen on 5/28 CT C/A/P. TTE with LVEF 60-65%, small IVC.   - PTA 20mg Lasix daily     Toxic metabolic encephalopathy, resolved   Not oriented to month or year. Likely related to sepsis vs hospital acquired delirium. No acute intracranial findings on CT head.   - Wernicke dose thiamine  - Delirium precautions     Possible alcohol use disorder  Reports 2 drinks/day. Did have an ED visit 03/2025 for alcohol intoxication. Reports last drink 5 days PTA, low concern for withdrawal. Her brother, Cruz, has concerns that she is drinking excessively and encourages addiction medicine involvement.  - Addiction medicine  "consulted 6/3/25   - Patient denied any problems with alcohol use  - PTA folate  - Continue thiamine, Wernicke dosing     Sacrum/coccyx moisture wound  Erythematous, blanchable area on her sacrum. Bedside RN says likely from excessive moisture 2/2 fecal incontinence. 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  - WOC following, appreciate recommendations     \"Prominent pericardial contents\"  Cardiology reviewed- this is an epicardial fat pad. No further workup or consult needed.     Generalized weakness, failure to thrive  TSH wnl.   - Ongoing PT/OT consults   - Ongoing evaluation for safe discharge plan     HTN: Hold PTA metoprolol, lisinopril given sepsis and normotension     Fall at home: CTH, C spine negative for acute process/fracture     Elevated INR: Unclear etiology, no known liver disease. Will hold off on Vit K given new thrombus.     Essential thrombocytosis: Stop PTA aspirin per heme, continue PTA hydroxyurea     Chronic/stable:     Glaucoma: PTA latanoprost  CKD2  Diabetic neuropathy  Schatzki's ring  Slow transit constipation  Mixed urge and stress incontinence  Gait instability           Diet: Room Service  Snacks/Supplements Adult: Other; Strawberry Glucerna with bkf, lunch and dinner trays daily; With Meals  Calorie Counts  Regular Diet Adult Thin Liquids (level 0)  parenteral nutrition - ADULT compounded formula CYCLE  parenteral nutrition - ADULT compounded formula CYCLE    DVT Prophylaxis: Enoxaparin (Lovenox) SQ  Silver Catheter: Not present  Fluids: None  Lines: PRESENT      PICC 05/26/25 Triple Lumen Right Basilic Access. PICC was ready to use, no immediate concern.-Site Assessment: WDL      Cardiac Monitoring: None  Code Status: Full Code      Clinically Significant Risk Factors               # Hypoalbuminemia: Lowest albumin = 2.3 g/dL at 6/2/2025  6:23 AM, will monitor as appropriate     # Hypertension: Noted on problem list           # DMII: A1C = 8.0 % (Ref range: <5.7 " %) within past 6 months    # Severe Malnutrition: based on nutrition assessment and treatment provided per dietitian's recommendations.    # Financial/Environmental Concerns: none         Social Drivers of Health   Food Insecurity: Unknown (5/28/2025)    Food Insecurity     Within the past 12 months, did you worry that your food would run out before you got money to buy more?: Patient unable to answer     Within the past 12 months, did the food you bought just not last and you didn t have money to get more?: Patient unable to answer   Housing Stability: Unknown (5/28/2025)    Housing Stability     Do you have housing? : Patient unable to answer     Are you worried about losing your housing?: Patient unable to answer   Tobacco Use: Medium Risk (4/9/2025)    Patient History     Smoking Tobacco Use: Former     Smokeless Tobacco Use: Never     Passive Exposure: Past   Financial Resource Strain: Unknown (5/28/2025)    Financial Resource Strain     Within the past 12 months, have you or your family members you live with been unable to get utilities (heat, electricity) when it was really needed?: Patient unable to answer   Transportation Needs: Unknown (5/28/2025)    Transportation Needs     Within the past 12 months, has lack of transportation kept you from medical appointments, getting your medicines, non-medical meetings or appointments, work, or from getting things that you need?: Patient unable to answer   Interpersonal Safety: Unknown (5/28/2025)    Interpersonal Safety     Do you feel physically and emotionally safe where you currently live?: Patient unable to answer     Within the past 12 months, have you been hit, slapped, kicked or otherwise physically hurt by someone?: Patient unable to answer     Within the past 12 months, have you been humiliated or emotionally abused in other ways by your partner or ex-partner?: Patient unable to answer         Disposition Plan     Medically Ready for Discharge: Anticipated  in 2-4 Days           Laya Appiah MD  Medicine Service, [facility] TEAM 2  [facility]  Securely message with [employer] (more info)  Text page via [employer] Paging/Directory   See signed in provider for up to date coverage information  ______________________________________________________________________    Interval History   No major event overnight, doing well with oral intake, complains of being tired, states she has her good days and bad days, no other major concern.     Physical Exam   Vital Signs: Temp: 97.8  F (36.6  C) Temp src: Oral BP: 128/63 Pulse: 101   Resp: 16 SpO2: 99 % O2 Device: None (Room air)    Weight: 149 lbs 14.6 oz    Gen: A&Ox4, lying comfortably on bed, in no distress, doing well  Abd: soft, non-tender, non-distended  Ext: No edema in upper/lower extremities      Medical Decision Making       40 MINUTES SPENT BY ME on the date of service doing chart review, history, exam, documentation & further activities per the note.      Data     I have personally reviewed the following data over the past 24 hrs:    9.1  \   6.9 (LL)   / 424     N/A N/A N/A /  170 (H)   4.3 N/A N/A \       Imaging results reviewed over the past 24 hrs:   No results found for this or any previous visit (from the past 24 hours).

## 2025-06-06 ENCOUNTER — APPOINTMENT (OUTPATIENT)
Dept: OCCUPATIONAL THERAPY | Facility: CLINIC | Age: 80
DRG: 871 | End: 2025-06-06
Payer: COMMERCIAL

## 2025-06-06 LAB
ANION GAP SERPL CALCULATED.3IONS-SCNC: 7 MMOL/L (ref 7–15)
BUN SERPL-MCNC: 25.7 MG/DL (ref 8–23)
CALCIUM SERPL-MCNC: 8 MG/DL (ref 8.8–10.4)
CHLORIDE SERPL-SCNC: 107 MMOL/L (ref 98–107)
CREAT SERPL-MCNC: 0.6 MG/DL (ref 0.51–0.95)
EGFRCR SERPLBLD CKD-EPI 2021: >90 ML/MIN/1.73M2
ERYTHROCYTE [DISTWIDTH] IN BLOOD BY AUTOMATED COUNT: 22.1 % (ref 10–15)
GLUCOSE BLDC GLUCOMTR-MCNC: 147 MG/DL (ref 70–99)
GLUCOSE BLDC GLUCOMTR-MCNC: 161 MG/DL (ref 70–99)
GLUCOSE BLDC GLUCOMTR-MCNC: 175 MG/DL (ref 70–99)
GLUCOSE BLDC GLUCOMTR-MCNC: 202 MG/DL (ref 70–99)
GLUCOSE BLDC GLUCOMTR-MCNC: 208 MG/DL (ref 70–99)
GLUCOSE SERPL-MCNC: 294 MG/DL (ref 70–99)
HCO3 SERPL-SCNC: 22 MMOL/L (ref 22–29)
HCT VFR BLD AUTO: 23.7 % (ref 35–47)
HGB BLD-MCNC: 7.3 G/DL (ref 11.7–15.7)
MAGNESIUM SERPL-MCNC: 2.1 MG/DL (ref 1.7–2.3)
MCH RBC QN AUTO: 28 PG (ref 26.5–33)
MCHC RBC AUTO-ENTMCNC: 30.8 G/DL (ref 31.5–36.5)
MCV RBC AUTO: 91 FL (ref 78–100)
PHOSPHATE SERPL-MCNC: 4.4 MG/DL (ref 2.5–4.5)
PLATELET # BLD AUTO: 356 10E3/UL (ref 150–450)
POTASSIUM SERPL-SCNC: 4.9 MMOL/L (ref 3.4–5.3)
RBC # BLD AUTO: 2.61 10E6/UL (ref 3.8–5.2)
RETICS # AUTO: 0.16 10E6/UL (ref 0.03–0.1)
RETICS/RBC NFR AUTO: 6.2 % (ref 0.5–2)
SODIUM SERPL-SCNC: 136 MMOL/L (ref 135–145)
WBC # BLD AUTO: 7.7 10E3/UL (ref 4–11)

## 2025-06-06 PROCEDURE — 84100 ASSAY OF PHOSPHORUS: CPT

## 2025-06-06 PROCEDURE — 250N000013 HC RX MED GY IP 250 OP 250 PS 637

## 2025-06-06 PROCEDURE — 80048 BASIC METABOLIC PNL TOTAL CA: CPT | Performed by: INTERNAL MEDICINE

## 2025-06-06 PROCEDURE — 120N000002 HC R&B MED SURG/OB UMMC

## 2025-06-06 PROCEDURE — 85045 AUTOMATED RETICULOCYTE COUNT: CPT

## 2025-06-06 PROCEDURE — 250N000009 HC RX 250: Performed by: STUDENT IN AN ORGANIZED HEALTH CARE EDUCATION/TRAINING PROGRAM

## 2025-06-06 PROCEDURE — 85014 HEMATOCRIT: CPT

## 2025-06-06 PROCEDURE — 97535 SELF CARE MNGMENT TRAINING: CPT | Mod: GO

## 2025-06-06 PROCEDURE — 83735 ASSAY OF MAGNESIUM: CPT

## 2025-06-06 PROCEDURE — 99232 SBSQ HOSP IP/OBS MODERATE 35: CPT | Performed by: STUDENT IN AN ORGANIZED HEALTH CARE EDUCATION/TRAINING PROGRAM

## 2025-06-06 PROCEDURE — B4185 PARENTERAL SOL 10 GM LIPIDS: HCPCS | Performed by: STUDENT IN AN ORGANIZED HEALTH CARE EDUCATION/TRAINING PROGRAM

## 2025-06-06 PROCEDURE — 99232 SBSQ HOSP IP/OBS MODERATE 35: CPT | Performed by: NURSE PRACTITIONER

## 2025-06-06 PROCEDURE — 250N000011 HC RX IP 250 OP 636: Performed by: INTERNAL MEDICINE

## 2025-06-06 PROCEDURE — 250N000013 HC RX MED GY IP 250 OP 250 PS 637: Performed by: STUDENT IN AN ORGANIZED HEALTH CARE EDUCATION/TRAINING PROGRAM

## 2025-06-06 RX ADMIN — INSULIN ASPART 1 UNITS: 100 INJECTION, SOLUTION INTRAVENOUS; SUBCUTANEOUS at 10:27

## 2025-06-06 RX ADMIN — ATORVASTATIN CALCIUM 40 MG: 40 TABLET, FILM COATED ORAL at 08:47

## 2025-06-06 RX ADMIN — FUROSEMIDE 20 MG: 20 TABLET ORAL at 08:47

## 2025-06-06 RX ADMIN — HYDROCORTISONE: 1 CREAM TOPICAL at 23:08

## 2025-06-06 RX ADMIN — ENOXAPARIN SODIUM 50 MG: 60 INJECTION SUBCUTANEOUS at 10:35

## 2025-06-06 RX ADMIN — TAMSULOSIN HYDROCHLORIDE 0.4 MG: 0.4 CAPSULE ORAL at 08:47

## 2025-06-06 RX ADMIN — ENOXAPARIN SODIUM 50 MG: 60 INJECTION SUBCUTANEOUS at 21:29

## 2025-06-06 RX ADMIN — HYDROCORTISONE: 1 CREAM TOPICAL at 10:40

## 2025-06-06 RX ADMIN — THIAMINE HCL TAB 100 MG 100 MG: 100 TAB at 08:47

## 2025-06-06 RX ADMIN — PANTOPRAZOLE SODIUM 40 MG: 40 TABLET, DELAYED RELEASE ORAL at 08:47

## 2025-06-06 RX ADMIN — FUROSEMIDE 20 MG: 20 TABLET ORAL at 17:02

## 2025-06-06 RX ADMIN — MAGNESIUM SULFATE HEPTAHYDRATE: 500 INJECTION, SOLUTION INTRAMUSCULAR; INTRAVENOUS at 21:31

## 2025-06-06 RX ADMIN — Medication 400 MCG: at 08:47

## 2025-06-06 RX ADMIN — LATANOPROST 1 DROP: 50 SOLUTION/ DROPS OPHTHALMIC at 23:07

## 2025-06-06 RX ADMIN — INSULIN ASPART 1 UNITS: 100 INJECTION, SOLUTION INTRAVENOUS; SUBCUTANEOUS at 06:14

## 2025-06-06 RX ADMIN — Medication 3 MG: at 21:30

## 2025-06-06 RX ADMIN — Medication 25 MG: at 08:47

## 2025-06-06 RX ADMIN — INSULIN ASPART 3 UNITS: 100 INJECTION, SOLUTION INTRAVENOUS; SUBCUTANEOUS at 01:06

## 2025-06-06 RX ADMIN — SMOFLIPID 250 ML: 6; 6; 5; 3 INJECTION, EMULSION INTRAVENOUS at 21:37

## 2025-06-06 RX ADMIN — HYDROXYUREA 500 MG: 15 SOLUTION ORAL at 08:47

## 2025-06-06 ASSESSMENT — ACTIVITIES OF DAILY LIVING (ADL)
ADLS_ACUITY_SCORE: 71

## 2025-06-06 NOTE — PROGRESS NOTES
"                       Inpatient Diabetes Management Service: Daily Progress Note     HPI: Sarah Felix is a 79 year old female with history of insulin-dependent DM2, HTN, fatty liver, possible alcohol use disorder, gait instability who presented 27 with generalized weakness and was admitted with DKA and sepsis, 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 Course complicated by C diff enteritis.     IDS consulted to assist with glycemic management - specifically for concerns of \"DKA\" as well as optimization while inpatient and when applicable, recommendations for transition home.         Assessment/Plan:     Assessment:    Type 2 Diabetes Mellitus, c/b neuropathy.  (A1c 8 % on 25) in presence of anemia and complex illness  DKA on admission (although not meeting all criteria/multifactorial).  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   Toxic metabolic encephalopathy  Stress / TPN induced hyperglycemia  C Diff Toxin PCR positive and C diff toxin negative, enteririts  BMI: 26     Plan/Recommendations:     - Decrease to Lantus 5 units q 24 hrs at 1200  - Increase Regular insulin in TPN cycled 12 hrs: to 30 units with Dextrose 150 g (0.20 units per gram dextrose)  - Decrease to Novolog carbohydrate coverage: 1 unit per 15 g cho TID AC and prn snacks/supplements  - Novolog High resistance correction scale:  >140 2000/0000/0400 during TPN and reduce  to Medium resistance 0800/1200/1600 when TPN off   - BG monitoring q 4 hours while minimal PO intake/TPN cycling   - PTA medications: holding metformin and glimepiride   - Hypoglycemia protocol  - Carb counting protocol     Discussion:      BG trend is relatively stable - did note a BG rise upon TPN initiation which after investigation is 2/2 TPN changing to 12 hour cycle.  No lows during the off time.     Will increase ratio of insulin " Notify patient all of her test to check for diseases that can cause inflammation of the right ear were normal.  I suspect that her right ear pain is due to her chewing joint.  I would like her to see the dentist as referred per gram dextrose for next bag of TPN. Please update if there will be any additional changes to the formula or cycle time. This was communicated directly to primary team.     Should there be a lower dip once the TPN stops, would further reduce vs stop the lantus and decrease intensity of the correction during time the TPN will not be running as well as look at ICR and correlate.     Labs this AM are unrevealing. Creat/GFR - stable    Sarah is weak and tired, is not eating well.          Discharge Planning: (tentative)    Medications: Basal bolus (discharge on insulin only)   Test Claims:  none needed.   Education:  Needs to be assessed closer to discharge.    Outpatient Follow-up:  recommend [facility] Endocrinology vs PCP        Please notify Inpatient Diabetes Service if changes are planned to steroids, nutrition, TPN/TF and anticipated procedures requiring prolonged NPO status.         Interval History/Review of Systems :   - The last 24 hours progress and nursing notes reviewed.    - pain/irritation at PICC line site - does not complain of this today   - Denies nausea, vomiting, or diarrhea  - Endorses fatigue and weakness.     Planned Procedures/Surgeries: none    Inpatient Glucose Control:         Recent Labs   Lab 06/06/25  0608 06/06/25  0548 06/06/25  0101 06/05/25  2148 06/05/25  1611 06/05/25  1239   * 294* 202* 187* 131* 170*           Medications Impacting Glycemia:   Steroids: none  D5W containing solutions/medications: none  Other medications impacting glucose: none        Nutrition:   Orders Placed This Encounter      Regular Diet Adult Thin Liquids (level 0)  Supplements:  Glucerna strawberry TID AC meals  TF: None  TPN:   Started 5/28 - 5/29: Regular insulin in continuous TPN 6 units with Dextrose 120 g     6/4: 18 hr cyclic (6778-7966): TPN with 150 g dextrose and 20 units of reg insulin  6/5:  Regular insulin in TPN cycled 18 hrs (2000 - 1400): 24 units with Dextrose 150 g (0.16 units per  "gram dextrose)   6/6: Regular insulin in TPN cycled 18 hrs (2000 - 1400): 30 units with Dextrose 150 g (0.20 units per gram dextrose)        Diabetes History: see full consult note for complete diabetes history   Diabetes Type and Duration: DM2, 7/2009     GAD65 antibody <5 (9/11/2012)   C peptide 7.6 (9/11/2012)     PTA Medication Regimen: Glimepiride 1 mg daily, Novolog 5 units with high carb meal daily as needed, metformin 500 mg daily    Missing doses?: uncertain  Historical Diabetes Medications: N/A     Glucose monitoring device/frequency/trends: Fortino CGM, reports 130 on average  Hypoglycemia PTA:   - Frequency: \"rarely\"  - Severity:  no history of severe unconscious lows   - Awareness:  intact    - Treatment: \"eats food\"      Outpatient Diabetes Provider: Unknown  Formal Diabetes Education/Educator: Erik        Physical Exam:   /58 (BP Location: Left arm)   Pulse 101   Temp 97.4  F (36.3  C) (Oral)   Resp 18   Wt 68 kg (149 lb 14.6 oz)   SpO2 93%   BMI 25.73 kg/m    General:  Unwell  but stable appearing. pleasant, in no acute distress. Resting comfortably in bed.   HEENT:  NC/AT. MMM, sclera not injected  Lungs:  unremarkable, no new cough, no SOB, breathing on RA  ABD:  rounded, non-distended   Skin:  warm and dry, no obvious lesions  MSK:   moving all extremities, fluid movement of extremities   Mental Status:  Alert and oriented x3  Psych:   Cooperative, flat but appropriate affect         Data:     Lab Results   Component Value Date    A1C 8.0 (H) 05/25/2025    A1C 7.5 (H) 11/05/2024    A1C 7.6 (H) 07/09/2024    A1C 6.6 (H) 03/05/2024    A1C 7.4 (H) 11/14/2023    A1C 8.3 (H) 06/21/2021    A1C 9.4 (H) 12/14/2020    A1C 8.1 (H) 09/21/2020    A1C 8.5 (H) 01/07/2020    A1C 7.6 (H) 10/12/2019       ROUTINE IP LABS (Last four results)  BMP  Recent Labs   Lab 06/06/25  0608 06/06/25  0548 06/06/25  0101 06/05/25  2148 06/05/25  0811 06/05/25  0547 06/04/25  0803 06/04/25  0449 06/03/25  0811 " 06/03/25  0612 06/02/25  0752 06/02/25  0623 06/01/25  0829 06/01/25  0634 05/31/25  0815 05/31/25  0558   NA  --  136  --   --   --   --   --  136  --   --   --  135  --   --   --  133*   POTASSIUM  --  4.9  --   --   --  4.3  --  4.4  --  4.3  --  4.0  --  4.3  --  4.2   CHLORIDE  --  107  --   --   --   --   --  107  --   --   --  106  --   --   --  101   CECY  --  8.0*  --   --   --   --   --  7.8*  --   --   --  7.8*  --   --   --  7.8*   CO2  --  22  --   --   --   --   --  23  --   --   --  22  --   --   --  25   BUN  --  25.7*  --   --   --   --   --  18.9  --   --   --  20.7  --   --   --  18.2   CR  --  0.60  --   --   --   --   --  0.59  --   --   --  0.60  --  0.66  --  0.67   * 294* 202* 187*   < >  --    < > 159*   < >  --    < > 165*   < >  --    < > 226*    < > = values in this interval not displayed.     CBC  Recent Labs   Lab 06/06/25  0548 06/05/25  0547 06/04/25  0823 06/04/25  0449 06/03/25  0853 06/03/25  0612   WBC 7.7 9.1  --  8.2  --  8.2   RBC 2.61* 2.47*  --  2.50*  --  2.52*   HGB 7.3* 6.9* 8.6* 6.9*   < > 6.9*   HCT 23.7* 22.1*  --  21.7*  --  21.7*   MCV 91 90 88 87   < > 86   MCH 28.0 27.9  --  27.6  --  27.4   MCHC 30.8* 31.2*  --  31.8  --  31.8   RDW 22.1* 20.7*  --  19.8*  --  18.5*    424  --  445  --  479*    < > = values in this interval not displayed.     Inpatient Diabetes Service will continue to follow, please don't hesitate to contact the team with any questions or concerns.     Yoselyn Gay, APRN-CNP, BC-ADM   Inpatient Diabetes Service  Available on [facility] - when on duty.     To contact Inpatient Diabetes Service:     7 AM - 5 PM: Page the IDS MIKE following the patient that day (see filed or incomplete progress notes/consult notes under Endocrinology)    OR if uncertain of provider assignment: page job code [phone]    5 PM - 7 AM: First call after hours is to primary service.    For urgent after-hours questions, page job code for on call fellow: [phone]     I  spent a total of 50 minutes on the date of the encounter doing prep/post-work, chart review, history and exam, documentation and further activities per the note including lab review, multidisciplinary communication, counseling the patient and/or coordinating care regarding acute hyper/hypoglycemic management, as well as discharge management and planning/communication.  See note for details.      Please notify inpatient diabetes service if changes are planned to steroids, nutrition, or if procedures are planned requiring prolonged NPO status.Diabetes Management Team job code: [phone]

## 2025-06-06 NOTE — PROGRESS NOTES
Care Management Follow Up    Length of Stay (days): 12  Expected Discharge Date: 06/09/2025  Concerns to be Addressed: discharge planning     Patient plan of care discussed at interdisciplinary rounds: Yes  Anticipated Discharge Disposition: Transitional Care  Anticipated Discharge Services: None  Anticipated Discharge DME: None  Patient/family educated on Medicare website which has current facility and service quality ratings: yes  Education Provided on the Discharge Plan: Yes  Patient/Family in Agreement with the Plan: yes  Referrals Placed by CM/SW: Post Acute Facilities  Private pay costs discussed: Not applicable  Discussed  Partnership in Safe Discharge Planning  document with patient/family: Yes   Handoff Completed: No, handoff not indicated or clinically appropriate  Additional Information: Per M2 Provider, anticipate pt will be med ready for discharge early next week. Writer met with pt at bedside to discuss PT & OT disposition recommendations for TCU. Pt in agreement with disposition plan. Education provided. Pt previously went to a TCU in [place], [place]. Currently she does not have a preference on TCU location, but would prefer a higher rated TCU. Writer will begin search near pt's home in [place] to higher rated facilities, per her request. Please see referrals below.    Active Referrals:    [facility] Global Referral ([employer]; P: [phone]; F: [phone]; E: [email or web address])    [facility] (Ph: [phone]; Admissions: [phone]; F: [phone])    [facility] (Ph: [phone], Admissions: [phone], F: [phone])    [facility] (Admissions Phone: [phone] Main Phone: [phone] Fax: [phone])    [facility] (Ph: [phone], Admissions: [phone], F: [phone])    [facility] (Dc Craig, Ph: [phone]; F: [phone];  [email or web address])    [facility] (Ph: [phone], Admissions: [phone], F: [phone])  6/6: Facility clinically reviewing. Pt looks appropriate for their facility.    [facility] in [place] (P: [phone])  [facility] [place], [place] [address] [place] [address]/[facility] (P: [phone]; F: [phone])    [facility] (Ph: [phone], Admissions: [phone], F: [phone])    Inactive Referrals:    [facility] - Hx of alcohol abuse.    [facility] - Bed not available, facility full, history of violence and/or drug or alcohol abuse    [facility] - Bed not available.    KELLY Andersen, [facility] Hospital[facility]  Clinical , [facility]  Desk Phone: [phone]   Securely message with [employer] (Search Name or 7C Med Surg SW Beds 8301-2067)  Text page via [facility] Paging/Directory   See signed in provider for up to date coverage information  Acute Care Management Department

## 2025-06-06 NOTE — PLAN OF CARE
Goal Outcome Evaluation:    Shift Hours: 0700 - 1900    Assessment:  Body systems that were not at patient's baseline Cognitive/Behavioral/Neuro, Peripheral Neurovascular, Gastrointestinal, Genitourinary, Skin, and Musculoskeletal. Focused body system assessments documented in flowsheets.        Activity     Fall Risk Score: 95   Bed alarm on? Yes     Activity Assistance Provided: assistance, 2 people      Assistive Device Utilized: gait belt    Pain: denies    Labs/RN Managed Protocols: K, Mg, Phos protocol WDL, no replacement. Hgb stable. BG checks q4h with rapid insulin coverage and carb coverage with meals    Lines/Drains: right triple lumen PICC SL    Nutrition: very poor appetite, on angella counts    Goal Outcome Evaluation  Plan of Care Reviewed With: patient  Overall Patient Progress: no change     No acute changes today. Pt's affect is flat and declines activity despite encouragement. Declined working with PT and OT today, and declined going up to chair. Encouraged repositioning from side to side to offload pressure to sacrum. Pt intermittently disoriented to situation, needing to be reminded of reasoning why cannot go home yet. Needing frequent rounding, needs reminder to use call light to call for help.   Barriers to Discharge:   On TPN & lipids, needs PT/OT for strength and improved mobility

## 2025-06-06 NOTE — PROGRESS NOTES
Calorie Count  Intake recorded for: 6/5  Total Kcals: 372 Total Protein: 15g  Kcals from Hospital Food: 372  Protein: 15g  Kcals from Outside Food (average):0 Protein: 0g  # Meals Ordered from Kitchen: 3 meals   # Meals Recorded: 3 meals (First - 100% coffee, less than 25% German toast w/ butter & syrup, 2 malik strips)       (Second - less than 25% beef pot roast w/ gravy, dinner roll)       (Third - 100% hot black tea, 50% deli ham & swiss sandwich w/ lettuce, tomato & rutherford, 25% fruit cup, ice cream)   # Supplements Recorded: 0

## 2025-06-06 NOTE — PROGRESS NOTES
[facility]    Progress Note - Medicine Service, [facility] TEAM 2       Date of Admission:  5/25/2025    Assessment & Plan   Sarah Felix is a 79 year old female with history of insulin-dependent DM2, HTN, fatty liver, possible alcohol use disorder, gait instability who presented 5/25 with generalized weakness and was admitted with DKA and sepsis, 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 Course c/b C diff enteritis; clinically improving with PO vancomycin.      Today's updates:  - Decreased appetite today, less calorie intake  - Will c/w TPN for now  - Appreciate SW help with TCU placement  - Will add retic count and PBS for her anemia work up    Moderate malnutrition in the context of chronic illness   Continued elevated ketones despite insulin ggt. Elevation likely due to poor oral intake due to encephalopathy and starvation ketosis. Pt was started on TPN this admission, she has been on calorie count and has improved on her oral intake, expect dc'ing TPN next few days.   - Continue TPN   - SLP: regular diet with thin liquids  - On calorie count     Acute anemia in the setting of ACD  Her PTA hemoglobin levels were within normal limits. Since admission, they have been steadily decreasing. 6/2 Hb was 7.0. Iron panel and B12 were ordered 6/2 and are c/w anemia of chronic disease. Initial concern for bleeding in urine, but repeat UA negative for blood & RBCs. No obvious source of hemorrhage. T bili and haptoglobin normal, pointing away from hemolysis.   - Continue trending Hb daily  - Consider transfusion if Hb <7  - Pt on therapeutic Lovenox dose, has not required blood transfusion this admission, no report of bleeding, stayed HDS, curbsided hematology for next steps, will add retic count and PBS,   - folate, vit b12, hapto wnl    Hepatic vein thrombus  Essential thrombocythemia (JAK2 positive), high risk disease   No prior imaging to compare. Per chart review,  "history of \"thromboembolism\" after ovarian cyst removal age 22. Liver panel normal.  - IR consulted; no indication for lytics  - Heme consulted  - Heparin gtt 5/25-5/27  - Lovenox 5/27-present  - At discharge transition to apixaban 10mg BID x 7 days then 5 mg BID  - Follow up with Dr. Wilde 7/29/25  - Continue hydroxyurea  - discontinue'ed PTA ASA      Enteritis possibly 2/2 C. Diff, resolved  Ileus, resolved  Initially presented septic but with negative workup including urine culture, blood cultures, CT C/A/P. Had ongoing worsening encephalopathy and leukocytosis on vanc/zosyn -> unasyn. Developed diarrhea and abdominal pain, stool with C diff PCR + but A toxin negative. C diff B toxin positive, will consult GI for input as to whether enteritis can be associated with this C. Diff presentation. Given clinical worsening, decided to empirically treat - now with improvement. Nausea/vomiting managed with NG decompression. Tube removed 6/1.  - Abx:  - PO vancomycin 125mg daily (5/28-6/5)   - Unasyn (5/27-5/29)  - Zosyn (5/25-5/27)              - Vancomycin (5/25-5/26)  - GI consult for potential workup of enteritis given uncommon presentation of C. Diff              - C. Diff retested and continues to show positive antigen and B toxin, but negative A toxin              - Outpatient MRI/MRCP to evaluate the pancreatic and hepatic cysts  - PO pantoprazole daily     Diabetic ketoacidosis on admission, resolved  Type 2 DM complicated by neuropathy- A1c 8%  T2DM with A1c 8.0%. Likely triggered by sepsis. Now off insulin gtt.  - Endocrine consulted, managing glargine + regular insulin in TPN + novolog  - Hold PTA metformin, glipizide, possible discharge only on insulin  - Diabetes ed consult    Stable/chronic/resolved conditions:  Dysphagia, resolved  Recurrent vomiting, resolved  Concern for aspiration from bedside RN. Feeding tube may have been preventing proper swallowing, so this was removed. On 6/4/25, SLP updated diet " order back to regular diet with thin liquids.   - NGT removed 6/1  - SLP: Regular diet with thin liquids     Urinary retention  Continues to have urinary retention after removing the costa. Will start tamsulosin and continue to monitor.  - Continue straight cath if bladder scan >600cc, may need costa placed if continuing to retain  - Tamsulosin daily     Abnormal UA  Repeat UA on 6/1 showed cloudy urine, proteinuria, hematuria, and leukocyte esterase. She was started on Augmentin given these findings. These UA findings are mostly consistent with her UA from 5/25. This is likely not due to a UTI given that a UTI would have been resolved with her Zosyn and Unasyn abx course, thus Augmentin was discontinued. The hematuria is likely 2/2 repeated catheter insertions. Repeat UA non-concerning for infection or hematuria. Urine culture growing >100,000CFU/mL Raoultella ornithinolytica and Psuedomonas aeruginosa.   - Consider treatment if symptomatic; patient currently asymptomatic     Concern for refeeding syndrome, resolved  Decrease in magnesium and phosphorus after beginning TPN, possible refeeding syndrome.  - BMP daily  - RN replacement protocols     Pleural effusions  Pulmonary edema  Anasarca  Seen on 5/28 CT C/A/P. TTE with LVEF 60-65%, small IVC.   - PTA 20mg Lasix daily     Toxic metabolic encephalopathy, resolved   Not oriented to month or year. Likely related to sepsis vs hospital acquired delirium. No acute intracranial findings on CT head.   - Wernicke dose thiamine  - Delirium precautions     Possible alcohol use disorder  Reports 2 drinks/day. Did have an ED visit 03/2025 for alcohol intoxication. Reports last drink 5 days PTA, low concern for withdrawal. Her brother, Cruz, has concerns that she is drinking excessively and encourages addiction medicine involvement.  - Addiction medicine consulted 6/3/25   - Patient denied any problems with alcohol use  - PTA folate  - Continue thiamine, Wernicke dosing    "  Sacrum/coccyx moisture wound  Erythematous, blanchable area on her sacrum. Bedside RN says likely from excessive moisture 2/2 fecal incontinence. 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  - WOC following, appreciate recommendations     \"Prominent pericardial contents\"  Cardiology reviewed- this is an epicardial fat pad. No further workup or consult needed.     Generalized weakness, failure to thrive  TSH wnl.   - Ongoing PT/OT consults   - Ongoing evaluation for safe discharge plan     HTN: Hold PTA metoprolol, lisinopril given sepsis and normotension     Fall at home: CTH, C spine negative for acute process/fracture     Elevated INR: Unclear etiology, no known liver disease. Will hold off on Vit K given new thrombus.     Essential thrombocytosis: Stop PTA aspirin per heme, continue PTA hydroxyurea     Chronic/stable:     Glaucoma: PTA latanoprost  CKD2  Diabetic neuropathy  Schatzki's ring  Slow transit constipation  Mixed urge and stress incontinence  Gait instability           Diet: Room Service  Snacks/Supplements Adult: Other; Strawberry Glucerna with bkf, lunch and dinner trays daily; With Meals  Calorie Counts  Regular Diet Adult Thin Liquids (level 0)  parenteral nutrition - ADULT compounded formula CYCLE  parenteral nutrition - ADULT compounded formula CYCLE    DVT Prophylaxis: Enoxaparin (Lovenox) SQ  Silver Catheter: Not present  Fluids: None  Lines: PRESENT             Cardiac Monitoring: None  Code Status: Full Code      Clinically Significant Risk Factors               # Hypoalbuminemia: Lowest albumin = 2.3 g/dL at 6/2/2025  6:23 AM, will monitor as appropriate     # Hypertension: Noted on problem list           # DMII: A1C = 8.0 % (Ref range: <5.7 %) within past 6 months    # Severe Malnutrition: based on nutrition assessment and treatment provided per dietitian's recommendations.      # Financial/Environmental Concerns: none         Social Drivers of Health   Food " Insecurity: Unknown (5/28/2025)    Food Insecurity     Within the past 12 months, did you worry that your food would run out before you got money to buy more?: Patient unable to answer     Within the past 12 months, did the food you bought just not last and you didn t have money to get more?: Patient unable to answer   Housing Stability: Unknown (5/28/2025)    Housing Stability     Do you have housing? : Patient unable to answer     Are you worried about losing your housing?: Patient unable to answer   Tobacco Use: Medium Risk (4/9/2025)    Patient History     Smoking Tobacco Use: Former     Smokeless Tobacco Use: Never     Passive Exposure: Past   Financial Resource Strain: Unknown (5/28/2025)    Financial Resource Strain     Within the past 12 months, have you or your family members you live with been unable to get utilities (heat, electricity) when it was really needed?: Patient unable to answer   Transportation Needs: Unknown (5/28/2025)    Transportation Needs     Within the past 12 months, has lack of transportation kept you from medical appointments, getting your medicines, non-medical meetings or appointments, work, or from getting things that you need?: Patient unable to answer   Interpersonal Safety: Unknown (5/28/2025)    Interpersonal Safety     Do you feel physically and emotionally safe where you currently live?: Patient unable to answer     Within the past 12 months, have you been hit, slapped, kicked or otherwise physically hurt by someone?: Patient unable to answer     Within the past 12 months, have you been humiliated or emotionally abused in other ways by your partner or ex-partner?: Patient unable to answer         Disposition Plan     Medically Ready for Discharge: Anticipated in 2-4 Days           Laya Appiah MD  Medicine Service, [facility] TEAM 2  [facility]  Securely message with [employer] (more info)  Text page via [facility] Paging/Directory    See signed in provider for up to date coverage information  ______________________________________________________________________    Interval History   No major event overnight, BM improving, patient not in good spirits, not a major change from yesterday though, feels tired, decreased appetite, stable otherwise.     Physical Exam   Vital Signs: Temp: 97.4  F (36.3  C) Temp src: Oral BP: 124/58 Pulse: 101   Resp: 18 SpO2: 93 % O2 Device: None (Room air)    Weight: 152 lbs 6.4 oz    Gen: A&Ox4, lying comfortably on bed, in no distress,  Abd: soft, non-tender, non-distended  Ext: No edema in upper/lower extremities      Medical Decision Making       40 MINUTES SPENT BY ME on the date of service doing chart review, history, exam, documentation & further activities per the note.      Data     I have personally reviewed the following data over the past 24 hrs:    7.7  \   7.3 (L)   / 356     136 107 25.7 (H) /  161 (H)   4.9 22 0.60 \       Imaging results reviewed over the past 24 hrs:   No results found for this or any previous visit (from the past 24 hours).

## 2025-06-06 NOTE — PLAN OF CARE
Shift Hours: 1900 - 0700    Assessment:  Body systems assessments were at patient's baseline.        Activity     Fall Risk Score: 95   Bed alarm on? Yes     Activity Assistance Provided: assistance, 2 people      Assistive Device Utilized: gait belt    Pain: Denies    Labs/RN Managed Protocols: On High K+, Mg++, and phos replacement protocol. BG checks Q4H with sliding scale. Hgb was 6.9 yesterday and no transfusion, pending recheck this morning.     Lines/Drains: R tripple Lumen PICC with cycled TPN/Lipids infusing via white lumen.     Nutrition: TPN/lipids, Regular diet, and Donta counts 6/4-6/6.    Goal Outcome Evaluation  Plan of Care Reviewed With: patient  Overall Patient Progress: no change  Outcome Evaluation: Pt is alert and oriented x person and place, but disoriented to time and situation, intermittently forgetful, flat affect, and impulsive at times. Bed alarm activated for safety. vitally stable on room air. No BM this shift. incontinent of urine at times. Perineum redness, barrier cream applied, coccyx cleft excoriation with Mepilex on. R tripple Lumen PICC with cycled TPN/Lipids infusing via white lumen. BG checks Q4H. Up with assist of 1 with BG and walker to commode. Continue to monitor pt and follow POC    Barriers to Discharge:   Unstable Hgb requiring close monitoring, and OT recommending TCU.

## 2025-06-07 LAB
ABO + RH BLD: NORMAL
BASOPHILS # BLD AUTO: 0.1 10E3/UL (ref 0–0.2)
BASOPHILS NFR BLD AUTO: 1 %
BLD GP AB SCN SERPL QL: NEGATIVE
BLD PROD TYP BPU: NORMAL
BLOOD COMPONENT TYPE: NORMAL
CODING SYSTEM: NORMAL
CROSSMATCH: NORMAL
EOSINOPHIL # BLD AUTO: 0.2 10E3/UL (ref 0–0.7)
EOSINOPHIL NFR BLD AUTO: 3 %
ERYTHROCYTE [DISTWIDTH] IN BLOOD BY AUTOMATED COUNT: 23.2 % (ref 10–15)
GLUCOSE BLDC GLUCOMTR-MCNC: 142 MG/DL (ref 70–99)
GLUCOSE BLDC GLUCOMTR-MCNC: 154 MG/DL (ref 70–99)
GLUCOSE BLDC GLUCOMTR-MCNC: 158 MG/DL (ref 70–99)
GLUCOSE BLDC GLUCOMTR-MCNC: 162 MG/DL (ref 70–99)
GLUCOSE BLDC GLUCOMTR-MCNC: 163 MG/DL (ref 70–99)
GLUCOSE BLDC GLUCOMTR-MCNC: 178 MG/DL (ref 70–99)
GLUCOSE BLDC GLUCOMTR-MCNC: 211 MG/DL (ref 70–99)
HCT VFR BLD AUTO: 21.9 % (ref 35–47)
HGB BLD-MCNC: 6.9 G/DL (ref 11.7–15.7)
IMM GRANULOCYTES # BLD: 0 10E3/UL
IMM GRANULOCYTES NFR BLD: 1 %
ISSUE DATE AND TIME: NORMAL
LYMPHOCYTES # BLD AUTO: 0.9 10E3/UL (ref 0.8–5.3)
LYMPHOCYTES NFR BLD AUTO: 12 %
MAGNESIUM SERPL-MCNC: 1.9 MG/DL (ref 1.7–2.3)
MCH RBC QN AUTO: 28.5 PG (ref 26.5–33)
MCHC RBC AUTO-ENTMCNC: 31.5 G/DL (ref 31.5–36.5)
MCV RBC AUTO: 91 FL (ref 78–100)
MONOCYTES # BLD AUTO: 0.4 10E3/UL (ref 0–1.3)
MONOCYTES NFR BLD AUTO: 6 %
NEUTROPHILS # BLD AUTO: 6.3 10E3/UL (ref 1.6–8.3)
NEUTROPHILS NFR BLD AUTO: 79 %
NRBC # BLD AUTO: 0 10E3/UL
NRBC BLD AUTO-RTO: 0 /100
PHOSPHATE SERPL-MCNC: 3.8 MG/DL (ref 2.5–4.5)
PLATELET # BLD AUTO: 357 10E3/UL (ref 150–450)
POTASSIUM SERPL-SCNC: 4.2 MMOL/L (ref 3.4–5.3)
RBC # BLD AUTO: 2.42 10E6/UL (ref 3.8–5.2)
RETICS # AUTO: 0.13 10E6/UL (ref 0.03–0.1)
RETICS/RBC NFR AUTO: 5.5 % (ref 0.5–2)
SPECIMEN EXP DATE BLD: NORMAL
UNIT ABO/RH: NORMAL
UNIT NUMBER: NORMAL
UNIT STATUS: NORMAL
UNIT TYPE ISBT: 5100
WBC # BLD AUTO: 7.9 10E3/UL (ref 4–11)

## 2025-06-07 PROCEDURE — 250N000013 HC RX MED GY IP 250 OP 250 PS 637

## 2025-06-07 PROCEDURE — 250N000013 HC RX MED GY IP 250 OP 250 PS 637: Performed by: STUDENT IN AN ORGANIZED HEALTH CARE EDUCATION/TRAINING PROGRAM

## 2025-06-07 PROCEDURE — 85060 BLOOD SMEAR INTERPRETATION: CPT | Performed by: PATHOLOGY

## 2025-06-07 PROCEDURE — 84100 ASSAY OF PHOSPHORUS: CPT | Performed by: INTERNAL MEDICINE

## 2025-06-07 PROCEDURE — B4185 PARENTERAL SOL 10 GM LIPIDS: HCPCS | Performed by: STUDENT IN AN ORGANIZED HEALTH CARE EDUCATION/TRAINING PROGRAM

## 2025-06-07 PROCEDURE — 83735 ASSAY OF MAGNESIUM: CPT | Performed by: INTERNAL MEDICINE

## 2025-06-07 PROCEDURE — P9016 RBC LEUKOCYTES REDUCED: HCPCS

## 2025-06-07 PROCEDURE — 84132 ASSAY OF SERUM POTASSIUM: CPT | Performed by: INTERNAL MEDICINE

## 2025-06-07 PROCEDURE — 250N000011 HC RX IP 250 OP 636: Performed by: INTERNAL MEDICINE

## 2025-06-07 PROCEDURE — 86901 BLOOD TYPING SEROLOGIC RH(D): CPT

## 2025-06-07 PROCEDURE — 120N000002 HC R&B MED SURG/OB UMMC

## 2025-06-07 PROCEDURE — 250N000013 HC RX MED GY IP 250 OP 250 PS 637: Performed by: INTERNAL MEDICINE

## 2025-06-07 PROCEDURE — 85004 AUTOMATED DIFF WBC COUNT: CPT

## 2025-06-07 PROCEDURE — 86923 COMPATIBILITY TEST ELECTRIC: CPT

## 2025-06-07 PROCEDURE — 250N000009 HC RX 250: Performed by: STUDENT IN AN ORGANIZED HEALTH CARE EDUCATION/TRAINING PROGRAM

## 2025-06-07 PROCEDURE — 85045 AUTOMATED RETICULOCYTE COUNT: CPT

## 2025-06-07 PROCEDURE — 99232 SBSQ HOSP IP/OBS MODERATE 35: CPT | Mod: GC | Performed by: STUDENT IN AN ORGANIZED HEALTH CARE EDUCATION/TRAINING PROGRAM

## 2025-06-07 RX ORDER — MAGNESIUM OXIDE 400 MG/1
400 TABLET ORAL EVERY 4 HOURS
Status: COMPLETED | OUTPATIENT
Start: 2025-06-07 | End: 2025-06-07

## 2025-06-07 RX ADMIN — Medication 3 MG: at 21:24

## 2025-06-07 RX ADMIN — PANTOPRAZOLE SODIUM 40 MG: 40 TABLET, DELAYED RELEASE ORAL at 08:06

## 2025-06-07 RX ADMIN — MAGNESIUM OXIDE TAB 400 MG (241.3 MG ELEMENTAL MG) 400 MG: 400 (241.3 MG) TAB at 12:27

## 2025-06-07 RX ADMIN — MAGNESIUM OXIDE TAB 400 MG (241.3 MG ELEMENTAL MG) 400 MG: 400 (241.3 MG) TAB at 10:21

## 2025-06-07 RX ADMIN — HYDROXYUREA 500 MG: 15 SOLUTION ORAL at 10:28

## 2025-06-07 RX ADMIN — TAMSULOSIN HYDROCHLORIDE 0.4 MG: 0.4 CAPSULE ORAL at 10:21

## 2025-06-07 RX ADMIN — Medication 25 MG: at 10:21

## 2025-06-07 RX ADMIN — ENOXAPARIN SODIUM 50 MG: 60 INJECTION SUBCUTANEOUS at 21:24

## 2025-06-07 RX ADMIN — HYDROCORTISONE: 1 CREAM TOPICAL at 10:21

## 2025-06-07 RX ADMIN — Medication 12.5 MG: at 21:36

## 2025-06-07 RX ADMIN — LATANOPROST 1 DROP: 50 SOLUTION/ DROPS OPHTHALMIC at 21:37

## 2025-06-07 RX ADMIN — ENOXAPARIN SODIUM 50 MG: 60 INJECTION SUBCUTANEOUS at 10:21

## 2025-06-07 RX ADMIN — FUROSEMIDE 20 MG: 20 TABLET ORAL at 10:21

## 2025-06-07 RX ADMIN — SMOFLIPID 250 ML: 6; 6; 5; 3 INJECTION, EMULSION INTRAVENOUS at 21:21

## 2025-06-07 RX ADMIN — THIAMINE HCL TAB 100 MG 100 MG: 100 TAB at 10:21

## 2025-06-07 RX ADMIN — ATORVASTATIN CALCIUM 40 MG: 40 TABLET, FILM COATED ORAL at 10:21

## 2025-06-07 RX ADMIN — Medication 400 MCG: at 10:21

## 2025-06-07 RX ADMIN — HYDROCORTISONE: 1 CREAM TOPICAL at 21:27

## 2025-06-07 RX ADMIN — MAGNESIUM SULFATE HEPTAHYDRATE: 500 INJECTION, SOLUTION INTRAMUSCULAR; INTRAVENOUS at 21:12

## 2025-06-07 RX ADMIN — FUROSEMIDE 20 MG: 20 TABLET ORAL at 16:28

## 2025-06-07 ASSESSMENT — ACTIVITIES OF DAILY LIVING (ADL)
ADLS_ACUITY_SCORE: 59
ADLS_ACUITY_SCORE: 62
ADLS_ACUITY_SCORE: 63
ADLS_ACUITY_SCORE: 60
ADLS_ACUITY_SCORE: 59
ADLS_ACUITY_SCORE: 63
ADLS_ACUITY_SCORE: 71
ADLS_ACUITY_SCORE: 62
ADLS_ACUITY_SCORE: 59
ADLS_ACUITY_SCORE: 63
ADLS_ACUITY_SCORE: 62
ADLS_ACUITY_SCORE: 63
ADLS_ACUITY_SCORE: 71
ADLS_ACUITY_SCORE: 59
ADLS_ACUITY_SCORE: 63
ADLS_ACUITY_SCORE: 63
ADLS_ACUITY_SCORE: 59

## 2025-06-07 NOTE — PROGRESS NOTES
"                       Inpatient Diabetes Management Service: Daily Progress Note     HPI: Sarah Felix is a 79 year old female with history of insulin-dependent DM2, HTN, fatty liver, possible alcohol use disorder, gait instability who presented 27 with generalized weakness and was admitted with DKA and sepsis, 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 Course complicated by C diff enteritis.     IDS consulted to assist with glycemic management - specifically for concerns of \"DKA\" as well as optimization while inpatient and when applicable, recommendations for transition home.         Assessment/Plan:     Assessment:    Type 2 Diabetes Mellitus, c/b neuropathy.  (A1c 8 % on 25) in presence of anemia and complex illness  DKA on admission (although not meeting all criteria/multifactorial).  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   Toxic metabolic encephalopathy  Stress / TPN induced hyperglycemia  C Diff Toxin PCR positive and C diff toxin negative, enteririts  BMI: 26     Plan/Recommendations:     - Continue with lantus 5 units q 24 hrs at 1200  - Continue with Regular insulin in TPN cycled 12 hrs: to 30 units with Dextrose 150 g (0.20 units per gram dextrose)  - Continue with Novolog carbohydrate coverage: 1 unit per 15 g cho TID AC and prn snacks/supplements  - Continue with Novolog High resistance correction scale:  >140 2000/0000/0400 during TPN   - Continue with to Medium resistance 0800/1200/1600 when TPN off   - BG monitoring q 4 hours while minimal PO intake/TPN cycling   - PTA medications: holding metformin and glimepiride   - Hypoglycemia protocol  - Carb counting protocol     Discussion:    Glycemia is well controlled, no changes are needed. Chart was reviewed in detail.        Discharge Planning: (tentative)    Medications: Basal bolus (discharge on insulin " "only)   Test Claims:  none needed.   Education:  Needs to be assessed closer to discharge.    Outpatient Follow-up:  recommend [facility] vs PCP        Please notify Inpatient Diabetes Service if changes are planned to steroids, nutrition, TPN/TF and anticipated procedures requiring prolonged NPO status.         Interval History/Review of Systems :   - The last 24 hours progress and nursing notes reviewed.    Planned Procedures/Surgeries: none  f  Inpatient Glucose Control:       Recent Labs   Lab 06/07/25  1216 06/07/25  0804 06/07/25  0401 06/07/25  0053 06/06/25  2120 06/06/25  1633   * 142* 154* 163* 208* 175*           Medications Impacting Glycemia:   Steroids: none  D5W containing solutions/medications: none  Other medications impacting glucose: none        Nutrition:   Orders Placed This Encounter      Regular Diet Adult Thin Liquids (level 0)  Supplements:  Glucerna strawberry TID AC meals  TF: None  TPN:   Started 5/28 - 5/29: Regular insulin in continuous TPN 6 units with Dextrose 120 g     6/4: 18 hr cyclic (6767-7854): TPN with 150 g dextrose and 20 units of reg insulin  6/5:  Regular insulin in TPN cycled 18 hrs (2000 - 1400): 24 units with Dextrose 150 g (0.16 units per gram dextrose)   6/6-7: Regular insulin in TPN cycled 18 hrs (2000 - 1400): 30 units with Dextrose 150 g (0.20 units per gram dextrose)        Diabetes History: see full consult note for complete diabetes history   Diabetes Type and Duration: DM2, 7/2009     GAD65 antibody <5 (9/11/2012)   C peptide 7.6 (9/11/2012)     PTA Medication Regimen: Glimepiride 1 mg daily, Novolog 5 units with high carb meal daily as needed, metformin 500 mg daily    Missing doses?: uncertain  Historical Diabetes Medications: N/A     Glucose monitoring device/frequency/trends: Fortino CGM, reports 130 on average  Hypoglycemia PTA:   - Frequency: \"rarely\"  - Severity:  no history of severe unconscious lows   - Awareness:  intact    - " "Treatment: \"eats food\"      Outpatient Diabetes Provider: Unknown  Formal Diabetes Education/Educator: Erik        Physical Exam:   /51 (BP Location: Left arm)   Pulse 110   Temp 98.1  F (36.7  C) (Oral)   Resp 16   Wt 68.1 kg (150 lb 2.1 oz)   SpO2 98%   BMI 25.77 kg/m    Patient is not seen in person.          Data:     Lab Results   Component Value Date    A1C 8.0 (H) 05/25/2025    A1C 7.5 (H) 11/05/2024    A1C 7.6 (H) 07/09/2024    A1C 6.6 (H) 03/05/2024    A1C 7.4 (H) 11/14/2023    A1C 8.3 (H) 06/21/2021    A1C 9.4 (H) 12/14/2020    A1C 8.1 (H) 09/21/2020    A1C 8.5 (H) 01/07/2020    A1C 7.6 (H) 10/12/2019       ROUTINE IP LABS (Last four results)  BMP  Recent Labs   Lab 06/07/25  1216 06/07/25  0804 06/07/25  0439 06/07/25  0401 06/07/25  0053 06/06/25  0608 06/06/25  0548 06/05/25  0811 06/05/25  0547 06/04/25  0803 06/04/25  0449 06/02/25  0752 06/02/25  0623 06/01/25  0829 06/01/25  0634   NA  --   --   --   --   --   --  136  --   --   --  136  --  135  --   --    POTASSIUM  --   --  4.2  --   --   --  4.9  --  4.3  --  4.4   < > 4.0  --  4.3   CHLORIDE  --   --   --   --   --   --  107  --   --   --  107  --  106  --   --    CECY  --   --   --   --   --   --  8.0*  --   --   --  7.8*  --  7.8*  --   --    CO2  --   --   --   --   --   --  22  --   --   --  23  --  22  --   --    BUN  --   --   --   --   --   --  25.7*  --   --   --  18.9  --  20.7  --   --    CR  --   --   --   --   --   --  0.60  --   --   --  0.59  --  0.60  --  0.66   * 142*  --  154* 163*   < > 294*   < >  --    < > 159*   < > 165*   < >  --     < > = values in this interval not displayed.     CBC  Recent Labs   Lab 06/07/25  0439 06/06/25  0548 06/05/25  0547 06/04/25  0823 06/04/25  0449   WBC 7.9 7.7 9.1  --  8.2   RBC 2.42* 2.61* 2.47*  --  2.50*   HGB 6.9* 7.3* 6.9* 8.6* 6.9*   HCT 21.9* 23.7* 22.1*  --  21.7*   MCV 91 91 90 88 87   MCH 28.5 28.0 27.9  --  27.6   MCHC 31.5 30.8* 31.2*  --  31.8   RDW 23.2* " 22.1* 20.7*  --  19.8*    356 424  --  445     Inpatient Diabetes Service will continue to follow, please don't hesitate to contact the team with any questions or concerns.     Mariana Phillips MD   Endocrinology Fellow, PGY-4    Page # [phone]  On Vocera     Case was discussed and reviewed with Endocrinology Attending Dr. Holguin      Patient was not seen by endocrine team today. I have reviewed records, discussed patient's care and edited the fellow's note. I agree with the plan of care documented.    Dania Holguin MD   Division of Diabetes, Endocrinology and Metabolism  Department of Medicine      To contact Inpatient Diabetes Service:     7 AM - 5 PM: Page the IDS MIKE following the patient that day (see filed or incomplete progress notes/consult notes under Endocrinology)    OR if uncertain of provider assignment: page job code [phone]    5 PM - 7 AM: First call after hours is to primary service.    For urgent after-hours questions, page job code for on call fellow: [phone]     Please notify inpatient diabetes service if changes are planned to steroids, nutrition, or if procedures are planned requiring prolonged NPO status.Diabetes Management Team job code: [phone]

## 2025-06-07 NOTE — PROGRESS NOTES
Calorie Count  Intake recorded for: 6/6  Total Kcals: 964 Total Protein: 23g  Kcals from Hospital Food: 964   Protein: 23g  Kcals from Outside Food (average):0 Protein: 0g  # Meals Ordered from Kitchen: 3  # Meals Recorded: 3 meals recorded   Meal 1: 100% hot black tea; 50% 8 oz orange juice   Meal 2: 100% fresh fruit cup; 75% rice krispies bar   Meal 3: 100% BLT sandwich on wheat toast, fresh fruit cup, regular [employer]; 50% chocolate chip cookie  # Supplements Recorded: 0

## 2025-06-07 NOTE — PLAN OF CARE
Shift Hours: 1900 - 0700    Assessment:  Body systems assessments were at patient's baseline.        Activity     Fall Risk Score: 95   Bed alarm on? Yes     Activity Assistance Provided: assistance, 2 people      Assistive Device Utilized: gait belt, walker    Pain: denies    Labs/RN Managed Protocols: CBC and BMP pending results    Lines/Drains: triple lumen PICC right upper arm    Nutrition: xxx    Goal Outcome Evaluation  Plan of Care Reviewed With: patient  Overall Patient Progress: no change  Outcome Evaluation: Patient at beginning of shift needing extensive assist of two to transfer out of bed.  at midnight ambulated with one to bathroom.  Denies pain.  Urine cloudy.    Barriers to Discharge:   Weakness.  Refusing PT/OT due to tiredness

## 2025-06-07 NOTE — PROGRESS NOTES
[facility]    Progress Note - Medicine Service, [facility] TEAM 2       Date of Admission:  5/25/2025    Assessment & Plan   Sarah Felix is a 79 year old female with history of insulin-dependent DM2, HTN, fatty liver, possible alcohol use disorder, gait instability who presented 5/25 with generalized weakness and was admitted with DKA and sepsis, 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 Course c/b C diff enteritis treated with PO vancomycin.      Today's updates:  - Transfuse 1u pRBC   - Psychology consult  - Resume PTA metoprolol tartrate 12.5mg BID   - Hold if MAP less than 65 or heart rate less than 60bpm  - Continue TPN, oral intake improving     Moderate malnutrition in the context of chronic illness   Continued elevated ketones despite insulin ggt. Elevation likely due to poor oral intake due to encephalopathy and starvation ketosis. Pt was started on TPN this admission, she has been on calorie count and has improved on her oral intake, expect discontinuing TPN in the next few days.   - Continue TPN   - SLP: regular diet with thin liquids  - On calorie counts     Acute anemia in the setting of ACD  Her PTA hemoglobin levels were within normal limits. Since admission, they have been steadily decreasing. 6/2 Hb was 7.0. Iron panel and B12 were ordered 6/2 and are c/w anemia of chronic disease. Initial concern for bleeding in urine, but repeat UA negative for blood & RBCs. No obvious source of hemorrhage. T bili and haptoglobin normal, pointing away from hemolysis.   - Continue trending Hb daily  - Consider transfusion if Hb <7  - Pt on therapeutic Lovenox dose, has not required blood transfusion this admission, no report of bleeding, stayed HDS, curbsided hematology for next steps, will add retic count and PBS  - folate, vit b12, hapto wnl     Hepatic vein thrombus  Essential thrombocythemia (JAK2 positive), high risk disease   No prior imaging to compare.  "Per chart review, history of \"thromboembolism\" after ovarian cyst removal age 22. Liver panel normal.  - IR consulted; no indication for lytics  - Heme consulted  - Heparin gtt 5/25-5/27  - Lovenox 5/27-present  - At discharge transition to apixaban 10mg BID x 7 days then 5 mg BID  - Follow up with Dr. Wilde 7/29/25  - Continue hydroxyurea  - discontinue'ed PTA ASA      Enteritis possibly 2/2 C. Diff, resolved  Ileus, resolved  Initially presented septic but with negative workup including urine culture, blood cultures, CT C/A/P. Had ongoing worsening encephalopathy and leukocytosis on vanc/zosyn -> unasyn. Developed diarrhea and abdominal pain, stool with C diff PCR + but A toxin negative. C diff B toxin positive, will consult GI for input as to whether enteritis can be associated with this C. Diff presentation. Given clinical worsening, decided to empirically treat - now with improvement. Nausea/vomiting managed with NG decompression. Tube removed 6/1.  - Abx:  - PO vancomycin 125mg daily (5/28-6/5)   - Unasyn (5/27-5/29)  - Zosyn (5/25-5/27)              - Vancomycin (5/25-5/26)  - GI consult for potential workup of enteritis given uncommon presentation of C. Diff              - C. Diff retested and continues to show positive antigen and B toxin, but negative A toxin              - Outpatient MRI/MRCP to evaluate the pancreatic and hepatic cysts  - PO pantoprazole daily     Diabetic ketoacidosis on admission, resolved  Type 2 DM complicated by neuropathy- A1c 8%  T2DM with A1c 8.0%. Likely triggered by sepsis. Now off insulin gtt.  - Endocrine consulted, managing glargine + regular insulin in TPN + novolog  - Hold PTA metformin, glipizide, possible discharge only on insulin  - Diabetes ed consult     Stable/chronic/resolved conditions:  Dysphagia, resolved  Recurrent vomiting, resolved  Concern for aspiration from bedside RN. Feeding tube may have been preventing proper swallowing, so this was removed. On 6/4/25, " SLP updated diet order back to regular diet with thin liquids.   - NGT removed 6/1  - SLP: Regular diet with thin liquids     Urinary retention  Continues to have urinary retention after removing the costa. Will start tamsulosin and continue to monitor.  - Continue straight cath if bladder scan >600cc, may need costa placed if continuing to retain  - Tamsulosin daily     Abnormal UA  Repeat UA on 6/1 showed cloudy urine, proteinuria, hematuria, and leukocyte esterase. She was started on Augmentin given these findings. These UA findings are mostly consistent with her UA from 5/25. This is likely not due to a UTI given that a UTI would have been resolved with her Zosyn and Unasyn abx course, thus Augmentin was discontinued. The hematuria is likely 2/2 repeated catheter insertions. Repeat UA non-concerning for infection or hematuria. Urine culture growing >100,000CFU/mL Raoultella ornithinolytica and Psuedomonas aeruginosa.   - Consider treatment if symptomatic; patient currently asymptomatic     Concern for refeeding syndrome, resolved  Decrease in magnesium and phosphorus after beginning TPN, possible refeeding syndrome.  - BMP daily  - RN replacement protocols     Pleural effusions  Pulmonary edema  Anasarca  Seen on 5/28 CT C/A/P. TTE with LVEF 60-65%, small IVC.   - PTA 20mg Lasix daily     Toxic metabolic encephalopathy, resolved   Not oriented to month or year. Likely related to sepsis vs hospital acquired delirium. No acute intracranial findings on CT head.   - Wernicke dose thiamine  - Delirium precautions     Possible alcohol use disorder  Reports 2 drinks/day. Did have an ED visit 03/2025 for alcohol intoxication. Reports last drink 5 days PTA, low concern for withdrawal. Her brother, Cruz, has concerns that she is drinking excessively and encourages addiction medicine involvement.  - Addiction medicine consulted 6/3/25              - Patient denied any problems with alcohol use  - PTA folate  - Continue  "thiamine, Wernicke dosing     Sacrum/coccyx moisture wound  Erythematous, blanchable area on her sacrum. Bedside RN says likely from excessive moisture 2/2 fecal incontinence. 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  - WOC following, appreciate recommendations     \"Prominent pericardial contents\"  Cardiology reviewed- this is an epicardial fat pad. No further workup or consult needed.     Generalized weakness, failure to thrive  TSH wnl.   - Ongoing PT/OT consults   - Ongoing evaluation for safe discharge plan     HTN: Hold PTA metoprolol, lisinopril given sepsis and normotension     Fall at home: CTH, C spine negative for acute process/fracture     Elevated INR: Unclear etiology, no known liver disease. Will hold off on Vit K given new thrombus.     Essential thrombocytosis: Stop PTA aspirin per heme, continue PTA hydroxyurea    Glaucoma: PTA latanoprost    CKD2    Diabetic neuropathy    Schatzki's ring    Slow transit constipation    Mixed urge and stress incontinence    Gait instability           Diet: Room Service  Snacks/Supplements Adult: Other; Strawberry Glucerna with bkf, lunch and dinner trays daily; With Meals  Regular Diet Adult Thin Liquids (level 0)  parenteral nutrition - ADULT compounded formula CYCLE  parenteral nutrition - ADULT compounded formula CYCLE    DVT Prophylaxis: Enoxaparin (Lovenox) SQ  Silver Catheter: Not present  Fluids: None  Lines: PRESENT      PICC 05/26/25 Triple Lumen Right Basilic Access. PICC was ready to use, no immediate concern.-Site Assessment: WDL      Cardiac Monitoring: None  Code Status: Full Code      Clinically Significant Risk Factors               # Hypoalbuminemia: Lowest albumin = 2.3 g/dL at 6/2/2025  6:23 AM, will monitor as appropriate     # Hypertension: Noted on problem list           # DMII: A1C = 8.0 % (Ref range: <5.7 %) within past 6 months    # Severe Malnutrition: based on nutrition assessment and treatment provided per " dietitian's recommendations.    # Financial/Environmental Concerns: none         Social Drivers of Health   Food Insecurity: Unknown (5/28/2025)    Food Insecurity     Within the past 12 months, did you worry that your food would run out before you got money to buy more?: Patient unable to answer     Within the past 12 months, did the food you bought just not last and you didn t have money to get more?: Patient unable to answer   Housing Stability: Unknown (5/28/2025)    Housing Stability     Do you have housing? : Patient unable to answer     Are you worried about losing your housing?: Patient unable to answer   Tobacco Use: Medium Risk (4/9/2025)    Patient History     Smoking Tobacco Use: Former     Smokeless Tobacco Use: Never     Passive Exposure: Past   Financial Resource Strain: Unknown (5/28/2025)    Financial Resource Strain     Within the past 12 months, have you or your family members you live with been unable to get utilities (heat, electricity) when it was really needed?: Patient unable to answer   Transportation Needs: Unknown (5/28/2025)    Transportation Needs     Within the past 12 months, has lack of transportation kept you from medical appointments, getting your medicines, non-medical meetings or appointments, work, or from getting things that you need?: Patient unable to answer   Interpersonal Safety: Unknown (5/28/2025)    Interpersonal Safety     Do you feel physically and emotionally safe where you currently live?: Patient unable to answer     Within the past 12 months, have you been hit, slapped, kicked or otherwise physically hurt by someone?: Patient unable to answer     Within the past 12 months, have you been humiliated or emotionally abused in other ways by your partner or ex-partner?: Patient unable to answer         Disposition Plan     Medically Ready for Discharge: Anticipated in 2-4 Days         The patient's care was discussed with the Attending Physician,   Bijal.    Miguel Solano MD  Medicine Service, [facility] TEAM 2  [facility]  Securely message with [employer] (more info)  Text page via [email or web address] Paging/Directory   See signed in provider for up to date coverage information  ______________________________________________________________________    Interval History   No acute events overnight. The patient reports that she continues to have generalized weakness and fatigue. She denies any fevers, chills, chest pain, shortness of breath, abdominal pain, diarrhea, or increased leg swelling.     Physical Exam   Vital Signs: Temp: 98.1  F (36.7  C) Temp src: Oral BP: 113/51 Pulse: 110   Resp: 16 SpO2: 98 % O2 Device: None (Room air)    Weight: 150 lbs 2.13 oz    Constitutional: awake, alert, cooperative, no apparent distress, and appears stated age  ENT: Mucus membranes moist  Respiratory: No increased work of breathing, good air exchange, clear to auscultation bilaterally, no crackles or wheezing  Cardiovascular: Regular rate and rhythm with no murmurs  GI: Soft, nontender, nondistended  Neurologic: Awake, alert and oriented, face symmetric, moving all 4 extremities  Psych: Flat affect. Endorses feeling depressed with hospital stay    Medical Decision Making       Please see A&P for additional details of medical decision making.      Data     I have personally reviewed the following data over the past 24 hrs:    7.9  \   6.9 (LL)   / 357     N/A N/A N/A /  162 (H)   4.2 N/A N/A \     Ferritin:  N/A % Retic:  5.5 (H) LDH:  N/A       Imaging results reviewed over the past 24 hrs:   No results found for this or any previous visit (from the past 24 hours).

## 2025-06-08 LAB
ANION GAP SERPL CALCULATED.3IONS-SCNC: 6 MMOL/L (ref 7–15)
BUN SERPL-MCNC: 26.7 MG/DL (ref 8–23)
CALCIUM SERPL-MCNC: 8.5 MG/DL (ref 8.8–10.4)
CHLORIDE SERPL-SCNC: 110 MMOL/L (ref 98–107)
CREAT SERPL-MCNC: 0.63 MG/DL (ref 0.51–0.95)
EGFRCR SERPLBLD CKD-EPI 2021: >90 ML/MIN/1.73M2
ERYTHROCYTE [DISTWIDTH] IN BLOOD BY AUTOMATED COUNT: 22 % (ref 10–15)
GLUCOSE BLDC GLUCOMTR-MCNC: 128 MG/DL (ref 70–99)
GLUCOSE BLDC GLUCOMTR-MCNC: 135 MG/DL (ref 70–99)
GLUCOSE BLDC GLUCOMTR-MCNC: 160 MG/DL (ref 70–99)
GLUCOSE BLDC GLUCOMTR-MCNC: 163 MG/DL (ref 70–99)
GLUCOSE BLDC GLUCOMTR-MCNC: 183 MG/DL (ref 70–99)
GLUCOSE BLDC GLUCOMTR-MCNC: 226 MG/DL (ref 70–99)
GLUCOSE SERPL-MCNC: 143 MG/DL (ref 70–99)
HCO3 SERPL-SCNC: 24 MMOL/L (ref 22–29)
HCT VFR BLD AUTO: 25.6 % (ref 35–47)
HGB BLD-MCNC: 8.1 G/DL (ref 11.7–15.7)
MAGNESIUM SERPL-MCNC: 1.9 MG/DL (ref 1.7–2.3)
MCH RBC QN AUTO: 29.3 PG (ref 26.5–33)
MCHC RBC AUTO-ENTMCNC: 31.6 G/DL (ref 31.5–36.5)
MCV RBC AUTO: 93 FL (ref 78–100)
PHOSPHATE SERPL-MCNC: 3.5 MG/DL (ref 2.5–4.5)
PLATELET # BLD AUTO: 339 10E3/UL (ref 150–450)
POTASSIUM SERPL-SCNC: 4.4 MMOL/L (ref 3.4–5.3)
RBC # BLD AUTO: 2.76 10E6/UL (ref 3.8–5.2)
SODIUM SERPL-SCNC: 140 MMOL/L (ref 135–145)
WBC # BLD AUTO: 6.4 10E3/UL (ref 4–11)

## 2025-06-08 PROCEDURE — 84100 ASSAY OF PHOSPHORUS: CPT | Performed by: INTERNAL MEDICINE

## 2025-06-08 PROCEDURE — 250N000013 HC RX MED GY IP 250 OP 250 PS 637: Performed by: STUDENT IN AN ORGANIZED HEALTH CARE EDUCATION/TRAINING PROGRAM

## 2025-06-08 PROCEDURE — 120N000002 HC R&B MED SURG/OB UMMC

## 2025-06-08 PROCEDURE — 250N000009 HC RX 250: Performed by: STUDENT IN AN ORGANIZED HEALTH CARE EDUCATION/TRAINING PROGRAM

## 2025-06-08 PROCEDURE — 250N000013 HC RX MED GY IP 250 OP 250 PS 637

## 2025-06-08 PROCEDURE — 85014 HEMATOCRIT: CPT

## 2025-06-08 PROCEDURE — 250N000011 HC RX IP 250 OP 636: Performed by: INTERNAL MEDICINE

## 2025-06-08 PROCEDURE — 83735 ASSAY OF MAGNESIUM: CPT | Performed by: INTERNAL MEDICINE

## 2025-06-08 PROCEDURE — 80048 BASIC METABOLIC PNL TOTAL CA: CPT

## 2025-06-08 PROCEDURE — 99232 SBSQ HOSP IP/OBS MODERATE 35: CPT | Performed by: STUDENT IN AN ORGANIZED HEALTH CARE EDUCATION/TRAINING PROGRAM

## 2025-06-08 RX ORDER — MAGNESIUM OXIDE 400 MG/1
400 TABLET ORAL EVERY 4 HOURS
Status: COMPLETED | OUTPATIENT
Start: 2025-06-08 | End: 2025-06-08

## 2025-06-08 RX ADMIN — MAGNESIUM OXIDE TAB 400 MG (241.3 MG ELEMENTAL MG) 400 MG: 400 (241.3 MG) TAB at 13:25

## 2025-06-08 RX ADMIN — Medication 25 MG: at 08:59

## 2025-06-08 RX ADMIN — Medication 3 MG: at 20:39

## 2025-06-08 RX ADMIN — PANTOPRAZOLE SODIUM 40 MG: 40 TABLET, DELAYED RELEASE ORAL at 08:58

## 2025-06-08 RX ADMIN — Medication 400 MCG: at 08:59

## 2025-06-08 RX ADMIN — ENOXAPARIN SODIUM 50 MG: 60 INJECTION SUBCUTANEOUS at 20:30

## 2025-06-08 RX ADMIN — THIAMINE HCL TAB 100 MG 100 MG: 100 TAB at 08:58

## 2025-06-08 RX ADMIN — MAGNESIUM SULFATE HEPTAHYDRATE: 500 INJECTION, SOLUTION INTRAMUSCULAR; INTRAVENOUS at 20:38

## 2025-06-08 RX ADMIN — Medication 12.5 MG: at 08:59

## 2025-06-08 RX ADMIN — ENOXAPARIN SODIUM 50 MG: 60 INJECTION SUBCUTANEOUS at 08:59

## 2025-06-08 RX ADMIN — MAGNESIUM OXIDE TAB 400 MG (241.3 MG ELEMENTAL MG) 400 MG: 400 (241.3 MG) TAB at 08:58

## 2025-06-08 RX ADMIN — FUROSEMIDE 20 MG: 20 TABLET ORAL at 08:59

## 2025-06-08 RX ADMIN — FUROSEMIDE 20 MG: 20 TABLET ORAL at 15:55

## 2025-06-08 RX ADMIN — HYDROCORTISONE: 1 CREAM TOPICAL at 09:18

## 2025-06-08 RX ADMIN — LATANOPROST 1 DROP: 50 SOLUTION/ DROPS OPHTHALMIC at 22:41

## 2025-06-08 RX ADMIN — TAMSULOSIN HYDROCHLORIDE 0.4 MG: 0.4 CAPSULE ORAL at 08:58

## 2025-06-08 RX ADMIN — Medication 12.5 MG: at 20:30

## 2025-06-08 RX ADMIN — ATORVASTATIN CALCIUM 40 MG: 40 TABLET, FILM COATED ORAL at 08:59

## 2025-06-08 RX ADMIN — HYDROXYUREA 500 MG: 15 SOLUTION ORAL at 09:18

## 2025-06-08 ASSESSMENT — ACTIVITIES OF DAILY LIVING (ADL)
ADLS_ACUITY_SCORE: 60
ADLS_ACUITY_SCORE: 60
ADLS_ACUITY_SCORE: 61
ADLS_ACUITY_SCORE: 60
ADLS_ACUITY_SCORE: 61
ADLS_ACUITY_SCORE: 60
ADLS_ACUITY_SCORE: 61
ADLS_ACUITY_SCORE: 60

## 2025-06-08 NOTE — PLAN OF CARE
Goal Outcome Evaluation:    .Shift Hours: 0700 - 1900    Assessment:  Body systems that were not at patient's baseline Cognitive/Behavioral/Neuro, Peripheral Neurovascular, Genitourinary, Skin, and Musculoskeletal. Focused body system assessments documented in flowsheets.        Activity     Fall Risk Score: 95   Bed alarm on? Yes     Activity Assistance Provided: assistance, 2 people      Assistive Device Utilized: gait belt, walker    Pain: denies    Labs/RN Managed Protocols: K, Mg, Phos protocols. Mg replaced today; BG q4h with sliding scale insulin and carb coverage with meals    Lines/Drains:  right triple lumen PICC    Nutrition: tolerating regular diet, TPN & lipids at night    Goal Outcome Evaluation  Plan of Care Reviewed With: patient  Overall Patient Progress: no change     Hgb 6.9 this AM. Transfusion of PRBC x 1 completed at 1830. Pt has improved appetite this evening. A1-2 to bedside commode. Declined sitting up in the recliner today, feeling tired. Call light within reach and has been using it appropriately this shift.     Barriers to Discharge:   TPN & lipids for nutrition, generalized weakness

## 2025-06-08 NOTE — PLAN OF CARE
Goal Outcome Evaluation:    Shift Hours: 0700 - 1500    Assessment:  Body systems that were not at patient's baseline Cognitive/Behavioral/Neuro, Peripheral Neurovascular, Genitourinary, Skin, and Musculoskeletal. Focused body system assessments documented in flowsheets.        Activity     Fall Risk Score: 95   Bed alarm on? Yes     Activity Assistance Provided: assistance, 2 people      Assistive Device Utilized: gait belt, lift device    Pain: denies    Labs/RN Managed Protocols: K, Phos are WDL, Mag replaced today; BG q4h with sliding scale insulin and carb coverage.    Lines/Drains: right triple lumen PICC, saline locked    Nutrition: on a regular diet, appetite improving, TPN and lipids overnight    Goal Outcome Evaluation  Plan of Care Reviewed With: patient  Overall Patient Progress: no change     No acute events this shift. Disoriented to time and situation. Up to chair once for lunch with much encouragement. Up to bedside commode with A2 + GB+ W. Appetite seems to be improving. Call light within reach and has been using appropriately this shift.    Barriers to Discharge:   TPN and lipids, needs improved mobility     IV to PO

## 2025-06-08 NOTE — PLAN OF CARE
Shift Hours: 1500 - 1900    Assessment:  Body systems that were not at patient's baseline Cognitive/Behavioral/Neuro and Musculoskeletal. Focused body system assessments documented in flowsheets.        Activity     Fall Risk Score: 95   Bed alarm on? Yes     Activity Assistance Provided: assistance, 1 person      Assistive Device Utilized: walker    Pain: Denies    Labs/RN Managed Protocols: BG    Lines/Drains: R triple lumen PICC    Nutrition: Reg diet, TPN & lipids. Very poor PO intake.    Goal Outcome Evaluation  Plan of Care Reviewed With: patient  Overall Patient Progress: no change  Outcome Evaluation: Pt alert, disoriented to time and situation. Flat affect. VSS on RA. Had 1 BM on the commode this shift. Voids spont.    Barriers to Discharge:   TPN & lipids

## 2025-06-08 NOTE — PLAN OF CARE
Shift Hours: 1900 - 0700    Assessment:  Body systems assessments were at patient's baseline.        Activity     Fall Risk Score: 95   Bed alarm on? Yes     Activity Assistance Provided: assistance, 1 person      Assistive Device Utilized: gait belt, walker    Pain: denies    Labs/RN Managed Protocols:     Lines/Drains: double lumen PICC on right arm    Nutrition: regular and tpn    Goal Outcome Evaluation  Plan of Care Reviewed With: patient  Overall Patient Progress: no change  Outcome Evaluation: Patient continues to try to get up independently,  not using call light.  Was stuck in bed with one foot on the floor trying to get up to bathroom.  Patient asking if PICC can be removed tomorrow.  Updated patient need to increase PO intake and strength before TPN discontinued.  Patient oriented to self only.    Barriers to Discharge:   Better po intake and weakness

## 2025-06-08 NOTE — PROGRESS NOTES
"                       Inpatient Diabetes Management Service: Daily Progress Note     HPI: Sarah Felix is a 79 year old female with history of insulin-dependent DM2, HTN, fatty liver, possible alcohol use disorder, gait instability who presented 27 with generalized weakness and was admitted with DKA and sepsis, 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 Course complicated by C diff enteritis.     IDS consulted to assist with glycemic management - specifically for concerns of \"DKA\" as well as optimization while inpatient and when applicable, recommendations for transition home.         Assessment/Plan:     Assessment:    Type 2 Diabetes Mellitus, c/b neuropathy.  (A1c 8 % on 25) in presence of anemia and complex illness  DKA on admission (although not meeting all criteria/multifactorial).  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   Toxic metabolic encephalopathy  Stress / TPN induced hyperglycemia  C Diff Toxin PCR positive and C diff toxin negative, enteririts  BMI: 26     Plan/Recommendations:     - Continue with lantus 5 units q 24 hrs at 1200  - Continue with Regular insulin in TPN cycled 12 hrs: to 30 units with Dextrose 150 g (0.20 units per gram dextrose)  - Continue with Novolog carbohydrate coverage: 1 unit per 15 g cho TID AC and prn snacks/supplements  - Continue with Novolog High resistance correction scale:  >140 2000/0000/0400 during TPN   - Continue with to Medium resistance 0800/1200/1600 when TPN off   - BG monitoring q 4 hours while minimal PO intake/TPN cycling   - PTA medications: holding metformin and glimepiride   - Hypoglycemia protocol  - Carb counting protocol     Discussion:    Glycemia is well controlled, no changes are needed. Chart was reviewed in detail.        Discharge Planning: (tentative)    Medications: Basal bolus (discharge on insulin " "only)   Test Claims:  none needed.   Education:  Needs to be assessed closer to discharge.    Outpatient Follow-up:  recommend [facility] Endocrinology vs PCP        Please notify Inpatient Diabetes Service if changes are planned to steroids, nutrition, TPN/TF and anticipated procedures requiring prolonged NPO status.         Interval History/Review of Systems :   - The last 24 hours progress and nursing notes reviewed.    Planned Procedures/Surgeries: none    Inpatient Glucose Control:       Recent Labs   Lab 06/08/25  0816 06/08/25  0543 06/08/25  0541 06/08/25  0022 06/07/25  2110 06/07/25 2016   * 135* 143* 160* 178* 211*           Medications Impacting Glycemia:   Steroids: none  D5W containing solutions/medications: none  Other medications impacting glucose: none        Nutrition:   Orders Placed This Encounter      Regular Diet Adult Thin Liquids (level 0)  Supplements:  Glucerna strawberry TID AC meals  TF: None  TPN:   Started 5/28 - 5/29: Regular insulin in continuous TPN 6 units with Dextrose 120 g     6/4: 18 hr cyclic (1102-2880): TPN with 150 g dextrose and 20 units of reg insulin  6/5:  Regular insulin in TPN cycled 18 hrs (2000 - 1400): 24 units with Dextrose 150 g (0.16 units per gram dextrose)   6/6-7: Regular insulin in TPN cycled 18 hrs (2000 - 1400): 30 units with Dextrose 150 g (0.20 units per gram dextrose)        Diabetes History: see full consult note for complete diabetes history   Diabetes Type and Duration: DM2, 7/2009     GAD65 antibody <5 (9/11/2012)   C peptide 7.6 (9/11/2012)     PTA Medication Regimen: Glimepiride 1 mg daily, Novolog 5 units with high carb meal daily as needed, metformin 500 mg daily    Missing doses?: uncertain  Historical Diabetes Medications: N/A     Glucose monitoring device/frequency/trends: Fortino CGM, reports 130 on average  Hypoglycemia PTA:   - Frequency: \"rarely\"  - Severity:  no history of severe unconscious lows   - Awareness:  intact    - Treatment: " "\"eats food\"      Outpatient Diabetes Provider: Unknown  Formal Diabetes Education/Educator: Erik        Physical Exam:   /60   Pulse 96   Temp 97.6  F (36.4  C) (Oral)   Resp 16   Ht 1.626 m (5' 4\")   Wt 67.4 kg (148 lb 9.4 oz)   SpO2 100%   BMI 25.51 kg/m    Patient is not seen in person.          Data:     Lab Results   Component Value Date    A1C 8.0 (H) 05/25/2025    A1C 7.5 (H) 11/05/2024    A1C 7.6 (H) 07/09/2024    A1C 6.6 (H) 03/05/2024    A1C 7.4 (H) 11/14/2023    A1C 8.3 (H) 06/21/2021    A1C 9.4 (H) 12/14/2020    A1C 8.1 (H) 09/21/2020    A1C 8.5 (H) 01/07/2020    A1C 7.6 (H) 10/12/2019       ROUTINE IP LABS (Last four results)  BMP  Recent Labs   Lab 06/08/25  0816 06/08/25  0543 06/08/25  0541 06/08/25  0022 06/07/25  0804 06/07/25  0439 06/06/25  0608 06/06/25  0548 06/05/25  0811 06/05/25  0547 06/04/25  0803 06/04/25  0449 06/02/25  0752 06/02/25  0623   NA  --   --  140  --   --   --   --  136  --   --   --  136  --  135   POTASSIUM  --   --  4.4  --   --  4.2  --  4.9  --  4.3  --  4.4   < > 4.0   CHLORIDE  --   --  110*  --   --   --   --  107  --   --   --  107  --  106   CECY  --   --  8.5*  --   --   --   --  8.0*  --   --   --  7.8*  --  7.8*   CO2  --   --  24  --   --   --   --  22  --   --   --  23  --  22   BUN  --   --  26.7*  --   --   --   --  25.7*  --   --   --  18.9  --  20.7   CR  --   --  0.63  --   --   --   --  0.60  --   --   --  0.59  --  0.60   * 135* 143* 160*   < >  --    < > 294*   < >  --    < > 159*   < > 165*    < > = values in this interval not displayed.     CBC  Recent Labs   Lab 06/08/25  0541 06/07/25  0439 06/06/25  0548 06/05/25  0547   WBC 6.4 7.9 7.7 9.1   RBC 2.76* 2.42* 2.61* 2.47*   HGB 8.1* 6.9* 7.3* 6.9*   HCT 25.6* 21.9* 23.7* 22.1*   MCV 93 91 91 90   MCH 29.3 28.5 28.0 27.9   MCHC 31.6 31.5 30.8* 31.2*   RDW 22.0* 23.2* 22.1* 20.7*    [phone]     Inpatient Diabetes Service will continue to follow, please don't hesitate to " contact the team with any questions or concerns.     Mariana Phillips MD   Endocrinology Fellow, PGY-4    Page # [phone]  On Vocera     Case was discussed and reviewed with Endocrinology Attending Dr. Holguin        Patient was not seen by endocrine team today. I have reviewed records, discussed patient's care and edited the fellow's note. I agree with the plan of care documented.    Dania Holguin MD   Division of Diabetes, Endocrinology and Metabolism  Department of Medicine    To contact Inpatient Diabetes Service:     7 AM - 5 PM: Page the IDS MIKE following the patient that day (see filed or incomplete progress notes/consult notes under Endocrinology)    OR if uncertain of provider assignment: page job code [phone]    5 PM - 7 AM: First call after hours is to primary service.    For urgent after-hours questions, page job code for on call fellow: [phone]     Please notify inpatient diabetes service if changes are planned to steroids, nutrition, or if procedures are planned requiring prolonged NPO status.Diabetes Management Team job code: [phone]

## 2025-06-09 LAB
ALBUMIN SERPL BCG-MCNC: 2.8 G/DL (ref 3.5–5.2)
ALP SERPL-CCNC: 71 U/L (ref 40–150)
ALT SERPL W P-5'-P-CCNC: 17 U/L (ref 0–50)
ANION GAP SERPL CALCULATED.3IONS-SCNC: 7 MMOL/L (ref 7–15)
AST SERPL W P-5'-P-CCNC: 19 U/L (ref 0–45)
BILIRUB SERPL-MCNC: 0.3 MG/DL
BUN SERPL-MCNC: 26.9 MG/DL (ref 8–23)
CALCIUM SERPL-MCNC: 8.4 MG/DL (ref 8.8–10.4)
CHLORIDE SERPL-SCNC: 107 MMOL/L (ref 98–107)
CREAT SERPL-MCNC: 0.63 MG/DL (ref 0.51–0.95)
EGFRCR SERPLBLD CKD-EPI 2021: >90 ML/MIN/1.73M2
ERYTHROCYTE [DISTWIDTH] IN BLOOD BY AUTOMATED COUNT: 23.1 % (ref 10–15)
GLUCOSE BLDC GLUCOMTR-MCNC: 105 MG/DL (ref 70–99)
GLUCOSE BLDC GLUCOMTR-MCNC: 106 MG/DL (ref 70–99)
GLUCOSE BLDC GLUCOMTR-MCNC: 146 MG/DL (ref 70–99)
GLUCOSE BLDC GLUCOMTR-MCNC: 153 MG/DL (ref 70–99)
GLUCOSE BLDC GLUCOMTR-MCNC: 154 MG/DL (ref 70–99)
GLUCOSE BLDC GLUCOMTR-MCNC: 183 MG/DL (ref 70–99)
GLUCOSE BLDC GLUCOMTR-MCNC: 185 MG/DL (ref 70–99)
GLUCOSE SERPL-MCNC: 103 MG/DL (ref 70–99)
HCO3 SERPL-SCNC: 26 MMOL/L (ref 22–29)
HCT VFR BLD AUTO: 26.4 % (ref 35–47)
HGB BLD-MCNC: 8.2 G/DL (ref 11.7–15.7)
INR PPP: 1.05 (ref 0.85–1.15)
MAGNESIUM SERPL-MCNC: 2 MG/DL (ref 1.7–2.3)
MCH RBC QN AUTO: 29.2 PG (ref 26.5–33)
MCHC RBC AUTO-ENTMCNC: 31.1 G/DL (ref 31.5–36.5)
MCV RBC AUTO: 94 FL (ref 78–100)
PATH REPORT.COMMENTS IMP SPEC: NORMAL
PATH REPORT.COMMENTS IMP SPEC: NORMAL
PATH REPORT.FINAL DX SPEC: NORMAL
PATH REPORT.MICROSCOPIC SPEC OTHER STN: NORMAL
PATH REPORT.MICROSCOPIC SPEC OTHER STN: NORMAL
PATH REPORT.RELEVANT HX SPEC: NORMAL
PHOSPHATE SERPL-MCNC: 3.3 MG/DL (ref 2.5–4.5)
PLATELET # BLD AUTO: 311 10E3/UL (ref 150–450)
POTASSIUM SERPL-SCNC: 4.1 MMOL/L (ref 3.4–5.3)
PREALB SERPL-MCNC: 12.2 MG/DL (ref 20–40)
PROT SERPL-MCNC: 4.7 G/DL (ref 6.4–8.3)
PROTHROMBIN TIME: 13.7 SECONDS (ref 11.8–14.8)
RBC # BLD AUTO: 2.81 10E6/UL (ref 3.8–5.2)
SODIUM SERPL-SCNC: 140 MMOL/L (ref 135–145)
WBC # BLD AUTO: 7.2 10E3/UL (ref 4–11)

## 2025-06-09 PROCEDURE — 250N000011 HC RX IP 250 OP 636: Performed by: INTERNAL MEDICINE

## 2025-06-09 PROCEDURE — 250N000013 HC RX MED GY IP 250 OP 250 PS 637: Performed by: STUDENT IN AN ORGANIZED HEALTH CARE EDUCATION/TRAINING PROGRAM

## 2025-06-09 PROCEDURE — 250N000013 HC RX MED GY IP 250 OP 250 PS 637

## 2025-06-09 PROCEDURE — 250N000009 HC RX 250: Performed by: STUDENT IN AN ORGANIZED HEALTH CARE EDUCATION/TRAINING PROGRAM

## 2025-06-09 PROCEDURE — 85610 PROTHROMBIN TIME: CPT | Performed by: INTERNAL MEDICINE

## 2025-06-09 PROCEDURE — 84134 ASSAY OF PREALBUMIN: CPT | Performed by: INTERNAL MEDICINE

## 2025-06-09 PROCEDURE — B4185 PARENTERAL SOL 10 GM LIPIDS: HCPCS | Performed by: STUDENT IN AN ORGANIZED HEALTH CARE EDUCATION/TRAINING PROGRAM

## 2025-06-09 PROCEDURE — 99232 SBSQ HOSP IP/OBS MODERATE 35: CPT | Mod: GC | Performed by: STUDENT IN AN ORGANIZED HEALTH CARE EDUCATION/TRAINING PROGRAM

## 2025-06-09 PROCEDURE — 80053 COMPREHEN METABOLIC PANEL: CPT

## 2025-06-09 PROCEDURE — 120N000002 HC R&B MED SURG/OB UMMC

## 2025-06-09 PROCEDURE — 83735 ASSAY OF MAGNESIUM: CPT

## 2025-06-09 PROCEDURE — 999N000044 HC STATISTIC CVC DRESSING CHANGE

## 2025-06-09 PROCEDURE — 83921 ORGANIC ACID SINGLE QUANT: CPT

## 2025-06-09 PROCEDURE — 84100 ASSAY OF PHOSPHORUS: CPT

## 2025-06-09 PROCEDURE — 85027 COMPLETE CBC AUTOMATED: CPT

## 2025-06-09 PROCEDURE — 90832 PSYTX W PT 30 MINUTES: CPT | Performed by: COUNSELOR

## 2025-06-09 PROCEDURE — 99232 SBSQ HOSP IP/OBS MODERATE 35: CPT | Performed by: STUDENT IN AN ORGANIZED HEALTH CARE EDUCATION/TRAINING PROGRAM

## 2025-06-09 RX ORDER — MAGNESIUM SULFATE HEPTAHYDRATE 40 MG/ML
2 INJECTION, SOLUTION INTRAVENOUS ONCE
Status: COMPLETED | OUTPATIENT
Start: 2025-06-09 | End: 2025-06-09

## 2025-06-09 RX ADMIN — Medication 12.5 MG: at 20:22

## 2025-06-09 RX ADMIN — TAMSULOSIN HYDROCHLORIDE 0.4 MG: 0.4 CAPSULE ORAL at 08:57

## 2025-06-09 RX ADMIN — MAGNESIUM SULFATE HEPTAHYDRATE 2 G: 2 INJECTION, SOLUTION INTRAVENOUS at 08:59

## 2025-06-09 RX ADMIN — Medication 3 MG: at 23:53

## 2025-06-09 RX ADMIN — ENOXAPARIN SODIUM 50 MG: 60 INJECTION SUBCUTANEOUS at 20:17

## 2025-06-09 RX ADMIN — LATANOPROST 1 DROP: 50 SOLUTION/ DROPS OPHTHALMIC at 20:18

## 2025-06-09 RX ADMIN — THIAMINE HCL TAB 100 MG 100 MG: 100 TAB at 08:57

## 2025-06-09 RX ADMIN — Medication 25 MG: at 08:57

## 2025-06-09 RX ADMIN — ATORVASTATIN CALCIUM 40 MG: 40 TABLET, FILM COATED ORAL at 08:57

## 2025-06-09 RX ADMIN — HYDROCORTISONE: 1 CREAM TOPICAL at 08:58

## 2025-06-09 RX ADMIN — HYDROXYUREA 500 MG: 15 SOLUTION ORAL at 09:07

## 2025-06-09 RX ADMIN — SMOFLIPID 250 ML: 6; 6; 5; 3 INJECTION, EMULSION INTRAVENOUS at 20:08

## 2025-06-09 RX ADMIN — LISINOPRIL 2.5 MG: 2.5 TABLET ORAL at 08:57

## 2025-06-09 RX ADMIN — PANTOPRAZOLE SODIUM 40 MG: 40 TABLET, DELAYED RELEASE ORAL at 08:57

## 2025-06-09 RX ADMIN — MAGNESIUM SULFATE HEPTAHYDRATE: 500 INJECTION, SOLUTION INTRAMUSCULAR; INTRAVENOUS at 20:09

## 2025-06-09 RX ADMIN — Medication 400 MCG: at 08:57

## 2025-06-09 RX ADMIN — Medication 12.5 MG: at 08:57

## 2025-06-09 RX ADMIN — FUROSEMIDE 20 MG: 20 TABLET ORAL at 17:30

## 2025-06-09 RX ADMIN — HYDROCORTISONE: 1 CREAM TOPICAL at 20:17

## 2025-06-09 RX ADMIN — FUROSEMIDE 20 MG: 20 TABLET ORAL at 08:57

## 2025-06-09 RX ADMIN — ENOXAPARIN SODIUM 50 MG: 60 INJECTION SUBCUTANEOUS at 08:58

## 2025-06-09 ASSESSMENT — ACTIVITIES OF DAILY LIVING (ADL)
ADLS_ACUITY_SCORE: 60

## 2025-06-09 NOTE — PROGRESS NOTES
Care Management Follow Up    Length of Stay (days): 15    Expected Discharge Date: 06/11/2025     Concerns to be Addressed: discharge planning     Patient plan of care discussed at interdisciplinary rounds: Yes    Anticipated Discharge Disposition: Transitional Care  Anticipated Discharge Services: None  Anticipated Discharge DME: None    Patient/family educated on Medicare website which has current facility and service quality ratings: yes  Education Provided on the Discharge Plan: Yes  Patient/Family in Agreement with the Plan: yes    Referrals Placed by CM/SW: Post Acute Facilities  Private pay costs discussed: Not applicable    Discussed  Partnership in Safe Discharge Planning  document with patient/family: Yes    Additional Information:  Patient accepted to [facility] and they can admit the patient tomorrow between 10 am and 4 pm.     Accepted TCU  [facility] (Ph: [phone]; Admissions: [phone]; F: [phone])    Pending Referrals:  [facility] Global Referral (Liaison, Mis Wheat; P: [phone]; F: [phone]; E: [email or web address])    [facility] (Admissions Phone: [phone] Main Phone: [phone] Fax: [phone])     [facility] (Ph: [phone], Admissions: [phone], F: [phone])     [facility] Global Referral (Dc Craig, Ph: [phone]; F: [phone]; [email or web address])     [facility] (Ph: [phone], Admissions: [phone], F: [phone])  6/9: CHW left  requesting a callback      [facility] in [place] (P: [phone])  [facility] [place], [place] [address] [place] [address]/[facility] (P: [phone]; F: [phone])     Inactive Referrals:  [facility] - Hx of alcohol abuse.  [facility] - Bed not available, facility full, history of violence and/or drug or alcohol  abuse  [facility] - Bed not available.  [facility] - Bed not available.    Yessi Gonsales  Inpatient W  [facility] [address]  [phone]

## 2025-06-09 NOTE — PROGRESS NOTES
"                       Inpatient Diabetes Management Service: Daily Progress Note     HPI: Sarah Felix is a 79 year old female with history of insulin-dependent DM2, HTN, fatty liver, possible alcohol use disorder, gait instability who presented 25 with generalized weakness and was admitted with DKA and sepsis, 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 Course complicated by C diff enteritis.     IDS consulted to assist with glycemic management - specifically for concerns of \"DKA\" as well as optimization while inpatient and when applicable, recommendations for transition home.         Assessment/Plan:     Assessment:    Type 2 Diabetes Mellitus, c/b neuropathy.  (A1c 8 % on 25) in presence of anemia and complex illness  DKA on admission (although not meeting all criteria/multifactorial).  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   Toxic metabolic encephalopathy  Stress / TPN induced hyperglycemia  C Diff Toxin PCR positive and C diff toxin negative, enteririts  BMI: 26     Plan/Recommendations:   - Lantus 5 units q 24 hrs at 1200  - decrease Regular insulin in TPN cycled 12 hrs: 30 -->27 units with Dextrose 150 g (0.18 units per gram dextrose)  - Novolog carbohydrate coverage: 1 unit per 15 g cho TID AC and prn snacks/supplements  - Novolog correction scale:  Medium resistance 1/50 >140 0800/1200/1600 when TPN off   High resistance 125 >140 2000/0000/0400 during TPN   - BG monitoring q 4 hours while minimal PO intake/TPN cycling   - PTA medications: holding metformin and glimepiride   - Hypoglycemia protocol  - Carb counting protocol     Discussion:  BG overall trending well. Suspect patient ate breakfast yesterday without carb coverage resulting in noon  mg/dL. TPN has been cycling 12 hrs overnight since , BG trending in lower 100s overnight, will slightly reduce " "regular insulin in TPN tonight. Patient reports eating breakfast this am (no carb coverage documented), patient also unsure of what she ate.      Discharge Planning: (tentative)    Medications: Basal bolus (discharge on insulin only)   Test Claims:  none needed.   Education:  Needs to be assessed closer to discharge.    Outpatient Follow-up:  recommend [facility] Endocrinology vs PCP        Please notify Inpatient Diabetes Service if changes are planned to steroids, nutrition, TPN/TF and anticipated procedures requiring prolonged NPO status.         Interval History/Review of Systems :   - The last 24 hours progress and nursing notes reviewed.  - Denies nausea or emesis with eating. Reports appetite is \"so-so\" and having some diarrhea.  - Feeling tired.      Planned Procedures/Surgeries: none    Inpatient Glucose Control:       Recent Labs   Lab 06/09/25  0809 06/09/25  0356 06/09/25  0352 06/08/25  2354 06/08/25  2022 06/08/25  1630   * 106* 103* 146* 183* 163*           Medications Impacting Glycemia:   Steroids: none  D5W containing solutions/medications: none  Other medications impacting glucose: none        Nutrition:   Orders Placed This Encounter      Regular Diet Adult Thin Liquids (level 0)  Supplements:  Glucerna strawberry TID AC meals  TF: None  TPN:   Started 5/28 - 5/29: Regular insulin in continuous TPN 6 units with Dextrose 120 g   6/4: 18 hr cyclic (3409-2698): TPN with 150 g dextrose and 20 units of reg insulin  6/5:  Regular insulin in TPN cycled 12 hrs (2000 - 0800): 24 units with Dextrose 150 g (0.16 units per gram dextrose)   6/6-6/8: Regular insulin in TPN cycled 12 hrs (2000 - 0800): 30 units with Dextrose 150 g (0.20 units per gram dextrose)   6/9: TPN cycling 12 hrs (2000 - 0800): 27 units reg insulin with dextrose 150 grams        Diabetes History: see full consult note for complete diabetes history   Diabetes Type and Duration: DM2, 7/2009     GAD65 antibody <5 (9/11/2012)   C " "peptide 7.6 (9/11/2012)     PTA Medication Regimen: Glimepiride 1 mg daily, Novolog 5 units with high carb meal daily as needed, metformin 500 mg daily    Missing doses?: uncertain  Historical Diabetes Medications: N/A     Glucose monitoring device/frequency/trends: Fortino CGM, reports 130 on average  Hypoglycemia PTA:   - Frequency: \"rarely\"  - Severity:  no history of severe unconscious lows   - Awareness:  intact    - Treatment: \"eats food\"      Outpatient Diabetes Provider: Unknown  Formal Diabetes Education/Educator: Erik        Physical Exam:   /63 (BP Location: Left arm)   Pulse 103   Temp 97.9  F (36.6  C) (Oral)   Resp 18   Ht 1.626 m (5' 4\")   Wt 64.8 kg (142 lb 13.7 oz)   SpO2 97%   BMI 24.52 kg/m    General:  fatigued appearing, NAD, resting comfortably in bed.  Lungs: unlabored breathing on RA.  Mental Status:  not assessed  Psych:   Calm, cooperative, good eye contact, full/appropriate affect         Data:     Lab Results   Component Value Date    A1C 8.0 (H) 05/25/2025    A1C 7.5 (H) 11/05/2024    A1C 7.6 (H) 07/09/2024    A1C 6.6 (H) 03/05/2024    A1C 7.4 (H) 11/14/2023    A1C 8.3 (H) 06/21/2021    A1C 9.4 (H) 12/14/2020    A1C 8.1 (H) 09/21/2020    A1C 8.5 (H) 01/07/2020    A1C 7.6 (H) 10/12/2019       ROUTINE IP LABS (Last four results)  BMP  Recent Labs   Lab 06/09/25  0809 06/09/25  0356 06/09/25  0352 06/08/25  2354 06/08/25  0543 06/08/25  0541 06/07/25  0804 06/07/25  0439 06/06/25  0608 06/06/25  0548 06/04/25  0803 06/04/25  0449   NA  --   --  140  --   --  140  --   --   --  136  --  136   POTASSIUM  --   --  4.1  --   --  4.4  --  4.2  --  4.9   < > 4.4   CHLORIDE  --   --  107  --   --  110*  --   --   --  107  --  107   CECY  --   --  8.4*  --   --  8.5*  --   --   --  8.0*  --  7.8*   CO2  --   --  26  --   --  24  --   --   --  22  --  23   BUN  --   --  26.9*  --   --  26.7*  --   --   --  25.7*  --  18.9   CR  --   --  0.63  --   --  0.63  --   --   --  0.60  --  0.59 "   * 106* 103* 146*   < > 143*   < >  --    < > 294*   < > 159*    < > = values in this interval not displayed.     CBC  Recent Labs   Lab 06/09/25  0352 06/08/25  0541 06/07/25  0439 06/06/25  0548   WBC 7.2 6.4 7.9 7.7   RBC 2.81* 2.76* 2.42* 2.61*   HGB 8.2* 8.1* 6.9* 7.3*   HCT 26.4* 25.6* 21.9* 23.7*   MCV 94 93 91 91   MCH 29.2 29.3 28.5 28.0   MCHC 31.1* 31.6 31.5 30.8*   RDW 23.1* 22.0* 23.2* 22.1*    [phone]     Inpatient Diabetes Service will continue to follow, please don't hesitate to contact the team with any questions or concerns.     Clair Chung PA-C  Inpatient Diabetes Service  Pager: [phone]  Available on "[employer]    To contact Inpatient Diabetes Service:     7 AM - 5 PM: Page the IDS MIKE following the patient that day (see filed or incomplete progress notes/consult notes under Endocrinology)    OR if uncertain of provider assignment: page job code [phone]    5 PM - 7 AM: First call after hours is to primary service.    For urgent after-hours questions, page job code for on call fellow: [phone]     I spent a total of 40 minutes on the date of the encounter doing prep/post-work, chart review, history and exam, documentation and further activities per the note including lab review, multidisciplinary communication, counseling the patient and/or coordinating care regarding acute hyper/hypoglycemic management, as well as discharge management and planning/communication.  See note for details.

## 2025-06-09 NOTE — PLAN OF CARE
Shift Hours: 2300 - 0700    Assessment:  Body systems that were not at patient's baseline Cognitive/Behavioral/Neuro and Musculoskeletal. Focused body system assessments documented in flowsheets.        Activity     Fall Risk Score: 95   Bed alarm on? Yes     Activity Assistance Provided: assistance, 1 person      Assistive Device Utilized: walker    Pain: denies    Labs/RN Managed Protocols: K, Mag, Phos. BG checks    Lines/Drains: R triple lumen PICC- one lumen running cycled TPN    Nutrition: Regular diet, TPN & lipids    Goal Outcome Evaluation  Plan of Care Reviewed With: patient  Overall Patient Progress: no change   Pt alert, disoriented to time and situation. Flat affect. VSS on RA. Had 1 BM on the commode this shift. Voids spontaneously. BG's stable overnight. Bed alarm on.     Barriers to Discharge:   TPN & Lipids

## 2025-06-09 NOTE — PROGRESS NOTES
Care Management Follow Up    Length of Stay (days): 15    Expected Discharge Date: 06/11/2025     Concerns to be Addressed: discharge planning     Patient plan of care discussed at interdisciplinary rounds: Yes    Anticipated Discharge Disposition: Transitional Care    Anticipated Discharge Services: None  Anticipated Discharge DME: None    Patient/family educated on Medicare website which has current facility and service quality ratings: yes  Education Provided on the Discharge Plan: Yes  Patient/Family in Agreement with the Plan: yes    Referrals Placed by CM/SW: Post Acute Facilities  Private pay costs discussed: Not applicable    Discussed  Partnership in Safe Discharge Planning  document with patient/family: Yes     Handoff Completed: No, handoff not indicated or clinically appropriate    Additional Information:  DUNCAN requested that Yessi PORRAS follow up on TCU referrals.  CHW alerted  that pt has been accepted for TCU at [facility].  SW messaged [place] [address] Providers to inquire about pt's medical readiness for discharge and to notify of accepting TCU facility.     Per [place] [address] Provider, Dr. Solano, hope is for pt to discharge without TPN.  Pt will require another 1-2 days of TPN, but is otherwise med ready.  SW messaged accepting TCU facility, [facility], to see if they can accommodate pt on TPN.     Accepting Facility:    [facility] (Ph: [phone]; Admissions: [phone]; F: [phone])  6/9: Can admit pt tomorrow between 10am-4pm. Pt still on TPN - SW awaiting response from TCU if they can admit on TPN.      Active Referrals:     [facility] Global Referral (Liaison, Mis Wheat; P: [phone]; F: [phone]; E: [email or web address])    [facility] (Admissions Phone: [phone] Main Phone: [phone] Fax: [phone])     [facility] (Ph: [phone], Admissions: [phone], F: [phone])      [facility] (Dc Craig, Ph: [phone]; F: [phone]; [email or web address])     [facility] (Ph: [phone], Admissions: [phone], F: [phone])  6/6: Facility clinically reviewing. Pt looks appropriate for their facility.     [facility] in [place] (P: [phone])  [facility] [address]/[facility] (P: [phone]; F: [phone])     [facility] (Ph: [phone], Admissions: [phone], F: [phone])     Inactive Referrals:     [facility] - Hx of alcohol abuse.  [facility] - Bed not available, facility full, history of violence and/or drug or alcohol abuse  [facility] - Bed not available.  [facility] - declined; bed not available    Next Steps:   Follow up with Provider and facility regarding plan for TPN.   __________________________     KELLY Burrsi, [facility]  , [facility]  [address] [address]   Desk Phone: [phone]   Securely message with [employer] (Search Name or [address] [phone] SW)  Text page via [facility] Paging/Directory   See signed in provider for up to date coverage information  Social Work & Care Management Department

## 2025-06-09 NOTE — PLAN OF CARE
Shift Hours: 0700 - 1900    Assessment:  Body systems assessments were at patient's baseline.        Activity     Fall Risk Score: 95   Bed alarm on? Yes     Activity Assistance Provided: assistance, 1 person      Assistive Device Utilized: walker    Pain: Denied    Labs/RN Managed Protocols: K, mag, phos; recheck tm    Lines/Drains: TL PICC    Nutrition: Regular    Goal Outcome Evaluation  Oriented to self and place. VSS on RA. IV mag replaced. Pt sleeping on and off throughout shift. Minimal appetite. Carbs covered. Pt encouraged to get up into recliner chair for meals, pt declined.     Barriers to Discharge:   Not yet medically ready.

## 2025-06-09 NOTE — CONSULTS
Discharge Pharmacy Test Claim    Patient's [employer] Medicare Advantage plan covers Eliquis with an expected monthly copay of $35.    Test Claim Copay   Eliquis 35.00     [facility] has one-time use 30-day free trial vouchers available for eliquis or xarelto.      Jaimie Perea  [facility] Pharm[facility] Liaison (A - L)  Phone: [phone] Fax: [phone]  Available on Teams & Vocera  Disclaimer: Pharmacy test claims are extimates and may not reflect final costs. Suggested alternatives aim to be cost-effective but may not be therapeutically equivalent as this consult is informational and does not constitute medical advice. Clinical decisions should be made by qualified healthcare providers.

## 2025-06-09 NOTE — PLAN OF CARE
"[id number]    Pt VSS on RA. Receiving TPN. Pt disoriented to date, calm and cooperative.    Goal Outcome Evaluation:      Plan of Care Reviewed With: patient    Overall Patient Progress: no changeOverall Patient Progress: no change    Outcome Evaluation: Bed alarm on      Problem: Adult Inpatient Plan of Care  Goal: Patient-Specific Goal (Individualized)  Description: You can add care plan individualizations to a care plan. Examples of Individualization might be:  \"Parent requests to be called daily at 9am for status\", \"I have a hard time hearing out of my right ear\", or \"Do not touch me to wake me up as it startles  me\".  Outcome: Progressing     Problem: Adult Inpatient Plan of Care  Goal: Absence of Hospital-Acquired Illness or Injury  Intervention: Identify and Manage Fall Risk  Recent Flowsheet Documentation  Taken 6/8/2025 2030 by Stanislav Mercado, RN  Safety Promotion/Fall Prevention:   activity supervised   assistive device/personal items within reach   clutter free environment maintained   increased rounding and observation     Problem: Adult Inpatient Plan of Care  Goal: Absence of Hospital-Acquired Illness or Injury  Intervention: Prevent Infection  Recent Flowsheet Documentation  Taken 6/8/2025 2030 by Stanislav Mercado, RN  Infection Prevention:   environmental surveillance performed   equipment surfaces disinfected   hand hygiene promoted   personal protective equipment utilized   rest/sleep promoted   single patient room provided           "

## 2025-06-09 NOTE — PROGRESS NOTES
"[facility]    Progress Note - Medicine Service, MARREBEL TEAM 2       Date of Admission:  5/25/2025    Assessment & Plan     Sarah Felix is a 79 year old female with history of insulin-dependent DM2, HTN, fatty liver, possible alcohol use disorder, gait instability who presented 5/25 with generalized weakness and was admitted with DKA and sepsis, 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 Course c/b C diff enteritis treated with PO vancomycin.      Today's updates:  - Discussed with nutritionist, continue TPN as patient not quite meeting oral nutrition goals  - Accepted to TCU; possible acceptance/discharge as early as 6/10/25 but will need to double check on TPN status prior to discharge     Moderate malnutrition in the context of chronic illness   Continued elevated ketones despite insulin ggt. Elevation likely due to poor oral intake due to encephalopathy and starvation ketosis. Pt was started on TPN this admission, she has been on calorie count and has improved on her oral intake, expect discontinuing TPN in the next few days.   - Continue TPN   - SLP: regular diet with thin liquids  - On calorie counts     Acute anemia in the setting of ACD  Her PTA hemoglobin levels were within normal limits. Since admission, they have been steadily decreasing. 6/2 Hb was 7.0. Iron panel and B12 were ordered 6/2 and are c/w anemia of chronic disease. Initial concern for bleeding in urine, but repeat UA negative for blood & RBCs. No obvious source of hemorrhage. T bili and haptoglobin normal, pointing away from hemolysis.   - Continue trending Hb daily  - Consider transfusion if Hb <7  - folate, vit b12, hapto wnl     Hepatic vein thrombus  Essential thrombocythemia (JAK2 positive), high risk disease   No prior imaging to compare. Per chart review, history of \"thromboembolism\" after ovarian cyst removal age 22. Liver panel normal.  - IR consulted; no indication for " lytics  - Heme consulted  - Heparin gtt 5/25-5/27  - Lovenox 5/27-present  - At discharge transition to apixaban 10mg BID x 7 days then 5 mg BID  - Follow up with Dr. Wilde 7/29/25  - Continue hydroxyurea  - discontinue'ed PTA ASA      Enteritis possibly 2/2 C. Diff, resolved  Ileus, resolved  Initially presented septic but with negative workup including urine culture, blood cultures, CT C/A/P. Had ongoing worsening encephalopathy and leukocytosis on vanc/zosyn -> unasyn. Developed diarrhea and abdominal pain, stool with C diff PCR + but A toxin negative. C diff B toxin positive, will consult GI for input as to whether enteritis can be associated with this C. Diff presentation. Given clinical worsening, decided to empirically treat - now with improvement. Nausea/vomiting managed with NG decompression. Tube removed 6/1.  - Abx:  - PO vancomycin 125mg daily (5/28-6/5)   - Unasyn (5/27-5/29)  - Zosyn (5/25-5/27)              - Vancomycin (5/25-5/26)  - GI consult for potential workup of enteritis given uncommon presentation of C. Diff              - C. Diff retested and continues to show positive antigen and B toxin, but negative A toxin              - Outpatient MRI/MRCP to evaluate the pancreatic and hepatic cysts  - PO pantoprazole daily     Diabetic ketoacidosis on admission, resolved  Type 2 DM complicated by neuropathy- A1c 8%  T2DM with A1c 8.0%. Likely triggered by sepsis. Now off insulin gtt.  - Endocrine consulted, managing glargine + regular insulin in TPN + novolog  - Hold PTA metformin, glipizide, possible discharge only on insulin  - Diabetes ed consult     Stable/chronic/resolved conditions:  Dysphagia, resolved  Recurrent vomiting, resolved  Concern for aspiration from bedside RN. Feeding tube may have been preventing proper swallowing, so this was removed. On 6/4/25, SLP updated diet order back to regular diet with thin liquids.   - NGT removed 6/1  - SLP: Regular diet with thin liquids     Urinary  retention  Continues to have urinary retention after removing the costa. Will start tamsulosin and continue to monitor.  - Continue straight cath if bladder scan >600cc, may need costa placed if continuing to retain  - Tamsulosin daily     Abnormal UA  Repeat UA on 6/1 showed cloudy urine, proteinuria, hematuria, and leukocyte esterase. She was started on Augmentin given these findings. These UA findings are mostly consistent with her UA from 5/25. This is likely not due to a UTI given that a UTI would have been resolved with her Zosyn and Unasyn abx course, thus Augmentin was discontinued. The hematuria is likely 2/2 repeated catheter insertions. Repeat UA non-concerning for infection or hematuria. Urine culture growing >100,000CFU/mL Raoultella ornithinolytica and Psuedomonas aeruginosa.   - Consider treatment if symptomatic; patient currently asymptomatic     Concern for refeeding syndrome, resolved  Decrease in magnesium and phosphorus after beginning TPN, possible refeeding syndrome.  - BMP daily  - RN replacement protocols     Pleural effusions  Pulmonary edema  Anasarca  Seen on 5/28 CT C/A/P. TTE with LVEF 60-65%, small IVC.   - PTA 20mg Lasix daily     Toxic metabolic encephalopathy, resolved   Not oriented to month or year. Likely related to sepsis vs hospital acquired delirium. No acute intracranial findings on CT head.   - Wernicke dose thiamine  - Delirium precautions     Possible alcohol use disorder  Reports 2 drinks/day. Did have an ED visit 03/2025 for alcohol intoxication. Reports last drink 5 days PTA, low concern for withdrawal. Her brother, Cruz, has concerns that she is drinking excessively and encourages addiction medicine involvement.  - Addiction medicine consulted 6/3/25              - Patient denied any problems with alcohol use  - PTA folate  - Continue thiamine, Wernicke dosing     Sacrum/coccyx moisture wound  Erythematous, blanchable area on her sacrum. Bedside RN says likely from  "excessive moisture 2/2 fecal incontinence. 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  - WOC following, appreciate recommendations     \"Prominent pericardial contents\"  Cardiology reviewed- this is an epicardial fat pad. No further workup or consult needed.     Generalized weakness, failure to thrive  TSH wnl.   - Ongoing PT/OT consults   - Ongoing evaluation for safe discharge plan     HTN: Hold PTA metoprolol, lisinopril given sepsis and normotension     Fall at home: CTH, C spine negative for acute process/fracture     Elevated INR: Unclear etiology, no known liver disease. Will hold off on Vit K given new thrombus.     Essential thrombocytosis: Stop PTA aspirin per heme, continue PTA hydroxyurea    Glaucoma: PTA latanoprost    CKD2    Diabetic neuropathy    Schatzki's ring    Slow transit constipation    Mixed urge and stress incontinence    Gait instability            Diet: Room Service  Snacks/Supplements Adult: Other; Strawberry Glucerna with bkf, lunch and dinner trays daily; With Meals  Regular Diet Adult Thin Liquids (level 0)  parenteral nutrition - ADULT compounded formula CYCLE  parenteral nutrition - ADULT compounded formula CYCLE    DVT Prophylaxis: Enoxaparin (Lovenox) SQ  Silver Catheter: Not present  Fluids: None  Lines: PRESENT      PICC 05/26/25 Triple Lumen Right Basilic Access. PICC was ready to use, no immediate concern.-Site Assessment: WDL      Cardiac Monitoring: None  Code Status: Full Code      Clinically Significant Risk Factors          # Hyperchloremia: Highest Cl = 110 mmol/L in last 2 days, will monitor as appropriate          # Hypoalbuminemia: Lowest albumin = 2.3 g/dL at 6/2/2025  6:23 AM, will monitor as appropriate     # Hypertension: Noted on problem list           # DMII: A1C = 8.0 % (Ref range: <5.7 %) within past 6 months    # Severe Malnutrition: based on nutrition assessment and treatment provided per dietitian's recommendations.    # " Financial/Environmental Concerns: none         Social Drivers of Health   Food Insecurity: Unknown (5/28/2025)    Food Insecurity     Within the past 12 months, did you worry that your food would run out before you got money to buy more?: Patient unable to answer     Within the past 12 months, did the food you bought just not last and you didn t have money to get more?: Patient unable to answer   Housing Stability: Unknown (5/28/2025)    Housing Stability     Do you have housing? : Patient unable to answer     Are you worried about losing your housing?: Patient unable to answer   Tobacco Use: Medium Risk (4/9/2025)    Patient History     Smoking Tobacco Use: Former     Smokeless Tobacco Use: Never     Passive Exposure: Past   Financial Resource Strain: Unknown (5/28/2025)    Financial Resource Strain     Within the past 12 months, have you or your family members you live with been unable to get utilities (heat, electricity) when it was really needed?: Patient unable to answer   Transportation Needs: Unknown (5/28/2025)    Transportation Needs     Within the past 12 months, has lack of transportation kept you from medical appointments, getting your medicines, non-medical meetings or appointments, work, or from getting things that you need?: Patient unable to answer   Interpersonal Safety: Unknown (5/28/2025)    Interpersonal Safety     Do you feel physically and emotionally safe where you currently live?: Patient unable to answer     Within the past 12 months, have you been hit, slapped, kicked or otherwise physically hurt by someone?: Patient unable to answer     Within the past 12 months, have you been humiliated or emotionally abused in other ways by your partner or ex-partner?: Patient unable to answer         Disposition Plan     Medically Ready for Discharge: Anticipated in 2-4 Days         The patient's care was discussed with the Attending Physician, Dr. Appiah.    Miguel Solano MD  Medicine  Service, JOSE EDUARDO TEAM 2  [facility]  Securely message with [employer] (more info)  Text page via [facility] Paging/Directory   See signed in provider for up to date coverage information  ______________________________________________________________________    Interval History   No acute events overnight. The patient denies any fevers, chills, abdominal pain, nausea, or vomiting. She had 1 episode of loose stool today. She is trying her best to increase her oral intake.    Physical Exam   Vital Signs: Temp: 98  F (36.7  C) Temp src: Oral BP: 105/53 Pulse: 100   Resp: 16 SpO2: 96 % O2 Device: None (Room air)    Weight: 142 lbs 13.73 oz    Constitutional: awake, alert, cooperative, no apparent distress, and appears stated age  ENT: Mucus membranes moist  Respiratory: No increased work of breathing, good air exchange, clear to auscultation bilaterally, no crackles or wheezing  Cardiovascular: Regular rate and rhythm with no murmurs  GI: Soft, nontender, nondistended  Neurologic: Awake, alert and oriented to person and place but not to time, face symmetric, moving all 4 extremities  Psych: Flat affect. Endorses feeling depressed with hospital stay    Medical Decision Making       Please see A&P for additional details of medical decision making.      Data     I have personally reviewed the following data over the past 24 hrs:    7.2  \   8.2 (L)   / 311     140 107 26.9 (H) /  153 (H)   4.1 26 0.63 \     ALT: 17 AST: 19 AP: 71 TBILI: 0.3   ALB: 2.8 (L) TOT PROTEIN: 4.7 (L) LIPASE: N/A     INR:  1.05 PTT:  N/A   D-dimer:  N/A Fibrinogen:  N/A       Imaging results reviewed over the past 24 hrs:   No results found for this or any previous visit (from the past 24 hours).

## 2025-06-09 NOTE — CONSULTS
"Health Psychology Consultation Note  [facility]     Patient: Sarah Felix  MRN: [id number]    : 1945  Date of Admission: 2025  Date of Encounter: 2025     Consult Requested by: Dayan Falk MD   Reason for Consult: Assistance with coping with prolonged hospitalization    Diagnosis:  Adjustment disorder, unspecified (ICD-10: F43.20)    Impression/Plan:  Ms. Felix is a 79 year old y/o female currently inpatient for 15 days, starting on 2025 for Hypokalemia [E87.6]  Ketoacidosis [E87.29]  Hyperglycemia [R73.9]  Generalized weakness [R53.1]  DUNG (acute kidney injury) [N17.9]  Fall, initial encounter [W19.XXXA] who presented today in the hospital room. Patient was engaged in the visit and expressed understanding of the information provided. Pt presented with symptoms of frustration related to prolonged hospitalization and discomfort associated with port. Pt symptoms are likely maintained by uncertainty regarding discharge. Symptom reduction would likely be facilitated by CBT along with cognitive processing trusted social supports. Follow up not indicated at this time due to sx level and overall degree of coping. Pt was in agreement with this plan. Please re-submit consult as appropriate. Signing off.     Recommendations for Care Team:  > Continue to monitor changes in patient's mood. While not an exhaustive list, examples of symptoms of note include increases in: sadness/hopelessness, time spent worrying, physiological responses to stress, and decreases in: sleep, appetite, socialization, engagement in care.   > Encourage pt to engage in processing with trusted social supports.    Background (per chart):  \" Sarah Felix is a 79 year old female with history of insulin-dependent DM2, HTN, fatty liver, possible alcohol use disorder, gait instability who presented  with generalized weakness and was admitted with DKA and sepsis, " "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 Course c/b C diff enteritis treated with PO vancomycin.  \"    Patient Demographics (per chart):   Age: 79 year old  Biological Sex: female  Race: White  Ethnicity: Choose not to answer    Hospital Admission (per chart):  Admission Date: 5/25/2025  Admission Diagnosis: Hypokalemia [E87.6]  Ketoacidosis [E87.29]  Hyperglycemia [R73.9]  Generalized weakness [R53.1]  DUNG (acute kidney injury) [N17.9]  Fall, initial encounter [W19.XXXA]  Length of Stay: 15    Subjective:  Engaged pt in psychotherapy for coping with prolonged hospitalization. Pt was agreeable to check-in at this time. Pt reported mood as \"okay\" but indicated some lingering frustration symptoms related to her overall hospitalization and discomfort associated with her port placement.  Engage patient in supportive listening and cognitive processing of ongoing symptoms and stressors.  Patient reported  Removal/modification of port has been delayed which has caused her to feel higher levels of frustration as of recent.  Engage patient in psychoeducation regarding coping with prolonged hospitalization and engaged patient in goalsetting and planning regarding utilizing social supports for additional cognitive processing coming days. Patient was engaged in the visit and expressed understanding of information provided.     Objective/Assessment:   Mental Status/Interview:  Appearance:   Appropriate   Eye Contact:   Good   Psychomotor Behavior:  Normal   Attitude:   Cooperative   Orientation:   All  Speech   Rate / Production: Normal    Volume:  Normal   Mood:    Euthymic  Affect:    Appropriate   Thought Content:   Clear  Thought Form:   Coherent  Logical     Insight:    Did not formally assess at this time.     Suicidal ideation: Pt denied active suicidal ideation.   Homicidal ideation: Pt denied active homicidal ideation.    Session Length:  Start Time: 2:00pm  End Time: 2:25pm    Date of Service:  06/09/25    Elvis Nam" PhD, LP  Clinical Health Psychologist  Phone: [phone]    *This note was completed using Dragon voice recognition software. Although reviewed after completion, some word and grammatical errors may occur.

## 2025-06-10 LAB
ERYTHROCYTE [DISTWIDTH] IN BLOOD BY AUTOMATED COUNT: 24.6 % (ref 10–15)
GLUCOSE BLDC GLUCOMTR-MCNC: 145 MG/DL (ref 70–99)
GLUCOSE BLDC GLUCOMTR-MCNC: 161 MG/DL (ref 70–99)
GLUCOSE BLDC GLUCOMTR-MCNC: 162 MG/DL (ref 70–99)
GLUCOSE BLDC GLUCOMTR-MCNC: 183 MG/DL (ref 70–99)
GLUCOSE BLDC GLUCOMTR-MCNC: 184 MG/DL (ref 70–99)
GLUCOSE BLDC GLUCOMTR-MCNC: 213 MG/DL (ref 70–99)
HCT VFR BLD AUTO: 27.5 % (ref 35–47)
HGB BLD-MCNC: 8.6 G/DL (ref 11.7–15.7)
MAGNESIUM SERPL-MCNC: 2.3 MG/DL (ref 1.7–2.3)
MCH RBC QN AUTO: 29.6 PG (ref 26.5–33)
MCHC RBC AUTO-ENTMCNC: 31.3 G/DL (ref 31.5–36.5)
MCV RBC AUTO: 95 FL (ref 78–100)
PHOSPHATE SERPL-MCNC: 3.3 MG/DL (ref 2.5–4.5)
PLATELET # BLD AUTO: 322 10E3/UL (ref 150–450)
POTASSIUM SERPL-SCNC: 3.9 MMOL/L (ref 3.4–5.3)
RBC # BLD AUTO: 2.91 10E6/UL (ref 3.8–5.2)
WBC # BLD AUTO: 6.7 10E3/UL (ref 4–11)

## 2025-06-10 PROCEDURE — 84132 ASSAY OF SERUM POTASSIUM: CPT | Performed by: INTERNAL MEDICINE

## 2025-06-10 PROCEDURE — 85014 HEMATOCRIT: CPT

## 2025-06-10 PROCEDURE — 250N000009 HC RX 250: Performed by: STUDENT IN AN ORGANIZED HEALTH CARE EDUCATION/TRAINING PROGRAM

## 2025-06-10 PROCEDURE — 99232 SBSQ HOSP IP/OBS MODERATE 35: CPT | Performed by: STUDENT IN AN ORGANIZED HEALTH CARE EDUCATION/TRAINING PROGRAM

## 2025-06-10 PROCEDURE — 120N000002 HC R&B MED SURG/OB UMMC

## 2025-06-10 PROCEDURE — 250N000011 HC RX IP 250 OP 636: Performed by: INTERNAL MEDICINE

## 2025-06-10 PROCEDURE — 84100 ASSAY OF PHOSPHORUS: CPT | Performed by: INTERNAL MEDICINE

## 2025-06-10 PROCEDURE — 250N000013 HC RX MED GY IP 250 OP 250 PS 637

## 2025-06-10 PROCEDURE — 83735 ASSAY OF MAGNESIUM: CPT | Performed by: INTERNAL MEDICINE

## 2025-06-10 PROCEDURE — 250N000013 HC RX MED GY IP 250 OP 250 PS 637: Performed by: STUDENT IN AN ORGANIZED HEALTH CARE EDUCATION/TRAINING PROGRAM

## 2025-06-10 PROCEDURE — 99232 SBSQ HOSP IP/OBS MODERATE 35: CPT | Mod: GC | Performed by: STUDENT IN AN ORGANIZED HEALTH CARE EDUCATION/TRAINING PROGRAM

## 2025-06-10 PROCEDURE — B4185 PARENTERAL SOL 10 GM LIPIDS: HCPCS | Performed by: STUDENT IN AN ORGANIZED HEALTH CARE EDUCATION/TRAINING PROGRAM

## 2025-06-10 RX ADMIN — FUROSEMIDE 20 MG: 20 TABLET ORAL at 09:26

## 2025-06-10 RX ADMIN — FUROSEMIDE 20 MG: 20 TABLET ORAL at 15:51

## 2025-06-10 RX ADMIN — HYDROCORTISONE: 1 CREAM TOPICAL at 19:57

## 2025-06-10 RX ADMIN — PANTOPRAZOLE SODIUM 40 MG: 40 TABLET, DELAYED RELEASE ORAL at 09:26

## 2025-06-10 RX ADMIN — Medication 3 MG: at 19:57

## 2025-06-10 RX ADMIN — Medication 12.5 MG: at 19:58

## 2025-06-10 RX ADMIN — LATANOPROST 1 DROP: 50 SOLUTION/ DROPS OPHTHALMIC at 21:57

## 2025-06-10 RX ADMIN — THIAMINE HCL TAB 100 MG 100 MG: 100 TAB at 09:26

## 2025-06-10 RX ADMIN — ENOXAPARIN SODIUM 50 MG: 60 INJECTION SUBCUTANEOUS at 09:26

## 2025-06-10 RX ADMIN — LISINOPRIL 2.5 MG: 2.5 TABLET ORAL at 09:26

## 2025-06-10 RX ADMIN — ATORVASTATIN CALCIUM 40 MG: 40 TABLET, FILM COATED ORAL at 09:26

## 2025-06-10 RX ADMIN — Medication 12.5 MG: at 09:26

## 2025-06-10 RX ADMIN — HYDROCORTISONE: 1 CREAM TOPICAL at 09:28

## 2025-06-10 RX ADMIN — TAMSULOSIN HYDROCHLORIDE 0.4 MG: 0.4 CAPSULE ORAL at 09:26

## 2025-06-10 RX ADMIN — HYDROXYUREA 500 MG: 15 SOLUTION ORAL at 09:29

## 2025-06-10 RX ADMIN — Medication 400 MCG: at 09:26

## 2025-06-10 RX ADMIN — SMOFLIPID 250 ML: 6; 6; 5; 3 INJECTION, EMULSION INTRAVENOUS at 19:56

## 2025-06-10 RX ADMIN — ENOXAPARIN SODIUM 50 MG: 60 INJECTION SUBCUTANEOUS at 19:57

## 2025-06-10 RX ADMIN — Medication 25 MG: at 09:26

## 2025-06-10 RX ADMIN — MAGNESIUM SULFATE HEPTAHYDRATE: 500 INJECTION, SOLUTION INTRAMUSCULAR; INTRAVENOUS at 19:59

## 2025-06-10 ASSESSMENT — ACTIVITIES OF DAILY LIVING (ADL)
ADLS_ACUITY_SCORE: 62
ADLS_ACUITY_SCORE: 64
ADLS_ACUITY_SCORE: 60
ADLS_ACUITY_SCORE: 60
ADLS_ACUITY_SCORE: 62
ADLS_ACUITY_SCORE: 60
ADLS_ACUITY_SCORE: 62
ADLS_ACUITY_SCORE: 60
ADLS_ACUITY_SCORE: 64
ADLS_ACUITY_SCORE: 60
ADLS_ACUITY_SCORE: 60
ADLS_ACUITY_SCORE: 62
ADLS_ACUITY_SCORE: 62
ADLS_ACUITY_SCORE: 60
ADLS_ACUITY_SCORE: 64
ADLS_ACUITY_SCORE: 64
ADLS_ACUITY_SCORE: 60
ADLS_ACUITY_SCORE: 60

## 2025-06-10 NOTE — PROGRESS NOTES
Care Management Follow Up    Length of Stay (days): 16    Expected Discharge Date: 06/11/2025     Concerns to be Addressed: discharge planning     Patient plan of care discussed at interdisciplinary rounds: Yes    Anticipated Discharge Disposition: Transitional Care     Anticipated Discharge Services: None  Anticipated Discharge DME: None    Patient/family educated on Medicare website which has current facility and service quality ratings: yes  Education Provided on the Discharge Plan: Yes  Patient/Family in Agreement with the Plan: yes    Referrals Placed by CM/SW: Post Acute Facilities  Private pay costs discussed: Not applicable    Discussed  Partnership in Safe Discharge Planning  document with patient/family: Yes     Handoff Completed: No, handoff not indicated or clinically appropriate    Additional Information:  Patient has disposition recommendations for TCU from PT and OT and has been clinically accepted at two facilities.  SW reached out to [place] [address] Providers regarding progress with nutrition, as pt will need to be off TPN to admit to one of these facilities. Per [place] [address] Provider, pt will come off of TPN tonight and then insulin regimen off of TPN will need to be determined, putting likely discharge at Thursday or Friday.    Accepting Facility:     [facility] (Ph: [phone]; Admissions: [phone]; F: [phone])  6/9: Can admit pt tomorrow between 10am-4pm. Pt still on TPN - facility cannot admit with pt on TPN.    [facility] [address] [address]  (Ph: [phone], Admissions: [phone], F: [phone])     Active Referrals:     [facility] Referral (Liaison, Mis Wheat; P: [phone]; F: [phone]; E: [email or web address])     [facility] (Admissions Phone: [phone] Main Phone: [phone] Fax: [phone])     [facility] (Ph: [phone], Admissions: [phone], F:  [phone])     [facility] (Dc Craig, Ph: [phone]; F: [phone]; [email or web address])     [facility] in [place] (P: [phone])  [address] [place], [place] [id number] [place] [address]/[facility] (P: [phone]; F: [phone])     Inactive Referrals:     [facility] - Hx of alcohol abuse.  [facility] - Bed not available, facility full, history of violence and/or drug or alcohol abuse  [facility] - Bed not available.  [facility] - declined; bed not available    Next Steps:   - Follow up with team on pt's med readiness for discharge.   - Follow up with accepting facilities and discuss placement options with pt.   __________________________     KELLY Burris, [facility]  , [facility]  [address]   Desk Phone: [phone]   Securely message with [employer] (Search Name or [address] [phone] [address])  Text page via [facility] Paging/Directory   See signed in provider for up to date coverage information  Social Work & Care Management Department

## 2025-06-10 NOTE — PLAN OF CARE
"BP 98/62 (BP Location: Left arm, Cuff Size: Adult Regular)   Pulse 100   Temp 98.4  F (36.9  C) (Oral)   Resp 18   Ht 1.626 m (5' 4\")   Wt 64.8 kg (142 lb 13.7 oz)   SpO2 94%   BMI 24.52 kg/m      Goal Outcome Evaluation:    Plan of care reviewed with: patient   Overall patient progress: no change    Time: 4726-7088  Status: presented with generalized weakness and admitted for DKA and sepsis and also found to have hepatic vein thrombus, course c/b C-Diff enteritis.    Neuro/Pain: oriented to person and place. Disoriented to time and situation. Bed alarm on. Sleepy for most of the shift. No out bed attempt.   Activity: assist of one person with GBW to bedside commode. Refused to sit on chair/recliner for dinner.   Cardiac/vs: WNL  Respiratory: on room air sating > 92%, kyree sound clear. Infrequent cough, prn Robitussin available. Denied SOB.   GI/: continent of bowel and bladder. Used bedside commode. No bm this shift.   Diet: regular diet, angella count started today x 3 days. Cycled TPN.   Skin: blanchable redness on her coccyx per report, mepi in place, turned side to side q2h. Heels off from pressure with pillows, mepi in place.   LDAs: R PICC triple lumen, saline locked and TPN.   Labs/Imaging: Blood glucose q4h, carb coverage.   Change this shift: none   Plan: continue to monitor. Side to side repositioning. Monitor coccyx area and heels. Bed/chair alarm on.     "

## 2025-06-10 NOTE — PROGRESS NOTES
[facility]    Progress Note - Medicine Service, MAROON TEAM 2       Date of Admission:  5/25/2025    Assessment & Plan   Sarah Felix is a 79 year old female with history of insulin-dependent DM2, HTN, fatty liver, possible alcohol use disorder, gait instability who presented 5/25 with generalized weakness and was admitted with DKA and sepsis, 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 Course c/b C diff enteritis treated with PO vancomycin.      Today's updates:  - Discussed with nutritionist, continue TPN as patient not quite meeting oral nutrition goals  - Accepted to TCU, but will need to be off of TPN upon discharge     Moderate malnutrition in the context of chronic illness   Continued elevated ketones despite insulin ggt. Elevation likely due to poor oral intake due to encephalopathy and starvation ketosis. Pt was started on TPN this admission, she has been on calorie count and has improved on her oral intake, expect discontinuing TPN in the next few days.   - Continue TPN   - SLP: regular diet with thin liquids  - On calorie counts     Delirium  Concern for delirium with the patient only alert and oriented to person and place but not to time. Also having difficulty with attention, unable to state the months of the year backwards or remember the month or year when asked later.   - Delirium precautions    Acute anemia in the setting of ACD  Her PTA hemoglobin levels were within normal limits. Since admission, they have been steadily decreasing. 6/2 Hb was 7.0. Iron panel and B12 were ordered 6/2 and are c/w anemia of chronic disease. Initial concern for bleeding in urine, but repeat UA negative for blood & RBCs. No obvious source of hemorrhage. T bili and haptoglobin normal, pointing away from hemolysis.   - Continue trending Hb daily  - Consider transfusion if Hb <7  - folate, vit b12, hapto wnl    Hepatic vein thrombus  Essential thrombocythemia (JAK2  "positive), high risk disease   No prior imaging to compare. Per chart review, history of \"thromboembolism\" after ovarian cyst removal age 22. Liver panel normal.  - IR consulted; no indication for lytics  - Heme consulted  - Heparin gtt 5/25-5/27  - Lovenox 5/27-present  - At discharge transition to apixaban 10mg BID x 7 days then 5 mg BID  - Follow up with Dr. Wilde 7/29/25  - Continue hydroxyurea  - discontinued PTA ASA      Enteritis possibly 2/2 C. Diff, resolved  Ileus, resolved  Initially presented septic but with negative workup including urine culture, blood cultures, CT C/A/P. Had ongoing worsening encephalopathy and leukocytosis on vanc/zosyn -> unasyn. Developed diarrhea and abdominal pain, stool with C diff PCR + but A toxin negative. C diff B toxin positive, will consult GI for input as to whether enteritis can be associated with this C. Diff presentation. Given clinical worsening, decided to empirically treat - now with improvement. Nausea/vomiting managed with NG decompression. Tube removed 6/1.  - Abx:  - PO vancomycin 125mg daily (5/28-6/5)   - Unasyn (5/27-5/29)  - Zosyn (5/25-5/27)              - Vancomycin (5/25-5/26)  - GI consult for potential workup of enteritis given uncommon presentation of C. Diff              - C. Diff retested and continues to show positive antigen and B toxin, but negative A toxin              - Outpatient MRI/MRCP to evaluate the pancreatic and hepatic cysts  - PO pantoprazole daily     Diabetic ketoacidosis on admission, resolved  Type 2 DM complicated by neuropathy- A1c 8%  T2DM with A1c 8.0%. Likely triggered by sepsis. Now off insulin gtt.  - Endocrine consulted, managing glargine + regular insulin in TPN + novolog  - Hold PTA metformin, glipizide, possible discharge only on insulin  - Diabetes ed consult     Stable/chronic/resolved conditions:  Dysphagia, resolved  Recurrent vomiting, resolved  Concern for aspiration from bedside RN. Feeding tube may have been " preventing proper swallowing, so this was removed. On 6/4/25, SLP updated diet order back to regular diet with thin liquids.   - NGT removed 6/1  - SLP: Regular diet with thin liquids     Urinary retention  Continues to have urinary retention after removing the costa. Will start tamsulosin and continue to monitor.  - Continue straight cath if bladder scan >600cc, may need costa placed if continuing to retain  - Tamsulosin daily     Abnormal UA  Repeat UA on 6/1 showed cloudy urine, proteinuria, hematuria, and leukocyte esterase. She was started on Augmentin given these findings. These UA findings are mostly consistent with her UA from 5/25. This is likely not due to a UTI given that a UTI would have been resolved with her Zosyn and Unasyn abx course, thus Augmentin was discontinued. The hematuria is likely 2/2 repeated catheter insertions. Repeat UA non-concerning for infection or hematuria. Urine culture growing >100,000CFU/mL Raoultella ornithinolytica and Psuedomonas aeruginosa.   - Consider treatment if symptomatic; patient currently asymptomatic     Concern for refeeding syndrome, resolved  Decrease in magnesium and phosphorus after beginning TPN, possible refeeding syndrome.  - BMP daily  - RN replacement protocols     Pleural effusions  Pulmonary edema  Anasarca  Seen on 5/28 CT C/A/P. TTE with LVEF 60-65%, small IVC.   - PTA 20mg Lasix daily     Toxic metabolic encephalopathy, resolved   Not oriented to month or year. Likely related to sepsis vs hospital acquired delirium. No acute intracranial findings on CT head.   - Wernicke dose thiamine  - Delirium precautions     Possible alcohol use disorder  Reports 2 drinks/day. Did have an ED visit 03/2025 for alcohol intoxication. Reports last drink 5 days PTA, low concern for withdrawal. Her brother, Cruz, has concerns that she is drinking excessively and encourages addiction medicine involvement.  - Addiction medicine consulted 6/3/25              - Patient  "denied any problems with alcohol use  - PTA folate  - Continue thiamine, Wernicke dosing     Sacrum/coccyx moisture wound  Erythematous, blanchable area on her sacrum. Bedside RN says likely from excessive moisture 2/2 fecal incontinence. 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  - WOC following, appreciate recommendations     \"Prominent pericardial contents\"  Cardiology reviewed- this is an epicardial fat pad. No further workup or consult needed.     Generalized weakness, failure to thrive  TSH wnl.   - Ongoing PT/OT consults   - Ongoing evaluation for safe discharge plan     HTN: Hold PTA metoprolol, lisinopril given sepsis and normotension     Fall at home: CTH, C spine negative for acute process/fracture     Elevated INR: Unclear etiology, no known liver disease. Will hold off on Vit K given new thrombus.     Essential thrombocytosis: Stop PTA aspirin per heme, continue PTA hydroxyurea    Glaucoma: PTA latanoprost    CKD2    Diabetic neuropathy    Schatzki's ring    Slow transit constipation    Mixed urge and stress incontinence    Gait instability             Diet: Room Service  Regular Diet Adult Thin Liquids (level 0)  parenteral nutrition - ADULT compounded formula CYCLE  Calorie Counts  Snacks/Supplements Adult: Other; 10:00 am: strawberry yogurt,, 2:00 pm: cottage cheese with canned peaches,, HS: bowl of chicken salad + magic cup; Between Meals  parenteral nutrition - ADULT compounded formula CYCLE    DVT Prophylaxis: Enoxaparin (Lovenox) SQ  Silver Catheter: Not present  Fluids: None  Lines: PRESENT      PICC 05/26/25 Triple Lumen Right Basilic Access. PICC was ready to use, no immediate concern.-Site Assessment: WDL      Cardiac Monitoring: None  Code Status: Full Code      Clinically Significant Risk Factors               # Hypoalbuminemia: Lowest albumin = 2.3 g/dL at 6/2/2025  6:23 AM, will monitor as appropriate     # Hypertension: Noted on problem list           # DMII: A1C = " 8.0 % (Ref range: <5.7 %) within past 6 months    # Severe Malnutrition: based on nutrition assessment and treatment provided per dietitian's recommendations.    # Financial/Environmental Concerns: none         Social Drivers of Health   Food Insecurity: Unknown (5/28/2025)    Food Insecurity     Within the past 12 months, did you worry that your food would run out before you got money to buy more?: Patient unable to answer     Within the past 12 months, did the food you bought just not last and you didn t have money to get more?: Patient unable to answer   Housing Stability: Unknown (5/28/2025)    Housing Stability     Do you have housing? : Patient unable to answer     Are you worried about losing your housing?: Patient unable to answer   Tobacco Use: Medium Risk (4/9/2025)    Patient History     Smoking Tobacco Use: Former     Smokeless Tobacco Use: Never     Passive Exposure: Past   Financial Resource Strain: Unknown (5/28/2025)    Financial Resource Strain     Within the past 12 months, have you or your family members you live with been unable to get utilities (heat, electricity) when it was really needed?: Patient unable to answer   Transportation Needs: Unknown (5/28/2025)    Transportation Needs     Within the past 12 months, has lack of transportation kept you from medical appointments, getting your medicines, non-medical meetings or appointments, work, or from getting things that you need?: Patient unable to answer   Interpersonal Safety: Unknown (5/28/2025)    Interpersonal Safety     Do you feel physically and emotionally safe where you currently live?: Patient unable to answer     Within the past 12 months, have you been hit, slapped, kicked or otherwise physically hurt by someone?: Patient unable to answer     Within the past 12 months, have you been humiliated or emotionally abused in other ways by your partner or ex-partner?: Patient unable to answer         Disposition Plan     Medically Ready for  Discharge: Anticipated in 2-4 Days         The patient's care was discussed with the Attending Physician, Dr. Wall.    Miguel Solano MD  Medicine Service, [facility] TEAM 2  [facility]  Securely message with [employer] (more info)  Text page via [employer] Paging/Directory   See signed in provider for up to date coverage information  ______________________________________________________________________    Interval History   No acute events overnight. The patient denies being in any pain today. She is eating what she can. She denies any fevers, chills, chest pain, shortness of breath, abdominal pain, or increased leg swelling.     Physical Exam   Vital Signs: Temp: 98.4  F (36.9  C) Temp src: Oral BP: 98/62 Pulse: 100   Resp: 18 SpO2: 94 % O2 Device: None (Room air)    Weight: 142 lbs 13.73 oz    Constitutional: awake, alert, cooperative, no apparent distress, and appears stated age, appears disheveled  ENT: Mucus membranes moist  Respiratory: No increased work of breathing, good air exchange, clear to auscultation bilaterally, no crackles or wheezing  Cardiovascular: Regular rate and rhythm with no murmurs  GI: Soft, mild generalized tenderness to palpation, nondistended  Neurologic: Awake, alert and oriented to person and place but not to time, face symmetric, moving all 4 extremities. Cannot remember the month or year when asked again after 5 minutes. Unable to state months of the year backwards.   Psych: Flat affect. Endorses feeling depressed with hospital stay    Medical Decision Making       Please see A&P for additional details of medical decision making.      Data     I have personally reviewed the following data over the past 24 hrs:    6.7  \   8.6 (L)   / 322     N/A N/A N/A /  183 (H)   3.9 N/A N/A \       Imaging results reviewed over the past 24 hrs:   No results found for this or any previous visit (from the past 24 hours).

## 2025-06-10 NOTE — PLAN OF CARE
Shift Hours: 0700 - 1500    Assessment:  Body systems assessments were at patient's baseline.        Activity     Fall Risk Score: 95   Bed alarm on? Yes     Activity Assistance Provided: assistance, 1 person      Assistive Device Utilized: gait belt    Pain: Denies    Labs/RN Managed Protocols: K, mag, phos    Lines/Drains: TL PICC, SL    Nutrition: Regular, on angella counts    Goal Outcome Evaluation  Oriented to self and place. VSS on RA. Minimal appetite. Pt declined getting up into chair. Pt sleeping intermittently throughout the day.     Barriers to Discharge:   Pt requires a few more days of TPN, otherwise is med ready.

## 2025-06-10 NOTE — PLAN OF CARE
Goal Outcome Evaluation:    Plan of Care Reviewed With: patient    Overall Patient Progress: no change  Overall Patient Progress: no change    Outcome Evaluation: Assumed care from [id number]. Oriented to self, place - disoriented to time, situation. Vitally stable on room air. BG monitored (183, 185, 184, 161), sliding scale administered as ordered. Denies pain. PICC infusing TPN/Lipids. Calorie counts begin today until 6/11. Mepilex changed on sacrum. AM Labs drawn. Continue with plan of care.     Itzel Engel RN

## 2025-06-10 NOTE — PROGRESS NOTES
"                       Inpatient Diabetes Management Service: Daily Progress Note     HPI: Sarah Felix is a 79 year old female with history of insulin-dependent DM2, HTN, fatty liver, possible alcohol use disorder, gait instability who presented 27 with generalized weakness and was admitted with DKA and sepsis, 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 Course complicated by C diff enteritis.     IDS consulted to assist with glycemic management - specifically for concerns of \"DKA\" as well as optimization while inpatient and when applicable, recommendations for transition home.         Assessment/Plan:     Assessment:  Type 2 Diabetes Mellitus, c/b neuropathy.  (A1c 8 % on 25) in presence of anemia and complex illness  DKA on admission (although not meeting all criteria/multifactorial).  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   Toxic metabolic encephalopathy  Stress / TPN induced hyperglycemia  C Diff Toxin PCR positive and C diff toxin negative, enteririts  BMI: 26     Plan/Recommendations:   - increase Lantus 5 --> 6 units q 24 hrs at 1200  - Regular insulin in TPN cycled 12 hrs: 27 units with Dextrose 150 g (0.18 units per gram dextrose)  - Novolog carbohydrate coverage: 1 unit per 15 g cho TID AC and prn snacks/supplements  - Novolog correction scale:  Medium resistance 1/50 >140 0800/1200/1600 when TPN off   High resistance 1/25 >140 2000/0000/0400 during TPN   - BG monitoring q 4 hours while minimal PO intake/TPN cycling   - PTA medications: holding metformin and glimepiride   - Hypoglycemia protocol  - Carb counting protocol     Discussion:  BG overall trending well, requiring some daytime correction and BG trending up during the day. Increase Lantus today. TPN cycling 12 hrs overnight, regular insulin was reduced for last night so will continue the same for tonight.  " "     Discharge Planning: (tentative)  --> plan to discharge to TCU once medically ready and off TPN  Medications: Likely resume metformin once off TPN and eating better (could replace Lantus). Novolog set meal doses.  Test Claims:  none needed.   Education:  Needs to be assessed closer to discharge.    Outpatient Follow-up:  recommend [facility] Endocrinology vs PCP        Please notify Inpatient Diabetes Service if changes are planned to steroids, nutrition, TPN/TF and anticipated procedures requiring prolonged NPO status.         Interval History/Review of Systems :   - The last 24 hours progress and nursing notes reviewed.  - Spoke to primary team, patient accepted to TCU but unable to use TPN at TCU, anticipate titrating off TPN in the next couple of days.  - Patient reports appetite is \"so-so\" without nausea or emesis.     Planned Procedures/Surgeries: none    Inpatient Glucose Control:       Recent Labs   Lab 06/10/25  0401 06/10/25  0001 06/09/25  2351 06/09/25  2013 06/09/25  1729 06/09/25  1208   * 184* 185* 183* 154* 153*           Medications Impacting Glycemia:   Steroids: none  D5W containing solutions/medications: none  Other medications impacting glucose: none        Nutrition:   Orders Placed This Encounter      Regular Diet Adult Thin Liquids (level 0)  Supplements:  Glucerna strawberry TID AC meals  TF: None  TPN:   Started 5/28 - 5/29: Regular insulin in continuous TPN 6 units with Dextrose 120 g   6/4: 18 hr cyclic ([id number]): TPN with 150 g dextrose and 20 units of reg insulin  6/5:  Regular insulin in TPN cycled 12 hrs (2000 - 0800): 24 units with Dextrose 150 g (0.16 units per gram dextrose)   6/6-6/8: Regular insulin in TPN cycled 12 hrs (2000 - 0800): 30 units with Dextrose 150 g (0.20 units per gram dextrose)   6/9 - current: TPN cycling 12 hrs (2000 - 0800): 27 units reg insulin with dextrose 150 grams        Diabetes History: see full consult note for complete diabetes history " "  Diabetes Type and Duration: DM2, 7/2009     GAD65 antibody <5 (9/11/2012)   C peptide 7.6 (9/11/2012)     PTA Medication Regimen: Glimepiride 1 mg daily, Novolog 5 units with high carb meal daily as needed, metformin 500 mg daily    Missing doses?: uncertain  Historical Diabetes Medications: N/A     Glucose monitoring device/frequency/trends: Fortino CGM, reports 130 on average  Hypoglycemia PTA:   - Frequency: \"rarely\"  - Severity:  no history of severe unconscious lows   - Awareness:  intact    - Treatment: \"eats food\"      Outpatient Diabetes Provider: Unknown  Formal Diabetes Education/Educator: Erik        Physical Exam:   /61 (BP Location: Left arm, Patient Position: Semi-Ny's, Cuff Size: Adult Regular)   Pulse 99   Temp 97.8  F (36.6  C) (Oral)   Resp 20   Ht 1.626 m (5' 4\")   Wt 64.8 kg (142 lb 13.7 oz)   SpO2 96%   BMI 24.52 kg/m    General:  fatigued appearing, NAD, resting comfortably in bed.  Lungs: unlabored breathing on RA.  Mental Status:  not assessed  Psych:   Calm, cooperative, good eye contact, full/appropriate affect         Data:     Lab Results   Component Value Date    A1C 8.0 (H) 05/25/2025    A1C 7.5 (H) 11/05/2024    A1C 7.6 (H) 07/09/2024    A1C 6.6 (H) 03/05/2024    A1C 7.4 (H) 11/14/2023    A1C 8.3 (H) 06/21/2021    A1C 9.4 (H) 12/14/2020    A1C 8.1 (H) 09/21/2020    A1C 8.5 (H) 01/07/2020    A1C 7.6 (H) 10/12/2019       ROUTINE IP LABS (Last four results)  BMP  Recent Labs   Lab 06/10/25  0540 06/10/25  0401 06/10/25  0001 06/09/25  2351 06/09/25  2013 06/09/25  0356 06/09/25  0352 06/08/25  0543 06/08/25  0541 06/07/25  0804 06/07/25  0439 06/06/25  0608 06/06/25  0548 06/04/25  0803 06/04/25  0449   NA  --   --   --   --   --   --  140  --  140  --   --   --  136  --  136   POTASSIUM 3.9  --   --   --   --   --  4.1  --  4.4  --  4.2  --  4.9   < > 4.4   CHLORIDE  --   --   --   --   --   --  107  --  110*  --   --   --  107  --  107   CECY  --   --   --   --   --   " --  8.4*  --  8.5*  --   --   --  8.0*  --  7.8*   CO2  --   --   --   --   --   --  26  --  24  --   --   --  22  --  23   BUN  --   --   --   --   --   --  26.9*  --  26.7*  --   --   --  25.7*  --  18.9   CR  --   --   --   --   --   --  0.63  --  0.63  --   --   --  0.60  --  0.59   GLC  --  161* 184* 185* 183*   < > 103*   < > 143*   < >  --    < > 294*   < > 159*    < > = values in this interval not displayed.     CBC  Recent Labs   Lab 06/10/25  0540 06/09/25  0352 06/08/25  0541 06/07/25  0439   WBC 6.7 7.2 6.4 7.9   RBC 2.91* 2.81* 2.76* 2.42*   HGB 8.6* 8.2* 8.1* 6.9*   HCT 27.5* 26.4* 25.6* 21.9*   MCV 95 94 93 91   MCH 29.6 29.2 29.3 28.5   MCHC 31.3* 31.1* 31.6 31.5   RDW 24.6* 23.1* 22.0* 23.2*    [phone]     Inpatient Diabetes Service will continue to follow, please don't hesitate to contact the team with any questions or concerns.     Clair Chung PA-C  Inpatient Diabetes Service  Pager: [phone]  Available on [employer]    To contact Inpatient Diabetes Service:     7 AM - 5 PM: Page the IDS MIKE following the patient that day (see filed or incomplete progress notes/consult notes under Endocrinology)    OR if uncertain of provider assignment: page job code [phone]    5 PM - 7 AM: First call after hours is to primary service.    For urgent after-hours questions, page job code for on call fellow: [phone]     I spent a total of 35 minutes on the date of the encounter doing prep/post-work, chart review, history and exam, documentation and further activities per the note including lab review, multidisciplinary communication, counseling the patient and/or coordinating care regarding acute hyper/hypoglycemic management, as well as discharge management and planning/communication.  See note for details.

## 2025-06-10 NOTE — PROGRESS NOTES
CLINICAL NUTRITION SERVICES - REASSESSMENT NOTE     RECOMMENDATIONS FOR MDs/PROVIDERS TO ORDER:  Patient is able to eat and tolerate oral intake. Suggest to discontinue TPN after tonight's infusion. Please notify endocrine. Encourage small frequent meals ongoing to maximize intake.   Consider appetite stimulant     Registered Dietitian Interventions:  Room service with assist   Magic cup at HS  Yogurt and cottage cheese with canned peaches at 10:00 and 2:00     Future/Additional Recommendations:  GI status  PO improvement        INFORMATION OBTAINED  Assessed patient in room.    Chart reviewed:   History of insulin-dependent DM2, HTN, fatty liver, possible alcohol use disorder, gait instability    Presented 5/25/25 with generalized weakness and was admitted with DKA and sepsis, 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     Course c/b C diff enteritis treated with PO vancomycin  Accepted to TCU; possible acceptance/discharge as early as 6/10/25       CURRENT NUTRITION ORDERS  Diet: Regular diet as of 6/3 + Glucerna with meals   -> passes swallow eval and now on solid diet since 6/2      Nutrition Support: On TPN since 5/28   Dosing wt: 64.4 kg   Volume: On overnight 12 hour cycle TPN regimen, 1200 ml PN volume   Dextrose: 150 grams per day ( endocrine following)  AA: 95 gram (1.5 gram/kg)  Lipids: SMOF, 250 ml x 6 days per week (M-Sat)     Regimen to provide: 1319 kcal/day (20 kcal/kg), 95 gram protein/day (1.5 gram/kg), 150 gram carb per day (GIR: 2.42 mg/kg/min peak infusion) with 32.5% fat calories from SMOF        CURRENT INTAKE/TOLERANCE  Visited with patient today. Patient tells me, her oral intake is improving. Able to eat more. Had half of an Omelet for bkf this am. Continues to have limited appetite. No N/V.  Having daily frequent BM. Patient dislikes oral supplements and are not utilizing Glucerna. Will discontinue.     Calorie count summary:   6/6: 970 kcal and 23 gram protein from 1  meals and bunch of  sweets recorded  : 370 kcal and 15 gram protein from 25% of 3 meals recorded   : 810 kcal and 15 gram protein from 2 small meal recorded     Goal for calorie count: 75% of estimated needs to discontinue TPN (~ 1200 kcal and 60 gram protein). Current calorie count met 74% of goal for calories and 33% goal for protein intake.     Per flow sheet review: patient is consuming 25-50% of meals.   Encouraged patient to utilize oral supplements as able. Will start magic cup at bedtime   Patient in agreement with high protein snack orders  in between meals to maximize protein intake.      NEW FINDINGS  GI symptoms:   Daily BM 2-5 over the past week   C.diff +, on antibiotics       Skin/wounds:   WOC RN note from 6/3 reviewed: WOC RN singed off   Gluteal cleft and buttocks: Wound due to: Incontinence Associated Dermatitis (IAD) . STATUS: improving       Nutrition-relevant labs:   Lytes normal range  BUN: 26.9, Cr: 0.63, GFR: >90  LFT's: ALP:71, ALT:17, AST:19, total bili: 0.3  TGs: N/A  CRP: trending down, now 12.70  B ( endo following), A1C: 8.0 (H)      Nutrition-relevant medications:   Folic acid  Insulin with meals +glargine 6 units every 24 hours at noon  Pyridoxine 25 mg daily  B1 100 mg daily      Weight:   Admit wt: 64.4 kg (142 lb) on   Most recent wt: 64.8 kg (142 lb 13.7 oz) on  standing wt   Wt stable since admit     Previously:   70.6 kg (155 lb 11.2 oz) on 25  8.8% wt loss over the past 4 month      MALNUTRITION  % Intake: Decreased intake does not meet criteria, On TPN  % Weight Loss: > 7.5% in 3 months (severe)   Subcutaneous Fat Loss: None observed  Muscle Loss: Temples (temporalis muscle): Moderate and Clavicles (pectoralis and deltoids): Moderate  Fluid Accumulation/Edema: 2+ moderate   Malnutrition Diagnosis: Severe malnutrition in the context of acute illness or injury  Malnutrition Present on Admission: Yes        EVALUATION OF THE PROGRESS TOWARD GOALS   Previous Goals  Total avg  nutritional intake to meet a minimum of 25 kcal/kg and 1.2-1.5 g PRO/kg daily (per dosing wt 64.4 kg).   Evaluation: Met with TPN and PO      Previous Nutrition Diagnosis  Inadequate oral intake related to N/V as evidenced by NG for decompression, TPN initiation   Evaluation: Improving    NUTRITION DIAGNOSIS  Inadequate oral intake related to lack of appetite as evidenced by dysphagia diet and continues on TPN  support     INTERVENTIONS  Parenteral nutrition/IV fluid management    GOALS  Total avg nutritional intake to meet a minimum of 25 kcal/kg and 1.2 g PRO/kg daily (per dosing wt 64.4 kg).     MONITORING/EVALUATION  Progress toward goals will be monitored and evaluated per policy.    Brittany Walker RD/LD  Unit 7C ([id number]) and ([id number]),  Monday-Friday: Vocera -> (7C clinical Dietitian),   Weekend/Holiday: Vocera -> (Weekend Clinical Dietitian)

## 2025-06-11 ENCOUNTER — APPOINTMENT (OUTPATIENT)
Dept: OCCUPATIONAL THERAPY | Facility: CLINIC | Age: 80
DRG: 871 | End: 2025-06-11
Payer: COMMERCIAL

## 2025-06-11 LAB
ANION GAP SERPL CALCULATED.3IONS-SCNC: 7 MMOL/L (ref 7–15)
BUN SERPL-MCNC: 32 MG/DL (ref 8–23)
CALCIUM SERPL-MCNC: 8.4 MG/DL (ref 8.8–10.4)
CHLORIDE SERPL-SCNC: 105 MMOL/L (ref 98–107)
CREAT SERPL-MCNC: 0.7 MG/DL (ref 0.51–0.95)
EGFRCR SERPLBLD CKD-EPI 2021: 89 ML/MIN/1.73M2
ERYTHROCYTE [DISTWIDTH] IN BLOOD BY AUTOMATED COUNT: 25.1 % (ref 10–15)
GLUCOSE BLDC GLUCOMTR-MCNC: 151 MG/DL (ref 70–99)
GLUCOSE BLDC GLUCOMTR-MCNC: 155 MG/DL (ref 70–99)
GLUCOSE BLDC GLUCOMTR-MCNC: 161 MG/DL (ref 70–99)
GLUCOSE BLDC GLUCOMTR-MCNC: 170 MG/DL (ref 70–99)
GLUCOSE BLDC GLUCOMTR-MCNC: 175 MG/DL (ref 70–99)
GLUCOSE BLDC GLUCOMTR-MCNC: 185 MG/DL (ref 70–99)
GLUCOSE SERPL-MCNC: 153 MG/DL (ref 70–99)
HCO3 SERPL-SCNC: 28 MMOL/L (ref 22–29)
HCT VFR BLD AUTO: 26.7 % (ref 35–47)
HGB BLD-MCNC: 8.3 G/DL (ref 11.7–15.7)
MAGNESIUM SERPL-MCNC: 2.1 MG/DL (ref 1.7–2.3)
MCH RBC QN AUTO: 29.5 PG (ref 26.5–33)
MCHC RBC AUTO-ENTMCNC: 31.1 G/DL (ref 31.5–36.5)
MCV RBC AUTO: 95 FL (ref 78–100)
METHYLMALONATE SERPL-SCNC: 0.23 UMOL/L (ref 0–0.4)
PHOSPHATE SERPL-MCNC: 3.7 MG/DL (ref 2.5–4.5)
PLATELET # BLD AUTO: 336 10E3/UL (ref 150–450)
POTASSIUM SERPL-SCNC: 3.9 MMOL/L (ref 3.4–5.3)
RBC # BLD AUTO: 2.81 10E6/UL (ref 3.8–5.2)
SODIUM SERPL-SCNC: 140 MMOL/L (ref 135–145)
WBC # BLD AUTO: 6 10E3/UL (ref 4–11)

## 2025-06-11 PROCEDURE — 250N000013 HC RX MED GY IP 250 OP 250 PS 637

## 2025-06-11 PROCEDURE — 250N000011 HC RX IP 250 OP 636: Performed by: INTERNAL MEDICINE

## 2025-06-11 PROCEDURE — 99232 SBSQ HOSP IP/OBS MODERATE 35: CPT | Mod: GC | Performed by: STUDENT IN AN ORGANIZED HEALTH CARE EDUCATION/TRAINING PROGRAM

## 2025-06-11 PROCEDURE — 120N000002 HC R&B MED SURG/OB UMMC

## 2025-06-11 PROCEDURE — 83735 ASSAY OF MAGNESIUM: CPT

## 2025-06-11 PROCEDURE — 80048 BASIC METABOLIC PNL TOTAL CA: CPT | Performed by: INTERNAL MEDICINE

## 2025-06-11 PROCEDURE — 250N000013 HC RX MED GY IP 250 OP 250 PS 637: Performed by: STUDENT IN AN ORGANIZED HEALTH CARE EDUCATION/TRAINING PROGRAM

## 2025-06-11 PROCEDURE — 85018 HEMOGLOBIN: CPT

## 2025-06-11 PROCEDURE — 97535 SELF CARE MNGMENT TRAINING: CPT | Mod: GO | Performed by: OCCUPATIONAL THERAPIST

## 2025-06-11 PROCEDURE — 97530 THERAPEUTIC ACTIVITIES: CPT | Mod: GO | Performed by: OCCUPATIONAL THERAPIST

## 2025-06-11 PROCEDURE — 84100 ASSAY OF PHOSPHORUS: CPT

## 2025-06-11 RX ADMIN — Medication 3 MG: at 19:51

## 2025-06-11 RX ADMIN — FUROSEMIDE 20 MG: 20 TABLET ORAL at 16:26

## 2025-06-11 RX ADMIN — LATANOPROST 1 DROP: 50 SOLUTION/ DROPS OPHTHALMIC at 21:50

## 2025-06-11 RX ADMIN — HYDROXYUREA 500 MG: 15 SOLUTION ORAL at 08:49

## 2025-06-11 RX ADMIN — HYDROCORTISONE: 1 CREAM TOPICAL at 19:52

## 2025-06-11 RX ADMIN — Medication 12.5 MG: at 08:45

## 2025-06-11 RX ADMIN — ENOXAPARIN SODIUM 50 MG: 60 INJECTION SUBCUTANEOUS at 21:50

## 2025-06-11 RX ADMIN — ENOXAPARIN SODIUM 50 MG: 60 INJECTION SUBCUTANEOUS at 12:55

## 2025-06-11 RX ADMIN — HYDROCORTISONE: 1 CREAM TOPICAL at 08:45

## 2025-06-11 RX ADMIN — Medication 12.5 MG: at 19:51

## 2025-06-11 RX ADMIN — LISINOPRIL 2.5 MG: 2.5 TABLET ORAL at 08:45

## 2025-06-11 RX ADMIN — Medication 25 MG: at 08:45

## 2025-06-11 RX ADMIN — PANTOPRAZOLE SODIUM 40 MG: 40 TABLET, DELAYED RELEASE ORAL at 08:45

## 2025-06-11 RX ADMIN — Medication 400 MCG: at 08:44

## 2025-06-11 RX ADMIN — THIAMINE HCL TAB 100 MG 100 MG: 100 TAB at 08:44

## 2025-06-11 RX ADMIN — ATORVASTATIN CALCIUM 40 MG: 40 TABLET, FILM COATED ORAL at 08:45

## 2025-06-11 RX ADMIN — FUROSEMIDE 20 MG: 20 TABLET ORAL at 08:44

## 2025-06-11 RX ADMIN — TAMSULOSIN HYDROCHLORIDE 0.4 MG: 0.4 CAPSULE ORAL at 08:45

## 2025-06-11 ASSESSMENT — ACTIVITIES OF DAILY LIVING (ADL)
ADLS_ACUITY_SCORE: 60
ADLS_ACUITY_SCORE: 58
ADLS_ACUITY_SCORE: 60
ADLS_ACUITY_SCORE: 58
ADLS_ACUITY_SCORE: 60
ADLS_ACUITY_SCORE: 58
ADLS_ACUITY_SCORE: 58
ADLS_ACUITY_SCORE: 60
ADLS_ACUITY_SCORE: 58
ADLS_ACUITY_SCORE: 60
ADLS_ACUITY_SCORE: 58
ADLS_ACUITY_SCORE: 58

## 2025-06-11 NOTE — PROGRESS NOTES
Care Management Follow Up    Length of Stay (days): 17  Expected Discharge Date: 06/13/2025  Concerns to be Addressed: discharge planning     Patient plan of care discussed at interdisciplinary rounds: Yes  Anticipated Discharge Disposition: Transitional Care  Anticipated Discharge Services: None  Anticipated Discharge DME: None  Patient/family educated on Medicare website which has current facility and service quality ratings: yes  Education Provided on the Discharge Plan: Yes  Patient/Family in Agreement with the Plan: yes  Referrals Placed by CM/SW: Post Acute Facilities  Private pay costs discussed: Not applicable  Discussed  Partnership in Safe Discharge Planning  document with patient/family: No   Handoff Completed: No, handoff not indicated or clinically appropriate  Additional Information: Pt is anticipated to be medically ready for discharge to one of the three accepting TCUs once off TPN and off insulin regimen. Writer asked CHIQUITA Dickson to follow-up with the patient to determine which facility she would like to go to. Per [facility]  Provider, anticipate discharge in the next 1-2 days. Writer asked CHIUQITA Dickson to work with family to identify preference for facility, complete PAS & set-up Friday AM discharge ride.    Accepting Facility:     [facility] (Ph: [phone]; Admissions: [phone]; F: [phone])  6/9: Can admit pt tomorrow between 10am-4pm. Pt still on TPN - facility cannot admit with pt on TPN.     [facility] [place] [address] [place] [address]  (Ph: [phone], Admissions: [phone], F: [phone])    [facility] (Admissions Phone: [phone] Main Phone: [phone] Fax: [phone])  [address] [place] [address]     Active Referrals:     [facility] Global Referral (Liaison, Mis Wheat; P: [phone]; F: [phone]; E: [email or web address])     [facility] (Ph: [phone], Admissions:  [phone], F: [phone])     [facility]erral (Chloedarinel Craig, Ph: [phone]; F: [phone]; [email or web address])     [facility] in [place] (P: [phone])  [facility] [address]/[facility] (P: [phone]; F: [phone])     Inactive Referrals:     [facility] - Hx of alcohol abuse.  [facility] - Bed not available, facility full, history of violence and/or drug or alcohol abuse  [facility] - Bed not available.  [facility] - declined; bed not available    KELLY Andersen, [facility] HospitalSW  Clinical   [facility] Acute Care Management  [phone]  Available on Vocera: 7C Med Surg 2230 thru 9421 SW

## 2025-06-11 NOTE — PLAN OF CARE
Goal Outcome Evaluation:  Shift Hours: 0700 - 1900    Assessment:  Body systems that were not at patient's baseline Cognitive/Behavioral/Neuro, Peripheral Neurovascular, Skin, and Musculoskeletal. Focused body system assessments documented in flowsheets.        Activity     Fall Risk Score: 95   Bed alarm on? Yes     Activity Assistance Provided: assistance, 1 person      Assistive Device Utilized: gait belt, walker    Pain: denies    Labs/RN Managed Protocols: K, Mg, Phos WDL, no replacements needed today    Lines/Drains: right triple lumen PICC    Nutrition: tolerating a regular diet, on angella counts,     Goal Outcome Evaluation  Plan of Care Reviewed With: patient  Overall Patient Progress: no change     No acute events or changes today. AO to self and place. Worked with OT. Continues with poor appetite. BG now ac/hs and 0200 with sliding scale insulin + carb coverage. Continue calorie counts. Call light within reach and able to make needs known.    Barriers to Discharge:   TPN/Lipids discontinued, Possible discharge on Friday

## 2025-06-11 NOTE — PROGRESS NOTES
"                       Inpatient Diabetes Management Service: Daily Progress Note     HPI: Sarah Felix is a 79 year old female with history of insulin-dependent DM2, HTN, fatty liver, possible alcohol use disorder, gait instability who presented 27 with generalized weakness and was admitted with DKA and sepsis, 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 Course complicated by C diff enteritis.      IDS consulted to assist with glycemic management - specifically for concerns of \"DKA\" as well as optimization while inpatient and when applicable, recommendations for transition home.         Assessment/Plan:     Assessment:  Type 2 Diabetes Mellitus, c/b neuropathy.  (A1c 8 % on 25) in presence of anemia and complex illness  DKA on admission (although not meeting all criteria/multifactorial).  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    - BG monitoring TID AC and HS/0200    - PTA medications: holding metformin and glimepiride   - Hypoglycemia protocol  - Carb counting protocol     Discussion:  BG overall trending well, Slightly elevated during the day and requiring additional daytime and correction with TPN. Will increase Lantus. . BGs trending well overnight with current Dextrose/insulin ratio in TPN. Per RD, Discontinuing TPN today. Will continue to trend BGS and assess insulin needs for discharge recommendations.     Cr 0.70 Gfr 89, " Anion gap 7 Bicarb 28.  WBC 6.0, Hgb 8.3.     Discharge Planning: (tentative)  --> plan to discharge to TCU once medically ready and off TPN  Medications: Likely resume metformin once off TPN and eating better (could replace Lantus). Novolog set meal doses.  Test Claims:  none needed.   Education:  Needs to be assessed closer to discharge.    Outpatient Follow-up:  recommend [facility] Endocrinology vs PCP        Please notify Inpatient Diabetes Service if changes are planned to steroids, nutrition, TPN/TF and anticipated procedures requiring prolonged NPO status.         Interval History/Review of Systems :   The last 24 hours progress and nursing notes reviewed.  No events overnight.   Continues to have poor appetite, only recorded 22 grams cho yesterday.     Planned Procedures/Surgeries: none    Inpatient Glucose Control:       Recent Labs   Lab 06/11/25  0442 06/11/25  0410 06/11/25  0001 06/10/25  1953 06/10/25  1550 06/10/25  1238   * 155* 185* 162* 213* 183*             Medications Impacting Glycemia:   Steroids: none  D5W containing solutions/medications: none  Other medications impacting glucose: none        Nutrition:   Orders Placed This Encounter      Regular Diet Adult Thin Liquids (level 0)    Supplements:  Glucerna strawberry TID AC meals  TF: None  TPN:   Started 5/28 - 5/29: Regular insulin in continuous TPN 6 units with Dextrose 120 g   6/4: 18 hr cyclic (9708-6046): TPN with 150 g dextrose and 20 units of reg insulin  6/5:  Regular insulin in TPN cycled 12 hrs (2000 - 0800): 24 units with Dextrose 150 g (0.16 units per gram dextrose)   6/6-6/8: Regular insulin in TPN cycled 12 hrs (2000 - 0800): 30 units with Dextrose 150 g (0.20 units per gram dextrose)   6/9 - 6/10 TPN cycling 12 hrs (2000 - 0800): 27 units reg insulin with dextrose 150 grams  6/11 TPN discontiued        Diabetes History: see full consult note for complete diabetes history   Diabetes Type and Duration: DM2, 7/2009     GAD65  "antibody <5 (9/11/2012)   C peptide 7.6 (9/11/2012)     PTA Medication Regimen: Glimepiride 1 mg daily, Novolog 5 units with high carb meal daily as needed, metformin 500 mg daily     Missing doses?: uncertain  Historical Diabetes Medications: N/A     Glucose monitoring device/frequency/trends: Fortino CGM, reports 130 on average  Hypoglycemia PTA:   - Frequency: \"rarely\"  - Severity:  no history of severe unconscious lows   - Awareness:  intact    - Treatment: \"eats food\"      Outpatient Diabetes Provider: Unknown  Formal Diabetes Education/Educator: Unknown        Physical Exam:   /58 (BP Location: Left arm)   Pulse 99   Temp 97.8  F (36.6  C) (Oral)   Resp 16   Ht 1.626 m (5' 4\")   Wt 67.1 kg (147 lb 14.9 oz)   SpO2 96%   BMI 25.39 kg/m    General:  fatigued appearing, NAD, resting comfortably in bed.  Lungs: unlabored breathing on RA.  Mental Status:  not assessed  Psych:   Calm, cooperative, good eye contact, full/appropriate affect           Data:     Lab Results   Component Value Date    A1C 8.0 (H) 05/25/2025    A1C 7.5 (H) 11/05/2024    A1C 7.6 (H) 07/09/2024    A1C 6.6 (H) 03/05/2024    A1C 7.4 (H) 11/14/2023    A1C 8.3 (H) 06/21/2021    A1C 9.4 (H) 12/14/2020    A1C 8.1 (H) 09/21/2020    A1C 8.5 (H) 01/07/2020    A1C 7.6 (H) 10/12/2019       ROUTINE IP LABS (Last four results)  BMP  Recent Labs   Lab 06/11/25  0442 06/11/25  0410 06/11/25  0001 06/10/25  1953 06/10/25  0812 06/10/25  0540 06/09/25  0356 06/09/25  0352 06/08/25  0543 06/08/25  0541 06/06/25  0608 06/06/25  0548     --   --   --   --   --   --  140  --  140  --  136   POTASSIUM 3.9  --   --   --   --  3.9  --  4.1  --  4.4   < > 4.9   CHLORIDE 105  --   --   --   --   --   --  107  --  110*  --  107   CECY 8.4*  --   --   --   --   --   --  8.4*  --  8.5*  --  8.0*   CO2 28  --   --   --   --   --   --  26  --  24  --  22   BUN 32.0*  --   --   --   --   --   --  26.9*  --  26.7*  --  25.7*   CR 0.70  --   --   --   --   --  "  --  0.63  --  0.63  --  0.60   * 155* 185* 162*   < >  --    < > 103*   < > 143*   < > 294*    < > = values in this interval not displayed.     CBC  Recent Labs   Lab 06/11/25  0442 06/10/25  0540 06/09/25  0352 06/08/25  0541   WBC 6.0 6.7 7.2 6.4   RBC 2.81* 2.91* 2.81* 2.76*   HGB 8.3* 8.6* 8.2* 8.1*   HCT 26.7* 27.5* 26.4* 25.6*   MCV 95 95 94 93   MCH 29.5 29.6 29.2 29.3   MCHC 31.1* 31.3* 31.1* 31.6   RDW 25.1* 24.6* 23.1* 22.0*    322 311 339     INR  Recent Labs   Lab 06/09/25  0352   INR 1.05       Inpatient Diabetes Service will continue to follow, please don't hesitate to contact the team with any questions or concerns.     MALATHI Falcon CNP    Plan discussed with patient, bedside RN, and primary team via this note.    To contact Inpatient Diabetes Service:     7 AM - 5 PM: Page the "[employer] MIKE following the patient that day (see filed or incomplete progress notes/consult notes under Endocrinology)    OR if uncertain of provider assignment: page job code [phone]    5 PM - 7 AM: First call after hours is to primary service.    For urgent after-hours questions, page job code for on call fellow: [phone]     I spent a total of 45 minutes on the date of the encounter doing prep/post-work, chart review, history and exam, documentation and further activities per the note including lab review, multidisciplinary communication, counseling the patient and/or coordinating care regarding acute hyper/hypoglycemic management, as well as discharge management and planning/communication.

## 2025-06-11 NOTE — PLAN OF CARE
Goal Outcome Evaluation:      Plan of Care Reviewed With: patient    Overall Patient Progress: no changeOverall Patient Progress: no change     Pt is A&O to self and place, not oriented to time and situation, V/S stable, assist x 1, on room air. BG q4hrs. Cycled TPN infusing to red lumen of triple lumen PICC line at 55/109/55 mL/hr. Reg diet with poor appetite. Continue monitoring blanchable redness on sacral area and wounds on left shin, ankle, and heel. No major events during the night, continue plan of care.

## 2025-06-11 NOTE — PROGRESS NOTES
Care Management Follow Up    Length of Stay (days): 17    Expected Discharge Date: 06/12/2025     Concerns to be Addressed: discharge planning     Patient plan of care discussed at interdisciplinary rounds: Yes    Anticipated Discharge Disposition: Transitional Care  Anticipated Discharge Services: None  Anticipated Discharge DME: None    Patient/family educated on Medicare website which has current facility and service quality ratings: yes  Education Provided on the Discharge Plan: Yes  Patient/Family in Agreement with the Plan: yes    Referrals Placed by CM/SW: Post Acute Facilities  Private pay costs discussed: Not applicable    Discussed  Partnership in Safe Discharge Planning  document with patient/family: Yes    Additional Information:  CHW spoke to the brother Cruz about the three accepting facilities and said he would consult with patient's niece and call back. This writer received a callback from the niece Gabrielle and said she would like patient to go to [facility] for TCU.     CHW scheduled an EMS wheelchair ride for Friday (6/13) between 10:36 and 11:21 AM to [facility].     PAS: [id number].    CHIQUITA West  7C Community Health Worker   [facility] Acute Care Management   Phone: [phone]

## 2025-06-11 NOTE — PROGRESS NOTES
Calorie Count  Intake recorded for: 6/10  Total Kcals: 469 Total Protein: 31g  Kcals from Hospital Food: 469  Protein: 31g  Kcals from Outside Food (average):0 Protein: 0g  # Meals Ordered from Kitchen: 3 meals   # Meals Recorded: 3 meals (First - 100% Greek yogurt, 25% ham omelet)       (Second - 100% salad w/ croutons & Icelandic dressing, 50% deli sandwich w/ malik & tomato)      (Third - 50% beef taco w/ cheese)   # Supplements Recorded: 0

## 2025-06-11 NOTE — PROGRESS NOTES
[facility]    Progress Note - Medicine Service, MAROON TEAM 2       Date of Admission:  5/25/2025    Assessment & Plan     Sarah Felix is a 79 year old female with history of insulin-dependent DM2, HTN, fatty liver, possible alcohol use disorder, gait instability who presented 5/25 with generalized weakness and was admitted with DKA and sepsis, 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 Course c/b C diff enteritis treated with PO vancomycin.      Today's updates:  - Discussed with nutritionist, discontinue TPN  - Working with endocrinology to determine safe diabetes regimen prior to discharge now that off TPN  - CHW scheduled an EMS wheelchair ride for Friday (6/13) between 10:36 and 11:21 AM to [facility].      Moderate malnutrition in the context of chronic illness   Continued elevated ketones despite insulin ggt. Elevation likely due to poor oral intake due to encephalopathy and starvation ketosis. Pt was started on TPN this admission, she has been on calorie count and has improved on her oral intake. TPN discontinued 6/11/25.  - Discontinue TPN   - SLP: regular diet with thin liquids  - On calorie counts     Delirium  Concern for delirium with the patient only alert and oriented to person and place but not to time. Also having difficulty with attention, unable to state the months of the year backwards or remember the month or year when asked later.   - Delirium precautions    Acute anemia in the setting of ACD  Her PTA hemoglobin levels were within normal limits. Since admission, they have been steadily decreasing. 6/2 Hb was 7.0. Iron panel and B12 were ordered 6/2 and are c/w anemia of chronic disease. Initial concern for bleeding in urine, but repeat UA negative for blood & RBCs. No obvious source of hemorrhage. T bili and haptoglobin normal, pointing away from hemolysis.   - Continue trending Hb daily  - Consider transfusion if Hb <7  -  "folate, vit b12, hapto wnl    Hepatic vein thrombus  Essential thrombocythemia (JAK2 positive), high risk disease   No prior imaging to compare. Per chart review, history of \"thromboembolism\" after ovarian cyst removal age 22. Liver panel normal.  - IR consulted; no indication for lytics  - Heme consulted  - Heparin gtt 5/25-5/27  - Lovenox 5/27-present  - At discharge transition to apixaban 10mg BID x 7 days then 5 mg BID  - Follow up with Dr. Wilde 7/29/25  - Continue hydroxyurea  - discontinued PTA ASA      Enteritis possibly 2/2 C. Diff, resolved  Ileus, resolved  Initially presented septic but with negative workup including urine culture, blood cultures, CT C/A/P. Had ongoing worsening encephalopathy and leukocytosis on vanc/zosyn -> unasyn. Developed diarrhea and abdominal pain, stool with C diff PCR + but A toxin negative. C diff B toxin positive, will consult GI for input as to whether enteritis can be associated with this C. Diff presentation. Given clinical worsening, decided to empirically treat - now with improvement. Nausea/vomiting managed with NG decompression. Tube removed 6/1.  - Abx:  - PO vancomycin 125mg daily (5/28-6/5)   - Unasyn (5/27-5/29)  - Zosyn (5/25-5/27)              - Vancomycin (5/25-5/26)  - GI consult for potential workup of enteritis given uncommon presentation of C. Diff              - C. Diff retested and continues to show positive antigen and B toxin, but negative A toxin              - Outpatient MRI/MRCP to evaluate the pancreatic and hepatic cysts  - PO pantoprazole daily     Diabetic ketoacidosis on admission, resolved  Type 2 DM complicated by neuropathy- A1c 8%  T2DM with A1c 8.0%. Likely triggered by sepsis. Now off insulin gtt.  - Endocrine consulted, managing glargine + regular insulin in TPN + novolog  - Hold PTA metformin, glipizide, possible discharge only on insulin  - Diabetes ed consult     Stable/chronic/resolved conditions:  Dysphagia, resolved  Recurrent " vomiting, resolved  Concern for aspiration from bedside RN. Feeding tube may have been preventing proper swallowing, so this was removed. On 6/4/25, SLP updated diet order back to regular diet with thin liquids.   - NGT removed 6/1  - SLP: Regular diet with thin liquids     Urinary retention  Continues to have urinary retention after removing the costa. Will start tamsulosin and continue to monitor.  - Continue straight cath if bladder scan >600cc, may need costa placed if continuing to retain  - Tamsulosin daily     Abnormal UA  Repeat UA on 6/1 showed cloudy urine, proteinuria, hematuria, and leukocyte esterase. She was started on Augmentin given these findings. These UA findings are mostly consistent with her UA from 5/25. This is likely not due to a UTI given that a UTI would have been resolved with her Zosyn and Unasyn abx course, thus Augmentin was discontinued. The hematuria is likely 2/2 repeated catheter insertions. Repeat UA non-concerning for infection or hematuria. Urine culture growing >100,000CFU/mL Raoultella ornithinolytica and Psuedomonas aeruginosa.   - Consider treatment if symptomatic; patient currently asymptomatic     Concern for refeeding syndrome, resolved  Decrease in magnesium and phosphorus after beginning TPN, possible refeeding syndrome.  - BMP daily  - RN replacement protocols     Pleural effusions  Pulmonary edema  Anasarca  Seen on 5/28 CT C/A/P. TTE with LVEF 60-65%, small IVC.   - PTA 20mg Lasix daily     Toxic metabolic encephalopathy, resolved   Not oriented to month or year. Likely related to sepsis vs hospital acquired delirium. No acute intracranial findings on CT head.   - Wernicke dose thiamine  - Delirium precautions     Possible alcohol use disorder  Reports 2 drinks/day. Did have an ED visit 03/2025 for alcohol intoxication. Reports last drink 5 days PTA, low concern for withdrawal. Her brother, Cruz, has concerns that she is drinking excessively and encourages addiction  "medicine involvement.  - Addiction medicine consulted 6/3/25              - Patient denied any problems with alcohol use  - PTA folate  - Continue thiamine, Wernicke dosing     Sacrum/coccyx moisture wound  Erythematous, blanchable area on her sacrum. Bedside RN says likely from excessive moisture 2/2 fecal incontinence. 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  - WOC following, appreciate recommendations     \"Prominent pericardial contents\"  Cardiology reviewed- this is an epicardial fat pad. No further workup or consult needed.     Generalized weakness, failure to thrive  TSH wnl.   - Ongoing PT/OT consults   - Ongoing evaluation for safe discharge plan     HTN: Hold PTA metoprolol, lisinopril given sepsis and normotension     Fall at home: CTH, C spine negative for acute process/fracture     Elevated INR: Unclear etiology, no known liver disease. Will hold off on Vit K given new thrombus.     Essential thrombocytosis: Stop PTA aspirin per heme, continue PTA hydroxyurea    Glaucoma: PTA latanoprost    CKD2    Diabetic neuropathy    Schatzki's ring    Slow transit constipation    Mixed urge and stress incontinence    Gait instability              Diet: Room Service  Regular Diet Adult Thin Liquids (level 0)  Calorie Counts  Snacks/Supplements Adult: Other; 10:00 am: strawberry yogurt,, 2:00 pm: cottage cheese with canned peaches,, HS: bowl of chicken salad + magic cup; Between Meals    DVT Prophylaxis: Enoxaparin (Lovenox) SQ  Silver Catheter: Not present  Fluids: None  Lines: PRESENT      PICC 05/26/25 Triple Lumen Right Basilic Access. PICC was ready to use, no immediate concern.-Site Assessment: WDL      Cardiac Monitoring: None  Code Status: Full Code      Clinically Significant Risk Factors               # Hypoalbuminemia: Lowest albumin = 2.3 g/dL at 6/2/2025  6:23 AM, will monitor as appropriate     # Hypertension: Noted on problem list           # DMII: A1C = 8.0 % (Ref range: <5.7 %) " "within past 6 months   # Overweight: Estimated body mass index is 25.39 kg/m  as calculated from the following:    Height as of this encounter: 1.626 m (5' 4\").    Weight as of this encounter: 67.1 kg (147 lb 14.9 oz).   # Severe Malnutrition: based on nutrition assessment and treatment provided per dietitian's recommendations.    # Financial/Environmental Concerns: none         Social Drivers of Health   Food Insecurity: Unknown (5/28/2025)    Food Insecurity     Within the past 12 months, did you worry that your food would run out before you got money to buy more?: Patient unable to answer     Within the past 12 months, did the food you bought just not last and you didn t have money to get more?: Patient unable to answer   Housing Stability: Unknown (5/28/2025)    Housing Stability     Do you have housing? : Patient unable to answer     Are you worried about losing your housing?: Patient unable to answer   Tobacco Use: Medium Risk (4/9/2025)    Patient History     Smoking Tobacco Use: Former     Smokeless Tobacco Use: Never     Passive Exposure: Past   Financial Resource Strain: Unknown (5/28/2025)    Financial Resource Strain     Within the past 12 months, have you or your family members you live with been unable to get utilities (heat, electricity) when it was really needed?: Patient unable to answer   Transportation Needs: Unknown (5/28/2025)    Transportation Needs     Within the past 12 months, has lack of transportation kept you from medical appointments, getting your medicines, non-medical meetings or appointments, work, or from getting things that you need?: Patient unable to answer   Interpersonal Safety: Unknown (5/28/2025)    Interpersonal Safety     Do you feel physically and emotionally safe where you currently live?: Patient unable to answer     Within the past 12 months, have you been hit, slapped, kicked or otherwise physically hurt by someone?: Patient unable to answer     Within the past 12 " months, have you been humiliated or emotionally abused in other ways by your partner or ex-partner?: Patient unable to answer         Disposition Plan     Medically Ready for Discharge: Anticipated in 2-4 Days         The patient's care was discussed with the Attending Physician, Dr. Wall.    Miguel Solano MD  Medicine Service, [facility] TEAM 2  [facility]  Securely message with [employer] (more info)  Text page via [employer] Paging/Directory   See signed in provider for up to date coverage information  ______________________________________________________________________    Interval History   No acute events overnight. The patient denies being in any pain. She reports that she is looking forward to leaving the hospital. She has been eating as best she can. Denies being in any pain or any fevers or chills.     Physical Exam   Vital Signs: Temp: 98  F (36.7  C) Temp src: Oral BP: 103/54 Pulse: 101   Resp: 18 SpO2: 95 % O2 Device: None (Room air)    Weight: 147 lbs 14.86 oz    Constitutional: awake, alert, cooperative, no apparent distress, and appears stated age, appears disheveled  ENT: Mucus membranes moist  Respiratory: No increased work of breathing, good air exchange, clear to auscultation bilaterally, no crackles or wheezing  Cardiovascular: Regular rate and rhythm with no murmurs  GI: Soft, mild generalized tenderness to palpation, nondistended  Neurologic: Awake, alert and oriented to person and place but not to time, face symmetric, moving all 4 extremities. Cannot remember the month or year when asked again after 5 minutes. Unable to state months of the year backwards.   Psych: Flat affect. Endorses feeling depressed with hospital stay    Medical Decision Making       Please see A&P for additional details of medical decision making.      Data     I have personally reviewed the following data over the past 24 hrs:    6.0  \   8.3 (L)   / 336     140 105 32.0 (H) /   151 (H)   3.9 28 0.70 \       Imaging results reviewed over the past 24 hrs:   No results found for this or any previous visit (from the past 24 hours).

## 2025-06-12 VITALS
WEIGHT: 147.93 LBS | DIASTOLIC BLOOD PRESSURE: 61 MMHG | HEART RATE: 94 BPM | SYSTOLIC BLOOD PRESSURE: 110 MMHG | OXYGEN SATURATION: 95 % | TEMPERATURE: 97.7 F | HEIGHT: 64 IN | BODY MASS INDEX: 25.25 KG/M2 | RESPIRATION RATE: 16 BRPM

## 2025-06-12 PROBLEM — R33.9 URINARY RETENTION: Status: ACTIVE | Noted: 2025-06-12

## 2025-06-12 LAB
ERYTHROCYTE [DISTWIDTH] IN BLOOD BY AUTOMATED COUNT: 25.2 % (ref 10–15)
GLUCOSE BLDC GLUCOMTR-MCNC: 151 MG/DL (ref 70–99)
GLUCOSE BLDC GLUCOMTR-MCNC: 162 MG/DL (ref 70–99)
GLUCOSE BLDC GLUCOMTR-MCNC: 197 MG/DL (ref 70–99)
HCT VFR BLD AUTO: 27.6 % (ref 35–47)
HGB BLD-MCNC: 8.6 G/DL (ref 11.7–15.7)
MAGNESIUM SERPL-MCNC: 1.7 MG/DL (ref 1.7–2.3)
MCH RBC QN AUTO: 29.5 PG (ref 26.5–33)
MCHC RBC AUTO-ENTMCNC: 31.2 G/DL (ref 31.5–36.5)
MCV RBC AUTO: 95 FL (ref 78–100)
PHOSPHATE SERPL-MCNC: 4.1 MG/DL (ref 2.5–4.5)
PLATELET # BLD AUTO: 356 10E3/UL (ref 150–450)
POTASSIUM SERPL-SCNC: 3.9 MMOL/L (ref 3.4–5.3)
RBC # BLD AUTO: 2.92 10E6/UL (ref 3.8–5.2)
WBC # BLD AUTO: 6.3 10E3/UL (ref 4–11)

## 2025-06-12 PROCEDURE — 250N000011 HC RX IP 250 OP 636: Performed by: STUDENT IN AN ORGANIZED HEALTH CARE EDUCATION/TRAINING PROGRAM

## 2025-06-12 PROCEDURE — 85027 COMPLETE CBC AUTOMATED: CPT

## 2025-06-12 PROCEDURE — 250N000013 HC RX MED GY IP 250 OP 250 PS 637: Performed by: STUDENT IN AN ORGANIZED HEALTH CARE EDUCATION/TRAINING PROGRAM

## 2025-06-12 PROCEDURE — 84100 ASSAY OF PHOSPHORUS: CPT | Performed by: INTERNAL MEDICINE

## 2025-06-12 PROCEDURE — 99232 SBSQ HOSP IP/OBS MODERATE 35: CPT | Performed by: NURSE PRACTITIONER

## 2025-06-12 PROCEDURE — 250N000013 HC RX MED GY IP 250 OP 250 PS 637

## 2025-06-12 PROCEDURE — 84132 ASSAY OF SERUM POTASSIUM: CPT | Performed by: INTERNAL MEDICINE

## 2025-06-12 PROCEDURE — 83735 ASSAY OF MAGNESIUM: CPT | Performed by: INTERNAL MEDICINE

## 2025-06-12 PROCEDURE — 250N000011 HC RX IP 250 OP 636: Performed by: INTERNAL MEDICINE

## 2025-06-12 PROCEDURE — 99238 HOSP IP/OBS DSCHRG MGMT 30/<: CPT | Mod: GC | Performed by: STUDENT IN AN ORGANIZED HEALTH CARE EDUCATION/TRAINING PROGRAM

## 2025-06-12 RX ORDER — MAGNESIUM SULFATE HEPTAHYDRATE 40 MG/ML
2 INJECTION, SOLUTION INTRAVENOUS ONCE
Status: COMPLETED | OUTPATIENT
Start: 2025-06-12 | End: 2025-06-12

## 2025-06-12 RX ORDER — PANTOPRAZOLE SODIUM 40 MG/1
40 TABLET, DELAYED RELEASE ORAL
Qty: 30 TABLET | Refills: 2 | Status: SHIPPED | OUTPATIENT
Start: 2025-06-13 | End: 2025-09-11

## 2025-06-12 RX ORDER — TAMSULOSIN HYDROCHLORIDE 0.4 MG/1
0.4 CAPSULE ORAL DAILY
Qty: 30 CAPSULE | Refills: 2 | Status: SHIPPED | OUTPATIENT
Start: 2025-06-13 | End: 2025-09-11

## 2025-06-12 RX ORDER — LANOLIN ALCOHOL/MO/W.PET/CERES
100 CREAM (GRAM) TOPICAL DAILY
Qty: 30 TABLET | Refills: 2 | Status: SHIPPED | OUTPATIENT
Start: 2025-06-13 | End: 2025-09-11

## 2025-06-12 RX ADMIN — ATORVASTATIN CALCIUM 40 MG: 40 TABLET, FILM COATED ORAL at 10:08

## 2025-06-12 RX ADMIN — PANTOPRAZOLE SODIUM 40 MG: 40 TABLET, DELAYED RELEASE ORAL at 07:45

## 2025-06-12 RX ADMIN — FUROSEMIDE 20 MG: 20 TABLET ORAL at 10:08

## 2025-06-12 RX ADMIN — Medication 12.5 MG: at 10:08

## 2025-06-12 RX ADMIN — ENOXAPARIN SODIUM 50 MG: 60 INJECTION SUBCUTANEOUS at 10:09

## 2025-06-12 RX ADMIN — THIAMINE HCL TAB 100 MG 100 MG: 100 TAB at 10:08

## 2025-06-12 RX ADMIN — HYDROXYUREA 500 MG: 15 SOLUTION ORAL at 10:09

## 2025-06-12 RX ADMIN — Medication 400 MCG: at 10:08

## 2025-06-12 RX ADMIN — LISINOPRIL 2.5 MG: 2.5 TABLET ORAL at 10:09

## 2025-06-12 RX ADMIN — ACETAMINOPHEN 325 MG: 325 TABLET ORAL at 06:10

## 2025-06-12 RX ADMIN — Medication 25 MG: at 10:08

## 2025-06-12 RX ADMIN — TAMSULOSIN HYDROCHLORIDE 0.4 MG: 0.4 CAPSULE ORAL at 10:08

## 2025-06-12 RX ADMIN — MAGNESIUM SULFATE HEPTAHYDRATE 2 G: 2 INJECTION, SOLUTION INTRAVENOUS at 10:10

## 2025-06-12 ASSESSMENT — ACTIVITIES OF DAILY LIVING (ADL)
ADLS_ACUITY_SCORE: 58
ADLS_ACUITY_SCORE: 60
ADLS_ACUITY_SCORE: 58
ADLS_ACUITY_SCORE: 60
ADLS_ACUITY_SCORE: 58
ADLS_ACUITY_SCORE: 60
ADLS_ACUITY_SCORE: 58
ADLS_ACUITY_SCORE: 60

## 2025-06-12 NOTE — PROGRESS NOTES
Care Management Follow Up    Length of Stay (days): 18    Expected Discharge Date: 06/13/2025     Concerns to be Addressed: discharge planning     Patient plan of care discussed at interdisciplinary rounds: Yes    Anticipated Discharge Disposition: Transitional Care  Anticipated Discharge Services: None  Anticipated Discharge DME: None    Patient/family educated on Medicare website which has current facility and service quality ratings: yes  Education Provided on the Discharge Plan: Yes  Patient/Family in Agreement with the Plan: yes    Referrals Placed by CM/SW: Post Acute Facilities  Private pay costs discussed: Not applicable    Discussed  Partnership in Safe Discharge Planning  document with patient/family: Yes    Additional Information:  CHW rescheduled EMS wheelchair ride to today between 1406 and 1451 to [facility]. IMM given to patient's brother Cruz via phone. Brother Cruz and leonard Anthony informed about the discharge ride time.    CIHQUITA West  7C Community Health Worker   [facility] Acute Care Management   Phone: [phone]

## 2025-06-12 NOTE — PLAN OF CARE
Occupational Therapy Discharge Summary    Reason for therapy discharge:    Discharged to transitional care facility.    Progress towards therapy goal(s). See goals on Care Plan in [facility] electronic health record for goal details.  Goals not met.  Barriers to achieving goals:   discharge from facility.    Therapy recommendation(s):    Continued therapy is recommended.  Rationale/Recommendations:  maximize safety and independence with ADLs.

## 2025-06-12 NOTE — PROGRESS NOTES
Calorie Count  Intake recorded for: 68/11  Total Kcals: 450 Total Protein: 23g  Kcals from Hospital Food: 450  Protein: 23g  Kcals from Outside Food (average):0 Protein: 0g  # Meals Ordered from Kitchen: 3 meals   # Meals Recorded: 2 meals (First - 50% fruit cup, 25% chicken stir alvarez w/ rice)       (Second - 50% chocolate chip cookie, less than 25% turkey burger w/ cheese, tater tots)   # Supplements Recorded: 0

## 2025-06-12 NOTE — PROGRESS NOTES
Care Management Discharge Note    Discharge Date: 06/12/2025    Discharge Disposition: Transitional Care: [facility]  [address] [place] [address]  P: [phone]  Adm P: [phone]  Adm F: [phone]     Discharge Services: None    Discharge DME: None    Discharge Transportation: health plan transportation    Private pay costs discussed: transportation costs    Does the patient's insurance plan have a 3 day qualifying hospital stay waiver?  No    PAS Confirmation Code: [id number]  Patient/family educated on Medicare website which has current facility and service quality ratings: yes    Education Provided on the Discharge Plan: Yes  Persons Notified of Discharge Plans: pt, pt family, primary provider, bedside RN, EMS ([facility]), receiving facility  Patient/Family in Agreement with the Plan: yes    Handoff Referral Completed: Yes, [facility] PCP: Internal handoff referral completed    Additional Information:  Per Amar[facility] 2 provider pt is medically ready for discharge today. Plan is to discharge to [facility] (Ph: [phone]). Per [facility] discussion pt and family are in agreement with the plan. EMS wheelchair ride ([phone]) scheduled for 1406-1451h. Orders sent to receiving facility. [id number] and IMM completed by CHW.    Janay Massey, MSW, MPH, LGSW,  [facility]  5A [phone]) 5C ([phone])   [facility] Acute Care Management  Phone: [phone]  Searchable by name on Vocera: Janay Massey

## 2025-06-12 NOTE — PLAN OF CARE
Shift Hours: 1900 - 0700    Assessment:  Body systems that were not at patient's baseline Cognitive/Behavioral/Neuro, Peripheral Neurovascular, Skin, and Musculoskeletal. Focused body system assessments documented in flowsheets.        Activity     Fall Risk Score: 95   Bed alarm on? Yes     Activity Assistance Provided: assistance, 1 person      Assistive Device Utilized: gait belt, walker    Pain: c/o headache 5/10 managed with PRN tylenol     Labs/RN Managed Protocols: K, Mag, Phos    Lines/Drains: R PICC- SL    Nutrition: regular diet and angella counts     Goal Outcome Evaluation  Plan of Care Reviewed With: patient  Overall Patient Progress: no change    Pt alert, disoriented to time and situation. Flat affect. VSS on RA. Had 1 BM on the commode this shift. Voids spontaneously. BG's stable overnight. Bed alarm on.     Barriers to Discharge:   Improve PO nutrition, discharge Friday

## 2025-06-12 NOTE — DISCHARGE INSTRUCTIONS
Diabetes Management Discharge Plan    Blood glucose monitoring: Three times daily before meals, and at bedtime.  Blood Glucose (BG) goals: 110-180  mg/dL before meals,   Per ADA guidelines, treatment goals and recommendations for older adults with DM and medically complexity is less stringent BG control/A1c for safety - targets of 110-180 mg/dL and up to 250 mg/dL reasonable.  Avoid reliance on A1c.  Glucose decisions should be based on avoidance of hypoglycemia and symptomatic hyperglycemia      Glucose Control Regimen:  1) Discontinue prior to admission medication: Glimepiride 1 mg daily, metformin 500 mg daily     2) Long-acting insulin: glargine (Lantus) - take 8 units once daily at 0900. Take at same time each day.    3) Rapid-acting insulin: aspart (Novolog) correction - see chart below. Take for high blood glucoses three times daily before meals and at bedtime.   You may add the correction dose to the carbohydrate coverage/mealtime insulin dose and give in one injection--ideally 10-15 minutes before a meal.  You may take the correction dose even if you skip a meal (as long as it has been 4 hours since previous correction dose)      Pre-meal correction scale:    Blood Glucose Insulin Novolog Before Meals:   Less than 140 0 units   140 -189  1 units   190 - 239 2 units   240 - 289 3 units   290 - 339 4 units    340 - 389  5 units   390 - 439 6 units   440 or more 7 units     Bedtime correction scale:     Blood Glucose Insulin Novolog At Bedtime:   Less than 200 0 units   200-249  1 units   250 - 299 2 units   300 - 349 3 units   350 - 399 4 units    400 or more 5 units        Outpatient Diabetes Follow-Up: Follow up with your Primary Care Provider (PCP) in 1-2 weeks, bring glucose data to your appointment. Follow up sooner if blood glucose runs consistently greater than 200 mg/dL or if having more than two episodes less than 70 mg/dL.     If you have urgent questions or concerns regarding your blood sugars or  insulin, you may contact [phone] (the main hospital ). Ask to speak with the Endocrinologist on call.     Thank you for letting the Diabetes Management Team be involved in your care!    Hypoglycemia (Low Blood Glucose) Management:  Signs/symptoms:  Shaking, sweating, fast heartbeat  Feeling dizzy, tired, or weak   Feeling anxious and easy to irritate  Feeling nervous, crabby, or confused  Hunger  Vision problems, headache  Numb or tingling mouth    If blood glucose is 51 to 70:   Eat or drink 15 grams of carbohydrate. Examples:   1/2 cup (4 ounces) apple juice or regular soda pop, or   1 cup (8 ounces) milk, or   15 skittles, or   3 to 4 glucose tablets.   Re-check your blood glucose in 15 minutes.   Repeat these steps every 15 minutes until your blood glucose is above 80.       If blood glucose is 50 or less:   Eat or drink 30 grams of carbohydrate. Examples:   1 cup (8 ounces) apple juice or regular soda pop, or   2 cups (16 ounces) milk, or   1 banana, or   6 to 8 glucose tablets.   Re-check your blood glucose in 15 minutes.   Repeat these steps every 15 minutes until your blood glucose is above 80.

## 2025-06-12 NOTE — CONSULTS
Diabetes Educator consult reason: DKA    Patient not yet appropriate for education. Per chart review, patient disoriented and discharging to [facility] [place] today.      Will not see patient at this time. Recommend diabetes education at TCU closer to discharge home/other disposition.     MALATHI Parrish, MONIKA, [facility]  Diabetes Educator  Pager: [phone]  Office: [phone]  Securely message with [employer]

## 2025-06-12 NOTE — PLAN OF CARE
Goal Outcome Evaluation:    Shift Hours: 0700 - 1500    Assessment:  Body systems that were not at patient's baseline Cognitive/Behavioral/Neuro, Peripheral Neurovascular, Skin, and Musculoskeletal. Focused body system assessments documented in flowsheets.        Activity     Fall Risk Score: 95   Bed alarm on? Yes     Activity Assistance Provided: assistance, 1 person      Assistive Device Utilized: gait belt, walker    Pain: denies    Labs/RN Managed Protocols: K, Mg, Phos protocols, Mag replaced today    Lines/Drains: right triple lumen PICC, SL    Nutrition: tolerating regular diet, poor appetite    Goal Outcome Evaluation  Plan of Care Reviewed With: patient  Overall Patient Progress: no change     No acute events this shift. Pt alert, disoriented to time and sometimes situation. Magnesium replaced. Pt's appetite improving. Up to bedside commode. BM x 1 this shift. Voiding spontaneously. Call light within reach and able to make needs known. Pt left floor at 1500.   Barriers to Discharge:   Discharge to TCU today at 1400

## 2025-06-12 NOTE — PROGRESS NOTES
"                       Inpatient Diabetes Management Service: Daily Progress Note     HPI: Sarah Felix is a 79 year old female with history of insulin-dependent DM2, HTN, fatty liver, possible alcohol use disorder, gait instability who presented 25 with generalized weakness and was admitted with DKA and sepsis, 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 Course complicated by C diff enteritis.      IDS consulted to assist with glycemic management - specifically for concerns of \"DKA\" as well as optimization while inpatient and when applicable, recommendations for transition home.    Inpatient Diabetes Service Signing off 25  Discharge recs are in AVS.   Please call back with any questions or if BG become more labile or if getting closer to discharge and assistance is needed for home recommendations.    Please allow at least 24 hours for home recs if they are not provided.   Diabetes Management Team job code: [phone] if after hours for fellow/MD          Assessment/Plan:     Assessment:  Type 2 Diabetes Mellitus, c/b neuropathy.  (A1c 8 % on 25) in presence of anemia and complex illness  DKA on admission (although not meeting all criteria/multifactorial).  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   Stress / TPN induced hyperglycemia - resolved  C Diff Toxin PCR positive and C diff toxin negative, enteririts  BMI: 26     Plan/Recommendations:   - Increase Lantus  7 units q 24 hrs at 1200  - Discontinue TPN per RD  - Novolog carbohydrate coverage: 1 unit per 15 g cho TID AC and prn snacks/supplements  - Novolog correction scale:  Medium resistance 1/50 >140 TID AC and >200 HS and 0200   - BG monitoring TID AC and HS/0200    - PTA medications: holding metformin and glimepiride   - Hypoglycemia protocol  - Carb counting protocol     Discussion:  BG overall trending well, BG " "slightly elevated during the day and requiring some additional subcutaneous in the last 24 hours. Will increase Lantus. Still not fully tolerating a well enough diet to resume Metformin. Not currently recommending resumption of Metformin at discharge. Once appetite improves could start Metformin per outpatient provider's discretion.     WBC 6.3, Hgb 8.6     Discharge Planning: plan to discharge to TCU tomorrow 6/13  Medications: Lantus + correction scale.   Test Claims:  none needed.   Education:  Transitioning to TCU.    Outpatient Follow-up:  recommend PCP or TCU provider.     Diabetes Management Discharge Plan  Instructions to patient were posted in AVS and discussed on day of discharge.   Medications and supplies are to be ordered by primary service on discharge.   *please use the [employer] non-branded discharge supply order set ([id number])*    Patient will need the following supplies prescribed:   Lantus Solostar pens  Novolog Flexpens  \"BD\" (32G x 4mm) insulin pen needles  Glucometer (if brand of meter not known, can be ordered \"no brand specified\" and note to pharmacy: \"can substitute per insurance coverage\")  Lancets  Test strips  Sharps container  Alcohol swabs     Blood glucose monitoring: Three times daily before meals, and at bedtime.  Blood Glucose (BG) goals: 110-180  mg/dL before meals,   Per ADA guidelines, treatment goals and recommendations for older adults with DM and medically complexity is less stringent BG control/A1c for safety - targets of 110-180 mg/dL and up to 250 mg/dL reasonable.  Avoid reliance on A1c.  Glucose decisions should be based on avoidance of hypoglycemia and symptomatic hyperglycemia      Glucose Control Regimen:  1) Discontinue prior to admission medication: Glimepiride 1 mg daily, metformin 500 mg daily     2) Long-acting insulin: glargine (Lantus) - take 8 units once daily at 0900. Take at same time each day.    3) Rapid-acting insulin: aspart (Novolog) correction - see chart " below. Take for high blood glucoses three times daily before meals and at bedtime.   You may add the correction dose to the carbohydrate coverage/mealtime insulin dose and give in one injection--ideally 10-15 minutes before a meal.  You may take the correction dose even if you skip a meal (as long as it has been 4 hours since previous correction dose)      Pre-meal correction scale:    Blood Glucose Insulin Novolog Before Meals:   Less than 140 0 units   140 -189  1 units   190 - 239 2 units   240 - 289 3 units   290 - 339 4 units    340 - 389  5 units   390 - 439 6 units   440 or more 7 units     Bedtime correction scale:     Blood Glucose Insulin Novolog At Bedtime:   Less than 200 0 units   200-249  1 units   250 - 299 2 units   300 - 349 3 units   350 - 399 4 units    400 or more 5 units        Outpatient Diabetes Follow-Up: Follow up with your Primary Care Provider (PCP) in 1-2 weeks, bring glucose data to your appointment. Follow up sooner if blood glucose runs consistently greater than 200 mg/dL or if having more than two episodes less than 70 mg/dL.     If you have urgent questions or concerns regarding your blood sugars or insulin, you may contact [phone] (the main hospital ). Ask to speak with the Endocrinologist on call.     Thank you for letting the Diabetes Management Team be involved in your care!    Please notify Inpatient Diabetes Service if changes are planned to steroids, nutrition, TPN/TF and anticipated procedures requiring prolonged NPO status.         Interval History/Review of Systems :   The last 24 hours progress and nursing notes reviewed.  No events overnight eating a little better today. Denies any nausea or vomiting.     Planned Procedures/Surgeries: none    Inpatient Glucose Control:       Recent Labs   Lab 06/12/25  0153 06/11/25  2148 06/11/25  1655 06/11/25  1212 06/11/25  0823 06/11/25  0442   * 175* 151* 170* 161* 153*             Medications Impacting Glycemia:  "  Steroids: none  D5W containing solutions/medications: none  Other medications impacting glucose: none        Nutrition:   Orders Placed This Encounter      Regular Diet Adult Thin Liquids (level 0)    Supplements:  Glucerna strawberry TID AC meals  TF: None  TPN:   Started 5/28 - 5/29: Regular insulin in continuous TPN 6 units with Dextrose 120 g   6/4: 18 hr cyclic (0050-0674): TPN with 150 g dextrose and 20 units of reg insulin  6/5:  Regular insulin in TPN cycled 12 hrs (2000 - 0800): 24 units with Dextrose 150 g (0.16 units per gram dextrose)   6/6-6/8: Regular insulin in TPN cycled 12 hrs (2000 - 0800): 30 units with Dextrose 150 g (0.20 units per gram dextrose)   6/9 - 6/10 TPN cycling 12 hrs (2000 - 0800): 27 units reg insulin with dextrose 150 grams  6/11 TPN discontiued           Diabetes History: see full consult note for complete diabetes history   Diabetes Type and Duration: DM2, 7/2009     GAD65 antibody <5 (9/11/2012)   C peptide 7.6 (9/11/2012)     PTA Medication Regimen: Glimepiride 1 mg daily, Novolog 5 units with high carb meal daily as needed, metformin 500 mg daily     Missing doses?: uncertain  Historical Diabetes Medications: N/A     Glucose monitoring device/frequency/trends: Fortino CGM, reports 130 on average  Hypoglycemia PTA:   - Frequency: \"rarely\"  - Severity:  no history of severe unconscious lows   - Awareness:  intact    - Treatment: \"eats food\"      Outpatient Diabetes Provider: Unknown  Formal Diabetes Education/Educator: Unknown        Physical Exam:   /61   Pulse 93   Temp 97.7  F (36.5  C) (Oral)   Resp 16   Ht 1.626 m (5' 4\")   Wt 67.1 kg (147 lb 14.9 oz)   SpO2 95%   BMI 25.39 kg/m    General:  fatigued appearing, NAD, resting comfortably in bed.  Lungs: unlabored breathing on RA.  Mental Status:  not assessed  Psych:   Calm, cooperative, good eye contact, full/appropriate affect           Data:     Lab Results   Component Value Date    A1C 8.0 (H) 05/25/2025    " A1C 7.5 (H) 11/05/2024    A1C 7.6 (H) 07/09/2024    A1C 6.6 (H) 03/05/2024    A1C 7.4 (H) 11/14/2023    A1C 8.3 (H) 06/21/2021    A1C 9.4 (H) 12/14/2020    A1C 8.1 (H) 09/21/2020    A1C 8.5 (H) 01/07/2020    A1C 7.6 (H) 10/12/2019       ROUTINE IP LABS (Last four results)  BMP  Recent Labs   Lab 06/12/25  0521 06/12/25  0153 06/11/25  2148 06/11/25  1655 06/11/25  1212 06/11/25  0823 06/11/25  0442 06/10/25  0812 06/10/25  0540 06/09/25  0356 06/09/25  0352 06/08/25  0543 06/08/25  0541 06/06/25  0608 06/06/25  0548   NA  --   --   --   --   --   --  140  --   --   --  140  --  140  --  136   POTASSIUM 3.9  --   --   --   --   --  3.9  --  3.9  --  4.1  --  4.4   < > 4.9   CHLORIDE  --   --   --   --   --   --  105  --   --   --  107  --  110*  --  107   CECY  --   --   --   --   --   --  8.4*  --   --   --  8.4*  --  8.5*  --  8.0*   CO2  --   --   --   --   --   --  28  --   --   --  26  --  24  --  22   BUN  --   --   --   --   --   --  32.0*  --   --   --  26.9*  --  26.7*  --  25.7*   CR  --   --   --   --   --   --  0.70  --   --   --  0.63  --  0.63  --  0.60   GLC  --  151* 175* 151* 170*   < > 153*   < >  --    < > 103*   < > 143*   < > 294*    < > = values in this interval not displayed.     CBC  Recent Labs   Lab 06/12/25  0521 06/11/25  0442 06/10/25  0540 06/09/25  0352   WBC 6.3 6.0 6.7 7.2   RBC 2.92* 2.81* 2.91* 2.81*   HGB 8.6* 8.3* 8.6* 8.2*   HCT 27.6* 26.7* 27.5* 26.4*   MCV 95 95 95 94   MCH 29.5 29.5 29.6 29.2   MCHC 31.2* 31.1* 31.3* 31.1*   RDW 25.2* 25.1* 24.6* 23.1*    336 322 311     INR  Recent Labs   Lab 06/09/25  0352   INR 1.05       Inpatient Diabetes Service will continue to follow, please don't hesitate to contact the team with any questions or concerns.     MALATHI Falcon CNP    Plan discussed with patient, bedside RN, and primary team via this note.    To contact Inpatient Diabetes Service:     7 AM - 5 PM: Page the IDS MIKE following the patient that day (see filed or  incomplete progress notes/consult notes under Endocrinology)    OR if uncertain of provider assignment: page job code [phone]    5 PM - 7 AM: First call after hours is to primary service.    For urgent after-hours questions, page job code for on call fellow: [phone]     I spent a total of 45 minutes on the date of the encounter doing prep/post-work, chart review, history and exam, documentation and further activities per the note including lab review, multidisciplinary communication, counseling the patient and/or coordinating care regarding acute hyper/hypoglycemic management, as well as discharge management and planning/communication.

## 2025-06-13 PROBLEM — R60.9 EDEMA: Status: ACTIVE | Noted: 2019-10-18

## 2025-06-13 PROBLEM — M89.9 DISORDER OF BONE: Status: ACTIVE | Noted: 2019-10-15

## 2025-06-13 PROBLEM — B37.9 CANDIDIASIS: Status: ACTIVE | Noted: 2019-12-16

## 2025-06-13 PROBLEM — R52 PAIN: Status: ACTIVE | Noted: 2019-10-18

## 2025-06-13 PROBLEM — L98.9 INFLAMMATORY DERMATOSIS: Status: ACTIVE | Noted: 2019-10-18

## 2025-06-13 PROBLEM — L98.9: Status: ACTIVE | Noted: 2019-12-16

## 2025-06-13 NOTE — DISCHARGE SUMMARY
"[facility]  Discharge Summary - Medicine & Pediatrics       Date of Admission:  5/25/2025  Date of Discharge:  6/12/2025  3:03 PM  Discharging Provider: Dr. Wall  Discharge Service: Medicine Service, JOSE EDUARDO TEAM 2    Discharge Diagnoses   Sepsis, improved   C. Difficile enteritis, resolved  Ileus, resolved  Diabetic ketoacidosis, resolved  Type 2 diabetes mellitus complicated by neuropathy  Hepatic vein thrombus   Essential thrombocytopenia JAK2 positive  Moderate malnutrition in the context of chronic illness  Anemia of chronic disease  Dysphagia, resolved  Urinary retention, resolved  Pleural effusions, resolved  Metabolic encephalopathy   Sacrum/coccyx moisture wound   Generalized weakness  Failure to thrive  Hypertension  Glaucoma  Chronic kidney disease stage 2  Diabetic neuropathy  Schatzki's ring  Gait instability      Clinically Significant Risk Factors     # DMII: A1C = 8.0 % (Ref range: <5.7 %) within past 6 months    # Overweight: Estimated body mass index is 25.39 kg/m  as calculated from the following:    Height as of this encounter: 1.626 m (5' 4\").    Weight as of this encounter: 67.1 kg (147 lb 14.9 oz).    # Severe Malnutrition: based on nutrition assessment and treatment provided per dietitian's recommendations.      Follow-ups Needed After Discharge   Follow-up Appointments       Follow Up and recommended labs and tests      Follow up with Nursing home physician.  No follow up labs or test are needed.  Follow up with primary care provider in 2 weeks.  No follow up labs or test are needed.  Follow up with Dr. Wilde with hematology on 7/29/25.            Follow up on management of diabetes, weight and nutrition status, initiation of apixaban for hepatic vein thrombus, and mental status at next PCP visit. MRCP ordered for review of hepatic and pancreatic cysts seen on imaging while inpatient.     Unresulted Labs Ordered in the Past 30 Days of this " "Admission       No orders found from 4/25/2025 to 5/26/2025.            Discharge Disposition   Discharged to short-term care facility  Condition at discharge: Stable    Hospital Course   Sarah Felix was admitted on 5/25/2025 for DKA, sepsis, and hepatic vein thrombus.  The following problems were addressed during her hospitalization:    Diabetic ketoacidosis on admission, resolved  Type 2 DM complicated by neuropathy- A1c 8%  T2DM with A1c 8.0%. Likely triggered by sepsis. Endocrine consulted and managed insulin and blood glucose during admission and recommended the following upon discharge.   - Insulin glargine 8 units at 0900  - Sliding scale insulin novolog 0-7 units three times daily  - Sliding scale insulin novolog 0-5 units at bedtime    Enteritis likely 2/2 C. Diff, resolved  Ileus, resolved  Initially presented septic but with negative workup including urine culture, blood cultures, CT C/A/P. Had ongoing worsening encephalopathy and leukocytosis on vanc/zosyn -> unasyn. Developed diarrhea and abdominal pain, stool with C diff PCR + but A toxin negative. C diff B toxin positive, GI consulted. Given clinical worsening, decided to empirically treat with improvement. Nausea/vomiting managed with NG decompression. NG tube removed 6/1.  - Abx:  - PO vancomycin 125mg daily (5/28-6/5)   - Unasyn (5/27-5/29)  - Zosyn (5/25-5/27)              - Vancomycin (5/25-5/26)  - GI consult for potential workup of enteritis given uncommon presentation of C. Diff  - C. Diff retested and continues to show positive antigen and B toxin, but negative A toxin              - Outpatient MRI/MRCP to evaluate the pancreatic and hepatic cysts  - PO pantoprazole daily    Hepatic vein thrombus  Essential thrombocythemia (JAK2 positive), high risk disease   No prior imaging to compare. Per chart review, history of \"thromboembolism\" after ovarian cyst removal age 22. Liver panel normal.  - IR consulted; no indication for lytics  - " Heme consulted  - Heparin gtt 5/25-5/27  - Lovenox 5/27-6/12/25  - At discharge transition to apixaban 10mg BID x 7 days then 5 mg BID  - Follow up with Dr. Wilde 7/29/25  - Continue hydroxyurea  - discontinued PTA ASA     Moderate malnutrition in the context of chronic illness   Continued elevated ketones despite insulin ggt on admission. Elevation likely due to poor oral intake due to encephalopathy and starvation ketosis. Pt was started on TPN this admission, she has been on calorie count and has improved on her oral intake. TPN discontinued 6/11/25. Speech consulted and recommended regular diet with thin liquids prior to discharge.  - SLP: regular diet with thin liquids     Delirium  Concern for delirium with the patient only alert and oriented to person and place but not to time. Also having difficulty with attention, unable to state the months of the year backwards or remember the month or year when asked later. No acute intracranial findings on CT head.   - Delirium precautions  - Consider formal neurocognitive evaluation if no improvement outside the hospital     Anemia of chronic disease  Her PTA hemoglobin levels were within normal limits. Since admission, they have been steadily decreasing. 6/2 Hb was 7.0. Iron panel and B12 were ordered 6/2 and are c/w anemia of chronic disease. No obvious source of hemorrhage. T bili and haptoglobin normal, pointing away from hemolysis. Patient received 1 unit pRBC during admission for a hemoglobin of 6.9.      Stable/chronic/resolved conditions:  Dysphagia, resolved  Recurrent vomiting, resolved  Concern for aspiration from bedside RN. Feeding tube may have been preventing proper swallowing, so this was removed. On 6/4/25, SLP updated diet order back to regular diet with thin liquids.   - NGT removed 6/1  - SLP: Regular diet with thin liquids     Urinary retention  Continued to have urinary retention after removing costa. Started tamsulosin with improvement  -  "Continue straight cath if bladder scan >600cc   - Tamsulosin daily     Abnormal UA  Repeat UA on 6/1 showed cloudy urine, proteinuria, hematuria, and leukocyte esterase. She was started on Augmentin given these findings. These UA findings are mostly consistent with her UA from 5/25. This is likely not due to a UTI given that a UTI would have been resolved with her Zosyn and Unasyn abx course, thus Augmentin was discontinued. The hematuria is likely 2/2 repeated catheter insertions. Repeat UA later during admission non-concerning for infection or hematuria. Urine culture growing >100,000CFU/mL Raoultella ornithinolytica and Psuedomonas aeruginosa.   - Consider treatment if symptomatic; patient asymptomatic during admission.      Concern for refeeding syndrome, resolved  Decrease in magnesium and phosphorus after beginning TPN, possible refeeding syndrome, later resolved.      Pleural effusions  Pulmonary edema  Anasarca  Seen on 5/28 CT C/A/P. TTE with LVEF 60-65%, small IVC.   - PTA 20mg Lasix daily     Possible alcohol use disorder  Reports 2 drinks/day. Did have an ED visit 03/2025 for alcohol intoxication. Reports last drink 5 days PTA, low concern for withdrawal. Her brother, Cruz, has concerns that she is drinking excessively and encourages addiction medicine involvement.  - Addiction medicine consulted 6/3/25              - Patient denied any problems with alcohol use  - PTA folate  - Continue thiamine 100mg daily      Sacrum/coccyx moisture wound  Erythematous, blanchable area on her sacrum. Bedside RN says likely from excessive moisture 2/2 fecal incontinence. 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     \"Prominent pericardial contents\"  Cardiology reviewed- this is an epicardial fat pad. No further workup or consult needed.     Generalized weakness, failure to thrive  TSH wnl.   - Ongoing PT/OT consults   - Ongoing evaluation for safe discharge plan     HTN: PTA metoprolol, " lisinopril     Fall at home: CTH, C spine negative for acute process/fracture     Elevated INR: Unclear etiology, no known liver disease. Will hold off on Vit K given new thrombus.     Essential thrombocytosis: Stop PTA aspirin per heme, continue PTA hydroxyurea     Glaucoma: PTA latanoprost    Consultations This Hospital Stay   PHARMACY TO DOSE VANCO  ENDOCRINE DIABETES ADULT IP CONSULT  DIABETES EDUCATION IP CONSULT  INTERVENTIONAL RADIOLOGY ADULT/PEDS IP CONSULT  PHYSICAL THERAPY ADULT IP CONSULT  OCCUPATIONAL THERAPY ADULT IP CONSULT  PHARMACY IP CONSULT  CLASSICAL HEMATOLOGY ADULT IP CONSULT  CARE MANAGEMENT / SOCIAL WORK IP CONSULT  VASCULAR ACCESS FOR PICC PLACEMENT ADULT IP CONSULT  SPEECH LANGUAGE PATH ADULT IP CONSULT  PHARMACY LIAISON FOR MEDICATION COVERAGE CONSULT  WOUND OSTOMY CONTINENCE NURSE  IP CONSULT  PHARMACY/NUTRITION TO START AND MANAGE TPN  PHARMACY IP CONSULT  PHARMACY IP CONSULT  PHARMACY IP CONSULT  CARDIOLOGY GENERAL ADULT IP CONSULT  GI LUMINAL ADULT IP CONSULT  PHARMACY IP CONSULT  PHARMACY IP CONSULT  PHARMACY IP CONSULT  PHARMACY IP CONSULT  ADDICTION SERVICE ADULT IP CONSULT FOR EAST BANK  PHARMACY IP CONSULT  PHARMACY IP CONSULT  PHARMACY IP CONSULT  PHARMACY IP CONSULT  PHARMACY IP CONSULT  PHARMACY IP CONSULT  PSYCHOLOGY ADULT IP CONSULT  PHARMACY LIAISON FOR MEDICATION COVERAGE CONSULT  NURSING TO CONSULT FOR VASCULAR ACCESS CARE IP CONSULT  PHARMACY IP CONSULT  PHARMACY IP CONSULT  NURSING TO CONSULT FOR VASCULAR ACCESS CARE IP CONSULT    Code Status   Prior       The patient was discussed with Dr. Mae Solano MD  [facility] 2 Service  [facility] 7C MED SURG  [address] [address]  Phone: [phone]  ______________________________________________________________________    Physical Exam   Vital Signs: Temp: 97.7  F (36.5  C) Temp src: Oral BP: 110/61 Pulse: 94   Resp: 16 SpO2: 95 % O2 Device: None (Room air)    Weight: 147 lbs 14.86  oz    Constitutional: awake, alert, cooperative, no apparent distress, and appears stated age, appears disheveled  ENT: Mucus membranes moist  Respiratory: No increased work of breathing, good air exchange, clear to auscultation bilaterally, no crackles or wheezing  Cardiovascular: Regular rate and rhythm with no murmurs  GI: Soft, nontender, nondistended  Neurologic: Awake, alert and oriented to person and place but not to time, face symmetric, moving all 4 extremities. Cannot remember the month or year when asked again after 5 minutes. Unable to state months of the year backwards.   Psych: Flat affect. Endorses feeling depressed with hospital stay      Primary Care Physician   Heide Fay    Discharge Orders      MR Abdomen MRCP w/o & w Contrast     Primary Care - Care Coordination Referral      Med Therapy Management Referral      Primary Care - Care Coordination Referral      General info for SNF    Length of Stay Estimate: Short Term Care: Estimated # of Days <30  Condition at Discharge: Stable  Level of care:skilled   Rehabilitation Potential: Good  Admission H&P remains valid and up-to-date: Yes  Recent Chemotherapy: N/A  Use Nursing Home Standing Orders: Yes     Mantoux instructions    Give two-step Mantoux (PPD) Per Facility Policy Yes     Follow Up and recommended labs and tests    Follow up with Nursing home physician.  No follow up labs or test are needed.  Follow up with primary care provider in 2 weeks.  No follow up labs or test are needed.  Follow up with Dr. Wilde with hematology on 7/29/25.     Reason for your hospital stay    Sarah Felix was admitted to [facility] for evaluation of sepsis, diabetic ketoacidosis, and a hepatic vein thrombus. Her infection was treated with antibiotics and our endocrinology managed her DKA and subsequent blood glucose. Her hepatic vein thrombus was treated with blood thinning medications. During admission, the patient developed C. Difficile colitis and  completed treatment. During admission, there was concern for the patient's nutrition and she was started on total parenteral nutrition which was discontinued on 6/11/25. Upon discharge, the patient is hemodynamically stable. PT and OT were consulted during admission and recommended discharge to TCU for further rehabilitation. She will be transitioned to starting apixaban 10mg twice daily for 7 days upon discharge followed by apixaban 5mg daily after that 7 day course. For her diabetes regimen, she will take insulin glargine 8 units daily at 0900 along with correction insulin 0 to 7 units three times a day and correction insulin 0-5 units before bed. Other new medications include tamsulosin to help with urinary retention, thiamine due to her history of alcohol use in the past, and pantoprazole for gastroesophageal reflux. She will have referrals placed to follow up with Dr. Wilde with hematology on 7/29/25 and an order has been placed for her to get an outpatient MRCP imaging study to evaluate pancreatic and hepatic cysts seen on imaging during admission.     Glucose monitor nursing POCT    Before meals and at bedtime     Bladder scan    X 2 for post void residual     Activity - Up with nursing assistance     Contact Isolation    C. Difficile treated while admitted     Fall precautions     Diet    Follow this diet upon discharge: Current Diet:Orders Placed This Encounter      Room Service      Calorie Counts      Snacks/Supplements Adult: Other; 10:00 am: strawberry yogurt,, 2:00 pm: cottage cheese with canned peaches,, HS: bowl of chicken salad + magic cup; Between Meals      Regular Diet Adult Thin Liquids (level 0)       Significant Results and Procedures   Most Recent 3 CBC's:  Recent Labs   Lab Test 06/12/25  0521 06/11/25  0442 06/10/25  0540   WBC 6.3 6.0 6.7   HGB 8.6* 8.3* 8.6*   MCV 95 95 95    336 322     Most Recent 3 BMP's:  Recent Labs   Lab Test 06/12/25  0521 06/12/25  0153 06/11/25  3338  06/11/25  1655 06/11/25  0823 06/11/25  0442 06/10/25  0812 06/10/25  0540 06/09/25  0356 06/09/25  0352 06/08/25  0543 06/08/25  0541   NA  --   --   --   --   --  140  --   --   --  140  --  140   POTASSIUM 3.9  --   --   --   --  3.9  --  3.9  --  4.1  --  4.4   CHLORIDE  --   --   --   --   --  105  --   --   --  107  --  110*   CO2  --   --   --   --   --  28  --   --   --  26  --  24   BUN  --   --   --   --   --  32.0*  --   --   --  26.9*  --  26.7*   CR  --   --   --   --   --  0.70  --   --   --  0.63  --  0.63   ANIONGAP  --   --   --   --   --  7  --   --   --  7  --  6*   CECY  --   --   --   --   --  8.4*  --   --   --  8.4*  --  8.5*   GLC  --  151* 175* 151*   < > 153*   < >  --    < > 103*   < > 143*    < > = values in this interval not displayed.     Most Recent 3 INR's:  Recent Labs   Lab Test 06/09/25  0352 06/02/25  0623 05/29/25  0427   INR 1.05 1.07 1.58*       Discharge Medications   Discharge Medication List as of 6/12/2025  2:36 PM        START taking these medications    Details   apixaban ANTICOAGULANT (ELIQUIS) 5 MG tablet Take 2 tablets (10 mg) by mouth 2 times daily for 7 days, THEN 1 tablet (5 mg) 2 times daily., Disp-208 tablet, R-0, E-Prescribe      insulin glargine (LANTUS PEN) 100 UNIT/ML pen Inject 8 Units subcutaneously daily., Disp-15 mL, R-0, E-PrescribeIf Lantus is not covered by insurance, may substitute Basaglar or Semglee or other insulin glargine product per insurance preference at same dose and frequency.        pantoprazole (PROTONIX) 40 MG EC tablet Take 1 tablet (40 mg) by mouth every morning (before breakfast)., Disp-30 tablet, R-2, E-Prescribe      tamsulosin (FLOMAX) 0.4 MG capsule Take 1 capsule (0.4 mg) by mouth daily., Disp-30 capsule, R-2, E-Prescribe      thiamine (B-1) 100 MG tablet Take 1 tablet (100 mg) by mouth daily., Disp-30 tablet, R-2, E-Prescribe           CONTINUE these medications which have CHANGED    Details   !! insulin aspart (NOVOLOG PEN) 100  UNIT/ML pen Inject 1-7 Units subcutaneously 3 times daily., Disp-15 mL, R-2, E-Prescribe      !! insulin aspart (NOVOLOG PEN) 100 UNIT/ML pen Inject 1-5 Units subcutaneously at bedtime., Disp-15 mL, R-0, E-Prescribe       !! - Potential duplicate medications found. Please discuss with provider.        CONTINUE these medications which have NOT CHANGED    Details   atorvastatin (LIPITOR) 40 MG tablet Take 1 tablet (40 mg) by mouth daily, Disp-90 tablet, R-1, E-Prescribe      blood glucose (ONETOUCH ULTRA) test strip 1 strip by In Vitro route 4 times daily., Disp-400 strip, R-3, E-Prescribe      CALCIUM 500 + D 500-200 MG-IU OR TABS one tab twice per day, Disp-100, R-0, No Print Out      Cholecalciferol (VITAMIN D PO) Pt consuming 3000 units per day, Historical      Continuous Glucose Sensor (FREESTYLE CARMELA 14 DAY SENSOR) MISC Change every 14 days., Disp-6 each, R-3, E-Prescribe      folic acid (FOLVITE) 400 MCG tablet Take 1 tablet (400 mcg) by mouth daily, Disp-100 tablet, R-3, E-Prescribe      furosemide (LASIX) 20 MG tablet Take 1 tablet (20 mg) by mouth 2 times daily, Disp-180 tablet, R-1, E-Prescribe      hydroxyurea (HYDREA) 500 MG capsule Take 1 capsule (500 mg) by mouth daily, Disp-90 capsule, R-3, E-PrescribePatient wants 3 months supply if possible. Thank you.      insulin pen needle (BD REY U/F) 32G X 4 MM miscellaneous USE 1 PEN NEEDLE four times daily. (WITH LANTUS AND Novolog)Disp-400 each, [id number]      Lancets (ONETOUCH DELICA PLUS [id number]) MISC 1 each 4 times daily, Disp-400 each, R-3, E-Prescribe      latanoprost (XALATAN) 0.005 % ophthalmic solution Place 1 drop into both eyes at bedtime., Disp-7.5 mL, R-5, E-Prescribe      lisinopril (ZESTRIL) 2.5 MG tablet Take 1 tablet (2.5 mg) by mouth daily, Disp-90 tablet, R-3, E-Prescribe      metoprolol tartrate (LOPRESSOR) 25 MG tablet Take 1/2 tablet or 12.5mg by mouth twice daily., Disp-90 tablet, R-3, E-Prescribe      nystatin (MYCOSTATIN)  "060357 UNIT/GM external cream Apply topically 2 times dailyDisp-30 g, [id number]      pyridOXINE (VITAMIN B6) 25 MG tablet Take 1 tablet (25 mg) by mouth daily, Disp-90 tablet, R-3, E-Prescribe      vitamin B-12 (CYANOCOBALAMIN) 2500 MCG sublingual tablet Take 2500 mcg by mouth three times per week, No Print Out      insulin syringe-needle U-100 (BD INSULIN SYRINGE ULTRAFINE) 31G X 5/16\" 1 ML miscellaneous Use 1 syringes twice daily for insulin and victoza and for symptoms of hypoglycemiaDisp-100 each, R-3Local Print      triamcinolone (KENALOG) 0.1 % external cream Apply topically 3 times dailyDisp-90 g, [id number]           STOP taking these medications       aspirin 81 MG EC tablet Comments:   Reason for Stopping:         glimepiride (AMARYL) 2 MG tablet Comments:   Reason for Stopping:         metFORMIN (GLUCOPHAGE XR) 500 MG 24 hr tablet Comments:   Reason for Stopping:         potassium chloride ER (MICRO-K) 10 MEQ CR capsule Comments:   Reason for Stopping:             Allergies   Allergies   Allergen Reactions    Actos [Pioglitazone]      Fatigue - felt crummy     Amlodipine Swelling    Ancef [Cefazolin]     Levaquin Rash     Itching, rash    Cephalosporins Rash    Clindamycin Rash    Levofloxacin Itching and Rash    Nickel Rash     Contact reaction  Contact reaction     "

## 2025-06-16 ENCOUNTER — LAB REQUISITION (OUTPATIENT)
Dept: LAB | Facility: CLINIC | Age: 80
End: 2025-06-16

## 2025-06-16 ENCOUNTER — TRANSITIONAL CARE UNIT VISIT (OUTPATIENT)
Dept: GERIATRICS | Facility: CLINIC | Age: 80
End: 2025-06-16
Payer: COMMERCIAL

## 2025-06-16 VITALS
RESPIRATION RATE: 18 BRPM | BODY MASS INDEX: 24.24 KG/M2 | HEIGHT: 64 IN | SYSTOLIC BLOOD PRESSURE: 135 MMHG | HEART RATE: 102 BPM | DIASTOLIC BLOOD PRESSURE: 50 MMHG | TEMPERATURE: 98.3 F | OXYGEN SATURATION: 97 % | WEIGHT: 142 LBS

## 2025-06-16 DIAGNOSIS — K76.89 HEPATIC CYST: ICD-10-CM

## 2025-06-16 DIAGNOSIS — E78.2 MIXED HYPERLIPIDEMIA: Primary | ICD-10-CM

## 2025-06-16 DIAGNOSIS — K22.2 SCHATZKI'S RING OF DISTAL ESOPHAGUS: ICD-10-CM

## 2025-06-16 DIAGNOSIS — M62.81 GENERALIZED MUSCLE WEAKNESS: ICD-10-CM

## 2025-06-16 DIAGNOSIS — R29.6 RECURRENT FALLS: ICD-10-CM

## 2025-06-16 DIAGNOSIS — M85.851 OSTEOPENIA OF BOTH HIPS: ICD-10-CM

## 2025-06-16 DIAGNOSIS — R26.89 IMPAIRMENT OF BALANCE: ICD-10-CM

## 2025-06-16 DIAGNOSIS — E46 MALNUTRITION, UNSPECIFIED TYPE: ICD-10-CM

## 2025-06-16 DIAGNOSIS — Z79.4 TYPE 2 DIABETES MELLITUS WITH STAGE 2 CHRONIC KIDNEY DISEASE, WITH LONG-TERM CURRENT USE OF INSULIN (H): ICD-10-CM

## 2025-06-16 DIAGNOSIS — I10 ESSENTIAL (PRIMARY) HYPERTENSION: ICD-10-CM

## 2025-06-16 DIAGNOSIS — D64.9 ANEMIA, UNSPECIFIED: ICD-10-CM

## 2025-06-16 DIAGNOSIS — R33.9 URINARY RETENTION: ICD-10-CM

## 2025-06-16 DIAGNOSIS — N18.2 CKD (CHRONIC KIDNEY DISEASE) STAGE 2, GFR 60-89 ML/MIN: ICD-10-CM

## 2025-06-16 DIAGNOSIS — E11.42 DIABETIC POLYNEUROPATHY ASSOCIATED WITH TYPE 2 DIABETES MELLITUS (H): ICD-10-CM

## 2025-06-16 DIAGNOSIS — I10 HYPERTENSION GOAL BP (BLOOD PRESSURE) < 140/90: Chronic | ICD-10-CM

## 2025-06-16 DIAGNOSIS — N18.2 TYPE 2 DIABETES MELLITUS WITH STAGE 2 CHRONIC KIDNEY DISEASE, WITH LONG-TERM CURRENT USE OF INSULIN (H): ICD-10-CM

## 2025-06-16 DIAGNOSIS — E11.22 TYPE 2 DIABETES MELLITUS WITH STAGE 2 CHRONIC KIDNEY DISEASE, WITH LONG-TERM CURRENT USE OF INSULIN (H): ICD-10-CM

## 2025-06-16 DIAGNOSIS — M85.852 OSTEOPENIA OF BOTH HIPS: ICD-10-CM

## 2025-06-16 DIAGNOSIS — N39.46 MIXED INCONTINENCE URGE AND STRESS (MALE)(FEMALE): ICD-10-CM

## 2025-06-16 DIAGNOSIS — R26.81 GAIT INSTABILITY: ICD-10-CM

## 2025-06-16 DIAGNOSIS — K21.9 GASTROESOPHAGEAL REFLUX DISEASE, UNSPECIFIED WHETHER ESOPHAGITIS PRESENT: ICD-10-CM

## 2025-06-16 PROBLEM — B37.9 CANDIDIASIS: Status: RESOLVED | Noted: 2019-12-16 | Resolved: 2025-06-16

## 2025-06-16 PROBLEM — E87.29 KETOACIDOSIS: Status: RESOLVED | Noted: 2025-05-25 | Resolved: 2025-06-16

## 2025-06-16 PROBLEM — R73.9 HYPERGLYCEMIA: Status: RESOLVED | Noted: 2025-05-25 | Resolved: 2025-06-16
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
## 2025-06-16 PROBLEM — L98.9 INFLAMMATORY DERMATOSIS: Status: RESOLVED | Noted: 2019-10-18 | Resolved: 2025-06-16

## 2025-06-16 PROBLEM — K59.01 SLOW TRANSIT CONSTIPATION: Status: RESOLVED | Noted: 2020-02-13 | Resolved: 2025-06-16

## 2025-06-16 PROBLEM — R20.0 NUMBNESS AND TINGLING OF BOTH FEET: Status: RESOLVED | Noted: 2024-03-15 | Resolved: 2025-06-16

## 2025-06-16 PROBLEM — R20.2 NUMBNESS AND TINGLING OF BOTH FEET: Status: RESOLVED | Noted: 2024-03-15 | Resolved: 2025-06-16

## 2025-06-16 PROBLEM — N17.9 AKI (ACUTE KIDNEY INJURY): Status: RESOLVED | Noted: 2025-05-25 | Resolved: 2025-06-16

## 2025-06-16 PROBLEM — R52 PAIN: Status: RESOLVED | Noted: 2019-10-18 | Resolved: 2025-06-16

## 2025-06-16 PROBLEM — B37.2 INTERTRIGINOUS CANDIDIASIS: Status: RESOLVED | Noted: 2019-12-16 | Resolved: 2025-06-16

## 2025-06-16 PROBLEM — M89.9 DISORDER OF BONE: Status: RESOLVED | Noted: 2019-10-15 | Resolved: 2025-06-16

## 2025-06-16 PROBLEM — R60.9 EDEMA: Status: RESOLVED | Noted: 2019-10-18 | Resolved: 2025-06-16

## 2025-06-16 PROBLEM — E87.6 HYPOKALEMIA: Status: RESOLVED | Noted: 2025-05-25 | Resolved: 2025-06-16

## 2025-06-16 PROBLEM — L98.9: Status: RESOLVED | Noted: 2019-12-16 | Resolved: 2025-06-16

## 2025-06-16 NOTE — PROGRESS NOTES
[facility] GERIATRICS    PRIMARY CARE PROVIDER AND CLINIC:  Heide Fay MD, [address] [address] / [place] [address]  Chief Complaint   Patient presents with    Hospital F/U      [facility] Medical Record Number:  [id number]  Place of Service where encounter took place:  [facility] ([facility]) [[id number]]    Sarah Felix  is a 79 year old  (1945), admitted to the above facility from  [facility]. Hospital stay 5/25/2025 through 6/12/2025..   HPI:    Sarah has medical history significant for diabetes, diabetic polyneuropathy, allergic rhinitis, heartburn, mixed hyperlipidemia, hypertension, osteopenia of both hips, chronic kidney disease stage II, recurrent falls, generalized weakness.  She was recently admitted to hospital and treated for DKA, sepsis, C.Diff and hepatic vein thrombosis.  Her infection was treated with antibiotics, DKA was managed by endocrinology and her hepatic vein thrombosis was treated with anticoagulants.  Malnutrition was noted at hospitalization and she was initially on total parenteral nutrition, but was switched to oral feeding before discharge.  When stable, she was discharged to TCU for physical rehabilitation.    CODE STATUS/ADVANCE DIRECTIVES DISCUSSION:  Prior  CPR/Full code   ALLERGIES:   Allergies   Allergen Reactions    Actos [Pioglitazone]      Fatigue - felt crummy     Amlodipine Swelling    Ancef [Cefazolin]     Carbapenems     Levaquin Rash     Itching, rash    Cephalosporins Rash    Clindamycin Rash    Levofloxacin Itching and Rash    Nickel Rash     Contact reaction  Contact reaction      PAST MEDICAL HISTORY:   Past Medical History:   Diagnosis Date    Adenomatous polyp of duodenum 08/13/2018    Removed via EUS 8/13/2018 with f/u EGD 3/14/2019.  No further surveillance needed per GI.    Allergic rhinitis, cause unspecified     Allergic rhinitis    Basal cell carcinoma of face 03/2012     s/p MOHS    CKD (chronic kidney disease) stage 2, GFR 60-89 ml/min 06/06/2014    Closed bimalleolar fracture of left ankle with routine healing 10/11/2019    Added automatically from request for surgery [id number]    Contact dermatitis and other eczema, due to unspecified cause     Cyst of thyroid 1998    Thyroid Nodule/Hurthle Cell Neoplasm/Benign - resected    Cystocele, lateral     Diverticulitis of colon (without mention of hemorrhage)(562.11) 06/2004    Abd CT    Esophageal reflux     Hiatal hernia    Essential hypertension, benign (HTN) 06/06/2014    Fatty liver     Hepatic cyst     8.0 cm x 6.6 cm on CT 3/2023 (stable)    Hiatal hernia     small    Hyperlipidemia     Mixed incontinence urge and stress (male)(female) 06/24/2007    Nontoxic uninodular goiter     Osteopenia 04/21/2005    on fosamax  for > 5 yr.  stop 7/2012    Other chronic otitis externa     Postoperative deep vein thrombosis (DVT) (H) age 22    post ovarian cyst removed    Schatzki's ring of distal esophagus 03/05/2023    Dilated 8/2018    Tubular adenoma of colon 06/23/2022    On colonoscopy 2016, rpt 5 yrs.    Type 2 diabetes mellitus (H)       PAST SURGICAL HISTORY:   has a past surgical history that includes colonoscopy (04/2006); hemorrhoidectomy (08/2008); surgical history of -  (10/2008); GYN surgery (10/22/2008); Esophagoscopy, gastroscopy, duodenoscopy (EGD), combined (N/A, 08/13/2018); Endoscopic ultrasound upper gastrointestinal tract (GI) (N/A, 09/06/2018); Esophagoscopy, gastroscopy, duodenoscopy (EGD), resect mucosa, combined (N/A, 09/06/2018); Esophagoscopy, gastroscopy, duodenoscopy (EGD), combined (N/A, 03/14/2019); Apply external fixator lower extremity (Left, 10/12/2019); Open reduction internal fixation ankle (Left, 10/28/2019); Phacoemulsification clear cornea with standard intraocular lens implant (Left, 09/25/2020); cataract iol, rt/lt (Right, 04/17/2018); appendectomy (age 22); Ovarian Cyst Removal (Left, age 22);  Thyroidectomy, Partial (01/01/1998); Mohs micrographic procedure; other surgical history (Left, 10/12/2019); Orif Ankle Fracture Bimalleolar (Left, 10/28/2019); and PICC/Midline Placement (Right, 05/26/2025).  FAMILY HISTORY: family history includes Arthritis in her mother; Cancer in her father; Cardiovascular in her brother; Diabetes in her mother; Hypertension in her mother; Other - See Comments in her brother; Rheumatoid Arthritis in her mother.  SOCIAL HISTORY:   reports that she quit smoking about 45 years ago. Her smoking use included cigarettes. She has been exposed to tobacco smoke. She has never used smokeless tobacco. She reports current alcohol use. She reports that she does not use drugs.  Patient's living condition: lives alone    Post Discharge Medication Reconciliation Status:   MED REC REQUIRED  Post Medication Reconciliation Status: discharge medications reconciled, continue medications without change       Current Outpatient Medications   Medication Sig Dispense Refill    apixaban ANTICOAGULANT (ELIQUIS) 5 MG tablet Take 2 tablets (10 mg) by mouth 2 times daily for 7 days, THEN 1 tablet (5 mg) 2 times daily. 208 tablet 0    atorvastatin (LIPITOR) 40 MG tablet Take 1 tablet (40 mg) by mouth daily 90 tablet 1    blood glucose (ONETOUCH ULTRA) test strip 1 strip by In Vitro route 4 times daily. 400 strip 3    CALCIUM 500 + D 500-200 MG-IU OR TABS one tab twice per day 100 0    Cholecalciferol (VITAMIN D PO) Pt consuming 3000 units per day      Continuous Glucose Sensor (FREESTYLE CARMELA 14 DAY SENSOR) MISC Change every 14 days. 6 each 3    folic acid (FOLVITE) 400 MCG tablet Take 1 tablet (400 mcg) by mouth daily 100 tablet 3    furosemide (LASIX) 20 MG tablet Take 1 tablet (20 mg) by mouth 2 times daily 180 tablet 1    hydroxyurea (HYDREA) 500 MG capsule Take 1 capsule (500 mg) by mouth daily 90 capsule 3    insulin aspart (NOVOLOG PEN) 100 UNIT/ML pen Inject 1-7 Units subcutaneously 3 times daily. 15  "mL 2    insulin aspart (NOVOLOG PEN) 100 UNIT/ML pen Inject 1-5 Units subcutaneously at bedtime. 15 mL 0    insulin glargine (LANTUS PEN) 100 UNIT/ML pen Inject 8 Units subcutaneously daily. 15 mL 0    insulin pen needle (BD REY U/F) 32G X 4 MM miscellaneous USE 1 PEN NEEDLE four times daily. (WITH LANTUS AND Novolog) 400 each 11    insulin syringe-needle U-100 (BD INSULIN SYRINGE ULTRAFINE) 31G X 5/16\" 1 ML miscellaneous Use 1 syringes twice daily for insulin and victoza and for symptoms of hypoglycemia 100 each 3    Lancets (ONETOUCH DELICA PLUS [id number]) MISC 1 each 4 times daily 400 each 3    latanoprost (XALATAN) 0.005 % ophthalmic solution Place 1 drop into both eyes at bedtime. 7.5 mL 5    lisinopril (ZESTRIL) 2.5 MG tablet Take 1 tablet (2.5 mg) by mouth daily 90 tablet 3    metoprolol tartrate (LOPRESSOR) 25 MG tablet Take 1/2 tablet or 12.5mg by mouth twice daily. 90 tablet 3    nystatin (MYCOSTATIN) 755979 UNIT/GM external cream Apply topically 2 times daily 30 g 3    pantoprazole (PROTONIX) 40 MG EC tablet Take 1 tablet (40 mg) by mouth every morning (before breakfast). 30 tablet 2    pyridOXINE (VITAMIN B6) 25 MG tablet Take 1 tablet (25 mg) by mouth daily 90 tablet 3    tamsulosin (FLOMAX) 0.4 MG capsule Take 1 capsule (0.4 mg) by mouth daily. 30 capsule 2    thiamine (B-1) 100 MG tablet Take 1 tablet (100 mg) by mouth daily. 30 tablet 2    triamcinolone (KENALOG) 0.1 % external cream Apply topically 3 times daily 90 g 3    vitamin B-12 (CYANOCOBALAMIN) 2500 MCG sublingual tablet Take 2500 mcg by mouth three times per week       No current facility-administered medications for this visit.       ROS:  10 point ROS of systems including Constitutional, Eyes, Respiratory, Cardiovascular, Gastroenterology, Genitourinary, Integumentary, Musculoskeletal, Psychiatric were all negative except for pertinent positives noted in my HPI.    Vitals:  /50   Pulse 102   Temp 98.3  F (36.8  C)   Resp 18   Ht " "1.626 m (5' 4\")   Wt 64.4 kg (142 lb)   SpO2 97%   BMI 24.37 kg/m      Physical Exam:  GENERAL APPEARANCE:  Alert, in no distress, oriented, cooperative. Laying in bed   ENT:  Mouth and posterior oropharynx normal, moist mucous membranes  EYES:  EOM, conjunctivae, lids, pupils and irises normal  NECK:  No adenopathy,masses or thyromegaly  RESP:  respiratory effort and palpation of chest normal, lungs clear to auscultation , no respiratory distress  CV:  Palpation and auscultation of heart done , regular rate and rhythm, no murmur, rub, or gallop, no edema to LE  GI/ABDOMEN:  normal bowel sounds, soft, nontender, no hepatosplenomegaly or other masses  M/S:   moves extremities spontaneously. Ambulation not tested   SKIN:  no rashes to exposed skin.   NEURO:   intact   PSYCH:  oriented X 3, affect and mood normal        Lab/Diagnostic data:  Recent labs in [facility] reviewed by me today.  and Most Recent 3 CBC's:  Recent Labs   Lab Test 06/12/25  0521 06/11/25  0442 06/10/25  0540   WBC 6.3 6.0 6.7   HGB 8.6* 8.3* 8.6*   MCV 95 95 95    336 322     Most Recent 3 BMP's:  Recent Labs   Lab Test 06/12/25  1133 06/12/25  0732 06/12/25  0521 06/12/25  0153 06/11/25  0823 06/11/25  0442 06/10/25  0812 06/10/25  0540 06/09/25  0356 06/09/25  0352 06/08/25  0543 06/08/25  0541   NA  --   --   --   --   --  140  --   --   --  140  --  140   POTASSIUM  --   --  3.9  --   --  3.9  --  3.9  --  4.1  --  4.4   CHLORIDE  --   --   --   --   --  105  --   --   --  107  --  110*   CO2  --   --   --   --   --  28  --   --   --  26  --  24   BUN  --   --   --   --   --  32.0*  --   --   --  26.9*  --  26.7*   CR  --   --   --   --   --  0.70  --   --   --  0.63  --  0.63   ANIONGAP  --   --   --   --   --  7  --   --   --  7  --  6*   CECY  --   --   --   --   --  8.4*  --   --   --  8.4*  --  8.5*   * 162*  --  151*   < > 153*   < >  --    < > 103*   < > 143*    < > = values in this interval not displayed.     Most Recent 2 " LFT's:  Recent Labs   Lab Test 06/09/25  0352 06/02/25  0623   AST 19 22   ALT 17 16   ALKPHOS 71 55   BILITOTAL 0.3 0.3     Most Recent 3 Creatinines:  Recent Labs   Lab Test 06/11/25  0442 06/09/25  0352 06/08/25  0541   CR 0.70 0.63 0.63     Most Recent 3 Hemoglobins:  Recent Labs   Lab Test 06/12/25  0521 06/11/25  0442 06/10/25  0540   HGB 8.6* 8.3* 8.6*     Most Recent Cholesterol Panel:  Recent Labs   Lab Test 11/05/24  1513   CHOL 193   LDL 90   HDL 81   TRIG 110     Most Recent Hemoglobin A1c:  Recent Labs   Lab Test 05/25/25  1334   A1C 8.0*       ASSESSMENT/PLAN:    Diabetic polyneuropathy associated with type 2 diabetes mellitus (H)  Type 2 diabetes mellitus with stage 2 chronic kidney disease, with long-term current use of insulin (H)  Latest A1c 8.0% 3 wks ago. She had DKA at hospitalization, resolved.   Continue Insulin glargine 8 units at 0900  Continue Novolog 0-7 units three times daily  Continue Novolog sliding scale insulin 0-5 units at bedtime    Hypertension goal BP (blood pressure) < 140/90  CKD (chronic kidney disease) stage 2, GFR 60-89 ml/min  Continue lisinopril 2.5 mg daily  Continue furosemide 20 mg 2 times daily  Continue metoprolol 12.5 mg 2 times daily  Continue to monitor blood pressure  Recheck BMP    Mixed hyperlipidemia  No recent lipid panel, will recheck  Continue atorvastatin 40 mg daily    Hepatic cyst  Pancreatic Cyst  These were noted at hospitalization.  Follow-up with outpatient MRI/MRCP    Hepatic vein thrombus  Essential thrombocytosis  Essential thrombocythemia (JAK2 positive), high risk disease   History of thromboembolism  She was followed by IR and Heme at hospitalization, was treated with heparin and Lovenox IV and discharged with apixaban 10 mg twice daily for 7 days and then will continue on 5 mg 2 times daily.  Continue hydroxyurea  Follow up with Dr. Wilde 7/29/25    Mixed incontinence urge and stress (male)(female)  Urinary retention  This was noted on  hospitalization after catheter was removed.  She was started on Flomax with improvement  Continue tamsulosin 0.4 mg daily  Continue straight cath if bladder scan >600cc     Malnutrition  Alcohol use disorder   She had elevated ketones on admission; likely due to poor oral intake.  She had TPN and was later switched to oral intake.  She will continue with regular diet with thin liquids    She has been seen in the ED in March [facility] for alcohol intoxication  Continue thiamine 1000 mg daily    Anasarca  Pulmonary edema  Per chart review; seen on 5/28 CT C/A/P. TTE with LVEF 60-65%, small IVC.   Continue Lasix 20mg daily    Physical Deconditioning  Recurrent falls  Gait instability  Impairment of balance  Generalized muscle weakness  Continue PT/OT    Schatzki's ring of distal esophagus  Gastroesophageal reflux disease, unspecified whether esophagitis present  Continue pantoprazole 40 mg daily       Anemia of chronic disease  On admission, Hg level was decreased to 7.0. She received 1 unit pRBC for Hg 6.9 and at discharge Hg was 8.6.  Recheck CBC    Orders:  Lab - CBC, BMP, Lipid panel      Electronically signed by:  Dr. Magi Estrella, DNP, CNP  50 minutes was spent reviewing medical record from hospital, assessing patient, reviewing medical notes from previous providers, reviewing medications with patient, coordinating above plan of care with nursing and patient /counseling pt. on above plan of care and documentation.

## 2025-06-16 NOTE — PLAN OF CARE
Physical Therapy Discharge Summary    Reason for therapy discharge:    Discharged to transitional care facility.    Progress towards therapy goal(s). See goals on Care Plan in [facility] electronic health record for goal details.  Goals partially met.  Barriers to achieving goals:   limited tolerance for therapy and discharge from facility.    Therapy recommendation(s):    Continued therapy is recommended.  Rationale/Recommendations:  Recommend TCU to maximize strength for functional mobility.

## 2025-06-16 NOTE — LETTER
6/16/2025      Sarah Felix  [address] [place] [address]        [facility] GERIATRICS    PRIMARY CARE PROVIDER AND CLINIC:  Heide Fay MD, [address] [address] / [place] [address]  Chief Complaint   Patient presents with     Hospital F/U      [facility] Medical Record Number:  [id number]  Place of Service where encounter took place:  [facility] ([facility]) [[id number]]    Sarah Felix  is a 79 year old  (1945), admitted to the above facility from  [facility]. Hospital stay 5/25/2025 through 6/12/2025..   HPI:    Sarah has medical history significant for diabetes, diabetic polyneuropathy, allergic rhinitis, heartburn, mixed hyperlipidemia, hypertension, osteopenia of both hips, chronic kidney disease stage II, recurrent falls, generalized weakness.  She was recently admitted to hospital and treated for DKA, sepsis, C.Diff and hepatic vein thrombosis.  Her infection was treated with antibiotics, DKA was managed by endocrinology and her hepatic vein thrombosis was treated with anticoagulants.  Malnutrition was noted at hospitalization and she was initially on total parenteral nutrition, but was switched to oral feeding before discharge.  When stable, she was discharged to TCU for physical rehabilitation.    CODE STATUS/ADVANCE DIRECTIVES DISCUSSION:  Prior  CPR/Full code   ALLERGIES:   Allergies   Allergen Reactions     Actos [Pioglitazone]      Fatigue - felt crummy      Amlodipine Swelling     Ancef [Cefazolin]      Carbapenems      Levaquin Rash     Itching, rash     Cephalosporins Rash     Clindamycin Rash     Levofloxacin Itching and Rash     Nickel Rash     Contact reaction  Contact reaction      PAST MEDICAL HISTORY:   Past Medical History:   Diagnosis Date     Adenomatous polyp of duodenum 08/13/2018    Removed via EUS 8/13/2018 with f/u EGD 3/14/2019.  No further surveillance needed per GI.     Allergic  rhinitis, cause unspecified     Allergic rhinitis     Basal cell carcinoma of face 03/2012    s/p MOHS     CKD (chronic kidney disease) stage 2, GFR 60-89 ml/min 06/06/2014     Closed bimalleolar fracture of left ankle with routine healing 10/11/2019    Added automatically from request for surgery [id number]     Contact dermatitis and other eczema, due to unspecified cause      Cyst of thyroid 1998    Thyroid Nodule/Hurthle Cell Neoplasm/Benign - resected     Cystocele, lateral      Diverticulitis of colon (without mention of hemorrhage)(562.11) 06/2004    Abd CT     Esophageal reflux     Hiatal hernia     Essential hypertension, benign (HTN) 06/06/2014     Fatty liver      Hepatic cyst     8.0 cm x 6.6 cm on CT 3/2023 (stable)     Hiatal hernia     small     Hyperlipidemia      Mixed incontinence urge and stress (male)(female) 06/24/2007     Nontoxic uninodular goiter      Osteopenia 04/21/2005    on fosamax  for > 5 yr.  stop 7/2012     Other chronic otitis externa      Postoperative deep vein thrombosis (DVT) (H) age 22    post ovarian cyst removed     Schatzki's ring of distal esophagus 03/05/2023    Dilated 8/2018     Tubular adenoma of colon 06/23/2022    On colonoscopy 2016, rpt 5 yrs.     Type 2 diabetes mellitus (H)       PAST SURGICAL HISTORY:   has a past surgical history that includes colonoscopy (04/2006); hemorrhoidectomy (08/2008); surgical history of -  (10/2008); GYN surgery (10/22/2008); Esophagoscopy, gastroscopy, duodenoscopy (EGD), combined (N/A, 08/13/2018); Endoscopic ultrasound upper gastrointestinal tract (GI) (N/A, 09/06/2018); Esophagoscopy, gastroscopy, duodenoscopy (EGD), resect mucosa, combined (N/A, 09/06/2018); Esophagoscopy, gastroscopy, duodenoscopy (EGD), combined (N/A, 03/14/2019); Apply external fixator lower extremity (Left, 10/12/2019); Open reduction internal fixation ankle (Left, 10/28/2019); Phacoemulsification clear cornea with standard intraocular lens implant (Left,  09/25/2020); cataract iol, rt/lt (Right, 04/17/2018); appendectomy (age 22); Ovarian Cyst Removal (Left, age 22); Thyroidectomy, Partial (01/01/1998); Mohs micrographic procedure; other surgical history (Left, 10/12/2019); Orif Ankle Fracture Bimalleolar (Left, 10/28/2019); and PICC/Midline Placement (Right, 05/26/2025).  FAMILY HISTORY: family history includes Arthritis in her mother; Cancer in her father; Cardiovascular in her brother; Diabetes in her mother; Hypertension in her mother; Other - See Comments in her brother; Rheumatoid Arthritis in her mother.  SOCIAL HISTORY:   reports that she quit smoking about 45 years ago. Her smoking use included cigarettes. She has been exposed to tobacco smoke. She has never used smokeless tobacco. She reports current alcohol use. She reports that she does not use drugs.  Patient's living condition: lives alone    Post Discharge Medication Reconciliation Status:   MED REC REQUIRED  Post Medication Reconciliation Status: discharge medications reconciled, continue medications without change       Current Outpatient Medications   Medication Sig Dispense Refill     apixaban ANTICOAGULANT (ELIQUIS) 5 MG tablet Take 2 tablets (10 mg) by mouth 2 times daily for 7 days, THEN 1 tablet (5 mg) 2 times daily. 208 tablet 0     atorvastatin (LIPITOR) 40 MG tablet Take 1 tablet (40 mg) by mouth daily 90 tablet 1     blood glucose (ONETOUCH ULTRA) test strip 1 strip by In Vitro route 4 times daily. 400 strip 3     CALCIUM 500 + D 500-200 MG-IU OR TABS one tab twice per day 100 0     Cholecalciferol (VITAMIN D PO) Pt consuming 3000 units per day       Continuous Glucose Sensor (FREESTYLE CARMELA 14 DAY SENSOR) MISC Change every 14 days. 6 each 3     folic acid (FOLVITE) 400 MCG tablet Take 1 tablet (400 mcg) by mouth daily 100 tablet 3     furosemide (LASIX) 20 MG tablet Take 1 tablet (20 mg) by mouth 2 times daily 180 tablet 1     hydroxyurea (HYDREA) 500 MG capsule Take 1 capsule (500 mg) by  "mouth daily 90 capsule 3     insulin aspart (NOVOLOG PEN) 100 UNIT/ML pen Inject 1-7 Units subcutaneously 3 times daily. 15 mL 2     insulin aspart (NOVOLOG PEN) 100 UNIT/ML pen Inject 1-5 Units subcutaneously at bedtime. 15 mL 0     insulin glargine (LANTUS PEN) 100 UNIT/ML pen Inject 8 Units subcutaneously daily. 15 mL 0     insulin pen needle (BD REY U/F) 32G X 4 MM miscellaneous USE 1 PEN NEEDLE four times daily. (WITH LANTUS AND Novolog) 400 each 11     insulin syringe-needle U-100 (BD INSULIN SYRINGE ULTRAFINE) 31G X 5/16\" 1 ML miscellaneous Use 1 syringes twice daily for insulin and victoza and for symptoms of hypoglycemia 100 each 3     Lancets (ONETOUCH DELICA PLUS [id number]) MISC 1 each 4 times daily 400 each 3     latanoprost (XALATAN) 0.005 % ophthalmic solution Place 1 drop into both eyes at bedtime. 7.5 mL 5     lisinopril (ZESTRIL) 2.5 MG tablet Take 1 tablet (2.5 mg) by mouth daily 90 tablet 3     metoprolol tartrate (LOPRESSOR) 25 MG tablet Take 1/2 tablet or 12.5mg by mouth twice daily. 90 tablet 3     nystatin (MYCOSTATIN) 293894 UNIT/GM external cream Apply topically 2 times daily 30 g 3     pantoprazole (PROTONIX) 40 MG EC tablet Take 1 tablet (40 mg) by mouth every morning (before breakfast). 30 tablet 2     pyridOXINE (VITAMIN B6) 25 MG tablet Take 1 tablet (25 mg) by mouth daily 90 tablet 3     tamsulosin (FLOMAX) 0.4 MG capsule Take 1 capsule (0.4 mg) by mouth daily. 30 capsule 2     thiamine (B-1) 100 MG tablet Take 1 tablet (100 mg) by mouth daily. 30 tablet 2     triamcinolone (KENALOG) 0.1 % external cream Apply topically 3 times daily 90 g 3     vitamin B-12 (CYANOCOBALAMIN) 2500 MCG sublingual tablet Take 2500 mcg by mouth three times per week       No current facility-administered medications for this visit.       ROS:  10 point ROS of systems including Constitutional, Eyes, Respiratory, Cardiovascular, Gastroenterology, Genitourinary, Integumentary, Musculoskeletal, Psychiatric were " "all negative except for pertinent positives noted in my HPI.    Vitals:  /50   Pulse 102   Temp 98.3  F (36.8  C)   Resp 18   Ht 1.626 m (5' 4\")   Wt 64.4 kg (142 lb)   SpO2 97%   BMI 24.37 kg/m      Physical Exam:  GENERAL APPEARANCE:  Alert, in no distress, oriented, cooperative. Laying in bed   ENT:  Mouth and posterior oropharynx normal, moist mucous membranes  EYES:  EOM, conjunctivae, lids, pupils and irises normal  NECK:  No adenopathy,masses or thyromegaly  RESP:  respiratory effort and palpation of chest normal, lungs clear to auscultation , no respiratory distress  CV:  Palpation and auscultation of heart done , regular rate and rhythm, no murmur, rub, or gallop, no edema to LE  GI/ABDOMEN:  normal bowel sounds, soft, nontender, no hepatosplenomegaly or other masses  M/S:   moves extremities spontaneously. Ambulation not tested   SKIN:  no rashes to exposed skin.   NEURO:   intact   PSYCH:  oriented X 3, affect and mood normal        Lab/Diagnostic data:  Recent labs in [facility] reviewed by me today.  and Most Recent 3 CBC's:  Recent Labs   Lab Test 06/12/25  0521 06/11/25  0442 06/10/25  0540   WBC 6.3 6.0 6.7   HGB 8.6* 8.3* 8.6*   MCV 95 95 95    336 322     Most Recent 3 BMP's:  Recent Labs   Lab Test 06/12/25  1133 06/12/25  0732 06/12/25  0521 06/12/25  0153 06/11/25  0823 06/11/25  0442 06/10/25  0812 06/10/25  0540 06/09/25  0356 06/09/25  0352 06/08/25  0543 06/08/25  0541   NA  --   --   --   --   --  140  --   --   --  140  --  140   POTASSIUM  --   --  3.9  --   --  3.9  --  3.9  --  4.1  --  4.4   CHLORIDE  --   --   --   --   --  105  --   --   --  107  --  110*   CO2  --   --   --   --   --  28  --   --   --  26  --  24   BUN  --   --   --   --   --  32.0*  --   --   --  26.9*  --  26.7*   CR  --   --   --   --   --  0.70  --   --   --  0.63  --  0.63   ANIONGAP  --   --   --   --   --  7  --   --   --  7  --  6*   CECY  --   --   --   --   --  8.4*  --   --   --  8.4*  --  " 8.5*   * 162*  --  151*   < > 153*   < >  --    < > 103*   < > 143*    < > = values in this interval not displayed.     Most Recent 2 LFT's:  Recent Labs   Lab Test 06/09/25  0352 06/02/25  0623   AST 19 22   ALT 17 16   ALKPHOS 71 55   BILITOTAL 0.3 0.3     Most Recent 3 Creatinines:  Recent Labs   Lab Test 06/11/25  0442 06/09/25  0352 06/08/25  0541   CR 0.70 0.63 0.63     Most Recent 3 Hemoglobins:  Recent Labs   Lab Test 06/12/25  0521 06/11/25  0442 06/10/25  0540   HGB 8.6* 8.3* 8.6*     Most Recent Cholesterol Panel:  Recent Labs   Lab Test 11/05/24  1513   CHOL 193   LDL 90   HDL 81   TRIG 110     Most Recent Hemoglobin A1c:  Recent Labs   Lab Test 05/25/25  1334   A1C 8.0*       ASSESSMENT/PLAN:    Diabetic polyneuropathy associated with type 2 diabetes mellitus (H)  Type 2 diabetes mellitus with stage 2 chronic kidney disease, with long-term current use of insulin (H)  Latest A1c 8.0% 3 wks ago. She had DKA at hospitalization, resolved.   Continue Insulin glargine 8 units at 0900  Continue Novolog 0-7 units three times daily  Continue Novolog sliding scale insulin 0-5 units at bedtime    Hypertension goal BP (blood pressure) < 140/90  CKD (chronic kidney disease) stage 2, GFR 60-89 ml/min  Continue lisinopril 2.5 mg daily  Continue furosemide 20 mg 2 times daily  Continue metoprolol 12.5 mg 2 times daily  Continue to monitor blood pressure  Recheck BMP    Mixed hyperlipidemia  No recent lipid panel, will recheck  Continue atorvastatin 40 mg daily    Hepatic cyst  Pancreatic Cyst  These were noted at hospitalization.  Follow-up with outpatient MRI/MRCP    Hepatic vein thrombus  Essential thrombocytosis  Essential thrombocythemia (JAK2 positive), high risk disease   History of thromboembolism  She was followed by IR and Heme at hospitalization, was treated with heparin and Lovenox IV and discharged with apixaban 10 mg twice daily for 7 days and then will continue on 5 mg 2 times daily.  Continue  hydroxyurea  Follow up with Dr. Wilde 7/29/25    Mixed incontinence urge and stress (male)(female)  Urinary retention  This was noted on hospitalization after catheter was removed.  She was started on Flomax with improvement  Continue tamsulosin 0.4 mg daily  Continue straight cath if bladder scan >600cc     Malnutrition  Alcohol use disorder   She had elevated ketones on admission; likely due to poor oral intake.  She had TPN and was later switched to oral intake.  She will continue with regular diet with thin liquids    She has been seen in the ED in March [facility] for alcohol intoxication  Continue thiamine 1000 mg daily    Anasarca  Pulmonary edema  Per chart review; seen on 5/28 CT C/A/P. TTE with LVEF 60-65%, small IVC.   Continue Lasix 20mg daily    Physical Deconditioning  Recurrent falls  Gait instability  Impairment of balance  Generalized muscle weakness  Continue PT/OT    Schatzki's ring of distal esophagus  Gastroesophageal reflux disease, unspecified whether esophagitis present  Continue pantoprazole 40 mg daily       Anemia of chronic disease  On admission, Hg level was decreased to 7.0. She received 1 unit pRBC for Hg 6.9 and at discharge Hg was 8.6.  Recheck CBC    Orders:  Lab - CBC, BMP, Lipid panel      Electronically signed by:  Dr. Magi Estrella, DNP, CNP  50 minutes was spent reviewing medical record from hospital, assessing patient, reviewing medical notes from previous providers, reviewing medications with patient, coordinating above plan of care with nursing and patient /counseling pt. on above plan of care and documentation.                 Sincerely,        Magi Estrella, MALATHI CNP    Electronically signed

## 2025-06-17 ENCOUNTER — PATIENT OUTREACH (OUTPATIENT)
Dept: CARE COORDINATION | Facility: CLINIC | Age: 80
End: 2025-06-17
Payer: COMMERCIAL

## 2025-06-17 ENCOUNTER — RESULTS FOLLOW-UP (OUTPATIENT)
Dept: GERIATRICS | Facility: CLINIC | Age: 80
End: 2025-06-17

## 2025-06-17 LAB
ANION GAP SERPL CALCULATED.3IONS-SCNC: 11 MMOL/L (ref 7–15)
ANION GAP SERPL CALCULATED.3IONS-SCNC: 11 MMOL/L (ref 7–15)
BUN SERPL-MCNC: 7.9 MG/DL (ref 8–23)
BUN SERPL-MCNC: 7.9 MG/DL (ref 8–23)
CALCIUM SERPL-MCNC: 8.9 MG/DL (ref 8.8–10.4)
CALCIUM SERPL-MCNC: 8.9 MG/DL (ref 8.8–10.4)
CHLORIDE SERPL-SCNC: 107 MMOL/L (ref 98–107)
CHLORIDE SERPL-SCNC: 107 MMOL/L (ref 98–107)
CHOLEST SERPL-MCNC: 114 MG/DL
CHOLEST SERPL-MCNC: 114 MG/DL
CREAT SERPL-MCNC: 0.84 MG/DL (ref 0.51–0.95)
CREAT SERPL-MCNC: 0.84 MG/DL (ref 0.51–0.95)
EGFRCR SERPLBLD CKD-EPI 2021: 71 ML/MIN/1.73M2
EGFRCR SERPLBLD CKD-EPI 2021: 71 ML/MIN/1.73M2
ERYTHROCYTE [DISTWIDTH] IN BLOOD BY AUTOMATED COUNT: 24.7 % (ref 10–15)
ERYTHROCYTE [DISTWIDTH] IN BLOOD BY AUTOMATED COUNT: 24.7 % (ref 10–15)
FASTING STATUS PATIENT QL REPORTED: ABNORMAL
FASTING STATUS PATIENT QL REPORTED: ABNORMAL
GLUCOSE SERPL-MCNC: 123 MG/DL (ref 70–99)
GLUCOSE SERPL-MCNC: 123 MG/DL (ref 70–99)
HCO3 SERPL-SCNC: 24 MMOL/L (ref 22–29)
HCO3 SERPL-SCNC: 24 MMOL/L (ref 22–29)
HCT VFR BLD AUTO: 33.1 % (ref 35–47)
HCT VFR BLD AUTO: 33.1 % (ref 35–47)
HDLC SERPL-MCNC: 27 MG/DL
HDLC SERPL-MCNC: 27 MG/DL
HGB BLD-MCNC: 10.2 G/DL (ref 11.7–15.7)
HGB BLD-MCNC: 10.2 G/DL (ref 11.7–15.7)
LDLC SERPL CALC-MCNC: 70 MG/DL
LDLC SERPL CALC-MCNC: 70 MG/DL
MCH RBC QN AUTO: 29.9 PG (ref 26.5–33)
MCH RBC QN AUTO: 29.9 PG (ref 26.5–33)
MCHC RBC AUTO-ENTMCNC: 30.8 G/DL (ref 31.5–36.5)
MCHC RBC AUTO-ENTMCNC: 30.8 G/DL (ref 31.5–36.5)
MCV RBC AUTO: 97 FL (ref 78–100)
MCV RBC AUTO: 97 FL (ref 78–100)
NONHDLC SERPL-MCNC: 87 MG/DL
NONHDLC SERPL-MCNC: 87 MG/DL
PLATELET # BLD AUTO: 602 10E3/UL (ref 150–450)
PLATELET # BLD AUTO: 602 10E3/UL (ref 150–450)
POTASSIUM SERPL-SCNC: 3.9 MMOL/L (ref 3.4–5.3)
POTASSIUM SERPL-SCNC: 3.9 MMOL/L (ref 3.4–5.3)
RBC # BLD AUTO: 3.41 10E6/UL (ref 3.8–5.2)
RBC # BLD AUTO: 3.41 10E6/UL (ref 3.8–5.2)
SODIUM SERPL-SCNC: 142 MMOL/L (ref 135–145)
SODIUM SERPL-SCNC: 142 MMOL/L (ref 135–145)
TRIGL SERPL-MCNC: 86 MG/DL
TRIGL SERPL-MCNC: 86 MG/DL
WBC # BLD AUTO: 8.9 10E3/UL (ref 4–11)
WBC # BLD AUTO: 8.9 10E3/UL (ref 4–11)

## 2025-06-17 PROCEDURE — 80061 LIPID PANEL: CPT | Performed by: NURSE PRACTITIONER

## 2025-06-17 PROCEDURE — 80048 BASIC METABOLIC PNL TOTAL CA: CPT | Performed by: NURSE PRACTITIONER

## 2025-06-17 PROCEDURE — 36415 COLL VENOUS BLD VENIPUNCTURE: CPT | Performed by: NURSE PRACTITIONER

## 2025-06-17 PROCEDURE — P9604 ONE-WAY ALLOW PRORATED TRIP: HCPCS | Performed by: NURSE PRACTITIONER

## 2025-06-17 PROCEDURE — 85027 COMPLETE CBC AUTOMATED: CPT | Performed by: NURSE PRACTITIONER

## 2025-06-23 ENCOUNTER — TRANSITIONAL CARE UNIT VISIT (OUTPATIENT)
Dept: GERIATRICS | Facility: CLINIC | Age: 80
End: 2025-06-23
Payer: COMMERCIAL

## 2025-06-23 VITALS
TEMPERATURE: 98.5 F | WEIGHT: 135 LBS | HEIGHT: 64 IN | SYSTOLIC BLOOD PRESSURE: 114 MMHG | OXYGEN SATURATION: 95 % | RESPIRATION RATE: 18 BRPM | DIASTOLIC BLOOD PRESSURE: 59 MMHG | HEART RATE: 96 BPM | BODY MASS INDEX: 23.05 KG/M2

## 2025-06-23 DIAGNOSIS — E11.9 TYPE 2 DIABETES MELLITUS WITHOUT COMPLICATION, WITH LONG-TERM CURRENT USE OF INSULIN (H): ICD-10-CM

## 2025-06-23 DIAGNOSIS — E78.2 MIXED HYPERLIPIDEMIA: ICD-10-CM

## 2025-06-23 DIAGNOSIS — R26.81 GAIT INSTABILITY: ICD-10-CM

## 2025-06-23 DIAGNOSIS — M62.81 GENERALIZED MUSCLE WEAKNESS: ICD-10-CM

## 2025-06-23 DIAGNOSIS — E11.42 DIABETIC POLYNEUROPATHY ASSOCIATED WITH TYPE 2 DIABETES MELLITUS (H): ICD-10-CM

## 2025-06-23 DIAGNOSIS — E44.0 MODERATE PROTEIN-CALORIE MALNUTRITION: Primary | ICD-10-CM

## 2025-06-23 DIAGNOSIS — Z79.4 TYPE 2 DIABETES MELLITUS WITHOUT COMPLICATION, WITH LONG-TERM CURRENT USE OF INSULIN (H): ICD-10-CM

## 2025-06-23 DIAGNOSIS — N39.46 MIXED INCONTINENCE URGE AND STRESS (MALE)(FEMALE): ICD-10-CM

## 2025-06-23 DIAGNOSIS — R26.89 IMPAIRMENT OF BALANCE: ICD-10-CM

## 2025-06-23 DIAGNOSIS — N18.2 CKD (CHRONIC KIDNEY DISEASE) STAGE 2, GFR 60-89 ML/MIN: ICD-10-CM

## 2025-06-23 DIAGNOSIS — I10 HYPERTENSION GOAL BP (BLOOD PRESSURE) < 140/90: Chronic | ICD-10-CM

## 2025-06-23 NOTE — PROGRESS NOTES
"[facility] GERIATRICS    Chief Complaint   Patient presents with    RECHECK     HPI:  Sarah Felix is a 79 year old  (1945), who is being seen today for an episodic care visit at: [facility] ([facility]) [[id number]]. She has medical history significant for diabetes, diabetic polyneuropathy, allergic rhinitis, heartburn, mixed hyperlipidemia, hypertension, osteopenia of both hips, chronic kidney disease stage II, recurrent falls, generalized weakness.  She was recently hospitalized for DKA, sepsis, C.Diff and hepatic vein thrombosis.    Today, she was seen working with therapy. She denies pain, SOB, nausea, vomiting, cough. Reported that she's been weak, but improving. Does not have good appetite    Allergies, and PMH/PSH reviewed in EPIC today.  REVIEW OF SYSTEMS:  10 point ROS of systems including Constitutional, Eyes, Respiratory, Cardiovascular, Gastroenterology, Genitourinary, Integumentary, Musculoskeletal, Psychiatric were all negative except for pertinent positives noted in my HPI.    Objective:   /59   Pulse 96   Temp 98.5  F (36.9  C)   Resp 18   Ht 1.626 m (5' 4\")   Wt 61.2 kg (135 lb)   SpO2 95%   BMI 23.17 kg/m      Physical Exam:  GENERAL APPEARANCE:  Alert, in no distress, oriented, cooperative.   ENT:  Mouth and posterior oropharynx normal, moist mucous membranes  EYES:  EOM, conjunctivae, lids, pupils and irises normal  NECK:  No adenopathy,masses or thyromegaly  RESP:  respiratory effort and palpation of chest normal, lungs clear to auscultation , no respiratory distress  CV:  Palpation and auscultation of heart done , regular rate and rhythm, no murmur, rub, or gallop, no edema to LE  GI/ABDOMEN:  normal bowel sounds, soft, nontender, no hepatosplenomegaly or other masses  M/S:   moves extremities spontaneously. Ambulation not tested   SKIN:  no rashes to exposed skin.   NEURO:   intact   PSYCH:  oriented X 3, affect and mood normal,       Recent labs in " EPIC reviewed by me today.  and Most Recent 3 CBC's:  Recent Labs   Lab Test 06/12/25  0521 06/11/25  0442 06/10/25  0540   WBC 6.3 6.0 6.7   HGB 8.6* 8.3* 8.6*   MCV 95 95 95    336 322     Most Recent 3 BMP's:  Recent Labs   Lab Test 06/12/25  1133 06/12/25  0732 06/12/25  0521 06/12/25  0153 06/11/25  0823 06/11/25  0442 06/10/25  0812 06/10/25  0540 06/09/25  0356 06/09/25  0352 06/08/25  0543 06/08/25  0541   NA  --   --   --   --   --  140  --   --   --  140  --  140   POTASSIUM  --   --  3.9  --   --  3.9  --  3.9  --  4.1  --  4.4   CHLORIDE  --   --   --   --   --  105  --   --   --  107  --  110*   CO2  --   --   --   --   --  28  --   --   --  26  --  24   BUN  --   --   --   --   --  32.0*  --   --   --  26.9*  --  26.7*   CR  --   --   --   --   --  0.70  --   --   --  0.63  --  0.63   ANIONGAP  --   --   --   --   --  7  --   --   --  7  --  6*   CECY  --   --   --   --   --  8.4*  --   --   --  8.4*  --  8.5*   * 162*  --  151*   < > 153*   < >  --    < > 103*   < > 143*    < > = values in this interval not displayed.     Most Recent 2 LFT's:  Recent Labs   Lab Test 06/09/25 0352 06/02/25 0623   AST 19 22   ALT 17 16   ALKPHOS 71 55   BILITOTAL 0.3 0.3     Most Recent 3 Creatinines:  Recent Labs   Lab Test 06/11/25 0442 06/09/25 0352 06/08/25  0541   CR 0.70 0.63 0.63     Most Recent 3 Hemoglobins:  Recent Labs   Lab Test 06/12/25  0521 06/11/25  0442 06/10/25  0540   HGB 8.6* 8.3* 8.6*     Most Recent Cholesterol Panel:  Recent Labs   Lab Test 11/05/24  1513   CHOL 193   LDL 90   HDL 81   TRIG 110     Most Recent Anemia Panel:  Recent Labs   Lab Test 06/12/25  0521 06/08/25  0541 06/07/25  0439 06/03/25  0612 06/02/25  1043 06/02/25  0623   WBC 6.3   < > 7.9   < >  --  11.7*   HGB 8.6*   < > 6.9*   < >  --  7.0*   HCT 27.6*   < > 21.9*   < >  --  22.0*   MCV 95   < > 91   < >  --  85      < > 357   < >  --  479*   IRON  --   --   --   --   --  32*   IRONSAT  --   --   --   --    --  19   RETICABSCT  --   --  0.133*   < >  --   --    RETP  --   --  5.5*   < >  --   --    FEB  --   --   --   --   --  167*   YURI  --   --   --   --   --  303   B12  --   --   --   --   --  985   FOLIC  --   --   --   --  6.6  --     < > = values in this interval not displayed.       Assessment/Plan:    Moderate malnutrition   Alcohol use disorder   Malnutrition noted at hospitalization. Resident was initially on TPN, but discontinued 6/11/25 at discharge. She was discharged to TCU on regular diet with thin liquids.  Poor oral intake noted since admission to TCU; current weight is 135 lbs, was 142.2 lbs on admission.  Continue thiamine 1000 mg daily  Start nutritional supplement Ensure with meals    Diabetic polyneuropathy associated with type 2 diabetes mellitus (H)  Type 2 diabetes mellitus with stage 2 chronic kidney disease, with long-term current use of insulin (H)  Latest A1c 8.0% 3 wks ago. She had DKA at hospitalization, resolved.   Continue Insulin glargine 8 units at 0900  Continue Novolog 0-7 units three times daily  Continue Novolog sliding scale insulin 0-5 units at bedtime    Hypertension goal BP (blood pressure) < 140/90  CKD (chronic kidney disease) stage 2, GFR 60-89 ml/min  Chronic and stable.   Continue lisinopril 2.5 mg daily  Continue furosemide 20 mg 2 times daily  Continue metoprolol 12.5 mg 2 times daily  Continue to monitor blood pressure  Recheck BMP ordered today; was not done last week    Mixed hyperlipidemia  No recent lipid panel, will recheck  Continue atorvastatin 40 mg daily  Lipid panel ordered today    Hepatic cyst  Pancreatic Cyst  These were noted at hospitalization.  Follow-up with outpatient MRI/MRCP    Hepatic vein thrombus  Essential thrombocytosis  Essential thrombocythemia (JAK2 positive), high risk disease   History of thromboembolism  She was followed by IR and Heme at hospitalization, was treated with heparin and Lovenox IV and discharged with apixaban.  Continue  Apixaban 5 mg 2 times daily.  Continue hydroxyurea  Follow up with Dr. Wilde 7/29/25    Mixed incontinence urge and stress (male)(female)  Urinary retention  This was noted on hospitalization after catheter was removed.  She was started on Flomax with improvement  Continue tamsulosin 0.4 mg daily      Anasarca  Pulmonary edema  TTE with LVEF 60-65%, small IVC.   Continue Lasix 20mg daily    Physical Deconditioning  Recurrent falls  Gait instability  Impairment of balance  Generalized muscle weakness  Per physical therapist; she's progressing well with therapy. She can walk 100-150 ft. They're working on building up her strength and endurance.   Continue PT/OT    Schatzki's ring of distal esophagus  Gastroesophageal reflux disease, unspecified whether esophagitis present  Continue pantoprazole 40 mg daily       Anemia of chronic disease  On admission, Hg level was decreased to 7.0. She received 1 unit pRBC for Hg 6.9 and at discharge Hg was 8.6.  Recheck CBC ordered today; not done last week    Orders:  Lab - CBC, BMP, Lipid panel  Start nutritional supplement; Ensure with meals for malnutrition.      Electronically signed by: Magi Estrella DNP, CNP

## 2025-06-23 NOTE — LETTER
" 6/23/2025      Sarah Felix  [address] [place] [address]        [facility] GERIATRICS    Chief Complaint   Patient presents with     RECHECK     HPI:  Sarah Felix is a 79 year old  (1945), who is being seen today for an episodic care visit at: [facility] ([facility]) [[id number]]. She has medical history significant for diabetes, diabetic polyneuropathy, allergic rhinitis, heartburn, mixed hyperlipidemia, hypertension, osteopenia of both hips, chronic kidney disease stage II, recurrent falls, generalized weakness.  She was recently hospitalized for DKA, sepsis, C.Diff and hepatic vein thrombosis.    Today, she was seen working with therapy. She denies pain, SOB, nausea, vomiting, cough. Reported that she's been weak, but improving. Does not have good appetite    Allergies, and PMH/PSH reviewed in EPIC today.  REVIEW OF SYSTEMS:  10 point ROS of systems including Constitutional, Eyes, Respiratory, Cardiovascular, Gastroenterology, Genitourinary, Integumentary, Musculoskeletal, Psychiatric were all negative except for pertinent positives noted in my HPI.    Objective:   /59   Pulse 96   Temp 98.5  F (36.9  C)   Resp 18   Ht 1.626 m (5' 4\")   Wt 61.2 kg (135 lb)   SpO2 95%   BMI 23.17 kg/m      Physical Exam:  GENERAL APPEARANCE:  Alert, in no distress, oriented, cooperative.   ENT:  Mouth and posterior oropharynx normal, moist mucous membranes  EYES:  EOM, conjunctivae, lids, pupils and irises normal  NECK:  No adenopathy,masses or thyromegaly  RESP:  respiratory effort and palpation of chest normal, lungs clear to auscultation , no respiratory distress  CV:  Palpation and auscultation of heart done , regular rate and rhythm, no murmur, rub, or gallop, no edema to LE  GI/ABDOMEN:  normal bowel sounds, soft, nontender, no hepatosplenomegaly or other masses  M/S:   moves extremities spontaneously. Ambulation not tested   SKIN:  no rashes to exposed skin. "   NEURO:   intact   PSYCH:  oriented X 3, affect and mood normal,       Recent labs in [facility] reviewed by me today.  and Most Recent 3 CBC's:  Recent Labs   Lab Test 06/12/25  0521 06/11/25  0442 06/10/25  0540   WBC 6.3 6.0 6.7   HGB 8.6* 8.3* 8.6*   MCV 95 95 95    336 322     Most Recent 3 BMP's:  Recent Labs   Lab Test 06/12/25  1133 06/12/25  0732 06/12/25  0521 06/12/25  0153 06/11/25  0823 06/11/25  0442 06/10/25  0812 06/10/25  0540 06/09/25  0356 06/09/25 0352 06/08/25  0543 06/08/25 0541   NA  --   --   --   --   --  140  --   --   --  140  --  140   POTASSIUM  --   --  3.9  --   --  3.9  --  3.9  --  4.1  --  4.4   CHLORIDE  --   --   --   --   --  105  --   --   --  107  --  110*   CO2  --   --   --   --   --  28  --   --   --  26  --  24   BUN  --   --   --   --   --  32.0*  --   --   --  26.9*  --  26.7*   CR  --   --   --   --   --  0.70  --   --   --  0.63  --  0.63   ANIONGAP  --   --   --   --   --  7  --   --   --  7  --  6*   CECY  --   --   --   --   --  8.4*  --   --   --  8.4*  --  8.5*   * 162*  --  151*   < > 153*   < >  --    < > 103*   < > 143*    < > = values in this interval not displayed.     Most Recent 2 LFT's:  Recent Labs   Lab Test 06/09/25 0352 06/02/25 0623   AST 19 22   ALT 17 16   ALKPHOS 71 55   BILITOTAL 0.3 0.3     Most Recent 3 Creatinines:  Recent Labs   Lab Test 06/11/25  0442 06/09/25  0352 06/08/25  0541   CR 0.70 0.63 0.63     Most Recent 3 Hemoglobins:  Recent Labs   Lab Test 06/12/25  0521 06/11/25  0442 06/10/25  0540   HGB 8.6* 8.3* 8.6*     Most Recent Cholesterol Panel:  Recent Labs   Lab Test 11/05/24  1513   CHOL 193   LDL 90   HDL 81   TRIG 110     Most Recent Anemia Panel:  Recent Labs   Lab Test 06/12/25  0521 06/08/25  0541 06/07/25  0439 06/03/25  0612 06/02/25  1043 06/02/25  0623   WBC 6.3   < > 7.9   < >  --  11.7*   HGB 8.6*   < > 6.9*   < >  --  7.0*   HCT 27.6*   < > 21.9*   < >  --  22.0*   MCV 95   < > 91   < >  --  85      <  > 357   < >  --  479*   IRON  --   --   --   --   --  32*   IRONSAT  --   --   --   --   --  19   RETICABSCT  --   --  0.133*   < >  --   --    RETP  --   --  5.5*   < >  --   --    FEB  --   --   --   --   --  167*   YURI  --   --   --   --   --  303   B12  --   --   --   --   --  985   FOLIC  --   --   --   --  6.6  --     < > = values in this interval not displayed.       Assessment/Plan:    Moderate malnutrition   Alcohol use disorder   Malnutrition noted at hospitalization. Resident was initially on TPN, but discontinued 6/11/25 at discharge. She was discharged to TCU on regular diet with thin liquids.  Poor oral intake noted since admission to TCU; current weight is 135 lbs, was 142.2 lbs on admission.  Continue thiamine 1000 mg daily  Start nutritional supplement Ensure with meals    Diabetic polyneuropathy associated with type 2 diabetes mellitus (H)  Type 2 diabetes mellitus with stage 2 chronic kidney disease, with long-term current use of insulin (H)  Latest A1c 8.0% 3 wks ago. She had DKA at hospitalization, resolved.   Continue Insulin glargine 8 units at 0900  Continue Novolog 0-7 units three times daily  Continue Novolog sliding scale insulin 0-5 units at bedtime    Hypertension goal BP (blood pressure) < 140/90  CKD (chronic kidney disease) stage 2, GFR 60-89 ml/min  Chronic and stable.   Continue lisinopril 2.5 mg daily  Continue furosemide 20 mg 2 times daily  Continue metoprolol 12.5 mg 2 times daily  Continue to monitor blood pressure  Recheck BMP ordered today; was not done last week    Mixed hyperlipidemia  No recent lipid panel, will recheck  Continue atorvastatin 40 mg daily  Lipid panel ordered today    Hepatic cyst  Pancreatic Cyst  These were noted at hospitalization.  Follow-up with outpatient MRI/MRCP    Hepatic vein thrombus  Essential thrombocytosis  Essential thrombocythemia (JAK2 positive), high risk disease   History of thromboembolism  She was followed by IR and Alexandra at  hospitalization, was treated with heparin and Lovenox IV and discharged with apixaban.  Continue Apixaban 5 mg 2 times daily.  Continue hydroxyurea  Follow up with Dr. Wilde 7/29/25    Mixed incontinence urge and stress (male)(female)  Urinary retention  This was noted on hospitalization after catheter was removed.  She was started on Flomax with improvement  Continue tamsulosin 0.4 mg daily      Anasarca  Pulmonary edema  TTE with LVEF 60-65%, small IVC.   Continue Lasix 20mg daily    Physical Deconditioning  Recurrent falls  Gait instability  Impairment of balance  Generalized muscle weakness  Per physical therapist; she's progressing well with therapy. She can walk 100-150 ft. They're working on building up her strength and endurance.   Continue PT/OT    Schatzki's ring of distal esophagus  Gastroesophageal reflux disease, unspecified whether esophagitis present  Continue pantoprazole 40 mg daily       Anemia of chronic disease  On admission, Hg level was decreased to 7.0. She received 1 unit pRBC for Hg 6.9 and at discharge Hg was 8.6.  Recheck CBC ordered today; not done last week    Orders:  Lab - CBC, BMP, Lipid panel  Start nutritional supplement; Ensure with meals for malnutrition.      Electronically signed by: Magi Estrella DNP, CNP        Sincerely,        MALATHI Roy CNP    Electronically signed

## 2025-06-24 ENCOUNTER — LAB REQUISITION (OUTPATIENT)
Dept: LAB | Facility: CLINIC | Age: 80
End: 2025-06-24

## 2025-06-24 DIAGNOSIS — I10 ESSENTIAL (PRIMARY) HYPERTENSION: ICD-10-CM

## 2025-06-24 DIAGNOSIS — D64.9 ANEMIA, UNSPECIFIED: ICD-10-CM

## 2025-06-25 ENCOUNTER — RESULTS FOLLOW-UP (OUTPATIENT)
Dept: GERIATRICS | Facility: CLINIC | Age: 80
End: 2025-06-25

## 2025-06-25 LAB
ANION GAP SERPL CALCULATED.3IONS-SCNC: 12 MMOL/L (ref 7–15)
ANION GAP SERPL CALCULATED.3IONS-SCNC: 12 MMOL/L (ref 7–15)
BUN SERPL-MCNC: 9.7 MG/DL (ref 8–23)
BUN SERPL-MCNC: 9.7 MG/DL (ref 8–23)
CALCIUM SERPL-MCNC: 8.9 MG/DL (ref 8.8–10.4)
CALCIUM SERPL-MCNC: 8.9 MG/DL (ref 8.8–10.4)
CHLORIDE SERPL-SCNC: 104 MMOL/L (ref 98–107)
CHLORIDE SERPL-SCNC: 104 MMOL/L (ref 98–107)
CHOLEST SERPL-MCNC: 110 MG/DL
CHOLEST SERPL-MCNC: 110 MG/DL
CREAT SERPL-MCNC: 0.76 MG/DL (ref 0.51–0.95)
CREAT SERPL-MCNC: 0.76 MG/DL (ref 0.51–0.95)
EGFRCR SERPLBLD CKD-EPI 2021: 80 ML/MIN/1.73M2
EGFRCR SERPLBLD CKD-EPI 2021: 80 ML/MIN/1.73M2
ERYTHROCYTE [DISTWIDTH] IN BLOOD BY AUTOMATED COUNT: 23.5 % (ref 10–15)
ERYTHROCYTE [DISTWIDTH] IN BLOOD BY AUTOMATED COUNT: 23.5 % (ref 10–15)
FASTING STATUS PATIENT QL REPORTED: NO
FASTING STATUS PATIENT QL REPORTED: NO
GLUCOSE SERPL-MCNC: 146 MG/DL (ref 70–99)
GLUCOSE SERPL-MCNC: 146 MG/DL (ref 70–99)
HCO3 SERPL-SCNC: 24 MMOL/L (ref 22–29)
HCO3 SERPL-SCNC: 24 MMOL/L (ref 22–29)
HCT VFR BLD AUTO: 34.3 % (ref 35–47)
HCT VFR BLD AUTO: 34.3 % (ref 35–47)
HDLC SERPL-MCNC: 28 MG/DL
HDLC SERPL-MCNC: 28 MG/DL
HGB BLD-MCNC: 10.6 G/DL (ref 11.7–15.7)
HGB BLD-MCNC: 10.6 G/DL (ref 11.7–15.7)
LDLC SERPL CALC-MCNC: 64 MG/DL
LDLC SERPL CALC-MCNC: 64 MG/DL
MCH RBC QN AUTO: 30.2 PG (ref 26.5–33)
MCH RBC QN AUTO: 30.2 PG (ref 26.5–33)
MCHC RBC AUTO-ENTMCNC: 30.9 G/DL (ref 31.5–36.5)
MCHC RBC AUTO-ENTMCNC: 30.9 G/DL (ref 31.5–36.5)
MCV RBC AUTO: 98 FL (ref 78–100)
MCV RBC AUTO: 98 FL (ref 78–100)
NONHDLC SERPL-MCNC: 82 MG/DL
NONHDLC SERPL-MCNC: 82 MG/DL
PLATELET # BLD AUTO: 679 10E3/UL (ref 150–450)
PLATELET # BLD AUTO: 679 10E3/UL (ref 150–450)
POTASSIUM SERPL-SCNC: 3.8 MMOL/L (ref 3.4–5.3)
POTASSIUM SERPL-SCNC: 3.8 MMOL/L (ref 3.4–5.3)
RBC # BLD AUTO: 3.51 10E6/UL (ref 3.8–5.2)
RBC # BLD AUTO: 3.51 10E6/UL (ref 3.8–5.2)
SODIUM SERPL-SCNC: 140 MMOL/L (ref 135–145)
SODIUM SERPL-SCNC: 140 MMOL/L (ref 135–145)
TRIGL SERPL-MCNC: 89 MG/DL
TRIGL SERPL-MCNC: 89 MG/DL
WBC # BLD AUTO: 12.8 10E3/UL (ref 4–11)
WBC # BLD AUTO: 12.8 10E3/UL (ref 4–11)

## 2025-06-25 PROCEDURE — P9603 ONE-WAY ALLOW PRORATED MILES: HCPCS | Performed by: NURSE PRACTITIONER

## 2025-06-25 PROCEDURE — 82465 ASSAY BLD/SERUM CHOLESTEROL: CPT | Performed by: NURSE PRACTITIONER

## 2025-06-25 PROCEDURE — 85027 COMPLETE CBC AUTOMATED: CPT | Performed by: NURSE PRACTITIONER

## 2025-06-25 PROCEDURE — 36415 COLL VENOUS BLD VENIPUNCTURE: CPT | Performed by: NURSE PRACTITIONER

## 2025-06-25 PROCEDURE — 80048 BASIC METABOLIC PNL TOTAL CA: CPT | Performed by: NURSE PRACTITIONER

## 2025-06-26 ENCOUNTER — TRANSITIONAL CARE UNIT VISIT (OUTPATIENT)
Dept: GERIATRICS | Facility: CLINIC | Age: 80
End: 2025-06-26
Payer: COMMERCIAL

## 2025-06-26 ENCOUNTER — LAB REQUISITION (OUTPATIENT)
Dept: LAB | Facility: CLINIC | Age: 80
End: 2025-06-26

## 2025-06-26 VITALS
RESPIRATION RATE: 20 BRPM | SYSTOLIC BLOOD PRESSURE: 101 MMHG | WEIGHT: 135.4 LBS | HEART RATE: 78 BPM | TEMPERATURE: 98.9 F | DIASTOLIC BLOOD PRESSURE: 64 MMHG | HEIGHT: 64 IN | BODY MASS INDEX: 23.12 KG/M2 | OXYGEN SATURATION: 96 %

## 2025-06-26 DIAGNOSIS — Z79.4 TYPE 2 DIABETES MELLITUS WITHOUT COMPLICATION, WITH LONG-TERM CURRENT USE OF INSULIN (H): Primary | ICD-10-CM

## 2025-06-26 DIAGNOSIS — R79.9 ABNORMAL FINDING OF BLOOD CHEMISTRY, UNSPECIFIED: ICD-10-CM

## 2025-06-26 DIAGNOSIS — A04.72 C. DIFFICILE COLITIS: ICD-10-CM

## 2025-06-26 DIAGNOSIS — R33.9 URINARY RETENTION: ICD-10-CM

## 2025-06-26 DIAGNOSIS — A41.9 SEPSIS, DUE TO UNSPECIFIED ORGANISM, UNSPECIFIED WHETHER ACUTE ORGAN DYSFUNCTION PRESENT (H): ICD-10-CM

## 2025-06-26 DIAGNOSIS — I82.0 HEPATIC VEIN THROMBOSIS (H): ICD-10-CM

## 2025-06-26 DIAGNOSIS — K76.89 HEPATIC CYST: ICD-10-CM

## 2025-06-26 DIAGNOSIS — I10 ESSENTIAL HYPERTENSION: ICD-10-CM

## 2025-06-26 DIAGNOSIS — E11.9 TYPE 2 DIABETES MELLITUS WITHOUT COMPLICATION, WITH LONG-TERM CURRENT USE OF INSULIN (H): Primary | ICD-10-CM

## 2025-06-26 PROCEDURE — 99305 1ST NF CARE MODERATE MDM 35: CPT | Performed by: FAMILY MEDICINE

## 2025-06-26 NOTE — LETTER
6/26/2025      Sarah Felix  [address] [place] [address]        No notes on file      Sincerely,        Gabrielle Kerr MD    Electronically signed

## 2025-06-30 ENCOUNTER — RESULTS FOLLOW-UP (OUTPATIENT)
Dept: GERIATRICS | Facility: CLINIC | Age: 80
End: 2025-06-30

## 2025-06-30 ENCOUNTER — DISCHARGE SUMMARY NURSING HOME (OUTPATIENT)
Dept: GERIATRICS | Facility: CLINIC | Age: 80
End: 2025-06-30
Payer: COMMERCIAL

## 2025-06-30 VITALS
HEIGHT: 64 IN | WEIGHT: 137 LBS | BODY MASS INDEX: 23.39 KG/M2 | SYSTOLIC BLOOD PRESSURE: 114 MMHG | OXYGEN SATURATION: 96 % | HEART RATE: 88 BPM | TEMPERATURE: 98.5 F | DIASTOLIC BLOOD PRESSURE: 63 MMHG | RESPIRATION RATE: 18 BRPM

## 2025-06-30 DIAGNOSIS — E11.9 TYPE 2 DIABETES MELLITUS WITHOUT COMPLICATION, WITH LONG-TERM CURRENT USE OF INSULIN (H): ICD-10-CM

## 2025-06-30 DIAGNOSIS — L03.031 CELLULITIS OF TOE OF RIGHT FOOT: Primary | ICD-10-CM

## 2025-06-30 DIAGNOSIS — I10 HYPERTENSION GOAL BP (BLOOD PRESSURE) < 140/90: Chronic | ICD-10-CM

## 2025-06-30 DIAGNOSIS — R26.81 GAIT INSTABILITY: ICD-10-CM

## 2025-06-30 DIAGNOSIS — K22.2 SCHATZKI'S RING OF DISTAL ESOPHAGUS: ICD-10-CM

## 2025-06-30 DIAGNOSIS — N18.2 CKD (CHRONIC KIDNEY DISEASE) STAGE 2, GFR 60-89 ML/MIN: ICD-10-CM

## 2025-06-30 DIAGNOSIS — E11.42 DIABETIC POLYNEUROPATHY ASSOCIATED WITH TYPE 2 DIABETES MELLITUS (H): ICD-10-CM

## 2025-06-30 DIAGNOSIS — M62.81 GENERALIZED MUSCLE WEAKNESS: ICD-10-CM

## 2025-06-30 DIAGNOSIS — Z79.4 TYPE 2 DIABETES MELLITUS WITHOUT COMPLICATION, WITH LONG-TERM CURRENT USE OF INSULIN (H): ICD-10-CM

## 2025-06-30 DIAGNOSIS — R33.9 URINARY RETENTION: ICD-10-CM

## 2025-06-30 DIAGNOSIS — N39.46 MIXED INCONTINENCE URGE AND STRESS (MALE)(FEMALE): ICD-10-CM

## 2025-06-30 DIAGNOSIS — Z79.4 TYPE 2 DIABETES MELLITUS WITH STAGE 2 CHRONIC KIDNEY DISEASE, WITH LONG-TERM CURRENT USE OF INSULIN (H): ICD-10-CM

## 2025-06-30 DIAGNOSIS — E78.2 MIXED HYPERLIPIDEMIA: ICD-10-CM

## 2025-06-30 DIAGNOSIS — E11.22 TYPE 2 DIABETES MELLITUS WITH STAGE 2 CHRONIC KIDNEY DISEASE, WITH LONG-TERM CURRENT USE OF INSULIN (H): ICD-10-CM

## 2025-06-30 DIAGNOSIS — R29.6 RECURRENT FALLS: ICD-10-CM

## 2025-06-30 DIAGNOSIS — N18.2 TYPE 2 DIABETES MELLITUS WITH STAGE 2 CHRONIC KIDNEY DISEASE, WITH LONG-TERM CURRENT USE OF INSULIN (H): ICD-10-CM

## 2025-06-30 DIAGNOSIS — E44.0 MODERATE PROTEIN-CALORIE MALNUTRITION: ICD-10-CM

## 2025-06-30 DIAGNOSIS — R26.89 IMPAIRMENT OF BALANCE: ICD-10-CM

## 2025-06-30 LAB
BASOPHILS # BLD AUTO: 0.1 10E3/UL (ref 0–0.2)
BASOPHILS NFR BLD AUTO: 1 %
EOSINOPHIL # BLD AUTO: 0.3 10E3/UL (ref 0–0.7)
EOSINOPHIL NFR BLD AUTO: 2 %
ERYTHROCYTE [DISTWIDTH] IN BLOOD BY AUTOMATED COUNT: 22.7 % (ref 10–15)
HCT VFR BLD AUTO: 34.2 % (ref 35–47)
HGB BLD-MCNC: 10.4 G/DL (ref 11.7–15.7)
IMM GRANULOCYTES # BLD: 0.1 10E3/UL
IMM GRANULOCYTES NFR BLD: 1 %
LYMPHOCYTES # BLD AUTO: 2.3 10E3/UL (ref 0.8–5.3)
LYMPHOCYTES NFR BLD AUTO: 17 %
MCH RBC QN AUTO: 30.2 PG (ref 26.5–33)
MCHC RBC AUTO-ENTMCNC: 30.4 G/DL (ref 31.5–36.5)
MCV RBC AUTO: 99 FL (ref 78–100)
MONOCYTES # BLD AUTO: 1.4 10E3/UL (ref 0–1.3)
MONOCYTES NFR BLD AUTO: 10 %
NEUTROPHILS # BLD AUTO: 9.2 10E3/UL (ref 1.6–8.3)
NEUTROPHILS NFR BLD AUTO: 69 %
NRBC # BLD AUTO: 0 10E3/UL
NRBC BLD AUTO-RTO: 0 /100
PLATELET # BLD AUTO: 656 10E3/UL (ref 150–450)
RBC # BLD AUTO: 3.44 10E6/UL (ref 3.8–5.2)
WBC # BLD AUTO: 13.4 10E3/UL (ref 4–11)

## 2025-06-30 PROCEDURE — 85004 AUTOMATED DIFF WBC COUNT: CPT | Performed by: NURSE PRACTITIONER

## 2025-06-30 PROCEDURE — 99316 NF DSCHRG MGMT 30 MIN+: CPT | Performed by: NURSE PRACTITIONER

## 2025-06-30 PROCEDURE — 36415 COLL VENOUS BLD VENIPUNCTURE: CPT | Performed by: NURSE PRACTITIONER

## 2025-06-30 PROCEDURE — P9604 ONE-WAY ALLOW PRORATED TRIP: HCPCS | Performed by: NURSE PRACTITIONER

## 2025-06-30 NOTE — PROGRESS NOTES
[facility] GERIATRICS DISCHARGE SUMMARY  PATIENT'S NAME: Sarah Felix  YOB: 1945  MEDICAL RECORD NUMBER:  [id number]  Place of Service where encounter took place:  [facility] ([facility]) [[id number]]    PRIMARY CARE PROVIDER AND CLINIC RESPONSIBLE AFTER TRANSFER:   Heide Fay MD, [address] [place] [address] / [place] [address]    Nursing Home: [facility] T[facility]     Transferring providers: Macy Clayton CMA, Gabrielle Kerr MD  Recent Hospitalization/ED:  [facility] stay 5/25/2025 to 6/12/2025.  Date of SNF Admission: 6/12/2025  Date of [facility] (anticipated) Discharge: 7/1/2025  Discharged to: previous independent home  Physical Function: Ambulates with a walker  DME: No DME needed    CODE STATUS/ADVANCE DIRECTIVES DISCUSSION:  Full Code   ALLERGIES: Actos [pioglitazone], Amlodipine, Ancef [cefazolin], Carbapenems, Levaquin, Cephalosporins, Clindamycin, Levofloxacin, and Nickel    NURSING FACILITY COURSE   Medication Changes/Rationale:   None    Summary of nursing facility stay:   She has medical history significant for diabetes, diabetic polyneuropathy, allergic rhinitis, heartburn, mixed hyperlipidemia, hypertension, osteopenia of both hips, chronic kidney disease stage II, recurrent falls, generalized weakness.  She was recently hospitalized for DKA, sepsis, C.Diff and hepatic vein thrombosis.    Today, nursing reported redness, warmth, and swelling in right great toe. She denies fever, chills, pain, SOB, nausea, vomiting.     Cellulitis of toe of right foot  Today nurse reported redness, warmth and swelling of right great toe. On assessment, it has an open area at the tip of the toe.   WBC reviewed today showed WBC high at 13.4; likely due to foot infection. Recheck CBC on 7/7/25  Start Cephalexin (Keflex) 500 mg QID for 7 days for cellulitis    Moderate malnutrition   Alcohol use disorder    Malnutrition noted at hospitalization. Resident was initially on TPN, but discontinued at discharge. She was discharged to TCU on regular diet with thin liquids.  Poor oral intake noted since admission to TCU; current weight is 137 lbs, was 142.2 lbs on admission.  Continue thiamine 1000 mg daily  Continue nutritional supplement - Ensure with meals     Diabetic polyneuropathy associated with type 2 diabetes mellitus (H)  Type 2 diabetes mellitus with stage 2 chronic kidney disease, with long-term current use of insulin (H)  Latest A1c 8.0%. She had DKA at hospitalization, resolved.   Continue Insulin glargine 8 units at 0900  Continue Novolog 0-7 units three times daily  Continue Novolog sliding scale insulin 0-5 units at bedtime     Hypertension goal BP (blood pressure) < 140/90  CKD (chronic kidney disease) stage 2, GFR 60-89 ml/min  Chronic and stable.   Continue lisinopril 2.5 mg daily  Continue furosemide 20 mg 2 times daily  Continue metoprolol 12.5 mg 2 times daily  Continue to monitor blood pressure  BMP lab reviewed; Cr 0.76, BUN 9.7, GFR 80, all WNL     Mixed hyperlipidemia  Lipid panel reviewed today showed normal cholesterol level 110, normal Triglycerides 87, low HDL 28 and normal LDL 64  Continue atorvastatin 40 mg daily    Hepatic cyst  Pancreatic Cyst  These were noted at hospitalization.  Follow-up with outpatient MRI/MRCP     Hepatic vein thrombus  Essential thrombocytosis  Essential thrombocythemia (JAK2 positive), high risk disease   History of thromboembolism  She was followed by IR and Alexandra at hospitalization, was treated with heparin and Lovenox IV and discharged with apixaban.  Continue Apixaban 5 mg 2 times daily.  Continue hydroxyurea  Follow up with Dr. Wilde 7/29/25     Mixed incontinence urge and stress (male)(female)  Urinary retention  This was noted on hospitalization after catheter was removed.  She was started on Flomax with improvement  Continue tamsulosin 0.4 mg daily      Anasarca  Pulmonary edema  TTE with LVEF 60-65%, small IVC.   Continue Lasix 20mg daily     Physical Deconditioning  Recurrent falls  Gait instability  Impairment of balance  Continue PT/OT in-home     Schatzki's ring of distal esophagus  Gastroesophageal reflux disease, unspecified whether esophagitis present  Continue pantoprazole 40 mg daily      Anemia of chronic disease  On admission, Hg level was decreased to 7.0. She received 1 unit pRBC for Hg 6.9 and at discharge Hg was 8.6.  CBC reviewed today showed Hg improved at 10.4.     Discharge Medications:  Post Medication Reconciliation Status: discharge medications reconciled, continue medications without change     Current Outpatient Medications   Medication Sig Dispense Refill    apixaban ANTICOAGULANT (ELIQUIS) 5 MG tablet Take 2 tablets (10 mg) by mouth 2 times daily for 7 days, THEN 1 tablet (5 mg) 2 times daily. 208 tablet 0    atorvastatin (LIPITOR) 40 MG tablet Take 1 tablet (40 mg) by mouth daily 90 tablet 1    blood glucose (ONETOUCH ULTRA) test strip 1 strip by In Vitro route 4 times daily. 400 strip 3    CALCIUM 500 + D 500-200 MG-IU OR TABS one tab twice per day 100 0    Cholecalciferol (VITAMIN D PO) Pt consuming 3000 units per day      Continuous Glucose Sensor (FREESTYLE CARMELA 14 DAY SENSOR) MISC Change every 14 days. 6 each 3    folic acid (FOLVITE) 400 MCG tablet Take 1 tablet (400 mcg) by mouth daily 100 tablet 3    furosemide (LASIX) 20 MG tablet Take 1 tablet (20 mg) by mouth 2 times daily 180 tablet 1    hydroxyurea (HYDREA) 500 MG capsule Take 1 capsule (500 mg) by mouth daily 90 capsule 3    insulin aspart (NOVOLOG PEN) 100 UNIT/ML pen Inject 1-7 Units subcutaneously 3 times daily. 15 mL 2    insulin aspart (NOVOLOG PEN) 100 UNIT/ML pen Inject 1-5 Units subcutaneously at bedtime. 15 mL 0    insulin glargine (LANTUS PEN) 100 UNIT/ML pen Inject 8 Units subcutaneously daily. 15 mL 0    insulin pen needle (BD REY U/F) 32G X 4 MM  "miscellaneous USE 1 PEN NEEDLE four times daily. (WITH LANTUS AND Novolog) 400 each 11    insulin syringe-needle U-100 (BD INSULIN SYRINGE ULTRAFINE) 31G X 5/16\" 1 ML miscellaneous Use 1 syringes twice daily for insulin and victoza and for symptoms of hypoglycemia 100 each 3    Lancets (ONETOUCH DELICA PLUS [id number]) MISC 1 each 4 times daily 400 each 3    latanoprost (XALATAN) 0.005 % ophthalmic solution Place 1 drop into both eyes at bedtime. 7.5 mL 5    lisinopril (ZESTRIL) 2.5 MG tablet Take 1 tablet (2.5 mg) by mouth daily 90 tablet 3    metoprolol tartrate (LOPRESSOR) 25 MG tablet Take 1/2 tablet or 12.5mg by mouth twice daily. 90 tablet 3    nystatin (MYCOSTATIN) 638538 UNIT/GM external cream Apply topically 2 times daily 30 g 3    pantoprazole (PROTONIX) 40 MG EC tablet Take 1 tablet (40 mg) by mouth every morning (before breakfast). 30 tablet 2    pyridOXINE (VITAMIN B6) 25 MG tablet Take 1 tablet (25 mg) by mouth daily 90 tablet 3    tamsulosin (FLOMAX) 0.4 MG capsule Take 1 capsule (0.4 mg) by mouth daily. 30 capsule 2    thiamine (B-1) 100 MG tablet Take 1 tablet (100 mg) by mouth daily. 30 tablet 2    triamcinolone (KENALOG) 0.1 % external cream Apply topically 3 times daily 90 g 3    vitamin B-12 (CYANOCOBALAMIN) 2500 MCG sublingual tablet Take 2500 mcg by mouth three times per week          Controlled medications:   not applicable/none     Past Medical History:   Past Medical History:   Diagnosis Date    Adenomatous polyp of duodenum 08/13/2018    Removed via EUS 8/13/2018 with f/u EGD 3/14/2019.  No further surveillance needed per GI.    Allergic rhinitis, cause unspecified     Allergic rhinitis    Basal cell carcinoma of face 03/2012    s/p MOHS    CKD (chronic kidney disease) stage 2, GFR 60-89 ml/min 06/06/2014    Closed bimalleolar fracture of left ankle with routine healing 10/11/2019    Added automatically from request for surgery [id number]    Contact dermatitis and other eczema, due to " "unspecified cause     Cyst of thyroid 1998    Thyroid Nodule/Hurthle Cell Neoplasm/Benign - resected    Cystocele, lateral     Diverticulitis of colon (without mention of hemorrhage)(562.11) 06/2004    Abd CT    Esophageal reflux     Hiatal hernia    Essential hypertension, benign (HTN) 06/06/2014    Fatty liver     Hepatic cyst     8.0 cm x 6.6 cm on CT 3/2023 (stable)    Hiatal hernia     small    Hyperlipidemia     Mixed incontinence urge and stress (male)(female) 06/24/2007    Nontoxic uninodular goiter     Osteopenia 04/21/2005    on fosamax  for > 5 yr.  stop 7/2012    Other chronic otitis externa     Postoperative deep vein thrombosis (DVT) (H) age 22    post ovarian cyst removed    Schatzki's ring of distal esophagus 03/05/2023    Dilated 8/2018    Tubular adenoma of colon 06/23/2022    On colonoscopy 2016, rpt 5 yrs.    Type 2 diabetes mellitus (H)      Physical Exam:   Vitals: /63   Pulse 88   Temp 98.5  F (36.9  C)   Resp 18   Ht 1.626 m (5' 4\")   Wt 62.1 kg (137 lb)   SpO2 96%   BMI 23.52 kg/m    BMI: Body mass index is 23.52 kg/m .    Physical Exam:  GENERAL APPEARANCE:  Alert, in no distress, oriented, cooperative.   ENT:  Mouth and posterior oropharynx normal, moist mucous membranes  EYES:  EOM, conjunctivae, lids, pupils and irises normal  NECK:  No adenopathy,masses or thyromegaly  RESP:  respiratory effort and palpation of chest normal, lungs clear to auscultation, no respiratory distress  CV:  Palpation and auscultation of heart done, regular rate and rhythm, no murmur, rub, or gallop, no edema to LE  GI/ABDOMEN:  normal bowel sounds, soft, nontender, no hepatosplenomegaly or other masses  M/S:   moves extremities spontaneously. Ambulation not tested   SKIN:  no rashes to exposed skin.   NEURO:   intact   PSYCH:  oriented X 3, affect and mood normal,        SNF labs: Recent labs in [facility] reviewed by me today.  and Most Recent 3 CBC's:  Recent Labs   Lab Test 06/12/25  0521 " 06/11/25  0442 06/10/25  0540   WBC 6.3 6.0 6.7   HGB 8.6* 8.3* 8.6*   MCV 95 95 95    336 322     Most Recent 3 BMP's:  Recent Labs   Lab Test 06/12/25  1133 06/12/25  0732 06/12/25  0521 06/12/25  0153 06/11/25  0823 06/11/25 0442 06/10/25  0812 06/10/25  0540 06/09/25 0356 06/09/25 0352 06/08/25  0543 06/08/25 0541   NA  --   --   --   --   --  140  --   --   --  140  --  140   POTASSIUM  --   --  3.9  --   --  3.9  --  3.9  --  4.1  --  4.4   CHLORIDE  --   --   --   --   --  105  --   --   --  107  --  110*   CO2  --   --   --   --   --  28  --   --   --  26  --  24   BUN  --   --   --   --   --  32.0*  --   --   --  26.9*  --  26.7*   CR  --   --   --   --   --  0.70  --   --   --  0.63  --  0.63   ANIONGAP  --   --   --   --   --  7  --   --   --  7  --  6*   CECY  --   --   --   --   --  8.4*  --   --   --  8.4*  --  8.5*   * 162*  --  151*   < > 153*   < >  --    < > 103*   < > 143*    < > = values in this interval not displayed.     Most Recent 2 LFT's:  Recent Labs   Lab Test 06/09/25 0352 06/02/25 0623   AST 19 22   ALT 17 16   ALKPHOS 71 55   BILITOTAL 0.3 0.3     Most Recent 3 Creatinines:  Recent Labs   Lab Test 06/11/25 0442 06/09/25 0352 06/08/25  0541   CR 0.70 0.63 0.63     Most Recent 3 Hemoglobins:  Recent Labs   Lab Test 06/12/25  0521 06/11/25  0442 06/10/25  0540   HGB 8.6* 8.3* 8.6*     Most Recent Cholesterol Panel:  Recent Labs   Lab Test 11/05/24  1513   CHOL 193   LDL 90   HDL 81   TRIG 110     Most Recent Anemia Panel:  Recent Labs   Lab Test 06/12/25  0521 06/08/25  0541 06/07/25  0439 06/03/25  0612 06/02/25  1043 06/02/25  0623   WBC 6.3   < > 7.9   < >  --  11.7*   HGB 8.6*   < > 6.9*   < >  --  7.0*   HCT 27.6*   < > 21.9*   < >  --  22.0*   MCV 95   < > 91   < >  --  85      < > 357   < >  --  479*   IRON  --   --   --   --   --  32*   IRONSAT  --   --   --   --   --  19   RETICABSCT  --   --  0.133*   < >  --   --    RETP  --   --  5.5*   < >  --   --     FEB  --   --   --   --   --  167*   YURI  --   --   --   --   --  303   B12  --   --   --   --   --  985   FOLIC  --   --   --   --  6.6  --     < > = values in this interval not displayed.       DISCHARGE PLAN:  Follow up labs: CBC to be drawn on discontinue date 7/7/25  Medical Follow Up:      Follow up with primary care provider in 1-2 weeks  [facility] scheduled appointments:  Appointments in Next Year      Jun 30, 2025 10:30 AM  Discharge Summary with MALATHI Monroe CNP  [facility] ([facility] ) [phone] Jun 30, 2025 2:00 PM  (Arrive by 1:45 PM)  RETURN ENDOCRINE with SUSANA Yuan  [facility] [place] ([facility] ) [phone]     Jul 14, 2025 12:30 PM  (Arrive by 12:00 PM)  MR ABDOMEN MRCP W/O & W CONTRAST with MICMR1  [facility] ([facility]) [phone]     Jul 29, 2025 12:00 PM  (Arrive by 11:45 AM)  Return Patient with Manjeet Wilde MD  [facility] ([facility] ) [phone]     Apr 14, 2026 2:10 PM  (Arrive by 1:55 PM)  RETURN RETINA with Lester Pimentel MD  [facility] ([facility]) [phone]           Discharge Services: Home Care:  Occupational Therapy, Physical Therapy, Registered Nurse, and Home Health Aide  Discharge Instructions Verbalized to Patient at Discharge:   None    TOTAL DISCHARGE TIME:   Greater than 30 minutes  Electronically signed by:  Magi Estrella DNP, CNP    Documentation of Face to Face and Certification for Home Health Services    I certify that patient: Sarah Felix is under my care and that I, or a nurse practitioner or physician's assistant working with me, had a face-to-face encounter that meets the physician face-to-face encounter  requirements with this patient on: 6/30/2025.    This encounter with the patient was in whole, or in part, for the following medical condition, which is the primary reason for home health care: Recurrent falls, gait instability, HTN, DM2, polyneuropathy, generalized weakness.    I certify that, based on my findings, the following services are medically necessary home health services: Nursing, Occupational Therapy, and Physical Therapy.    My clinical findings support the need for the above services because: Nurse is needed: for initial assessment., Occupational Therapy Services are needed to assess and treat cognitive ability and address ADL safety due to impairment in function., and Physical Therapy Services are needed to assess and treat the following functional impairments: gait, balance and mobility.    Further, I certify that my clinical findings support that this patient is homebound (i.e. absences from home require considerable and taxing effort and are for medical reasons or Hoah[employer] services or infrequently or of short duration when for other reasons) because: Requires assistance of another person or specialized equipment to access medical services because patient: Is unable to exit home safely on own due to: functional impairment., Is unable to walk greater than 200 feet without rest., and Range of motion limitations prevents ability to exit home safely...    Based on the above findings. I certify that this patient is confined to the home and needs intermittent skilled nursing care, physical therapy and/or speech therapy.  The patient is under my care, and I have initiated the establishment of the plan of care.  This patient will be followed by a physician who will periodically review the plan of care.  Physician/Provider to provide follow up care: Heide Fay    Attending hospital physician (the Medicare certified PRICE provider): No att. providers found  Physician Signature: See electronic  signature associated with these discharge orders.  Date: 6/30/2025

## 2025-06-30 NOTE — LETTER
6/30/2025      Sarah Felix  [address] [place] [address]        [facility] GERIATRICS DISCHARGE SUMMARY  PATIENT'S NAME: Sarah Felix  YOB: 1945  MEDICAL RECORD NUMBER:  [id number]  Place of Service where encounter took place:  [facility] ([facility]) [[id number]]    PRIMARY CARE PROVIDER AND CLINIC RESPONSIBLE AFTER TRANSFER:   Heide Fay MD, [address] [address] / [place] [address]    Nursing Home: [facility]     Transferring providers: Macy Clayton CMA, Gabrielle Kerr MD  Recent Hospitalization/ED:  [facility] stay 5/25/2025 to 6/12/2025.  Date of SNF Admission: 6/12/2025  Date of [facility] (anticipated) Discharge: 7/1/2025  Discharged to: previous independent home  Physical Function: Ambulates with a walker  DME: No DME needed    CODE STATUS/ADVANCE DIRECTIVES DISCUSSION:  Full Code   ALLERGIES: Actos [pioglitazone], Amlodipine, Ancef [cefazolin], Carbapenems, Levaquin, Cephalosporins, Clindamycin, Levofloxacin, and Nickel    NURSING FACILITY COURSE   Medication Changes/Rationale:   None    Summary of nursing facility stay:   She has medical history significant for diabetes, diabetic polyneuropathy, allergic rhinitis, heartburn, mixed hyperlipidemia, hypertension, osteopenia of both hips, chronic kidney disease stage II, recurrent falls, generalized weakness.  She was recently hospitalized for DKA, sepsis, C.Diff and hepatic vein thrombosis.    Today, nursing reported redness, warmth, and swelling in right great toe. She denies fever, chills, pain, SOB, nausea, vomiting.     Cellulitis of toe of right foot  Today nurse reported redness, warmth and swelling of right great toe. On assessment, it has an open area at the tip of the toe.   WBC reviewed today showed WBC high at 13.4; likely due to foot infection. Recheck CBC on 7/7/25  Start Cephalexin (Keflex) 500 mg QID for 7  days for cellulitis    Moderate malnutrition   Alcohol use disorder   Malnutrition noted at hospitalization. Resident was initially on TPN, but discontinued at discharge. She was discharged to TCU on regular diet with thin liquids.  Poor oral intake noted since admission to TCU; current weight is 137 lbs, was 142.2 lbs on admission.  Continue thiamine 1000 mg daily  Continue nutritional supplement - Ensure with meals     Diabetic polyneuropathy associated with type 2 diabetes mellitus (H)  Type 2 diabetes mellitus with stage 2 chronic kidney disease, with long-term current use of insulin (H)  Latest A1c 8.0%. She had DKA at hospitalization, resolved.   Continue Insulin glargine 8 units at 0900  Continue Novolog 0-7 units three times daily  Continue Novolog sliding scale insulin 0-5 units at bedtime     Hypertension goal BP (blood pressure) < 140/90  CKD (chronic kidney disease) stage 2, GFR 60-89 ml/min  Chronic and stable.   Continue lisinopril 2.5 mg daily  Continue furosemide 20 mg 2 times daily  Continue metoprolol 12.5 mg 2 times daily  Continue to monitor blood pressure  BMP lab reviewed; Cr 0.76, BUN 9.7, GFR 80, all WNL     Mixed hyperlipidemia  Lipid panel reviewed today showed normal cholesterol level 110, normal Triglycerides 87, low HDL 28 and normal LDL 64  Continue atorvastatin 40 mg daily    Hepatic cyst  Pancreatic Cyst  These were noted at hospitalization.  Follow-up with outpatient MRI/MRCP     Hepatic vein thrombus  Essential thrombocytosis  Essential thrombocythemia (JAK2 positive), high risk disease   History of thromboembolism  She was followed by IR and Alexandra at hospitalization, was treated with heparin and Lovenox IV and discharged with apixaban.  Continue Apixaban 5 mg 2 times daily.  Continue hydroxyurea  Follow up with Dr. Wilde 7/29/25     Mixed incontinence urge and stress (male)(female)  Urinary retention  This was noted on hospitalization after catheter was removed.  She was started on  Flomax with improvement  Continue tamsulosin 0.4 mg daily     Anasarca  Pulmonary edema  TTE with LVEF 60-65%, small IVC.   Continue Lasix 20mg daily     Physical Deconditioning  Recurrent falls  Gait instability  Impairment of balance  Continue PT/OT in-home     Schatzki's ring of distal esophagus  Gastroesophageal reflux disease, unspecified whether esophagitis present  Continue pantoprazole 40 mg daily      Anemia of chronic disease  On admission, Hg level was decreased to 7.0. She received 1 unit pRBC for Hg 6.9 and at discharge Hg was 8.6.  CBC reviewed today showed Hg improved at 10.4.     Discharge Medications:  Post Medication Reconciliation Status: discharge medications reconciled, continue medications without change     Current Outpatient Medications   Medication Sig Dispense Refill     apixaban ANTICOAGULANT (ELIQUIS) 5 MG tablet Take 2 tablets (10 mg) by mouth 2 times daily for 7 days, THEN 1 tablet (5 mg) 2 times daily. 208 tablet 0     atorvastatin (LIPITOR) 40 MG tablet Take 1 tablet (40 mg) by mouth daily 90 tablet 1     blood glucose (ONETOUCH ULTRA) test strip 1 strip by In Vitro route 4 times daily. 400 strip 3     CALCIUM 500 + D 500-200 MG-IU OR TABS one tab twice per day 100 0     Cholecalciferol (VITAMIN D PO) Pt consuming 3000 units per day       Continuous Glucose Sensor (FREESTYLE CARMELA 14 DAY SENSOR) MISC Change every 14 days. 6 each 3     folic acid (FOLVITE) 400 MCG tablet Take 1 tablet (400 mcg) by mouth daily 100 tablet 3     furosemide (LASIX) 20 MG tablet Take 1 tablet (20 mg) by mouth 2 times daily 180 tablet 1     hydroxyurea (HYDREA) 500 MG capsule Take 1 capsule (500 mg) by mouth daily 90 capsule 3     insulin aspart (NOVOLOG PEN) 100 UNIT/ML pen Inject 1-7 Units subcutaneously 3 times daily. 15 mL 2     insulin aspart (NOVOLOG PEN) 100 UNIT/ML pen Inject 1-5 Units subcutaneously at bedtime. 15 mL 0     insulin glargine (LANTUS PEN) 100 UNIT/ML pen Inject 8 Units subcutaneously  "daily. 15 mL 0     insulin pen needle (BD REY U/F) 32G X 4 MM miscellaneous USE 1 PEN NEEDLE four times daily. (WITH LANTUS AND Novolog) 400 each 11     insulin syringe-needle U-100 (BD INSULIN SYRINGE ULTRAFINE) 31G X 5/16\" 1 ML miscellaneous Use 1 syringes twice daily for insulin and victoza and for symptoms of hypoglycemia 100 each 3     Lancets (ONETOUCH DELICA PLUS FIHAQA92A) MISC 1 each 4 times daily 400 each 3     latanoprost (XALATAN) 0.005 % ophthalmic solution Place 1 drop into both eyes at bedtime. 7.5 mL 5     lisinopril (ZESTRIL) 2.5 MG tablet Take 1 tablet (2.5 mg) by mouth daily 90 tablet 3     metoprolol tartrate (LOPRESSOR) 25 MG tablet Take 1/2 tablet or 12.5mg by mouth twice daily. 90 tablet 3     nystatin (MYCOSTATIN) 597759 UNIT/GM external cream Apply topically 2 times daily 30 g 3     pantoprazole (PROTONIX) 40 MG EC tablet Take 1 tablet (40 mg) by mouth every morning (before breakfast). 30 tablet 2     pyridOXINE (VITAMIN B6) 25 MG tablet Take 1 tablet (25 mg) by mouth daily 90 tablet 3     tamsulosin (FLOMAX) 0.4 MG capsule Take 1 capsule (0.4 mg) by mouth daily. 30 capsule 2     thiamine (B-1) 100 MG tablet Take 1 tablet (100 mg) by mouth daily. 30 tablet 2     triamcinolone (KENALOG) 0.1 % external cream Apply topically 3 times daily 90 g 3     vitamin B-12 (CYANOCOBALAMIN) 2500 MCG sublingual tablet Take 2500 mcg by mouth three times per week          Controlled medications:   not applicable/none     Past Medical History:   Past Medical History:   Diagnosis Date     Adenomatous polyp of duodenum 08/13/2018    Removed via EUS 8/13/2018 with f/u EGD 3/14/2019.  No further surveillance needed per GI.     Allergic rhinitis, cause unspecified     Allergic rhinitis     Basal cell carcinoma of face 03/2012    s/p MOHS     CKD (chronic kidney disease) stage 2, GFR 60-89 ml/min 06/06/2014     Closed bimalleolar fracture of left ankle with routine healing 10/11/2019    Added automatically from " "request for surgery [id number]     Contact dermatitis and other eczema, due to unspecified cause      Cyst of thyroid 1998    Thyroid Nodule/Hurthle Cell Neoplasm/Benign - resected     Cystocele, lateral      Diverticulitis of colon (without mention of hemorrhage)(562.11) 06/2004    Abd CT     Esophageal reflux     Hiatal hernia     Essential hypertension, benign (HTN) 06/06/2014     Fatty liver      Hepatic cyst     8.0 cm x 6.6 cm on CT 3/2023 (stable)     Hiatal hernia     small     Hyperlipidemia      Mixed incontinence urge and stress (male)(female) 06/24/2007     Nontoxic uninodular goiter      Osteopenia 04/21/2005    on fosamax  for > 5 yr.  stop 7/2012     Other chronic otitis externa      Postoperative deep vein thrombosis (DVT) (H) age 22    post ovarian cyst removed     Schatzki's ring of distal esophagus 03/05/2023    Dilated 8/2018     Tubular adenoma of colon 06/23/2022    On colonoscopy 2016, rpt 5 yrs.     Type 2 diabetes mellitus (H)      Physical Exam:   Vitals: /63   Pulse 88   Temp 98.5  F (36.9  C)   Resp 18   Ht 1.626 m (5' 4\")   Wt 62.1 kg (137 lb)   SpO2 96%   BMI 23.52 kg/m    BMI: Body mass index is 23.52 kg/m .    Physical Exam:  GENERAL APPEARANCE:  Alert, in no distress, oriented, cooperative.   ENT:  Mouth and posterior oropharynx normal, moist mucous membranes  EYES:  EOM, conjunctivae, lids, pupils and irises normal  NECK:  No adenopathy,masses or thyromegaly  RESP:  respiratory effort and palpation of chest normal, lungs clear to auscultation, no respiratory distress  CV:  Palpation and auscultation of heart done, regular rate and rhythm, no murmur, rub, or gallop, no edema to LE  GI/ABDOMEN:  normal bowel sounds, soft, nontender, no hepatosplenomegaly or other masses  M/S:   moves extremities spontaneously. Ambulation not tested   SKIN:  no rashes to exposed skin.   NEURO:   intact   PSYCH:  oriented X 3, affect and mood normal,        SNF labs: Recent labs in [facility] " reviewed by me today.  and Most Recent 3 CBC's:  Recent Labs   Lab Test 06/12/25  0521 06/11/25  0442 06/10/25  0540   WBC 6.3 6.0 6.7   HGB 8.6* 8.3* 8.6*   MCV 95 95 95    336 322     Most Recent 3 BMP's:  Recent Labs   Lab Test 06/12/25  1133 06/12/25  0732 06/12/25  0521 06/12/25  0153 06/11/25  0823 06/11/25  0442 06/10/25  0812 06/10/25  0540 06/09/25  0356 06/09/25  0352 06/08/25  0543 06/08/25  0541   NA  --   --   --   --   --  140  --   --   --  140  --  140   POTASSIUM  --   --  3.9  --   --  3.9  --  3.9  --  4.1  --  4.4   CHLORIDE  --   --   --   --   --  105  --   --   --  107  --  110*   CO2  --   --   --   --   --  28  --   --   --  26  --  24   BUN  --   --   --   --   --  32.0*  --   --   --  26.9*  --  26.7*   CR  --   --   --   --   --  0.70  --   --   --  0.63  --  0.63   ANIONGAP  --   --   --   --   --  7  --   --   --  7  --  6*   CECY  --   --   --   --   --  8.4*  --   --   --  8.4*  --  8.5*   * 162*  --  151*   < > 153*   < >  --    < > 103*   < > 143*    < > = values in this interval not displayed.     Most Recent 2 LFT's:  Recent Labs   Lab Test 06/09/25 0352 06/02/25 0623   AST 19 22   ALT 17 16   ALKPHOS 71 55   BILITOTAL 0.3 0.3     Most Recent 3 Creatinines:  Recent Labs   Lab Test 06/11/25 0442 06/09/25 0352 06/08/25  0541   CR 0.70 0.63 0.63     Most Recent 3 Hemoglobins:  Recent Labs   Lab Test 06/12/25  0521 06/11/25  0442 06/10/25  0540   HGB 8.6* 8.3* 8.6*     Most Recent Cholesterol Panel:  Recent Labs   Lab Test 11/05/24  1513   CHOL 193   LDL 90   HDL 81   TRIG 110     Most Recent Anemia Panel:  Recent Labs   Lab Test 06/12/25  0521 06/08/25  0541 06/07/25  0439 06/03/25  0612 06/02/25  1043 06/02/25  0623   WBC 6.3   < > 7.9   < >  --  11.7*   HGB 8.6*   < > 6.9*   < >  --  7.0*   HCT 27.6*   < > 21.9*   < >  --  22.0*   MCV 95   < > 91   < >  --  85      < > 357   < >  --  479*   IRON  --   --   --   --   --  32*   IRONSAT  --   --   --   --   --   19   RETICABSCT  --   --  0.133*   < >  --   --    RETP  --   --  5.5*   < >  --   --    FEB  --   --   --   --   --  167*   YURI  --   --   --   --   --  303   B12  --   --   --   --   --  985   FOLIC  --   --   --   --  6.6  --     < > = values in this interval not displayed.       DISCHARGE PLAN:  Follow up labs: CBC to be drawn on discontinue date 7/7/25  Medical Follow Up:      Follow up with primary care provider in 1-2 weeks  [facility] scheduled appointments:  Appointments in Next Year      Jun 30, 2025 10:30 AM  Discharge Summary with MALATHI Monroe CNP  [facility] ([facility] ) [phone] Jun 30, 2025 2:00 PM  (Arrive by 1:45 PM)  RETURN ENDOCRINE with SUSANA Yuan  [facility] [place] ([facility] ) [phone]     Jul 14, 2025 12:30 PM  (Arrive by 12:00 PM)  MR ABDOMEN MRCP W/O & W CONTRAST with MICMR1  [facility] [place] ([facility]) [phone]     Jul 29, 2025 12:00 PM  (Arrive by 11:45 AM)  Return Patient with Manjeet Wilde MD  [facility] ([facility] ) [phone]     Apr 14, 2026 2:10 PM  (Arrive by 1:55 PM)  RETURN RETINA with Lester Pimentel MD  [facility] ([facility]) [phone]           Discharge Services: Home Care:  Occupational Therapy, Physical Therapy, Registered Nurse, and Home Health Aide  Discharge Instructions Verbalized to Patient at Discharge:   None    TOTAL DISCHARGE TIME:   Greater than 30 minutes  Electronically signed by:  Magi Estrella DNP, CNP    Documentation of Face to Face and Certification for Home Health Services    I certify that patient: Sarah Brooksgrantdeyanira is under my care and that I, or a nurse practitioner or physician's assistant working  with me, had a face-to-face encounter that meets the physician face-to-face encounter requirements with this patient on: 6/30/2025.    This encounter with the patient was in whole, or in part, for the following medical condition, which is the primary reason for home health care: Recurrent falls, gait instability, HTN, DM2, polyneuropathy, generalized weakness.    I certify that, based on my findings, the following services are medically necessary home health services: Nursing, Occupational Therapy, and Physical Therapy.    My clinical findings support the need for the above services because: Nurse is needed: for initial assessment., Occupational Therapy Services are needed to assess and treat cognitive ability and address ADL safety due to impairment in function., and Physical Therapy Services are needed to assess and treat the following functional impairments: gait, balance and mobility.    Further, I certify that my clinical findings support that this patient is homebound (i.e. absences from home require considerable and taxing effort and are for medical reasons or Restorationist services or infrequently or of short duration when for other reasons) because: Requires assistance of another person or specialized equipment to access medical services because patient: Is unable to exit home safely on own due to: functional impairment., Is unable to walk greater than 200 feet without rest., and Range of motion limitations prevents ability to exit home safely...    Based on the above findings. I certify that this patient is confined to the home and needs intermittent skilled nursing care, physical therapy and/or speech therapy.  The patient is under my care, and I have initiated the establishment of the plan of care.  This patient will be followed by a physician who will periodically review the plan of care.  Physician/Provider to provide follow up care: Heide Fay    Attending hospital physician (the Medicare certified  PRICE provider): No att. providers found  Physician Signature: See electronic signature associated with these discharge orders.  Date: 6/30/2025          Sincerely,        MALATHI Roy CNP    Electronically signed

## 2025-07-02 ENCOUNTER — LAB REQUISITION (OUTPATIENT)
Dept: LAB | Facility: CLINIC | Age: 80
End: 2025-07-02
Payer: COMMERCIAL

## 2025-07-02 DIAGNOSIS — R79.9 ABNORMAL FINDING OF BLOOD CHEMISTRY, UNSPECIFIED: ICD-10-CM

## 2025-07-07 LAB
ERYTHROCYTE [DISTWIDTH] IN BLOOD BY AUTOMATED COUNT: 22.7 % (ref 10–15)
HCT VFR BLD AUTO: 38 % (ref 35–47)
HGB BLD-MCNC: 11.8 G/DL (ref 11.7–15.7)
MCH RBC QN AUTO: 30.2 PG (ref 26.5–33)
MCHC RBC AUTO-ENTMCNC: 31.1 G/DL (ref 31.5–36.5)
MCV RBC AUTO: 97 FL (ref 78–100)
PLATELET # BLD AUTO: 707 10E3/UL (ref 150–450)
RBC # BLD AUTO: 3.91 10E6/UL (ref 3.8–5.2)
WBC # BLD AUTO: 14.9 10E3/UL (ref 4–11)

## 2025-07-07 PROCEDURE — 36415 COLL VENOUS BLD VENIPUNCTURE: CPT | Performed by: NURSE PRACTITIONER

## 2025-07-07 PROCEDURE — P9603 ONE-WAY ALLOW PRORATED MILES: HCPCS | Performed by: NURSE PRACTITIONER

## 2025-07-07 PROCEDURE — 85027 COMPLETE CBC AUTOMATED: CPT | Performed by: NURSE PRACTITIONER

## 2025-07-08 DIAGNOSIS — Z09 HOSPITAL DISCHARGE FOLLOW-UP: ICD-10-CM

## 2025-07-09 ENCOUNTER — TELEPHONE (OUTPATIENT)
Dept: FAMILY MEDICINE | Facility: CLINIC | Age: 80
End: 2025-07-09
Payer: COMMERCIAL

## 2025-07-09 NOTE — TELEPHONE ENCOUNTER
Home Care is calling regarding an established patient with [facility].  Requesting orders from: Heide Fay  RN APPROVED: RN able to provide verbal orders.  Home Care will send orders for signature.  RN will close encounter.  Is this a request for a temporary pause in the home care episode?  No    Orders Requested    Skilled Nursing  Request for initial certification (first set of orders)   Frequency: Two times a weeks for two week , every other week for 3 weeks for wound care.    RN gave verbal order: Yes      Physical Therapy  Request for initial evaluation and treatment (one time)   RN gave verbal order: Yes      Occupational Therapy  Request for initial evaluation and treatment (one time)   RN gave verbal order: Yes      Orders Requested    Home Visit by Home Care Nurse  Need Wound Care Orders- verbal order is OK  Patient has a Stage II pressure ulcer. RN will cleanse & apply skin prep and wound dressing.  RN gave verbal order: Yes    Phone number Home Care can be reached at: [phone]  Okay to leave a detailed message?: Yes    Contacts       Contact Date/Time Type Contact Phone/Fax    07/09/2025 10:53 AM CDT Phone (Incoming) [facility] [phone]     Secure .          Oscar Naylor RN

## 2025-07-09 NOTE — TELEPHONE ENCOUNTER
MTM referral from: Transitions of Care (recent hospital discharge, TCU discharge, or ED visit)    MTM referral outreach attempt #1 on July 9, 2025 at 9:09 AM      Outcome: Patient is not interested at this time because declined    Use  [facility] part d map Discharge: 7/1/2025 for the carrier/Plan on the flowsheet      LUIS EDUARDO Willis

## 2025-07-14 ENCOUNTER — TELEPHONE (OUTPATIENT)
Dept: FAMILY MEDICINE | Facility: CLINIC | Age: 80
End: 2025-07-14
Payer: COMMERCIAL

## 2025-07-14 NOTE — TELEPHONE ENCOUNTER
Home Care is calling regarding an established patient with [facility].  Requesting orders from: Heide Fay  FYI: RN able to provide verbal orders.  Sending as FYI only.  Is this a request for a temporary pause in the home care episode?  No    Orders Requested    Physical Therapy  Request for initial certification (first set of orders)   Frequency: Once a week for one week and then skip a week and then once a week for two weeks.  This will begin on 7/11.  This is for strength, gait, transfers,condition in pain management and fall prevention.    RN gave verbal order: Yes        Phone number Home Care can be reached at: JOSE Bolaños/Palma LOPEZ   Okay to leave a detailed message?: Yes [phone]      Darrian Sun RN

## 2025-07-20 PROBLEM — D47.3 ESSENTIAL THROMBOCYTHEMIA (H): Status: ACTIVE | Noted: 2025-07-20

## 2025-07-22 ENCOUNTER — TELEPHONE (OUTPATIENT)
Dept: FAMILY MEDICINE | Facility: CLINIC | Age: 80
End: 2025-07-22

## 2025-07-22 ENCOUNTER — OFFICE VISIT (OUTPATIENT)
Dept: FAMILY MEDICINE | Facility: CLINIC | Age: 80
End: 2025-07-22
Payer: COMMERCIAL

## 2025-07-22 VITALS
SYSTOLIC BLOOD PRESSURE: 104 MMHG | RESPIRATION RATE: 16 BRPM | HEART RATE: 103 BPM | OXYGEN SATURATION: 97 % | WEIGHT: 131 LBS | BODY MASS INDEX: 22.49 KG/M2 | DIASTOLIC BLOOD PRESSURE: 71 MMHG | TEMPERATURE: 97.5 F

## 2025-07-22 DIAGNOSIS — E44.0 MODERATE PROTEIN-CALORIE MALNUTRITION: ICD-10-CM

## 2025-07-22 DIAGNOSIS — F10.90 ALCOHOL USE DISORDER: ICD-10-CM

## 2025-07-22 DIAGNOSIS — R63.4 UNINTENTIONAL WEIGHT LOSS: ICD-10-CM

## 2025-07-22 DIAGNOSIS — I82.0 HEPATIC VEIN THROMBOSIS (H): ICD-10-CM

## 2025-07-22 DIAGNOSIS — Z23 NEED FOR VACCINATION: ICD-10-CM

## 2025-07-22 DIAGNOSIS — R29.6 RECURRENT FALLS: ICD-10-CM

## 2025-07-22 DIAGNOSIS — R39.15 URINARY URGENCY: ICD-10-CM

## 2025-07-22 DIAGNOSIS — Z09 HOSPITAL DISCHARGE FOLLOW-UP: Primary | ICD-10-CM

## 2025-07-22 DIAGNOSIS — R26.81 GAIT INSTABILITY: ICD-10-CM

## 2025-07-22 DIAGNOSIS — E11.22 TYPE 2 DIABETES MELLITUS WITH STAGE 2 CHRONIC KIDNEY DISEASE, WITH LONG-TERM CURRENT USE OF INSULIN (H): ICD-10-CM

## 2025-07-22 DIAGNOSIS — Z79.4 TYPE 2 DIABETES MELLITUS WITH STAGE 2 CHRONIC KIDNEY DISEASE, WITH LONG-TERM CURRENT USE OF INSULIN (H): ICD-10-CM

## 2025-07-22 DIAGNOSIS — I10 HYPERTENSION GOAL BP (BLOOD PRESSURE) < 140/90: Chronic | ICD-10-CM

## 2025-07-22 DIAGNOSIS — D47.3 ESSENTIAL THROMBOCYTHEMIA (H): ICD-10-CM

## 2025-07-22 DIAGNOSIS — N18.2 TYPE 2 DIABETES MELLITUS WITH STAGE 2 CHRONIC KIDNEY DISEASE, WITH LONG-TERM CURRENT USE OF INSULIN (H): ICD-10-CM

## 2025-07-22 PROCEDURE — 90480 ADMN SARSCOV2 VAC 1/ONLY CMP: CPT | Performed by: FAMILY MEDICINE

## 2025-07-22 PROCEDURE — 1126F AMNT PAIN NOTED NONE PRSNT: CPT | Performed by: FAMILY MEDICINE

## 2025-07-22 PROCEDURE — 3078F DIAST BP <80 MM HG: CPT | Performed by: FAMILY MEDICINE

## 2025-07-22 PROCEDURE — 99214 OFFICE O/P EST MOD 30 MIN: CPT | Mod: 25 | Performed by: FAMILY MEDICINE

## 2025-07-22 PROCEDURE — 3074F SYST BP LT 130 MM HG: CPT | Performed by: FAMILY MEDICINE

## 2025-07-22 PROCEDURE — G2211 COMPLEX E/M VISIT ADD ON: HCPCS | Performed by: FAMILY MEDICINE

## 2025-07-22 PROCEDURE — 91320 SARSCV2 VAC 30MCG TRS-SUC IM: CPT | Performed by: FAMILY MEDICINE

## 2025-07-22 ASSESSMENT — PAIN SCALES - GENERAL: PAINLEVEL_OUTOF10: NO PAIN (0)

## 2025-07-22 NOTE — TELEPHONE ENCOUNTER
Home Care is calling regarding an established patient with [facility].  Requesting orders from: Heide Fay  RN APPROVED: RN able to provide verbal orders.  Home Care will send orders for signature.  RN will close encounter.  Is this a request for a temporary pause in the home care episode?  No    Orders Requested    Skilled Nursing  Request for continuation of care with increase in frequency  Frequency: add one skilled nursing visit for this week 7/21/25. For monitoring skin integrity  RN gave verbal order: Yes      Skilled Nursing  Request for discontinuation of care   Goals have been met/progressing.  Frequency: discharge pt from skilled nursing after the week of 7/28/25.  RN gave verbal order: Yes      Phone number Home Care can be reached at: [phone]  Okay to leave a detailed message?: Yes      Nicolas Ortiz RN

## 2025-07-22 NOTE — TELEPHONE ENCOUNTER
Home Care is calling regarding an established patient with [facility].  Requesting orders from: Heide Fay  RN APPROVED: RN able to provide verbal orders.  Home Care will send orders for signature.  RN will close encounter.  Is this a request for a temporary pause in the home care episode?  No    Orders Requested      Occupational Therapy  Request for initial certification (first set of orders)   Frequency: One time a week for one a week, one time a month for one month.   RN gave verbal order: Yes      Phone number Home Care can be reached at: MIGUEL A Ross/Palma Wilsonay to leave a detailed message?: Yes [phone]      Darrian Sun RN

## 2025-07-22 NOTE — PROGRESS NOTES
Assessment & Plan     Hospital discharge follow-up      Moderate protein-calorie malnutrition  Unintentional weight loss  She continues to lose weight.  I discussed the importance of increasing her caloric intake, finding foods that are appealing to her.  I gave her the website for Mom's meals and encouraged her to cancel Meals on Wheels since she finds their food unappealing.  CT C/A/P from hospitalization (May) showed no malignancy.  We'll monitor weight closely and I will see her in clinic in one month.      Alcohol use disorder  She states she was not drunk when she was brought into the ER in March after a fall.  I pointed out that her BAL was 0.26 which is definitely in the intoxication range and > 3x the legal limit for driving.  I discussed that any alcohol use will further her risk for instability, falls, and rehospitalization.  I encouraged her to continue taking the thiamine, B6, B12 and folate supplements.    Urinary urgency  I think we can discontinue the Flomax at next appointment.    Recurrent falls  Gait instability  She is getting home PT now.    Hypertension goal BP (blood pressure) < 140/90  With weight loss her BP is now low and I instructed her to discontinue the metoprolol.      Type 2 diabetes mellitus with stage 2 chronic kidney disease, with long-term current use of insulin (H)  We'll recheck A1c next month    Hepatic vein thrombosis (H)  Essential thrombocythemia (H)  These are followed by Alexandra/Dr. Jimenez and she is seeing him next week.      Need for vaccination  - COVID-19 12+ (PFIZER)  - Tdap, tetanus-diptheria-acell pertussis, (BOOSTRIX) 5-2.5-18.5 LF-MCG/0.5 LEOBARDO injection  Dispense: 0.5 mL; Refill: 0    MED REC REQUIRED  Post Medication Reconciliation Status:  Discharge medications reconciled and changed, see notes/orders      Follow-up   Follow-Up appt scheduled:  Aug 14, 2025 4:30 PM  (Arrive by 4:10 PM)  Provider Visit with Heide Fay MD  [facility]  [place] ([facility] ) [phone]     The longitudinal plan of care for the diagnosis(es)/condition(s) as documented were addressed during this visit. Due to the added complexity in care, I will continue to support Sarah in the subsequent management and with ongoing continuity of care.    Heide Fay MD  [facility]        Subjective   Sarah is a 79 year old, presenting for the following health issues:  Hospital F/U        7/22/2025    11:31 AM   Additional Questions   Roomed by Rosina House     Via the Health Maintenance questionnaire, the patient has reported the following services have been completed -Mammogram: [facility] 2023-06-02, this information has been sent to the abstraction team.  HPI          7/11/2025   Post Discharge Outreach   How are you doing now that you are home? I am doing ok. My taste is gone.   How are your symptoms? (Red Flag symptoms escalate to triage hotline per guidelines) Improved   Does the patient have their discharge instructions?  Yes   Does the patient have questions regarding their discharge instructions?  No   Were you started on any new medications or were there changes to any of your previous medications?  Yes   Does the patient have all of their medications? Yes   Do you have questions regarding any of your medications?  No   Do you have all of your needed medical supplies or equipment (DME)?  (i.e. oxygen tank, CPAP, cane, etc.) Yes       Hospital Follow-up Visit:    Hospital/Nursing Home/IP Rehab Facility: [facility]  Most Recent Admission Date: 5/25/2025   Most Recent Admission Diagnosis: Hypokalemia - E87.6  Ketoacidosis - E87.29  Hyperglycemia - R73.9  Generalized weakness - R53.1  UDNG (acute kidney injury) - N17.9  Fall, initial encounter - W19.XXXA     Most Recent Discharge Date: 6/12/2025   Most Recent Discharge Diagnosis: Ketoacidosis - E87.29  Hyperglycemia -  R73.9  Hypokalemia - E87.6  DUNG (acute kidney injury) - N17.9  Generalized weakness - R53.1  Fall, initial encounter - W19.XXXA  Diabetic ketoacidosis without coma associated with type 2 diabetes mellitus (H) - E11.10  Sepsis with acute renal failure without septic shock, due to unspecified organism, unspecified acute renal failure type (H) - A41.9, R65.20, N17.9  Hepatic vein thrombosis (H) - I82.0  Localized edema - R60.0  Type 2 diabetes mellitus with chronic kidney disease, with long-term current use of insulin, unspecified CKD stage (H) - E11.22, Z79.4  Benign hypertensive kidney disease with chronic kidney disease stage I through stage IV, or unspecified(403.10) - I12.9  Chronic kidney disease, unspecified CKD stage - N18.9  Other specified counseling - Z71.89  Type 2 diabetes mellitus with hyperglycemia, with long-term current use of insulin (H) - E11.65, Z79.4  Schatzki's ring of distal esophagus - K22.2  Urinary retention - R33.9  Heart burn - R12  Alcohol use - F10.90  Hepatic cyst - K76.89     TCU Discharge:  Rehab Facility: [facility]  Date of Admission: 6/12/2025  Date of Discharge: 7/7/2025  Reason(s) for Admission: physical rehab   Summary of hospitalization:  TCU Discharge Summary from [facility] dated 6/30/2025  Diagnostic Tests/Treatments reviewed.  Follow up needed: none  Other Healthcare Providers Involved in Patient s Care:         Homecare  Update since discharge: stable.   Plan of care communicated with patient     FROM TCU DISCHARGE SUMMARY:  Discharged to: previous independent home  Physical Function: Ambulates with a walker  DME: No DME needed    Summary of nursing facility stay:   She has medical history significant for diabetes, diabetic polyneuropathy, allergic rhinitis, heartburn, mixed hyperlipidemia, hypertension, osteopenia of both hips, chronic kidney disease stage II, recurrent falls, generalized weakness.  She was recently hospitalized for DKA, sepsis,  C.Diff and hepatic vein thrombosis.    Today, nursing reported redness, warmth, and swelling in right great toe. She denies fever, chills, pain, SOB, nausea, vomiting.      Cellulitis of toe of right foot  Today nurse reported redness, warmth and swelling of right great toe. On assessment, it has an open area at the tip of the toe.   WBC reviewed today showed WBC high at 13.4; likely due to foot infection. Recheck CBC on 7/7/25  Start Cephalexin (Keflex) 500 mg QID for 7 days for cellulitis     Moderate malnutrition   Alcohol use disorder   Malnutrition noted at hospitalization. Resident was initially on TPN, but discontinued at discharge. She was discharged to TCU on regular diet with thin liquids.  Poor oral intake noted since admission to TCU; current weight is 137 lbs, was 142.2 lbs on admission.  Continue thiamine 1000 mg daily  Continue nutritional supplement - Ensure with meals     Diabetic polyneuropathy associated with type 2 diabetes mellitus (H)  Type 2 diabetes mellitus with stage 2 chronic kidney disease, with long-term current use of insulin (H)  Latest A1c 8.0%. She had DKA at hospitalization, resolved.   Continue Insulin glargine 8 units at 0900  Continue Novolog 0-7 units three times daily  Continue Novolog sliding scale insulin 0-5 units at bedtime     Hypertension goal BP (blood pressure) < 140/90  CKD (chronic kidney disease) stage 2, GFR 60-89 ml/min  Chronic and stable.   Continue lisinopril 2.5 mg daily  Continue furosemide 20 mg 2 times daily  Continue metoprolol 12.5 mg 2 times daily  Continue to monitor blood pressure  BMP lab reviewed; Cr 0.76, BUN 9.7, GFR 80, all WNL     Mixed hyperlipidemia  Lipid panel reviewed today showed normal cholesterol level 110, normal Triglycerides 87, low HDL 28 and normal LDL 64  Continue atorvastatin 40 mg daily     Hepatic cyst  Pancreatic Cyst  These were noted at hospitalization.  Follow-up with outpatient MRI/MRCP     Hepatic vein thrombus  Essential  "thrombocytosis  Essential thrombocythemia (JAK2 positive), high risk disease   History of thromboembolism  She was followed by IR and Heme at hospitalization, was treated with heparin and Lovenox IV and discharged with apixaban.  Continue Apixaban 5 mg 2 times daily.  Continue hydroxyurea  Follow up with Dr. Wilde 7/29/25     Mixed incontinence urge and stress (male)(female)  Urinary retention  This was noted on hospitalization after catheter was removed.  She was started on Flomax with improvement  Continue tamsulosin 0.4 mg daily     Anasarca  Pulmonary edema  TTE with LVEF 60-65%, small IVC.   Continue Lasix 20mg daily     Physical Deconditioning  Recurrent falls  Gait instability  Impairment of balance  Continue PT/OT in-home     Schatzki's ring of distal esophagus  Gastroesophageal reflux disease, unspecified whether esophagitis present  Continue pantoprazole 40 mg daily      Anemia of chronic disease  On admission, Hg level was decreased to 7.0. She received 1 unit pRBC for Hg 6.9 and at discharge Hg was 8.6.  CBC reviewed today showed Hg improved at 10.4.        UPDATE SINCE DISCHARGE:  \"Bed sore\" - got an offloading cushion, followed by home care RN    Unintentional weight loss - was getting Meals on Wheels which she doesn't like.  She went to grocery store yesterday and bought food that is appealing to her.  She feels she is hungry for that.      Dysphagia - only with swallowing pills - takes her pills with apple sauce.  Inpatient SLP approved her for regular diet with thin liquids.     She does get PCA assistance twice a week for ADL's/IADL's and her PCA can drive her to grocery store if needed.    Urinary urgency - Flomax was started during hospitalization when she had retention following removal of costa indwelling catheter.  She does note urgency but no double-voiding, hesitancy, or sense of incomplete bladder emptying.    Wt Readings from Last 10 Encounters:   07/22/25 59.4 kg (131 lb)   06/30/25 62.1 " kg (137 lb)   06/26/25 61.4 kg (135 lb 6.4 oz)   06/23/25 61.2 kg (135 lb)   06/16/25 64.4 kg (142 lb)   06/13/25 66.7 kg (147 lb)   06/11/25 67.1 kg (147 lb 14.9 oz)   03/29/25 68 kg (150 lb)   01/28/25 70.6 kg (155 lb 11.2 oz)   11/25/24 71.7 kg (158 lb)           Objective    /71 (BP Location: Left arm, Patient Position: Sitting, Cuff Size: Adult Regular)   Pulse 103   Temp 97.5  F (36.4  C) (Temporal)   Resp 16   Wt 59.4 kg (131 lb)   SpO2 97%   BMI 22.49 kg/m    Body mass index is 22.49 kg/m .  Physical Exam   GENERAL: appears frail, alert and no distress, uses 4-prong cane for ambulation  RESP: lungs clear to auscultation - no rales, rhonchi or wheezes  CV: regular rate and rhythm, normal S1 S2, no S3 or S4, no murmur, click or rub  ABDOMEN: soft, nontender, no hepatosplenomegaly, no masses  MS: no gross musculoskeletal defects noted, no edema  SKIN: no suspicious lesions or rashes, right great toe with no erythema, swelling or tenderness to palpation, stage 1 pressure sore of coccyx which is covered by an absorbant dressing.  NEURO: unstable gait, mentation intact and speech normal        Signed Electronically by: Heide Fay MD

## 2025-07-22 NOTE — PATIENT INSTRUCTIONS
You are due for your tetanus booster.  Medicare does not cover that if administered in a clinic.  I recommend that you get that (the Tdap vaccine) at a pharmacy where it is covered by Medicare.     See if you qualify for Mom's Meals: https://[email or web address]/        Overactive Bladder includes symptoms of urinary frequency (including nighttime urination), urgency and often incontinence.      If an office urinalysis is normal, the first step in evaluation is to keep a voiding diary for two 24-hour periods.  Note details of your fluid intake: time, type of beverage, and amount.  Note details of your urine output: time, amount, and any accidents.  For accidents note the time, if urge was present, the activity at the time of the accident, and a rough estimate of the amount (on a scale of 1-4).      Remember that normal voiding occurs every 2.5 to 4 hours with 8-14 ounces voided at a time.  Normal fluid intake (in ounces) is 1/2 your weight (in pounds).  For example, a 140-pound woman should drink 70 ounces per day.    Treatment of Overactive Bladder includes:  1) Avoiding triggers.  These include caffeine, alcohol, nutrasweet, acidic foods, spicy foods, vitamins B & C.  Eliminate all of the above and then gradually add them back, monitoring your symptoms.  2) Urge suppression.  When you feel a sudden urge do not run to find a bathroom.  Do not move.  Stay still and do a pulsating kegel for 15-30 seconds.    3) Bladder Drill.  Start by emptying your bladder every 60 minutes (regardless of the urge) and increase the interval by 15-30 minutes each week until a goal of every 2-3 hours has been reached.    4) Medications:  *Magnesium 350 mg twice daily.  *Consider local estrogen.  *Consider prescription medication.

## 2025-07-23 ENCOUNTER — TELEPHONE (OUTPATIENT)
Dept: FAMILY MEDICINE | Facility: CLINIC | Age: 80
End: 2025-07-23
Payer: COMMERCIAL

## 2025-07-23 NOTE — TELEPHONE ENCOUNTER
Forms/Letter Request    Type of form/letter: Home Health Certification /  cert/ plan of care 7/8/2025 - 9/5/2025      Do we have the form/letter: Yes: placed in care team 1 providers sign folder    Who is the form from? Home care    Where did/will the form come from? form was faxed in    When is form/letter needed by: n/a    How would you like the form/letter returned: Fax : [phone]

## 2025-07-24 NOTE — TELEPHONE ENCOUNTER
Forms have been completed and are in the Care Team 1 Completed basket.    Please be sure to abstract home care certification/plan of care orders.  Heide Fay MD

## 2025-07-26 NOTE — PROGRESS NOTES
[facility] GERIATRICS  [facility] Record Number:  [id number]  Place of Service where encounter took place: [facility] ([facility]) [[id number]]  CODE STATUS:   CPR/Full code     Chief Complaint/Reason for Visit:  Chief Complaint   Patient presents with    Hospital F/U       HPI:    Sarah Felix is a 79 year old female with hx of DM with recent complex hospitalization as noted below.     [facility]  Discharge Summary - Medicine & Pediatrics  Date of Admission:  5/25/2025      Hospital Course  Sarah Felix was admitted on 5/25/2025 for DKA, sepsis, and hepatic vein thrombus.  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 Likely triggered by sepsis. Endocrine consulted and managed insulin and blood glucose during admission and recommended the following upon discharge.   - Insulin glargine 8 units at 0900  - Sliding scale insulin novolog 0-7 units three times daily  - Sliding scale insulin novolog 0-5 units at bedtime     Enteritis likely 2/2 C. Diff, resolved  Ileus, resolved  Initially presented septic but with negative workup including urine culture, blood cultures, CT C/A/P. Had ongoing worsening encephalopathy and leukocytosis on vanc/zosyn -> unasyn. Developed diarrhea and abdominal pain, stool with C diff PCR + but A toxin negative. C diff B toxin positive, GI consulted. Given clinical worsening, decided to empirically treat with improvement. Nausea/vomiting managed with NG decompression. NG tube removed 6/1.  - Abx:  - PO vancomycin 125mg daily (5/28-6/5)   - Unasyn (5/27-5/29)  - Zosyn (5/25-5/27)              - Vancomycin (5/25-5/26)  - GI consult for potential workup of enteritis given uncommon presentation of C. Diff  - C. Diff retested and continues to show positive antigen and B toxin, but  "negative A toxin              - Outpatient MRI/MRCP to evaluate the pancreatic and hepatic cysts  - PO pantoprazole daily     Hepatic vein thrombus  Essential thrombocythemia (JAK2 positive), high risk disease   No prior imaging to compare. Per chart review, history of \"thromboembolism\" after ovarian cyst removal age 22. Liver panel normal.  - IR consulted; no indication for lytics  - Heme consulted  - Heparin gtt 5/25-5/27  - Lovenox 5/27-6/12/25  - At discharge transition to apixaban 10mg BID x 7 days then 5 mg BID  - Follow up with Dr. Wilde 7/29/25  - Continue hydroxyurea  - discontinued PTA ASA      Moderate malnutrition in the context of chronic illness   Continued elevated ketones despite insulin ggt on admission. Elevation likely due to poor oral intake due to encephalopathy and starvation ketosis. Pt was started on TPN this admission, she has been on calorie count and has improved on her oral intake. TPN discontinued 6/11/25. Speech consulted and recommended regular diet with thin liquids prior to discharge.  - SLP: regular diet with thin liquids     Delirium  Concern for delirium with the patient only alert and oriented to person and place but not to time. Also having difficulty with attention, unable to state the months of the year backwards or remember the month or year when asked later. No acute intracranial findings on CT head.   - Delirium precautions  - Consider formal neurocognitive evaluation if no improvement outside the hospital     Anemia of chronic disease  Her PTA hemoglobin levels were within normal limits. Since admission, they have been steadily decreasing. 6/2 Hb was 7.0. Iron panel and B12 were ordered 6/2 and are c/w anemia of chronic disease. No obvious source of hemorrhage. T bili and haptoglobin normal, pointing away from hemolysis. Patient received 1 unit pRBC during admission for a hemoglobin of 6.9.      Stable/chronic/resolved conditions:  Dysphagia, resolved  Recurrent vomiting, " resolved  Concern for aspiration from bedside RN. Feeding tube may have been preventing proper swallowing, so this was removed. On 6/4/25, SLP updated diet order back to regular diet with thin liquids.   - NGT removed 6/1  - SLP: Regular diet with thin liquids     Urinary retention  Continued to have urinary retention after removing costa. Started tamsulosin with improvement  - Continue straight cath if bladder scan >600cc   - Tamsulosin daily     Abnormal UA  Repeat UA on 6/1 showed cloudy urine, proteinuria, hematuria, and leukocyte esterase. She was started on Augmentin given these findings. These UA findings are mostly consistent with her UA from 5/25. This is likely not due to a UTI given that a UTI would have been resolved with her Zosyn and Unasyn abx course, thus Augmentin was discontinued. The hematuria is likely 2/2 repeated catheter insertions. Repeat UA later during admission non-concerning for infection or hematuria. Urine culture growing >100,000CFU/mL Raoultella ornithinolytica and Psuedomonas aeruginosa.   - Consider treatment if symptomatic; patient asymptomatic during admission.      Concern for refeeding syndrome, resolved  Decrease in magnesium and phosphorus after beginning TPN, possible refeeding syndrome, later resolved.      Pleural effusions  Pulmonary edema  Anasarca  Seen on 5/28 CT C/A/P. TTE with LVEF 60-65%, small IVC.   - PTA 20mg Lasix daily     Possible alcohol use disorder  Reports 2 drinks/day. Did have an ED visit 03/2025 for alcohol intoxication. Reports last drink 5 days PTA, low concern for withdrawal. Her brother, Cruz, has concerns that she is drinking excessively and encourages addiction medicine involvement.  - Addiction medicine consulted 6/3/25              - Patient denied any problems with alcohol use  - PTA folate  - Continue thiamine 100mg daily      Sacrum/coccyx moisture wound  Erythematous, blanchable area on her sacrum. Bedside RN says likely from excessive  "moisture 2/2 fecal incontinence. 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     \"Prominent pericardial contents\"  Cardiology reviewed- this is an epicardial fat pad. No further workup or consult needed.     Generalized weakness, failure to thrive  TSH wnl.   - Ongoing PT/OT consults   - Ongoing evaluation for safe discharge plan     HTN: PTA metoprolol, lisinopril     Fall at home: CTH, C spine negative for acute process/fracture     Elevated INR: Unclear etiology, no known liver disease. Will hold off on Vit K given new thrombus.     Essential thrombocytosis: Stop PTA aspirin per heme, continue PTA hydroxyurea     Glaucoma: PTA latanoprost    Overall stabilized and discharged to TCU on 6/12/2025 for PT, OT, nursing cares, medical management and monitoring.       REVIEW OF SYSTEMS:  All others negative other than those noted in HPI.      PAST MEDICAL HISTORY:  Past Medical History:   Diagnosis Date    Adenomatous polyp of duodenum 08/13/2018    Removed via EUS 8/13/2018 with f/u EGD 3/14/2019.  No further surveillance needed per GI.    Allergic rhinitis, cause unspecified     Allergic rhinitis    Basal cell carcinoma of face 03/2012    s/p MOHS    CKD (chronic kidney disease) stage 2, GFR 60-89 ml/min 06/06/2014    Closed bimalleolar fracture of left ankle with routine healing 10/11/2019    Added automatically from request for surgery [id number]    Contact dermatitis and other eczema, due to unspecified cause     Cyst of thyroid 1998    Thyroid Nodule/Hurthle Cell Neoplasm/Benign - resected    Cystocele, lateral     Diverticulitis of colon (without mention of hemorrhage)(562.11) 06/2004    Abd CT    Esophageal reflux     Hiatal hernia    Essential hypertension, benign (HTN) 06/06/2014    Fatty liver     Hepatic cyst     8.0 cm x 6.6 cm on CT 3/2023 (stable)    Hiatal hernia     small    Hyperlipidemia     Mixed incontinence urge and stress (male)(female) 06/24/2007    Nontoxic uninodular goiter  "    Osteopenia 04/21/2005    on fosamax  for > 5 yr.  stop 7/2012    Other chronic otitis externa     Postoperative deep vein thrombosis (DVT) (H) age 22    post ovarian cyst removed    Schatzki's ring of distal esophagus 03/05/2023    Dilated 8/2018    Tubular adenoma of colon 06/23/2022    On colonoscopy 2016, rpt 5 yrs.    Type 2 diabetes mellitus (H)        PAST SURGICAL HISTORY:  Past Surgical History:   Procedure Laterality Date    APPENDECTOMY  age 22    APPLY EXTERNAL FIXATOR LOWER EXTREMITY Left 10/12/2019    Procedure: Left ankle external fixator placement;  Surgeon: Leeanne Brown MD;  Location: [facility] OR    CATARACT IOL, RT/LT Right 04/17/2018    [facility]    COLONOSCOPY  04/2006    repeat 10 yr    ENDOSCOPIC ULTRASOUND UPPER GASTROINTESTINAL TRACT (GI) N/A 09/06/2018    Procedure: ENDOSCOPIC ULTRASOUND, ESOPHAGOSCOPY / UPPER GASTROINTESTINAL TRACT (GI);  Endoscopic Ultrasound, Esophagogastroduodenoscopy with endoscopic mucosal resection;  Surgeon: Hood Brower MD;  [facility]    ESOPHAGOSCOPY, GASTROSCOPY, DUODENOSCOPY (EGD), COMBINED N/A 08/13/2018    Procedure: COMBINED ESOPHAGOSCOPY, GASTROSCOPY, DUODENOSCOPY (EGD), BIOPSY SINGLE OR MULTIPLE;  EGD;  Surgeon: Hood Brower MD;  [facility]    ESOPHAGOSCOPY, GASTROSCOPY, DUODENOSCOPY (EGD), COMBINED N/A 03/14/2019    Procedure: Upper Endoscopy;  Surgeon: Hood Brower MD;  [facility]    ESOPHAGOSCOPY, GASTROSCOPY, DUODENOSCOPY (EGD), RESECT MUCOSA, COMBINED N/A 09/06/2018    Procedure: COMBINED ESOPHAGOSCOPY, GASTROSCOPY, DUODENOSCOPY (EGD), RESECT MUCOSA;;  Surgeon: Hood Brower MD;  [facility]    GYN SURGERY  10/22/2008    pelvic support    HEMORRHOIDECTOMY  08/2008    MOHS MICROGRAPHIC PROCEDURE      OPEN REDUCTION INTERNAL FIXATION ANKLE Left 10/28/2019    Procedure: Internal fixation left bimalleolar ankle fracture, removal external fixator;  Surgeon: Jose Roberto Can MD;  [facility]    ORIF  ANKLE FRACTURE BIMALLEOLAR Left 10/28/2019    Internal fixation left bimalleolar ankle fracture, removal external fixator    OTHER SURGICAL HISTORY Left 10/12/2019    APPLY EXTERNAL FIXATOR LOWER EXTREMITYProcedure: Left ankle external fixator placement; Surgeon: Leeanne Brown MD; Location: [facility] OR      OVARIAN CYST REMOVAL Left age 22    L ovarian cyst removal, laparotomy      PHACOEMULSIFICATION CLEAR CORNEA WITH STANDARD INTRAOCULAR LENS IMPLANT Left 2020    Procedure: LEFT CATARACT REMOVAL WITH INTRAOCULAR LENS IMPLANT;  Surgeon: Janay Amezcua MD;  [facility]    PICC TRIPLE LUMEN PLACEMENT Right 2025    Basilic, 39 cm, 4 cm external length    SURGICAL HISTORY OF -   10/2008    Transobturator tape for Urinary Incontinence    THYROIDECTOMY, PARTIAL  1998       FAMILY HISTORY:  Family History   Problem Relation Age of Onset    Diabetes Mother     Hypertension Mother     Arthritis Mother         rheumatoid    Cancer Father     Cardiovascular Brother         palpitations not on meds.    Glaucoma No family hx of     Macular Degeneration No family hx of     Amblyopia No family hx of     Rheumatoid Arthritis Mother     Other - See Comments Brother         palpitations       SOCIAL HISTORY:  Social History     Socioeconomic History    Marital status: Single     Spouse name: Not on file    Number of children: 0    Years of education: Not on file    Highest education level: Not on file   Occupational History    Occupation: RN     Employer: [employer]     Comment: peds   Tobacco Use    Smoking status: Former     Current packs/day: 0.00     Types: Cigarettes     Quit date: 1980     Years since quittin.5     Passive exposure: Past    Smokeless tobacco: Never    Tobacco comments:     smoked from 70-80; smoked about 1ppd   Vaping Use    Vaping status: Never Used   Substance and Sexual Activity    Alcohol use: Yes     Alcohol/week: 0.0 standard drinks of alcohol      Comment: 6-7 drinks per week    Drug use: No    Sexual activity: Yes     Partners: Male     Comment: postmenopausal   Other Topics Concern    Parent/sibling w/ CABG, MI or angioplasty before 65F 55M? No   Social History Narrative    Dairy/d 2 servings/d. Not much milk mostly cheese    Caffeine 5 servings/d    Exercise active in yard work     Sunscreen used - Yes when in sun    Seatbelts used - Yes    Working smoke/CO detectors in the home - Yes    Guns stored in the home - No    Self Breast Exams - Yes    Self Testicular Exam - NA    Eye Exam up to date - Yes needs to see opthamologist for diabetes    Dental Exam up to date - Yes    Pap Smear up to date -Yes     Mammogram up to date - Yes 3/10    PSA up to date - NA    Dexa Scan up to date - 06/5/07    Flex Sig / Colonoscopy up to date -  04/06 yes RTC 10 yrs in epic    Immunizations up to date - Yes Td 06/04    Abuse: Current or Past(Physical, Sexual or Emotional)- No    Do you feel safe in your environment - Yes    8/4/10    Liya Ashraf RN                         Social Drivers of Health     Financial Resource Strain: Unknown (5/28/2025)    Financial Resource Strain     Within the past 12 months, have you or your family members you live with been unable to get utilities (heat, electricity) when it was really needed?: Patient unable to answer   Food Insecurity: Unknown (5/28/2025)    Food Insecurity     Within the past 12 months, did you worry that your food would run out before you got money to buy more?: Patient unable to answer     Within the past 12 months, did the food you bought just not last and you didn t have money to get more?: Patient unable to answer   Transportation Needs: Unknown (5/28/2025)    Transportation Needs     Within the past 12 months, has lack of transportation kept you from medical appointments, getting your medicines, non-medical meetings or appointments, work, or from getting things that you need?: Patient unable to answer    Physical Activity: Not on file   Stress: Not on file   Social Connections: Not on file   Interpersonal Safety: Unknown (5/28/2025)    Interpersonal Safety     Do you feel physically and emotionally safe where you currently live?: Patient unable to answer     Within the past 12 months, have you been hit, slapped, kicked or otherwise physically hurt by someone?: Patient unable to answer     Within the past 12 months, have you been humiliated or emotionally abused in other ways by your partner or ex-partner?: Patient unable to answer   Housing Stability: Unknown (5/28/2025)    Housing Stability     Do you have housing? : Patient unable to answer     Are you worried about losing your housing?: Patient unable to answer       MEDICATIONS:  Current Outpatient Medications   Medication Sig Dispense Refill    apixaban ANTICOAGULANT (ELIQUIS) 5 MG tablet Take 2 tablets (10 mg) by mouth 2 times daily for 7 days, THEN 1 tablet (5 mg) 2 times daily. 208 tablet 0    atorvastatin (LIPITOR) 40 MG tablet Take 1 tablet (40 mg) by mouth daily 90 tablet 1    blood glucose (ONETOUCH ULTRA) test strip 1 strip by In Vitro route 4 times daily. 400 strip 3    CALCIUM 500 + D 500-200 MG-IU OR TABS one tab twice per day 100 0    Cholecalciferol (VITAMIN D PO) Pt consuming 3000 units per day      Continuous Glucose Sensor (FREESTYLE CARMELA 14 DAY SENSOR) MISC Change every 14 days. 6 each 3    folic acid (FOLVITE) 400 MCG tablet Take 1 tablet (400 mcg) by mouth daily 100 tablet 3    furosemide (LASIX) 20 MG tablet Take 1 tablet (20 mg) by mouth 2 times daily 180 tablet 1    hydroxyurea (HYDREA) 500 MG capsule Take 1 capsule (500 mg) by mouth daily 90 capsule 3    insulin aspart (NOVOLOG PEN) 100 UNIT/ML pen Inject 1-7 Units subcutaneously 3 times daily. 15 mL 2    insulin aspart (NOVOLOG PEN) 100 UNIT/ML pen Inject 1-5 Units subcutaneously at bedtime. 15 mL 0    insulin glargine (LANTUS PEN) 100 UNIT/ML pen Inject 8 Units subcutaneously daily.  "15 mL 0    insulin pen needle (BD REY U/F) 32G X 4 MM miscellaneous USE 1 PEN NEEDLE four times daily. (WITH LANTUS AND Novolog) 400 each 11    insulin syringe-needle U-100 (BD INSULIN SYRINGE ULTRAFINE) 31G X 5/16\" 1 ML miscellaneous Use 1 syringes twice daily for insulin and victoza and for symptoms of hypoglycemia 100 each 3    Lancets (ONETOUCH DELICA PLUS [id number]) MISC 1 each 4 times daily 400 each 3    latanoprost (XALATAN) 0.005 % ophthalmic solution Place 1 drop into both eyes at bedtime. 7.5 mL 5    lisinopril (ZESTRIL) 2.5 MG tablet Take 1 tablet (2.5 mg) by mouth daily 90 tablet 3    nystatin (MYCOSTATIN) 476186 UNIT/GM external cream Apply topically 2 times daily 30 g 3    pantoprazole (PROTONIX) 40 MG EC tablet Take 1 tablet (40 mg) by mouth every morning (before breakfast). 30 tablet 2    pyridOXINE (VITAMIN B6) 25 MG tablet Take 1 tablet (25 mg) by mouth daily 90 tablet 3    tamsulosin (FLOMAX) 0.4 MG capsule Take 1 capsule (0.4 mg) by mouth daily. 30 capsule 2    thiamine (B-1) 100 MG tablet Take 1 tablet (100 mg) by mouth daily. 30 tablet 2    triamcinolone (KENALOG) 0.1 % external cream Apply topically 3 times daily 90 g 3    vitamin B-12 (CYANOCOBALAMIN) 2500 MCG sublingual tablet Take 2500 mcg by mouth three times per week          ALLERGIES:  Allergies   Allergen Reactions    Actos [Pioglitazone]      Fatigue - felt crummy     Amlodipine Swelling    Ancef [Cefazolin]     Carbapenems     Levaquin Rash     Itching, rash    Cephalosporins Rash    Clindamycin Rash    Levofloxacin Itching and Rash    Nickel Rash     Contact reaction  Contact reaction       PHYSICAL EXAM:  General: Patient is alert female, no distress.   Vitals: /64   Pulse 78   Temp 98.9  F (37.2  C)   Resp 20   Ht 1.626 m (5' 4\")   Wt 61.4 kg (135 lb 6.4 oz)   SpO2 96%   BMI 23.24 kg/m    HEENT: Head is NCAT. Eyes show no injection or icterus. Nares negative. Oropharynx well hydrated.  Neck: Supple. No tenderness or " adenopathy. No JVD.  Lungs: Clear bilaterally. No wheezes.  Cardiovascular: Regular rate and rhythm, normal S1, S2.  Back: No spinal or CVA tenderness.  Abdomen: Soft, no tenderness on exam. Bowel sounds present. No guarding rebound or rigidity.  : Deferred.  Extremities: No edema is noted.  Musculoskeletal: Degen changes.  Skin: No rashes noted.  Psych: Mood appears good.      LABS/DIAGNOSTIC DATA:  Reviewed in EPIC.      ASSESSMENT/PLAN:  DM. Hospitalization for DKA, sepsis, complications. On glargine insulin and sliding scale insulin. Follow accuchecks.  HTN. On metoprolol and lisinopril. Monitor Bps in TCU, adjust meds if needed.  Hepatic vein thrombosis. On anticoagulation with apixaban. Follow up with GI.   Sepsis. Treated with abx.  C diff colitis. Completed treatment while hospitalized.   Urinary retention. On tamsulosin.  Hx alcohol use disorder. Started on thiamine.  Pancreatic and hepatic cysts. Referred for outpatient follow up.   Thrombocytosis. JAK2 positive. On hydroxyurea. Follow up with hematology.   Weakness and deconditioning. Here in TCU for therapies.   CKD. Labs as noted, reviewed.  Anemia. Did receive transfusion. Recheck labs in TCU.   Code status is full code.       Electronically signed by: Gabrielle Kerr MD

## 2025-07-28 NOTE — PROGRESS NOTES
[facility] Hematology Consultation  [address] [place] [address]  Phone: [phone]    Outpatient Visit Note:    Patient: Sarah Felix  MRN: [id number]  : 1945  IVAN: 2025    Sarah Felix is a 79 year old woman with a history of Jak2 positive myeloproliferative neoplasm, likely essential thrombocytopenia, who returns for routine follow up.       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 Platelet count has mildly improved on HU but is poorly tolerated.  She has a new hepatic vein thrombosis and is doing well on apixaban.    Recommendations:  I counseled the patient about my assessment of diagnosis of ET and Jak2 pos MPN, major cause of morbidity and mortality includes thrombotic events   Continue Apixaban 5mg BID for now, continue for 3 months for treatment of hepatic vein thrombosis  At 3 months, plan to reduce to 2.5mg BID  Stop HU temporarily to see if GI symptoms resolve.  Ok to stay off if she feels much better of therapy  Continue 500mg hydroxyurea daily  Continue with routine CBC every 3 months.   Will plan to see the patient in 3 months in clinic      25 minutes spent by me on the date of the encounter doing chart review, interpretation of tests, patient visit, and documentation      Manjeet Wilde MD  Associate Professor of Medicine, Division of Hematology, Oncology and Transplantation  [employer]     The longitudinal plan of care for the diagnosis(es)/condition(s) as documented were addressed during this visit. Due to the added complexity in care, I will continue to support Sarah in the subsequent management and with ongoing continuity of care.    -------------------------------  Forward History:  Established care Dec 2021 for thrombocytosis, asymptomatic, on ASA.  Jak2 testing pos but by error there results were  "misinterpreted. PCP notified Dr. Wilde May 2023. She was seen in office in May 23 and restarted on ASA and HU was started at 500 mg daily.    June 2025 Hospitalized with sepsis,  C Diff Colitis and found to have a hepatic vein thrombosis.  Her ASA was stopped and HU continued.  Apixaban was started.     History: Sarah Felix is a 79 year old woman with a history of a surgically provoked DVT many years ago (at 25 years of age after ovarian cystectomy, treated briefly with anticoagulation), DM2, HTN, with a longstanding history of thrombocytosis who most likely has Jak2 positive ET (known MPN) and doing well on HU and ASA. CT abd/pelvis 2023 showed normal sized spleen     Since her last visit, she was hospitalized for sepsis, C Diff and was found to have a new hepatic vein thrombosis. She is recovering well from this.  She is tolerating her apixaban well with no bleeding or easy bruising.    No history of stroke, MI.     Medications are reviewed in the EMR and notable for  mg every day.    Physical Exam:  BP 97/60   Pulse 96   Temp 97.6  F (36.4  C) (Tympanic)   Resp 16   Ht 1.6 m (5' 3\")   Wt 58.5 kg (129 lb)   SpO2 98%   BMI 22.85 kg/m     Exam:   Gen: Appears well, no distress  HEENT: no scleral icterus or hemorrhage, no wet purpura, no lymphadenopathy  Skin: no ecchymoses or hematomas  Neuro: no focal deficits, affect and cognition are normal    Labs:   Latest Reference Range & Units 06/30/25 04:40 07/07/25 05:38   WBC 4.0 - 11.0 10e3/uL 13.4 (H) 14.9 (H)   Hemoglobin 11.7 - 15.7 g/dL 10.4 (L) 11.8   Hematocrit 35.0 - 47.0 % 34.2 (L) 38.0   Platelet Count 150 - 450 10e3/uL 656 (H) 707 (H)   (H): Data is abnormally high  (L): Data is abnormally low    "

## 2025-07-29 ENCOUNTER — ONCOLOGY VISIT (OUTPATIENT)
Dept: ONCOLOGY | Facility: CLINIC | Age: 80
End: 2025-07-29
Attending: INTERNAL MEDICINE
Payer: COMMERCIAL

## 2025-07-29 VITALS
HEART RATE: 96 BPM | HEIGHT: 63 IN | TEMPERATURE: 97.6 F | BODY MASS INDEX: 22.86 KG/M2 | DIASTOLIC BLOOD PRESSURE: 60 MMHG | SYSTOLIC BLOOD PRESSURE: 97 MMHG | WEIGHT: 129 LBS | OXYGEN SATURATION: 98 % | RESPIRATION RATE: 16 BRPM

## 2025-07-29 DIAGNOSIS — D47.3 ESSENTIAL THROMBOCYTHEMIA (H): Primary | ICD-10-CM

## 2025-07-29 PROCEDURE — 99214 OFFICE O/P EST MOD 30 MIN: CPT | Performed by: INTERNAL MEDICINE

## 2025-07-29 PROCEDURE — G0463 HOSPITAL OUTPT CLINIC VISIT: HCPCS | Performed by: INTERNAL MEDICINE

## 2025-07-29 ASSESSMENT — PAIN SCALES - GENERAL: PAINLEVEL_OUTOF10: MODERATE PAIN (6)

## 2025-07-29 NOTE — NURSING NOTE
"Oncology Rooming Note    July 29, 2025 12:07 PM   Sarah Felix is a 79 year old female who presents for:    Chief Complaint   Patient presents with    Oncology Clinic Visit     Thrombocytosis      Initial Vitals: BP 97/60   Pulse 96   Temp 97.6  F (36.4  C) (Tympanic)   Resp 16   Ht 1.6 m (5' 3\")   Wt 58.5 kg (129 lb)   SpO2 98%   BMI 22.85 kg/m   Estimated body mass index is 22.85 kg/m  as calculated from the following:    Height as of this encounter: 1.6 m (5' 3\").    Weight as of this encounter: 58.5 kg (129 lb). Body surface area is 1.61 meters squared.  Moderate Pain (6) Comment: neuropathy   No LMP recorded. Patient is postmenopausal.  Allergies reviewed: Yes  Medications reviewed: Yes    Medications: Medication refills not needed today.  Pharmacy name entered into [facility]:    [facility] - [place], [place] - [address]  WRITTEN PRESCRIPTION REQUESTED  [id number] - [place], [place] - [address]    PHQ9:  Did this patient require a PHQ9?: No      Clinical concerns: Just recently got out of the hospital around a week ago, wanted the provider to take a loot at their medications. Her B/P was low so she reports that they took her of of Lisinopril.      Adrian Shi, MARCI            "

## 2025-07-29 NOTE — PATIENT INSTRUCTIONS
Your visit the [facility] was to discuss management of your blood disorder called Essential Thrombocytosis.  The most common complication of the disease is blood clots.  The purpose of your Hydroxyurea and Aspirin were to reduce the chance of clot.    During your hospital stay, the doctors found you had a small blood clot in your abdomen, near the liver.  Because of this, they stopped your aspirin and started Eliquis, which is a blood thinner.    Stop your aspirin  Continue your Eliquis.  The treatment for this clot will last 3 months, at which point we can lower your dose of the medicine and continue to use it to prevent future blood clots.  Try stopping the Hydroxyurea temporarily.  If your upset stomach greatly improves then we can discuss stopping the medicine permanently.  You would be well-protected against clots with the Eliquis.  If there is no change, then resume the Hydroxyurea.  I'll see you back in 3 months so we can discuss lowering the dose of blood thinner and see about your experience with stopping the Hydroyxurea.

## 2025-07-29 NOTE — LETTER
2025      Sarah Felix  [address] [place] [address]      Dear Colleague,    Thank you for referring your patient, Sarah Felix, to the [facility]. Please see a copy of my visit note below.        [facility]  [address] [place] [address]  Phone: [phone]    Outpatient Visit Note:    Patient: Sarah Felix  MRN: [id number]  : 1945  IVAN: 2025    Sarah Felix is a 79 year old woman with a history of Jak2 positive myeloproliferative neoplasm, likely essential thrombocytopenia, who returns for routine follow up.       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 Platelet count has mildly improved on HU but is poorly tolerated.  She has a new hepatic vein thrombosis and is doing well on apixaban.    Recommendations:  I counseled the patient about my assessment of diagnosis of ET and Jak2 pos MPN, major cause of morbidity and mortality includes thrombotic events   Continue Apixaban 5mg BID for now, continue for 3 months for treatment of hepatic vein thrombosis  At 3 months, plan to reduce to 2.5mg BID  Stop HU temporarily to see if GI symptoms resolve.  Ok to stay off if she feels much better of therapy  Continue 500mg hydroxyurea daily  Continue with routine CBC every 3 months.   Will plan to see the patient in 3 months in clinic      25 minutes spent by me on the date of the encounter doing chart review, interpretation of tests, patient visit, and documentation      Manjeet Wilde MD  Associate Professor of Medicine, Division of Hematology, Oncology and Transplantation  [employer]     The longitudinal plan of care for the diagnosis(es)/condition(s) as documented were addressed during this visit. Due to the added complexity in care, I will continue to support Sarah in  "the subsequent management and with ongoing continuity of care.    -------------------------------  Forward History:  Established care Dec 2021 for thrombocytosis, asymptomatic, on ASA.  Jak2 testing pos but by error there results were misinterpreted. PCP notified Dr. Wilde May 2023. She was seen in office in May 23 and restarted on ASA and HU was started at 500 mg daily.    June 2025 Hospitalized with sepsis,  C Diff Colitis and found to have a hepatic vein thrombosis.  Her ASA was stopped and HU continued.  Apixaban was started.     History: Sarah Felix is a 79 year old woman with a history of a surgically provoked DVT many years ago (at 25 years of age after ovarian cystectomy, treated briefly with anticoagulation), DM2, HTN, with a longstanding history of thrombocytosis who most likely has Jak2 positive ET (known MPN) and doing well on HU and ASA. CT abd/pelvis 2023 showed normal sized spleen     Since her last visit, she was hospitalized for sepsis, C Diff and was found to have a new hepatic vein thrombosis. She is recovering well from this.  She is tolerating her apixaban well with no bleeding or easy bruising.    No history of stroke, MI.     Medications are reviewed in the EMR and notable for  mg every day.    Physical Exam:  BP 97/60   Pulse 96   Temp 97.6  F (36.4  C) (Tympanic)   Resp 16   Ht 1.6 m (5' 3\")   Wt 58.5 kg (129 lb)   SpO2 98%   BMI 22.85 kg/m     Exam:   Gen: Appears well, no distress  HEENT: no scleral icterus or hemorrhage, no wet purpura, no lymphadenopathy  Skin: no ecchymoses or hematomas  Neuro: no focal deficits, affect and cognition are normal    Labs:   Latest Reference Range & Units 06/30/25 04:40 07/07/25 05:38   WBC 4.0 - 11.0 10e3/uL 13.4 (H) 14.9 (H)   Hemoglobin 11.7 - 15.7 g/dL 10.4 (L) 11.8   Hematocrit 35.0 - 47.0 % 34.2 (L) 38.0   Platelet Count 150 - 450 10e3/uL 656 (H) 707 (H)   (H): Data is abnormally high  (L): Data is abnormally low      Again, " thank you for allowing me to participate in the care of your patient.        Sincerely,        Manjeet Wilde MD    Electronically signed

## 2025-07-30 ENCOUNTER — PATIENT OUTREACH (OUTPATIENT)
Dept: ONCOLOGY | Facility: CLINIC | Age: 80
End: 2025-07-30
Payer: COMMERCIAL

## 2025-07-30 DIAGNOSIS — I82.0 HEPATIC VEIN THROMBOSIS (H): ICD-10-CM

## 2025-07-31 NOTE — PROGRESS NOTES
[facility]: Cancer Care                                                                                          Sarah called in and wanted to know if she should still be on her apixiban.  According to Dr Wilde's note, she should be.  She stated she went through her pills at home and does not see that she has been taking that medication since leaving the [facility]CU a week ago.  RN notified Dr Wilde and she should restart medication.  He stated that she should continue to take it until she sees him in October.  He is ok with sending a 90 day supply to [facility] and having them mail it to her.  RN called pt back and updated her and she stated understanding and agrees with plan.  Medication sent to [facility].      Signature:  Mony Restrepo RN

## 2025-08-04 ENCOUNTER — TELEPHONE (OUTPATIENT)
Dept: FAMILY MEDICINE | Facility: CLINIC | Age: 80
End: 2025-08-04
Payer: COMMERCIAL

## 2025-08-08 DIAGNOSIS — E78.5 HYPERLIPIDEMIA LDL GOAL <100: ICD-10-CM

## 2025-08-08 DIAGNOSIS — F10.90 ALCOHOL USE: ICD-10-CM

## 2025-08-08 DIAGNOSIS — I10 HYPERTENSION GOAL BP (BLOOD PRESSURE) < 140/90: ICD-10-CM

## 2025-08-08 DIAGNOSIS — L30.4 INTERTRIGO: Primary | ICD-10-CM

## 2025-08-08 DIAGNOSIS — K22.2 SCHATZKI'S RING OF DISTAL ESOPHAGUS: ICD-10-CM

## 2025-08-08 DIAGNOSIS — R12 HEART BURN: ICD-10-CM

## 2025-08-08 DIAGNOSIS — R33.9 URINARY RETENTION: ICD-10-CM

## 2025-08-10 RX ORDER — FUROSEMIDE 20 MG/1
20 TABLET ORAL 2 TIMES DAILY
Qty: 180 TABLET | Refills: 1 | Status: SHIPPED | OUTPATIENT
Start: 2025-08-10 | End: 2025-08-14

## 2025-08-10 RX ORDER — ATORVASTATIN CALCIUM 40 MG/1
40 TABLET, FILM COATED ORAL DAILY
Qty: 90 TABLET | Refills: 3 | Status: SHIPPED | OUTPATIENT
Start: 2025-08-10

## 2025-08-10 RX ORDER — NYSTATIN 100000 [USP'U]/G
POWDER TOPICAL 2 TIMES DAILY PRN
Qty: 30 G | Refills: 1 | Status: SHIPPED | OUTPATIENT
Start: 2025-08-10

## 2025-08-10 RX ORDER — TAMSULOSIN HYDROCHLORIDE 0.4 MG/1
0.4 CAPSULE ORAL DAILY
Qty: 30 CAPSULE | Refills: 0 | Status: SHIPPED | OUTPATIENT
Start: 2025-08-10 | End: 2025-08-14

## 2025-08-10 RX ORDER — LANOLIN ALCOHOL/MO/W.PET/CERES
100 CREAM (GRAM) TOPICAL DAILY
Qty: 90 TABLET | Refills: 3 | Status: SHIPPED | OUTPATIENT
Start: 2025-08-10

## 2025-08-10 RX ORDER — PANTOPRAZOLE SODIUM 40 MG/1
40 TABLET, DELAYED RELEASE ORAL
Qty: 90 TABLET | Refills: 0 | Status: SHIPPED | OUTPATIENT
Start: 2025-08-10

## 2025-08-15 ENCOUNTER — RESULTS FOLLOW-UP (OUTPATIENT)
Dept: FAMILY MEDICINE | Facility: CLINIC | Age: 80
End: 2025-08-15
Payer: COMMERCIAL

## 2025-08-15 PROBLEM — F10.10 ALCOHOL ABUSE: Status: ACTIVE | Noted: 2025-08-15

## 2025-08-20 ENCOUNTER — PATIENT OUTREACH (OUTPATIENT)
Dept: CARE COORDINATION | Facility: CLINIC | Age: 80
End: 2025-08-20
Payer: COMMERCIAL

## (undated) DEVICE — COVER CAMERA IN-LIGHT DISP LT-C02

## (undated) DEVICE — CLIP HEMOCLIP ENDOSCOPIC INSTINCT 2.8X230CM INSC-7-230-SS

## (undated) DEVICE — PREP DURAPREP REMOVER 4OZ 8611

## (undated) DEVICE — PREP DURAPREP 26ML APL 8630

## (undated) DEVICE — ESU GROUND PAD UNIVERSAL W/O CORD

## (undated) DEVICE — SPONGE LAP 18X18" X8435

## (undated) DEVICE — PREP POVIDONE IODINE SCRUB 7.5% 4OZ APL82212

## (undated) DEVICE — CAST PADDING 3" COTTON WEBRIL UNSTERILE 9083

## (undated) DEVICE — GLOVE PROTEXIS BLUE W/NEU-THERA 7.5  2D73EB75

## (undated) DEVICE — DRSG KERLIX 4 1/2"X4YDS ROLL 6730

## (undated) DEVICE — SU ETHILON 3-0 PS-1 18" 1663H

## (undated) DEVICE — SU VICRYL 2-0 CT-1 27" UND J259H

## (undated) DEVICE — GUIDE WIRE 1.25X150MM 4.0MM THRD 900.722

## (undated) DEVICE — GLOVE PROTEXIS W/NEU-THERA 7.0  2D73TE70

## (undated) DEVICE — DRAPE MAYO STAND 23X54 8337

## (undated) DEVICE — EYE CANN IRR 25GA CYSTOTOME 581610

## (undated) DEVICE — DRILL BIT QUICK COUPLING 2.5X110MM GOLD 310.25

## (undated) DEVICE — PACK CATARACT CUSTOM ASC SEY15CPUMC

## (undated) DEVICE — DRAPE C-ARMOR 5 SIDED 5523

## (undated) DEVICE — DRAPE C-ARM W/STRAPS 42X72" 07-CA104

## (undated) DEVICE — ENDO CAP AND TUBING STERILE FOR ENDOGATOR  100130

## (undated) DEVICE — TUBING SUCTION 10'X3/16" N510

## (undated) DEVICE — SUCTION MANIFOLD DORNOCH ULTRA CART UL-CL500

## (undated) DEVICE — EYE TIP IRRIGATION & ASPIRATION POLYMER CVD 0.3MM 8065751512

## (undated) DEVICE — KIT CONNECTOR FOR OLYMPUS ENDOSCOPES DEFENDO 100310

## (undated) DEVICE — KIT ENDO FIRST STEP DISINFECTANT 200ML W/POUCH EP-4

## (undated) DEVICE — SOL NACL 0.9% IRRIG 1000ML BOTTLE 2F7124

## (undated) DEVICE — STRAP KNEE/BODY 31143004

## (undated) DEVICE — IMM LEG ELEVATOR 79-90191

## (undated) DEVICE — DRILL BIT SYN QUICK COUPLING 3.5X110MM 310.35

## (undated) DEVICE — DRAPE SHEET MED 44X70" 9355

## (undated) DEVICE — EYE PACK CUSTOM ANTERIOR 30DEG TIP CENTURION PPK6682-04

## (undated) DEVICE — DRSG GAUZE 4X4" TRAY 6939

## (undated) DEVICE — GOWN XLG DISP 9545

## (undated) DEVICE — PREP SKIN SCRUB TRAY 4461A

## (undated) DEVICE — ENDO FORCEP BX CAPTURA JUMBO SPIKE 2.8MMX230CM G53042

## (undated) DEVICE — EYE KNIFE SLIT XSTAR VISITEC 2.6MM 45DEG 373726

## (undated) DEVICE — EYE SHIELD PLASTIC

## (undated) DEVICE — TOURNIQUET SGL  BLADDER 30"X4" BLUE 5921030135

## (undated) DEVICE — Device

## (undated) DEVICE — CAST PADDING 4" STERILE 9044S

## (undated) DEVICE — SU MONOCRYL 4-0 PS-2 18" UND Y496G

## (undated) DEVICE — PACK ENDOSCOPY GI CUSTOM UMMC

## (undated) DEVICE — ESU GROUND PAD ADULT W/CORD E7507

## (undated) DEVICE — ENDO SNARE EXACTO COLD 9MM LOOP 2.4MMX230CM 00711115

## (undated) DEVICE — PACK LOWER EXTREMITY RIVERSIDE SOP32LEFSX

## (undated) DEVICE — DRSG ADAPTIC 3X16"  6114

## (undated) DEVICE — DRAPE IOBAN INCISE 23X17" 6650EZ

## (undated) DEVICE — GLOVE PROTEXIS MICRO 6.5  2D73PM65

## (undated) DEVICE — ENDO TUBING CO2 SMARTCAP STERILE DISP 100145CO2EXT

## (undated) DEVICE — SOL WATER IRRIG 1000ML BOTTLE 2F7114

## (undated) DEVICE — SPECIMEN TRAP POLYP 4 CHAMBER EZ710202 EZ710202

## (undated) DEVICE — BNDG ELASTIC 4" DBL LENGTH UNSTERILE 6611-14

## (undated) DEVICE — LINEN TOWEL PACK X5 5464

## (undated) DEVICE — EYE KNIFE STILETTO VISITEC 1.1MM ANG 45DEG SIDEPORT 376620

## (undated) DEVICE — SU VICRYL 0 CT-2 27" J334H

## (undated) DEVICE — SPECIMEN CONTAINER 3OZ W/FORMALIN 59901

## (undated) DEVICE — ATTACHMENT CAP DISTAL REVEAL 13.4MM BX00711773

## (undated) DEVICE — ENDO BITE BLOCK ADULT OMNI-BLOC

## (undated) DEVICE — PAD CHUX UNDERPAD 23X24" 7136

## (undated) DEVICE — TAPE CLOTH 3" CARDINAL 3TRCL03

## (undated) DEVICE — DRAPE CONVERTORS U-DRAPE 60X72" 8476

## (undated) DEVICE — LINEN TOWEL PACK X6 WHITE 5487

## (undated) DEVICE — LINEN ORTHO PACK 5446

## (undated) DEVICE — EYE CANN IRR 27GA ANTERIOR CHAMBER 581280

## (undated) DEVICE — LINEN TOWEL PACK X30 5481

## (undated) DEVICE — ESU PENCIL SMOKE EVAC W/ROCKER SWITCH 0703-047-000

## (undated) DEVICE — DRSG XEROFORM 5X9" 8884431605

## (undated) DEVICE — DRSG XEROFORM 5X9" CUR253590W

## (undated) DEVICE — CAST PADDING 3" COTTON SPECIALIST 100 UNSTERILE 7053

## (undated) RX ORDER — FENTANYL CITRATE 50 UG/ML
INJECTION, SOLUTION INTRAMUSCULAR; INTRAVENOUS
Status: DISPENSED
Start: 2019-10-12

## (undated) RX ORDER — FENTANYL CITRATE 50 UG/ML
INJECTION, SOLUTION INTRAMUSCULAR; INTRAVENOUS
Status: DISPENSED
Start: 2019-10-28

## (undated) RX ORDER — FENTANYL CITRATE 50 UG/ML
INJECTION, SOLUTION INTRAMUSCULAR; INTRAVENOUS
Status: DISPENSED
Start: 2019-03-14

## (undated) RX ORDER — ONDANSETRON 2 MG/ML
INJECTION INTRAMUSCULAR; INTRAVENOUS
Status: DISPENSED
Start: 2019-10-28

## (undated) RX ORDER — METOCLOPRAMIDE HYDROCHLORIDE 5 MG/ML
INJECTION INTRAMUSCULAR; INTRAVENOUS
Status: DISPENSED
Start: 2019-03-14

## (undated) RX ORDER — SODIUM CHLORIDE, SODIUM LACTATE, POTASSIUM CHLORIDE, CALCIUM CHLORIDE 600; 310; 30; 20 MG/100ML; MG/100ML; MG/100ML; MG/100ML
INJECTION, SOLUTION INTRAVENOUS
Status: DISPENSED
Start: 2019-10-28

## (undated) RX ORDER — ACETAMINOPHEN 325 MG/1
TABLET ORAL
Status: DISPENSED
Start: 2019-10-12

## (undated) RX ORDER — OXYCODONE HYDROCHLORIDE 5 MG/1
TABLET ORAL
Status: DISPENSED
Start: 2019-10-28

## (undated) RX ORDER — LIDOCAINE HYDROCHLORIDE 20 MG/ML
INJECTION, SOLUTION EPIDURAL; INFILTRATION; INTRACAUDAL; PERINEURAL
Status: DISPENSED
Start: 2019-03-14

## (undated) RX ORDER — HYDROMORPHONE HYDROCHLORIDE 1 MG/ML
INJECTION, SOLUTION INTRAMUSCULAR; INTRAVENOUS; SUBCUTANEOUS
Status: DISPENSED
Start: 2019-10-28

## (undated) RX ORDER — FENTANYL CITRATE 50 UG/ML
INJECTION, SOLUTION INTRAMUSCULAR; INTRAVENOUS
Status: DISPENSED
Start: 2018-08-13

## (undated) RX ORDER — SIMETHICONE 20 MG/.3ML
EMULSION ORAL
Status: DISPENSED
Start: 2018-09-06

## (undated) RX ORDER — HYDROMORPHONE HYDROCHLORIDE 1 MG/ML
INJECTION, SOLUTION INTRAMUSCULAR; INTRAVENOUS; SUBCUTANEOUS
Status: DISPENSED
Start: 2019-10-12

## (undated) RX ORDER — PROPOFOL 10 MG/ML
INJECTION, EMULSION INTRAVENOUS
Status: DISPENSED
Start: 2019-03-14

## (undated) RX ORDER — OXYCODONE HYDROCHLORIDE 5 MG/1
TABLET ORAL
Status: DISPENSED
Start: 2019-10-12

## (undated) RX ORDER — CLINDAMYCIN PHOSPHATE 600 MG/50ML
INJECTION, SOLUTION INTRAVENOUS
Status: DISPENSED
Start: 2019-10-28

## (undated) RX ORDER — ONDANSETRON 2 MG/ML
INJECTION INTRAMUSCULAR; INTRAVENOUS
Status: DISPENSED
Start: 2019-03-14

## (undated) RX ORDER — FENTANYL CITRATE 50 UG/ML
INJECTION, SOLUTION INTRAMUSCULAR; INTRAVENOUS
Status: DISPENSED
Start: 2018-09-06